# Patient Record
Sex: MALE | Race: WHITE | NOT HISPANIC OR LATINO | Employment: OTHER | URBAN - METROPOLITAN AREA
[De-identification: names, ages, dates, MRNs, and addresses within clinical notes are randomized per-mention and may not be internally consistent; named-entity substitution may affect disease eponyms.]

---

## 2023-11-10 ENCOUNTER — APPOINTMENT (EMERGENCY)
Dept: RADIOLOGY | Facility: HOSPITAL | Age: 73
DRG: 291 | End: 2023-11-10
Payer: MEDICARE

## 2023-11-10 ENCOUNTER — HOSPITAL ENCOUNTER (INPATIENT)
Facility: HOSPITAL | Age: 73
LOS: 6 days | Discharge: NURSING, REHAB OR LONG-TERM CARE | DRG: 291 | End: 2023-11-16
Attending: EMERGENCY MEDICINE | Admitting: HOSPITALIST
Payer: MEDICARE

## 2023-11-10 ENCOUNTER — APPOINTMENT (EMERGENCY)
Dept: CT IMAGING | Facility: HOSPITAL | Age: 73
DRG: 291 | End: 2023-11-10
Payer: MEDICARE

## 2023-11-10 DIAGNOSIS — R79.89 ELEVATED TROPONIN: ICD-10-CM

## 2023-11-10 DIAGNOSIS — R60.0 EDEMA OF LEFT LOWER EXTREMITY: ICD-10-CM

## 2023-11-10 DIAGNOSIS — S21.101A: ICD-10-CM

## 2023-11-10 DIAGNOSIS — I82.409 ACUTE DVT (DEEP VENOUS THROMBOSIS) (HCC): ICD-10-CM

## 2023-11-10 DIAGNOSIS — R53.1 GENERALIZED WEAKNESS: Primary | ICD-10-CM

## 2023-11-10 DIAGNOSIS — R06.89 ACUTE RESPIRATORY INSUFFICIENCY: ICD-10-CM

## 2023-11-10 DIAGNOSIS — I50.41 ACUTE COMBINED SYSTOLIC AND DIASTOLIC CONGESTIVE HEART FAILURE (HCC): ICD-10-CM

## 2023-11-10 PROBLEM — J96.01 ACUTE RESPIRATORY FAILURE WITH HYPOXIA (HCC): Status: ACTIVE | Noted: 2023-11-10

## 2023-11-10 PROBLEM — R65.10 SIRS (SYSTEMIC INFLAMMATORY RESPONSE SYNDROME) (HCC): Status: ACTIVE | Noted: 2023-11-10

## 2023-11-10 PROBLEM — I10 ESSENTIAL HYPERTENSION: Status: ACTIVE | Noted: 2023-11-10

## 2023-11-10 PROBLEM — R22.42 LOCALIZED SWELLING OF LEFT LOWER LEG: Status: ACTIVE | Noted: 2023-11-10

## 2023-11-10 LAB
2HR DELTA HS TROPONIN: 14 NG/L
4HR DELTA HS TROPONIN: 14 NG/L
ALBUMIN SERPL BCP-MCNC: 3.9 G/DL (ref 3.5–5)
ALP SERPL-CCNC: 54 U/L (ref 34–104)
ALT SERPL W P-5'-P-CCNC: 34 U/L (ref 7–52)
ANION GAP SERPL CALCULATED.3IONS-SCNC: 9 MMOL/L
APTT PPP: 28 SECONDS (ref 23–37)
AST SERPL W P-5'-P-CCNC: 53 U/L (ref 13–39)
ATRIAL RATE: 96 BPM
BASOPHILS # BLD AUTO: 0.03 THOUSANDS/ÂΜL (ref 0–0.1)
BASOPHILS NFR BLD AUTO: 0 % (ref 0–1)
BILIRUB SERPL-MCNC: 0.71 MG/DL (ref 0.2–1)
BNP SERPL-MCNC: 572 PG/ML (ref 0–100)
BUN SERPL-MCNC: 17 MG/DL (ref 5–25)
CALCIUM SERPL-MCNC: 8.8 MG/DL (ref 8.4–10.2)
CARDIAC TROPONIN I PNL SERPL HS: 54 NG/L
CARDIAC TROPONIN I PNL SERPL HS: 68 NG/L
CARDIAC TROPONIN I PNL SERPL HS: 68 NG/L
CHLORIDE SERPL-SCNC: 103 MMOL/L (ref 96–108)
CO2 SERPL-SCNC: 22 MMOL/L (ref 21–32)
CREAT SERPL-MCNC: 1.03 MG/DL (ref 0.6–1.3)
D DIMER PPP FEU-MCNC: 3.09 UG/ML FEU
EOSINOPHIL # BLD AUTO: 0.03 THOUSAND/ÂΜL (ref 0–0.61)
EOSINOPHIL NFR BLD AUTO: 0 % (ref 0–6)
ERYTHROCYTE [DISTWIDTH] IN BLOOD BY AUTOMATED COUNT: 13.4 % (ref 11.6–15.1)
FLUAV RNA RESP QL NAA+PROBE: NEGATIVE
FLUBV RNA RESP QL NAA+PROBE: NEGATIVE
GFR SERPL CREATININE-BSD FRML MDRD: 70 ML/MIN/1.73SQ M
GLUCOSE SERPL-MCNC: 128 MG/DL (ref 65–140)
HCT VFR BLD AUTO: 44.4 % (ref 36.5–49.3)
HGB BLD-MCNC: 14.5 G/DL (ref 12–17)
IMM GRANULOCYTES # BLD AUTO: 0.08 THOUSAND/UL (ref 0–0.2)
IMM GRANULOCYTES NFR BLD AUTO: 1 % (ref 0–2)
INR PPP: 1.11 (ref 0.84–1.19)
LACTATE SERPL-SCNC: 2 MMOL/L (ref 0.5–2)
LACTATE SERPL-SCNC: 2.1 MMOL/L (ref 0.5–2)
LACTATE SERPL-SCNC: 2.4 MMOL/L (ref 0.5–2)
LYMPHOCYTES # BLD AUTO: 0.46 THOUSANDS/ÂΜL (ref 0.6–4.47)
LYMPHOCYTES NFR BLD AUTO: 3 % (ref 14–44)
MAGNESIUM SERPL-MCNC: 2 MG/DL (ref 1.9–2.7)
MCH RBC QN AUTO: 31.3 PG (ref 26.8–34.3)
MCHC RBC AUTO-ENTMCNC: 32.7 G/DL (ref 31.4–37.4)
MCV RBC AUTO: 96 FL (ref 82–98)
MONOCYTES # BLD AUTO: 0.84 THOUSAND/ÂΜL (ref 0.17–1.22)
MONOCYTES NFR BLD AUTO: 6 % (ref 4–12)
NEUTROPHILS # BLD AUTO: 13.95 THOUSANDS/ÂΜL (ref 1.85–7.62)
NEUTS SEG NFR BLD AUTO: 90 % (ref 43–75)
NRBC BLD AUTO-RTO: 0 /100 WBCS
P AXIS: 61 DEGREES
PLATELET # BLD AUTO: 295 THOUSANDS/UL (ref 149–390)
PMV BLD AUTO: 10.8 FL (ref 8.9–12.7)
POTASSIUM SERPL-SCNC: 4.2 MMOL/L (ref 3.5–5.3)
POTASSIUM SERPL-SCNC: 5.8 MMOL/L (ref 3.5–5.3)
PR INTERVAL: 136 MS
PROCALCITONIN SERPL-MCNC: 0.32 NG/ML
PROT SERPL-MCNC: 7.3 G/DL (ref 6.4–8.4)
PROTHROMBIN TIME: 14.7 SECONDS (ref 11.6–14.5)
QRS AXIS: 56 DEGREES
QRSD INTERVAL: 118 MS
QT INTERVAL: 368 MS
QTC INTERVAL: 464 MS
RBC # BLD AUTO: 4.63 MILLION/UL (ref 3.88–5.62)
RSV RNA RESP QL NAA+PROBE: NEGATIVE
SARS-COV-2 RNA RESP QL NAA+PROBE: NEGATIVE
SODIUM SERPL-SCNC: 134 MMOL/L (ref 135–147)
T WAVE AXIS: 133 DEGREES
VENTRICULAR RATE: 96 BPM
WBC # BLD AUTO: 15.39 THOUSAND/UL (ref 4.31–10.16)

## 2023-11-10 PROCEDURE — 83735 ASSAY OF MAGNESIUM: CPT | Performed by: EMERGENCY MEDICINE

## 2023-11-10 PROCEDURE — G1004 CDSM NDSC: HCPCS

## 2023-11-10 PROCEDURE — 83605 ASSAY OF LACTIC ACID: CPT | Performed by: INTERNAL MEDICINE

## 2023-11-10 PROCEDURE — 87040 BLOOD CULTURE FOR BACTERIA: CPT | Performed by: EMERGENCY MEDICINE

## 2023-11-10 PROCEDURE — 71045 X-RAY EXAM CHEST 1 VIEW: CPT

## 2023-11-10 PROCEDURE — 83605 ASSAY OF LACTIC ACID: CPT | Performed by: EMERGENCY MEDICINE

## 2023-11-10 PROCEDURE — 0241U HB NFCT DS VIR RESP RNA 4 TRGT: CPT | Performed by: INTERNAL MEDICINE

## 2023-11-10 PROCEDURE — 96365 THER/PROPH/DIAG IV INF INIT: CPT

## 2023-11-10 PROCEDURE — 99285 EMERGENCY DEPT VISIT HI MDM: CPT | Performed by: EMERGENCY MEDICINE

## 2023-11-10 PROCEDURE — 84132 ASSAY OF SERUM POTASSIUM: CPT | Performed by: EMERGENCY MEDICINE

## 2023-11-10 PROCEDURE — 80053 COMPREHEN METABOLIC PANEL: CPT | Performed by: EMERGENCY MEDICINE

## 2023-11-10 PROCEDURE — 85025 COMPLETE CBC W/AUTO DIFF WBC: CPT | Performed by: EMERGENCY MEDICINE

## 2023-11-10 PROCEDURE — 71275 CT ANGIOGRAPHY CHEST: CPT

## 2023-11-10 PROCEDURE — 85379 FIBRIN DEGRADATION QUANT: CPT | Performed by: EMERGENCY MEDICINE

## 2023-11-10 PROCEDURE — 36415 COLL VENOUS BLD VENIPUNCTURE: CPT | Performed by: EMERGENCY MEDICINE

## 2023-11-10 PROCEDURE — 93005 ELECTROCARDIOGRAM TRACING: CPT

## 2023-11-10 PROCEDURE — 84145 PROCALCITONIN (PCT): CPT | Performed by: EMERGENCY MEDICINE

## 2023-11-10 PROCEDURE — 96375 TX/PRO/DX INJ NEW DRUG ADDON: CPT

## 2023-11-10 PROCEDURE — 99223 1ST HOSP IP/OBS HIGH 75: CPT | Performed by: INTERNAL MEDICINE

## 2023-11-10 PROCEDURE — 85730 THROMBOPLASTIN TIME PARTIAL: CPT | Performed by: EMERGENCY MEDICINE

## 2023-11-10 PROCEDURE — 83880 ASSAY OF NATRIURETIC PEPTIDE: CPT | Performed by: EMERGENCY MEDICINE

## 2023-11-10 PROCEDURE — 99285 EMERGENCY DEPT VISIT HI MDM: CPT

## 2023-11-10 PROCEDURE — 93010 ELECTROCARDIOGRAM REPORT: CPT | Performed by: INTERNAL MEDICINE

## 2023-11-10 PROCEDURE — 84484 ASSAY OF TROPONIN QUANT: CPT | Performed by: EMERGENCY MEDICINE

## 2023-11-10 PROCEDURE — 85610 PROTHROMBIN TIME: CPT | Performed by: EMERGENCY MEDICINE

## 2023-11-10 RX ORDER — HYDRALAZINE HYDROCHLORIDE 20 MG/ML
5 INJECTION INTRAMUSCULAR; INTRAVENOUS EVERY 6 HOURS PRN
Status: DISCONTINUED | OUTPATIENT
Start: 2023-11-10 | End: 2023-11-16 | Stop reason: HOSPADM

## 2023-11-10 RX ORDER — ACETAMINOPHEN 325 MG/1
650 TABLET ORAL EVERY 6 HOURS PRN
Status: DISCONTINUED | OUTPATIENT
Start: 2023-11-10 | End: 2023-11-16 | Stop reason: HOSPADM

## 2023-11-10 RX ORDER — HEPARIN SODIUM 1000 [USP'U]/ML
10000 INJECTION, SOLUTION INTRAVENOUS; SUBCUTANEOUS ONCE
Status: COMPLETED | OUTPATIENT
Start: 2023-11-10 | End: 2023-11-10

## 2023-11-10 RX ORDER — HEPARIN SODIUM 10000 [USP'U]/100ML
3-30 INJECTION, SOLUTION INTRAVENOUS
Status: DISCONTINUED | OUTPATIENT
Start: 2023-11-10 | End: 2023-11-14

## 2023-11-10 RX ORDER — FUROSEMIDE 10 MG/ML
20 INJECTION INTRAMUSCULAR; INTRAVENOUS 2 TIMES DAILY
Status: DISCONTINUED | OUTPATIENT
Start: 2023-11-10 | End: 2023-11-13

## 2023-11-10 RX ORDER — HEPARIN SODIUM 1000 [USP'U]/ML
10000 INJECTION, SOLUTION INTRAVENOUS; SUBCUTANEOUS EVERY 6 HOURS PRN
Status: DISCONTINUED | OUTPATIENT
Start: 2023-11-10 | End: 2023-11-14

## 2023-11-10 RX ORDER — HEPARIN SODIUM 1000 [USP'U]/ML
5000 INJECTION, SOLUTION INTRAVENOUS; SUBCUTANEOUS EVERY 6 HOURS PRN
Status: DISCONTINUED | OUTPATIENT
Start: 2023-11-10 | End: 2023-11-14

## 2023-11-10 RX ADMIN — HEPARIN SODIUM 10000 UNITS: 1000 INJECTION, SOLUTION INTRAVENOUS; SUBCUTANEOUS at 19:05

## 2023-11-10 RX ADMIN — IOHEXOL 100 ML: 350 INJECTION, SOLUTION INTRAVENOUS at 19:14

## 2023-11-10 RX ADMIN — SODIUM CHLORIDE 250 ML: 0.9 INJECTION, SOLUTION INTRAVENOUS at 21:44

## 2023-11-10 RX ADMIN — ACETAMINOPHEN 650 MG: 325 TABLET, FILM COATED ORAL at 21:40

## 2023-11-10 RX ADMIN — HEPARIN SODIUM 18 UNITS/KG/HR: 10000 INJECTION, SOLUTION INTRAVENOUS at 19:23

## 2023-11-10 RX ADMIN — FUROSEMIDE 20 MG: 10 INJECTION, SOLUTION INTRAMUSCULAR; INTRAVENOUS at 21:40

## 2023-11-10 NOTE — ED PROVIDER NOTES
History  Chief Complaint   Patient presents with    Weakness - Generalized     Pt to er via ems from [facility] with reports from staff that he was in "respiratory distress", patient denies any trouble breathing. Reports that he has been feeling an increase in weakness over the past couple days      74year old male presents for evaluation of generalized weakness and soreness of the anterior thighs bilaterally, worse on the right, with the feeling his legs are going to give out when he walks. Patient was noted to have difficulty breathing by nursing home staff this morning and had an oxygen saturation of 90% on room air upon arrival.  He states he feels better with the oxygen, but denies shortness of breath. No chest pain. Patient states he has noticed increased swelling of the left leg over the past 2 months. No history of VTE. He report recent URI symptoms 4 weeks ago, but states he no longer has cough, congestion or fevers. None       Past Medical History:   Diagnosis Date    Hypertension     Skin cancer        History reviewed. No pertinent surgical history. History reviewed. No pertinent family history. I have reviewed and agree with the history as documented. E-Cigarette/Vaping     E-Cigarette/Vaping Substances     Social History     Tobacco Use    Smoking status: Never    Smokeless tobacco: Never   Substance Use Topics    Alcohol use: Never    Drug use: Never       Review of Systems    Physical Exam  Physical Exam  Vitals and nursing note reviewed. HENT:      Head: Normocephalic and atraumatic. Cardiovascular:      Rate and Rhythm: Normal rate and regular rhythm. Pulses: Normal pulses. Heart sounds: Normal heart sounds. Pulmonary:      Effort: Pulmonary effort is normal. No respiratory distress. Breath sounds: Normal breath sounds. Abdominal:      General: There is no distension. Palpations: Abdomen is soft. Tenderness:  There is no abdominal tenderness. Musculoskeletal:      Right lower leg: No edema. Left lower leg: Edema (3+) present. Skin:     Comments: Erythema left lower leg associated with edema and venous congestion. Neurological:      Mental Status: He is alert. Vital Signs  ED Triage Vitals   Temperature Pulse Respirations Blood Pressure SpO2   11/10/23 1515 11/10/23 1515 11/10/23 1513 11/10/23 1513 11/10/23 1515   100.2 °F (37.9 °C) 96 18 161/71 90 %      Temp Source Heart Rate Source Patient Position - Orthostatic VS BP Location FiO2 (%)   11/10/23 1515 11/10/23 1515 11/10/23 1513 11/10/23 1513 --   Oral Monitor Lying Left arm       Pain Score       11/10/23 2000       No Pain           Vitals:    11/10/23 1630 11/10/23 1715 11/10/23 1900 11/10/23 2000   BP: 138/65 130/62  141/62   Pulse: 91 99 98 92   Patient Position - Orthostatic VS:    Lying         Visual Acuity      ED Medications  Medications   heparin (porcine) 25,000 units in 0.45% NaCl 250 mL infusion (premix) ( Intravenous Not Given 11/10/23 1927)   heparin (porcine) injection 10,000 Units (has no administration in time range)   heparin (porcine) injection 5,000 Units (has no administration in time range)   heparin (porcine) injection 10,000 Units (10,000 Units Intravenous Given 11/10/23 1905)   iohexol (OMNIPAQUE) 350 MG/ML injection (SINGLE-DOSE) 100 mL (100 mL Intravenous Given 11/10/23 1914)       Diagnostic Studies  Results Reviewed       Procedure Component Value Units Date/Time    Blood culture #1 [[id number]] Collected: 11/10/23 1532    Lab Status: Preliminary result Specimen: Blood from Arm, Left Updated: 11/10/23 2001     Blood Culture Received in Microbiology Lab. Culture in Progress. Blood culture #2 [[id number]] Collected: 11/10/23 1532    Lab Status: Preliminary result Specimen: Blood from Arm, Left Updated: 11/10/23 2001     Blood Culture Received in Microbiology Lab. Culture in Progress.     Lactic acid 2 Hours [[id number]] Collected: 11/10/23 1942 Lab Status: In process Specimen: Blood from Arm, Right Updated: 11/1948    HS Troponin I 4hr [[id number]] Collected: 11/10/23 1942    Lab Status: In process Specimen: Blood from Arm, Right Updated: 11/1948    D-Dimer [[id number]]  (Abnormal) Collected: 11/10/23 1645    Lab Status: Final result Specimen: Blood from Arm, Left Updated: 11/10/23 1752     D-Dimer, Quant 3.09 ug/ml FEU     Narrative: In the evaluation for possible pulmonary embolism, in the appropriate (Well's Score of 4 or less) patient, the age adjusted d-dimer cutoff for this patient can be calculated as:    Age x 0.01 (in ug/mL) for Age-adjusted D-dimer exclusion threshold for a patient over 50 years. Protime-INR [[id number]]  (Abnormal) Collected: 11/10/23 1645    Lab Status: Final result Specimen: Blood from Arm, Left Updated: 11/10/23 1752     Protime 14.7 seconds      INR 1.11    APTT [[id number]]  (Normal) Collected: 11/10/23 1645    Lab Status: Final result Specimen: Blood from Arm, Left Updated: 11/10/23 1752     PTT 28 seconds     Potassium [[id number]]  (Normal) Collected: 11/10/23 1647    Lab Status: Final result Specimen: Blood from Arm, Left Updated: 11/10/23 1739     Potassium 4.2 mmol/L     HS Troponin I 2hr [[id number]]  (Abnormal) Collected: 11/10/23 1706    Lab Status: Final result Specimen: Blood from Arm, Left Updated: 11/10/23 1736     hs TnI 2hr 68 ng/L      Delta 2hr hsTnI 14 ng/L     B-Type Natriuretic Peptide(BNP) [[id number]]  (Abnormal) Collected: 11/10/23 1532    Lab Status: Final result Specimen: Blood from Arm, Left Updated: 11/10/23 1637      pg/mL     Lactic acid [[id number]]  (Abnormal) Collected: 11/10/23 1532    Lab Status: Final result Specimen: Blood from Arm, Left Updated: 11/10/23 1624     LACTIC ACID 2.1 mmol/L     Narrative:      Result may be elevated if tourniquet was used during collection.     Procalcitonin [[id number]]  (Abnormal) Collected: 11/10/23 2673    Lab Status: Final result Specimen: Blood from Arm, Left Updated: 11/10/23 1623     Procalcitonin 0.32 ng/ml     HS Troponin 0hr (reflex protocol) [[id number]]  (Abnormal) Collected: 11/10/23 1532    Lab Status: Final result Specimen: Blood from Arm, Left Updated: 11/10/23 1620     hs TnI 0hr 54 ng/L     Comprehensive metabolic panel [[id number]]  (Abnormal) Collected: 11/10/23 1540    Lab Status: Final result Specimen: Blood from Arm, Left Updated: 11/10/23 1614     Sodium 134 mmol/L      Potassium 5.8 mmol/L      Chloride 103 mmol/L      CO2 22 mmol/L      ANION GAP 9 mmol/L      BUN 17 mg/dL      Creatinine 1.03 mg/dL      Glucose 128 mg/dL      Calcium 8.8 mg/dL      AST 53 U/L      ALT 34 U/L      Alkaline Phosphatase 54 U/L      Total Protein 7.3 g/dL      Albumin 3.9 g/dL      Total Bilirubin 0.71 mg/dL      eGFR 70 ml/min/1.73sq m     Narrative:      [place] guidelines for Chronic Kidney Disease (CKD):     Stage 1 with normal or high GFR (GFR > 90 mL/min/1.73 square meters)    Stage 2 Mild CKD (GFR = 60-89 mL/min/1.73 square meters)    Stage 3A Moderate CKD (GFR = 45-59 mL/min/1.73 square meters)    Stage 3B Moderate CKD (GFR = 30-44 mL/min/1.73 square meters)    Stage 4 Severe CKD (GFR = 15-29 mL/min/1.73 square meters)    Stage 5 End Stage CKD (GFR <15 mL/min/1.73 square meters)  Note: GFR calculation is accurate only with a steady state creatinine    Magnesium [[id number]]  (Normal) Collected: 11/10/23 1540    Lab Status: Final result Specimen: Blood from Arm, Left Updated: 11/10/23 1614     Magnesium 2.0 mg/dL     CBC and differential [[id number]]  (Abnormal) Collected: 11/10/23 1532    Lab Status: Final result Specimen: Blood from Arm, Left Updated: 11/10/23 1604     WBC 15.39 Thousand/uL      RBC 4.63 Million/uL      Hemoglobin 14.5 g/dL      Hematocrit 44.4 %      MCV 96 fL      MCH 31.3 pg      MCHC 32.7 g/dL      RDW 13.4 %      MPV 10.8 fL      Platelets 179 Thousands/uL      nRBC 0 /100 WBCs Neutrophils Relative 90 %      Immat GRANS % 1 %      Lymphocytes Relative 3 %      Monocytes Relative 6 %      Eosinophils Relative 0 %      Basophils Relative 0 %      Neutrophils Absolute 13.95 Thousands/µL      Immature Grans Absolute 0.08 Thousand/uL      Lymphocytes Absolute 0.46 Thousands/µL      Monocytes Absolute 0.84 Thousand/µL      Eosinophils Absolute 0.03 Thousand/µL      Basophils Absolute 0.03 Thousands/µL     Narrative: This is an appended report. These results have been appended to a previously verified report. UA w Reflex to Microscopic w Reflex to Culture [[id number]]     Lab Status: No result Specimen: Urine                    CTA ED chest PE study   Final Result by Russell Berry MD (1950)      No definite pulmonary emboli although segmental and subsegmental emboli in the lung bases cannot be excluded due to motion artifact. Evaluation of the lungs compromised by motion with mild interstitial edema. Pulmonary artery enlargement which can be seen with pulmonary hypertension. Hepatic steatosis. Gynecomastia.          Workstation performed: [id number]         XR chest portable   ED Interpretation by Aicha Guzman MD (11/10 [id number])   Cardiomegaly with patchiness bilateral lower lung fields                 Procedures  ECG 12 Lead Documentation Only    Date/Time: 11/10/2023 3:20 PM    Performed by: Aicha Guzman MD  Authorized by: Aicha Guzman MD    Indications / Diagnosis:  Generalized weakness  ECG reviewed by me, the ED Provider: yes    Patient location:  ED  Previous ECG:     Previous ECG:  Unavailable  Interpretation:     Interpretation: abnormal    Rate:     ECG rate:  96    ECG rate assessment: normal    Rhythm:     Rhythm: sinus rhythm    Ectopy:     Ectopy: PVCs    QRS:     QRS axis:  Normal    QRS intervals:  Normal  Conduction:     Conduction: normal    ST segments:     ST segments:  Non-specific    Elevation:  V1, V2 and V3    Depression:  V5 and V6  T waves:     T waves: inverted      Inverted:  AVL and II  Other findings:     Other findings: LVH with strain             ED Course  ED Course as of 11/10/23 2005   Fri Nov 10, 2023   1624 hs TnI 0hr(!): 54   1624 Potassium(!): 5.8  Moderately hemolyzed. Repeat ordered   1739 Potassium: 4.2   1816 D-Dimer, Quant(!): 3.09             HEART Risk Score      Flowsheet Row Most Recent Value   Heart Score Risk Calculator    History 0  [no chest pain] Filed at: 11/10/2023 1545   ECG 1 Filed at: 11/10/2023 1545   Age 2 Filed at: 11/10/2023 1545   Risk Factors 2 Filed at: 11/10/2023 1545   Troponin 0 Filed at: 11/10/2023 1545   HEART Score 5 Filed at: 11/10/2023 1545                       Initial Sepsis Screening       Row Name 11/10/23 2002                Is the patient's history suggestive of a new or worsening infection? No  -EE                  User Key  (r) = Recorded By, (t) = Taken By, (c) = Cosigned By      [address] [place] [address] Name Provider Type    EE Lea Curtis MD Physician                    SBIRT 22yo+      Flowsheet Row Most Recent Value   Initial Alcohol Screen: US AUDIT-C     1. How often do you have a drink containing alcohol? 0 Filed at: 11/10/2023 1514   2. How many drinks containing alcohol do you have on a typical day you are drinking? 0 Filed at: 11/10/2023 1514   3a. Male UNDER 65: How often do you have five or more drinks on one occasion? 0 Filed at: 11/10/2023 1514   3b. FEMALE Any Age, or MALE 65+: How often do you have 4 or more drinks on one occassion? 0 Filed at: 11/10/2023 1514   Audit-C Score 0 Filed at: 11/10/2023 1514   WILLIE: How many times in the past year have you. .. Used an illegal drug or used a prescription medication for non-medical reasons?  Never Filed at: 11/10/2023 1514            Wells' Criteria for PE      Flowsheet Row Most Recent Value   Wells' Criteria for PE    Clinical signs and symptoms of DVT 3 Filed at: 11/10/2023 1546   PE is primary diagnosis or equally likely 3 Filed at: 11/10/2023 1546   HR >100 0 Filed at: 11/10/2023 1546   Immobilization at least 3 days or Surgery in the previous 4 weeks 0 Filed at: 11/10/2023 1546   Previous, objectively diagnosed PE or DVT 0 Filed at: 11/10/2023 1546   Hemoptysis 0 Filed at: 11/10/2023 1546   Malignancy with treatment within 6 months or palliative 0 Filed at: 11/10/2023 1546   Wells' Criteria Total 6 Filed at: 11/10/2023 1546                  Medical Decision Making  74year old male presents for evaluation of generalized weakness. Placed on nasal canula for hypoxemia on room air. No home oxygen requirement. EKG consistent with LVH with strain. No prior for comparison. No STEMI criteria or arrhythmia on my independent interpretation. HEART score 5. Moderate risk Well's score for PE with signs of possible DVT on exam.  D-dimer elevated. Patient started on heparin drip. CT PE study negative for large PE, but limited by motion artifact. Will continue VTE heparin pending duplex of the lower extremity which can be completed as inpatient. Patient admitted for further evaluation and management. Amount and/or Complexity of Data Reviewed  Labs: ordered. Decision-making details documented in ED Course. Radiology: ordered and independent interpretation performed. Risk  Prescription drug management. Decision regarding hospitalization.              Disposition  Final diagnoses:   Generalized weakness   Elevated troponin   Edema of left lower extremity   Acute respiratory insufficiency     Time reflects when diagnosis was documented in both MDM as applicable and the Disposition within this note       Time User Action Codes Description Comment    11/10/2023  7:32 PM Ernestina Duane Add [R53.1] Generalized weakness     11/10/2023  7:32 PM Ayesha Duane Add [R79.89] Elevated troponin     11/10/2023  8:00 PM Ernestina Duane Add [R60.0] Edema of left lower extremity     11/10/2023  8:05 KURT Cerna Add [R06.89] Acute respiratory insufficiency           ED Disposition       ED Disposition   Admit    Condition   Stable    Date/Time   Fri Nov 10, 2023 2000    Comment   Case was discussed with MIO and the patient's admission status was agreed to be Admission Status: inpatient status to the service of Dr. Juliana Peterson . Follow-up Information    None         Patient's Medications    No medications on file       No discharge procedures on file.     PDMP Review       None            ED Provider  Electronically Signed by             Eduarda Stephenson MD  11/10/23 2002       Eduarda Stephenson MD  11/10/23 2005

## 2023-11-11 LAB
ANION GAP SERPL CALCULATED.3IONS-SCNC: 10 MMOL/L
APTT PPP: 185 SECONDS (ref 23–37)
APTT PPP: 78 SECONDS (ref 23–37)
APTT PPP: >210 SECONDS (ref 23–37)
BASOPHILS # BLD AUTO: 0.11 THOUSANDS/ÂΜL (ref 0–0.1)
BASOPHILS NFR BLD AUTO: 1 % (ref 0–1)
BUN SERPL-MCNC: 19 MG/DL (ref 5–25)
CALCIUM SERPL-MCNC: 8.1 MG/DL (ref 8.4–10.2)
CHLORIDE SERPL-SCNC: 102 MMOL/L (ref 96–108)
CO2 SERPL-SCNC: 22 MMOL/L (ref 21–32)
CREAT SERPL-MCNC: 1.14 MG/DL (ref 0.6–1.3)
EOSINOPHIL # BLD AUTO: 0.13 THOUSAND/ÂΜL (ref 0–0.61)
EOSINOPHIL NFR BLD AUTO: 1 % (ref 0–6)
ERYTHROCYTE [DISTWIDTH] IN BLOOD BY AUTOMATED COUNT: 13.5 % (ref 11.6–15.1)
GFR SERPL CREATININE-BSD FRML MDRD: 62 ML/MIN/1.73SQ M
GLUCOSE SERPL-MCNC: 121 MG/DL (ref 65–140)
HCT VFR BLD AUTO: 40.5 % (ref 36.5–49.3)
HGB BLD-MCNC: 13.1 G/DL (ref 12–17)
IMM GRANULOCYTES # BLD AUTO: 0.15 THOUSAND/UL (ref 0–0.2)
IMM GRANULOCYTES NFR BLD AUTO: 1 % (ref 0–2)
LYMPHOCYTES # BLD AUTO: 1.87 THOUSANDS/ÂΜL (ref 0.6–4.47)
LYMPHOCYTES NFR BLD AUTO: 9 % (ref 14–44)
MAGNESIUM SERPL-MCNC: 1.9 MG/DL (ref 1.9–2.7)
MCH RBC QN AUTO: 30.8 PG (ref 26.8–34.3)
MCHC RBC AUTO-ENTMCNC: 32.3 G/DL (ref 31.4–37.4)
MCV RBC AUTO: 95 FL (ref 82–98)
MONOCYTES # BLD AUTO: 1.07 THOUSAND/ÂΜL (ref 0.17–1.22)
MONOCYTES NFR BLD AUTO: 5 % (ref 4–12)
NEUTROPHILS # BLD AUTO: 18.72 THOUSANDS/ÂΜL (ref 1.85–7.62)
NEUTS SEG NFR BLD AUTO: 83 % (ref 43–75)
NRBC BLD AUTO-RTO: 0 /100 WBCS
PLATELET # BLD AUTO: 263 THOUSANDS/UL (ref 149–390)
PMV BLD AUTO: 10.8 FL (ref 8.9–12.7)
POTASSIUM SERPL-SCNC: 4 MMOL/L (ref 3.5–5.3)
RBC # BLD AUTO: 4.26 MILLION/UL (ref 3.88–5.62)
SODIUM SERPL-SCNC: 134 MMOL/L (ref 135–147)
WBC # BLD AUTO: 22.05 THOUSAND/UL (ref 4.31–10.16)

## 2023-11-11 PROCEDURE — 99222 1ST HOSP IP/OBS MODERATE 55: CPT | Performed by: INTERNAL MEDICINE

## 2023-11-11 PROCEDURE — 83735 ASSAY OF MAGNESIUM: CPT | Performed by: INTERNAL MEDICINE

## 2023-11-11 PROCEDURE — 80048 BASIC METABOLIC PNL TOTAL CA: CPT | Performed by: INTERNAL MEDICINE

## 2023-11-11 PROCEDURE — 85730 THROMBOPLASTIN TIME PARTIAL: CPT | Performed by: INTERNAL MEDICINE

## 2023-11-11 PROCEDURE — 90471 IMMUNIZATION ADMIN: CPT | Performed by: INTERNAL MEDICINE

## 2023-11-11 PROCEDURE — 85025 COMPLETE CBC W/AUTO DIFF WBC: CPT | Performed by: INTERNAL MEDICINE

## 2023-11-11 PROCEDURE — 99232 SBSQ HOSP IP/OBS MODERATE 35: CPT | Performed by: HOSPITALIST

## 2023-11-11 PROCEDURE — 85730 THROMBOPLASTIN TIME PARTIAL: CPT | Performed by: HOSPITALIST

## 2023-11-11 PROCEDURE — 90662 IIV NO PRSV INCREASED AG IM: CPT | Performed by: INTERNAL MEDICINE

## 2023-11-11 RX ORDER — METOPROLOL SUCCINATE 100 MG/1
100 TABLET, EXTENDED RELEASE ORAL DAILY
COMMUNITY

## 2023-11-11 RX ORDER — METOPROLOL SUCCINATE 50 MG/1
100 TABLET, EXTENDED RELEASE ORAL DAILY
Status: DISCONTINUED | OUTPATIENT
Start: 2023-11-12 | End: 2023-11-16 | Stop reason: HOSPADM

## 2023-11-11 RX ORDER — LISINOPRIL 10 MG/1
10 TABLET ORAL DAILY
COMMUNITY
End: 2023-11-16

## 2023-11-11 RX ORDER — LISINOPRIL 10 MG/1
10 TABLET ORAL DAILY
Status: DISCONTINUED | OUTPATIENT
Start: 2023-11-12 | End: 2023-11-13

## 2023-11-11 RX ADMIN — INFLUENZA A VIRUS A/VICTORIA/4897/2022 IVR-238 (H1N1) ANTIGEN (FORMALDEHYDE INACTIVATED), INFLUENZA A VIRUS A/DARWIN/9/2021 SAN-010 (H3N2) ANTIGEN (FORMALDEHYDE INACTIVATED), INFLUENZA B VIRUS B/PHUKET/3073/2013 ANTIGEN (FORMALDEHYDE INACTIVATED), AND INFLUENZA B VIRUS B/MICHIGAN/01/2021 ANTIGEN (FORMALDEHYDE INACTIVATED) 0.7 ML: 60; 60; 60; 60 INJECTION, SUSPENSION INTRAMUSCULAR at 14:07

## 2023-11-11 RX ADMIN — ACETAMINOPHEN 650 MG: 325 TABLET, FILM COATED ORAL at 06:14

## 2023-11-11 RX ADMIN — HEPARIN SODIUM 12 UNITS/KG/HR: 10000 INJECTION, SOLUTION INTRAVENOUS at 23:49

## 2023-11-11 RX ADMIN — FUROSEMIDE 20 MG: 10 INJECTION, SOLUTION INTRAMUSCULAR; INTRAVENOUS at 09:34

## 2023-11-11 RX ADMIN — HEPARIN SODIUM 15 UNITS/KG/HR: 10000 INJECTION, SOLUTION INTRAVENOUS at 07:42

## 2023-11-11 RX ADMIN — FUROSEMIDE 20 MG: 10 INJECTION, SOLUTION INTRAMUSCULAR; INTRAVENOUS at 17:22

## 2023-11-11 NOTE — ASSESSMENT & PLAN NOTE
Reporting sob over the past couple of days. Dry ongoing cough over the past couple of weeks. Denies congestion, fevers or chills. 88% on RA. Currently on 2 L NC   CTA chest   No definite pulmonary emboli although segmental and subsegmental emboli in the lung bases cannot be excluded due to motion artifact. Evaluation of the lungs compromised by motion with mild interstitial edema.   Order ECHO   Per patient takes lasix at home. Dose unknown.  [facility] will need to be called in the morning   IV lasix ordered  Covid/Flu/RSV negative   Respiratory protocol   Continue to monitor and wean oxygen as able

## 2023-11-11 NOTE — ASSESSMENT & PLAN NOTE
Reporting sob over the past couple of days. Dry ongoing cough over the past couple of weeks. Denies congestion, fevers or chills. 88% on RA. Currently on 2 L NC   CTA chest   No definite pulmonary emboli although segmental and subsegmental emboli in the lung bases cannot be excluded due to motion artifact. Evaluation of the lungs compromised by motion with mild interstitial edema.     Order ECHO   IV lasix ordered  Covid/Flu/RSV negative   Respiratory protocol   Continue to monitor and wean oxygen as able

## 2023-11-11 NOTE — ASSESSMENT & PLAN NOTE
Patient reports progressively worsening swelling and erythema of his left lower extremity. 2+ pitting edema noted  D-dimer elevated. Order venous duplex  Started on VTE heparin due to possible DVT. BNP elevated. No echo on file. Start IV Lasix 20 mg bid   Elevate leg  Hold off on antibiotics at this time.  Does not appear cellulitic

## 2023-11-11 NOTE — PLAN OF CARE
Problem: MOBILITY - ADULT  Goal: Maintain or return to baseline ADL function  Description: INTERVENTIONS:  -  Assess patient's ability to carry out ADLs; assess patient's baseline for ADL function and identify physical deficits which impact ability to perform ADLs (bathing, care of mouth/teeth, toileting, grooming, dressing, etc.)  - Assess/evaluate cause of self-care deficits   - Assess range of motion  - Assess patient's mobility; develop plan if impaired  - Assess patient's need for assistive devices and provide as appropriate  - Encourage maximum independence but intervene and supervise when necessary  - Involve family in performance of ADLs  - Assess for home care needs following discharge   - Consider OT consult to assist with ADL evaluation and planning for discharge  - Provide patient education as appropriate  Outcome: Progressing  Goal: Maintains/Returns to pre admission functional level  Description: INTERVENTIONS:  - Perform BMAT or MOVE assessment daily.   - Set and communicate daily mobility goal to care team and patient/family/caregiver. - Collaborate with rehabilitation services on mobility goals if consulted  - Perform Range of Motion 2 times a day. - Reposition patient every 2 hours.   - Dangle patient 2 times a day  - Stand patient 2 times a day  - Ambulate patient 2 times a day  - Out of bed to chair 2 times a day   - Out of bed for meals 2 times a day  - Out of bed for toileting  - Record patient progress and toleration of activity level   Outcome: Progressing     Problem: PAIN - ADULT  Goal: Verbalizes/displays adequate comfort level or baseline comfort level  Description: Interventions:  - Encourage patient to monitor pain and request assistance  - Assess pain using appropriate pain scale  - Administer analgesics based on type and severity of pain and evaluate response  - Implement non-pharmacological measures as appropriate and evaluate response  - Consider cultural and social influences on pain and pain management  - Notify physician/advanced practitioner if interventions unsuccessful or patient reports new pain  Outcome: Progressing     Problem: INFECTION - ADULT  Goal: Absence or prevention of progression during hospitalization  Description: INTERVENTIONS:  - Assess and monitor for signs and symptoms of infection  - Monitor lab/diagnostic results  - Monitor all insertion sites, i.e. indwelling lines, tubes, and drains  - Monitor endotracheal if appropriate and nasal secretions for changes in amount and color  - [place] appropriate cooling/warming therapies per order  - Administer medications as ordered  - Instruct and encourage patient and family to use good hand hygiene technique  - Identify and instruct in appropriate isolation precautions for identified infection/condition  Outcome: Progressing  Goal: Absence of fever/infection during neutropenic period  Description: INTERVENTIONS:  - Monitor WBC    Outcome: Progressing     Problem: SAFETY ADULT  Goal: Maintain or return to baseline ADL function  Description: INTERVENTIONS:  -  Assess patient's ability to carry out ADLs; assess patient's baseline for ADL function and identify physical deficits which impact ability to perform ADLs (bathing, care of mouth/teeth, toileting, grooming, dressing, etc.)  - Assess/evaluate cause of self-care deficits   - Assess range of motion  - Assess patient's mobility; develop plan if impaired  - Assess patient's need for assistive devices and provide as appropriate  - Encourage maximum independence but intervene and supervise when necessary  - Involve family in performance of ADLs  - Assess for home care needs following discharge   - Consider OT consult to assist with ADL evaluation and planning for discharge  - Provide patient education as appropriate  Outcome: Progressing  Goal: Maintains/Returns to pre admission functional level  Description: INTERVENTIONS:  - Perform BMAT or MOVE assessment daily.   - Set and communicate daily mobility goal to care team and patient/family/caregiver. - Collaborate with rehabilitation services on mobility goals if consulted  - Perform Range of Motion 2 times a day. - Reposition patient every 2 hours.   - Dangle patient 2 times a day  - Stand patient 2 times a day  - Ambulate patient 2 times a day  - Out of bed to chair 2 times a day   - Out of bed for meals 2 times a day  - Out of bed for toileting  - Record patient progress and toleration of activity level   Outcome: Progressing  Goal: Patient will remain free of falls  Description: INTERVENTIONS:  - Educate patient/family on patient safety including physical limitations  - Instruct patient to call for assistance with activity   - Consult OT/PT to assist with strengthening/mobility   - Keep Call bell within reach  - Keep bed low and locked with side rails adjusted as appropriate  - Keep care items and personal belongings within reach  - Initiate and maintain comfort rounds  - Make Fall Risk Sign visible to staff  - Offer Toileting every 2 Hours, in advance of need  - Initiate/Maintain bed alarm  - Obtain necessary fall risk management equipment: socks  - Apply yellow socks and bracelet for high fall risk patients  - Consider moving patient to room near nurses station  Outcome: Progressing     Problem: DISCHARGE PLANNING  Goal: Discharge to home or other facility with appropriate resources  Description: INTERVENTIONS:  - Identify barriers to discharge w/patient and caregiver  - Arrange for needed discharge resources and transportation as appropriate  - Identify discharge learning needs (meds, wound care, etc.)  - Arrange for interpretive services to assist at discharge as needed  - Refer to Case Management Department for coordinating discharge planning if the patient needs post-hospital services based on physician/advanced practitioner order or complex needs related to functional status, cognitive ability, or social support system  Outcome: Progressing     Problem: Knowledge Deficit  Goal: Patient/family/caregiver demonstrates understanding of disease process, treatment plan, medications, and discharge instructions  Description: Complete learning assessment and assess knowledge base.   Interventions:  - Provide teaching at level of understanding  - Provide teaching via preferred learning methods  Outcome: Progressing     Problem: Potential for Falls  Goal: Patient will remain free of falls  Description: INTERVENTIONS:  - Educate patient/family on patient safety including physical limitations  - Instruct patient to call for assistance with activity   - Consult OT/PT to assist with strengthening/mobility   - Keep Call bell within reach  - Keep bed low and locked with side rails adjusted as appropriate  - Keep care items and personal belongings within reach  - Initiate and maintain comfort rounds  - Make Fall Risk Sign visible to staff  - Offer Toileting every 2 Hours, in advance of need  - Initiate/Maintain bed alarm  - Obtain necessary fall risk management equipment: socks  - Apply yellow socks and bracelet for high fall risk patients  - Consider moving patient to room near nurses station  Outcome: Progressing

## 2023-11-11 NOTE — ASSESSMENT & PLAN NOTE
Home regimen: Unknown  Patient reports he takes 2 blood pressure medications + Lasix  Requesting nursing to perform med rec as able  Normotensive thus far.   Continue to monitor  Added IV hydralazine as needed for systolic over 437

## 2023-11-11 NOTE — ASSESSMENT & PLAN NOTE
Presents from [facility] due to increasing generalized weakness over the past couple of days. He feels like his legs are going to give out when he tries to ambulate. Denies fall or injury. Typically uses a cane to ambulate.   Meeting SIRS criteria (tachycardia, tachypnea, leukocytosis)  CTA negative for pneumonia  COVID/flu/RSV negative  UA pending  Hypoxic on arrival - 2L NC  Fall precautions  Consult PT/OT and case management

## 2023-11-11 NOTE — UTILIZATION REVIEW
Initial Clinical Review    Admission: Date/Time/Statement:   Admission Orders (From admission, onward)       Ordered        11/10/23 2000  INPATIENT ADMISSION  Once                          Orders Placed This Encounter   Procedures    INPATIENT ADMISSION     Standing Status:   Standing     Number of Occurrences:   1     Order Specific Question:   Level of Care     Answer:   Med Surg [16]     Order Specific Question:   Estimated length of stay     Answer:   More than 2 Midnights     Order Specific Question:   Certification     Answer:   I certify that inpatient services are medically necessary for this patient for a duration of greater than two midnights. See H&P and MD Progress Notes for additional information about the patient's course of treatment. ED Arrival Information       Expected   -    Arrival   11/10/2023 15:05    Acuity   Emergent              Means of arrival   Ambulance    Escorted by   [facility]    Service   Hospitalist    Admission type   Emergency              Arrival complaint   weakness             Chief Complaint   Patient presents with    Weakness - Generalized     Pt to er via ems from [facility] with reports from staff that he was in "respiratory distress", patient denies any trouble breathing. Reports that he has been feeling an increase in weakness over the past couple days        Initial Presentation: 74 y.o. male to ED via EMS from home ([facility])   Present to ED with generalized weakness, shortness of breath and left lower extremity swelling. He feels like his legs are going to give out when he is attempting to ambulate. Usually uses a cane. PMHX hypertension, obesity   Admitted to [facility] with DX: Generalized weakness   on exam: temp 100.3; tachypnea; RA sat 88% - placed on O2 @ 2L via nc sat 94%; left lower extremity has 2+ pitting edema with dry skin and redness. D-dimer elevated. .  leukocytosis (15.39)   CTA unable to exclude segmental and subsegmental emboli within the lungs. Started heparin gtt  PLAN: cont heparin gtt; cont iv lasix; monitor labs; f/u UA; PT/ OT eval - tx; titrate O2; f/u echo; f/u venous duplex of B/L LE; tele monitoring; cardiology consult; f/u blood cx    Date: 11/11/23      Day 2  Pt states feeling better; T 101.4; elevated trops  Plan: cont heparin gtt; cont iv lasix; monitor labs; f/u UA; PT/ OT eval - tx; titrate O2; f/u echo; f/u venous duplex of B/L LE; trend fever; f/u blood cx    CARDIOLOGY CONSULT: Troponin elevation / LE edema / Pulmonary edema  Patient denies any chest discomfort. He does have exertional dyspnea and LLE edema. He is currently on IV heparin and awaiting venous duplex of LLE. Chest CT shows pulmonary edema and BNP is elevated at 572. Check echocardiogram on Monday. Continue with IV lasix as ordered. Troponin elevation is likely a non MI troponin elevation. More recs after echo is completed.        ED Triage Vitals   Temperature Pulse Respirations Blood Pressure SpO2   11/10/23 1515 11/10/23 1515 11/10/23 1513 11/10/23 1513 11/10/23 1515   100.2 °F (37.9 °C) 96 18 161/71 90 %      Temp Source Heart Rate Source Patient Position - Orthostatic VS BP Location FiO2 (%)   11/10/23 1515 11/10/23 1515 11/10/23 1513 11/10/23 1513 --   Oral Monitor Lying Left arm       Pain Score       11/10/23 2000       No Pain          Wt Readings from Last 1 Encounters:   11/11/23 112 kg (246 lb 7.6 oz)     Additional Vital Signs:   Date/Time Temp Pulse Resp BP MAP (mmHg) SpO2 Calculated FIO2 (%) - Nasal Cannula Nasal Cannula O2 Flow Rate (L/min) O2 Device Patient Position - Orthostatic VS   11/11/23 15:56:14 98 °F (36.7 °C) 88 18 136/62 87 97 % 28 2 L/min Nasal cannula Lying   11/11/23 0938 -- -- -- -- -- -- 28 2 L/min Nasal cannula --   11/11/23 08:17:46 98.3 °F (36.8 °C) 90 18 167/73 104 94 % 28 2 L/min Nasal cannula Sitting   11/11/23 06:06:53 101.4 °F (38.6 °C) Abnormal  84 -- -- -- 95 % -- -- -- --   11/11/23 02:49:42 100 °F (37.8 °C) 79 -- -- -- 96 % -- -- -- --   11/10/23 2250 -- -- -- -- -- 94 % 28 2 L/min Nasal cannula --   11/10/23 2240 100.3 °F (37.9 °C) 83 16 146/65 94 94 % 24 1 L/min Nasal cannula Lying   11/10/23 2100 -- 88 20 126/54 78 95 % -- -- None (Room air) Lying   11/10/23 2030 -- 91 20 142/63 91 97 % -- -- None (Room air) Lying   11/10/23 2000 -- 92 22 141/62 89 97 % -- -- None (Room air) Lying   11/10/23 1900 -- 98 -- -- -- 94 % -- -- -- --   11/10/23 1715 -- 99 20 130/62 85 95 % -- -- -- --   11/10/23 1630 -- 91 20 138/65 94 95 % -- -- -- --   11/10/23 1515 100.2 °F (37.9 °C) 96 -- -- -- 90 % -- -- None (Room air) --   11/10/23 1513 -- -- 18 161/71 -- -- -- -- -- Lying       EKG: Sinus rhythm with occasional Premature ventricular complexes  Possible Left atrial enlargement  Left ventricular hypertrophy with repolarization abnormality  Prolonged QT  Abnormal ECG  No previous ECGs available      Pertinent Labs/Diagnostic Test Results:   CTA ED chest PE study   Final Result by Loan Pugh MD (1950)      No definite pulmonary emboli although segmental and subsegmental emboli in the lung bases cannot be excluded due to motion artifact. Evaluation of the lungs compromised by motion with mild interstitial edema. Pulmonary artery enlargement which can be seen with pulmonary hypertension. Hepatic steatosis. Gynecomastia. Workstation performed: [id number]         XR chest portable   ED Interpretation by Brennon Malin MD (11/10 8610)   Cardiomegaly with patchiness bilateral lower lung fields      Final Result by Cathy Wheeler MD (11/11 0977)      Moderate cardiomegaly with likely mild pulmonary edema. Resident: Howie Vega, the attending radiologist, have reviewed the images and agree with the final report above.       Workstation performed: [id number]         VAS lower limb venous duplex study, complete bilateral    (Results Pending)     Results from last 7 days   Lab Units 11/10/23  2012   SARS-COV-2  Negative     Results from last 7 days   Lab Units 11/11/23  0206 11/10/23  1532   WBC Thousand/uL 22.05* 15.39*   HEMOGLOBIN g/dL 13.1 14.5   HEMATOCRIT % 40.5 44.4   PLATELETS Thousands/uL 263 295   NEUTROS ABS Thousands/µL 18.72* 13.95*        Results from last 7 days   Lab Units 11/11/23  0206 11/10/23  1647 11/10/23  1540   SODIUM mmol/L 134*  --  134*   POTASSIUM mmol/L 4.0 4.2 5.8*   CHLORIDE mmol/L 102  --  103   CO2 mmol/L 22  --  22   ANION GAP mmol/L 10  --  9   BUN mg/dL 19  --  17   CREATININE mg/dL 1.14  --  1.03   EGFR ml/min/1.73sq m 62  --  70   CALCIUM mg/dL 8.1*  --  8.8   MAGNESIUM mg/dL 1.9  --  2.0     Results from last 7 days   Lab Units 11/10/23  1540   AST U/L 53*   ALT U/L 34   ALK PHOS U/L 54   TOTAL PROTEIN g/dL 7.3   ALBUMIN g/dL 3.9   TOTAL BILIRUBIN mg/dL 0.71        Results from last 7 days   Lab Units 11/11/23  0206 11/10/23  1540   GLUCOSE RANDOM mg/dL 121 128       Results from last 7 days   Lab Units 11/10/23  1942 11/10/23  1706 11/10/23  1532   HS TNI 0HR ng/L  --   --  54*   HS TNI 2HR ng/L  --  68*  --    HSTNI D2 ng/L  --  14  --    HS TNI 4HR ng/L 68*  --   --    HSTNI D4 ng/L 14  --   --      Results from last 7 days   Lab Units 11/10/23  1645   D-DIMER QUANTITATIVE ug/ml FEU 3.09*     Results from last 7 days   Lab Units 11/11/23  1053 11/11/23  0157 11/10/23  1645   PROTIME seconds  --   --  14.7*   INR   --   --  1.11   PTT seconds 185* >210* 28        Results from last 7 days   Lab Units 11/10/23  1532   PROCALCITONIN ng/ml 0.32*     Results from last 7 days   Lab Units 11/10/23  2316 11/10/23  1942 11/10/23  1532   LACTIC ACID mmol/L 2.0 2.4* 2.1*       Results from last 7 days   Lab Units 11/10/23  1532   BNP pg/mL 572*       Results from last 7 days   Lab Units 11/10/23  2012   INFLUENZA A PCR  Negative   INFLUENZA B PCR  Negative   RSV PCR  Negative       Results from last 7 days   Lab Units 11/10/23  1532 BLOOD CULTURE  Received in Microbiology Lab. Culture in Progress. Received in Microbiology Lab. Culture in Progress. ED Treatment:   Medication Administration from 11/10/2023 1505 to 11/10/2023 2227         Date/Time Order Dose Route Action     11/10/2023 1905 EST heparin (porcine) injection 10,000 Units 10,000 Units Intravenous Given     11/10/2023 1923 EST heparin (porcine) 25,000 units in 0.45% NaCl 250 mL infusion (premix) 18 Units/kg/hr Intravenous New Bag     11/10/2023 1914 EST iohexol (OMNIPAQUE) 350 MG/ML injection (SINGLE-DOSE) 100 mL 100 mL Intravenous Given     11/10/2023 2140 EST acetaminophen (TYLENOL) tablet 650 mg 650 mg Oral Given     11/10/2023 2140 EST furosemide (LASIX) injection 20 mg 20 mg Intravenous Given     11/10/2023 2144 EST sodium chloride 0.9 % bolus 250 mL 250 mL Intravenous New Bag            Admitting Diagnosis: Acute respiratory insufficiency [R06.89]  Elevated troponin [R79.89]  Generalized weakness [R53.1]  Edema of left lower extremity [R60.0]    Age/Sex: 74 y.o. male    Admission Orders: SCDs; I/O; daily wts; tele monitoring; cardiac diet; fluid restriction 1800    Scheduled Medications:  furosemide, 20 mg, Intravenous, BID      Continuous IV Infusions:  heparin (porcine), 3-30 Units/kg/hr (Order-Specific), Intravenous, Titrated      PRN Meds:  acetaminophen, 650 mg, Oral, Q6H PRN  (11/10 rec'd x1)  (11/11 rec'd x1 so far today)   heparin (porcine), 10,000 Units, Intravenous, Q6H PRN  heparin (porcine), 5,000 Units, Intravenous, Q6H PRN  hydrALAZINE, 5 mg, Intravenous, Q6H PRN        IP CONSULT TO NUTRITION SERVICES  IP CONSULT TO CARDIOLOGY  IP CONSULT TO CASE MANAGEMENT    Network Utilization Review Department  ATTENTION: Please call with any questions or concerns to [phone] and carefully listen to the prompts so that you are directed to the right person.  All voicemails are confidential.   For Discharge needs, contact Care Management DC Support Team at [phone] opt. 2  Send all requests for admission clinical reviews, approved or denied determinations and any other requests to dedicated fax number below belonging to the campus where the patient is receiving treatment.  List of dedicated fax numbers for the Facilities:  [place] DENIALS (Administrative/Medical Necessity) [phone]   DISCHARGE SUPPORT TEAM (NETWORK) [id number] [address] [facility] (Maternity/NICU/Pediatrics) [phone]   [address] [place] [address] Drive 1521 [facility] 1000 [facility] [phone]   [address] [place] [address] 5220 [facility] [address] [address] [facility] [address] [facility] [address] [phone]

## 2023-11-11 NOTE — ASSESSMENT & PLAN NOTE
D-dimer 3.09  CTA chest  No definite pulmonary emboli although segmental and subsegmental emboli in the lung bases cannot be excluded due to motion artifact. Evaluation of the lungs compromised by motion with mild interstitial edema. Pulmonary artery enlargement which can be seen with pulmonary hypertension. Hepatic steatosis. Gynecomastia. Left lower extremity redness and swelling.    Order venous duplex bilateral lower extremities  ED started patient on VTE heparin drip due to strong likelihood for DVT

## 2023-11-11 NOTE — H&P
[address] [facility]  H&P  Name: Brian Bloom 74 y.o. male I MRN: [id number]  Unit/Bed#: ED 05 I Date of Admission: 11/10/2023   Date of Service: 11/10/2023 I Hospital Day: 0      Assessment/Plan   * Generalized weakness  Assessment & Plan  Presents from [facility] care due to increasing generalized weakness over the past couple of days. He feels like his legs are going to give out when he tries to ambulate. Denies fall or injury. Typically uses a cane to ambulate. Meeting SIRS criteria (tachycardia, tachypnea, leukocytosis)  CTA negative for pneumonia  COVID/flu/RSV negative  UA pending  Hypoxic on arrival - 2L NC  Fall precautions  Consult PT/OT and case management    Acute respiratory failure with hypoxia [facility])  Assessment & Plan  Reporting sob over the past couple of days. Dry ongoing cough over the past couple of weeks. Denies congestion, fevers or chills. 88% on RA. Currently on 2 L NC   CTA chest   No definite pulmonary emboli although segmental and subsegmental emboli in the lung bases cannot be excluded due to motion artifact. Evaluation of the lungs compromised by motion with mild interstitial edema.   Order ECHO   Per patient takes lasix at home. Dose unknown. [place] assisted care will need to be called in the morning   IV lasix ordered  Covid/Flu/RSV negative   Respiratory protocol   Continue to monitor and wean oxygen as able     Localized swelling of left lower leg  Assessment & Plan  Patient reports progressively worsening swelling and erythema of his left lower extremity. 2+ pitting edema noted  D-dimer elevated. Order venous duplex  Started on VTE heparin due to possible DVT. BNP elevated. No echo on file. Start IV Lasix 20 mg bid   Elevate leg  Hold off on antibiotics at this time.  Does not appear cellulitic     SIRS (systemic inflammatory response syndrome) (HCC)  Assessment & Plan  SIRS: Tachycardia, tachypnea, leukocytosis (15.39)  Lactic 2.1, 2-hour pending  Procalcitonin 0.32  SOI: Unknown at this time  Left lower extremity with swelling and erythema  DVT suspected  Do not suspect cellulitis at this time. CTA negative for pneumonia  COVID/flu/RSV negative  UA pending  Abx: Hold off on starting abx at this time. Blood cultures pending    Elevated d-dimer  Assessment & Plan  D-dimer 3.09  CTA chest  No definite pulmonary emboli although segmental and subsegmental emboli in the lung bases cannot be excluded due to motion artifact. Evaluation of the lungs compromised by motion with mild interstitial edema. Pulmonary artery enlargement which can be seen with pulmonary hypertension. Hepatic steatosis. Gynecomastia. Left lower extremity redness and swelling. Order venous duplex bilateral lower extremities  ED started patient on VTE heparin drip due to strong likelihood for DVT    Elevated troponin  Assessment & Plan  Troponin 54, 68, 4-hour pending  Delta 14  Denies chest pain. BNP elevated at 572. No prior echo. ED started patient on VTE heparin drip due to possible DVT. Telemetry  Cardiology consult    Essential hypertension  Assessment & Plan  Home regimen: Unknown  Patient reports he takes 2 blood pressure medications + Lasix  Called [place] assisted care. VM left  Normotensive thus far. Continue to monitor  Added IV hydralazine as needed for systolic over 704           VTE Pharmacologic Prophylaxis: VTE Score: 5 High Risk (Score >/= 5) - Pharmacological DVT Prophylaxis Ordered: heparin drip. Sequential Compression Devices Ordered. Code Status: Level 1 - Full Code   Discussion with family: Updated  (daughter) at bedside. Anticipated Length of Stay: Patient will be admitted on an inpatient basis with an anticipated length of stay of greater than 2 midnights secondary to Gen weakness, ddimer, sirs. Total Time Spent on Date of Encounter in care of patient: 55 mins.  This time was spent on one or more of the following: performing physical exam; counseling and coordination of care; obtaining or reviewing history; documenting in the medical record; reviewing/ordering tests, medications or procedures; communicating with other healthcare professionals and discussing with patient's family/caregivers. Chief Complaint: Weakness     History of Present Illness:  Eric Zelaya is a 74 y.o. male with a PMH of hypertension, obesity who presents from [facility] due to generalized weakness, shortness of breath and left lower extremity swelling. Patient's main concern is his weakness. He feels like his legs are going to give out when he is attempting to ambulate. Usually uses a cane. Denies fall or injury but had some close incidences. On exam left lower extremity has 2+ pitting edema with dry skin and redness. Patient reports this has been worsening over the past 2 to 3 weeks. Denies history of blood clots. D-dimer elevated. CTA unable to exclude segmental and subsegmental emboli within the lungs. Started on IV heparin drip. Per patient shortness of breath present over the past couple of days. Dry ongoing cough over the past couple of weeks. Denies congestion, fevers or chills. COVID/flu/RSV negative. No signs of pneumonia on CTA chest.  Mild interstitial edema of the lungs. Per patient he takes Lasix at home. Unknown dose. No echo on file. Suspect mild heart failure exacerbation. Requiring 2 L nasal cannula at this time. Review of Systems:  Review of Systems   Constitutional:  Positive for fatigue. Negative for chills and fever. HENT:  Negative for sore throat. Respiratory:  Positive for shortness of breath. Negative for cough and chest tightness. Cardiovascular:  Positive for leg swelling. Negative for chest pain. Gastrointestinal:  Negative for abdominal distention, abdominal pain, diarrhea, nausea and vomiting. Genitourinary:  Negative for difficulty urinating.    Musculoskeletal:  Negative for arthralgias. Skin:  Positive for color change. Neurological:  Positive for weakness. Negative for headaches. Psychiatric/Behavioral:  Negative for agitation and behavioral problems. All other systems reviewed and are negative. Past Medical and Surgical History:   Past Medical History:   Diagnosis Date    Hypertension     Skin cancer        History reviewed. No pertinent surgical history. Meds/Allergies:  Prior to Admission medications    Not on File     I have been unable to obtain / verify an up to date medication list despite all reasonable attempts. Allergies: Allergies   Allergen Reactions    Zosyn [Piperacillin Sod-Tazobactam So] Rash       Social History:  Marital Status:    Occupation: Unknown  Patient Pre-hospital Living Situation: Assisted Living  Patient Pre-hospital Level of Mobility: walks with cane  Patient Pre-hospital Diet Restrictions: Cardiac  Substance Use History:   Social History     Substance and Sexual Activity   Alcohol Use Never     Social History     Tobacco Use   Smoking Status Never   Smokeless Tobacco Never     Social History     Substance and Sexual Activity   Drug Use Never       Family History:  History reviewed. No pertinent family history. Physical Exam:     Vitals:   Blood Pressure: 126/54 (11/10/23 2100)  Pulse: 88 (11/10/23 2100)  Temperature: 100.2 °F (37.9 °C) (11/10/23 1515)  Temp Source: Oral (11/10/23 1515)  Respirations: 20 (11/10/23 2100)  Height: 5' 7" (170.2 cm) (11/10/23 1515)  Weight - Scale: 127 kg (280 lb) (11/10/23 1515)  SpO2: 95 % (11/10/23 2100)    Physical Exam  Vitals and nursing note reviewed. Constitutional:       Appearance: Normal appearance. He is obese. HENT:      Head: Normocephalic. Eyes:      Extraocular Movements: Extraocular movements intact. Pupils: Pupils are equal, round, and reactive to light. Cardiovascular:      Rate and Rhythm: Normal rate and regular rhythm. Heart sounds: No murmur heard. No gallop. Pulmonary:      Effort: No respiratory distress. Breath sounds: No wheezing. Comments: Decreased breath sounds   Abdominal:      General: Bowel sounds are normal. There is no distension. Tenderness: There is no abdominal tenderness. Musculoskeletal:         General: Normal range of motion. Cervical back: Normal range of motion. Right lower leg: No edema. Left lower leg: Edema (+2) present. Skin:     General: Skin is warm. Neurological:      General: No focal deficit present. Mental Status: He is alert and oriented to person, place, and time. Mental status is at baseline. Psychiatric:         Mood and Affect: Mood normal.         Behavior: Behavior normal.         Thought Content: Thought content normal.          Additional Data:     Lab Results:  Results from last 7 days   Lab Units 11/10/23  1532   WBC Thousand/uL 15.39*   HEMOGLOBIN g/dL 14.5   HEMATOCRIT % 44.4   PLATELETS Thousands/uL 295   NEUTROS PCT % 90*   LYMPHS PCT % 3*   MONOS PCT % 6   EOS PCT % 0     Results from last 7 days   Lab Units 11/10/23  1647 11/10/23  1540   SODIUM mmol/L  --  134*   POTASSIUM mmol/L 4.2 5.8*   CHLORIDE mmol/L  --  103   CO2 mmol/L  --  22   BUN mg/dL  --  17   CREATININE mg/dL  --  1.03   ANION GAP mmol/L  --  9   CALCIUM mg/dL  --  8.8   ALBUMIN g/dL  --  3.9   TOTAL BILIRUBIN mg/dL  --  0.71   ALK PHOS U/L  --  54   ALT U/L  --  34   AST U/L  --  53*   GLUCOSE RANDOM mg/dL  --  128     Results from last 7 days   Lab Units 11/10/23  1645   INR  1.11             Results from last 7 days   Lab Units 11/10/23  1942 11/10/23  1532   LACTIC ACID mmol/L 2.4* 2.1*   PROCALCITONIN ng/ml  --  0.32*       Lines/Drains:  Invasive Devices       Peripheral Intravenous Line  Duration             Peripheral IV 11/10/23 Distal;Right;Upper;Ventral (anterior) Arm <1 day    Peripheral IV 11/10/23 Dorsal (posterior); Left <1 day                        Imaging: Reviewed radiology reports from this admission including: chest xray and chest CT scan  CTA ED chest PE study   Final Result by Mariaelena Prasad MD (1950)      No definite pulmonary emboli although segmental and subsegmental emboli in the lung bases cannot be excluded due to motion artifact. Evaluation of the lungs compromised by motion with mild interstitial edema. Pulmonary artery enlargement which can be seen with pulmonary hypertension. Hepatic steatosis. Gynecomastia. Workstation performed: [id number]         XR chest portable   ED Interpretation by Dunia Muñoz MD (11/10 1550)   Cardiomegaly with patchiness bilateral lower lung fields      VAS lower limb venous duplex study, complete bilateral    (Results Pending)       EKG and Other Studies Reviewed on Admission:   EKG: NSR. HR 96.    ** Please Note: This note has been constructed using a voice recognition system.  **

## 2023-11-11 NOTE — ASSESSMENT & PLAN NOTE
Troponin 54, 68, 68  Denies chest pain. BNP elevated at 572. No prior echo. ED started patient on VTE heparin drip due to possible DVT.   Telemetry  Cardiology consult

## 2023-11-11 NOTE — PROGRESS NOTES
[facility] [facility]  Progress Note  Name: Tiffanie Briones  MRN: [id number]  Unit/Bed#: -82 I Date of Admission: 11/10/2023   Date of Service: 11/11/2023 I Hospital Day: 1    Assessment/Plan   SIRS (systemic inflammatory response syndrome) (HCC)  Assessment & Plan  SIRS: Tachycardia, tachypnea, leukocytosis (15.39)  Lactic 2.1, 2-hour pending  Procalcitonin 0.32  SOI: Unknown at this time  Left lower extremity with swelling and erythema  DVT suspected  Do not suspect cellulitis at this time. CTA negative for pneumonia  COVID/flu/RSV negative  UA pending  Abx: Hold off on starting abx at this time. Blood cultures pending    Acute respiratory failure with hypoxia [facility])  Assessment & Plan  Reporting sob over the past couple of days. Dry ongoing cough over the past couple of weeks. Denies congestion, fevers or chills. 88% on RA. Currently on 2 L NC   CTA chest   No definite pulmonary emboli although segmental and subsegmental emboli in the lung bases cannot be excluded due to motion artifact. Evaluation of the lungs compromised by motion with mild interstitial edema.   Order ECHO   IV lasix ordered  Covid/Flu/RSV negative   Respiratory protocol   Continue to monitor and wean oxygen as able     Elevated d-dimer  Assessment & Plan  D-dimer 3.09  CTA chest  No definite pulmonary emboli although segmental and subsegmental emboli in the lung bases cannot be excluded due to motion artifact. Evaluation of the lungs compromised by motion with mild interstitial edema. Pulmonary artery enlargement which can be seen with pulmonary hypertension. Hepatic steatosis. Gynecomastia. Left lower extremity redness and swelling. Order venous duplex bilateral lower extremities  ED started patient on VTE heparin drip due to strong likelihood for DVT    Elevated troponin  Assessment & Plan  Troponin 54, 68, 68  Denies chest pain. BNP elevated at 572. No prior echo.   ED started patient on VTE heparin drip due to possible DVT. Telemetry  Cardiology consult    Localized swelling of left lower leg  Assessment & Plan  Patient reports progressively worsening swelling and erythema of his left lower extremity. 2+ pitting edema noted  D-dimer elevated. Order venous duplex  Started on VTE heparin due to possible DVT. BNP elevated. No echo on file. cont IV Lasix 20 mg bid   Elevate leg  Hold off on antibiotics at this time. Does not appear cellulitic     Essential hypertension  Assessment & Plan  Home regimen: Unknown  Patient reports he takes 2 blood pressure medications + Lasix  Requesting nursing to perform med rec as able  Normotensive thus far. Continue to monitor  Added IV hydralazine as needed for systolic over 106    * Generalized weakness  Assessment & Plan  Presents from [facility] care due to increasing generalized weakness over the past couple of days. He feels like his legs are going to give out when he tries to ambulate. Denies fall or injury. Typically uses a cane to ambulate. Meeting SIRS criteria (tachycardia, tachypnea, leukocytosis)  CTA negative for pneumonia  COVID/flu/RSV negative  UA pending  Hypoxic on arrival - 2L NC  Fall precautions  Consult PT/OT and case management          VTE  Prophylaxis:   Pharmacologic: in place  Mechanical VTE Prophylaxis in Place: Yes    Patient Centered Rounds: I have performed bedside rounds with nursing staff today. Discussions with Specialists or Other Care Team Provider: case management         Current Length of Stay: 1 day(s)    Current Patient Status: Inpatient        Code Status: Level 1 - Full Code    Discharge Plan: Pt will require continued inpatient hospitalization. Subjective:     Pt states feeling better on iv lasix. Patient is seen and examined at bedside. All other ROS are negative.     Objective:     Vitals:   Temp (24hrs), Av °F (37.8 °C), Min:98.3 °F (36.8 °C), Max:101.4 °F (38.6 °C)    Temp:  [98.3 °F (36.8 °C)-101.4 °F (38.6 °C)] 98.3 °F (36.8 °C)  HR:  [79-99] 90  Resp:  [16-22] 18  BP: (126-167)/(54-73) 167/73  SpO2:  [90 %-97 %] 94 %  Body mass index is 38.6 kg/m². Input and Output Summary (last 24 hours): Intake/Output Summary (Last 24 hours) at 11/11/2023 1041  Last data filed at 11/11/2023 0601  Gross per 24 hour   Intake --   Output 250 ml   Net -250 ml       Physical Exam:       GEN: No acute distress, comfortable on o2  HEEENT: No JVD, PERRLA, no scleral icterus  RESP: Lungs clear to auscultation bilaterally  CV: RRR, +s1/s2   ABD: SOFT NON TENDER, POSITIVE BOWEL SOUNDS, NO DISTENTION  PSYCH: CALM  NEURO: Mentation baseline, NO FOCAL DEFICITS  SKIN: NO RASH  EXTREM:   EDEMA present. Additional Data:     Labs:    Results from last 7 days   Lab Units 11/11/23  0206   WBC Thousand/uL 22.05*   HEMOGLOBIN g/dL 13.1   HEMATOCRIT % 40.5   PLATELETS Thousands/uL 263   NEUTROS PCT % 83*   LYMPHS PCT % 9*   MONOS PCT % 5   EOS PCT % 1     Results from last 7 days   Lab Units 11/11/23  0206 11/10/23  1647 11/10/23  1540   SODIUM mmol/L 134*  --  134*   POTASSIUM mmol/L 4.0   < > 5.8*   CHLORIDE mmol/L 102  --  103   CO2 mmol/L 22  --  22   BUN mg/dL 19  --  17   CREATININE mg/dL 1.14  --  1.03   ANION GAP mmol/L 10  --  9   CALCIUM mg/dL 8.1*  --  8.8   ALBUMIN g/dL  --   --  3.9   TOTAL BILIRUBIN mg/dL  --   --  0.71   ALK PHOS U/L  --   --  54   ALT U/L  --   --  34   AST U/L  --   --  53*   GLUCOSE RANDOM mg/dL 121  --  128    < > = values in this interval not displayed.      Results from last 7 days   Lab Units 11/10/23  1645   INR  1.11             Results from last 7 days   Lab Units 11/10/23  2316 11/10/23  1942 11/10/23  1532   LACTIC ACID mmol/L 2.0 2.4* 2.1*   PROCALCITONIN ng/ml  --   --  0.32*       Lines/Drains:  Invasive Devices       Peripheral Intravenous Line  Duration             Peripheral IV 11/10/23 Distal;Right;Upper;Ventral (anterior) Arm <1 day                    Telemetry: Telemetry Orders (From admission, onward)               24 Hour Telemetry Monitoring  Continuous x 24 Hours (Telem)        Question:  Reason for 24 Hour Telemetry  Answer:  Decompensated CHF- and any one of the following: continuous diuretic infusion or total diuretic dose >200 mg daily, associated electrolyte derangement (I.e. K < 3.0), ionotropic drip (continuous infusion), hx of ventricular arrhythmia, or new EF < 35%                        * I Have Reviewed All Lab Data Listed Above. Imaging:     Results for orders placed during the hospital encounter of 11/10/23    XR chest portable    Narrative  CHEST    INDICATION:   hypoxia. COMPARISON: CTA of the chest 11/10/2023    EXAM PERFORMED/VIEWS:  XR CHEST PORTABLE  AP semierect      FINDINGS:    Moderate cardiomegaly. Aortic calcification. Mild bibasilar opacities could be suggestive of pulmonary edema. Mild dextroscoliosis. Age-related degenerative change of the spine. Severe erosions of right greater than left glenohumeral joint. Impression  Moderate cardiomegaly with likely mild pulmonary edema. Resident: Neo Vee, the attending radiologist, have reviewed the images and agree with the final report above. Workstation performed: [id number]    No results found for this or any previous visit. *I have reviewed all imaging reports listed above      Recent Cultures (last 7 days):     Results from last 7 days   Lab Units 11/10/23  1532   BLOOD CULTURE  Received in Microbiology Lab. Culture in Progress. Received in Microbiology Lab. Culture in Progress.        Last 24 Hours Medication List:   Current Facility-Administered Medications   Medication Dose Route Frequency Provider Last Rate    acetaminophen  650 mg Oral Q6H PRN Vesta Pamer, PA-C      furosemide  20 mg Intravenous BID Angelica Madrid PA-C      heparin (porcine)  3-30 Units/kg/hr (Order-Specific) Intravenous Titrated Vesta Rower, PA-C 15 Units/kg/hr (11/11/23 0774)    heparin (porcine)  10,000 Units Intravenous Q6H PRN Angelica Madrid PA-C      heparin (porcine)  5,000 Units Intravenous Q6H PRN Jhonny Sharp PA-C      hydrALAZINE  5 mg Intravenous Q6H PRN Jhonny Sharp PA-C      influenza vaccine  0.7 mL Intramuscular Once Willie Brody MD          Today, Patient Was Seen By: Lyn Ochoa MD    ** Please Note: Dictation voice to text software may have been used in the creation of this document.  **

## 2023-11-11 NOTE — ASSESSMENT & PLAN NOTE
Patient reports progressively worsening swelling and erythema of his left lower extremity. 2+ pitting edema noted  D-dimer elevated. Order venous duplex  Started on VTE heparin due to possible DVT. BNP elevated. No echo on file. cont IV Lasix 20 mg bid   Elevate leg  Hold off on antibiotics at this time.  Does not appear cellulitic

## 2023-11-11 NOTE — ASSESSMENT & PLAN NOTE
Troponin 54, 68, 4-hour pending  Delta 14  Denies chest pain. BNP elevated at 572. No prior echo. ED started patient on VTE heparin drip due to possible DVT.   Telemetry  Cardiology consult

## 2023-11-11 NOTE — ASSESSMENT & PLAN NOTE
SIRS: Tachycardia, tachypnea, leukocytosis (15.39)  Lactic 2.1, 2-hour pending  Procalcitonin 0.32  SOI: Unknown at this time  Left lower extremity with swelling and erythema  DVT suspected  Do not suspect cellulitis at this time. CTA negative for pneumonia  COVID/flu/RSV negative  UA pending  Abx: Hold off on starting abx at this time.    Blood cultures pending

## 2023-11-11 NOTE — ASSESSMENT & PLAN NOTE
Home regimen: Unknown  Patient reports he takes 2 blood pressure medications + Lasix  Called [facility]. VM left  Normotensive thus far.   Continue to monitor  Added IV hydralazine as needed for systolic over 792

## 2023-11-11 NOTE — CONSULTS
Consultation - Cardiology   Huyen Zamudio 74 y.o. male MRN: [id number]  Unit/Bed#: -95 Encounter: [id number]    Assessment/Plan     Assessment:  Troponin elevation  LE edema  Pulmonary edema    Plan:    Patient denies any chest discomfort. He does have exertional dyspnea and LLE edema. He is currently on IV heparin and awaiting venous duplex of LLE. Chest CT shows pulmonary edema and BNP is elevated at 572. Check echocardiogram on Monday. Continue with IV lasix as ordered. Troponin elevation is likely a non MI troponin elevation. More recs after echo is completed. History of Present Illness   Physician Requesting Consult: Kailey Goldstein MD  Reason for Consult / Principal Problem: Troponin elevation  HPI: Huyen Zamudio is a 74y.o. year old male who presents with weakness, shortness of breath and left lower extremity edema. Patient was admitted and started on IV heparin and suspected to have a DVT. Chest CT was perfromed that was negative for PE but does show pulmoanry edema and PA enlargement. Patient has a history of HTN. No history of cad, chf or arrhythmia. Inpatient consult to Cardiology  Consult performed by: Iza Horn MD  Consult ordered by: Leander Flores PA-C          Review of Systems   Constitutional:  Negative for chills and fever. HENT:  Negative for ear pain and sore throat. Eyes:  Negative for pain and visual disturbance. Respiratory:  Positive for shortness of breath. Negative for cough. Cardiovascular:  Positive for leg swelling. Negative for chest pain and palpitations. Gastrointestinal:  Negative for abdominal pain and vomiting. Genitourinary:  Negative for dysuria and hematuria. Musculoskeletal:  Negative for arthralgias and back pain. Skin:  Negative for color change and rash. Neurological:  Negative for seizures and syncope. All other systems reviewed and are negative.       Historical Information   Past Medical History: Diagnosis Date    Hypertension     Skin cancer      History reviewed. No pertinent surgical history. Social History     Substance and Sexual Activity   Alcohol Use Never     Social History     Substance and Sexual Activity   Drug Use Never     E-Cigarette/Vaping     E-Cigarette/Vaping Substances     Social History     Tobacco Use   Smoking Status Never   Smokeless Tobacco Never     Family History: History reviewed. No pertinent family history. Meds/Allergies   current meds:   Current Facility-Administered Medications   Medication Dose Route Frequency    acetaminophen (TYLENOL) tablet 650 mg  650 mg Oral Q6H PRN    furosemide (LASIX) injection 20 mg  20 mg Intravenous BID    heparin (porcine) 25,000 units in 0.45% NaCl 250 mL infusion (premix)  3-30 Units/kg/hr (Order-Specific) Intravenous Titrated    heparin (porcine) injection 10,000 Units  10,000 Units Intravenous Q6H PRN    heparin (porcine) injection 5,000 Units  5,000 Units Intravenous Q6H PRN    hydrALAZINE (APRESOLINE) injection 5 mg  5 mg Intravenous Q6H PRN     Allergies   Allergen Reactions    Zosyn [Piperacillin Sod-Tazobactam So] Rash       Objective   Vitals: Blood pressure 167/73, pulse 90, temperature 98.3 °F (36.8 °C), temperature source Axillary, resp. rate 18, height 5' 7" (1.702 m), weight 112 kg (246 lb 7.6 oz), SpO2 94 %. Orthostatic Blood Pressures      Flowsheet Row Most Recent Value   Blood Pressure 167/73 filed at 11/11/2023 9061   Patient Position - Orthostatic VS Sitting filed at 11/11/2023 0817              Intake/Output Summary (Last 24 hours) at 11/11/2023 1105  Last data filed at 11/11/2023 0601  Gross per 24 hour   Intake --   Output 250 ml   Net -250 ml       Invasive Devices       Peripheral Intravenous Line  Duration             Peripheral IV 11/11/23 Left;Proximal;Ventral (anterior) Forearm <1 day                    Physical Exam  Vitals and nursing note reviewed.    Constitutional:       General: He is not in acute distress. Appearance: He is well-developed. HENT:      Head: Normocephalic and atraumatic. Eyes:      Conjunctiva/sclera: Conjunctivae normal.   Cardiovascular:      Rate and Rhythm: Normal rate and regular rhythm. Heart sounds: No murmur heard. Pulmonary:      Effort: Pulmonary effort is normal. No respiratory distress. Breath sounds: Normal breath sounds. Abdominal:      Palpations: Abdomen is soft. Tenderness: There is no abdominal tenderness. Musculoskeletal:         General: No swelling. Cervical back: Neck supple. Left lower leg: Edema present. Skin:     General: Skin is warm and dry. Capillary Refill: Capillary refill takes less than 2 seconds. Neurological:      Mental Status: He is alert. Psychiatric:         Mood and Affect: Mood normal.         Lab Results: I have personally reviewed pertinent lab results. CBC with diff:   Results from last 7 days   Lab Units 11/11/23  0206   WBC Thousand/uL 22.05*   RBC Million/uL 4.26   HEMOGLOBIN g/dL 13.1   HEMATOCRIT % 40.5   MCV fL 95   MCH pg 30.8   MCHC g/dL 32.3   RDW % 13.5   MPV fL 10.8   PLATELETS Thousands/uL 263     CMP:   Results from last 7 days   Lab Units 11/11/23  0206 11/10/23  1540   SODIUM mmol/L 134* 134*   CHLORIDE mmol/L 102 103   CO2 mmol/L 22 22   BUN mg/dL 19 17   CREATININE mg/dL 1.14 1.03   CALCIUM mg/dL 8.1* 8.8   AST U/L  --  53*   ALT U/L  --  34   ALK PHOS U/L  --  54   EGFR ml/min/1.73sq m 62 70     HS Troponin:   0   Lab Value Date/Time    HSTNI0 54 (H) 11/10/2023 1532    HSTNI2 68 (H) 11/10/2023 1706    HSTNI4 68 (H) 11/10/2023 1942     BNP:   Results from last 7 days   Lab Units 11/11/23  0206   POTASSIUM mmol/L 4.0   CHLORIDE mmol/L 102   CO2 mmol/L 22   BUN mg/dL 19   CREATININE mg/dL 1.14   CALCIUM mg/dL 8.1*   EGFR ml/min/1.73sq m 62     Imaging: I have personally reviewed pertinent films in PACS  EKG: NSR with PVCs.  LVH  VTE Prophylaxis:     Code Status: Level 1 - Full Code  Advance Directive and Living Will:      Power of :    POLST:      Counseling / Coordination of Care  Total floor / unit time spent today 45 minutes. Greater than 50% of total time was spent with the patient and / or family counseling and / or coordination of care. A description of the counseling / coordination of care.

## 2023-11-12 ENCOUNTER — APPOINTMENT (INPATIENT)
Dept: NON INVASIVE DIAGNOSTICS | Facility: HOSPITAL | Age: 73
DRG: 291 | End: 2023-11-12
Payer: MEDICARE

## 2023-11-12 LAB
APTT PPP: 66 SECONDS (ref 23–37)
BACTERIA UR QL AUTO: ABNORMAL /HPF
BILIRUB UR QL STRIP: NEGATIVE
CLARITY UR: CLEAR
COLOR UR: YELLOW
FLUAV RNA RESP QL NAA+PROBE: NEGATIVE
FLUBV RNA RESP QL NAA+PROBE: NEGATIVE
GLUCOSE UR STRIP-MCNC: ABNORMAL MG/DL
HGB UR QL STRIP.AUTO: ABNORMAL
KETONES UR STRIP-MCNC: ABNORMAL MG/DL
LEUKOCYTE ESTERASE UR QL STRIP: NEGATIVE
NITRITE UR QL STRIP: NEGATIVE
NON-SQ EPI CELLS URNS QL MICRO: ABNORMAL /HPF
PH UR STRIP.AUTO: 5.5 [PH]
PROT UR STRIP-MCNC: ABNORMAL MG/DL
RBC #/AREA URNS AUTO: ABNORMAL /HPF
RSV RNA RESP QL NAA+PROBE: NEGATIVE
SARS-COV-2 RNA RESP QL NAA+PROBE: NEGATIVE
SP GR UR STRIP.AUTO: 1.02 (ref 1–1.03)
UROBILINOGEN UR STRIP-ACNC: <2 MG/DL
WBC #/AREA URNS AUTO: ABNORMAL /HPF

## 2023-11-12 PROCEDURE — 99232 SBSQ HOSP IP/OBS MODERATE 35: CPT | Performed by: INTERNAL MEDICINE

## 2023-11-12 PROCEDURE — 93970 EXTREMITY STUDY: CPT | Performed by: SURGERY

## 2023-11-12 PROCEDURE — 0241U HB NFCT DS VIR RESP RNA 4 TRGT: CPT | Performed by: HOSPITALIST

## 2023-11-12 PROCEDURE — 85730 THROMBOPLASTIN TIME PARTIAL: CPT | Performed by: INTERNAL MEDICINE

## 2023-11-12 PROCEDURE — 81001 URINALYSIS AUTO W/SCOPE: CPT | Performed by: HOSPITALIST

## 2023-11-12 PROCEDURE — 93970 EXTREMITY STUDY: CPT

## 2023-11-12 PROCEDURE — 99232 SBSQ HOSP IP/OBS MODERATE 35: CPT | Performed by: HOSPITALIST

## 2023-11-12 RX ORDER — CEFTRIAXONE 1 G/50ML
1000 INJECTION, SOLUTION INTRAVENOUS EVERY 24 HOURS
Status: DISCONTINUED | OUTPATIENT
Start: 2023-11-12 | End: 2023-11-14

## 2023-11-12 RX ADMIN — LISINOPRIL 10 MG: 10 TABLET ORAL at 08:26

## 2023-11-12 RX ADMIN — FUROSEMIDE 20 MG: 10 INJECTION, SOLUTION INTRAMUSCULAR; INTRAVENOUS at 08:26

## 2023-11-12 RX ADMIN — FUROSEMIDE 20 MG: 10 INJECTION, SOLUTION INTRAMUSCULAR; INTRAVENOUS at 18:05

## 2023-11-12 RX ADMIN — CEFTRIAXONE 1000 MG: 1 INJECTION, SOLUTION INTRAVENOUS at 10:23

## 2023-11-12 RX ADMIN — METOPROLOL SUCCINATE 100 MG: 50 TABLET, EXTENDED RELEASE ORAL at 08:26

## 2023-11-12 RX ADMIN — HEPARIN SODIUM 12 UNITS/KG/HR: 10000 INJECTION, SOLUTION INTRAVENOUS at 15:50

## 2023-11-12 NOTE — ASSESSMENT & PLAN NOTE
Troponin 54, 68, 68  Denies chest pain. BNP elevated at 572. No prior echo. ED started patient on VTE heparin drip due to possible DVT. Telemetry  Cardiology consult appreciated. [place] to have non MI trop elevation.  Await echocardiogram.

## 2023-11-12 NOTE — ASSESSMENT & PLAN NOTE
Home regimen: Unknown  Patient reports he takes 2 blood pressure medications + Lasix  Requesting nursing to perform med rec as able  Normotensive thus far.   Continue to monitor  Added IV hydralazine as needed for systolic over 580

## 2023-11-12 NOTE — ASSESSMENT & PLAN NOTE
Presents from [facility] assisted care due to increasing generalized weakness over the past couple of days. He feels like his legs are going to give out when he tries to ambulate. Denies fall or injury. Typically uses a cane to ambulate.   Meeting SIRS criteria (tachycardia, tachypnea, leukocytosis)  CTA negative for pneumonia  COVID/flu/RSV negative  UA pending  Hypoxic on arrival - 2L NC  Fall precautions  Weakness may be secondary to volume overload, fever  Consult PT/OT and case management

## 2023-11-12 NOTE — PROGRESS NOTES
[address] [facility]  Progress Note  Name: Ghulam Marcus  MRN: [id number]  Unit/Bed#: -12 I Date of Admission: 11/10/2023   Date of Service: 11/12/2023 I Hospital Day: 2    Assessment/Plan   SIRS (systemic inflammatory response syndrome) (HCC)  Assessment & Plan  SIRS: Tachycardia, tachypnea, leukocytosis (15.39)  Lactic 2.1, 2-hour pending  Procalcitonin 0.32  SOI: Unknown at this time  Left lower extremity with swelling and erythema  DVT suspected  Do not suspect cellulitis at this time. CTA negative for pneumonia  COVID/flu/RSV negative  Pt with fevers 11/11 PM.   Will check UA, COVID/flu/RSV, procalcitonin. Initiate rocephin. Acute respiratory failure with hypoxia [facility])  Assessment & Plan  Reporting sob over the past couple of days. Dry ongoing cough over the past couple of weeks. Denies congestion, fevers or chills. 88% on RA. Currently on 2 L NC   CTA chest   No definite pulmonary emboli although segmental and subsegmental emboli in the lung bases cannot be excluded due to motion artifact. Evaluation of the lungs compromised by motion with mild interstitial edema.   Order ECHO   IV lasix ordered  Covid/Flu/RSV negative   Respiratory protocol   Continue to monitor and wean oxygen as able     Elevated d-dimer  Assessment & Plan  D-dimer 3.09  CTA chest  No definite pulmonary emboli although segmental and subsegmental emboli in the lung bases cannot be excluded due to motion artifact. Evaluation of the lungs compromised by motion with mild interstitial edema. Pulmonary artery enlargement which can be seen with pulmonary hypertension. Hepatic steatosis. Gynecomastia. Left lower extremity redness and swelling. Order venous duplex bilateral lower extremities  ED started patient on VTE heparin drip due to strong likelihood for DVT    Elevated troponin  Assessment & Plan  Troponin 54, 68, 68  Denies chest pain. BNP elevated at 572. No prior echo.   ED started patient on VTE heparin drip due to possible DVT. Telemetry  Cardiology consult appreciated. [place] to have non MI trop elevation. Await echocardiogram.    Localized swelling of left lower leg  Assessment & Plan  Patient reports progressively worsening swelling and erythema of his left lower extremity. 2+ pitting edema noted  D-dimer elevated. Order venous duplex  Started on VTE heparin due to possible DVT. BNP elevated. No echo on file. cont IV Lasix 20 mg bid   Elevate leg  Hold off on antibiotics at this time. Does not appear cellulitic     Essential hypertension  Assessment & Plan  Home regimen: Unknown  Patient reports he takes 2 blood pressure medications + Lasix  Requesting nursing to perform med rec as able  Normotensive thus far. Continue to monitor  Added IV hydralazine as needed for systolic over 217    * Generalized weakness  Assessment & Plan  Presents from [facility] due to increasing generalized weakness over the past couple of days. He feels like his legs are going to give out when he tries to ambulate. Denies fall or injury. Typically uses a cane to ambulate. Meeting SIRS criteria (tachycardia, tachypnea, leukocytosis)  CTA negative for pneumonia  COVID/flu/RSV negative  UA pending  Hypoxic on arrival - 2L NC  Fall precautions  Weakness may be secondary to volume overload, fever  Consult PT/OT and case management          VTE  Prophylaxis:   Pharmacologic: in place  Mechanical VTE Prophylaxis in Place: Yes    Patient Centered Rounds: I have performed bedside rounds with nursing staff today. Discussions with Specialists or Other Care Team Provider: case management    Education and Discussions with Family / Patient: pt dtr      Current Length of Stay: 2 day(s)    Current Patient Status: Inpatient        Code Status: Level 1 - Full Code    Discharge Plan: Pt will require continued inpatient hospitalization.     Subjective:     Pt febrile overnight  States mild dysuria has developed  No chest pain    Patient is seen and examined at bedside. All other ROS are negative. Objective:     Vitals:   Temp (24hrs), Av.7 °F (37.1 °C), Min:98 °F (36.7 °C), Max:99.7 °F (37.6 °C)    Temp:  [98 °F (36.7 °C)-99.7 °F (37.6 °C)] 98.5 °F (36.9 °C)  HR:  [] 101  Resp:  [18] 18  BP: (136-163)/(62-82) 137/72  SpO2:  [91 %-97 %] 93 %  Body mass index is 37.98 kg/m². Input and Output Summary (last 24 hours): Intake/Output Summary (Last 24 hours) at 2023 1009  Last data filed at 2023 0856  Gross per 24 hour   Intake 720 ml   Output 810 ml   Net -90 ml       Physical Exam:       GEN: No acute distress, comfortable  HEEENT: No JVD, PERRLA, no scleral icterus  RESP: Lungs clear to auscultation bilaterally  CV: RRR, +s1/s2   ABD: SOFT NON TENDER, POSITIVE BOWEL SOUNDS, NO DISTENTION  PSYCH: CALM  NEURO: [place] baseline, NO FOCAL DEFICITS  SKIN: NO RASH  EXTREM: bilateral L>R EDEMA    Additional Data:     Labs:    Results from last 7 days   Lab Units 23  0206   WBC Thousand/uL 22.05*   HEMOGLOBIN g/dL 13.1   HEMATOCRIT % 40.5   PLATELETS Thousands/uL 263   NEUTROS PCT % 83*   LYMPHS PCT % 9*   MONOS PCT % 5   EOS PCT % 1     Results from last 7 days   Lab Units 23  0206 11/10/23  1647 11/10/23  1540   SODIUM mmol/L 134*  --  134*   POTASSIUM mmol/L 4.0   < > 5.8*   CHLORIDE mmol/L 102  --  103   CO2 mmol/L 22  --  22   BUN mg/dL 19  --  17   CREATININE mg/dL 1.14  --  1.03   ANION GAP mmol/L 10  --  9   CALCIUM mg/dL 8.1*  --  8.8   ALBUMIN g/dL  --   --  3.9   TOTAL BILIRUBIN mg/dL  --   --  0.71   ALK PHOS U/L  --   --  54   ALT U/L  --   --  34   AST U/L  --   --  53*   GLUCOSE RANDOM mg/dL 121  --  128    < > = values in this interval not displayed.      Results from last 7 days   Lab Units 11/10/23  1645   INR  1.11             Results from last 7 days   Lab Units 11/10/23  2316 11/10/23  1942 11/10/23  1532   LACTIC ACID mmol/L 2.0 2.4* 2.1*   PROCALCITONIN ng/ml  --   --  0.32*       Lines/Drains:  Invasive Devices       Peripheral Intravenous Line  Duration             Peripheral IV 11/11/23 Left;Proximal;Ventral (anterior) Forearm <1 day                    Telemetry:   Telemetry Orders (From admission, onward)               24 Hour Telemetry Monitoring  Continuous x 24 Hours (Telem)        Question:  Reason for 24 Hour Telemetry  Answer:  Decompensated CHF- and any one of the following: continuous diuretic infusion or total diuretic dose >200 mg daily, associated electrolyte derangement (I.e. K < 3.0), ionotropic drip (continuous infusion), hx of ventricular arrhythmia, or new EF < 35%                        * I Have Reviewed All Lab Data Listed Above. Imaging:     Results for orders placed during the hospital encounter of 11/10/23    XR chest portable    Narrative  CHEST    INDICATION:   hypoxia. COMPARISON: CTA of the chest 11/10/2023    EXAM PERFORMED/VIEWS:  XR CHEST PORTABLE  AP semierect      FINDINGS:    Moderate cardiomegaly. Aortic calcification. Mild bibasilar opacities could be suggestive of pulmonary edema. Mild dextroscoliosis. Age-related degenerative change of the spine. Severe erosions of right greater than left glenohumeral joint. Impression  Moderate cardiomegaly with likely mild pulmonary edema. Resident: Valarie Mcclelland, the attending radiologist, have reviewed the images and agree with the final report above. Workstation performed: [id number]    No results found for this or any previous visit. *I have reviewed all imaging reports listed above      Recent Cultures (last 7 days):     Results from last 7 days   Lab Units 11/10/23  1532   BLOOD CULTURE  No Growth at 24 hrs. No Growth at 24 hrs.        Last 24 Hours Medication List:   Current Facility-Administered Medications   Medication Dose Route Frequency Provider Last Rate    acetaminophen  650 mg Oral Q6H PRN Lisa Das PA-C      cefTRIAXone 1,000 mg Intravenous Q24H Yesenia Berry MD      furosemide  20 mg Intravenous BID Mendez Mariangel PA-C      heparin (porcine)  3-30 Units/kg/hr (Order-Specific) Intravenous Titrated Mendez Mariangel, PA-C 12 Units/kg/hr (11/11/23 7960)    heparin (porcine)  10,000 Units Intravenous Q6H PRN Mendez Mariangel, PA-C      heparin (porcine)  5,000 Units Intravenous Q6H PRN Mendez Mariangel PA-C      hydrALAZINE  5 mg Intravenous Q6H PRN Angelica Madrid PA-C      lisinopril  10 mg Oral Daily Angelica Madrid PA-C      metoprolol succinate  100 mg Oral Daily Andrea August PA-C          Today, Patient Was Seen By: Yesenia Berry MD    ** Please Note: Dictation voice to text software may have been used in the creation of this document.  **

## 2023-11-12 NOTE — PLAN OF CARE
Problem: MOBILITY - ADULT  Goal: Maintain or return to baseline ADL function  Description: INTERVENTIONS:  -  Assess patient's ability to carry out ADLs; assess patient's baseline for ADL function and identify physical deficits which impact ability to perform ADLs (bathing, care of mouth/teeth, toileting, grooming, dressing, etc.)  - Assess/evaluate cause of self-care deficits   - Assess range of motion  - Assess patient's mobility; develop plan if impaired  - Assess patient's need for assistive devices and provide as appropriate  - Encourage maximum independence but intervene and supervise when necessary  - Involve family in performance of ADLs  - Assess for home care needs following discharge   - Consider OT consult to assist with ADL evaluation and planning for discharge  - Provide patient education as appropriate  Outcome: Progressing  Goal: Maintains/Returns to pre admission functional level  Description: INTERVENTIONS:  - Perform BMAT or MOVE assessment daily.   - Set and communicate daily mobility goal to care team and patient/family/caregiver. - Collaborate with rehabilitation services on mobility goals if consulted  - Perform Range of Motion x times a day. - Reposition patient every x hours.   - Dangle patient x times a day  - Stand patient x times a day  - Ambulate patient x times a day  - Out of bed to chair x times a day   - Out of bed for meals x times a day  - Out of bed for toileting  - Record patient progress and toleration of activity level   Outcome: Progressing     Problem: PAIN - ADULT  Goal: Verbalizes/displays adequate comfort level or baseline comfort level  Description: Interventions:  - Encourage patient to monitor pain and request assistance  - Assess pain using appropriate pain scale  - Administer analgesics based on type and severity of pain and evaluate response  - Implement non-pharmacological measures as appropriate and evaluate response  - Consider cultural and social influences on pain and pain management  - Notify physician/advanced practitioner if interventions unsuccessful or patient reports new pain  Outcome: Progressing     Problem: INFECTION - ADULT  Goal: Absence or prevention of progression during hospitalization  Description: INTERVENTIONS:  - Assess and monitor for signs and symptoms of infection  - Monitor lab/diagnostic results  - Monitor all insertion sites, i.e. indwelling lines, tubes, and drains  - Monitor endotracheal if appropriate and nasal secretions for changes in amount and color  - [place] appropriate cooling/warming therapies per order  - Administer medications as ordered  - Instruct and encourage patient and family to use good hand hygiene technique  - Identify and instruct in appropriate isolation precautions for identified infection/condition  Outcome: Progressing  Goal: Absence of fever/infection during neutropenic period  Description: INTERVENTIONS:  - Monitor WBC    Outcome: Progressing     Problem: SAFETY ADULT  Goal: Maintain or return to baseline ADL function  Description: INTERVENTIONS:  -  Assess patient's ability to carry out ADLs; assess patient's baseline for ADL function and identify physical deficits which impact ability to perform ADLs (bathing, care of mouth/teeth, toileting, grooming, dressing, etc.)  - Assess/evaluate cause of self-care deficits   - Assess range of motion  - Assess patient's mobility; develop plan if impaired  - Assess patient's need for assistive devices and provide as appropriate  - Encourage maximum independence but intervene and supervise when necessary  - Involve family in performance of ADLs  - Assess for home care needs following discharge   - Consider OT consult to assist with ADL evaluation and planning for discharge  - Provide patient education as appropriate  Outcome: Progressing  Goal: Maintains/Returns to pre admission functional level  Description: INTERVENTIONS:  - Perform BMAT or MOVE assessment daily.   - Set and communicate daily mobility goal to care team and patient/family/caregiver. - Collaborate with rehabilitation services on mobility goals if consulted  - Perform Range of Motion x times a day. - Reposition patient every x hours.   - Dangle patient x times a day  - Stand patient x times a day  - Ambulate patient x times a day  - Out of bed to chair x times a day   - Out of bed for meals x times a day  - Out of bed for toileting  - Record patient progress and toleration of activity level   Outcome: Progressing  Goal: Patient will remain free of falls  Description: INTERVENTIONS:  - Educate patient/family on patient safety including physical limitations  - Instruct patient to call for assistance with activity   - Consult OT/PT to assist with strengthening/mobility   - Keep Call bell within reach  - Keep bed low and locked with side rails adjusted as appropriate  - Keep care items and personal belongings within reach  - Initiate and maintain comfort rounds  - Make Fall Risk Sign visible to staff  - Offer Toileting every  Hours, in advance of need  - Initiate/Maintain bedalarm  - Obtain necessary fall risk management equipment:   - Apply yellow socks and bracelet for high fall risk patients  - Consider moving patient to room near nurses station  Outcome: Progressing     Problem: DISCHARGE PLANNING  Goal: Discharge to home or other facility with appropriate resources  Description: INTERVENTIONS:  - Identify barriers to discharge w/patient and caregiver  - Arrange for needed discharge resources and transportation as appropriate  - Identify discharge learning needs (meds, wound care, etc.)  - Arrange for interpretive services to assist at discharge as needed  - Refer to Case Management Department for coordinating discharge planning if the patient needs post-hospital services based on physician/advanced practitioner order or complex needs related to functional status, cognitive ability, or social support system  Outcome: Progressing     Problem: Knowledge Deficit  Goal: Patient/family/caregiver demonstrates understanding of disease process, treatment plan, medications, and discharge instructions  Description: Complete learning assessment and assess knowledge base.   Interventions:  - Provide teaching at level of understanding  - Provide teaching via preferred learning methods  Outcome: Progressing     Problem: Potential for Falls  Goal: Patient will remain free of falls  Description: INTERVENTIONS:  - Educate patient/family on patient safety including physical limitations  - Instruct patient to call for assistance with activity   - Consult OT/PT to assist with strengthening/mobility   - Keep Call bell within reach  - Keep bed low and locked with side rails adjusted as appropriate  - Keep care items and personal belongings within reach  - Initiate and maintain comfort rounds  - Make Fall Risk Sign visible to staff  - Offer Toileting every x Hours, in advance of need  - Initiate/Maintain xbedalarm  - Obtain necessary fall risk management equipment:   - Apply yellow socks and bracelet for high fall risk patients  - Consider moving patient to room near nurses station  Outcome: Progressing

## 2023-11-12 NOTE — PROGRESS NOTES
Cardiology Progress Note - Michell Iglesias 74 y.o. male MRN: [id number]    Unit/Bed#: [facility] Encounter: [id number]        Subjective:    No significant events overnight. No chest pain or shortness of breath. Review of Systems   Constitutional: Negative for malaise/fatigue. Cardiovascular:  Negative for chest pain. Respiratory:  Negative for shortness of breath. Objective:   Vitals: Blood pressure 137/72, pulse 101, temperature 98.5 °F (36.9 °C), temperature source Oral, resp. rate 18, height 5' 7" (1.702 m), weight 110 kg (242 lb 8.1 oz), SpO2 93 %. , Body mass index is 37.98 kg/m².,   Orthostatic Blood Pressures      Flowsheet Row Most Recent Value   Blood Pressure 137/72 filed at 11/12/2023 0728   Patient Position - Orthostatic VS Lying filed at 11/12/2023 0154           Systolic (25XZM), LBJ:942 , Min:136 , EKW:894     Diastolic (73RUO), XPU:70, Min:62, Max:82      Intake/Output Summary (Last 24 hours) at 11/12/2023 1356  Last data filed at 11/12/2023 1348  Gross per 24 hour   Intake 1020 ml   Output 1360 ml   Net -340 ml     Weight (last 2 days)       Date/Time Weight    11/12/23 0450 110 (242.51)    11/11/23 0500 112 (246.47)    11/10/23 1515 127 (280)              T    Physical Exam  Vitals reviewed. Constitutional:       Appearance: Normal appearance. HENT:      Head: Normocephalic. Nose: Nose normal.      Mouth/Throat:      Mouth: Mucous membranes are moist.      Pharynx: Oropharynx is clear. Eyes:      General: No scleral icterus. Conjunctiva/sclera: Conjunctivae normal.   Cardiovascular:      Rate and Rhythm: Normal rate and regular rhythm. Heart sounds: No murmur heard. No friction rub. No gallop. Pulmonary:      Effort: Pulmonary effort is normal. No respiratory distress. Breath sounds: Normal breath sounds. No wheezing or rales. Abdominal:      General: Abdomen is flat. Bowel sounds are normal. There is no distension. Palpations: Abdomen is soft. Tenderness: There is no abdominal tenderness. There is no guarding. Musculoskeletal:      Cervical back: Normal range of motion and neck supple. Right lower leg: No edema. Left lower leg: No edema. Skin:     General: Skin is warm and dry. Neurological:      General: No focal deficit present. Mental Status: He is alert and oriented to person, place, and time.    Psychiatric:         Mood and Affect: Mood normal.         Behavior: Behavior normal.           Laboratory Results:        CBC with diff:   Results from last 7 days   Lab Units 11/11/23  0206 11/10/23  1532   WBC Thousand/uL 22.05* 15.39*   HEMOGLOBIN g/dL 13.1 14.5   HEMATOCRIT % 40.5 44.4   MCV fL 95 96   PLATELETS Thousands/uL 263 295   RBC Million/uL 4.26 4.63   MCH pg 30.8 31.3   MCHC g/dL 32.3 32.7   RDW % 13.5 13.4   MPV fL 10.8 10.8   NRBC AUTO /100 WBCs 0 0         CMP:  Results from last 7 days   Lab Units 11/11/23  0206 11/10/23  1647 11/10/23  1540   POTASSIUM mmol/L 4.0 4.2 5.8*   CHLORIDE mmol/L 102  --  103   CO2 mmol/L 22  --  22   BUN mg/dL 19  --  17   CREATININE mg/dL 1.14  --  1.03   CALCIUM mg/dL 8.1*  --  8.8   AST U/L  --   --  53*   ALT U/L  --   --  34   ALK PHOS U/L  --   --  54   EGFR ml/min/1.73sq m 62  --  70         BMP:  Results from last 7 days   Lab Units 11/11/23  0206 11/10/23  1647 11/10/23  1540   POTASSIUM mmol/L 4.0 4.2 5.8*   CHLORIDE mmol/L 102  --  103   CO2 mmol/L 22  --  22   BUN mg/dL 19  --  17   CREATININE mg/dL 1.14  --  1.03   CALCIUM mg/dL 8.1*  --  8.8       BNP:   Recent Labs     11/10/23  1532   *       Magnesium:   Results from last 7 days   Lab Units 11/11/23  0206 11/10/23  1540   MAGNESIUM mg/dL 1.9 2.0       Coags:   Results from last 7 days   Lab Units 11/12/23  0231 11/11/23  1946 11/11/23  1053 11/11/23  0157 11/10/23  1645   PTT seconds 66* 78* 185* >210* 28   INR   --   --   --   --  1.11       TSH: No results found for: "TSH"    Hemoglobin A1C       Lipid Profile: Cardiac testing:   No results found for this or any previous visit. No results found for this or any previous visit. No results found for this or any previous visit. No results found for this or any previous visit. Meds/Allergies   current meds:   Current Facility-Administered Medications   Medication Dose Route Frequency    acetaminophen (TYLENOL) tablet 650 mg  650 mg Oral Q6H PRN    cefTRIAXone (ROCEPHIN) IVPB (premix in dextrose) 1,000 mg 50 mL  1,000 mg Intravenous Q24H    furosemide (LASIX) injection 20 mg  20 mg Intravenous BID    heparin (porcine) 25,000 units in 0.45% NaCl 250 mL infusion (premix)  3-30 Units/kg/hr (Order-Specific) Intravenous Titrated    heparin (porcine) injection 10,000 Units  10,000 Units Intravenous Q6H PRN    heparin (porcine) injection 5,000 Units  5,000 Units Intravenous Q6H PRN    hydrALAZINE (APRESOLINE) injection 5 mg  5 mg Intravenous Q6H PRN    lisinopril (ZESTRIL) tablet 10 mg  10 mg Oral Daily    metoprolol succinate (TOPROL-XL) 24 hr tablet 100 mg  100 mg Oral Daily     Medications Prior to Admission   Medication    lisinopril (ZESTRIL) 10 mg tablet    metoprolol succinate (TOPROL-XL) 100 mg 24 hr tablet       heparin (porcine), 3-30 Units/kg/hr (Order-Specific), Last Rate: 12 Units/kg/hr (11/11/23 1404)      Assessment:  Principal Problem:    Generalized weakness  Active Problems:    Essential hypertension    Localized swelling of left lower leg    Elevated troponin    Elevated d-dimer    Acute respiratory failure with hypoxia (HCC)    SIRS (systemic inflammatory response syndrome) ([address])    Plan:    Continue with IV diuresis as ordered  Echo pending Monday. Counseling / Coordination of Care  Total floor / unit time spent today 25 minutes. Greater than 50% of total time was spent with the patient and / or family counseling and / or coordination of care. A description of the counseling / coordination of care.

## 2023-11-12 NOTE — UTILIZATION REVIEW
NOTIFICATION OF INPATIENT ADMISSION   AUTHORIZATION REQUEST   SERVICING FACILITY:   [address] [place] [address]  [address] [facility],Third [address] [address]  Tax ID: [id number]  NPI: [id number] ATTENDING PROVIDER:  Attending Name and NPI#: Lucas Hurtado, [id number] [place] [address] [[id number]]  Address: [address] [facility],Third [address] [address]  Phone: [phone]   ADMISSION INFORMATION:  Place of Service: [address] [place] [address]  Place of Service Code: 21  Inpatient Admission Date/Time: 11/10/23  8:00 PM  Discharge Date/Time: No discharge date for patient encounter. Admitting Diagnosis Code/Description:  Acute respiratory insufficiency [R06.89]  Elevated troponin [R79.89]  Generalized weakness [R53.1]  Edema of left lower extremity [R60.0]     UTILIZATION REVIEW CONTACT:  Lila Darden Utilization   Network Utilization Review Department  Phone: [phone]  Fax [phone]  Email: [email or web address]  Contact for approvals/pending authorizations, clinical reviews, and discharge. PHYSICIAN ADVISORY SERVICES:  Medical Necessity Denial & Fpef-lw-Zrfy Review  Phone: [phone]  Fax: [phone]  Email: [email or web address]     DISCHARGE SUPPORT TEAM:  For Patients Discharge Needs & Updates  Phone: [phone] opt. 2 Fax: [phone]  Email: [email or web address]

## 2023-11-12 NOTE — ASSESSMENT & PLAN NOTE
SIRS: Tachycardia, tachypnea, leukocytosis (15.39)  Lactic 2.1, 2-hour pending  Procalcitonin 0.32  SOI: Unknown at this time  Left lower extremity with swelling and erythema  DVT suspected  Do not suspect cellulitis at this time. CTA negative for pneumonia  COVID/flu/RSV negative  Pt with fevers 11/11 PM.   Will check UA, COVID/flu/RSV, procalcitonin. Initiate rocephin.

## 2023-11-12 NOTE — CONSULTS
Consult received for CHF Ed. No formal dx for CHF at this time, noting edema and IV lasix use. Pt ordered cardiac diet with fluid restriction. Pt resides at facility, reports obtains meals typically from facility, may order out at [employer], gets cheesteak with sauce. Pt reports no following a special diet there. May snack on tiffany crackers or pretzels or tastykakes. Discussed low Na diet with pt to help prevent edema. Discussed avoidance of salt shaker entirely. Discussed low sodium modifications to meals such as swiss cheese with cheesesteak and asking for less tomato sauce or lettuce and tomato instead. Select unsalted pretzels or tiffany crackers or fresh/canned fruits for snacks. Pt does not appear particularly interested in following low Na diet though appreciative. Provided with low sodium nutrition therapy handout. Continue cardiac diet. Fluid restriction per MD.     Will provide further instruction pending CHF dx.

## 2023-11-13 ENCOUNTER — APPOINTMENT (INPATIENT)
Dept: NON INVASIVE DIAGNOSTICS | Facility: HOSPITAL | Age: 73
DRG: 291 | End: 2023-11-13
Payer: MEDICARE

## 2023-11-13 LAB
ALBUMIN SERPL BCP-MCNC: 3.2 G/DL (ref 3.5–5)
ALP SERPL-CCNC: 59 U/L (ref 34–104)
ALT SERPL W P-5'-P-CCNC: 34 U/L (ref 7–52)
ANION GAP SERPL CALCULATED.3IONS-SCNC: 9 MMOL/L
AORTIC ROOT: 3.3 CM
APICAL FOUR CHAMBER EJECTION FRACTION: 17 %
APTT PPP: 51 SECONDS (ref 23–37)
APTT PPP: 52 SECONDS (ref 23–37)
APTT PPP: 70 SECONDS (ref 23–37)
APTT PPP: 72 SECONDS (ref 23–37)
ASCENDING AORTA: 3.3 CM
AST SERPL W P-5'-P-CCNC: 49 U/L (ref 13–39)
BASOPHILS # BLD AUTO: 0.05 THOUSANDS/ÂΜL (ref 0–0.1)
BASOPHILS NFR BLD AUTO: 1 % (ref 0–1)
BILIRUB SERPL-MCNC: 0.51 MG/DL (ref 0.2–1)
BUN SERPL-MCNC: 20 MG/DL (ref 5–25)
CALCIUM ALBUM COR SERPL-MCNC: 9.3 MG/DL (ref 8.3–10.1)
CALCIUM SERPL-MCNC: 8.7 MG/DL (ref 8.4–10.2)
CHLORIDE SERPL-SCNC: 101 MMOL/L (ref 96–108)
CHOLEST SERPL-MCNC: 106 MG/DL
CO2 SERPL-SCNC: 25 MMOL/L (ref 21–32)
CREAT SERPL-MCNC: 0.86 MG/DL (ref 0.6–1.3)
DOP CALC LVOT AREA: 3.14 CM2
DOP CALC LVOT DIAMETER: 2 CM
E WAVE DECELERATION TIME: 137 MS
E/A RATIO: 0.73
EOSINOPHIL # BLD AUTO: 0.17 THOUSAND/ÂΜL (ref 0–0.61)
EOSINOPHIL NFR BLD AUTO: 2 % (ref 0–6)
ERYTHROCYTE [DISTWIDTH] IN BLOOD BY AUTOMATED COUNT: 13.4 % (ref 11.6–15.1)
FRACTIONAL SHORTENING: 7 (ref 28–44)
GFR SERPL CREATININE-BSD FRML MDRD: 84 ML/MIN/1.73SQ M
GLUCOSE SERPL-MCNC: 160 MG/DL (ref 65–140)
HCT VFR BLD AUTO: 41.6 % (ref 36.5–49.3)
HDLC SERPL-MCNC: 27 MG/DL
HGB BLD-MCNC: 13.9 G/DL (ref 12–17)
IMM GRANULOCYTES # BLD AUTO: 0.05 THOUSAND/UL (ref 0–0.2)
IMM GRANULOCYTES NFR BLD AUTO: 1 % (ref 0–2)
INTERVENTRICULAR SEPTUM IN DIASTOLE (PARASTERNAL SHORT AXIS VIEW): 1.3 CM
INTERVENTRICULAR SEPTUM: 1.3 CM (ref 0.6–1.1)
LAAS-AP2: 21.2 CM2
LAAS-AP4: 20.7 CM2
LDLC SERPL CALC-MCNC: 45 MG/DL (ref 0–100)
LEFT ATRIUM AREA SYSTOLE SINGLE PLANE A4C: 20.8 CM2
LEFT ATRIUM SIZE: 4 CM
LEFT ATRIUM VOLUME (MOD BIPLANE): 69 ML
LEFT ATRIUM VOLUME INDEX (MOD BIPLANE): 31.4 ML/M2
LEFT INTERNAL DIMENSION IN SYSTOLE: 6.6 CM (ref 2.1–4)
LEFT VENTRICLE DIASTOLIC VOLUME (MOD BIPLANE): 185 ML
LEFT VENTRICLE SYSTOLIC VOLUME (MOD BIPLANE): 158 ML
LEFT VENTRICULAR INTERNAL DIMENSION IN DIASTOLE: 7.1 CM (ref 3.5–6)
LEFT VENTRICULAR POSTERIOR WALL IN END DIASTOLE: 0.8 CM
LEFT VENTRICULAR STROKE VOLUME: 42 ML
LV EF: 14 %
LVSV (TEICH): 42 ML
LYMPHOCYTES # BLD AUTO: 0.94 THOUSANDS/ÂΜL (ref 0.6–4.47)
LYMPHOCYTES NFR BLD AUTO: 11 % (ref 14–44)
MCH RBC QN AUTO: 31.4 PG (ref 26.8–34.3)
MCHC RBC AUTO-ENTMCNC: 33.4 G/DL (ref 31.4–37.4)
MCV RBC AUTO: 94 FL (ref 82–98)
MITRAL REGURGITATION PEAK VELOCITY: 5.94 M/S
MITRAL VALVE MEAN INFLOW VELOCITY: 4.58 M/S
MITRAL VALVE REGURGITANT PEAK GRADIENT: 141 MMHG
MONOCYTES # BLD AUTO: 0.85 THOUSAND/ÂΜL (ref 0.17–1.22)
MONOCYTES NFR BLD AUTO: 10 % (ref 4–12)
MV E'TISSUE VEL-SEP: 4 CM/S
MV PEAK A VEL: 0.9 M/S
MV PEAK E VEL: 66 CM/S
MV STENOSIS PRESSURE HALF TIME: 40 MS
MV VALVE AREA P 1/2 METHOD: 5.5
NEUTROPHILS # BLD AUTO: 6.8 THOUSANDS/ÂΜL (ref 1.85–7.62)
NEUTS SEG NFR BLD AUTO: 75 % (ref 43–75)
NRBC BLD AUTO-RTO: 0 /100 WBCS
PLATELET # BLD AUTO: 272 THOUSANDS/UL (ref 149–390)
PMV BLD AUTO: 10.7 FL (ref 8.9–12.7)
POTASSIUM SERPL-SCNC: 3.9 MMOL/L (ref 3.5–5.3)
PROT SERPL-MCNC: 7 G/DL (ref 6.4–8.4)
RBC # BLD AUTO: 4.43 MILLION/UL (ref 3.88–5.62)
RIGHT ATRIUM AREA SYSTOLE A4C: 17.7 CM2
RIGHT VENTRICLE ID DIMENSION: 4.1 CM
SL CV DOP CALC MV VTI RETROGRADE: 186.6 CM
SL CV LEFT ATRIUM LENGTH A2C: 5.3 CM
SL CV LV EF: 20
SL CV MV MEAN GRADIENT RETROGRADE: 94 MMHG
SL CV PED ECHO LEFT VENTRICLE DIASTOLIC VOLUME (MOD BIPLANE) 2D: 263 ML
SL CV PED ECHO LEFT VENTRICLE SYSTOLIC VOLUME (MOD BIPLANE) 2D: 220 ML
SODIUM SERPL-SCNC: 135 MMOL/L (ref 135–147)
TRICUSPID ANNULAR PLANE SYSTOLIC EXCURSION: 2.7 CM
TRIGL SERPL-MCNC: 172 MG/DL
WBC # BLD AUTO: 8.86 THOUSAND/UL (ref 4.31–10.16)

## 2023-11-13 PROCEDURE — 85025 COMPLETE CBC W/AUTO DIFF WBC: CPT | Performed by: HOSPITALIST

## 2023-11-13 PROCEDURE — 80061 LIPID PANEL: CPT | Performed by: INTERNAL MEDICINE

## 2023-11-13 PROCEDURE — C8929 TTE W OR WO FOL WCON,DOPPLER: HCPCS

## 2023-11-13 PROCEDURE — 97167 OT EVAL HIGH COMPLEX 60 MIN: CPT

## 2023-11-13 PROCEDURE — 99232 SBSQ HOSP IP/OBS MODERATE 35: CPT | Performed by: HOSPITALIST

## 2023-11-13 PROCEDURE — 93306 TTE W/DOPPLER COMPLETE: CPT | Performed by: INTERNAL MEDICINE

## 2023-11-13 PROCEDURE — 97116 GAIT TRAINING THERAPY: CPT

## 2023-11-13 PROCEDURE — 99233 SBSQ HOSP IP/OBS HIGH 50: CPT | Performed by: INTERNAL MEDICINE

## 2023-11-13 PROCEDURE — 80053 COMPREHEN METABOLIC PANEL: CPT | Performed by: HOSPITALIST

## 2023-11-13 PROCEDURE — 97163 PT EVAL HIGH COMPLEX 45 MIN: CPT

## 2023-11-13 PROCEDURE — 85730 THROMBOPLASTIN TIME PARTIAL: CPT | Performed by: INTERNAL MEDICINE

## 2023-11-13 PROCEDURE — 85730 THROMBOPLASTIN TIME PARTIAL: CPT | Performed by: HOSPITALIST

## 2023-11-13 RX ORDER — ATORVASTATIN CALCIUM 40 MG/1
40 TABLET, FILM COATED ORAL
Status: DISCONTINUED | OUTPATIENT
Start: 2023-11-13 | End: 2023-11-16 | Stop reason: HOSPADM

## 2023-11-13 RX ORDER — SPIRONOLACTONE 25 MG/1
25 TABLET ORAL DAILY
Status: DISCONTINUED | OUTPATIENT
Start: 2023-11-13 | End: 2023-11-16 | Stop reason: HOSPADM

## 2023-11-13 RX ORDER — LISINOPRIL 20 MG/1
20 TABLET ORAL DAILY
Status: DISCONTINUED | OUTPATIENT
Start: 2023-11-14 | End: 2023-11-16 | Stop reason: HOSPADM

## 2023-11-13 RX ORDER — FUROSEMIDE 10 MG/ML
40 INJECTION INTRAMUSCULAR; INTRAVENOUS 2 TIMES DAILY
Status: DISCONTINUED | OUTPATIENT
Start: 2023-11-13 | End: 2023-11-15

## 2023-11-13 RX ADMIN — FUROSEMIDE 20 MG: 10 INJECTION, SOLUTION INTRAMUSCULAR; INTRAVENOUS at 09:00

## 2023-11-13 RX ADMIN — PERFLUTREN 1.2 ML/MIN: 6.52 INJECTION, SUSPENSION INTRAVENOUS at 10:53

## 2023-11-13 RX ADMIN — ACETAMINOPHEN 650 MG: 325 TABLET, FILM COATED ORAL at 02:11

## 2023-11-13 RX ADMIN — HEPARIN SODIUM 16 UNITS/KG/HR: 10000 INJECTION, SOLUTION INTRAVENOUS at 20:38

## 2023-11-13 RX ADMIN — ATORVASTATIN CALCIUM 40 MG: 40 TABLET, FILM COATED ORAL at 17:35

## 2023-11-13 RX ADMIN — HEPARIN SODIUM 14 UNITS/KG/HR: 10000 INJECTION, SOLUTION INTRAVENOUS at 07:24

## 2023-11-13 RX ADMIN — HEPARIN SODIUM 5000 UNITS: 1000 INJECTION INTRAVENOUS; SUBCUTANEOUS at 05:50

## 2023-11-13 RX ADMIN — METOPROLOL SUCCINATE 100 MG: 50 TABLET, EXTENDED RELEASE ORAL at 09:00

## 2023-11-13 RX ADMIN — SPIRONOLACTONE 25 MG: 25 TABLET ORAL at 17:35

## 2023-11-13 RX ADMIN — LISINOPRIL 10 MG: 10 TABLET ORAL at 09:00

## 2023-11-13 RX ADMIN — FUROSEMIDE 40 MG: 10 INJECTION, SOLUTION INTRAMUSCULAR; INTRAVENOUS at 17:36

## 2023-11-13 RX ADMIN — CEFTRIAXONE 1000 MG: 1 INJECTION, SOLUTION INTRAVENOUS at 09:00

## 2023-11-13 RX ADMIN — HEPARIN SODIUM 5000 UNITS: 1000 INJECTION INTRAVENOUS; SUBCUTANEOUS at 13:01

## 2023-11-13 NOTE — PROGRESS NOTES
Cardiology Progress Note   Zoraida Levi 74 y.o. male MRN: [id number]    Unit/Bed#: -01 Encounter: [id number]      Assessment:  Reduced ejection fraction heart failure  Plan:  Discussed ECHO results show evidence of reduced ejection fraction HF. Increased lisinopril to 20mg daily. Increased Lasix to 40mg to continue diuresis for B/L leg edema on exam. Pt started on spironolactone 25mg daily and atorvastatin 40mg with concern for CAD. Continue on metoprolol succinate, heparin, and rocephin. Discussed use of LifeVest due to string of nonsustained Vtach on EKG. Will need outpatient f/u with heart catheterization. Continue DVT management. Objective:     Vitals: Blood pressure 147/81, pulse 99, temperature 98 °F (36.7 °C), resp. rate 20, height 5' 7" (1.702 m), weight 111 kg (245 lb), SpO2 92 %. , Body mass index is 38.37 kg/m².,   Orthostatic Blood Pressures      Flowsheet Row Most Recent Value   Blood Pressure 147/81 filed at 11/13/2023 1423   Patient Position - Orthostatic VS Lying filed at 11/13/2023 0846              Intake/Output Summary (Last 24 hours) at 11/13/2023 1543  Last data filed at 11/13/2023 1342  Gross per 24 hour   Intake 1150 ml   Output 1690 ml   Net -540 ml         Physical Exam:    GEN: Zoraida Levi appears well, alert and oriented x 3, pleasant and cooperative   HEENT: Sclera anicteric, conjunctivae pink, mucous membranes moist. Oropharynx clear. NECK: supple, no significant JVD, Trachea midline, no thyromegaly. HEART: regular rhythm, normal S1 and S2, no murmurs, clicks, gallops or rubs   LUNGS: clear to auscultation bilaterally; no wheezes, rales, or rhonchi. No increased work of breathing or signs of respiratory distress. ABDOMEN: Soft, nontender, nondistended  EXTREMITIES: 1+ pitting edema of lower extremities B/L. Skin warm and well perfused, no clubbing, cyanosis. NEURO: No focal findings. Normal speech.  Mood and affect normal.   SKIN: Normal without suspicious lesions on exposed skin. Medications:      Current Facility-Administered Medications:     acetaminophen (TYLENOL) tablet 650 mg, 650 mg, Oral, Q6H PRN, Jus Romero PA-C, 650 mg at 11/13/23 0211    atorvastatin (LIPITOR) tablet 40 mg, 40 mg, Oral, Daily With Meseret Carolina MD    cefTRIAXone (ROCEPHIN) IVPB (premix in dextrose) 1,000 mg 50 mL, 1,000 mg, Intravenous, Q24H, Musa Peguero MD, Last Rate: 100 mL/hr at 11/13/23 0900, 1,000 mg at 11/13/23 0900    furosemide (LASIX) injection 40 mg, 40 mg, Intravenous, BID, Ashley Heredia MD    heparin (porcine) 25,000 units in 0.45% NaCl 250 mL infusion (premix), 3-30 Units/kg/hr (Order-Specific), Intravenous, Titrated, Angelica Madrid PA-C, Last Rate: 20 mL/hr at 11/13/23 1303, 16 Units/kg/hr at 11/13/23 1303    heparin (porcine) injection 10,000 Units, 10,000 Units, Intravenous, Q6H PRN, Jus Romero PA-C    heparin (porcine) injection 5,000 Units, 5,000 Units, Intravenous, Q6H PRN, Jus Romero PA-C, 5,000 Units at 11/13/23 1301    hydrALAZINE (APRESOLINE) injection 5 mg, 5 mg, Intravenous, Q6H PRN, Jus Romero PA-C    [START ON 11/14/2023] lisinopril (ZESTRIL) tablet 20 mg, 20 mg, Oral, Daily, Ashley Heredia MD    metoprolol succinate (TOPROL-XL) 24 hr tablet 100 mg, 100 mg, Oral, Daily, Angelica Madrid PA-C, 100 mg at 11/13/23 0900    spironolactone (ALDACTONE) tablet 25 mg, 25 mg, Oral, Daily, Ashley Heredia MD     Labs & Results:        Results from last 7 days   Lab Units 11/13/23  1207 11/11/23  0206 11/10/23  1532   WBC Thousand/uL 8.86 22. 05* 15.39*   HEMOGLOBIN g/dL 13.9 13.1 14.5   HEMATOCRIT % 41.6 40.5 44.4   PLATELETS Thousands/uL 272 263 295     Results from last 7 days   Lab Units 11/13/23  1207   TRIGLYCERIDES mg/dL 172*   HDL mg/dL 27*     Results from last 7 days   Lab Units 11/13/23  1207 11/11/23  0206 11/10/23  1647 11/10/23  1540   POTASSIUM mmol/L 3.9 4.0 4.2 5.8*   CHLORIDE mmol/L 101 102  --  103   CO2 mmol/L 25 22  --  22   BUN mg/dL 20 19  --  17 CREATININE mg/dL 0.86 1.14  --  1.03   CALCIUM mg/dL 8.7 8.1*  --  8.8   ALK PHOS U/L 59  --   --  54   ALT U/L 34  --   --  34   AST U/L 49*  --   --  53*     Results from last 7 days   Lab Units 11/13/23  1207 11/13/23  0440 11/12/23  0231 11/11/23  0157 11/10/23  1645   INR   --   --   --   --  1.11   PTT seconds 52* 51* 66*   < > 28    < > = values in this interval not displayed.      Results from last 7 days   Lab Units 11/11/23  0206 11/10/23  1540   MAGNESIUM mg/dL 1.9 2.0

## 2023-11-13 NOTE — PLAN OF CARE
Problem: MOBILITY - ADULT  Goal: Maintain or return to baseline ADL function  Description: INTERVENTIONS:  -  Assess patient's ability to carry out ADLs; assess patient's baseline for ADL function and identify physical deficits which impact ability to perform ADLs (bathing, care of mouth/teeth, toileting, grooming, dressing, etc.)  - Assess/evaluate cause of self-care deficits   - Assess range of motion  - Assess patient's mobility; develop plan if impaired  - Assess patient's need for assistive devices and provide as appropriate  - Encourage maximum independence but intervene and supervise when necessary  - Involve family in performance of ADLs  - Assess for home care needs following discharge   - Consider OT consult to assist with ADL evaluation and planning for discharge  - Provide patient education as appropriate  Outcome: Progressing  Goal: Maintains/Returns to pre admission functional level  Description: INTERVENTIONS:  - Perform BMAT or MOVE assessment daily.   - Set and communicate daily mobility goal to care team and patient/family/caregiver. - Collaborate with rehabilitation services on mobility goals if consulted  - Perform Range of Motion x times a day. - Reposition patient every x hours.   - Dangle patient x times a day  - Stand patient x times a day  - Ambulate patient x times a day  - Out of bed to chair x times a day   - Out of bed for meals x times a day  - Out of bed for toileting  - Record patient progress and toleration of activity level   Outcome: Progressing     Problem: PAIN - ADULT  Goal: Verbalizes/displays adequate comfort level or baseline comfort level  Description: Interventions:  - Encourage patient to monitor pain and request assistance  - Assess pain using appropriate pain scale  - Administer analgesics based on type and severity of pain and evaluate response  - Implement non-pharmacological measures as appropriate and evaluate response  - Consider cultural and social influences on pain and pain management  - Notify physician/advanced practitioner if interventions unsuccessful or patient reports new pain  Outcome: Progressing     Problem: INFECTION - ADULT  Goal: Absence or prevention of progression during hospitalization  Description: INTERVENTIONS:  - Assess and monitor for signs and symptoms of infection  - Monitor lab/diagnostic results  - Monitor all insertion sites, i.e. indwelling lines, tubes, and drains  - Monitor endotracheal if appropriate and nasal secretions for changes in amount and color  - [place] appropriate cooling/warming therapies per order  - Administer medications as ordered  - Instruct and encourage patient and family to use good hand hygiene technique  - Identify and instruct in appropriate isolation precautions for identified infection/condition  Outcome: Progressing  Goal: Absence of fever/infection during neutropenic period  Description: INTERVENTIONS:  - Monitor WBC    Outcome: Progressing     Problem: SAFETY ADULT  Goal: Maintain or return to baseline ADL function  Description: INTERVENTIONS:  -  Assess patient's ability to carry out ADLs; assess patient's baseline for ADL function and identify physical deficits which impact ability to perform ADLs (bathing, care of mouth/teeth, toileting, grooming, dressing, etc.)  - Assess/evaluate cause of self-care deficits   - Assess range of motion  - Assess patient's mobility; develop plan if impaired  - Assess patient's need for assistive devices and provide as appropriate  - Encourage maximum independence but intervene and supervise when necessary  - Involve family in performance of ADLs  - Assess for home care needs following discharge   - Consider OT consult to assist with ADL evaluation and planning for discharge  - Provide patient education as appropriate  Outcome: Progressing  Goal: Maintains/Returns to pre admission functional level  Description: INTERVENTIONS:  - Perform BMAT or MOVE assessment daily.   - Set and communicate daily mobility goal to care team and patient/family/caregiver. - Collaborate with rehabilitation services on mobility goals if consulted  - Perform Range of Motion x times a day. - Reposition patient every x hours.   - Dangle patient x times a day  - Stand patient x times a day  - Ambulate patient x times a day  - Out of bed to chair x times a day   - Out of bed for meals x times a day  - Out of bed for toileting  - Record patient progress and toleration of activity level   Outcome: Progressing  Goal: Patient will remain free of falls  Description: INTERVENTIONS:  - Educate patient/family on patient safety including physical limitations  - Instruct patient to call for assistance with activity   - Consult OT/PT to assist with strengthening/mobility   - Keep Call bell within reach  - Keep bed low and locked with side rails adjusted as appropriate  - Keep care items and personal belongings within reach  - Initiate and maintain comfort rounds  - Make Fall Risk Sign visible to staff  - Offer Toileting every x Hours, in advance of need  - Initiate/Maintain xalarm  - Obtain necessary fall risk management equipment: x  - Apply yellow socks and bracelet for high fall risk patients  - Consider moving patient to room near nurses station  Outcome: Progressing     Problem: DISCHARGE PLANNING  Goal: Discharge to home or other facility with appropriate resources  Description: INTERVENTIONS:  - Identify barriers to discharge w/patient and caregiver  - Arrange for needed discharge resources and transportation as appropriate  - Identify discharge learning needs (meds, wound care, etc.)  - Arrange for interpretive services to assist at discharge as needed  - Refer to Case Management Department for coordinating discharge planning if the patient needs post-hospital services based on physician/advanced practitioner order or complex needs related to functional status, cognitive ability, or social support system  Outcome: Progressing     Problem: Knowledge Deficit  Goal: Patient/family/caregiver demonstrates understanding of disease process, treatment plan, medications, and discharge instructions  Description: Complete learning assessment and assess knowledge base.   Interventions:  - Provide teaching at level of understanding  - Provide teaching via preferred learning methods  Outcome: Progressing     Problem: Potential for Falls  Goal: Patient will remain free of falls  Description: INTERVENTIONS:  - Educate patient/family on patient safety including physical limitations  - Instruct patient to call for assistance with activity   - Consult OT/PT to assist with strengthening/mobility   - Keep Call bell within reach  - Keep bed low and locked with side rails adjusted as appropriate  - Keep care items and personal belongings within reach  - Initiate and maintain comfort rounds  - Make Fall Risk Sign visible to staff  - Offer Toileting every x Hours, in advance of need  - Initiate/Maintain xalarm  - Obtain necessary fall risk management equipment: x  - Apply yellow socks and bracelet for high fall risk patients  - Consider moving patient to room near nurses station  Outcome: Progressing     Problem: Prexisting or High Potential for Compromised Skin Integrity  Goal: Skin integrity is maintained or improved  Description: INTERVENTIONS:  - Identify patients at risk for skin breakdown  - Assess and monitor skin integrity  - Assess and monitor nutrition and hydration status  - Monitor labs   - Assess for incontinence   - Turn and reposition patient  - Assist with mobility/ambulation  - Relieve pressure over bony prominences  - Avoid friction and shearing  - Provide appropriate hygiene as needed including keeping skin clean and dry  - Evaluate need for skin moisturizer/barrier cream  - Collaborate with interdisciplinary team   - Patient/family teaching  - Consider wound care consult   Outcome: Progressing

## 2023-11-13 NOTE — ASSESSMENT & PLAN NOTE
Patient reports progressively worsening swelling and erythema of his left lower extremity. 2+ pitting edema noted  Acute Left DVT  Started on VTE heparin. Eventual doac. BNP elevated. No echo on file. cont IV Lasix 20 mg bid   Elevate leg  Hold off on antibiotics at this time.  Does not appear cellulitic

## 2023-11-13 NOTE — PHYSICAL THERAPY NOTE
PHYSICAL THERAPY EVALUATION NOTE    Patient Name: Zoraida Levi  RLTRF'D Date: 2023    AGE:   74 y.o. Mrn:   [id number]  ADMIT DX:  Acute respiratory insufficiency [R06.89]  Elevated troponin [R79.89]  Generalized weakness [R53.1]  Edema of left lower extremity [R60.0]    Past Medical History:   Diagnosis Date    Hypertension     Skin cancer      Length Of Stay: 3  PHYSICAL THERAPY EVALUATION :   Patient's identity confirmed via 2 patient identifiers (full name and ) at start of session         23 1505   PT Last Visit   PT Visit Date 23   Note Type   Note type Evaluation   Pain Assessment   Pain Assessment Tool FLACC   Pain Score No Pain   Pain Location/Orientation Orientation: Right  (quad)   Restrictions/Precautions   Weight Bearing Precautions Per Order No   Other Precautions Chair Alarm; Bed Alarm;Telemetry; Fall Risk   Home Living   Type of Home Assisted living  ([address]   Home Layout One level  (0 RUDY)   Bathroom Shower/Tub Walk-in shower   Bathroom Toilet Standard   Bathroom Equipment Grab bars in shower; Shower chair;Grab bars around toilet   Home Equipment Walker;Quad cane  (long handled equipment for LBD, not using PTA)   Additional Comments Pt reports using a [employer] PTA. Pt ambulates to dining room for meals. Prior Function   Level of [place] Independent with ADLs; Independent with functional mobility   Lives With Facility staff  (Roommate)   IADLs Independent with driving;Family/Friend/Other provides meals; Family/Friend/Other provides medication management  (facility does laundry)   Falls in the last 6 months 0   Vocational Retired   General   Family/Caregiver Present No   Cognition   Overall Cognitive Status WFL   Arousal/Participation Alert   Orientation Level Oriented X4   Following Commands Follows multistep commands with increased time or repetition   Comments Pt pleasant and cooperative, ? if higher level cog impairments, defer to OT   Subjective   Subjective "I [facility] at [facility] It was great from what I remember"   RLE Assessment   RLE Assessment WFL   Strength RLE   RLE Overall Strength 3+/5   LLE Assessment   LLE Assessment WFL   Strength LLE   LLE Overall Strength 3+/5   Bed Mobility   Supine to Sit 4  Minimal assistance   Additional items Assist x 1; Increased time required;LE management;HOB elevated   Transfers   Sit to Stand 4  Minimal assistance   Additional items Assist x 1   Stand to Sit 4  Minimal assistance   Additional items Assist x 1   Ambulation/Elevation   Gait pattern Decreased foot clearance; Short stride; Excessively slow;Retropulsion   Gait Assistance 4  Minimal assist   Additional items Assist x 1   Assistive Device Large base quad cane   Distance 3 ft  (EOB to recliner chair)   Ambulation/Elevation Additional Comments see tx note below for mobility w/ RW   Balance   Static Sitting Good   Static Standing Fair -   Ambulatory Poor +   Activity Tolerance   Activity Tolerance Patient tolerated treatment well   Medical Staff Made Aware OT VANE Garcia   Nurse Made Aware RN Annalee   Assessment   Problem List Decreased strength;Decreased endurance; Impaired balance;Decreased mobility; Decreased skin integrity;Obesity   Assessment Tiffany Taylor is a 74 y.o. Male who presents to [address] [place] [address] on 11/10/2023 from [address] w/ c/o generalized weakness and diagnosis of generalized weakness. Orders for PT eval and treat received. Pt presents w/ comorbidities of HTN, hx of ETOH. At baseline, pt mobilizes modified I w/ quad cane, and reports 0 falls in the last 6 months. Upon evaluation, pt presents w/ the following deficits: weakness, impaired skin integrity, impaired hearing, and decreased endurance. Upon eval, pt requires min A x 1 for bed mobility, min A x 1 for transfers, and min A x 1 for gait. Based on this PT evaluation today, patient's discharge recommendation is for Level II. During this admission, pt would benefit from continued skilled inpatient PT in the acute care setting in order to address the abovementioned deficits to maximize function and mobility before DC from acute care. Goals   Patient Goals to get stronger   STG Expiration Date 11/23/23   Short Term Goal #1 Patient will: Perform all bed mobility tasks w/ supervision to improve pt's independence w/ repositioning for decrease risk of skin breakdown, Perform all transfers w/ supervision consistently from various height surfaces in order to improve I w/ engagement w/ real-world environments/situations, Ambulate at least 100 ft. with roller walker w/ supervision w/o LOB to facilitate return and engagement w/ previous living environment, and Increase all balance 1/2 grade to decrease risk for falls   PT Treatment Day 0   Plan   Treatment/Interventions Functional transfer training;LE strengthening/ROM; Endurance training; Therapeutic exercise;Patient/family training;Equipment eval/education; Bed mobility;Gait training   PT Frequency 2-3x/wk   Discharge Recommendation   Rehab Resource Intensity Level, PT II (Moderate Resource Intensity)   AM-PAC Basic Mobility Inpatient   Turning in Flat Bed Without Bedrails 3   Lying on Back to Sitting on Edge of Flat Bed Without Bedrails 3   Moving Bed to Chair 3   Standing Up From Chair Using Arms 3   Walk in Room 3   Climb 3-5 Stairs With Railing 1   Basic Mobility Inpatient Raw Score 16   Basic Mobility Standardized Score 38.32   Highest Level Of Mobility   -HLM Goal 5: Stand one or more mins   JH-HLM Achieved 6: Walk 10 steps or more   Additional Treatment Session   Start Time 1518   End Time 1526   Treatment Assessment Pt seated OOB in recliner chair, he requires min A x 1 for STS from recliner chair. He is then able to ambulate 40 ft w/ RW w/ CGA/min A x 1. Pt noted to be very pronated, decreased R LE stance time. Pt returned to recliner chair at end of session, reports incresaed WOB. Equipment Use RW   Additional Treatment Day 1   End of Consult   Patient Position at End of Consult Bedside chair;Bed/Chair alarm activated; All needs within reach   End of Consult Comments Notified GLORIA Lieberman of some serosag drainage from anterior shin wound on LLE       Pt would benefit from skilled inpatient PT during this admission in order to facilitate progress towards goals to maximize functional independence    Malachi Barnhart, PT, DPT

## 2023-11-13 NOTE — ASSESSMENT & PLAN NOTE
SIRS: Tachycardia, tachypnea, leukocytosis (15.39)  Lactic 2.1, 2-hour pending  Procalcitonin 0.32  SOI: Unknown at this time, could be related to acute DVT  Left lower extremity with swelling and erythema  DVT suspected  Do not suspect cellulitis at this time. CTA negative for pneumonia  COVID/flu/RSV negative  Pt with fevers 11/11 PM.     UA no clear UTI  COVID/flu/RSV, neg procalcitonin equivocal at 0.32 .  Initiate empirically on rocephin with resolution of fever

## 2023-11-13 NOTE — PROGRESS NOTES
[address] [facility]  Progress Note  Name: Saroj Montoya  MRN: [id number]  Unit/Bed#: -05 I Date of Admission: 11/10/2023   Date of Service: 11/13/2023 I Hospital Day: 3    Assessment/Plan   SIRS (systemic inflammatory response syndrome) (HCC)  Assessment & Plan  SIRS: Tachycardia, tachypnea, leukocytosis (15.39)  Lactic 2.1, 2-hour pending  Procalcitonin 0.32  SOI: Unknown at this time, could be related to acute DVT  Left lower extremity with swelling and erythema  DVT suspected  Do not suspect cellulitis at this time. CTA negative for pneumonia  COVID/flu/RSV negative  Pt with fevers 11/11 PM.     UA no clear UTI  COVID/flu/RSV, neg procalcitonin equivocal at 0.32 . Initiate empirically on rocephin with resolution of fever    Acute respiratory failure with hypoxia [facility])  Assessment & Plan  Reporting sob over the past couple of days. Dry ongoing cough over the past couple of weeks. Denies congestion, fevers or chills. 88% on RA. Currently on 2 L NC   CTA chest   No definite pulmonary emboli although segmental and subsegmental emboli in the lung bases cannot be excluded due to motion artifact. Evaluation of the lungs compromised by motion with mild interstitial edema.   Order ECHO   IV lasix ordered  Covid/Flu/RSV negative   Respiratory protocol   Continue to monitor and wean oxygen as able     Elevated d-dimer  Assessment & Plan  D-dimer 3.09  CTA chest  No definite pulmonary emboli although segmental and subsegmental emboli in the lung bases cannot be excluded due to motion artifact. Evaluation of the lungs compromised by motion with mild interstitial edema. Pulmonary artery enlargement which can be seen with pulmonary hypertension. Hepatic steatosis. Gynecomastia. Left lower extremity redness and swelling.    Order venous duplex bilateral lower extremities  ED started patient on VTE heparin drip due to strong likelihood for DVT    Elevated troponin  Assessment & Plan  Troponin 54, 68, 68  Denies chest pain. BNP elevated at 572. No prior echo. ED started patient on VTE heparin drip due to possible DVT. Telemetry  Cardiology consult appreciated. [place] to have non MI trop elevation. Await echocardiogram.    Localized swelling of left lower leg  Assessment & Plan  Patient reports progressively worsening swelling and erythema of his left lower extremity. 2+ pitting edema noted  Acute Left DVT  Started on VTE heparin. Eventual doac. BNP elevated. No echo on file. cont IV Lasix 20 mg bid   Elevate leg  Hold off on antibiotics at this time. Does not appear cellulitic     Essential hypertension  Assessment & Plan  Home regimen: Unknown  Patient reports he takes 2 blood pressure medications + Lasix  Requesting nursing to perform med rec as able  Normotensive thus far. Continue to monitor  Added IV hydralazine as needed for systolic over 304    * Generalized weakness  Assessment & Plan  Presents from [facility] due to increasing generalized weakness over the past couple of days. He feels like his legs are going to give out when he tries to ambulate. Denies fall or injury. Typically uses a cane to ambulate. Meeting SIRS criteria (tachycardia, tachypnea, leukocytosis)  CTA negative for pneumonia  COVID/flu/RSV negative  UA pending  Hypoxic on arrival - 2L NC  Fall precautions  Weakness may be secondary to volume overload, fever  Consult PT/OT and case management          VTE  Prophylaxis:   Pharmacologic: in place  Mechanical VTE Prophylaxis in Place: Yes    Patient Centered Rounds: I have performed bedside rounds with nursing staff today.     Discussions with Specialists or Other Care Team Provider: case management    Education and Discussions with Family / Patient: pt dtr      Current Length of Stay: 3 day(s)    Current Patient Status: Inpatient        Code Status: Level 1 - Full Code    Discharge Plan: Pt will require continued inpatient hospitalization. Subjective:   Pt afebrile since . States leg is still swollen but no enlargement. Patient is seen and examined at bedside. All other ROS are negative. Objective:     Vitals:   Temp (24hrs), Av.5 °F (36.9 °C), Min:98 °F (36.7 °C), Max:99 °F (37.2 °C)    Temp:  [98 °F (36.7 °C)-99 °F (37.2 °C)] 98 °F (36.7 °C)  HR:  [91-99] 99  Resp:  [22] 22  BP: (142-143)/(69-71) 142/69  SpO2:  [91 %-92 %] 92 %  Body mass index is 38.4 kg/m². Input and Output Summary (last 24 hours): Intake/Output Summary (Last 24 hours) at 2023 1045  Last data filed at 2023 0900  Gross per 24 hour   Intake 1330 ml   Output 1240 ml   Net 90 ml       Physical Exam:       GEN: No acute distress, comfortable  HEEENT: No JVD, PERRLA, no scleral icterus  RESP: Lungs clear to auscultation bilaterally  CV: RRR, +s1/s2   ABD: SOFT NON TENDER, POSITIVE BOWEL SOUNDS, NO DISTENTION  PSYCH: CALM  NEURO: [place] baseline, NO FOCAL DEFICITS  SKIN: NO RASH  EXTREM: le edema     Additional Data:     Labs:    Results from last 7 days   Lab Units 23  0206   WBC Thousand/uL 22.05*   HEMOGLOBIN g/dL 13.1   HEMATOCRIT % 40.5   PLATELETS Thousands/uL 263   NEUTROS PCT % 83*   LYMPHS PCT % 9*   MONOS PCT % 5   EOS PCT % 1     Results from last 7 days   Lab Units 23  0206 11/10/23  1647 11/10/23  1540   SODIUM mmol/L 134*  --  134*   POTASSIUM mmol/L 4.0   < > 5.8*   CHLORIDE mmol/L 102  --  103   CO2 mmol/L 22  --  22   BUN mg/dL 19  --  17   CREATININE mg/dL 1.14  --  1.03   ANION GAP mmol/L 10  --  9   CALCIUM mg/dL 8.1*  --  8.8   ALBUMIN g/dL  --   --  3.9   TOTAL BILIRUBIN mg/dL  --   --  0.71   ALK PHOS U/L  --   --  54   ALT U/L  --   --  34   AST U/L  --   --  53*   GLUCOSE RANDOM mg/dL 121  --  128    < > = values in this interval not displayed.      Results from last 7 days   Lab Units 11/10/23  1645   INR  1.11             Results from last 7 days   Lab Units 11/10/23  2316 11/10/23  4191 11/10/23  1532   LACTIC ACID mmol/L 2.0 2.4* 2.1*   PROCALCITONIN ng/ml  --   --  0.32*       Lines/Drains:  Invasive Devices       Peripheral Intravenous Line  Duration             Peripheral IV 11/11/23 Left;Proximal;Ventral (anterior) Forearm 2 days                    Telemetry:   Telemetry Orders (From admission, onward)               24 Hour Telemetry Monitoring  Continuous x 24 Hours (Telem)        Expiring   Question:  Reason for 24 Hour Telemetry  Answer:  Decompensated CHF- and any one of the following: continuous diuretic infusion or total diuretic dose >200 mg daily, associated electrolyte derangement (I.e. K < 3.0), ionotropic drip (continuous infusion), hx of ventricular arrhythmia, or new EF < 35%                        * I Have Reviewed All Lab Data Listed Above. Imaging:     Results for orders placed during the hospital encounter of 11/10/23    XR chest portable    Narrative  CHEST    INDICATION:   hypoxia. COMPARISON: CTA of the chest 11/10/2023    EXAM PERFORMED/VIEWS:  XR CHEST PORTABLE  AP semierect      FINDINGS:    Moderate cardiomegaly. Aortic calcification. Mild bibasilar opacities could be suggestive of pulmonary edema. Mild dextroscoliosis. Age-related degenerative change of the spine. Severe erosions of right greater than left glenohumeral joint. Impression  Moderate cardiomegaly with likely mild pulmonary edema. Resident: Miah Guerrier, the attending radiologist, have reviewed the images and agree with the final report above. Workstation performed: [id number]    No results found for this or any previous visit. *I have reviewed all imaging reports listed above      Recent Cultures (last 7 days):     Results from last 7 days   Lab Units 11/10/23  1532   BLOOD CULTURE  No Growth at 48 hrs. No Growth at 48 hrs.        Last 24 Hours Medication List:   Current Facility-Administered Medications   Medication Dose Route Frequency Provider Last Rate    acetaminophen  650 mg Oral Q6H PRN EMMETT Coronado-LENNOX      cefTRIAXone  1,000 mg Intravenous Q24H Lyn Ochoa MD 1,000 mg (11/13/23 0900)    furosemide  20 mg Intravenous BID EMMETT Coronado-C      heparin (porcine)  3-30 Units/kg/hr (Order-Specific) Intravenous Titrated Jhonny Lila, PA-C 14 Units/kg/hr (11/13/23 0724)    heparin (porcine)  10,000 Units Intravenous Q6H PRN Jhonny Lila, PA-C      heparin (porcine)  5,000 Units Intravenous Q6H PRN Jhonny Lila, PA-C      hydrALAZINE  5 mg Intravenous Q6H PRN Angelica Madrid PA-C      lisinopril  10 mg Oral Daily Angelica Madrid PA-C      metoprolol succinate  100 mg Oral Daily Jhonny Sharp PA-C          Today, Patient Was Seen By: Lyn Ochoa MD    ** Please Note: Dictation voice to text software may have been used in the creation of this document.  **

## 2023-11-13 NOTE — PLAN OF CARE
Problem: MOBILITY - ADULT  Goal: Maintain or return to baseline ADL function  Description: INTERVENTIONS:  -  Assess patient's ability to carry out ADLs; assess patient's baseline for ADL function and identify physical deficits which impact ability to perform ADLs (bathing, care of mouth/teeth, toileting, grooming, dressing, etc.)  - Assess/evaluate cause of self-care deficits   - Assess range of motion  - Assess patient's mobility; develop plan if impaired  - Assess patient's need for assistive devices and provide as appropriate  - Encourage maximum independence but intervene and supervise when necessary  - Involve family in performance of ADLs  - Assess for home care needs following discharge   - Consider OT consult to assist with ADL evaluation and planning for discharge  - Provide patient education as appropriate  Outcome: Progressing  Goal: Maintains/Returns to pre admission functional level  Description: INTERVENTIONS:  - Perform BMAT or MOVE assessment daily.   - Set and communicate daily mobility goal to care team and patient/family/caregiver. - Collaborate with rehabilitation services on mobility goals if consulted  - Perform Range of Motion 2 times a day. - Reposition patient every 2 hours.   - Dangle patient 2 times a day  - Stand patient 2 times a day  - Ambulate patient 2 times a day  - Out of bed to chair 2 times a day   - Out of bed for meals 2 times a day  - Out of bed for toileting  - Record patient progress and toleration of activity level   Outcome: Progressing     Problem: PAIN - ADULT  Goal: Verbalizes/displays adequate comfort level or baseline comfort level  Description: Interventions:  - Encourage patient to monitor pain and request assistance  - Assess pain using appropriate pain scale  - Administer analgesics based on type and severity of pain and evaluate response  - Implement non-pharmacological measures as appropriate and evaluate response  - Consider cultural and social influences on pain and pain management  - Notify physician/advanced practitioner if interventions unsuccessful or patient reports new pain  Outcome: Progressing     Problem: INFECTION - ADULT  Goal: Absence or prevention of progression during hospitalization  Description: INTERVENTIONS:  - Assess and monitor for signs and symptoms of infection  - Monitor lab/diagnostic results  - Monitor all insertion sites, i.e. indwelling lines, tubes, and drains  - Monitor endotracheal if appropriate and nasal secretions for changes in amount and color  - [place] appropriate cooling/warming therapies per order  - Administer medications as ordered  - Instruct and encourage patient and family to use good hand hygiene technique  - Identify and instruct in appropriate isolation precautions for identified infection/condition  Outcome: Progressing  Goal: Absence of fever/infection during neutropenic period  Description: INTERVENTIONS:  - Monitor WBC    Outcome: Progressing     Problem: SAFETY ADULT  Goal: Maintain or return to baseline ADL function  Description: INTERVENTIONS:  -  Assess patient's ability to carry out ADLs; assess patient's baseline for ADL function and identify physical deficits which impact ability to perform ADLs (bathing, care of mouth/teeth, toileting, grooming, dressing, etc.)  - Assess/evaluate cause of self-care deficits   - Assess range of motion  - Assess patient's mobility; develop plan if impaired  - Assess patient's need for assistive devices and provide as appropriate  - Encourage maximum independence but intervene and supervise when necessary  - Involve family in performance of ADLs  - Assess for home care needs following discharge   - Consider OT consult to assist with ADL evaluation and planning for discharge  - Provide patient education as appropriate  Outcome: Progressing  Goal: Maintains/Returns to pre admission functional level  Description: INTERVENTIONS:  - Perform BMAT or MOVE assessment daily.   - Set and communicate daily mobility goal to care team and patient/family/caregiver. - Collaborate with rehabilitation services on mobility goals if consulted  - Perform Range of Motion 2 times a day. - Reposition patient every 2 hours.   - Dangle patient 2 times a day  - Stand patient 2 times a day  - Ambulate patient 2 times a day  - Out of bed to chair 2 times a day   - Out of bed for meals 2 times a day  - Out of bed for toileting  - Record patient progress and toleration of activity level   Outcome: Progressing  Goal: Patient will remain free of falls  Description: INTERVENTIONS:  - Educate patient/family on patient safety including physical limitations  - Instruct patient to call for assistance with activity   - Consult OT/PT to assist with strengthening/mobility   - Keep Call bell within reach  - Keep bed low and locked with side rails adjusted as appropriate  - Keep care items and personal belongings within reach  - Initiate and maintain comfort rounds  - Make Fall Risk Sign visible to staff  - Offer Toileting every 2 Hours, in advance of need  - Initiate/Maintain bed alarm  - Obtain necessary fall risk management equipment: socks  - Apply yellow socks and bracelet for high fall risk patients  - Consider moving patient to room near nurses station  Outcome: Progressing     Problem: DISCHARGE PLANNING  Goal: Discharge to home or other facility with appropriate resources  Description: INTERVENTIONS:  - Identify barriers to discharge w/patient and caregiver  - Arrange for needed discharge resources and transportation as appropriate  - Identify discharge learning needs (meds, wound care, etc.)  - Arrange for interpretive services to assist at discharge as needed  - Refer to Case Management Department for coordinating discharge planning if the patient needs post-hospital services based on physician/advanced practitioner order or complex needs related to functional status, cognitive ability, or social support system  Outcome: Progressing     Problem: Knowledge Deficit  Goal: Patient/family/caregiver demonstrates understanding of disease process, treatment plan, medications, and discharge instructions  Description: Complete learning assessment and assess knowledge base.   Interventions:  - Provide teaching at level of understanding  - Provide teaching via preferred learning methods  Outcome: Progressing     Problem: Potential for Falls  Goal: Patient will remain free of falls  Description: INTERVENTIONS:  - Educate patient/family on patient safety including physical limitations  - Instruct patient to call for assistance with activity   - Consult OT/PT to assist with strengthening/mobility   - Keep Call bell within reach  - Keep bed low and locked with side rails adjusted as appropriate  - Keep care items and personal belongings within reach  - Initiate and maintain comfort rounds  - Make Fall Risk Sign visible to staff  - Offer Toileting every 2 Hours, in advance of need  - Initiate/Maintain bed alarm  - Obtain necessary fall risk management equipment: socks  - Apply yellow socks and bracelet for high fall risk patients  - Consider moving patient to room near nurses station  Outcome: Progressing     Problem: Prexisting or High Potential for Compromised Skin Integrity  Goal: Skin integrity is maintained or improved  Description: INTERVENTIONS:  - Identify patients at risk for skin breakdown  - Assess and monitor skin integrity  - Assess and monitor nutrition and hydration status  - Monitor labs   - Assess for incontinence   - Turn and reposition patient  - Assist with mobility/ambulation  - Relieve pressure over bony prominences  - Avoid friction and shearing  - Provide appropriate hygiene as needed including keeping skin clean and dry  - Evaluate need for skin moisturizer/barrier cream  - Collaborate with interdisciplinary team   - Patient/family teaching  - Consider wound care consult   Outcome: Progressing

## 2023-11-13 NOTE — PLAN OF CARE
Problem: OCCUPATIONAL THERAPY ADULT  Goal: Performs self-care activities at highest level of function for planned discharge setting. See evaluation for individualized goals. Description: Treatment Interventions: ADL retraining, Endurance training, Patient/family training, Equipment evaluation/education, Compensatory technique education, Continued evaluation, Functional transfer training          See flowsheet documentation for full assessment, interventions and recommendations. Note: Limitation: Decreased ADL status, Decreased Safe judgement during ADL, Decreased endurance, Decreased self-care trans, Decreased high-level ADLs  Prognosis: Good  Assessment: Pt is a 74 y.o. male seen for OT evaluation at [facility], admitted 11/10/2023 w/ Generalized weakness. OT completed extensive review of pt's medical and social history. Comorbidities affecting pt's functional performance at time of assessment include: HTN, elevated troponin & D-dimer, acute respiratory failure with hypoxia, SIRS, (+) DVT to LLE. Personal factors affecting pt at time of IE include: difficulty performing ADLS, difficulty performing IADLS , health management , and environment. Prior to admission, pt was living at [facility] in a [facility] with [facility]. Pt was I w/  ADLS and A w/ IADLS, (+) drove, & required use of  quad cane  PTA. Upon evaluation: Pt requires Min Ax1 for bed mobility, Min Ax1 for functional mobility/transfers with RW, S for UB ADLs and Min A for LB ADLS 2* the following deficits impacting occupational performance: weakness, decreased strength, decreased balance, decreased tolerance, and increased pain. Full objective findings from OT assessment regarding body systems outlined above. Pt to benefit from continued skilled OT tx while in the hospital to address deficits as defined above and maximize level of functional independence w/ ADL's and functional mobility.  Occupational Performance areas to address include: grooming, bathing/shower, toilet hygiene, dressing, functional mobility, community mobility, and clothing management. Based on findings, pt is of high complexity. The patient's raw score on the AM-PAC Daily Activity inpatient short form is 19, standardized score is 40.22, greater than 39.4. Patients at this level are likely to benefit from DC to post-acute rehabilitation services. However, please refer to therapist recommendation for discharge planning given other factors that may influence destination. At this time, OT recommendations at time of discharge are DC with Level II resources as pt is requiring increased physical assistance for safe functional txfers/mobility.      Rehab Resource Intensity Level, OT: II (Moderate Resource Intensity)

## 2023-11-13 NOTE — ASSESSMENT & PLAN NOTE
Troponin 54, 68, 68  Denies chest pain. BNP elevated at 572. No prior echo. ED started patient on VTE heparin drip due to possible DVT. Telemetry  Cardiology consult appreciated. [place] to have non MI trop elevation.  Await echocardiogram.

## 2023-11-13 NOTE — PLAN OF CARE
Problem: PHYSICAL THERAPY ADULT  Goal: Performs mobility at highest level of function for planned discharge setting. See evaluation for individualized goals. Description: Treatment/Interventions: Functional transfer training, LE strengthening/ROM, Endurance training, Therapeutic exercise, Patient/family training, Equipment eval/education, Bed mobility, Gait training          See flowsheet documentation for full assessment, interventions and recommendations. Note:    Problem List: Decreased strength, Decreased endurance, Impaired balance, Decreased mobility, Decreased skin integrity, Obesity  Assessment: Paulette Tiwari is a 74 y.o. Male who presents to [address] [place] [address] on 11/10/2023 from [address] [facility] w/ c/o generalized weakness and diagnosis of generalized weakness. Orders for PT eval and treat received. Pt presents w/ comorbidities of HTN, hx of ETOH. At baseline, pt mobilizes modified I w/ quad cane, and reports 0 falls in the last 6 months. Upon evaluation, pt presents w/ the following deficits: weakness, impaired skin integrity, impaired hearing, and decreased endurance. Upon eval, pt requires min A x 1 for bed mobility, min A x 1 for transfers, and min A x 1 for gait. Based on this PT evaluation today, patient's discharge recommendation is for Level II. During this admission, pt would benefit from continued skilled inpatient PT in the acute care setting in order to address the abovementioned deficits to maximize function and mobility before DC from acute care. Rehab Resource Intensity Level, PT: II (Moderate Resource Intensity)    See flowsheet documentation for full assessment.

## 2023-11-13 NOTE — OCCUPATIONAL THERAPY NOTE
Occupational Therapy Evaluation     Patient Name: Michael Garcia  Today's Date: 11/13/2023  Problem List  Principal Problem:    Generalized weakness  Active Problems:    Essential hypertension    Localized swelling of left lower leg    Elevated troponin    Elevated d-dimer    Acute respiratory failure with hypoxia (HCC)    SIRS (systemic inflammatory response syndrome) (HCC)    Past Medical History  Past Medical History:   Diagnosis Date    Hypertension     Skin cancer      Past Surgical History  History reviewed. No pertinent surgical history. 11/13/23 1525   OT Last Visit   OT Visit Date 11/13/23   Note Type   Note type Evaluation   Pain Assessment   Pain Assessment Tool Chang-Baker FACES   Chang-Baker FACES Pain Rating 4   Pain Location/Orientation Orientation: Right;Location: Leg   Patient's Stated Pain Goal No pain   Hospital Pain Intervention(s) Repositioned; Ambulation/increased activity; Elevated   Restrictions/Precautions   Weight Bearing Precautions Per Order No   Home Living   Type of Home Assisted living  [facility]isted)   Home Layout One level  (0 RUDY)   Bathroom Shower/Tub Walk-in shower   Bathroom Toilet Standard   Bathroom Equipment Grab bars in shower; Shower chair;Grab bars around toilet   Bathroom Accessibility Accessible via walker   Home Equipment Walker;Cane;Sock aid;Long-handled shoehorn;Reacher  (Quad cane - reports having LHAD but does not use. Elastic laces for shoes)   Additional Comments Quad cane for mobility   Prior Function   Level of [place] Independent with ADLs; Independent with functional mobility; Needs assistance with [address] staff  (Roommate)   Receives Help From Personal care attendant   IADLs Independent with driving;Family/Friend/Other provides meals; Family/Friend/Other provides medication management  (Staff does laundry)   Falls in the last 6 months 0   Vocational Retired   General   Additional Pertinent History (+) DVT LLE - pt on heparin GTT Family/Caregiver Present No   Additional General Comments LLE with poor skin integrity. Dressing appeared to be falling down LE, open oozing wound. RN informed   Subjective   Subjective "I couldn't get out of bed on Saturday"   ADL   Eating Assistance 7  [address] 5  Supervision/Setup   LB Bathing Assistance 4  [address]  [address]  4  Minimal Assistance   Bed Mobility   Supine to Sit 4  Minimal assistance   Additional items Assist x 1; Increased time required;HOB elevated;Verbal cues; Bedrails   Additional Comments Pt sat EOB x3 min with S   Transfers   Sit to Stand 4  Minimal assistance   Additional items Assist x 1; Increased time required;Verbal cues;Armrests   Stand to Sit 4  Minimal assistance   Additional items Assist x 1; Increased time required;Verbal cues;Armrests   Stand pivot 5  Supervision   Additional items Assist x 2; Increased time required;Verbal cues  (Quad cane - (+) LOB)   Functional Mobility   Functional Mobility 4  Minimal assistance   Additional Comments x1 x 50 ft   Additional items Rolling walker   Balance   Static Sitting Good   Dynamic Sitting Fair +   Static Standing Fair -   Dynamic Standing Fair -   Ambulatory Fair -   Activity Tolerance   Activity Tolerance Patient tolerated treatment well   Medical Staff Made Aware PT VANE Loja   Nurse Made Aware RN Annalee FLEMING Assessment   RUE Assessment WFL   LUE Assessment   LUE Assessment WFL   Cognition   Overall Cognitive Status WFL   Arousal/Participation Alert   Attention Within functional limits   Orientation Level Oriented X4   Memory Decreased recall of precautions   Following Commands Follows multistep commands with increased time or repetition   Assessment   Limitation Decreased ADL status; Decreased Safe judgement during ADL;Decreased endurance;Decreased self-care trans;Decreased high-level ADLs   Prognosis Good   Assessment Pt is a 74 y.o. male seen for OT evaluation at [facility], admitted 11/10/2023 w/ Generalized weakness. OT completed extensive review of pt's medical and social history. Comorbidities affecting pt's functional performance at time of assessment include: HTN, elevated troponin & D-dimer, acute respiratory failure with hypoxia, SIRS, (+) DVT to LLE. Personal factors affecting pt at time of IE include: difficulty performing ADLS, difficulty performing IADLS , health management , and environment. Prior to admission, pt was living at [facility] in a [facility] with 0 RUDY. Pt was I w/  ADLS and A w/ IADLS, (+) drove, & required use of  quad cane  PTA. Upon evaluation: Pt requires Min Ax1 for bed mobility, Min Ax1 for functional mobility/transfers with RW, S for UB ADLs and Min A for LB ADLS 2* the following deficits impacting occupational performance: weakness, decreased strength, decreased balance, decreased tolerance, and increased pain. Full objective findings from OT assessment regarding body systems outlined above. Pt to benefit from continued skilled OT tx while in the hospital to address deficits as defined above and maximize level of functional independence w/ ADL's and functional mobility. Occupational Performance areas to address include: grooming, bathing/shower, toilet hygiene, dressing, functional mobility, community mobility, and clothing management. Based on findings, pt is of high complexity. The patient's raw score on the AM-PAC Daily Activity inpatient short form is 19, standardized score is 40.22, greater than 39.4. Patients at this level are likely to benefit from DC to post-acute rehabilitation services. However, please refer to therapist recommendation for discharge planning given other factors that may influence destination.  At this time, OT recommendations at time of discharge are DC with Level II resources as pt is requiring increased physical assistance for safe functional txfers/mobility. Goals   Patient Goals Pt wants to get stronger   Plan   Treatment Interventions ADL retraining; Endurance training;Patient/family training;Equipment evaluation/education; Compensatory technique education;Continued evaluation; Functional transfer training   Goal Expiration Date 11/23/23   OT Treatment Day 0   OT Frequency 2-3x/wk   Discharge Recommendation   Rehab Resource Intensity Level, OT II (Moderate Resource Intensity)   AM-PAC Daily Activity Inpatient   Lower Body Dressing 3   Bathing 3   Toileting 3   Upper Body Dressing 3   Grooming 3   Eating 4   Daily Activity Raw Score 19   Daily Activity Standardized Score (Calc for Raw Score >=11) 40.22   AM-PAC Applied Cognition Inpatient   Following a Speech/Presentation 3   Understanding Ordinary Conversation 4   Taking Medications 4   Remembering Where Things Are Placed or Put Away 4   Remembering List of 4-5 Errands 3   Taking Care of Complicated Tasks 3   Applied Cognition Raw Score 21   Applied Cognition Standardized Score 44.3   End of Consult   Education Provided Yes   Patient Position at End of Consult Bedside chair; All needs within reach;Bed/Chair alarm activated   Nurse Communication Nurse aware of consult     Pt will achieve the following goals within 10 days. *Pt will complete grooming with Mod I & DME PRn. *Pt will complete LB bathing and dressing with Mod I & DME PRN. *Pt will complete toileting w/ Mod I w/ G hygiene/thoroughness using DME PRN    *Pt will complete bed mobility with Mod I, with HOB elevated & use of side rails PRN to prep for purposeful tasks    *Pt will perform functional transfers with on/off all surfaces with Mod I using DME as needed w/ G balance/safety. *Pt will improve functional mobility during ADL/IADL/leisure tasks to Mod I using DME as needed w/ G balance/safety.       Jacinto Garces, OTR/L

## 2023-11-13 NOTE — ASSESSMENT & PLAN NOTE
Home regimen: Unknown  Patient reports he takes 2 blood pressure medications + Lasix  Requesting nursing to perform med rec as able  Normotensive thus far.   Continue to monitor  Added IV hydralazine as needed for systolic over 308

## 2023-11-13 NOTE — ASSESSMENT & PLAN NOTE
Presents from [facility] assisted care due to increasing generalized weakness over the past couple of days. He feels like his legs are going to give out when he tries to ambulate. Denies fall or injury. Typically uses a cane to ambulate.   Meeting SIRS criteria (tachycardia, tachypnea, leukocytosis)  CTA negative for pneumonia  COVID/flu/RSV negative  UA pending  Hypoxic on arrival - 2L NC  Fall precautions  Weakness may be secondary to volume overload, fever  Consult PT/OT and case management

## 2023-11-13 NOTE — UTILIZATION REVIEW
Initial Clinical Review    Admission: Date/Time/Statement:   Admission Orders (From admission, onward)       Ordered        11/10/23 2000  INPATIENT ADMISSION  Once                          Orders Placed This Encounter   Procedures    INPATIENT ADMISSION     Standing Status:   Standing     Number of Occurrences:   1     Order Specific Question:   Level of Care     Answer:   Med Surg [16]     Order Specific Question:   Estimated length of stay     Answer:   More than 2 Midnights     Order Specific Question:   Certification     Answer:   I certify that inpatient services are medically necessary for this patient for a duration of greater than two midnights. See H&P and MD Progress Notes for additional information about the patient's course of treatment. ED Arrival Information       Expected   -    Arrival   11/10/2023 15:05    Acuity   Emergent              Means of arrival   Ambulance    Escorted by   [facility]    Service   Hospitalist    Admission type   Emergency              Arrival complaint   weakness             Chief Complaint   Patient presents with    Weakness - Generalized     Pt to er via ems from [facility] with reports from staff that he was in "respiratory distress", patient denies any trouble breathing. Reports that he has been feeling an increase in weakness over the past couple days        Initial Presentation: 74 y.o. male to ED via EMS from home ([facility])   Present to ED with generalized weakness, shortness of breath and left lower extremity swelling. He feels like his legs are going to give out when he is attempting to ambulate. Usually uses a cane. PMHX hypertension, obesity   Admitted to [facility] with DX: Generalized weakness   on exam: temp 100.3; tachypnea; RA sat 88% - placed on O2 @ 2L via nc sat 94%; left lower extremity has 2+ pitting edema with dry skin and redness. D-dimer elevated. .  leukocytosis (15.39)   CTA unable to exclude segmental and subsegmental emboli within the lungs. Started heparin gtt  PLAN: cont heparin gtt; cont iv lasix; monitor labs; f/u UA; PT/ OT eval - tx; titrate O2; f/u echo; f/u venous duplex of B/L LE; tele monitoring; cardiology consult; f/u blood cx    Date: 11/11/23      Day 2  Pt states feeling better; T 101.4; elevated trops  Plan: cont heparin gtt; cont iv lasix; monitor labs; f/u UA; PT/ OT eval - tx; titrate O2; f/u echo; f/u venous duplex of B/L LE; trend fever; f/u blood cx    CARDIOLOGY CONSULT: Troponin elevation / LE edema / Pulmonary edema  Patient denies any chest discomfort. He does have exertional dyspnea and LLE edema. He is currently on IV heparin and awaiting venous duplex of LLE. Chest CT shows pulmonary edema and BNP is elevated at 572. Check echocardiogram on Monday. Continue with IV lasix as ordered. Troponin elevation is likely a non MI troponin elevation. More recs after echo is completed.        ED Triage Vitals   Temperature Pulse Respirations Blood Pressure SpO2   11/10/23 1515 11/10/23 1515 11/10/23 1513 11/10/23 1513 11/10/23 1515   100.2 °F (37.9 °C) 96 18 161/71 90 %      Temp Source Heart Rate Source Patient Position - Orthostatic VS BP Location FiO2 (%)   11/10/23 1515 11/10/23 1515 11/10/23 1513 11/10/23 1513 --   Oral Monitor Lying Left arm       Pain Score       11/10/23 2000       No Pain          Wt Readings from Last 1 Encounters:   11/13/23 111 kg (245 lb)     Additional Vital Signs:   Date/Time Temp Pulse Resp BP MAP (mmHg) SpO2 Calculated FIO2 (%) - Nasal Cannula Nasal Cannula O2 Flow Rate (L/min) O2 Device Patient Position - Orthostatic VS   11/11/23 15:56:14 98 °F (36.7 °C) 88 18 136/62 87 97 % 28 2 L/min Nasal cannula Lying   11/11/23 0938 -- -- -- -- -- -- 28 2 L/min Nasal cannula --   11/11/23 08:17:46 98.3 °F (36.8 °C) 90 18 167/73 104 94 % 28 2 L/min Nasal cannula Sitting   11/11/23 06:06:53 101.4 °F (38.6 °C) Abnormal  84 -- -- -- 95 % -- -- -- --   11/11/23 02:49:42 100 °F (37.8 °C) 79 -- -- -- 96 % -- -- -- --   11/10/23 2250 -- -- -- -- -- 94 % 28 2 L/min Nasal cannula --   11/10/23 2240 100.3 °F (37.9 °C) 83 16 146/65 94 94 % 24 1 L/min Nasal cannula Lying   11/10/23 2100 -- 88 20 126/54 78 95 % -- -- None (Room air) Lying   11/10/23 2030 -- 91 20 142/63 91 97 % -- -- None (Room air) Lying   11/10/23 2000 -- 92 22 141/62 89 97 % -- -- None (Room air) Lying   11/10/23 1900 -- 98 -- -- -- 94 % -- -- -- --   11/10/23 1715 -- 99 20 130/62 85 95 % -- -- -- --   11/10/23 1630 -- 91 20 138/65 94 95 % -- -- -- --   11/10/23 1515 100.2 °F (37.9 °C) 96 -- -- -- 90 % -- -- None (Room air) --   11/10/23 1513 -- -- 18 161/71 -- -- -- -- -- Lying       EKG: Sinus rhythm with occasional Premature ventricular complexes  Possible Left atrial enlargement  Left ventricular hypertrophy with repolarization abnormality  Prolonged QT  Abnormal ECG  No previous ECGs available      Pertinent Labs/Diagnostic Test Results:   VAS lower limb venous duplex study, complete bilateral   Final Result by Alvarado Roberts MD (11/12 1604)      CTA ED chest PE study   Final Result by Chuck Estrella MD (1950)      No definite pulmonary emboli although segmental and subsegmental emboli in the lung bases cannot be excluded due to motion artifact. Evaluation of the lungs compromised by motion with mild interstitial edema. Pulmonary artery enlargement which can be seen with pulmonary hypertension. Hepatic steatosis. Gynecomastia. Workstation performed: [id number]         XR chest portable   ED Interpretation by Lea Curtis MD (11/10 0540)   Cardiomegaly with patchiness bilateral lower lung fields      Final Result by Sally Winston MD (11/11 1776)      Moderate cardiomegaly with likely mild pulmonary edema. Resident: Kendra Ibarra, the attending radiologist, have reviewed the images and agree with the final report above.       Workstation performed: [id number] Results from last 7 days   Lab Units 11/12/23  1028 11/10/23  2012   SARS-COV-2  Negative Negative       Results from last 7 days   Lab Units 11/13/23  1207 11/11/23  0206 11/10/23  1532   WBC Thousand/uL 8.86 22. 05* 15.39*   HEMOGLOBIN g/dL 13.9 13.1 14.5   HEMATOCRIT % 41.6 40.5 44.4   PLATELETS Thousands/uL 272 263 295   NEUTROS ABS Thousands/µL 6.80 18.72* 13.95*          Results from last 7 days   Lab Units 11/13/23  1207 11/11/23  0206 11/10/23  1647 11/10/23  1540   SODIUM mmol/L 135 134*  --  134*   POTASSIUM mmol/L 3.9 4.0 4.2 5.8*   CHLORIDE mmol/L 101 102  --  103   CO2 mmol/L 25 22  --  22   ANION GAP mmol/L 9 10  --  9   BUN mg/dL 20 19  --  17   CREATININE mg/dL 0.86 1.14  --  1.03   EGFR ml/min/1.73sq m 84 62  --  70   CALCIUM mg/dL 8.7 8.1*  --  8.8   MAGNESIUM mg/dL  --  1.9  --  2.0       Results from last 7 days   Lab Units 11/13/23  1207 11/10/23  1540   AST U/L 49* 53*   ALT U/L 34 34   ALK PHOS U/L 59 54   TOTAL PROTEIN g/dL 7.0 7.3   ALBUMIN g/dL 3.2* 3.9   TOTAL BILIRUBIN mg/dL 0.51 0.71          Results from last 7 days   Lab Units 11/13/23  1207 11/11/23  0206 11/10/23  1540   GLUCOSE RANDOM mg/dL 160* 121 128         Results from last 7 days   Lab Units 11/10/23  1942 11/10/23  1706 11/10/23  1532   HS TNI 0HR ng/L  --   --  54*   HS TNI 2HR ng/L  --  68*  --    HSTNI D2 ng/L  --  14  --    HS TNI 4HR ng/L 68*  --   --    HSTNI D4 ng/L 14  --   --        Results from last 7 days   Lab Units 11/10/23  1645   D-DIMER QUANTITATIVE ug/ml FEU 3.09*       Results from last 7 days   Lab Units 11/13/23  1207 11/13/23  0440 11/12/23  0231 11/11/23  0157 11/10/23  1645   PROTIME seconds  --   --   --   --  14.7*   INR   --   --   --   --  1.11   PTT seconds 52* 51* 66*   < > 28    < > = values in this interval not displayed.           Results from last 7 days   Lab Units 11/10/23  1532   PROCALCITONIN ng/ml 0.32*       Results from last 7 days   Lab Units 11/10/23  2316 11/10/23  1942 11/10/23  1532   LACTIC ACID mmol/L 2.0 2.4* 2.1*         Results from last 7 days   Lab Units 11/10/23  1532   BNP pg/mL 572*         Results from last 7 days   Lab Units 11/12/23  1028 11/10/23  2012   INFLUENZA A PCR  Negative Negative   INFLUENZA B PCR  Negative Negative   RSV PCR  Negative Negative         Results from last 7 days   Lab Units 11/10/23  1532   BLOOD CULTURE  No Growth at 48 hrs. No Growth at 48 hrs.          ED Treatment:   Medication Administration from 11/10/2023 1505 to 11/10/2023 2227         Date/Time Order Dose Route Action     11/10/2023 1905 EST heparin (porcine) injection 10,000 Units 10,000 Units Intravenous Given     11/10/2023 1923 EST heparin (porcine) 25,000 units in 0.45% NaCl 250 mL infusion (premix) 18 Units/kg/hr Intravenous New Bag     11/10/2023 1914 EST iohexol (OMNIPAQUE) 350 MG/ML injection (SINGLE-DOSE) 100 mL 100 mL Intravenous Given     11/10/2023 2140 EST acetaminophen (TYLENOL) tablet 650 mg 650 mg Oral Given     11/10/2023 2140 EST furosemide (LASIX) injection 20 mg 20 mg Intravenous Given     11/10/2023 2144 EST sodium chloride 0.9 % bolus 250 mL 250 mL Intravenous New Bag            Admitting Diagnosis: Acute respiratory insufficiency [R06.89]  Elevated troponin [R79.89]  Generalized weakness [R53.1]  Edema of left lower extremity [R60.0]    Age/Sex: 74 y.o. male    Admission Orders: SCDs; I/O; daily wts; tele monitoring; cardiac diet; fluid restriction 1800    Scheduled Medications:  furosemide, 20 mg, Intravenous, BID      Continuous IV Infusions:  heparin (porcine), 3-30 Units/kg/hr (Order-Specific), Intravenous, Titrated      PRN Meds:  acetaminophen, 650 mg, Oral, Q6H PRN  (11/10 rec'd x1)  (11/11 rec'd x1 so far today)   heparin (porcine), 10,000 Units, Intravenous, Q6H PRN  heparin (porcine), 5,000 Units, Intravenous, Q6H PRN  hydrALAZINE, 5 mg, Intravenous, Q6H PRN        IP CONSULT TO NUTRITION SERVICES  IP CONSULT TO CARDIOLOGY  IP CONSULT TO CASE MANAGEMENT    Network Utilization Review Department  ATTENTION: Please call with any questions or concerns to [phone] and carefully listen to the prompts so that you are directed to the right person. All voicemails are confidential.   For Discharge needs, contact Care Management DC Support Team at [phone] opt. 2  Send all requests for admission clinical reviews, approved or denied determinations and any other requests to dedicated fax number below belonging to the campus where the patient is receiving treatment.  List of dedicated fax numbers for the Facilities:  [place] DENIALS (Administrative/Medical Necessity) [phone]   DISCHARGE SUPPORT TEAM (NETWORK) [address] [facility] (Maternity/NICU/Pediatrics) [phone]   [address] [address] [facility] [phone]   [address] [place] [address] [address] [place] [address] [address] [address] [facility] [address] [phone]

## 2023-11-14 DIAGNOSIS — I42.0 DILATED CARDIOMYOPATHY (HCC): Primary | ICD-10-CM

## 2023-11-14 PROBLEM — A41.9 SEPSIS (HCC): Status: ACTIVE | Noted: 2023-11-10

## 2023-11-14 PROBLEM — I82.409 ACUTE DVT (DEEP VENOUS THROMBOSIS) (HCC): Status: ACTIVE | Noted: 2023-11-10

## 2023-11-14 PROBLEM — I50.41 ACUTE COMBINED SYSTOLIC AND DIASTOLIC CONGESTIVE HEART FAILURE (HCC): Status: ACTIVE | Noted: 2023-11-10

## 2023-11-14 LAB
ALBUMIN SERPL BCP-MCNC: 3.4 G/DL (ref 3.5–5)
ALP SERPL-CCNC: 58 U/L (ref 34–104)
ALT SERPL W P-5'-P-CCNC: 35 U/L (ref 7–52)
ANION GAP SERPL CALCULATED.3IONS-SCNC: 10 MMOL/L
AST SERPL W P-5'-P-CCNC: 45 U/L (ref 13–39)
BASOPHILS # BLD AUTO: 0.08 THOUSANDS/ÂΜL (ref 0–0.1)
BASOPHILS NFR BLD AUTO: 1 % (ref 0–1)
BILIRUB SERPL-MCNC: 0.53 MG/DL (ref 0.2–1)
BUN SERPL-MCNC: 22 MG/DL (ref 5–25)
CALCIUM ALBUM COR SERPL-MCNC: 8.8 MG/DL (ref 8.3–10.1)
CALCIUM SERPL-MCNC: 8.3 MG/DL (ref 8.4–10.2)
CHLORIDE SERPL-SCNC: 102 MMOL/L (ref 96–108)
CO2 SERPL-SCNC: 22 MMOL/L (ref 21–32)
CREAT SERPL-MCNC: 0.89 MG/DL (ref 0.6–1.3)
EOSINOPHIL # BLD AUTO: 0.7 THOUSAND/ÂΜL (ref 0–0.61)
EOSINOPHIL NFR BLD AUTO: 7 % (ref 0–6)
ERYTHROCYTE [DISTWIDTH] IN BLOOD BY AUTOMATED COUNT: 13.5 % (ref 11.6–15.1)
GFR SERPL CREATININE-BSD FRML MDRD: 83 ML/MIN/1.73SQ M
GLUCOSE SERPL-MCNC: 138 MG/DL (ref 65–140)
GLUCOSE SERPL-MCNC: 163 MG/DL (ref 65–140)
HCT VFR BLD AUTO: 43.1 % (ref 36.5–49.3)
HGB BLD-MCNC: 13.8 G/DL (ref 12–17)
IMM GRANULOCYTES # BLD AUTO: 0.07 THOUSAND/UL (ref 0–0.2)
IMM GRANULOCYTES NFR BLD AUTO: 1 % (ref 0–2)
LYMPHOCYTES # BLD AUTO: 1.7 THOUSANDS/ÂΜL (ref 0.6–4.47)
LYMPHOCYTES NFR BLD AUTO: 18 % (ref 14–44)
MAGNESIUM SERPL-MCNC: 2 MG/DL (ref 1.9–2.7)
MCH RBC QN AUTO: 30.4 PG (ref 26.8–34.3)
MCHC RBC AUTO-ENTMCNC: 32 G/DL (ref 31.4–37.4)
MCV RBC AUTO: 95 FL (ref 82–98)
MONOCYTES # BLD AUTO: 0.8 THOUSAND/ÂΜL (ref 0.17–1.22)
MONOCYTES NFR BLD AUTO: 8 % (ref 4–12)
NEUTROPHILS # BLD AUTO: 6.18 THOUSANDS/ÂΜL (ref 1.85–7.62)
NEUTS SEG NFR BLD AUTO: 65 % (ref 43–75)
NRBC BLD AUTO-RTO: 0 /100 WBCS
PLATELET # BLD AUTO: 286 THOUSANDS/UL (ref 149–390)
PMV BLD AUTO: 10.6 FL (ref 8.9–12.7)
POTASSIUM SERPL-SCNC: 4 MMOL/L (ref 3.5–5.3)
PROT SERPL-MCNC: 6.7 G/DL (ref 6.4–8.4)
RBC # BLD AUTO: 4.54 MILLION/UL (ref 3.88–5.62)
SODIUM SERPL-SCNC: 134 MMOL/L (ref 135–147)
WBC # BLD AUTO: 9.53 THOUSAND/UL (ref 4.31–10.16)

## 2023-11-14 PROCEDURE — 82948 REAGENT STRIP/BLOOD GLUCOSE: CPT

## 2023-11-14 PROCEDURE — 80053 COMPREHEN METABOLIC PANEL: CPT | Performed by: HOSPITALIST

## 2023-11-14 PROCEDURE — 85025 COMPLETE CBC W/AUTO DIFF WBC: CPT | Performed by: HOSPITALIST

## 2023-11-14 PROCEDURE — 99222 1ST HOSP IP/OBS MODERATE 55: CPT | Performed by: NURSE PRACTITIONER

## 2023-11-14 PROCEDURE — 99232 SBSQ HOSP IP/OBS MODERATE 35: CPT | Performed by: PHYSICIAN ASSISTANT

## 2023-11-14 PROCEDURE — 83735 ASSAY OF MAGNESIUM: CPT | Performed by: PHYSICIAN ASSISTANT

## 2023-11-14 PROCEDURE — 99233 SBSQ HOSP IP/OBS HIGH 50: CPT | Performed by: INTERNAL MEDICINE

## 2023-11-14 RX ADMIN — FUROSEMIDE 40 MG: 10 INJECTION, SOLUTION INTRAMUSCULAR; INTRAVENOUS at 09:16

## 2023-11-14 RX ADMIN — CEFTRIAXONE 1000 MG: 1 INJECTION, SOLUTION INTRAVENOUS at 09:15

## 2023-11-14 RX ADMIN — EMPAGLIFLOZIN 10 MG: 10 TABLET, FILM COATED ORAL at 11:20

## 2023-11-14 RX ADMIN — APIXABAN 10 MG: 5 TABLET, FILM COATED ORAL at 17:12

## 2023-11-14 RX ADMIN — FUROSEMIDE 40 MG: 10 INJECTION, SOLUTION INTRAMUSCULAR; INTRAVENOUS at 17:12

## 2023-11-14 RX ADMIN — HEPARIN SODIUM 16 UNITS/KG/HR: 10000 INJECTION, SOLUTION INTRAVENOUS at 09:09

## 2023-11-14 RX ADMIN — SPIRONOLACTONE 25 MG: 25 TABLET ORAL at 09:15

## 2023-11-14 RX ADMIN — ATORVASTATIN CALCIUM 40 MG: 40 TABLET, FILM COATED ORAL at 17:12

## 2023-11-14 RX ADMIN — METOPROLOL SUCCINATE 100 MG: 50 TABLET, EXTENDED RELEASE ORAL at 09:12

## 2023-11-14 RX ADMIN — LISINOPRIL 20 MG: 20 TABLET ORAL at 09:15

## 2023-11-14 NOTE — PLAN OF CARE
Problem: MOBILITY - ADULT  Goal: Maintain or return to baseline ADL function  Description: INTERVENTIONS:  -  Assess patient's ability to carry out ADLs; assess patient's baseline for ADL function and identify physical deficits which impact ability to perform ADLs (bathing, care of mouth/teeth, toileting, grooming, dressing, etc.)  - Assess/evaluate cause of self-care deficits   - Assess range of motion  - Assess patient's mobility; develop plan if impaired  - Assess patient's need for assistive devices and provide as appropriate  - Encourage maximum independence but intervene and supervise when necessary  - Involve family in performance of ADLs  - Assess for home care needs following discharge   - Consider OT consult to assist with ADL evaluation and planning for discharge  - Provide patient education as appropriate  Outcome: Progressing  Goal: Maintains/Returns to pre admission functional level  Description: INTERVENTIONS:  - Perform BMAT or MOVE assessment daily.   - Set and communicate daily mobility goal to care team and patient/family/caregiver. - Collaborate with rehabilitation services on mobility goals if consulted  - Perform Range of Motion  times a day. - Reposition patient every  hours.   - Dangle patient  times a day  - Stand patient  times a day  - Ambulate patient  times a day  - Out of bed to chair  times a day   - Out of bed for meals times a day  - Out of bed for toileting  - Record patient progress and toleration of activity level   Outcome: Progressing     Problem: PAIN - ADULT  Goal: Verbalizes/displays adequate comfort level or baseline comfort level  Description: Interventions:  - Encourage patient to monitor pain and request assistance  - Assess pain using appropriate pain scale  - Administer analgesics based on type and severity of pain and evaluate response  - Implement non-pharmacological measures as appropriate and evaluate response  - Consider cultural and social influences on pain and pain management  - Notify physician/advanced practitioner if interventions unsuccessful or patient reports new pain  Outcome: Progressing     Problem: INFECTION - ADULT  Goal: Absence or prevention of progression during hospitalization  Description: INTERVENTIONS:  - Assess and monitor for signs and symptoms of infection  - Monitor lab/diagnostic results  - Monitor all insertion sites, i.e. indwelling lines, tubes, and drains  - Monitor endotracheal if appropriate and nasal secretions for changes in amount and color  - [place] appropriate cooling/warming therapies per order  - Administer medications as ordered  - Instruct and encourage patient and family to use good hand hygiene technique  - Identify and instruct in appropriate isolation precautions for identified infection/condition  Outcome: Progressing  Goal: Absence of fever/infection during neutropenic period  Description: INTERVENTIONS:  - Monitor WBC    Outcome: Progressing     Problem: SAFETY ADULT  Goal: Maintain or return to baseline ADL function  Description: INTERVENTIONS:  -  Assess patient's ability to carry out ADLs; assess patient's baseline for ADL function and identify physical deficits which impact ability to perform ADLs (bathing, care of mouth/teeth, toileting, grooming, dressing, etc.)  - Assess/evaluate cause of self-care deficits   - Assess range of motion  - Assess patient's mobility; develop plan if impaired  - Assess patient's need for assistive devices and provide as appropriate  - Encourage maximum independence but intervene and supervise when necessary  - Involve family in performance of ADLs  - Assess for home care needs following discharge   - Consider OT consult to assist with ADL evaluation and planning for discharge  - Provide patient education as appropriate  Outcome: Progressing  Goal: Maintains/Returns to pre admission functional level  Description: INTERVENTIONS:  - Perform BMAT or MOVE assessment daily.   - Set and communicate daily mobility goal to care team and patient/family/caregiver. - Collaborate with rehabilitation services on mobility goals if consulted  - Perform Range of Motion  times a day. - Reposition patient every  hours.   - Dangle patient  times a day  - Stand patient  times a day  - Ambulate patient  times a day  - Out of bed to chair  times a day   - Out of bed for meals  times a day  - Out of bed for toileting  - Record patient progress and toleration of activity level   Outcome: Progressing  Goal: Patient will remain free of falls  Description: INTERVENTIONS:  - Educate patient/family on patient safety including physical limitations  - Instruct patient to call for assistance with activity   - Consult OT/PT to assist with strengthening/mobility   - Keep Call bell within reach  - Keep bed low and locked with side rails adjusted as appropriate  - Keep care items and personal belongings within reach  - Initiate and maintain comfort rounds  - Make Fall Risk Sign visible to staff  - Offer Toileting every  Hours, in advance of need  - Initiate/Maintain alarm  - Obtain necessary fall risk management equipment:   - Apply yellow socks and bracelet for high fall risk patients  - Consider moving patient to room near nurses station  Outcome: Progressing 1.64

## 2023-11-14 NOTE — ASSESSMENT & PLAN NOTE
Home regimen: Unknown  Patient reports he takes 2 blood pressure medications + Lasix  Blood pressure stable  Given findings on echocardiogram, Toprol XL, lisinopril and spironolactone added per cardiology

## 2023-11-14 NOTE — PROGRESS NOTES
Cardiology Progress Note   Huyen Zamudio 74 y.o. male MRN: [id number]    Unit/Bed#: -01 Encounter: [id number]      Assessment:  Acute combined CHF, new diagnosis  Dilated cardiomyopathy w/ EF 20%  NSVT  Acute LLE DVT  Non-MI elevated troponin   Coronary artery calcifications  Hypertension   Hyperlipidemia    TTE 11/13/2023: EF 20%, global hypokinesis, G1DD, akinetic basal inferior and mid inferior, hypokinetic basal anterior, basal anteroseptal, basal inferoseptal, basal inferolateral, basal anterolateral, mid anterior, mid anteroseptal, mid inferoseptal, mid inferolateral, mid anterolateral, apical anterior, apical septal, apical inferior, apical lateral and apex, mild INSA, moderate MR, mild TR. Plan/Discussion:  Patients mildly elevated troponin likely non-MI elevated in the setting of CHF exacerbation. He is chest pain free. EKG without ischemic changes. TTE reviewed in detail with patient; plan to optimize GDMT here in hospital and facilitate outpatient workup to include ischemic evaluation when better euvolemic. Continue Toprol XL, lisinopril and Aldactone  Check A1c and add Jardiance 10mg QD  He does have bout of 3-5 beats of NSVT on telemetry secondary to low EF; LifeVest ordered and issued yesterday evening. Continue telemetry (non-expiring) throughout his admission. He is close to euvolemic; transition to PO diuretics (Lasix 40mg QD) when able to be weaned of O2 (currently on 2L via NC). Serial BMPs daily (Keep K>4 and Mg>2), salt/fluid restrictions advised, daily standing weights, strict ASHWINI monitoring. Transition from IV heparin gtt to [facility] for acute DVT as per primary team     Subjective:   Patient seen and examined. Overnight events reviewed. Feels mildly fatigued but otherwise no acute complaints. Objective:     Vitals: Blood pressure 165/84, pulse 96, temperature 97.8 °F (36.6 °C), resp. rate 18, height 5' 7" (1.702 m), weight 111 kg (244 lb 11.4 oz), SpO2 90 %. , Body mass index is 38.33 kg/m².,   Orthostatic Blood Pressures      Flowsheet Row Most Recent Value   Blood Pressure 165/84 filed at 11/14/2023 0746   Patient Position - Orthostatic VS Lying filed at 11/13/2023 2114              Intake/Output Summary (Last 24 hours) at 11/14/2023 1026  Last data filed at 11/14/2023 1025  Gross per 24 hour   Intake 420 ml   Output 2625 ml   Net -2205 ml         Physical Exam:    GEN: Pernell Leyden appears well, alert and oriented x 3, pleasant and cooperative   HEENT: Sclera anicteric, conjunctivae pink, mucous membranes moist. Oropharynx clear. NECK: supple, no significant JVD, Trachea midline, no thyromegaly. HEART: regular rhythm, normal S1 and S2, no murmurs, clicks, gallops or rubs   LUNGS: clear to auscultation bilaterally; no wheezes, rales, or rhonchi. No increased work of breathing or signs of respiratory distress. ABDOMEN: Soft, nontender, nondistended  EXTREMITIES: Skin warm and well perfused, no clubbing, cyanosis, or edema. NEURO: No focal findings. Normal speech. Mood and affect normal.   SKIN: Normal without suspicious lesions on exposed skin.       Medications:      Current Facility-Administered Medications:     acetaminophen (TYLENOL) tablet 650 mg, 650 mg, Oral, Q6H PRN, Madan Romero PA-C, 650 mg at 11/13/23 0211    atorvastatin (LIPITOR) tablet 40 mg, 40 mg, Oral, Daily With Justina Mcallister MD, 40 mg at 11/13/23 1735    cefTRIAXone (ROCEPHIN) IVPB (premix in dextrose) 1,000 mg 50 mL, 1,000 mg, Intravenous, Q24H, Leidy Waterman MD, Last Rate: 100 mL/hr at 11/14/23 0915, 1,000 mg at 11/14/23 0915    furosemide (LASIX) injection 40 mg, 40 mg, Intravenous, BID, Jennifer Arana MD, 40 mg at 11/14/23 0916    heparin (porcine) 25,000 units in 0.45% NaCl 250 mL infusion (premix), 3-30 Units/kg/hr (Order-Specific), Intravenous, Titrated, Angelica Madrid PA-C, Last Rate: 20 mL/hr at 11/14/23 0909, 16 Units/kg/hr at 11/14/23 0909    heparin (porcine) injection 10,000 Units, 10,000 Units, Intravenous, Q6H PRN, Escobar Mcfarland PA-C    heparin (porcine) injection 5,000 Units, 5,000 Units, Intravenous, Q6H PRN, Tristenqualine EMMETT Mcfarland-C, 5,000 Units at 11/13/23 1301    hydrALAZINE (APRESOLINE) injection 5 mg, 5 mg, Intravenous, Q6H PRN, Escobar Mcfarland PA-C    lisinopril (ZESTRIL) tablet 20 mg, 20 mg, Oral, Daily, Casi Hopkins MD, 20 mg at 11/14/23 0915    metoprolol succinate (TOPROL-XL) 24 hr tablet 100 mg, 100 mg, Oral, Daily, Angelica Madrid PA-C, 100 mg at 11/14/23 1670    spironolactone (ALDACTONE) tablet 25 mg, 25 mg, Oral, Daily, Casi Hopkins MD, 25 mg at 11/14/23 0915     Labs & Results:        Results from last 7 days   Lab Units 11/14/23  0531 11/13/23  1207 11/11/23  0206   WBC Thousand/uL 9.53 8.86 22.05*   HEMOGLOBIN g/dL 13.8 13.9 13.1   HEMATOCRIT % 43.1 41.6 40.5   PLATELETS Thousands/uL 286 272 263     Results from last 7 days   Lab Units 11/13/23  1207   TRIGLYCERIDES mg/dL 172*   HDL mg/dL 27*     Results from last 7 days   Lab Units 11/14/23  0531 11/13/23  1207 11/11/23  0206 11/10/23  1647 11/10/23  1540   POTASSIUM mmol/L 4.0 3.9 4.0   < > 5.8*   CHLORIDE mmol/L 102 101 102  --  103   CO2 mmol/L 22 25 22  --  22   BUN mg/dL 22 20 19  --  17   CREATININE mg/dL 0.89 0.86 1.14  --  1.03   CALCIUM mg/dL 8.3* 8.7 8.1*  --  8.8   ALK PHOS U/L 58 59  --   --  54   ALT U/L 35 34  --   --  34   AST U/L 45* 49*  --   --  53*    < > = values in this interval not displayed. Results from last 7 days   Lab Units 11/13/23  2314 11/13/23  1918 11/13/23  1207 11/11/23  0157 11/10/23  1645   INR   --   --   --   --  1.11   PTT seconds 70* 72* 52*   < > 28    < > = values in this interval not displayed.      Results from last 7 days   Lab Units 11/11/23  0206 11/10/23  1540   MAGNESIUM mg/dL 1.9 2.0

## 2023-11-14 NOTE — CONSULTS
Consultation - [id number]. 74 y.o. male MRN: [id number]  Unit/Bed#: -01 Encounter: [id number]    Assessment/Plan     Assessment:  Chronic venous hypertension with ulcer of left lower extremity  Non-pressure chronic ulcer of left lower leg limited to breakdown of skin  Localized swelling of left lower leg  Open wound of chest wall, right initial encounter    Plan:  Left lower leg: Patient has a partial thickness venous ulcer of his left lower extremity not showing any s/s of infection. Wound measures 1x1x0.1cm. Please cleanse wound with NSS, pat dry, apply Dermagran gauze to wound covered with dry gauze and [place] changed every other day    Left lower leg erythematous and edematous with +2 pitting edema. Patient was found to have an acute DVT in the gastrocnemius veins. Primary care team feels erythema not d/t cellulitis. Holding off on antibiotics at this time. Patient currently on heparin gtt. Right chest wall: Patient has hypergranular wound of right chest wall possibly d/t skin cancer. Wound has atypical appearance. Patient has not had biopsy of area. Wound measures 1.5x2x0.1cm. Hypergranular tissue slightly friable with removal of dressing. Please cleanse wound with NSS, pat dry, apply Derma Gel Semi Occlusive hydrogel sheet to wound and secure with a tegaderm dressing changed every other day. Will place an outpatient referral to dermatology. Bilateral heels intact and blanchable    Patient declines assessment of sacrum and buttocks    Will place preventative wound care orders     History of Present Illness   HPI:  Yudith Mg is a 74 y.o. male with a PmHx of HTN and obesity who was admitted on 11/10 for generalized weakness and left lower extremity swelling 2/2 acute DVT. Wound care was consulted for evaluation of venous ulcer of his LLE and open wound of his right chest wall. Patient feels the wound on his chest might be d/t skin cancer.  He was seeing a Dermatologist through [facility] prior to his hospitalization. He reports he has not had a biopsy of his wound. He denies any pain, fevers, or chills. Consults    Review of Systems   Constitutional: Negative. HENT:  Negative for ear pain and hearing loss. Eyes:  Negative for pain. Respiratory:  Negative for chest tightness and shortness of breath. Cardiovascular:  Positive for leg swelling. Negative for chest pain and palpitations. Gastrointestinal:  Negative for diarrhea, nausea and vomiting. Genitourinary:  Negative for dysuria. Musculoskeletal:  Positive for gait problem. Skin:  Positive for wound. Neurological:  Negative for tremors and weakness. Psychiatric/Behavioral:  Negative for behavioral problems, confusion and suicidal ideas. Historical Information   Past Medical History:   Diagnosis Date    Hypertension     Skin cancer      History reviewed. No pertinent surgical history. Social History   Social History     Substance and Sexual Activity   Alcohol Use Never     Social History     Substance and Sexual Activity   Drug Use Never     E-Cigarette/Vaping     E-Cigarette/Vaping Substances     Social History     Tobacco Use   Smoking Status Never   Smokeless Tobacco Never     Family History: History reviewed. No pertinent family history.     Meds/Allergies   current meds:   Current Facility-Administered Medications   Medication Dose Route Frequency    acetaminophen (TYLENOL) tablet 650 mg  650 mg Oral Q6H PRN    atorvastatin (LIPITOR) tablet 40 mg  40 mg Oral Daily With Dinner    cefTRIAXone (ROCEPHIN) IVPB (premix in dextrose) 1,000 mg 50 mL  1,000 mg Intravenous Q24H    Empagliflozin (JARDIANCE) tablet 10 mg  10 mg Oral Daily    furosemide (LASIX) injection 40 mg  40 mg Intravenous BID    heparin (porcine) 25,000 units in 0.45% NaCl 250 mL infusion (premix)  3-30 Units/kg/hr (Order-Specific) Intravenous Titrated    heparin (porcine) injection 10,000 Units  10,000 Units Intravenous Q6H PRN    heparin (porcine) injection 5,000 Units  5,000 Units Intravenous Q6H PRN    hydrALAZINE (APRESOLINE) injection 5 mg  5 mg Intravenous Q6H PRN    lisinopril (ZESTRIL) tablet 20 mg  20 mg Oral Daily    metoprolol succinate (TOPROL-XL) 24 hr tablet 100 mg  100 mg Oral Daily    spironolactone (ALDACTONE) tablet 25 mg  25 mg Oral Daily     Allergies   Allergen Reactions    Zosyn [Piperacillin Sod-Tazobactam So] Rash       Objective   Vitals: Blood pressure 165/84, pulse 96, temperature 97.8 °F (36.6 °C), resp. rate 18, height 5' 7" (1.702 m), weight 111 kg (244 lb 11.4 oz), SpO2 90 %. Physical Exam  Constitutional:       General: He is not in acute distress. Appearance: Normal appearance. He is obese. HENT:      Head: Normocephalic and atraumatic. Eyes:      General:         Right eye: No discharge. Left eye: No discharge. Pulmonary:      Effort: Pulmonary effort is normal. No respiratory distress. Musculoskeletal:      Cervical back: Normal range of motion and neck supple. No rigidity. Left lower leg: Edema present. Skin:     Findings: Erythema, lesion and wound present. Comments: LLE erythema   Neurological:      General: No focal deficit present. Mental Status: He is alert and oriented to person, place, and time. Mental status is at baseline. Psychiatric:         Mood and Affect: Mood normal.         Behavior: Behavior normal.         Thought Content: Thought content normal.         Judgment: Judgment normal.                 Lab Results: I have personally reviewed pertinent reports. Imaging: I have personally reviewed pertinent reports. EKG, Pathology, and Other Studies: I have personally reviewed pertinent reports. Code Status: Level 1 - Full Code      Counseling / Coordination of Care  Total floor / unit time spent today 15 minutes. Greater than 50% of total time was spent with the patient and / or family counseling and / or coordination of care.   A description of the counseling / coordination of care: Wound care plan of care.

## 2023-11-14 NOTE — ASSESSMENT & PLAN NOTE
Reporting sob over the past couple of days. Dry ongoing cough over the past couple of weeks. Denies congestion, fevers or chills. 88% on RA on admission. Currently on 2 L NC   CTA chest   No definite pulmonary emboli although segmental and subsegmental emboli in the lung bases cannot be excluded due to motion artifact. Evaluation of the lungs compromised by motion with mild interstitial edema.     Covid/Flu/RSV negative   Respiratory protocol   Continue to monitor and wean oxygen as able

## 2023-11-14 NOTE — PROGRESS NOTES
[address] [facility]  Progress Note  Name: Brennon Malin  MRN: [id number]  Unit/Bed#: -60 I Date of Admission: 11/10/2023   Date of Service: 11/14/2023 I Hospital Day: 4    Assessment/Plan   * Generalized weakness  Assessment & Plan  Presents from [facility] care due to increasing generalized weakness over the past couple of days. He feels like his legs are going to give out when he tries to ambulate. Denies fall or injury. Typically uses a cane to ambulate. Meeting SIRS criteria (tachycardia, tachypnea, leukocytosis)  CTA negative for pneumonia  COVID/flu/RSV negative  UA negative UTI  Hypoxic on arrival - 2L NC  Fall precautions  Suspect weakness related to volume overload and general deconditioning  Consult PT/OT and case management    Acute combined systolic and diastolic congestive heart failure [facility])  Assessment & Plan  Patient reports progressively worsening swelling and erythema of his left lower extremity. 2+ pitting edema noted  Acute Left DVT  Started on VTE heparin. Transition to NOAC  BNP elevated.     Echocardiogram: EF 04% grade 1 diastolic dysfunction  IV Lasix increased to 40 mg twice daily on 11/13  Given reduced EF continue additional medication optimization including metoprolol, Aldactone, lisinopril  Daily weights, I/O  Cardiology following    SIRS (systemic inflammatory response syndrome) (HCC)  Assessment & Plan  SIRS: Tachycardia, tachypnea, leukocytosis (15.39)  Procalcitonin 0.32  No identified source of infection - initial SIRS criteria could be related to acute DVT and acute CHF  CTA negative for pneumonia  COVID/flu/RSV negative  Patient has been afebrile last several days, leukocytosis resolved  UA negative UTI  Mildly elevated procalcitonin 0.32 x1  Blood cultures negative x2 after 72 hours  Received 3 days of empiric IV rocephin - will discontinue and monitor    Acute respiratory failure with hypoxia [facility])  Assessment & Plan  Reporting sob over the past couple of days. Dry ongoing cough over the past couple of weeks. Denies congestion, fevers or chills. 88% on RA on admission. Currently on 2 L NC   CTA chest   No definite pulmonary emboli although segmental and subsegmental emboli in the lung bases cannot be excluded due to motion artifact. Evaluation of the lungs compromised by motion with mild interstitial edema.   Covid/Flu/RSV negative   Respiratory protocol   Continue to monitor and wean oxygen as able     Acute DVT (deep venous thrombosis) (HCC)  Assessment & Plan  D-dimer 3.09  CTA chest  No definite pulmonary emboli although segmental and subsegmental emboli in the lung bases cannot be excluded due to motion artifact. Evaluation of the lungs compromised by motion with mild interstitial edema. Pulmonary artery enlargement which can be seen with pulmonary hypertension. Hepatic steatosis. Gynecomastia. Left lower extremity redness and swelling. Venous duplex positive acute DVT in the left gastrocnemius veins. Started on heparin gtt  Will transition to NOAC    Elevated troponin  Assessment & Plan  Troponin 54, 68, 68  Denies chest pain. BNP elevated at 572. No prior echo. Telemetry  Cardiology consult appreciated. [place] to have non MI trop elevation. Essential hypertension  Assessment & Plan  Home regimen: Unknown  Patient reports he takes 2 blood pressure medications + Lasix  Blood pressure stable  Given findings on echocardiogram, Toprol XL, lisinopril and spironolactone added per cardiology         VTE Pharmacologic Prophylaxis: VTE Score: 5 High Risk (Score >/= 5) - Pharmacological DVT Prophylaxis Ordered: apixaban (Eliquis). Sequential Compression Devices Ordered. Mobility:   Basic Mobility Inpatient Raw Score: 16  JH-HLM Goal: 5: Stand one or more mins  JH-HLM Achieved: 6: Walk 10 steps or more  HLM Goal achieved. Continue to encourage appropriate mobility.     Patient Centered Rounds: I performed bedside rounds with nursing staff today. Discussions with Specialists or Other Care Team Provider: chinyere CIFUENETS    Education and Discussions with Family / Patient: Attempted to update  (daughter) via phone. Left voicemail. Total Time Spent on Date of Encounter in care of patient: 45 mins. This time was spent on one or more of the following: performing physical exam; counseling and coordination of care; obtaining or reviewing history; documenting in the medical record; reviewing/ordering tests, medications or procedures; communicating with other healthcare professionals and discussing with patient's family/caregivers. Current Length of Stay: 4 day(s)  Current Patient Status: Inpatient   Certification Statement: The patient will continue to require additional inpatient hospital stay due to IV diuresis  Discharge Plan: Anticipate discharge in 24-48 hrs to rehab facility. Code Status: Level 1 - Full Code    Subjective:   Patient overall feels well this morning. Feels he is urinating frequently. Breathing gradually improving. Denies chest pain/palpitations, nausea/vomiting, abdominal pain. Still with lower extremity edema. Objective:     Vitals:   Temp (24hrs), Av.7 °F (36.5 °C), Min:97.5 °F (36.4 °C), Max:97.9 °F (36.6 °C)    Temp:  [97.5 °F (36.4 °C)-97.9 °F (36.6 °C)] 97.5 °F (36.4 °C)  HR:  [89-96] 96  Resp:  [17-20] 17  BP: (146-168)/(78-84) 168/83  SpO2:  [90 %-91 %] 90 %  Body mass index is 38.33 kg/m². Input and Output Summary (last 24 hours): Intake/Output Summary (Last 24 hours) at 2023 1552  Last data filed at 2023 1540  Gross per 24 hour   Intake 540 ml   Output 3115 ml   Net -2575 ml       Physical Exam:   Physical Exam  Vitals and nursing note reviewed. Constitutional:       General: He is not in acute distress. Appearance: He is well-developed. Comments: No acute distress   HENT:      Head: Normocephalic and atraumatic.    Eyes:      General: No scleral icterus. Extraocular Movements: Extraocular movements intact. Conjunctiva/sclera: Conjunctivae normal.   Cardiovascular:      Rate and Rhythm: Normal rate and regular rhythm. Heart sounds: No murmur heard. Pulmonary:      Effort: Pulmonary effort is normal. No respiratory distress. Breath sounds: Normal breath sounds. Abdominal:      General: Bowel sounds are normal.      Palpations: Abdomen is soft. Tenderness: There is no abdominal tenderness. There is no guarding or rebound. Musculoskeletal:         General: No swelling. Cervical back: Normal range of motion. Comments: Lower extremities bilaterally, lower extremity edema L > R   Skin:     General: Skin is warm and dry. Capillary Refill: Capillary refill takes less than 2 seconds. Neurological:      Mental Status: He is alert. Mental status is at baseline.    Psychiatric:         Mood and Affect: Mood normal.         Speech: Speech normal.         Behavior: Behavior normal.          Additional Data:     Labs:  Results from last 7 days   Lab Units 11/14/23  0531   WBC Thousand/uL 9.53   HEMOGLOBIN g/dL 13.8   HEMATOCRIT % 43.1   PLATELETS Thousands/uL 286   NEUTROS PCT % 65   LYMPHS PCT % 18   MONOS PCT % 8   EOS PCT % 7*     Results from last 7 days   Lab Units 11/14/23  0531   SODIUM mmol/L 134*   POTASSIUM mmol/L 4.0   CHLORIDE mmol/L 102   CO2 mmol/L 22   BUN mg/dL 22   CREATININE mg/dL 0.89   ANION GAP mmol/L 10   CALCIUM mg/dL 8.3*   ALBUMIN g/dL 3.4*   TOTAL BILIRUBIN mg/dL 0.53   ALK PHOS U/L 58   ALT U/L 35   AST U/L 45*   GLUCOSE RANDOM mg/dL 138     Results from last 7 days   Lab Units 11/10/23  1645   INR  1.11     Results from last 7 days   Lab Units 11/14/23  1100   POC GLUCOSE mg/dl 163*         Results from last 7 days   Lab Units 11/10/23  2316 11/10/23  1942 11/10/23  1532   LACTIC ACID mmol/L 2.0 2.4* 2.1*   PROCALCITONIN ng/ml  --   --  0.32*       Lines/Drains:  Invasive Devices       Peripheral Intravenous Line  Duration             Peripheral IV 11/11/23 Left;Proximal;Ventral (anterior) Forearm 3 days                      Telemetry:  Telemetry Orders (From admission, onward)               LifeVest Patient: Continuous Telemetry Monitoring during hospitalization (non-expiring)  Continuous LifeVest Telemetry Monitoring        References:    LifeVest Policy                     Telemetry Reviewed: Normal Sinus Rhythm  Indication for Continued Telemetry Use: Acute CHF on >200 mg lasix/day or equivalent dose or with new reduced EF. Imaging: Reviewed radiology reports from this admission including: chest CT scan    Recent Cultures (last 7 days):   Results from last 7 days   Lab Units 11/10/23  1532   BLOOD CULTURE  No Growth at 72 hrs. No Growth at 72 hrs. Last 24 Hours Medication List:   Current Facility-Administered Medications   Medication Dose Route Frequency Provider Last Rate    acetaminophen  650 mg Oral Q6H PRN Ian Mishra PA-C      apixaban  10 mg Oral BID Liss Castillo PA-C      Followed by    Mehdi Jama ON 11/21/2023] apixaban  5 mg Oral BID Liss Castillo PA-C      atorvastatin  40 mg Oral Daily With Rani Mares MD      cefTRIAXone  1,000 mg Intravenous Q24H Hector Greer MD 1,000 mg (11/14/23 0915)    Empagliflozin  10 mg Oral Daily Blair Chairez PA-C      furosemide  40 mg Intravenous BID Jose Juan Barnhart MD      hydrALAZINE  5 mg Intravenous Q6H PRN Ian Mishra PA-C      lisinopril  20 mg Oral Daily Jose Juan Barnhart MD      metoprolol succinate  100 mg Oral Daily Ian Mishra PA-C      spironolactone  25 mg Oral Daily Jose Juan Barnhart MD          Today, Patient Was Seen By: Renny Juarez PA-C    **Please Note: This note may have been constructed using a voice recognition system. **

## 2023-11-14 NOTE — ASSESSMENT & PLAN NOTE
D-dimer 3.09  CTA chest  No definite pulmonary emboli although segmental and subsegmental emboli in the lung bases cannot be excluded due to motion artifact. Evaluation of the lungs compromised by motion with mild interstitial edema. Pulmonary artery enlargement which can be seen with pulmonary hypertension. Hepatic steatosis. Gynecomastia. Left lower extremity redness and swelling. Venous duplex positive acute DVT in the left gastrocnemius veins.   Started on heparin gtt  Will transition to [address]

## 2023-11-14 NOTE — PLAN OF CARE
Problem: PAIN - ADULT  Goal: Verbalizes/displays adequate comfort level or baseline comfort level  Description: Interventions:  - Encourage patient to monitor pain and request assistance  - Assess pain using appropriate pain scale  - Administer analgesics based on type and severity of pain and evaluate response  - Implement non-pharmacological measures as appropriate and evaluate response  - Consider cultural and social influences on pain and pain management  - Notify physician/advanced practitioner if interventions unsuccessful or patient reports new pain  Outcome: Progressing     Problem: INFECTION - ADULT  Goal: Absence or prevention of progression during hospitalization  Description: INTERVENTIONS:  - Assess and monitor for signs and symptoms of infection  - Monitor lab/diagnostic results  - Monitor all insertion sites, i.e. indwelling lines, tubes, and drains  - Monitor endotracheal if appropriate and nasal secretions for changes in amount and color  - [place] appropriate cooling/warming therapies per order  - Administer medications as ordered  - Instruct and encourage patient and family to use good hand hygiene technique  - Identify and instruct in appropriate isolation precautions for identified infection/condition  Outcome: Progressing  Goal: Absence of fever/infection during neutropenic period  Description: INTERVENTIONS:  - Monitor WBC    Outcome: Progressing     Problem: MOBILITY - ADULT  Goal: Maintain or return to baseline ADL function  Description: INTERVENTIONS:  -  Assess patient's ability to carry out ADLs; assess patient's baseline for ADL function and identify physical deficits which impact ability to perform ADLs (bathing, care of mouth/teeth, toileting, grooming, dressing, etc.)  - Assess/evaluate cause of self-care deficits   - Assess range of motion  - Assess patient's mobility; develop plan if impaired  - Assess patient's need for assistive devices and provide as appropriate  - Encourage maximum independence but intervene and supervise when necessary  - Involve family in performance of ADLs  - Assess for home care needs following discharge   - Consider OT consult to assist with ADL evaluation and planning for discharge  - Provide patient education as appropriate  Outcome: Progressing  Goal: Maintains/Returns to pre admission functional level  Description: INTERVENTIONS:  - Perform BMAT or MOVE assessment daily.   - Set and communicate daily mobility goal to care team and patient/family/caregiver. - Collaborate with rehabilitation services on mobility goals if consulted  - Perform Range of Motion 3 times a day. - Reposition patient every 3 hours.   - Dangle patient 3 times a day  - Stand patient 3 times a day  - Ambulate patient 3 times a day  - Out of bed to chair 3 times a day   - Out of bed for meals 3 times a day  - Out of bed for toileting  - Record patient progress and toleration of activity level   Outcome: Progressing

## 2023-11-14 NOTE — ASSESSMENT & PLAN NOTE
SIRS: Tachycardia, tachypnea, leukocytosis (15.39)  Procalcitonin 0.32  No identified source of infection - initial SIRS criteria could be related to acute DVT and acute CHF  CTA negative for pneumonia  COVID/flu/RSV negative  Patient has been afebrile last several days, leukocytosis resolved  UA negative UTI  Mildly elevated procalcitonin 0.32 x1  Blood cultures negative x2 after 72 hours  Received 3 days of empiric IV rocephin - will discontinue and monitor

## 2023-11-14 NOTE — UTILIZATION REVIEW
NOTIFICATION OF INPATIENT ADMISSION   AUTHORIZATION REQUEST   SERVICING FACILITY:   [address] [place] [address]  [address] [facility],Third Floor [address]  Tax ID: [id number]  NPI: [id number] ATTENDING PROVIDER:  Attending Name and NPI#: Joe Leung Md [[id number]]  Address: [address] [facility],Third [facility] [address]  Phone: [phone]   ADMISSION INFORMATION:  Place of Service: Inpatient [address]  Place of Service Code: 21  Inpatient Admission Date/Time: 11/10/23  8:00 PM  Discharge Date/Time: No discharge date for patient encounter. Admitting Diagnosis Code/Description:  Acute respiratory insufficiency [R06.89]  Elevated troponin [R79.89]  Generalized weakness [R53.1]  Edema of left lower extremity [R60.0]     UTILIZATION REVIEW CONTACT:  Alejandra Harrison Utilization   Network Utilization Review Department  Phone: [phone]  Fax [phone]  Email: [email or web address]  Contact for approvals/pending authorizations, clinical reviews, and discharge. PHYSICIAN ADVISORY SERVICES:  Medical Necessity Denial & Dxwa-hv-Xtav Review  Phone: [phone]  Fax: [phone]  Email: [email or web address]     DISCHARGE SUPPORT TEAM:  For Patients Discharge Needs & Updates  Phone: [phone] opt. 2 Fax: [phone]  Email: [email or web address]

## 2023-11-14 NOTE — ASSESSMENT & PLAN NOTE
Troponin 54, 68, 68  Denies chest pain. BNP elevated at 572. No prior echo. Telemetry  Cardiology consult appreciated. [place] to have non MI trop elevation.

## 2023-11-14 NOTE — ASSESSMENT & PLAN NOTE
Presents from [facility] assisted care due to increasing generalized weakness over the past couple of days. He feels like his legs are going to give out when he tries to ambulate. Denies fall or injury. Typically uses a cane to ambulate.   Meeting SIRS criteria (tachycardia, tachypnea, leukocytosis)  CTA negative for pneumonia  COVID/flu/RSV negative  UA negative UTI  Hypoxic on arrival - 2L NC  Fall precautions  Suspect weakness related to volume overload and general deconditioning  Consult PT/OT and case management

## 2023-11-14 NOTE — UTILIZATION REVIEW
Continued Stay Review    Date: 11/14/23                           Current Patient Class: inpatient   Current Level of Care: med surg    HPI:73 y.o. male initially admitted on 11/10/23 inpatient due to  weakness/acute respiratory failure with hypoxia/Swelling of left leg/SIRS/elevated D dimer and troponin/hpt. Found to have non MI elevated troponin with CHF exacerbation. LLE with swelling and edema. Likely DVT and started on heparin gtt,  emboli could not be ruled out in lung due to artifact. Assessment/Plan:   11/14/23:   fatigued. Telemetry 3 to 5 beats of NSVT. On exam cardiac rhythm regular  lungs clear. Continue Toprol XL, lisinopril and Aldactone . Check A1c and add Jardiance 10mg QD. Continue Telemetry. On oxygen, when can wean to room air transition to oral diuretics. Continue IV lasix. Continue Heparin gtt. Continue ceftriaxone. .       Vital Signs:   11/14/23 07:46:08 97.8 °F (36.6 °C) 96 18 165/84 111 90 % -- -- -- --   11/13/23 21:14:45 97.9 °F (36.6 °C) 89 20 146/78 101 91 % -- -- None (Room air)        Pertinent Labs/Diagnostic Results:   VAS lower limb venous duplex study, complete bilateral   Final Result by Rodney Rowell MD (11/12 6454)   RIGHT LOWER LIMB:  No evidence of acute or chronic deep vein thrombosis. No evidence of superficial thrombophlebitis noted. Doppler evaluation shows a normal response to augmentation maneuvers. Popliteal, posterior tibial and anterior tibial arterial Doppler waveform's are  monophasic. LEFT LOWER LIMB:  Acute deep vein thrombosis is noted in the gastrocnemius veins. No evidence of superficial thrombophlebitis noted. Doppler evaluation shows a normal response to augmentation maneuvers. Popliteal, posterior tibial and anterior tibial arterial Doppler waveform's are  monophasic and hyperemic.    CTA ED chest PE study   Final Result by Tereza Raphael MD (1950)      No definite pulmonary emboli although segmental and subsegmental emboli in the lung bases cannot be excluded due to motion artifact. Evaluation of the lungs compromised by motion with mild interstitial edema. Pulmonary artery enlargement which can be seen with pulmonary hypertension. Hepatic steatosis. Gynecomastia. Workstation performed: [id number]         XR chest portable   ED Interpretation by Israel Hall MD (11/10 0650)   Cardiomegaly with patchiness bilateral lower lung fields      Final Result by Sabina Mata MD (11/11 1365)      Moderate cardiomegaly with likely mild pulmonary edema. Resident: Elizabeth Rai, the attending radiologist, have reviewed the images and agree with the final report above. Workstation performed: [id number]            11/13/23 echo   Left Ventricle: Left ventricular cavity size is mildly dilated. Wall thickness is normal. The left ventricular ejection fraction is 20%. Systolic function is severely reduced. There is global hypokinesis. Diastolic function is mildly abnormal, consistent with grade I (abnormal) relaxation. The following segments are akinetic: basal inferior and mid inferior. The following segments are hypokinetic: basal anterior, basal anteroseptal, basal inferoseptal, basal inferolateral, basal anterolateral, mid anterior, mid anteroseptal, mid inferoseptal, mid inferolateral, mid anterolateral, apical anterior, apical septal, apical inferior, apical lateral and apex. Left Atrium: The atrium is mildly dilated. Right Atrium: The atrium is mildly dilated. Mitral Valve: There is moderate regurgitation. Tricuspid Valve: There is mild regurgitation. Results from last 7 days   Lab Units 11/12/23  1028 11/10/23  2012   SARS-COV-2  Negative Negative     Results from last 7 days   Lab Units 11/14/23  0531 11/13/23  1207 11/11/23  0206 11/10/23  1532   WBC Thousand/uL 9.53 8.86 22. 05* 15.39*   HEMOGLOBIN g/dL 13.8 13.9 13.1 14.5   HEMATOCRIT % 43.1 41.6 40.5 44.4   PLATELETS Thousands/uL 286 272 263 295   NEUTROS ABS Thousands/µL 6.18 6.80 18.72* 13.95*     Results from last 7 days   Lab Units 11/14/23  0531 11/13/23  1207 11/11/23  0206 11/10/23  1647 11/10/23  1540   SODIUM mmol/L 134* 135 134*  --  134*   POTASSIUM mmol/L 4.0 3.9 4.0 4.2 5.8*   CHLORIDE mmol/L 102 101 102  --  103   CO2 mmol/L 22 25 22  --  22   ANION GAP mmol/L 10 9 10  --  9   BUN mg/dL 22 20 19  --  17   CREATININE mg/dL 0.89 0.86 1.14  --  1.03   EGFR ml/min/1.73sq m 83 84 62  --  70   CALCIUM mg/dL 8.3* 8.7 8.1*  --  8.8   MAGNESIUM mg/dL  --   --  1.9  --  2.0     Results from last 7 days   Lab Units 11/14/23  0531 11/13/23  1207 11/10/23  1540   AST U/L 45* 49* 53*   ALT U/L 35 34 34   ALK PHOS U/L 58 59 54   TOTAL PROTEIN g/dL 6.7 7.0 7.3   ALBUMIN g/dL 3.4* 3.2* 3.9   TOTAL BILIRUBIN mg/dL 0.53 0.51 0.71     Results from last 7 days   Lab Units 11/14/23  1100   POC GLUCOSE mg/dl 163*     Results from last 7 days   Lab Units 11/14/23  0531 11/13/23  1207 11/11/23  0206 11/10/23  1540   GLUCOSE RANDOM mg/dL 138 160* 121 128     Results from last 7 days   Lab Units 11/10/23  1942 11/10/23  1706 11/10/23  1532   HS TNI 0HR ng/L  --   --  54*   HS TNI 2HR ng/L  --  68*  --    HSTNI D2 ng/L  --  14  --    HS TNI 4HR ng/L 68*  --   --    HSTNI D4 ng/L 14  --   --      Results from last 7 days   Lab Units 11/10/23  1645   D-DIMER QUANTITATIVE ug/ml FEU 3.09*     Results from last 7 days   Lab Units 11/13/23  2314 11/13/23  1918 11/13/23  1207 11/11/23  0157 11/10/23  1645   PROTIME seconds  --   --   --   --  14.7*   INR   --   --   --   --  1.11   PTT seconds 70* 72* 52*   < > 28    < > = values in this interval not displayed.      Results from last 7 days   Lab Units 11/10/23  1532   PROCALCITONIN ng/ml 0.32*     Results from last 7 days   Lab Units 11/10/23  2316 11/10/23  1942 11/10/23  1532   LACTIC ACID mmol/L 2.0 2.4* 2.1*     Results from last 7 days   Lab Units 11/10/23  1532 BNP pg/mL 572*     Results from last 7 days   Lab Units 11/12/23  0853   CLARITY UA  Clear   COLOR UA  Yellow   SPEC GRAV UA  1.020   PH UA  5.5   GLUCOSE UA mg/dl 30 (3/100%)*   KETONES UA mg/dl Trace*   BLOOD UA  Trace*   PROTEIN UA mg/dl Trace*   NITRITE UA  Negative   BILIRUBIN UA  Negative   UROBILINOGEN UA (BE) mg/dl <2.0   LEUKOCYTES UA  Negative   WBC UA /hpf 4-10*   RBC UA /hpf 2-4   BACTERIA UA /hpf Occasional   EPITHELIAL CELLS WET PREP /hpf Occasional     Results from last 7 days   Lab Units 11/12/23  1028 11/10/23  2012   INFLUENZA A PCR  Negative Negative   INFLUENZA B PCR  Negative Negative   RSV PCR  Negative Negative     Results from last 7 days   Lab Units 11/10/23  1532   BLOOD CULTURE  No Growth at 72 hrs. No Growth at 72 hrs. Medications:   Scheduled Medications:  atorvastatin, 40 mg, Oral, Daily With Dinner  cefTRIAXone, 1,000 mg, Intravenous, Q24H  Empagliflozin, 10 mg, Oral, Daily  furosemide, 40 mg, Intravenous, BID  lisinopril, 20 mg, Oral, Daily  metoprolol succinate, 100 mg, Oral, Daily  spironolactone, 25 mg, Oral, Daily    Continuous IV Infusions:  heparin (porcine), 3-30 Units/kg/hr (Order-Specific), Intravenous, Titrated      PRN Meds: not used. acetaminophen, 650 mg, Oral, Q6H PRN  heparin (porcine), 10,000 Units, Intravenous, Q6H PRN  heparin (porcine), 5,000 Units, Intravenous, Q6H PRN  hydrALAZINE, 5 mg, Intravenous, Q6H PRN        Discharge Plan: to be determined. Network Utilization Review Department  ATTENTION: Please call with any questions or concerns to [phone] and carefully listen to the prompts so that you are directed to the right person. All voicemails are confidential.   For Discharge needs, contact Care Management DC Support Team at [phone] opt.  2  Send all requests for admission clinical reviews, approved or denied determinations and any other requests to dedicated fax number below belonging to the campus where the patient is receiving treatment.  List of dedicated fax numbers for the Facilities:  Cantuvdru JENKINS (Administrative/Medical Necessity) [phone]   DISCHARGE SUPPORT TEAM (NETWORK) [id number] Rogelio Lemos (Maternity/NICU/Pediatrics) [phone]   [address] 1521 [facility] 1000 [facility] [phone] [address] 5220 [facility] [address] [address] [facility] [address] [phone]

## 2023-11-14 NOTE — ASSESSMENT & PLAN NOTE
Patient reports progressively worsening swelling and erythema of his left lower extremity. 2+ pitting edema noted  Acute Left DVT  Started on VTE heparin. Transition to NOAC  BNP elevated.     Echocardiogram: EF 10% grade 1 diastolic dysfunction  IV Lasix increased to 40 mg twice daily on 11/13  Given reduced EF continue additional medication optimization including metoprolol, Aldactone, lisinopril  Daily weights, I/O  Cardiology following

## 2023-11-15 ENCOUNTER — APPOINTMENT (INPATIENT)
Dept: RADIOLOGY | Facility: HOSPITAL | Age: 73
DRG: 291 | End: 2023-11-15
Payer: MEDICARE

## 2023-11-15 PROBLEM — R65.10 SIRS (SYSTEMIC INFLAMMATORY RESPONSE SYNDROME) (HCC): Status: RESOLVED | Noted: 2023-11-10 | Resolved: 2023-11-15

## 2023-11-15 PROBLEM — R79.89 ELEVATED TROPONIN: Status: RESOLVED | Noted: 2023-11-10 | Resolved: 2023-11-15

## 2023-11-15 LAB
ANION GAP SERPL CALCULATED.3IONS-SCNC: 4 MMOL/L
BACTERIA BLD CULT: NORMAL
BACTERIA BLD CULT: NORMAL
BASOPHILS # BLD AUTO: 0.06 THOUSANDS/ÂΜL (ref 0–0.1)
BASOPHILS NFR BLD AUTO: 1 % (ref 0–1)
BUN SERPL-MCNC: 23 MG/DL (ref 5–25)
CALCIUM SERPL-MCNC: 9.1 MG/DL (ref 8.4–10.2)
CHLORIDE SERPL-SCNC: 100 MMOL/L (ref 96–108)
CO2 SERPL-SCNC: 34 MMOL/L (ref 21–32)
CREAT SERPL-MCNC: 1.2 MG/DL (ref 0.6–1.3)
EOSINOPHIL # BLD AUTO: 0.67 THOUSAND/ÂΜL (ref 0–0.61)
EOSINOPHIL NFR BLD AUTO: 8 % (ref 0–6)
ERYTHROCYTE [DISTWIDTH] IN BLOOD BY AUTOMATED COUNT: 13.4 % (ref 11.6–15.1)
EST. AVERAGE GLUCOSE BLD GHB EST-MCNC: 180 MG/DL
GFR SERPL CREATININE-BSD FRML MDRD: 58 ML/MIN/1.73SQ M
GLUCOSE SERPL-MCNC: 122 MG/DL (ref 65–140)
HBA1C MFR BLD: 7.9 %
HCT VFR BLD AUTO: 46.3 % (ref 36.5–49.3)
HGB BLD-MCNC: 14.5 G/DL (ref 12–17)
IMM GRANULOCYTES # BLD AUTO: 0.06 THOUSAND/UL (ref 0–0.2)
IMM GRANULOCYTES NFR BLD AUTO: 1 % (ref 0–2)
LYMPHOCYTES # BLD AUTO: 1.5 THOUSANDS/ÂΜL (ref 0.6–4.47)
LYMPHOCYTES NFR BLD AUTO: 19 % (ref 14–44)
MCH RBC QN AUTO: 30.3 PG (ref 26.8–34.3)
MCHC RBC AUTO-ENTMCNC: 31.3 G/DL (ref 31.4–37.4)
MCV RBC AUTO: 97 FL (ref 82–98)
MONOCYTES # BLD AUTO: 0.76 THOUSAND/ÂΜL (ref 0.17–1.22)
MONOCYTES NFR BLD AUTO: 10 % (ref 4–12)
NEUTROPHILS # BLD AUTO: 4.99 THOUSANDS/ÂΜL (ref 1.85–7.62)
NEUTS SEG NFR BLD AUTO: 61 % (ref 43–75)
NRBC BLD AUTO-RTO: 0 /100 WBCS
PLATELET # BLD AUTO: 347 THOUSANDS/UL (ref 149–390)
PMV BLD AUTO: 10.6 FL (ref 8.9–12.7)
POTASSIUM SERPL-SCNC: 4.9 MMOL/L (ref 3.5–5.3)
RBC # BLD AUTO: 4.78 MILLION/UL (ref 3.88–5.62)
SODIUM SERPL-SCNC: 138 MMOL/L (ref 135–147)
WBC # BLD AUTO: 8.04 THOUSAND/UL (ref 4.31–10.16)

## 2023-11-15 PROCEDURE — 83036 HEMOGLOBIN GLYCOSYLATED A1C: CPT | Performed by: INTERNAL MEDICINE

## 2023-11-15 PROCEDURE — 87081 CULTURE SCREEN ONLY: CPT | Performed by: INTERNAL MEDICINE

## 2023-11-15 PROCEDURE — 85025 COMPLETE CBC W/AUTO DIFF WBC: CPT | Performed by: INTERNAL MEDICINE

## 2023-11-15 PROCEDURE — 99232 SBSQ HOSP IP/OBS MODERATE 35: CPT | Performed by: PHYSICIAN ASSISTANT

## 2023-11-15 PROCEDURE — 99233 SBSQ HOSP IP/OBS HIGH 50: CPT | Performed by: INTERNAL MEDICINE

## 2023-11-15 PROCEDURE — 71045 X-RAY EXAM CHEST 1 VIEW: CPT

## 2023-11-15 PROCEDURE — 80048 BASIC METABOLIC PNL TOTAL CA: CPT | Performed by: INTERNAL MEDICINE

## 2023-11-15 RX ORDER — FUROSEMIDE 40 MG/1
40 TABLET ORAL DAILY
Status: DISCONTINUED | OUTPATIENT
Start: 2023-11-15 | End: 2023-11-16 | Stop reason: HOSPADM

## 2023-11-15 RX ORDER — ACETAMINOPHEN 325 MG/1
650 TABLET ORAL EVERY 6 HOURS PRN
Refills: 0
Start: 2023-11-15

## 2023-11-15 RX ORDER — ATORVASTATIN CALCIUM 40 MG/1
40 TABLET, FILM COATED ORAL
Refills: 0
Start: 2023-11-15

## 2023-11-15 RX ORDER — SPIRONOLACTONE 25 MG/1
25 TABLET ORAL DAILY
Refills: 0
Start: 2023-11-16

## 2023-11-15 RX ORDER — LISINOPRIL 20 MG/1
20 TABLET ORAL DAILY
Refills: 0
Start: 2023-11-16

## 2023-11-15 RX ORDER — FUROSEMIDE 40 MG/1
40 TABLET ORAL DAILY
Refills: 0
Start: 2023-11-16

## 2023-11-15 RX ADMIN — ATORVASTATIN CALCIUM 40 MG: 40 TABLET, FILM COATED ORAL at 17:26

## 2023-11-15 RX ADMIN — APIXABAN 10 MG: 5 TABLET, FILM COATED ORAL at 09:04

## 2023-11-15 RX ADMIN — EMPAGLIFLOZIN 10 MG: 10 TABLET, FILM COATED ORAL at 09:04

## 2023-11-15 RX ADMIN — METOPROLOL SUCCINATE 100 MG: 50 TABLET, EXTENDED RELEASE ORAL at 09:04

## 2023-11-15 RX ADMIN — APIXABAN 10 MG: 5 TABLET, FILM COATED ORAL at 17:26

## 2023-11-15 RX ADMIN — LISINOPRIL 20 MG: 20 TABLET ORAL at 09:04

## 2023-11-15 RX ADMIN — FUROSEMIDE 40 MG: 40 TABLET ORAL at 09:04

## 2023-11-15 RX ADMIN — SPIRONOLACTONE 25 MG: 25 TABLET ORAL at 09:04

## 2023-11-15 NOTE — CASE MANAGEMENT
Case Management Discharge Planning Note    Patient name Paresh Rios  Location [address] [facility] [place] 322/-94 MRN [id number]  : 1950 Date 11/15/2023       Current Admission Date: 11/10/2023  Current Admission Diagnosis:Generalized weakness   Patient Active Problem List    Diagnosis Date Noted    Essential hypertension 11/10/2023    Generalized weakness 11/10/2023    Acute combined systolic and diastolic congestive heart failure ([address]) 11/10/2023    Elevated troponin 11/10/2023    Acute DVT (deep venous thrombosis) ([address]) 11/10/2023    Acute respiratory failure with hypoxia ([address]) 11/10/2023    SIRS (systemic inflammatory response syndrome) ([address]) 11/10/2023      LOS (days): 5  Geometric Mean LOS (GMLOS) (days): 3.60  Days to GMLOS:-1.1     OBJECTIVE:  Risk of Unplanned Readmission Score: 8.64         Current admission status: Inpatient   Preferred Pharmacy:   [address], [address] [address] [address]  Phone: [phone] Fax: [phone]    Primary Care Provider: No primary care provider on file. Primary Insurance: MEDICARE  Secondary Insurance: BLUE CROSS    DISCHARGE DETAILS:     Provided pt with STR choice list from [address] [place] [address] for her to chose which accepting facility she would like to go to. Pt is going to talk to her daughters and make decision. CM to check back with pt regarding SNF placement choice.

## 2023-11-15 NOTE — PLAN OF CARE
Problem: MOBILITY - ADULT  Goal: Maintain or return to baseline ADL function  Description: INTERVENTIONS:  -  Assess patient's ability to carry out ADLs; assess patient's baseline for ADL function and identify physical deficits which impact ability to perform ADLs (bathing, care of mouth/teeth, toileting, grooming, dressing, etc.)  - Assess/evaluate cause of self-care deficits   - Assess range of motion  - Assess patient's mobility; develop plan if impaired  - Assess patient's need for assistive devices and provide as appropriate  - Encourage maximum independence but intervene and supervise when necessary  - Involve family in performance of ADLs  - Assess for home care needs following discharge   - Consider OT consult to assist with ADL evaluation and planning for discharge  - Provide patient education as appropriate  Outcome: Progressing  Goal: Maintains/Returns to pre admission functional level  Description: INTERVENTIONS:  - Perform BMAT or MOVE assessment daily.   - Set and communicate daily mobility goal to care team and patient/family/caregiver. - Collaborate with rehabilitation services on mobility goals if consulted  - Perform Range of Motion 2 times a day. - Reposition patient every 2 hours.   - Dangle patient 2 times a day  - Stand patient 2 times a day    - Out of bed to chair 2 times a day   - Out of bed for meals 2 times a day  - Out of bed for toileting  - Record patient progress and toleration of activity level   Outcome: Progressing     Problem: PAIN - ADULT  Goal: Verbalizes/displays adequate comfort level or baseline comfort level  Description: Interventions:  - Encourage patient to monitor pain and request assistance  - Assess pain using appropriate pain scale  - Administer analgesics based on type and severity of pain and evaluate response  - Implement non-pharmacological measures as appropriate and evaluate response  - Consider cultural and social influences on pain and pain management  - Notify physician/advanced practitioner if interventions unsuccessful or patient reports new pain  Outcome: Progressing     Problem: INFECTION - ADULT  Goal: Absence or prevention of progression during hospitalization  Description: INTERVENTIONS:  - Assess and monitor for signs and symptoms of infection  - Monitor lab/diagnostic results  - Monitor all insertion sites, i.e. indwelling lines, tubes, and drains  - Monitor endotracheal if appropriate and nasal secretions for changes in amount and color  - [place] appropriate cooling/warming therapies per order  - Administer medications as ordered  - Instruct and encourage patient and family to use good hand hygiene technique  - Identify and instruct in appropriate isolation precautions for identified infection/condition  Outcome: Progressing  Goal: Absence of fever/infection during neutropenic period  Description: INTERVENTIONS:  - Monitor WBC    Outcome: Progressing     Problem: SAFETY ADULT  Goal: Maintain or return to baseline ADL function  Description: INTERVENTIONS:  -  Assess patient's ability to carry out ADLs; assess patient's baseline for ADL function and identify physical deficits which impact ability to perform ADLs (bathing, care of mouth/teeth, toileting, grooming, dressing, etc.)  - Assess/evaluate cause of self-care deficits   - Assess range of motion  - Assess patient's mobility; develop plan if impaired  - Assess patient's need for assistive devices and provide as appropriate  - Encourage maximum independence but intervene and supervise when necessary  - Involve family in performance of ADLs  - Assess for home care needs following discharge   - Consider OT consult to assist with ADL evaluation and planning for discharge  - Provide patient education as appropriate  Outcome: Progressing  Goal: Maintains/Returns to pre admission functional level  Description: INTERVENTIONS:  - Perform BMAT or MOVE assessment daily.   - Set and communicate daily mobility goal to care team and patient/family/caregiver. - Collaborate with rehabilitation services on mobility goals if consulted  - Perform Range of Motion 2 times a day. - Reposition patient every 2 hours.   - Dangle patient 2 times a day  - Stand patient 2 times a day  - Ambulate patient 2 times a day  - Out of bed to chair 2 times a day   - Out of bed for meals 2 times a day  - Out of bed for toileting  - Record patient progress and toleration of activity level   Outcome: Progressing  Goal: Patient will remain free of falls  Description: INTERVENTIONS:  - Educate patient/family on patient safety including physical limitations  - Instruct patient to call for assistance with activity   - Consult OT/PT to assist with strengthening/mobility   - Keep Call bell within reach  - Keep bed low and locked with side rails adjusted as appropriate  - Keep care items and personal belongings within reach  - Initiate and maintain comfort rounds  - Make Fall Risk Sign visible to staff  - Offer Toileting every 2 Hours, in advance of need  - Initiate/Maintain bved alarm  - Obtain necessary fall risk management equipment:   - Apply yellow socks and bracelet for high fall risk patients  - Consider moving patient to room near nurses station  Outcome: Progressing     Problem: DISCHARGE PLANNING  Goal: Discharge to home or other facility with appropriate resources  Description: INTERVENTIONS:  - Identify barriers to discharge w/patient and caregiver  - Arrange for needed discharge resources and transportation as appropriate  - Identify discharge learning needs (meds, wound care, etc.)  - Arrange for interpretive services to assist at discharge as needed  - Refer to Case Management Department for coordinating discharge planning if the patient needs post-hospital services based on physician/advanced practitioner order or complex needs related to functional status, cognitive ability, or social support system  Outcome: Progressing     Problem: Knowledge Deficit  Goal: Patient/family/caregiver demonstrates understanding of disease process, treatment plan, medications, and discharge instructions  Description: Complete learning assessment and assess knowledge base.   Interventions:  - Provide teaching at level of understanding  - Provide teaching via preferred learning methods  Outcome: Progressing     Problem: Potential for Falls  Goal: Patient will remain free of falls  Description: INTERVENTIONS:  - Educate patient/family on patient safety including physical limitations  - Instruct patient to call for assistance with activity   - Consult OT/PT to assist with strengthening/mobility   - Keep Call bell within reach  - Keep bed low and locked with side rails adjusted as appropriate  - Keep care items and personal belongings within reach  - Initiate and maintain comfort rounds  - Make Fall Risk Sign visible to staff  - Offer Toileting every 2 Hours, in advance of need  - Initiate/Maintain bed alarm  - Obtain necessary fall risk management equipment:   - Apply yellow socks and bracelet for high fall risk patients  - Consider moving patient to room near nurses station  Outcome: Progressing     Problem: Prexisting or High Potential for Compromised Skin Integrity  Goal: Skin integrity is maintained or improved  Description: INTERVENTIONS:  - Identify patients at risk for skin breakdown  - Assess and monitor skin integrity  - Assess and monitor nutrition and hydration status  - Monitor labs   - Assess for incontinence   - Turn and reposition patient  - Assist with mobility/ambulation  - Relieve pressure over bony prominences  - Avoid friction and shearing  - Provide appropriate hygiene as needed including keeping skin clean and dry  - Evaluate need for skin moisturizer/barrier cream  - Collaborate with interdisciplinary team   - Patient/family teaching  - Consider wound care consult   Outcome: Progressing

## 2023-11-15 NOTE — ASSESSMENT & PLAN NOTE
Presents from [facility] ER care due to increasing generalized weakness over the past couple of days. He feels like his legs are going to give out when he tries to ambulate. Denies fall or injury. Typically uses a cane to ambulate. Meeting SIRS criteria (tachycardia, tachypnea, leukocytosis)  CTA negative for pneumonia  COVID/flu/RSV negative  UA negative UTI  Hypoxic on arrival - 2L NC  Fall precautions  Suspect weakness related to volume overload and general deconditioning  Consult PT/OT and case management  Stable for discharge to rehab.   Patient will require additional teaching from [address] [facility] in regards to the life vest.

## 2023-11-15 NOTE — ASSESSMENT & PLAN NOTE
SIRS: Tachycardia, tachypnea, leukocytosis (15.39)  Procalcitonin 0.32  No identified source of infection - initial SIRS criteria could be related to acute DVT and acute CHF  CTA negative for pneumonia  COVID/flu/RSV negative  Patient has been afebrile last several days, leukocytosis resolved  UA negative UTI  Mildly elevated procalcitonin 0.32 x1  Blood cultures negative x2 after 4 days  Received 3 days of empiric IV rocephin - will discontinue and monitor

## 2023-11-15 NOTE — CASE MANAGEMENT
Case Management Discharge Planning Note    Patient name Traci Jama  Location [address] [facility] [place] 322/-02 MRN [id number]  : 1950 Date 11/15/2023       Current Admission Date: 11/10/2023  Current Admission Diagnosis:Generalized weakness   Patient Active Problem List    Diagnosis Date Noted    Essential hypertension 11/10/2023    Generalized weakness 11/10/2023    Acute combined systolic and diastolic congestive heart failure ([address]) 11/10/2023    Acute DVT (deep venous thrombosis) ([address]) 11/10/2023    Acute respiratory failure with hypoxia ([address]) 11/10/2023      LOS (days): 5  Geometric Mean LOS (GMLOS) (days): 3.60  Days to GMLOS:-1.2     OBJECTIVE:  Risk of Unplanned Readmission Score: 8.64         Current admission status: Inpatient   Preferred Pharmacy:   [address] [facility], [address] [address] [address]  Phone: [phone] Fax: [phone]    Primary Care Provider: No primary care provider on file. Primary Insurance: MEDICARE  Secondary Insurance: BLUE CROSS    DISCHARGE DETAILS:     Pt's d/c was cancelled for today. Pt does not have a enough of a working understanding of his LifeVest for him to be safely discharged to [facility] today. Updated Pt's RN Argelia Baires and EMMETT Verma [facility]. Updated [place] israel Pryor, who is in agreement with  the pt's d/c plan. Spoke with Se from [facility] and requested in person training for the pt tomorrow prior to d/c. Se will contact  Supervisor Miquel Bronson with training time. Updated pt with his d/c plan-- pt on board with not being discharged today.

## 2023-11-15 NOTE — ASSESSMENT & PLAN NOTE
Patient reports progressively worsening swelling and erythema of his left lower extremity. 2+ pitting edema noted  Acute Left DVT  Started on VTE heparin. Transition to NOAC  BNP elevated. Echocardiogram: EF 53% grade 1 diastolic dysfunction  IV Lasix increased to 40 mg twice daily on 11/13  Given reduced EF continue additional medication optimization including metoprolol, Aldactone, lisinopril  Daily weights, I/O  Repeat CXR 11/15 no acute cardiopulmonary disease - cardiomegaly.   Cardiology following - transitioned to oral diuretic 11/15

## 2023-11-15 NOTE — H&P (VIEW-ONLY)
Cardiology Progress Note   Ara Sy 74 y.o. male MRN: [id number]    Unit/Bed#: -01 Encounter: [id number]    Assessment:  New acute combined CHF  Dilated cardiomyopathy w/ EF 20%  NSVT  Acute LLE DVT  Non-MI elevated troponin   Coronary artery calcifications  Hypertension   Hyperlipidemia     TTE 11/13/2023: EF 20%, global hypokinesis, G1DD, akinetic basal inferior and mid inferior, hypokinetic basal anterior, basal anteroseptal, basal inferoseptal, basal inferolateral, basal anterolateral, mid anterior, mid anteroseptal, mid inferoseptal, mid inferolateral, mid anterolateral, apical anterior, apical septal, apical inferior, apical lateral and apex, mild NIAS, moderate MR, mild TR. Plan/Discussion:  Patients mildly elevated troponin likely non-MI elevated in the setting of CHF exacerbation. He is chest pain free. EKG without ischemic changes. He does have bouts of 3-5 beats of NSVT on telemetry secondary to low EF; now with LifeVest  TTE reviewed in detail with patient; plan to optimize GDMT here in hospital and facilitate cardiac cath in the outpatient setting. Continue Toprol XL, lisinopril, aldactone and Jardiance  Appears euvolemic today and Cr now up-trending; switch to PO Lasix 40mg QD  Plan for outpatient BMP and follow up in our office next week  Salt/fluid restrictions and daily standing weights at home advised  IV heparin switched to PO Eliquis for acute DVT    No further inpatient cardiac recommendations at this time. Signing off. Please call with any questions. Subjective:   Patient seen and examined. Overnight events reviewed. Objective:     Vitals: Blood pressure 158/85, pulse 94, temperature (!) 97.3 °F (36.3 °C), temperature source Oral, resp. rate 18, height 5' 7" (1.702 m), weight 111 kg (244 lb 11.4 oz), SpO2 93 %. , Body mass index is 38.33 kg/m².,   Orthostatic Blood Pressures      Flowsheet Row Most Recent Value   Blood Pressure 158/85 filed at 11/14/2023 1932   Patient Position - Orthostatic VS Lying filed at 11/14/2023 1932              Intake/Output Summary (Last 24 hours) at 11/15/2023 0816  Last data filed at 11/15/2023 0416  Gross per 24 hour   Intake 1140 ml   Output 2865 ml   Net -1725 ml         Physical Exam:    GEN: Tiffany Memory appears well, alert and oriented x 3, pleasant and cooperative   HEENT: Sclera anicteric, conjunctivae pink, mucous membranes moist. Oropharynx clear. NECK: supple, no significant JVD, Trachea midline, no thyromegaly. HEART: regular rhythm, normal S1 and S2, no murmurs, clicks, gallops or rubs   LUNGS: clear to auscultation bilaterally; no wheezes, rales, or rhonchi. No increased work of breathing or signs of respiratory distress. ABDOMEN: Soft, nontender, nondistended  EXTREMITIES: Skin warm and well perfused, no clubbing, cyanosis, or edema. NEURO: No focal findings. Normal speech. Mood and affect normal.   SKIN: Normal without suspicious lesions on exposed skin.       Medications:      Current Facility-Administered Medications:     acetaminophen (TYLENOL) tablet 650 mg, 650 mg, Oral, Q6H PRN, Jacklyn Sheppard PA-C, 650 mg at 11/13/23 0211    apixaban (ELIQUIS) tablet 10 mg, 10 mg, Oral, BID, 10 mg at 11/14/23 1712 **FOLLOWED BY** [START ON 11/21/2023] apixaban (ELIQUIS) tablet 5 mg, 5 mg, Oral, BID, Krysta Castillo PA-C    atorvastatin (LIPITOR) tablet 40 mg, 40 mg, Oral, Daily With Reji Rankin MD, 40 mg at 11/14/23 1712    Empagliflozin (JARDIANCE) tablet 10 mg, 10 mg, Oral, Daily, Blair Chairez PA-C, 10 mg at 11/14/23 1120    furosemide (LASIX) tablet 40 mg, 40 mg, Oral, Daily, Blair Chairez PA-C    hydrALAZINE (APRESOLINE) injection 5 mg, 5 mg, Intravenous, Q6H PRN, Angelica Madrid PA-C    lisinopril (ZESTRIL) tablet 20 mg, 20 mg, Oral, Daily, Donnamaria Schaumann, MD, 20 mg at 11/14/23 0915    metoprolol succinate (TOPROL-XL) 24 hr tablet 100 mg, 100 mg, Oral, Daily, Angelica Madrid PA-C, 100 mg at 11/14/23 0912    spironolactone (ALDACTONE) tablet 25 mg, 25 mg, Oral, Daily, Marcus Zendejas MD, 25 mg at 11/14/23 0915     Labs & Results:        Results from last 7 days   Lab Units 11/15/23  0306 11/14/23  0531 11/13/23  1207   WBC Thousand/uL 8.04 9.53 8.86   HEMOGLOBIN g/dL 14.5 13.8 13.9   HEMATOCRIT % 46.3 43.1 41.6   PLATELETS Thousands/uL 347 286 272     Results from last 7 days   Lab Units 11/13/23  1207   TRIGLYCERIDES mg/dL 172*   HDL mg/dL 27*     Results from last 7 days   Lab Units 11/15/23  0306 11/14/23  0531 11/13/23  1207 11/10/23  1647 11/10/23  1540   POTASSIUM mmol/L 4.9 4.0 3.9   < > 5.8*   CHLORIDE mmol/L 100 102 101   < > 103   CO2 mmol/L 34* 22 25   < > 22   BUN mg/dL 23 22 20   < > 17   CREATININE mg/dL 1.20 0.89 0.86   < > 1.03   CALCIUM mg/dL 9.1 8.3* 8.7   < > 8.8   ALK PHOS U/L  --  58 59  --  54   ALT U/L  --  35 34  --  34   AST U/L  --  45* 49*  --  53*    < > = values in this interval not displayed. Results from last 7 days   Lab Units 11/13/23  2314 11/13/23  1918 11/13/23  1207 11/11/23  0157 11/10/23  1645   INR   --   --   --   --  1.11   PTT seconds 70* 72* 52*   < > 28    < > = values in this interval not displayed.      Results from last 7 days   Lab Units 11/14/23  1242 11/11/23  0206 11/10/23  1540   MAGNESIUM mg/dL 2.0 1.9 2.0

## 2023-11-15 NOTE — PROGRESS NOTES
[facility]  Progress Note  Name: Daryl Hall  MRN: [id number]  Unit/Bed#: -27 I Date of Admission: 11/10/2023   Date of Service: 11/15/2023 I Hospital Day: 5    Assessment/Plan   * Generalized weakness  Assessment & Plan  Presents from [facility] assisted care due to increasing generalized weakness over the past couple of days. He feels like his legs are going to give out when he tries to ambulate. Denies fall or injury. Typically uses a cane to ambulate. Meeting SIRS criteria (tachycardia, tachypnea, leukocytosis)  CTA negative for pneumonia  COVID/flu/RSV negative  UA negative UTI  Hypoxic on arrival - 2L NC  Fall precautions  Suspect weakness related to volume overload and general deconditioning  Consult PT/OT and case management  Stable for discharge to rehab. Patient will require additional teaching from [address] in regards to the life vest.      Acute combined systolic and diastolic congestive heart failure [facility])  Assessment & Plan  Patient reports progressively worsening swelling and erythema of his left lower extremity. 2+ pitting edema noted  Acute Left DVT  Started on VTE heparin. Transition to NOAC  BNP elevated. Echocardiogram: EF 90% grade 1 diastolic dysfunction  IV Lasix increased to 40 mg twice daily on 11/13  Given reduced EF continue additional medication optimization including metoprolol, Aldactone, lisinopril  Daily weights, I/O  Repeat CXR 11/15 no acute cardiopulmonary disease - cardiomegaly. Cardiology following - transitioned to oral diuretic 11/15    Acute respiratory failure with hypoxia [facility])  Assessment & Plan  Reporting sob over the past couple of days. Dry ongoing cough over the past couple of weeks. Denies congestion, fevers or chills. 88% on RA on admission. Currently on 2 L NC   CTA chest   No definite pulmonary emboli although segmental and subsegmental emboli in the lung bases cannot be excluded due to motion artifact.   Evaluation of the lungs compromised by motion with mild interstitial edema.   Covid/Flu/RSV negative   Respiratory protocol   Continue to monitor and wean oxygen as able -requiring approximately 2 L nasal cannula  Repeat CXR 11/15 with cardiomegaly but no evidence of volume overload or infiltrate  Suspect multifactorial in setting of obesity, deconditioning, atelectasis. Encourage incentive spirometry    Acute DVT (deep venous thrombosis) ([facility])  Assessment & Plan  D-dimer 3.09  CTA chest  No definite pulmonary emboli although segmental and subsegmental emboli in the lung bases cannot be excluded due to motion artifact. Evaluation of the lungs compromised by motion with mild interstitial edema. Pulmonary artery enlargement which can be seen with pulmonary hypertension. Hepatic steatosis. Gynecomastia. Left lower extremity redness and swelling. Venous duplex positive acute DVT in the left gastrocnemius veins. Started on heparin gtt  Will transition to [address] [place] [address] hypertension  Assessment & Plan  Home regimen: Unknown  Patient reports he takes 2 blood pressure medications + Lasix  Blood pressure stable  Given findings on echocardiogram, Toprol XL, lisinopril and spironolactone added per cardiology    SIRS (systemic inflammatory response syndrome) ([facility])-resolved as of 11/15/2023  Assessment & Plan  SIRS: Tachycardia, tachypnea, leukocytosis (15.39)  Procalcitonin 0.32  No identified source of infection - initial SIRS criteria could be related to acute DVT and acute CHF  CTA negative for pneumonia  COVID/flu/RSV negative  Patient has been afebrile last several days, leukocytosis resolved  UA negative UTI  Mildly elevated procalcitonin 0.32 x1  Blood cultures negative x2 after 4 days  Received 3 days of empiric IV rocephin - will discontinue and monitor         VTE Pharmacologic Prophylaxis: VTE Score: 5 High Risk (Score >/= 5) - Pharmacological DVT Prophylaxis Ordered: apixaban (Eliquis).  Sequential Compression Devices Ordered. Mobility:   Basic Mobility Inpatient Raw Score: 16  JH-HLM Goal: 5: Stand one or more mins  JH-HLM Achieved: 1: Laying in bed  [facility] Goal NOT achieved. Continue with multidisciplinary rounding and encourage appropriate mobility to improve upon [facility] goals. Patient Centered Rounds: I performed bedside rounds with nursing staff today. Discussions with Specialists or Other Care Team Provider: chinyere CIFUENTES    Education and Discussions with Family / Patient: Updated  (daughter) via phone. Total Time Spent on Date of Encounter in care of patient: 45 mins. This time was spent on one or more of the following: performing physical exam; counseling and coordination of care; obtaining or reviewing history; documenting in the medical record; reviewing/ordering tests, medications or procedures; communicating with other healthcare professionals and discussing with patient's family/caregivers. Current Length of Stay: 5 day(s)  Current Patient Status: Inpatient   Certification Statement: The patient will continue to require additional inpatient hospital stay due to disposition planning  Discharge Plan: Anticipate discharge tomorrow to rehab facility. Code Status: Level 1 - Full Code    Subjective:   Patient overall feels well. Generally feels weak and deconditioned. Agreeable for rehab. Denies chest pain/palpitations, shortness of breath, nausea/vomiting or abdominal pain. Feels his lower extremity edema has improved. Objective:     Vitals:   Temp (24hrs), Av.8 °F (36.6 °C), Min:97.3 °F (36.3 °C), Max:98.3 °F (36.8 °C)    Temp:  [97.3 °F (36.3 °C)-98.3 °F (36.8 °C)] 97.8 °F (36.6 °C)  HR:  [86-94] 86  Resp:  [16-18] 17  BP: (130-158)/(64-87) 130/64  SpO2:  [90 %-93 %] 93 %  Body mass index is 38.33 kg/m². Input and Output Summary (last 24 hours):      Intake/Output Summary (Last 24 hours) at 11/15/2023 1510  Last data filed at 11/15/2023 1301  Gross per 24 hour   Intake 1200 ml   Output 1850 ml   Net -650 ml       Physical Exam:   Physical Exam  Vitals and nursing note reviewed. Constitutional:       General: He is not in acute distress. Appearance: He is well-developed. Interventions: Nasal cannula in place. Comments: No acute distress   HENT:      Head: Normocephalic and atraumatic. Eyes:      General: No scleral icterus. Extraocular Movements: Extraocular movements intact. Conjunctiva/sclera: Conjunctivae normal.   Cardiovascular:      Rate and Rhythm: Normal rate and regular rhythm. Heart sounds: No murmur heard. Pulmonary:      Effort: Pulmonary effort is normal. No respiratory distress. Breath sounds: Normal breath sounds. No wheezing, rhonchi or rales. Abdominal:      General: Bowel sounds are normal.      Palpations: Abdomen is soft. Tenderness: There is no abdominal tenderness. There is no guarding or rebound. Musculoskeletal:         General: No swelling. Cervical back: Normal range of motion. Comments: Able to move upper/lower extremities bilaterally. Trace lower extremity edema, L > R   Skin:     General: Skin is warm and dry. Capillary Refill: Capillary refill takes less than 2 seconds. Neurological:      Mental Status: He is alert. Mental status is at baseline.    Psychiatric:         Mood and Affect: Mood normal.         Speech: Speech normal.         Behavior: Behavior normal.          Additional Data:     Labs:  Results from last 7 days   Lab Units 11/15/23  0306   WBC Thousand/uL 8.04   HEMOGLOBIN g/dL 14.5   HEMATOCRIT % 46.3   PLATELETS Thousands/uL 347   NEUTROS PCT % 61   LYMPHS PCT % 19   MONOS PCT % 10   EOS PCT % 8*     Results from last 7 days   Lab Units 11/15/23  0306 11/14/23  0531   SODIUM mmol/L 138 134*   POTASSIUM mmol/L 4.9 4.0   CHLORIDE mmol/L 100 102   CO2 mmol/L 34* 22   BUN mg/dL 23 22   CREATININE mg/dL 1.20 0.89   ANION GAP mmol/L 4 10   CALCIUM mg/dL 9.1 8.3*   ALBUMIN g/dL  -- 3.4*   TOTAL BILIRUBIN mg/dL  --  0.53   ALK PHOS U/L  --  58   ALT U/L  --  35   AST U/L  --  45*   GLUCOSE RANDOM mg/dL 122 138     Results from last 7 days   Lab Units 11/10/23  1645   INR  1.11     Results from last 7 days   Lab Units 11/14/23  1100   POC GLUCOSE mg/dl 163*     Results from last 7 days   Lab Units 11/15/23  0306   HEMOGLOBIN A1C % 7.9*     Results from last 7 days   Lab Units 11/10/23  2316 11/10/23  1942 11/10/23  1532   LACTIC ACID mmol/L 2.0 2.4* 2.1*   PROCALCITONIN ng/ml  --   --  0.32*       Lines/Drains:  Invasive Devices       Peripheral Intravenous Line  Duration             Peripheral IV 11/15/23 Left Antecubital <1 day                      Telemetry:  Telemetry Orders (From admission, onward)               LifeVest Patient: Continuous Telemetry Monitoring during hospitalization (non-expiring)  Continuous LifeVest Telemetry Monitoring        References:    LifeVest Policy                     Telemetry Reviewed: Normal Sinus Rhythm  Indication for Continued Telemetry Use: Acute CHF on >200 mg lasix/day or equivalent dose or with new reduced EF. Imaging: Reviewed radiology reports from this admission including: chest xray    Recent Cultures (last 7 days):   Results from last 7 days   Lab Units 11/10/23  1532   BLOOD CULTURE  No Growth After 4 Days. No Growth After 4 Days.        Last 24 Hours Medication List:   Current Facility-Administered Medications   Medication Dose Route Frequency Provider Last Rate    acetaminophen  650 mg Oral Q6H PRN Rasheed Ferrari PA-C      apixaban  10 mg Oral BID Antoinette Castillo PA-C      Followed by    Rogerio Wells ON 11/21/2023] apixaban  5 mg Oral BID Antoinette Castillo PA-C      atorvastatin  40 mg Oral Daily With Chilango Pond MD      Empagliflozin  10 mg Oral Daily Harris Stanley PA-C      furosemide  40 mg Oral Daily Blair Chairez PA-C      hydrALAZINE  5 mg Intravenous Q6H PRN Rasheed Ferrari PA-C      lisinopril  20 mg Oral Daily Guillaume Mei Litzy Soliz MD      metoprolol succinate  100 mg Oral Daily Daphney Burgess PA-C      spironolactone  25 mg Oral Daily Regi Rose MD          Today, Patient Was Seen By: Hortencia Davis PA-C    **Please Note: This note may have been constructed using a voice recognition system. **

## 2023-11-15 NOTE — ASSESSMENT & PLAN NOTE
Reporting sob over the past couple of days. Dry ongoing cough over the past couple of weeks. Denies congestion, fevers or chills. 88% on RA on admission. Currently on 2 L NC   CTA chest   No definite pulmonary emboli although segmental and subsegmental emboli in the lung bases cannot be excluded due to motion artifact. Evaluation of the lungs compromised by motion with mild interstitial edema.   Covid/Flu/RSV negative   Respiratory protocol   Continue to monitor and wean oxygen as able -requiring approximately 2 L nasal cannula  Repeat CXR 11/15 with cardiomegaly but no evidence of volume overload or infiltrate  Suspect multifactorial in setting of obesity, deconditioning, atelectasis.   Encourage incentive spirometry

## 2023-11-15 NOTE — PLAN OF CARE
Problem: MOBILITY - ADULT  Goal: Maintain or return to baseline ADL function  Description: INTERVENTIONS:  -  Assess patient's ability to carry out ADLs; assess patient's baseline for ADL function and identify physical deficits which impact ability to perform ADLs (bathing, care of mouth/teeth, toileting, grooming, dressing, etc.)  - Assess/evaluate cause of self-care deficits   - Assess range of motion  - Assess patient's mobility; develop plan if impaired  - Assess patient's need for assistive devices and provide as appropriate  - Encourage maximum independence but intervene and supervise when necessary  - Involve family in performance of ADLs  - Assess for home care needs following discharge   - Consider OT consult to assist with ADL evaluation and planning for discharge  - Provide patient education as appropriate  Outcome: Progressing  Goal: Maintains/Returns to pre admission functional level  Description: INTERVENTIONS:  - Perform BMAT or MOVE assessment daily.   - Set and communicate daily mobility goal to care team and patient/family/caregiver. - Collaborate with rehabilitation services on mobility goals if consulted  - Perform Range of Motion 3 times a day. - Reposition patient every 2 hours.   - Dangle patient 3 times a day  - Stand patient 3 times a day  - Ambulate patient 3 times a day  - Out of bed to chair 3 times a day   - Out of bed for meals 3 times a day  - Out of bed for toileting  - Record patient progress and toleration of activity level   Outcome: Progressing     Problem: PAIN - ADULT  Goal: Verbalizes/displays adequate comfort level or baseline comfort level  Description: Interventions:  - Encourage patient to monitor pain and request assistance  - Assess pain using appropriate pain scale  - Administer analgesics based on type and severity of pain and evaluate response  - Implement non-pharmacological measures as appropriate and evaluate response  - Consider cultural and social influences on pain and pain management  - Notify physician/advanced practitioner if interventions unsuccessful or patient reports new pain  Outcome: Progressing     Problem: INFECTION - ADULT  Goal: Absence or prevention of progression during hospitalization  Description: INTERVENTIONS:  - Assess and monitor for signs and symptoms of infection  - Monitor lab/diagnostic results  - Monitor all insertion sites, i.e. indwelling lines, tubes, and drains  - Monitor endotracheal if appropriate and nasal secretions for changes in amount and color  - Willard appropriate cooling/warming therapies per order  - Administer medications as ordered  - Instruct and encourage patient and family to use good hand hygiene technique  - Identify and instruct in appropriate isolation precautions for identified infection/condition  Outcome: Progressing  Goal: Absence of fever/infection during neutropenic period  Description: INTERVENTIONS:  - Monitor WBC    Outcome: Progressing     Problem: SAFETY ADULT  Goal: Maintain or return to baseline ADL function  Description: INTERVENTIONS:  -  Assess patient's ability to carry out ADLs; assess patient's baseline for ADL function and identify physical deficits which impact ability to perform ADLs (bathing, care of mouth/teeth, toileting, grooming, dressing, etc.)  - Assess/evaluate cause of self-care deficits   - Assess range of motion  - Assess patient's mobility; develop plan if impaired  - Assess patient's need for assistive devices and provide as appropriate  - Encourage maximum independence but intervene and supervise when necessary  - Involve family in performance of ADLs  - Assess for home care needs following discharge   - Consider OT consult to assist with ADL evaluation and planning for discharge  - Provide patient education as appropriate  Outcome: Progressing  Goal: Maintains/Returns to pre admission functional level  Description: INTERVENTIONS:  - Perform BMAT or MOVE assessment daily.   - Set and communicate daily mobility goal to care team and patient/family/caregiver. - Collaborate with rehabilitation services on mobility goals if consulted  - Perform Range of Motion 3 times a day. - Reposition patient every 2 hours.   - Dangle patient 3 times a day  - Stand patient 3 times a day  - Ambulate patient 3 times a day  - Out of bed to chair 3 times a day   - Out of bed for meals 3 times a day  - Out of bed for toileting  - Record patient progress and toleration of activity level   Outcome: Progressing  Goal: Patient will remain free of falls  Description: INTERVENTIONS:  - Educate patient/family on patient safety including physical limitations  - Instruct patient to call for assistance with activity   - Consult OT/PT to assist with strengthening/mobility   - Keep Call bell within reach  - Keep bed low and locked with side rails adjusted as appropriate  - Keep care items and personal belongings within reach  - Initiate and maintain comfort rounds  - Make Fall Risk Sign visible to staff  - Offer Toileting every 2 Hours, in advance of need  - Initiate/Maintain bed alarm  - Obtain necessary fall risk management equipment  - Apply yellow socks and bracelet for high fall risk patients  - Consider moving patient to room near nurses station  Outcome: Progressing     Problem: DISCHARGE PLANNING  Goal: Discharge to home or other facility with appropriate resources  Description: INTERVENTIONS:  - Identify barriers to discharge w/patient and caregiver  - Arrange for needed discharge resources and transportation as appropriate  - Identify discharge learning needs (meds, wound care, etc.)  - Arrange for interpretive services to assist at discharge as needed  - Refer to Case Management Department for coordinating discharge planning if the patient needs post-hospital services based on physician/advanced practitioner order or complex needs related to functional status, cognitive ability, or social support system  Outcome: Progressing     Problem: Knowledge Deficit  Goal: Patient/family/caregiver demonstrates understanding of disease process, treatment plan, medications, and discharge instructions  Description: Complete learning assessment and assess knowledge base.   Interventions:  - Provide teaching at level of understanding  - Provide teaching via preferred learning methods  Outcome: Progressing     Problem: Potential for Falls  Goal: Patient will remain free of falls  Description: INTERVENTIONS:  - Educate patient/family on patient safety including physical limitations  - Instruct patient to call for assistance with activity   - Consult OT/PT to assist with strengthening/mobility   - Keep Call bell within reach  - Keep bed low and locked with side rails adjusted as appropriate  - Keep care items and personal belongings within reach  - Initiate and maintain comfort rounds  - Make Fall Risk Sign visible to staff  - Offer Toileting every 2 Hours, in advance of need  - Initiate/Maintain bed alarm  - Obtain necessary fall risk management equipment  - Apply yellow socks and bracelet for high fall risk patients  - Consider moving patient to room near nurses station  Outcome: Progressing     Problem: Prexisting or High Potential for Compromised Skin Integrity  Goal: Skin integrity is maintained or improved  Description: INTERVENTIONS:  - Identify patients at risk for skin breakdown  - Assess and monitor skin integrity  - Assess and monitor nutrition and hydration status  - Monitor labs   - Assess for incontinence   - Turn and reposition patient  - Assist with mobility/ambulation  - Relieve pressure over bony prominences  - Avoid friction and shearing  - Provide appropriate hygiene as needed including keeping skin clean and dry  - Evaluate need for skin moisturizer/barrier cream  - Collaborate with interdisciplinary team   - Patient/family teaching  - Consider wound care consult   Outcome: Progressing

## 2023-11-15 NOTE — CASE MANAGEMENT
Case Management Discharge Planning Note    Patient name Traci Jama  Location [address] [facility] 322/-93 MRN [id number]  : 1950 Date 11/15/2023       Current Admission Date: 11/10/2023  Current Admission Diagnosis:Generalized weakness   Patient Active Problem List    Diagnosis Date Noted    Essential hypertension 11/10/2023    Generalized weakness 11/10/2023    Acute combined systolic and diastolic congestive heart failure ([address]) 11/10/2023    Acute DVT (deep venous thrombosis) ([address]) 11/10/2023    Acute respiratory failure with hypoxia ([address]) 11/10/2023      LOS (days): 5  Geometric Mean LOS (GMLOS) (days): 3.60  Days to GMLOS:-1.1     OBJECTIVE:  Risk of Unplanned Readmission Score: 8.64         Current admission status: Inpatient   Preferred Pharmacy:   [id number], [id number] [place] [id number]  Phone: [phone] Fax: [phone]    Primary Care Provider: No primary care provider on file. Primary Insurance: MEDICARE  Secondary Insurance: BLUE CROSS    DISCHARGE DETAILS:     Met with pt to provide SNF choice list from [address] [place] [address]. [facility] and set up transport via [facility] for 2:30pm BLS. Pt to update his daughters. CM waiting final confirmation from [facility].

## 2023-11-15 NOTE — CASE MANAGEMENT
Case Management Assessment & Discharge Planning Note    Patient name Kajal Muñoz  Location [address] [facility] 322/-24 MRN [id number]  : 1950 Date 11/15/2023       Current Admission Date: 11/10/2023  Current Admission Diagnosis:Generalized weakness   Patient Active Problem List    Diagnosis Date Noted    Essential hypertension 11/10/2023    Generalized weakness 11/10/2023    Acute combined systolic and diastolic congestive heart failure ([address]) 11/10/2023    Acute DVT (deep venous thrombosis) ([address]) 11/10/2023    Acute respiratory failure with hypoxia ([address]) 11/10/2023      LOS (days): 5  Geometric Mean LOS (GMLOS) (days): 3.60  Days to GMLOS:-1.2     OBJECTIVE:    Risk of Unplanned Readmission Score: 8.64         Current admission status: Inpatient       Preferred Pharmacy:   [address], [address] [address] [address]  Phone: [phone] Fax: [phone]    Primary Care Provider: No primary care provider on file. Primary Insurance: MEDICARE  Secondary Insurance: BLUE CROSS    ASSESSMENT:  Active Health Care Proxies    There are no active Health Care Proxies on file. Advance Directives  Does patient have a Health Care POA?: Yes  Does patient have Advance Directives?: Yes  Advance Directives: Living will, Power of  for health care  Primary Contact: Mel Pinto         Readmission Root Cause  30 Day Readmission: No    Patient Information  Admitted from[de-identified] Facility ([address]  Mental Status: Alert  During Assessment patient was accompanied by: Not accompanied during assessment  Assessment information provided by[de-identified] Patient  Primary Caregiver: Self  Support Systems: Children, Daughter, Self, Other (Comment)  [place] of Residence: [address] [facility] do you live in?: [address] [facility] entry access options.  Select all that apply.: No steps to enter home  Type of Current Residence: Facility ([address]  Upon entering residence, is there a bedroom on the main floor (no further steps)?: Yes  Upon entering residence, is there a bathroom on the main floor (no further steps)?: Yes  In the last 12 months, was there a time when you were not able to pay the mortgage or rent on time?: No  In the last 12 months, how many places have you lived?: 1  In the last 12 months, was there a time when you did not have a steady place to sleep or slept in a shelter (including now)?: No  Homeless/housing insecurity resource given?: N/A  Living Arrangements: Other (Comment) ([address]    Activities of Daily Living Prior to Admission  Functional Status: Independent  Completes ADLs independently?: Yes  Ambulates independently?: Yes  Does patient use assisted devices?: Yes  Assisted Devices (DME) used: Straight Cane  Does patient currently own DME?: Yes  What DME does the patient currently own?: Straight Heidi Albino  Does patient have a history of Outpatient Therapy (PT/OT)?: No  Does the patient have a history of Short-Term Rehab?: No  Does patient have a history of HHC?: Yes  Does patient currently have [facility]?: No         Patient Information Continued  Income Source: Pension/senior living  Does patient have prescription coverage?: Yes  Within the past 12 months, you worried that your food would run out before you got the money to buy more.: Never true  Within the past 12 months, the food you bought just didn't last and you didn't have money to get more.: Never true  Food insecurity resource given?: N/A  Does patient receive dialysis treatments?: No  Does patient have a history of substance abuse?: No  Does patient have a history of Mental Health Diagnosis?: No         Means of Transportation  Means of Transport to Appts[de-identified] Drives Self  In the past 12 months, has lack of transportation kept you from medical appointments or from getting medications?: No  In the past 12 months, has lack of transportation kept you from meetings, work, or from getting things needed for daily living?: No  Was application for public transport provided?: N/A        DISCHARGE DETAILS:    Discharge planning discussed with[de-identified] Patient  Freedom of Choice: Yes     CM contacted family/caregiver?: No- see comments (Pt is alert and oriented)  Were Treatment Team discharge recommendations reviewed with patient/caregiver?: Yes  Did patient/caregiver verbalize understanding of patient care needs?: Yes  Were patient/caregiver advised of the risks associated with not following Treatment Team discharge recommendations?: Yes         Requested [address] [facility]         Is the patient interested in [facility] at discharge?: No    DME Referral Provided  Referral made for DME?: No                  Additional Comments: CM met with pt to discuss role of CM and any other needs prior to discharge. Pt lives at [facility]. Indp PTA. Owns/uses cane, walker. Hx HH (Unable to recall name). No hx STR/OP PT/DA/MH. Transportation TBD based on disposition at discharge.

## 2023-11-15 NOTE — PROGRESS NOTES
Cardiology Progress Note   Prudy Kaitlyn 74 y.o. male MRN: [id number]    Unit/Bed#: -01 Encounter: [id number]    Assessment:  New acute combined CHF  Dilated cardiomyopathy w/ EF 20%  NSVT  Acute LLE DVT  Non-MI elevated troponin   Coronary artery calcifications  Hypertension   Hyperlipidemia     TTE 11/13/2023: EF 20%, global hypokinesis, G1DD, akinetic basal inferior and mid inferior, hypokinetic basal anterior, basal anteroseptal, basal inferoseptal, basal inferolateral, basal anterolateral, mid anterior, mid anteroseptal, mid inferoseptal, mid inferolateral, mid anterolateral, apical anterior, apical septal, apical inferior, apical lateral and apex, mild NISA, moderate MR, mild TR. Plan/Discussion:  Patients mildly elevated troponin likely non-MI elevated in the setting of CHF exacerbation. He is chest pain free. EKG without ischemic changes. He does have bouts of 3-5 beats of NSVT on telemetry secondary to low EF; now with LifeVest  TTE reviewed in detail with patient; plan to optimize GDMT here in hospital and facilitate cardiac cath in the outpatient setting. Continue Toprol XL, lisinopril, aldactone and Jardiance  Appears euvolemic today and Cr now up-trending; switch to PO Lasix 40mg QD  Plan for outpatient BMP and follow up in our office next week  Salt/fluid restrictions and daily standing weights at home advised  IV heparin switched to PO Eliquis for acute DVT    No further inpatient cardiac recommendations at this time. Signing off. Please call with any questions. Subjective:   Patient seen and examined. Overnight events reviewed. Objective:     Vitals: Blood pressure 158/85, pulse 94, temperature (!) 97.3 °F (36.3 °C), temperature source Oral, resp. rate 18, height 5' 7" (1.702 m), weight 111 kg (244 lb 11.4 oz), SpO2 93 %. , Body mass index is 38.33 kg/m².,   Orthostatic Blood Pressures      Flowsheet Row Most Recent Value   Blood Pressure 158/85 filed at 11/14/2023 1932   Patient Position - Orthostatic VS Lying filed at 11/14/2023 1932              Intake/Output Summary (Last 24 hours) at 11/15/2023 0816  Last data filed at 11/15/2023 0416  Gross per 24 hour   Intake 1140 ml   Output 2865 ml   Net -1725 ml         Physical Exam:    GEN: Huyne Zamudio appears well, alert and oriented x 3, pleasant and cooperative   HEENT: Sclera anicteric, conjunctivae pink, mucous membranes moist. Oropharynx clear. NECK: supple, no significant JVD, Trachea midline, no thyromegaly. HEART: regular rhythm, normal S1 and S2, no murmurs, clicks, gallops or rubs   LUNGS: clear to auscultation bilaterally; no wheezes, rales, or rhonchi. No increased work of breathing or signs of respiratory distress. ABDOMEN: Soft, nontender, nondistended  EXTREMITIES: Skin warm and well perfused, no clubbing, cyanosis, or edema. NEURO: No focal findings. Normal speech. Mood and affect normal.   SKIN: Normal without suspicious lesions on exposed skin.       Medications:      Current Facility-Administered Medications:     acetaminophen (TYLENOL) tablet 650 mg, 650 mg, Oral, Q6H PRN, Leander Flores PA-C, 650 mg at 11/13/23 0211    apixaban (ELIQUIS) tablet 10 mg, 10 mg, Oral, BID, 10 mg at 11/14/23 1712 **FOLLOWED BY** [START ON 11/21/2023] apixaban (ELIQUIS) tablet 5 mg, 5 mg, Oral, BID, Krysta Castillo PA-C    atorvastatin (LIPITOR) tablet 40 mg, 40 mg, Oral, Daily With Farnaz Jackson MD, 40 mg at 11/14/23 1712    Empagliflozin (JARDIANCE) tablet 10 mg, 10 mg, Oral, Daily, Blair Chairez PA-C, 10 mg at 11/14/23 1120    furosemide (LASIX) tablet 40 mg, 40 mg, Oral, Daily, Blair Chairez PA-C    hydrALAZINE (APRESOLINE) injection 5 mg, 5 mg, Intravenous, Q6H PRN, Angelica Madrid PA-C    lisinopril (ZESTRIL) tablet 20 mg, 20 mg, Oral, Daily, Iza Horn MD, 20 mg at 11/14/23 0915    metoprolol succinate (TOPROL-XL) 24 hr tablet 100 mg, 100 mg, Oral, Daily, Angelica Madrid PA-C, 100 mg at 11/14/23 0912    spironolactone (ALDACTONE) tablet 25 mg, 25 mg, Oral, Daily, Margarito Villa MD, 25 mg at 11/14/23 0915     Labs & Results:        Results from last 7 days   Lab Units 11/15/23  0306 11/14/23  0531 11/13/23  1207   WBC Thousand/uL 8.04 9.53 8.86   HEMOGLOBIN g/dL 14.5 13.8 13.9   HEMATOCRIT % 46.3 43.1 41.6   PLATELETS Thousands/uL 347 286 272     Results from last 7 days   Lab Units 11/13/23  1207   TRIGLYCERIDES mg/dL 172*   HDL mg/dL 27*     Results from last 7 days   Lab Units 11/15/23  0306 11/14/23  0531 11/13/23  1207 11/10/23  1647 11/10/23  1540   POTASSIUM mmol/L 4.9 4.0 3.9   < > 5.8*   CHLORIDE mmol/L 100 102 101   < > 103   CO2 mmol/L 34* 22 25   < > 22   BUN mg/dL 23 22 20   < > 17   CREATININE mg/dL 1.20 0.89 0.86   < > 1.03   CALCIUM mg/dL 9.1 8.3* 8.7   < > 8.8   ALK PHOS U/L  --  58 59  --  54   ALT U/L  --  35 34  --  34   AST U/L  --  45* 49*  --  53*    < > = values in this interval not displayed. Results from last 7 days   Lab Units 11/13/23  2314 11/13/23  1918 11/13/23  1207 11/11/23  0157 11/10/23  1645   INR   --   --   --   --  1.11   PTT seconds 70* 72* 52*   < > 28    < > = values in this interval not displayed.      Results from last 7 days   Lab Units 11/14/23  1242 11/11/23  0206 11/10/23  1540   MAGNESIUM mg/dL 2.0 1.9 2.0

## 2023-11-16 VITALS
OXYGEN SATURATION: 89 % | TEMPERATURE: 97.5 F | SYSTOLIC BLOOD PRESSURE: 130 MMHG | HEIGHT: 67 IN | HEART RATE: 89 BPM | WEIGHT: 239.2 LBS | DIASTOLIC BLOOD PRESSURE: 65 MMHG | BODY MASS INDEX: 37.54 KG/M2 | RESPIRATION RATE: 17 BRPM

## 2023-11-16 LAB
ANION GAP SERPL CALCULATED.3IONS-SCNC: 7 MMOL/L
BUN SERPL-MCNC: 29 MG/DL (ref 5–25)
CALCIUM SERPL-MCNC: 8.9 MG/DL (ref 8.4–10.2)
CHLORIDE SERPL-SCNC: 103 MMOL/L (ref 96–108)
CO2 SERPL-SCNC: 28 MMOL/L (ref 21–32)
CREAT SERPL-MCNC: 1.04 MG/DL (ref 0.6–1.3)
GFR SERPL CREATININE-BSD FRML MDRD: 69 ML/MIN/1.73SQ M
GLUCOSE SERPL-MCNC: 131 MG/DL (ref 65–140)
MRSA NOSE QL CULT: NORMAL
POTASSIUM SERPL-SCNC: 4.6 MMOL/L (ref 3.5–5.3)
SODIUM SERPL-SCNC: 138 MMOL/L (ref 135–147)

## 2023-11-16 PROCEDURE — 99239 HOSP IP/OBS DSCHRG MGMT >30: CPT | Performed by: PHYSICIAN ASSISTANT

## 2023-11-16 PROCEDURE — 80048 BASIC METABOLIC PNL TOTAL CA: CPT | Performed by: INTERNAL MEDICINE

## 2023-11-16 RX ADMIN — SPIRONOLACTONE 25 MG: 25 TABLET ORAL at 09:51

## 2023-11-16 RX ADMIN — APIXABAN 10 MG: 5 TABLET, FILM COATED ORAL at 09:30

## 2023-11-16 RX ADMIN — LISINOPRIL 20 MG: 20 TABLET ORAL at 09:51

## 2023-11-16 RX ADMIN — FUROSEMIDE 40 MG: 40 TABLET ORAL at 09:27

## 2023-11-16 RX ADMIN — METOPROLOL SUCCINATE 100 MG: 50 TABLET, EXTENDED RELEASE ORAL at 09:27

## 2023-11-16 RX ADMIN — EMPAGLIFLOZIN 10 MG: 10 TABLET, FILM COATED ORAL at 09:29

## 2023-11-16 NOTE — ASSESSMENT & PLAN NOTE
Patient reports progressively worsening swelling and erythema of his left lower extremity. 2+ pitting edema noted  Acute Left DVT  Started on VTE heparin. Transition to NOAC  BNP elevated. Echocardiogram: EF 24% grade 1 diastolic dysfunction  IV Lasix increased to 40 mg twice daily on 11/13  Given reduced EF continue additional medication optimization including metoprolol, Aldactone, lisinopril  Daily weights, I/O  Repeat CXR 11/15 no acute cardiopulmonary disease - cardiomegaly.   Cardiology following - transitioned to oral diuretic 11/15

## 2023-11-16 NOTE — ASSESSMENT & PLAN NOTE
Presents from [facility] care due to increasing generalized weakness over the past couple of days. He feels like his legs are going to give out when he tries to ambulate. Denies fall or injury. Typically uses a cane to ambulate. Meeting SIRS criteria (tachycardia, tachypnea, leukocytosis)  CTA negative for pneumonia  COVID/flu/RSV negative  UA negative UTI  Hypoxic on arrival - 2L NC  Fall precautions  Suspect weakness related to volume overload and general deconditioning  Consult PT/OT and case management  Stable for discharge to rehab.   Patient will require additional teaching from [address] in regards to the life vest.

## 2023-11-16 NOTE — NURSING NOTE
Representative from [address] came at [address] on 11/15/2023 with RN present in room at time of education. [employer] rep re-trained patient on the use of his LifeVest. Patient expressed full understanding of how to use LifeVest after discharge.

## 2023-11-16 NOTE — NURSING NOTE
Patient discharged to [facility] for SNF. Report given to Missy Youngblood at receiving facility. Patient demonstrated proper technique of LifeVest application. Patient belongs given to transport team along with discharge paperwork to provide to facility. All questions/concerns addressed prior to d/c.

## 2023-11-16 NOTE — PLAN OF CARE
Problem: MOBILITY - ADULT  Goal: Maintain or return to baseline ADL function  Description: INTERVENTIONS:  -  Assess patient's ability to carry out ADLs; assess patient's baseline for ADL function and identify physical deficits which impact ability to perform ADLs (bathing, care of mouth/teeth, toileting, grooming, dressing, etc.)  - Assess/evaluate cause of self-care deficits   - Assess range of motion  - Assess patient's mobility; develop plan if impaired  - Assess patient's need for assistive devices and provide as appropriate  - Encourage maximum independence but intervene and supervise when necessary  - Involve family in performance of ADLs  - Assess for home care needs following discharge   - Consider OT consult to assist with ADL evaluation and planning for discharge  - Provide patient education as appropriate  Outcome: Progressing  Goal: Maintains/Returns to pre admission functional level  Description: INTERVENTIONS:  - Perform BMAT or MOVE assessment daily.   - Set and communicate daily mobility goal to care team and patient/family/caregiver. - Collaborate with rehabilitation services on mobility goals if consulted  - Perform Range of Motion 3 times a day. - Reposition patient every 2 hours.   - Dangle patient 3 times a day  - Stand patient 3 times a day  - Ambulate patient 3 times a day  - Out of bed to chair 3 times a day   - Out of bed for meals 3 times a day  - Out of bed for toileting  - Record patient progress and toleration of activity level   Outcome: Progressing     Problem: PAIN - ADULT  Goal: Verbalizes/displays adequate comfort level or baseline comfort level  Description: Interventions:  - Encourage patient to monitor pain and request assistance  - Assess pain using appropriate pain scale  - Administer analgesics based on type and severity of pain and evaluate response  - Implement non-pharmacological measures as appropriate and evaluate response  - Consider cultural and social influences on pain and pain management  - Notify physician/advanced practitioner if interventions unsuccessful or patient reports new pain  Outcome: Progressing     Problem: INFECTION - ADULT  Goal: Absence or prevention of progression during hospitalization  Description: INTERVENTIONS:  - Assess and monitor for signs and symptoms of infection  - Monitor lab/diagnostic results  - Monitor all insertion sites, i.e. indwelling lines, tubes, and drains  - Monitor endotracheal if appropriate and nasal secretions for changes in amount and color  - [place] appropriate cooling/warming therapies per order  - Administer medications as ordered  - Instruct and encourage patient and family to use good hand hygiene technique  - Identify and instruct in appropriate isolation precautions for identified infection/condition  Outcome: Progressing  Goal: Absence of fever/infection during neutropenic period  Description: INTERVENTIONS:  - Monitor WBC    Outcome: Progressing     Problem: SAFETY ADULT  Goal: Maintain or return to baseline ADL function  Description: INTERVENTIONS:  -  Assess patient's ability to carry out ADLs; assess patient's baseline for ADL function and identify physical deficits which impact ability to perform ADLs (bathing, care of mouth/teeth, toileting, grooming, dressing, etc.)  - Assess/evaluate cause of self-care deficits   - Assess range of motion  - Assess patient's mobility; develop plan if impaired  - Assess patient's need for assistive devices and provide as appropriate  - Encourage maximum independence but intervene and supervise when necessary  - Involve family in performance of ADLs  - Assess for home care needs following discharge   - Consider OT consult to assist with ADL evaluation and planning for discharge  - Provide patient education as appropriate  Outcome: Progressing  Goal: Maintains/Returns to pre admission functional level  Description: INTERVENTIONS:  - Perform BMAT or MOVE assessment daily.   - Set and communicate daily mobility goal to care team and patient/family/caregiver. - Collaborate with rehabilitation services on mobility goals if consulted  - Perform Range of Motion 3 times a day. - Reposition patient every 2 hours.   - Dangle patient 3 times a day  - Stand patient 3 times a day  - Ambulate patient 3 times a day  - Out of bed to chair 3 times a day   - Out of bed for meals 3 times a day  - Out of bed for toileting  - Record patient progress and toleration of activity level   Outcome: Progressing  Goal: Patient will remain free of falls  Description: INTERVENTIONS:  - Educate patient/family on patient safety including physical limitations  - Instruct patient to call for assistance with activity   - Consult OT/PT to assist with strengthening/mobility   - Keep Call bell within reach  - Keep bed low and locked with side rails adjusted as appropriate  - Keep care items and personal belongings within reach  - Initiate and maintain comfort rounds  - Make Fall Risk Sign visible to staff  - Offer Toileting every 2 Hours, in advance of need  - Initiate/Maintain bed alarm  - Obtain necessary fall risk management equipment  - Apply yellow socks and bracelet for high fall risk patients  - Consider moving patient to room near nurses station  Outcome: Progressing     Problem: DISCHARGE PLANNING  Goal: Discharge to home or other facility with appropriate resources  Description: INTERVENTIONS:  - Identify barriers to discharge w/patient and caregiver  - Arrange for needed discharge resources and transportation as appropriate  - Identify discharge learning needs (meds, wound care, etc.)  - Arrange for interpretive services to assist at discharge as needed  - Refer to Case Management Department for coordinating discharge planning if the patient needs post-hospital services based on physician/advanced practitioner order or complex needs related to functional status, cognitive ability, or social support system  Outcome: Progressing     Problem: Knowledge Deficit  Goal: Patient/family/caregiver demonstrates understanding of disease process, treatment plan, medications, and discharge instructions  Description: Complete learning assessment and assess knowledge base.   Interventions:  - Provide teaching at level of understanding  - Provide teaching via preferred learning methods  Outcome: Progressing     Problem: Potential for Falls  Goal: Patient will remain free of falls  Description: INTERVENTIONS:  - Educate patient/family on patient safety including physical limitations  - Instruct patient to call for assistance with activity   - Consult OT/PT to assist with strengthening/mobility   - Keep Call bell within reach  - Keep bed low and locked with side rails adjusted as appropriate  - Keep care items and personal belongings within reach  - Initiate and maintain comfort rounds  - Make Fall Risk Sign visible to staff  - Offer Toileting every 2 Hours, in advance of need  - Initiate/Maintain BED alarm  - Obtain necessary fall risk management equipment  - Apply yellow socks and bracelet for high fall risk patients  - Consider moving patient to room near nurses station  Outcome: Progressing     Problem: Prexisting or High Potential for Compromised Skin Integrity  Goal: Skin integrity is maintained or improved  Description: INTERVENTIONS:  - Identify patients at risk for skin breakdown  - Assess and monitor skin integrity  - Assess and monitor nutrition and hydration status  - Monitor labs   - Assess for incontinence   - Turn and reposition patient  - Assist with mobility/ambulation  - Relieve pressure over bony prominences  - Avoid friction and shearing  - Provide appropriate hygiene as needed including keeping skin clean and dry  - Evaluate need for skin moisturizer/barrier cream  - Collaborate with interdisciplinary team   - Patient/family teaching  - Consider wound care consult   Outcome: Progressing

## 2023-11-16 NOTE — CASE MANAGEMENT
Case Management Discharge Planning Note    Patient name Timmy Espinoza  Location [address] [facility] [place] 322/-00 MRN [id number]  : 1950 Date 2023       Current Admission Date: 11/10/2023  Current Admission Diagnosis:Generalized weakness   Patient Active Problem List    Diagnosis Date Noted    Essential hypertension 11/10/2023    Generalized weakness 11/10/2023    Acute combined systolic and diastolic congestive heart failure ([address]) 11/10/2023    Acute DVT (deep venous thrombosis) ([address]) 11/10/2023    Acute respiratory failure with hypoxia ([address]) 11/10/2023      LOS (days): 6  Geometric Mean LOS (GMLOS) (days): 3.60  Days to GMLOS:-2     OBJECTIVE:  Risk of Unplanned Readmission Score: 10.9         Current admission status: Inpatient   Preferred Pharmacy:   [facility], [address] [facility], [address] [place] [address]  Phone: [phone] Fax: [phone]    Primary Care Provider: No primary care provider on file. Primary Insurance: MEDICARE  Secondary Insurance: BLUE CROSS    DISCHARGE DETAILS:        CM received confirmed  time of 11am. Notified MD and RN via TT and facility via Aidin. Pt notified in person.               IMM Given (Date):: 23 (1029am)  IMM Given to[de-identified] Patient          Accepting Facility Name, [address] [place] [address] : [facility]  Receiving Facility/Agency Phone Number: [phone]  Facility/Agency Fax Number: [phone]

## 2023-11-16 NOTE — DISCHARGE SUMMARY
[address] [facility]  Discharge- [facility] 1950, 74 y.o. male MRN: [id number]  Unit/Bed#: -Jason Encounter: [id number]  Primary Care Provider: No primary care provider on file. Date and time admitted to hospital: 11/10/2023  3:11 PM    Acute respiratory failure with hypoxia [facility])  Assessment & Plan  Reporting sob over the past couple of days. Dry ongoing cough over the past couple of weeks. Denies congestion, fevers or chills. 88% on RA on admission. Currently on 2 L NC   CTA chest   No definite pulmonary emboli although segmental and subsegmental emboli in the lung bases cannot be excluded due to motion artifact. Evaluation of the lungs compromised by motion with mild interstitial edema.   Covid/Flu/RSV negative   Respiratory protocol   Continue to monitor and wean oxygen as able -requiring approximately 2 L nasal cannula  Repeat CXR 11/15 with cardiomegaly but no evidence of volume overload or infiltrate  Suspect multifactorial in setting of obesity, deconditioning, atelectasis. Encourage incentive spirometry    Acute DVT (deep venous thrombosis) (HCC)  Assessment & Plan  D-dimer 3.09  CTA chest  No definite pulmonary emboli although segmental and subsegmental emboli in the lung bases cannot be excluded due to motion artifact. Evaluation of the lungs compromised by motion with mild interstitial edema. Pulmonary artery enlargement which can be seen with pulmonary hypertension. Hepatic steatosis. Gynecomastia. Left lower extremity redness and swelling. Venous duplex positive acute DVT in the left gastrocnemius veins. Started on heparin gtt  Will transition to NOAC    Acute combined systolic and diastolic congestive heart failure [facility])  Assessment & Plan  Patient reports progressively worsening swelling and erythema of his left lower extremity. 2+ pitting edema noted  Acute Left DVT  Started on VTE heparin. Transition to NOAC  BNP elevated.     Echocardiogram: EF 63% grade 1 diastolic dysfunction  IV Lasix increased to 40 mg twice daily on 11/13  Given reduced EF continue additional medication optimization including metoprolol, Aldactone, lisinopril  Daily weights, I/O  Repeat CXR 11/15 no acute cardiopulmonary disease - cardiomegaly. Cardiology following - transitioned to oral diuretic 11/15    Essential hypertension  Assessment & Plan  Home regimen: Unknown  Patient reports he takes 2 blood pressure medications + Lasix  Blood pressure stable  Given findings on echocardiogram, Toprol XL, lisinopril and spironolactone added per cardiology    * Generalized weakness  Assessment & Plan  Presents from [facility] care due to increasing generalized weakness over the past couple of days. He feels like his legs are going to give out when he tries to ambulate. Denies fall or injury. Typically uses a cane to ambulate. Meeting SIRS criteria (tachycardia, tachypnea, leukocytosis)  CTA negative for pneumonia  COVID/flu/RSV negative  UA negative UTI  Hypoxic on arrival - 2L NC  Fall precautions  Suspect weakness related to volume overload and general deconditioning  Consult PT/OT and case management  Stable for discharge to rehab. Patient will require additional teaching from [id number] [place] [address] in regards to the life vest.        Medical Problems       Resolved Problems  Date Reviewed: 11/16/2023            Resolved    Elevated troponin 11/15/2023     Resolved by  Armand Berger PA-C    SIRS (systemic inflammatory response syndrome) ([address]) 11/15/2023     Resolved by  Armand Berger PA-C        Discharging Physician / Practitioner: Josy Renee PA-C  PCP: No primary care provider on file.   Admission Date:   Admission Orders (From admission, onward)       Ordered        11/10/23 2000  INPATIENT ADMISSION  Once                          Discharge Date: 11/16/23    Consultations During Hospital Stay:  Cardiology  Nutrition  Wound care    Procedures Performed:   Echocardiogram 11/10: Left Ventricle: Left ventricular cavity size is mildly dilated. Wall thickness is normal. The left ventricular ejection fraction is 20%. Systolic function is severely reduced. There is global hypokinesis. Diastolic function is mildly abnormal, consistent with grade I (abnormal) relaxation. The following segments are akinetic: basal inferior and mid inferior. The following segments are hypokinetic: basal anterior, basal anteroseptal, basal inferoseptal, basal inferolateral, basal anterolateral, mid anterior, mid anteroseptal, mid inferoseptal, mid inferolateral, mid anterolateral, apical anterior, apical septal, apical inferior, apical lateral and apex. Left Atrium: The atrium is mildly dilated. Right Atrium: The atrium is mildly dilated. Mitral Valve: There is moderate regurgitation. Tricuspid Valve: There is mild regurgitation. VAS LE venous duplex study 11/12:   RIGHT LOWER LIMB:  No evidence of acute or chronic deep vein thrombosis. No evidence of superficial thrombophlebitis noted. Doppler evaluation shows a normal response to augmentation maneuvers. Popliteal, posterior tibial and anterior tibial arterial Doppler waveform's are  monophasic. LEFT LOWER LIMB:  Acute deep vein thrombosis is noted in the gastrocnemius veins. No evidence of superficial thrombophlebitis noted. Doppler evaluation shows a normal response to augmentation maneuvers. Popliteal, posterior tibial and anterior tibial arterial Doppler waveform's are  monophasic and hyperemic. Significant Findings / Test Results:   CXR 11/10: Moderate cardiomegaly with likely mild pulmonary edema. CTA ED chest PE study 11/10:   No definite pulmonary emboli although segmental and subsegmental emboli in the lung bases cannot be excluded due to motion artifact. Evaluation of the lungs compromised by motion with mild interstitial edema. Pulmonary artery enlargement which can be seen with pulmonary hypertension.   Hepatic steatosis. Gynecomastia. CXR 11/15: Cardiomegaly. Clear lungs     Incidental Findings:   None     Test Results Pending at Discharge (will require follow up): None     Outpatient Tests Requested:  L heart cath    Complications:  None    Reason for Admission: SOB    Hospital Course:   Dario Petersen is a 74 y.o. male patient with past medical history of hypertension, obesity who originally presented to the hospital on 11/10/2023 due to generalized weakness, shortness of breath and left lower extremity swelling. Patient's main concern was his weakness. [facility]. He was found to have lower extremity pitting edema with dry skin and redness worsening over the past 2 to 3 weeks. D-dimer was elevated and patient was initially started on a heparin drip. Ultimately, patient was found to have CHF with an EF of 20%, cardiology recommending LifeVest with outpatient cardiac catheterization. Ultimately, CTA chest was negative for any definite pulmonary emboli, however, venous duplex was positive for acute DVT in left gastrocnemius vein. Patient was on heparin GTT during admission and transition to [address] [place] [address] for discharge. Patient responded to IV diuretics and was stable for discharge with LifeVest.  Hemodynamically stable at time of discharge and appropriate for outpatient follow-up. Please see above list of diagnoses and related plan for additional information. Condition at Discharge: stable    Discharge Day Visit / Exam:   Subjective: Patient has no questions, feels he is able to utilize his LifeVest in the outpatient setting and is agreeable to outpatient follow-up.   Vitals: Blood Pressure: 130/65 (11/16/23 0950)  Pulse: 89 (11/16/23 0950)  Temperature: 97.5 °F (36.4 °C) (11/16/23 0605)  Temp Source: Oral (11/14/23 1932)  Respirations: 17 (11/15/23 1424)  Height: 5' 7" (170.2 cm) (11/13/23 0750)  Weight - Scale: 109 kg (239 lb 3.2 oz) (11/16/23 0553)  SpO2: (!) 89 % (11/16/23 0950)  Exam: Physical Exam  Vitals and nursing note reviewed. Constitutional:       General: He is not in acute distress. Appearance: Normal appearance. He is well-developed. HENT:      Head: Normocephalic and atraumatic. Eyes:      General: No scleral icterus. Conjunctiva/sclera: Conjunctivae normal.   Cardiovascular:      Rate and Rhythm: Normal rate and regular rhythm. Heart sounds: No murmur heard. Pulmonary:      Effort: Pulmonary effort is normal.      Breath sounds: No wheezing, rhonchi or rales. Abdominal:      General: There is no distension. Palpations: Abdomen is soft. Musculoskeletal:      Left lower leg: Edema present. Skin:     General: Skin is warm and dry. Neurological:      General: No focal deficit present. Mental Status: He is alert. Psychiatric:         Mood and Affect: Mood normal.        Discussion with Family: Patient declined call to . Discharge instructions/Information to patient and family:   See after visit summary for information provided to patient and family. Provisions for Follow-Up Care:  See after visit summary for information related to follow-up care and any pertinent home health orders. Mobility at time of Discharge:   Basic Mobility Inpatient Raw Score: 16  -HLM Goal: 5: Stand one or more mins  -HLM Achieved: 3: Sit at edge of bed        Disposition:   Other [facility] Readmission: L heart cath     Discharge Statement:  I spent 65 minutes discharging the patient. This time was spent on the day of discharge. I had direct contact with the patient on the day of discharge. Greater than 50% of the total time was spent examining patient, answering all patient questions, arranging and discussing plan of care with patient as well as directly providing post-discharge instructions. Additional time then spent on discharge activities.     Discharge Medications:  See after visit summary for reconciled discharge medications provided to patient and/or family.       **Please Note: This note may have been constructed using a voice recognition system**

## 2023-11-16 NOTE — PROGRESS NOTES
Patient:    MRN:  [id number]    Thomas Request ID:  [id number]    Level of care reserved:  [address]    Partner Reserved:  [address], [address] [phone]    Clinical needs requested:    Geography searched:  10 miles around [address]    Start of Service:    Request sent:  1:03pm EST on 11/14/2023 by Radha Schroeedr    Partner reserved:  12:06pm EST on 11/15/2023 by Lul Mendoza    Choice list shared:  10:13am EST on 11/15/2023 by Lul Mendoza

## 2023-11-16 NOTE — CASE MANAGEMENT
Case Management Discharge Planning Note    Patient name Colby Ladd  Location [address] 322/-70 MRN [id number]  : 1950 Date 2023       Current Admission Date: 11/10/2023  Current Admission Diagnosis:Generalized weakness   Patient Active Problem List    Diagnosis Date Noted    Essential hypertension 11/10/2023    Generalized weakness 11/10/2023    Acute combined systolic and diastolic congestive heart failure ([address]) 11/10/2023    Acute DVT (deep venous thrombosis) ([address]) 11/10/2023    Acute respiratory failure with hypoxia ([address]) 11/10/2023      LOS (days): 6  Geometric Mean LOS (GMLOS) (days): 3.60  Days to GMLOS:-1.9     OBJECTIVE:  Risk of Unplanned Readmission Score: 10.9         Current admission status: Inpatient   Preferred Pharmacy:   [address] [address]  Phone: [phone] Fax: [phone]    Primary Care Provider: No primary care provider on file. Primary Insurance: MEDICARE  Secondary Insurance: BLUE CROSS    DISCHARGE DETAILS:        Cait Paulino can accept pt today. Notified provider via TT. Awaiting determination of medical stability. Pt is medically stable per provider. CM requested wc van via [facility]. Awaiting confirmed  time.

## 2023-11-17 ENCOUNTER — TELEPHONE (OUTPATIENT)
Age: 73
End: 2023-11-17

## 2023-11-17 NOTE — TELEPHONE ENCOUNTER
Lori from [facility] ([phone]) called to confirm the patient's apt. Confirmed date/time and location.

## 2023-11-27 ENCOUNTER — TELEPHONE (OUTPATIENT)
Dept: CARDIOLOGY CLINIC | Facility: CLINIC | Age: 73
End: 2023-11-27

## 2023-11-27 NOTE — LETTER
2023       Ofelia Biswas              : 1950        MRN: [id number]  [address] [address]     Procedure Name:   CARDIAC  CATHETERIZATION    Procedure date: 2023    The hospital will contact you the day prior to your procedure, usually between 4PM - 6PM to instruct you on the time and place to report. If you do not hear from a [facility]  by 6PM the evening prior to your procedure, please contact the campus that you are scheduled at. [place]: [facility] [facility]    You may have nothing to eat or drink from midnight the night prior to your procedure. You may have a minimal amount of water with your morning medications. DO NOT stop taking Plavix or Aspirin unless advised otherwise. If your procedure is scheduled after 12:00 noon, you may have clear liquids until 8:00AM the morning of your procedure. Clear liquids are 7UP, Ginger Ale, Jello or broth. Arrange for a responsible person to drive you to and from the hospital.    Please shower your procedure and do not use powders or lotions. Please notify us if you have been admitted to the hospital within the past 30 days. Bring a list of daily medications, vitamins, minerals, herbals and nutritional supplements you take. Include dosage and time you take them each day. If packing an overnight bag, pack minimal clothing, you will be given hospital sleepwear. Do not bring money, valuables or jewelry. Wedding band is OK. If you use CPAP machine, bring it to the hospital.      Have your Photo ID and Insurance cards with you. DO NOT take any diabetic medication, including insulin, the morning of the procedure. Oral diabetic medications may include: Glucophage, Prandin, Glyburide, Micronase, Avandia, Glocovance, Precose, Glynase y Starlix.     You should continue to take your morning dose of heart and/or blood pressure medications with a sip of water UNLESS ADVISED OTHERWISE. Special Instructions:    Medication hold:   ELIQUIS: DO NOT take this medication the night prior and the morning of the procedure. LASIX: DO NOT take this medication the morning of the procedure. JARDIANCE: DO NOT take this medication the morning of the procedure.      SPIRONOLACTONE: DO NOT take this medication the morning of the procedure        Thank you,   [address] [place] [address] Cardiology   [address] [place] [address]  Ph: [phone]

## 2023-11-27 NOTE — LETTER
11/30/2023      MRN: [id number]        [address] [place] [address]      You have been referred to our Cardiology department to be scheduled for a Cardiac Catheterization . I have been unable to contact you. Please call me at [phone] to schedule your procedure.        Thank you,   Rafael Dunn  Surgery Coordinator  [facility]   [facility]  [facility] [place]  Ph: [phone]

## 2023-11-27 NOTE — TELEPHONE ENCOUNTER
Good morning Dr Membreno Angry had place and order for this patient to have an outpatient cardiac cath during his hospital admission. I see he have hosp f/u appointment with you on 12/7/2023. We would like to know if patient still need to be schedule to have this cardiac cath procedure done? Thank you.

## 2023-11-28 ENCOUNTER — OFFICE VISIT (OUTPATIENT)
Dept: DERMATOLOGY | Facility: CLINIC | Age: 73
End: 2023-11-28

## 2023-11-28 VITALS — WEIGHT: 230 LBS | BODY MASS INDEX: 36.1 KG/M2 | TEMPERATURE: 97.7 F | HEIGHT: 67 IN

## 2023-11-28 DIAGNOSIS — D22.9 MULTIPLE MELANOCYTIC NEVI: Primary | ICD-10-CM

## 2023-11-28 DIAGNOSIS — D18.01 CHERRY ANGIOMA: ICD-10-CM

## 2023-11-28 DIAGNOSIS — D48.5 NEOPLASM OF UNCERTAIN BEHAVIOR OF SKIN: ICD-10-CM

## 2023-11-28 DIAGNOSIS — L91.8 ACROCHORDON: ICD-10-CM

## 2023-11-28 DIAGNOSIS — S21.101A: ICD-10-CM

## 2023-11-28 PROCEDURE — 88341 IMHCHEM/IMCYTCHM EA ADD ANTB: CPT | Performed by: STUDENT IN AN ORGANIZED HEALTH CARE EDUCATION/TRAINING PROGRAM

## 2023-11-28 PROCEDURE — 88342 IMHCHEM/IMCYTCHM 1ST ANTB: CPT | Performed by: STUDENT IN AN ORGANIZED HEALTH CARE EDUCATION/TRAINING PROGRAM

## 2023-11-28 PROCEDURE — 88305 TISSUE EXAM BY PATHOLOGIST: CPT | Performed by: STUDENT IN AN ORGANIZED HEALTH CARE EDUCATION/TRAINING PROGRAM

## 2023-11-28 NOTE — PATIENT INSTRUCTIONS
NEOPLASM OF UNCERTAIN BEHAVIOR      Plan:  Shave biopsy  Will call with results and go from there. PROCEDURES PERFORMED TODAY ASSOCIATED WITH THIS CONDITION:          TANGENTIAL BIOPSY  PROCEDURE TANGENTIAL (SHAVE) BIOPSY NOTE:        INFORMED CONSENT DISCUSSION AND POST-OPERATIVE INSTRUCTIONS FOR PATIENT    I.  RATIONALE FOR PROCEDURE  I understand that a skin biopsy allows the Dermatologist to test a lesion or rash under the microscope to obtain a diagnosis. It usually involves numbing the area with numbing medication and removing a small piece of skin; sometimes the area will be closed with sutures. In this specific procedure, sutures are not usually needed. If any sutures are placed, then they are usually need to be removed in 2 weeks or less. I understand that my Dermatologist recommends that a skin "shave" biopsy be performed today. A local anesthetic, similar to the kind that a dentist uses when filling a cavity, will be injected with a very small needle into the skin area to be sampled. The injected skin and tissue underneath "will go to sleep” and become numb so no pain should be felt afterwards. An instrument shaped like a tiny "razor blade" (shave biopsy instrument) will be used to cut a small piece of tissue and skin from the area so that a sample of tissue can be taken and examined more closely under the microscope. A slight amount of bleeding will occur, but it will be stopped with direct pressure and a pressure bandage and any other appropriate methods. I understands that a scar will form where the wound was created. Surgical ointment will be applied to help protect the wound. Sutures are not usually needed.     II.  RISKS AND POTENTIAL COMPLICATIONS   I understand the risks and potential complications of a skin biopsy include but are not limited to the following:  Bleeding  Infection  Pain  Scar/keloid  Skin discoloration  Incomplete Removal  Recurrence  Nerve Damage/Numbness/Loss of Function  Allergic Reaction to Anesthesia  Biopsies are diagnostic procedures and based on findings additional treatment or evaluation may be required  Loss or destruction of specimen resulting in no additional findings    My Dermatologist has explained to me the nature of the condition, the nature of the procedure, and the benefits to be reasonably expected compared with alternative approaches. My Dermatologist has discussed the likelihood of major risks or complications of this procedure including the specific risks listed above, such as bleeding, infection, and scarring/keloid. I understand that a scar is expected after this procedure. I understand that my physician cannot predict if the scar will form a "keloid," which extends beyond the borders of the wound that is created. A keloid is a thick, painful, and bumpy scar. A keloid can be difficult to treat, as it does not always respond well to therapy, which includes injecting cortisone directly into the keloid every few weeks. While this usually reduces the pain and size of the scar, it does not eliminate it. I understand that photographs may be taken before and after the procedure. These will be maintained as part of the medical providers confidential records and may not be made available to me. I further authorize the medical provider to use the photographs for teaching purposes or to illustrate scientific papers, books, or lectures if in his/her judgment, medical research, education, or science may benefit from its use. I have had an opportunity to fully inquire about the risks and benefits of this procedure and its alternatives. I have been given ample time and opportunity to ask questions and to seek a second opinion if I wished to do so. I acknowledge that there have specifically been no guarantees as to the cosmetic results from the procedure.   I am aware that with any procedure there is always the possibility of an unexpected complication. III. POST-PROCEDURAL CARE (WHAT YOU WILL NEED TO DO "AFTER THE BIOPSY" TO OPTIMIZE HEALING)    Keep the area clean and dry. Try NOT to remove the bandage or get it wet for the first 24 hours. Gently clean the area and apply surgical ointment (such as Vaseline petrolatum ointment, which is available "over the counter" and not a prescription) to the biopsy site for up to 2 weeks straight. This acts to protect the wound from the outside world. Sutures are not usually placed in this procedure. If any sutures were placed, return for suture removal as instructed (generally 1 week for the face, 2 weeks for the body). Take Acetaminophen (Tylenol) for discomfort, if no contraindications. Ibuprofen or aspirin could make bleeding worse. Call our office immediately for signs of infection: fever, chills, increased redness, warmth, tenderness, discomfort/pain, or pus or foul smell coming from the wound. WHAT TO DO IF THERE IS ANY BLEEDING? If a small amount of bleeding is noticed, place a clean cloth over the area and apply firm pressure for ten minutes. Check the wound after 10 minutes of direct pressure. If bleeding persists, try one more time for an additional 10 minutes of direct pressure on the area. If the bleeding becomes heavier or does not stop after the second attempt, or if you have any other questions about this procedure, then please call your [facility] ke's Dermatologist by calling [phone] (SKIN). I hereby acknowledge that I have reviewed and verified the site with my Dermatologist and have requested and authorized my Dermatologist to proceed with the procedure.                MELANOCYTIC NEVI ("Moles")        Assessment and Plan:  Based on a thorough discussion of this condition and the management approach to it (including a comprehensive discussion of the known risks, side effects and potential benefits of treatment), the patient (family) agrees to implement the following specific plan:  When outside we recommend using a wide brim hat, sunglasses, long sleeve and pants, sunscreen with SPF 62+ with reapplication every 2 hours, or SPF specific clothing   Benign, reassured  Annual skin check     Melanocytic Nevi  Melanocytic nevi ("moles") are tan or brown, raised or flat areas of the skin which have an increased number of melanocytes. Melanocytes are the cells in our body which make pigment and account for skin color. Some moles are present at birth (I.e., "congenital nevi"), while others come up later in life (i.e., "acquired nevi"). The sun can stimulate the body to make more moles. Sunburns are not the only thing that triggers more moles. Chronic sun exposure can do it too. Clinically distinguishing a healthy mole from melanoma may be difficult, even for experienced dermatologists. The "ABCDE's" of moles have been suggested as a means of helping to alert a person to a suspicious mole and the possible increased risk of melanoma. The suggestions for raising alert are as follows:    Asymmetry: Healthy moles tend to be symmetric, while melanomas are often asymmetric. Asymmetry means if you draw a line through the mole, the two halves do not match in color, size, shape, or surface texture. Asymmetry can be a result of rapid enlargement of a mole, the development of a raised area on a previously flat lesion, scaling, ulceration, bleeding or scabbing within the mole. Any mole that starts to demonstrate "asymmetry" should be examined promptly by a board certified dermatologist.     Border: Healthy moles tend to have discrete, even borders. The border of a melanoma often blends into the normal skin and does not sharply delineate the mole from normal skin. Any mole that starts to demonstrate "uneven borders" should be examined promptly by a board certified dermatologist.     Color: Healthy moles tend to be one color throughout.   Melanomas tend to be made up of different colors ranging from dark black, blue, white, or red. Any mole that demonstrates a color change should be examined promptly by a board certified dermatologist.     Diameter: Healthy moles tend to be smaller than 0.6 cm in size; an exception are "congenital nevi" that can be larger. Melanomas tend to grow and can often be greater than 0.6 cm (1/4 of an inch, or the size of a pencil eraser). This is only a guideline, and many normal moles may be larger than 0.6 cm without being unhealthy. Any mole that starts to change in size (small to bigger or bigger to smaller) should be examined promptly by a board certified dermatologist.     Evolving: Healthy moles tend to "stay the same."  Melanomas may often show signs of change or evolution such as a change in size, shape, color, or elevation. Any mole that starts to itch, bleed, crust, burn, hurt, or ulcerate or demonstrate a change or evolution should be examined promptly by a board certified dermatologist.        Lori Negro and Plan:  Based on a thorough discussion of this condition and the management approach to it (including a comprehensive discussion of the known risks, side effects and potential benefits of treatment), the patient (family) agrees to implement the following specific plan:  When outside we recommend using a wide brim hat, sunglasses, long sleeve and pants, sunscreen with SPF 77+ with reapplication every 2 hours, or SPF specific clothing       What is a lentigo? A lentigo is a pigmented flat or slightly raised lesion with a clearly defined edge. Unlike an ephelis (freckle), it does not fade in the winter months. There are several kinds of lentigo. The name lentigo originally referred to its appearance resembling a small lentil. The plural of lentigo is lentigines, although “lentigos” is also in common use. Who gets lentigines? Lentigines can affect males and females of all ages and races.  Solar lentigines are especially prevalent in fair skinned adults. Lentigines associated with syndromes are present at birth or arise during childhood. What causes lentigines? Common forms of lentigo are due to exposure to ultraviolet radiation:  Sun damage including sunburn   Indoor tanning   Phototherapy, especially photochemotherapy (PUVA)    Ionizing radiation, eg radiation therapy, can also cause lentigines. Several familial syndromes associated with widespread lentigines originate from mutations in Blake-MAP kinase, mTOR signaling and PTEN pathways. What is the treatment for lentigines? Most lentigines are left alone. Attempts to lighten them may not be successful. The following approaches are used:  SPF 50+ broad-spectrum sunscreen   Hydroquinone bleaching cream   Alpha hydroxy acids   Vitamin C   Retinoids   Azelaic acid   Chemical peels  Individual lesions can be permanently removed using:  Cryotherapy   Intense pulsed light   Pigment lasers    How can lentigines be prevented? Lentigines associated with exposure ultraviolet radiation can be prevented by very careful sun protection. Clothing is more successful at preventing new lentigines than are sunscreens. What is the outlook for lentigines? Lentigines usually persist. They may increase in number with age and sun exposure. Some in sun-protected sites may fade and disappear. ARREGUIN ANGIOMAS        Assessment and Plan:  Based on a thorough discussion of this condition and the management approach to it (including a comprehensive discussion of the known risks, side effects and potential benefits of treatment), the patient (family) agrees to implement the following specific plan:  Monitor for changes  Benign, reassured      Assessment and Plan:    Cherry angioma, also known as Iver Montero spots, are benign vascular skin lesions. A "cherry angioma" is a firm red, blue or purple papule, 0.1-1 cm in diameter.  When thrombosed, they can appear black in colour until evaluated with a dermatoscope when the red or purple colour is more easily seen. Cherry angioma may develop on any part of the body but most often appear on the scalp, face, lips and trunk. An angioma is due to proliferating endothelial cells; these are the cells that line the inside of a blood vessel. Angiomas can arise in early life or later in life; the most common type of angioma is a cherry angioma. Cherry angiomas are very common in males and females of any age or race. They are more noticeable in white skin than in skin of colour. They markedly increase in number from about the age of 36. There may be a family history of similar lesions. Eruptive cherry angiomas have been rarely reported to be associated with internal malignancy. The cause of angiomas is unknown. Genetic analysis of cherry angiomas has shown that they frequently carry specific somatic missense mutations in the GNAQ and GNA11 (Q209H) genes, which are involved in other vascular and melanocytic proliferations. SEBORRHEIC KERATOSIS; NON-INFLAMED        Assessment and Plan:  Based on a thorough discussion of this condition and the management approach to it (including a comprehensive discussion of the known risks, side effects and potential benefits of treatment), the patient (family) agrees to implement the following specific plan:  Monitor for changes  Benign, reassured      Seborrheic Keratosis  A seborrheic keratosis is a harmless warty spot that appears during adult life as a common sign of skin aging. Seborrheic keratoses can arise on any area of skin, covered or uncovered, with the usual exception of the palms and soles. They do not arise from mucous membranes. Seborrheic keratoses can have highly variable appearance. Seborrheic keratoses are extremely common. It has been estimated that over 90% of adults over the age of 61 years have one or more of them.  They occur in males and females of all races, typically beginning to erupt in the 30s or 40s. They are uncommon under the age of 21 years. The precise cause of seborrhoeic keratoses is not known. Seborrhoeic keratoses are considered degenerative in nature. As time goes by, seborrheic keratoses tend to become more numerous. Some people inherit a tendency to develop a very large number of them; some people may have hundreds of them. There is no easy way to remove multiple lesions on a single occasion. Unless a specific lesion is "inflamed" and is causing pain or stinging/burning or is bleeding, most insurance companies do not authorize treatment. XEROSIS ("DRY SKIN")        Assessment and Plan:  Based on a thorough discussion of this condition and the management approach to it (including a comprehensive discussion of the known risks, side effects and potential benefits of treatment), the patient (family) agrees to implement the following specific plan:  Use moisturizer like Eucerin,Cerave or Aveeno Cream 3 times a day for the dry skin            Dry skin refers to skin that feels dry to touch. Dry skin has a dull surface with a rough, scaly quality. The skin is less pliable and cracked. When dryness is severe, the skin may become inflamed and fissured. Although any body site can be dry, dry skin tends to affect the shins more than any other site. Dry skin is lacking moisture in the outer horny cell layer (stratum corneum) and this results in cracks in the skin surface. Dry skin is also called xerosis, xeroderma or asteatosis (lack of fat). It can affect males and females of all ages. There is some racial variability in water and lipid content of the skin. Dry skin that starts in early childhood may be one of about 20 types of ichthyosis (fish-scale skin). There is often a family history of dry skin. Dry skin is commonly seen in people with atopic dermatitis. Nearly everyone > 60 years has dry skin.     Dry skin that begins later may be seen in people with certain diseases and conditions. Postmenopausal women  Hypothyroidism  Chronic renal disease   Malnutrition and weight loss   Subclinical dermatitis   Treatment with certain drugs such as oral retinoids, diuretics and epidermal growth factor receptor inhibitors      What is the treatment for dry skin? The mainstay of treatment of dry skin and ichthyosis is moisturisers/emollients. They should be applied liberally and often enough to:  Reduce itch   Improve the barrier function   Prevent entry of irritants, bacteria   Reduce transepidermal water loss. How can dry skin be prevented? Eliminate aggravating factors:  Reduce the frequency of bathing. A humidifier in winter and air conditioner in summer   Compare having a short shower with a prolonged soak in a bath. Use lukewarm, not hot, water. Replace standard soap with a substitute such as a synthetic detergent cleanser, water-miscible emollient, bath oil, anti-pruritic tar oil, colloidal oatmeal etc.   Apply an emollient liberally and often, particularly shortly after bathing, and when itchy. The drier the skin, the thicker this should be, especially on the hands. What is the outlook for dry skin? A tendency to dry skin may persist life-long, or it may improve once contributing factors are controlled. ACROCHORDON ("SKIN TAG")        Assessment and Plan:  Based on a thorough discussion of this condition and the management approach to it (including a comprehensive discussion of the known risks, side effects and potential benefits of treatment), the patient (family) agrees to implement the following specific plan:  Reassured benign    Skin tags are common, soft, harmless skin lesions that are also called, in the appropriate settings, papillomas, fibroepithelial polyps, and soft fibromas. They are made up of loosely arranged collagen fibers and blood vessels surrounded by a thickened or thinned-out epidermis.     Skin tags tend to develop in both men and women as we grow older.  They are usually found on the skin folds (neck, armpits, groin). It is not known what specifically causes skin tags. Certain factors, though, do appear to play a role:  Chaffing and irritation from skin rubbing together  High levels of growth factors (as seen, for example, in pregnancy or in acromegaly/gigantism)  Insulin resistance  Human papillomavirus (wart virus)    We discussed that most skin tags do not need to be treated unless they are specifically causing the patient physical distress or limitation or pose a risk for a larger problem such as an infection that forms secondary to excoriation or chronic irritation.     We had a thorough discussion of treatment options and specific risks (including that any procedural treatment may not be covered by insurance and would then be the patient's responsibility) and benefits/alternatives including but not limited to the following:  Cryotherapy (freezing)  Shave removal  Surgical excision (snip excision with scissors)  Electrosurgery  Ligation (we do not do this procedure and counseled against it due to risk of tissue necrosis and infection)

## 2023-11-28 NOTE — PROGRESS NOTES
[facility] Note     Patient Name: Domenic Jeffery  Encounter Date: 11/28/23     Have you been cared for by a [facility]atologist in the last 3 years and, if so, which description applies to you? NO. I am considered a "new" patient and must complete all patient intake questions. I am MALE/not capable of bearing children. REVIEW OF SYSTEMS:  Have you recently had or currently have any of the following? Recent fever or chills? YES, 10 days ago was in hospital and running fever. Any non-healing wound? YES, on chest.   PAST MEDICAL HISTORY:  Have you personally ever had or currently have any of the following? If "YES," then please provide more detail. Skin cancer (such as Melanoma, Basal Cell Carcinoma, Squamous Cell Carcinoma? No  Tuberculosis, HIV/AIDS, Hepatitis B or C: No  Radiation Treatment No   HISTORY OF IMMUNOSUPPRESSION:   Do you have a history of any of the following:  Systemic Immunosuppression such as Diabetes, Biologic or Immunotherapy, Chemotherapy, Organ Transplantation, Bone Marrow Transplantation? No     Answering "YES" requires the addition of the dotphrase "IMMUNOSUPPRESSED" as the first diagnosis of the patient's visit. FAMILY HISTORY:  Any "first degree relatives" (parent, brother, sister, or child) with the following? Skin Cancer, Pancreatic or Other Cancer? YES, mother-breast   PATIENT EXPERIENCE:    Do you want the Dermatologist to perform a COMPLETE skin exam today including a clinical examination under the "bra and underwear" areas? Yes  If necessary, do we have your permission to call and leave a detailed message on your Preferred Phone number that includes your specific medical information?   Yes      Allergies   Allergen Reactions    Zosyn [Piperacillin Sod-Tazobactam So] Rash      Current Outpatient Medications:     acetaminophen (TYLENOL) 325 mg tablet, Take 2 tablets (650 mg total) by mouth every 6 (six) hours as needed for mild pain, headaches or fever, Disp: , Rfl: 0    apixaban (ELIQUIS) 5 mg, Take 2 tablets (10 mg total) by mouth 2 (two) times a day for 12 doses, Disp: 24 tablet, Rfl: 0    apixaban (ELIQUIS) 5 mg, Take 1 tablet (5 mg total) by mouth 2 (two) times a day Do not start before November 21, 2023., Disp: , Rfl: 0    atorvastatin (LIPITOR) 40 mg tablet, Take 1 tablet (40 mg total) by mouth daily with dinner, Disp: , Rfl: 0    Empagliflozin (JARDIANCE) 10 MG TABS tablet, Take 1 tablet (10 mg total) by mouth daily Do not start before November 16, 2023., Disp: , Rfl: 0    furosemide (LASIX) 40 mg tablet, Take 1 tablet (40 mg total) by mouth daily Do not start before November 16, 2023., Disp: , Rfl: 0    lisinopril (ZESTRIL) 20 mg tablet, Take 1 tablet (20 mg total) by mouth daily Do not start before November 16, 2023., Disp: , Rfl: 0    metoprolol succinate (TOPROL-XL) 100 mg 24 hr tablet, Take 100 mg by mouth daily, Disp: , Rfl:     spironolactone (ALDACTONE) 25 mg tablet, Take 1 tablet (25 mg total) by mouth daily Do not start before November 16, 2023., Disp: , Rfl: 0      New patient with spot of concern on chest, present for about a year. Sometimes it bleed, mostly when he tries to clean it and is slightly itchy. Whom besides the patient is providing clinical information about today's encounter? NO ADDITIONAL HISTORIAN (patient alone provided history)    Physical Exam and Assessment/Plan by Diagnosis:    NEOPLASM OF UNCERTAIN BEHAVIOR    Physical Exam:  (Anatomic Location); (Size and Morphological Description); (Differential Diagnosis):  Specimen A: 74year old male with family history of skin cancer; skin; shave biopsy; right upper chest wall; 2 cm x 1.5 cm erythematous ulcer with rolled boarder; Diff DDX: rule out basal cell carcinoma   Pertinent Positives:  Pertinent Negatives: Additional History of Present Condition: New patient with spot of concern on chest, present for over a year.  States that it will occasionally bleed, denies any trauma prior to onset of lesion. He currently resides at [facility]. Patient was recently discharged from the hospital for syncopal event, now wearing a cardiac life vest.  Wound care consulted during admission, and provided dressing changes to right upper chest wall lesion and left lower extremity venous ulcer. Current wound care orders performed by nursing staff for right chest wound: Cleanse lesion with NSS, pat dry, apply hydrogel to wound bed, and cover with clear dressing. Change every 3 days and as needed for soilage or dislodgement. Patient reports family history of skin cancer. Wound care also following left lower extremity venous ulcer. Plan:  Shave biopsy performed on right upper chest wall lesion  Pressure dressing applied. Orders provided to nursing home to remove dressing in 24 hours, and apply vaseline to wound. Will call with results        PROCEDURES PERFORMED TODAY ASSOCIATED WITH THIS CONDITION:          TANGENTIAL BIOPSY  PROCEDURE TANGENTIAL (SHAVE) BIOPSY NOTE:    Performing Physician:  Usha Kenny  Anatomic Location; Clinical Description with size (cm); Pre-Op Diagnosis:   Specimen A: 74year old male with family history of skin cancer; skin; shave biopsy; right upper chest wall; 2 cm x 1.5 cm erythematous ulcer with rolled boarder; Diff DDX: rule out basal cell carcinoma  Post-op diagnosis: Same     Local anesthesia: 1% xylocaine with epi      Topical anesthesia: None    Hemostasis: Aluminum chloride       After obtaining informed consent  at which time there was a discussion about the purpose of biopsy  and low risks of infection and bleeding. The area was prepped and draped in the usual fashion. Anesthesia was obtained with 1% lidocaine with epinephrine. A shave biopsy to an appropriate sampling depth was obtained by Shave (Dermablade or 15 blade) The resulting wound was covered with surgical ointment and bandaged appropriately.      The patient tolerated the procedure well without complications and was without signs of functional compromise. Specimen has been sent for review by Dermatopathology. Standard post-procedure care has been explained and has been included in written form within the patient's copy of Informed Consent. INFORMED CONSENT DISCUSSION AND POST-OPERATIVE INSTRUCTIONS FOR PATIENT    I.  RATIONALE FOR PROCEDURE  I understand that a skin biopsy allows the Dermatologist to test a lesion or rash under the microscope to obtain a diagnosis. It usually involves numbing the area with numbing medication and removing a small piece of skin; sometimes the area will be closed with sutures. In this specific procedure, sutures are not usually needed. If any sutures are placed, then they are usually need to be removed in 2 weeks or less. I understand that my Dermatologist recommends that a skin "shave" biopsy be performed today. A local anesthetic, similar to the kind that a dentist uses when filling a cavity, will be injected with a very small needle into the skin area to be sampled. The injected skin and tissue underneath "will go to sleep” and become numb so no pain should be felt afterwards. An instrument shaped like a tiny "razor blade" (shave biopsy instrument) will be used to cut a small piece of tissue and skin from the area so that a sample of tissue can be taken and examined more closely under the microscope. A slight amount of bleeding will occur, but it will be stopped with direct pressure and a pressure bandage and any other appropriate methods. I understands that a scar will form where the wound was created. Surgical ointment will be applied to help protect the wound. Sutures are not usually needed.     II.  RISKS AND POTENTIAL COMPLICATIONS   I understand the risks and potential complications of a skin biopsy include but are not limited to the following:  Bleeding  Infection  Pain  Scar/keloid  Skin discoloration  Incomplete Removal  Recurrence  Nerve Damage/Numbness/Loss of Function  Allergic Reaction to Anesthesia  Biopsies are diagnostic procedures and based on findings additional treatment or evaluation may be required  Loss or destruction of specimen resulting in no additional findings    My Dermatologist has explained to me the nature of the condition, the nature of the procedure, and the benefits to be reasonably expected compared with alternative approaches. My Dermatologist has discussed the likelihood of major risks or complications of this procedure including the specific risks listed above, such as bleeding, infection, and scarring/keloid. I understand that a scar is expected after this procedure. I understand that my physician cannot predict if the scar will form a "keloid," which extends beyond the borders of the wound that is created. A keloid is a thick, painful, and bumpy scar. A keloid can be difficult to treat, as it does not always respond well to therapy, which includes injecting cortisone directly into the keloid every few weeks. While this usually reduces the pain and size of the scar, it does not eliminate it. I understand that photographs may be taken before and after the procedure. These will be maintained as part of the medical providers confidential records and may not be made available to me. I further authorize the medical provider to use the photographs for teaching purposes or to illustrate scientific papers, books, or lectures if in his/her judgment, medical research, education, or science may benefit from its use. I have had an opportunity to fully inquire about the risks and benefits of this procedure and its alternatives. I have been given ample time and opportunity to ask questions and to seek a second opinion if I wished to do so. I acknowledge that there have specifically been no guarantees as to the cosmetic results from the procedure.   I am aware that with any procedure there is always the possibility of an unexpected complication. III. POST-PROCEDURAL CARE (WHAT YOU WILL NEED TO DO "AFTER THE BIOPSY" TO OPTIMIZE HEALING)    Keep the area clean and dry. Try NOT to remove the bandage or get it wet for the first 24 hours. Gently clean the area and apply surgical ointment (such as Vaseline petrolatum ointment, which is available "over the counter" and not a prescription) to the biopsy site for up to 2 weeks straight. This acts to protect the wound from the outside world. Sutures are not usually placed in this procedure. If any sutures were placed, return for suture removal as instructed (generally 1 week for the face, 2 weeks for the body). Take Acetaminophen (Tylenol) for discomfort, if no contraindications. Ibuprofen or aspirin could make bleeding worse. Call our office immediately for signs of infection: fever, chills, increased redness, warmth, tenderness, discomfort/pain, or pus or foul smell coming from the wound. WHAT TO DO IF THERE IS ANY BLEEDING? If a small amount of bleeding is noticed, place a clean cloth over the area and apply firm pressure for ten minutes. Check the wound after 10 minutes of direct pressure. If bleeding persists, try one more time for an additional 10 minutes of direct pressure on the area. If the bleeding becomes heavier or does not stop after the second attempt, or if you have any other questions about this procedure, then please call your [facility] by calling [phone] (SKIN). I hereby acknowledge that I have reviewed and verified the site with my Dermatologist and have requested and authorized my Dermatologist to proceed with the procedure. Medical Complexity:    UNDIAGNOSED NEW PROBLEM WITH UNCERTAIN DIAGNOSIS. A condition included in the differential diagnosis represents a high risk of morbidity without treatment.       MELANOCYTIC NEVI ("Moles")    Physical Exam:  Anatomic Location Affected: Mostly on sun-exposed areas of the trunk and extremities  Morphological Description:  Scattered, 1-4mm round to ovoid, symmetrical-appearing, even bordered, skin colored to dark brown macules/papules, mostly in sun-exposed areas      Additional History of Present Condition:  skin check    Assessment and Plan:  Based on a thorough discussion of this condition and the management approach to it (including a comprehensive discussion of the known risks, side effects and potential benefits of treatment), the patient (family) agrees to implement the following specific plan:  When outside we recommend using a wide brim hat, sunglasses, long sleeve and pants, sunscreen with SPF 90+ with reapplication every 2 hours, or SPF specific clothing   Benign, reassured  Annual skin check      LENTIGO    Physical Exam:  Anatomic Location Affected:  trunk, arms  Morphological Description:  Light brown macules  Pertinent Positives:  Pertinent Negatives: Additional History of Present Condition:  skin check    Assessment and Plan:  Based on a thorough discussion of this condition and the management approach to it (including a comprehensive discussion of the known risks, side effects and potential benefits of treatment), the patient (family) agrees to implement the following specific plan:  When outside we recommend using a wide brim hat, sunglasses, long sleeve and pants, sunscreen with SPF 84+ with reapplication every 2 hours, or SPF specific clothing         ARREGUIN ANGIOMAS    Physical Exam:  Anatomic Location Affected:  trunk  Morphological Description:  Scattered cherry red, 1-4 mm papules. Pertinent Positives:  Pertinent Negatives:     Additional History of Present Condition:      Assessment and Plan:  Based on a thorough discussion of this condition and the management approach to it (including a comprehensive discussion of the known risks, side effects and potential benefits of treatment), the patient (family) agrees to implement the following specific plan:  Monitor for changes  Benign, reassured      SEBORRHEIC KERATOSIS; NON-INFLAMED    Physical Exam:  Anatomic Location Affected:  trunk  Morphological Description:  Flat and raised, waxy, smooth to warty textured, yellow to brownish-grey to dark brown to blackish, discrete, "stuck-on" appearing papules. Pertinent Positives:  Pertinent Negatives: Additional History of Present Condition:  skin check    Assessment and Plan:  Based on a thorough discussion of this condition and the management approach to it (including a comprehensive discussion of the known risks, side effects and potential benefits of treatment), the patient (family) agrees to implement the following specific plan:  Monitor for changes  Benign, reassured      XEROSIS ("DRY SKIN")    Physical Exam:  Anatomic Location Affected:  diffuse  Morphological Description:  xerosis  Pertinent Positives:  Pertinent Negatives: Additional History of Present Condition:  skin check    Assessment and Plan:  Based on a thorough discussion of this condition and the management approach to it (including a comprehensive discussion of the known risks, side effects and potential benefits of treatment), the patient (family) agrees to implement the following specific plan:  Use moisturizer like Eucerin,Cerave or Aveeno Cream 3 times a day for the dry skin              ACROCHORDON ("SKIN TAG")    Physical Exam:  Anatomic Location Affected:  Neck, face  Morphological Description:  Skin colored and brown pedunculated papules  Pertinent Positives:  Pertinent Negatives:     Additional History of Present Condition:  Present upon skin exam    Assessment and Plan:  Based on a thorough discussion of this condition and the management approach to it (including a comprehensive discussion of the known risks, side effects and potential benefits of treatment), the patient (family) agrees to implement the following specific plan:  Reassured benign        Scribe Attestation      I,:  Ed Nieto am acting as a scribe while in the presence of the attending physician.:       I,:  Rubens Araujo MD personally performed the services described in this documentation    as scribed in my presence.:           Silvia Mi, [address] [place] [address]. Assessment and evaluation performed with Dr. Nelson Tolentino.

## 2023-12-04 PROCEDURE — 88305 TISSUE EXAM BY PATHOLOGIST: CPT | Performed by: STUDENT IN AN ORGANIZED HEALTH CARE EDUCATION/TRAINING PROGRAM

## 2023-12-04 PROCEDURE — 88341 IMHCHEM/IMCYTCHM EA ADD ANTB: CPT | Performed by: STUDENT IN AN ORGANIZED HEALTH CARE EDUCATION/TRAINING PROGRAM

## 2023-12-04 PROCEDURE — 88342 IMHCHEM/IMCYTCHM 1ST ANTB: CPT | Performed by: STUDENT IN AN ORGANIZED HEALTH CARE EDUCATION/TRAINING PROGRAM

## 2023-12-05 ENCOUNTER — TELEPHONE (OUTPATIENT)
Age: 73
End: 2023-12-05

## 2023-12-05 NOTE — TELEPHONE ENCOUNTER
Patients daughter is returning call from [facility] to discuss biopsy results.   Please call patients daughter Preet Childress [phone]

## 2023-12-05 NOTE — RESULT ENCOUNTER NOTE
DERMATOPATHOLOGY RESULT NOTE    Results reviewed by ordering physician. Dr. Miles Hendrix recommends patient proceed with excision. No answer, voicemail left for patient to return call to review results of pathology. Provided patient with office number, and personal number to call back and discuss. Will also try calling patient again, and call nursing facility. Instructions for Clinical Derm Team:   (remember to route Result Note to appropriate staff):    Call patient and schedule for excision    Result & Plan by Specimen:    Specimen A: malignant  Plan: excision    Tissue Exam: [id number]  Order: [id number]  Status: Final result      Visible to patient: No (inaccessible in [address] [place] [address])      Dx: Neoplasm of uncertain behavior of skin    1 Result Note     Component   Case Report  Surgical Pathology Report                         Case: [id number]                                  Authorizing Provider:  Jess Cortez,     Collected:           11/28/2023 [address]                                                                        Ordering Location:     [facility]      Received:            11/28/2023 [address]                                                                      Pathologist:           Patti Raza MD                                                          Specimen:    Skin, Other, Specimen A: right upper chest wall                                          Final Diagnosis  A. Skin, right upper chest wall, shave biopsy:    Consistent with BASAL CELL CARCINOMA (NODULAR TYPE); transected (see note). Note: SOX10, p40, and Isaiah-EP4 immunostains were reviewed.       Electronically signed by Patti Raza MD on 12/4/2023 at  9:53 AM

## 2023-12-05 NOTE — TELEPHONE ENCOUNTER
Patient scheduled for LHC at [facility] on 12/13/2023 with Dr Kandi Rivera. Patient aware of general instructions. Meds holds:   ELIQUIS: Hold the night prior and the morning of the procedure. LASIX: Hold the morning of the procedure. JARDIANCE: Hold the morning of the procedure. SPIRONOLACTONE: Hold the morning of the procedure    Can we please check insurance for service. Patient at rehab center, [facility] [place], [place]. Transportation will need for transportation arrangement, I will call at [phone]. [place], can you please fax procedure instructions at [phone] Attn. Justus Reid. Thank you.

## 2023-12-07 ENCOUNTER — OFFICE VISIT (OUTPATIENT)
Dept: CARDIOLOGY CLINIC | Facility: CLINIC | Age: 73
End: 2023-12-07
Payer: MEDICARE

## 2023-12-07 VITALS
SYSTOLIC BLOOD PRESSURE: 118 MMHG | WEIGHT: 226.4 LBS | BODY MASS INDEX: 35.53 KG/M2 | DIASTOLIC BLOOD PRESSURE: 69 MMHG | HEART RATE: 85 BPM | HEIGHT: 67 IN

## 2023-12-07 DIAGNOSIS — E66.01 OBESITY, MORBID (HCC): ICD-10-CM

## 2023-12-07 DIAGNOSIS — I82.462 ACUTE DEEP VEIN THROMBOSIS (DVT) OF CALF MUSCLE VEIN OF LEFT LOWER EXTREMITY (HCC): ICD-10-CM

## 2023-12-07 DIAGNOSIS — I10 ESSENTIAL HYPERTENSION: ICD-10-CM

## 2023-12-07 DIAGNOSIS — I50.41 ACUTE COMBINED SYSTOLIC AND DIASTOLIC CONGESTIVE HEART FAILURE (HCC): Primary | ICD-10-CM

## 2023-12-07 DIAGNOSIS — I42.0 DILATED CARDIOMYOPATHY (HCC): ICD-10-CM

## 2023-12-07 PROCEDURE — 99214 OFFICE O/P EST MOD 30 MIN: CPT | Performed by: PHYSICIAN ASSISTANT

## 2023-12-07 RX ORDER — DOCUSATE SODIUM 100 MG/1
100 CAPSULE, LIQUID FILLED ORAL 2 TIMES DAILY
COMMUNITY

## 2023-12-07 RX ORDER — LINEZOLID 100 MG/5ML
10 GRANULE, FOR SUSPENSION ORAL 3 TIMES DAILY
COMMUNITY

## 2023-12-07 RX ORDER — METOPROLOL SUCCINATE 100 MG/1
100 TABLET, EXTENDED RELEASE ORAL DAILY
COMMUNITY

## 2023-12-07 NOTE — RESULT ENCOUNTER NOTE
Attempted to call patient again, but no answer and voicemail left. Also tried calling his daughter, Preet Childress, but no answer. Will try calling both numbers again later today.

## 2023-12-07 NOTE — PROGRESS NOTES
Cardiology Office Follow Up  Eric Zelaya  1950  [id number]      ASSESSMENT:  Chronic HFrEF  Dilated cardiomyopathy w/ EF 20% wearing LifeVest   NSVT  Acute LLE DVT  Coronary artery calcifications  Hypertension   Hyperlipidemia    TTE 11/13/2023: EF 20%, global hypokinesis, G1DD, akinetic basal inferior and mid inferior, hypokinetic basal anterior, basal anteroseptal, basal inferoseptal, basal inferolateral, basal anterolateral, mid anterior, mid anteroseptal, mid inferoseptal, mid inferolateral, mid anterolateral, apical anterior, apical septal, apical inferior, apical lateral and apex, mild NISA, moderate MR, mild TR. PLAN:  Euvolemic today on Lasix 40mg and Aldactone 25mg QD  Check BMP   Continue GDMT with Toprol XL 100mg, lisinopril 20mg, Ertugliflozin 5mg  Continue PO Eliquis 5mg BID  Scheduled for [facility] 12/13/2023 with Dr Kandi Rivera to exclude ischemic component of cardiomyopathy. CT chest w/ coronary calcifications. LDL 45. Statin deferred for now. Recheck limited TTE in 90 days to assess for LVEF recovery  RTO in 3 months or sooner if needed    Interval History/ HPI:   Eric Zelaya is a 74year old male with history of dilated cardiomyopathy, EF 20%, LLE DVT, Hypertension, Obesity who presents for hospital follow up visit. Admitted to [address] [place] [address] 11/10-11/16 with progressive weakness, SOB, LLE swelling from [facility]. Initial lab work showed elevated d-dimer. CTA negative for PE study however found with LLE DVT on venous doppler. Started on IV heparin which was switched to Eliquis 5mg BID. TTE also done shows EF 20% but appeared to be compensated from a volume standpoint. Brief NSVT was noted on telemetry. He had been chest pain free since admission. EKG on admission showed SR with PAC, LAD, LVH with repolarization abnormality. He was placed on optimal medical therapy and LifeVest prior to discharge. Plan for [facility] and follow up appt today. Presents from [employer].  Reports he is doing well CV wise, no acute complaints or concerns today. Wearing his LifeVest and taking his medications. Per med review from [employer] was switched to [employer]. Weights have been slowly improving, today 226.9 taken today at his facility. Is being provide low-salt foods and restricting his fluid intake. Vitals:  118/69  80  226lbs    Past Medical History:   Diagnosis Date    Hypertension     Skin cancer      Social History     Socioeconomic History    Marital status:      Spouse name: Not on file    Number of children: Not on file    Years of education: Not on file    Highest education level: Not on file   Occupational History    Not on file   Tobacco Use    Smoking status: Never    Smokeless tobacco: Never   Substance and Sexual Activity    Alcohol use: Never    Drug use: Never    Sexual activity: Not on file   Other Topics Concern    Not on file   Social History Narrative    Not on file     Social Determinants of Health     Financial Resource Strain: Not on file   Food Insecurity: No Food Insecurity (11/15/2023)    Hunger Vital Sign     Worried About Running Out of Food in the Last Year: Never true     Ran Out of Food in the Last Year: Never true   Transportation Needs: No Transportation Needs (11/15/2023)    PRAPARE - Transportation     Lack of Transportation (Medical): No     Lack of Transportation (Non-Medical): No   Physical Activity: Not on file   Stress: Not on file   Social Connections: Not on file   Intimate Partner Violence: Not on file   Housing Stability: Low Risk  (11/15/2023)    Housing Stability Vital Sign     Unable to Pay for Housing in the Last Year: No     Number of Places Lived in the Last Year: 1     Unstable Housing in the Last Year: No      No family history on file. No past surgical history on file.     Current Outpatient Medications:     acetaminophen (TYLENOL) 325 mg tablet, Take 2 tablets (650 mg total) by mouth every 6 (six) hours as needed for mild pain, headaches or fever, Disp: , Rfl: 0    apixaban (ELIQUIS) 5 mg, Take 2 tablets (10 mg total) by mouth 2 (two) times a day for 12 doses, Disp: 24 tablet, Rfl: 0    apixaban (ELIQUIS) 5 mg, Take 1 tablet (5 mg total) by mouth 2 (two) times a day Do not start before November 21, 2023., Disp: , Rfl: 0    atorvastatin (LIPITOR) 40 mg tablet, Take 1 tablet (40 mg total) by mouth daily with dinner, Disp: , Rfl: 0    Empagliflozin (JARDIANCE) 10 MG TABS tablet, Take 1 tablet (10 mg total) by mouth daily Do not start before November 16, 2023., Disp: , Rfl: 0    furosemide (LASIX) 40 mg tablet, Take 1 tablet (40 mg total) by mouth daily Do not start before November 16, 2023., Disp: , Rfl: 0    lisinopril (ZESTRIL) 20 mg tablet, Take 1 tablet (20 mg total) by mouth daily Do not start before November 16, 2023., Disp: , Rfl: 0    metoprolol succinate (TOPROL-XL) 100 mg 24 hr tablet, Take 100 mg by mouth daily, Disp: , Rfl:     spironolactone (ALDACTONE) 25 mg tablet, Take 1 tablet (25 mg total) by mouth daily Do not start before November 16, 2023., Disp: , Rfl: 0      Review of Systems:  Review of Systems   Constitutional:  Negative for appetite change, chills, diaphoresis, fatigue and fever. Respiratory:  Negative for cough, chest tightness and shortness of breath. Cardiovascular:  Negative for chest pain, palpitations and leg swelling. Gastrointestinal:  Negative for diarrhea, nausea and vomiting. Endocrine: Negative for cold intolerance and heat intolerance. Genitourinary:  Negative for difficulty urinating, dysuria and enuresis. Musculoskeletal:  Negative for arthralgias, back pain and gait problem. Allergic/Immunologic: Negative for environmental allergies and food allergies. Neurological:  Negative for dizziness, facial asymmetry and headaches. Hematological:  Negative for adenopathy. Does not bruise/bleed easily. Psychiatric/Behavioral:  Negative for agitation, behavioral problems and confusion.           Physical Exam:  Physical Exam  Constitutional:       Appearance: He is well-developed. HENT:      Right Ear: External ear normal.      Left Ear: External ear normal.   Eyes:      Pupils: Pupils are equal, round, and reactive to light. Cardiovascular:      Rate and Rhythm: Normal rate and regular rhythm. Heart sounds: Normal heart sounds. No murmur heard. No friction rub. No gallop. Pulmonary:      Effort: Pulmonary effort is normal.      Breath sounds: Normal breath sounds. Comments: Decreased breath sounds  Abdominal:      Palpations: Abdomen is soft. Musculoskeletal:         General: Normal range of motion. Cervical back: Normal range of motion. Right lower leg: Edema present. Left lower leg: Edema present. Comments: L>R trace to +1 bilaterally   Skin:     General: Skin is warm and dry. Neurological:      Mental Status: He is alert and oriented to person, place, and time. Deep Tendon Reflexes: Reflexes are normal and symmetric. Psychiatric:         Behavior: Behavior normal.         Thought Content: Thought content normal.         Judgment: Judgment normal.         This note was completed in part utilizing [employer] Direct Software. Grammatical errors, random word insertions, spelling mistakes, and incomplete sentences can be an occasional consequence of this system secondary to software limitations, ambient noise, and hardware issues. If you have any questions or concerns about the content, text, or information contained within the body of this dictation, please contact the provider for clarification.

## 2023-12-07 NOTE — TELEPHONE ENCOUNTER
Attempted to call daughter, Marcelino Guerrero, to review results, but no answer and voicemail left advising her to call back to discuss. Will also try calling back again today.

## 2023-12-07 NOTE — TELEPHONE ENCOUNTER
Called and spoke to patient's daughter, Marisa Castro, and was able to review results of biopsy. Please refer to pathology results. Findings previously discussed with Dr. Vidya Carmona. Informed Shanique of positive basal cell carcinoma. Dr. Vidya Carmona recommends patient proceed with excision. Informed consent for excision discussed. Marisa Castro requests clerical call her to schedule and she will coordinate with [facility] where patient resides. All questions answered, daughter verbalized understanding.

## 2023-12-08 NOTE — TELEPHONE ENCOUNTER
Called pt daughter Burt Watters) to schedule an excision but n/a, left v/m with our c/b info. If pt daughter calls back please schedule an excision in a procedure spot with Dr. Destinee Siegel in C/V or with Dr. Mikhail Felton in his Wednesday clinics at [facility].

## 2023-12-12 NOTE — PROGRESS NOTES
left message for patient regarding arrival time of 0730, need for transportation, npo status, and directions to unit. call back number provided.

## 2023-12-13 ENCOUNTER — HOSPITAL ENCOUNTER (INPATIENT)
Facility: HOSPITAL | Age: 73
LOS: 2 days | Discharge: NURSING, REHAB OR LONG-TERM CARE | End: 2023-12-15
Attending: INTERNAL MEDICINE | Admitting: INTERNAL MEDICINE
Payer: MEDICARE

## 2023-12-13 DIAGNOSIS — I47.29 NSVT (NONSUSTAINED VENTRICULAR TACHYCARDIA) (HCC): ICD-10-CM

## 2023-12-13 DIAGNOSIS — N18.30 STAGE 3 CHRONIC KIDNEY DISEASE, UNSPECIFIED WHETHER STAGE 3A OR 3B CKD (HCC): ICD-10-CM

## 2023-12-13 DIAGNOSIS — R33.9 URINARY RETENTION: ICD-10-CM

## 2023-12-13 DIAGNOSIS — E11.9 DIABETES MELLITUS (HCC): Chronic | ICD-10-CM

## 2023-12-13 DIAGNOSIS — I10 ESSENTIAL HYPERTENSION: Chronic | ICD-10-CM

## 2023-12-13 DIAGNOSIS — I25.10 CORONARY ARTERY DISEASE INVOLVING NATIVE CORONARY ARTERY OF NATIVE HEART: Primary | Chronic | ICD-10-CM

## 2023-12-13 DIAGNOSIS — I42.0 DILATED CARDIOMYOPATHY (HCC): ICD-10-CM

## 2023-12-13 DIAGNOSIS — N40.1 BENIGN PROSTATIC HYPERPLASIA WITH URINARY RETENTION: ICD-10-CM

## 2023-12-13 DIAGNOSIS — I82.462 ACUTE DEEP VEIN THROMBOSIS (DVT) OF CALF MUSCLE VEIN OF LEFT LOWER EXTREMITY (HCC): ICD-10-CM

## 2023-12-13 DIAGNOSIS — R33.8 BENIGN PROSTATIC HYPERPLASIA WITH URINARY RETENTION: ICD-10-CM

## 2023-12-13 DIAGNOSIS — I50.22 CHRONIC HFREF (HEART FAILURE WITH REDUCED EJECTION FRACTION) (HCC): Chronic | ICD-10-CM

## 2023-12-13 DIAGNOSIS — I50.41 ACUTE COMBINED SYSTOLIC AND DIASTOLIC CONGESTIVE HEART FAILURE (HCC): ICD-10-CM

## 2023-12-13 PROBLEM — R53.1 GENERALIZED WEAKNESS: Status: RESOLVED | Noted: 2023-11-10 | Resolved: 2023-12-13

## 2023-12-13 PROBLEM — N17.9 ACUTE KIDNEY INJURY (HCC): Status: ACTIVE | Noted: 2023-12-13

## 2023-12-13 PROBLEM — J96.01 ACUTE RESPIRATORY FAILURE WITH HYPOXIA (HCC): Status: RESOLVED | Noted: 2023-11-10 | Resolved: 2023-12-13

## 2023-12-13 PROBLEM — I82.402 ACUTE DEEP VEIN THROMBOSIS (DVT) OF LEFT LOWER EXTREMITY (HCC): Status: ACTIVE | Noted: 2023-11-10

## 2023-12-13 LAB
AMPHETAMINES SERPL QL SCN: NEGATIVE
ANION GAP SERPL CALCULATED.3IONS-SCNC: 8 MMOL/L
ANION GAP SERPL CALCULATED.3IONS-SCNC: 8 MMOL/L
ATRIAL RATE: 85 BPM
BACTERIA UR QL AUTO: ABNORMAL /HPF
BARBITURATES UR QL: NEGATIVE
BENZODIAZ UR QL: NEGATIVE
BILIRUB UR QL STRIP: NEGATIVE
BUN SERPL-MCNC: 52 MG/DL (ref 5–25)
BUN SERPL-MCNC: 61 MG/DL (ref 5–25)
CALCIUM SERPL-MCNC: 8.3 MG/DL (ref 8.4–10.2)
CALCIUM SERPL-MCNC: 9.2 MG/DL (ref 8.4–10.2)
CHLORIDE SERPL-SCNC: 105 MMOL/L (ref 96–108)
CHLORIDE SERPL-SCNC: 110 MMOL/L (ref 96–108)
CLARITY UR: ABNORMAL
CO2 SERPL-SCNC: 23 MMOL/L (ref 21–32)
CO2 SERPL-SCNC: 23 MMOL/L (ref 21–32)
COCAINE UR QL: NEGATIVE
COLOR UR: ABNORMAL
CREAT SERPL-MCNC: 1.58 MG/DL (ref 0.6–1.3)
CREAT SERPL-MCNC: 1.75 MG/DL (ref 0.6–1.3)
CREAT UR-MCNC: 90.7 MG/DL
FERRITIN SERPL-MCNC: 316 NG/ML (ref 24–336)
GFR SERPL CREATININE-BSD FRML MDRD: 37 ML/MIN/1.73SQ M
GFR SERPL CREATININE-BSD FRML MDRD: 42 ML/MIN/1.73SQ M
GLUCOSE P FAST SERPL-MCNC: 101 MG/DL (ref 65–99)
GLUCOSE SERPL-MCNC: 101 MG/DL (ref 65–140)
GLUCOSE SERPL-MCNC: 95 MG/DL (ref 65–140)
GLUCOSE UR STRIP-MCNC: ABNORMAL MG/DL
HGB UR QL STRIP.AUTO: ABNORMAL
IRON SATN MFR SERPL: 38 % (ref 15–50)
IRON SERPL-MCNC: 164 UG/DL (ref 50–212)
KETONES UR STRIP-MCNC: NEGATIVE MG/DL
LEUKOCYTE ESTERASE UR QL STRIP: NEGATIVE
METHADONE UR QL: NEGATIVE
MUCOUS THREADS UR QL AUTO: ABNORMAL
NITRITE UR QL STRIP: NEGATIVE
NON-SQ EPI CELLS URNS QL MICRO: ABNORMAL /HPF
OPIATES UR QL SCN: NEGATIVE
OXYCODONE+OXYMORPHONE UR QL SCN: NEGATIVE
P AXIS: 40 DEGREES
PCP UR QL: NEGATIVE
PH UR STRIP.AUTO: 5 [PH]
POTASSIUM SERPL-SCNC: 4.6 MMOL/L (ref 3.5–5.3)
POTASSIUM SERPL-SCNC: 5.5 MMOL/L (ref 3.5–5.3)
PR INTERVAL: 144 MS
PROT UR STRIP-MCNC: ABNORMAL MG/DL
PROT UR-MCNC: 51 MG/DL
PROT/CREAT UR: 0.56 MG/G{CREAT} (ref 0–0.1)
QRS AXIS: 35 DEGREES
QRSD INTERVAL: 122 MS
QT INTERVAL: 386 MS
QTC INTERVAL: 459 MS
RBC #/AREA URNS AUTO: ABNORMAL /HPF
SODIUM SERPL-SCNC: 136 MMOL/L (ref 135–147)
SODIUM SERPL-SCNC: 141 MMOL/L (ref 135–147)
SP GR UR STRIP.AUTO: 1.01 (ref 1–1.03)
T WAVE AXIS: 156 DEGREES
THC UR QL: NEGATIVE
TIBC SERPL-MCNC: 434 UG/DL (ref 250–450)
TSH SERPL DL<=0.05 MIU/L-ACNC: 1.8 UIU/ML (ref 0.45–4.5)
UIBC SERPL-MCNC: 270 UG/DL (ref 155–355)
UROBILINOGEN UR STRIP-ACNC: <2 MG/DL
VENTRICULAR RATE: 85 BPM
WBC #/AREA URNS AUTO: ABNORMAL /HPF

## 2023-12-13 PROCEDURE — 80048 BASIC METABOLIC PNL TOTAL CA: CPT | Performed by: PHYSICIAN ASSISTANT

## 2023-12-13 PROCEDURE — 82728 ASSAY OF FERRITIN: CPT | Performed by: PHYSICIAN ASSISTANT

## 2023-12-13 PROCEDURE — NC001 PR NO CHARGE: Performed by: INTERNAL MEDICINE

## 2023-12-13 PROCEDURE — 83540 ASSAY OF IRON: CPT | Performed by: PHYSICIAN ASSISTANT

## 2023-12-13 PROCEDURE — 83550 IRON BINDING TEST: CPT | Performed by: PHYSICIAN ASSISTANT

## 2023-12-13 PROCEDURE — 4A023N7 MEASUREMENT OF CARDIAC SAMPLING AND PRESSURE, LEFT HEART, PERCUTANEOUS APPROACH: ICD-10-PCS | Performed by: INTERNAL MEDICINE

## 2023-12-13 PROCEDURE — 80307 DRUG TEST PRSMV CHEM ANLYZR: CPT | Performed by: PHYSICIAN ASSISTANT

## 2023-12-13 PROCEDURE — 87086 URINE CULTURE/COLONY COUNT: CPT | Performed by: NURSE PRACTITIONER

## 2023-12-13 PROCEDURE — 84443 ASSAY THYROID STIM HORMONE: CPT | Performed by: PHYSICIAN ASSISTANT

## 2023-12-13 PROCEDURE — 80048 BASIC METABOLIC PNL TOTAL CA: CPT | Performed by: INTERNAL MEDICINE

## 2023-12-13 PROCEDURE — 84156 ASSAY OF PROTEIN URINE: CPT | Performed by: INTERNAL MEDICINE

## 2023-12-13 PROCEDURE — B2111ZZ FLUOROSCOPY OF MULTIPLE CORONARY ARTERIES USING LOW OSMOLAR CONTRAST: ICD-10-PCS | Performed by: INTERNAL MEDICINE

## 2023-12-13 PROCEDURE — 93005 ELECTROCARDIOGRAM TRACING: CPT

## 2023-12-13 PROCEDURE — 82570 ASSAY OF URINE CREATININE: CPT | Performed by: INTERNAL MEDICINE

## 2023-12-13 PROCEDURE — 51798 US URINE CAPACITY MEASURE: CPT | Performed by: INTERNAL MEDICINE

## 2023-12-13 PROCEDURE — 99223 1ST HOSP IP/OBS HIGH 75: CPT | Performed by: INTERNAL MEDICINE

## 2023-12-13 PROCEDURE — 93010 ELECTROCARDIOGRAM REPORT: CPT | Performed by: INTERNAL MEDICINE

## 2023-12-13 PROCEDURE — 81001 URINALYSIS AUTO W/SCOPE: CPT | Performed by: NURSE PRACTITIONER

## 2023-12-13 RX ORDER — SODIUM CHLORIDE 9 MG/ML
125 INJECTION, SOLUTION INTRAVENOUS CONTINUOUS
Status: DISCONTINUED | OUTPATIENT
Start: 2023-12-13 | End: 2023-12-13

## 2023-12-13 RX ORDER — DOCUSATE SODIUM 100 MG/1
100 CAPSULE, LIQUID FILLED ORAL 2 TIMES DAILY
Status: DISCONTINUED | OUTPATIENT
Start: 2023-12-13 | End: 2023-12-15 | Stop reason: HOSPADM

## 2023-12-13 RX ORDER — METOPROLOL SUCCINATE 100 MG/1
100 TABLET, EXTENDED RELEASE ORAL DAILY
Status: DISCONTINUED | OUTPATIENT
Start: 2023-12-13 | End: 2023-12-15 | Stop reason: HOSPADM

## 2023-12-13 RX ORDER — ATORVASTATIN CALCIUM 40 MG/1
40 TABLET, FILM COATED ORAL
Status: DISCONTINUED | OUTPATIENT
Start: 2023-12-13 | End: 2023-12-15 | Stop reason: HOSPADM

## 2023-12-13 RX ORDER — ASPIRIN 81 MG/1
81 TABLET, CHEWABLE ORAL DAILY
Status: DISCONTINUED | OUTPATIENT
Start: 2023-12-14 | End: 2023-12-15 | Stop reason: HOSPADM

## 2023-12-13 RX ORDER — CLOPIDOGREL BISULFATE 75 MG/1
600 TABLET ORAL ONCE
Status: COMPLETED | OUTPATIENT
Start: 2023-12-13 | End: 2023-12-13

## 2023-12-13 RX ORDER — ACETAMINOPHEN 325 MG/1
650 TABLET ORAL EVERY 6 HOURS PRN
Status: DISCONTINUED | OUTPATIENT
Start: 2023-12-13 | End: 2023-12-15 | Stop reason: HOSPADM

## 2023-12-13 RX ORDER — SODIUM CHLORIDE 9 MG/ML
50 INJECTION, SOLUTION INTRAVENOUS CONTINUOUS
Status: DISCONTINUED | OUTPATIENT
Start: 2023-12-13 | End: 2023-12-15

## 2023-12-13 RX ORDER — ASPIRIN 81 MG/1
324 TABLET, CHEWABLE ORAL ONCE
Status: COMPLETED | OUTPATIENT
Start: 2023-12-13 | End: 2023-12-13

## 2023-12-13 RX ADMIN — ATORVASTATIN CALCIUM 40 MG: 40 TABLET, FILM COATED ORAL at 16:53

## 2023-12-13 RX ADMIN — CLOPIDOGREL BISULFATE 600 MG: 75 TABLET ORAL at 07:47

## 2023-12-13 RX ADMIN — DOCUSATE SODIUM 100 MG: 100 CAPSULE, LIQUID FILLED ORAL at 16:53

## 2023-12-13 RX ADMIN — ASPIRIN 81 MG CHEWABLE TABLET 324 MG: 81 TABLET CHEWABLE at 07:47

## 2023-12-13 RX ADMIN — METOPROLOL SUCCINATE 100 MG: 100 TABLET, EXTENDED RELEASE ORAL at 11:24

## 2023-12-13 RX ADMIN — SODIUM CHLORIDE 125 ML/HR: 0.9 INJECTION, SOLUTION INTRAVENOUS at 07:49

## 2023-12-13 NOTE — H&P
Cardiac Catheterization -  Outpatient H&P  Paresh Rios 74 y.o. male MRN: [id number]  Unit/Bed#: BE CATH LAB ROOM Encounter: [id number]       PCP: No primary care provider on file. History of Present Illness   HPI:  Paresh Rios is a 74 y.o. male w/ HTN, HLD, former smoker (quit 20 yrs ago), DVT w/ recent episode ADHF was found to have new onset low EF 20% is referred for [facility] for ischemic evaluation. Patient denies of having any cardiac symptoms since the discharge from the hospital. Pt is ambulatory w/ walker and cane, but also pushes himself in the wheelchair. This morning, the patient is found to have CHUY with Cr 1.7 (baseline 1). Historical Information   Past Medical History:   Diagnosis Date    Hypertension     Skin cancer      No past surgical history on file. Social History   Social History     Substance and Sexual Activity   Alcohol Use Never     Social History     Substance and Sexual Activity   Drug Use Never     Social History     Tobacco Use   Smoking Status Never   Smokeless Tobacco Never     Family History: No family history on file.     Meds/Allergies   all medications and allergies reviewed  Allergies   Allergen Reactions    Zosyn [Piperacillin Sod-Tazobactam So] Rash       Physical Exam  /57 (BP Location: Right arm)   Pulse 80   Temp (!) 97.4 °F (36.3 °C) (Temporal)   Resp 16   Ht 5' 7" (1.702 m)   Wt 101 kg (222 lb)   SpO2 95%   BMI 34.77 kg/m²       GEN: Paresh Rios appears well, alert and oriented x 3, pleasant and cooperative   HEENT:  Normocephalic, atraumatic, anicteric, moist mucous membranes  NECK: no JVD; nocarotid bruits   HEART: RRR, normal S1 and S2, no murmurs, clicks, gallops or rubs   LUNGS: Clear to auscultation bilaterally; no wheezes, rales, or rhonchi; respiration nonlabored   ABDOMEN:  Normoactive bowel sounds, soft, no tenderness, no distention  EXTREMITIES: No edema  NEURO: no gross focal findings; cranial nerves grossly intact   SKIN:  Dry, intact, warm to touch  ACCESS: 2+ right radial Shawn's, 2+ right femoral pulse      Previous Cath/PCI:  No results found for this or any previous visit. No results found for this or any previous visit. No results found for this or any previous visit. No results found for this or any previous visit. Previous STRESS TEST:  No results found for this or any previous visit. No results found for this or any previous visit. No results found for this or any previous visit. ECHO:  No results found for this or any previous visit. Results for orders placed during the hospital encounter of 11/10/23    Echo complete w/ contrast if indicated    Interpretation Summary    Left Ventricle: Left ventricular cavity size is mildly dilated. Wall thickness is normal. The left ventricular ejection fraction is 20%. Systolic function is severely reduced. There is global hypokinesis. Diastolic function is mildly abnormal, consistent with grade I (abnormal) relaxation. The following segments are akinetic: basal inferior and mid inferior. The following segments are hypokinetic: basal anterior, basal anteroseptal, basal inferoseptal, basal inferolateral, basal anterolateral, mid anterior, mid anteroseptal, mid inferoseptal, mid inferolateral, mid anterolateral, apical anterior, apical septal, apical inferior, apical lateral and apex. Left Atrium: The atrium is mildly dilated. Right Atrium: The atrium is mildly dilated. Mitral Valve: There is moderate regurgitation. Tricuspid Valve: There is mild regurgitation. LARRY:  No results found for this or any previous visit. No results found for this or any previous visit. CMR:  No results found for this or any previous visit. No results found for this or any previous visit. No results found for this or any previous visit. Lab Results: I have personally reviewed pertinent lab results.     Results from last 7 days   Lab Units 12/13/23  0742   POTASSIUM mmol/L 5.5*   CO2 mmol/L 23   CHLORIDE mmol/L 110*   BUN mg/dL 61*   CREATININE mg/dL 1.75*                         Assessment/Plan     Assessment:  74 y.o. male w/ HTN, HLD, former smoker (quit 20 yrs ago), DVT w/ recent episode ADHF and new low EF of 20% is referred for [facility] for ischemic evaluation. Patient is found to be in CHUY this morning. 1.New cardiomyopathy, NYHA 1, r/o ischemic cardiomyopathy  2. CHUY      Plan:  1. Given the elective nature of LHC, we will postpone his LHC until his renal function stabilizes. Etiology of CHUY unclear, but possibly medication induced from ACE and diuretics. Will admit the patient and will recheck his renal function tomorrow before reassessment of LHC tomorrow  2. Hold ACE, diuretics. Start IVF  3. Nephro consult  4. HF consult  5. NPO @MN  6. Can give Eliquis in the morning. Hold night and tomorrow morning dose          Case discussed and reviewed with Dr. Chelsea Luna who agrees with my assessment and plan. Thank you for involving us in the care of your patient. Christen Escobar,    Interventional Cardiology Fellow  PGY-7      ==========================================================================================    Epic/ Allscripts/Care Everywhere records reviewed: Yes    ** Please Note: [employer] voice to text software may have been used in the creation of this document.  **

## 2023-12-13 NOTE — PLAN OF CARE
Problem: CARDIOVASCULAR - ADULT  Goal: Maintains optimal cardiac output and hemodynamic stability  Description: INTERVENTIONS:  - Monitor I/O, vital signs and rhythm  - Monitor for S/S and trends of decreased cardiac output  - Administer and titrate ordered vasoactive medications to optimize hemodynamic stability  - Assess quality of pulses, skin color and temperature  - Assess for signs of decreased coronary artery perfusion  - Instruct patient to report change in severity of symptoms  Outcome: Progressing     Problem: METABOLIC, FLUID AND ELECTROLYTES - ADULT  Goal: Fluid balance maintained  Description: INTERVENTIONS:  - Monitor labs   - Monitor I/O and WT  - Instruct patient on fluid and nutrition as appropriate  - Assess for signs & symptoms of volume excess or deficit  Outcome: Progressing     Problem: GENITOURINARY - ADULT  Goal: Absence of urinary retention  Description: INTERVENTIONS:  - Assess patient’s ability to void and empty bladder  - Monitor I/O  - Bladder scan as needed  - Discuss with physician/AP medications to alleviate retention as needed  - Discuss catheterization for long term situations as appropriate  Outcome: Not Progressing

## 2023-12-13 NOTE — CONSULTS
Consultation - Nephrology   Saray Key 74 y.o. male MRN: [id number]  Unit/Bed#: BE CATH LAB ROOM Encounter: [id number]          ASSESSMENT and PLAN:  Acute kidney injury (POA):  Etiology: Suspect prerenal azotemia with volume depletion in the setting diuretics, loss of RAAS system with initiation of lisinopril as well as CRS  Baseline creatinine 0.9-1.2 dating back to 11/10/2023  Discharge creatinine on 11/16/2023 was 1.04 EGFR 69 and stable electrolytes  In care everywhere creatinine 0.7-1.0 dating back to 2020 through [facility]  Admission creatinine 1.75 with EGFR 37, elevated potassium of 5.5  Currently on IV fluids at 125 cc an hour precardiac catheterization  On lisinopril and Lasix as outpatient  Workup:  Urinalysis with micro 11/12/2023 reports: Trace blood, trace protein, 4-10 WBCs, positive glucose  Plan  Avoid nephrotoxins, NSAIDs, and limit IV contrast if possible  Avoid hypotension, perturbations of blood pressure to prevent decreased renal perfusion  Adjust meds to appropriate GFR  Continue with IV fluids timed.   In the setting of decreased EF of 20%  Due to hold lisinopril Aldactone and Lasix for now likely would need adjustments prior to discharge  Will repeat urinalysis with micro  Check bladder scan  Optimize hemodynamic status to avoid delay in renal recovery  Need accurate I/O and daily weights  Monitor BMP daily    Suspect chronic kidney disease IIIA:    Etiology:  Suspect secondary to cardiorenal syndrome, hypertension nephrosclerosis and age-related nephron loss  Baseline creatinine 0.91.2 dating back to 11/10/2023 proteinuria seen on urinalysis  Does not follow with nephrology  Recommend follow-up on discharge for long-term management-would need to be [facility]    Blood pressure/volume status  Current blood pressure appears stable 117/57 when compared to prior blood pressures with last admission  Home medications: Furosemide 40 mg daily, lisinopril 20 mg daily, Toprol  mg daily,  Current medications: Metoprolol  mg daily  Maximize hemodynamics to maintain MAP >65  Avoid hypotension or fluctuations in blood pressure  Patient examining fairly euvolemic to the dry side  Will continue to trend    New onset of acute combined CHF  Plan cardiac catheterization today for ischemic workup however BMP revealed elevated creatinine and currently on hold  Gentle IV fluids   Monitor respiratory status closely given decreased EF of 20%    Recent diagnosis of acute DVT  On Eliquis milligrams 2 times daily as outpatient    Overall plan and recommendations:  Continue to hold lisinopril and Lasix while admitted  We will require dose adjustments prior to discharge  BMP in a.m. Once creatinine reviewed further recommendations will be forthcoming regarding cardiac catheterization  Using Q calculate patient has a 14% risk of contrast-induced nephropathy and 12 % risk of dialysis giving his comorbidities the patient is understanding. Recommend holding off cardiac catheterization till renal function improved assist with CHUY risk reduction      Medical records through [facility] has been reviewed for this consult encounter    HISTORY OF PRESENT ILLNESS:  Requesting Physician: Dorota Arreguin MD  Reason for Consult: Cute kidney injury    Ayanna Garcia is a 74 y.o. male who has PMH of suspected CKD, hypertension, hyperlipidemia, recent admission with DVT and heart failure EF 20%, who presents for left heart cath for ischemic evaluation. Today's blood work revealed acute kidney injury with a creatinine 1.75 and cath was held off. Nephrology consulted for acute kidney injury. On discussion, the patient reports drinking very minimal for he is on a fluid restriction. Has been taking his medication as prescribed. Patient reported being disappointed not having his cardiac catheterization today.   Of note patient had a hospitalization 11/10/2023-11/16/2023 for new onset of acute combined systolic and diastolic CHF with EF of 72% and grade 1 diastolic dysfunction, acute DVT and treated with IV diuretics and oral Lasix and lisinopril along with LifeVest.  No blood work available since discharge for review    PAST MEDICAL HISTORY:  Past Medical History:   Diagnosis Date    Hypertension     Skin cancer        PAST SURGICAL HISTORY:  No past surgical history on file. ALLERGIES:  Allergies   Allergen Reactions    Zosyn [Piperacillin Sod-Tazobactam So] Rash       SOCIAL HISTORY:  Social History     Substance and Sexual Activity   Alcohol Use Never     Social History     Substance and Sexual Activity   Drug Use Never     Social History     Tobacco Use   Smoking Status Never   Smokeless Tobacco Never       FAMILY HISTORY:  No family history on file. MEDICATIONS:    Current Facility-Administered Medications:     acetaminophen (TYLENOL) tablet 650 mg, 650 mg, Oral, Q6H PRN, Brian Cargo, CRNP    atorvastatin (LIPITOR) tablet 40 mg, 40 mg, Oral, After Dinner, Brian Cargo, CRNP    docusate sodium (COLACE) capsule 100 mg, 100 mg, Oral, BID, Brian Cargo, CRNP    metoprolol succinate (TOPROL-XL) 24 hr tablet 100 mg, 100 mg, Oral, Daily, Brian Cargo, CRNP    sodium chloride 0.9 % infusion, 125 mL/hr, Intravenous, Continuous, Brian Cargo, CRNP, Last Rate: 125 mL/hr at 12/13/23 0749, 125 mL/hr at 12/13/23 0749      REVIEW OF SYSTEMS:  Patient seen and examined at bedside  Review of Systems   Constitutional: Negative. Negative for activity change, appetite change, chills, diaphoresis, fatigue and fever. HENT: Negative. Negative for congestion and facial swelling. Respiratory: Negative. Cardiovascular:  Positive for leg swelling. Improved with diuretics   Gastrointestinal: Negative. Endocrine: Negative. Genitourinary: Negative. Musculoskeletal: Negative. Skin: Negative. Allergic/Immunologic: Negative. Neurological: Negative. Hematological: Negative. Psychiatric/Behavioral: Negative. PHYSICAL EXAM:  Current weight: Weight - Scale: 101 kg (222 lb)  Vitals:    12/13/23 0727   BP: 117/57   BP Location: Right arm   Pulse: 80   Resp: 16   Temp: (!) 97.4 °F (36.3 °C)   TempSrc: Temporal   SpO2: 95%   Weight: 101 kg (222 lb)   Height: 5' 7" (1.702 m)       Physical Exam  Vitals and nursing note reviewed. Constitutional:       Appearance: Normal appearance. HENT:      Head: Normocephalic and atraumatic. Nose: Nose normal.      Mouth/Throat:      Mouth: Mucous membranes are moist.      Pharynx: Oropharynx is clear. Eyes:      Extraocular Movements: Extraocular movements intact. Conjunctiva/sclera: Conjunctivae normal.   Cardiovascular:      Rate and Rhythm: Normal rate and regular rhythm. Pulses: Normal pulses. Heart sounds: Normal heart sounds. Pulmonary:      Effort: Pulmonary effort is normal.      Breath sounds: Normal breath sounds. Abdominal:      General: Bowel sounds are normal.      Palpations: Abdomen is soft. Musculoskeletal:      Cervical back: Normal range of motion and neck supple. Right lower leg: Edema present. Left lower leg: Edema present. Comments: Lrft>right edema   Skin:     General: Skin is warm and dry. Comments: LE venous stasis discoloration   Neurological:      General: No focal deficit present. Mental Status: He is alert and oriented to person, place, and time. Psychiatric:         Mood and Affect: Mood normal.         Behavior: Behavior normal.           Lab Results:   Results from last 7 days   Lab Units 12/13/23  0742   SODIUM mmol/L 141   POTASSIUM mmol/L 5.5*   CHLORIDE mmol/L 110*   CO2 mmol/L 23   BUN mg/dL 61*   CREATININE mg/dL 1.75*   CALCIUM mg/dL 9.2           Portions of the record may have been created with voice recognition software.  Occasional wrong word or "sound a like" substitutions may have occurred due to the inherent limitations of voice recognition software.  Read the chart carefully and recognize,

## 2023-12-13 NOTE — INTERVAL H&P NOTE
H&P reviewed. After examining the patient, I find no changed to the H&P since it had been written. 73M w/ HTN, HLD, former smoker (quit 20 yrs ago), DVT w/ recent episode ADHF was found to have new onset low EF 20% is referred for [facility] for ischemic evaluation. /57 (BP Location: Right arm)   Pulse 80   Temp (!) 97.4 °F (36.3 °C) (Temporal)   Resp 16   Ht 5' 7" (1.702 m)   Wt 101 kg (222 lb)   SpO2 95%   BMI 34.77 kg/m²      Patient re-evaluated.  Accept as history and physical.    Jasmyne Kendall, DO/December 13, 2023/7:28 AM

## 2023-12-13 NOTE — CONSULTS
Advanced Heart Failure / Pulmonary Hypertension Service Consultation    Pernell Leyden 74 y.o. male  MRN: [id number]  Unit/Bed#: BE CATH LAB ROOM; Encounter: [id number]    Assessment:  Principal Problem:    Acute kidney injury [facility])  Active Problems:    Essential hypertension    Coronary artery calcification seen on CT scan      HPI:   Pernell Leyden is a 57-year-old man with HFrEF (diagnosed in 11/2023, wearing LifeVest as outpatient), HTN, coronary artery calcifications on CT imaging, DM2, recent LLE DVT (also diagnosed in 11/2023), and basal cell carcinoma who presented to [facility] on 12/13/2023 for elective outpatient LHC (ischemic evaluation for new HFrEF). However, pre-procedure labs revealed CHUY, and LHC was cancelled. Started on IV fluids, and nephrology and HF team consulted. Patient seen and examined in short stay. No current cardiac complaints. Denies CP, SOB, FREDERICK, PND, and orthopnea. Ambulating 100-150 feet with walker without issues. Continues with mild LE swelling (L>R). Denies recent viral symptoms/syndrome prior to admission last month. Reports fluid restriction and diet is monitored by staff at his living facility. Is being weighed regularly and has noted some additional weight loss since discharge. Reviewed patient's medical, family, and social history; EMR updated as appropriate. Heart failure service was consulted for "acute combined systolic and diastolic congestive heart failure." Patient scheduled to follow with Dr. Miguel Soto for outpatient cardiology. Objective: Intake/ Output: TBD. Weight: stated weight documented. Telemetry: NSR, rates in 70s. MAPs: 78. Today's Plan:  Continues on IV fluids. Nephrology on board; input appreciated. Outpatient ACEi, SGLT2i, and diuretic on hold. Appears tentative plan is for [facility] tomorrow. Cath team also on board. Check TSH, iron panel, HIV, and BNP. Continue I/Os and daily weights. Will price check Entresto. Depending on cost and renal function, can consider switching from ACEi to [facility]. Plan:  Acute kidney injury   Baseline creatinine of 0.8-1.1. Today, creatinine of 1.75 (1.04 on 11/16). Nephrology on board; input appreciated. Chronic HFrEF; LVEF 20%; LVIDd 7.1 cm; NYHA II; ACC/AHA Stage C   Etiology: unclear. Possible contribution from long-standing HTN. Negative UDS this admission. TTE 11/13/2023 (volume up): LVEF 20%. LVIDd 7.1 cm. Grade 1 DD. RWMA noted. Normal RV. Mild NISA. Moderate MR. Mild TR. Dilated IVC. Pharmacotherapies / Neurohormonal Blockade:  --Beta Blocker: metoprolol succinate 100 mg daily. --ARNi / ACEi / ARB: Held (on lisinopril 20 mg daily as outpatient). --Aldosterone Antagonist: No (was on MRA during 11/2023; d/c in outpatient setting?). --SGLT2 Inhibitor: Held (on ertugliflozin 5 mg daily as outpatient)  --Home Diuretic: Lasix 40 mg daily. --Inpatient Diuretic: Held. Sudden Cardiac Death Risk Reduction:  --LVEF 20%. Wearing LifeVest as outpatient. --Plan to reassess LVEF with limited TTE after 3-4 months of uninterrupted and optimized HF GDMT (earliest 02/2024). Electrical Resynchronization:  --Candidacy for BiV device: narrow QRS. Advanced Therapies (if appropriate): Will continue to monitor. Coronary artery calcifications noted on CT imaging   Without active chest pain. CTA chest 11/10/2023: "Moderate coronary artery calcification indicating atherosclerotic heart disease."   Continues on statin and BB as above. Hypertension: diagnosed in his early 45s. Diabetes mellitus, type II  DVT of left gastrocnemius vein: diagnosed in 11/2023. AC on Eliquis. Basal cell carcinoma: noted on shave biopsy in 11/2023. Following with outpatient dermatology - plan for excision per notes. History of admission for alcohol intoxication: resulting in mechanical fall; occurring in 2020 at [facility].      Past Medical History:   Diagnosis Date    BPH (benign prostatic hyperplasia)     Chronic HFrEF (heart failure with reduced ejection fraction) ([address]) 11/2023    Coronary artery calcification seen on CT scan 11/10/2023    Deep vein thrombosis (DVT) of left lower extremity ([address]) 11/2023    Diabetes mellitus ([address]) 11/2023    History of acute alcohol intoxication 10/2020    History of phimosis of penis     History of urinary calculi     Hypertension 1988    Skin cancer     Tobacco abuse     quit around 2000       Review of Systems   Constitutional:  Negative for activity change, appetite change, chills, fatigue, fever and unexpected weight change. HENT:  Negative for sneezing and sore throat. Respiratory:  Negative for cough, chest tightness and shortness of breath. Cardiovascular:  Positive for leg swelling (LLE). Negative for chest pain and palpitations. Gastrointestinal:  Negative for abdominal distention, abdominal pain, diarrhea and nausea. Genitourinary:  Negative for decreased urine volume, dysuria and urgency. Musculoskeletal: Negative. Skin: Negative. Neurological:  Negative for dizziness, syncope, weakness and light-headedness. Psychiatric/Behavioral:  Negative for confusion and sleep disturbance. The patient is not nervous/anxious.         Current Facility-Administered Medications:     acetaminophen (TYLENOL) tablet 650 mg, 650 mg, Oral, Q6H PRN, Naila Labella, CRNP    [START ON 12/14/2023] aspirin chewable tablet 81 mg, 81 mg, Oral, Daily, Naila Labella, CRNP    atorvastatin (LIPITOR) tablet 40 mg, 40 mg, Oral, After Dinner, Naila Labella, CRNP    docusate sodium (COLACE) capsule 100 mg, 100 mg, Oral, BID, Naila Labella, CRNP    metoprolol succinate (TOPROL-XL) 24 hr tablet 100 mg, 100 mg, Oral, Daily, Naila Labella, CRNP, 100 mg at 12/13/23 1124    sodium chloride 0.9 % infusion, 50 mL/hr, Intravenous, Continuous, Naila Labella, CRNP, Last Rate: 50 mL/hr at 12/13/23 1245, 50 mL/hr at 12/13/23 1245    Allergies   Allergen Reactions    Zosyn [Piperacillin Sod-Tazobactam So] Rash     Social History     Socioeconomic History    Marital status:      Spouse name: Not on file    Number of children: Not on file    Years of education: Not on file    Highest education level: Not on file   Occupational History    Not on file   Tobacco Use    Smoking status: Former     Types: Cigarettes     Start date: 200     Quit date:      Years since quittin.9    Smokeless tobacco: Never    Tobacco comments:     Quit over 30 years ago (Updated 2023). Vaping Use    Vaping status: Never Used   Substance and Sexual Activity    Alcohol use: Not Currently    Drug use: Never    Sexual activity: Not on file   Other Topics Concern    Not on file   Social History Narrative    Not on file     Social Determinants of Health     Financial Resource Strain: Not on file   Food Insecurity: No Food Insecurity (11/15/2023)    Hunger Vital Sign     Worried About Running Out of Food in the Last Year: Never true     Ran Out of Food in the Last Year: Never true   Transportation Needs: No Transportation Needs (11/15/2023)    PRAPARE - Transportation     Lack of Transportation (Medical): No     Lack of Transportation (Non-Medical): No   Physical Activity: Not on file   Stress: Not on file   Social Connections: Not on file   Intimate Partner Violence: Not on file   Housing Stability: Low Risk  (11/15/2023)    Housing Stability Vital Sign     Unable to Pay for Housing in the Last Year: No     Number of Places Lived in the Last Year: 1     Unstable Housing in the Last Year: No     Family History   Problem Relation Age of Onset    Breast cancer Mother     Heart attack Father 64    Other Sister         s/p hysterectomy    Cerebral palsy Brother     Skin cancer Other         family history    No Known Problems Son     No Known Problems Daughter     Sudden death Neg Hx        Vitals:  Blood pressure 117/57, pulse 80, temperature (!) 97.4 °F (36.3 °C), temperature source Temporal, resp.  rate 16, height 5' 7" (1.702 m), weight 101 kg (222 lb), SpO2 95%. No intake/output data recorded. Weight (last 2 days)       Date/Time Weight    12/13/23 0727 101 (222)          Wt Readings from Last 10 Encounters:   12/13/23 101 kg (222 lb)   12/07/23 103 kg (226 lb 6.4 oz)   11/28/23 104 kg (230 lb)   11/16/23 109 kg (239 lb 3.2 oz)       Vitals:    12/13/23 0727   BP: 117/57   BP Location: Right arm   Pulse: 80   Resp: 16   Temp: (!) 97.4 °F (36.3 °C)   TempSrc: Temporal   SpO2: 95%   Weight: 101 kg (222 lb)   Height: 5' 7" (1.702 m)       Physical Exam  Vitals reviewed. Constitutional:       General: He is not in acute distress. Appearance: Normal appearance. He is well-developed and overweight. He is not toxic-appearing or diaphoretic. HENT:      Head: Normocephalic. Nose: Nose normal.      Mouth/Throat:      Mouth: Mucous membranes are moist.   Eyes:      General: No scleral icterus. Conjunctiva/sclera: Conjunctivae normal.   Neck:      Vascular: No JVD. Trachea: No tracheal deviation. Cardiovascular:      Rate and Rhythm: Normal rate and regular rhythm. No extrasystoles are present. Heart sounds: Murmur heard. Pulmonary:      Effort: Pulmonary effort is normal. No tachypnea, bradypnea or respiratory distress. Breath sounds: Normal air entry. No decreased air movement. No decreased breath sounds or wheezing. Abdominal:      General: Bowel sounds are normal. There is no distension. Palpations: Abdomen is soft. Tenderness: There is no abdominal tenderness. Musculoskeletal:      Cervical back: Neck supple. Right lower leg: Edema (trace) present. Left lower leg: Edema (trace) present. Skin:     General: Skin is warm and dry. Coloration: Skin is not jaundiced. Neurological:      General: No focal deficit present. Mental Status: He is alert and oriented to person, place, and time.    Psychiatric:         Attention and Perception: Attention normal. Mood and Affect: Mood and affect normal.         Speech: Speech normal.         Behavior: Behavior normal. Behavior is cooperative. Thought Content:  Thought content normal.       Lines/Drains/Airways       Active Status       None                  Labs & Results:            Results from last 7 days   Lab Units 12/13/23  0742   POTASSIUM mmol/L 5.5*   CHLORIDE mmol/L 110*   CO2 mmol/L 23   BUN mg/dL 61*   CREATININE mg/dL 1.75*   CALCIUM mg/dL 9.2         Tomer Guy PA-C

## 2023-12-14 ENCOUNTER — PATIENT OUTREACH (OUTPATIENT)
Dept: CARDIOLOGY CLINIC | Facility: CLINIC | Age: 73
End: 2023-12-14

## 2023-12-14 ENCOUNTER — TELEPHONE (OUTPATIENT)
Dept: OTHER | Facility: HOSPITAL | Age: 73
End: 2023-12-14

## 2023-12-14 LAB
ANION GAP SERPL CALCULATED.3IONS-SCNC: 9 MMOL/L
BNP SERPL-MCNC: 103 PG/ML (ref 0–100)
BUN SERPL-MCNC: 37 MG/DL (ref 5–25)
CALCIUM SERPL-MCNC: 8 MG/DL (ref 8.4–10.2)
CHLORIDE SERPL-SCNC: 108 MMOL/L (ref 96–108)
CO2 SERPL-SCNC: 20 MMOL/L (ref 21–32)
CREAT SERPL-MCNC: 1.09 MG/DL (ref 0.6–1.3)
EST. AVERAGE GLUCOSE BLD GHB EST-MCNC: 189 MG/DL
GFR SERPL CREATININE-BSD FRML MDRD: 66 ML/MIN/1.73SQ M
GLUCOSE SERPL-MCNC: 77 MG/DL (ref 65–140)
HBA1C MFR BLD: 8.2 %
HIV 1+2 AB+HIV1 P24 AG SERPL QL IA: NORMAL
HIV1 P24 AG SER QL: NORMAL
POTASSIUM SERPL-SCNC: 4.4 MMOL/L (ref 3.5–5.3)
SODIUM SERPL-SCNC: 137 MMOL/L (ref 135–147)

## 2023-12-14 PROCEDURE — 93458 L HRT ARTERY/VENTRICLE ANGIO: CPT | Performed by: INTERNAL MEDICINE

## 2023-12-14 PROCEDURE — 99222 1ST HOSP IP/OBS MODERATE 55: CPT | Performed by: NURSE PRACTITIONER

## 2023-12-14 PROCEDURE — 99153 MOD SED SAME PHYS/QHP EA: CPT | Performed by: INTERNAL MEDICINE

## 2023-12-14 PROCEDURE — 99152 MOD SED SAME PHYS/QHP 5/>YRS: CPT | Performed by: INTERNAL MEDICINE

## 2023-12-14 PROCEDURE — 99232 SBSQ HOSP IP/OBS MODERATE 35: CPT | Performed by: INTERNAL MEDICINE

## 2023-12-14 PROCEDURE — 83880 ASSAY OF NATRIURETIC PEPTIDE: CPT | Performed by: PHYSICIAN ASSISTANT

## 2023-12-14 PROCEDURE — 80048 BASIC METABOLIC PNL TOTAL CA: CPT | Performed by: NURSE PRACTITIONER

## 2023-12-14 PROCEDURE — C1769 GUIDE WIRE: HCPCS | Performed by: INTERNAL MEDICINE

## 2023-12-14 PROCEDURE — 83036 HEMOGLOBIN GLYCOSYLATED A1C: CPT | Performed by: PHYSICIAN ASSISTANT

## 2023-12-14 PROCEDURE — 87806 HIV AG W/HIV1&2 ANTB W/OPTIC: CPT | Performed by: PHYSICIAN ASSISTANT

## 2023-12-14 PROCEDURE — C1894 INTRO/SHEATH, NON-LASER: HCPCS | Performed by: INTERNAL MEDICINE

## 2023-12-14 RX ORDER — SACUBITRIL AND VALSARTAN 24; 26 MG/1; MG/1
1 TABLET, FILM COATED ORAL 2 TIMES DAILY
Qty: 60 TABLET | Refills: 0 | Status: SHIPPED | OUTPATIENT
Start: 2023-12-14 | End: 2023-12-15

## 2023-12-14 RX ORDER — MIDAZOLAM HYDROCHLORIDE 2 MG/2ML
INJECTION, SOLUTION INTRAMUSCULAR; INTRAVENOUS CODE/TRAUMA/SEDATION MEDICATION
Status: DISCONTINUED | OUTPATIENT
Start: 2023-12-14 | End: 2023-12-14 | Stop reason: HOSPADM

## 2023-12-14 RX ORDER — LIDOCAINE HYDROCHLORIDE 10 MG/ML
INJECTION, SOLUTION EPIDURAL; INFILTRATION; INTRACAUDAL; PERINEURAL CODE/TRAUMA/SEDATION MEDICATION
Status: DISCONTINUED | OUTPATIENT
Start: 2023-12-14 | End: 2023-12-14 | Stop reason: HOSPADM

## 2023-12-14 RX ORDER — NITROGLYCERIN 0.4 MG/1
0.4 TABLET SUBLINGUAL
Status: DISCONTINUED | OUTPATIENT
Start: 2023-12-14 | End: 2023-12-15 | Stop reason: HOSPADM

## 2023-12-14 RX ORDER — HEPARIN SODIUM 1000 [USP'U]/ML
INJECTION, SOLUTION INTRAVENOUS; SUBCUTANEOUS CODE/TRAUMA/SEDATION MEDICATION
Status: DISCONTINUED | OUTPATIENT
Start: 2023-12-14 | End: 2023-12-14 | Stop reason: HOSPADM

## 2023-12-14 RX ORDER — ONDANSETRON 2 MG/ML
4 INJECTION INTRAMUSCULAR; INTRAVENOUS EVERY 6 HOURS PRN
Status: DISCONTINUED | OUTPATIENT
Start: 2023-12-14 | End: 2023-12-15 | Stop reason: HOSPADM

## 2023-12-14 RX ORDER — VERAPAMIL HYDROCHLORIDE 2.5 MG/ML
INJECTION, SOLUTION INTRAVENOUS CODE/TRAUMA/SEDATION MEDICATION
Status: DISCONTINUED | OUTPATIENT
Start: 2023-12-14 | End: 2023-12-14 | Stop reason: HOSPADM

## 2023-12-14 RX ORDER — TAMSULOSIN HYDROCHLORIDE 0.4 MG/1
0.4 CAPSULE ORAL
Status: DISCONTINUED | OUTPATIENT
Start: 2023-12-14 | End: 2023-12-15 | Stop reason: HOSPADM

## 2023-12-14 RX ORDER — FENTANYL CITRATE 50 UG/ML
INJECTION, SOLUTION INTRAMUSCULAR; INTRAVENOUS CODE/TRAUMA/SEDATION MEDICATION
Status: DISCONTINUED | OUTPATIENT
Start: 2023-12-14 | End: 2023-12-14 | Stop reason: HOSPADM

## 2023-12-14 RX ORDER — METOPROLOL SUCCINATE 50 MG/1
50 TABLET, EXTENDED RELEASE ORAL EVERY EVENING
Status: DISCONTINUED | OUTPATIENT
Start: 2023-12-14 | End: 2023-12-15 | Stop reason: HOSPADM

## 2023-12-14 RX ORDER — NITROGLYCERIN 20 MG/100ML
INJECTION INTRAVENOUS CODE/TRAUMA/SEDATION MEDICATION
Status: DISCONTINUED | OUTPATIENT
Start: 2023-12-14 | End: 2023-12-14 | Stop reason: HOSPADM

## 2023-12-14 RX ADMIN — ATORVASTATIN CALCIUM 40 MG: 40 TABLET, FILM COATED ORAL at 17:47

## 2023-12-14 RX ADMIN — DOCUSATE SODIUM 100 MG: 100 CAPSULE, LIQUID FILLED ORAL at 09:20

## 2023-12-14 RX ADMIN — ASPIRIN 81 MG CHEWABLE TABLET 81 MG: 81 TABLET CHEWABLE at 09:20

## 2023-12-14 RX ADMIN — DOCUSATE SODIUM 100 MG: 100 CAPSULE, LIQUID FILLED ORAL at 17:47

## 2023-12-14 RX ADMIN — METOPROLOL SUCCINATE 50 MG: 50 TABLET, EXTENDED RELEASE ORAL at 21:07

## 2023-12-14 RX ADMIN — METOPROLOL SUCCINATE 100 MG: 100 TABLET, EXTENDED RELEASE ORAL at 09:20

## 2023-12-14 RX ADMIN — SODIUM CHLORIDE 50 ML/HR: 0.9 INJECTION, SOLUTION INTRAVENOUS at 10:41

## 2023-12-14 RX ADMIN — TAMSULOSIN HYDROCHLORIDE 0.4 MG: 0.4 CAPSULE ORAL at 17:47

## 2023-12-14 NOTE — PROGRESS NOTES
Progress Note - Nephrology   Paresh Rios 74 y.o. male MRN: [id number]  Unit/Bed#: -01 Encounter: [id number]      Assessment / Plan:    Acute kidney injury POA  Baseline creatinine of 0.9-1.2  Admission creatinine 1.75  CHUY felt due to volume depletion in the setting of diuretics and RAAS inhibition as well as cardiorenal syndrome and urinary retention  Creatinine has improved with intravenous fluids, with holding RAAS inhibition and placement of Colon catheter  Continue cautious intravenous fluids precatheterization  Urinalysis: Positive glucose/large heme/1+ protein/increased WBC and RBCs /PVR: 900 mL  Given rapid improvement in renal function, doubt underlying glomerulonephritis (i.e. abnormal urinalysis)  Suspect underlying CKD stage IIIa. Would benefit from nephrology evaluation/follow-up as an outpatient  Hypertension  Need to avoid relative hypotension  Continue to hold ACE inhibitor and diuretic  Decreased bicarbonate level  Presumed on anion gap acidosis due to renal failure and administration of chloride based solution  If decreased bicarbonate level persist through 12/15, will check venous blood gas  Proteinuria  Spot urine measured at 560 mg  Urinary retention  Colon catheter placed  Hyperkalemia  Likely due to volume depletion and ACE in addition  Resolved  Other issues:  Heart failure with reduced ejection fraction  History of DVT  Diabetes mellitus type 2  Obesity  Basal cell carcinoma  NSVT  Hyperlipidemia    Plan:  Continue cautious intravenous fluids pre-catheterization  Start Flomax  Consult urology  Avoid nephrotoxins and relative hypotension  Continue to hold lisinopril and diuretics  Would repeat urinalysis as an outpatient once his urologic issues subside  Check CBC  Trend bicarbonate level      Subjective: The patient was seen and examined earlier this morning.   He denied any shortness of breath, chest pain or pressure, abdominal pain, vomiting, diarrhea, sweats, chills, change in peripheral edema pattern. A Colon catheter was placed because of urinary retention. The patient denied any paroxysmal nocturnal dyspnea or orthopnea. Objective:     Vitals: Blood pressure 123/64, pulse 73, temperature 98.3 °F (36.8 °C), resp. rate 18, height 5' 7" (1.702 m), weight 100 kg (221 lb 6.4 oz), SpO2 96%. ,Body mass index is 34.68 kg/m². Temp (24hrs), Av.4 °F (36.9 °C), Min:98.1 °F (36.7 °C), Max:98.9 °F (37.2 °C)      Weight (last 2 days)       Date/Time Weight    23 0600 100 (221.4)    23 15:29:46 101 (222)    23 0727 101 (222)                   Intake/Output Summary (Last 24 hours) at 2023 1249  Last data filed at 2023 1100  Gross per 24 hour   Intake 1138.33 ml   Output 3400 ml   Net -2261.67 ml     I/O last 24 hours: In: 2353 [P.O.:798; I.V.:1555]  Out: 3510 [Urine:3510]        Physical Exam    /64   Pulse 73   Temp 98.3 °F (36.8 °C)   Resp 18   Ht 5' 7" (1.702 m)   Wt 100 kg (221 lb 6.4 oz)   SpO2 96%   BMI 34.68 kg/m²   Vital signs were reviewed  Constitutional: The patient was awake, alert, pleasant, cooperative and in no apparent distress  Cardiovascular: No overt jugular venous tension was noted, S1-S2, no pericardial friction rub S3 appreciated, there is trace edema of the left lower extremity  Pulmonary: Adequate inspiratory effort, lungs were clear                Invasive Devices       Peripheral Intravenous Line  Duration             Peripheral IV 23 Left Antecubital 1 day    Peripheral IV 23 Dorsal (posterior); Left Wrist <1 day              Drain  Duration             Urethral Catheter Non-latex 18 Fr. <1 day                    Medications:    Scheduled Meds:  Current Facility-Administered Medications   Medication Dose Route Frequency Provider Last Rate    acetaminophen  650 mg Oral Q6H PRN Beaulah Aspen, CRNP      aspirin  81 mg Oral Daily Beaulah Aspen, CRNP      atorvastatin  40 mg Oral After [employer], CARLIE docusate sodium  100 mg Oral BID Idell Maxcy, CRNP      metoprolol succinate  100 mg Oral Daily Idell Maxcy, CRNP      metoprolol succinate  50 mg Oral QPM Nico Mendez PA-C      sodium chloride  50 mL/hr Intravenous Continuous Idell Maxcy, CRNP 50 mL/hr (12/14/23 1041)       PRN Meds:.  acetaminophen    Continuous Infusions:sodium chloride, 50 mL/hr, Last Rate: 50 mL/hr (12/14/23 1041)            LAB RESULTS:      Results from last 7 days   Lab Units 12/14/23  0538 12/13/23  1509 12/13/23  0742   POTASSIUM mmol/L 4.4 4.6 5.5*   CHLORIDE mmol/L 108 105 110*   CO2 mmol/L 20* 23 23   BUN mg/dL 37* 52* 61*   CREATININE mg/dL 1.09 1.58* 1.75*   CALCIUM mg/dL 8.0* 8.3* 9.2       CUTURES:  Lab Results   Component Value Date    BLOODCX No Growth After 5 Days. 11/10/2023    BLOODCX No Growth After 5 Days. 11/10/2023                 PLEASE NOTE:  This encounter was completed utilizing the [employer] Direct Speech Voice Recognition Software. Grammatical errors, random word insertions, pronoun errors and incomplete sentences are occasional consequences of the system due to software limitations, ambient noise and hardware issues. These may be missed by proof reading prior to affixing electronic signature. Any questions or concerns about the content, text or information contained within the body of this dictation should be directly addressed to the physician for clarification. Please do not hesitate to call me directly if you have any any questions or concerns.

## 2023-12-14 NOTE — CASE MANAGEMENT
Case Management Progress Note    Patient name Yunior Brian  Location [address] [facility] 565/-06 MRN [id number]  : 1950 Date 2023       LOS (days): 1  Geometric Mean LOS (GMLOS) (days): 3.1  Days to GMLOS:1.9        OBJECTIVE:        Current admission status: Inpatient  Preferred Pharmacy:   [address], [address] [facility], [address] [place] [id number]  Phone: [phone] Fax: [address] [facility] [address] [facility] [place], [address]  [address] [facility] of [address] [address]  Phone: [phone] Fax: [facility], [address] [facility] [address] [facility] [address]  Phone: [phone] Fax: [phone]    Primary Care Provider: No primary care provider on file. Primary Insurance: MEDICARE  Secondary Insurance: BLUE CROSS    PROGRESS NOTE: Patient is s/p cardiac cath. Patient on bedrest until 6 PM. Facility confirmed they are able to accept patient in the evening. Spoke with patient's daughter Martinez Neri who confirmed she prepaid for round trip transport with [facility] for patient to get back to [facility] post procedure. Spoke with Tonya Cutler at [address] [place] [address] Dr Ivey. Per rep, WCV cutoff is 6 PM. Scheduled transport for tomorrow 12/15 at 1 PM. Daughter, nurse and provider updated. Update at 1530: Kelly Romero is $47 per month. [employer] will apply one month free coupon.

## 2023-12-14 NOTE — CASE MANAGEMENT
Case Management Assessment & Discharge Planning Note    Patient name Rigoberto Buckley  Location [address] [facility] [place] 565/-51 MRN [id number]  : 1950 Date 2023       Current Admission Date: 2023  Current Admission Diagnosis:Acute kidney injury [facility])   Patient Active Problem List    Diagnosis Date Noted    Acute kidney injury ([address]) 2023    Obesity, morbid ([address]) 2023    Diabetes mellitus ([address]) 11/15/2023    Essential hypertension 11/10/2023    Chronic HFrEF (heart failure with reduced ejection fraction) ([address]) 11/10/2023    Deep vein thrombosis (DVT) of left lower extremity ([address]) 11/10/2023    Coronary artery calcification seen on CT scan 11/10/2023    Benign prostatic hyperplasia with urinary retention       LOS (days): 1  Geometric Mean LOS (GMLOS) (days): 3.1  Days to GMLOS:2     OBJECTIVE:  PATIENT READMITTED [address] of Unplanned Readmission Score: 11.76         Current admission status: Inpatient       Preferred Pharmacy:   [address] [facility], [address] [facility], [address] [place] [id number]  Phone: [phone] Fax: [address] [place] [address], [address] [address]  Phone: [phone] Fax: [facility], [address] [place] [address] [address] [facility]  Phone: [phone] Fax: [phone]    Primary Care Provider: No primary care provider on file. Primary Insurance: MEDICARE  Secondary Insurance: BLUE CROSS    ASSESSMENT:  Active Health Care Proxies    There are no active Health Care Proxies on file.          Readmission Root Cause  30 Day Readmission: Yes  Who directed you to return to the hospital?: Specialist  Did you understand whom to contact if you had questions or problems?: Yes  Did you get your prescriptions before you left the hospital?: Yes  Were you able to get your prescriptions filled when you left the hospital?: Yes  Did you take your medications as prescribed?: Yes  Were you able to get to your follow-up appointments?: Yes  During previous admission, was a post-acute recommendation made?: Yes  What post-acute resources were offered?:  (Was DC on 11/16 to [facility]placed by [facility])  Patient was readmitted due to: Acute kidney injury    Patient Information  Admitted from[de-identified] Facility  Mental Status: Alert  During Assessment patient was accompanied by: Not accompanied during assessment  Assessment information provided by[de-identified] Patient  Primary Caregiver: Other (Comment)  Caregiver's Name[de-identified] Resides at [address] [facility] Staff assists with ADLs  Caregiver's Relationship to Patient[de-identified] Other (Specify)  Support Systems: Daughter  [place] of Residence: [address] [place] [address] do you live in?: [address] [place] [address] entry access options.  Select all that apply.: No steps to enter home  Type of Current Residence: Facility  Upon entering residence, is there a bedroom on the main floor (no further steps)?: Yes  Upon entering residence, is there a bathroom on the main floor (no further steps)?: Yes  Living Arrangements: Other (Comment) ([address] [place])    Activities of Daily Living Prior to Admission  Functional Status: Assistance  Completes ADLs independently?: No  Level of ADL dependence: Assistance  Ambulates independently?: No  Level of ambulatory dependence: Assistance  Does patient use assisted devices?: Yes  Assisted Devices (DME) used: Dea Hess  Does patient currently own DME?: Yes  What DME does the patient currently own?: Jeet Maki  Does patient have a history of Outpatient Therapy (PT/OT)?: No  Does the patient have a history of Short-Term Rehab?: Yes (Currently at [facility] for [facility])  Does patient have a history of HHC?: No  Does patient currently have [facility]?: No         Patient Information Continued  Income Source: Pension/FDC  Does patient have prescription coverage?: Yes  Does patient receive dialysis treatments?: No  Does patient have a history of substance abuse?: No  Does patient have a history of Mental Health Diagnosis?: No    PHQ 2/9 Screening   Reviewed PHQ 2/9 Depression Screening Score?: No    Means of Transportation  Means of Transport to Appts[de-identified] Family transport      Housing Stability: Low Risk  (12/14/2023)    Housing Stability Vital Sign     Unable to Pay for Housing in the Last Year: No     Number of [address] in the Last Year: 1     Unstable Housing in the Last Year: No   Food Insecurity: No Food Insecurity (12/14/2023)    Hunger Vital Sign     Worried About Running Out of Food in the Last Year: Never true     Ran Out of Food in the Last Year: Never true   Transportation Needs: No Transportation Needs (12/14/2023)    PRAPARE - Transportation     Lack of Transportation (Medical): No     Lack of Transportation (Non-Medical): No   Utilities: Not At Risk (12/14/2023)    [facility] Utilities     Threatened with loss of utilities: No       DISCHARGE DETAILS:    Discharge planning discussed with[de-identified] Patient  Freedom of Choice: Yes  Comments - Freedom of Choice: Sangeeta OSS [facility] Paulino CM contacted family/caregiver?: Yes  Were Treatment Team discharge recommendations reviewed with patient/caregiver?: Yes  Did patient/caregiver verbalize understanding of patient care needs?: N/A- going to facility  Were patient/caregiver advised of the risks associated with not following Treatment Team discharge recommendations?: Yes    Contacts  Patient Contacts: Meera Langley  Relationship to Patient[de-identified] Family  Contact Method: Phone  Phone Number: [phone]      Summary: Met with patient at bedside to complete initial assessment. Demographics confirmed. Patient lives at [facility]) and received assistance with all ADLs. Daughter or KEON provides transport to appointments. On 11/16 patient was DC to [facility].  Per patient, the plan is to be discharged back to [facility] for a couple of more days and then they will transition him back to [address] [place] [address]. Return referral sent to [facility] via [address] Patient will need a WCV back to facility. Has facility's wheelchair at bedside.

## 2023-12-14 NOTE — CONSULTS
Consult - Urology   Estella [facility] 1950, 74 y.o. male MRN: [id number]    Unit/Bed#: -Jason Encounter: [id number]    Benign prostatic hyperplasia with urinary retention  Assessment & Plan  Continue Flomax 0.4 mg daily  Creat 1.09, down from 1.75  Maintain Colon catheter x 1 week  Void trial as outpatient in the office 1 week  Daily bowel regimen constipation  Check urine culture, added onto urine testing completed previously  Medical optimization and discharge per primary team  Follow-up with urology outpatient, no need for urological intervention during this hospitalization      Urology will sign off but remain available for any further inpatient needs. Please feel free to contact the provider currently covering the Urology [facility] role for this campus with questions or concerns. Subjective:   59-year-old male with recent hospitalization for DVT and CHF from 1110/1116 for shortness of breath and leg swelling. He has an ejection fraction of 20% presented for left heart catheterization that was postponed due to a creatinine of 1.75 with a baseline of 0.9-1.2. Nephrology was consulted due to CHUY. urinary retention protocol was ordered. He had been straight cathed for up to 1000 mL last evening. Today Colon catheter was placed and he was started on Flomax by nephrology team.  His creatinine is since improved to 1.09. Urine microscopic from yesterday reveals large blood and 10-20 WBCs with occasional bacteria. He reports having prior UTI. He feels his urinary symptoms started after his fluid restriction. He does not feel he has problems urinating at home normally. He reported nocturia x 1 previously. He has been circumcised as an adult due to phimosis. Denies personal or family history of prostate cancer. He does report his last bowel movement was about 2 days ago but has not been eating regularly. He normally has a bowel movement every day or so.     He has a history of CKD, hypertension, hyperlipidemia,    Review of Systems   Constitutional:  Negative for activity change, appetite change, chills, fatigue, fever and unexpected weight change. HENT:  Negative for facial swelling. Eyes:  Negative for discharge. Respiratory: Negative. Negative for cough and shortness of breath. Cardiovascular:  Negative for chest pain and leg swelling. Gastrointestinal:  Positive for constipation. Negative for abdominal distention, abdominal pain, diarrhea, nausea and vomiting. Endocrine: Negative. Genitourinary:  Positive for difficulty urinating. Negative for decreased urine volume, dysuria, enuresis, flank pain, frequency, genital sores, hematuria and urgency. Musculoskeletal:  Positive for gait problem. Negative for back pain and myalgias. Skin:  Negative for pallor and rash. Allergic/Immunologic: Negative. Negative for immunocompromised state. Neurological:  Negative for facial asymmetry and speech difficulty. Psychiatric/Behavioral:  Negative for agitation and confusion. Objective:  Vitals: Blood pressure 119/56, pulse 66, temperature 98.3 °F (36.8 °C), resp. rate 18, height 5' 7" (1.702 m), weight 100 kg (221 lb 6.4 oz), SpO2 94%. ,Body mass index is 34.68 kg/m². Physical Exam  Vitals and nursing note reviewed. Constitutional:       General: He is not in acute distress. Appearance: Normal appearance. He is obese. He is not ill-appearing or toxic-appearing. HENT:      Head: Normocephalic. Pulmonary:      Effort: Pulmonary effort is normal. No respiratory distress. Abdominal:      General: Abdomen is flat. There is no distension. Genitourinary:     Penis: Circumcised. Erythema present. No hypospadias, tenderness, discharge, swelling or lesions. Comments: Unable to palpate prostate due to body habitus and positioning. Mild erythema to tip of penis  Musculoskeletal:         General: No swelling. Skin:     General: Skin is warm and dry.    Neurological: General: No focal deficit present. Mental Status: He is alert and oriented to person, place, and time. Psychiatric:         Mood and Affect: Mood normal.         Behavior: Behavior normal.         Imaging:  none  Imaging reviewed - both report and images personally reviewed. Labs:  No results for input(s): "WBC" in the last 72 hours. No results for input(s): "HGB" in the last 72 hours. Recent Labs     23  0742 23  1509 23  0538   CREATININE 1.75* 1.58* 1.09       Microbiology:  Pending    History:  Social History     Socioeconomic History    Marital status:      Spouse name: None    Number of children: None    Years of education: None    Highest education level: None   Occupational History    None   Tobacco Use    Smoking status: Former     Types: Cigarettes     Start date:      Quit date:      Years since quittin.9    Smokeless tobacco: Never    Tobacco comments:     Quit over 30 years ago (Updated 2023). Vaping Use    Vaping status: Never Used   Substance and Sexual Activity    Alcohol use: Not Currently    Drug use: Never    Sexual activity: None   Other Topics Concern    None   Social History Narrative    None     Social Determinants of Health     Financial Resource Strain: Not on file   Food Insecurity: No Food Insecurity (2023)    Hunger Vital Sign     Worried About Running Out of Food in the Last Year: Never true     Ran Out of Food in the Last Year: Never true   Transportation Needs: No Transportation Needs (2023)    PRAPARE - Transportation     Lack of Transportation (Medical): No     Lack of Transportation (Non-Medical):  No   Physical Activity: Not on file   Stress: Not on file   Social Connections: Not on file   Intimate Partner Violence: Not on file   Housing Stability: Low Risk  (2023)    Housing Stability Vital Sign     Unable to Pay for Housing in the Last Year: No     Number of Places Lived in the Last Year: 1     Unstable Housing in the Last Year: No       Past Medical History:   Diagnosis Date    BPH (benign prostatic hyperplasia)     Chronic HFrEF (heart failure with reduced ejection fraction) ([address]) 11/2023    Coronary artery calcification seen on CT scan 11/10/2023    Deep vein thrombosis (DVT) of left lower extremity ([address]) 11/2023    Diabetes mellitus ([address]) 11/2023    History of acute alcohol intoxication 10/2020    History of phimosis of penis     History of urinary calculi     Hypertension 1988    Skin cancer     Tobacco abuse     quit around 2000     Past Surgical History:   Procedure Laterality Date    BLADDER STONE REMOVAL  2014    ORIF ANKLE FRACTURE Left     TOTAL HIP ARTHROPLASTY Right      Family History   Problem Relation Age of Onset    Breast cancer Mother     Heart attack Father 64    Other Sister         s/p hysterectomy    Cerebral palsy Brother     Skin cancer Other         family history    No Known Problems Son     No Known Problems Daughter     Sudden death Neg Hx        The following portions of the patient's history were reviewed and updated as appropriate: allergies, current medications, past family history, past medical history, past social history, past surgical history and problem list    CARLIE Joyner  Date: 12/14/2023 Time: 4:51 PM

## 2023-12-14 NOTE — ASSESSMENT & PLAN NOTE
Continue Flomax 0.4 mg daily  Creat 1.09, down from 1.75  Maintain Colon catheter x 1 week  Void trial as outpatient in the office 1 week  Daily bowel regimen constipation  Check urine culture, added onto urine testing completed previously  Medical optimization and discharge per primary team  Follow-up with urology outpatient, no need for urological intervention during this hospitalization

## 2023-12-14 NOTE — PROGRESS NOTES
Advanced Heart Failure / Pulmonary Hypertension Service Progress Note    Pernell Leyden 74 y.o. male   MRN: [id number]  Unit/Bed#: MS 56Lona-Jason; Encounter: [id number]    Assessment:  Principal Problem:    Acute kidney injury [facility])  Active Problems:    Essential hypertension    Coronary artery calcification seen on CT scan      Subjective:   Patient seen and examined. No significant events overnight. Denies any SOB, FREDERICK, or worsening swelling s/p IV fluids. Eager to get LHC completed. Objective: Intake/ Output: 2010.5mL / 2760 mL. Weight: 221 lbs (222 lbs on 12/13). Telemetry: NSR, rates in 70-80s. ~10 sec run of VT this AM. Occasional NSVT. MAPs: 70-85. Today's Plan:  NOT IN ACUTE HF EXACERBATION. Tentative plan for [facility] today per cath team.   Outpatient Eliquis remains on hold. Given NSVT/PVCs on telemetry, will add PM dose of metoprolol succinate. Continues on IV fluids. Continue close monitoring of volume status.  (down from 572 in 11/2023). Continue I/Os and daily weights. Outpatient ACEi, SGLT2i, and diuretic remain on hold. Also, at some point between this admission and prior one, MRA was discontinued (unclear why). Will price check Entresto. Depending on cost and renal function, can consider switching from ACEi to [facility]. Plan:  Acute kidney injury, resolved              Baseline creatinine of 0.8-1.1. Today, creatinine of 1.09 (1.75 on 12/13). Nephrology on board; input appreciated. Chronic HFrEF; LVEF 20%; LVIDd 7.1 cm; NYHA II; ACC/AHA Stage C              Etiology: unclear. Possible contribution from long-standing HTN. Negative UDS this admission. Negative HIV. Iron panel and TSH WNL. TTE 11/13/2023 (volume up): LVEF 20%. LVIDd 7.1 cm. Grade 1 DD. RWMA noted. Normal RV. Mild NISA. Moderate MR. Mild TR. Dilated IVC.       Pharmacotherapies / Neurohormonal Blockade:  --Beta Blocker: metoprolol succinate 100 mg qAM and 50 mg qPM. --ARNi / ACEi / ARB: Held (on lisinopril 20 mg daily as outpatient). --Aldosterone Antagonist: No (was on MRA during 11/2023; d/c in outpatient setting?). --SGLT2 Inhibitor: Held (on ertugliflozin 5 mg daily as outpatient)  --Home Diuretic: Lasix 40 mg daily. --Inpatient Diuretic: Held. Sudden Cardiac Death Risk Reduction:  --LVEF 20%. Wearing LifeVest as outpatient. --Plan to reassess LVEF with limited TTE after 3-4 months of uninterrupted and optimized HF GDMT (earliest 02/2024). Electrical Resynchronization:  --Candidacy for BiV device: narrow QRS. Advanced Therapies (if appropriate): Will continue to monitor. Coronary artery calcifications noted on CT imaging              Without active chest pain. CTA chest 11/10/2023: "Moderate coronary artery calcification indicating atherosclerotic heart disease."              Continues on statin and BB as above. Hypertension: diagnosed in his early 45s. Diabetes mellitus, type II  DVT of left gastrocnemius vein: diagnosed in 11/2023. AC on Eliquis. Basal cell carcinoma: noted on shave biopsy in 11/2023. Following with outpatient dermatology - plan for excision per notes. History of admission for alcohol intoxication: resulting in mechanical fall; occurring in 2020 at [facility]. Benign prostatic hyperplasia    Vitals:   Blood pressure 123/64, pulse 73, temperature 98.3 °F (36.8 °C), resp. rate 18, height 5' 7" (1.702 m), weight 100 kg (221 lb 6.4 oz), SpO2 96%. I/O last 3 completed shifts: In: 2010.5 [P.O.:798;  I.V.:1212.5]  Out: 2760 [Urine:2760]    Weight (last 2 days)       Date/Time Weight    12/14/23 0600 100 (221.4)    12/13/23 15:29:46 101 (222)    12/13/23 0727 101 (222)          Wt Readings from Last 10 Encounters:   12/14/23 100 kg (221 lb 6.4 oz)   12/07/23 103 kg (226 lb 6.4 oz)   11/28/23 104 kg (230 lb)   11/16/23 109 kg (239 lb 3.2 oz)     Vitals:    12/14/23 0230 12/14/23 0600 12/14/23 0716 12/14/23 1140 BP: 125/64  128/64 123/64   BP Location:   Right arm    Pulse: 76  82 73   Resp:    18   Temp:   98.3 °F (36.8 °C) 98.3 °F (36.8 °C)   TempSrc:   Oral    SpO2: 97%  96% 96%   Weight:  100 kg (221 lb 6.4 oz)     Height:           Physical Exam  Vitals reviewed. Constitutional:       General: He is awake. He is not in acute distress. Appearance: Normal appearance. He is well-developed and overweight. He is not toxic-appearing or diaphoretic. Interventions: Nasal cannula in place. HENT:      Head: Normocephalic. Nose: Nose normal.      Mouth/Throat:      Mouth: Mucous membranes are moist.   Eyes:      General: No scleral icterus. Conjunctiva/sclera: Conjunctivae normal.   Neck:      Trachea: No tracheal deviation. Cardiovascular:      Rate and Rhythm: Normal rate and regular rhythm. No extrasystoles are present. Heart sounds: Murmur heard. Pulmonary:      Effort: Pulmonary effort is normal. No tachypnea, bradypnea or respiratory distress. Breath sounds: Decreased air movement present. No wheezing or rhonchi. Abdominal:      General: There is no distension. Palpations: Abdomen is soft. Tenderness: There is no abdominal tenderness. Genitourinary:     Comments: Oclon catheter present with tea colored urine and sediment  Musculoskeletal:      Cervical back: Neck supple. Right lower leg: No edema. Left lower leg: No edema. Skin:     General: Skin is warm and dry. Coloration: Skin is pale. Skin is not jaundiced. Neurological:      General: No focal deficit present. Mental Status: He is alert and oriented to person, place, and time. Psychiatric:         Attention and Perception: Attention normal.         Mood and Affect: Mood normal. Affect is blunt. Speech: Speech normal.         Behavior: Behavior normal. Behavior is cooperative. Thought Content:  Thought content normal.       Lines/Drains/Airways       Active Status       Name Placement date Placement time Site Days    Urethral Catheter Non-latex 18 Fr. 12/14/23  6200  Non-latex  less than 1                    Current Facility-Administered Medications:     acetaminophen (TYLENOL) tablet 650 mg, 650 mg, Oral, Q6H PRN, Gopal Points, CRNP    aspirin chewable tablet 81 mg, 81 mg, Oral, Daily, Gopal Points, CRNP, 81 mg at 12/14/23 0920    atorvastatin (LIPITOR) tablet 40 mg, 40 mg, Oral, After Dinner, Gopal Points, CRNP, 40 mg at 12/13/23 1653    docusate sodium (COLACE) capsule 100 mg, 100 mg, Oral, BID, Gopal Points, CRNP, 100 mg at 12/14/23 0920    metoprolol succinate (TOPROL-XL) 24 hr tablet 100 mg, 100 mg, Oral, Daily, Gopal Points, CRNP, 100 mg at 12/14/23 0920    metoprolol succinate (TOPROL-XL) 24 hr tablet 50 mg, 50 mg, Oral, QPM, Mariana Acevedo PA-C    sodium chloride 0.9 % infusion, 50 mL/hr, Intravenous, Continuous, Gopal Points, CRNP, Last Rate: 50 mL/hr at 12/14/23 1041, 50 mL/hr at 12/14/23 1041    tamsulosin (FLOMAX) capsule 0.4 mg, 0.4 mg, Oral, Daily With Laya Langford MD    Labs & Results:              Results from last 7 days   Lab Units 12/14/23  0538 12/13/23  1509 12/13/23  0742   POTASSIUM mmol/L 4.4 4.6 5.5*   CHLORIDE mmol/L 108 105 110*   CO2 mmol/L 20* 23 23   BUN mg/dL 37* 52* 61*   CREATININE mg/dL 1.09 1.58* 1.75*   CALCIUM mg/dL 8.0* 8.3* 9.2         Agatha Bloom PA-C

## 2023-12-14 NOTE — PLAN OF CARE
Problem: CARDIOVASCULAR - ADULT  Goal: Maintains optimal cardiac output and hemodynamic stability  Description: INTERVENTIONS:  - Monitor I/O, vital signs and rhythm  - Monitor for S/S and trends of decreased cardiac output  - Administer and titrate ordered vasoactive medications to optimize hemodynamic stability  - Assess quality of pulses, skin color and temperature  - Assess for signs of decreased coronary artery perfusion  - Instruct patient to report change in severity of symptoms  Outcome: Progressing  Goal: Absence of cardiac dysrhythmias or at baseline rhythm  Description: INTERVENTIONS:  - Continuous cardiac monitoring, vital signs, obtain 12 lead EKG if ordered  - Administer antiarrhythmic and heart rate control medications as ordered  - Monitor electrolytes and administer replacement therapy as ordered  Outcome: Progressing     Problem: GENITOURINARY - ADULT  Goal: Maintains or returns to baseline urinary function  Description: INTERVENTIONS:  - Assess urinary function  - Encourage oral fluids to ensure adequate hydration if ordered  - Administer IV fluids as ordered to ensure adequate hydration  - Administer ordered medications as needed  - Offer frequent toileting  - Follow urinary retention protocol if ordered  Outcome: Progressing  Goal: Absence of urinary retention  Description: INTERVENTIONS:  - Assess patient’s ability to void and empty bladder  - Monitor I/O  - Bladder scan as needed  - Discuss with physician/AP medications to alleviate retention as needed  - Discuss catheterization for long term situations as appropriate  Outcome: Progressing  Goal: Urinary catheter remains patent  Description: INTERVENTIONS:  - Assess patency of urinary catheter  - If patient has a chronic farmer, consider changing catheter if non-functioning  - Follow guidelines for intermittent irrigation of non-functioning urinary catheter  Outcome: Progressing     Problem: METABOLIC, FLUID AND ELECTROLYTES - ADULT  Goal: Electrolytes maintained within normal limits  Description: INTERVENTIONS:  - Monitor labs and assess patient for signs and symptoms of electrolyte imbalances  - Administer electrolyte replacement as ordered  - Monitor response to electrolyte replacements, including repeat lab results as appropriate  - Instruct patient on fluid and nutrition as appropriate  Outcome: Progressing  Goal: Fluid balance maintained  Description: INTERVENTIONS:  - Monitor labs   - Monitor I/O and WT  - Instruct patient on fluid and nutrition as appropriate  - Assess for signs & symptoms of volume excess or deficit  Outcome: Progressing  Goal: Glucose maintained within target range  Description: INTERVENTIONS:  - Monitor Blood Glucose as ordered  - Assess for signs and symptoms of hyperglycemia and hypoglycemia  - Administer ordered medications to maintain glucose within target range  - Assess nutritional intake and initiate nutrition service referral as needed  Outcome: Progressing     Problem: Prexisting or High Potential for Compromised Skin Integrity  Goal: Skin integrity is maintained or improved  Description: INTERVENTIONS:  - Identify patients at risk for skin breakdown  - Assess and monitor skin integrity  - Assess and monitor nutrition and hydration status  - Monitor labs   - Assess for incontinence   - Turn and reposition patient  - Assist with mobility/ambulation  - Relieve pressure over bony prominences  - Avoid friction and shearing  - Provide appropriate hygiene as needed including keeping skin clean and dry  - Evaluate need for skin moisturizer/barrier cream  - Collaborate with interdisciplinary team   - Patient/family teaching  - Consider wound care consult   Outcome: Progressing

## 2023-12-14 NOTE — TELEPHONE ENCOUNTER
Patient seen in consultation during hospitalization for urinary retention up to 1 L.  Status post Colon catheter placement initiation of Flomax to remain in place for 1 week.  Please schedule patient for void trial 1 week.  With provider follow-up in 3 months

## 2023-12-14 NOTE — PROGRESS NOTES
Patient admitted to [facility]; Advanced Heart Failure Census. Outpatient Advanced Heart Failure LCSW completed electronic chart review and rounded with the HF Team. HF Team discussed today's plan. Pt scheduled for cardiac cath today. Referral for outpatient social work not entered at this time. Please enter referral if outpatient resources are needed in the future.

## 2023-12-15 VITALS
BODY MASS INDEX: 34.69 KG/M2 | DIASTOLIC BLOOD PRESSURE: 59 MMHG | HEART RATE: 79 BPM | RESPIRATION RATE: 16 BRPM | OXYGEN SATURATION: 94 % | SYSTOLIC BLOOD PRESSURE: 97 MMHG | TEMPERATURE: 98.1 F | HEIGHT: 67 IN | WEIGHT: 221 LBS

## 2023-12-15 PROBLEM — I42.0 DILATED CARDIOMYOPATHY (HCC): Status: ACTIVE | Noted: 2023-12-15

## 2023-12-15 PROBLEM — I25.10 CORONARY ARTERY DISEASE INVOLVING NATIVE CORONARY ARTERY OF NATIVE HEART: Status: ACTIVE | Noted: 2023-11-10
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
## 2023-12-15 PROBLEM — F10.929 ALCOHOL INTOXICATION (HCC): Status: RESOLVED | Noted: 2020-10-15 | Resolved: 2023-12-15

## 2023-12-15 LAB
ANION GAP SERPL CALCULATED.3IONS-SCNC: 7 MMOL/L
BUN SERPL-MCNC: 29 MG/DL (ref 5–25)
CALCIUM SERPL-MCNC: 8.3 MG/DL (ref 8.4–10.2)
CHLORIDE SERPL-SCNC: 103 MMOL/L (ref 96–108)
CO2 SERPL-SCNC: 23 MMOL/L (ref 21–32)
CREAT SERPL-MCNC: 0.98 MG/DL (ref 0.6–1.3)
ERYTHROCYTE [DISTWIDTH] IN BLOOD BY AUTOMATED COUNT: 12.7 % (ref 11.6–15.1)
GFR SERPL CREATININE-BSD FRML MDRD: 75 ML/MIN/1.73SQ M
GLUCOSE SERPL-MCNC: 85 MG/DL (ref 65–140)
HCT VFR BLD AUTO: 37.7 % (ref 36.5–49.3)
HGB BLD-MCNC: 12.7 G/DL (ref 12–17)
MAGNESIUM SERPL-MCNC: 2.1 MG/DL (ref 1.9–2.7)
MCH RBC QN AUTO: 31.9 PG (ref 26.8–34.3)
MCHC RBC AUTO-ENTMCNC: 33.7 G/DL (ref 31.4–37.4)
MCV RBC AUTO: 95 FL (ref 82–98)
PHOSPHATE SERPL-MCNC: 3.4 MG/DL (ref 2.3–4.1)
PLATELET # BLD AUTO: 133 THOUSANDS/UL (ref 149–390)
PMV BLD AUTO: 11.3 FL (ref 8.9–12.7)
POTASSIUM SERPL-SCNC: 4.4 MMOL/L (ref 3.5–5.3)
RBC # BLD AUTO: 3.98 MILLION/UL (ref 3.88–5.62)
SODIUM SERPL-SCNC: 133 MMOL/L (ref 135–147)
WBC # BLD AUTO: 6.25 THOUSAND/UL (ref 4.31–10.16)

## 2023-12-15 PROCEDURE — 84100 ASSAY OF PHOSPHORUS: CPT | Performed by: INTERNAL MEDICINE

## 2023-12-15 PROCEDURE — 99232 SBSQ HOSP IP/OBS MODERATE 35: CPT | Performed by: INTERNAL MEDICINE

## 2023-12-15 PROCEDURE — 83735 ASSAY OF MAGNESIUM: CPT | Performed by: INTERNAL MEDICINE

## 2023-12-15 PROCEDURE — 80048 BASIC METABOLIC PNL TOTAL CA: CPT | Performed by: INTERNAL MEDICINE

## 2023-12-15 PROCEDURE — 85027 COMPLETE CBC AUTOMATED: CPT | Performed by: INTERNAL MEDICINE

## 2023-12-15 PROCEDURE — 99238 HOSP IP/OBS DSCHRG MGMT 30/<: CPT | Performed by: INTERNAL MEDICINE

## 2023-12-15 RX ORDER — AMIODARONE HYDROCHLORIDE 200 MG/1
200 TABLET ORAL DAILY
Status: CANCELLED
Start: 2023-12-22

## 2023-12-15 RX ORDER — ASPIRIN 81 MG/1
81 TABLET, CHEWABLE ORAL DAILY
Start: 2023-12-16

## 2023-12-15 RX ORDER — AMIODARONE HYDROCHLORIDE 200 MG/1
200 TABLET ORAL DAILY
Refills: 0 | Status: CANCELLED | OUTPATIENT
Start: 2023-12-15

## 2023-12-15 RX ORDER — METOPROLOL SUCCINATE 50 MG/1
50 TABLET, EXTENDED RELEASE ORAL EVERY EVENING
Status: CANCELLED
Start: 2023-12-15

## 2023-12-15 RX ORDER — NITROGLYCERIN 0.4 MG/1
0.4 TABLET SUBLINGUAL
Status: CANCELLED
Start: 2023-12-15

## 2023-12-15 RX ORDER — METOPROLOL SUCCINATE 100 MG/1
100 TABLET, EXTENDED RELEASE ORAL DAILY
Status: CANCELLED
Start: 2023-12-16

## 2023-12-15 RX ORDER — ASPIRIN 81 MG/1
81 TABLET, CHEWABLE ORAL DAILY
Status: CANCELLED
Start: 2023-12-16

## 2023-12-15 RX ORDER — TAMSULOSIN HYDROCHLORIDE 0.4 MG/1
0.4 CAPSULE ORAL
Status: CANCELLED
Start: 2023-12-15

## 2023-12-15 RX ORDER — NITROGLYCERIN 0.4 MG/1
0.4 TABLET SUBLINGUAL
Start: 2023-12-15

## 2023-12-15 RX ORDER — METOPROLOL SUCCINATE 100 MG/1
100 TABLET, EXTENDED RELEASE ORAL DAILY
Start: 2023-12-16

## 2023-12-15 RX ORDER — TAMSULOSIN HYDROCHLORIDE 0.4 MG/1
0.4 CAPSULE ORAL
Start: 2023-12-15

## 2023-12-15 RX ORDER — AMIODARONE HYDROCHLORIDE 200 MG/1
200 TABLET ORAL
Status: CANCELLED
Start: 2023-12-15 | End: 2023-12-22

## 2023-12-15 RX ORDER — METOPROLOL SUCCINATE 50 MG/1
50 TABLET, EXTENDED RELEASE ORAL EVERY EVENING
Start: 2023-12-15

## 2023-12-15 RX ORDER — FUROSEMIDE 20 MG/1
20 TABLET ORAL DAILY
Refills: 0 | Status: CANCELLED | OUTPATIENT
Start: 2023-12-15

## 2023-12-15 RX ORDER — METOPROLOL SUCCINATE 50 MG/1
50 TABLET, EXTENDED RELEASE ORAL EVERY EVENING
Refills: 0 | Status: CANCELLED | OUTPATIENT
Start: 2023-12-15

## 2023-12-15 RX ORDER — AMIODARONE HYDROCHLORIDE 200 MG/1
200 TABLET ORAL
Status: DISCONTINUED | OUTPATIENT
Start: 2023-12-15 | End: 2023-12-15 | Stop reason: HOSPADM

## 2023-12-15 RX ORDER — AMIODARONE HYDROCHLORIDE 200 MG/1
200 TABLET ORAL DAILY
Start: 2023-12-22

## 2023-12-15 RX ORDER — AMIODARONE HYDROCHLORIDE 200 MG/1
TABLET ORAL
Qty: 21 TABLET | Refills: 0 | Status: CANCELLED | OUTPATIENT
Start: 2023-12-15 | End: 2024-01-18

## 2023-12-15 RX ORDER — AMIODARONE HYDROCHLORIDE 200 MG/1
200 TABLET ORAL
Start: 2023-12-15 | End: 2023-12-22

## 2023-12-15 RX ORDER — FUROSEMIDE 20 MG/1
20 TABLET ORAL DAILY
Status: DISCONTINUED | OUTPATIENT
Start: 2023-12-15 | End: 2023-12-15 | Stop reason: HOSPADM

## 2023-12-15 RX ADMIN — APIXABAN 5 MG: 5 TABLET, FILM COATED ORAL at 08:23

## 2023-12-15 RX ADMIN — METOPROLOL SUCCINATE 100 MG: 100 TABLET, EXTENDED RELEASE ORAL at 08:23

## 2023-12-15 RX ADMIN — AMIODARONE HYDROCHLORIDE 200 MG: 200 TABLET ORAL at 11:39

## 2023-12-15 RX ADMIN — FUROSEMIDE 20 MG: 20 TABLET ORAL at 11:39

## 2023-12-15 RX ADMIN — DOCUSATE SODIUM 100 MG: 100 CAPSULE, LIQUID FILLED ORAL at 08:23

## 2023-12-15 RX ADMIN — ASPIRIN 81 MG CHEWABLE TABLET 81 MG: 81 TABLET CHEWABLE at 08:23

## 2023-12-15 RX ADMIN — SACUBITRIL AND VALSARTAN 1 TABLET: 24; 26 TABLET, FILM COATED ORAL at 11:39

## 2023-12-15 NOTE — CASE MANAGEMENT
Case Management Discharge Planning Note    Patient name Yunior Brian  Location [address] [facility] [place] 565/-[id number] MRN [id number]  : 1950 Date 12/15/2023       Current Admission Date: 2023  Current Admission Diagnosis:Acute kidney injury [facility])   Patient Active Problem List    Diagnosis Date Noted    Acute kidney injury ([address]) 2023    Obesity, morbid ([address]) 2023    Diabetes mellitus ([address]) 11/15/2023    Essential hypertension 11/10/2023    Chronic HFrEF (heart failure with reduced ejection fraction) ([address]) 11/10/2023    Deep vein thrombosis (DVT) of left lower extremity ([address]) 11/10/2023    Coronary artery disease involving native coronary artery of native heart 11/10/2023    Benign prostatic hyperplasia with urinary retention       LOS (days): 2  Geometric Mean LOS (GMLOS) (days): 3.1  Days to GMLOS:1.1     OBJECTIVE:  Risk of Unplanned Readmission Score: 14.01         Current admission status: Inpatient   Preferred Pharmacy:   [address] [facility], [address] [facility] [address]  Phone: [phone] Fax: [address] [place] [address], [address] [place] [address]  Phone: [phone] Fax: [address]  Phone: [phone] Fax: [phone]    Primary Care Provider: No primary care provider on file.     Primary Insurance: MEDICARE  Secondary Insurance: BLUE CROSS    DISCHARGE DETAILS:    Contacts  Patient Contacts: Mnia Morejon  Relationship to Patient[de-identified] Family  Contact Method: Phone  Phone Number: [phone]  Reason/Outcome: Discharge Planning    Treatment Team Recommendation: SNF  Discharge Destination Plan[de-identified] SNF  Transport at Discharge : [address]: Yes     Transported by [employer] and Unit #): [place] [phone]  ETA of Transport (Date): 12/15/23  ETA of Transport (Time): 1300      IMM Given (Date):: 12/15/23  IMM Given to[de-identified] Patient      Accepting Facility Name, [address] [place] [address] : [address] [place] [address]  Receiving Facility/Agency Phone Number: [phone]  Facility/Agency Fax Number: [phone]

## 2023-12-15 NOTE — PROGRESS NOTES
Advanced Heart Failure / Pulmonary Hypertension Service Progress Note    Jory Aceves 74 y.o. male   MRN: [id number]  Unit/Bed#: MS Ambrose-Jason; Encounter: [id number]    Assessment:  Principal Problem:    Dilated cardiomyopathy ([address])  Active Problems:    Acute kidney injury ([address])    Essential hypertension    Coronary artery disease involving native coronary artery of native heart    Benign prostatic hyperplasia with urinary retention      Subjective:   Patient seen and examined. No significant events overnight. Denies any SOB, FREDERICK, or worsening swelling s/p IV fluids. Eager to get LHC completed. Objective: Intake/ Output: 1095.8 mL / 2400 mL. Weight: 221 lbs (221 lbs on 12/14). Telemetry: NSR, rates in 70-80s. Continues with NSVT. MAPs: . Today's Plan:  NOT IN ACUTE HF EXACERBATION. LHC yesterday with single vessel CAD ( of RCA). Continues with frequent NSVT. Start PO amiodarone 200 mg TID x1 week followed by 200 mg daily thereafter. Now off IV fluids. Start Entresto 24-36 mg BID and PO Lasix 20 mg daily. Discontinue SLGT2i at discharge. Refer to cardiac rehab and family practice (no PCP listed in EMR) at time of discharge. Continue LifeVest at time of discharge. Repeat CMP in 7-10 days. Hospital follow-up scheduled for 01/04/2024 at [facility]. Plan:  Acute kidney injury, resolved              Baseline creatinine of 0.8-1.1. Today, creatinine of 0.98 (1.09 on 12/14). Nephrology on board; input appreciated. Chronic HFrEF; LVEF 20%; LVIDd 7.1 cm; NYHA II; ACC/AHA Stage C              Etiology: unclear. Possible contribution from long-standing HTN. Negative UDS this admission. Negative HIV. Iron panel and TSH WNL. TTE 11/13/2023 (volume up): LVEF 20%. LVIDd 7.1 cm. Grade 1 DD. RWMA noted. Normal RV. Mild NISA. Moderate MR. Mild TR. Dilated IVC.       Pharmacotherapies / Neurohormonal Blockade:  --Beta Blocker: metoprolol succinate 100 mg qAM and 50 mg qPM.   --ARNi / ACEi / ARB: Entresto 24-26 mg BID. --Aldosterone Antagonist: No (was on MRA during 11/2023; d/c in outpatient setting?). --SGLT2 Inhibitor: Held (on ertugliflozin 5 mg daily as outpatient). Note: history of UTIs. --Home Diuretic: Lasix 40 mg daily. --Inpatient Diuretic: PO Lasix 20 mg daily. Sudden Cardiac Death Risk Reduction:  --LVEF 20%. Wearing LifeVest as outpatient. --Plan to reassess LVEF with limited TTE after 3-4 months of uninterrupted and optimized HF GDMT (earliest 02/2024). Electrical Resynchronization:  --Candidacy for BiV device: narrow QRS. Advanced Therapies (if appropriate): Will continue to monitor. Coronary artery disease              CTA chest 11/10/2023: "Moderate coronary artery calcification indicating atherosclerotic heart disease."   [facility] 12/14/2023: 100% stenosis of ostial to proximal RCA (). RPAV filled by collaterals from distal Cx. Continues on aspirin, statin and BB as above. Nonsustained ventricular tachycardia   Start PO amiodarone 200 mg TID x1 week followed by 200 mg daily thereafter. Continue BB as above. Hypertension: diagnosed in his early 45s. Diabetes mellitus, type II  DVT of left gastrocnemius vein: diagnosed in 11/2023. AC on Eliquis. Basal cell carcinoma: noted on shave biopsy in 11/2023. Following with outpatient dermatology - plan for excision per notes. History of admission for alcohol intoxication: resulting in mechanical fall; occurring in 2020 at [facility]. Benign prostatic hyperplasia    Vitals:   Blood pressure 97/59, pulse 79, temperature 98.1 °F (36.7 °C), resp. rate 16, height 5' 7" (1.702 m), weight 100 kg (221 lb), SpO2 94%. I/O last 3 completed shifts:   In: 1891.7 [P.O.:440; I.V.:1451.7]  Out: 4300 [Urine:4300]    Weight (last 2 days)       Date/Time Weight    12/15/23 0500 100 (221)    12/14/23 0600 100 (221.4)    12/13/23 15:29:46 101 (222)    12/13/23 0727 101 (371) Wt Readings from Last 10 Encounters:   12/15/23 100 kg (221 lb)   12/07/23 103 kg (226 lb 6.4 oz)   11/28/23 104 kg (230 lb)   11/16/23 109 kg (239 lb 3.2 oz)     Vitals:    12/15/23 0500 12/15/23 0751 12/15/23 1139 12/15/23 1147   BP:  127/61 127/63 97/59   BP Location:  Right arm     Pulse:  79 85 79   Resp:  16  16   Temp:  98.3 °F (36.8 °C)  98.1 °F (36.7 °C)   TempSrc:  Oral     SpO2:  94% 93% 94%   Weight: 100 kg (221 lb)      Height:           Physical Exam  Vitals reviewed. Constitutional:       General: He is awake. He is not in acute distress. Appearance: Normal appearance. He is well-developed and overweight. He is not toxic-appearing or diaphoretic. HENT:      Head: Normocephalic. Nose: Nose normal.      Mouth/Throat:      Mouth: Mucous membranes are moist.   Eyes:      General: No scleral icterus. Conjunctiva/sclera: Conjunctivae normal.   Neck:      Trachea: No tracheal deviation. Cardiovascular:      Rate and Rhythm: Normal rate and regular rhythm. Heart sounds: Murmur heard. Pulmonary:      Effort: Pulmonary effort is normal. No tachypnea, bradypnea or respiratory distress. Breath sounds: Decreased air movement present. No decreased breath sounds or wheezing. Abdominal:      General: There is no distension. Palpations: Abdomen is soft. Tenderness: There is no abdominal tenderness. Genitourinary:     Comments: Colon catheter present with tea colored urine  Musculoskeletal:      Cervical back: Neck supple. Right lower leg: No edema. Left lower leg: No edema. Skin:     General: Skin is warm and dry. Coloration: Skin is not jaundiced or pale. Neurological:      General: No focal deficit present. Mental Status: He is alert and oriented to person, place, and time. Psychiatric:         Attention and Perception: Attention normal.         Mood and Affect: Mood normal. Affect is blunt.          Speech: Speech normal. Behavior: Behavior normal. Behavior is cooperative. Thought Content:  Thought content normal.       Lines/Drains/Airways       Active Status       Name Placement date Placement time Site Days    Urethral Catheter Non-latex 18 Fr. 12/14/23 2037  Non-latex  1                    Current Facility-Administered Medications:     acetaminophen (TYLENOL) tablet 650 mg, 650 mg, Oral, Q6H PRN, Naila Labella, CRNP    amiodarone tablet 200 mg, 200 mg, Oral, TID With Meals, Santillan Soup, PA-C, 200 mg at 12/15/23 1139    apixaban (ELIQUIS) tablet 5 mg, 5 mg, Oral, BID, Anila Labella, CRNP, 5 mg at 12/15/23 1984    aspirin chewable tablet 81 mg, 81 mg, Oral, Daily, Naila Labella, CRNP, 81 mg at 12/15/23 4070    atorvastatin (LIPITOR) tablet 40 mg, 40 mg, Oral, After Dondra Mould, CRNP, 40 mg at 12/14/23 1747    docusate sodium (COLACE) capsule 100 mg, 100 mg, Oral, BID, Naila Labella, CRNP, 100 mg at 12/15/23 3397    furosemide (LASIX) tablet 20 mg, 20 mg, Oral, Daily, Santillan Soup, PA-C, 20 mg at 12/15/23 1139    metoprolol succinate (TOPROL-XL) 24 hr tablet 100 mg, 100 mg, Oral, Daily, Naila Labella, CRNP, 100 mg at 12/15/23 1660    metoprolol succinate (TOPROL-XL) 24 hr tablet 50 mg, 50 mg, Oral, QPM, Naila Labella, CRNP, 50 mg at 12/14/23 2107    nitroglycerin (NITROSTAT) SL tablet 0.4 mg, 0.4 mg, Sublingual, Q5 Min PRN, Naila Labella, CRNP    ondansetron [facility]) injection 4 mg, 4 mg, Intravenous, Q6H PRN, Naila Labella, CRNP    sacubitril-valsartan (ENTRESTO) 24-26 MG per tablet 1 tablet, 1 tablet, Oral, BID, Santillan Soup, PA-C, 1 tablet at 12/15/23 1139    tamsulosin (FLOMAX) capsule 0.4 mg, 0.4 mg, Oral, Daily With Dinner, Naila Labella, CRNP, 0.4 mg at 12/14/23 3730    Labs & Results:      Results from last 7 days   Lab Units 12/15/23  0459   WBC Thousand/uL 6.25   HEMOGLOBIN g/dL 12.7   HEMATOCRIT % 37.7   PLATELETS Thousands/uL 133*         Results from last 7 days   Lab Units 12/15/23  0459 12/14/23  0538 12/13/23  1509   POTASSIUM mmol/L 4.4 4.4 4.6   CHLORIDE mmol/L 103 108 105   CO2 mmol/L 23 20* 23   BUN mg/dL 29* 37* 52*   CREATININE mg/dL 0.98 1.09 1.58*   CALCIUM mg/dL 8.3* 8.0* 8.3*         Reji Rodgers, UJLIAN

## 2023-12-15 NOTE — DISCHARGE SUMMARY
02 13 reduced affect that is good okay take    705 we have had to Do the cath and then rapidly it is cardioprotective that  Discharge Summary - Dario Petersen 74 y.o. male MRN: [id number]    Unit/Bed#: MS Ambrose-01 Encounter: [id number]    Admission Date: 12/13/2023   Discharge Date: 12/15/2023    Disposition: Short-term rehab at [facility]    Condition at Discharge: fair     PCP:No primary care provider on file. OP Cardiologist: Dr Pierre Henderson    Interventional cardiologist: Dr. Uyen Sargent    Admitting Diagnosis:  Reduced ejection fraction 20%- admitted for ischemic evaluation  Chronic HFrEF; LVEF 20%; LVIDd 7.1 cm; NYHA II; ACC/AHA Stage C. Secondary Diagnoses  CHUY - Secondary to volume depletion and urinary retention. Hypertension  Hyperlipidemia  Former smoker quit 20 years ago  DVT left gastrocnemius vein 11/23 on Eliquis  Ambulatory dysfunction chronic  Chronic kidney disease  Basal cell carcinoma above right clavicle at neck crease-plan for outpatient excision    Discharge Diagnosis:  Coronary artery disease t chronic total occlusion(100%) proximal RCA   RPAV filled by collaterals from distal circumflex;   mild disease iin LAD    NSVT  started on Amiodarone 200 mg Tid for 7 days then 200 mg daily  Check CMP in 1 month. Resume life vest    Urinary retention -discharged home with Colon catheter. Patient needs to follow-up with urology in 1 week  Heart failure with reduced ejection fraction, dilated stage C NYHA II/ Mixed cardiomyopathy lasix entresto added. CHUY resolved-BMP should be checked within 1 week    Consultants:  Heart failure Dr. Karin Willis  Urology Dr. Safia Stanley      /61 (BP Location: Right arm)   Pulse 79   Temp 98.3 °F (36.8 °C) (Oral)   Resp 16   Ht 5' 7" (1.702 m)   Wt 100 kg (221 lb)   SpO2 94%   BMI 34.61 kg/m²       Review of Systems   Genitourinary:  Positive for difficulty urinating.        Physical Exam        HPI and Hospital Course: 68-year-old male with history of hypertension, hyperlipidemia, former smoker quit 20 years ago, DVT of the left gastrocnemius vein 11/23 on Eliquis, and new onset of low ejection fraction of 20%. Patient was admitted for left heart cath for ischemic evaluation. Patient  has a gait dysfunction and was originally admitted and discharged from [address] [place] [address]. Patient was admitted to [facility] for ischemic evaluation due to his low ejection fraction. On admission his creatinine was 1.7 and his cath was canceled . Nephrology and heart failure were consulted. Patient received IV hydration and nephrotoxic agents were held. Patient underwent cardiac catheterization on 12/13/2023 which revealed 100%  Ost RCA to Prox RCA lesion is 100% stenosed. RPAV filled by collaterals from distal circumflex;  and and diffuse disease in the LAD. LVEDP pressure was 10 mmHg. Patient had urinary retention during the hospitalization with his elevated creatinine. Urology was consulted and indwelling Colon catheter was placed after failing urinary retention protocol . Patient's urine is dark yellow with sedimentation. Patient needs to follow-up with urology in 1 week in the office. Patient's discharge creatinine is 0.8 and his GFR is 75. BMP should be rechecked in 1 week. Hold your    Patient is having episodes of nonsustained VT. Amiodarone was started for  PVC suppression. He can resume his LifeVest once he is discharged. In regards to his DVT his Eliquis has been resumed at 5 mg twice daily. Heart failure service has started him on Entresto 24/26 2 times per day and Lasix 40 mg daily. He will follow-up in our cardiology [place] [address] [place] [address] office.           Current Facility-Administered Medications   Medication Dose Route Frequency    acetaminophen (TYLENOL) tablet 650 mg  650 mg Oral Q6H PRN    apixaban (ELIQUIS) tablet 5 mg  5 mg Oral BID    aspirin chewable tablet 81 mg  81 mg Oral Daily    atorvastatin (LIPITOR) tablet 40 mg  40 mg Oral After Dinner    docusate sodium (COLACE) capsule 100 mg  100 mg Oral BID    metoprolol succinate (TOPROL-XL) 24 hr tablet 100 mg  100 mg Oral Daily    metoprolol succinate (TOPROL-XL) 24 hr tablet 50 mg  50 mg Oral QPM    nitroglycerin (NITROSTAT) SL tablet 0.4 mg  0.4 mg Sublingual Q5 Min PRN    ondansetron (ZOFRAN) injection 4 mg  4 mg Intravenous Q6H PRN    tamsulosin (FLOMAX) capsule 0.4 mg  0.4 mg Oral Daily With Dinner       Pertinent Labs/diagnostics:        Lab Ressults:  Recent Results (from the past 24 hour(s))   Basic metabolic panel    Collection Time: 12/15/23  4:59 AM   Result Value Ref Range    Sodium 133 (L) 135 - 147 mmol/L    Potassium 4.4 3.5 - 5.3 mmol/L    Chloride 103 96 - 108 mmol/L    CO2 23 21 - 32 mmol/L    ANION GAP 7 mmol/L    BUN 29 (H) 5 - 25 mg/dL    Creatinine 0.98 0.60 - 1.30 mg/dL    Glucose 85 65 - 140 mg/dL    Calcium 8.3 (L) 8.4 - 10.2 mg/dL    eGFR 75 ml/min/1.73sq m   Magnesium    Collection Time: 12/15/23  4:59 AM   Result Value Ref Range    Magnesium 2.1 1.9 - 2.7 mg/dL   Phosphorus    Collection Time: 12/15/23  4:59 AM   Result Value Ref Range    Phosphorus 3.4 2.3 - 4.1 mg/dL   CBC    Collection Time: 12/15/23  4:59 AM   Result Value Ref Range    WBC 6.25 4.31 - 10.16 Thousand/uL    RBC 3.98 3.88 - 5.62 Million/uL    Hemoglobin 12.7 12.0 - 17.0 g/dL    Hematocrit 37.7 36.5 - 49.3 %    MCV 95 82 - 98 fL    MCH 31.9 26.8 - 34.3 pg    MCHC 33.7 31.4 - 37.4 g/dL    RDW 12.7 11.6 - 15.1 %    Platelets 632 (L) 344 - 390 Thousands/uL    MPV 11.3 8.9 - 12.7 fL           Lipid Profile:   No results found for: "CHOL"  Lab Results   Component Value Date    HDL 27 (L) 11/13/2023     Lab Results   Component Value Date    LDLCALC 45 11/13/2023     Lab Results   Component Value Date    TRIG 172 (H) 11/13/2023         Tele: Sinus rhythm with PVCs and short runs of VT      Discharge instructions/Information to patient and family:   See after visit summary for information provided to patient and family. Provisions for Follow-Up Care:  See after visit summary for information related to follow-up care and any pertinent home health orders. Planned Readmission: No    Discharge Statement:  I spent 45 minutes minutes discharging the patient. This time was spent on the day of discharge. I had direct contact with the patient on the day of discharge. Additional documentation is required if more than 30 minutes were spent on discharge. ** Please Note: Fluency Direct Dictation voice to text software may have been used in the creation of this document.  **

## 2023-12-15 NOTE — DISCHARGE INSTR - AVS FIRST PAGE
1. Please see the post cardiac catheterization dishcarge instructions. No heavy lifting, greater than 10 lbs. or strenuous  activity for 48 hrs. 2.Remove band aid tomorrow. Shower and wash area- wrist gently with soap and water- beginning tomorrow. Rinse and pat dry. Apply new water seal band aid. Repeat this process for 5 days. No powders, creams lotions or antibiotic ointments  for 5 days. No tub baths, hot tubs or swimming for 5 days. 3. Please call our office ([phone]) if you have any fever, redness, swelling, discharge from your wrist access site.     4.No driving for 1 day

## 2023-12-15 NOTE — PROGRESS NOTES
NEPHROLOGY PROGRESS NOTE   Zoraida Levi 74 y.o. male MRN: [id number]  Unit/Bed#: -01 Encounter: [id number]  Reason for Consult: CHUY    ASSESSMENT/PLAN:  Acute Kidney Injury, POA- secondary to volume depletion in the setting of diuretics with initiation of lisinopril +/- urinary retention  Creatinine back to baseline, RESOLVED   S/p IVF  Cardiology restarting lasix and entresto today 12/15/23  BMP in 5-7 days outpatient  Chronic Kidney Disease stage 3a- Baselie creatinine 0.9-1.2. Will follow up with our office. Blood Pressure- acceptable  Lower dose of lasix started today  Continue metoprolol   Combined CHF- s/p IVF and now restarted low dose lasix   Urinary Retention- farmer catheter in place  Outpatient urology follow up     Disposition:  Stable from renal standpoint for discharge. BMP 5-7 days. Office message sent. SUBJECTIVE:  Patient denies acute complaints and is feeling well. States he does not have a PCP. No SOB. No LE edema. Willing to follow up in our office.     OBJECTIVE:  Current Weight: Weight - Scale: 100 kg (221 lb)  Vitals:    12/15/23 0500 12/15/23 0751 12/15/23 1139 12/15/23 1147   BP:  127/61 127/63 97/59   BP Location:  Right arm     Pulse:  79 85 79   Resp:  16  16   Temp:  98.3 °F (36.8 °C)  98.1 °F (36.7 °C)   TempSrc:  Oral     SpO2:  94% 93% 94%   Weight: 100 kg (221 lb)      Height:           Intake/Output Summary (Last 24 hours) at 12/15/2023 1225  Last data filed at 12/15/2023 0900  Gross per 24 hour   Intake 1113.33 ml   Output 1650 ml   Net -536.67 ml     General: NAD  Skin: no rash, dry  Eyes: anicteric  ENMT: mm moist  Neck: no masses  Respiratory: CTAB  Cardiac: RRR  Extremities: no edema  GI: soft nt nd  Neuro: alert awake  Psych: mood and affect appropriate    Medications:    Current Facility-Administered Medications:     acetaminophen (TYLENOL) tablet 650 mg, 650 mg, Oral, Q6H PRN, Danial Pong, CRNP    amiodarone tablet 200 mg, 200 mg, Oral, TID With Meals, Shari Fan PA-C, 200 mg at 12/15/23 1139    apixaban (ELIQUIS) tablet 5 mg, 5 mg, Oral, BID, Odette Cart, CRNP, 5 mg at 12/15/23 1791    aspirin chewable tablet 81 mg, 81 mg, Oral, Daily, Odette Cart, CRNP, 81 mg at 12/15/23 2877    atorvastatin (LIPITOR) tablet 40 mg, 40 mg, Oral, After Tiny Garg, JAKENP, 40 mg at 12/14/23 1747    docusate sodium (COLACE) capsule 100 mg, 100 mg, Oral, BID, Odette Cart, CRNP, 100 mg at 12/15/23 3174    furosemide (LASIX) tablet 20 mg, 20 mg, Oral, Daily, Shari Fan PA-C, 20 mg at 12/15/23 1139    metoprolol succinate (TOPROL-XL) 24 hr tablet 100 mg, 100 mg, Oral, Daily, Odette Cart, CRNP, 100 mg at 12/15/23 9111    metoprolol succinate (TOPROL-XL) 24 hr tablet 50 mg, 50 mg, Oral, QPM, Odette Cart, CRNP, 50 mg at 12/14/23 2107    nitroglycerin (NITROSTAT) SL tablet 0.4 mg, 0.4 mg, Sublingual, Q5 Min PRN, Odette Cart, CRNP    ondansetron [facility]) injection 4 mg, 4 mg, Intravenous, Q6H PRN, Odette Cart, CRNP    sacubitril-valsartan (ENTRESTO) 24-26 MG per tablet 1 tablet, 1 tablet, Oral, BID, Shari Fan PA-C, 1 tablet at 12/15/23 1139    tamsulosin (FLOMAX) capsule 0.4 mg, 0.4 mg, Oral, Daily With Tiny Forest, CRNP, 0.4 mg at 12/14/23 1747    Laboratory Results:  Results from last 7 days   Lab Units 12/15/23  0459 12/14/23  0538 12/13/23  1509 12/13/23  0742   WBC Thousand/uL 6.25  --   --   --    HEMOGLOBIN g/dL 12.7  --   --   --    HEMATOCRIT % 37.7  --   --   --    PLATELETS Thousands/uL 133*  --   --   --    POTASSIUM mmol/L 4.4 4.4 4.6 5.5*   CHLORIDE mmol/L 103 108 105 110*   CO2 mmol/L 23 20* 23 23   BUN mg/dL 29* 37* 52* 61*   CREATININE mg/dL 0.98 1.09 1.58* 1.75*   CALCIUM mg/dL 8.3* 8.0* 8.3* 9.2   MAGNESIUM mg/dL 2.1  --   --   --    PHOSPHORUS mg/dL 3.4  --   --   --      I have personally reviewed the blood work as stated above and in my note. I have personally reviewed cardiology note.

## 2023-12-16 LAB — BACTERIA UR CULT: NORMAL

## 2023-12-18 NOTE — TELEPHONE ENCOUNTER
LM for Renan that we have and appointment for tomorrow at the [place] office at 8:30 and 3 - He can call back to confirm or if he wants to go to [place] we can set that up as michelle has no appointments until further notice

## 2023-12-18 NOTE — TELEPHONE ENCOUNTER
Look as  if Toro was sent to [facility] in [place] [phone]  called [facility]. They will do a void trial in a week and call with results

## 2023-12-19 ENCOUNTER — TELEPHONE (OUTPATIENT)
Dept: NEPHROLOGY | Facility: CLINIC | Age: 73
End: 2023-12-19

## 2023-12-19 NOTE — TELEPHONE ENCOUNTER
----- Message from Solange Jerome PA-C sent at 12/15/2023 12:17 PM EST -----  Please schedule nonurgent hospital follow up for CKD3a.  Patient being discharged from [facility] today.  Thank you.

## 2023-12-22 NOTE — TELEPHONE ENCOUNTER
Spoke with GALLO and explained that we don't have a provider at this time in [place] - He would have to travel to [facility].  He states they did contact [facility] and they will be over next week and then he will call back to set up a plan

## 2023-12-22 NOTE — TELEPHONE ENCOUNTER
GALLO from [facility] calling in regarding apt for patient for void trial. They stated patient was discharged [facility] yesterday. Please review and call [facility] to set up apts for void trial. They do not have a nurse at this facility to do void trial.     CB: [phone]   (GALLO CHAVEZ)

## 2023-12-24 ENCOUNTER — HOSPITAL ENCOUNTER (EMERGENCY)
Facility: HOSPITAL | Age: 73
Discharge: HOME | End: 2023-12-24
Attending: EMERGENCY MEDICINE
Payer: MEDICARE

## 2023-12-24 VITALS
WEIGHT: 221 LBS | HEIGHT: 67 IN | DIASTOLIC BLOOD PRESSURE: 52 MMHG | BODY MASS INDEX: 34.69 KG/M2 | SYSTOLIC BLOOD PRESSURE: 107 MMHG | TEMPERATURE: 98.3 F | RESPIRATION RATE: 18 BRPM | HEART RATE: 67 BPM | OXYGEN SATURATION: 98 %

## 2023-12-24 DIAGNOSIS — N39.0 URINARY TRACT INFECTION: ICD-10-CM

## 2023-12-24 DIAGNOSIS — Z46.6 URINARY CATHETER (FOLEY) CHANGE REQUIRED: ICD-10-CM

## 2023-12-24 DIAGNOSIS — R33.9 URINARY RETENTION: Primary | ICD-10-CM

## 2023-12-24 LAB
BACTERIA UR QL AUTO: ABNORMAL /HPF
BILIRUB UR QL STRIP: NEGATIVE
CLARITY UR: CLEAR
COLOR UR: YELLOW
GLUCOSE UR STRIP-MCNC: ABNORMAL MG/DL
HGB UR QL STRIP.AUTO: ABNORMAL
KETONES UR STRIP-MCNC: NEGATIVE MG/DL
LEUKOCYTE ESTERASE UR QL STRIP: ABNORMAL
NITRITE UR QL STRIP: NEGATIVE
NON-SQ EPI CELLS URNS QL MICRO: ABNORMAL /HPF
OTHER STN SPEC: ABNORMAL
PH UR STRIP.AUTO: 5 [PH]
PROT UR STRIP-MCNC: NEGATIVE MG/DL
RBC #/AREA URNS AUTO: ABNORMAL /HPF
SP GR UR STRIP.AUTO: 1.01 (ref 1–1.03)
UROBILINOGEN UR STRIP-ACNC: <2 MG/DL
WBC #/AREA URNS AUTO: ABNORMAL /HPF

## 2023-12-24 PROCEDURE — 81001 URINALYSIS AUTO W/SCOPE: CPT

## 2023-12-24 PROCEDURE — 99284 EMERGENCY DEPT VISIT MOD MDM: CPT

## 2023-12-24 PROCEDURE — 87106 FUNGI IDENTIFICATION YEAST: CPT

## 2023-12-24 PROCEDURE — 87086 URINE CULTURE/COLONY COUNT: CPT

## 2023-12-24 RX ORDER — CEPHALEXIN 250 MG/1
500 CAPSULE ORAL ONCE
Status: COMPLETED | OUTPATIENT
Start: 2023-12-24 | End: 2023-12-24

## 2023-12-24 RX ORDER — CEPHALEXIN 500 MG/1
500 CAPSULE ORAL EVERY 6 HOURS SCHEDULED
Qty: 28 CAPSULE | Refills: 0 | Status: SHIPPED | OUTPATIENT
Start: 2023-12-24 | End: 2023-12-31

## 2023-12-24 RX ORDER — FLUCONAZOLE 100 MG/1
200 TABLET ORAL ONCE
Status: COMPLETED | OUTPATIENT
Start: 2023-12-24 | End: 2023-12-24

## 2023-12-24 RX ADMIN — FLUCONAZOLE 200 MG: 100 TABLET ORAL at 15:46

## 2023-12-24 RX ADMIN — CEPHALEXIN 500 MG: 250 CAPSULE ORAL at 15:46

## 2023-12-24 NOTE — ED PROVIDER NOTES
History  Chief Complaint   Patient presents with    Urinary Catheter Problem     Patient presents to the ER via EMS from [facility] with report of no urine coming out of his farmer catheter.  Patient reports being on fluid restriction, emptying the farmer bag this morning and having only a small amount in the farmer bag upon arrival.     This is a 72 y/o male with PMH CHF, CAD, DVT, DM, HTN who presents to the ER today for decreased urine output from catheter. Patient reports that this morning his bag was full. He emptied it and then after lunch he noticed there was minimal urine in it. He also noticed he was developing some abdominal pressure as well. He denies having this before. He denies fevers, chills, sediment in farmer bag, blood in urine, back pain, pelvic numbness. He states he just had the catheter placed two days ago at [facility]. Patient had a recent hospital admission at \[facility]\"" for cardiac catheterization       History provided by:  Patient   used: No        Prior to Admission Medications   Prescriptions Last Dose Informant Patient Reported? Taking?   acetaminophen (TYLENOL) 325 mg tablet  Outside Facility (Specify) No No   Sig: Take 2 tablets (650 mg total) by mouth every 6 (six) hours as needed for mild pain, headaches or fever   amiodarone 200 mg tablet   No No   Sig: Take 1 tablet (200 mg total) by mouth 3 (three) times a day with meals for 20 doses   amiodarone 200 mg tablet   No No   Sig: Take 1 tablet (200 mg total) by mouth daily Do not start before December 22, 2023.   apixaban (ELIQUIS) 5 mg  Outside Facility (Specify) No No   Sig: Take 1 tablet (5 mg total) by mouth 2 (two) times a day Do not start before November 21, 2023.   aspirin 81 mg chewable tablet   No No   Sig: Chew 1 tablet (81 mg total) daily   atorvastatin (LIPITOR) 40 mg tablet  Outside Facility (Specify) No No   Sig: Take 1 tablet (40 mg total) by mouth daily with dinner   docusate sodium  (COLACE) 100 mg capsule  Outside Facility (Specify) Yes No   Sig: Take 100 mg by mouth 2 (two) times a day   furosemide (LASIX) 40 mg tablet  Outside Facility (Specify) No No   Sig: Take 1 tablet (40 mg total) by mouth daily Do not start before November 16, 2023.   magnesium hydroxide (MILK OF MAGNESIA) 400 mg/5 mL oral suspension  Outside Facility (Specify) Yes No   Sig: Take by mouth daily as needed for constipation   metoprolol succinate (TOPROL-XL) 100 mg 24 hr tablet   No No   Sig: Take 1 tablet (100 mg total) by mouth daily   metoprolol succinate (TOPROL-XL) 50 mg 24 hr tablet   No No   Sig: Take 1 tablet (50 mg total) by mouth every evening   nitroglycerin (NITROSTAT) 0.4 mg SL tablet   No No   Sig: Place 1 tablet (0.4 mg total) under the tongue every 5 (five) minutes as needed for chest pain   sacubitril-valsartan (ENTRESTO) 24-26 MG TABS   No No   Sig: Take 1 tablet by mouth 2 (two) times a day   tamsulosin (FLOMAX) 0.4 mg   No No   Sig: Take 1 capsule (0.4 mg total) by mouth daily with dinner      Facility-Administered Medications: None       Past Medical History:   Diagnosis Date    Alcohol intoxication ([facility]) 10/15/2020    BPH (benign prostatic hyperplasia)     Chronic HFrEF (heart failure with reduced ejection fraction) ([facility]) 11/2023    Coronary artery calcification seen on CT scan 11/10/2023    Coronary artery disease 12/14/2023    Deep vein thrombosis (DVT) of left lower extremity ([facility]) 11/2023    Diabetes mellitus ([facility]) 11/2023    Fall from slip, trip, or stumble 10/15/2020    History of phimosis of penis     History of urinary calculi     Hypertension 1988    Skin cancer     Tobacco abuse     quit around 2000       Past Surgical History:   Procedure Laterality Date    BLADDER STONE REMOVAL  2014    CARDIAC CATHETERIZATION N/A 12/14/2023    Procedure: Cardiac catheterization;  Surgeon: Dave Royal MD;  Location[facility];  Service: Cardiology    ORIF ANKLE FRACTURE Left     TOTAL HIP  ARTHROPLASTY Right        Family History   Problem Relation Age of Onset    Breast cancer Mother     Heart attack Father 61    Other Sister         s/p hysterectomy    Cerebral palsy Brother     Skin cancer Other         family history    No Known Problems Son     No Known Problems Daughter     Sudden death Neg Hx      I have reviewed and agree with the history as documented.    E-Cigarette/Vaping    E-Cigarette Use Never User      E-Cigarette/Vaping Substances     Social History     Tobacco Use    Smoking status: Former     Types: Cigarettes     Start date:      Quit date:      Years since quittin.0    Smokeless tobacco: Never    Tobacco comments:     Quit over 30 years ago (Updated 2023).    Vaping Use    Vaping status: Never Used   Substance Use Topics    Alcohol use: Not Currently    Drug use: Never       Review of Systems   Constitutional:  Negative for chills and fever.   Respiratory:  Negative for shortness of breath.    Cardiovascular:  Negative for chest pain.   Gastrointestinal:  Positive for abdominal distention and abdominal pain. Negative for nausea and vomiting.   Genitourinary:  Positive for decreased urine volume and difficulty urinating. Negative for hematuria.       Physical Exam  Physical Exam  Vitals and nursing note reviewed.   Constitutional:       General: He is awake.      Appearance: He is well-developed. He is obese.   HENT:      Head: Normocephalic and atraumatic.      Right Ear: External ear normal.      Left Ear: External ear normal.      Nose: Nose normal.   Eyes:      General: No scleral icterus.     Extraocular Movements: Extraocular movements intact.   Cardiovascular:      Rate and Rhythm: Normal rate and regular rhythm.      Heart sounds: Normal heart sounds, S1 normal and S2 normal. No murmur heard.     No gallop.   Pulmonary:      Effort: Pulmonary effort is normal.      Breath sounds: Normal breath sounds. No wheezing, rhonchi or rales.   Abdominal:      General:  Abdomen is protuberant. Bowel sounds are normal.      Palpations: Abdomen is soft.      Tenderness: There is no abdominal tenderness.      Comments: Urine draining into new farmer bag well. No sediment or hematuria noted.    Musculoskeletal:         General: Normal range of motion.      Cervical back: Normal range of motion.   Skin:     General: Skin is warm and dry.   Neurological:      General: No focal deficit present.      Mental Status: He is alert.   Psychiatric:         Attention and Perception: Attention and perception normal.         Mood and Affect: Mood normal.         Behavior: Behavior normal. Behavior is cooperative.         Vital Signs  ED Triage Vitals [12/24/23 1412]   Temperature Pulse Respirations Blood Pressure SpO2   98.3 °F (36.8 °C) 77 17 104/65 96 %      Temp Source Heart Rate Source Patient Position - Orthostatic VS BP Location FiO2 (%)   Oral Monitor Lying Right arm --      Pain Score       No Pain           Vitals:    12/24/23 1412 12/24/23 1500   BP: 104/65 107/52   Pulse: 77 67   Patient Position - Orthostatic VS: Lying          Visual Acuity      ED Medications  Medications   cephalexin (KEFLEX) capsule 500 mg (500 mg Oral Given 12/24/23 1546)   fluconazole (DIFLUCAN) tablet 200 mg (200 mg Oral Given 12/24/23 1546)       Diagnostic Studies  Results Reviewed       Procedure Component Value Units Date/Time    Urine culture [[id number]] Collected: 12/24/23 1546    Lab Status: In process Specimen: Urine, Clean Catch Updated: 12/24/23 1553    Urine Microscopic [[id number]]  (Abnormal) Collected: 12/24/23 1426    Lab Status: Final result Specimen: Urine, Indwelling Farmer Catheter Updated: 12/24/23 1531     RBC, UA 0-1 /hpf      WBC, UA 4-10 /hpf      Epithelial Cells Occasional /hpf      Bacteria, UA Occasional /hpf      OTHER OBSERVATIONS Yeast Cells Present    Narrative:      Microscopic performed by Allie Martini.     UA w Reflex to Microscopic w Reflex to Culture [[id number]]  (Abnormal)  Collected: 12/24/23 1426    Lab Status: Final result Specimen: Urine, Indwelling Farmer Catheter Updated: 12/24/23 1452     Color, UA Yellow     Clarity, UA Clear     Specific Gravity, UA 1.015     pH, UA 5.0     Leukocytes, UA Moderate     Nitrite, UA Negative     Protein, UA Negative mg/dl      Glucose, UA 30 (3/100%) mg/dl      Ketones, UA Negative mg/dl      Urobilinogen, UA <2.0 mg/dl      Bilirubin, UA Negative     Occult Blood, UA Small                   No orders to display              Procedures  Procedures         ED Course                               SBIRT 20yo+      Flowsheet Row Most Recent Value   Initial Alcohol Screen: US AUDIT-C     1. How often do you have a drink containing alcohol? 0 Filed at: 12/24/2023 1414   Audit-C Score 0 Filed at: 12/24/2023 1414   WILLIE: How many times in the past year have you...    Used an illegal drug or used a prescription medication for non-medical reasons? Never Filed at: 12/24/2023 1414                      Medical Decision Making  74 y/o male here for urinary retention  Differential diagnosis including but not limited to: UTI, urinary retention, prostatomegaly/obstruction  Assessment: UTI, urinary retention, farmer catheter change  Plan:  bladder scan > 1000 mL. Improved after farmer exchanged. UTI noted on UA with yeast cells present. Started on keflex x one week and gave single dose of diflucan. Patient  was given strict return to ER precautions both verbally and in discharge papers. Patient verbalized understanding and agrees with plan.      Amount and/or Complexity of Data Reviewed  External Data Reviewed: notes.     Details: Recent admission disccharged 12/15/23  Labs: ordered.    Risk  Prescription drug management.             Disposition  Final diagnoses:   Urinary retention   Urinary catheter (Farmer) change required   Urinary tract infection     Time reflects when diagnosis was documented in both MDM as applicable and the Disposition within this note        Time User Action Codes Description Comment    12/24/2023  3:54 PM Yolanda Jane [R33.9] Urinary retention     12/24/2023  3:54 PM Yolanda Jane [Z46.6] Urinary catheter (Colon) change required     12/24/2023  3:54 PM Yolanda Jane [N39.0] Urinary tract infection           ED Disposition       ED Disposition   Discharge    Condition   Stable    Date/Time   Sun Dec 24, 2023 4024    Comment   Toro Pricey discharge to home/self care.                   Follow-up Information    None         Discharge Medication List as of 12/24/2023  4:02 PM        START taking these medications    Details   cephalexin (KEFLEX) 500 mg capsule Take 1 capsule (500 mg total) by mouth every 6 (six) hours for 7 days, Starting Sun 12/24/2023, Until Sun 12/31/2023, Print           CONTINUE these medications which have NOT CHANGED    Details   acetaminophen (TYLENOL) 325 mg tablet Take 2 tablets (650 mg total) by mouth every 6 (six) hours as needed for mild pain, headaches or fever, Starting Wed 11/15/2023, No Print      amiodarone 200 mg tablet Take 1 tablet (200 mg total) by mouth daily Do not start before December 22, 2023., Starting Fri 12/22/2023, No Print      apixaban (ELIQUIS) 5 mg Take 1 tablet (5 mg total) by mouth 2 (two) times a day Do not start before November 21, 2023., Starting Tue 11/21/2023, No Print      aspirin 81 mg chewable tablet Chew 1 tablet (81 mg total) daily, Starting Sat 12/16/2023, No Print      atorvastatin (LIPITOR) 40 mg tablet Take 1 tablet (40 mg total) by mouth daily with dinner, Starting Wed 11/15/2023, No Print      docusate sodium (COLACE) 100 mg capsule Take 100 mg by mouth 2 (two) times a day, Historical Med      furosemide (LASIX) 40 mg tablet Take 1 tablet (40 mg total) by mouth daily Do not start before November 16, 2023., Starting u 11/16/2023, No Print      magnesium hydroxide (MILK OF MAGNESIA) 400 mg/5 mL oral suspension Take by mouth daily as needed for constipation, Historical  Med      !! metoprolol succinate (TOPROL-XL) 100 mg 24 hr tablet Take 1 tablet (100 mg total) by mouth daily, Starting Sat 12/16/2023, No Print      !! metoprolol succinate (TOPROL-XL) 50 mg 24 hr tablet Take 1 tablet (50 mg total) by mouth every evening, Starting Fri 12/15/2023, No Print      nitroglycerin (NITROSTAT) 0.4 mg SL tablet Place 1 tablet (0.4 mg total) under the tongue every 5 (five) minutes as needed for chest pain, Starting Fri 12/15/2023, No Print      sacubitril-valsartan (ENTRESTO) 24-26 MG TABS Take 1 tablet by mouth 2 (two) times a day, Starting Fri 12/15/2023, No Print      tamsulosin (FLOMAX) 0.4 mg Take 1 capsule (0.4 mg total) by mouth daily with dinner, Starting Fri 12/15/2023, No Print       !! - Potential duplicate medications found. Please discuss with provider.          No discharge procedures on file.    PDMP Review         Value Time User    PDMP Reviewed  Yes 11/15/2023 12:20 PM Krysta Castillo PA-C            ED Provider  Electronically Signed by             Yolanda Jane PA-C  12/24/23 2884

## 2023-12-26 LAB — BACTERIA UR CULT: ABNORMAL

## 2023-12-26 NOTE — TELEPHONE ENCOUNTER
LINDA for GALLO about void  trial for Toro as we do not have anyone in our [place] office at this time and if [facility] would be able to assist with the void trial

## 2023-12-27 NOTE — TELEPHONE ENCOUNTER
GALLO returned call and stated that he spoke with [facility] and is working on getting a new order from the patient's PCP.    Once the new order has been obtained, [facility] will do the voiding trial.    Call back if any questions [phone]

## 2023-12-27 NOTE — TELEPHONE ENCOUNTER
LINDA for GALLO about void  trial for Toro as we do not have anyone in our [place] office at this time and if [facility] would be able to assist with the void trial

## 2023-12-29 ENCOUNTER — TELEPHONE (OUTPATIENT)
Age: 73
End: 2023-12-29

## 2023-12-29 NOTE — TELEPHONE ENCOUNTER
Patients daughter calling in in regards to void trial and f/u from hospital/ER . Please review and call patients daughter back.     CB: [phone]  (Shanique-daughter)

## 2023-12-29 NOTE — TELEPHONE ENCOUNTER
Called the patient and asked for a call back to confirm the below appointment.    Date: 4/19/2024     Arrival Time: 2:30 PM     Visit Type: FOLLOW UP PG [[id number]]     Provider: Santo Storey MD [facility]

## 2023-12-29 NOTE — TELEPHONE ENCOUNTER
Void trial to be done by facility/[facility] as noted below. Follow up in 3 months as noted by CARLIE De La Paz

## 2023-12-29 NOTE — TELEPHONE ENCOUNTER
Pt daughter calling in to see if she has to make her dads excision appt for his BCC, or if someone will be calling him

## 2023-12-30 NOTE — PROGRESS NOTES
Cardiology Office Follow Up  Toro Rodriguez  1950  [id number]      ASSESSMENT:  Nonischemic dilated cardiomyopathy/ HFrEF  11/2023 echo: LVEF 20%  GDMT: Entresto 24-26 twice daily, Toprol- mg in a.m., 50 mg in p.m.  Diuretic: Lasix 40 daily  Patient seen and evaluated by advanced heart failure and degree of cardiomyopathy felt to be out of proportion from his CAD  Coronary artery disease  12/2023 LHC: Ostial proximal RCA stenosis , mild disease in remainder of coronaries, right system filled by collaterals  Nonsustained VT/frequent PVCs  Takes amiodarone for suppression  History of DVT November 2023 for which he takes Eliquis  Type 2 diabetes  Hypertension  Long-term resident of assisted living facility    PLAN/ DISCUSSION:  Overall appears euvolemic today on low-dose of Lasix 40 mg daily  Remains on aspirin, statin, and beta-blocker for CAD, not having any angina  We will attempt to further optimize his GDMT  Repeat BMP today  If renal function and electrolytes stable we will double dose of Entresto  Continue Toprol- a.m. and 50  Continue amiodarone for NSVT  Repeat echocardiogram in 6-7 weeks  If EF remains <35%, we will plan for ICD implantation (currently wearing LifeVest)  Follow-up 2 months  Encouraged to call the office in the meantime for any questions or concerns, specifically concerns about retaining fluid    Addendum: Received medication list from assisted living facility.  Currently prescribed lisinopril and Entresto.  We will discontinue lisinopril but continue Entresto as above. He is also not getting amiodarone. New Rx sent today.    Interval History/ HPI:   73-year-old gentleman with history of dilated cardiomyopathy.  He underwent recent cardiac cath which showed a  of his RCA.  Catheterization was complicated by acute kidney injur with peak creatinine of 1.75 y.  His creatinine did gradually improve.  He was placed on beta-blockers and Entresto.  He is here today for a  "posthospital follow-up.    Today comes to the office accompanied by his daughter.  He reports feeling generally weak.  This is gotten slightly better since his hospitalization but is still present.  He reports no chest pain or chest pressure.  He reports ongoing swelling in his left leg which has been present for several months.  He is currently wearing a LifeVest.  He has had no shocks.  He has had no further falls or episodes of passing out at home.     Vitals:  /60 (BP Location: Left arm, Patient Position: Sitting, Cuff Size: Standard)   Pulse 76   Ht 5' 7\" (1.702 m)   Wt 103 kg (226 lb 6.4 oz)   BMI 35.46 kg/m²      Past Medical History:   Diagnosis Date    Alcohol intoxication ([facility]) 10/15/2020    BPH (benign prostatic hyperplasia)     Chronic HFrEF (heart failure with reduced ejection fraction) ([facility]) 2023    Coronary artery calcification seen on CT scan 11/10/2023    Coronary artery disease 2023    Deep vein thrombosis (DVT) of left lower extremity ([facility]) 2023    Diabetes mellitus ([facility]) 2023    Fall from slip, trip, or stumble 10/15/2020    History of phimosis of penis     History of urinary calculi     Hypertension 1988    Skin cancer     Tobacco abuse     quit around      Social History     Socioeconomic History    Marital status:      Spouse name: Not on file    Number of children: Not on file    Years of education: Not on file    Highest education level: Not on file   Occupational History    Not on file   Tobacco Use    Smoking status: Former     Types: Cigarettes     Start date:      Quit date:      Years since quittin.0    Smokeless tobacco: Never    Tobacco comments:     Quit over 30 years ago (Updated 2023).    Vaping Use    Vaping status: Never Used   Substance and Sexual Activity    Alcohol use: Not Currently    Drug use: Never    Sexual activity: Not on file   Other Topics Concern    Not on file   Social History Narrative    Not on file     Social " Determinants of Health     Financial Resource Strain: Not on file   Food Insecurity: No Food Insecurity (12/14/2023)    Hunger Vital Sign     Worried About Running Out of Food in the Last Year: Never true     Ran Out of Food in the Last Year: Never true   Transportation Needs: No Transportation Needs (12/14/2023)    PRAPARE - Transportation     Lack of Transportation (Medical): No     Lack of Transportation (Non-Medical): No   Physical Activity: Not on file   Stress: Not on file   Social Connections: Not on file   Intimate Partner Violence: Not on file   Housing Stability: Low Risk  (12/14/2023)    Housing Stability Vital Sign     Unable to Pay for Housing in the Last Year: No     Number of Places Lived in the Last Year: 1     Unstable Housing in the Last Year: No      Family History   Problem Relation Age of Onset    Breast cancer Mother     Heart attack Father 61    Other Sister         s/p hysterectomy    Cerebral palsy Brother     Skin cancer Other         family history    No Known Problems Son     No Known Problems Daughter     Sudden death Neg Hx      Past Surgical History:   Procedure Laterality Date    BLADDER STONE REMOVAL  2014    CARDIAC CATHETERIZATION N/A 12/14/2023    Procedure: Cardiac catheterization;  Surgeon: Dave Royal MD;  Location: [facility];  Service: Cardiology    ORIF ANKLE FRACTURE Left     TOTAL HIP ARTHROPLASTY Right        Current Outpatient Medications:     acetaminophen (TYLENOL) 325 mg tablet, Take 2 tablets (650 mg total) by mouth every 6 (six) hours as needed for mild pain, headaches or fever, Disp: , Rfl: 0    amiodarone 200 mg tablet, Take 1 tablet (200 mg total) by mouth 3 (three) times a day with meals for 20 doses, Disp: , Rfl:     amiodarone 200 mg tablet, Take 1 tablet (200 mg total) by mouth daily Do not start before December 22, 2023., Disp: , Rfl:     apixaban (ELIQUIS) 5 mg, Take 1 tablet (5 mg total) by mouth 2 (two) times a day Do not start before November  21, 2023., Disp: , Rfl: 0    aspirin 81 mg chewable tablet, Chew 1 tablet (81 mg total) daily, Disp: , Rfl:     atorvastatin (LIPITOR) 40 mg tablet, Take 1 tablet (40 mg total) by mouth daily with dinner, Disp: , Rfl: 0    cephalexin (KEFLEX) 500 mg capsule, Take 1 capsule (500 mg total) by mouth every 6 (six) hours for 7 days, Disp: 28 capsule, Rfl: 0    docusate sodium (COLACE) 100 mg capsule, Take 100 mg by mouth 2 (two) times a day, Disp: , Rfl:     furosemide (LASIX) 40 mg tablet, Take 1 tablet (40 mg total) by mouth daily Do not start before November 16, 2023., Disp: , Rfl: 0    magnesium hydroxide (MILK OF MAGNESIA) 400 mg/5 mL oral suspension, Take by mouth daily as needed for constipation, Disp: , Rfl:     metoprolol succinate (TOPROL-XL) 100 mg 24 hr tablet, Take 1 tablet (100 mg total) by mouth daily, Disp: , Rfl:     metoprolol succinate (TOPROL-XL) 50 mg 24 hr tablet, Take 1 tablet (50 mg total) by mouth every evening, Disp: , Rfl:     nitroglycerin (NITROSTAT) 0.4 mg SL tablet, Place 1 tablet (0.4 mg total) under the tongue every 5 (five) minutes as needed for chest pain, Disp: , Rfl:     sacubitril-valsartan (ENTRESTO) 24-26 MG TABS, Take 1 tablet by mouth 2 (two) times a day, Disp: , Rfl:     tamsulosin (FLOMAX) 0.4 mg, Take 1 capsule (0.4 mg total) by mouth daily with dinner, Disp: , Rfl:       Review of Systems:  Review of Systems   Constitutional:  Negative for chills and fever.   HENT:  Negative for ear pain and sore throat.    Eyes:  Negative for pain and visual disturbance.   Respiratory:  Negative for cough and shortness of breath.    Cardiovascular:  Positive for leg swelling. Negative for chest pain and palpitations.   Gastrointestinal:  Negative for abdominal pain and vomiting.   Genitourinary:  Negative for dysuria and hematuria.   Musculoskeletal:  Negative for arthralgias and back pain.   Skin:  Negative for color change and rash.   Neurological:  Positive for weakness. Negative for  seizures and syncope.   All other systems reviewed and are negative.        Physical Exam:  Physical Exam  Constitutional:       Appearance: He is well-developed.   HENT:      Head: Normocephalic and atraumatic.   Eyes:      Pupils: Pupils are equal, round, and reactive to light.   Cardiovascular:      Rate and Rhythm: Normal rate and regular rhythm.      Heart sounds: No murmur heard.     No friction rub. No gallop.   Pulmonary:      Effort: Pulmonary effort is normal. No respiratory distress.      Breath sounds: Normal breath sounds. No wheezing or rales.   Abdominal:      General: Bowel sounds are normal. There is no distension.      Palpations: Abdomen is soft.      Tenderness: There is no abdominal tenderness.   Musculoskeletal:         General: Swelling present. No deformity. Normal range of motion.      Cervical back: Normal range of motion and neck supple.      Comments: Left lower extremity 1-2+ pitting edema   Skin:     General: Skin is warm and dry.   Neurological:      Mental Status: He is alert and oriented to person, place, and time.   Psychiatric:         Behavior: Behavior normal.         This note was completed in part utilizing [employer] Software.  Grammatical errors, random word insertions, spelling mistakes, and incomplete sentences can be an occasional consequence of this system secondary to software limitations, ambient noise, and hardware issues.  If you have any questions or concerns about the content, text, or information contained within the body of this dictation, please contact the provider for clarification.

## 2024-01-04 ENCOUNTER — APPOINTMENT (OUTPATIENT)
Dept: LAB | Facility: HOSPITAL | Age: 74
End: 2024-01-04
Payer: MEDICARE

## 2024-01-04 ENCOUNTER — OFFICE VISIT (OUTPATIENT)
Dept: CARDIOLOGY CLINIC | Facility: CLINIC | Age: 74
End: 2024-01-04
Payer: MEDICARE

## 2024-01-04 VITALS
HEART RATE: 76 BPM | WEIGHT: 226.4 LBS | DIASTOLIC BLOOD PRESSURE: 60 MMHG | SYSTOLIC BLOOD PRESSURE: 146 MMHG | HEIGHT: 67 IN | BODY MASS INDEX: 35.53 KG/M2

## 2024-01-04 DIAGNOSIS — I25.10 CORONARY ARTERY DISEASE INVOLVING NATIVE CORONARY ARTERY OF NATIVE HEART WITHOUT ANGINA PECTORIS: Chronic | ICD-10-CM

## 2024-01-04 DIAGNOSIS — I47.29 NSVT (NONSUSTAINED VENTRICULAR TACHYCARDIA) (HCC): ICD-10-CM

## 2024-01-04 DIAGNOSIS — I50.22 CHRONIC HFREF (HEART FAILURE WITH REDUCED EJECTION FRACTION) (HCC): Chronic | ICD-10-CM

## 2024-01-04 DIAGNOSIS — I50.22 CHRONIC HFREF (HEART FAILURE WITH REDUCED EJECTION FRACTION) (HCC): Primary | Chronic | ICD-10-CM

## 2024-01-04 DIAGNOSIS — I42.0 DILATED CARDIOMYOPATHY (HCC): ICD-10-CM

## 2024-01-04 LAB
ALBUMIN SERPL BCP-MCNC: 3.8 G/DL (ref 3.5–5)
ALP SERPL-CCNC: 61 U/L (ref 34–104)
ALT SERPL W P-5'-P-CCNC: 15 U/L (ref 7–52)
ANION GAP SERPL CALCULATED.3IONS-SCNC: 11 MMOL/L
AST SERPL W P-5'-P-CCNC: 18 U/L (ref 13–39)
BILIRUB SERPL-MCNC: 0.58 MG/DL (ref 0.2–1)
BUN SERPL-MCNC: 25 MG/DL (ref 5–25)
CALCIUM SERPL-MCNC: 9.1 MG/DL (ref 8.4–10.2)
CHLORIDE SERPL-SCNC: 104 MMOL/L (ref 96–108)
CO2 SERPL-SCNC: 24 MMOL/L (ref 21–32)
CREAT SERPL-MCNC: 1.17 MG/DL (ref 0.6–1.3)
GFR SERPL CREATININE-BSD FRML MDRD: 60 ML/MIN/1.73SQ M
GLUCOSE P FAST SERPL-MCNC: 98 MG/DL (ref 65–99)
POTASSIUM SERPL-SCNC: 4.5 MMOL/L (ref 3.5–5.3)
PROT SERPL-MCNC: 7 G/DL (ref 6.4–8.4)
SODIUM SERPL-SCNC: 139 MMOL/L (ref 135–147)

## 2024-01-04 PROCEDURE — 36415 COLL VENOUS BLD VENIPUNCTURE: CPT

## 2024-01-04 PROCEDURE — 99214 OFFICE O/P EST MOD 30 MIN: CPT | Performed by: PHYSICIAN ASSISTANT

## 2024-01-04 PROCEDURE — 80053 COMPREHEN METABOLIC PANEL: CPT

## 2024-01-04 RX ORDER — AMIODARONE HYDROCHLORIDE 200 MG/1
200 TABLET ORAL DAILY
Qty: 90 TABLET | Refills: 1 | Status: SHIPPED | OUTPATIENT
Start: 2024-01-04

## 2024-01-04 RX ORDER — MELOXICAM 15 MG/1
15 TABLET ORAL DAILY
COMMUNITY

## 2024-01-04 NOTE — PATIENT INSTRUCTIONS
Please check blood work today  We will repeat an echocardiogram in about 6-7 weeks prior to your next visit  AS long as blood work looks good, I will call the nurses at your home for medication adjustments  Follow up in early March  Please call if any questions or concerns

## 2024-01-08 DIAGNOSIS — I50.22 CHRONIC HFREF (HEART FAILURE WITH REDUCED EJECTION FRACTION) (HCC): Primary | ICD-10-CM

## 2024-01-08 NOTE — TELEPHONE ENCOUNTER
----- Message from Lisandro Cevallos PA-C sent at 1/4/2024  4:09 PM EST -----  Lab work looks good.  Kidney function and electrolytes are stable.  I would like to increase Entresto to 48-52 mg twice daily.  I did try to call the assisted living facility this afternoon but the nursing line is closed after 3 PM.  I left a message with our office number.  If they call back could you let me know where I can send a new prescription for Entresto?  Thank you, Gerson

## 2024-01-08 NOTE — TELEPHONE ENCOUNTER
GALLO from [facility] returned call.    Message relayed as given.    Per GALLO, please send new rx to [facility].     He would also like a copy of message faxed to him @ [phone] for pt's record.    Blair, can you please cover rx - Gerson is out of office today.

## 2024-01-16 ENCOUNTER — NURSE TRIAGE (OUTPATIENT)
Age: 74
End: 2024-01-16

## 2024-01-16 NOTE — TELEPHONE ENCOUNTER
"Answer Assessment - Initial Assessment Questions  1. REASON FOR CALL or QUESTION: \"What is your reason for calling today?\" or \"How can I best help you?\" or \"What question do you have that I can help answer?\"          GALLO  from [facility] called to report void trial will be complete tomorrow. Cancelled today due to weather.     #[phone]    Protocols used: Information Only Call - No Triage-ADULT-[place]    " Calcipotriene Pregnancy And Lactation Text: This medication has not been proven safe during pregnancy. It is unknown if this medication is excreted in breast milk.

## 2024-01-19 NOTE — TELEPHONE ENCOUNTER
Susie nurse from [facility] called to notify office void trial still was not completed. They are not able to perform void trial due to not have PVR available.     Seems this has been ongoing since 1/9/24 communication back and forth about void trial .     Please see previous notes in chart and advise.    # [phone]

## 2024-01-22 DIAGNOSIS — R33.9 URINARY RETENTION: Primary | ICD-10-CM

## 2024-01-22 NOTE — TELEPHONE ENCOUNTER
Spoke to [facility] nurse Laila ( [phone] ) Laila stated they can do farmer removal and reinsertion if necessary at the facility where pt resides on 1/24/24.   She just requested an order to be placed in the chart.  She states pt is very weak with cardiac issues and it is difficult for him to be transported.    Laila stated she will call pt's daughter to relay this information.  Pt has appt on 4/13/24 with Dr. Storey for urinary retention.

## 2024-01-22 NOTE — TELEPHONE ENCOUNTER
Laila from [facility] called in to inform us that they can not perform void trial, I explained that office is aware and has reached out to client to schedule void trial in our office.  Laila's call [phone]

## 2024-01-22 NOTE — TELEPHONE ENCOUNTER
The only order I could not find was for bladder catheterization.  I wrote it in the free text.  If that does not work we will need to fax them a written order

## 2024-01-22 NOTE — TELEPHONE ENCOUNTER
Pt daughter calling to set up voiding trial per [facility] nurses. And previous notes.     Please call daughter back at [phone] vm with appointment information can be left on daughter phone if needed.

## 2024-01-23 ENCOUNTER — HOSPITAL ENCOUNTER (EMERGENCY)
Facility: HOSPITAL | Age: 74
Discharge: HOME | End: 2024-01-23
Attending: EMERGENCY MEDICINE
Payer: MEDICARE

## 2024-01-23 VITALS
TEMPERATURE: 97.6 F | BODY MASS INDEX: 35.47 KG/M2 | HEIGHT: 67 IN | SYSTOLIC BLOOD PRESSURE: 126 MMHG | WEIGHT: 226 LBS | OXYGEN SATURATION: 93 % | HEART RATE: 71 BPM | DIASTOLIC BLOOD PRESSURE: 58 MMHG | RESPIRATION RATE: 18 BRPM

## 2024-01-23 DIAGNOSIS — N48.1 BALANITIS: Primary | ICD-10-CM

## 2024-01-23 DIAGNOSIS — B37.2 CANDIDAL SKIN INFECTION: ICD-10-CM

## 2024-01-23 DIAGNOSIS — L89.312 PRESSURE ULCER OF RIGHT BUTTOCK, STAGE 2 (HCC): ICD-10-CM

## 2024-01-23 PROCEDURE — 99284 EMERGENCY DEPT VISIT MOD MDM: CPT

## 2024-01-23 PROCEDURE — 99283 EMERGENCY DEPT VISIT LOW MDM: CPT

## 2024-01-23 RX ORDER — CLOTRIMAZOLE 1 %
CREAM (GRAM) TOPICAL ONCE
Status: COMPLETED | OUTPATIENT
Start: 2024-01-23 | End: 2024-01-23

## 2024-01-23 RX ORDER — NYSTATIN 100000 [USP'U]/G
POWDER TOPICAL 3 TIMES DAILY
Qty: 60 G | Refills: 0 | Status: SHIPPED | OUTPATIENT
Start: 2024-01-24 | End: 2024-01-31

## 2024-01-23 RX ORDER — ACETAMINOPHEN 325 MG/1
650 TABLET ORAL ONCE
Status: COMPLETED | OUTPATIENT
Start: 2024-01-23 | End: 2024-01-23

## 2024-01-23 RX ORDER — CLOTRIMAZOLE 1 %
CREAM (GRAM) TOPICAL 2 TIMES DAILY
Qty: 15 G | Refills: 0 | Status: SHIPPED | OUTPATIENT
Start: 2024-01-23 | End: 2024-01-31

## 2024-01-23 RX ORDER — NYSTATIN 100000 [USP'U]/G
POWDER TOPICAL ONCE
Status: COMPLETED | OUTPATIENT
Start: 2024-01-23 | End: 2024-01-23

## 2024-01-23 RX ADMIN — ACETAMINOPHEN 325MG 650 MG: 325 TABLET ORAL at 18:27

## 2024-01-23 RX ADMIN — NYSTATIN: 100000 POWDER TOPICAL at 18:27

## 2024-01-23 RX ADMIN — CLOTRIMAZOLE: 1 CREAM TOPICAL at 18:27

## 2024-01-23 NOTE — DISCHARGE INSTRUCTIONS
Apply the topical nystatin powder 3 times a day to bilateral groin folds that are red and itchy.  Apply gauze or ABD pad for barrier to the skin touching itself.  Apply the topical clotrimazole (Lotrimin) 1% cream twice a day to the tip of the penis.  Change the patient's position every 2 hours to prevent worsening pressure ulcer to the right buttocks.  Keep the right buttocks clean and dry.    Return to the ER if you develop new or worsening pain, inability urinate, fever, severe abdominal pain, vomiting, diarrhea, weakness, confusion, or lethargy.

## 2024-01-23 NOTE — TELEPHONE ENCOUNTER
"hmm i don't think there are any [facility] orders for vna orders i've always used the paper order sheets and free texted it and then stamped or signed it by provider in office- here's the usual-      \"Remove farmer catheter 0700. Hydrate and allow natural voids throughout the day. At 1500 measure residual bladder volume via bladder scanner or via straight cath. Replace farmer catheter if no void in those 8 hours OR if bladder residual is over 400ml\"  "

## 2024-01-23 NOTE — ED PROVIDER NOTES
History  Chief Complaint   Patient presents with    Urinary Catheter Problem     Patient presents to the ER via EMS with report of having pain from a farmer that was placed 1 weeks ago (with blood in urine as well).      The patient is a 73-year-old male with PMH of HTN, CAD, obesity, BPH, LLL DVT on Eliquis, and indwelling Farmer for acute urinary retention presenting for evaluation of penis tip pain and bleeding.  The patient notes having a Farmer in for the last few weeks with plan for removal tomorrow.  He has had worsening pain to the tip of the penis over the last few days.  Today while in the shower, he had bleeding from the tip of the penis.  He notes it is very tender to touch and any movement of the Farmer catheter causes extreme discomfort.  He also notes having a rash to the groin folds over the last 3 to 4 days and new right buttock wound.  He has lived at assisted care for the last 1.5 years and notes overall he is well taken care of.  He is relatively independent with his ADLs, however he has started with PT for deconditioning secondary to recent hospitalizations.  He notes bilateral foot numbness/tingling for years which is unchanged from his baseline.  He denies fevers, headache, headedness/dizziness, sore throat/cough, CP/SOB, abdominal pain, N/V/D, decreased urine from Farmer catheter, hematuria, and leg swelling.    On confirm the story with the patient's assisted living facility, spoke with GLORIA Desai who confirms the patient complained of urethral meatus pain with bleeding at that site.  No new hematuria to the Farmer bag.  No dysfunction of the Farmer catheter.  Plan is for removal of Farmer tomorrow and void trial.  She also does note rash/wound to the buttocks is relatively new.  She notes he otherwise is at his baseline and denies other medical issues or complaints.      History provided by:  Patient, medical records and nursing home   used: No        Prior to Admission  Medications   Prescriptions Last Dose Informant Patient Reported? Taking?   acetaminophen (TYLENOL) 325 mg tablet  Outside Facility (Specify) No No   Sig: Take 2 tablets (650 mg total) by mouth every 6 (six) hours as needed for mild pain, headaches or fever   amiodarone 200 mg tablet  Outside Facility (Specify) No No   Sig: Take 1 tablet (200 mg total) by mouth 3 (three) times a day with meals for 20 doses   Patient not taking: Reported on 1/4/2024   amiodarone 200 mg tablet   No No   Sig: Take 1 tablet (200 mg total) by mouth daily   apixaban (ELIQUIS) 5 mg  Outside Facility (Specify) No No   Sig: Take 1 tablet (5 mg total) by mouth 2 (two) times a day Do not start before November 21, 2023.   aspirin 81 mg chewable tablet  Outside Facility (Specify) No No   Sig: Chew 1 tablet (81 mg total) daily   Patient not taking: Reported on 1/4/2024   atorvastatin (LIPITOR) 40 mg tablet  Outside Facility (Specify) No No   Sig: Take 1 tablet (40 mg total) by mouth daily with dinner   docusate sodium (COLACE) 100 mg capsule  Outside Facility (Specify) Yes No   Sig: Take 100 mg by mouth 2 (two) times a day   Patient not taking: Reported on 1/4/2024   furosemide (LASIX) 40 mg tablet  Outside Facility (Specify) No No   Sig: Take 1 tablet (40 mg total) by mouth daily Do not start before November 16, 2023.   magnesium hydroxide (MILK OF MAGNESIA) 400 mg/5 mL oral suspension  Outside Facility (Specify) Yes No   Sig: Take by mouth daily as needed for constipation   Patient not taking: Reported on 1/4/2024   meloxicam (MOBIC) 15 mg tablet  Outside Facility (Specify) Yes No   Sig: Take 15 mg by mouth daily   metoprolol succinate (TOPROL-XL) 100 mg 24 hr tablet  Outside Facility (Specify) No No   Sig: Take 1 tablet (100 mg total) by mouth daily   metoprolol succinate (TOPROL-XL) 50 mg 24 hr tablet  Outside Facility (Specify) No No   Sig: Take 1 tablet (50 mg total) by mouth every evening   nitroglycerin (NITROSTAT) 0.4 mg SL tablet   Outside Facility (Specify) No No   Sig: Place 1 tablet (0.4 mg total) under the tongue every 5 (five) minutes as needed for chest pain   sacubitril-valsartan (ENTRESTO) 49-51 MG TABS   No No   Sig: Take 1 tablet by mouth 2 (two) times a day   tamsulosin (FLOMAX) 0.4 mg  Outside Facility (Specify) No No   Sig: Take 1 capsule (0.4 mg total) by mouth daily with dinner      Facility-Administered Medications: None       Past Medical History:   Diagnosis Date    Alcohol intoxication ([facility]) 10/15/2020    BPH (benign prostatic hyperplasia)     Chronic HFrEF (heart failure with reduced ejection fraction) ([facility]) 2023    Coronary artery calcification seen on CT scan 11/10/2023    Coronary artery disease 2023    Deep vein thrombosis (DVT) of left lower extremity ([facility]) 2023    Diabetes mellitus ([facility]) 2023    Fall from slip, trip, or stumble 10/15/2020    History of phimosis of penis     History of urinary calculi     Hypertension     Skin cancer     Tobacco abuse     quit around        Past Surgical History:   Procedure Laterality Date    BLADDER STONE REMOVAL  2014    CARDIAC CATHETERIZATION N/A 2023    Procedure: Cardiac catheterization;  Surgeon: Dave Royal MD;  Location: [facility];  Service: Cardiology    ORIF ANKLE FRACTURE Left     TOTAL HIP ARTHROPLASTY Right        Family History   Problem Relation Age of Onset    Breast cancer Mother     Heart attack Father 61    Other Sister         s/p hysterectomy    Cerebral palsy Brother     Skin cancer Other         family history    No Known Problems Son     No Known Problems Daughter     Sudden death Neg Hx      I have reviewed and agree with the history as documented.    E-Cigarette/Vaping    E-Cigarette Use Never User      E-Cigarette/Vaping Substances     Social History     Tobacco Use    Smoking status: Former     Types: Cigarettes     Start date:      Quit date:      Years since quittin.1    Smokeless tobacco: Never     Tobacco comments:     Quit over 30 years ago (Updated 12/2023).    Vaping Use    Vaping status: Never Used   Substance Use Topics    Alcohol use: Not Currently    Drug use: Never       Review of Systems   Constitutional:  Negative for appetite change, chills and fever.   Respiratory:  Negative for cough and shortness of breath.    Cardiovascular:  Negative for chest pain, palpitations and leg swelling.   Gastrointestinal:  Negative for abdominal pain, diarrhea, nausea and vomiting.   Genitourinary:  Negative for hematuria.        Tenderness to the tip of the penis, blood from the tip of the penis   Skin:  Positive for rash (b/l groins) and wound (Right buttocks). Negative for color change and pallor.   Neurological:  Negative for dizziness, syncope, weakness, light-headedness and headaches.   All other systems reviewed and are negative.      Physical Exam  Physical Exam  Vitals and nursing note reviewed.   Constitutional:       General: He is not in acute distress.     Appearance: Normal appearance. He is well-developed. He is not ill-appearing, toxic-appearing or diaphoretic.   HENT:      Head: Normocephalic and atraumatic.      Jaw: There is normal jaw occlusion.      Nose: Nose normal.      Mouth/Throat:      Lips: Pink.      Mouth: Mucous membranes are moist.   Eyes:      Extraocular Movements: Extraocular movements intact.      Conjunctiva/sclera: Conjunctivae normal.   Cardiovascular:      Rate and Rhythm: Normal rate and regular rhythm.      Pulses:           Radial pulses are 2+ on the right side and 2+ on the left side.        Posterior tibial pulses are 2+ on the right side and 2+ on the left side.      Heart sounds: Normal heart sounds, S1 normal and S2 normal.   Pulmonary:      Effort: Pulmonary effort is normal. No respiratory distress.      Breath sounds: Normal breath sounds and air entry.   Abdominal:      General: There is no distension.      Palpations: Abdomen is soft.      Tenderness: There is no  abdominal tenderness. There is no guarding or rebound.   Genitourinary:     Pubic Area: Rash present.      Penis: Uncircumcised. Erythema, tenderness and discharge (Scant clear discharge) present. No phimosis, paraphimosis or swelling.       Testes: Normal.          Comments: Erythematous, moist, pruritic rash to bilateral groin folds consistent with candidal infection. Focal area of erythema, warmth, tenderness to the urethral meatus consistent with balanitis. Satellite lesions most consistent with yeast  Musculoskeletal:         General: Normal range of motion.      Cervical back: Neck supple.      Right lower leg: No edema.      Left lower leg: No edema.   Skin:     General: Skin is warm and dry.      Capillary Refill: Capillary refill takes less than 2 seconds.      Comments: See  exam   Neurological:      General: No focal deficit present.      Mental Status: He is alert and oriented to person, place, and time. Mental status is at baseline.      Sensory: Sensation is intact.      Motor: Motor function is intact.         Vital Signs  ED Triage Vitals   Temperature Pulse Respirations Blood Pressure SpO2   01/23/24 1730 01/23/24 1730 01/23/24 1730 01/23/24 1730 01/23/24 1730   97.6 °F (36.4 °C) 71 16 126/58 93 %      Temp Source Heart Rate Source Patient Position - Orthostatic VS BP Location FiO2 (%)   01/23/24 1730 -- 01/23/24 1730 01/23/24 1730 --   Oral  Sitting Right arm       Pain Score       01/23/24 1731       4           Vitals:    01/23/24 1730   BP: 126/58   Pulse: 71   Patient Position - Orthostatic VS: Sitting         Visual Acuity      ED Medications  Medications   nystatin (MYCOSTATIN) powder ( Topical Given 1/23/24 1827)   clotrimazole (LOTRIMIN) 1 % cream ( Topical Given 1/23/24 1827)   acetaminophen (TYLENOL) tablet 650 mg (650 mg Oral Given 1/23/24 1827)       Diagnostic Studies  Results Reviewed       None                   No orders to display              Procedures  Procedures         ED  "Course  ED Course as of 01/23/24 1925 Tue Jan 23, 2024   1807 Called [facility] to clarify why patient was sent in.  The RN Giselle reported that he was complaining of pain at the urethral meatus and had scant amount of blood coming from the site at that time for which she called the on-call [facility] nurse.  The [facility] nurse was unable to come evaluate so she requested to be sent into the ER for further evaluation.  She also notes he has been complaining of right foot numbness for \"a while now\".  She also reports a new wound to the right buttocks that she requests evaluation of.   1817 On further evaluation with the patient in discussion, in conjunction with the report from Giselel (RN at [facility]) patient is without hematuria to the back.  He has had no change in urine output or bladder fullness/spasms.  He confirms that there was blood coming from the urethral meatus.  Not in the actual urine or tubing itself.  No need for UA at this time.  Plan for Colon removal tomorrow and void trial.  Will continue with plan for treatment of balanitis and skinfold yeast infection.                               SBIRT 22yo+      Flowsheet Row Most Recent Value   Initial Alcohol Screen: US AUDIT-C     1. How often do you have a drink containing alcohol? 0 Filed at: 01/23/2024 1732   Audit-C Score 0 Filed at: 01/23/2024 1732   WILLIE: How many times in the past year have you...    Used an illegal drug or used a prescription medication for non-medical reasons? Never Filed at: 01/23/2024 1732                      Medical Decision Making  DDx including but not limited to: Wound check, urethral meatus ulcer, balanitis, candidal skin infection, pressure injury; doubt Colon catheter obstruction or dysfunction, no hematuria; no fever or abdominal pain    Wound care performed for bilateral groin rash, urethral meatus, and pressure injury to right buttocks.  Suspect yeast given moisture, pruritus, with satellite lesions.  Will " start antifungals and wound care.  Discussed case with RN from patient's assisted living facility and she is in agreement with plan.  Documentation provided for ongoing wound care.  Benign abdominal exam.  Patient without other complaints at this time.  Patient appears well, in no acute distress, and will be transported back to his facility when vehicle available.    Problems Addressed:  Balanitis: acute illness or injury  Candidal skin infection: acute illness or injury  Pressure ulcer of right buttock, stage 2 (HCC): acute illness or injury    Risk  OTC drugs.  Prescription drug management.             Disposition  Final diagnoses:   Balanitis   Pressure ulcer of right buttock, stage 2 (HCC)   Candidal skin infection     Time reflects when diagnosis was documented in both MDM as applicable and the Disposition within this note       Time User Action Codes Description Comment    1/23/2024  6:20 PM Tracy Ingram Add [N48.1] Balanitis     1/23/2024  6:20 PM Tracy Ingram Add [L89.312] Pressure ulcer of right buttock, stage 2 (HCC)     1/23/2024  6:21 PM Tracy Ingram Add [B37.2] Candidal skin infection           ED Disposition       ED Disposition   Discharge    Condition   Stable    Date/Time   Tue Jan 23, 2024 1820    Comment   Toro Rodriguez discharge to home/self care.                   Follow-up Information       Follow up With Specialties Details Why Contact Info Additional Information    [facility], Nurse Practitioner Schedule an appointment as soon as possible for a visit on 1/29/2024 For wound re-check [address] [place] [id number]  [phone]        [facility] Emergency Department Emergency Medicine Go to  If symptoms worsen [address] [facility] [id number] [phone] [facility] Emergency Department, [address] [place], [place] [address]            Patient's Medications   Discharge  Prescriptions    CLOTRIMAZOLE (LOTRIMIN) 1 % CREAM    Apply topically 2 (two) times a day for 7 days Apply to affected area, the tip of the penis, 2 times daily       Start Date: 1/23/2024 End Date: 1/30/2024       Order Dose: --       Quantity: 15 g    Refills: 0    NYSTATIN (MYCOSTATIN) POWDER    Apply topically 3 (three) times a day for 7 days Do not start before January 24, 2024.       Start Date: 1/24/2024 End Date: 1/31/2024       Order Dose: --       Quantity: 60 g    Refills: 0       No discharge procedures on file.    PDMP Review         Value Time User    PDMP Reviewed  Yes 11/15/2023 12:20 PM Krysta Castillo PA-C            ED Provider  Electronically Signed by             CARLIE Steele  01/23/24 1927

## 2024-01-24 NOTE — TELEPHONE ENCOUNTER
repeat same voiding trial 2 weeks. i see he went to the er yesterday and got cream for balanitis as well

## 2024-01-24 NOTE — TELEPHONE ENCOUNTER
Laila from [facility] called in to inform us that client failed VOID trial. New farmer placed. Requesting orders with the plan, will another VOID trial be the next step or monthly changes. Please advise and fax orders accordingly.     Fax#: [phone]

## 2024-01-24 NOTE — TELEPHONE ENCOUNTER
Faxed farmer removal/void trial orders to [facility] [phone].  Laila stated she removed pt's farmer this morning.  She will contact us with regard to whether pt needed farmer re-inserted.

## 2024-01-24 NOTE — ED NOTES
Er staff cleansed wounds with warm soap and water. Applied medication to dry skin and applied padding with skin breakdown padding.      Li Joseph RN  01/23/24 [id number]

## 2024-01-25 NOTE — TELEPHONE ENCOUNTER
Faxed order for repeat farmer removal/VT in 2 weeks to Laila at [facility] [phone].  Asked that we be contacted for PVR appt in the office if needed.

## 2024-01-31 ENCOUNTER — PROCEDURE VISIT (OUTPATIENT)
Dept: DERMATOLOGY | Facility: CLINIC | Age: 74
End: 2024-01-31
Payer: MEDICARE

## 2024-01-31 VITALS — TEMPERATURE: 97.9 F | BODY MASS INDEX: 33.74 KG/M2 | WEIGHT: 215 LBS | HEIGHT: 67 IN

## 2024-01-31 DIAGNOSIS — C44.519 BASAL CELL CARCINOMA (BCC) OF SKIN OF OTHER PART OF TORSO: Primary | ICD-10-CM

## 2024-01-31 PROCEDURE — 88342 IMHCHEM/IMCYTCHM 1ST ANTB: CPT | Performed by: STUDENT IN AN ORGANIZED HEALTH CARE EDUCATION/TRAINING PROGRAM

## 2024-01-31 PROCEDURE — 11604 EXC TR-EXT MAL+MARG 3.1-4 CM: CPT | Performed by: DERMATOLOGY

## 2024-01-31 PROCEDURE — 11102 TANGNTL BX SKIN SINGLE LES: CPT | Performed by: DERMATOLOGY

## 2024-01-31 PROCEDURE — 12034 INTMD RPR S/TR/EXT 7.6-12.5: CPT | Performed by: DERMATOLOGY

## 2024-01-31 PROCEDURE — 88341 IMHCHEM/IMCYTCHM EA ADD ANTB: CPT | Performed by: STUDENT IN AN ORGANIZED HEALTH CARE EDUCATION/TRAINING PROGRAM

## 2024-01-31 PROCEDURE — 88305 TISSUE EXAM BY PATHOLOGIST: CPT | Performed by: STUDENT IN AN ORGANIZED HEALTH CARE EDUCATION/TRAINING PROGRAM

## 2024-01-31 RX ORDER — LISINOPRIL 10 MG/1
TABLET ORAL
COMMUNITY
Start: 2024-01-05

## 2024-01-31 NOTE — PROGRESS NOTES
PROCEDURE:  EXCISION WITH INTERMEDIATE LAYERED CLOSURE     Attending:  Dr. Turner / Dr. Streeter  Assistant:  Susie Hart    Pre-Op Diagnosis: Basal Cell Carcinoma  Post-Op Diagnosis: Same   Benign versus Malignant Malignant      Lesion Anatomic Location: Right upper chest wall (Previous Accession Number: [id number])  Pre-op size: 2.8 cm x 2 cm  Size of defect:  3.6 cm x 2.8 cm (with 0.4 centimeter margins)  Final repaired wound length:  8.5 cm    Written and verbal, witnessed informed consent was obtained. I discussed that excision is a method of removing lesions both benign and malignant lesions.  A portion of normal skin is often taken to ensure completeness of removal.  I reviewed that procedure will include numbing the area,  cutting around and under defect, undermining tissue, and closing the wound with sutures both inside and out.  These sutures are usually removed in 7 to 14 days. Risks (bleeding, pain, infection, scarring, recurrence) and benefits discussed. It was discussed with patient that every effort is made to minimize scar, but scarring is influenced also by extrinsic factor such as location, age and genetics.     Time Out: performed:  yes  Correct patient: yes.   Correct site per Clinic Report: yes  Correct site per Patient Report: yes    LOCAL ANESTHESIA: 3:1 1% xylocaine with epi and 1-100,000 buffered    DESCRIPTION OF PROCEDURE: The patient was brought back into the procedure room, anesthetized locally, prepped and draped in the usual fashion. Using a #15 blade with a scalpel, the lesion was excised in elliptical fashion. The wound was  undermined in the  fascial plane. Hemostasis was achieved with light electrocoagulation. Purpose of undermining was to decrease wound tension and facilitate closure.    The wound was closed with subcutaneous sutures as follows:    Deep suture:4-0 Vicryl      Epidermal edge closure was accomplished with superficial sutures as follows:    Superficial suture: 4-0  Ethilon  Superficial suture type: running    Estimated blood loss less than 3ml.    The patient tolerated the procedure well without any complications. The wound was cleaned with sterile saline, dried off, surgical vaseline ointment was applied, and the wound was covered. A pressure dressing was applied for stabilization and light pressure. The patient was given detailed oral and written instructions on postoperative care as detailed in consent. The patient left in good medical condition.    POSTOP DISCUSSION DISCUSSION AND INSTRUCTIONS FOR PATIENT      Rationale for Procedure  A skin excision allows the dermatologist to remove a lesion. The procedure involves a local numbing medication and removing the entire lesion. Typically, the lesion is being removed because it is atypical, traumatized, or for significant appearance reasons.  The area will be open like a brush burn and allowed to heal.   There will be no sutures.Tissue is sent to pathologist who will reconfirm diagnosis and verify completeness of lesion removal.    Description of Procedure  We would like to perform a skin excision today.  A local anesthetic, similar to the kind that a dentist uses when filling a cavity, will be injected with a very small needle into the skin area to be sampled.  The injected skin and tissue underneath should “go to sleep” and become numbed so that no further pain should be felt.  A scalpel will be used to cut around the lesion and tissue will be submitted to pathology for examination.  Depending on the diagnosis the lesion will be excised with a certain amount of normal skin to help assure completeness of lesion removal.  The physician will discuss in advance the anticipated size and extent of removal.   Bleeding will occur, but it will stopped with direct pressure, sutures, and electrocautery.    Surgical “Vaseline-type” ointment will also applied after the procedure to help create a barrier between the wound and the outside  world.      Risks and Potential Complications  The advantage of a skin excision is that it allows us to remove a problem lesion quickly.  Although this usually permits the lesion to heal as soon as possible with the least scarring, there are some risks and potential complications that include but are not limited to the following:  Some bleeding is normal at time of procedure and some bleeding on gauze is normal  the first few days after surgery.  Profuse bleeding and bleeding with swelling and pain should be reported as detailed  below  Infection is uncommon in skin surgery.  Infection should be reported and is indicated by pain, redness, and discharge of purulent material.  Some dull to at time sharp pain could occur initially the day after surgery.  Persistent pain or increasing pain days after surgery is not expected and should be reported.  Every effort is made to minimize scar, but location, size, and genetics do play a role in scar appearance.  A surgical wound does not achieve its optimal appearance until 6 months.  There are several treatments available if scarring would be problematic including scar creams, silicone pad, laser and scar revision.  Skin discoloration can occur especially in people of color.  Its important to avoid sun on wound in first 6 months after surgery.  Treatment is available if pigment is problematic.  Incomplete removal of the lesion or recurrence of lesion can occur and this would then require further treatment and more surgery.  Nerve Damage/Numbness/Loss of Function is very rare, but is most likely to occur if lesion is large or if it is in a high risk location  Allergic Reaction to lidocaine is rare.  More commonly,  epinephrine is used  with the lidocaine.  Occasionally, epinephrine (aka adrenalin) may cause a brief  feeling of anxiety or jitteriness.  The person at the microscope  (pathologist) may provide additional information that was unexpected. This unexpected finding  could provoke the need for additional treatment or evaluation.    What You Will Need to Do After the Procedure  Keep the area clean and dry the first day. Try NOT to remove the bandage for the first day.  Gently clean the area with soap and water and apply Vaseline ointment (this is over the counter and not a prescription) to the excision  site for up to 2 weeks.    Apply a clean appropriately sized bandage to area.  Gauze and paper tape are recommended for sensitive skin.  Return for suture removal as instructed (generally 1 week for the face, 2 weeks for the body).  Take Acetaminophen (Tylenol) for discomfort, if no contraindications.  Do NOT take Ibuprofen or aspirin unless specifically told to do so by your Dermatologist because these medications can make bleeding worse.  Call our office immediately for signs of infection: fever, chills, increased redness, warmth, tenderness, discomfort/pain, or pus or foul smell coming from the wound.    If bleeding is noticed, place a clean cloth over the area and apply firm pressure for thirty minutes.  Check the wound ONLY after 30 minutes of direct pressure; do not cheat and sneak a peak, as that does not count.  If bleeding persists after 30 minutes of legitimate direct pressure, then try one more round of direct pressure for an additional 10 minutes to the area.  Should the bleeding become heavier or not stop after the second attempt, call [facility] directly at [phone] (SKIN) or, if after hours, go to your local Emergency Room/Emergency Department.       NEOPLASM OF UNCERTAIN BEHAVIOR    Physical Exam:  (Anatomic Location); (Size and Morphological Description); (Differential Diagnosis):  Specimen B (Labeled A under media tab): 73 year old male; skin; shave; left lower back; 1.5 cm x 0.5 cm light brown to pink macule variegated ; diff DDX: pigmented BCC vs nevi rule out atypia vs melanoma    Pertinent Positives:  Pertinent Negatives:    Additional  History of Present Condition:  Noted upon appt today.    Plan:  Shave biopsy       PROCEDURES PERFORMED TODAY ASSOCIATED WITH THIS CONDITION:          TANGENTIAL BIOPSY  PROCEDURE TANGENTIAL (SHAVE) BIOPSY NOTE:    Performing Physician:   Anatomic Location; Clinical Description with size (cm); Pre-Op Diagnosis:   Specimen B (Labeled A under media tab): 73 year old male; skin; shave; left lower back; 1.5 cm x 0.5 cm light brown to pink macule variegated ; diff DDX: pigmented BCC vs nevi rule out atypia vs melanoma  Post-op diagnosis: Same     Local anesthesia: 3:1 1% xylocaine with epi and 1-100,000 buffered     Topical anesthesia: None    Hemostasis: Aluminum chloride       After obtaining informed consent  at which time there was a discussion about the purpose of biopsy  and low risks of infection and bleeding.  The area was prepped and draped in the usual fashion. Anesthesia was obtained with 1% lidocaine with epinephrine. A shave biopsy to an appropriate sampling depth was obtained by Shave (Dermablade or 15 blade) The resulting wound was covered with surgical ointment and bandaged appropriately.     The patient tolerated the procedure well without complications and was without signs of functional compromise.      Specimen has been sent for review by Dermatopathology.    Standard post-procedure care has been explained and has been included in written form within the patient's copy of Informed Consent.    INFORMED CONSENT DISCUSSION AND POST-OPERATIVE INSTRUCTIONS FOR PATIENT    I.  RATIONALE FOR PROCEDURE  I understand that a skin biopsy allows the Dermatologist to test a lesion or rash under the microscope to obtain a diagnosis.  It usually involves numbing the area with numbing medication and removing a small piece of skin; sometimes the area will be closed with sutures. In this specific procedure, sutures are not usually needed.  If any sutures are placed, then they are usually need to be removed in 2  "weeks or less.    I understand that my Dermatologist recommends that a skin \"shave\" biopsy be performed today.  A local anesthetic, similar to the kind that a dentist uses when filling a cavity, will be injected with a very small needle into the skin area to be sampled.  The injected skin and tissue underneath \"will go to sleep” and become numb so no pain should be felt afterwards.  An instrument shaped like a tiny \"razor blade\" (shave biopsy instrument) will be used to cut a small piece of tissue and skin from the area so that a sample of tissue can be taken and examined more closely under the microscope.  A slight amount of bleeding will occur, but it will be stopped with direct pressure and a pressure bandage and any other appropriate methods.  I understands that a scar will form where the wound was created.  Surgical ointment will be applied to help protect the wound.  Sutures are not usually needed.    II.  RISKS AND POTENTIAL COMPLICATIONS   I understand the risks and potential complications of a skin biopsy include but are not limited to the following:  Bleeding  Infection  Pain  Scar/keloid  Skin discoloration  Incomplete Removal  Recurrence  Nerve Damage/Numbness/Loss of Function  Allergic Reaction to Anesthesia  Biopsies are diagnostic procedures and based on findings additional treatment or evaluation may be required  Loss or destruction of specimen resulting in no additional findings    My Dermatologist has explained to me the nature of the condition, the nature of the procedure, and the benefits to be reasonably expected compared with alternative approaches.  My Dermatologist has discussed the likelihood of major risks or complications of this procedure including the specific risks listed above, such as bleeding, infection, and scarring/keloid.  I understand that a scar is expected after this procedure.  I understand that my physician cannot predict if the scar will form a \"keloid,\" which extends beyond " "the borders of the wound that is created.  A keloid is a thick, painful, and bumpy scar.  A keloid can be difficult to treat, as it does not always respond well to therapy, which includes injecting cortisone directly into the keloid every few weeks.  While this usually reduces the pain and size of the scar, it does not eliminate it.      I understand that photographs may be taken before and after the procedure.  These will be maintained as part of the medical providers confidential records and may not be made available to me.  I further authorize the medical provider to use the photographs for teaching purposes or to illustrate scientific papers, books, or lectures if in his/her judgment, medical research, education, or science may benefit from its use.    I have had an opportunity to fully inquire about the risks and benefits of this procedure and its alternatives.   I have been given ample time and opportunity to ask questions and to seek a second opinion if I wished to do so.  I acknowledge that there have specifically been no guarantees as to the cosmetic results from the procedure.  I am aware that with any procedure there is always the possibility of an unexpected complication.    III. POST-PROCEDURAL CARE (WHAT YOU WILL NEED TO DO \"AFTER THE BIOPSY\" TO OPTIMIZE HEALING)    Keep the area clean and dry.  Try NOT to remove the bandage or get it wet for the first 24 hours.    Gently clean the area and apply surgical ointment (such as Vaseline petrolatum ointment, which is available \"over the counter\" and not a prescription) to the biopsy site for up to 2 weeks straight.  This acts to protect the wound from the outside world.      Sutures are not usually placed in this procedure.  If any sutures were placed, return for suture removal as instructed (generally 1 week for the face, 2 weeks for the body).      Take Acetaminophen (Tylenol) for discomfort, if no contraindications.  Ibuprofen or aspirin could make " bleeding worse.    Call our office immediately for signs of infection: fever, chills, increased redness, warmth, tenderness, discomfort/pain, or pus or foul smell coming from the wound.    WHAT TO DO IF THERE IS ANY BLEEDING?  If a small amount of bleeding is noticed, place a clean cloth over the area and apply firm pressure for ten minutes.  Check the wound after 10 minutes of direct pressure.  If bleeding persists, try one more time for an additional 10 minutes of direct pressure on the area.  If the bleeding becomes heavier or does not stop after the second attempt, or if you have any other questions about this procedure, then please call your [facility] Dermatologist by calling [phone] (SKIN).     I hereby acknowledge that I have reviewed and verified the site with my Dermatologist and have requested and authorized my Dermatologist to proceed with the procedure.         Medical Complexity:    UNDIAGNOSED NEW PROBLEM WITH UNCERTAIN DIAGNOSIS.  A condition included in the differential diagnosis represents a high risk of morbidity without treatment.         Scribe Attestation    I,:  Susie Hart am acting as a scribe while in the presence of the attending physician.:       I,:  Tyson Turner MD personally performed the services described in this documentation    as scribed in my presence.:         Patient was seen and discussed with MD Randolph Harry DO   PGY-IV Dermatology Resident

## 2024-01-31 NOTE — PATIENT INSTRUCTIONS
POSTOP DISCUSSION DISCUSSION AND INSTRUCTIONS FOR PATIENT      Rationale for Procedure  A skin excision allows the dermatologist to remove a lesion. The procedure involves a local numbing medication and removing the entire lesion. Typically, the lesion is being removed because it is atypical, traumatized, or for significant appearance reasons.  The area will be open like a brush burn and allowed to heal.   There will be no sutures.Tissue is sent to pathologist who will reconfirm diagnosis and verify completeness of lesion removal.    Description of Procedure  We would like to perform a skin excision today.  A local anesthetic, similar to the kind that a dentist uses when filling a cavity, will be injected with a very small needle into the skin area to be sampled.  The injected skin and tissue underneath should “go to sleep” and become numbed so that no further pain should be felt.  A scalpel will be used to cut around the lesion and tissue will be submitted to pathology for examination.  Depending on the diagnosis the lesion will be excised with a certain amount of normal skin to help assure completeness of lesion removal.  The physician will discuss in advance the anticipated size and extent of removal.   Bleeding will occur, but it will stopped with direct pressure, sutures, and electrocautery.    Surgical “Vaseline-type” ointment will also applied after the procedure to help create a barrier between the wound and the outside world.      Risks and Potential Complications  The advantage of a skin excision is that it allows us to remove a problem lesion quickly.  Although this usually permits the lesion to heal as soon as possible with the least scarring, there are some risks and potential complications that include but are not limited to the following:  Some bleeding is normal at time of procedure and some bleeding on gauze is normal  the first few days after surgery.  Profuse bleeding and bleeding with swelling  and pain should be reported as detailed  below  Infection is uncommon in skin surgery.  Infection should be reported and is indicated by pain, redness, and discharge of purulent material.  Some dull to at time sharp pain could occur initially the day after surgery.  Persistent pain or increasing pain days after surgery is not expected and should be reported.  Every effort is made to minimize scar, but location, size, and genetics do play a role in scar appearance.  A surgical wound does not achieve its optimal appearance until 6 months.  There are several treatments available if scarring would be problematic including scar creams, silicone pad, laser and scar revision.  Skin discoloration can occur especially in people of color.  Its important to avoid sun on wound in first 6 months after surgery.  Treatment is available if pigment is problematic.  Incomplete removal of the lesion or recurrence of lesion can occur and this would then require further treatment and more surgery.  Nerve Damage/Numbness/Loss of Function is very rare, but is most likely to occur if lesion is large or if it is in a high risk location  Allergic Reaction to lidocaine is rare.  More commonly,  epinephrine is used  with the lidocaine.  Occasionally, epinephrine (aka adrenalin) may cause a brief  feeling of anxiety or jitteriness.  The person at the microscope  (pathologist) may provide additional information that was unexpected. This unexpected finding could provoke the need for additional treatment or evaluation.    What You Will Need to Do After the Procedure  Keep the area clean and dry the first day. Try NOT to remove the bandage for the first day.  Gently clean the area with soap and water and apply Vaseline ointment (this is over the counter and not a prescription) to the excision  site for up to 2 weeks.    Apply a clean appropriately sized bandage to area.  Gauze and paper tape are recommended for sensitive skin.  Return for suture  removal as instructed (generally 1 week for the face, 2 weeks for the body).  Take Acetaminophen (Tylenol) for discomfort, if no contraindications.  Do NOT take Ibuprofen or aspirin unless specifically told to do so by your Dermatologist because these medications can make bleeding worse.  Call our office immediately for signs of infection: fever, chills, increased redness, warmth, tenderness, discomfort/pain, or pus or foul smell coming from the wound.    If bleeding is noticed, place a clean cloth over the area and apply firm pressure for thirty minutes.  Check the wound ONLY after 30 minutes of direct pressure; do not cheat and sneak a peak, as that does not count.  If bleeding persists after 30 minutes of legitimate direct pressure, then try one more round of direct pressure for an additional 10 minutes to the area.  Should the bleeding become heavier or not stop after the second attempt, call [facility] directly at [phone] (SKIN) or, if after hours, go to your local Emergency Room/Emergency Department.

## 2024-02-01 ENCOUNTER — TELEPHONE (OUTPATIENT)
Age: 74
End: 2024-02-01

## 2024-02-01 NOTE — TELEPHONE ENCOUNTER
Patient lives in an assisted living facility and has a personal nurse who assists with any medical attention.     Patients daughter is asking if we can provide Laila, his nurse, with detailed orders on how to care for his 2 wounds? Possibly having her change the bandage every other day since she is not with him every day. She also asks if the provider would be okay with Laila removing the sutures in 2 weeks as opposed to him coming in to our office?    Advised that I would check with the provider first. Once approved we can potentially give Laila a call and provide her with verbal orders.     Jorge phone number is [phone]

## 2024-02-01 NOTE — TELEPHONE ENCOUNTER
Attempted to reach Laila at the number that was provided. Laila  was not in the office at this time but another nurse stated they would have her call me back once she returns. Awaiting for Laila to call back to provide her with verbal orders.

## 2024-02-06 PROCEDURE — 88342 IMHCHEM/IMCYTCHM 1ST ANTB: CPT | Performed by: STUDENT IN AN ORGANIZED HEALTH CARE EDUCATION/TRAINING PROGRAM

## 2024-02-06 PROCEDURE — 88305 TISSUE EXAM BY PATHOLOGIST: CPT | Performed by: STUDENT IN AN ORGANIZED HEALTH CARE EDUCATION/TRAINING PROGRAM

## 2024-02-06 PROCEDURE — 88341 IMHCHEM/IMCYTCHM EA ADD ANTB: CPT | Performed by: STUDENT IN AN ORGANIZED HEALTH CARE EDUCATION/TRAINING PROGRAM

## 2024-02-09 DIAGNOSIS — C43.59 MALIGNANT MELANOMA OF TORSO EXCLUDING BREAST (HCC): Primary | ICD-10-CM

## 2024-02-09 NOTE — RESULT ENCOUNTER NOTE
DERMATOPATHOLOGY RESULT NOTE    Results reviewed by ordering physician.  Called patient to personally discuss results. I spoke with assisted care facility, patient himself and patents daughter Shanique Rodriguez.    I disussed that basal cell had narrow margins and should be observed.  But the newly noted pigmented lesion is a melanoma that extended deeper .  In noted it's potential to spread. I noted possible SLN and surgical oncology referral.      Instructions for Clinical Derm Team:   (remember to route Result Note to appropriate staff):     Result & Plan by Specimen:    Specimen A: malignant  Plan: margins close,  Observe      Specimen B: malignant  Plan: higher risk melanoma.  Refer to surgical oncologyFinal Diagnosis  A. Skin, right upper chest wall, excision:    -Residual BASAL CELL CARCINOMA (SUPERFICIAL AND NODULAR TYPE); basal cell carcinoma, superficial type, focally close (0.4 mm) to one peripheral specimen margin.    -Prior procedure site changes present.    -Incidental seborrheic keratosis.    -Incidental lichenoid keratosis.         B. Skin, left lower back, shave biopsy:    MELANOMA (thickness: at least 0.7 mm) (see note).    Note: SOX10, MART, and PRAME immunostains were reviewed; significant melanocytic architectural disorder is seen, and the lesional cells are positive for PRAME. Please see synoptic report for additional details.    Synoptic report for melanoma of the skin  Thickness: at least 0.7 mm (invasive melanoma extends to deep specimen margin)  Ulceration: not seen  Anatomic (Lukas) level: at least IV  Type: superficial spreading melanoma  Mitoses: 2/mm2  Microsatellites: cannot be determined  Lymphovascular invasion: not seen  Neurotropism: not seen  Tumor regression: present  Tumor-infiltrating lymphocytes (TIL): present, non-brisk  Margin assessment: invasive melanoma extends to deep specimen margin; melanoma in situ extends to peripheral specimen margin  Pathologic stage: at least  "pT1a  Associated nevus: dermal melanocytic nevus    Electronically signed by Claire Heart MD on 2/6/2024 at  2:56 PM  Additional Information   All reported additional testing was performed with appropriately reactive controls.  These tests were developed and their performance characteristics determined by [facility] or appropriate performing facility, though some tests may be performed on tissues which have not been validated for performance characteristics (such as staining performed on alcohol exposed cell blocks and decalcified tissues).  Results should be interpreted with caution and in the context of the patients' clinical condition. These tests may not be cleared or approved by the U.S. Food and Drug Administration, though the FDA has determined that such clearance or approval is not necessary. These tests are used for clinical purposes and they should not be regarded as investigational or for research. This laboratory has been approved by CLIA 88, designated as a high-complexity laboratory and is qualified to perform these tests.  .  Gross Description   A. The specimen is received in formalin, labeled with the patient's name and hospital number, and is designated \" right upper chest wall\".  The specimen consists of an unoriented tan ellipse of skin measuring 6.3 x 2.5 cm, excised to a maximum depth of 0.7 cm.  The skin surface exhibits a centrally located area of vague scarring measuring approximately 2.2 x 1 cm which appears to be located 0.5 cm or greater from the peripheral margins.  The margin of resection is inked green.  The skin surface tips are inked red.  The specimen is sectioned revealing unremarkable tan-yellow fatty cut surfaces.  Entirely submitted. Nine cassettes.  Tips in cassette 1, on a sponge; center sequentially submitted in cassettes 2-9, 2 complete sections in each cassette.  B. The specimen is received in formalin, labeled with the patient's name and hospital " "number, and is designated \" left lower back\".  The specimen consists of a tan superficial shaving of skin measuring 1 x 0.4 x 0.1 cm.  The skin surface exhibits scattered areas of irregular/vague pigmentation.  The margin of resection is inked green.  The skin surface is inked red.  The specimen is bisected lengthwise.  Entirely submitted. One cassette.  Between sponges.    Note: The estimated total formalin fixation time based upon information provided by the submitting clinician and the standard processing schedule is under 72 hours.    [facility]  Clinical Information   Specimen A: 73 year old male; skin; excision; right upper chest; 2.8 cm x 2 cm pink plaque; Diff DDX: Basal cell carcinoma    Specimen B (Labeled A under media tab): 73 year old male; skin; shave; left lower back; 1.5 cm x 0.5 cm light brown to pink macule variegated ; diff DDX: pigmented BCC vs nevi rule out atypia vs melanoma    ATTN: DERM PATH  Resulting Agency [facility]      "

## 2024-02-12 ENCOUNTER — TELEPHONE (OUTPATIENT)
Age: 74
End: 2024-02-12

## 2024-02-12 ENCOUNTER — DOCUMENTATION (OUTPATIENT)
Dept: HEMATOLOGY ONCOLOGY | Facility: CLINIC | Age: 74
End: 2024-02-12

## 2024-02-12 NOTE — PROGRESS NOTES
Intake received/ Chart reviewed for services completed outside of [facility]    Pathology completed: 1/31/2024 [id number]    Imaging completed: n/a    All records needed are in patients chart. No records retrieval needed at this time.     Dr. La 2/28/2024

## 2024-02-12 NOTE — TELEPHONE ENCOUNTER
Pt's daughter Shanique calling regarding pt's scheduled suture removal for Weds    Shanique states pt cannot come to appt, and has a home nurse    Pt wants his home nurse to remove sutures, however the nurse needs pt's medical records to do so    Advised pt he would need to come to the office and sign a consent form for us to fax them    Shanique states he cannot come to office to sign consent

## 2024-02-13 NOTE — TELEPHONE ENCOUNTER
Called and left message for patients daughter informing her that everything was okay with Eleno removing sutures. Several calls have been made by clinical POD to give Eleno verbal orders/approval, we were just waiting for a call back.    Asked her to please return call if she had any questions or needed anything further.

## 2024-02-13 NOTE — TELEPHONE ENCOUNTER
Rec'd call from patient's daughter questioning if we had an answer yet on if [facility] nurse can remove sutures?    (Message taken yesterday and forwarded to office (?) was that medical records were needed and that released needed to be signed??)    Medical records are NOT NEEDED. What is needed is an order for [facility] Nursing to remove sutures. Can be faxed to [phone] OR verbal order can be given.    (Looks like this issue was addressed in prior triage 2/1/2024 - Dr. Turner has already given approval - POD was unable to contact [facility] nurse.)    At daughter's request, cancelled tomorrows appt for suture removal. She is just getting over COVID and she (Shanique) provides transportation for her father will be unable to bring him.    Shanqiue is aware that we are running on a skeleton crew today due to inclement weather but I told her that I would call her this afternoon - regardless of status of order.    Shanique verbalized understanding.    CLINICAL POD: Can you please attempt to contact [facility] to give verbal order for suture removal? (Contact info is in prior encounter.)  They will be with patient approx 9:00 am tomorrow.    Thank you.

## 2024-02-13 NOTE — TELEPHONE ENCOUNTER
Called [facility] but Laila is not available at the moment , she will give me a call back regarding verbal order for suture removal .

## 2024-02-16 ENCOUNTER — TELEPHONE (OUTPATIENT)
Dept: CARDIOLOGY CLINIC | Facility: CLINIC | Age: 74
End: 2024-02-16

## 2024-02-16 NOTE — TELEPHONE ENCOUNTER
Routed LOV note to [facility] #[phone].  Placed echo and OV appt dates on face sheet of fax to [facility].

## 2024-02-20 PROBLEM — C43.59 MELANOMA OF BACK (HCC): Status: ACTIVE | Noted: 2024-02-20

## 2024-02-26 ENCOUNTER — HOSPITAL ENCOUNTER (OUTPATIENT)
Dept: NON INVASIVE DIAGNOSTICS | Age: 74
Discharge: HOME | End: 2024-02-26
Payer: MEDICARE

## 2024-02-26 VITALS
HEART RATE: 62 BPM | BODY MASS INDEX: 33.74 KG/M2 | SYSTOLIC BLOOD PRESSURE: 130 MMHG | WEIGHT: 215 LBS | DIASTOLIC BLOOD PRESSURE: 58 MMHG | HEIGHT: 67 IN

## 2024-02-26 DIAGNOSIS — I50.22 CHRONIC HFREF (HEART FAILURE WITH REDUCED EJECTION FRACTION) (HCC): Chronic | ICD-10-CM

## 2024-02-26 LAB
AORTIC ROOT: 3.3 CM
APICAL FOUR CHAMBER EJECTION FRACTION: 31 %
ASCENDING AORTA: 3.2 CM
BSA FOR ECHO PROCEDURE: 2.09 M2
DOP CALC LVOT AREA: 3.8 CM2
DOP CALC LVOT DIAMETER: 2.2 CM
E WAVE DECELERATION TIME: 199 MS
E/A RATIO: 0.64
FRACTIONAL SHORTENING: 15 (ref 28–44)
INTERVENTRICULAR SEPTUM IN DIASTOLE (PARASTERNAL SHORT AXIS VIEW): 1 CM
INTERVENTRICULAR SEPTUM: 1 CM (ref 0.6–1.1)
LAAS-AP2: 24.7 CM2
LAAS-AP4: 25.2 CM2
LEFT ATRIUM AREA SYSTOLE SINGLE PLANE A4C: 26.6 CM2
LEFT ATRIUM SIZE: 4 CM
LEFT ATRIUM VOLUME (MOD BIPLANE): 97 ML
LEFT ATRIUM VOLUME INDEX (MOD BIPLANE): 46.4 ML/M2
LEFT INTERNAL DIMENSION IN SYSTOLE: 6.3 CM (ref 2.1–4)
LEFT VENTRICULAR INTERNAL DIMENSION IN DIASTOLE: 7.4 CM (ref 3.5–6)
LEFT VENTRICULAR POSTERIOR WALL IN END DIASTOLE: 1.1 CM
LEFT VENTRICULAR STROKE VOLUME: 84 ML
LVSV (TEICH): 84 ML
MITRAL REGURGITATION PEAK VELOCITY: 5.6 M/S
MITRAL VALVE MEAN INFLOW VELOCITY: 4.32 M/S
MITRAL VALVE REGURGITANT PEAK GRADIENT: 125 MMHG
MV E'TISSUE VEL-SEP: 6 CM/S
MV PEAK A VEL: 0.89 M/S
MV PEAK E VEL: 57 CM/S
MV STENOSIS PRESSURE HALF TIME: 58 MS
MV VALVE AREA P 1/2 METHOD: 3.79
RA PRESSURE ESTIMATED: 8 MMHG
RIGHT ATRIUM AREA SYSTOLE A4C: 18.6 CM2
RIGHT VENTRICLE ID DIMENSION: 3.4 CM
SL CV DOP CALC MV VTI RETROGRADE: 205.4 CM
SL CV LEFT ATRIUM LENGTH A2C: 5.1 CM
SL CV LV EF: 40
SL CV MV MEAN GRADIENT RETROGRADE: 84 MMHG
SL CV PED ECHO LEFT VENTRICLE DIASTOLIC VOLUME (MOD BIPLANE) 2D: 287 ML
SL CV PED ECHO LEFT VENTRICLE SYSTOLIC VOLUME (MOD BIPLANE) 2D: 203 ML
TRICUSPID ANNULAR PLANE SYSTOLIC EXCURSION: 2 CM

## 2024-02-26 PROCEDURE — 93306 TTE W/DOPPLER COMPLETE: CPT | Performed by: INTERNAL MEDICINE

## 2024-02-26 PROCEDURE — 93306 TTE W/DOPPLER COMPLETE: CPT

## 2024-02-27 ENCOUNTER — TELEPHONE (OUTPATIENT)
Dept: HEMATOLOGY ONCOLOGY | Facility: CLINIC | Age: 74
End: 2024-02-27

## 2024-02-27 NOTE — TELEPHONE ENCOUNTER
Call Transfer   Who are you speaking with?  Child   If it is not the patient, are they listed on an active communication consent form? Yes   Who is the patients HemOnc/SurgOnc provider? Dr. La   What is the reason for this call? Questions about appmt tomorrow   Person/Department that the call was transferred to?    Time that call was transferred?   CLAY Porras 10:45am 2/27   Your call will be transferred now. If you receive a voicemail, please leave a detailed message and a member of the team will return your call as soon as possible.    Did you relay this information to the caller?  Yes

## 2024-02-28 ENCOUNTER — CONSULT (OUTPATIENT)
Age: 74
End: 2024-02-28
Payer: MEDICARE

## 2024-02-28 ENCOUNTER — APPOINTMENT (OUTPATIENT)
Dept: LAB | Facility: HOSPITAL | Age: 74
End: 2024-02-28
Payer: MEDICARE

## 2024-02-28 VITALS
SYSTOLIC BLOOD PRESSURE: 116 MMHG | HEIGHT: 67 IN | TEMPERATURE: 98 F | BODY MASS INDEX: 34.21 KG/M2 | DIASTOLIC BLOOD PRESSURE: 62 MMHG | WEIGHT: 218 LBS | HEART RATE: 76 BPM | OXYGEN SATURATION: 98 % | RESPIRATION RATE: 18 BRPM

## 2024-02-28 DIAGNOSIS — C43.59 MELANOMA OF BACK (HCC): Primary | ICD-10-CM

## 2024-02-28 DIAGNOSIS — C43.59 MALIGNANT MELANOMA OF TORSO EXCLUDING BREAST (HCC): ICD-10-CM

## 2024-02-28 DIAGNOSIS — I50.23 ACUTE ON CHRONIC SYSTOLIC (CONGESTIVE) HEART FAILURE (HCC): ICD-10-CM

## 2024-02-28 DIAGNOSIS — C43.59 MELANOMA OF BACK (HCC): ICD-10-CM

## 2024-02-28 LAB
BASOPHILS # BLD AUTO: 0.05 THOUSANDS/ÂΜL (ref 0–0.1)
BASOPHILS NFR BLD AUTO: 1 % (ref 0–1)
EOSINOPHIL # BLD AUTO: 0.93 THOUSAND/ÂΜL (ref 0–0.61)
EOSINOPHIL NFR BLD AUTO: 11 % (ref 0–6)
ERYTHROCYTE [DISTWIDTH] IN BLOOD BY AUTOMATED COUNT: 14.4 % (ref 11.6–15.1)
HCT VFR BLD AUTO: 36.2 % (ref 36.5–49.3)
HGB BLD-MCNC: 11.3 G/DL (ref 12–17)
IMM GRANULOCYTES # BLD AUTO: 0.05 THOUSAND/UL (ref 0–0.2)
IMM GRANULOCYTES NFR BLD AUTO: 1 % (ref 0–2)
LYMPHOCYTES # BLD AUTO: 1.38 THOUSANDS/ÂΜL (ref 0.6–4.47)
LYMPHOCYTES NFR BLD AUTO: 16 % (ref 14–44)
MCH RBC QN AUTO: 31.7 PG (ref 26.8–34.3)
MCHC RBC AUTO-ENTMCNC: 31.2 G/DL (ref 31.4–37.4)
MCV RBC AUTO: 102 FL (ref 82–98)
MONOCYTES # BLD AUTO: 0.79 THOUSAND/ÂΜL (ref 0.17–1.22)
MONOCYTES NFR BLD AUTO: 9 % (ref 4–12)
NEUTROPHILS # BLD AUTO: 5.49 THOUSANDS/ÂΜL (ref 1.85–7.62)
NEUTS SEG NFR BLD AUTO: 62 % (ref 43–75)
NRBC BLD AUTO-RTO: 0 /100 WBCS
PLATELET # BLD AUTO: 329 THOUSANDS/UL (ref 149–390)
PMV BLD AUTO: 10.7 FL (ref 8.9–12.7)
RBC # BLD AUTO: 3.56 MILLION/UL (ref 3.88–5.62)
WBC # BLD AUTO: 8.69 THOUSAND/UL (ref 4.31–10.16)

## 2024-02-28 PROCEDURE — 99205 OFFICE O/P NEW HI 60 MIN: CPT | Performed by: STUDENT IN AN ORGANIZED HEALTH CARE EDUCATION/TRAINING PROGRAM

## 2024-02-28 PROCEDURE — 36415 COLL VENOUS BLD VENIPUNCTURE: CPT

## 2024-02-28 PROCEDURE — 85025 COMPLETE CBC W/AUTO DIFF WBC: CPT

## 2024-02-28 RX ORDER — TRAMADOL HYDROCHLORIDE 50 MG/1
50 TABLET ORAL EVERY 6 HOURS PRN
Qty: 10 TABLET | Refills: 0 | Status: CANCELLED | OUTPATIENT
Start: 2024-02-28

## 2024-02-28 RX ORDER — TRAMADOL HYDROCHLORIDE 50 MG/1
50 TABLET ORAL EVERY 6 HOURS PRN
Qty: 10 TABLET | Refills: 0 | Status: SHIPPED | OUTPATIENT
Start: 2024-02-28

## 2024-02-28 NOTE — H&P (VIEW-ONLY)
Surgical Oncology Consultation    [facility]  [address] [place] [id number] [phone]    Patient:  Toro Rodriguez  1950  [id number]    Primary Care provider:  CARLIE Foster  [address] [facility] [address]    Referring provider:  Tyson Turner MD  [address] [place] [address]    Diagnoses and all orders for this visit:    Melanoma of back (HCC)    Malignant melanoma of torso excluding breast (HCC)  -     Ambulatory Referral to Surgical Oncology        Chief Complaint   Patient presents with    Consult       No follow-ups on file.    Oncology History   Melanoma of back (HCC)   1/31/2024 Biopsy    B. Skin, left lower back, shave biopsy:     MELANOMA (thickness: at least 0.7 mm) (see note).     Note: SOX10, MART, and PRAME immunostains were reviewed; significant melanocytic architectural disorder is seen, and the lesional cells are positive for PRAME. Please see synoptic report for additional details.     Synoptic report for melanoma of the skin  Thickness: at least 0.7 mm (invasive melanoma extends to deep specimen margin)  Ulceration: not seen  Anatomic (Lukas) level: at least IV  Type: superficial spreading melanoma  Mitoses: 2/mm2  Microsatellites: cannot be determined  Lymphovascular invasion: not seen  Neurotropism: not seen  Tumor regression: present  Tumor-infiltrating lymphocytes (TIL): present, non-brisk  Margin assessment: invasive melanoma extends to deep specimen margin; melanoma in situ extends to peripheral specimen margin  Pathologic stage: at least pT1a  Associated nevus: dermal melanocytic nevus     2/20/2024 Initial Diagnosis    Melanoma of back (HCC)         History of Present Illness  :   Pt with hx BCC and saw derm in Nov for removal; at that time they noted a lesion of the back that was concerning and biopsied it. Pt was unaware of its  presence. This demonstrated a melanoma 0.7 mm positive deep margin. No systemic sx of weight loss, bone pain, neuro sx. Pt with extensive cardiac hx and wears a zoll life vest at this time. Is on eliquis for fib. EF 20-35% w/ hx dilated cardiomyopathy. Recent hospitalizations for CHF exacerbation with CHUY and urinary retention.     Review of Systems  Complete ROS Surg Onc:   Constitutional: The patient ENDORSES recent history of general fatigue, no recent weight loss, no change in appetite.   Eyes: No complaints of visual problems, no scleral icterus.   ENT: No complaints of ear pain, no hoarseness, no difficulty swallowing,  no tinnitus and no new masses in head, oral cavity, or neck.   Cardiovascular: No complaints of chest pain, no palpitations, no ankle edema.   Respiratory: No complaints of shortness of breath, no cough.   Gastrointestinal: No complaints of jaundice, no bloody stools, no pale stools.   Genitourinary: YES dysuria, YES hematuria, no nocturia, no frequent urination, YES urethral discharge.   Musculoskeletal: No complaints of weakness, paralysis, joint stiffness or arthralgias.  Integumentary: No complaints of rash, no new lesions.   Neurological: No complaints of convulsions, no seizures, no dizziness.   Hematologic/Lymphatic: No complaints of easy bruising.   Endocrine:  No hot or cold intolerance.  No polydipsia, polyphagia, or polyuria.  Allergy/immunology:  No environmental allergies.  No food allergies.  Not immunocompromised.      Patient Active Problem List   Diagnosis    Essential hypertension    Chronic HFrEF (heart failure with reduced ejection fraction) (HCC)    Deep vein thrombosis (DVT) of left lower extremity (HCC)    Obesity, morbid (HCC)    Acute kidney injury (HCC)    Coronary artery disease involving native coronary artery of native heart    Diabetes mellitus (HCC)    Benign prostatic hyperplasia with urinary retention    Dilated cardiomyopathy (HCC)    Melanoma of back (HCC)      Past Medical History:   Diagnosis Date    Alcohol intoxication ([facility]) 10/15/2020    BPH (benign prostatic hyperplasia)     Chronic HFrEF (heart failure with reduced ejection fraction) ([facility]) 2023    Coronary artery calcification seen on CT scan 11/10/2023    Coronary artery disease 2023    Deep vein thrombosis (DVT) of left lower extremity ([facility]) 2023    Diabetes mellitus ([facility]) 2023    Fall from slip, trip, or stumble 10/15/2020    History of phimosis of penis     History of urinary calculi     Hypertension     Skin cancer     Tobacco abuse     quit around      Past Surgical History:   Procedure Laterality Date    BLADDER STONE REMOVAL      CARDIAC CATHETERIZATION N/A 2023    Procedure: Cardiac catheterization;  Surgeon: Dave Royal MD;  Location: [facility];  Service: Cardiology    ORIF ANKLE FRACTURE Left     TOTAL HIP ARTHROPLASTY Right      Family History   Problem Relation Age of Onset    Breast cancer Mother     Heart attack Father 61    Other Sister         s/p hysterectomy    Cerebral palsy Brother     Skin cancer Other         family history    No Known Problems Son     No Known Problems Daughter     Sudden death Neg Hx      Social History     Socioeconomic History    Marital status:      Spouse name: Not on file    Number of children: Not on file    Years of education: Not on file    Highest education level: Not on file   Occupational History    Not on file   Tobacco Use    Smoking status: Former     Types: Cigarettes     Start date:      Quit date: 2     Years since quittin.2    Smokeless tobacco: Never    Tobacco comments:     Quit over 30 years ago (Updated 2023).    Vaping Use    Vaping status: Never Used   Substance and Sexual Activity    Alcohol use: Not Currently    Drug use: Never    Sexual activity: Not on file   Other Topics Concern    Not on file   Social History Narrative    Not on file     Social Determinants of Health      Financial Resource Strain: Not on file   Food Insecurity: No Food Insecurity (12/14/2023)    Hunger Vital Sign     Worried About Running Out of Food in the Last Year: Never true     Ran Out of Food in the Last Year: Never true   Transportation Needs: No Transportation Needs (12/14/2023)    PRAPARE - Transportation     Lack of Transportation (Medical): No     Lack of Transportation (Non-Medical): No   Physical Activity: Not on file   Stress: Not on file   Social Connections: Not on file   Intimate Partner Violence: Not on file   Housing Stability: Low Risk  (12/14/2023)    Housing Stability Vital Sign     Unable to Pay for Housing in the Last Year: No     Number of Places Lived in the Last Year: 1     Unstable Housing in the Last Year: No       Current Outpatient Medications:     acetaminophen (TYLENOL) 325 mg tablet, Take 2 tablets (650 mg total) by mouth every 6 (six) hours as needed for mild pain, headaches or fever, Disp: , Rfl: 0    amiodarone 200 mg tablet, Take 1 tablet (200 mg total) by mouth 3 (three) times a day with meals for 20 doses (Patient not taking: Reported on 1/4/2024), Disp: , Rfl:     amiodarone 200 mg tablet, Take 1 tablet (200 mg total) by mouth daily, Disp: 90 tablet, Rfl: 1    apixaban (ELIQUIS) 5 mg, Take 1 tablet (5 mg total) by mouth 2 (two) times a day Do not start before November 21, 2023., Disp: , Rfl: 0    aspirin 81 mg chewable tablet, Chew 1 tablet (81 mg total) daily (Patient not taking: Reported on 1/4/2024), Disp: , Rfl:     atorvastatin (LIPITOR) 40 mg tablet, Take 1 tablet (40 mg total) by mouth daily with dinner, Disp: , Rfl: 0    clotrimazole (LOTRIMIN) 1 % cream, Apply topically 2 (two) times a day for 7 days Apply to affected area, the tip of the penis, 2 times daily, Disp: 15 g, Rfl: 0    docusate sodium (COLACE) 100 mg capsule, Take 100 mg by mouth 2 (two) times a day, Disp: , Rfl:     furosemide (LASIX) 40 mg tablet, Take 1 tablet (40 mg total) by mouth daily Do not  start before November 16, 2023. (Patient not taking: Reported on 1/31/2024), Disp: , Rfl: 0    lisinopril (ZESTRIL) 10 mg tablet, , Disp: , Rfl:     magnesium hydroxide (MILK OF MAGNESIA) 400 mg/5 mL oral suspension, Take by mouth daily as needed for constipation (Patient not taking: Reported on 1/4/2024), Disp: , Rfl:     meloxicam (MOBIC) 15 mg tablet, Take 15 mg by mouth daily (Patient not taking: Reported on 1/31/2024), Disp: , Rfl:     metoprolol succinate (TOPROL-XL) 100 mg 24 hr tablet, Take 1 tablet (100 mg total) by mouth daily, Disp: , Rfl:     metoprolol succinate (TOPROL-XL) 50 mg 24 hr tablet, Take 1 tablet (50 mg total) by mouth every evening (Patient not taking: Reported on 1/31/2024), Disp: , Rfl:     mupirocin (BACTROBAN) 2 % ointment, Apply topically 3 (three) times a day, Disp: 30 g, Rfl: 2    nitroglycerin (NITROSTAT) 0.4 mg SL tablet, Place 1 tablet (0.4 mg total) under the tongue every 5 (five) minutes as needed for chest pain, Disp: , Rfl:     nystatin (MYCOSTATIN) powder, Apply topically 3 (three) times a day for 7 days Do not start before January 24, 2024., Disp: 60 g, Rfl: 0    sacubitril-valsartan (ENTRESTO) 49-51 MG TABS, Take 1 tablet by mouth 2 (two) times a day, Disp: 60 tablet, Rfl: 5    tamsulosin (FLOMAX) 0.4 mg, Take 1 capsule (0.4 mg total) by mouth daily with dinner, Disp: , Rfl:   Allergies   Allergen Reactions    Zosyn [Piperacillin Sod-Tazobactam So] Rash       Vitals:    02/28/24 0957   BP: 116/62   Pulse: 76   Resp: 18   Temp: 98 °F (36.7 °C)   SpO2: 98%       Physical Exam   General: Appears tired, appears stated age, sitting on walker  Skin: Dry, anicteric  HEENT: Normocephalic, atraumatic; sclera aniceteric, mucous membranes dry; cervical nodes without adenopathy  Cardiopulmonary: RRR, Easy WOB, mild BLE edema. Lifevest in place  Abd: Rotund and soft, nontender, no masses appreciated, no hepatosplenomegaly  MSK: Symmetric, no cyanosis, no overt weakness  Lymphatic: No  cervical, axillary or inguinal lymphadenopathy  Neuro: Affect appropriate, no gross motor abnormalities other than walker use      Pathology:     B. Skin, left lower back, shave biopsy:     MELANOMA (thickness: at least 0.7 mm) (see note).     Note: SOX10, MART, and PRAME immunostains were reviewed; significant melanocytic architectural disorder is seen, and the lesional cells are positive for PRAME. Please see synoptic report for additional details.     Synoptic report for melanoma of the skin  Thickness: at least 0.7 mm (invasive melanoma extends to deep specimen margin)  Ulceration: not seen  Anatomic (Lukas) level: at least IV  Type: superficial spreading melanoma  Mitoses: 2/mm2  Microsatellites: cannot be determined  Lymphovascular invasion: not seen  Neurotropism: not seen  Tumor regression: present  Tumor-infiltrating lymphocytes (TIL): present, non-brisk  Margin assessment: invasive melanoma extends to deep specimen margin; melanoma in situ extends to peripheral specimen margin  Pathologic stage: at least pT1a  Associated nevus: dermal melanocytic nevus    Labs: Reviewed in [facility]    Imaging  Echo complete w/ contrast if indicated    Result Date: 2/26/2024  Narrative:   Left Ventricle: Left ventricular cavity size is normal. Wall thickness is mildly increased. The left ventricular ejection fraction is 35-40% by biplane measurement. Systolic function is moderately reduced. There is moderate global hypokinesis with specific regional wall abnormalities. Diastolic function is mildly abnormal, consistent with grade I (abnormal) relaxation.   The following segments are akinetic: basal inferior, basal inferolateral, mid inferior and mid inferolateral.   The remainder of the myocardium is hypokinetic.   Right Ventricle: Right ventricular cavity size is normal. Systolic function is normal.   Left Atrium: The atrium is moderately dilated.   Right Atrium: The atrium is dilated.   Mitral Valve: There is moderate  regurgitation. Compared to prior study dated 11/13/2020, the LV systolic function has improved.       I independently reviewed and interpreted the above laboratory and imaging data including hospital course, urology notes, echo, labs, derm notes, path, pics. Discussed with Dr Guido      Discussion/Summary:   74 yo male with life-threatening cardiomyopathy and at least T1a melanoma of left lower back with positive deep margin. Given the patients co-morbidities and unknown T stage, will pursue WLE alone to determine need for US-mediated surveillance. Unclear if SLN is indicated in this case, and would require signifigicantly more anesthesia in this otherwise high-risk pt. Likewise, pt unlikely to be a candidate for targeted therapy. Discussed wrisks of WLE to include bleeding, infection, poor wound healing and more global risks of cardiac event, PE, DVT, stroke, death. At elevated risk 2/2 above cardiac hx. Will remain on eliquis to reduce risks. All questions answered.

## 2024-02-28 NOTE — PROGRESS NOTES
Surgical Oncology Consultation    [facility]  [address] [place] [address] [phone]    Patient:  Toro Rodriguez  1950  [id number]    Primary Care provider:  CARLIE Foster  [address] [facility] [address]    Referring provider:  Tyson Turner MD  [address] [place] [address]    Diagnoses and all orders for this visit:    Melanoma of back (HCC)    Malignant melanoma of torso excluding breast (HCC)  -     Ambulatory Referral to Surgical Oncology        Chief Complaint   Patient presents with    Consult       No follow-ups on file.    Oncology History   Melanoma of back (HCC)   1/31/2024 Biopsy    B. Skin, left lower back, shave biopsy:     MELANOMA (thickness: at least 0.7 mm) (see note).     Note: SOX10, MART, and PRAME immunostains were reviewed; significant melanocytic architectural disorder is seen, and the lesional cells are positive for PRAME. Please see synoptic report for additional details.     Synoptic report for melanoma of the skin  Thickness: at least 0.7 mm (invasive melanoma extends to deep specimen margin)  Ulceration: not seen  Anatomic (Lukas) level: at least IV  Type: superficial spreading melanoma  Mitoses: 2/mm2  Microsatellites: cannot be determined  Lymphovascular invasion: not seen  Neurotropism: not seen  Tumor regression: present  Tumor-infiltrating lymphocytes (TIL): present, non-brisk  Margin assessment: invasive melanoma extends to deep specimen margin; melanoma in situ extends to peripheral specimen margin  Pathologic stage: at least pT1a  Associated nevus: dermal melanocytic nevus     2/20/2024 Initial Diagnosis    Melanoma of back (HCC)         History of Present Illness  :   Pt with hx BCC and saw derm in Nov for removal; at that time they noted a lesion of the back that was concerning and biopsied it. Pt was unaware of its  presence. This demonstrated a melanoma 0.7 mm positive deep margin. No systemic sx of weight loss, bone pain, neuro sx. Pt with extensive cardiac hx and wears a zoll life vest at this time. Is on eliquis for fib. EF 20-35% w/ hx dilated cardiomyopathy. Recent hospitalizations for CHF exacerbation with CHUY and urinary retention.     Review of Systems  Complete ROS Surg Onc:   Constitutional: The patient ENDORSES recent history of general fatigue, no recent weight loss, no change in appetite.   Eyes: No complaints of visual problems, no scleral icterus.   ENT: No complaints of ear pain, no hoarseness, no difficulty swallowing,  no tinnitus and no new masses in head, oral cavity, or neck.   Cardiovascular: No complaints of chest pain, no palpitations, no ankle edema.   Respiratory: No complaints of shortness of breath, no cough.   Gastrointestinal: No complaints of jaundice, no bloody stools, no pale stools.   Genitourinary: YES dysuria, YES hematuria, no nocturia, no frequent urination, YES urethral discharge.   Musculoskeletal: No complaints of weakness, paralysis, joint stiffness or arthralgias.  Integumentary: No complaints of rash, no new lesions.   Neurological: No complaints of convulsions, no seizures, no dizziness.   Hematologic/Lymphatic: No complaints of easy bruising.   Endocrine:  No hot or cold intolerance.  No polydipsia, polyphagia, or polyuria.  Allergy/immunology:  No environmental allergies.  No food allergies.  Not immunocompromised.      Patient Active Problem List   Diagnosis    Essential hypertension    Chronic HFrEF (heart failure with reduced ejection fraction) (HCC)    Deep vein thrombosis (DVT) of left lower extremity (HCC)    Obesity, morbid (HCC)    Acute kidney injury (HCC)    Coronary artery disease involving native coronary artery of native heart    Diabetes mellitus (HCC)    Benign prostatic hyperplasia with urinary retention    Dilated cardiomyopathy (HCC)    Melanoma of back (HCC)      Past Medical History:   Diagnosis Date    Alcohol intoxication ([facility]) 10/15/2020    BPH (benign prostatic hyperplasia)     Chronic HFrEF (heart failure with reduced ejection fraction) ([facility]) 2023    Coronary artery calcification seen on CT scan 11/10/2023    Coronary artery disease 2023    Deep vein thrombosis (DVT) of left lower extremity ([facility]) 2023    Diabetes mellitus ([facility]) 2023    Fall from slip, trip, or stumble 10/15/2020    History of phimosis of penis     History of urinary calculi     Hypertension     Skin cancer     Tobacco abuse     quit around      Past Surgical History:   Procedure Laterality Date    BLADDER STONE REMOVAL      CARDIAC CATHETERIZATION N/A 2023    Procedure: Cardiac catheterization;  Surgeon: Dave Royal MD;  Location: [facility];  Service: Cardiology    ORIF ANKLE FRACTURE Left     TOTAL HIP ARTHROPLASTY Right      Family History   Problem Relation Age of Onset    Breast cancer Mother     Heart attack Father 61    Other Sister         s/p hysterectomy    Cerebral palsy Brother     Skin cancer Other         family history    No Known Problems Son     No Known Problems Daughter     Sudden death Neg Hx      Social History     Socioeconomic History    Marital status:      Spouse name: Not on file    Number of children: Not on file    Years of education: Not on file    Highest education level: Not on file   Occupational History    Not on file   Tobacco Use    Smoking status: Former     Types: Cigarettes     Start date:      Quit date: 2     Years since quittin.2    Smokeless tobacco: Never    Tobacco comments:     Quit over 30 years ago (Updated 2023).    Vaping Use    Vaping status: Never Used   Substance and Sexual Activity    Alcohol use: Not Currently    Drug use: Never    Sexual activity: Not on file   Other Topics Concern    Not on file   Social History Narrative    Not on file     Social Determinants of Health      Financial Resource Strain: Not on file   Food Insecurity: No Food Insecurity (12/14/2023)    Hunger Vital Sign     Worried About Running Out of Food in the Last Year: Never true     Ran Out of Food in the Last Year: Never true   Transportation Needs: No Transportation Needs (12/14/2023)    PRAPARE - Transportation     Lack of Transportation (Medical): No     Lack of Transportation (Non-Medical): No   Physical Activity: Not on file   Stress: Not on file   Social Connections: Not on file   Intimate Partner Violence: Not on file   Housing Stability: Low Risk  (12/14/2023)    Housing Stability Vital Sign     Unable to Pay for Housing in the Last Year: No     Number of Places Lived in the Last Year: 1     Unstable Housing in the Last Year: No       Current Outpatient Medications:     acetaminophen (TYLENOL) 325 mg tablet, Take 2 tablets (650 mg total) by mouth every 6 (six) hours as needed for mild pain, headaches or fever, Disp: , Rfl: 0    amiodarone 200 mg tablet, Take 1 tablet (200 mg total) by mouth 3 (three) times a day with meals for 20 doses (Patient not taking: Reported on 1/4/2024), Disp: , Rfl:     amiodarone 200 mg tablet, Take 1 tablet (200 mg total) by mouth daily, Disp: 90 tablet, Rfl: 1    apixaban (ELIQUIS) 5 mg, Take 1 tablet (5 mg total) by mouth 2 (two) times a day Do not start before November 21, 2023., Disp: , Rfl: 0    aspirin 81 mg chewable tablet, Chew 1 tablet (81 mg total) daily (Patient not taking: Reported on 1/4/2024), Disp: , Rfl:     atorvastatin (LIPITOR) 40 mg tablet, Take 1 tablet (40 mg total) by mouth daily with dinner, Disp: , Rfl: 0    clotrimazole (LOTRIMIN) 1 % cream, Apply topically 2 (two) times a day for 7 days Apply to affected area, the tip of the penis, 2 times daily, Disp: 15 g, Rfl: 0    docusate sodium (COLACE) 100 mg capsule, Take 100 mg by mouth 2 (two) times a day, Disp: , Rfl:     furosemide (LASIX) 40 mg tablet, Take 1 tablet (40 mg total) by mouth daily Do not  start before November 16, 2023. (Patient not taking: Reported on 1/31/2024), Disp: , Rfl: 0    lisinopril (ZESTRIL) 10 mg tablet, , Disp: , Rfl:     magnesium hydroxide (MILK OF MAGNESIA) 400 mg/5 mL oral suspension, Take by mouth daily as needed for constipation (Patient not taking: Reported on 1/4/2024), Disp: , Rfl:     meloxicam (MOBIC) 15 mg tablet, Take 15 mg by mouth daily (Patient not taking: Reported on 1/31/2024), Disp: , Rfl:     metoprolol succinate (TOPROL-XL) 100 mg 24 hr tablet, Take 1 tablet (100 mg total) by mouth daily, Disp: , Rfl:     metoprolol succinate (TOPROL-XL) 50 mg 24 hr tablet, Take 1 tablet (50 mg total) by mouth every evening (Patient not taking: Reported on 1/31/2024), Disp: , Rfl:     mupirocin (BACTROBAN) 2 % ointment, Apply topically 3 (three) times a day, Disp: 30 g, Rfl: 2    nitroglycerin (NITROSTAT) 0.4 mg SL tablet, Place 1 tablet (0.4 mg total) under the tongue every 5 (five) minutes as needed for chest pain, Disp: , Rfl:     nystatin (MYCOSTATIN) powder, Apply topically 3 (three) times a day for 7 days Do not start before January 24, 2024., Disp: 60 g, Rfl: 0    sacubitril-valsartan (ENTRESTO) 49-51 MG TABS, Take 1 tablet by mouth 2 (two) times a day, Disp: 60 tablet, Rfl: 5    tamsulosin (FLOMAX) 0.4 mg, Take 1 capsule (0.4 mg total) by mouth daily with dinner, Disp: , Rfl:   Allergies   Allergen Reactions    Zosyn [Piperacillin Sod-Tazobactam So] Rash       Vitals:    02/28/24 0957   BP: 116/62   Pulse: 76   Resp: 18   Temp: 98 °F (36.7 °C)   SpO2: 98%       Physical Exam   General: Appears tired, appears stated age, sitting on walker  Skin: Dry, anicteric  HEENT: Normocephalic, atraumatic; sclera aniceteric, mucous membranes dry; cervical nodes without adenopathy  Cardiopulmonary: RRR, Easy WOB, mild BLE edema. Lifevest in place  Abd: Rotund and soft, nontender, no masses appreciated, no hepatosplenomegaly  MSK: Symmetric, no cyanosis, no overt weakness  Lymphatic: No  cervical, axillary or inguinal lymphadenopathy  Neuro: Affect appropriate, no gross motor abnormalities other than walker use      Pathology:     B. Skin, left lower back, shave biopsy:     MELANOMA (thickness: at least 0.7 mm) (see note).     Note: SOX10, MART, and PRAME immunostains were reviewed; significant melanocytic architectural disorder is seen, and the lesional cells are positive for PRAME. Please see synoptic report for additional details.     Synoptic report for melanoma of the skin  Thickness: at least 0.7 mm (invasive melanoma extends to deep specimen margin)  Ulceration: not seen  Anatomic (Lukas) level: at least IV  Type: superficial spreading melanoma  Mitoses: 2/mm2  Microsatellites: cannot be determined  Lymphovascular invasion: not seen  Neurotropism: not seen  Tumor regression: present  Tumor-infiltrating lymphocytes (TIL): present, non-brisk  Margin assessment: invasive melanoma extends to deep specimen margin; melanoma in situ extends to peripheral specimen margin  Pathologic stage: at least pT1a  Associated nevus: dermal melanocytic nevus    Labs: Reviewed in [employer]    Imaging  Echo complete w/ contrast if indicated    Result Date: 2/26/2024  Narrative:   Left Ventricle: Left ventricular cavity size is normal. Wall thickness is mildly increased. The left ventricular ejection fraction is 35-40% by biplane measurement. Systolic function is moderately reduced. There is moderate global hypokinesis with specific regional wall abnormalities. Diastolic function is mildly abnormal, consistent with grade I (abnormal) relaxation.   The following segments are akinetic: basal inferior, basal inferolateral, mid inferior and mid inferolateral.   The remainder of the myocardium is hypokinetic.   Right Ventricle: Right ventricular cavity size is normal. Systolic function is normal.   Left Atrium: The atrium is moderately dilated.   Right Atrium: The atrium is dilated.   Mitral Valve: There is moderate  regurgitation. Compared to prior study dated 11/13/2020, the LV systolic function has improved.       I independently reviewed and interpreted the above laboratory and imaging data including hospital course, urology notes, echo, labs, derm notes, path, pics. Discussed with Dr Guido      Discussion/Summary:   74 yo male with life-threatening cardiomyopathy and at least T1a melanoma of left lower back with positive deep margin. Given the patients co-morbidities and unknown T stage, will pursue WLE alone to determine need for US-mediated surveillance. Unclear if SLN is indicated in this case, and would require signifigicantly more anesthesia in this otherwise high-risk pt. Likewise, pt unlikely to be a candidate for targeted therapy. Discussed wrisks of WLE to include bleeding, infection, poor wound healing and more global risks of cardiac event, PE, DVT, stroke, death. At elevated risk 2/2 above cardiac hx. Will remain on eliquis to reduce risks. All questions answered.

## 2024-03-05 ENCOUNTER — TELEPHONE (OUTPATIENT)
Dept: ANESTHESIOLOGY | Facility: CLINIC | Age: 74
End: 2024-03-05

## 2024-03-06 ENCOUNTER — ANESTHESIA EVENT (OUTPATIENT)
Dept: PERIOP | Facility: HOSPITAL | Age: 74
End: 2024-03-06
Payer: MEDICARE

## 2024-03-07 ENCOUNTER — TELEPHONE (OUTPATIENT)
Dept: HEMATOLOGY ONCOLOGY | Facility: CLINIC | Age: 74
End: 2024-03-07

## 2024-03-07 DIAGNOSIS — E11.9 TYPE 2 DIABETES MELLITUS WITHOUT COMPLICATION, WITHOUT LONG-TERM CURRENT USE OF INSULIN (HCC): Primary | ICD-10-CM

## 2024-03-07 NOTE — TELEPHONE ENCOUNTER
Call Transfer   Who are you speaking with?  Child   If it is not the patient, are they listed on an active communication consent form? Yes   Who is the patients HemOnc/SurgOnc provider? Dr. La   What is the reason for this call? Returning call about procedure   Person/Department that the call was transferred to?    Time that call was transferred?   CLAY Porras 1:55pm 3/7   Your call will be transferred now. If you receive a voicemail, please leave a detailed message and a member of the team will return your call as soon as possible.    Did you relay this information to the caller?  Yes

## 2024-03-07 NOTE — TELEPHONE ENCOUNTER
"\"Hi, my name is Shanique Rodriguez. I'm calling for Toro Rodriguez's date of birth is [birth year] and he lives at [address] [place] [address]. in [place] I've received a message from Angelique. She said that this was a message through my chart. She said that she tried to call my father twice to schedule a telephone visit prior to the upcoming surgery on 3/18. He didn't call back. So I'm calling about that and that It would probably be better if somebody called me and then I could coordinate between me, my father, and the so we could do the appointment by telephone. So somebody could give me a call back at [phone]. Again, it's [phone]. My name is Shanique Rodriguez. I'm his daughter. That would be great if somebody to do that. Thank you very much. Antwan.\"    "

## 2024-03-08 ENCOUNTER — OFFICE VISIT (OUTPATIENT)
Dept: CARDIOLOGY CLINIC | Facility: CLINIC | Age: 74
End: 2024-03-08
Payer: MEDICARE

## 2024-03-08 ENCOUNTER — TELEMEDICINE (OUTPATIENT)
Dept: ANESTHESIOLOGY | Facility: CLINIC | Age: 74
End: 2024-03-08
Payer: MEDICARE

## 2024-03-08 VITALS
HEIGHT: 67 IN | DIASTOLIC BLOOD PRESSURE: 52 MMHG | BODY MASS INDEX: 34.25 KG/M2 | WEIGHT: 218.2 LBS | SYSTOLIC BLOOD PRESSURE: 110 MMHG | HEART RATE: 65 BPM

## 2024-03-08 DIAGNOSIS — I25.10 CORONARY ARTERY DISEASE INVOLVING NATIVE CORONARY ARTERY OF NATIVE HEART WITHOUT ANGINA PECTORIS: Chronic | ICD-10-CM

## 2024-03-08 DIAGNOSIS — E11.22 TYPE 2 DIABETES MELLITUS WITH CHRONIC KIDNEY DISEASE, WITHOUT LONG-TERM CURRENT USE OF INSULIN, UNSPECIFIED CKD STAGE (HCC): ICD-10-CM

## 2024-03-08 DIAGNOSIS — C43.59 MELANOMA OF BACK (HCC): ICD-10-CM

## 2024-03-08 DIAGNOSIS — I42.0 DILATED CARDIOMYOPATHY (HCC): ICD-10-CM

## 2024-03-08 DIAGNOSIS — I50.22 CHRONIC HFREF (HEART FAILURE WITH REDUCED EJECTION FRACTION) (HCC): Primary | ICD-10-CM

## 2024-03-08 DIAGNOSIS — I87.312 CHRONIC VENOUS HYPERTENSION (IDIOPATHIC) WITH ULCER OF LEFT LOWER EXTREMITY (CODE) (HCC): ICD-10-CM

## 2024-03-08 DIAGNOSIS — I10 ESSENTIAL HYPERTENSION: Chronic | ICD-10-CM

## 2024-03-08 DIAGNOSIS — I25.10 CORONARY ARTERY DISEASE INVOLVING NATIVE CORONARY ARTERY OF NATIVE HEART WITHOUT ANGINA PECTORIS: ICD-10-CM

## 2024-03-08 DIAGNOSIS — E66.01 OBESITY, MORBID (HCC): ICD-10-CM

## 2024-03-08 DIAGNOSIS — Z01.89 ENCOUNTER FOR GERIATRIC ASSESSMENT: Primary | ICD-10-CM

## 2024-03-08 DIAGNOSIS — N18.31 CHRONIC KIDNEY DISEASE, STAGE 3A (HCC): ICD-10-CM

## 2024-03-08 PROCEDURE — 99443 PR PHYS/QHP TELEPHONE EVALUATION 21-30 MIN: CPT | Performed by: NURSE PRACTITIONER

## 2024-03-08 PROCEDURE — 99214 OFFICE O/P EST MOD 30 MIN: CPT | Performed by: INTERNAL MEDICINE

## 2024-03-08 NOTE — PROGRESS NOTES
[facility]  CONSULT       VITAL SIGNS  HR:  BP:  TEMP:  PAIN LEVEL:   TAKEN:     ALLERGIES REVIEWED  MEDICATIONS REVIEWED   PAST MEDICAL HISTORY REVIEWED     See Geriatric Assessment below...  Cognitive Assessment:   CAM:   TUG <15 sec:  Falls (last 6 months):   Hand  score:  -Attempt 1:  -Attempt 2:  -Attempt 3:  Chris Total Score: 18  PHQ- 9 Depression Scale:1  Nutrition Assessment Score:14  METS:5.81  Incentive Spirometry Level:   Health goals:  -What are your overall health goals?     -What brings you strength? daughter    -What activities are important to you? Old movies     As always we discussed having your BEST surgery, and BEST recovery.  Surgery goals reviewed today.      Breathing exercises   Patient was encouraged to begin lung exercises today.  This could be accomplished through deep breathing and cough exercises.  Patient was taught how to use an incentive spirometer.  Return demonstration provided.    Eating/nutrition   Encouraged patient to increase oral protein intake prior to surgery.  This can be accomplished by consuming chicken, fish, tuna fish, cottage cheese, cheese, eggs, Greek yogurt, and protein shakes as needed.  I encouraged use of protein shakes such ENLIVE.  I also recommended making your own protein shakes with protein powder.     Sleep/Stress management  Patient was encouraged to rest their body prior to surgery.  Encouraged attempting to get 8 hours of sleep at night.  Avoid stress.  Avoid sick contacts.  Encouraged to find a relaxing hobby such as reading, meditation, listening to music.  Training exercises  Patient was encouraged to remain active as possible.  Today bilateral lower extremity generic exercises were taught for muscle strengthening and balance.  All exercises to be done sitting down.

## 2024-03-08 NOTE — PROGRESS NOTES
"                                      Cardiology Follow Up    Toro Rodriguez  1950  [id number]  [facility]  Shereen BYRD MELVA  [address] [address] [phone]    1. Chronic venous hypertension (idiopathic) with ulcer of left lower extremity (CODE) (HCC)        2. Chronic kidney disease, stage 3a (HCC)        3. Obesity, morbid (HCC)        4. Type 2 diabetes mellitus with chronic kidney disease, without long-term current use of insulin, unspecified CKD stage (HCC)        5. Chronic HFrEF (heart failure with reduced ejection fraction) (HCC)        6. Coronary artery disease involving native coronary artery of native heart without angina pectoris            Interval History: Followup CHF    Patient here for cardiac clearance .    He has limited functional status. No chest pain. Dyspnea is stable.     Medical Problems       Problem List       Essential hypertension (Chronic)    Acute on chronic systolic (congestive) heart failure (HCC)    Wt Readings from Last 3 Encounters:   03/08/24 99 kg (218 lb 3.2 oz)   02/28/24 98.9 kg (218 lb)   02/26/24 97.5 kg (215 lb)                 Deep vein thrombosis (DVT) of left lower extremity (HCC)    Obesity, morbid (HCC)    Acute kidney injury (HCC)    Coronary artery disease involving native coronary artery of native heart (Chronic)    Overview Signed 12/15/2023  8:31 AM by Mariana Acevedo PA-C     CTA chest 11/10/2023: \"Moderate coronary artery calcification indicating atherosclerotic heart disease.\"  LHC 12/14/2023: 100% stenosis of ostial to proximal RCA (). RPAV filled by collaterals from distal Cx.          Type 2 diabetes mellitus with chronic kidney disease, without long-term current use of insulin, unspecified CKD stage (HCC)      Lab Results   Component Value Date    HGBA1C 8.2 (H) 12/14/2023         Benign prostatic hyperplasia with urinary retention (Chronic)    Dilated cardiomyopathy (HCC)    " Melanoma of back (HCC)    Chronic venous hypertension (idiopathic) with ulcer of left lower extremity (CODE) (HCC)    Chronic kidney disease, stage 3a (HCC)    Lab Results   Component Value Date    EGFR 60 2024    EGFR 75 12/15/2023    EGFR 66 2023    CREATININE 1.17 2024    CREATININE 0.98 12/15/2023    CREATININE 1.09 2023             Past Medical History:   Diagnosis Date    Alcohol intoxication (HCC) 10/15/2020    BPH (benign prostatic hyperplasia)     Chronic HFrEF (heart failure with reduced ejection fraction) (HCC) 2023    Coronary artery calcification seen on CT scan 11/10/2023    Coronary artery disease 2023    Deep vein thrombosis (DVT) of left lower extremity ([facility]) 2023    Diabetes mellitus ([facility]) 2023    Fall from slip, trip, or stumble 10/15/2020    History of phimosis of penis     History of urinary calculi     Hypertension     Skin cancer     Tobacco abuse     quit around      Social History     Socioeconomic History    Marital status:      Spouse name: Not on file    Number of children: Not on file    Years of education: Not on file    Highest education level: Not on file   Occupational History    Not on file   Tobacco Use    Smoking status: Former     Types: Cigarettes     Start date:      Quit date:      Years since quittin.2    Smokeless tobacco: Never    Tobacco comments:     Quit over 30 years ago (Updated 2023).    Vaping Use    Vaping status: Never Used   Substance and Sexual Activity    Alcohol use: Not Currently    Drug use: Never    Sexual activity: Not on file   Other Topics Concern    Not on file   Social History Narrative    Not on file     Social Determinants of Health     Financial Resource Strain: Not on file   Food Insecurity: No Food Insecurity (2023)    Hunger Vital Sign     Worried About Running Out of Food in the Last Year: Never true     Ran Out of Food in the Last Year: Never true   Transportation  Needs: No Transportation Needs (12/14/2023)    PRAPARE - Transportation     Lack of Transportation (Medical): No     Lack of Transportation (Non-Medical): No   Physical Activity: Not on file   Stress: Not on file   Social Connections: Not on file   Intimate Partner Violence: Not on file   Housing Stability: Low Risk  (12/14/2023)    Housing Stability Vital Sign     Unable to Pay for Housing in the Last Year: No     Number of Places Lived in the Last Year: 1     Unstable Housing in the Last Year: No      Family History   Problem Relation Age of Onset    Breast cancer Mother     Heart attack Father 61    Other Sister         s/p hysterectomy    Cerebral palsy Brother     Skin cancer Other         family history    No Known Problems Son     No Known Problems Daughter     Sudden death Neg Hx      Past Surgical History:   Procedure Laterality Date    BLADDER STONE REMOVAL  2014    CARDIAC CATHETERIZATION N/A 12/14/2023    Procedure: Cardiac catheterization;  Surgeon: Dave Royal MD;  Location: [facility];  Service: Cardiology    ORIF ANKLE FRACTURE Left     TOTAL HIP ARTHROPLASTY Right        Current Outpatient Medications:     acetaminophen (TYLENOL) 325 mg tablet, Take 2 tablets (650 mg total) by mouth every 6 (six) hours as needed for mild pain, headaches or fever, Disp: , Rfl: 0    amiodarone 200 mg tablet, Take 1 tablet (200 mg total) by mouth daily, Disp: 90 tablet, Rfl: 1    apixaban (ELIQUIS) 5 mg, Take 1 tablet (5 mg total) by mouth 2 (two) times a day Do not start before November 21, 2023., Disp: , Rfl: 0    aspirin 81 mg chewable tablet, Chew 1 tablet (81 mg total) daily, Disp: , Rfl:     atorvastatin (LIPITOR) 40 mg tablet, Take 1 tablet (40 mg total) by mouth daily with dinner, Disp: , Rfl: 0    clotrimazole (LOTRIMIN) 1 % cream, Apply topically 2 (two) times a day for 7 days Apply to affected area, the tip of the penis, 2 times daily, Disp: 15 g, Rfl: 0    docusate sodium (COLACE) 100 mg  capsule, Take 100 mg by mouth 2 (two) times a day, Disp: , Rfl:     furosemide (LASIX) 40 mg tablet, Take 1 tablet (40 mg total) by mouth daily Do not start before November 16, 2023., Disp: , Rfl: 0    lisinopril (ZESTRIL) 10 mg tablet, , Disp: , Rfl:     metoprolol succinate (TOPROL-XL) 100 mg 24 hr tablet, Take 1 tablet (100 mg total) by mouth daily, Disp: , Rfl:     mupirocin (BACTROBAN) 2 % ointment, Apply topically 3 (three) times a day, Disp: 30 g, Rfl: 2    nitroglycerin (NITROSTAT) 0.4 mg SL tablet, Place 1 tablet (0.4 mg total) under the tongue every 5 (five) minutes as needed for chest pain, Disp: , Rfl:     sacubitril-valsartan (ENTRESTO) 49-51 MG TABS, Take 1 tablet by mouth 2 (two) times a day, Disp: 60 tablet, Rfl: 5    tamsulosin (FLOMAX) 0.4 mg, Take 1 capsule (0.4 mg total) by mouth daily with dinner, Disp: , Rfl:     traMADol (Ultram) 50 mg tablet, Take 1 tablet (50 mg total) by mouth every 6 (six) hours as needed for moderate pain, Disp: 10 tablet, Rfl: 0    magnesium hydroxide (MILK OF MAGNESIA) 400 mg/5 mL oral suspension, Take by mouth daily as needed for constipation (Patient not taking: Reported on 1/4/2024), Disp: , Rfl:     meloxicam (MOBIC) 15 mg tablet, Take 15 mg by mouth daily (Patient not taking: Reported on 3/8/2024), Disp: , Rfl:     metoprolol succinate (TOPROL-XL) 50 mg 24 hr tablet, Take 1 tablet (50 mg total) by mouth every evening (Patient not taking: Reported on 1/31/2024), Disp: , Rfl:     nystatin (MYCOSTATIN) powder, Apply topically 3 (three) times a day for 7 days Do not start before January 24, 2024., Disp: 60 g, Rfl: 0  Allergies   Allergen Reactions    Zosyn [Piperacillin Sod-Tazobactam So] Rash       Labs:     Chemistry        Component Value Date/Time    K 4.5 01/04/2024 1005    K 4.3 10/16/2020 0640     01/04/2024 1005     10/16/2020 0640    CO2 24 01/04/2024 1005    CO2 22 10/16/2020 0640    BUN 25 01/04/2024 1005    BUN 16 10/16/2020 0640    CREATININE  "1.17 01/04/2024 1005    CREATININE 0.74 (L) 10/16/2020 0640        Component Value Date/Time    CALCIUM 9.1 01/04/2024 1005    CALCIUM 8.5 (L) 10/16/2020 0640    ALKPHOS 61 01/04/2024 1005    ALKPHOS 105 10/15/2020 0637    AST 18 01/04/2024 1005    AST 31 10/15/2020 0637    ALT 15 01/04/2024 1005    ALT 18 10/15/2020 0637            No results found for: \"CHOL\"  Lab Results   Component Value Date    HDL 27 (L) 11/13/2023     Lab Results   Component Value Date    LDLCALC 45 11/13/2023     Lab Results   Component Value Date    TRIG 172 (H) 11/13/2023     No results found for: \"CHOLHDL\"    Imaging: Echo complete w/ contrast if indicated    Result Date: 2/26/2024  Narrative:   Left Ventricle: Left ventricular cavity size is normal. Wall thickness is mildly increased. The left ventricular ejection fraction is 35-40% by biplane measurement. Systolic function is moderately reduced. There is moderate global hypokinesis with specific regional wall abnormalities. Diastolic function is mildly abnormal, consistent with grade I (abnormal) relaxation.   The following segments are akinetic: basal inferior, basal inferolateral, mid inferior and mid inferolateral.   The remainder of the myocardium is hypokinetic.   Right Ventricle: Right ventricular cavity size is normal. Systolic function is normal.   Left Atrium: The atrium is moderately dilated.   Right Atrium: The atrium is dilated.   Mitral Valve: There is moderate regurgitation. Compared to prior study dated 11/13/2020, the LV systolic function has improved.       EKG: .    Review of Systems   Constitutional: Positive for malaise/fatigue.   HENT: Negative.     Eyes: Negative.    Cardiovascular: Negative.    Respiratory:  Positive for shortness of breath.    Endocrine: Negative.    Hematologic/Lymphatic: Negative.    Skin: Negative.    Musculoskeletal: Negative.    Gastrointestinal: Negative.    Genitourinary: Negative.    Neurological: Negative.    Psychiatric/Behavioral: " Negative.     Allergic/Immunologic: Negative.        Vitals:    03/08/24 1040   BP: 110/52   Pulse: 65           Physical Exam  Vitals reviewed.   Constitutional:       Appearance: Normal appearance.   HENT:      Head: Normocephalic.      Nose: Nose normal.      Mouth/Throat:      Mouth: Mucous membranes are moist.      Pharynx: Oropharynx is clear.   Eyes:      General: No scleral icterus.     Conjunctiva/sclera: Conjunctivae normal.   Cardiovascular:      Rate and Rhythm: Normal rate and regular rhythm.      Heart sounds: No murmur heard.     No friction rub. No gallop.   Pulmonary:      Effort: Pulmonary effort is normal. No respiratory distress.      Breath sounds: Normal breath sounds. No wheezing or rales.   Abdominal:      General: Abdomen is flat. Bowel sounds are normal. There is no distension.      Palpations: Abdomen is soft.      Tenderness: There is no abdominal tenderness. There is no guarding.   Musculoskeletal:      Cervical back: Normal range of motion and neck supple.      Right lower leg: No edema.      Left lower leg: No edema.   Skin:     General: Skin is warm and dry.   Neurological:      General: No focal deficit present.      Mental Status: He is alert and oriented to person, place, and time.   Psychiatric:         Mood and Affect: Mood normal.         Behavior: Behavior normal.         Discussion/Summary:    Preop Cardiac Clearance: moderate to high cardiovascular risk.   Chronic Systolic CHF.  Personally reviewed his recent echo and LVEF is approximately 40% which is up from 20%. COntinue with current medical therapy. He can discontinue the lifevest.  He gained 4 pounds so he will take a second dose of lasix this afternoon.   CAD:  of RCA.  Continue aspirin and statin therapy.   NSVT  Hx of DVT.     He has melanoma and is scheduled for surgery.       The patient was counseled regarding diagnostic results, instructions for management, risk factor reductions, impressions. total time of  encounter was 25 minutes and 15 minutes was spent counseling.

## 2024-03-08 NOTE — PROGRESS NOTES
[facility]  CONSULT: GERIATRIC SURGERY   Virtual Brief Visit    This Visit is being completed by telephone. The Patient is located at Home and in the following state in which I hold an active license [place]    The patient was identified by name and date of birth. Toro oRdriguez was informed that this is a telemedicine visit and that the visit is being conducted through Telephone.  My office door was closed. No one else was in the room.  He acknowledged consent and understanding of privacy and security of the video platform. The patient has agreed to participate and understands they can discontinue the visit at any time.    Patient is aware this is a billable service.     Assessment/Plan:  73 year old male was referred to [facility] for geriatric assessment and surgical optimization   Consult concerns:  NUTRITION, GLUCOSE CONTROL AND AGE   Melanoma of back (HCC) [C43.59]  - Primary      He is scheduled on 3.18.24  Case: [id number] Date/Time: 03/18/24 0945   Procedure: WIDE LOCAL EXCISION OF BACK MELANOMA (Back)   Anesthesia type: Conscious Sedation   Diagnosis: Melanoma of back (HCC) [C43.59]   Pre-op diagnosis: Melanoma of back (HCC) [C43.59]   Location: AN OR ROOM 03 / [facility]   Surgeons: Lillie La MD     Last anesthesia   Reports NO PROBLEMS      Call daughter   Problem List Items Addressed This Visit       Essential hypertension -   Stable   No concerns today   Much better   Received CC       Coronary artery disease involving native coronary artery of native heart (Chronic)  Dilated cardiomyopathy (HCC)  Stable   METS 5  Denies CP/SOB   Admits to being in well health   Received CC   Pre-op Cardiac Clearance: moderate to high cardiovascular risk.   Chronic Systolic CHF.  Personally reviewed his recent echo and LVEF is approximately 40% which is up from 20%. COntinue with current medical therapy. He can discontinue the lifevest.  He gained 4 pounds so he will take  a second dose of lasix this afternoon.   CAD:  of RCA.   NSVT  Hx of DVT.      He has melanoma and is scheduled for surgery.            Type 2 diabetes mellitus with chronic kidney disease, without long-term current use of insulin, unspecified CKD stage (HCC)  PATIENT & DAUGHTER ARE UNAWARE OF DM DX   EDUCATION PROVIDED ON DIABETES AND HBA1C LEVELS  ENCOURAGED TO GO FOR REPEAT HBA1C LEVEL - CAN BE BEFORE OR AFTER SURGERY   ENCOURAGED TO DISCUSS NEXT STEPS WITH PCP   SSI RISK HIGH 2.2 HBA1C 8.2  Discussed with [facility] DOC, due to nature of surgery (CA) we should plan to move forward with current SSI risk   Education on ADA diet provided   Education High protein diet provided   Follow up with PCP             Melanoma of back (HCC)  Seen for surgical op  Seen for geriatric   Pats reviewed stable   Started on BEST   At risk for post-op CHUY - yes  At risk for post-op SSI- yes 8.2- okay to proceed to surgery   Out patient   BEST   Breathing- instructed to exercise lungs prior to surgery via deep breathing   Eat- discussed increasing protein intake prior to surgery   Sleep/stress- encouraged 8-10 sleep @ night, stress reduction, avoid sick contacts and handwashing   Train- encouraged to remain active          Chronic venous hypertension (idiopathic) with ulcer of left lower extremity (CODE) ([facility])  Bed sore\ buttock  Visiting nurse treating it with PCP recs  Encouraged high protein diet           Chronic kidney disease, stage 3a ([facility])   Latest Reference Range & Units 01/04/24 10:05   Sodium 135 - 147 mmol/L 139   Potassium 3.5 - 5.3 mmol/L 4.5   Chloride 96 - 108 mmol/L 104   Carbon Dioxide 21 - 32 mmol/L 24   ANION GAP mmol/L 11   BUN 5 - 25 mg/dL 25   Creatinine 0.60 - 1.30 mg/dL 1.17   GLUCOSE, FASTING 65 - 99 mg/dL 98   Calcium 8.4 - 10.2 mg/dL 9.1   AST 13 - 39 U/L 18   ALT 7 - 52 U/L 15   ALK PHOS 34 - 104 U/L 61   Total Protein 6.4 - 8.4 g/dL 7.0   Albumin 3.5 - 5.0 g/dL 3.8   Total Bilirubin 0.20 - 1.00 mg/dL 0.58    GFR, Calculated ml/min/1.73sq m 60             Encounter for geriatric assessment  Outpatient procedure   Recommend DP ON ADMIT   Chris Total Score: 18  PHQ- 9 Depression Scale:1  Nutrition Assessment Score:14  METS:5.81  Incentive Spirometry Level:   Health goals:  -What are your overall health goals?      -What brings you strength? daughter     -What activities are important to you? Old movie             Recent Visits  Date Type Provider Dept   03/05/24 Telephone Amber Smith  [facility]   Showing recent visits within past 7 days and meeting all other requirements  Today's Visits  Date Type Provider Dept   03/08/24 Telemedicine CARLIE Nash  [facility]   Showing today's visits and meeting all other requirements  Future Appointments  No visits were found meeting these conditions.  Showing future appointments within next 150 days and meeting all other requirements       Denies fevers and denies chills  Denies congestion and denies sore throat  Denies chest pain  Denies palpitations  Denies shortness of breath  Denies abdominal pain  Denies nausea, vomiting, and diarrhea  Denies any issues with their skin... example NO open wounds or sores  Denies rashes  Denies difficulty urinating  Denies any issues with their urine... example a dark color or an odor  Denies dizziness  Denies headaches  Denies confusion and denies hallucinations    Admits to being in well health today        Physical Assessment   We did not connect via video  Patient was alert and orientated times 3  Mood appropriate  Thought appropriate    I heard no respiratory distress over the phone today      DEB Engel is a 73-year-old male who was referred to [facility] for presurgery optimization and geriatric assessment.  Toro explains he is unaware of how long the area on his back has been there.  States he was having a routine skin exam after having skin cancer removed from his chest when it was  identified.  Since the biopsy on his back he does notice small spots of blood in his clothing.  No pain.  He is looking forward to having it removed.      I met with Toro and his daughter Shanique over video through [employer].  Picture was clear.  Audio was good.  Both were pleasant and a pleasure to care for.  Toro admits to being in well health today.  He is looking forward to having the area removed off his back.  He was told it was a 1 day surgery and he would be going back to the assisted living where he resides after surgery.  He is comfortable with that plan.      He just came from his cardiologist who already cleared him for surgery.  He is currently scheduled for conscious sedation.  He denies ever having any problems with anesthesia.      Surgical optimization complete.  PAT WERE reviewed.  His hemoglobin A1c is high at 8.2.  Both patient and the daughter are unaware of any diagnosis of diabetes.  Today I provided education on the disease process of diabetes.  I also provided education on hemoglobin A1c levels.  I talked about a realistic level for her father, TORO.  I did encourage him to get a repeat HBA1C level.  This can be done before or after surgery.  I had a discussion with [facility] DOC and due to the nature of the surgery (ca) we feel it is more important to move forward with SURGERY VS WAIT COMPARED TO SSI RISK.   I discussed the RISK OF SSI.  I did nutrition education on a ADA diet.  I talked about ways to limit carbs and sweets.  I talked about increasing protein in diet.  Plan to repeat the hemoglobin A1c level.  Plan to speak to the PCP about the next steps in his glucose management.    Patient has a cardiac history.  He follows cardiology.  He just saw cardiology today.  He got cardiac clearance to move forward with surgery.  After surgery he will go back to the assisted facility where he currently resides.    As always we started our best protocol  As always we discussed having your BEST  surgery, and BEST recovery.  Surgery goals reviewed today.      Breathing exercises   Patient was encouraged to begin lung exercises today.  This could be accomplished through deep breathing and cough exercises.      Eating/nutrition   Encouraged patient to increase oral protein intake prior to surgery.  This can be accomplished by consuming chicken, fish, tuna fish, cottage cheese, cheese, eggs, Greek yogurt, and protein shakes as needed.  I encouraged use of protein shakes such ENLIVE.  I also recommended making your own protein shakes with protein powder.   Sleep/Stress management  Patient was encouraged to rest their body prior to surgery.  Encouraged attempting to get 8 hours of sleep at night.  Avoid stress.  Avoid sick contacts.  Encouraged to find a relaxing hobby such as reading, meditation, listening to music.  Training exercises  Patient was encouraged to remain active as possible.  Today bilateral lower extremity generic exercises were taught for muscle strengthening and balance.  All exercises to be done sitting down.         Visit Time  Total Visit Duration: 30 MINUTES

## 2024-03-12 NOTE — PRE-PROCEDURE INSTRUCTIONS
Pre-Surgery Instructions:   Medication Instructions    amiodarone 200 mg tablet Take day of surgery.    apixaban (ELIQUIS) 5 mg Take day of surgery.    atorvastatin (LIPITOR) 40 mg tablet Take day of surgery.    furosemide (LASIX) 40 mg tablet Hold day of surgery.    lisinopril (ZESTRIL) 10 mg tablet Hold day of surgery.    meloxicam (MOBIC) 15 mg tablet Stop taking 7 days prior to surgery.    metoprolol succinate (TOPROL-XL) 100 mg 24 hr tablet Take day of surgery.    metoprolol succinate (TOPROL-XL) 50 mg 24 hr tablet Take day of surgery.    mupirocin (BACTROBAN) 2 % ointment Hold day of surgery.    nitroglycerin (NITROSTAT) 0.4 mg SL tablet Uses PRN- OK to take day of surgery    nystatin (MYCOSTATIN) powder Hold day of surgery.    sacubitril-valsartan (ENTRESTO) 49-51 MG TABS Hold day of surgery.    tamsulosin (FLOMAX) 0.4 mg Take day of surgery.    traMADol (Ultram) 50 mg tablet Uses PRN- OK to take day of surgery      Medication instructions for day surgery reviewed. Please use only a sip of water to take your instructed medications. Avoid all over the counter vitamins, supplements and NSAIDS for one week prior to surgery per anesthesia guidelines. Tylenol is ok to take as needed.     You will receive a call one business day prior to surgery with an arrival time and hospital directions. If your surgery is scheduled on a Monday, the hospital will be calling you on the Friday prior to your surgery. If you have not heard from anyone by 8pm, please call the hospital supervisor through the hospital  at [phone]. ([place] [phone] or [place] [phone]).    Do not eat or drink anything after midnight the night before your surgery, including candy, mints, lifesavers, or chewing gum. Do not drink alcohol 24hrs before your surgery. Try not to smoke at least 24hrs before your surgery.       Follow the pre surgery showering instructions as listed in the “My Surgical Experience Booklet” or otherwise  provided by your surgeon's office. Do not use a blade to shave the surgical area 1 week before surgery. It is okay to use a clean electric clippers up to 24 hours before surgery. Do not apply any lotions, creams, including makeup, cologne, deodorant, or perfumes after showering on the day of your surgery. Do not use dry shampoo, hair spray, hair gel, or any type of hair products.     No contact lenses, eye make-up, or artificial eyelashes. Remove nail polish, including gel polish, and any artificial, gel, or acrylic nails if possible. Remove all jewelry including rings and body piercing jewelry.     Wear causal clothing that is easy to take on and off. Consider your type of surgery.    Keep any valuables, jewelry, piercings at home. Please bring any specially ordered equipment (sling, braces) if indicated.    Arrange for a responsible person to drive you to and from the hospital on the day of your surgery. Please confirm the visitor policy for the day of your procedure when you receive your phone call with an arrival time.     Call the surgeon's office with any new illnesses, exposures, or additional questions prior to surgery.    Please reference your “My Surgical Experience Booklet” for additional information to prepare for your upcoming surgery.

## 2024-03-14 ENCOUNTER — PROCEDURE VISIT (OUTPATIENT)
Dept: UROLOGY | Facility: MEDICAL CENTER | Age: 74
End: 2024-03-14
Payer: MEDICARE

## 2024-03-14 ENCOUNTER — TELEPHONE (OUTPATIENT)
Dept: UROLOGY | Facility: MEDICAL CENTER | Age: 74
End: 2024-03-14

## 2024-03-14 VITALS
SYSTOLIC BLOOD PRESSURE: 118 MMHG | DIASTOLIC BLOOD PRESSURE: 68 MMHG | HEART RATE: 71 BPM | BODY MASS INDEX: 33.12 KG/M2 | WEIGHT: 211 LBS | HEIGHT: 67 IN | OXYGEN SATURATION: 96 %

## 2024-03-14 DIAGNOSIS — R33.9 URINARY RETENTION: Primary | ICD-10-CM

## 2024-03-14 LAB — POST-VOID RESIDUAL VOLUME, ML POC: 189 ML

## 2024-03-14 PROCEDURE — 51798 US URINE CAPACITY MEASURE: CPT

## 2024-03-14 NOTE — TELEPHONE ENCOUNTER
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

## 2024-03-14 NOTE — TELEPHONE ENCOUNTER
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

## 2024-03-14 NOTE — PROGRESS NOTES
"3/14/2024    Toro Rodriguez  1950  [id number]    Diagnosis  Chief Complaint    Urinary Retention         Patient presents for void trial managed and seen in consult by the AP Team.     Plan  Patient needs follow up with any provider to establish care.     Procedure Colon removal/voiding trial    Colon catheter removed by VNA at 8 am.     Patient returned this afternoon. Patient voided while in office. Bladder ultrasound performed and PVR measured 189 mls.    Patient complained of irritation at the tip of the penis. Patient was told to clean area with luke warm water and to apply Vaseline to the tip of the penis.     Recent Results (from the past 4 hour(s))   POCT Measure PVR    Collection Time: 03/14/24  3:14 PM   Result Value Ref Range    POST-VOID RESIDUAL VOLUME, ML  mL           Vitals:    03/14/24 1456   BP: 118/68   BP Location: Left arm   Patient Position: Sitting   Cuff Size: Standard   Pulse: 71   SpO2: 96%   Weight: 95.7 kg (211 lb)   Height: 5' 7\" (1.702 m)           Carol Buckner RN       "

## 2024-03-15 ENCOUNTER — TELEPHONE (OUTPATIENT)
Dept: HEMATOLOGY ONCOLOGY | Facility: CLINIC | Age: 74
End: 2024-03-15

## 2024-03-15 NOTE — TELEPHONE ENCOUNTER
Patient Call    Who are you speaking with? Child    If it is not the patient, are they listed on an active communication consent form? Yes   What is the reason for this call? Pts daughter is calling in regards to pts surgery on Monday with Dr La. Pts daughter has questions regarding medications he is supposed to take and not take prior to the surgery as well as arrival time. Attempted to call Kristyn Porras, unable to connect with her regarding these questions. Pts daughter would like a call back today regarding this please.    Does this require a call back? Yes   If a call back is required, please list best call back number [phone]   If a call back is required, advise that a message will be forwarded to their care team and someone will return their call as soon as possible.   Did you relay this information to the patient? Yes

## 2024-03-17 PROBLEM — N17.9 ACUTE KIDNEY INJURY (HCC): Status: RESOLVED | Noted: 2023-12-13 | Resolved: 2024-03-17

## 2024-03-17 NOTE — ANESTHESIA PREPROCEDURE EVALUATION
Procedure:  WIDE LOCAL EXCISION OF BACK MELANOMA (Back)    Relevant Problems   ANESTHESIA   (-) History of anesthesia complications      CARDIO   (+) Coronary artery disease involving native coronary artery of native heart   (+) Deep vein thrombosis (DVT) of left lower extremity (HCC)   (+) Essential hypertension      ENDO   (+) Type 2 diabetes mellitus with chronic kidney disease, without long-term current use of insulin, unspecified CKD stage (HCC)      GI/HEPATIC   (-) Gastroesophageal reflux disease      /RENAL   (+) Benign prostatic hyperplasia with urinary retention   (+) Chronic kidney disease, stage 3a (HCC)      PULMONARY   (-) URI (upper respiratory infection)      Cardiovascular/Peripheral Vascular   (+) Dilated cardiomyopathy (HCC)      TTE 2/2024:    Left Ventricle: Left ventricular cavity size is normal. Wall thickness is mildly increased. The left ventricular ejection fraction is 35-40% by biplane measurement. Systolic function is moderately reduced. There is moderate global hypokinesis with specific regional wall abnormalities. Diastolic function is mildly abnormal, consistent with grade I (abnormal) relaxation.    The following segments are akinetic: basal inferior, basal inferolateral, mid inferior and mid inferolateral.    The remainder of the myocardium is hypokinetic.    Right Ventricle: Right ventricular cavity size is normal. Systolic function is normal.    Left Atrium: The atrium is moderately dilated.    Right Atrium: The atrium is dilated.    Mitral Valve: There is moderate regurgitation  Physical Exam    Airway    Mallampati score: II  TM Distance: >3 FB  Neck ROM: full     Dental   Comment: Denies loose     Cardiovascular      Pulmonary      Other Findings        Anesthesia Plan  ASA Score- 4     Anesthesia Type- IV sedation with anesthesia with ASA Monitors.         Additional Monitors:     Airway Plan:            Plan Factors-Exercise tolerance (METS): <4 METS.    Chart reviewed. EKG  reviewed.  Existing labs reviewed. Patient summary reviewed.                  Induction- intravenous.    Postoperative Plan-     Informed Consent- Anesthetic plan and risks discussed with patient.  I personally reviewed this patient with the CRNA. Discussed and agreed on the Anesthesia Plan with the CRNA..

## 2024-03-18 ENCOUNTER — TELEPHONE (OUTPATIENT)
Dept: UROLOGY | Facility: AMBULATORY SURGERY CENTER | Age: 74
End: 2024-03-18

## 2024-03-18 ENCOUNTER — ANESTHESIA (OUTPATIENT)
Dept: PERIOP | Facility: HOSPITAL | Age: 74
End: 2024-03-18
Payer: MEDICARE

## 2024-03-18 ENCOUNTER — HOSPITAL ENCOUNTER (OUTPATIENT)
Facility: HOSPITAL | Age: 74
Setting detail: OUTPATIENT SURGERY
Discharge: HOME | End: 2024-03-18
Attending: STUDENT IN AN ORGANIZED HEALTH CARE EDUCATION/TRAINING PROGRAM | Admitting: STUDENT IN AN ORGANIZED HEALTH CARE EDUCATION/TRAINING PROGRAM
Payer: MEDICARE

## 2024-03-18 VITALS
DIASTOLIC BLOOD PRESSURE: 60 MMHG | RESPIRATION RATE: 18 BRPM | HEART RATE: 62 BPM | SYSTOLIC BLOOD PRESSURE: 135 MMHG | TEMPERATURE: 97.5 F | OXYGEN SATURATION: 94 %

## 2024-03-18 DIAGNOSIS — C43.59 MELANOMA OF BACK (HCC): ICD-10-CM

## 2024-03-18 DIAGNOSIS — Z01.818 PRE-OP TESTING: Primary | ICD-10-CM

## 2024-03-18 LAB — GLUCOSE SERPL-MCNC: 105 MG/DL (ref 65–140)

## 2024-03-18 PROCEDURE — 88341 IMHCHEM/IMCYTCHM EA ADD ANTB: CPT | Performed by: STUDENT IN AN ORGANIZED HEALTH CARE EDUCATION/TRAINING PROGRAM

## 2024-03-18 PROCEDURE — 88305 TISSUE EXAM BY PATHOLOGIST: CPT | Performed by: STUDENT IN AN ORGANIZED HEALTH CARE EDUCATION/TRAINING PROGRAM

## 2024-03-18 PROCEDURE — 82948 REAGENT STRIP/BLOOD GLUCOSE: CPT

## 2024-03-18 PROCEDURE — 88342 IMHCHEM/IMCYTCHM 1ST ANTB: CPT | Performed by: STUDENT IN AN ORGANIZED HEALTH CARE EDUCATION/TRAINING PROGRAM

## 2024-03-18 RX ORDER — LIDOCAINE HYDROCHLORIDE 10 MG/ML
INJECTION, SOLUTION EPIDURAL; INFILTRATION; INTRACAUDAL; PERINEURAL AS NEEDED
Status: DISCONTINUED | OUTPATIENT
Start: 2024-03-18 | End: 2024-03-18

## 2024-03-18 RX ORDER — GLYCOPYRROLATE 0.2 MG/ML
INJECTION INTRAMUSCULAR; INTRAVENOUS AS NEEDED
Status: DISCONTINUED | OUTPATIENT
Start: 2024-03-18 | End: 2024-03-18

## 2024-03-18 RX ORDER — CEFAZOLIN SODIUM 2 G/50ML
2000 SOLUTION INTRAVENOUS ONCE
Status: DISCONTINUED | OUTPATIENT
Start: 2024-03-18 | End: 2024-03-18 | Stop reason: HOSPADM

## 2024-03-18 RX ORDER — ONDANSETRON 2 MG/ML
INJECTION INTRAMUSCULAR; INTRAVENOUS AS NEEDED
Status: DISCONTINUED | OUTPATIENT
Start: 2024-03-18 | End: 2024-03-18

## 2024-03-18 RX ORDER — SODIUM CHLORIDE, SODIUM LACTATE, POTASSIUM CHLORIDE, CALCIUM CHLORIDE 600; 310; 30; 20 MG/100ML; MG/100ML; MG/100ML; MG/100ML
INJECTION, SOLUTION INTRAVENOUS CONTINUOUS PRN
Status: DISCONTINUED | OUTPATIENT
Start: 2024-03-18 | End: 2024-03-18

## 2024-03-18 RX ORDER — HYDROMORPHONE HCL IN WATER/PF 6 MG/30 ML
0.2 PATIENT CONTROLLED ANALGESIA SYRINGE INTRAVENOUS
Status: DISCONTINUED | OUTPATIENT
Start: 2024-03-18 | End: 2024-03-18 | Stop reason: HOSPADM

## 2024-03-18 RX ORDER — SODIUM CHLORIDE, SODIUM LACTATE, POTASSIUM CHLORIDE, CALCIUM CHLORIDE 600; 310; 30; 20 MG/100ML; MG/100ML; MG/100ML; MG/100ML
125 INJECTION, SOLUTION INTRAVENOUS CONTINUOUS
Status: DISCONTINUED | OUTPATIENT
Start: 2024-03-18 | End: 2024-03-18 | Stop reason: HOSPADM

## 2024-03-18 RX ORDER — MAGNESIUM HYDROXIDE 1200 MG/15ML
LIQUID ORAL AS NEEDED
Status: DISCONTINUED | OUTPATIENT
Start: 2024-03-18 | End: 2024-03-18 | Stop reason: HOSPADM

## 2024-03-18 RX ORDER — FENTANYL CITRATE/PF 50 MCG/ML
25 SYRINGE (ML) INJECTION
Status: DISCONTINUED | OUTPATIENT
Start: 2024-03-18 | End: 2024-03-18 | Stop reason: HOSPADM

## 2024-03-18 RX ORDER — PROPOFOL 10 MG/ML
INJECTION, EMULSION INTRAVENOUS AS NEEDED
Status: DISCONTINUED | OUTPATIENT
Start: 2024-03-18 | End: 2024-03-18

## 2024-03-18 RX ORDER — FENTANYL CITRATE 50 UG/ML
INJECTION, SOLUTION INTRAMUSCULAR; INTRAVENOUS AS NEEDED
Status: DISCONTINUED | OUTPATIENT
Start: 2024-03-18 | End: 2024-03-18

## 2024-03-18 RX ORDER — LIDOCAINE HYDROCHLORIDE 10 MG/ML
0.5 INJECTION, SOLUTION EPIDURAL; INFILTRATION; INTRACAUDAL; PERINEURAL ONCE AS NEEDED
Status: DISCONTINUED | OUTPATIENT
Start: 2024-03-18 | End: 2024-03-18 | Stop reason: HOSPADM

## 2024-03-18 RX ORDER — ONDANSETRON 2 MG/ML
4 INJECTION INTRAMUSCULAR; INTRAVENOUS ONCE AS NEEDED
Status: DISCONTINUED | OUTPATIENT
Start: 2024-03-18 | End: 2024-03-18 | Stop reason: HOSPADM

## 2024-03-18 RX ORDER — CEFAZOLIN SODIUM 2 G/50ML
SOLUTION INTRAVENOUS AS NEEDED
Status: DISCONTINUED | OUTPATIENT
Start: 2024-03-18 | End: 2024-03-18

## 2024-03-18 RX ORDER — KETAMINE HCL IN NACL, ISO-OSM 100MG/10ML
SYRINGE (ML) INJECTION AS NEEDED
Status: DISCONTINUED | OUTPATIENT
Start: 2024-03-18 | End: 2024-03-18

## 2024-03-18 RX ADMIN — PROPOFOL 40 MG: 10 INJECTION, EMULSION INTRAVENOUS at 09:42

## 2024-03-18 RX ADMIN — PROPOFOL 50 MCG/KG/MIN: 10 INJECTION, EMULSION INTRAVENOUS at 09:43

## 2024-03-18 RX ADMIN — NOREPINEPHRINE BITARTRATE 8 MCG: 1 INJECTION, SOLUTION, CONCENTRATE INTRAVENOUS at 09:48

## 2024-03-18 RX ADMIN — NOREPINEPHRINE BITARTRATE 8 MCG: 1 INJECTION, SOLUTION, CONCENTRATE INTRAVENOUS at 09:52

## 2024-03-18 RX ADMIN — SODIUM CHLORIDE, SODIUM LACTATE, POTASSIUM CHLORIDE, AND CALCIUM CHLORIDE: .6; .31; .03; .02 INJECTION, SOLUTION INTRAVENOUS at 08:32

## 2024-03-18 RX ADMIN — LIDOCAINE HYDROCHLORIDE 50 MG: 10 INJECTION, SOLUTION EPIDURAL; INFILTRATION; INTRACAUDAL; PERINEURAL at 09:42

## 2024-03-18 RX ADMIN — NOREPINEPHRINE BITARTRATE 8 MCG: 1 INJECTION, SOLUTION, CONCENTRATE INTRAVENOUS at 09:42

## 2024-03-18 RX ADMIN — FENTANYL CITRATE 25 MCG: 50 INJECTION INTRAMUSCULAR; INTRAVENOUS at 09:55

## 2024-03-18 RX ADMIN — GLYCOPYRROLATE 0.1 MG: 0.2 INJECTION INTRAMUSCULAR; INTRAVENOUS at 09:52

## 2024-03-18 RX ADMIN — Medication 10 MG: at 09:57

## 2024-03-18 RX ADMIN — CEFAZOLIN SODIUM 2000 MG: 2 SOLUTION INTRAVENOUS at 09:34

## 2024-03-18 RX ADMIN — PROPOFOL 20 MG: 10 INJECTION, EMULSION INTRAVENOUS at 09:55

## 2024-03-18 RX ADMIN — ONDANSETRON 4 MG: 2 INJECTION INTRAMUSCULAR; INTRAVENOUS at 09:40

## 2024-03-18 NOTE — INTERVAL H&P NOTE
H&P reviewed. After examining the patient I find no changes in the patients condition since the H&P had been written.    Vitals:    03/18/24 0841   BP: 122/69   Pulse: 64   Resp: 18   Temp: 97.8 °F (36.6 °C)   SpO2: 93%

## 2024-03-18 NOTE — OP NOTE
OPERATIVE REPORT  PATIENT NAME: Toro Rodriguez    :  1950  MRN: [id number]  Pt Location: [facility] OR ROOM 03    SURGERY DATE: 3/18/2024    Surgeons and Role:     * Lillie La MD - Primary     * Tien Rivera MD - Assisting    Preop Diagnosis:  Melanoma of back (HCC) [C43.59]    Post-Op Diagnosis Codes:     * Melanoma of back (HCC) [C43.59]    Procedure(s):  WIDE LOCAL EXCISION OF BACK MELANOMA    Specimen(s):  ID Type Source Tests Collected by Time Destination   1 : BACK MELANOMA SUTURE MARKS 12 O CLOCK Tissue Soft Tissue, Other TISSUE EXAM Lillie La MD 3/18/2024 1003        Estimated Blood Loss:   2 mL    Drains:  Urethral Catheter Straight-tip 16 Fr. (Active)   Number of days: 85       Anesthesia Type:   Conscious Sedation     Operative Indications:  Melanoma of back (HCC) [C43.59]      Operative Findings:  Melanoma scar in expected location    Complications:   None    Procedure and Technique:  The patient underwent sedation and was placed in the lateral position with all pressure pointed padded. The patient's back was prepped and draped in the usual sterile fashion. A timeout was performed. The margins of the melanoma were marked at 1 x 1.2 cm.  A circumferential margin of 1 cm was marked.  The skin was incised sharply to the muscular fascia.  The melanoma was then dissected off of the fascia with electrocautery.  The specimen was marked with a stitch at 12 o clock.  Skin flaps were then raised both superiorly and inferiorly to facilitate a tension-free closure. Re-approximation of the tissue was estimated and standing cone defects were removed. The final dimensions were 3.5 x 6.5 cm. The defect was copiously irrigated and found to be hemostatic.  The deep dermal tissues were closed with interrupted 2-0 Vicryl suture. The skin was run with 3-0 monocryl.  The patient was then turned supine and awakened and taken to the PACU in stable condition.     I was present for the entire  procedure.    Patient Disposition:  PACU     Wide Local Excision for Primary Cutaneous Melanoma - Excision 1 (Back)  Operation performed with curative intent Yes   Original Breslow thickness of the lesion 0.7 mm (to the tenth of a millimeter)   Clinical margin width   (measured from the edge of the lesion or the prior excision scar) 1 cm   Depth of excision Full-thickness skin/subcutaneous tissue down to fascia (melanoma)         SIGNATURE: Lillie La MD  DATE: March 18, 2024  TIME: 10:53 AM

## 2024-03-18 NOTE — ANESTHESIA POSTPROCEDURE EVALUATION
Post-Op Assessment Note    CV Status:  Stable  Pain Score: 0    Pain management: adequate       Mental Status:  Alert and awake   Hydration Status:  Euvolemic   PONV Controlled:  Controlled   Airway Patency:  Patent  Two or more mitigation strategies used for obstructive sleep apnea   Post Op Vitals Reviewed: Yes    No anethesia notable event occurred.    Staff: CRNA, Anesthesiologist               BP   90/50   Temp   97   Pulse  77   Resp   16   SpO2   100

## 2024-03-18 NOTE — DISCHARGE INSTR - AVS FIRST PAGE
Please follow up with Dr. La on 4/3/24 as previously scheduled    There is skin glue and a small band aid strip over your incision. Beginning tomorrow, you can shower with gentle soap and water as usual and the band aid strip will fall off over time. Do not soak your incision (eg in a bath) for 10 days. The steri strips will come of on there own, please do not pull them off.    Use over the counter tylenol as needed for pain - these can be utilized at the same time. For example, if needed, I recommend 1G tylenol @ 0900, 600 mg advil @ 1200, 1G tylenol @ 1500, 600 mg advil @ 1800, etc. If you have pain that exceeds this, take prescribed medication as needed. Please avoid Advil and other NSAIDs    Expect tenderness after surgery. If you have any significant fever (>101), spreading redness, drainage of pus-like fluid, give my office a call. If you have any other issues or questions, call anytime. [phone]

## 2024-03-21 PROCEDURE — 88341 IMHCHEM/IMCYTCHM EA ADD ANTB: CPT | Performed by: STUDENT IN AN ORGANIZED HEALTH CARE EDUCATION/TRAINING PROGRAM

## 2024-03-21 PROCEDURE — 88305 TISSUE EXAM BY PATHOLOGIST: CPT | Performed by: STUDENT IN AN ORGANIZED HEALTH CARE EDUCATION/TRAINING PROGRAM

## 2024-03-21 PROCEDURE — 88342 IMHCHEM/IMCYTCHM 1ST ANTB: CPT | Performed by: STUDENT IN AN ORGANIZED HEALTH CARE EDUCATION/TRAINING PROGRAM

## 2024-04-01 ENCOUNTER — TELEPHONE (OUTPATIENT)
Dept: HEMATOLOGY ONCOLOGY | Facility: CLINIC | Age: 74
End: 2024-04-01

## 2024-04-01 DIAGNOSIS — C44.519 BASAL CELL CARCINOMA (BCC) OF SKIN OF OTHER PART OF TORSO: ICD-10-CM

## 2024-04-01 NOTE — TELEPHONE ENCOUNTER
Patient underwent excision on 1/31/24.  He should not require mupirocin at this time.  However, please inquire if he is experiencing any concerns at incision site.

## 2024-04-01 NOTE — TELEPHONE ENCOUNTER
Appointment Change  Cancel, Reschedule, Change to Virtual      Who are you speaking with? Child   If it is not the patient, is the caller listed on the communication consent form? Yes   Which provider is the appointment scheduled with? Dr. La   When was the original appointment scheduled?    Please list date and time 4/3/24 11am   At which location is the appointment scheduled to take place? [facility]   Was the appointment rescheduled?     Was the appointment changed from an in person visit to a virtual visit?    If so, please list the details of the change. 4/3/24 330   What is the reason for the appointment change? patient       Was STAR transport scheduled? N/A   Does STAR transport need to be scheduled for the new visit (if applicable) N/A   Does the patient need an infusion appointment rescheduled? N/A   Does the patient have an upcoming infusion appointment scheduled? If so, when? No   Is the patient undergoing chemotherapy? N/A   For appointments cancelled with less than 24 hours:  Was the no-show policy reviewed? N/A

## 2024-04-02 NOTE — TELEPHONE ENCOUNTER
Called and left a message for the patient letting him know he should not need a refill on the mupirocin at this time and asked if he has any concerns at his incision site

## 2024-04-03 ENCOUNTER — OFFICE VISIT (OUTPATIENT)
Dept: SURGICAL ONCOLOGY | Facility: CLINIC | Age: 74
End: 2024-04-03

## 2024-04-03 VITALS
SYSTOLIC BLOOD PRESSURE: 140 MMHG | TEMPERATURE: 97.4 F | HEIGHT: 67 IN | HEART RATE: 70 BPM | OXYGEN SATURATION: 91 % | DIASTOLIC BLOOD PRESSURE: 68 MMHG | WEIGHT: 216 LBS | BODY MASS INDEX: 33.9 KG/M2

## 2024-04-03 DIAGNOSIS — C43.59 MELANOMA OF BACK (HCC): Primary | ICD-10-CM

## 2024-04-03 PROCEDURE — 99024 POSTOP FOLLOW-UP VISIT: CPT | Performed by: STUDENT IN AN ORGANIZED HEALTH CARE EDUCATION/TRAINING PROGRAM

## 2024-04-03 NOTE — PROGRESS NOTES
Surgical Oncology Consultation    [facility]  [address] [place] [address] [phone]    Patient:  Toro Rodriguez  1950  [id number]    Primary Care provider:  CARLIE Foster  [address] [place] [address]    Referring provider:  No referring provider defined for this encounter.    Diagnoses and all orders for this visit:    Melanoma of back (HCC)        Chief Complaint   Patient presents with    Follow-up       No follow-ups on file.    Oncology History   Melanoma of back (HCC)   1/31/2024 Biopsy    B. Skin, left lower back, shave biopsy:     MELANOMA (thickness: at least 0.7 mm) (see note).     Note: SOX10, MART, and PRAME immunostains were reviewed; significant melanocytic architectural disorder is seen, and the lesional cells are positive for PRAME. Please see synoptic report for additional details.     Synoptic report for melanoma of the skin  Thickness: at least 0.7 mm (invasive melanoma extends to deep specimen margin)  Ulceration: not seen  Anatomic (Lukas) level: at least IV  Type: superficial spreading melanoma  Mitoses: 2/mm2  Microsatellites: cannot be determined  Lymphovascular invasion: not seen  Neurotropism: not seen  Tumor regression: present  Tumor-infiltrating lymphocytes (TIL): present, non-brisk  Margin assessment: invasive melanoma extends to deep specimen margin; melanoma in situ extends to peripheral specimen margin  Pathologic stage: at least pT1a  Associated nevus: dermal melanocytic nevus     2/20/2024 Initial Diagnosis    Melanoma of back (HCC)     3/18/2024 Surgery    A. Skin, back, excision:     -Residual focal melanoma (thickness: 0.7 mm); not seen at examined inked specimen margins.     -Prior procedure site changes present.         History of Present Illness  :   S/p incision of back melanoma - final path T1a. Doing well.     Review of  Systems  Complete ROS Surg Onc:   Constitutional: The patient ENDORSES recent history of general fatigue, no recent weight loss, no change in appetite.   Eyes: No complaints of visual problems, no scleral icterus.   ENT: No complaints of ear pain, no hoarseness, no difficulty swallowing,  no tinnitus and no new masses in head, oral cavity, or neck.   Cardiovascular: No complaints of chest pain, no palpitations, no ankle edema.   Respiratory: No complaints of shortness of breath, no cough.   Gastrointestinal: No complaints of jaundice, no bloody stools, no pale stools.   Genitourinary: YES dysuria, YES hematuria, no nocturia, no frequent urination, YES urethral discharge.   Musculoskeletal: No complaints of weakness, paralysis, joint stiffness or arthralgias.  Integumentary: No complaints of rash, no new lesions.   Neurological: No complaints of convulsions, no seizures, no dizziness.   Hematologic/Lymphatic: No complaints of easy bruising.   Endocrine:  No hot or cold intolerance.  No polydipsia, polyphagia, or polyuria.  Allergy/immunology:  No environmental allergies.  No food allergies.  Not immunocompromised.      Patient Active Problem List   Diagnosis    Essential hypertension    Acute on chronic systolic (congestive) heart failure (HCC)    Deep vein thrombosis (DVT) of left lower extremity (HCC)    Obesity, morbid (HCC)    Coronary artery disease involving native coronary artery of native heart    Type 2 diabetes mellitus with chronic kidney disease, without long-term current use of insulin, unspecified CKD stage (HCC)    Benign prostatic hyperplasia with urinary retention    Dilated cardiomyopathy (HCC)    Melanoma of back (HCC)    Chronic venous hypertension (idiopathic) with ulcer of left lower extremity (CODE) (HCC)    Chronic kidney disease, stage 3a (HCC)    Encounter for geriatric assessment     Past Medical History:   Diagnosis Date    Alcohol intoxication (HCC) 10/15/2020    BPH (benign prostatic  hyperplasia)     Chronic HFrEF (heart failure with reduced ejection fraction) ([facility]) 2023    Coronary artery calcification seen on CT scan 11/10/2023    Coronary artery disease 2023    Deep vein thrombosis (DVT) of left lower extremity ([facility]) 2023    Diabetes mellitus ([facility]) 2023    Fall from slip, trip, or stumble 10/15/2020    History of phimosis of penis     History of urinary calculi     Hypertension     Skin cancer     Tobacco abuse     quit around      Past Surgical History:   Procedure Laterality Date    BLADDER STONE REMOVAL      CARDIAC CATHETERIZATION N/A 2023    Procedure: Cardiac catheterization;  Surgeon: Dave Royal MD;  Location: [facility];  Service: Cardiology    ORIF ANKLE FRACTURE Left     SKIN LESION EXCISION N/A 3/18/2024    Procedure: WIDE LOCAL EXCISION OF BACK MELANOMA;  Surgeon: Lillie La MD;  Location: [facility];  Service: Surgical Oncology    TOTAL HIP ARTHROPLASTY Right      Family History   Problem Relation Age of Onset    Breast cancer Mother     Heart attack Father 61    Other Sister         s/p hysterectomy    Cerebral palsy Brother     Skin cancer Other         family history    No Known Problems Son     No Known Problems Daughter     Sudden death Neg Hx      Social History     Socioeconomic History    Marital status:      Spouse name: Not on file    Number of children: Not on file    Years of education: Not on file    Highest education level: Not on file   Occupational History    Not on file   Tobacco Use    Smoking status: Former     Types: Cigarettes     Start date:      Quit date: 2     Years since quittin.2    Smokeless tobacco: Never    Tobacco comments:     Quit over 30 years ago (Updated 2023).    Vaping Use    Vaping status: Never Used   Substance and Sexual Activity    Alcohol use: Not Currently    Drug use: Never    Sexual activity: Not on file   Other Topics Concern    Not on file   Social History  Narrative    Not on file     Social Determinants of Health     Financial Resource Strain: Not on file   Food Insecurity: No Food Insecurity (12/14/2023)    Hunger Vital Sign     Worried About Running Out of Food in the Last Year: Never true     Ran Out of Food in the Last Year: Never true   Transportation Needs: No Transportation Needs (12/14/2023)    PRAPARE - Transportation     Lack of Transportation (Medical): No     Lack of Transportation (Non-Medical): No   Physical Activity: Not on file   Stress: Not on file   Social Connections: Not on file   Intimate Partner Violence: Not on file   Housing Stability: Low Risk  (12/14/2023)    Housing Stability Vital Sign     Unable to Pay for Housing in the Last Year: No     Number of Places Lived in the Last Year: 1     Unstable Housing in the Last Year: No       Current Outpatient Medications:     acetaminophen (TYLENOL) 325 mg tablet, Take 2 tablets (650 mg total) by mouth every 6 (six) hours as needed for mild pain, headaches or fever, Disp: , Rfl: 0    amiodarone 200 mg tablet, Take 1 tablet (200 mg total) by mouth daily, Disp: 90 tablet, Rfl: 1    apixaban (ELIQUIS) 5 mg, Take 1 tablet (5 mg total) by mouth 2 (two) times a day Do not start before November 21, 2023., Disp: , Rfl: 0    aspirin 81 mg chewable tablet, Chew 1 tablet (81 mg total) daily, Disp: , Rfl:     atorvastatin (LIPITOR) 40 mg tablet, Take 1 tablet (40 mg total) by mouth daily with dinner, Disp: , Rfl: 0    furosemide (LASIX) 40 mg tablet, Take 1 tablet (40 mg total) by mouth daily Do not start before November 16, 2023., Disp: , Rfl: 0    lisinopril (ZESTRIL) 10 mg tablet, , Disp: , Rfl:     meloxicam (MOBIC) 15 mg tablet, Take 15 mg by mouth daily, Disp: , Rfl:     metoprolol succinate (TOPROL-XL) 100 mg 24 hr tablet, Take 1 tablet (100 mg total) by mouth daily, Disp: , Rfl:     metoprolol succinate (TOPROL-XL) 50 mg 24 hr tablet, Take 1 tablet (50 mg total) by mouth every evening, Disp: , Rfl:      mupirocin (BACTROBAN) 2 % ointment, Apply topically 3 (three) times a day, Disp: 30 g, Rfl: 2    nitroglycerin (NITROSTAT) 0.4 mg SL tablet, Place 1 tablet (0.4 mg total) under the tongue every 5 (five) minutes as needed for chest pain, Disp: , Rfl:     sacubitril-valsartan (ENTRESTO) 49-51 MG TABS, Take 1 tablet by mouth 2 (two) times a day, Disp: 60 tablet, Rfl: 5    tamsulosin (FLOMAX) 0.4 mg, Take 1 capsule (0.4 mg total) by mouth daily with dinner, Disp: , Rfl:     traMADol (Ultram) 50 mg tablet, Take 1 tablet (50 mg total) by mouth every 6 (six) hours as needed for moderate pain, Disp: 10 tablet, Rfl: 0    clotrimazole (LOTRIMIN) 1 % cream, Apply topically 2 (two) times a day for 7 days Apply to affected area, the tip of the penis, 2 times daily (Patient not taking: Reported on 4/3/2024), Disp: 15 g, Rfl: 0    nystatin (MYCOSTATIN) powder, Apply topically 3 (three) times a day for 7 days Do not start before January 24, 2024. (Patient not taking: Reported on 4/3/2024), Disp: 60 g, Rfl: 0  Allergies   Allergen Reactions    Zosyn [Piperacillin Sod-Tazobactam So] Rash       Vitals:    04/03/24 1530   BP: 140/68   Pulse: 70   Temp: (!) 97.4 °F (36.3 °C)   SpO2: 91%       Physical Exam   General: Appears tired, appears stated age, sitting on walker  Skin: Dry, anicteric  HEENT: Normocephalic, atraumatic; sclera aniceteric, mucous membranes dry; cervical nodes without adenopathy  Cardiopulmonary: RRR, Easy WOB, mild BLE edema. Lifevest in place  Abd: Rotund and soft, nontender, no masses appreciated, no hepatosplenomegaly  MSK: Symmetric, no cyanosis, no overt weakness  Lymphatic: No cervical, axillary or inguinal lymphadenopathy  Neuro: Affect appropriate, no gross motor abnormalities other than walker use      Pathology:     B. Skin, left lower back, shave biopsy:     MELANOMA (thickness: at least 0.7 mm) (see note).     Note: SOX10, MART, and PRAME immunostains were reviewed; significant melanocytic architectural  disorder is seen, and the lesional cells are positive for PRAME. Please see synoptic report for additional details.     Synoptic report for melanoma of the skin  Thickness: at least 0.7 mm (invasive melanoma extends to deep specimen margin)  Ulceration: not seen  Anatomic (Lukas) level: at least IV  Type: superficial spreading melanoma  Mitoses: 2/mm2  Microsatellites: cannot be determined  Lymphovascular invasion: not seen  Neurotropism: not seen  Tumor regression: present  Tumor-infiltrating lymphocytes (TIL): present, non-brisk  Margin assessment: invasive melanoma extends to deep specimen margin; melanoma in situ extends to peripheral specimen margin  Pathologic stage: at least pT1a  Associated nevus: dermal melanocytic nevus    Labs: Reviewed in [facility]    Imaging  Echo complete w/ contrast if indicated    Result Date: 2/26/2024  Narrative:   Left Ventricle: Left ventricular cavity size is normal. Wall thickness is mildly increased. The left ventricular ejection fraction is 35-40% by biplane measurement. Systolic function is moderately reduced. There is moderate global hypokinesis with specific regional wall abnormalities. Diastolic function is mildly abnormal, consistent with grade I (abnormal) relaxation.   The following segments are akinetic: basal inferior, basal inferolateral, mid inferior and mid inferolateral.   The remainder of the myocardium is hypokinetic.   Right Ventricle: Right ventricular cavity size is normal. Systolic function is normal.   Left Atrium: The atrium is moderately dilated.   Right Atrium: The atrium is dilated.   Mitral Valve: There is moderate regurgitation. Compared to prior study dated 11/13/2020, the LV systolic function has improved.       I independently reviewed and interpreted the above laboratory and imaging data including hospital course, urology notes, echo, labs, derm notes, path, pics. Discussed with Dr Guido      Discussion/Summary:   Pt is s/p WLE for T1a melanoma of  back. Path reviewed. Dicussed surveillance with q3 mo visits with derm for skin checks. Discussed vigilance about new skin lesions in this region or new lumps/bumps in LN basins. Discussed need for continued sun protection with sunscreen, hats, minimizing sun exposure. Patient and family expressed understanding and endorsement.

## 2024-04-17 NOTE — PROGRESS NOTES
"     Problem List Items Addressed This Visit       Acute on chronic systolic (congestive) heart failure (HCC)    Coronary artery disease involving native coronary artery of native heart (Chronic)    Type 2 diabetes mellitus with chronic kidney disease, without long-term current use of insulin, unspecified CKD stage (HCC)    Benign prostatic hyperplasia with urinary retention (Chronic)    Relevant Medications    finasteride (PROSCAR) 5 mg tablet    Dilated cardiomyopathy (HCC)    Melanoma of back (HCC)     Other Visit Diagnoses       Urinary retention due to benign prostatic hyperplasia    -  Primary    Relevant Medications    finasteride (PROSCAR) 5 mg tablet    Other Relevant Orders    POCT Measure PVR (Completed)                  Discussion:      Toro and his  and I had a productive consultation today.  He is not a healthy man with a history of chronic heart failure, coronary artery disease, type 2 diabetes, and dilated cardiomyopathy.  He is status post recent excision of melanoma on his back after which he had urinary retention.  He is voiding now.  He is taking tamsulosin.  I do recommend addition of finasteride given that this has been shown to decrease urinary retention episodes in the future.  His postvoid residual urine volume was 139 mL.  He does have limited functional status and is in wheelchair today, he sits down most of the day.  He is not a good surgical candidate for outlet surgery at the current time.    He will see us back in 6 months time for a postvoid residual urine volume.  He can see us yearly thereafter.        Portions of the above record have been created with voice recognition software.  Occasional wrong word or \"sound alike\" substitution may have occurred due to the inherent limitations of voice recognition software.  Read the chart carefully and recognize, using context, where substitution may have occurred.    Assessment and plan:       Please see problem oriented charting " for the assessment plan of today's urological complaints      Rick Espino MD      Chief Complaint     As listed above      History of Present Illness     Toro Rodriguez is a 73 y.o. man presenting in consultation for urinary retention.    The patient was referred by Mela La MD and today's note has been sent to the referring provider.    With regard to this complaint it is localized to the prostate.  The quality is described as urinary retention and the severity of this complaint is described as moderate.  These symptoms have been present for since his melanoma surgery and the timing is ongoing but improving. PVR today 139 mL. Previous treatments include tamsulosin and previous work-up includes evaluation by the surgical oncology team.  The patient mentions nothign as aggravating and alleviating factors, respectively.  The following associated signs and symptoms are mentioned: none    I counseled him on finasteride and he wishes to start this medication.    The following portions of the patient's history were reviewed and updated as appropriate: allergies, current medications, past family history, past medical history, past social history, past surgical history and problem list.    Detailed Urologic History     - please refer to HPI    Review of Systems     Review of Systems   Constitutional: Negative.    HENT: Negative.     Eyes: Negative.    Respiratory: Negative.     Cardiovascular: Negative.    Gastrointestinal: Negative.    Endocrine: Negative.    Genitourinary:  Positive for difficulty urinating.   Musculoskeletal: Negative.    Skin: Negative.    Allergic/Immunologic: Negative.    Neurological:  Positive for weakness.   Hematological: Negative.    Psychiatric/Behavioral: Negative.               Allergies     Allergies   Allergen Reactions    Zosyn [Piperacillin Sod-Tazobactam So] Rash       Physical Exam     Physical Exam  Vitals and nursing note reviewed.   Constitutional:       General: He is not in  acute distress.     Appearance: Normal appearance. He is well-developed. He is ill-appearing. He is not toxic-appearing or diaphoretic.      Comments: In a wheelchair   HENT:      Head: Normocephalic and atraumatic.   Eyes:      General: No scleral icterus.  Pulmonary:      Effort: Pulmonary effort is normal. No respiratory distress.   Abdominal:      General: There is no distension.      Palpations: Abdomen is soft.      Tenderness: There is no abdominal tenderness.   Musculoskeletal:         General: No deformity.      Cervical back: Neck supple.   Skin:     Coloration: Skin is not jaundiced or pale.   Neurological:      Mental Status: He is alert and oriented to person, place, and time. Mental status is at baseline.      Cranial Nerves: No cranial nerve deficit.      Motor: No weakness.      Coordination: Coordination normal.   Psychiatric:         Mood and Affect: Mood normal.         Behavior: Behavior normal.         Thought Content: Thought content normal.         Judgment: Judgment normal.             Vital Signs  There were no vitals filed for this visit.      Current Medications       Current Outpatient Medications:     acetaminophen (TYLENOL) 325 mg tablet, Take 2 tablets (650 mg total) by mouth every 6 (six) hours as needed for mild pain, headaches or fever, Disp: , Rfl: 0    amiodarone 200 mg tablet, Take 1 tablet (200 mg total) by mouth daily, Disp: 90 tablet, Rfl: 1    apixaban (ELIQUIS) 5 mg, Take 1 tablet (5 mg total) by mouth 2 (two) times a day Do not start before November 21, 2023., Disp: , Rfl: 0    aspirin 81 mg chewable tablet, Chew 1 tablet (81 mg total) daily, Disp: , Rfl:     atorvastatin (LIPITOR) 40 mg tablet, Take 1 tablet (40 mg total) by mouth daily with dinner, Disp: , Rfl: 0    clotrimazole (LOTRIMIN) 1 % cream, Apply topically 2 (two) times a day for 7 days Apply to affected area, the tip of the penis, 2 times daily (Patient not taking: Reported on 4/3/2024), Disp: 15 g, Rfl: 0     furosemide (LASIX) 40 mg tablet, Take 1 tablet (40 mg total) by mouth daily Do not start before November 16, 2023., Disp: , Rfl: 0    lisinopril (ZESTRIL) 10 mg tablet, , Disp: , Rfl:     meloxicam (MOBIC) 15 mg tablet, Take 15 mg by mouth daily, Disp: , Rfl:     metoprolol succinate (TOPROL-XL) 100 mg 24 hr tablet, Take 1 tablet (100 mg total) by mouth daily, Disp: , Rfl:     metoprolol succinate (TOPROL-XL) 50 mg 24 hr tablet, Take 1 tablet (50 mg total) by mouth every evening, Disp: , Rfl:     mupirocin (BACTROBAN) 2 % ointment, Apply topically 3 (three) times a day, Disp: 30 g, Rfl: 2    nitroglycerin (NITROSTAT) 0.4 mg SL tablet, Place 1 tablet (0.4 mg total) under the tongue every 5 (five) minutes as needed for chest pain, Disp: , Rfl:     nystatin (MYCOSTATIN) powder, Apply topically 3 (three) times a day for 7 days Do not start before January 24, 2024. (Patient not taking: Reported on 4/3/2024), Disp: 60 g, Rfl: 0    sacubitril-valsartan (ENTRESTO) 49-51 MG TABS, Take 1 tablet by mouth 2 (two) times a day, Disp: 60 tablet, Rfl: 5    tamsulosin (FLOMAX) 0.4 mg, Take 1 capsule (0.4 mg total) by mouth daily with dinner, Disp: , Rfl:     traMADol (Ultram) 50 mg tablet, Take 1 tablet (50 mg total) by mouth every 6 (six) hours as needed for moderate pain, Disp: 10 tablet, Rfl: 0      Active Problems     Patient Active Problem List   Diagnosis    Essential hypertension    Acute on chronic systolic (congestive) heart failure (HCC)    Deep vein thrombosis (DVT) of left lower extremity (HCC)    Obesity, morbid (HCC)    Coronary artery disease involving native coronary artery of native heart    Type 2 diabetes mellitus with chronic kidney disease, without long-term current use of insulin, unspecified CKD stage (HCC)    Benign prostatic hyperplasia with urinary retention    Dilated cardiomyopathy (HCC)    Melanoma of back (HCC)    Chronic venous hypertension (idiopathic) with ulcer of left lower extremity (CODE) (HCC)     Chronic kidney disease, stage 3a ([facility])    Encounter for geriatric assessment         Past Medical History     Past Medical History:   Diagnosis Date    Alcohol intoxication ([facility]) 10/15/2020    BPH (benign prostatic hyperplasia)     Chronic HFrEF (heart failure with reduced ejection fraction) ([facility]) 2023    Coronary artery calcification seen on CT scan 11/10/2023    Coronary artery disease 2023    Deep vein thrombosis (DVT) of left lower extremity ([facility]) 2023    Diabetes mellitus ([facility]) 2023    Fall from slip, trip, or stumble 10/15/2020    History of phimosis of penis     History of urinary calculi     Hypertension     Skin cancer     Tobacco abuse     quit around          Surgical History     Past Surgical History:   Procedure Laterality Date    BLADDER STONE REMOVAL      CARDIAC CATHETERIZATION N/A 2023    Procedure: Cardiac catheterization;  Surgeon: Dave Royal MD;  [facility];  Service: Cardiology    ORIF ANKLE FRACTURE Left     SKIN LESION EXCISION N/A 3/18/2024    Procedure: WIDE LOCAL EXCISION OF BACK MELANOMA;  Surgeon: Lillie La MD;  Location: [facility];  Service: Surgical Oncology    TOTAL HIP ARTHROPLASTY Right          Family History     Family History   Problem Relation Age of Onset    Breast cancer Mother     Heart attack Father 61    Other Sister         s/p hysterectomy    Cerebral palsy Brother     Skin cancer Other         family history    No Known Problems Son     No Known Problems Daughter     Sudden death Neg Hx          Social History     Social History     Social History     Tobacco Use   Smoking Status Former    Types: Cigarettes    Start date:     Quit date: 1962    Years since quittin.3   Smokeless Tobacco Never   Tobacco Comments    Quit over 30 years ago (Updated 2023).          Pertinent Lab Values     Lab Results   Component Value Date    CREATININE 1.17 2024

## 2024-04-18 ENCOUNTER — OFFICE VISIT (OUTPATIENT)
Dept: UROLOGY | Facility: HOSPITAL | Age: 74
End: 2024-04-18
Payer: MEDICARE

## 2024-04-18 ENCOUNTER — APPOINTMENT (OUTPATIENT)
Dept: LAB | Facility: HOSPITAL | Age: 74
End: 2024-04-18
Payer: MEDICARE

## 2024-04-18 VITALS
SYSTOLIC BLOOD PRESSURE: 126 MMHG | BODY MASS INDEX: 33.83 KG/M2 | HEIGHT: 67 IN | HEART RATE: 32 BPM | OXYGEN SATURATION: 98 % | DIASTOLIC BLOOD PRESSURE: 84 MMHG

## 2024-04-18 DIAGNOSIS — I50.23 ACUTE ON CHRONIC SYSTOLIC (CONGESTIVE) HEART FAILURE (HCC): ICD-10-CM

## 2024-04-18 DIAGNOSIS — R33.8 BENIGN PROSTATIC HYPERPLASIA WITH URINARY RETENTION: Chronic | ICD-10-CM

## 2024-04-18 DIAGNOSIS — N40.1 URINARY RETENTION DUE TO BENIGN PROSTATIC HYPERPLASIA: Primary | ICD-10-CM

## 2024-04-18 DIAGNOSIS — E11.22 TYPE 2 DIABETES MELLITUS WITH CHRONIC KIDNEY DISEASE, WITHOUT LONG-TERM CURRENT USE OF INSULIN, UNSPECIFIED CKD STAGE (HCC): ICD-10-CM

## 2024-04-18 DIAGNOSIS — I25.10 CORONARY ARTERY DISEASE INVOLVING NATIVE CORONARY ARTERY OF NATIVE HEART WITHOUT ANGINA PECTORIS: Chronic | ICD-10-CM

## 2024-04-18 DIAGNOSIS — N40.1 BENIGN PROSTATIC HYPERPLASIA WITH URINARY RETENTION: Chronic | ICD-10-CM

## 2024-04-18 DIAGNOSIS — R33.8 URINARY RETENTION DUE TO BENIGN PROSTATIC HYPERPLASIA: Primary | ICD-10-CM

## 2024-04-18 DIAGNOSIS — I42.0 DILATED CARDIOMYOPATHY (HCC): ICD-10-CM

## 2024-04-18 DIAGNOSIS — E11.9 TYPE 2 DIABETES MELLITUS WITHOUT COMPLICATION, WITHOUT LONG-TERM CURRENT USE OF INSULIN (HCC): ICD-10-CM

## 2024-04-18 DIAGNOSIS — C43.59 MELANOMA OF BACK (HCC): ICD-10-CM

## 2024-04-18 LAB — POST-VOID RESIDUAL VOLUME, ML POC: 139 ML

## 2024-04-18 PROCEDURE — 51798 US URINE CAPACITY MEASURE: CPT | Performed by: UROLOGY

## 2024-04-18 PROCEDURE — 83036 HEMOGLOBIN GLYCOSYLATED A1C: CPT

## 2024-04-18 PROCEDURE — 99204 OFFICE O/P NEW MOD 45 MIN: CPT | Performed by: UROLOGY

## 2024-04-18 PROCEDURE — 36415 COLL VENOUS BLD VENIPUNCTURE: CPT

## 2024-04-18 RX ORDER — FINASTERIDE 5 MG/1
5 TABLET, FILM COATED ORAL DAILY
Qty: 90 TABLET | Refills: 3 | Status: SHIPPED | OUTPATIENT
Start: 2024-04-18 | End: 2025-04-13

## 2024-04-19 LAB
EST. AVERAGE GLUCOSE BLD GHB EST-MCNC: 123 MG/DL
HBA1C MFR BLD: 5.9 %

## 2024-04-21 ENCOUNTER — HOSPITAL ENCOUNTER (EMERGENCY)
Facility: HOSPITAL | Age: 74
Discharge: HOME | End: 2024-04-21
Attending: EMERGENCY MEDICINE | Admitting: EMERGENCY MEDICINE
Payer: MEDICARE

## 2024-04-21 VITALS
OXYGEN SATURATION: 95 % | DIASTOLIC BLOOD PRESSURE: 70 MMHG | HEART RATE: 56 BPM | SYSTOLIC BLOOD PRESSURE: 164 MMHG | RESPIRATION RATE: 18 BRPM | TEMPERATURE: 98.7 F

## 2024-04-21 DIAGNOSIS — R31.9 HEMATURIA: Primary | ICD-10-CM

## 2024-04-21 DIAGNOSIS — N39.0 UTI (URINARY TRACT INFECTION): ICD-10-CM

## 2024-04-21 DIAGNOSIS — R33.9 URINARY RETENTION: ICD-10-CM

## 2024-04-21 LAB
ALBUMIN SERPL BCP-MCNC: 3.9 G/DL (ref 3.5–5)
ALP SERPL-CCNC: 57 U/L (ref 34–104)
ALT SERPL W P-5'-P-CCNC: 10 U/L (ref 7–52)
ANION GAP SERPL CALCULATED.3IONS-SCNC: 8 MMOL/L (ref 4–13)
AST SERPL W P-5'-P-CCNC: 13 U/L (ref 13–39)
BACTERIA UR QL AUTO: ABNORMAL /HPF
BASOPHILS # BLD AUTO: 0.03 THOUSANDS/ÂΜL (ref 0–0.1)
BASOPHILS NFR BLD AUTO: 1 % (ref 0–1)
BILIRUB SERPL-MCNC: 0.38 MG/DL (ref 0.2–1)
BILIRUB UR QL STRIP: NEGATIVE
BUN SERPL-MCNC: 36 MG/DL (ref 5–25)
CALCIUM SERPL-MCNC: 9 MG/DL (ref 8.4–10.2)
CHLORIDE SERPL-SCNC: 105 MMOL/L (ref 96–108)
CLARITY UR: ABNORMAL
CO2 SERPL-SCNC: 24 MMOL/L (ref 21–32)
COLOR UR: ABNORMAL
CREAT SERPL-MCNC: 1.42 MG/DL (ref 0.6–1.3)
EOSINOPHIL # BLD AUTO: 0.84 THOUSAND/ÂΜL (ref 0–0.61)
EOSINOPHIL NFR BLD AUTO: 13 % (ref 0–6)
ERYTHROCYTE [DISTWIDTH] IN BLOOD BY AUTOMATED COUNT: 12.9 % (ref 11.6–15.1)
GFR SERPL CREATININE-BSD FRML MDRD: 47 ML/MIN/1.73SQ M
GLUCOSE SERPL-MCNC: 112 MG/DL (ref 65–140)
GLUCOSE UR STRIP-MCNC: NEGATIVE MG/DL
HCT VFR BLD AUTO: 36.2 % (ref 36.5–49.3)
HGB BLD-MCNC: 11.5 G/DL (ref 12–17)
HGB UR QL STRIP.AUTO: ABNORMAL
IMM GRANULOCYTES # BLD AUTO: 0.03 THOUSAND/UL (ref 0–0.2)
IMM GRANULOCYTES NFR BLD AUTO: 1 % (ref 0–2)
KETONES UR STRIP-MCNC: NEGATIVE MG/DL
LEUKOCYTE ESTERASE UR QL STRIP: ABNORMAL
LYMPHOCYTES # BLD AUTO: 1.39 THOUSANDS/ÂΜL (ref 0.6–4.47)
LYMPHOCYTES NFR BLD AUTO: 22 % (ref 14–44)
MCH RBC QN AUTO: 32.1 PG (ref 26.8–34.3)
MCHC RBC AUTO-ENTMCNC: 31.8 G/DL (ref 31.4–37.4)
MCV RBC AUTO: 101 FL (ref 82–98)
MONOCYTES # BLD AUTO: 0.69 THOUSAND/ÂΜL (ref 0.17–1.22)
MONOCYTES NFR BLD AUTO: 11 % (ref 4–12)
NEUTROPHILS # BLD AUTO: 3.5 THOUSANDS/ÂΜL (ref 1.85–7.62)
NEUTS SEG NFR BLD AUTO: 52 % (ref 43–75)
NITRITE UR QL STRIP: NEGATIVE
NON-SQ EPI CELLS URNS QL MICRO: ABNORMAL /HPF
NRBC BLD AUTO-RTO: 0 /100 WBCS
PH UR STRIP.AUTO: 5.5 [PH]
PLATELET # BLD AUTO: 254 THOUSANDS/UL (ref 149–390)
PMV BLD AUTO: 10 FL (ref 8.9–12.7)
POTASSIUM SERPL-SCNC: 4.4 MMOL/L (ref 3.5–5.3)
PROT SERPL-MCNC: 7 G/DL (ref 6.4–8.4)
PROT UR STRIP-MCNC: ABNORMAL MG/DL
RBC # BLD AUTO: 3.58 MILLION/UL (ref 3.88–5.62)
RBC #/AREA URNS AUTO: ABNORMAL /HPF
SODIUM SERPL-SCNC: 137 MMOL/L (ref 135–147)
SP GR UR STRIP.AUTO: 1.02 (ref 1–1.03)
UROBILINOGEN UR STRIP-ACNC: <2 MG/DL
WBC # BLD AUTO: 6.48 THOUSAND/UL (ref 4.31–10.16)
WBC #/AREA URNS AUTO: ABNORMAL /HPF

## 2024-04-21 PROCEDURE — 87077 CULTURE AEROBIC IDENTIFY: CPT | Performed by: EMERGENCY MEDICINE

## 2024-04-21 PROCEDURE — 85025 COMPLETE CBC W/AUTO DIFF WBC: CPT | Performed by: EMERGENCY MEDICINE

## 2024-04-21 PROCEDURE — 81001 URINALYSIS AUTO W/SCOPE: CPT | Performed by: EMERGENCY MEDICINE

## 2024-04-21 PROCEDURE — 87086 URINE CULTURE/COLONY COUNT: CPT | Performed by: EMERGENCY MEDICINE

## 2024-04-21 PROCEDURE — 87186 SC STD MICRODIL/AGAR DIL: CPT | Performed by: EMERGENCY MEDICINE

## 2024-04-21 PROCEDURE — 99283 EMERGENCY DEPT VISIT LOW MDM: CPT

## 2024-04-21 PROCEDURE — 99284 EMERGENCY DEPT VISIT MOD MDM: CPT | Performed by: EMERGENCY MEDICINE

## 2024-04-21 PROCEDURE — 36415 COLL VENOUS BLD VENIPUNCTURE: CPT | Performed by: EMERGENCY MEDICINE

## 2024-04-21 PROCEDURE — 80053 COMPREHEN METABOLIC PANEL: CPT | Performed by: EMERGENCY MEDICINE

## 2024-04-21 PROCEDURE — 51798 US URINE CAPACITY MEASURE: CPT

## 2024-04-21 RX ORDER — CEPHALEXIN 500 MG/1
500 CAPSULE ORAL EVERY 6 HOURS SCHEDULED
Qty: 40 CAPSULE | Refills: 0 | Status: SHIPPED | OUTPATIENT
Start: 2024-04-21 | End: 2024-05-02

## 2024-04-21 RX ORDER — CEPHALEXIN 250 MG/1
500 CAPSULE ORAL ONCE
Status: COMPLETED | OUTPATIENT
Start: 2024-04-21 | End: 2024-04-21

## 2024-04-21 RX ADMIN — CEPHALEXIN 500 MG: 250 CAPSULE ORAL at 21:23

## 2024-04-22 ENCOUNTER — TELEPHONE (OUTPATIENT)
Age: 74
End: 2024-04-22

## 2024-04-22 NOTE — TELEPHONE ENCOUNTER
Pt's daughter Shanique asking to speak with clinical for medical update regarding pt's last office visit as she was unable to attend,she is listed on 02/28/24 Communication Consent.

## 2024-04-22 NOTE — DISCHARGE INSTRUCTIONS
Please follow-up with urology for further care within 1 week, if symptoms worsen please return to the emergency department

## 2024-04-23 NOTE — TELEPHONE ENCOUNTER
LM for Shanique Villarrela daughter who is on the consent form that Dr Espino ordered him to take finasteride in conjunction with his Flomax /tamsulosin and to follow  up in 6 months for  PVR

## 2024-04-23 NOTE — TELEPHONE ENCOUNTER
Received call from patient's daughter Shanique, regarding patients catheter that was inserted on 4/21/24. Inquiring how long it needs to stay in. Reports they inserted it due to bleeding. 400mL drained at the time of insertion. Patient would like to have this removed as soon as possible.     I scheduled VOID trial for first available at [facility] on 4/30/24 as there is no availability in [place]. Shanique states that he does have [facility] nurses that come out for his wound care, inquiring if they can do farmer removal in the AM. Please place order if so.     Shanique's call back#: [phone]     [facility]- Phone#: [phone] she does not have the fax #.

## 2024-04-24 LAB — BACTERIA UR CULT: ABNORMAL

## 2024-04-24 NOTE — TELEPHONE ENCOUNTER
Called the [facility] number and it was an [employer] number. Called Shanique, and LM for her that we can provide the orders, but the [facility] number is not correct. Asked her to call back with the correct number and/or fax number.

## 2024-04-24 NOTE — TELEPHONE ENCOUNTER
Colon removal first thing in the morning on day of voiding trial on 4/30/24   Encourage hydration and monitoring of voiding   Return to office in the afternoon after approximately 4-6 hours for PVR

## 2024-04-25 NOTE — TELEPHONE ENCOUNTER
Called Gloria at [facility] [phone] and she states that Maverick is at another [facility] agency [phone]  called [phone] again wrong [facility]  then called [phone] and spoke with Sandi .  Gave her that dates that Toro is to have void trial and she will have the visiting nurse contact us to see if they can do remove his catheter in am of 4/30. She will be in his home today

## 2024-04-26 NOTE — TELEPHONE ENCOUNTER
Called  rafiq back and she had not her form cele if they can remove catheter on 4/30 @ 8:30 - she will chaeck with them and call back

## 2024-04-26 NOTE — TELEPHONE ENCOUNTER
[facility] Fax [phone]    Colon removal first thing in the morning on day of voiding trial on 5/7 @ 8:30-9:00  Encourage hydration and monitoring of voiding   Patient will present too office in the afternoon after approximately 4-6 hours for PVR @ 3:00

## 2024-04-26 NOTE — TELEPHONE ENCOUNTER
Called [facility] to make sure that they could change their date to the following week 5/7 and spoke with khoi and nurse isn't available to confirm appointment. She will call back about the week of 5/7 to see which date will owrk

## 2024-04-26 NOTE — TELEPHONE ENCOUNTER
Spoke with Shanique and s r/s void trial out one more week.  Will call and verify with [facility] mi [phone] f [phone]

## 2024-04-26 NOTE — TELEPHONE ENCOUNTER
Daughter called. She had called [facility] to see if they can remove cath on 4/30 and they stated they have no orders. Daughter also questioning if Void trial should be pushed off at all d/t he just started the Proscar.       Requesting call back.     Daughter's call back; [phone]

## 2024-04-29 ENCOUNTER — HOSPITAL ENCOUNTER (INPATIENT)
Facility: HOSPITAL | Age: 74
LOS: 2 days | Discharge: NURSING, REHAB OR LONG-TERM CARE | DRG: 696 | End: 2024-05-02
Attending: EMERGENCY MEDICINE | Admitting: INTERNAL MEDICINE
Payer: MEDICARE

## 2024-04-29 ENCOUNTER — APPOINTMENT (EMERGENCY)
Dept: CT IMAGING | Facility: HOSPITAL | Age: 74
DRG: 696 | End: 2024-04-29
Payer: MEDICARE

## 2024-04-29 DIAGNOSIS — R33.8 BENIGN PROSTATIC HYPERPLASIA WITH URINARY RETENTION: Chronic | ICD-10-CM

## 2024-04-29 DIAGNOSIS — N28.1 BILATERAL RENAL CYSTS: ICD-10-CM

## 2024-04-29 DIAGNOSIS — N18.31 CHRONIC KIDNEY DISEASE, STAGE 3A (HCC): ICD-10-CM

## 2024-04-29 DIAGNOSIS — N40.1 BENIGN PROSTATIC HYPERPLASIA WITH URINARY RETENTION: Chronic | ICD-10-CM

## 2024-04-29 DIAGNOSIS — I10 ESSENTIAL HYPERTENSION: Chronic | ICD-10-CM

## 2024-04-29 DIAGNOSIS — N39.0 URINARY TRACT INFECTION: ICD-10-CM

## 2024-04-29 DIAGNOSIS — R31.9 HEMATURIA: Primary | ICD-10-CM

## 2024-04-29 DIAGNOSIS — D64.9 ANEMIA: ICD-10-CM

## 2024-04-29 LAB
ALBUMIN SERPL BCP-MCNC: 3.8 G/DL (ref 3.5–5)
ALP SERPL-CCNC: 57 U/L (ref 34–104)
ALT SERPL W P-5'-P-CCNC: 11 U/L (ref 7–52)
ANION GAP SERPL CALCULATED.3IONS-SCNC: 8 MMOL/L (ref 4–13)
APTT PPP: 36 SECONDS (ref 23–37)
AST SERPL W P-5'-P-CCNC: 13 U/L (ref 13–39)
BACTERIA UR QL AUTO: ABNORMAL /HPF
BASOPHILS # BLD AUTO: 0.05 THOUSANDS/ÂΜL (ref 0–0.1)
BASOPHILS NFR BLD AUTO: 1 % (ref 0–1)
BILIRUB SERPL-MCNC: 0.36 MG/DL (ref 0.2–1)
BILIRUB UR QL STRIP: NEGATIVE
BUN SERPL-MCNC: 63 MG/DL (ref 5–25)
CALCIUM SERPL-MCNC: 8.3 MG/DL (ref 8.4–10.2)
CHLORIDE SERPL-SCNC: 107 MMOL/L (ref 96–108)
CLARITY UR: ABNORMAL
CO2 SERPL-SCNC: 23 MMOL/L (ref 21–32)
COLOR UR: ABNORMAL
CREAT SERPL-MCNC: 1.61 MG/DL (ref 0.6–1.3)
EOSINOPHIL # BLD AUTO: 1.11 THOUSAND/ÂΜL (ref 0–0.61)
EOSINOPHIL NFR BLD AUTO: 15 % (ref 0–6)
ERYTHROCYTE [DISTWIDTH] IN BLOOD BY AUTOMATED COUNT: 12.9 % (ref 11.6–15.1)
GFR SERPL CREATININE-BSD FRML MDRD: 40 ML/MIN/1.73SQ M
GLUCOSE SERPL-MCNC: 92 MG/DL (ref 65–140)
GLUCOSE UR STRIP-MCNC: NEGATIVE MG/DL
HCT VFR BLD AUTO: 29.3 % (ref 36.5–49.3)
HGB BLD-MCNC: 9.3 G/DL (ref 12–17)
HGB UR QL STRIP.AUTO: ABNORMAL
IMM GRANULOCYTES # BLD AUTO: 0.03 THOUSAND/UL (ref 0–0.2)
IMM GRANULOCYTES NFR BLD AUTO: 0 % (ref 0–2)
INR PPP: 1.45 (ref 0.84–1.19)
KETONES UR STRIP-MCNC: ABNORMAL MG/DL
LEUKOCYTE ESTERASE UR QL STRIP: ABNORMAL
LYMPHOCYTES # BLD AUTO: 1.5 THOUSANDS/ÂΜL (ref 0.6–4.47)
LYMPHOCYTES NFR BLD AUTO: 21 % (ref 14–44)
MCH RBC QN AUTO: 32.5 PG (ref 26.8–34.3)
MCHC RBC AUTO-ENTMCNC: 31.7 G/DL (ref 31.4–37.4)
MCV RBC AUTO: 102 FL (ref 82–98)
MONOCYTES # BLD AUTO: 0.6 THOUSAND/ÂΜL (ref 0.17–1.22)
MONOCYTES NFR BLD AUTO: 8 % (ref 4–12)
NEUTROPHILS # BLD AUTO: 4.03 THOUSANDS/ÂΜL (ref 1.85–7.62)
NEUTS SEG NFR BLD AUTO: 55 % (ref 43–75)
NITRITE UR QL STRIP: NEGATIVE
NON-SQ EPI CELLS URNS QL MICRO: ABNORMAL /HPF
NRBC BLD AUTO-RTO: 0 /100 WBCS
PH UR STRIP.AUTO: 5.5 [PH]
PLATELET # BLD AUTO: 248 THOUSANDS/UL (ref 149–390)
PMV BLD AUTO: 11.2 FL (ref 8.9–12.7)
POTASSIUM SERPL-SCNC: 4.7 MMOL/L (ref 3.5–5.3)
PROT SERPL-MCNC: 6.7 G/DL (ref 6.4–8.4)
PROT UR STRIP-MCNC: ABNORMAL MG/DL
PROTHROMBIN TIME: 18.1 SECONDS (ref 11.6–14.5)
RBC # BLD AUTO: 2.86 MILLION/UL (ref 3.88–5.62)
RBC #/AREA URNS AUTO: ABNORMAL /HPF
SODIUM SERPL-SCNC: 138 MMOL/L (ref 135–147)
SP GR UR STRIP.AUTO: 1.02 (ref 1–1.03)
UROBILINOGEN UR STRIP-ACNC: <2 MG/DL
WBC # BLD AUTO: 7.32 THOUSAND/UL (ref 4.31–10.16)
WBC #/AREA URNS AUTO: ABNORMAL /HPF

## 2024-04-29 PROCEDURE — 85610 PROTHROMBIN TIME: CPT | Performed by: PHYSICIAN ASSISTANT

## 2024-04-29 PROCEDURE — 36415 COLL VENOUS BLD VENIPUNCTURE: CPT | Performed by: PHYSICIAN ASSISTANT

## 2024-04-29 PROCEDURE — 85730 THROMBOPLASTIN TIME PARTIAL: CPT | Performed by: PHYSICIAN ASSISTANT

## 2024-04-29 PROCEDURE — 99285 EMERGENCY DEPT VISIT HI MDM: CPT

## 2024-04-29 PROCEDURE — 74176 CT ABD & PELVIS W/O CONTRAST: CPT

## 2024-04-29 PROCEDURE — 99285 EMERGENCY DEPT VISIT HI MDM: CPT | Performed by: PHYSICIAN ASSISTANT

## 2024-04-29 PROCEDURE — 80053 COMPREHEN METABOLIC PANEL: CPT | Performed by: PHYSICIAN ASSISTANT

## 2024-04-29 PROCEDURE — 99204 OFFICE O/P NEW MOD 45 MIN: CPT | Performed by: UROLOGY

## 2024-04-29 PROCEDURE — 81001 URINALYSIS AUTO W/SCOPE: CPT | Performed by: PHYSICIAN ASSISTANT

## 2024-04-29 PROCEDURE — 87086 URINE CULTURE/COLONY COUNT: CPT | Performed by: PHYSICIAN ASSISTANT

## 2024-04-29 PROCEDURE — 85025 COMPLETE CBC W/AUTO DIFF WBC: CPT | Performed by: PHYSICIAN ASSISTANT

## 2024-04-29 PROCEDURE — 99223 1ST HOSP IP/OBS HIGH 75: CPT | Performed by: INTERNAL MEDICINE

## 2024-04-29 RX ORDER — TAMSULOSIN HYDROCHLORIDE 0.4 MG/1
0.4 CAPSULE ORAL
Status: DISCONTINUED | OUTPATIENT
Start: 2024-04-29 | End: 2024-05-02 | Stop reason: HOSPADM

## 2024-04-29 RX ORDER — METOPROLOL SUCCINATE 50 MG/1
50 TABLET, EXTENDED RELEASE ORAL EVERY EVENING
Status: DISCONTINUED | OUTPATIENT
Start: 2024-04-29 | End: 2024-05-02 | Stop reason: HOSPADM

## 2024-04-29 RX ORDER — CEFTRIAXONE 1 G/50ML
1000 INJECTION, SOLUTION INTRAVENOUS EVERY 24 HOURS
Status: DISCONTINUED | OUTPATIENT
Start: 2024-04-30 | End: 2024-05-02

## 2024-04-29 RX ORDER — NITROGLYCERIN 0.4 MG/1
0.4 TABLET SUBLINGUAL
Status: DISCONTINUED | OUTPATIENT
Start: 2024-04-29 | End: 2024-05-02 | Stop reason: HOSPADM

## 2024-04-29 RX ORDER — ASPIRIN 81 MG/1
81 TABLET, CHEWABLE ORAL DAILY
Status: DISCONTINUED | OUTPATIENT
Start: 2024-04-30 | End: 2024-04-29

## 2024-04-29 RX ORDER — AMIODARONE HYDROCHLORIDE 200 MG/1
200 TABLET ORAL DAILY
Status: DISCONTINUED | OUTPATIENT
Start: 2024-04-30 | End: 2024-05-02 | Stop reason: HOSPADM

## 2024-04-29 RX ORDER — CEFTRIAXONE 1 G/50ML
1000 INJECTION, SOLUTION INTRAVENOUS ONCE
Status: COMPLETED | OUTPATIENT
Start: 2024-04-29 | End: 2024-04-29

## 2024-04-29 RX ORDER — ATORVASTATIN CALCIUM 40 MG/1
40 TABLET, FILM COATED ORAL
Status: DISCONTINUED | OUTPATIENT
Start: 2024-04-29 | End: 2024-05-02 | Stop reason: HOSPADM

## 2024-04-29 RX ORDER — METOPROLOL SUCCINATE 50 MG/1
100 TABLET, EXTENDED RELEASE ORAL DAILY
Status: DISCONTINUED | OUTPATIENT
Start: 2024-04-30 | End: 2024-05-02 | Stop reason: HOSPADM

## 2024-04-29 RX ORDER — FINASTERIDE 5 MG/1
5 TABLET, FILM COATED ORAL DAILY
Status: DISCONTINUED | OUTPATIENT
Start: 2024-04-30 | End: 2024-05-02 | Stop reason: HOSPADM

## 2024-04-29 RX ORDER — SODIUM CHLORIDE 9 MG/ML
75 INJECTION, SOLUTION INTRAVENOUS CONTINUOUS
Status: DISCONTINUED | OUTPATIENT
Start: 2024-04-29 | End: 2024-04-30

## 2024-04-29 RX ADMIN — TAMSULOSIN HYDROCHLORIDE 0.4 MG: 0.4 CAPSULE ORAL at 18:18

## 2024-04-29 RX ADMIN — ATORVASTATIN CALCIUM 40 MG: 40 TABLET, FILM COATED ORAL at 18:18

## 2024-04-29 RX ADMIN — METOPROLOL SUCCINATE 50 MG: 50 TABLET, EXTENDED RELEASE ORAL at 18:18

## 2024-04-29 RX ADMIN — CEFTRIAXONE 1000 MG: 1 INJECTION, SOLUTION INTRAVENOUS at 10:46

## 2024-04-29 RX ADMIN — SODIUM CHLORIDE 75 ML/HR: 0.9 INJECTION, SOLUTION INTRAVENOUS at 18:18

## 2024-04-29 RX ADMIN — SODIUM CHLORIDE 150 ML/HR: 0.9 INJECTION, SOLUTION INTRAVENOUS at 10:44

## 2024-04-29 NOTE — ASSESSMENT & PLAN NOTE
"Lab Results   Component Value Date    HGBA1C 5.9 (H) 04/18/2024       No results for input(s): \"POCGLU\" in the last 72 hours.    Blood Sugar Average: Last 72 hrs:      "

## 2024-04-29 NOTE — CONSULTS
Consultation - Toro Rodriguez 74 y.o. male MRN: [id number]    Unit/Bed#: ED 03 Encounter: [id number]      Assessment/Plan     Assessment/Plan:  Hematuria  -pt is 73 y/o male with PMH for DVT on Eliquis, CHF, CAD, CKD3a, T2DM and HTN who presents with blood in his urine that began today.   -CT: No obstructive uropathy. Bilateral intrarenal calculi. Indeterminate cystic lesions in both kidneys. Recommend follow-up nonemergent ultrasound for further evaluation. Partially decompressed by a Farmer catheter. No calculi appreciated   -hgb 9.3 (11.5 4/21)  -creat 1.61   -urine is clear and pink after hand irrigation.     Plan:  -hand irrigate farmer q4  -if hematuria worsens, will switch to CBI  -if pt deteriorates, transfer to [facility]  -trend hgb, creat and UO  -hold A/C if possible  -antibiotics  -plan discussed with Uro AP      History of Present Illness     HPI: Toro Rodriguez is a 74 y.o. year old male with pmh of DVT on eliquis, CHF, CAD, T2DM, BPH, CKD3a and HTN who presents for blood in his urine that began today. He denies any trauma or abd pain. He feels well. Farmer appears to be draining well. No flank pain.     Inpatient consult to Urology  Consult performed by: Pati Perez PA-C  Consult ordered by: Brook Fuentes MD          Review of Systems   Constitutional:  Negative for chills and fever.   HENT:  Negative for ear pain and sore throat.    Eyes:  Negative for pain and visual disturbance.   Respiratory:  Negative for cough and shortness of breath.    Cardiovascular:  Negative for chest pain and palpitations.   Gastrointestinal:  Negative for abdominal pain and vomiting.   Genitourinary:  Positive for hematuria. Negative for difficulty urinating, dysuria and flank pain.   Musculoskeletal:  Negative for arthralgias and back pain.   Skin:  Negative for color change and rash.   Neurological:  Negative for seizures and syncope.   All other systems reviewed and are negative.      Historical Information   Past  Medical History:   Diagnosis Date    Alcohol intoxication ([facility]) 10/15/2020    BPH (benign prostatic hyperplasia)     Chronic HFrEF (heart failure with reduced ejection fraction) ([facility]) 2023    Coronary artery calcification seen on CT scan 11/10/2023    Coronary artery disease 2023    Deep vein thrombosis (DVT) of left lower extremity ([facility]) 2023    Diabetes mellitus (HCC) 2023    Fall from slip, trip, or stumble 10/15/2020    History of phimosis of penis     History of urinary calculi     Hypertension     Skin cancer     Tobacco abuse     quit around      Past Surgical History:   Procedure Laterality Date    BLADDER STONE REMOVAL      CARDIAC CATHETERIZATION N/A 2023    Procedure: Cardiac catheterization;  Surgeon: Dave Royal MD;  [facility];  Service: Cardiology    ORIF ANKLE FRACTURE Left     SKIN LESION EXCISION N/A 3/18/2024    Procedure: WIDE LOCAL EXCISION OF BACK MELANOMA;  Surgeon: Lillie La MD;  [facility];  Service: Surgical Oncology    TOTAL HIP ARTHROPLASTY Right      Social History   Social History     Substance and Sexual Activity   Alcohol Use Not Currently     Social History     Substance and Sexual Activity   Drug Use Never     Social History     Tobacco Use   Smoking Status Former    Types: Cigarettes    Start date:     Quit date:     Years since quittin.3   Smokeless Tobacco Never   Tobacco Comments    Quit over 30 years ago (Updated 2023).      E-Cigarette/Vaping    E-Cigarette Use Never User      E-Cigarette/Vaping Substances    Nicotine No     THC No     CBD No     Flavoring No     Other No     Unknown No       Family History: non-contributory    Meds/Allergies   all current active meds have been reviewed  Allergies   Allergen Reactions    Zosyn [Piperacillin Sod-Tazobactam So] Rash       Objective   Vitals: Blood pressure 157/66, pulse (!) 54, temperature 97.8 °F (36.6 °C), temperature source Oral, resp.  "rate 18, height 5' 7\" (1.702 m), weight 98 kg (216 lb), SpO2 96%.  No intake or output data in the 24 hours ending 04/29/24 1259  Invasive Devices       Peripheral Intravenous Line  Duration             Peripheral IV 04/29/24 Right Antecubital <1 day              Drain  Duration             Urethral Catheter Three way 20 Fr. 7 days                    Physical Exam  Vitals and nursing note reviewed.   Constitutional:       General: He is not in acute distress.     Appearance: He is well-developed.   HENT:      Head: Normocephalic and atraumatic.   Eyes:      Conjunctiva/sclera: Conjunctivae normal.   Cardiovascular:      Rate and Rhythm: Normal rate and regular rhythm.      Heart sounds: No murmur heard.  Pulmonary:      Effort: Pulmonary effort is normal. No respiratory distress.      Breath sounds: Normal breath sounds.   Abdominal:      Palpations: Abdomen is soft.      Tenderness: There is no abdominal tenderness.   Musculoskeletal:         General: No swelling.      Cervical back: Neck supple.   Skin:     General: Skin is warm and dry.      Capillary Refill: Capillary refill takes less than 2 seconds.   Neurological:      General: No focal deficit present.      Mental Status: He is alert and oriented to person, place, and time.   Psychiatric:         Mood and Affect: Mood normal.         Lab Results: I have personally reviewed pertinent reports.    Imaging Studies: I have personally reviewed pertinent reports.    EKG, Pathology, and Other Studies: I have personally reviewed pertinent reports.    VTE Prophylaxis: Reason for no pharmacologic prophylaxis on Eliquis    Code Status: Prior  Advance Directive and Living Will:      Power of :    POLST:      Counseling / Coordination of Care  Total floor / unit time spent today 30 minutes. Greater than 50% of total time was spent with the patient and / or family counseling and / or coordination of care. A description of the counseling / coordination of care: in " my consultation note.

## 2024-04-29 NOTE — H&P
"[facility]  HPI Note  Name: Toro Rodriguez I  MRN: [id number]  Unit/Bed#: -01 I Date of Admission: 4/29/2024   Date of Service: 4/29/2024 I Hospital Day: 0    Assessment/Plan   Chronic kidney disease, stage 3a (HCC)  Assessment & Plan  Lab Results   Component Value Date    EGFR 40 04/29/2024    EGFR 47 04/21/2024    EGFR 60 01/04/2024    CREATININE 1.61 (H) 04/29/2024    CREATININE 1.42 (H) 04/21/2024    CREATININE 1.17 01/04/2024    Elevation in creatinine  Hold lasix, lisionpril and mobic  Monitor Cr with Abx   Encourgaed oral hydration today  BMP AM    Dilated cardiomyopathy (HCC)  Assessment & Plan  ECHo shows EF 35-40% with systolic function moderately reduced, grade 1 diastolic dysfunction  Follws up with [facility]  On Lasix, Lisinopril  Hold today and monitor BMP AM  Appears to be dry- euvolemic today  Continue toprol    Benign prostatic hyperplasia with urinary retention  Assessment & Plan  Continue flomax and finasteride  Urology consulted  Continue farmer    Type 2 diabetes mellitus with chronic kidney disease, without long-term current use of insulin, unspecified CKD stage (HCC)  Assessment & Plan  Lab Results   Component Value Date    HGBA1C 5.9 (H) 04/18/2024       No results for input(s): \"POCGLU\" in the last 72 hours.    Blood Sugar Average: Last 72 hrs:        Deep vein thrombosis (DVT) of left lower extremity (HCC)  Assessment & Plan  Hold eliquis  DVT in nov 2023 in gastrocnemius vein  If has to hold eliquis for longer period, will consult IR for IVC filter      * Hematuria  Assessment & Plan  Poa  Likely truamtic farmer insertion  Hemoglobin trended down from 11.3-9.3  Urology consulted  Hold AC  Bladder irrigation as per urology  Now is running clear to mild pink tinge colured urine  IF persistent, may need TX to [place]           VTE Prophylaxis: Pharmacologic VTE Prophylaxis contraindicated due to hematuria   / sequential compression device   Code Status: " level- 1  POLST: POLST form is not discussed and not completed at this time.    Anticipated Length of Stay:  Patient will be admitted on an Observation basis with an anticipated length of stay of  less than 2 midnights.   Justification for Hospital Stay: hematuria    Chief Complaint:     Chief Complaint   Patient presents with    Blood in Urine     PT to Ed for blood in urine, emptie farmer last night and urine looked normal per pt. Denies pain, currently being treated for UTI.         History of Present Illness:    Toro Rodriguez is a 74 y.o. male who presents with hematuria. Patient stated he had farmer inserted on 4/21 after he noticed hematuria. He was prescribed keflex and was asked to follow up with Urology.  Patient stated he noticed pink-tinged urine yesterday and has ginger hematuria since morning.  Has been taking Keflex regularly.  Denies any further symptoms.    ED provider discussed with urology, recommended WITH elevated creatinine and hematuria      Review of Systems:    Review of Systems   All other systems reviewed and are negative.      Past Medical and Surgical History:     Past Medical History:   Diagnosis Date    Alcohol intoxication ([facility]) 10/15/2020    BPH (benign prostatic hyperplasia)     Chronic HFrEF (heart failure with reduced ejection fraction) ([facility]) 11/2023    Coronary artery calcification seen on CT scan 11/10/2023    Coronary artery disease 12/14/2023    Deep vein thrombosis (DVT) of left lower extremity ([facility]) 11/2023    Diabetes mellitus ([facility]) 11/2023    Fall from slip, trip, or stumble 10/15/2020    History of phimosis of penis     History of urinary calculi     Hypertension 1988    Skin cancer     Tobacco abuse     quit around 2000       Past Surgical History:   Procedure Laterality Date    BLADDER STONE REMOVAL  2014    CARDIAC CATHETERIZATION N/A 12/14/2023    Procedure: Cardiac catheterization;  Surgeon: Dave Royal MD;  [facility]: [facility];  Service: Cardiology    ORI  ANKLE FRACTURE Left     SKIN LESION EXCISION N/A 3/18/2024    Procedure: WIDE LOCAL EXCISION OF BACK MELANOMA;  Surgeon: Lillie La MD;  Location: [facility];  Service: Surgical Oncology    TOTAL HIP ARTHROPLASTY Right        Meds/Allergies:    Prior to Admission medications    Medication Sig Start Date End Date Taking? Authorizing Provider   amiodarone 200 mg tablet Take 1 tablet (200 mg total) by mouth daily 1/4/24  Yes Lisandro Cevallos PA-C   apixaban (ELIQUIS) 5 mg Take 1 tablet (5 mg total) by mouth 2 (two) times a day Do not start before November 21, 2023. 11/21/23  Yes Krysta Castillo PA-C   aspirin 81 mg chewable tablet Chew 1 tablet (81 mg total) daily 12/16/23  Yes CARLIE Rincon   atorvastatin (LIPITOR) 40 mg tablet Take 1 tablet (40 mg total) by mouth daily with dinner 11/15/23  Yes Krysta Castillo PA-C   cephalexin (KEFLEX) 500 mg capsule Take 1 capsule (500 mg total) by mouth every 6 (six) hours for 10 days 4/21/24 5/1/24 Yes Angie Massey DO   finasteride (PROSCAR) 5 mg tablet Take 1 tablet (5 mg total) by mouth daily 4/18/24 4/13/25 Yes Rick Espino MD   furosemide (LASIX) 40 mg tablet Take 1 tablet (40 mg total) by mouth daily Do not start before November 16, 2023. 11/16/23  Yes Krysta Castillo PA-C   lisinopril (ZESTRIL) 10 mg tablet  1/5/24  Yes Historical Provider, MD   meloxicam (MOBIC) 15 mg tablet Take 15 mg by mouth daily   Yes Historical Provider, MD   metoprolol succinate (TOPROL-XL) 100 mg 24 hr tablet Take 1 tablet (100 mg total) by mouth daily 12/16/23  Yes CARLIE Rincon   metoprolol succinate (TOPROL-XL) 50 mg 24 hr tablet Take 1 tablet (50 mg total) by mouth every evening 12/15/23  Yes CARLIE Rincon   mupirocin (BACTROBAN) 2 % ointment Apply topically 3 (three) times a day 1/31/24  Yes Randolph Streeter DO   sacubitril-valsartan (ENTRESTO) 49-51 MG TABS Take 1 tablet by mouth 2 (two) times a day 1/10/24  Yes Blair Chairez PA-C   tamsulosin (FLOMAX) 0.4 mg  Take 1 capsule (0.4 mg total) by mouth daily with dinner 12/15/23  Yes CARLIE Rincon   acetaminophen (TYLENOL) 325 mg tablet Take 2 tablets (650 mg total) by mouth every 6 (six) hours as needed for mild pain, headaches or fever 11/15/23   Krysta Castillo PA-C   clotrimazole (LOTRIMIN) 1 % cream Apply topically 2 (two) times a day for 7 days Apply to affected area, the tip of the penis, 2 times daily  Patient not taking: Reported on 4/3/2024 1/23/24 3/8/24  CARLIE Steele   nitroglycerin (NITROSTAT) 0.4 mg SL tablet Place 1 tablet (0.4 mg total) under the tongue every 5 (five) minutes as needed for chest pain 12/15/23   CARLIE Rincon   nystatin (MYCOSTATIN) powder Apply topically 3 (three) times a day for 7 days Do not start before 2024.  Patient not taking: Reported on 4/3/2024 1/24/24 3/12/24  CARLIE Steele   traMADol (Ultram) 50 mg tablet Take 1 tablet (50 mg total) by mouth every 6 (six) hours as needed for moderate pain  Patient not taking: Reported on 2024   Lillie La MD     I have reviewed home medications with patient personally.    Allergies:   Allergies   Allergen Reactions    Zosyn [Piperacillin Sod-Tazobactam So] Rash       Social History:     Marital Status:    Occupation: None  Patient Pre-hospital Living Situation: assisted living facility  Patient Pre-hospital Level of Mobility: Uses cane  Patient Pre-hospital Diet Restrictions: none  Substance Use History:   Social History     Substance and Sexual Activity   Alcohol Use Not Currently     Social History     Tobacco Use   Smoking Status Former    Types: Cigarettes    Start date:     Quit date: 1962    Years since quittin.3   Smokeless Tobacco Never   Tobacco Comments    Quit over 30 years ago (Updated 2023).      Social History     Substance and Sexual Activity   Drug Use Never       Family History:    Family History   Problem Relation Age of Onset    Breast cancer Mother      "Heart attack Father 61    Other Sister         s/p hysterectomy    Cerebral palsy Brother     Skin cancer Other         family history    No Known Problems Son     No Known Problems Daughter     Sudden death Neg Hx        Physical Exam:     Vitals:   Blood Pressure: 151/55 (04/29/24 1728)  Pulse: 60 (04/29/24 1728)  Temperature: (!) 97.2 °F (36.2 °C) (04/29/24 1728)  Temp Source: Oral (04/29/24 1728)  Respirations: 18 (04/29/24 1728)  Height: 5' 7\" (170.2 cm) (04/29/24 0811)  Weight - Scale: 98 kg (216 lb) (04/29/24 0811)  SpO2: 95 % (04/29/24 1728)    Physical Exam  Vitals reviewed.   Constitutional:       Appearance: He is obese.   HENT:      Mouth/Throat:      Pharynx: Oropharynx is clear.   Cardiovascular:      Rate and Rhythm: Normal rate.   Pulmonary:      Effort: Pulmonary effort is normal.   Abdominal:      General: Bowel sounds are normal. There is no distension.      Palpations: Abdomen is soft.      Tenderness: There is no abdominal tenderness.   Genitourinary:     Comments: Colon with mild pink-tinged urine in the bag  Musculoskeletal:         General: No tenderness.   Skin:     General: Skin is warm and dry.      Capillary Refill: Capillary refill takes less than 2 seconds.   Neurological:      Mental Status: He is alert and oriented to person, place, and time.           Additional Data:     Lab Results: I have personally reviewed pertinent reports.      Results from last 7 days   Lab Units 04/29/24  0840   WBC Thousand/uL 7.32   HEMOGLOBIN g/dL 9.3*   HEMATOCRIT % 29.3*   PLATELETS Thousands/uL 248   SEGS PCT % 55   LYMPHO PCT % 21   MONO PCT % 8   EOS PCT % 15*     Results from last 7 days   Lab Units 04/29/24  0840   POTASSIUM mmol/L 4.7   CHLORIDE mmol/L 107   CO2 mmol/L 23   BUN mg/dL 63*   CREATININE mg/dL 1.61*   CALCIUM mg/dL 8.3*   ALK PHOS U/L 57   ALT U/L 11   AST U/L 13     Results from last 7 days   Lab Units 04/29/24  0840   INR  1.45*       Imaging: I have personally reviewed pertinent " reports.      CT renal stone study abdomen pelvis without contrast    Result Date: 4/29/2024  Narrative: CT ABDOMEN AND PELVIS WITHOUT IV CONTRAST - LOW DOSE RENAL STONE INDICATION: hematuria. COMPARISON: None. TECHNIQUE: Low radiation dose thin section CT examination of the abdomen and pelvis was performed without intravenous or oral contrast according to a protocol specifically designed to evaluate for urinary tract calculus. Axial, sagittal, and coronal 2D reformatted images were created from the source data and submitted for interpretation. Evaluation for pathology in the abdomen and pelvis that is unrelated to urinary tract calculi is limited. Radiation dose length product (DLP) for this visit: 481 mGy-cm . This examination, like all CT scans performed in the [facility], was performed utilizing techniques to minimize radiation dose exposure, including the use of iterative reconstruction and automated exposure control. URINARY TRACT FINDINGS: RIGHT KIDNEY AND URETER: 5 mm calculus in the lower pole of the kidney. No ureteral calculi. No hydronephrosis or hydroureter. Indeterminate small hypodense and isodense cystic lesions scattered throughout the kidney. LEFT KIDNEY AND URETER: Punctate calculus in the lower pole of the kidney. No ureteral calculi. No hydronephrosis or hydroureter. Small simple cyst at the upper pole of the kidney, and indeterminate cystic hypodensity in the lower pole of the kidney. URINARY BLADDER: Partially decompressed by a Colon catheter. No calculi appreciated ADDITIONAL FINDINGS: LOWER CHEST: No clinically significant abnormality in the visualized lower chest. SOLID VISCERA: Limited low radiation dose noncontrast CT evaluation demonstrates no clinically significant abnormality of the imaged portions of the liver, spleen, pancreas, or adrenal glands. GALLBLADDER/BILIARY TREE: No calcified gallstones. No pericholecystic inflammatory change. No biliary dilation. STOMACH  AND BOWEL: Colonic diverticulosis without findings of acute diverticulitis. APPENDIX: No findings to suggest appendicitis. ABDOMINOPELVIC CAVITY: No ascites. No pneumoperitoneum. No lymphadenopathy. VESSELS: Atherosclerosis without abdominal aortic aneurysm. REPRODUCTIVE ORGANS: Unremarkable for patient's age. ABDOMINAL WALL/INGUINAL REGIONS: Unremarkable. BONES: No acute fracture or suspicious osseous lesion. Advanced degenerative changes throughout the visualized thoracolumbar spine, and mild lumbar levoscoliosis. Left-sided pars defect at L5. Grade 1 anterolisthesis L5-S1. Right hip arthroplasty.     Impression: No obstructive uropathy. Bilateral intrarenal calculi. Indeterminate cystic lesions in both kidneys. Recommend follow-up nonemergent ultrasound for further evaluation. Colonic diverticulosis. The study was marked in EPIC for immediate notification. Workstation performed: [id number]       EKG, Pathology, and Other Studies Reviewed on Admission:   EKG: Reviewed    Epic / Care Everywhere Records Reviewed: Yes     ** Please Note: This note has been constructed using a voice recognition system. **

## 2024-04-29 NOTE — ED PROVIDER NOTES
"History  Chief Complaint   Patient presents with    Blood in Urine     PT to Ed for blood in urine, emptie farmer last night and urine looked normal per pt. Denies pain, currently being treated for UTI.      RC is a 73 y/o male with a PMH of CHF, BPH, DM2, HTN, kidney stones, and UTI presents with hematuria x1 day. He was seen in the ED on 4/21 and was prescribed a 10 day course of Keflex for a UTI and was discharged home. A bladder scan showed 400 cc of urine in the bladder post-void and a farmer was placed with planned TOV on 5/7. He denies fever, chills, cough, difficulty breathing, abdominal pain, back pain, N/V/D, blood in stool, rashes or lesions. He reports that he \"doesn't feel right\". He is on Eliquis.      History provided by:  Patient  Blood in Urine  Pertinent negatives include no abdominal pain, chills, fever, flank pain or nausea.       Prior to Admission Medications   Prescriptions Last Dose Informant Patient Reported? Taking?   acetaminophen (TYLENOL) 325 mg tablet  Self No No   Sig: Take 2 tablets (650 mg total) by mouth every 6 (six) hours as needed for mild pain, headaches or fever   amiodarone 200 mg tablet  Self No No   Sig: Take 1 tablet (200 mg total) by mouth daily   apixaban (ELIQUIS) 5 mg  Self No No   Sig: Take 1 tablet (5 mg total) by mouth 2 (two) times a day Do not start before November 21, 2023.   aspirin 81 mg chewable tablet  Self No No   Sig: Chew 1 tablet (81 mg total) daily   atorvastatin (LIPITOR) 40 mg tablet  Self No No   Sig: Take 1 tablet (40 mg total) by mouth daily with dinner   cephalexin (KEFLEX) 500 mg capsule   No No   Sig: Take 1 capsule (500 mg total) by mouth every 6 (six) hours for 10 days   clotrimazole (LOTRIMIN) 1 % cream  Self No No   Sig: Apply topically 2 (two) times a day for 7 days Apply to affected area, the tip of the penis, 2 times daily   Patient not taking: Reported on 4/3/2024   finasteride (PROSCAR) 5 mg tablet   No No   Sig: Take 1 tablet (5 mg total) " by mouth daily   furosemide (LASIX) 40 mg tablet  Self No No   Sig: Take 1 tablet (40 mg total) by mouth daily Do not start before November 16, 2023.   lisinopril (ZESTRIL) 10 mg tablet  Self Yes No   meloxicam (MOBIC) 15 mg tablet  Self Yes No   Sig: Take 15 mg by mouth daily   metoprolol succinate (TOPROL-XL) 100 mg 24 hr tablet  Self No No   Sig: Take 1 tablet (100 mg total) by mouth daily   metoprolol succinate (TOPROL-XL) 50 mg 24 hr tablet  Self No No   Sig: Take 1 tablet (50 mg total) by mouth every evening   mupirocin (BACTROBAN) 2 % ointment  Self No No   Sig: Apply topically 3 (three) times a day   nitroglycerin (NITROSTAT) 0.4 mg SL tablet  Self No No   Sig: Place 1 tablet (0.4 mg total) under the tongue every 5 (five) minutes as needed for chest pain   nystatin (MYCOSTATIN) powder   No No   Sig: Apply topically 3 (three) times a day for 7 days Do not start before January 24, 2024.   Patient not taking: Reported on 4/3/2024   sacubitril-valsartan (ENTRESTO) 49-51 MG TABS  Self No No   Sig: Take 1 tablet by mouth 2 (two) times a day   tamsulosin (FLOMAX) 0.4 mg  Self No No   Sig: Take 1 capsule (0.4 mg total) by mouth daily with dinner   traMADol (Ultram) 50 mg tablet  Self No No   Sig: Take 1 tablet (50 mg total) by mouth every 6 (six) hours as needed for moderate pain      Facility-Administered Medications: None       Past Medical History:   Diagnosis Date    Alcohol intoxication ([facility]) 10/15/2020    BPH (benign prostatic hyperplasia)     Chronic HFrEF (heart failure with reduced ejection fraction) ([facility]) 11/2023    Coronary artery calcification seen on CT scan 11/10/2023    Coronary artery disease 12/14/2023    Deep vein thrombosis (DVT) of left lower extremity ([facility]) 11/2023    Diabetes mellitus ([facility]) 11/2023    Fall from slip, trip, or stumble 10/15/2020    History of phimosis of penis     History of urinary calculi     Hypertension 1988    Skin cancer     Tobacco abuse     quit around 2000       Past  Surgical History:   Procedure Laterality Date    BLADDER STONE REMOVAL      CARDIAC CATHETERIZATION N/A 2023    Procedure: Cardiac catheterization;  Surgeon: Dave Royal MD;  Location: [facility];  Service: Cardiology    ORIF ANKLE FRACTURE Left     SKIN LESION EXCISION N/A 3/18/2024    Procedure: WIDE LOCAL EXCISION OF BACK MELANOMA;  Surgeon: Lillie La MD;  Location: [facility];  Service: Surgical Oncology    TOTAL HIP ARTHROPLASTY Right        Family History   Problem Relation Age of Onset    Breast cancer Mother     Heart attack Father 61    Other Sister         s/p hysterectomy    Cerebral palsy Brother     Skin cancer Other         family history    No Known Problems Son     No Known Problems Daughter     Sudden death Neg Hx      I have reviewed and agree with the history as documented.    E-Cigarette/Vaping    E-Cigarette Use Never User      E-Cigarette/Vaping Substances    Nicotine No     THC No     CBD No     Flavoring No     Other No     Unknown No      Social History     Tobacco Use    Smoking status: Former     Types: Cigarettes     Start date:      Quit date:      Years since quittin.3    Smokeless tobacco: Never    Tobacco comments:     Quit over 30 years ago (Updated 2023).    Vaping Use    Vaping status: Never Used   Substance Use Topics    Alcohol use: Not Currently    Drug use: Never       Review of Systems   Constitutional:  Negative for chills and fever.   Respiratory:  Negative for cough and shortness of breath.    Cardiovascular:  Negative for chest pain.        Chronic swelling of left leg   Gastrointestinal:  Negative for abdominal pain, blood in stool, constipation, diarrhea and nausea.   Genitourinary:  Positive for hematuria. Negative for flank pain, penile pain, penile swelling, scrotal swelling and testicular pain.   Musculoskeletal:  Negative for back pain.   Skin:  Positive for pallor. Negative for color change and rash.        Chronic  redness and scaling of left leg   Neurological:  Negative for dizziness, weakness and light-headedness.   Psychiatric/Behavioral:  Negative for confusion.    All other systems reviewed and are negative.      Physical Exam  Physical Exam  Vitals and nursing note reviewed.   Constitutional:       General: He is not in acute distress.     Appearance: Normal appearance. He is not ill-appearing or diaphoretic.   HENT:      Head: Normocephalic and atraumatic.      Mouth/Throat:      Mouth: Mucous membranes are moist.   Eyes:      Conjunctiva/sclera: Conjunctivae normal.   Cardiovascular:      Rate and Rhythm: Normal rate and regular rhythm.   Pulmonary:      Effort: Pulmonary effort is normal. No respiratory distress.      Breath sounds: Normal breath sounds.   Abdominal:      General: Bowel sounds are normal. There is no distension.      Palpations: Abdomen is soft.      Tenderness: There is no abdominal tenderness. There is no right CVA tenderness or left CVA tenderness.   Musculoskeletal:      Cervical back: Normal range of motion.      Right lower leg: Edema present.      Left lower leg: Edema present.      Comments: Swelling of left calf that patient reports is chronic   Shooting, tingling pain originating in right thigh that shoots down to right foot. Patient reports this pain began about 6 months ago. Has heat patch over the area.   Skin:     General: Skin is warm and dry.      Coloration: Skin is pale.      Comments: Erythema and scaling of bilateral LE that patient reports is chronic.   Neurological:      Mental Status: He is alert. Mental status is at baseline.   Psychiatric:         Behavior: Behavior is cooperative.         Vital Signs  ED Triage Vitals [04/29/24 0811]   Temperature Pulse Respirations Blood Pressure SpO2   97.8 °F (36.6 °C) 64 18 131/64 95 %      Temp Source Heart Rate Source Patient Position - Orthostatic VS BP Location FiO2 (%)   Oral Monitor Sitting Left arm --      Pain Score       No Pain            Vitals:    04/29/24 0811   BP: 131/64   Pulse: 64   Patient Position - Orthostatic VS: Sitting         Visual Acuity      ED Medications  Medications   sodium chloride 0.9 % infusion (has no administration in time range)   cefTRIAXone (ROCEPHIN) IVPB (premix in dextrose) 1,000 mg 50 mL (has no administration in time range)       Diagnostic Studies  Results Reviewed       Procedure Component Value Units Date/Time    Protime-INR [[id number]]  (Abnormal) Collected: 04/29/24 0840    Lab Status: Final result Specimen: Blood from Arm, Right Updated: 04/29/24 0935     Protime 18.1 seconds      INR 1.45    APTT [[id number]]  (Normal) Collected: 04/29/24 0840    Lab Status: Final result Specimen: Blood from Arm, Right Updated: 04/29/24 0935     PTT 36 seconds     Urine Microscopic [[id number]]  (Abnormal) Collected: 04/29/24 0903    Lab Status: Final result Specimen: Urine, Indwelling Colon Catheter Updated: 04/29/24 0927     RBC, UA Innumerable /hpf      WBC, UA 10-20 /hpf      Epithelial Cells Occasional /hpf      Bacteria, UA Occasional /hpf     Urine culture [[id number]] Collected: 04/29/24 0903    Lab Status: In process Specimen: Urine, Indwelling Colon Catheter Updated: 04/29/24 0927    UA w Reflex to Microscopic w Reflex to Culture [[id number]]  (Abnormal) Collected: 04/29/24 0903    Lab Status: Final result Specimen: Urine, Indwelling Colon Catheter Updated: 04/29/24 0909     Color, UA Red     Clarity, UA Turbid     Specific Gravity, UA 1.020     pH, UA 5.5     Leukocytes, UA Moderate     Nitrite, UA Negative     Protein, UA 30 (1+) mg/dl      Glucose, UA Negative mg/dl      Ketones, UA Trace mg/dl      Urobilinogen, UA <2.0 mg/dl      Bilirubin, UA Negative     Occult Blood, UA Large    Comprehensive metabolic panel [[id number]]  (Abnormal) Collected: 04/29/24 0840    Lab Status: Final result Specimen: Blood from Arm, Right Updated: 04/29/24 0905     Sodium 138 mmol/L      Potassium 4.7 mmol/L      Chloride 107  mmol/L      CO2 23 mmol/L      ANION GAP 8 mmol/L      BUN 63 mg/dL      Creatinine 1.61 mg/dL      Glucose 92 mg/dL      Calcium 8.3 mg/dL      AST 13 U/L      ALT 11 U/L      Alkaline Phosphatase 57 U/L      Total Protein 6.7 g/dL      Albumin 3.8 g/dL      Total Bilirubin 0.36 mg/dL      eGFR 40 ml/min/1.73sq m     Narrative:      National Kidney Disease Foundation guidelines for Chronic Kidney Disease (CKD):     Stage 1 with normal or high GFR (GFR > 90 mL/min/1.73 square meters)    Stage 2 Mild CKD (GFR = 60-89 mL/min/1.73 square meters)    Stage 3A Moderate CKD (GFR = 45-59 mL/min/1.73 square meters)    Stage 3B Moderate CKD (GFR = 30-44 mL/min/1.73 square meters)    Stage 4 Severe CKD (GFR = 15-29 mL/min/1.73 square meters)    Stage 5 End Stage CKD (GFR <15 mL/min/1.73 square meters)  Note: GFR calculation is accurate only with a steady state creatinine    CBC and differential [[id number]]  (Abnormal) Collected: 04/29/24 0840    Lab Status: Final result Specimen: Blood from Arm, Right Updated: 04/29/24 0850     WBC 7.32 Thousand/uL      RBC 2.86 Million/uL      Hemoglobin 9.3 g/dL      Hematocrit 29.3 %       fL      MCH 32.5 pg      MCHC 31.7 g/dL      RDW 12.9 %      MPV 11.2 fL      Platelets 248 Thousands/uL      nRBC 0 /100 WBCs      Segmented % 55 %      Immature Grans % 0 %      Lymphocytes % 21 %      Monocytes % 8 %      Eosinophils Relative 15 %      Basophils Relative 1 %      Absolute Neutrophils 4.03 Thousands/µL      Absolute Immature Grans 0.03 Thousand/uL      Absolute Lymphocytes 1.50 Thousands/µL      Absolute Monocytes 0.60 Thousand/µL      Eosinophils Absolute 1.11 Thousand/µL      Basophils Absolute 0.05 Thousands/µL                    CT renal stone study abdomen pelvis without contrast   Final Result by Charles Pizarro MD (04/29 0917)      No obstructive uropathy. Bilateral intrarenal calculi.      Indeterminate cystic lesions in both kidneys. Recommend follow-up nonemergent  ultrasound for further evaluation.      Colonic diverticulosis.      The study was marked in EPIC for immediate notification.         Workstation performed: [id number]                    Procedures  Procedures         ED Course  ED Course as of 04/29/24 1040   Mon Apr 29, 2024   1015 Urology paged concerning results of labs and CT scan.    1029 Sepideh Pro from urology suggest admit obs for anemia.  No need for transfer.    1031 [facility]Im paged for admission.                                SBIRT 22yo+      Flowsheet Row Most Recent Value   Initial Alcohol Screen: US AUDIT-C     1. How often do you have a drink containing alcohol? 0 Filed at: 04/29/2024 0812   2. How many drinks containing alcohol do you have on a typical day you are drinking?  0 Filed at: 04/29/2024 0812   3a. Male UNDER 65: How often do you have five or more drinks on one occasion? 0 Filed at: 04/29/2024 0812   3b. FEMALE Any Age, or MALE 65+: How often do you have 4 or more drinks on one occassion? 0 Filed at: 04/29/2024 0812   Audit-C Score 0 Filed at: 04/29/2024 0812   WILLIE: How many times in the past year have you...    Used an illegal drug or used a prescription medication for non-medical reasons? Never Filed at: 04/29/2024 0812                      Medical Decision Making  Ddx includes hematuria secondary to UTI, blood thinners, bladder cancer, obstructing stone.    Patient with b/l renal cysts, advised f/u with urology/PCP for further monitoring/evaluation.  Will admit for IV abx, further monitoring of Hg and Cr.      Amount and/or Complexity of Data Reviewed  External Data Reviewed: labs and notes.  Labs: ordered.  Radiology: ordered.  Discussion of management or test interpretation with external provider(s): D/w urology.     Risk  Prescription drug management.  Decision regarding hospitalization.             Disposition  Final diagnoses:   Hematuria   Bilateral renal cysts   Urinary tract infection   Anemia     Time reflects when diagnosis was  documented in both MDM as applicable and the Disposition within this note       Time User Action Codes Description Comment    4/29/2024  9:23 AM Yasmeen Braun [R31.9] Hematuria     4/29/2024  9:23 AM Yasmeen Braun [N28.1] Bilateral renal cysts     4/29/2024 10:31 AM Yasmeen Braun [N39.0] Urinary tract infection     4/29/2024 10:40 AM Yasmeen Braun [D64.9] Anemia           ED Disposition       ED Disposition   Admit    Condition   Stable    Date/Time   Mon Apr 29, 2024 1031    Comment   Case was discussed with Dr. Fuentes and the patient's admission status was agreed to be Admission Status: observation status to the service of Dr. Fuentes .               Follow-up Information    None         Patient's Medications   Discharge Prescriptions    No medications on file       No discharge procedures on file.    PDMP Review         Value Time User    PDMP Reviewed  Yes 11/15/2023 12:20 PM Krysta Castillo PA-C            ED Provider  Electronically Signed by             Yasmeen Braun PA-C  04/29/24 1049

## 2024-04-29 NOTE — TELEPHONE ENCOUNTER
Spoke with Ashley and she asked for orders to be refaxed - refax - also noticed that Maverick was in the ED - no notes as of yet

## 2024-04-29 NOTE — ASSESSMENT & PLAN NOTE
Hold eliquis  DVT in nov 2023 in gastrocnemius vein  If has to hold eliquis for longer period, will consult IR for IVC filter

## 2024-04-29 NOTE — TREATMENT PLAN
Received TT from the emergency department that patient is status post Colon catheter placement 4/21 due to urinary retention and UTI.  He was discharged home on Keflex.  Today he was noted to have blood in the urine and his urine is draining light pink color.  He is on Eliquis.  His hemoglobin was 9.3 which is decreased from 11.58 days ago.  Creatinine 1.61 previously 1.42.  His urine with innumerable RBCs and positive pyuria.  CT with bilateral renal cysts and bilateral nonobstructing calculi otherwise unremarkable.    Plan:  Please place consult to urology team  Monitor urine output and color  Trend labs  Agree with IV Elayne's  Manually irrigate Colno catheter  Begin CBI if urine does not clear up  Hold anticoagulation if possible  Medical optimization antibiotics per primary team

## 2024-04-29 NOTE — PLAN OF CARE

## 2024-04-29 NOTE — ASSESSMENT & PLAN NOTE
Poa  Likely truamtic farmer insertion  Hemoglobin trended down from 11.3-9.3  Urology consulted  Hold AC  Bladder irrigation as per urology  Now is running clear to mild pink tinge colured urine  IF persistent, may need TX to [place]

## 2024-04-29 NOTE — ASSESSMENT & PLAN NOTE
Lab Results   Component Value Date    EGFR 40 04/29/2024    EGFR 47 04/21/2024    EGFR 60 01/04/2024    CREATININE 1.61 (H) 04/29/2024    CREATININE 1.42 (H) 04/21/2024    CREATININE 1.17 01/04/2024    Elevation in creatinine  Hold lasix, lisionpril and mobic  Monitor Cr with Abx   Encourgaed oral hydration today  BMP AM

## 2024-04-30 PROBLEM — D64.9 ANEMIA: Status: ACTIVE | Noted: 2024-04-30

## 2024-04-30 LAB
ANION GAP SERPL CALCULATED.3IONS-SCNC: 4 MMOL/L (ref 4–13)
BACTERIA UR CULT: NORMAL
BASOPHILS # BLD AUTO: 0.05 THOUSANDS/ÂΜL (ref 0–0.1)
BASOPHILS NFR BLD AUTO: 1 % (ref 0–1)
BUN SERPL-MCNC: 41 MG/DL (ref 5–25)
CALCIUM SERPL-MCNC: 7.8 MG/DL (ref 8.4–10.2)
CHLORIDE SERPL-SCNC: 112 MMOL/L (ref 96–108)
CO2 SERPL-SCNC: 24 MMOL/L (ref 21–32)
CREAT SERPL-MCNC: 1.18 MG/DL (ref 0.6–1.3)
EOSINOPHIL # BLD AUTO: 0.86 THOUSAND/ÂΜL (ref 0–0.61)
EOSINOPHIL NFR BLD AUTO: 15 % (ref 0–6)
ERYTHROCYTE [DISTWIDTH] IN BLOOD BY AUTOMATED COUNT: 13 % (ref 11.6–15.1)
FERRITIN SERPL-MCNC: 175 NG/ML (ref 24–336)
GFR SERPL CREATININE-BSD FRML MDRD: 59 ML/MIN/1.73SQ M
GLUCOSE P FAST SERPL-MCNC: 103 MG/DL (ref 65–99)
GLUCOSE SERPL-MCNC: 103 MG/DL (ref 65–140)
HCT VFR BLD AUTO: 26.5 % (ref 36.5–49.3)
HCT VFR BLD AUTO: 27.7 % (ref 36.5–49.3)
HGB BLD-MCNC: 8.2 G/DL (ref 12–17)
HGB BLD-MCNC: 8.6 G/DL (ref 12–17)
IMM GRANULOCYTES # BLD AUTO: 0.05 THOUSAND/UL (ref 0–0.2)
IMM GRANULOCYTES NFR BLD AUTO: 1 % (ref 0–2)
IRON SATN MFR SERPL: 27 % (ref 15–50)
IRON SERPL-MCNC: 57 UG/DL (ref 50–212)
LYMPHOCYTES # BLD AUTO: 1.43 THOUSANDS/ÂΜL (ref 0.6–4.47)
LYMPHOCYTES NFR BLD AUTO: 25 % (ref 14–44)
MCH RBC QN AUTO: 32 PG (ref 26.8–34.3)
MCHC RBC AUTO-ENTMCNC: 30.9 G/DL (ref 31.4–37.4)
MCV RBC AUTO: 104 FL (ref 82–98)
MONOCYTES # BLD AUTO: 0.57 THOUSAND/ÂΜL (ref 0.17–1.22)
MONOCYTES NFR BLD AUTO: 10 % (ref 4–12)
NEUTROPHILS # BLD AUTO: 2.72 THOUSANDS/ÂΜL (ref 1.85–7.62)
NEUTS SEG NFR BLD AUTO: 48 % (ref 43–75)
NRBC BLD AUTO-RTO: 0 /100 WBCS
PLATELET # BLD AUTO: 219 THOUSANDS/UL (ref 149–390)
PMV BLD AUTO: 10.1 FL (ref 8.9–12.7)
POTASSIUM SERPL-SCNC: 4.8 MMOL/L (ref 3.5–5.3)
RBC # BLD AUTO: 2.56 MILLION/UL (ref 3.88–5.62)
SODIUM SERPL-SCNC: 140 MMOL/L (ref 135–147)
TIBC SERPL-MCNC: 209 UG/DL (ref 250–450)
UIBC SERPL-MCNC: 152 UG/DL (ref 155–355)
WBC # BLD AUTO: 5.68 THOUSAND/UL (ref 4.31–10.16)

## 2024-04-30 PROCEDURE — 83550 IRON BINDING TEST: CPT | Performed by: NURSE PRACTITIONER

## 2024-04-30 PROCEDURE — 82728 ASSAY OF FERRITIN: CPT | Performed by: NURSE PRACTITIONER

## 2024-04-30 PROCEDURE — 99222 1ST HOSP IP/OBS MODERATE 55: CPT | Performed by: INTERNAL MEDICINE

## 2024-04-30 PROCEDURE — 99233 SBSQ HOSP IP/OBS HIGH 50: CPT | Performed by: INTERNAL MEDICINE

## 2024-04-30 PROCEDURE — 99232 SBSQ HOSP IP/OBS MODERATE 35: CPT | Performed by: PHYSICIAN ASSISTANT

## 2024-04-30 PROCEDURE — 85018 HEMOGLOBIN: CPT | Performed by: INTERNAL MEDICINE

## 2024-04-30 PROCEDURE — 85014 HEMATOCRIT: CPT | Performed by: INTERNAL MEDICINE

## 2024-04-30 PROCEDURE — 85025 COMPLETE CBC W/AUTO DIFF WBC: CPT | Performed by: INTERNAL MEDICINE

## 2024-04-30 PROCEDURE — 80048 BASIC METABOLIC PNL TOTAL CA: CPT | Performed by: INTERNAL MEDICINE

## 2024-04-30 PROCEDURE — 83540 ASSAY OF IRON: CPT | Performed by: NURSE PRACTITIONER

## 2024-04-30 RX ORDER — FUROSEMIDE 40 MG/1
40 TABLET ORAL DAILY
Status: DISCONTINUED | OUTPATIENT
Start: 2024-04-30 | End: 2024-05-01

## 2024-04-30 RX ADMIN — CEFTRIAXONE 1000 MG: 1 INJECTION, SOLUTION INTRAVENOUS at 11:53

## 2024-04-30 RX ADMIN — FUROSEMIDE 40 MG: 40 TABLET ORAL at 13:48

## 2024-04-30 RX ADMIN — FINASTERIDE 5 MG: 5 TABLET, FILM COATED ORAL at 08:13

## 2024-04-30 RX ADMIN — TAMSULOSIN HYDROCHLORIDE 0.4 MG: 0.4 CAPSULE ORAL at 17:15

## 2024-04-30 RX ADMIN — METOPROLOL SUCCINATE 50 MG: 50 TABLET, EXTENDED RELEASE ORAL at 17:15

## 2024-04-30 RX ADMIN — METOPROLOL SUCCINATE 100 MG: 50 TABLET, EXTENDED RELEASE ORAL at 08:13

## 2024-04-30 RX ADMIN — AMIODARONE HYDROCHLORIDE 200 MG: 200 TABLET ORAL at 08:13

## 2024-04-30 RX ADMIN — ATORVASTATIN CALCIUM 40 MG: 40 TABLET, FILM COATED ORAL at 17:15

## 2024-04-30 NOTE — CASE MANAGEMENT
Case Management Assessment & Discharge Planning Note    Patient name Toro Rodriguez  Location /-01 MRN [id number]  : 1950 Date 2024       Current Admission Date: 2024  Current Admission Diagnosis:Hematuria   Patient Active Problem List    Diagnosis Date Noted    Anemia 2024    Hematuria 2024    Chronic venous hypertension (idiopathic) with ulcer of left lower extremity (CODE) ([facility]) 2024    Chronic kidney disease, stage 3a ([facility]) 2024    Encounter for geriatric assessment 2024    Melanoma of back ([facility]) 2024    Dilated cardiomyopathy ([facility]) 12/15/2023    Obesity, morbid ([facility]) 2023    Type 2 diabetes mellitus with chronic kidney disease, without long-term current use of insulin, unspecified CKD stage ([facility]) 11/15/2023    Essential hypertension 11/10/2023    Acute on chronic systolic (congestive) heart failure ([facility]) 11/10/2023    Deep vein thrombosis (DVT) of left lower extremity ([facility]) 11/10/2023    Coronary artery disease involving native coronary artery of native heart 11/10/2023    Benign prostatic hyperplasia with urinary retention       LOS (days): 0  Geometric Mean LOS (GMLOS) (days): 2.3  Days to GMLOS:2.2     OBJECTIVE:              Current admission status: Inpatient       Preferred Pharmacy:   Pharmacy of [address] [address]  Phone: [phone] Fax: [phone]    [facility] [address]  [address] [facility] [address]  Phone: [phone] Fax: [phone]    Primary Care Provider: CARLIE Foster    Primary Insurance: MEDICARE  Secondary Insurance: BLUE CROSS    ASSESSMENT:  Active Health Care Proxies    There are no active Health Care Proxies on file.       Advance Directives  Does patient have a Health Care POA?: Yes  Does patient have Advance Directives?: Yes  Advance  Directives: Living will, Power of  for health care         Readmission Root Cause  30 Day Readmission: No    Patient Information  Admitted from:: Facility ([facility])  Mental Status: Alert  During Assessment patient was accompanied by: Not accompanied during assessment  Assessment information provided by:: Patient  Primary Caregiver: Other (Comment)  Caregiver's Name:: Staff at [facility]  Caregiver's Telephone Number:: [phone] Alwx, director  Support Systems: [facility]  County of Residence: [place]  What city do you live in?: [place]  Home entry access options. Select all that apply.: Stairs, No steps to enter home  Type of Current Residence: 2 story home  Upon entering residence, is there a bedroom on the main floor (no further steps)?: Yes  Upon entering residence, is there a bathroom on the main floor (no further steps)?: Yes  Living Arrangements: Other (Comment) ([facility])  Is patient a ?: No    Activities of Daily Living Prior to Admission  Functional Status: Assistance  Completes ADLs independently?: No  Level of ADL dependence: Assistance  Ambulates independently?: Yes  Does patient use assisted devices?: Yes  Assisted Devices (DME) used: Walker, Straight Cane  Does patient currently own DME?: Yes  What DME does the patient currently own?: Wheelchair, Walker  Does patient have a history of Outpatient Therapy (PT/OT)?: No  Does the patient have a history of Short-Term Rehab?: Yes ([place] [facility])  Does patient have a history of HHC?: Yes  Does patient currently have HHC?: Yes    Current Home Health Care  Type of Current Home Care Services: Home OT, Home PT, Nurse visit  Current Home Health Agency:: [facility]  Current Home Health Follow-Up Provider:: PCP    Patient Information Continued  Income Source: Pension/shelter  Does patient have prescription coverage?: Yes  Does patient receive dialysis treatments?: No  Does patient have a history of substance abuse?: No  Does  patient have a history of Mental Health Diagnosis?: No         Means of Transportation  [facility]      Social Determinants of Health (SDOH)      Flowsheet Row Most Recent Value   Housing Stability    In the last 12 months, was there a time when you were not able to pay the mortgage or rent on time? N   In the last 12 months, how many places have you lived? 1   In the last 12 months, was there a time when you did not have a steady place to sleep or slept in a shelter (including now)? N   Transportation Needs    In the past 12 months, has lack of transportation kept you from medical appointments or from getting medications? no   In the past 12 months, has lack of transportation kept you from meetings, work, or from getting things needed for daily living? No   Food Insecurity    Within the past 12 months, you worried that your food would run out before you got the money to buy more. Never true   Within the past 12 months, the food you bought just didn't last and you didn't have money to get more. Never true            DISCHARGE DETAILS:    Discharge planning discussed with:: patient        CM contacted family/caregiver?: No- see comments (patient alert and in contact with family)                  Requested Home Health Care         Is the patient interested in HHC at discharge?: Yes  Home Health Discipline requested:: Nursing, Occupational Therapy, Physical Therapy  [facility] External [facility]erral Reason (only applicable if external HHA name selected): Patient has established relationship with provider  Home Health Follow-Up Provider:: PCP  Home Health Services Needed:: Gait/ADL Training, Evaluate Functional Status and Safety, Urinary Incontinence Catheter Management  Homebound Criteria Met:: Uses an Assist Device (i.e. cane, walker, etc)  Supporting Clincal Findings:: Limited Endurance     Met with patient to review the role of CM ad discuss needs patient may  have prior to discharge.  Patient reports residing in a shared room on the 1st floor at [facility], He uses a RW and goes to the DR for meals. He hasa history hoe SNF rehab at [facility]. He plans to return to [facility] as discharge. He may need a Lyft ride home.

## 2024-04-30 NOTE — WOUND OSTOMY CARE
Consult Note - Wound   Toro Rodriguez 74 y.o. male MRN: [id number]  Unit/Bed#: -01 Encounter: [id number]        History and Present Illness:  Patient is a 75 yo male that was admitted to [facility] for treatment of hematuria. Patient has a PMH of alcohol intoxication, BPH, DVT, CAD, DM, Patient is a mod assist for turning and repositioning. Patient is incontinent at times of bowel and bladder. On assessment, patient is seen lying on regular mattress.      Wound Care was consulted for buttocks wound.     Assessment Findings:   B/L heels are dry intact and ranulfo with no skin loss or wounds present. Recommend preventative Hydraguard Cream and proper offloading/ repositioning.      POA Right Buttocks Stage 2 Pressure Injury: patient reports wound occurred during previous hospitalization. Circular in shape area of partial thickness skin loss. Wound bed is beefy red in color and non-blanchable tissue. Jana-wound is fragile. Scant sanguinous drainage noted. Recommend foam dressing to area.   Patient reports that foam dressing has been successful in the past.     No induration, fluctuance, odor, warmth/temperature differences, redness, or purulence noted to the above noted wounds and skin areas assessed. New dressings applied per orders listed below. Patient tolerated well- no s/s of non-verbal pain or discomfort observed during the encounter. Bedside nurse aware of plan of care. See flow sheets for more detailed assessment findings.      Orders listed below and wound care will continue to follow, call or tiger text with questions.     Skin Care Plan:  1-Cleanse sacro-buttocks with soap and water. Apply silicone bordered foam dressing to right buttocks wound. Reagan with T for Treatment and change every other day or PRN soilage/displacement.   2-Turn/reposition q2h or when medically stable for pressure re-distribution on skin .  3-Elevate heels to offload pressure.  4-Moisturize skin daily with skin nourishing  cream  5-Ehob cushion in chair when out of bed.  6-Preventative Hydraguard to bilateral heels BID and PRN.       Wounds:  Wound 03/18/24 Pressure Injury Buttocks Right;Posterior (Active)   Wound Image   04/30/24 0801   Wound Description Beefy red;Non-blanchable erythema 04/30/24 1500   Pressure Injury Stage 2 04/30/24 1500   Jana-wound Assessment Fragile 04/30/24 1500   Wound Length (cm) 0.5 cm 04/30/24 1500   Wound Width (cm) 0.5 cm 04/30/24 1500   Wound Depth (cm) 0.1 cm 04/30/24 1500   Wound Surface Area (cm^2) 0.25 cm^2 04/30/24 1500   Wound Volume (cm^3) 0.025 cm^3 04/30/24 1500   Calculated Wound Volume (cm^3) 0.03 cm^3 04/30/24 1500   Wound Site Closure DESTINEY 04/30/24 1500   Drainage Amount Scant 04/30/24 1500   Drainage Description Sanguineous 04/30/24 1500   Non-staged Wound Description Partial thickness 04/30/24 1500   Treatments Cleansed;Irrigation with NSS;Site care 04/30/24 1500   Dressing Foam, Silicon (eg. Allevyn, etc) 04/30/24 1500   Dressing Changed New 04/30/24 1500   Patient Tolerance Tolerated well 04/30/24 1500   Dressing Status Clean;Dry;Intact 04/30/24 1500               Odette Feng RN, BSN, CWOCN

## 2024-04-30 NOTE — ASSESSMENT & PLAN NOTE
Lab Results   Component Value Date    EGFR 59 04/30/2024    EGFR 40 04/29/2024    EGFR 47 04/21/2024    CREATININE 1.18 04/30/2024    CREATININE 1.61 (H) 04/29/2024    CREATININE 1.42 (H) 04/21/2024    Elevation in creatinine  Monitor Cr with Abx   Encourgaed oral hydration   Restart lasix, hold lisionpril today

## 2024-04-30 NOTE — PLAN OF CARE
Problem: Potential for Falls  Goal: Patient will remain free of falls  Description: INTERVENTIONS:  - Educate patient/family on patient safety including physical limitations  - Instruct patient to call for assistance with activity   - Consult OT/PT to assist with strengthening/mobility   - Keep Call bell within reach  - Keep bed low and locked with side rails adjusted as appropriate  - Keep care items and personal belongings within reach  - Initiate and maintain comfort rounds  - Make Fall Risk Sign visible to staff  - Offer Toileting every 2 Hours, in advance of need  - Initiate/Maintain shift alarm  - Obtain necessary fall risk management equipment: socks  - Apply yellow socks and bracelet for high fall risk patients  - Consider moving patient to room near nurses station  Outcome: Progressing     Problem: PAIN - ADULT  Goal: Verbalizes/displays adequate comfort level or baseline comfort level  Description: Interventions:  - Encourage patient to monitor pain and request assistance  - Assess pain using appropriate pain scale  - Administer analgesics based on type and severity of pain and evaluate response  - Implement non-pharmacological measures as appropriate and evaluate response  - Consider cultural and social influences on pain and pain management  - Notify physician/advanced practitioner if interventions unsuccessful or patient reports new pain  Outcome: Progressing     Problem: INFECTION - ADULT  Goal: Absence or prevention of progression during hospitalization  Description: INTERVENTIONS:  - Assess and monitor for signs and symptoms of infection  - Monitor lab/diagnostic results  - Monitor all insertion sites, i.e. indwelling lines, tubes, and drains  - Monitor endotracheal if appropriate and nasal secretions for changes in amount and color  - [place] appropriate cooling/warming therapies per order  - Administer medications as ordered  - Instruct and encourage patient and family to use good hand hygiene  technique  - Identify and instruct in appropriate isolation precautions for identified infection/condition  Outcome: Progressing  Goal: Absence of fever/infection during neutropenic period  Description: INTERVENTIONS:  - Monitor WBC    Outcome: Progressing     Problem: SAFETY ADULT  Goal: Patient will remain free of falls  Description: INTERVENTIONS:  - Educate patient/family on patient safety including physical limitations  - Instruct patient to call for assistance with activity   - Consult OT/PT to assist with strengthening/mobility   - Keep Call bell within reach  - Keep bed low and locked with side rails adjusted as appropriate  - Keep care items and personal belongings within reach  - Initiate and maintain comfort rounds  - Make Fall Risk Sign visible to staff  - Offer Toileting every 2 Hours, in advance of need  - Initiate/Maintain shift alarm  - Obtain necessary fall risk management equipment: socks  - Apply yellow socks and bracelet for high fall risk patients  - Consider moving patient to room near nurses station  Outcome: Progressing  Goal: Maintain or return to baseline ADL function  Description: INTERVENTIONS:  -  Assess patient's ability to carry out ADLs; assess patient's baseline for ADL function and identify physical deficits which impact ability to perform ADLs (bathing, care of mouth/teeth, toileting, grooming, dressing, etc.)  - Assess/evaluate cause of self-care deficits   - Assess range of motion  - Assess patient's mobility; develop plan if impaired  - Assess patient's need for assistive devices and provide as appropriate  - Encourage maximum independence but intervene and supervise when necessary  - Involve family in performance of ADLs  - Assess for home care needs following discharge   - Consider OT consult to assist with ADL evaluation and planning for discharge  - Provide patient education as appropriate  Outcome: Progressing  Goal: Maintains/Returns to pre admission functional  level  Description: INTERVENTIONS:  - Perform AM-PAC 6 Click Basic Mobility/ Daily Activity assessment daily.  - Set and communicate daily mobility goal to care team and patient/family/caregiver.   - Collaborate with rehabilitation services on mobility goals if consulted  - Perform Range of Motion 2 times a day.  - Reposition patient every 2 hours.  - Dangle patient 2 times a day  - Stand patient 2 times a day  - Ambulate patient 2 times a day  - Out of bed to chair 2 times a day   - Out of bed for meals 2 times a day  - Out of bed for toileting  - Record patient progress and toleration of activity level   Outcome: Progressing     Problem: DISCHARGE PLANNING  Goal: Discharge to home or other facility with appropriate resources  Description: INTERVENTIONS:  - Identify barriers to discharge w/patient and caregiver  - Arrange for needed discharge resources and transportation as appropriate  - Identify discharge learning needs (meds, wound care, etc.)  - Arrange for interpretive services to assist at discharge as needed  - Refer to Case Management Department for coordinating discharge planning if the patient needs post-hospital services based on physician/advanced practitioner order or complex needs related to functional status, cognitive ability, or social support system  Outcome: Progressing     Problem: Knowledge Deficit  Goal: Patient/family/caregiver demonstrates understanding of disease process, treatment plan, medications, and discharge instructions  Description: Complete learning assessment and assess knowledge base.  Interventions:  - Provide teaching at level of understanding  - Provide teaching via preferred learning methods  Outcome: Progressing     Problem: Prexisting or High Potential for Compromised Skin Integrity  Goal: Skin integrity is maintained or improved  Description: INTERVENTIONS:  - Identify patients at risk for skin breakdown  - Assess and monitor skin integrity  - Assess and monitor nutrition  and hydration status  - Monitor labs   - Assess for incontinence   - Turn and reposition patient  - Assist with mobility/ambulation  - Relieve pressure over bony prominences  - Avoid friction and shearing  - Provide appropriate hygiene as needed including keeping skin clean and dry  - Evaluate need for skin moisturizer/barrier cream  - Collaborate with interdisciplinary team   - Patient/family teaching  - Consider wound care consult   Outcome: Progressing

## 2024-04-30 NOTE — PROGRESS NOTES
[facility]  Progress Note  Name: Toro Rodriguez I  MRN: [id number]  Unit/Bed#: -01 I Date of Admission: 4/29/2024   Date of Service: 4/30/2024 I Hospital Day: 0    Assessment/Plan   Anemia  Assessment & Plan  Hgb trending down    Chronic kidney disease, stage 3a (HCC)  Assessment & Plan  Lab Results   Component Value Date    EGFR 59 04/30/2024    EGFR 40 04/29/2024    EGFR 47 04/21/2024    CREATININE 1.18 04/30/2024    CREATININE 1.61 (H) 04/29/2024    CREATININE 1.42 (H) 04/21/2024    Elevation in creatinine  Monitor Cr with Abx   Encourgaed oral hydration   Restart lasix, hold lisionpril today    Dilated cardiomyopathy (HCC)  Assessment & Plan  ECHo shows EF 35-40% with systolic function moderately reduced, grade 1 diastolic dysfunction  Follws up with [facility]  On Lasix, Lisinopril  Hold today and monitor BMP AM  Appears to be dry- euvolemic today  Continue toprol  On amiodarone for NSVT    Benign prostatic hyperplasia with urinary retention  Assessment & Plan  Continue flomax and finasteride  Urology consulted  Continue farmer    Deep vein thrombosis (DVT) of left lower extremity (HCC)  Assessment & Plan  Hold eliquis  DVT in nov 2023 in gastrocnemius vein  If has to hold eliquis for longer period, will consult IR for IVC filter      * Hematuria  Assessment & Plan  Poa  Likely truamtic farmer insertion  Hemoglobin trended down from 11.3-9.3  Urology consulted  Hold AC  Bladder irrigation as per urology  Now is running clear to mild pink tinge colured urine  IF persistent, may need TX to [place]                 VTE Pharmacologic Prophylaxis:   Moderate Risk (Score 3-4) - Pharmacological DVT Prophylaxis Contraindicated. Sequential Compression Devices Ordered.    Mobility:   Basic Mobility Inpatient Raw Score: 17  JH-HLM Goal: 5: Stand one or more mins  JH-HLM Achieved: 5: Stand (1 or more minutes)  JH-HLM Goal achieved. Continue to encourage appropriate mobility.    Patient  Centered Rounds: I performed bedside rounds with nursing staff today.   Discussions with Specialists or Other Care Team Provider: GI    Education and Discussions with Family / Patient: Attempted to update  (daughter) via phone. Unable to contact.    Total Time Spent on Date of Encounter in care of patient: 55 mins. This time was spent on one or more of the following: performing physical exam; counseling and coordination of care; obtaining or reviewing history; documenting in the medical record; reviewing/ordering tests, medications or procedures; communicating with other healthcare professionals and discussing with patient's family/caregivers.    Current Length of Stay: 0 day(s)  Current Patient Status: Inpatient   Certification Statement: The patient will continue to require additional inpatient hospital stay due to EGD/colonoscopy, downtrending hemoglobin  Discharge Plan:  Not ready    Code Status: Level 1 - Full Code    Subjective:   Patient stated he had some dark bowel movements, denies any abdominal pain  Clear urine in Colon bag    Objective:     Vitals:   Temp (24hrs), Av.6 °F (36.4 °C), Min:97.2 °F (36.2 °C), Max:98.1 °F (36.7 °C)    Temp:  [97.2 °F (36.2 °C)-98.1 °F (36.7 °C)] 97.3 °F (36.3 °C)  HR:  [55-64] 61  Resp:  [14-20] 16  BP: (127-151)/(55-73) 146/60  SpO2:  [92 %-96 %] 95 %  Body mass index is 33.83 kg/m².     Input and Output Summary (last 24 hours):     Intake/Output Summary (Last 24 hours) at 2024 1303  Last data filed at 2024 0801  Gross per 24 hour   Intake 3142.5 ml   Output 2750 ml   Net 392.5 ml       Physical Exam:   Physical Exam     Gen.-Patient comfortable   Neck- Supple. No thyromegaly or lymphadenopathy  Lungs-Clear bilaterally without any wheeze or rales   Heart S1-S2, regular rate and rhythm, no murmurs  Abdomen-soft nontender, no organomegaly. Bowel sounds present, clear urine in Colon bag  Extremities-no cyanosi,  clubbing or edema  Skin- no  rash  Neuro-nonfocal     Additional Data:     Labs:  Results from last 7 days   Lab Units 04/30/24  0416   WBC Thousand/uL 5.68   HEMOGLOBIN g/dL 8.2*   HEMATOCRIT % 26.5*   PLATELETS Thousands/uL 219   SEGS PCT % 48   LYMPHO PCT % 25   MONO PCT % 10   EOS PCT % 15*     Results from last 7 days   Lab Units 04/30/24  0416 04/29/24  0840   SODIUM mmol/L 140 138   POTASSIUM mmol/L 4.8 4.7   CHLORIDE mmol/L 112* 107   CO2 mmol/L 24 23   BUN mg/dL 41* 63*   CREATININE mg/dL 1.18 1.61*   ANION GAP mmol/L 4 8   CALCIUM mg/dL 7.8* 8.3*   ALBUMIN g/dL  --  3.8   TOTAL BILIRUBIN mg/dL  --  0.36   ALK PHOS U/L  --  57   ALT U/L  --  11   AST U/L  --  13   GLUCOSE RANDOM mg/dL 103 92     Results from last 7 days   Lab Units 04/29/24  0840   INR  1.45*                   Lines/Drains:  Invasive Devices       Peripheral Intravenous Line  Duration             Peripheral IV 04/29/24 Right Antecubital 1 day              Drain  Duration             Urethral Catheter Three way 20 Fr. 8 days                  Urinary Catheter:  Goal for removal: N/A- Discharging with Colon               Imaging: No pertinent imaging reviewed.    Recent Cultures (last 7 days):   Results from last 7 days   Lab Units 04/29/24  0903   URINE CULTURE  No Growth <1000 cfu/mL       Last 24 Hours Medication List:   Current Facility-Administered Medications   Medication Dose Route Frequency Provider Last Rate    amiodarone  200 mg Oral Daily Brook Fuentes MD      atorvastatin  40 mg Oral Daily With Dinner Brook Fuentes MD      cefTRIAXone  1,000 mg Intravenous Q24H Brook Fuentes MD 1,000 mg (04/30/24 1153)    finasteride  5 mg Oral Daily Brook Fuentes MD      furosemide  40 mg Oral Daily Brook Fuentes MD      metoprolol succinate  100 mg Oral Daily Brook Fuentes MD      metoprolol succinate  50 mg Oral QPM Brook Fuentes MD      nitroglycerin  0.4 mg Sublingual Q5 Min PRN Brook Fuentes MD      tamsulosin  0.4 mg Oral Daily With Dinner  Brook Fuentes MD          Today, Patient Was Seen By: Brook Fuentes MD    **Please Note: This note may have been constructed using a voice recognition system.**

## 2024-04-30 NOTE — PROGRESS NOTES
"Progress Note - Toro Rodriguez 74 y.o. male MRN: [id number]    Unit/Bed#: -01 Encounter: [id number]      Assessment/Plan:  Hematuria  -pt is 75 y/o male with PMH for DVT on Eliquis, CHF, CAD, CKD3a, T2DM and HTN who presents with blood in his urine that began today (4/29).   -CT: No obstructive uropathy. Bilateral intrarenal calculi. Indeterminate cystic lesions in both kidneys. Recommend follow-up nonemergent ultrasound for further evaluation. Partially decompressed by a Farmer catheter. No calculi appreciated   -hgb down to 8.2 today from 9.3 yesterday (11.5 4/21)  -creat improved to 1.18 (1.61)   -urine is clear yellow without any clots since he was first irrigated yesterday reported by nursing.      Plan:  -hand irrigate farmer q4  -trend hgb, creat and UO  -hold A/C if possible  -antibiotics  -no need for urologic intervention   -may need to look for other sources of bleeding, pt did report dark stool 2 days ago  -plan discussed with Uro AP    Subjective:   I feel fine.     Objective:     Vitals: Blood pressure 146/60, pulse 61, temperature (!) 97.3 °F (36.3 °C), resp. rate 16, height 5' 7\" (1.702 m), weight 98 kg (216 lb), SpO2 95%.,Body mass index is 33.83 kg/m².      Intake/Output Summary (Last 24 hours) at 4/30/2024 0828  Last data filed at 4/30/2024 0801  Gross per 24 hour   Intake 3142.5 ml   Output 2750 ml   Net 392.5 ml       Physical Exam: General appearance: alert and oriented, in no acute distress  Lungs: clear to auscultation bilaterally  Heart: regular rate and rhythm, S1, S2 normal, no murmur, click, rub or gallop  Abdomen: soft, non-tender; bowel sounds normal; no masses,  no organomegaly  Urine is clear and yellow without any clots.       Invasive Devices       Peripheral Intravenous Line  Duration             Peripheral IV 04/29/24 Right Antecubital 1 day              Drain  Duration             Urethral Catheter Three way 20 Fr. 8 days                    Lab, Imaging and other studies: I " have personally reviewed pertinent reports.    VTE Pharmacologic Prophylaxis: Sequential compression device (Venodyne)   VTE Mechanical Prophylaxis: sequential compression device

## 2024-04-30 NOTE — ASSESSMENT & PLAN NOTE
ECHo shows EF 35-40% with systolic function moderately reduced, grade 1 diastolic dysfunction  Follws up with [facility]iology  On Lasix, Lisinopril  Hold today and monitor BMP AM  Appears to be dry- euvolemic today  Continue toprol  On amiodarone for NSVT

## 2024-04-30 NOTE — CONSULTS
Consultation - GI   Toro Rodriguez 74 y.o. male MRN: [id number]  Unit/Bed#: -01 Encounter: [id number]      Assessment/Plan   74-year-old male with history of BPH and urinary retention requiring intermittent indwelling Colon catheter presented to ED with acute onset of hematuria 4/29/2024.  Prior episode of hematuria 4/21/2024  GI consulted for anemia with hemoglobin 9.3 on admission, decreased to 8.2.  Baseline hemoglobin 13-14 November 2023, hemoglobin decreased to 11.3 2/2024  No recent signs of active GI bleeding-reports dark brown stools but no melena, hematochezia or rectal bleeding.  No GERD symptoms      1.  Normocytic anemia  Patient with recent multiple episodes of hematuria  Hemoglobin 11.5 on 4/21  Decreased to 9.3 on admission 4/29, decreased to 8.2 this a.m.  MCV increased at 102  No signs of GI bleeding-does report dark brown stools but no melena or rectal bleeding  Suspect recent hematuria in setting of anticoagulation with Eliquis may be contributing to anemia    -Check iron panel to further characterize anemia  -Continue to trend hemoglobin, transfuse to keep greater than 7.0  -Hold Eliquis, last dose was Sunday evening 4/28  -Plan for EGD/colonoscopy tentatively on Thursday 5/2 to allow for Eliquis washout    2.  Hematuria  Recent episodes of hematuria.  Urine clear yellow this a.m.  Urology following  CT scan in ED showed nonobstructive uropathy, bilateral intrarenal calculi  Anticoagulation/Eliquis on hold    3.  BPH with urinary retention    4.  History of DVT on Eliquis for anticoagulation  Previously on Eliquis 5 mg twice daily, last dose Sunday evening 4/28    6. CAD/cardiomyopathy/NSVT      Inpatient consult to gastroenterology  Consult performed by: CARLIE Paniagua  Consult ordered by: Brook Fuentes MD          Physician Requesting Consult: Brook Fuentes MD  Reason for Consult / Principal Problem: Anemia    HPI: Toro Rodriguez is a 74 y.o. year old male with PMH  including DM2, CKD, CAD, cardiomyopathy with LVEF 35-40%, CHF, remote DVT on Eliquis, recent excision of back melanoma 3/2024  He has known history of BPH with urinary retention requiring intermittent indwelling Colon catheter  Presented to ED 4/29/2024 with acute onset of hematuria.  Patient currently has Colon catheter in place and reported bright red blood in catheter tubing and bag.  No associated abdominal pain, no flank pain  Previous episode of hematuria 4/21/2024, evaluated in the ED and discharged    GI consulted for anemia  Hemoglobin in ED 9.3, decreased to 8.2 today, normal MCV  Baseline hemoglobin 13-14 November 2023, most recently 11.3 in February 2024  Patient denies recent hematochezia or rectal bleeding but does report dark brown stools for several days.  Denies melena  Denies dysphagia, no GERD symptoms.  Appetite remains good and his weight has been stable, no unintentional weight loss    Remote colonoscopy approximately 10 years ago, denies history of colon polyps          Review of Systems   Constitutional:  Positive for fatigue.   HENT: Negative.     Respiratory: Negative.     Cardiovascular:  Positive for leg swelling.   Gastrointestinal:         Reports several day history of dark brown stools   Genitourinary:  Positive for difficulty urinating and hematuria.   Musculoskeletal:  Positive for arthralgias.   Skin:  Positive for pallor.   Neurological:  Positive for weakness.   Hematological: Negative.    Psychiatric/Behavioral: Negative.           Historical Information   Past Medical History:   Diagnosis Date    Alcohol intoxication (Formerly [facility]) 10/15/2020    BPH (benign prostatic hyperplasia)     Chronic HFrEF (heart failure with reduced ejection fraction) (Formerly [facility]) 11/2023    Coronary artery calcification seen on CT scan 11/10/2023    Coronary artery disease 12/14/2023    Deep vein thrombosis (DVT) of left lower extremity (Formerly [facility]) 11/2023    Diabetes mellitus (HCC) 11/2023    Fall from slip, trip, or  stumbdylon 10/15/2020    History of phimosis of penis     History of urinary calculi     Hypertension 1988    Skin cancer     Tobacco abuse     quit around      Past Surgical History:   Procedure Laterality Date    BLADDER STONE REMOVAL      CARDIAC CATHETERIZATION N/A 2023    Procedure: Cardiac catheterization;  Surgeon: Dave Royal MD;  Location: [facility];  Service: Cardiology    ORIF ANKLE FRACTURE Left     SKIN LESION EXCISION N/A 3/18/2024    Procedure: WIDE LOCAL EXCISION OF BACK MELANOMA;  Surgeon: Lillie La MD;  Location: [facility];  Service: Surgical Oncology    TOTAL HIP ARTHROPLASTY Right      Social History   Social History     Substance and Sexual Activity   Alcohol Use Not Currently     Social History     Substance and Sexual Activity   Drug Use Never     Social History     Tobacco Use   Smoking Status Former    Types: Cigarettes    Start date:     Quit date:     Years since quittin.3   Smokeless Tobacco Never   Tobacco Comments    Quit over 30 years ago (Updated 2023).      Family History   Problem Relation Age of Onset    Breast cancer Mother     Heart attack Father 61    Other Sister         s/p hysterectomy    Cerebral palsy Brother     Skin cancer Other         family history    No Known Problems Son     No Known Problems Daughter     Sudden death Neg Hx        Meds/Allergies   Current Facility-Administered Medications   Medication Dose Route Frequency    amiodarone tablet 200 mg  200 mg Oral Daily    atorvastatin (LIPITOR) tablet 40 mg  40 mg Oral Daily With Dinner    cefTRIAXone (ROCEPHIN) IVPB (premix in dextrose) 1,000 mg 50 mL  1,000 mg Intravenous Q24H    finasteride (PROSCAR) tablet 5 mg  5 mg Oral Daily    metoprolol succinate (TOPROL-XL) 24 hr tablet 100 mg  100 mg Oral Daily    metoprolol succinate (TOPROL-XL) 24 hr tablet 50 mg  50 mg Oral QPM    nitroglycerin (NITROSTAT) SL tablet 0.4 mg  0.4 mg Sublingual Q5 Min PRN    tamsulosin  (FLOMAX) capsule 0.4 mg  0.4 mg Oral Daily With Dinner     Medications Prior to Admission   Medication    amiodarone 200 mg tablet    apixaban (ELIQUIS) 5 mg    aspirin 81 mg chewable tablet    atorvastatin (LIPITOR) 40 mg tablet    cephalexin (KEFLEX) 500 mg capsule    finasteride (PROSCAR) 5 mg tablet    furosemide (LASIX) 40 mg tablet    lisinopril (ZESTRIL) 10 mg tablet    meloxicam (MOBIC) 15 mg tablet    metoprolol succinate (TOPROL-XL) 100 mg 24 hr tablet    metoprolol succinate (TOPROL-XL) 50 mg 24 hr tablet    mupirocin (BACTROBAN) 2 % ointment    sacubitril-valsartan (ENTRESTO) 49-51 MG TABS    tamsulosin (FLOMAX) 0.4 mg    acetaminophen (TYLENOL) 325 mg tablet    clotrimazole (LOTRIMIN) 1 % cream    nitroglycerin (NITROSTAT) 0.4 mg SL tablet    nystatin (MYCOSTATIN) powder    traMADol (Ultram) 50 mg tablet       Allergies   Allergen Reactions    Zosyn [Piperacillin Sod-Tazobactam So] Rash           Physical Exam   Constitutional: Appears comfortable, NAD  HENT: WNL  Head: Normocephalic and atraumatic.   Eyes: Anicteric  Neck: Normal range of motion. Neck supple.   Cardiovascular: Normal rate and regular rhythm.  S1 and S2 normal  Pulmonary/Chest: Effort normal and breath sounds decreased bilaterally  Abdominal: Soft, nondistended, nontender with palpation. Bowel sounds are normal.   Musculoskeletal: Normal range of motion.   Extremities:  No edema  Neurological: alert and oriented to person, place, and time.   Skin: Skin is warm and dry.   Psychiatric: normal mood and affect.       Lab Results:   Admission on 04/29/2024   Component Date Value    WBC 04/29/2024 7.32     RBC 04/29/2024 2.86 (L)     Hemoglobin 04/29/2024 9.3 (L)     Hematocrit 04/29/2024 29.3 (L)     MCV 04/29/2024 102 (H)     MCH 04/29/2024 32.5     MCHC 04/29/2024 31.7     RDW 04/29/2024 12.9     MPV 04/29/2024 11.2     Platelets 04/29/2024 248     nRBC 04/29/2024 0     Segmented % 04/29/2024 55     Immature Grans % 04/29/2024 0      Lymphocytes % 04/29/2024 21     Monocytes % 04/29/2024 8     Eosinophils Relative 04/29/2024 15 (H)     Basophils Relative 04/29/2024 1     Absolute Neutrophils 04/29/2024 4.03     Absolute Immature Grans 04/29/2024 0.03     Absolute Lymphocytes 04/29/2024 1.50     Absolute Monocytes 04/29/2024 0.60     Eosinophils Absolute 04/29/2024 1.11 (H)     Basophils Absolute 04/29/2024 0.05     Sodium 04/29/2024 138     Potassium 04/29/2024 4.7     Chloride 04/29/2024 107     CO2 04/29/2024 23     ANION GAP 04/29/2024 8     BUN 04/29/2024 63 (H)     Creatinine 04/29/2024 1.61 (H)     Glucose 04/29/2024 92     Calcium 04/29/2024 8.3 (L)     AST 04/29/2024 13     ALT 04/29/2024 11     Alkaline Phosphatase 04/29/2024 57     Total Protein 04/29/2024 6.7     Albumin 04/29/2024 3.8     Total Bilirubin 04/29/2024 0.36     eGFR 04/29/2024 40     Color, UA 04/29/2024 Red     Clarity, UA 04/29/2024 Turbid     Specific Gravity, UA 04/29/2024 1.020     pH, UA 04/29/2024 5.5     Leukocytes, UA 04/29/2024 Moderate (A)     Nitrite, UA 04/29/2024 Negative     Protein, UA 04/29/2024 30 (1+) (A)     Glucose, UA 04/29/2024 Negative     Ketones, UA 04/29/2024 Trace (A)     Urobilinogen, UA 04/29/2024 <2.0     Bilirubin, UA 04/29/2024 Negative     Occult Blood, UA 04/29/2024 Large (A)     Protime 04/29/2024 18.1 (H)     INR 04/29/2024 1.45 (H)     PTT 04/29/2024 36     RBC, UA 04/29/2024 Innumerable (A)     WBC, UA 04/29/2024 10-20 (A)     Epithelial Cells 04/29/2024 Occasional     Bacteria, UA 04/29/2024 Occasional     Urine Culture 04/29/2024 No Growth <1000 cfu/mL     WBC 04/30/2024 5.68     RBC 04/30/2024 2.56 (L)     Hemoglobin 04/30/2024 8.2 (L)     Hematocrit 04/30/2024 26.5 (L)     MCV 04/30/2024 104 (H)     MCH 04/30/2024 32.0     MCHC 04/30/2024 30.9 (L)     RDW 04/30/2024 13.0     MPV 04/30/2024 10.1     Platelets 04/30/2024 219     nRBC 04/30/2024 0     Segmented % 04/30/2024 48     Immature Grans % 04/30/2024 1     Lymphocytes %  04/30/2024 25     Monocytes % 04/30/2024 10     Eosinophils Relative 04/30/2024 15 (H)     Basophils Relative 04/30/2024 1     Absolute Neutrophils 04/30/2024 2.72     Absolute Immature Grans 04/30/2024 0.05     Absolute Lymphocytes 04/30/2024 1.43     Absolute Monocytes 04/30/2024 0.57     Eosinophils Absolute 04/30/2024 0.86 (H)     Basophils Absolute 04/30/2024 0.05     Sodium 04/30/2024 140     Potassium 04/30/2024 4.8     Chloride 04/30/2024 112 (H)     CO2 04/30/2024 24     ANION GAP 04/30/2024 4     BUN 04/30/2024 41 (H)     Creatinine 04/30/2024 1.18     Glucose 04/30/2024 103     Glucose, Fasting 04/30/2024 103 (H)     Calcium 04/30/2024 7.8 (L)     eGFR 04/30/2024 59      Imaging Studies: I have personally reviewed pertinent reports.      EKG, Pathology, and Other Studies: I have personally reviewed pertinent reports.    Counseling / Coordination of Care  Total floor / unit time spent today 45 minutes.

## 2024-04-30 NOTE — DISCHARGE INSTR - OTHER ORDERS
Skin Care Plan:  1-Cleanse sacro-buttocks with soap and water. Apply silicone bordered foam dressing to right buttocks wound. Reagan with T for Treatment and change every other day or PRN soilage/displacement.   2-Turn/reposition q2h or when medically stable for pressure re-distribution on skin .  3-Elevate heels to offload pressure.  4-Moisturize skin daily with skin nourishing cream  5-Ehob cushion in chair when out of bed.  6-Preventative Hydraguard to bilateral heels BID and PRN.

## 2024-05-01 ENCOUNTER — TELEPHONE (OUTPATIENT)
Dept: OTHER | Facility: HOSPITAL | Age: 74
End: 2024-05-01

## 2024-05-01 LAB
ANION GAP SERPL CALCULATED.3IONS-SCNC: 6 MMOL/L (ref 4–13)
BASOPHILS # BLD AUTO: 0.07 THOUSANDS/ÂΜL (ref 0–0.1)
BASOPHILS NFR BLD AUTO: 1 % (ref 0–1)
BUN SERPL-MCNC: 32 MG/DL (ref 5–25)
CALCIUM SERPL-MCNC: 8.1 MG/DL (ref 8.4–10.2)
CHLORIDE SERPL-SCNC: 108 MMOL/L (ref 96–108)
CO2 SERPL-SCNC: 24 MMOL/L (ref 21–32)
CREAT SERPL-MCNC: 1.13 MG/DL (ref 0.6–1.3)
EOSINOPHIL # BLD AUTO: 0.98 THOUSAND/ÂΜL (ref 0–0.61)
EOSINOPHIL NFR BLD AUTO: 16 % (ref 0–6)
ERYTHROCYTE [DISTWIDTH] IN BLOOD BY AUTOMATED COUNT: 13.1 % (ref 11.6–15.1)
GFR SERPL CREATININE-BSD FRML MDRD: 62 ML/MIN/1.73SQ M
GLUCOSE SERPL-MCNC: 105 MG/DL (ref 65–140)
HCT VFR BLD AUTO: 28.7 % (ref 36.5–49.3)
HGB BLD-MCNC: 8.9 G/DL (ref 12–17)
IMM GRANULOCYTES # BLD AUTO: 0.03 THOUSAND/UL (ref 0–0.2)
IMM GRANULOCYTES NFR BLD AUTO: 1 % (ref 0–2)
LYMPHOCYTES # BLD AUTO: 1.6 THOUSANDS/ÂΜL (ref 0.6–4.47)
LYMPHOCYTES NFR BLD AUTO: 25 % (ref 14–44)
MCH RBC QN AUTO: 32 PG (ref 26.8–34.3)
MCHC RBC AUTO-ENTMCNC: 31 G/DL (ref 31.4–37.4)
MCV RBC AUTO: 103 FL (ref 82–98)
MONOCYTES # BLD AUTO: 0.59 THOUSAND/ÂΜL (ref 0.17–1.22)
MONOCYTES NFR BLD AUTO: 9 % (ref 4–12)
NEUTROPHILS # BLD AUTO: 3.07 THOUSANDS/ÂΜL (ref 1.85–7.62)
NEUTS SEG NFR BLD AUTO: 48 % (ref 43–75)
NRBC BLD AUTO-RTO: 0 /100 WBCS
PLATELET # BLD AUTO: 256 THOUSANDS/UL (ref 149–390)
PMV BLD AUTO: 10.5 FL (ref 8.9–12.7)
POTASSIUM SERPL-SCNC: 4.6 MMOL/L (ref 3.5–5.3)
RBC # BLD AUTO: 2.78 MILLION/UL (ref 3.88–5.62)
SODIUM SERPL-SCNC: 138 MMOL/L (ref 135–147)
WBC # BLD AUTO: 6.34 THOUSAND/UL (ref 4.31–10.16)

## 2024-05-01 PROCEDURE — 99232 SBSQ HOSP IP/OBS MODERATE 35: CPT | Performed by: INTERNAL MEDICINE

## 2024-05-01 PROCEDURE — 99233 SBSQ HOSP IP/OBS HIGH 50: CPT | Performed by: INTERNAL MEDICINE

## 2024-05-01 PROCEDURE — 80048 BASIC METABOLIC PNL TOTAL CA: CPT | Performed by: INTERNAL MEDICINE

## 2024-05-01 PROCEDURE — 85025 COMPLETE CBC W/AUTO DIFF WBC: CPT | Performed by: INTERNAL MEDICINE

## 2024-05-01 PROCEDURE — 99232 SBSQ HOSP IP/OBS MODERATE 35: CPT | Performed by: PHYSICIAN ASSISTANT

## 2024-05-01 RX ORDER — LISINOPRIL 5 MG/1
5 TABLET ORAL DAILY
Status: DISCONTINUED | OUTPATIENT
Start: 2024-05-02 | End: 2024-05-02 | Stop reason: HOSPADM

## 2024-05-01 RX ORDER — BISACODYL 5 MG/1
10 TABLET, DELAYED RELEASE ORAL ONCE
Status: COMPLETED | OUTPATIENT
Start: 2024-05-01 | End: 2024-05-01

## 2024-05-01 RX ADMIN — AMIODARONE HYDROCHLORIDE 200 MG: 200 TABLET ORAL at 09:57

## 2024-05-01 RX ADMIN — METOPROLOL SUCCINATE 100 MG: 50 TABLET, EXTENDED RELEASE ORAL at 09:56

## 2024-05-01 RX ADMIN — METOPROLOL SUCCINATE 50 MG: 50 TABLET, EXTENDED RELEASE ORAL at 18:49

## 2024-05-01 RX ADMIN — CEFTRIAXONE 1000 MG: 1 INJECTION, SOLUTION INTRAVENOUS at 09:58

## 2024-05-01 RX ADMIN — POLYETHYLENE GLYCOL 3350, SODIUM SULFATE ANHYDROUS, SODIUM BICARBONATE, SODIUM CHLORIDE, POTASSIUM CHLORIDE 4000 ML: 236; 22.74; 6.74; 5.86; 2.97 POWDER, FOR SOLUTION ORAL at 12:44

## 2024-05-01 RX ADMIN — FINASTERIDE 5 MG: 5 TABLET, FILM COATED ORAL at 09:56

## 2024-05-01 RX ADMIN — ATORVASTATIN CALCIUM 40 MG: 40 TABLET, FILM COATED ORAL at 18:49

## 2024-05-01 RX ADMIN — BISACODYL 10 MG: 5 TABLET, COATED ORAL at 12:39

## 2024-05-01 RX ADMIN — TAMSULOSIN HYDROCHLORIDE 0.4 MG: 0.4 CAPSULE ORAL at 18:49

## 2024-05-01 NOTE — PROGRESS NOTES
[facility]  Progress Note  Name: Toro Rodriguez I  MRN: [id number]  Unit/Bed#: -01 I Date of Admission: 4/29/2024   Date of Service: 5/1/2024 I Hospital Day: 1    Assessment/Plan   Anemia  Assessment & Plan  Hgb trending down    Chronic kidney disease, stage 3a (HCC)  Assessment & Plan  Lab Results   Component Value Date    EGFR 62 05/01/2024    EGFR 59 04/30/2024    EGFR 40 04/29/2024    CREATININE 1.13 05/01/2024    CREATININE 1.18 04/30/2024    CREATININE 1.61 (H) 04/29/2024    Elevation in creatinine  Monitor Cr with Abx   Encourgaed oral hydration   Restart lasix, lisinopril AM    Dilated cardiomyopathy (HCC)  Assessment & Plan  ECHo shows EF 35-40% with systolic function moderately reduced, grade 1 diastolic dysfunction  Follws up with [facility]  On Lasix, Lisinopril  Hold today and monitor BMP AM  Appears to be dry- euvolemic today  Continue toprol  On amiodarone for NSVT    Benign prostatic hyperplasia with urinary retention  Assessment & Plan  Continue flomax and finasteride  Urology consulted  Continue farmer    Deep vein thrombosis (DVT) of left lower extremity (HCC)  Assessment & Plan  Hold eliquis  DVT in nov 2023 in gastrocnemius vein  If has to hold eliquis for longer period, will consult IR for IVC filter      * Hematuria  Assessment & Plan  Poa  Likely truamtic farmer insertion  Hemoglobin trended down from 11.3-9.3  Urology consulted  Hold AC  Bladder irrigation as per urology  Now is running clear to mild pink tinge colured urine  IF persistent, may need TX to [place]  REsolving                 VTE Pharmacologic Prophylaxis:   Moderate Risk (Score 3-4) - Pharmacological DVT Prophylaxis Contraindicated. Sequential Compression Devices Ordered.    Mobility:   Basic Mobility Inpatient Raw Score: 17  JH-HLM Goal: 5: Stand one or more mins  JH-HLM Achieved: 6: Walk 10 steps or more  JH-HLM Goal achieved. Continue to encourage appropriate mobility.    Patient  Centered Rounds: I performed bedside rounds with nursing staff today.   Discussions with Specialists or Other Care Team Provider: GI    Education and Discussions with Family / Patient: Patient declined call to .     Total Time Spent on Date of Encounter in care of patient: 55 mins. This time was spent on one or more of the following: performing physical exam; counseling and coordination of care; obtaining or reviewing history; documenting in the medical record; reviewing/ordering tests, medications or procedures; communicating with other healthcare professionals and discussing with patient's family/caregivers.    Current Length of Stay: 1 day(s)  Current Patient Status: Inpatient   Certification Statement: The patient will continue to require additional inpatient hospital stay due to EGD/colonoscopy, downtrending hemoglobin  Discharge Plan:  Not ready    Code Status: Level 1 - Full Code    Subjective:   Patient denies any abdominal pain.  No nausea or vomiting no acute overnight events.  Stated he felt get to the chair today.  Clear urine in Colon bag    Objective:     Vitals:   Temp (24hrs), Av.8 °F (36.6 °C), Min:97.6 °F (36.4 °C), Max:98 °F (36.7 °C)    Temp:  [97.6 °F (36.4 °C)-98 °F (36.7 °C)] 97.6 °F (36.4 °C)  HR:  [55-60] 55  Resp:  [16-18] 16  BP: (145)/(60-64) 145/64  SpO2:  [93 %-97 %] 97 %  Body mass index is 33.83 kg/m².     Input and Output Summary (last 24 hours):     Intake/Output Summary (Last 24 hours) at 2024 1435  Last data filed at 2024 1103  Gross per 24 hour   Intake 480 ml   Output 3600 ml   Net -3120 ml       Physical Exam:   Physical Exam     Gen.-Patient comfortable   Neck- Supple. No thyromegaly or lymphadenopathy  Lungs-Clear bilaterally without any wheeze or rales   Heart S1-S2, regular rate and rhythm, no murmurs  Abdomen-soft nontender, no organomegaly. Bowel sounds present, clear urine in Colon bag  Extremities-no cyanosi,  clubbing or edema  Skin- no  rash  Neuro-nonfocal     Additional Data:     Labs:  Results from last 7 days   Lab Units 05/01/24  0336   WBC Thousand/uL 6.34   HEMOGLOBIN g/dL 8.9*   HEMATOCRIT % 28.7*   PLATELETS Thousands/uL 256   SEGS PCT % 48   LYMPHO PCT % 25   MONO PCT % 9   EOS PCT % 16*     Results from last 7 days   Lab Units 05/01/24  0336 04/30/24  0416 04/29/24  0840   SODIUM mmol/L 138   < > 138   POTASSIUM mmol/L 4.6   < > 4.7   CHLORIDE mmol/L 108   < > 107   CO2 mmol/L 24   < > 23   BUN mg/dL 32*   < > 63*   CREATININE mg/dL 1.13   < > 1.61*   ANION GAP mmol/L 6   < > 8   CALCIUM mg/dL 8.1*   < > 8.3*   ALBUMIN g/dL  --   --  3.8   TOTAL BILIRUBIN mg/dL  --   --  0.36   ALK PHOS U/L  --   --  57   ALT U/L  --   --  11   AST U/L  --   --  13   GLUCOSE RANDOM mg/dL 105   < > 92    < > = values in this interval not displayed.     Results from last 7 days   Lab Units 04/29/24  0840   INR  1.45*                   Lines/Drains:  Invasive Devices       Peripheral Intravenous Line  Duration             Peripheral IV 04/29/24 Right Antecubital 2 days              Drain  Duration             Urethral Catheter Three way 20 Fr. 9 days                  Urinary Catheter:  Goal for removal: N/A- Discharging with Colon               Imaging: No pertinent imaging reviewed.    Recent Cultures (last 7 days):   Results from last 7 days   Lab Units 04/29/24  0903   URINE CULTURE  No Growth <1000 cfu/mL       Last 24 Hours Medication List:   Current Facility-Administered Medications   Medication Dose Route Frequency Provider Last Rate    amiodarone  200 mg Oral Daily Brook Fuentes MD      atorvastatin  40 mg Oral Daily With Dinner Brook Fuentes MD      cefTRIAXone  1,000 mg Intravenous Q24H Brook Fuentes MD 1,000 mg (05/01/24 0958)    finasteride  5 mg Oral Daily Brook Fuentes MD      furosemide  40 mg Oral Daily Brook Fuentes MD      [START ON 5/2/2024] lisinopril  5 mg Oral Daily Brook Fuentes MD      metoprolol succinate  100 mg  Oral Daily Brook Fuentes MD      metoprolol succinate  50 mg Oral QPM Brook Fuentes MD      nitroglycerin  0.4 mg Sublingual Q5 Min PRN Brook Fuentes MD      tamsulosin  0.4 mg Oral Daily With Dinner Brook Fuentes MD          Today, Patient Was Seen By: Brook Fuentes MD    **Please Note: This note may have been constructed using a voice recognition system.**

## 2024-05-01 NOTE — ASSESSMENT & PLAN NOTE
Poa  Likely truamtic farmer insertion  Hemoglobin trended down from 11.3-9.3  Urology consulted  Hold AC  Bladder irrigation as per urology  Now is running clear to mild pink tinge colured urine  IF persistent, may need TX to [place]  REsolving

## 2024-05-01 NOTE — ASSESSMENT & PLAN NOTE
Lab Results   Component Value Date    EGFR 62 05/01/2024    EGFR 59 04/30/2024    EGFR 40 04/29/2024    CREATININE 1.13 05/01/2024    CREATININE 1.18 04/30/2024    CREATININE 1.61 (H) 04/29/2024    Elevation in creatinine  Monitor Cr with Abx   Encourgaed oral hydration   Restart lasix, lisinopril AM

## 2024-05-01 NOTE — PLAN OF CARE
Problem: Potential for Falls  Goal: Patient will remain free of falls  Description: INTERVENTIONS:  - Educate patient/family on patient safety including physical limitations  - Instruct patient to call for assistance with activity   - Consult OT/PT to assist with strengthening/mobility   - Keep Call bell within reach  - Keep bed low and locked with side rails adjusted as appropriate  - Keep care items and personal belongings within reach  - Initiate and maintain comfort rounds  - Make Fall Risk Sign visible to staff  - Offer Toileting every 2 Hours, in advance of need  - Initiate/Maintain BED alarm  - Obtain necessary fall risk management equipment: SOCKS  - Apply yellow socks and bracelet for high fall risk patients  - Consider moving patient to room near nurses station  Outcome: Progressing     Problem: PAIN - ADULT  Goal: Verbalizes/displays adequate comfort level or baseline comfort level  Description: Interventions:  - Encourage patient to monitor pain and request assistance  - Assess pain using appropriate pain scale  - Administer analgesics based on type and severity of pain and evaluate response  - Implement non-pharmacological measures as appropriate and evaluate response  - Consider cultural and social influences on pain and pain management  - Notify physician/advanced practitioner if interventions unsuccessful or patient reports new pain  Outcome: Progressing     Problem: INFECTION - ADULT  Goal: Absence or prevention of progression during hospitalization  Description: INTERVENTIONS:  - Assess and monitor for signs and symptoms of infection  - Monitor lab/diagnostic results  - Monitor all insertion sites, i.e. indwelling lines, tubes, and drains  - Monitor endotracheal if appropriate and nasal secretions for changes in amount and color  - [place] appropriate cooling/warming therapies per order  - Administer medications as ordered  - Instruct and encourage patient and family to use good hand hygiene  technique  - Identify and instruct in appropriate isolation precautions for identified infection/condition  Outcome: Progressing  Goal: Absence of fever/infection during neutropenic period  Description: INTERVENTIONS:  - Monitor WBC    Outcome: Progressing     Problem: SAFETY ADULT  Goal: Patient will remain free of falls  Description: INTERVENTIONS:  - Educate patient/family on patient safety including physical limitations  - Instruct patient to call for assistance with activity   - Consult OT/PT to assist with strengthening/mobility   - Keep Call bell within reach  - Keep bed low and locked with side rails adjusted as appropriate  - Keep care items and personal belongings within reach  - Initiate and maintain comfort rounds  - Make Fall Risk Sign visible to staff  - Offer Toileting every 2 Hours, in advance of need  - Initiate/Maintain BED alarm  - Obtain necessary fall risk management equipment: SOCKS  - Apply yellow socks and bracelet for high fall risk patients  - Consider moving patient to room near nurses station  Outcome: Progressing  Goal: Maintain or return to baseline ADL function  Description: INTERVENTIONS:  -  Assess patient's ability to carry out ADLs; assess patient's baseline for ADL function and identify physical deficits which impact ability to perform ADLs (bathing, care of mouth/teeth, toileting, grooming, dressing, etc.)  - Assess/evaluate cause of self-care deficits   - Assess range of motion  - Assess patient's mobility; develop plan if impaired  - Assess patient's need for assistive devices and provide as appropriate  - Encourage maximum independence but intervene and supervise when necessary  - Involve family in performance of ADLs  - Assess for home care needs following discharge   - Consider OT consult to assist with ADL evaluation and planning for discharge  - Provide patient education as appropriate  Outcome: Progressing  Goal: Maintains/Returns to pre admission functional  level  Description: INTERVENTIONS:  - Perform AM-PAC 6 Click Basic Mobility/ Daily Activity assessment daily.  - Set and communicate daily mobility goal to care team and patient/family/caregiver.   - Collaborate with rehabilitation services on mobility goals if consulted  - Perform Range of Motion 2 times a day.  - Reposition patient every 2 hours.  - Dangle patient 2 times a day  - Stand patient 2 times a day  - Ambulate patient 2 times a day  - Out of bed to chair 2 times a day   - Out of bed for meals 2 times a day  - Out of bed for toileting  - Record patient progress and toleration of activity level   Outcome: Progressing     Problem: DISCHARGE PLANNING  Goal: Discharge to home or other facility with appropriate resources  Description: INTERVENTIONS:  - Identify barriers to discharge w/patient and caregiver  - Arrange for needed discharge resources and transportation as appropriate  - Identify discharge learning needs (meds, wound care, etc.)  - Arrange for interpretive services to assist at discharge as needed  - Refer to Case Management Department for coordinating discharge planning if the patient needs post-hospital services based on physician/advanced practitioner order or complex needs related to functional status, cognitive ability, or social support system  Outcome: Progressing     Problem: Knowledge Deficit  Goal: Patient/family/caregiver demonstrates understanding of disease process, treatment plan, medications, and discharge instructions  Description: Complete learning assessment and assess knowledge base.  Interventions:  - Provide teaching at level of understanding  - Provide teaching via preferred learning methods  Outcome: Progressing     Problem: Prexisting or High Potential for Compromised Skin Integrity  Goal: Skin integrity is maintained or improved  Description: INTERVENTIONS:  - Identify patients at risk for skin breakdown  - Assess and monitor skin integrity  - Assess and monitor nutrition  and hydration status  - Monitor labs   - Assess for incontinence   - Turn and reposition patient  - Assist with mobility/ambulation  - Relieve pressure over bony prominences  - Avoid friction and shearing  - Provide appropriate hygiene as needed including keeping skin clean and dry  - Evaluate need for skin moisturizer/barrier cream  - Collaborate with interdisciplinary team   - Patient/family teaching  - Consider wound care consult   Outcome: Progressing

## 2024-05-01 NOTE — PROGRESS NOTES
"Progress note - Gastroenterology   Toro Rodriguez 74 y.o. male MRN: [id number]  Unit/Bed#: -01 Encounter: [id number]    ASSESSMENT and PLAN    74year-old male with history of BPH and urinary retention requiring intermittent indwelling Farmer catheter presented to ED with acute onset of hematuria 4/29/2024.  Prior episode of hematuria 4/21/2024  GI consulted for anemia with hemoglobin 9.3 on admission, decreased to 8.2.  Baseline hemoglobin 13-14 November 2023, hemoglobin decreased to 11.3 2/2024  No recent signs of active GI bleeding-reports dark brown stools but no melena, hematochezia or rectal bleeding.  No GERD symptoms        1.  Normocytic anemia  Patient with recent multiple episodes of hematuria  Hemoglobin 11.5 on 4/21  Decreased to 9.3 on admission 4/29, stable at 8.9 this a.m.  MCV increased at 102  Iron panel showed normal iron levels, ferritin normal at 175, TIBC low-more consistent with anemia of chronic disease  No signs of GI bleeding-does report dark brown stools but no melena or rectal bleeding since admission  Suspect recent hematuria in setting of anticoagulation with Eliquis may be contributing to anemia  -Continue to trend hemoglobin, transfuse to keep greater than 7.0  -Hold Eliquis, last dose was Sunday evening 4/28  -Plan for EGD/colonoscopy tomorrow 5/2      2.  Hematuria  Recent episodes of hematuria.  Urine remains clear yellow this a.m.  Urology following  CT scan in ED showed nonobstructive uropathy, bilateral intrarenal calculi  Anticoagulation/Eliquis on hold      Chief Complaint   Patient presents with    Blood in Urine     PT to Ed for blood in urine, emptie farmer last night and urine looked normal per pt. Denies pain, currently being treated for UTI.        SUBJECTIVE/HPI   No BM or rectal bleeding since admission  No further hematuria  Hemoglobin stable at 8.9 this a.m., normal MCV    /60   Pulse 59   Temp 97.8 °F (36.6 °C)   Resp 16   Ht 5' 7\" (1.702 m)   Wt 98 " kg (216 lb)   SpO2 93%   BMI 33.83 kg/m²     PHYSICALEXAM  General appearance: alert, appears stated age and cooperative  Eyes: no icterus   Head: Normocephalic, without obvious abnormality, atraumatic  Lungs: clear to auscultation bilaterally  Heart: regular rate and rhythm, S1, S2 normal, no murmur, click, rub or gallop  Abdomen: soft, non-tender; bowel sounds normal; no masses,  no organomegaly  Extremities: extremities normal, atraumatic, no cyanosis or edema  Neurologic: Grossly normal    Lab Results   Component Value Date    CALCIUM 8.1 (L) 05/01/2024    K 4.6 05/01/2024    CO2 24 05/01/2024     05/01/2024    BUN 32 (H) 05/01/2024    CREATININE 1.13 05/01/2024     Lab Results   Component Value Date    WBC 6.34 05/01/2024    HGB 8.9 (L) 05/01/2024    HCT 28.7 (L) 05/01/2024     (H) 05/01/2024     05/01/2024     Lab Results   Component Value Date    ALT 11 04/29/2024    AST 13 04/29/2024    ALKPHOS 57 04/29/2024       Lab Results   Component Value Date    IRON 57 04/30/2024    TIBC 209 (L) 04/30/2024    FERRITIN 175 04/30/2024     Lab Results   Component Value Date    INR 1.45 (H) 04/29/2024

## 2024-05-01 NOTE — PROGRESS NOTES
"Progress Note -urology  Toro Rodriguez 74 y.o. male MRN: [id number]  Unit/Bed#: -01 Encounter: [id number]    Assessment:  74-year-old male with PMH of DVT on Eliquis, CHF, CAD, T2DM, HTN who presents with hematuria, now resolved  -AVSS on room air  -No leukocytosis  -Hemoglobin 8.9 from 8.6 -previous labs reviewed and hemoglobin appears to normally be in the 11-12 range.  -UOP 4.1 L  -Urine is clear, yellow without blood or clots present in collection bag or tubing    Plan:  -Urology will sign off at this time.  Please contact team with any further questions or concerns.  Reconsult as needed for return of hematuria.  -Trend H&H transfuse as indicated   - ok to attempt void trial prior to hospital dc once medically stabilized, notify urology if pt fails void trial. If unable to be completed while inpt he will need short interval follow up as an outpatient   -Recommend investigation of other sources of bleeding: GI following and recommending EGD/Cscope tomorrow   - remainder of medical management per primary     Discussed with Uro AP via TT messenger     Subjective/Objective       Subjective: No acute events overnight.  Patient feeling \"okay \"today.  No further blood in urine.    Objective:     Blood pressure 145/60, pulse 59, temperature 97.8 °F (36.6 °C), resp. rate 16, height 5' 7\" (1.702 m), weight 98 kg (216 lb), SpO2 93%.,Body mass index is 33.83 kg/m².      Intake/Output Summary (Last 24 hours) at 5/1/2024 0931  Last data filed at 5/1/2024 0301  Gross per 24 hour   Intake 480 ml   Output 2700 ml   Net -2220 ml       Invasive Devices       Peripheral Intravenous Line  Duration             Peripheral IV 04/29/24 Right Antecubital 2 days              Drain  Duration             Urethral Catheter Three way 20 Fr. 9 days                    Physical Exam  Vitals reviewed.   Constitutional:       General: He is not in acute distress.     Appearance: He is not toxic-appearing.   HENT:      Head: Normocephalic " "and atraumatic.   Cardiovascular:      Rate and Rhythm: Normal rate.   Pulmonary:      Effort: No respiratory distress.   Abdominal:      Palpations: Abdomen is soft.      Tenderness: There is no abdominal tenderness.   Skin:     Coloration: Skin is pale.   Neurological:      Mental Status: He is alert.          Lab, Imaging and other studies:I have personally reviewed pertinent lab results.  , CBC:   Lab Results   Component Value Date    WBC 6.34 05/01/2024    HGB 8.9 (L) 05/01/2024    HCT 28.7 (L) 05/01/2024     (H) 05/01/2024     05/01/2024    RBC 2.78 (L) 05/01/2024    MCH 32.0 05/01/2024    MCHC 31.0 (L) 05/01/2024    RDW 13.1 05/01/2024    MPV 10.5 05/01/2024    NRBC 0 05/01/2024   , CMP:   Lab Results   Component Value Date    SODIUM 138 05/01/2024    K 4.6 05/01/2024     05/01/2024    CO2 24 05/01/2024    BUN 32 (H) 05/01/2024    CREATININE 1.13 05/01/2024    CALCIUM 8.1 (L) 05/01/2024    EGFR 62 05/01/2024       CT renal stone study abdomen pelvis without contrast    Result Date: 4/29/2024  Impression: No obstructive uropathy. Bilateral intrarenal calculi. Indeterminate cystic lesions in both kidneys. Recommend follow-up nonemergent ultrasound for further evaluation. Colonic diverticulosis. The study was marked in EPIC for immediate notification. Workstation performed: [id number]       VTE PPX:  VTE Pharmacologic Prophylaxis: Reason for no pharmacologic prophylaxis anemia  VTE Mechanical Prophylaxis: SCDs, ambulation as able     Radha Bell PA-C  5/1/2024     9:31 AM   **Please Note: Portions of the record may have been created using voice recognition software.  Occasional wrong word or \"sound a like\" substitutions may have occurred due to the inherent limitations of voice recognition software.  Read the chart carefully and recognize, using context, where substitutions have occurred.**    "

## 2024-05-01 NOTE — TELEPHONE ENCOUNTER
Patient seen in consultation at [facility] for urinary tract infection and urinary retention.  Has Colon catheter.  If void trial not attempted/successful during hospitalization will need outpatient void trial

## 2024-05-01 NOTE — CASE MANAGEMENT
Case Management Progress Note    Patient name Toro Rodriguez  Location /-01 MRN [id number]  : 1950 Date 2024       LOS (days): 1  Geometric Mean LOS (GMLOS) (days): 2.3  Days to GMLOS:1.4        OBJECTIVE:        Current admission status: Inpatient  Preferred Pharmacy:   Pharmacy of [address] [address]  Phone: [phone] Fax: [phone]    [facility] [address] [facility]  [address] [facility] [address]  Phone: [phone] Fax: [phone]    Primary Care Provider: CARLIE Foster    Primary Insurance: MEDICARE  Secondary Insurance: BLUE CROSS    PROGRESS NOTE:    Eleno DELACRUZ accepted. CM added Eleno DELACRUZ to pt's dc instructions.

## 2024-05-01 NOTE — PLAN OF CARE
Problem: INFECTION - ADULT  Goal: Absence or prevention of progression during hospitalization  Description: INTERVENTIONS:  - Assess and monitor for signs and symptoms of infection  - Monitor lab/diagnostic results  - Monitor all insertion sites, i.e. indwelling lines, tubes, and drains  - Monitor endotracheal if appropriate and nasal secretions for changes in amount and color  - [place] appropriate cooling/warming therapies per order  - Administer medications as ordered  - Instruct and encourage patient and family to use good hand hygiene technique  - Identify and instruct in appropriate isolation precautions for identified infection/condition  Outcome: Progressing  Goal: Absence of fever/infection during neutropenic period  Description: INTERVENTIONS:  - Monitor WBC    Outcome: Progressing

## 2024-05-02 ENCOUNTER — ANESTHESIA EVENT (INPATIENT)
Dept: GASTROENTEROLOGY | Facility: HOSPITAL | Age: 74
DRG: 696 | End: 2024-05-02
Payer: MEDICARE

## 2024-05-02 ENCOUNTER — ANESTHESIA (INPATIENT)
Dept: GASTROENTEROLOGY | Facility: HOSPITAL | Age: 74
DRG: 696 | End: 2024-05-02
Payer: MEDICARE

## 2024-05-02 ENCOUNTER — APPOINTMENT (INPATIENT)
Dept: GASTROENTEROLOGY | Facility: HOSPITAL | Age: 74
DRG: 696 | End: 2024-05-02
Payer: MEDICARE

## 2024-05-02 VITALS
HEART RATE: 57 BPM | WEIGHT: 216.05 LBS | SYSTOLIC BLOOD PRESSURE: 131 MMHG | DIASTOLIC BLOOD PRESSURE: 103 MMHG | BODY MASS INDEX: 33.91 KG/M2 | HEIGHT: 67 IN | RESPIRATION RATE: 17 BRPM | OXYGEN SATURATION: 93 % | TEMPERATURE: 98.7 F

## 2024-05-02 LAB
ANION GAP SERPL CALCULATED.3IONS-SCNC: 8 MMOL/L (ref 4–13)
BASOPHILS # BLD AUTO: 0.05 THOUSANDS/ÂΜL (ref 0–0.1)
BASOPHILS NFR BLD AUTO: 1 % (ref 0–1)
BUN SERPL-MCNC: 27 MG/DL (ref 5–25)
CALCIUM SERPL-MCNC: 8.3 MG/DL (ref 8.4–10.2)
CHLORIDE SERPL-SCNC: 104 MMOL/L (ref 96–108)
CO2 SERPL-SCNC: 25 MMOL/L (ref 21–32)
CREAT SERPL-MCNC: 1.13 MG/DL (ref 0.6–1.3)
EOSINOPHIL # BLD AUTO: 1.01 THOUSAND/ÂΜL (ref 0–0.61)
EOSINOPHIL NFR BLD AUTO: 13 % (ref 0–6)
ERYTHROCYTE [DISTWIDTH] IN BLOOD BY AUTOMATED COUNT: 12.9 % (ref 11.6–15.1)
GFR SERPL CREATININE-BSD FRML MDRD: 62 ML/MIN/1.73SQ M
GLUCOSE SERPL-MCNC: 86 MG/DL (ref 65–140)
HCT VFR BLD AUTO: 28.9 % (ref 36.5–49.3)
HGB BLD-MCNC: 9.1 G/DL (ref 12–17)
IMM GRANULOCYTES # BLD AUTO: 0.05 THOUSAND/UL (ref 0–0.2)
IMM GRANULOCYTES NFR BLD AUTO: 1 % (ref 0–2)
LYMPHOCYTES # BLD AUTO: 1.68 THOUSANDS/ÂΜL (ref 0.6–4.47)
LYMPHOCYTES NFR BLD AUTO: 22 % (ref 14–44)
MCH RBC QN AUTO: 32.2 PG (ref 26.8–34.3)
MCHC RBC AUTO-ENTMCNC: 31.5 G/DL (ref 31.4–37.4)
MCV RBC AUTO: 102 FL (ref 82–98)
MONOCYTES # BLD AUTO: 0.71 THOUSAND/ÂΜL (ref 0.17–1.22)
MONOCYTES NFR BLD AUTO: 9 % (ref 4–12)
NEUTROPHILS # BLD AUTO: 4.32 THOUSANDS/ÂΜL (ref 1.85–7.62)
NEUTS SEG NFR BLD AUTO: 54 % (ref 43–75)
NRBC BLD AUTO-RTO: 0 /100 WBCS
PLATELET # BLD AUTO: 261 THOUSANDS/UL (ref 149–390)
PMV BLD AUTO: 11.1 FL (ref 8.9–12.7)
POTASSIUM SERPL-SCNC: 4.7 MMOL/L (ref 3.5–5.3)
RBC # BLD AUTO: 2.83 MILLION/UL (ref 3.88–5.62)
SODIUM SERPL-SCNC: 137 MMOL/L (ref 135–147)
WBC # BLD AUTO: 7.82 THOUSAND/UL (ref 4.31–10.16)

## 2024-05-02 PROCEDURE — 43235 EGD DIAGNOSTIC BRUSH WASH: CPT | Performed by: INTERNAL MEDICINE

## 2024-05-02 PROCEDURE — 45378 DIAGNOSTIC COLONOSCOPY: CPT | Performed by: INTERNAL MEDICINE

## 2024-05-02 PROCEDURE — 0DJD8ZZ INSPECTION OF LOWER INTESTINAL TRACT, VIA NATURAL OR ARTIFICIAL OPENING ENDOSCOPIC: ICD-10-PCS | Performed by: INTERNAL MEDICINE

## 2024-05-02 PROCEDURE — 85025 COMPLETE CBC W/AUTO DIFF WBC: CPT | Performed by: INTERNAL MEDICINE

## 2024-05-02 PROCEDURE — 0DJ08ZZ INSPECTION OF UPPER INTESTINAL TRACT, VIA NATURAL OR ARTIFICIAL OPENING ENDOSCOPIC: ICD-10-PCS | Performed by: INTERNAL MEDICINE

## 2024-05-02 PROCEDURE — 80048 BASIC METABOLIC PNL TOTAL CA: CPT | Performed by: INTERNAL MEDICINE

## 2024-05-02 PROCEDURE — 99239 HOSP IP/OBS DSCHRG MGMT >30: CPT | Performed by: INTERNAL MEDICINE

## 2024-05-02 RX ORDER — PROPOFOL 10 MG/ML
INJECTION, EMULSION INTRAVENOUS AS NEEDED
Status: DISCONTINUED | OUTPATIENT
Start: 2024-05-02 | End: 2024-05-02

## 2024-05-02 RX ORDER — FUROSEMIDE 40 MG/1
40 TABLET ORAL DAILY
Status: DISCONTINUED | OUTPATIENT
Start: 2024-05-03 | End: 2024-05-02 | Stop reason: HOSPADM

## 2024-05-02 RX ORDER — SODIUM CHLORIDE 9 MG/ML
INJECTION, SOLUTION INTRAVENOUS CONTINUOUS PRN
Status: DISCONTINUED | OUTPATIENT
Start: 2024-05-02 | End: 2024-05-02

## 2024-05-02 RX ORDER — LIDOCAINE HYDROCHLORIDE 10 MG/ML
INJECTION, SOLUTION EPIDURAL; INFILTRATION; INTRACAUDAL; PERINEURAL AS NEEDED
Status: DISCONTINUED | OUTPATIENT
Start: 2024-05-02 | End: 2024-05-02

## 2024-05-02 RX ORDER — PHENYLEPHRINE HCL IN 0.9% NACL 1 MG/10 ML
SYRINGE (ML) INTRAVENOUS AS NEEDED
Status: DISCONTINUED | OUTPATIENT
Start: 2024-05-02 | End: 2024-05-02

## 2024-05-02 RX ORDER — CEFTRIAXONE 1 G/50ML
1000 INJECTION, SOLUTION INTRAVENOUS EVERY 24 HOURS
Status: DISCONTINUED | OUTPATIENT
Start: 2024-05-03 | End: 2024-05-02 | Stop reason: HOSPADM

## 2024-05-02 RX ORDER — LISINOPRIL 5 MG/1
5 TABLET ORAL DAILY
Start: 2024-05-02

## 2024-05-02 RX ADMIN — TAMSULOSIN HYDROCHLORIDE 0.4 MG: 0.4 CAPSULE ORAL at 17:16

## 2024-05-02 RX ADMIN — FINASTERIDE 5 MG: 5 TABLET, FILM COATED ORAL at 09:08

## 2024-05-02 RX ADMIN — LISINOPRIL 5 MG: 5 TABLET ORAL at 09:08

## 2024-05-02 RX ADMIN — METOPROLOL SUCCINATE 100 MG: 50 TABLET, EXTENDED RELEASE ORAL at 09:08

## 2024-05-02 RX ADMIN — LIDOCAINE HYDROCHLORIDE 50 MG: 10 INJECTION, SOLUTION EPIDURAL; INFILTRATION; INTRACAUDAL; PERINEURAL at 14:39

## 2024-05-02 RX ADMIN — Medication 100 MCG: at 14:44

## 2024-05-02 RX ADMIN — APIXABAN 5 MG: 5 TABLET, FILM COATED ORAL at 17:16

## 2024-05-02 RX ADMIN — ATORVASTATIN CALCIUM 40 MG: 40 TABLET, FILM COATED ORAL at 17:16

## 2024-05-02 RX ADMIN — AMIODARONE HYDROCHLORIDE 200 MG: 200 TABLET ORAL at 09:08

## 2024-05-02 RX ADMIN — PROPOFOL 50 MG: 10 INJECTION, EMULSION INTRAVENOUS at 14:36

## 2024-05-02 RX ADMIN — METOPROLOL SUCCINATE 50 MG: 50 TABLET, EXTENDED RELEASE ORAL at 17:16

## 2024-05-02 RX ADMIN — PROPOFOL 50 MG: 10 INJECTION, EMULSION INTRAVENOUS at 14:51

## 2024-05-02 RX ADMIN — PROPOFOL 50 MG: 10 INJECTION, EMULSION INTRAVENOUS at 14:40

## 2024-05-02 RX ADMIN — CEFTRIAXONE 1000 MG: 1 INJECTION, SOLUTION INTRAVENOUS at 09:23

## 2024-05-02 RX ADMIN — SODIUM CHLORIDE: 0.9 INJECTION, SOLUTION INTRAVENOUS at 14:36

## 2024-05-02 NOTE — CASE MANAGEMENT
Case Management Progress Note    Patient name Toro Rodriguez  Location /-01 MRN [id number]  : 1950 Date 2024       LOS (days): 2  Geometric Mean LOS (GMLOS) (days): 2.3  Days to GMLOS:0.1        OBJECTIVE:        Current admission status: Inpatient  Preferred Pharmacy:   Pharmacy of [address]  [address] [facility] [address]  Phone: [phone] Fax: [phone]    [facility] [address] [facility]  [address] [facility] [place] [address]  Phone: [phone] Fax: [phone]    Primary Care Provider: CARLIE Foster    Primary Insurance: MEDICARE  Secondary Insurance: BLUE CROSS    PROGRESS NOTE:  Call placed to Juan at [facility], informed Juan that Pt will be returning to facility this evening. Jenny ride request sent to [facility] for 6pm. Pt's nurse(Heidy) and Pt made aware. Faxed discharge instructions to [facility] at [phone]. Jenny Waiver placed in patient label chart.

## 2024-05-02 NOTE — NURSING NOTE
Patient to be discharged to [facility] in stable condition, via Ly[employer] ride. Report called to receiving facility. Patient discharged with farmer catheter in place.

## 2024-05-02 NOTE — PLAN OF CARE
Problem: INFECTION - ADULT  Goal: Absence or prevention of progression during hospitalization  Description: INTERVENTIONS:  - Assess and monitor for signs and symptoms of infection  - Monitor lab/diagnostic results  - Monitor all insertion sites, i.e. indwelling lines, tubes, and drains  - Monitor endotracheal if appropriate and nasal secretions for changes in amount and color  - [place] appropriate cooling/warming therapies per order  - Administer medications as ordered  - Instruct and encourage patient and family to use good hand hygiene technique  - Identify and instruct in appropriate isolation precautions for identified infection/condition  Outcome: Progressing

## 2024-05-02 NOTE — OP NOTE
EGD IMPRESSION:  Small sliding hiatal hernia, otherwise normal exam  Colonoscopy IMPRESSION:  Scattered diverticulosis of moderate severity in the transverse colon, descending colon and sigmoid colon  Normal.    RECOMMENDATION:    No bleeding source identified on EGD or colonoscopy.  If no other source of anemia is identified, we will plan outpatient small bowel capsule.    Resume diet and Eliquis  Results forwarded to hospitalist via [place] text

## 2024-05-02 NOTE — PROGRESS NOTES
[facility]  Progress Note  Name: Toro Rodriguez I  MRN: [id number]  Unit/Bed#: -01 I Date of Admission: 4/29/2024   Date of Service: 5/2/2024 I Hospital Day: 2    Assessment/Plan   Anemia  Assessment & Plan  Hgb trending down    Chronic kidney disease, stage 3a (HCC)  Assessment & Plan  Lab Results   Component Value Date    EGFR 62 05/01/2024    EGFR 59 04/30/2024    EGFR 40 04/29/2024    CREATININE 1.13 05/01/2024    CREATININE 1.18 04/30/2024    CREATININE 1.61 (H) 04/29/2024    Elevation in creatinine  Monitor Cr with Abx   Encourgaed oral hydration   Restart lasix, lisinopril AM    Dilated cardiomyopathy (HCC)  Assessment & Plan  ECHo shows EF 35-40% with systolic function moderately reduced, grade 1 diastolic dysfunction  Follws up with [facility]  On Lasix, Lisinopril  Hold today and monitor BMP AM  Appears to be dry- euvolemic today  Continue toprol  On amiodarone for NSVT    Benign prostatic hyperplasia with urinary retention  Assessment & Plan  Continue flomax and finasteride  Urology consulted  Continue farmer    Deep vein thrombosis (DVT) of left lower extremity (HCC)  Assessment & Plan  Hold eliquis  DVT in nov 2023 in gastrocnemius vein  If has to hold eliquis for longer period, will consult IR for IVC filter      * Hematuria  Assessment & Plan  Poa  Likely truamtic farmer insertion  Hemoglobin trended down from 11.3-9.3  Urology consulted  Hold AC  Bladder irrigation as per urology  Now is running clear to mild pink tinge colured urine  IF persistent, may need TX to [place]  REsolving                 VTE Pharmacologic Prophylaxis:   Moderate Risk (Score 3-4) - Pharmacological DVT Prophylaxis Contraindicated. Sequential Compression Devices Ordered.    Mobility:   Basic Mobility Inpatient Raw Score: 17  JH-HLM Goal: 5: Stand one or more mins  JH-HLM Achieved: 5: Stand (1 or more minutes)  JH-HLM Goal achieved. Continue to encourage appropriate mobility.    Patient  Centered Rounds: I performed bedside rounds with nursing staff today.   Discussions with Specialists or Other Care Team Provider: GI    Education and Discussions with Family / Patient:     Total Time Spent on Date of Encounter in care of patient: 55 mins. This time was spent on one or more of the following: performing physical exam; counseling and coordination of care; obtaining or reviewing history; documenting in the medical record; reviewing/ordering tests, medications or procedures; communicating with other healthcare professionals and discussing with patient's family/caregivers.    Current Length of Stay: 2 day(s)  Current Patient Status: Inpatient   Certification Statement: The patient will continue to require additional inpatient hospital stay due to EGD/colonoscopy, downtrending hemoglobin  Discharge Plan:  Not ready    Code Status: Level 1 - Full Code    Subjective:   Patient denies any abdominal pain.  No nausea or vomiting no acute overnight events.  Stated he felt get to the chair today. Did not have any clear BM  Clear urine in Colon bag    Objective:     Vitals:   Temp (24hrs), Av.3 °F (36.3 °C), Min:97.2 °F (36.2 °C), Max:97.3 °F (36.3 °C)    Temp:  [97.2 °F (36.2 °C)-97.3 °F (36.3 °C)] 97.3 °F (36.3 °C)  HR:  [55-58] 55  Resp:  [14-18] 14  BP: (140-145)/(63-68) 144/63  SpO2:  [94 %-97 %] 95 %  Body mass index is 33.83 kg/m².     Input and Output Summary (last 24 hours):     Intake/Output Summary (Last 24 hours) at 2024 1420  Last data filed at 2024 0950  Gross per 24 hour   Intake --   Output 3150 ml   Net -3150 ml       Physical Exam:   Physical Exam     Gen.-Patient comfortable   Neck- Supple. No thyromegaly or lymphadenopathy  Lungs-Clear bilaterally without any wheeze or rales   Heart S1-S2, regular rate and rhythm, no murmurs  Abdomen-soft nontender, no organomegaly. Bowel sounds present, clear urine in Colon bag  Extremities-no cyanosi,  clubbing or edema  Skin- no  rash  Neuro-nonfocal     Additional Data:     Labs:  Results from last 7 days   Lab Units 05/02/24  0337   WBC Thousand/uL 7.82   HEMOGLOBIN g/dL 9.1*   HEMATOCRIT % 28.9*   PLATELETS Thousands/uL 261   SEGS PCT % 54   LYMPHO PCT % 22   MONO PCT % 9   EOS PCT % 13*     Results from last 7 days   Lab Units 05/02/24  0337 04/30/24  0416 04/29/24  0840   SODIUM mmol/L 137   < > 138   POTASSIUM mmol/L 4.7   < > 4.7   CHLORIDE mmol/L 104   < > 107   CO2 mmol/L 25   < > 23   BUN mg/dL 27*   < > 63*   CREATININE mg/dL 1.13   < > 1.61*   ANION GAP mmol/L 8   < > 8   CALCIUM mg/dL 8.3*   < > 8.3*   ALBUMIN g/dL  --   --  3.8   TOTAL BILIRUBIN mg/dL  --   --  0.36   ALK PHOS U/L  --   --  57   ALT U/L  --   --  11   AST U/L  --   --  13   GLUCOSE RANDOM mg/dL 86   < > 92    < > = values in this interval not displayed.     Results from last 7 days   Lab Units 04/29/24  0840   INR  1.45*                   Lines/Drains:  Invasive Devices       Peripheral Intravenous Line  Duration             Peripheral IV 04/29/24 Right Antecubital 3 days              Drain  Duration             Urethral Catheter Three way 20 Fr. 10 days                  Urinary Catheter:  Goal for removal: N/A- Discharging with Colon               Imaging: No pertinent imaging reviewed.    Recent Cultures (last 7 days):   Results from last 7 days   Lab Units 04/29/24  0903   URINE CULTURE  No Growth <1000 cfu/mL       Last 24 Hours Medication List:   Current Facility-Administered Medications   Medication Dose Route Frequency Provider Last Rate    amiodarone  200 mg Oral Daily Brook Fuentes MD      atorvastatin  40 mg Oral Daily With Dinner Brook Fuentes MD      cefTRIAXone  1,000 mg Intravenous Q24H Brook Fuentes MD 1,000 mg (05/02/24 0923)    finasteride  5 mg Oral Daily Brook Fuentes MD      lisinopril  5 mg Oral Daily Brook Fuentes MD      metoprolol succinate  100 mg Oral Daily Brook Fuentes MD      metoprolol succinate  50 mg Oral QPM  Brook Fuentes MD      nitroglycerin  0.4 mg Sublingual Q5 Min PRN Brook Fuentes MD      tamsulosin  0.4 mg Oral Daily With Dinner Brook Fuentes MD          Today, Patient Was Seen By: Brook Fuentes MD    **Please Note: This note may have been constructed using a voice recognition system.**

## 2024-05-02 NOTE — ANESTHESIA PREPROCEDURE EVALUATION
Procedure:  COLONOSCOPY  EGD    Relevant Problems   ANESTHESIA (within normal limits)      CARDIO   (+) Chronic venous hypertension (idiopathic) with ulcer of left lower extremity (CODE) (HCC)   (+) Coronary artery disease involving native coronary artery of native heart   (+) Deep vein thrombosis (DVT) of left lower extremity (HCC)   (+) Essential hypertension      ENDO   (+) Type 2 diabetes mellitus with chronic kidney disease, without long-term current use of insulin, unspecified CKD stage (HCC)      /RENAL   (+) Benign prostatic hyperplasia with urinary retention   (+) Chronic kidney disease, stage 3a (HCC)      HEMATOLOGY   (+) Anemia      Cardiovascular/Peripheral Vascular   (+) Acute on chronic systolic (congestive) heart failure (HCC)   (+) Dilated cardiomyopathy (HCC)     Labs:   Results from last 7 days   Lab Units 05/02/24 0337 05/01/24 0336 04/30/24 1837 04/30/24 0416 04/29/24  0840   WBC Thousand/uL 7.82 6.34  --  5.68 7.32   HEMOGLOBIN g/dL 9.1* 8.9* 8.6* 8.2* 9.3*   HEMATOCRIT % 28.9* 28.7* 27.7* 26.5* 29.3*   PLATELETS Thousands/uL 261 256  --  219 248   SEGS PCT % 54 48  --  48 55   MONO PCT % 9 9  --  10 8   EOS PCT % 13* 16*  --  15* 15*     Results from last 7 days   Lab Units 05/02/24 0337 05/01/24 0336 04/30/24 0416 04/29/24  0840   SODIUM mmol/L 137 138 140 138   POTASSIUM mmol/L 4.7 4.6 4.8 4.7   CHLORIDE mmol/L 104 108 112* 107   CO2 mmol/L 25 24 24 23   ANION GAP mmol/L 8 6 4 8   BUN mg/dL 27* 32* 41* 63*   CREATININE mg/dL 1.13 1.13 1.18 1.61*   EGFR ml/min/1.73sq m 62 62 59 40   CALCIUM mg/dL 8.3* 8.1* 7.8* 8.3*   GLUCOSE RANDOM mg/dL 86 105 103 92   ALT U/L  --   --   --  11   AST U/L  --   --   --  13   ALK PHOS U/L  --   --   --  57   ALBUMIN g/dL  --   --   --  3.8   TOTAL BILIRUBIN mg/dL  --   --   --  0.36          Results from last 7 days   Lab Units 04/29/24  0840   INR  1.45*   PTT seconds 36      02/26/24 1345   Echo complete w/ contrast if indicated (Final result)        Impression  No impression found.      Narrative    Left Ventricle: Left ventricular cavity size is normal. Wall thickness  is mildly increased. The left ventricular ejection fraction is 35-40% by  biplane measurement. Systolic function is moderately reduced. There is  moderate global hypokinesis with specific regional wall abnormalities.  Diastolic function is mildly abnormal, consistent with grade I (abnormal)  relaxation.    The following segments are akinetic: basal inferior, basal  inferolateral, mid inferior and mid inferolateral.    The remainder of the myocardium is hypokinetic.    Right Ventricle: Right ventricular cavity size is normal. Systolic  function is normal.    Left Atrium: The atrium is moderately dilated.    Right Atrium: The atrium is dilated.    Mitral Valve: There is moderate regurgitation.        Physical Exam    Airway    Mallampati score: II  TM Distance: >3 FB  Neck ROM: full     Dental   No notable dental hx     Cardiovascular  Cardiovascular exam normal    Pulmonary  Pulmonary exam normal     Other Findings        Anesthesia Plan  ASA Score- 4     Anesthesia Type- IV sedation with anesthesia with ASA Monitors.         Additional Monitors:     Airway Plan:     Comment: I discussed risks (reviewed with patient on the anesthesia consent form), benefits and alternatives of monitored sedation including the possibility under sedation to have recall or mild discomfort.  .       Plan Factors-    Chart reviewed. EKG reviewed.  Existing labs reviewed. Patient summary reviewed.                  Induction- intravenous.    Postoperative Plan-     Informed Consent- Anesthetic plan and risks discussed with patient.  I personally reviewed this patient with the CRNA. Discussed and agreed on the Anesthesia Plan with the CRNA..

## 2024-05-02 NOTE — ANESTHESIA POSTPROCEDURE EVALUATION
Post-Op Assessment Note    CV Status:  Stable    Pain management: adequate       Mental Status:  Lethargic and sleepy   Hydration Status:  Stable   PONV Controlled:  None   Airway Patency:  Patent     Post Op Vitals Reviewed: Yes    No anethesia notable event occurred.    Staff: Anesthesiologist, CRNA   Comments: vss              BP   121/58   Temp      Pulse  63   Resp   18   SpO2   96

## 2024-05-03 NOTE — CASE MANAGEMENT
Case Management Progress Note    Patient name Toro Rodriguez  Location /-01 MRN [id number]  : 1950 Date 5/3/2024       LOS (days): 2  Geometric Mean LOS (GMLOS) (days): 2.3  Days to GMLOS:0.1        OBJECTIVE:        Current admission status: Inpatient  Preferred Pharmacy:   Pharmacy of [address] [address]  Phone: [phone] Fax: [phone]    [facility] [address] [facility]  [address] [facility] [address]  Phone: [phone] Fax: [phone]    Primary Care Provider: CARLIE Foster    Primary Insurance: MEDICARE  Secondary Insurance: BLUE CROSS    PROGRESS NOTE:    Notification made to OP CM Handoff: [facility] OP CM regarding discharge planning and disposition.   Message left for Karen CIFUENTES

## 2024-05-03 NOTE — DISCHARGE SUMMARY
[facility]  Discharge- Toro Rodriguez 1950, 74 y.o. male MRN: [id number]  Unit/Bed#: -Jason Encounter: [id number]  Primary Care Provider: CARLIE Foster   Date and time admitted to hospital: 4/29/2024  8:10 AM    Anemia  Assessment & Plan  Hgb trending down    Chronic kidney disease, stage 3a (HCC)  Assessment & Plan  Lab Results   Component Value Date    EGFR 62 05/01/2024    EGFR 59 04/30/2024    EGFR 40 04/29/2024    CREATININE 1.13 05/01/2024    CREATININE 1.18 04/30/2024    CREATININE 1.61 (H) 04/29/2024    Elevation in creatinine  Monitor Cr with Abx   Encourgaed oral hydration   Restart lasix, lisinopril AM    Dilated cardiomyopathy (HCC)  Assessment & Plan  ECHo shows EF 35-40% with systolic function moderately reduced, grade 1 diastolic dysfunction  Follws up with [facility]  On Lasix, Lisinopril  Hold today and monitor BMP AM  Appears to be dry- euvolemic today  Continue toprol  On amiodarone for NSVT    Benign prostatic hyperplasia with urinary retention  Assessment & Plan  Continue flomax and finasteride  Urology consulted  Continue farmer    Deep vein thrombosis (DVT) of left lower extremity (HCC)  Assessment & Plan  Hold eliquis  DVT in nov 2023 in gastrocnemius vein  If has to hold eliquis for longer period, will consult IR for IVC filter      * Hematuria  Assessment & Plan  Poa  Likely truamtic farmer insertion  Hemoglobin trended down from 11.3-9.3  Urology consulted  Hold AC  Bladder irrigation as per urology  Now is running clear to mild pink tinge colured urine  IF persistent, may need TX to [place]  REsolving                Medical Problems       Resolved Problems  Date Reviewed: 4/18/2024   None         Admission Date:   Admission Orders (From admission, onward)       Ordered        04/30/24 1248  INPATIENT ADMISSION  Once            04/29/24 1034  Place in Observation  Once                            Admitting Diagnosis: Blood in urine  [R31.9]  Urinary tract infection [N39.0]  Anemia [D64.9]  Hematuria [R31.9]  Bilateral renal cysts [N28.1]  Benign prostatic hyperplasia with urinary retention [N40.1, R33.8]    Chief compliant:   Chief Complaint   Patient presents with    Blood in Urine     PT to Ed for blood in urine, emptie farmer last night and urine looked normal per pt. Denies pain, currently being treated for UTI.         Procedures Performed: No orders of the defined types were placed in this encounter.      Summary of Hospital Course: Patient presented with hematuria, after Farmer insertion in the ED before.  Urology was consulted, bladder irrigation done.  Hematuria cleared.  Hemoglobin trended down.  With chronic anemia GI was consulted.  Patient had EGD and colonoscopy, no active bleed noted.  Patient will require small capsule endoscopy as an outpatient.  Patient will need void trial outpatient    This is only brief summarization of hospitalization, please refer to progress note for more details     Significant Findings, Care, Treatment and Services Provided:   Colonoscopy    Result Date: 5/2/2024  Impression: Scattered diverticulosis of moderate severity in the transverse colon, descending colon and sigmoid colon Normal. No bleeding source identified on colonoscopy RECOMMENDATION:  No further screening colonoscopies necessary  Age greater than 65  No bleeding source identified on EGD or colonoscopy.  If no other source of anemia is identified, we will plan outpatient small bowel capsule. Resume diet and Eliquis  Ace Ramirez DO     EGD    Result Date: 5/2/2024  Impression: The duodenum appeared normal. The stomach appeared normal. The esophagus appeared normal. 2 cm hiatal hernia RECOMMENDATION: No bleeding source on upper endoscopy.  Proceed with colonoscopy today as scheduled.   Ace Ramirez DO     CT renal stone study abdomen pelvis without contrast    Result Date: 4/29/2024  Impression: No obstructive uropathy. Bilateral  intrarenal calculi. Indeterminate cystic lesions in both kidneys. Recommend follow-up nonemergent ultrasound for further evaluation. Colonic diverticulosis. The study was marked in EPIC for immediate notification. Workstation performed: [id number]       No Chest XR results available for this patient.      Complications: None    Condition at Discharge: stable         Discharge instructions/Information to patient and family:   See after visit summary for information provided to patient and family.      Provisions for Follow-Up Care:  See after visit summary for information related to follow-up care and any pertinent home health orders.      PCP: CARLIE Foster    Disposition: Assisted living/personal care at [facility]    Planned Readmission: No    Discharge Statement   I spent 32 minutes discharging the patient. This time was spent on the day of discharge. I had direct contact with the patient on the day of discharge. Additional documentation is required if more than 30 minutes were spent on discharge.     Discharge Medications:  See after visit summary for reconciled discharge medications provided to patient and family.

## 2024-05-03 NOTE — TELEPHONE ENCOUNTER
Patient still in patient is tent[place] scheduled for void trial 5/10 - [facility] will be removing cathete in am

## 2024-05-06 NOTE — TELEPHONE ENCOUNTER
Order has been faxed to [facility]. Received confirmation of fax being sent successfully.

## 2024-05-06 NOTE — TELEPHONE ENCOUNTER
LM for Shanique that her dad Phil is scheduled for a void trial tomorrow @ 9 and 3 -possibly [facility] can remove cathete in am and then he would just have to come in office in  afternoon for PVR.   This appointment is scheduled at our [place] office on [address]  He is scheduled for a follow up Wednesday 10/23 in [place]

## 2024-05-06 NOTE — TELEPHONE ENCOUNTER
Patient's daughter confirmed luisana/ [facility] that they can remove the Colon tomorrow. They just need an order sent over.     [facility]- Phone- [phone]                 Fax- [phone]

## 2024-05-07 ENCOUNTER — PROCEDURE VISIT (OUTPATIENT)
Dept: UROLOGY | Facility: AMBULATORY SURGERY CENTER | Age: 74
End: 2024-05-07
Payer: MEDICARE

## 2024-05-07 VITALS
OXYGEN SATURATION: 97 % | HEIGHT: 61 IN | HEART RATE: 70 BPM | WEIGHT: 213.6 LBS | DIASTOLIC BLOOD PRESSURE: 60 MMHG | BODY MASS INDEX: 40.33 KG/M2 | SYSTOLIC BLOOD PRESSURE: 140 MMHG

## 2024-05-07 DIAGNOSIS — R33.9 URINARY RETENTION: Primary | ICD-10-CM

## 2024-05-07 PROCEDURE — 51702 INSERT TEMP BLADDER CATH: CPT

## 2024-05-07 PROCEDURE — 99211 OFF/OP EST MAY X REQ PHY/QHP: CPT

## 2024-05-07 PROCEDURE — 51798 US URINE CAPACITY MEASURE: CPT

## 2024-05-07 RX ORDER — MELOXICAM 15 MG/1
15 TABLET ORAL DAILY
COMMUNITY

## 2024-05-07 RX ORDER — CLOTRIMAZOLE 1 %
CREAM (GRAM) TOPICAL 2 TIMES DAILY
COMMUNITY

## 2024-05-07 RX ORDER — ACETAMINOPHEN 325 MG/1
325 TABLET ORAL EVERY 6 HOURS PRN
COMMUNITY

## 2024-05-07 RX ORDER — TRAMADOL HYDROCHLORIDE 50 MG/1
50 TABLET ORAL EVERY 6 HOURS PRN
COMMUNITY

## 2024-05-07 NOTE — PROGRESS NOTES
"5/7/2024    Toro Rodriguez  1950  [id number]    Diagnosis  Chief Complaint    Urinary Retention         Patient presents for void trail managed by Dr. Espino    Plan  Patient will follow up in 2 weeks for another void trial - also scheduled for a Cysto in the [place] office    Procedure Colon removal/voiding trial    Colon catheter removed  by facility in morning. Patient presented this afternoon. Patient states unable to void. Patient voided 0 ml while in office. Bladder ultrasound performed and PVR measured 360 ml.    16 f coude catheter inserted and 360 ml of urine expelled . Attached catheter to overnight bag and extra supplies given.  Patient will present back in w weeks for another void trial      Vitals:    05/07/24 1551   BP: 140/60   Pulse: 70   SpO2: 97%   Weight: 96.9 kg (213 lb 9.6 oz)   Height: 5' 1\" (1.549 m)           Syeda Donnelly RN       "

## 2024-05-07 NOTE — TELEPHONE ENCOUNTER
Patients daughter called in to confirm we sent orders for cath removal to [facility].

## 2024-05-21 ENCOUNTER — PROCEDURE VISIT (OUTPATIENT)
Dept: UROLOGY | Facility: AMBULATORY SURGERY CENTER | Age: 74
End: 2024-05-21
Payer: MEDICARE

## 2024-05-21 VITALS
BODY MASS INDEX: 39.65 KG/M2 | HEART RATE: 67 BPM | SYSTOLIC BLOOD PRESSURE: 164 MMHG | DIASTOLIC BLOOD PRESSURE: 68 MMHG | HEIGHT: 61 IN | WEIGHT: 210 LBS

## 2024-05-21 DIAGNOSIS — R33.9 URINARY RETENTION: Primary | ICD-10-CM

## 2024-05-21 LAB — POST-VOID RESIDUAL VOLUME, ML POC: 358 ML

## 2024-05-21 PROCEDURE — 51798 US URINE CAPACITY MEASURE: CPT

## 2024-05-21 NOTE — PROGRESS NOTES
"5/21/2024    Toro Rodriguez  1950  [id number]    Diagnosis  Chief Complaint    Urinary Retention; void trial         Patient presents for void trial managed by Dr. Espino    Plan  Follow up as scheduled for cystoscopy  Knows to call the office in the meantime with questions or concerns.    Procedure 2nd Colon removal/voiding trial    Colon catheter removed by [facility] staff in the am. They encouraged patient to hydrate well and return this afternoon for post void residual.  He knows he may return early if uncomfortable and unable to urinate. Patient agrees to this plan.    Patient returned this afternoon. Patient states able to void. Patient voided  while in office. Bladder ultrasound performed and PVR measured 358ml. Patient's daughter requested patient to void again. Patient was able to voided again and PVR was 150mL. Educated patient on double voiding and when to call the office or go to the ER. Patient and daughter understand.    Spoke with CARLIE Quiroz who recommends patient follow up as scheduled for cystoscopy 7/2.       Recent Results (from the past 4 hour(s))   POCT Measure PVR    Collection Time: 05/21/24  3:07 PM   Result Value Ref Range    POST-VOID RESIDUAL VOLUME, ML  mL           Vitals:    05/21/24 1502   BP: 164/68   Pulse: 67   Weight: 95.3 kg (210 lb)   Height: 5' 1\" (1.549 m)           Krysta Pugh RN       "

## 2024-05-30 DIAGNOSIS — I47.29 NSVT (NONSUSTAINED VENTRICULAR TACHYCARDIA) (HCC): ICD-10-CM

## 2024-05-30 NOTE — TELEPHONE ENCOUNTER
Medication: Amiodarone    Dose/Frequency: 200 mg daily    Quantity: 90    Pharmacy: [facility]    Office:   [x] PCP/Provider - Lisandro Cevallos PA-C  [] Speciality/Provider -     Does the patient have enough for 3 days?   [x] Yes   [] No - Send as HP to POD

## 2024-05-31 RX ORDER — AMIODARONE HYDROCHLORIDE 200 MG/1
200 TABLET ORAL DAILY
Qty: 90 TABLET | Refills: 2 | Status: SHIPPED | OUTPATIENT
Start: 2024-05-31

## 2024-05-31 NOTE — TELEPHONE ENCOUNTER
Requested medication(s) are due for refill today: Yes  Patient has already received a courtesy refill: No  Other reason request has been forwarded to provider: cannot be delegated

## 2024-06-07 ENCOUNTER — TELEPHONE (OUTPATIENT)
Dept: UROLOGY | Facility: MEDICAL CENTER | Age: 74
End: 2024-06-07

## 2024-06-13 ENCOUNTER — TELEPHONE (OUTPATIENT)
Age: 74
End: 2024-06-13

## 2024-06-13 NOTE — TELEPHONE ENCOUNTER
[facility] called stating the unsigned order to signed for 05/17/24.    Fax number [phone] or   [phone]

## 2024-06-13 NOTE — TELEPHONE ENCOUNTER
[facility] calling to confirm that we have received orders they are going to re fax..  Gave fax number for both [place] and [facility].

## 2024-06-20 NOTE — TELEPHONE ENCOUNTER
LM for them to send paperwork to [phone] jose Landa and if they have question to call [phone]

## 2024-06-21 ENCOUNTER — TELEPHONE (OUTPATIENT)
Dept: UROLOGY | Facility: AMBULATORY SURGERY CENTER | Age: 74
End: 2024-06-21

## 2024-06-21 NOTE — TELEPHONE ENCOUNTER
Called the patient and left a VM, the appointment that was scheduled on 7-2-2024 has been rescheduled to 7- at 7:30 Am, please arrive by 7:00 Am. If you have any questions please call us at [phone] thank-you.

## 2024-07-10 ENCOUNTER — APPOINTMENT (EMERGENCY)
Dept: CT IMAGING | Facility: HOSPITAL | Age: 74
End: 2024-07-10
Payer: MEDICARE

## 2024-07-10 ENCOUNTER — HOSPITAL ENCOUNTER (EMERGENCY)
Facility: HOSPITAL | Age: 74
Discharge: NURSING, REHAB OR LONG-TERM CARE | End: 2024-07-10
Attending: EMERGENCY MEDICINE | Admitting: EMERGENCY MEDICINE
Payer: MEDICARE

## 2024-07-10 ENCOUNTER — APPOINTMENT (EMERGENCY)
Dept: RADIOLOGY | Facility: HOSPITAL | Age: 74
End: 2024-07-10
Payer: MEDICARE

## 2024-07-10 VITALS
OXYGEN SATURATION: 93 % | RESPIRATION RATE: 17 BRPM | HEART RATE: 52 BPM | TEMPERATURE: 98.2 F | DIASTOLIC BLOOD PRESSURE: 65 MMHG | SYSTOLIC BLOOD PRESSURE: 138 MMHG

## 2024-07-10 DIAGNOSIS — R10.9 ABDOMINAL PAIN: Primary | ICD-10-CM

## 2024-07-10 DIAGNOSIS — N39.0 UTI (URINARY TRACT INFECTION): ICD-10-CM

## 2024-07-10 DIAGNOSIS — J18.9 PNEUMONIA: ICD-10-CM

## 2024-07-10 DIAGNOSIS — R33.9 URINARY RETENTION: ICD-10-CM

## 2024-07-10 LAB
ALBUMIN SERPL BCG-MCNC: 4.1 G/DL (ref 3.5–5)
ALP SERPL-CCNC: 79 U/L (ref 34–104)
ALT SERPL W P-5'-P-CCNC: 11 U/L (ref 7–52)
ANION GAP SERPL CALCULATED.3IONS-SCNC: 11 MMOL/L (ref 4–13)
AST SERPL W P-5'-P-CCNC: 17 U/L (ref 13–39)
BACTERIA UR QL AUTO: ABNORMAL /HPF
BASOPHILS # BLD AUTO: 0.04 THOUSANDS/ÂΜL (ref 0–0.1)
BASOPHILS NFR BLD AUTO: 1 % (ref 0–1)
BILIRUB SERPL-MCNC: 0.49 MG/DL (ref 0.2–1)
BILIRUB UR QL STRIP: NEGATIVE
BUN SERPL-MCNC: 28 MG/DL (ref 5–25)
CALCIUM SERPL-MCNC: 9.3 MG/DL (ref 8.4–10.2)
CHLORIDE SERPL-SCNC: 102 MMOL/L (ref 96–108)
CLARITY UR: ABNORMAL
CO2 SERPL-SCNC: 24 MMOL/L (ref 21–32)
COLOR UR: YELLOW
CREAT SERPL-MCNC: 1.52 MG/DL (ref 0.6–1.3)
EOSINOPHIL # BLD AUTO: 0.24 THOUSAND/ÂΜL (ref 0–0.61)
EOSINOPHIL NFR BLD AUTO: 3 % (ref 0–6)
ERYTHROCYTE [DISTWIDTH] IN BLOOD BY AUTOMATED COUNT: 12.1 % (ref 11.6–15.1)
GFR SERPL CREATININE-BSD FRML MDRD: 43 ML/MIN/1.73SQ M
GLUCOSE SERPL-MCNC: 125 MG/DL (ref 65–140)
GLUCOSE UR STRIP-MCNC: NEGATIVE MG/DL
HCT VFR BLD AUTO: 39.9 % (ref 36.5–49.3)
HGB BLD-MCNC: 12.8 G/DL (ref 12–17)
HGB UR QL STRIP.AUTO: ABNORMAL
HYALINE CASTS #/AREA URNS LPF: ABNORMAL /LPF
IMM GRANULOCYTES # BLD AUTO: 0.06 THOUSAND/UL (ref 0–0.2)
IMM GRANULOCYTES NFR BLD AUTO: 1 % (ref 0–2)
KETONES UR STRIP-MCNC: NEGATIVE MG/DL
LEUKOCYTE ESTERASE UR QL STRIP: ABNORMAL
LIPASE SERPL-CCNC: 35 U/L (ref 11–82)
LYMPHOCYTES # BLD AUTO: 0.95 THOUSANDS/ÂΜL (ref 0.6–4.47)
LYMPHOCYTES NFR BLD AUTO: 12 % (ref 14–44)
MCH RBC QN AUTO: 31.2 PG (ref 26.8–34.3)
MCHC RBC AUTO-ENTMCNC: 32.1 G/DL (ref 31.4–37.4)
MCV RBC AUTO: 97 FL (ref 82–98)
MONOCYTES # BLD AUTO: 0.42 THOUSAND/ÂΜL (ref 0.17–1.22)
MONOCYTES NFR BLD AUTO: 6 % (ref 4–12)
MUCOUS THREADS UR QL AUTO: ABNORMAL
NEUTROPHILS # BLD AUTO: 5.93 THOUSANDS/ÂΜL (ref 1.85–7.62)
NEUTS SEG NFR BLD AUTO: 77 % (ref 43–75)
NITRITE UR QL STRIP: NEGATIVE
NON-SQ EPI CELLS URNS QL MICRO: ABNORMAL /HPF
NRBC BLD AUTO-RTO: 0 /100 WBCS
PH UR STRIP.AUTO: 5.5 [PH]
PLATELET # BLD AUTO: 321 THOUSANDS/UL (ref 149–390)
PMV BLD AUTO: 10.2 FL (ref 8.9–12.7)
POTASSIUM SERPL-SCNC: 3.9 MMOL/L (ref 3.5–5.3)
PROT SERPL-MCNC: 7.7 G/DL (ref 6.4–8.4)
PROT UR STRIP-MCNC: ABNORMAL MG/DL
RBC # BLD AUTO: 4.1 MILLION/UL (ref 3.88–5.62)
RBC #/AREA URNS AUTO: ABNORMAL /HPF
SODIUM SERPL-SCNC: 137 MMOL/L (ref 135–147)
SP GR UR STRIP.AUTO: 1.01 (ref 1–1.03)
UROBILINOGEN UR STRIP-ACNC: <2 MG/DL
WBC # BLD AUTO: 7.64 THOUSAND/UL (ref 4.31–10.16)
WBC #/AREA URNS AUTO: ABNORMAL /HPF

## 2024-07-10 PROCEDURE — 87086 URINE CULTURE/COLONY COUNT: CPT | Performed by: PHYSICIAN ASSISTANT

## 2024-07-10 PROCEDURE — 83690 ASSAY OF LIPASE: CPT | Performed by: PHYSICIAN ASSISTANT

## 2024-07-10 PROCEDURE — 81001 URINALYSIS AUTO W/SCOPE: CPT | Performed by: PHYSICIAN ASSISTANT

## 2024-07-10 PROCEDURE — 87077 CULTURE AEROBIC IDENTIFY: CPT | Performed by: PHYSICIAN ASSISTANT

## 2024-07-10 PROCEDURE — 74177 CT ABD & PELVIS W/CONTRAST: CPT

## 2024-07-10 PROCEDURE — 85025 COMPLETE CBC W/AUTO DIFF WBC: CPT | Performed by: PHYSICIAN ASSISTANT

## 2024-07-10 PROCEDURE — 99285 EMERGENCY DEPT VISIT HI MDM: CPT | Performed by: PHYSICIAN ASSISTANT

## 2024-07-10 PROCEDURE — 80053 COMPREHEN METABOLIC PANEL: CPT | Performed by: PHYSICIAN ASSISTANT

## 2024-07-10 PROCEDURE — 87186 SC STD MICRODIL/AGAR DIL: CPT | Performed by: PHYSICIAN ASSISTANT

## 2024-07-10 PROCEDURE — 36415 COLL VENOUS BLD VENIPUNCTURE: CPT | Performed by: PHYSICIAN ASSISTANT

## 2024-07-10 PROCEDURE — 71045 X-RAY EXAM CHEST 1 VIEW: CPT

## 2024-07-10 PROCEDURE — 99284 EMERGENCY DEPT VISIT MOD MDM: CPT

## 2024-07-10 PROCEDURE — 87147 CULTURE TYPE IMMUNOLOGIC: CPT | Performed by: PHYSICIAN ASSISTANT

## 2024-07-10 RX ORDER — AMOXICILLIN AND CLAVULANATE POTASSIUM 875; 125 MG/1; MG/1
1 TABLET, FILM COATED ORAL EVERY 12 HOURS SCHEDULED
Qty: 20 TABLET | Refills: 0 | Status: SHIPPED | OUTPATIENT
Start: 2024-07-10 | End: 2024-07-20

## 2024-07-10 RX ORDER — AMOXICILLIN AND CLAVULANATE POTASSIUM 875; 125 MG/1; MG/1
1 TABLET, FILM COATED ORAL ONCE
Status: COMPLETED | OUTPATIENT
Start: 2024-07-10 | End: 2024-07-10

## 2024-07-10 RX ADMIN — IOHEXOL 100 ML: 350 INJECTION, SOLUTION INTRAVENOUS at 17:23

## 2024-07-10 RX ADMIN — AMOXICILLIN AND CLAVULANATE POTASSIUM 1 TABLET: 875; 125 TABLET, COATED ORAL at 18:24

## 2024-07-10 NOTE — ED PROVIDER NOTES
History  Chief Complaint   Patient presents with    Abdominal Pain     Coming from [facility]. Staff called ems. Ab cramping this morning no N/V.     Patient is a 75 y/o M with h/o BPH with urinary retention, CAD, HTN presents to the ED with suprapubic pain that started this morning while on the toilet.  He states he was trying to have a BM and developed sharp pain.  He states the pain made him nauseated, but he did not vomit.  No diarrhea.  He states the last time he urinated was this morning when he tried to have a BM.  No blood in urine.  No fevers/chills. Patient has farmer catheter removed on 5/21.  He states he has been doing fine without the catheter. Patient has had a productive cough for 5 days.  He denies chest pain or SOB.        History provided by:  Patient  Abdominal Pain  Associated symptoms: nausea    Associated symptoms: no chest pain, no chills, no cough, no diarrhea, no fever, no shortness of breath and no vomiting        Prior to Admission Medications   Prescriptions Last Dose Informant Patient Reported? Taking?   acetaminophen (TYLENOL) 325 mg tablet   Yes No   Sig: Take 325 mg by mouth every 6 (six) hours as needed for mild pain   amiodarone 200 mg tablet   No No   Sig: Take 1 tablet (200 mg total) by mouth daily   apixaban (ELIQUIS) 5 mg  Self No No   Sig: Take 1 tablet (5 mg total) by mouth 2 (two) times a day Do not start before November 21, 2023.   aspirin 81 mg chewable tablet  Self No No   Sig: Chew 1 tablet (81 mg total) daily   atorvastatin (LIPITOR) 40 mg tablet  Self No No   Sig: Take 1 tablet (40 mg total) by mouth daily with dinner   clotrimazole (LOTRIMIN) 1 % cream   Yes No   Sig: Apply topically 2 (two) times a day   finasteride (PROSCAR) 5 mg tablet   No No   Sig: Take 1 tablet (5 mg total) by mouth daily   furosemide (LASIX) 40 mg tablet  Self No No   Sig: Take 1 tablet (40 mg total) by mouth daily Do not start before November 16, 2023.   lisinopril (ZESTRIL) 5 mg tablet    No No   Sig: Take 1 tablet (5 mg total) by mouth daily   meloxicam (MOBIC) 15 mg tablet   Yes No   Sig: Take 15 mg by mouth daily   metoprolol succinate (TOPROL-XL) 100 mg 24 hr tablet  Self No No   Sig: Take 1 tablet (100 mg total) by mouth daily   metoprolol succinate (TOPROL-XL) 50 mg 24 hr tablet  Self No No   Sig: Take 1 tablet (50 mg total) by mouth every evening   mupirocin (BACTROBAN) 2 % ointment   Yes No   Sig: Apply topically 3 (three) times a day   nitroglycerin (NITROSTAT) 0.4 mg SL tablet  Self No No   Sig: Place 1 tablet (0.4 mg total) under the tongue every 5 (five) minutes as needed for chest pain   nystatin (MYCOSTATIN) powder   No No   Sig: Apply topically 3 (three) times a day for 7 days Do not start before January 24, 2024.   Patient not taking: Reported on 4/3/2024   sacubitril-valsartan (ENTRESTO) 49-51 MG TABS   Yes No   Sig: Take 1 tablet by mouth 2 (two) times a day   tamsulosin (FLOMAX) 0.4 mg  Self No No   Sig: Take 1 capsule (0.4 mg total) by mouth daily with dinner   traMADol (ULTRAM) 50 mg tablet   Yes No   Sig: Take 50 mg by mouth every 6 (six) hours as needed for moderate pain      Facility-Administered Medications: None       Past Medical History:   Diagnosis Date    Alcohol intoxication (Formerly [facility]) 10/15/2020    BPH (benign prostatic hyperplasia)     Chronic HFrEF (heart failure with reduced ejection fraction) ([facility]) 11/2023    Coronary artery calcification seen on CT scan 11/10/2023    Coronary artery disease 12/14/2023    Deep vein thrombosis (DVT) of left lower extremity ([facility]) 11/2023    Diabetes mellitus ([facility]) 11/2023    Fall from slip, trip, or stumble 10/15/2020    History of phimosis of penis     History of urinary calculi     Hypertension 1988    Skin cancer     Tobacco abuse     quit around 2000       Past Surgical History:   Procedure Laterality Date    BLADDER STONE REMOVAL  2014    CARDIAC CATHETERIZATION N/A 12/14/2023    Procedure: Cardiac catheterization;  Surgeon: Dave DELANEY  MD Genny;  Location: [facility];  Service: Cardiology    ORIF ANKLE FRACTURE Left     SKIN LESION EXCISION N/A 3/18/2024    Procedure: WIDE LOCAL EXCISION OF BACK MELANOMA;  Surgeon: Lillie La MD;  Location: [facility];  Service: Surgical Oncology    TOTAL HIP ARTHROPLASTY Right        Family History   Problem Relation Age of Onset    Breast cancer Mother     Heart attack Father 61    Other Sister         s/p hysterectomy    Cerebral palsy Brother     Skin cancer Other         family history    No Known Problems Son     No Known Problems Daughter     Sudden death Neg Hx      I have reviewed and agree with the history as documented.    E-Cigarette/Vaping    E-Cigarette Use Never User      E-Cigarette/Vaping Substances    Nicotine No     THC No     CBD No     Flavoring No     Other No     Unknown No      Social History     Tobacco Use    Smoking status: Former     Types: Cigarettes     Start date:      Quit date:      Years since quittin.5    Smokeless tobacco: Never    Tobacco comments:     Quit over 30 years ago (Updated 2023).    Vaping Use    Vaping status: Never Used   Substance Use Topics    Alcohol use: Not Currently    Drug use: Never       Review of Systems   Constitutional:  Negative for chills and fever.   HENT: Negative.     Respiratory:  Negative for cough and shortness of breath.    Cardiovascular:  Negative for chest pain, palpitations and leg swelling.   Gastrointestinal:  Positive for abdominal pain and nausea. Negative for diarrhea and vomiting.   Genitourinary:  Positive for decreased urine volume.   Musculoskeletal:  Negative for back pain and neck pain.   Skin:  Negative for color change and rash.   Neurological:  Negative for dizziness, weakness and numbness.   All other systems reviewed and are negative.      Physical Exam  Physical Exam  Vitals and nursing note reviewed.   Constitutional:       General: He is not in acute distress.     Appearance: Normal  appearance. He is well-developed and well-groomed. He is not ill-appearing.   HENT:      Head: Normocephalic and atraumatic.   Eyes:      Conjunctiva/sclera: Conjunctivae normal.   Cardiovascular:      Rate and Rhythm: Regular rhythm. Bradycardia present.   Pulmonary:      Effort: Pulmonary effort is normal.      Breath sounds: Rhonchi (diffuse) present. No decreased breath sounds or wheezing.   Abdominal:      General: Abdomen is flat. Bowel sounds are normal.      Palpations: Abdomen is soft.      Tenderness: There is abdominal tenderness in the suprapubic area. There is no right CVA tenderness, left CVA tenderness, guarding or rebound.   Musculoskeletal:      Cervical back: Normal range of motion.      Right lower le+ Pitting Edema present.      Left lower le+ Pitting Edema present.   Skin:     General: Skin is warm and dry.      Coloration: Skin is not pale.      Findings: No rash.   Neurological:      Mental Status: He is alert. Mental status is at baseline.   Psychiatric:         Behavior: Behavior is cooperative.         Vital Signs  ED Triage Vitals   Temperature Pulse Respirations Blood Pressure SpO2   07/10/24 1558 07/10/24 1555 07/10/24 1555 07/10/24 1555 07/10/24 1555   98.2 °F (36.8 °C) 58 18 128/63 95 %      Temp Source Heart Rate Source Patient Position - Orthostatic VS BP Location FiO2 (%)   07/10/24 1558 07/10/24 1555 -- -- --   Axillary Monitor         Pain Score       07/10/24 1555       5           Vitals:    07/10/24 1555 07/10/24 1700 07/10/24 1715   BP: 128/63 131/59 140/63   Pulse: 58 58 55         Visual Acuity      ED Medications  Medications   amoxicillin-clavulanate (AUGMENTIN) 875-125 mg per tablet 1 tablet (has no administration in time range)   iohexol (OMNIPAQUE) 350 MG/ML injection (SINGLE-DOSE) 100 mL (100 mL Intravenous Given 7/10/24 1723)       Diagnostic Studies  Results Reviewed       Procedure Component Value Units Date/Time    Urine Microscopic [[id number]]  (Abnormal)  Collected: 07/10/24 1707    Lab Status: Final result Specimen: Urine, Indwelling Colon Catheter Updated: 07/10/24 1729     RBC, UA 0-1 /hpf      WBC, UA Innumerable /hpf      Epithelial Cells Occasional /hpf      Bacteria, UA Moderate /hpf      MUCUS THREADS Occasional     Hyaline Casts, UA 0-1 /lpf     Narrative:      Microscopic performed by Allie Martini.     Urine culture [[id number]] Collected: 07/10/24 1707    Lab Status: In process Specimen: Urine, Indwelling Colon Catheter Updated: 07/10/24 1729    UA w Reflex to Microscopic w Reflex to Culture [[id number]]  (Abnormal) Collected: 07/10/24 1707    Lab Status: Final result Specimen: Urine, Indwelling Colon Catheter Updated: 07/10/24 1718     Color, UA Yellow     Clarity, UA Slightly Cloudy     Specific Gravity, UA 1.015     pH, UA 5.5     Leukocytes, UA Large     Nitrite, UA Negative     Protein, UA Trace mg/dl      Glucose, UA Negative mg/dl      Ketones, UA Negative mg/dl      Urobilinogen, UA <2.0 mg/dl      Bilirubin, UA Negative     Occult Blood, UA Small    Comprehensive metabolic panel [[id number]]  (Abnormal) Collected: 07/10/24 1605    Lab Status: Final result Specimen: Blood from Arm, Left Updated: 07/10/24 1630     Sodium 137 mmol/L      Potassium 3.9 mmol/L      Chloride 102 mmol/L      CO2 24 mmol/L      ANION GAP 11 mmol/L      BUN 28 mg/dL      Creatinine 1.52 mg/dL      Glucose 125 mg/dL      Calcium 9.3 mg/dL      AST 17 U/L      ALT 11 U/L      Alkaline Phosphatase 79 U/L      Total Protein 7.7 g/dL      Albumin 4.1 g/dL      Total Bilirubin 0.49 mg/dL      eGFR 43 ml/min/1.73sq m     Narrative:      National Kidney Disease Foundation guidelines for Chronic Kidney Disease (CKD):     Stage 1 with normal or high GFR (GFR > 90 mL/min/1.73 square meters)    Stage 2 Mild CKD (GFR = 60-89 mL/min/1.73 square meters)    Stage 3A Moderate CKD (GFR = 45-59 mL/min/1.73 square meters)    Stage 3B Moderate CKD (GFR = 30-44 mL/min/1.73 square meters)     Stage 4 Severe CKD (GFR = 15-29 mL/min/1.73 square meters)    Stage 5 End Stage CKD (GFR <15 mL/min/1.73 square meters)  Note: GFR calculation is accurate only with a steady state creatinine    Lipase [[id number]]  (Normal) Collected: 07/10/24 1605    Lab Status: Final result Specimen: Blood from Arm, Left Updated: 07/10/24 1630     Lipase 35 u/L     CBC and differential [[id number]]  (Abnormal) Collected: 07/10/24 1605    Lab Status: Final result Specimen: Blood from Arm, Left Updated: 07/10/24 1616     WBC 7.64 Thousand/uL      RBC 4.10 Million/uL      Hemoglobin 12.8 g/dL      Hematocrit 39.9 %      MCV 97 fL      MCH 31.2 pg      MCHC 32.1 g/dL      RDW 12.1 %      MPV 10.2 fL      Platelets 321 Thousands/uL      nRBC 0 /100 WBCs      Segmented % 77 %      Immature Grans % 1 %      Lymphocytes % 12 %      Monocytes % 6 %      Eosinophils Relative 3 %      Basophils Relative 1 %      Absolute Neutrophils 5.93 Thousands/µL      Absolute Immature Grans 0.06 Thousand/uL      Absolute Lymphocytes 0.95 Thousands/µL      Absolute Monocytes 0.42 Thousand/µL      Eosinophils Absolute 0.24 Thousand/µL      Basophils Absolute 0.04 Thousands/µL                    XR chest 1 view portable   ED Interpretation by Yasmeen Braun PA-C (07/10 1701)   RLL pneumonia        CT abdomen pelvis with contrast    (Results Pending)              Procedures  Procedures         ED Course  ED Course as of 07/10/24 1730   Wed Jul 10, 2024   1625 Patient's bladder scan showing 500mL, patient unable to urinary, he is agreeable to farmer catheter.    1707 Farmer catheter placed, draining over 600mL.    1724 Care transferred to Dr. Hodgson awaiting CT results.                                  SBIRT 22yo+      Flowsheet Row Most Recent Value   Initial Alcohol Screen: US AUDIT-C     1. How often do you have a drink containing alcohol? 0 Filed at: 07/10/2024 1554   2. How many drinks containing alcohol do you have on a typical day you are drinking?   0 Filed at: 07/10/2024 1555   3a. Male UNDER 65: How often do you have five or more drinks on one occasion? 0 Filed at: 07/10/2024 1555   3b. FEMALE Any Age, or MALE 65+: How often do you have 4 or more drinks on one occassion? 0 Filed at: 07/10/2024 1555   Audit-C Score 0 Filed at: 07/10/2024 1555   WILLIE: How many times in the past year have you...    Used an illegal drug or used a prescription medication for non-medical reasons? Never Filed at: 07/10/2024 1555                      Medical Decision Making  Patient with lower abdominal pain, urinary retention with bladder scan, patient agreeable to farmer catheter.  Will order CT scan to r/o obstructing stone, colitis.  Patient with cough x 5 days, diffuse ronchi, RLL on CXR will start abx.     Amount and/or Complexity of Data Reviewed  Labs: ordered.  Radiology: ordered and independent interpretation performed.    Risk  Prescription drug management.                 Disposition  Final diagnoses:   Abdominal pain   Urinary retention   Pneumonia     Time reflects when diagnosis was documented in both MDM as applicable and the Disposition within this note       Time User Action Codes Description Comment    7/10/2024  5:02 PM Yasmeen Braun [R10.9] Abdominal pain     7/10/2024  5:02 PM Yasmeen Braun [R33.9] Urinary retention     7/10/2024  5:02 PM Yasmeen Braun [J18.9] Pneumonia           ED Disposition       None          Follow-up Information    None         Patient's Medications   Discharge Prescriptions    No medications on file       No discharge procedures on file.    PDMP Review         Value Time User    PDMP Reviewed  Yes 11/15/2023 12:20 PM Krysta Castillo PA-C            ED Provider  Electronically Signed by             Yasmeen Braun PA-C  07/10/24 [id number]

## 2024-07-10 NOTE — DISCHARGE INSTRUCTIONS
No acute findings in the abdomen or pelvis.     1.2 cm indeterminate right upper pole renal cyst. Nonemergent ultrasound was recommended on CT of the abdomen and pelvis 4/29/2024.

## 2024-07-12 LAB — BACTERIA UR CULT: ABNORMAL

## 2024-07-23 NOTE — PROGRESS NOTES
Ambulatory Visit  Name: Toro Rodriguez      : 1950      MRN: [id number]  Encounter Provider: Rick Espino MD  Encounter Date: 2024   Encounter department: [facility] [place]    Assessment & Plan   1. Acute on chronic systolic (congestive) heart failure (HCC)  2. Chronic venous hypertension (idiopathic) with ulcer of left lower extremity (CODE) (HCC)  3. Acute deep vein thrombosis (DVT) of calf muscle vein of left lower extremity (HCC)  4. Dilated cardiomyopathy ([facility])  5. 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 Chronic kidney disease, stage 3a ([facility])  7. Benign prostatic hyperplasia with urinary retention  Assessment & Plan:  Cystoscopy today shows slight obstruction by the lateral lobes of the prostate but less than I would have expected.  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  I recommend a interventional radiology suprapubic catheter placement  Orders:  -     Ambulatory Referral to Interventional Radiology; Future  8. Gross hematuria  Assessment & Plan:  No bladder lesions or tumors on cystoscopy today  9. Obesity, morbid ([facility])  10. Encounter to discuss test results  Assessment & Plan:  All clinical findings reviewed with the patient in real-time.  Given significant urethral erosion and severe discomfort at the tip of the penis and the appearance of his bladder outlet and his flaccid bladder I recommend suprapubic catheter placement    I have spent a total time of 30 minutes in caring for this patient on the day of the visit/encounter including Diagnostic results, Prognosis, Risks and benefits of tx options, Instructions for management,  Patient and family education, Importance of tx compliance, Risk factor reductions, Impressions, Counseling / Coordination of care, Documenting in the medical record, Reviewing / ordering tests, medicine, procedures  , and Obtaining or reviewing history  .   11. Urethral erosion by catheter, initial encounter  ([facility])  Assessment & Plan:  This will stop after placement of suprapubic catheter.    Reviewed capping his suprapubic catheter as well as continuous bladder drainage.  He is very much bothered by his suprapubic catheter then consideration can be given to urodynamic studies to look for residual function within the bladder to assess the advisability of possible outlet surgery.  I did tell the patient and his daughter that I am leaning away from this at this time given his poor overall state of health currently          History of Present Illness     Toro Rodriguez is a 74 y.o. male who presents with gross hematuria and urinary retention    Multiple comorbid conditions    The following portions of the patient's history were reviewed and updated as appropriate: allergies, current medications, past family history, past medical history, past social history, past surgical history and problem list.      Review of Systems   Constitutional:  Positive for fatigue.   HENT: Negative.     Eyes: Negative.    Respiratory: Negative.     Cardiovascular: Negative.    Gastrointestinal: Negative.    Endocrine: Negative.    Genitourinary:  Positive for difficulty urinating.   Musculoskeletal:  Positive for arthralgias and gait problem.   Skin: Negative.    Allergic/Immunologic: Negative.    Neurological:  Positive for weakness.   Hematological: Negative.    Psychiatric/Behavioral: Negative.       Current Outpatient Medications on File Prior to Visit   Medication Sig Dispense Refill    acetaminophen (TYLENOL) 325 mg tablet Take 325 mg by mouth every 6 (six) hours as needed for mild pain      amiodarone 200 mg tablet Take 1 tablet (200  "mg total) by mouth daily 90 tablet 2    apixaban (ELIQUIS) 5 mg Take 1 tablet (5 mg total) by mouth 2 (two) times a day Do not start before November 21, 2023.  0    aspirin 81 mg chewable tablet Chew 1 tablet (81 mg total) daily      atorvastatin (LIPITOR) 40 mg tablet Take 1 tablet (40 mg total) by mouth daily with dinner  0    finasteride (PROSCAR) 5 mg tablet Take 1 tablet (5 mg total) by mouth daily 90 tablet 3    furosemide (LASIX) 40 mg tablet Take 1 tablet (40 mg total) by mouth daily Do not start before November 16, 2023.  0    lisinopril (ZESTRIL) 5 mg tablet Take 1 tablet (5 mg total) by mouth daily      meloxicam (MOBIC) 15 mg tablet Take 15 mg by mouth daily      metoprolol succinate (TOPROL-XL) 100 mg 24 hr tablet Take 1 tablet (100 mg total) by mouth daily      metoprolol succinate (TOPROL-XL) 50 mg 24 hr tablet Take 1 tablet (50 mg total) by mouth every evening      mupirocin (BACTROBAN) 2 % ointment Apply topically 3 (three) times a day      nitroglycerin (NITROSTAT) 0.4 mg SL tablet Place 1 tablet (0.4 mg total) under the tongue every 5 (five) minutes as needed for chest pain      sacubitril-valsartan (ENTRESTO) 49-51 MG TABS Take 1 tablet by mouth 2 (two) times a day      tamsulosin (FLOMAX) 0.4 mg Take 1 capsule (0.4 mg total) by mouth daily with dinner      traMADol (ULTRAM) 50 mg tablet Take 50 mg by mouth every 6 (six) hours as needed for moderate pain      clotrimazole (LOTRIMIN) 1 % cream Apply topically 2 (two) times a day      nystatin (MYCOSTATIN) powder Apply topically 3 (three) times a day for 7 days Do not start before January 24, 2024. (Patient not taking: Reported on 4/3/2024) 60 g 0     No current facility-administered medications on file prior to visit.        Objective     /62 (BP Location: Left arm, Patient Position: Sitting, Cuff Size: Standard)   Pulse (!) 53   Ht 5' 1\" (1.549 m)   Wt 95.3 kg (210 lb)   SpO2 94%   BMI 39.68 kg/m²   Physical Exam  Vitals reviewed. " "  Constitutional:       General: He is not in acute distress.     Appearance: Normal appearance. He is well-developed. He is ill-appearing. He is not toxic-appearing or diaphoretic.   HENT:      Head: Normocephalic and atraumatic.      Nose: Nose normal.      Mouth/Throat:      Mouth: Mucous membranes are moist.   Eyes:      General: No scleral icterus.        Right eye: No discharge.         Left eye: No discharge.   Neck:      Thyroid: No thyromegaly.      Trachea: No tracheal deviation.   Pulmonary:      Effort: Pulmonary effort is normal.   Chest:      Chest wall: No tenderness.   Abdominal:      General: There is no distension.      Palpations: There is no mass.      Tenderness: There is no abdominal tenderness. There is no guarding.      Hernia: No hernia is present.   Genitourinary:     Comments: Urethral erosion to the level of the corona ventrally  Musculoskeletal:         General: No deformity or signs of injury. Normal range of motion.      Cervical back: Normal range of motion and neck supple.   Lymphadenopathy:      Cervical: No cervical adenopathy.   Skin:     General: Skin is dry.      Coloration: Skin is not jaundiced or pale.      Findings: No erythema.   Neurological:      Mental Status: He is alert and oriented to person, place, and time.      Cranial Nerves: No cranial nerve deficit.      Motor: No abnormal muscle tone.      Coordination: Coordination normal.   Psychiatric:         Mood and Affect: Mood normal.         Behavior: Behavior normal.         Thought Content: Thought content normal.         Judgment: Judgment normal.       Results  No results found for: \"PSA\"  Lab Results   Component Value Date    CALCIUM 9.3 07/10/2024    K 3.9 07/10/2024    CO2 24 07/10/2024     07/10/2024    BUN 28 (H) 07/10/2024    CREATININE 1.52 (H) 07/10/2024     Lab Results   Component Value Date    WBC 7.64 07/10/2024    HGB 12.8 07/10/2024    HCT 39.9 07/10/2024    MCV 97 07/10/2024     " 07/10/2024       Office Urine Dip  No results found for this or any previous visit (from the past 1 hour(s)).]    Administrative Statements

## 2024-07-30 ENCOUNTER — TELEPHONE (OUTPATIENT)
Dept: UROLOGY | Facility: HOSPITAL | Age: 74
End: 2024-07-30

## 2024-07-30 ENCOUNTER — PROCEDURE VISIT (OUTPATIENT)
Dept: UROLOGY | Facility: HOSPITAL | Age: 74
End: 2024-07-30
Payer: MEDICARE

## 2024-07-30 VITALS
SYSTOLIC BLOOD PRESSURE: 132 MMHG | OXYGEN SATURATION: 94 % | HEART RATE: 53 BPM | WEIGHT: 210 LBS | HEIGHT: 61 IN | BODY MASS INDEX: 39.65 KG/M2 | DIASTOLIC BLOOD PRESSURE: 62 MMHG

## 2024-07-30 DIAGNOSIS — N36.8 URETHRAL EROSION BY CATHETER, INITIAL ENCOUNTER  (HCC): ICD-10-CM

## 2024-07-30 DIAGNOSIS — R31.0 GROSS HEMATURIA: ICD-10-CM

## 2024-07-30 DIAGNOSIS — E66.01 OBESITY, MORBID (HCC): ICD-10-CM

## 2024-07-30 DIAGNOSIS — R33.8 BENIGN PROSTATIC HYPERPLASIA WITH URINARY RETENTION: Chronic | ICD-10-CM

## 2024-07-30 DIAGNOSIS — T83.89XA URETHRAL EROSION BY CATHETER, INITIAL ENCOUNTER  (HCC): ICD-10-CM

## 2024-07-30 DIAGNOSIS — N18.31 CHRONIC KIDNEY DISEASE, STAGE 3A (HCC): ICD-10-CM

## 2024-07-30 DIAGNOSIS — I50.23 ACUTE ON CHRONIC SYSTOLIC (CONGESTIVE) HEART FAILURE (HCC): ICD-10-CM

## 2024-07-30 DIAGNOSIS — I42.0 DILATED CARDIOMYOPATHY (HCC): ICD-10-CM

## 2024-07-30 DIAGNOSIS — I87.312 CHRONIC VENOUS HYPERTENSION (IDIOPATHIC) WITH ULCER OF LEFT LOWER EXTREMITY (CODE) (HCC): ICD-10-CM

## 2024-07-30 DIAGNOSIS — E11.22 TYPE 2 DIABETES MELLITUS WITH CHRONIC KIDNEY DISEASE, WITHOUT LONG-TERM CURRENT USE OF INSULIN, UNSPECIFIED CKD STAGE (HCC): ICD-10-CM

## 2024-07-30 DIAGNOSIS — Z71.2 ENCOUNTER TO DISCUSS TEST RESULTS: Primary | ICD-10-CM

## 2024-07-30 DIAGNOSIS — N40.1 BENIGN PROSTATIC HYPERPLASIA WITH URINARY RETENTION: Chronic | ICD-10-CM

## 2024-07-30 DIAGNOSIS — I82.462 ACUTE DEEP VEIN THROMBOSIS (DVT) OF CALF MUSCLE VEIN OF LEFT LOWER EXTREMITY (HCC): ICD-10-CM

## 2024-07-30 PROCEDURE — 99215 OFFICE O/P EST HI 40 MIN: CPT | Performed by: UROLOGY

## 2024-07-30 PROCEDURE — 52000 CYSTOURETHROSCOPY: CPT | Performed by: UROLOGY

## 2024-07-30 NOTE — ASSESSMENT & PLAN NOTE
This will stop after placement of suprapubic catheter.    Reviewed capping his suprapubic catheter as well as continuous bladder drainage.  He is very much bothered by his suprapubic catheter then consideration can be given to urodynamic studies to look for residual function within the bladder to assess the advisability of possible outlet surgery.  I did tell the patient and his daughter that I am leaning away from this at this time given his poor overall state of health currently

## 2024-07-30 NOTE — ASSESSMENT & PLAN NOTE
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

## 2024-07-30 NOTE — ASSESSMENT & PLAN NOTE
All clinical findings reviewed with the patient in real-time.  Given significant urethral erosion and severe discomfort at the tip of the penis and the appearance of his bladder outlet and his flaccid bladder I recommend suprapubic catheter placement    I have spent a total time of 30 minutes in caring for this patient on the day of the visit/encounter including Diagnostic results, Prognosis, Risks and benefits of tx options, Instructions for management, Patient and family education, Importance of tx compliance, Risk factor reductions, Impressions, Counseling / Coordination of care, Documenting in the medical record, Reviewing / ordering tests, medicine, procedures  , and Obtaining or reviewing history  .

## 2024-07-30 NOTE — ASSESSMENT & PLAN NOTE
Cystoscopy today shows slight obstruction by the lateral lobes of the prostate but less than I would have expected.  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  I recommend a interventional radiology suprapubic catheter placement

## 2024-07-30 NOTE — PROGRESS NOTES
Office Cystoscopy Procedure Note    Indication:     urinary retention     Informed consent   The risks, benefits, complications, treatment options, and expected outcomes were discussed with the patient. The patient concurred with the proposed plan and provided informed consent.    Anesthesia  Lidocaine jelly 2%    Antibiotic prophylaxis   None    Procedure  The patient was placed in the supineposition, was prepped and draped in the usual manner using sterile technique, and 2% lidocaine jelly instilled into the urethra.  A 17 F flexible cystoscope was then inserted into the urethra and the urethra and bladder carefully examined.  The following findings were noted:    Findings:  Urethra:  Abnormal, erosion to the level of the corona is present, no strictures.  Intact sphincter.  Prostate:  Mild lateral lobe hypertrophy, no median lobe, no lesions  Bladder:  The bladder is capacious and smooth, this is not what would be expected for a obstructed bladder with the lack of apparent hypertrophy of the detrusor muscle, concern for neurogenic bladder here  Ureteral orifices:  orthotopic  Other findings:  None, retroflexed view confirms    Specimens: None                 Complications:    None; patient tolerated the procedure well           Disposition: To home            Condition: Stable    Plan: Mildly obstructive bladder outlet.  Large and capacious bladder.  I suspect a neurogenic bladder.  I recommend suprapubic catheter placement.  If the patient very much dislikes his suprapubic catheter he can undergo urodynamic studies to assess for bladder compliance, capacity, contractility, and conscious sensation to inform possible outlet surgery in the future.       Cystoscopy     Date/Time  7/30/2024 10:00 AM     Performed by  Rick Espino MD   Authorized by  Rick Espino MD     Universal Protocol:  Consent: Verbal consent obtained. Written consent obtained.  Risks and benefits: risks, benefits and alternatives were  discussed  Consent given by: patient  Patient understanding: patient states understanding of the procedure being performed  Patient consent: the patient's understanding of the procedure matches consent given  Procedure consent: procedure consent matches procedure scheduled  Relevant documents: relevant documents present and verified  Test results: test results available and properly labeled  Site marked: the operative site was not marked  Radiology Images displayed and confirmed. If images not available, report reviewed: imaging studies available  Required items: required blood products, implants, devices, and special equipment available  Patient identity confirmed: verbally with patient and provided demographic data      Procedure Details:  Procedure type: cystoscopy    Patient tolerance: Patient tolerated the procedure well with no immediate complications    Additional Procedure Details: Office Cystoscopy Procedure Note    Indication:     urinary retention     Informed consent   The risks, benefits, complications, treatment options, and expected outcomes were discussed with the patient. The patient concurred with the proposed plan and provided informed consent.    Anesthesia  Lidocaine jelly 2%    Antibiotic prophylaxis   None    Procedure  The patient was placed in the supineposition, was prepped and draped in the usual manner using sterile technique, and 2% lidocaine jelly instilled into the urethra.  A 17 F flexible cystoscope was then inserted into the urethra and the urethra and bladder carefully examined.  The following findings were noted:    Findings:  Urethra:  Abnormal, erosion to the level of the corona is present, no strictures.  Intact sphincter.  Prostate:  Mild lateral lobe hypertrophy, no median lobe, no lesions  Bladder:  The bladder is capacious and smooth, this is not what would be expected for a obstructed bladder with the lack of apparent hypertrophy of the detrusor muscle, concern for  neurogenic bladder here  Ureteral orifices:  orthotopic  Other findings:  None, retroflexed view confirms    Specimens: None                 Complications:    None; patient tolerated the procedure well           Disposition: To home            Condition: Stable    Plan: Mildly obstructive bladder outlet.  Large and capacious bladder.  I suspect a neurogenic bladder.  I recommend suprapubic catheter placement.  If the patient very much dislikes his suprapubic catheter he can undergo urodynamic studies to assess for bladder compliance, capacity, contractility, and conscious sensation to inform possible outlet surgery in the future.

## 2024-07-31 ENCOUNTER — PREP FOR PROCEDURE (OUTPATIENT)
Dept: INTERVENTIONAL RADIOLOGY/VASCULAR | Facility: CLINIC | Age: 74
End: 2024-07-31

## 2024-07-31 DIAGNOSIS — R31.0 GROSS HEMATURIA: Primary | ICD-10-CM

## 2024-08-15 ENCOUNTER — NURSE TRIAGE (OUTPATIENT)
Age: 74
End: 2024-08-15

## 2024-08-15 ENCOUNTER — TELEPHONE (OUTPATIENT)
Age: 74
End: 2024-08-15

## 2024-08-15 NOTE — TELEPHONE ENCOUNTER
Patient's daughter called in, inquiring why SPT was recommended over outlet sx, inquiring if it is due to his health, what risks/benefits are involved, and wondering if going through the necessary testing (urodynamics) is worth putting him through to determine if he is candidate for the outlet sx rather than the SPT placement.   Explained this was likely reviewed during office visit. Please advise if we should schedule another office visit to review this.

## 2024-08-15 NOTE — TELEPHONE ENCOUNTER
Correct SPT was recommended over surgery due to his overall poor health. If wishes to discuss further can schedule additional follow up visit

## 2024-08-15 NOTE — TELEPHONE ENCOUNTER
Spoke with Michael and notified her of SPT recommendations- discussed the urodynamic and SPT and she asked to have information about SPT emailed to [email or web address]

## 2024-09-05 ENCOUNTER — APPOINTMENT (EMERGENCY)
Dept: CT IMAGING | Facility: HOSPITAL | Age: 74
End: 2024-09-05
Attending: EMERGENCY MEDICINE
Payer: MEDICARE

## 2024-09-05 ENCOUNTER — HOSPITAL ENCOUNTER (EMERGENCY)
Facility: HOSPITAL | Age: 74
Discharge: HOME | End: 2024-09-05
Attending: EMERGENCY MEDICINE
Payer: MEDICARE

## 2024-09-05 VITALS
DIASTOLIC BLOOD PRESSURE: 67 MMHG | SYSTOLIC BLOOD PRESSURE: 152 MMHG | HEART RATE: 45 BPM | OXYGEN SATURATION: 93 % | BODY MASS INDEX: 32.96 KG/M2 | HEIGHT: 67 IN | TEMPERATURE: 97.8 F | RESPIRATION RATE: 16 BRPM | WEIGHT: 210 LBS

## 2024-09-05 DIAGNOSIS — N39.0 UTI (URINARY TRACT INFECTION): Primary | ICD-10-CM

## 2024-09-05 LAB
2HR DELTA HS TROPONIN: 0 NG/L
ALBUMIN SERPL BCG-MCNC: 3.8 G/DL (ref 3.5–5)
ALP SERPL-CCNC: 71 U/L (ref 34–104)
ALT SERPL W P-5'-P-CCNC: 9 U/L (ref 7–52)
ANION GAP SERPL CALCULATED.3IONS-SCNC: 4 MMOL/L (ref 4–13)
AST SERPL W P-5'-P-CCNC: 14 U/L (ref 13–39)
BACTERIA UR QL AUTO: ABNORMAL /HPF
BASOPHILS # BLD AUTO: 0.05 THOUSANDS/ÂΜL (ref 0–0.1)
BASOPHILS NFR BLD AUTO: 1 % (ref 0–1)
BILIRUB SERPL-MCNC: 0.71 MG/DL (ref 0.2–1)
BILIRUB UR QL STRIP: NEGATIVE
BUN SERPL-MCNC: 20 MG/DL (ref 5–25)
CALCIUM SERPL-MCNC: 8.9 MG/DL (ref 8.4–10.2)
CARDIAC TROPONIN I PNL SERPL HS: 26 NG/L
CARDIAC TROPONIN I PNL SERPL HS: 26 NG/L
CHLORIDE SERPL-SCNC: 103 MMOL/L (ref 96–108)
CLARITY UR: ABNORMAL
CO2 SERPL-SCNC: 30 MMOL/L (ref 21–32)
COLOR UR: YELLOW
CREAT SERPL-MCNC: 1.25 MG/DL (ref 0.6–1.3)
EOSINOPHIL # BLD AUTO: 0.27 THOUSAND/ÂΜL (ref 0–0.61)
EOSINOPHIL NFR BLD AUTO: 4 % (ref 0–6)
ERYTHROCYTE [DISTWIDTH] IN BLOOD BY AUTOMATED COUNT: 13.2 % (ref 11.6–15.1)
GFR SERPL CREATININE-BSD FRML MDRD: 55 ML/MIN/1.73SQ M
GLUCOSE SERPL-MCNC: 96 MG/DL (ref 65–140)
GLUCOSE UR STRIP-MCNC: NEGATIVE MG/DL
HCT VFR BLD AUTO: 40.7 % (ref 36.5–49.3)
HGB BLD-MCNC: 13.2 G/DL (ref 12–17)
HGB UR QL STRIP.AUTO: ABNORMAL
IMM GRANULOCYTES # BLD AUTO: 0.03 THOUSAND/UL (ref 0–0.2)
IMM GRANULOCYTES NFR BLD AUTO: 0 % (ref 0–2)
KETONES UR STRIP-MCNC: NEGATIVE MG/DL
LACTATE SERPL-SCNC: 1 MMOL/L (ref 0.5–2)
LEUKOCYTE ESTERASE UR QL STRIP: ABNORMAL
LIPASE SERPL-CCNC: 41 U/L (ref 11–82)
LYMPHOCYTES # BLD AUTO: 1.52 THOUSANDS/ÂΜL (ref 0.6–4.47)
LYMPHOCYTES NFR BLD AUTO: 22 % (ref 14–44)
MCH RBC QN AUTO: 31.2 PG (ref 26.8–34.3)
MCHC RBC AUTO-ENTMCNC: 32.4 G/DL (ref 31.4–37.4)
MCV RBC AUTO: 96 FL (ref 82–98)
MONOCYTES # BLD AUTO: 0.65 THOUSAND/ÂΜL (ref 0.17–1.22)
MONOCYTES NFR BLD AUTO: 9 % (ref 4–12)
NEUTROPHILS # BLD AUTO: 4.38 THOUSANDS/ÂΜL (ref 1.85–7.62)
NEUTS SEG NFR BLD AUTO: 64 % (ref 43–75)
NITRITE UR QL STRIP: NEGATIVE
NON-SQ EPI CELLS URNS QL MICRO: ABNORMAL /HPF
NRBC BLD AUTO-RTO: 0 /100 WBCS
PH UR STRIP.AUTO: 6 [PH]
PLATELET # BLD AUTO: 273 THOUSANDS/UL (ref 149–390)
PMV BLD AUTO: 10.8 FL (ref 8.9–12.7)
POTASSIUM SERPL-SCNC: 4.2 MMOL/L (ref 3.5–5.3)
PROT SERPL-MCNC: 7.1 G/DL (ref 6.4–8.4)
PROT UR STRIP-MCNC: ABNORMAL MG/DL
RBC # BLD AUTO: 4.23 MILLION/UL (ref 3.88–5.62)
RBC #/AREA URNS AUTO: ABNORMAL /HPF
SODIUM SERPL-SCNC: 137 MMOL/L (ref 135–147)
SP GR UR STRIP.AUTO: 1.01 (ref 1–1.03)
UROBILINOGEN UR STRIP-ACNC: <2 MG/DL
WBC # BLD AUTO: 6.9 THOUSAND/UL (ref 4.31–10.16)
WBC #/AREA URNS AUTO: ABNORMAL /HPF

## 2024-09-05 PROCEDURE — 81001 URINALYSIS AUTO W/SCOPE: CPT | Performed by: EMERGENCY MEDICINE

## 2024-09-05 PROCEDURE — 85025 COMPLETE CBC W/AUTO DIFF WBC: CPT | Performed by: EMERGENCY MEDICINE

## 2024-09-05 PROCEDURE — 80053 COMPREHEN METABOLIC PANEL: CPT | Performed by: EMERGENCY MEDICINE

## 2024-09-05 PROCEDURE — 36415 COLL VENOUS BLD VENIPUNCTURE: CPT | Performed by: EMERGENCY MEDICINE

## 2024-09-05 PROCEDURE — 83605 ASSAY OF LACTIC ACID: CPT | Performed by: EMERGENCY MEDICINE

## 2024-09-05 PROCEDURE — 74177 CT ABD & PELVIS W/CONTRAST: CPT

## 2024-09-05 PROCEDURE — 84484 ASSAY OF TROPONIN QUANT: CPT | Performed by: EMERGENCY MEDICINE

## 2024-09-05 PROCEDURE — 99284 EMERGENCY DEPT VISIT MOD MDM: CPT | Performed by: EMERGENCY MEDICINE

## 2024-09-05 PROCEDURE — 87086 URINE CULTURE/COLONY COUNT: CPT | Performed by: EMERGENCY MEDICINE

## 2024-09-05 PROCEDURE — 83690 ASSAY OF LIPASE: CPT | Performed by: EMERGENCY MEDICINE

## 2024-09-05 PROCEDURE — 99285 EMERGENCY DEPT VISIT HI MDM: CPT

## 2024-09-05 PROCEDURE — 93005 ELECTROCARDIOGRAM TRACING: CPT

## 2024-09-05 RX ORDER — CEPHALEXIN 500 MG/1
500 CAPSULE ORAL EVERY 12 HOURS SCHEDULED
Qty: 14 CAPSULE | Refills: 0 | Status: ON HOLD | OUTPATIENT
Start: 2024-09-05 | End: 2024-09-12

## 2024-09-05 RX ADMIN — IOHEXOL 100 ML: 350 INJECTION, SOLUTION INTRAVENOUS at 10:28

## 2024-09-05 RX ADMIN — CEPHALEXIN 500 MG: 250 CAPSULE ORAL at 11:23

## 2024-09-05 NOTE — ED TRIAGE NOTES
"Pt arrives stating that he has not had a bowel movement in three days. Also states that he saw red in his indwelling catheter this morning but it is now \"normal color\". Denies pain, just a bloated feeling. Did try OTC laxatives with no relief.   "

## 2024-09-05 NOTE — DISCHARGE INSTRUCTIONS
Bladder findings may be due to under distention and Colon catheter placement placement. Cannot exclude cystitis. Correlate with UA.     No other evidence of acute abdominopelvic process.     Colonic diverticulosis.     Atherosclerotic disease. Multifocal moderate to severe right common, external and proximal femoral artery stenosis. Moderate focal stenosis of the proximal left common iliac artery. This could be followed up with nonemergent abdominal aortic CTA with   bilateral lower extremity runoff if clinically warranted.     Additional chronic findings and negatives as above.

## 2024-09-05 NOTE — ED PROVIDER NOTES
History  Chief Complaint   Patient presents with    Constipation     74-year-old male presents for evaluation of small amount of blood found in Colon catheter bag.  Patient also reports not having a bowel movement for the last 2-3 days.  Denies abdominal pain, vomiting.  Patient reports blood in urine has resolved.  Denies fever, back pain.        Prior to Admission Medications   Prescriptions Last Dose Informant Patient Reported? Taking?   acetaminophen (TYLENOL) 325 mg tablet   Yes No   Sig: Take 325 mg by mouth every 6 (six) hours as needed for mild pain   amiodarone 200 mg tablet   No No   Sig: Take 1 tablet (200 mg total) by mouth daily   apixaban (ELIQUIS) 5 mg  Self No No   Sig: Take 1 tablet (5 mg total) by mouth 2 (two) times a day Do not start before November 21, 2023.   aspirin 81 mg chewable tablet  Self No No   Sig: Chew 1 tablet (81 mg total) daily   atorvastatin (LIPITOR) 40 mg tablet  Self No No   Sig: Take 1 tablet (40 mg total) by mouth daily with dinner   clotrimazole (LOTRIMIN) 1 % cream   Yes No   Sig: Apply topically 2 (two) times a day   finasteride (PROSCAR) 5 mg tablet   No No   Sig: Take 1 tablet (5 mg total) by mouth daily   furosemide (LASIX) 40 mg tablet  Self No No   Sig: Take 1 tablet (40 mg total) by mouth daily Do not start before November 16, 2023.   lisinopril (ZESTRIL) 5 mg tablet   No No   Sig: Take 1 tablet (5 mg total) by mouth daily   meloxicam (MOBIC) 15 mg tablet   Yes No   Sig: Take 15 mg by mouth daily   metoprolol succinate (TOPROL-XL) 100 mg 24 hr tablet  Self No No   Sig: Take 1 tablet (100 mg total) by mouth daily   metoprolol succinate (TOPROL-XL) 50 mg 24 hr tablet  Self No No   Sig: Take 1 tablet (50 mg total) by mouth every evening   mupirocin (BACTROBAN) 2 % ointment   Yes No   Sig: Apply topically 3 (three) times a day   nitroglycerin (NITROSTAT) 0.4 mg SL tablet  Self No No   Sig: Place 1 tablet (0.4 mg total) under the tongue every 5 (five) minutes as needed  for chest pain   nystatin (MYCOSTATIN) powder   No No   Sig: Apply topically 3 (three) times a day for 7 days Do not start before January 24, 2024.   Patient not taking: Reported on 4/3/2024   sacubitril-valsartan (ENTRESTO) 49-51 MG TABS   Yes No   Sig: Take 1 tablet by mouth 2 (two) times a day   tamsulosin (FLOMAX) 0.4 mg  Self No No   Sig: Take 1 capsule (0.4 mg total) by mouth daily with dinner   traMADol (ULTRAM) 50 mg tablet   Yes No   Sig: Take 50 mg by mouth every 6 (six) hours as needed for moderate pain      Facility-Administered Medications: None       Past Medical History:   Diagnosis Date    Alcohol intoxication ([facility]) 10/15/2020    BPH (benign prostatic hyperplasia)     Chronic HFrEF (heart failure with reduced ejection fraction) ([facility]) 11/2023    Coronary artery calcification seen on CT scan 11/10/2023    Coronary artery disease 12/14/2023    Deep vein thrombosis (DVT) of left lower extremity ([facility]) 11/2023    Diabetes mellitus ([facility]) 11/2023    Fall from slip, trip, or stumble 10/15/2020    History of phimosis of penis     History of urinary calculi     Hypertension 1988    Skin cancer     Tobacco abuse     quit around 2000       Past Surgical History:   Procedure Laterality Date    BLADDER STONE REMOVAL  2014    CARDIAC CATHETERIZATION N/A 12/14/2023    Procedure: Cardiac catheterization;  Surgeon: Dave Royal MD;  Location: [facility];  Service: Cardiology    ORIF ANKLE FRACTURE Left     SKIN LESION EXCISION N/A 3/18/2024    Procedure: WIDE LOCAL EXCISION OF BACK MELANOMA;  Surgeon: Lillie La MD;  Location: [facility];  Service: Surgical Oncology    TOTAL HIP ARTHROPLASTY Right        Family History   Problem Relation Age of Onset    Breast cancer Mother     Heart attack Father 61    Other Sister         s/p hysterectomy    Cerebral palsy Brother     Skin cancer Other         family history    No Known Problems Son     No Known Problems Daughter     Sudden death Neg Hx      I have  reviewed and agree with the history as documented.    E-Cigarette/Vaping    E-Cigarette Use Never User      E-Cigarette/Vaping Substances    Nicotine No     THC No     CBD No     Flavoring No     Other No     Unknown No      Social History     Tobacco Use    Smoking status: Former     Types: Cigarettes     Start date:      Quit date:      Years since quittin.7    Smokeless tobacco: Never    Tobacco comments:     Quit over 30 years ago (Updated 2023).    Vaping Use    Vaping status: Never Used   Substance Use Topics    Alcohol use: Not Currently    Drug use: Never       Review of Systems   Constitutional:  Negative for fever.   Genitourinary:  Positive for hematuria.       Physical Exam  Physical Exam  Vitals and nursing note reviewed.   Constitutional:       General: He is not in acute distress.     Appearance: He is well-developed.   HENT:      Head: Normocephalic and atraumatic.      Right Ear: External ear normal.      Left Ear: External ear normal.      Nose: Nose normal.   Eyes:      General: No scleral icterus.  Pulmonary:      Effort: Pulmonary effort is normal. No respiratory distress.   Abdominal:      General: There is no distension.      Palpations: Abdomen is soft.      Comments: Mildly tender in lower abdomen   Musculoskeletal:         General: No deformity. Normal range of motion.      Cervical back: Normal range of motion and neck supple.   Skin:     General: Skin is warm.      Findings: No rash.   Neurological:      General: No focal deficit present.      Mental Status: He is alert.      Gait: Gait normal.   Psychiatric:         Mood and Affect: Mood normal.         Vital Signs  ED Triage Vitals [24 0909]   Temperature Pulse Respirations Blood Pressure SpO2   97.8 °F (36.6 °C) (!) 46 18 (!) 176/72 96 %      Temp Source Heart Rate Source Patient Position - Orthostatic VS BP Location FiO2 (%)   Oral Monitor Sitting Left arm --      Pain Score       No Pain           Vitals:     09/05/24 0909 09/05/24 1000 09/05/24 1130 09/05/24 1230   BP: (!) 176/72 144/67 138/60 152/67   Pulse: (!) 46 (!) 46 (!) 49 (!) 45   Patient Position - Orthostatic VS: Sitting            Visual Acuity      ED Medications  Medications   iohexol (OMNIPAQUE) 350 MG/ML injection (MULTI-DOSE) 100 mL (100 mL Intravenous Given 9/5/24 1028)   cephalexin (KEFLEX) capsule 500 mg (500 mg Oral Given 9/5/24 1123)       Diagnostic Studies  Results Reviewed       Procedure Component Value Units Date/Time    HS Troponin I 2hr [[id number]]  (Normal) Collected: 09/05/24 1126    Lab Status: Final result Specimen: Blood from Arm, Right Updated: 09/05/24 1155     hs TnI 2hr 26 ng/L      Delta 2hr hsTnI 0 ng/L     HS Troponin I 4hr [[id number]]     Lab Status: No result Specimen: Blood     Urine Microscopic [[id number]]  (Abnormal) Collected: 09/05/24 0932    Lab Status: Final result Specimen: Urine, Indwelling Colon Catheter Updated: 09/05/24 1017     RBC, UA Innumerable /hpf      WBC, UA Innumerable /hpf      Epithelial Cells Moderate /hpf      Bacteria, UA Innumerable /hpf     Urine culture [[id number]] Collected: 09/05/24 0932    Lab Status: In process Specimen: Urine, Indwelling Colon Catheter Updated: 09/05/24 1017    HS Troponin 0hr (reflex protocol) [[id number]]  (Normal) Collected: 09/05/24 0926    Lab Status: Final result Specimen: Blood from Arm, Right Updated: 09/05/24 0956     hs TnI 0hr 26 ng/L     Lactic acid, plasma (w/reflex if result > 2.0) [[id number]]  (Normal) Collected: 09/05/24 0926    Lab Status: Final result Specimen: Blood from Arm, Right Updated: 09/05/24 0955     LACTIC ACID 1.0 mmol/L     Narrative:      Result may be elevated if tourniquet was used during collection.    UA w Reflex to Microscopic w Reflex to Culture [[id number]]  (Abnormal) Collected: 09/05/24 0932    Lab Status: Final result Specimen: Urine, Indwelling Colon Catheter Updated: 09/05/24 0952     Color, UA Yellow     Clarity, UA Cloudy     Specific  Gravity, UA 1.015     pH, UA 6.0     Leukocytes, UA Large     Nitrite, UA Negative     Protein, UA 30 (1+) mg/dl      Glucose, UA Negative mg/dl      Ketones, UA Negative mg/dl      Urobilinogen, UA <2.0 mg/dl      Bilirubin, UA Negative     Occult Blood, UA Large    CMP [[id number]] Collected: 09/05/24 0926    Lab Status: Final result Specimen: Blood from Arm, Right Updated: 09/05/24 0949     Sodium 137 mmol/L      Potassium 4.2 mmol/L      Chloride 103 mmol/L      CO2 30 mmol/L      ANION GAP 4 mmol/L      BUN 20 mg/dL      Creatinine 1.25 mg/dL      Glucose 96 mg/dL      Calcium 8.9 mg/dL      AST 14 U/L      ALT 9 U/L      Alkaline Phosphatase 71 U/L      Total Protein 7.1 g/dL      Albumin 3.8 g/dL      Total Bilirubin 0.71 mg/dL      eGFR 55 ml/min/1.73sq m     Narrative:      National Kidney Disease Foundation guidelines for Chronic Kidney Disease (CKD):     Stage 1 with normal or high GFR (GFR > 90 mL/min/1.73 square meters)    Stage 2 Mild CKD (GFR = 60-89 mL/min/1.73 square meters)    Stage 3A Moderate CKD (GFR = 45-59 mL/min/1.73 square meters)    Stage 3B Moderate CKD (GFR = 30-44 mL/min/1.73 square meters)    Stage 4 Severe CKD (GFR = 15-29 mL/min/1.73 square meters)    Stage 5 End Stage CKD (GFR <15 mL/min/1.73 square meters)  Note: GFR calculation is accurate only with a steady state creatinine    Lipase [[id number]]  (Normal) Collected: 09/05/24 0926    Lab Status: Final result Specimen: Blood from Arm, Right Updated: 09/05/24 0949     Lipase 41 u/L     CBC and differential [[id number]] Collected: 09/05/24 0926    Lab Status: Final result Specimen: Blood from Arm, Right Updated: 09/05/24 0933     WBC 6.90 Thousand/uL      RBC 4.23 Million/uL      Hemoglobin 13.2 g/dL      Hematocrit 40.7 %      MCV 96 fL      MCH 31.2 pg      MCHC 32.4 g/dL      RDW 13.2 %      MPV 10.8 fL      Platelets 273 Thousands/uL      nRBC 0 /100 WBCs      Segmented % 64 %      Immature Grans % 0 %      Lymphocytes % 22 %       Monocytes % 9 %      Eosinophils Relative 4 %      Basophils Relative 1 %      Absolute Neutrophils 4.38 Thousands/µL      Absolute Immature Grans 0.03 Thousand/uL      Absolute Lymphocytes 1.52 Thousands/µL      Absolute Monocytes 0.65 Thousand/µL      Eosinophils Absolute 0.27 Thousand/µL      Basophils Absolute 0.05 Thousands/µL                    CT Abdomen pelvis with contrast   Final Result by Tien Villa MD (09/05 1102)      Bladder findings may be due to under distention and Colon catheter placement placement. Cannot exclude cystitis. Correlate with UA.      No other evidence of acute abdominopelvic process.      Colonic diverticulosis.      Atherosclerotic disease. Multifocal moderate to severe right common, external and proximal femoral artery stenosis. Moderate focal stenosis of the proximal left common iliac artery. This could be followed up with nonemergent abdominal aortic CTA with    bilateral lower extremity runoff if clinically warranted.      Additional chronic findings and negatives as above.      The study was marked in EPIC for immediate notification.         Workstation performed: [id number]                    Procedures  Procedures         ED Course                                 SBIRT 20yo+      Flowsheet Row Most Recent Value   Initial Alcohol Screen: US AUDIT-C     1. How often do you have a drink containing alcohol? 0 Filed at: 09/05/2024 0910   2. How many drinks containing alcohol do you have on a typical day you are drinking?  0 Filed at: 09/05/2024 0910   3a. Male UNDER 65: How often do you have five or more drinks on one occasion? 0 Filed at: 09/05/2024 0910   3b. FEMALE Any Age, or MALE 65+: How often do you have 4 or more drinks on one occassion? 0 Filed at: 09/05/2024 0910   Audit-C Score 0 Filed at: 09/05/2024 0910   WILLIE: How many times in the past year have you...    Used an illegal drug or used a prescription medication for non-medical reasons? Never Filed at:  09/05/2024 0910                      Medical Decision Making  74-year-old male presenting with hematuria and constipation.  Obtain labs, urinalysis, CT abdomen pelvis.    Discussed all results with patient.  Will treat for urinary tract infection.  Follow-up with PCP peer return precautions discussed    Amount and/or Complexity of Data Reviewed  Labs: ordered.  Radiology: ordered.    Risk  Prescription drug management.                 Disposition  Final diagnoses:   UTI (urinary tract infection)     Time reflects when diagnosis was documented in both MDM as applicable and the Disposition within this note       Time User Action Codes Description Comment    9/5/2024 12:40 PM Austin Hodgson [N39.0] UTI (urinary tract infection)           ED Disposition       ED Disposition   Discharge    Condition   Stable    Date/Time   Thu Sep 5, 2024 1239    Comment   Toro Rodriguez discharge to home/self care.                   Follow-up Information       Follow up With Specialties Details Why Contact Info Additional Information    [facility], Nurse Practitioner   [address] [facility] [address]  [phone]        [facility] Emergency Medicine  If symptoms worsen [address] [facility] [address] [phone] [facility] Department, [address] [place], [place] [address]            Discharge Medication List as of 9/5/2024 12:40 PM        START taking these medications    Details   cephalexin (KEFLEX) 500 mg capsule Take 1 capsule (500 mg total) by mouth every 12 (twelve) hours for 7 days, Starting Thu 9/5/2024, Until Thu 9/12/2024, Print           CONTINUE these medications which have NOT CHANGED    Details   acetaminophen (TYLENOL) 325 mg tablet Take 325 mg by mouth every 6 (six) hours as needed for mild pain, Historical Med      amiodarone 200 mg tablet Take 1 tablet (200 mg  total) by mouth daily, Starting Fri 5/31/2024, Normal      apixaban (ELIQUIS) 5 mg Take 1 tablet (5 mg total) by mouth 2 (two) times a day Do not start before November 21, 2023., Starting Tue 11/21/2023, No Print      aspirin 81 mg chewable tablet Chew 1 tablet (81 mg total) daily, Starting Sat 12/16/2023, No Print      atorvastatin (LIPITOR) 40 mg tablet Take 1 tablet (40 mg total) by mouth daily with dinner, Starting Wed 11/15/2023, No Print      clotrimazole (LOTRIMIN) 1 % cream Apply topically 2 (two) times a day, Historical Med      finasteride (PROSCAR) 5 mg tablet Take 1 tablet (5 mg total) by mouth daily, Starting Thu 4/18/2024, Until Sun 4/13/2025, Normal      furosemide (LASIX) 40 mg tablet Take 1 tablet (40 mg total) by mouth daily Do not start before November 16, 2023., Starting Thu 11/16/2023, No Print      lisinopril (ZESTRIL) 5 mg tablet Take 1 tablet (5 mg total) by mouth daily, Starting Thu 5/2/2024, No Print      meloxicam (MOBIC) 15 mg tablet Take 15 mg by mouth daily, Historical Med      !! metoprolol succinate (TOPROL-XL) 100 mg 24 hr tablet Take 1 tablet (100 mg total) by mouth daily, Starting Sat 12/16/2023, No Print      !! metoprolol succinate (TOPROL-XL) 50 mg 24 hr tablet Take 1 tablet (50 mg total) by mouth every evening, Starting Fri 12/15/2023, No Print      mupirocin (BACTROBAN) 2 % ointment Apply topically 3 (three) times a day, Historical Med      nitroglycerin (NITROSTAT) 0.4 mg SL tablet Place 1 tablet (0.4 mg total) under the tongue every 5 (five) minutes as needed for chest pain, Starting Fri 12/15/2023, No Print      nystatin (MYCOSTATIN) powder Apply topically 3 (three) times a day for 7 days Do not start before January 24, 2024., Starting Wed 1/24/2024, Until Tue 3/12/2024, Normal      sacubitril-valsartan (ENTRESTO) 49-51 MG TABS Take 1 tablet by mouth 2 (two) times a day, Historical Med      tamsulosin (FLOMAX) 0.4 mg Take 1 capsule (0.4 mg total) by mouth daily with dinner,  Starting Fri 12/15/2023, No Print      traMADol (ULTRAM) 50 mg tablet Take 50 mg by mouth every 6 (six) hours as needed for moderate pain, Historical Med       !! - Potential duplicate medications found. Please discuss with provider.          No discharge procedures on file.    PDMP Review         Value Time User    PDMP Reviewed  Yes 11/15/2023 12:20 PM Krysta Castillo PA-C            ED Provider  Electronically Signed by             Austin Hodgson DO  09/05/24 2565

## 2024-09-06 LAB
ATRIAL RATE: 47 BPM
BACTERIA UR CULT: NORMAL
P AXIS: 30 DEGREES
PR INTERVAL: 146 MS
QRS AXIS: 42 DEGREES
QRSD INTERVAL: 126 MS
QT INTERVAL: 542 MS
QTC INTERVAL: 479 MS
T WAVE AXIS: 87 DEGREES
VENTRICULAR RATE: 47 BPM

## 2024-09-06 PROCEDURE — 93010 ELECTROCARDIOGRAM REPORT: CPT | Performed by: INTERNAL MEDICINE

## 2024-09-12 ENCOUNTER — HOSPITAL ENCOUNTER (INPATIENT)
Facility: HOSPITAL | Age: 74
LOS: 5 days | Discharge: NURSING, REHAB OR LONG-TERM CARE | DRG: 699 | End: 2024-09-17
Attending: EMERGENCY MEDICINE | Admitting: INTERNAL MEDICINE
Payer: MEDICARE

## 2024-09-12 DIAGNOSIS — N39.0 UTI (URINARY TRACT INFECTION): Primary | ICD-10-CM

## 2024-09-12 DIAGNOSIS — R31.9 HEMATURIA: ICD-10-CM

## 2024-09-12 DIAGNOSIS — R53.1 GENERALIZED WEAKNESS: ICD-10-CM

## 2024-09-12 DIAGNOSIS — R10.84 GENERALIZED ABDOMINAL PAIN: ICD-10-CM

## 2024-09-12 DIAGNOSIS — I50.23 ACUTE ON CHRONIC SYSTOLIC (CONGESTIVE) HEART FAILURE (HCC): ICD-10-CM

## 2024-09-12 PROBLEM — T83.511A URINARY TRACT INFECTION ASSOCIATED WITH INDWELLING URETHRAL CATHETER  (HCC): Status: ACTIVE | Noted: 2024-09-12

## 2024-09-12 PROBLEM — T83.510A URINARY TRACT INFECTION ASSOCIATED WITH CYSTOSTOMY CATHETER  (HCC): Status: ACTIVE | Noted: 2024-09-12

## 2024-09-12 LAB
ALBUMIN SERPL BCG-MCNC: 3.6 G/DL (ref 3.5–5)
ALP SERPL-CCNC: 76 U/L (ref 34–104)
ALT SERPL W P-5'-P-CCNC: 8 U/L (ref 7–52)
ANION GAP SERPL CALCULATED.3IONS-SCNC: 6 MMOL/L (ref 4–13)
AST SERPL W P-5'-P-CCNC: 12 U/L (ref 13–39)
BACTERIA UR QL AUTO: ABNORMAL /HPF
BASOPHILS # BLD AUTO: 0.04 THOUSANDS/ΜL (ref 0–0.1)
BASOPHILS NFR BLD AUTO: 1 % (ref 0–1)
BILIRUB SERPL-MCNC: 0.57 MG/DL (ref 0.2–1)
BILIRUB UR QL STRIP: NEGATIVE
BUN SERPL-MCNC: 18 MG/DL (ref 5–25)
CALCIUM SERPL-MCNC: 8.6 MG/DL (ref 8.4–10.2)
CHLORIDE SERPL-SCNC: 103 MMOL/L (ref 96–108)
CLARITY UR: ABNORMAL
CO2 SERPL-SCNC: 29 MMOL/L (ref 21–32)
COLOR UR: YELLOW
CREAT SERPL-MCNC: 1.22 MG/DL (ref 0.6–1.3)
EOSINOPHIL # BLD AUTO: 0.27 THOUSAND/ΜL (ref 0–0.61)
EOSINOPHIL NFR BLD AUTO: 5 % (ref 0–6)
ERYTHROCYTE [DISTWIDTH] IN BLOOD BY AUTOMATED COUNT: 13.3 % (ref 11.6–15.1)
GFR SERPL CREATININE-BSD FRML MDRD: 57 ML/MIN/1.73SQ M
GLUCOSE SERPL-MCNC: 87 MG/DL (ref 65–140)
GLUCOSE UR STRIP-MCNC: NEGATIVE MG/DL
HCT VFR BLD AUTO: 37.6 % (ref 36.5–49.3)
HGB BLD-MCNC: 12.5 G/DL (ref 12–17)
HGB UR QL STRIP.AUTO: ABNORMAL
IMM GRANULOCYTES # BLD AUTO: 0.02 THOUSAND/UL (ref 0–0.2)
IMM GRANULOCYTES NFR BLD AUTO: 0 % (ref 0–2)
KETONES UR STRIP-MCNC: NEGATIVE MG/DL
LEUKOCYTE ESTERASE UR QL STRIP: ABNORMAL
LYMPHOCYTES # BLD AUTO: 1.54 THOUSANDS/ΜL (ref 0.6–4.47)
LYMPHOCYTES NFR BLD AUTO: 28 % (ref 14–44)
MAGNESIUM SERPL-MCNC: 2.2 MG/DL (ref 1.9–2.7)
MCH RBC QN AUTO: 32 PG (ref 26.8–34.3)
MCHC RBC AUTO-ENTMCNC: 33.2 G/DL (ref 31.4–37.4)
MCV RBC AUTO: 96 FL (ref 82–98)
MONOCYTES # BLD AUTO: 0.57 THOUSAND/ΜL (ref 0.17–1.22)
MONOCYTES NFR BLD AUTO: 10 % (ref 4–12)
MUCOUS THREADS UR QL AUTO: ABNORMAL
NEUTROPHILS # BLD AUTO: 3.11 THOUSANDS/ΜL (ref 1.85–7.62)
NEUTS SEG NFR BLD AUTO: 56 % (ref 43–75)
NITRITE UR QL STRIP: POSITIVE
NON-SQ EPI CELLS URNS QL MICRO: ABNORMAL /HPF
NRBC BLD AUTO-RTO: 0 /100 WBCS
PH UR STRIP.AUTO: 5.5 [PH]
PLATELET # BLD AUTO: 237 THOUSANDS/UL (ref 149–390)
PMV BLD AUTO: 11.7 FL (ref 8.9–12.7)
POTASSIUM SERPL-SCNC: 3.9 MMOL/L (ref 3.5–5.3)
PROT SERPL-MCNC: 6.9 G/DL (ref 6.4–8.4)
PROT UR STRIP-MCNC: ABNORMAL MG/DL
RBC # BLD AUTO: 3.91 MILLION/UL (ref 3.88–5.62)
RBC #/AREA URNS AUTO: ABNORMAL /HPF
SODIUM SERPL-SCNC: 138 MMOL/L (ref 135–147)
SP GR UR STRIP.AUTO: 1.01 (ref 1–1.03)
UROBILINOGEN UR STRIP-ACNC: <2 MG/DL
WBC # BLD AUTO: 5.55 THOUSAND/UL (ref 4.31–10.16)
WBC #/AREA URNS AUTO: ABNORMAL /HPF

## 2024-09-12 PROCEDURE — 80053 COMPREHEN METABOLIC PANEL: CPT | Performed by: EMERGENCY MEDICINE

## 2024-09-12 PROCEDURE — 87077 CULTURE AEROBIC IDENTIFY: CPT | Performed by: EMERGENCY MEDICINE

## 2024-09-12 PROCEDURE — 81001 URINALYSIS AUTO W/SCOPE: CPT | Performed by: EMERGENCY MEDICINE

## 2024-09-12 PROCEDURE — 99284 EMERGENCY DEPT VISIT MOD MDM: CPT | Performed by: EMERGENCY MEDICINE

## 2024-09-12 PROCEDURE — 99223 1ST HOSP IP/OBS HIGH 75: CPT | Performed by: INTERNAL MEDICINE

## 2024-09-12 PROCEDURE — 96365 THER/PROPH/DIAG IV INF INIT: CPT

## 2024-09-12 PROCEDURE — 87147 CULTURE TYPE IMMUNOLOGIC: CPT | Performed by: INTERNAL MEDICINE

## 2024-09-12 PROCEDURE — 87086 URINE CULTURE/COLONY COUNT: CPT | Performed by: EMERGENCY MEDICINE

## 2024-09-12 PROCEDURE — 87147 CULTURE TYPE IMMUNOLOGIC: CPT | Performed by: EMERGENCY MEDICINE

## 2024-09-12 PROCEDURE — 36415 COLL VENOUS BLD VENIPUNCTURE: CPT | Performed by: EMERGENCY MEDICINE

## 2024-09-12 PROCEDURE — 87186 SC STD MICRODIL/AGAR DIL: CPT | Performed by: EMERGENCY MEDICINE

## 2024-09-12 PROCEDURE — 99285 EMERGENCY DEPT VISIT HI MDM: CPT

## 2024-09-12 PROCEDURE — 85025 COMPLETE CBC W/AUTO DIFF WBC: CPT | Performed by: EMERGENCY MEDICINE

## 2024-09-12 PROCEDURE — 87081 CULTURE SCREEN ONLY: CPT | Performed by: INTERNAL MEDICINE

## 2024-09-12 PROCEDURE — 83735 ASSAY OF MAGNESIUM: CPT | Performed by: EMERGENCY MEDICINE

## 2024-09-12 RX ORDER — POLYETHYLENE GLYCOL 3350 17 G/17G
17 POWDER, FOR SOLUTION ORAL DAILY PRN
Status: DISCONTINUED | OUTPATIENT
Start: 2024-09-12 | End: 2024-09-17 | Stop reason: HOSPADM

## 2024-09-12 RX ORDER — ACETAMINOPHEN 325 MG/1
650 TABLET ORAL EVERY 6 HOURS PRN
Status: DISCONTINUED | OUTPATIENT
Start: 2024-09-12 | End: 2024-09-17 | Stop reason: HOSPADM

## 2024-09-12 RX ORDER — CEFTRIAXONE 1 G/50ML
1000 INJECTION, SOLUTION INTRAVENOUS ONCE
Status: COMPLETED | OUTPATIENT
Start: 2024-09-12 | End: 2024-09-12

## 2024-09-12 RX ORDER — LANOLIN ALCOHOL/MO/W.PET/CERES
6 CREAM (GRAM) TOPICAL
Status: DISCONTINUED | OUTPATIENT
Start: 2024-09-12 | End: 2024-09-17 | Stop reason: HOSPADM

## 2024-09-12 RX ORDER — ONDANSETRON 2 MG/ML
4 INJECTION INTRAMUSCULAR; INTRAVENOUS EVERY 4 HOURS PRN
Status: DISCONTINUED | OUTPATIENT
Start: 2024-09-12 | End: 2024-09-17 | Stop reason: HOSPADM

## 2024-09-12 RX ORDER — AMIODARONE HYDROCHLORIDE 200 MG/1
200 TABLET ORAL DAILY
Status: DISCONTINUED | OUTPATIENT
Start: 2024-09-13 | End: 2024-09-17 | Stop reason: HOSPADM

## 2024-09-12 RX ORDER — FINASTERIDE 5 MG/1
5 TABLET, FILM COATED ORAL DAILY
Status: DISCONTINUED | OUTPATIENT
Start: 2024-09-13 | End: 2024-09-17 | Stop reason: HOSPADM

## 2024-09-12 RX ORDER — TAMSULOSIN HYDROCHLORIDE 0.4 MG/1
0.4 CAPSULE ORAL
Status: DISCONTINUED | OUTPATIENT
Start: 2024-09-12 | End: 2024-09-17 | Stop reason: HOSPADM

## 2024-09-12 RX ORDER — CALCIUM CARBONATE 500 MG/1
500 TABLET, CHEWABLE ORAL 2 TIMES DAILY PRN
Status: DISCONTINUED | OUTPATIENT
Start: 2024-09-12 | End: 2024-09-17 | Stop reason: HOSPADM

## 2024-09-12 RX ORDER — TRAMADOL HYDROCHLORIDE 50 MG/1
50 TABLET ORAL EVERY 6 HOURS PRN
Status: DISCONTINUED | OUTPATIENT
Start: 2024-09-12 | End: 2024-09-17 | Stop reason: HOSPADM

## 2024-09-12 RX ORDER — DOCUSATE SODIUM 100 MG/1
100 CAPSULE, LIQUID FILLED ORAL 2 TIMES DAILY PRN
Status: DISCONTINUED | OUTPATIENT
Start: 2024-09-12 | End: 2024-09-17 | Stop reason: HOSPADM

## 2024-09-12 RX ORDER — VANCOMYCIN HYDROCHLORIDE 750 MG/150ML
10 INJECTION, SOLUTION INTRAVENOUS EVERY 12 HOURS
Status: DISCONTINUED | OUTPATIENT
Start: 2024-09-12 | End: 2024-09-14

## 2024-09-12 RX ORDER — ATORVASTATIN CALCIUM 40 MG/1
40 TABLET, FILM COATED ORAL
Status: DISCONTINUED | OUTPATIENT
Start: 2024-09-12 | End: 2024-09-17 | Stop reason: HOSPADM

## 2024-09-12 RX ORDER — METOPROLOL SUCCINATE 50 MG/1
50 TABLET, EXTENDED RELEASE ORAL EVERY EVENING
Status: DISCONTINUED | OUTPATIENT
Start: 2024-09-12 | End: 2024-09-13

## 2024-09-12 RX ORDER — ASPIRIN 81 MG/1
81 TABLET, CHEWABLE ORAL DAILY
Status: DISCONTINUED | OUTPATIENT
Start: 2024-09-13 | End: 2024-09-17 | Stop reason: HOSPADM

## 2024-09-12 RX ORDER — LISINOPRIL 5 MG/1
5 TABLET ORAL DAILY
Status: DISCONTINUED | OUTPATIENT
Start: 2024-09-13 | End: 2024-09-17 | Stop reason: HOSPADM

## 2024-09-12 RX ADMIN — CEFTRIAXONE 1000 MG: 1 INJECTION, SOLUTION INTRAVENOUS at 15:19

## 2024-09-12 RX ADMIN — APIXABAN 5 MG: 5 TABLET, FILM COATED ORAL at 18:37

## 2024-09-12 RX ADMIN — METOPROLOL SUCCINATE 50 MG: 50 TABLET, EXTENDED RELEASE ORAL at 18:38

## 2024-09-12 RX ADMIN — ATORVASTATIN CALCIUM 40 MG: 40 TABLET, FILM COATED ORAL at 18:37

## 2024-09-12 RX ADMIN — VANCOMYCIN HYDROCHLORIDE 750 MG: 750 INJECTION, SOLUTION INTRAVENOUS at 18:30

## 2024-09-12 RX ADMIN — SODIUM CHLORIDE 500 ML: 0.9 INJECTION, SOLUTION INTRAVENOUS at 15:19

## 2024-09-12 RX ADMIN — TAMSULOSIN HYDROCHLORIDE 0.4 MG: 0.4 CAPSULE ORAL at 18:37

## 2024-09-12 NOTE — H&P
H&P - Hospitalist   Name: Toro Rodriguez 74 y.o. male I MRN: [id number]  Unit/Bed#: [facility] 02 I Date of Admission: 9/12/2024   Date of Service: 9/12/2024 I Hospital Day: 0     Assessment & Plan  Urinary tract infection associated with indwelling urethral catheter  (HCC)  Recent diagnosed with UTI and placed on Keflex twice daily.  Urine culture at that time growing mixed contaminants.  Patient noted blood in his urine this morning, UA suspicious for UTI  His last urine culture grew Enterococcus thus started on IV vancomycin.  He was given a dose of IV ceftriaxone in the ER  Follow-up on repeat urine culture and tailor antibiotics appropriately  Generalized weakness  Overall malaise, generalized weakness for the past day.  Has been on Keflex for UTI but noticed discoloration and some blood in his urine as well as generalized weakness today  PT and OT evaluations  Essential hypertension  Blood pressure controlled  Resume home lisinopril, Lasix, Entresto, Toprol-XL  Monitor blood pressure  Coronary artery disease involving native coronary artery of native heart  He presently denies chest pain  Noted to have some bradycardia in the ER  Continue Toprol-XL but reduce morning dose to 75 mg.  Continue evening dose at 50 mg.  Continue aspirin  Benign prostatic hyperplasia with urinary retention  BPH with urinary retention, following with urology and will have SPT placed as an outpatient  Deep vein thrombosis (DVT) of left lower extremity (HCC)  Continue Eliquis  Chronic systolic (congestive) heart failure (HCC)  Wt Readings from Last 3 Encounters:   09/12/24 95.3 kg (210 lb)   09/05/24 95.3 kg (210 lb)   07/30/24 95.3 kg (210 lb)   Appears compensated presently.  No weight gain, shortness of breath or leg swelling  Continue Entresto, Lasix, Toprol-XL  EF is 40%, follows with cardiology  Daily weights while hospitalized  Chronic kidney disease, stage 3a (HCC)  Lab Results   Component Value Date    EGFR 57 09/12/2024    EGFR 55  09/05/2024    EGFR 43 07/10/2024    CREATININE 1.22 09/12/2024    CREATININE 1.25 09/05/2024    CREATININE 1.52 (H) 07/10/2024   His creatinine is stable    VTE Pharmacologic Prophylaxis: VTE Score: 5 High Risk (Score >/= 5) - Pharmacological DVT Prophylaxis Ordered: apixaban (Eliquis). Sequential Compression Devices Ordered.  Code Status: Prior     Anticipated Length of Stay: Patient will be admitted on an inpatient basis with an anticipated length of stay of greater than 2 midnights secondary to UTI, failure of outpatient antibiotics, generalized weakness needing physical Occupational Therapy evaluations.    Total Time for Visit, including Counseling / Coordination of Care: 75 Minutes. Greater than 50% of this total time spent on direct patient counseling and coordination of care.    Chief Complaint: hematuria, weakness    History of Present Illness:  Toro Rodriguez is a 74 y.o. male with a PMH of urinary retention with Colon catheter in place, CHF, hypertension complaining of generalized weakness, dysuria and bloody urine over the past day.  Patient was recently diagnosed with UTI and sent home with Keflex twice daily.  Was feeling okay up until this morning, sudden feeling of what he describes as listlessness, malaise and fatigue.  Unable to ambulate due to the symptoms.  Also noted some bleeding and discolored urine at that time.  There were no fevers or chills.  No lower abdominal pain presently.    Review of Systems:  Review of Systems   Constitutional:  Negative for activity change, appetite change, chills, fatigue and fever.   HENT:  Negative for congestion, rhinorrhea, sinus pressure and sore throat.    Eyes:  Negative for photophobia, pain and visual disturbance.   Respiratory:  Negative for cough, shortness of breath and wheezing.    Cardiovascular:  Negative for chest pain, palpitations and leg swelling.   Gastrointestinal:  Negative for abdominal distention, abdominal pain, constipation, diarrhea,  nausea and vomiting.   Endocrine: Negative for cold intolerance, heat intolerance, polydipsia and polyuria.   Genitourinary:  Positive for hematuria. Negative for difficulty urinating, dysuria, flank pain and frequency.   Musculoskeletal:  Negative for arthralgias, back pain and joint swelling.   Skin:  Negative for color change, pallor and rash.   Allergic/Immunologic: Negative.    Neurological:  Positive for weakness. Negative for dizziness, syncope, light-headedness and headaches.   Hematological: Negative.    Psychiatric/Behavioral: Negative.         Past Medical and Surgical History:   Past Medical History:   Diagnosis Date    Alcohol intoxication (Formerly [facility]) 10/15/2020    BPH (benign prostatic hyperplasia)     Chronic HFrEF (heart failure with reduced ejection fraction) (Formerly [facility]) 11/2023    Coronary artery calcification seen on CT scan 11/10/2023    Coronary artery disease 12/14/2023    Deep vein thrombosis (DVT) of left lower extremity ([facility]) 11/2023    Diabetes mellitus ([facility]) 11/2023    Fall from slip, trip, or stumble 10/15/2020    History of phimosis of penis     History of urinary calculi     Hypertension 1988    Skin cancer     Tobacco abuse     quit around 2000       Past Surgical History:   Procedure Laterality Date    BLADDER STONE REMOVAL  2014    CARDIAC CATHETERIZATION N/A 12/14/2023    Procedure: Cardiac catheterization;  Surgeon: Dave Royal MD;  Location: [facility];  Service: Cardiology    ORIF ANKLE FRACTURE Left     SKIN LESION EXCISION N/A 3/18/2024    Procedure: WIDE LOCAL EXCISION OF BACK MELANOMA;  Surgeon: Lillie La MD;  Location: [facility];  Service: Surgical Oncology    TOTAL HIP ARTHROPLASTY Right        Meds/Allergies:  Prior to Admission medications    Medication Sig Start Date End Date Taking? Authorizing Provider   acetaminophen (TYLENOL) 325 mg tablet Take 325 mg by mouth every 6 (six) hours as needed for mild pain    Historical Provider, MD   amiodarone 200 mg tablet  Take 1 tablet (200 mg total) by mouth daily 5/31/24   Rob Roach MD   apixaban (ELIQUIS) 5 mg Take 1 tablet (5 mg total) by mouth 2 (two) times a day Do not start before November 21, 2023. 11/21/23   Krysta Castillo PA-C   aspirin 81 mg chewable tablet Chew 1 tablet (81 mg total) daily 12/16/23   CARLIE Rincon   atorvastatin (LIPITOR) 40 mg tablet Take 1 tablet (40 mg total) by mouth daily with dinner 11/15/23   Krysta Castillo PA-C   cephalexin (KEFLEX) 500 mg capsule Take 1 capsule (500 mg total) by mouth every 12 (twelve) hours for 7 days 9/5/24 9/12/24  Austin Hodgson DO   clotrimazole (LOTRIMIN) 1 % cream Apply topically 2 (two) times a day    Historical Provider, MD   finasteride (PROSCAR) 5 mg tablet Take 1 tablet (5 mg total) by mouth daily 4/18/24 4/13/25  Rick Espino MD   furosemide (LASIX) 40 mg tablet Take 1 tablet (40 mg total) by mouth daily Do not start before November 16, 2023. 11/16/23   Krysta Castillo PA-C   lisinopril (ZESTRIL) 5 mg tablet Take 1 tablet (5 mg total) by mouth daily 5/2/24   Brook Fuentes MD   meloxicam (MOBIC) 15 mg tablet Take 15 mg by mouth daily    Historical Provider, MD   metoprolol succinate (TOPROL-XL) 100 mg 24 hr tablet Take 1 tablet (100 mg total) by mouth daily 12/16/23   CARLIE Rincon   metoprolol succinate (TOPROL-XL) 50 mg 24 hr tablet Take 1 tablet (50 mg total) by mouth every evening 12/15/23   CARLIE Rincon   mupirocin (BACTROBAN) 2 % ointment Apply topically 3 (three) times a day    Historical Provider, MD   nitroglycerin (NITROSTAT) 0.4 mg SL tablet Place 1 tablet (0.4 mg total) under the tongue every 5 (five) minutes as needed for chest pain 12/15/23   CARLIE Rincon   nystatin (MYCOSTATIN) powder Apply topically 3 (three) times a day for 7 days Do not start before January 24, 2024.  Patient not taking: Reported on 4/3/2024 1/24/24 3/12/24  CARLIE Steele   sacubitril-valsartan (ENTRESTO) 49-51 MG TABS Take 1 tablet by  "mouth 2 (two) times a day    Historical Provider, MD   tamsulosin (FLOMAX) 0.4 mg Take 1 capsule (0.4 mg total) by mouth daily with dinner 12/15/23   CARLIE Rincon   traMADol (ULTRAM) 50 mg tablet Take 50 mg by mouth every 6 (six) hours as needed for moderate pain    Historical Provider, MD       All medications reviewed.    Allergies:   Allergies   Allergen Reactions    Zosyn [Piperacillin Sod-Tazobactam So] Rash       Social History:  Marital Status:      Substance Use History:   Social History     Substance and Sexual Activity   Alcohol Use Not Currently     Social History     Tobacco Use   Smoking Status Former    Types: Cigarettes    Start date:     Quit date:     Years since quittin.7   Smokeless Tobacco Never   Tobacco Comments    Quit over 30 years ago (Updated 2023).      Social History     Substance and Sexual Activity   Drug Use Never       Family History:  Non-contributory    Physical Exam:     Vitals:   Blood Pressure: 160/70 (24 1530)  Pulse: (!) 49 (24 1530)  Temperature: (!) 97.3 °F (36.3 °C) (24 1304)  Respirations: 17 (24 1530)  Height: 5' 7\" (170.2 cm) (24 1304)  Weight - Scale: 95.3 kg (210 lb) (24 1304)  SpO2: 94 % (24 1530)    Physical Exam  Vitals and nursing note reviewed.   Constitutional:       General: He is not in acute distress.     Appearance: Normal appearance. He is not ill-appearing.   HENT:      Head: Normocephalic and atraumatic.   Eyes:      General: No scleral icterus.        Right eye: No discharge.         Left eye: No discharge.      Pupils: Pupils are equal, round, and reactive to light.   Cardiovascular:      Rate and Rhythm: Regular rhythm. Bradycardia present.      Pulses: Normal pulses.      Heart sounds: No murmur heard.     No friction rub. No gallop.   Pulmonary:      Effort: Pulmonary effort is normal. No respiratory distress.      Breath sounds: Normal breath sounds. No wheezing, rhonchi or rales. "   Abdominal:      General: Bowel sounds are normal. There is no distension.      Palpations: Abdomen is soft.      Tenderness: There is no abdominal tenderness. There is no guarding or rebound.   Genitourinary:     Comments: + farmer, dark urine, no blood or clots  Musculoskeletal:         General: No swelling or deformity.      Cervical back: Neck supple.      Right lower leg: No edema.      Left lower leg: No edema.   Skin:     General: Skin is warm and dry.      Capillary Refill: Capillary refill takes less than 2 seconds.      Coloration: Skin is not jaundiced or pale.      Findings: No erythema or rash.   Neurological:      General: No focal deficit present.      Mental Status: He is alert and oriented to person, place, and time. Mental status is at baseline.      Cranial Nerves: No cranial nerve deficit.      Sensory: No sensory deficit.      Motor: No weakness.   Psychiatric:         Mood and Affect: Mood normal.         Behavior: Behavior normal.          Additional Data:     Lab Results:  Results from last 7 days   Lab Units 09/12/24  1343   WBC Thousand/uL 5.55   HEMOGLOBIN g/dL 12.5   HEMATOCRIT % 37.6   PLATELETS Thousands/uL 237   SEGS PCT % 56   LYMPHO PCT % 28   MONO PCT % 10   EOS PCT % 5     Results from last 7 days   Lab Units 09/12/24  1343   SODIUM mmol/L 138   POTASSIUM mmol/L 3.9   CHLORIDE mmol/L 103   CO2 mmol/L 29   BUN mg/dL 18   CREATININE mg/dL 1.22   ANION GAP mmol/L 6   CALCIUM mg/dL 8.6   ALBUMIN g/dL 3.6   TOTAL BILIRUBIN mg/dL 0.57   ALK PHOS U/L 76   ALT U/L 8   AST U/L 12*   GLUCOSE RANDOM mg/dL 87                       Imaging: All pertinent imaging reviewed.  No orders to display       EKG and Other Studies Reviewed on Admission:   Prior pertinent studies and records reviewed in [facility]Care Everywhere.    ** Please Note: This note has been constructed using a voice recognition system. **

## 2024-09-12 NOTE — ASSESSMENT & PLAN NOTE
Blood pressure controlled  Resume home lisinopril, Lasix, Entresto, Toprol-XL  Monitor blood pressure

## 2024-09-12 NOTE — ASSESSMENT & PLAN NOTE
Wt Readings from Last 3 Encounters:   09/13/24 87.5 kg (192 lb 12.8 oz)   09/05/24 95.3 kg (210 lb)   07/30/24 95.3 kg (210 lb)   Appears compensated presently.  No weight gain, shortness of breath or leg swelling  Continue Lisinopril, Lasix, Toprol-XL  EF is 40%, follows with cardiology  Daily weights while hospitalized

## 2024-09-12 NOTE — ASSESSMENT & PLAN NOTE
Lab Results   Component Value Date    EGFR 57 09/12/2024    EGFR 55 09/05/2024    EGFR 43 07/10/2024    CREATININE 1.22 09/12/2024    CREATININE 1.25 09/05/2024    CREATININE 1.52 (H) 07/10/2024   His creatinine is stable

## 2024-09-12 NOTE — ED PROVIDER NOTES
1. UTI (urinary tract infection)    2. Hematuria    3. Generalized weakness      ED Disposition       ED Disposition   Admit    Condition   Stable    Date/Time   u Sep 12, 2024  4:01 PM    Comment   Case was discussed with Dr. Fleming and the patient's admission status was agreed to be Admission Status: inpatient status to the service of Dr. Fleming.               Assessment & Plan       Medical Decision Making  Obtain blood work, UA  IV fluids and continue to monitor patient for any worsening symptoms.    Patient's blood work was essentially unremarkable.  UA is suggestive of continued UTI.  Patient is currently on p.o. antibiotics Keflex on day 4.  Previous urine culture grew out Enterococcus facialis that is susceptible to vancomycin.  Patient has penicillin allergy.  IV vancomycin started and patient is admitted for UTI failing outpatient treatment.  Patient agrees with admission plans.    Amount and/or Complexity of Data Reviewed  External Data Reviewed: labs, radiology, ECG and notes.  Labs: ordered. Decision-making details documented in ED Course.    Risk  Prescription drug management.  Decision regarding hospitalization.                       Medications   sodium chloride 0.9 % bolus 500 mL (500 mL Intravenous New Bag 9/12/24 1519)   vancomycin (VANCOCIN) 2000 mg in sodium chloride 0.9% 500 mL IVPB (has no administration in time range)   cefTRIAXone (ROCEPHIN) IVPB (premix in dextrose) 1,000 mg 50 mL (1,000 mg Intravenous New Bag 9/12/24 1519)       History of Present Illness       74-year-old male with past history of hypertension, diabetes, CHF, DVT, CAD, BPH, chronic indwelling Colon catheter, presents to the ED for evaluation of generalized weakness and hematuria.  Patient was seen at our emergency department last week for abdominal pain.  Patient was found to have UTI on UA as well as CT scan suggestive of cystitis.  Patient was placed on Keflex.  Patient is currently on day 4 of Keflex however patient  is complaining of generalized weakness and more fatigue since yesterday.  Patient noted hematuria in his Colon bag today that is new.  Subsequently patient came to the ED for further evaluation.  Patient denies any fevers but has had chills at the assisted living facility.      Blood in Urine  Pertinent negatives include no abdominal pain, chills, dysuria, fever or vomiting.           Review of Systems   Constitutional:  Negative for chills and fever.   HENT:  Negative for ear pain and sore throat.    Eyes:  Negative for pain and visual disturbance.   Respiratory:  Negative for cough and shortness of breath.    Cardiovascular:  Negative for chest pain and palpitations.   Gastrointestinal:  Negative for abdominal pain and vomiting.   Genitourinary:  Positive for hematuria. Negative for dysuria.   Musculoskeletal:  Negative for arthralgias and back pain.   Skin:  Negative for color change and rash.   Neurological:  Negative for seizures and syncope.   All other systems reviewed and are negative.          Objective     ED Triage Vitals [09/12/24 1304]   Temperature Pulse Blood Pressure Respirations SpO2 Patient Position - Orthostatic VS   (!) 97.3 °F (36.3 °C) (!) 53 139/66 16 96 % Sitting      Temp src Heart Rate Source BP Location FiO2 (%) Pain Score    -- Monitor -- -- No Pain        Physical Exam  Vitals and nursing note reviewed.   Constitutional:       General: He is not in acute distress.     Appearance: He is well-developed.   HENT:      Head: Normocephalic and atraumatic.   Eyes:      Conjunctiva/sclera: Conjunctivae normal.   Cardiovascular:      Rate and Rhythm: Normal rate and regular rhythm.      Heart sounds: No murmur heard.  Pulmonary:      Effort: Pulmonary effort is normal. No respiratory distress.      Breath sounds: Normal breath sounds.   Abdominal:      General: Abdomen is flat. Bowel sounds are normal. There is no distension.      Palpations: Abdomen is soft.      Tenderness: There is no  abdominal tenderness.      Comments: Abdomen is soft, nondistended, with bowel sound present to all 4 quadrants.  No tenderness noted to palpation of abdomen.   Musculoskeletal:         General: No swelling.      Cervical back: Neck supple.   Skin:     General: Skin is warm and dry.      Capillary Refill: Capillary refill takes less than 2 seconds.   Neurological:      Mental Status: He is alert.   Psychiatric:         Mood and Affect: Mood normal.         Labs Reviewed   COMPREHENSIVE METABOLIC PANEL - Abnormal       Result Value    Sodium 138      Potassium 3.9      Chloride 103      CO2 29      ANION GAP 6      BUN 18      Creatinine 1.22      Glucose 87      Calcium 8.6      AST 12 (*)     ALT 8      Alkaline Phosphatase 76      Total Protein 6.9      Albumin 3.6      Total Bilirubin 0.57      eGFR 57      Narrative:     National Kidney Disease Foundation guidelines for Chronic Kidney Disease (CKD):     Stage 1 with normal or high GFR (GFR > 90 mL/min/1.73 square meters)    Stage 2 Mild CKD (GFR = 60-89 mL/min/1.73 square meters)    Stage 3A Moderate CKD (GFR = 45-59 mL/min/1.73 square meters)    Stage 3B Moderate CKD (GFR = 30-44 mL/min/1.73 square meters)    Stage 4 Severe CKD (GFR = 15-29 mL/min/1.73 square meters)    Stage 5 End Stage CKD (GFR <15 mL/min/1.73 square meters)  Note: GFR calculation is accurate only with a steady state creatinine   URINALYSIS WITH REFLEX TO SCOPE - Abnormal    Color, UA Yellow      Clarity, UA Cloudy      Specific Gravity, UA 1.015      pH, UA 5.5      Leukocytes, UA Small (*)     Nitrite, UA Positive (*)     Protein, UA Trace (*)     Glucose, UA Negative      Ketones, UA Negative      Urobilinogen, UA <2.0      Bilirubin, UA Negative      Occult Blood, UA Large (*)    URINE MICROSCOPIC - Abnormal    RBC, UA 30-50 (*)     WBC, UA 4-10 (*)     Epithelial Cells Occasional      Bacteria, UA Moderate (*)     MUCUS THREADS Occasional (*)    MAGNESIUM - Normal    Magnesium 2.2      URINE CULTURE   CBC AND DIFFERENTIAL    WBC 5.55      RBC 3.91      Hemoglobin 12.5      Hematocrit 37.6      MCV 96      MCH 32.0      MCHC 33.2      RDW 13.3      MPV 11.7      Platelets 237      nRBC 0      Segmented % 56      Immature Grans % 0      Lymphocytes % 28      Monocytes % 10      Eosinophils Relative 5      Basophils Relative 1      Absolute Neutrophils 3.11      Absolute Immature Grans 0.02      Absolute Lymphocytes 1.54      Absolute Monocytes 0.57      Eosinophils Absolute 0.27      Basophils Absolute 0.04       No orders to display       Procedures       Marino Beltran DO  09/12/24 [id number]

## 2024-09-12 NOTE — ASSESSMENT & PLAN NOTE
Recent diagnosed with UTI and placed on Keflex twice daily.  Urine culture at that time growing mixed contaminants.  Patient noted blood in his urine this morning, UA suspicious for UTI  His last urine culture grew Enterococcus thus started on IV vancomycin.  He was given a dose of IV ceftriaxone in the ER  Follow-up on repeat urine culture and tailor antibiotics appropriately

## 2024-09-12 NOTE — ASSESSMENT & PLAN NOTE
He presently denies chest pain  Noted to have some bradycardia in the ER  Continue Toprol-XL but reduce morning dose to 75 mg.  Continue evening dose at 50 mg.  Continue aspirin

## 2024-09-12 NOTE — ED TRIAGE NOTES
Pt arrives via EMS from [facility] stating that his urine has been dark, but this morning he noticed that there was red in his urinary bag. Pt has an indwelling catheter for about a year. Recently treated for a UTI

## 2024-09-12 NOTE — ASSESSMENT & PLAN NOTE
Wt Readings from Last 3 Encounters:   09/12/24 95.3 kg (210 lb)   09/05/24 95.3 kg (210 lb)   07/30/24 95.3 kg (210 lb)   Appears compensated presently.  No weight gain, shortness of breath or leg swelling  Continue Entresto, Lasix, Toprol-XL  EF is 40%, follows with cardiology  Daily weights while hospitalized

## 2024-09-12 NOTE — ASSESSMENT & PLAN NOTE
Overall malaise, generalized weakness for the past day.  Has been on Keflex for UTI but noticed discoloration and some blood in his urine as well as generalized weakness today  PT and OT evaluations

## 2024-09-12 NOTE — PLAN OF CARE
Problem: Potential for Falls  Goal: Patient will remain free of falls  Description: INTERVENTIONS:  - Educate patient/family on patient safety including physical limitations  - Instruct patient to call for assistance with activity   - Consult OT/PT to assist with strengthening/mobility   - Keep Call bell within reach  - Keep bed low and locked with side rails adjusted as appropriate  - Keep care items and personal belongings within reach  - Initiate and maintain comfort rounds  - Make Fall Risk Sign visible to staff  - Offer Toileting every 2 Hours, in advance of need  - Initiate/Maintain bed alarm  - Obtain necessary fall risk management equipment: yellow socks   Problem: PAIN - ADULT  Goal: Verbalizes/displays adequate comfort level or baseline comfort level  Description: Interventions:  - Encourage patient to monitor pain and request assistance  - Assess pain using appropriate pain scale  - Administer analgesics based on type and severity of pain and evaluate response  - Implement non-pharmacological measures as appropriate and evaluate response  - Consider cultural and social influences on pain and pain management  - Notify physician/advanced practitioner if interventions unsuccessful or patient reports new pain  Outcome: Progressing     Problem: INFECTION - ADULT  Goal: Absence or prevention of progression during hospitalization  Description: INTERVENTIONS:  - Assess and monitor for signs and symptoms of infection  - Monitor lab/diagnostic results  - Monitor all insertion sites, i.e. indwelling lines, tubes, and drains  - Monitor endotracheal if appropriate and nasal secretions for changes in amount and color  - [place] appropriate cooling/warming therapies per order  - Administer medications as ordered  - Instruct and encourage patient and family to use good hand hygiene technique  - Identify and instruct in appropriate isolation precautions for identified infection/condition  Outcome: Progressing      Problem: SAFETY ADULT  Goal: Maintain or return to baseline ADL function  Description: INTERVENTIONS:  -  Assess patient's ability to carry out ADLs; assess patient's baseline for ADL function and identify physical deficits which impact ability to perform ADLs (bathing, care of mouth/teeth, toileting, grooming, dressing, etc.)  - Assess/evaluate cause of self-care deficits   - Assess range of motion  - Assess patient's mobility; develop plan if impaired  - Assess patient's need for assistive devices and provide as appropriate  - Encourage maximum independence but intervene and supervise when necessary  - Involve family in performance of ADLs  - Assess for home care needs following discharge   - Consider OT consult to assist with ADL evaluation and planning for discharge  - Provide patient education as appropriate  Outcome: Progressing     Problem: DISCHARGE PLANNING  Goal: Discharge to home or other facility with appropriate resources  Description: INTERVENTIONS:  - Identify barriers to discharge w/patient and caregiver  - Arrange for needed discharge resources and transportation as appropriate  - Identify discharge learning needs (meds, wound care, etc.)  - Arrange for interpretive services to assist at discharge as needed  - Refer to Case Management Department for coordinating discharge planning if the patient needs post-hospital services based on physician/advanced practitioner order or complex needs related to functional status, cognitive ability, or social support system  Outcome: Progressing     - Apply yellow socks and bracelet for high fall risk patients  - Consider moving patient to room near nurses station  Outcome: Progressing

## 2024-09-12 NOTE — PROGRESS NOTES
Toro Rodriguez is a 74 y.o. male who is currently ordered Vancomycin IV with management by the Pharmacy Consult service.  Relevant clinical data and objective / subjective history reviewed.  Vancomycin Assessment:  Indication and Goal AUC/Trough: Urinary tract infection (goal -600, trough >10), -600, trough >10  Clinical Status: New start  Micro:   Urine culture pending  Renal Function:  SCr: 1.22 mg/dL  CrCl: 56.2 mL/min  Renal replacement: Not on dialysis  Days of Therapy: 1  Current Dose: 750mg IV Q12H  Vancomycin Plan:  New Dosing:    Estimated AUC: 482 mcg*hr/mL  Estimated Trough: 15.8 mcg/mL  Next Level: With morning labs at approximately 0530 on 9/14/24  Renal Function Monitoring: Daily BMP and UOP  Pharmacy will continue to follow closely for s/sx of nephrotoxicity, infusion reactions and appropriateness of therapy.  BMP and CBC will be ordered per protocol. We will continue to follow the patient’s culture results and clinical progress daily.    Valeria Becker, Pharmacist

## 2024-09-13 LAB
ANION GAP SERPL CALCULATED.3IONS-SCNC: 9 MMOL/L (ref 4–13)
BASOPHILS # BLD AUTO: 0.04 THOUSANDS/ΜL (ref 0–0.1)
BASOPHILS NFR BLD AUTO: 1 % (ref 0–1)
BUN SERPL-MCNC: 18 MG/DL (ref 5–25)
CALCIUM SERPL-MCNC: 8.2 MG/DL (ref 8.4–10.2)
CHLORIDE SERPL-SCNC: 105 MMOL/L (ref 96–108)
CO2 SERPL-SCNC: 25 MMOL/L (ref 21–32)
CREAT SERPL-MCNC: 1.13 MG/DL (ref 0.6–1.3)
EOSINOPHIL # BLD AUTO: 0.24 THOUSAND/ΜL (ref 0–0.61)
EOSINOPHIL NFR BLD AUTO: 5 % (ref 0–6)
ERYTHROCYTE [DISTWIDTH] IN BLOOD BY AUTOMATED COUNT: 13.2 % (ref 11.6–15.1)
GFR SERPL CREATININE-BSD FRML MDRD: 62 ML/MIN/1.73SQ M
GLUCOSE SERPL-MCNC: 112 MG/DL (ref 65–140)
GLUCOSE SERPL-MCNC: 77 MG/DL (ref 65–140)
HCT VFR BLD AUTO: 37.2 % (ref 36.5–49.3)
HGB BLD-MCNC: 12.5 G/DL (ref 12–17)
IMM GRANULOCYTES # BLD AUTO: 0.04 THOUSAND/UL (ref 0–0.2)
IMM GRANULOCYTES NFR BLD AUTO: 1 % (ref 0–2)
LYMPHOCYTES # BLD AUTO: 1.35 THOUSANDS/ΜL (ref 0.6–4.47)
LYMPHOCYTES NFR BLD AUTO: 25 % (ref 14–44)
MCH RBC QN AUTO: 32.3 PG (ref 26.8–34.3)
MCHC RBC AUTO-ENTMCNC: 33.6 G/DL (ref 31.4–37.4)
MCV RBC AUTO: 96 FL (ref 82–98)
MONOCYTES # BLD AUTO: 0.56 THOUSAND/ΜL (ref 0.17–1.22)
MONOCYTES NFR BLD AUTO: 11 % (ref 4–12)
NEUTROPHILS # BLD AUTO: 3.09 THOUSANDS/ΜL (ref 1.85–7.62)
NEUTS SEG NFR BLD AUTO: 57 % (ref 43–75)
NRBC BLD AUTO-RTO: 0 /100 WBCS
PLATELET # BLD AUTO: 222 THOUSANDS/UL (ref 149–390)
PMV BLD AUTO: 10.6 FL (ref 8.9–12.7)
POTASSIUM SERPL-SCNC: 4.5 MMOL/L (ref 3.5–5.3)
RBC # BLD AUTO: 3.87 MILLION/UL (ref 3.88–5.62)
SODIUM SERPL-SCNC: 139 MMOL/L (ref 135–147)
WBC # BLD AUTO: 5.32 THOUSAND/UL (ref 4.31–10.16)

## 2024-09-13 PROCEDURE — 97163 PT EVAL HIGH COMPLEX 45 MIN: CPT

## 2024-09-13 PROCEDURE — 99232 SBSQ HOSP IP/OBS MODERATE 35: CPT | Performed by: PHYSICIAN ASSISTANT

## 2024-09-13 PROCEDURE — 85025 COMPLETE CBC W/AUTO DIFF WBC: CPT | Performed by: INTERNAL MEDICINE

## 2024-09-13 PROCEDURE — 80048 BASIC METABOLIC PNL TOTAL CA: CPT | Performed by: INTERNAL MEDICINE

## 2024-09-13 PROCEDURE — 82948 REAGENT STRIP/BLOOD GLUCOSE: CPT

## 2024-09-13 RX ORDER — METOPROLOL SUCCINATE 50 MG/1
50 TABLET, EXTENDED RELEASE ORAL EVERY 12 HOURS
Status: DISCONTINUED | OUTPATIENT
Start: 2024-09-13 | End: 2024-09-14

## 2024-09-13 RX ADMIN — TAMSULOSIN HYDROCHLORIDE 0.4 MG: 0.4 CAPSULE ORAL at 17:12

## 2024-09-13 RX ADMIN — APIXABAN 5 MG: 5 TABLET, FILM COATED ORAL at 17:12

## 2024-09-13 RX ADMIN — METOPROLOL SUCCINATE 50 MG: 50 TABLET, EXTENDED RELEASE ORAL at 09:47

## 2024-09-13 RX ADMIN — APIXABAN 5 MG: 5 TABLET, FILM COATED ORAL at 09:48

## 2024-09-13 RX ADMIN — AMIODARONE HYDROCHLORIDE 200 MG: 200 TABLET ORAL at 09:48

## 2024-09-13 RX ADMIN — ATORVASTATIN CALCIUM 40 MG: 40 TABLET, FILM COATED ORAL at 17:12

## 2024-09-13 RX ADMIN — VANCOMYCIN HYDROCHLORIDE 750 MG: 750 INJECTION, SOLUTION INTRAVENOUS at 05:06

## 2024-09-13 RX ADMIN — FINASTERIDE 5 MG: 5 TABLET, FILM COATED ORAL at 09:48

## 2024-09-13 RX ADMIN — VANCOMYCIN HYDROCHLORIDE 750 MG: 750 INJECTION, SOLUTION INTRAVENOUS at 17:12

## 2024-09-13 RX ADMIN — ASPIRIN 81 MG CHEWABLE TABLET 81 MG: 81 TABLET CHEWABLE at 09:48

## 2024-09-13 RX ADMIN — LISINOPRIL 5 MG: 5 TABLET ORAL at 09:48

## 2024-09-13 NOTE — PLAN OF CARE
Problem: PHYSICAL THERAPY ADULT  Goal: Performs mobility at highest level of function for planned discharge setting.  See evaluation for individualized goals.  Description: Treatment/Interventions: Functional transfer training, LE strengthening/ROM, Therapeutic exercise, Endurance training, Patient/family training, Bed mobility, Gait training, Compensatory technique education, Spoke to nursing, OT          See flowsheet documentation for full assessment, interventions and recommendations.  Note: Prognosis: Good  Problem List: Decreased strength, Decreased endurance, Impaired balance, Decreased mobility  Assessment: Toro Rodriguez is a 74 y.o. Male who presents to [facility] on 9/12/24 from his longterm w/ (+) UTI and generalized weakness. Orders for PT eval and treat received, w/ activity orders of up and out of bed as tolerated and fall precautions. Pt presents w/ comorbidities of urinary retention with Colon catheter in place, CHF, hypertension. At baseline, pt mobilizes independently w/ a rollator, and reports 0 falls in the last 6 months. Upon evaluation, pt presents w/ the following deficits: weakness, impaired coordination, impaired balance, and decreased endurance. Pt currently demonstrates supervision for transfers and supervision with intermittent CG assist with a RW for ambulation. The patient's AM-PAC Basic Mobility Inpatient Short Form Raw Score is 17. A Raw score of less than or equal to 17 suggests the patient may benefit from discharge to post-acute rehabilitation services. Please also refer to the recommendation of the Physical Therapist for safe discharge planning. Based on current status, Level 3 rehab intensity is recommended at time of discharge. At this time, pt would benefit from continued skilled PT services, in the acute care setting, in order to address the abovementioned deficits and maximize independence and functional gains prior to discharge.        Rehab Resource Intensity Level, PT: III  (Minimum Resource Intensity)    See flowsheet documentation for full assessment.

## 2024-09-13 NOTE — PLAN OF CARE
Problem: PAIN - ADULT  Goal: Verbalizes/displays adequate comfort level or baseline comfort level  Description: Interventions:  - Encourage patient to monitor pain and request assistance  - Assess pain using appropriate pain scale  - Administer analgesics based on type and severity of pain and evaluate response  - Implement non-pharmacological measures as appropriate and evaluate response  - Consider cultural and social influences on pain and pain management  - Notify physician/advanced practitioner if interventions unsuccessful or patient reports new pain  Outcome: Progressing     Problem: INFECTION - ADULT  Goal: Absence or prevention of progression during hospitalization  Description: INTERVENTIONS:  - Assess and monitor for signs and symptoms of infection  - Monitor lab/diagnostic results  - Monitor all insertion sites, i.e. indwelling lines, tubes, and drains  - Monitor endotracheal if appropriate and nasal secretions for changes in amount and color  - [place] appropriate cooling/warming therapies per order  - Administer medications as ordered  - Instruct and encourage patient and family to use good hand hygiene technique  - Identify and instruct in appropriate isolation precautions for identified infection/condition  Outcome: Progressing     Problem: SAFETY ADULT  Goal: Maintain or return to baseline ADL function  Description: INTERVENTIONS:  -  Assess patient's ability to carry out ADLs; assess patient's baseline for ADL function and identify physical deficits which impact ability to perform ADLs (bathing, care of mouth/teeth, toileting, grooming, dressing, etc.)  - Assess/evaluate cause of self-care deficits   - Assess range of motion  - Assess patient's mobility; develop plan if impaired  - Assess patient's need for assistive devices and provide as appropriate  - Encourage maximum independence but intervene and supervise when necessary  - Involve family in performance of ADLs  - Assess for home care  needs following discharge   - Consider OT consult to assist with ADL evaluation and planning for discharge  - Provide patient education as appropriate  Outcome: Progressing     Problem: Potential for Falls  Goal: Patient will remain free of falls  Description: INTERVENTIONS:  - Educate patient/family on patient safety including physical limitations  - Instruct patient to call for assistance with activity   - Consult OT/PT to assist with strengthening/mobility   - Keep Call bell within reach  - Keep bed low and locked with side rails adjusted as appropriate  - Keep care items and personal belongings within reach  - Initiate and maintain comfort rounds  - Make Fall Risk Sign visible to staff  - Offer Toileting every 2 Hours, in advance of need  - Initiate/Maintain bed alarm  - Obtain necessary fall risk management equipment: yellow socks   Problem: DISCHARGE PLANNING  Goal: Discharge to home or other facility with appropriate resources  Description: INTERVENTIONS:  - Identify barriers to discharge w/patient and caregiver  - Arrange for needed discharge resources and transportation as appropriate  - Identify discharge learning needs (meds, wound care, etc.)  - Arrange for interpretive services to assist at discharge as needed  - Refer to Case Management Department for coordinating discharge planning if the patient needs post-hospital services based on physician/advanced practitioner order or complex needs related to functional status, cognitive ability, or social support system  Outcome: Progressing     - Apply yellow socks and bracelet for high fall risk patients  - Consider moving patient to room near nurses station  Outcome: Progressing

## 2024-09-13 NOTE — PROGRESS NOTES
Toro Rodriguez is a 74 y.o. male who is currently ordered Vancomycin IV with management by the Pharmacy Consult service.  Relevant clinical data and objective / subjective history reviewed.  Vancomycin Assessment:  Indication and Goal AUC/Trough: Urinary tract infection (goal -600, trough >10), -600, trough >10  Clinical Status: stable  Micro:   Pending  Renal Function:  SCr: 1.13 mg/dL  CrCl: 61.2 mL/min  Renal replacement: Not on dialysis  Days of Therapy: 2  Current Dose: 750mg every 12 hours  Vancomycin Plan:  New Dosing: no change  Estimated AUC: 470 mcg*hr/mL  Estimated Trough: 15.2 mcg/mL  Next Level: 9/14/24 at 0530  Renal Function Monitoring: Daily BMP and UOP  Pharmacy will continue to follow closely for s/sx of nephrotoxicity, infusion reactions and appropriateness of therapy.  BMP and CBC will be ordered per protocol. We will continue to follow the patient’s culture results and clinical progress daily.    Randolph Kang, Pharmacist, PharmD, BCPS

## 2024-09-13 NOTE — PROGRESS NOTES
Progress Note - Hospitalist   Name: Toro Rodriguez 74 y.o. male I MRN: [id number]  Unit/Bed#: -01 I Date of Admission: 9/12/2024   Date of Service: 9/13/2024 I Hospital Day: 1    Assessment & Plan  Urinary tract infection associated with indwelling urethral catheter  (HCC)  Recent diagnosed with UTI and placed on Keflex twice daily.  Urine culture at that time growing mixed contaminants.  Patient noted blood in his urine this morning, UA suspicious for UTI  His last urine culture grew Enterococcus thus started on IV vancomycin.  He was given a dose of IV ceftriaxone in the ER  Follow-up on repeat urine culture and tailor antibiotics appropriately  Generalized weakness  Overall malaise, generalized weakness for the past day.  Has been on Keflex for UTI but noticed discoloration and some blood in his urine as well as generalized weakness today  PT and OT evaluations  Essential hypertension  Blood pressure controlled  Resume home lisinopril, Lasix, Entresto, Toprol-XL  Monitor blood pressure  Coronary artery disease involving native coronary artery of native heart  He presently denies chest pain  Noted to have some bradycardia in the ER  Continue Toprol-XL but reduce morning dose to 50 mg.  Continue evening dose at 50 mg.  Continue aspirin  Benign prostatic hyperplasia with urinary retention  BPH with urinary retention, following with urology and will have SPT placed as an outpatient  Deep vein thrombosis (DVT) of left lower extremity (HCC)  Continue Eliquis  Chronic systolic (congestive) heart failure (HCC)  Wt Readings from Last 3 Encounters:   09/13/24 87.5 kg (192 lb 12.8 oz)   09/05/24 95.3 kg (210 lb)   07/30/24 95.3 kg (210 lb)   Appears compensated presently.  No weight gain, shortness of breath or leg swelling  Continue Lisinopril, Lasix, Toprol-XL  EF is 40%, follows with cardiology  Daily weights while hospitalized  Chronic kidney disease, stage 3a ([facility])  Lab Results   Component Value Date    EGFR 62  2024    EGFR 57 2024    EGFR 55 2024    CREATININE 1.13 2024    CREATININE 1.22 2024    CREATININE 1.25 2024   His creatinine is stable    VTE Pharmacologic Prophylaxis: VTE Score: 5 High Risk (Score >/= 5) - Pharmacological DVT Prophylaxis Ordered: apixaban (Eliquis). Sequential Compression Devices Ordered.    Mobility:   Basic Mobility Inpatient Raw Score: 17  JH-HLM Goal: 5: Stand one or more mins  JH-HLM Achieved: 6: Walk 10 steps or more  JH-HLM Goal achieved. Continue to encourage appropriate mobility.    Patient Centered Rounds: I performed bedside rounds with nursing staff today.   Discussions with Specialists or Other Care Team Provider: emerson, rn    Education and Discussions with Family / Patient: Updated  (daughter) via phone.    Current Length of Stay: 1 day(s)  Current Patient Status: Inpatient   Certification Statement: The patient will continue to require additional inpatient hospital stay due to UTI and IV antibiotics awaiting urine culture  Discharge Plan: Anticipate discharge in 24-48 hrs to discharge location to be determined pending rehab evaluations.    Code Status: Level 1 - Full Code    Subjective   Patient feels a little bit better today in regards to his weakness.  No fevers overnight.  Urine is still dark.    Objective     Vitals:   Temp (24hrs), Av.6 °F (36.4 °C), Min:97.3 °F (36.3 °C), Max:98 °F (36.7 °C)    Temp:  [97.3 °F (36.3 °C)-98 °F (36.7 °C)] 97.7 °F (36.5 °C)  HR:  [42-53] 53  Resp:  [16-20] 20  BP: (139-181)/(61-75) 144/61  SpO2:  [94 %-96 %] 94 %  Body mass index is 30.2 kg/m².     Input and Output Summary (last 24 hours):     Intake/Output Summary (Last 24 hours) at 2024 1028  Last data filed at 2024 1017  Gross per 24 hour   Intake 550 ml   Output 2470 ml   Net -1920 ml       Physical Exam  Vitals and nursing note reviewed.   Constitutional:       General: He is not in acute distress.     Appearance: Normal  appearance. He is well-developed. He is not ill-appearing or diaphoretic.   HENT:      Head: Normocephalic and atraumatic.      Mouth/Throat:      Mouth: Oropharynx is clear and moist. Mucous membranes are moist.   Eyes:      General: No scleral icterus.     Extraocular Movements: EOM normal.   Cardiovascular:      Rate and Rhythm: Regular rhythm. Bradycardia present.      Heart sounds: Normal heart sounds. No murmur heard.     No friction rub. No gallop.   Pulmonary:      Effort: Pulmonary effort is normal. No respiratory distress.      Breath sounds: Normal breath sounds. No wheezing, rhonchi or rales.   Abdominal:      General: Bowel sounds are normal. There is no distension.      Palpations: Abdomen is soft.      Tenderness: There is no abdominal tenderness. There is no guarding or rebound.   Genitourinary:     Comments: Colon catheter in place with dark yellow urine  Musculoskeletal:         General: No swelling or deformity.      Cervical back: Neck supple.      Right lower leg: No edema.      Left lower leg: No edema.   Skin:     General: Skin is warm and dry.      Capillary Refill: Capillary refill takes less than 2 seconds.      Coloration: Skin is not jaundiced or pale.      Findings: No erythema or rash.   Neurological:      General: No focal deficit present.      Mental Status: He is alert and oriented to person, place, and time. Mental status is at baseline.   Psychiatric:         Mood and Affect: Mood and affect and mood normal.         Behavior: Behavior normal.        Lines/Drains:  Lines/Drains/Airways       Active Status       Name Placement date Placement time Site Days    Urethral Catheter Coude 18 Fr. 07/10/24  1640  Coude  64                  Urinary Catheter:  Goal for removal: N/A - Chronic Colon                 Lab Results: I have reviewed the following results:    Results from last 7 days   Lab Units 09/13/24  0325   WBC Thousand/uL 5.32   HEMOGLOBIN g/dL 12.5   HEMATOCRIT % 37.2    PLATELETS Thousands/uL 222   SEGS PCT % 57   LYMPHO PCT % 25   MONO PCT % 11   EOS PCT % 5     Results from last 7 days   Lab Units 09/13/24  0325 09/12/24  1343   SODIUM mmol/L 139 138   POTASSIUM mmol/L 4.5 3.9   CHLORIDE mmol/L 105 103   CO2 mmol/L 25 29   BUN mg/dL 18 18   CREATININE mg/dL 1.13 1.22   ANION GAP mmol/L 9 6   CALCIUM mg/dL 8.2* 8.6   ALBUMIN g/dL  --  3.6   TOTAL BILIRUBIN mg/dL  --  0.57   ALK PHOS U/L  --  76   ALT U/L  --  8   AST U/L  --  12*   GLUCOSE RANDOM mg/dL 77 87                       Recent Cultures (last 7 days):         Imaging Review: No pertinent imaging studies reviewed.  Other Studies: No additional pertinent studies reviewed.    Last 24 Hours Medication List:     Current Facility-Administered Medications:     acetaminophen (TYLENOL) tablet 650 mg, Q6H PRN    amiodarone tablet 200 mg, Daily    apixaban (ELIQUIS) tablet 5 mg, BID    aspirin chewable tablet 81 mg, Daily    atorvastatin (LIPITOR) tablet 40 mg, Daily With Dinner    calcium carbonate (TUMS) chewable tablet 500 mg, BID PRN    docusate sodium (COLACE) capsule 100 mg, BID PRN    finasteride (PROSCAR) tablet 5 mg, Daily    lisinopril (ZESTRIL) tablet 5 mg, Daily    melatonin tablet 6 mg, HS PRN    metoprolol succinate (TOPROL-XL) 24 hr tablet 50 mg, Q12H    ondansetron (ZOFRAN) injection 4 mg, Q4H PRN    polyethylene glycol (MIRALAX) packet 17 g, Daily PRN    tamsulosin (FLOMAX) capsule 0.4 mg, Daily With Dinner    traMADol (ULTRAM) tablet 50 mg, Q6H PRN    vancomycin (VANCOCIN) IVPB (premix in dextrose) 750 mg 150 mL, Q12H, Last Rate: 750 mg (09/13/24 0506)    Administrative Statements   Today, Patient Was Seen By: Rick Crawley PA-C      **Please Note: This note may have been constructed using a voice recognition system.**

## 2024-09-13 NOTE — ASSESSMENT & PLAN NOTE
He presently denies chest pain  Noted to have some bradycardia in the ER  Continue Toprol-XL but reduce morning dose to 50 mg.  Continue evening dose at 50 mg.  Continue aspirin

## 2024-09-13 NOTE — PHYSICAL THERAPY NOTE
PHYSICAL THERAPY EVALUATION NOTE      Patient Name: Toro Rodriguez  Today's Date: 2024    AGE:   74 y.o.  Mrn:   [id number]  ADMIT DX:  Blood in urine [R31.9]  UTI (urinary tract infection) [N39.0]  Hematuria [R31.9]  Generalized weakness [R53.1]    Past Medical History:   Diagnosis Date    Alcohol intoxication ([facility]) 10/15/2020    BPH (benign prostatic hyperplasia)     Chronic HFrEF (heart failure with reduced ejection fraction) ([facility]) 2023    Coronary artery calcification seen on CT scan 11/10/2023    Coronary artery disease 2023    Deep vein thrombosis (DVT) of left lower extremity ([facility]) 2023    Diabetes mellitus ([facility]) 2023    Fall from slip, trip, or stumble 10/15/2020    History of phimosis of penis     History of urinary calculi     Hypertension     Skin cancer     Tobacco abuse     quit around      Length Of Stay: 1  PHYSICAL THERAPY EVALUATION :   Patient's identity confirmed via 2 patient identifiers (full name and ) at start of session       24 1305   PT Last Visit   PT Visit Date 24   Note Type   Note type Evaluation   Pain Assessment   Pain Assessment Tool 0-10   Pain Score No Pain  (at rest,. reports 8/10 after amb trial)   Pain Location/Orientation Orientation: Right  (thigh)   Hospital Pain Intervention(s) Repositioned   Restrictions/Precautions   Weight Bearing Precautions Per Order No   Other Precautions Chair Alarm;Bed Alarm;Fall Risk;Pain;Hard of hearing   Home Living   Type of Home Assisted living  ([place] assisted care [facility])   Home Layout One level;Access   Bathroom Shower/Tub Walk-in shower   Bathroom Toilet Standard   Bathroom Equipment Grab bars in shower;Built-in shower seat;Grab bars around toilet   Bathroom Accessibility Accessible   Home Equipment   (rollator)   Additional Comments has his own room with a roommate   Prior Function   Level of [place]  "Independent with functional mobility  (independent with bed mobility and transfers; help with bathing. can do lower body dressing but reports it is a struggle.)   Lives With Facility staff  (has a roommate)   IADLs Family/Friend/Other provides medication management;Family/Friend/Other provides meals   Falls in the last 6 months 1 to 4  (1 slip and fall in the bedroom)   Vocational Retired   General   Family/Caregiver Present No   Cognition   Overall Cognitive Status WFL   Arousal/Participation Alert   Orientation Level Oriented X4   Memory Within functional limits   Following Commands Follows one step commands with increased time or repetition   Comments Pleasant and cooperative throughout session.   Subjective   Subjective \"I walked earlier today too\".   RUE Assessment   RUE Assessment WFL   LUE Assessment   LUE Assessment WFL   RLE Assessment   RLE Assessment WFL   LLE Assessment   LLE Assessment WFL   Light Touch   RLE Light Touch Grossly intact   LLE Light Touch Grossly intact   Bed Mobility   Additional Comments not assessed. Pt received in chair.   Transfers   Sit to Stand 5  Supervision   Additional items Armrests   Stand to Sit 5  Supervision   Additional items Armrests   Ambulation/Elevation   Gait pattern   (BLE externally rotated with flat arches, short step length and increased trunk sway.)   Gait Assistance 5  Supervision  (intermittent CG assist)   Additional items Assist x 1;Verbal cues;Tactile cues   Assistive Device Rolling walker   Distance 250   Stair Management Assistance Not tested   Ambulation/Elevation Additional Comments During last 50ft of trial, pt reported onset of fatigue. Pt required a standing rest breakwith min/CG assist for trunk support. ultimately his recliner had to be brought to the doorway of his room to allow for seated rest break. After standing break, was able to ambulate 25ft to chair. Pt transferred to chair and was repositioned in room with BLE elevated.   Balance   Static " Sitting Good   Dynamic Sitting Fair +   Static Standing Fair   Dynamic Standing Fair -   Ambulatory Fair -   Activity Tolerance   Activity Tolerance Patient limited by fatigue   Medical Staff Made Aware FRANCIS Garcia   Nurse Made Aware GLORIA Sultana   Assessment   Prognosis Good   Problem List Decreased strength;Decreased endurance;Impaired balance;Decreased mobility   Assessment Toro Rodriguez is a 74 y.o. Male who [facility] from his [facility] w/ (+) UTI and generalized weakness. Orders for PT eval and treat received, w/ activity orders of up and out of bed as tolerated and fall precautions. Pt presents w/ comorbidities of urinary retention with Colon catheter in place, CHF, hypertension. At baseline, pt mobilizes independently w/ a rollator, and reports 0 falls in the last 6 months. Upon evaluation, pt presents w/ the following deficits: weakness, impaired coordination, impaired balance, and decreased endurance. Pt currently demonstrates supervision for transfers and supervision with intermittent CG assist with a RW for ambulation. The patient's AM-PAC Basic Mobility Inpatient Short Form Raw Score is 17. A Raw score of less than or equal to 17 suggests the patient may benefit from discharge to post-acute rehabilitation services. Please also refer to the recommendation of the Physical Therapist for safe discharge planning. Based on current status, Level 3 rehab intensity is recommended at time of discharge. At this time, pt would benefit from continued skilled PT services, in the acute care setting, in order to address the abovementioned deficits and maximize independence and functional gains prior to discharge.   Goals   Patient Goals to feel better   [facility] Expiration Date 09/27/24   Short Term Goal #1 1. supine to sit independently. 2. sit to stand modified independently. 3. ambulate with RW >250ft modified independently. 4. LE HEP with supervision assist.   PT Treatment Day 0   Plan   Treatment/Interventions  Functional transfer training;LE strengthening/ROM;Therapeutic exercise;Endurance training;Patient/family training;Bed mobility;Gait training;Compensatory technique education;Spoke to nursing;OT   PT Frequency 3-5x/wk   Discharge Recommendation   Rehab Resource Intensity Level, PT III (Minimum Resource Intensity)   AM-PAC Basic Mobility Inpatient   Turning in Flat Bed Without Bedrails 3   Lying on Back to Sitting on Edge of Flat Bed Without Bedrails 3   Moving Bed to Chair 3   Standing Up From Chair Using Arms 3   Walk in Room 3   Climb 3-5 Stairs With Railing 2   Basic Mobility Inpatient Raw Score 17   Basic Mobility Standardized Score 39.67   [facility] Highest Level Of Mobility   -HL Goal 5: Stand one or more mins   -HL Achieved 7: Walk 25 feet or more   End of Consult   Patient Position at End of Consult Bedside chair;Bed/Chair alarm activated;All needs within reach       The patient's AM-PAC Basic Mobility Inpatient Short Form Raw Score is 17, Standardized Score is 39.67. A standardized score less then or equal to 38.32 (raw score of 17) suggests the patient may benefit from discharge to post-acute rehabilitation services which may not coincide with above PT recommendations. However please refer to therapist recommendation for discharge planning given other factors that may influence destination.      Pt would benefit from skilled inpatient PT during this admission in order to facilitate progress towards goals to maximize functional independence    [place] Guillory, PT

## 2024-09-13 NOTE — PLAN OF CARE
Problem: Potential for Falls  Goal: Patient will remain free of falls  Description: INTERVENTIONS:  - Educate patient/family on patient safety including physical limitations  - Instruct patient to call for assistance with activity   - Consult OT/PT to assist with strengthening/mobility   - Keep Call bell within reach  - Keep bed low and locked with side rails adjusted as appropriate  - Keep care items and personal belongings within reach  - Initiate and maintain comfort rounds  - Make Fall Risk Sign visible to staff  - Offer Toileting every 2 Hours, in advance of need  - Initiate/Maintain bed alarm  - Obtain necessary fall risk management equipment  - Apply yellow socks and bracelet for high fall risk patients  - Consider moving patient to room near nurses station  Outcome: Progressing     Problem: PAIN - ADULT  Goal: Verbalizes/displays adequate comfort level or baseline comfort level  Description: Interventions:  - Encourage patient to monitor pain and request assistance  - Assess pain using appropriate pain scale  - Administer analgesics based on type and severity of pain and evaluate response  - Implement non-pharmacological measures as appropriate and evaluate response  - Consider cultural and social influences on pain and pain management  - Notify physician/advanced practitioner if interventions unsuccessful or patient reports new pain  Outcome: Progressing     Problem: INFECTION - ADULT  Goal: Absence or prevention of progression during hospitalization  Description: INTERVENTIONS:  - Assess and monitor for signs and symptoms of infection  - Monitor lab/diagnostic results  - Monitor all insertion sites, i.e. indwelling lines, tubes, and drains  - Monitor endotracheal if appropriate and nasal secretions for changes in amount and color  - [place] appropriate cooling/warming therapies per order  - Administer medications as ordered  - Instruct and encourage patient and family to use good hand hygiene  technique  - Identify and instruct in appropriate isolation precautions for identified infection/condition  Outcome: Progressing  Goal: Absence of fever/infection during neutropenic period  Description: INTERVENTIONS:  - Monitor WBC    Outcome: Progressing     Problem: SAFETY ADULT  Goal: Patient will remain free of falls  Description: INTERVENTIONS:  - Educate patient/family on patient safety including physical limitations  - Instruct patient to call for assistance with activity   - Consult OT/PT to assist with strengthening/mobility   - Keep Call bell within reach  - Keep bed low and locked with side rails adjusted as appropriate  - Keep care items and personal belongings within reach  - Initiate and maintain comfort rounds  - Make Fall Risk Sign visible to staff  - Offer Toileting every 2 Hours, in advance of need  - Initiate/Maintain bed alarm  - Obtain necessary fall risk management equipment  - Apply yellow socks and bracelet for high fall risk patients  - Consider moving patient to room near nurses station  Outcome: Progressing  Goal: Maintain or return to baseline ADL function  Description: INTERVENTIONS:  -  Assess patient's ability to carry out ADLs; assess patient's baseline for ADL function and identify physical deficits which impact ability to perform ADLs (bathing, care of mouth/teeth, toileting, grooming, dressing, etc.)  - Assess/evaluate cause of self-care deficits   - Assess range of motion  - Assess patient's mobility; develop plan if impaired  - Assess patient's need for assistive devices and provide as appropriate  - Encourage maximum independence but intervene and supervise when necessary  - Involve family in performance of ADLs  - Assess for home care needs following discharge   - Consider OT consult to assist with ADL evaluation and planning for discharge  - Provide patient education as appropriate  Outcome: Progressing  Goal: Maintains/Returns to pre admission functional level  Description:  INTERVENTIONS:  - Perform AM-PAC 6 Click Basic Mobility/ Daily Activity assessment daily.  - Set and communicate daily mobility goal to care team and patient/family/caregiver.   - Collaborate with rehabilitation services on mobility goals if consulted  - Perform Range of Motion 4 times a day.  - Reposition patient every 2 hours.  - Dangle patient 3 times a day  - Stand patient 3 times a day  - Ambulate patient 3 times a day  - Out of bed to chair 3 times a day   - Out of bed for meals 3 times a day  - Out of bed for toileting  - Record patient progress and toleration of activity level   Outcome: Progressing     Problem: DISCHARGE PLANNING  Goal: Discharge to home or other facility with appropriate resources  Description: INTERVENTIONS:  - Identify barriers to discharge w/patient and caregiver  - Arrange for needed discharge resources and transportation as appropriate  - Identify discharge learning needs (meds, wound care, etc.)  - Arrange for interpretive services to assist at discharge as needed  - Refer to Case Management Department for coordinating discharge planning if the patient needs post-hospital services based on physician/advanced practitioner order or complex needs related to functional status, cognitive ability, or social support system  Outcome: Progressing     Problem: Knowledge Deficit  Goal: Patient/family/caregiver demonstrates understanding of disease process, treatment plan, medications, and discharge instructions  Description: Complete learning assessment and assess knowledge base.  Interventions:  - Provide teaching at level of understanding  - Provide teaching via preferred learning methods  Outcome: Progressing     Problem: Prexisting or High Potential for Compromised Skin Integrity  Goal: Skin integrity is maintained or improved  Description: INTERVENTIONS:  - Identify patients at risk for skin breakdown  - Assess and monitor skin integrity  - Assess and monitor nutrition and hydration  status  - Monitor labs   - Assess for incontinence   - Turn and reposition patient  - Assist with mobility/ambulation  - Relieve pressure over bony prominences  - Avoid friction and shearing  - Provide appropriate hygiene as needed including keeping skin clean and dry  - Evaluate need for skin moisturizer/barrier cream  - Collaborate with interdisciplinary team   - Patient/family teaching  - Consider wound care consult   Outcome: Progressing

## 2024-09-13 NOTE — ASSESSMENT & PLAN NOTE
Lab Results   Component Value Date    EGFR 62 09/13/2024    EGFR 57 09/12/2024    EGFR 55 09/05/2024    CREATININE 1.13 09/13/2024    CREATININE 1.22 09/12/2024    CREATININE 1.25 09/05/2024   His creatinine is stable

## 2024-09-13 NOTE — CASE MANAGEMENT
Case Management Discharge Planning Note    Patient name Toro Rodriguez  Location /-01 MRN [id number]  : 1950 Date 2024       Current Admission Date: 2024  Current Admission Diagnosis:Urinary tract infection associated with indwelling urethral catheter  ([facility])   Patient Active Problem List    Diagnosis Date Noted Date Diagnosed    Urinary tract infection associated with indwelling urethral catheter  ([facility]) 2024     Encounter to discuss test results 2024     Urethral erosion by catheter, initial encounter  ([facility]) 2024     Anemia 2024     Hematuria 2024     Chronic venous hypertension (idiopathic) with ulcer of left lower extremity (CODE) ([facility]) 2024     Chronic kidney disease, stage 3a ([facility]) 2024     Encounter for geriatric assessment 2024     Melanoma of back ([facility]) 2024     Dilated cardiomyopathy ([facility]) 12/15/2023     Obesity, morbid ([facility]) 2023     Type 2 diabetes mellitus with chronic kidney disease, without long-term current use of insulin, unspecified CKD stage ([facility]) 11/15/2023     Essential hypertension 11/10/2023     Generalized weakness 11/10/2023     Chronic systolic (congestive) heart failure ([facility]) 11/10/2023     Deep vein thrombosis (DVT) of left lower extremity ([facility]) 11/10/2023     Coronary artery disease involving native coronary artery of native heart 11/10/2023     Benign prostatic hyperplasia with urinary retention        LOS (days): 1  Geometric Mean LOS (GMLOS) (days): 3.3  Days to GMLOS:2.3     OBJECTIVE:  Risk of Unplanned Readmission Score: 25.46         Current admission status: Inpatient   Preferred Pharmacy:   [facility] [address]  [address] [address]  Phone: [phone] Fax: [phone]    [facility] [address]  [address] [address]  Phone: [phone] Fax:  [phone]    Primary Care Provider: CARLIE Foster    Primary Insurance: MEDICARE  Secondary Insurance: BLUE CROSS    DISCHARGE DETAILS:    Additional Comments: CM attempted open but pt was asleep. CM left VM for pt's daughter Shanique ([phone]) to complete CM assessment.

## 2024-09-14 LAB
ANION GAP SERPL CALCULATED.3IONS-SCNC: 7 MMOL/L (ref 4–13)
BACTERIA UR CULT: ABNORMAL
BASOPHILS # BLD AUTO: 0.03 THOUSANDS/ΜL (ref 0–0.1)
BASOPHILS NFR BLD AUTO: 1 % (ref 0–1)
BUN SERPL-MCNC: 21 MG/DL (ref 5–25)
CALCIUM SERPL-MCNC: 8.5 MG/DL (ref 8.4–10.2)
CHLORIDE SERPL-SCNC: 105 MMOL/L (ref 96–108)
CO2 SERPL-SCNC: 24 MMOL/L (ref 21–32)
CREAT SERPL-MCNC: 1.08 MG/DL (ref 0.6–1.3)
EOSINOPHIL # BLD AUTO: 0.3 THOUSAND/ΜL (ref 0–0.61)
EOSINOPHIL NFR BLD AUTO: 6 % (ref 0–6)
ERYTHROCYTE [DISTWIDTH] IN BLOOD BY AUTOMATED COUNT: 13.3 % (ref 11.6–15.1)
GFR SERPL CREATININE-BSD FRML MDRD: 66 ML/MIN/1.73SQ M
GLUCOSE SERPL-MCNC: 91 MG/DL (ref 65–140)
HCT VFR BLD AUTO: 38.2 % (ref 36.5–49.3)
HGB BLD-MCNC: 12.2 G/DL (ref 12–17)
IMM GRANULOCYTES # BLD AUTO: 0.01 THOUSAND/UL (ref 0–0.2)
IMM GRANULOCYTES NFR BLD AUTO: 0 % (ref 0–2)
LYMPHOCYTES # BLD AUTO: 1.36 THOUSANDS/ΜL (ref 0.6–4.47)
LYMPHOCYTES NFR BLD AUTO: 26 % (ref 14–44)
MCH RBC QN AUTO: 30.7 PG (ref 26.8–34.3)
MCHC RBC AUTO-ENTMCNC: 31.9 G/DL (ref 31.4–37.4)
MCV RBC AUTO: 96 FL (ref 82–98)
MONOCYTES # BLD AUTO: 0.51 THOUSAND/ΜL (ref 0.17–1.22)
MONOCYTES NFR BLD AUTO: 10 % (ref 4–12)
NEUTROPHILS # BLD AUTO: 2.97 THOUSANDS/ΜL (ref 1.85–7.62)
NEUTS SEG NFR BLD AUTO: 57 % (ref 43–75)
NRBC BLD AUTO-RTO: 0 /100 WBCS
PLATELET # BLD AUTO: 236 THOUSANDS/UL (ref 149–390)
PMV BLD AUTO: 11.1 FL (ref 8.9–12.7)
POTASSIUM SERPL-SCNC: 4.4 MMOL/L (ref 3.5–5.3)
RBC # BLD AUTO: 3.97 MILLION/UL (ref 3.88–5.62)
SODIUM SERPL-SCNC: 136 MMOL/L (ref 135–147)
VANCOMYCIN TROUGH SERPL-MCNC: 12.9 UG/ML (ref 10–20)
WBC # BLD AUTO: 5.18 THOUSAND/UL (ref 4.31–10.16)

## 2024-09-14 PROCEDURE — 85025 COMPLETE CBC W/AUTO DIFF WBC: CPT | Performed by: INTERNAL MEDICINE

## 2024-09-14 PROCEDURE — 80048 BASIC METABOLIC PNL TOTAL CA: CPT | Performed by: INTERNAL MEDICINE

## 2024-09-14 PROCEDURE — 99232 SBSQ HOSP IP/OBS MODERATE 35: CPT | Performed by: PHYSICIAN ASSISTANT

## 2024-09-14 PROCEDURE — 97166 OT EVAL MOD COMPLEX 45 MIN: CPT

## 2024-09-14 PROCEDURE — 80202 ASSAY OF VANCOMYCIN: CPT | Performed by: PHYSICIAN ASSISTANT

## 2024-09-14 RX ORDER — METOPROLOL SUCCINATE 25 MG/1
25 TABLET, EXTENDED RELEASE ORAL EVERY 12 HOURS SCHEDULED
Status: DISCONTINUED | OUTPATIENT
Start: 2024-09-14 | End: 2024-09-17 | Stop reason: HOSPADM

## 2024-09-14 RX ORDER — CEFEPIME HYDROCHLORIDE 2 G/50ML
2000 INJECTION, SOLUTION INTRAVENOUS EVERY 8 HOURS
Status: DISCONTINUED | OUTPATIENT
Start: 2024-09-14 | End: 2024-09-16

## 2024-09-14 RX ADMIN — METOPROLOL SUCCINATE 25 MG: 25 TABLET, EXTENDED RELEASE ORAL at 20:04

## 2024-09-14 RX ADMIN — TAMSULOSIN HYDROCHLORIDE 0.4 MG: 0.4 CAPSULE ORAL at 17:01

## 2024-09-14 RX ADMIN — CEFEPIME HYDROCHLORIDE 2000 MG: 2 INJECTION, SOLUTION INTRAVENOUS at 12:46

## 2024-09-14 RX ADMIN — FINASTERIDE 5 MG: 5 TABLET, FILM COATED ORAL at 07:52

## 2024-09-14 RX ADMIN — ASPIRIN 81 MG CHEWABLE TABLET 81 MG: 81 TABLET CHEWABLE at 07:51

## 2024-09-14 RX ADMIN — AMIODARONE HYDROCHLORIDE 200 MG: 200 TABLET ORAL at 07:52

## 2024-09-14 RX ADMIN — VANCOMYCIN HYDROCHLORIDE 750 MG: 750 INJECTION, SOLUTION INTRAVENOUS at 06:28

## 2024-09-14 RX ADMIN — LISINOPRIL 5 MG: 5 TABLET ORAL at 07:52

## 2024-09-14 RX ADMIN — ATORVASTATIN CALCIUM 40 MG: 40 TABLET, FILM COATED ORAL at 17:01

## 2024-09-14 RX ADMIN — CEFEPIME HYDROCHLORIDE 2000 MG: 2 INJECTION, SOLUTION INTRAVENOUS at 18:58

## 2024-09-14 RX ADMIN — APIXABAN 5 MG: 5 TABLET, FILM COATED ORAL at 07:52

## 2024-09-14 RX ADMIN — APIXABAN 5 MG: 5 TABLET, FILM COATED ORAL at 17:01

## 2024-09-14 NOTE — PLAN OF CARE
Problem: Potential for Falls  Goal: Patient will remain free of falls  Description: INTERVENTIONS:  - Educate patient/family on patient safety including physical limitations  - Instruct patient to call for assistance with activity   - Consult OT/PT to assist with strengthening/mobility   - Keep Call bell within reach  - Keep bed low and locked with side rails adjusted as appropriate  - Keep care items and personal belongings within reach  - Initiate and maintain comfort rounds  - Make Fall Risk Sign visible to staff  - Offer Toileting every x Hours, in advance of need  - Initiate/Maintain xalarm  - Obtain necessary fall risk management equipment: x  - Apply yellow socks and bracelet for high fall risk patients  - Consider moving patient to room near nurses station  Outcome: Progressing     Problem: PAIN - ADULT  Goal: Verbalizes/displays adequate comfort level or baseline comfort level  Description: Interventions:  - Encourage patient to monitor pain and request assistance  - Assess pain using appropriate pain scale  - Administer analgesics based on type and severity of pain and evaluate response  - Implement non-pharmacological measures as appropriate and evaluate response  - Consider cultural and social influences on pain and pain management  - Notify physician/advanced practitioner if interventions unsuccessful or patient reports new pain  Outcome: Progressing     Problem: INFECTION - ADULT  Goal: Absence or prevention of progression during hospitalization  Description: INTERVENTIONS:  - Assess and monitor for signs and symptoms of infection  - Monitor lab/diagnostic results  - Monitor all insertion sites, i.e. indwelling lines, tubes, and drains  - Monitor endotracheal if appropriate and nasal secretions for changes in amount and color  - [place] appropriate cooling/warming therapies per order  - Administer medications as ordered  - Instruct and encourage patient and family to use good hand hygiene  technique  - Identify and instruct in appropriate isolation precautions for identified infection/condition  Outcome: Progressing  Goal: Absence of fever/infection during neutropenic period  Description: INTERVENTIONS:  - Monitor WBC    Outcome: Progressing     Problem: SAFETY ADULT  Goal: Patient will remain free of falls  Description: INTERVENTIONS:  - Educate patient/family on patient safety including physical limitations  - Instruct patient to call for assistance with activity   - Consult OT/PT to assist with strengthening/mobility   - Keep Call bell within reach  - Keep bed low and locked with side rails adjusted as appropriate  - Keep care items and personal belongings within reach  - Initiate and maintain comfort rounds  - Make Fall Risk Sign visible to staff  - Offer Toileting every x Hours, in advance of need  - Initiate/Maintain xalarm  - Obtain necessary fall risk management equipment: x  - Apply yellow socks and bracelet for high fall risk patients  - Consider moving patient to room near nurses station  Outcome: Progressing  Goal: Maintain or return to baseline ADL function  Description: INTERVENTIONS:  -  Assess patient's ability to carry out ADLs; assess patient's baseline for ADL function and identify physical deficits which impact ability to perform ADLs (bathing, care of mouth/teeth, toileting, grooming, dressing, etc.)  - Assess/evaluate cause of self-care deficits   - Assess range of motion  - Assess patient's mobility; develop plan if impaired  - Assess patient's need for assistive devices and provide as appropriate  - Encourage maximum independence but intervene and supervise when necessary  - Involve family in performance of ADLs  - Assess for home care needs following discharge   - Consider OT consult to assist with ADL evaluation and planning for discharge  - Provide patient education as appropriate  Outcome: Progressing  Goal: Maintains/Returns to pre admission functional level  Description:  INTERVENTIONS:  - Perform AM-PAC 6 Click Basic Mobility/ Daily Activity assessment daily.  - Set and communicate daily mobility goal to care team and patient/family/caregiver.   - Collaborate with rehabilitation services on mobility goals if consulted  - Perform Range of Motion x times a day.  - Reposition patient every x hours.  - Dangle patient x times a day  - Stand patient x times a day  - Ambulate patient x times a day  - Out of bed to chair x times a day   - Out of bed for meals x times a day  - Out of bed for toileting  - Record patient progress and toleration of activity level   Outcome: Progressing     Problem: DISCHARGE PLANNING  Goal: Discharge to home or other facility with appropriate resources  Description: INTERVENTIONS:  - Identify barriers to discharge w/patient and caregiver  - Arrange for needed discharge resources and transportation as appropriate  - Identify discharge learning needs (meds, wound care, etc.)  - Arrange for interpretive services to assist at discharge as needed  - Refer to Case Management Department for coordinating discharge planning if the patient needs post-hospital services based on physician/advanced practitioner order or complex needs related to functional status, cognitive ability, or social support system  Outcome: Progressing     Problem: Knowledge Deficit  Goal: Patient/family/caregiver demonstrates understanding of disease process, treatment plan, medications, and discharge instructions  Description: Complete learning assessment and assess knowledge base.  Interventions:  - Provide teaching at level of understanding  - Provide teaching via preferred learning methods  Outcome: Progressing     Problem: Prexisting or High Potential for Compromised Skin Integrity  Goal: Skin integrity is maintained or improved  Description: INTERVENTIONS:  - Identify patients at risk for skin breakdown  - Assess and monitor skin integrity  - Assess and monitor nutrition and hydration  status  - Monitor labs   - Assess for incontinence   - Turn and reposition patient  - Assist with mobility/ambulation  - Relieve pressure over bony prominences  - Avoid friction and shearing  - Provide appropriate hygiene as needed including keeping skin clean and dry  - Evaluate need for skin moisturizer/barrier cream  - Collaborate with interdisciplinary team   - Patient/family teaching  - Consider wound care consult   Outcome: Progressing

## 2024-09-14 NOTE — ASSESSMENT & PLAN NOTE
Lab Results   Component Value Date    EGFR 58 09/16/2024    EGFR 66 09/14/2024    EGFR 62 09/13/2024    CREATININE 1.19 09/16/2024    CREATININE 1.08 09/14/2024    CREATININE 1.13 09/13/2024   His creatinine is stable

## 2024-09-14 NOTE — PROGRESS NOTES
Toro Rodriguez is a 74 y.o. male who is currently ordered Vancomycin IV with management by the Pharmacy Consult service.  Relevant clinical data and objective / subjective history reviewed.  Vancomycin Assessment:  Indication and Goal AUC/Trough: Urinary tract infection (goal -600, trough >10), -600, trough >10  Clinical Status: stable  Micro:   Pending  Renal Function:  SCr: 1.08 mg/dL  CrCl: 61.4 mL/min  Renal replacement: Not on dialysis  Days of Therapy: 3  Current Dose: 750mg every 12 hours  Vancomycin Plan: trough level resulted 12.9, which is therapeutic. However, current dose predicts AUC~600. Thus, dose changed as below.  New Dosinmg every 24 hours  Estimated AUC: 492 mcg*hr/mL  Estimated Trough: 13.2 mcg/mL  Next Level: 24 at 0500  Renal Function Monitoring: Daily BMP and UOP  Pharmacy will continue to follow closely for s/sx of nephrotoxicity, infusion reactions and appropriateness of therapy.  BMP and CBC will be ordered per protocol. We will continue to follow the patient’s culture results and clinical progress daily.    Randolph Kang, Pharmacist, PharmD, BCPS

## 2024-09-14 NOTE — ASSESSMENT & PLAN NOTE
He presently denies chest pain  Noted to have some bradycardia in the ER  Continue Toprol-XL but reduce dose to 25 mg twice daily due to persistent bradycardia  Continue aspirin

## 2024-09-14 NOTE — OCCUPATIONAL THERAPY NOTE
Occupational Therapy Evaluation     Patient Name: Toro Rodriguez  Today's Date: 9/14/2024  Problem List  Principal Problem:    Urinary tract infection associated with indwelling urethral catheter  ([facility])  Active Problems:    Essential hypertension    Generalized weakness    Chronic systolic (congestive) heart failure (HCC)    Deep vein thrombosis (DVT) of left lower extremity ([facility])    Coronary artery disease involving native coronary artery of native heart    Benign prostatic hyperplasia with urinary retention    Chronic kidney disease, stage 3a ([facility])    Past Medical History  Past Medical History:   Diagnosis Date    Alcohol intoxication ([facility]) 10/15/2020    BPH (benign prostatic hyperplasia)     Chronic HFrEF (heart failure with reduced ejection fraction) ([facility]) 11/2023    Coronary artery calcification seen on CT scan 11/10/2023    Coronary artery disease 12/14/2023    Deep vein thrombosis (DVT) of left lower extremity ([facility]) 11/2023    Diabetes mellitus ([facility]) 11/2023    Fall from slip, trip, or stumble 10/15/2020    History of phimosis of penis     History of urinary calculi     Hypertension 1988    Skin cancer     Tobacco abuse     quit around 2000     Past Surgical History  Past Surgical History:   Procedure Laterality Date    BLADDER STONE REMOVAL  2014    CARDIAC CATHETERIZATION N/A 12/14/2023    Procedure: Cardiac catheterization;  Surgeon: Dave Royal MD;  [facility];  Service: Cardiology    ORIF ANKLE FRACTURE Left     SKIN LESION EXCISION N/A 3/18/2024    Procedure: WIDE LOCAL EXCISION OF BACK MELANOMA;  Surgeon: Lillie La MD;  Location: [facility];  Service: Surgical Oncology    TOTAL HIP ARTHROPLASTY Right            09/14/24 1120   OT Last Visit   OT Visit Date 09/14/24   Note Type   Note type Evaluation   Pain Assessment   Pain Assessment Tool 0-10   Pain Score No Pain   Restrictions/Precautions   Weight Bearing Precautions Per Order No   Other Precautions Chair Alarm;Bed  "Alarm;Fall Risk;Hard of hearing   Home Living   Type of Home Assisted living  ([facility])   Home Layout One level  (0 RUDY)   Bathroom Shower/Tub Walk-in shower   Bathroom Toilet Standard   Bathroom Equipment Built-in shower seat;Grab bars around toilet;Grab bars in shower   Bathroom Accessibility Accessible   Home Equipment Other (Comment)  (Rollator)   Prior Function   Level of [place] Independent with functional mobility;Needs assistance with ADLs;Needs assistance with IADLS   Lives With Facility staff;Other (Comment)  (Roommate)   Receives Help From Personal care attendant   IADLs Family/Friend/Other provides medication management;Family/Friend/Other provides meals;Family/Friend/Other provides transportation   Falls in the last 6 months 1 to 4   Vocational Retired   Comments Pt reports he gets assistance for LB dressing on his shower days in the bathroom & on non-shower days he can complete himself. Assistance for bathing   Lifestyle   Autonomy Typically IND with dressing, A for bathing, Mod I with RW   Reciprocal Relationships Has a roommate who he gets along with, staff dispense medications/cook/do laundry   General   Family/Caregiver Present No   Subjective   Subjective \"Oh I wanted to get up for lunch anyway!\"   ADL   Eating Assistance 7  Independent   Grooming Assistance 7  Independent   UB Bathing Assistance 5  Supervision/Setup   LB Bathing Assistance 5  Supervision/Setup   UB Dressing Assistance 7  Independent   LB Dressing Assistance 7  Independent   LB Dressing Deficit Don/doff R sock;Don/doff L sock   Toileting Assistance  7  Independent   Bed Mobility   Supine to Sit 6  Modified independent   Additional items Bedrails   Transfers   Sit to Stand 6  Modified independent   Additional items Armrests   Stand to Sit 6  Modified independent   Additional items Armrests   Functional Mobility   Functional Mobility 5  Supervision   Additional Comments Greater than functional household distance through " unit halls   Additional items Rolling walker   Balance   Static Sitting Good   Dynamic Sitting Fair +   Static Standing Fair   Dynamic Standing Fair -   Ambulatory Fair -   Activity Tolerance   Activity Tolerance Patient tolerated treatment well   Nurse Made Aware GLORIA FLEMING Assessment   RUE Assessment WFL   LUE Assessment   LUE Assessment WFL   Cognition   Overall Cognitive Status WFL   Arousal/Participation Alert   Attention Within functional limits   Orientation Level Oriented X4   Memory Within functional limits   Following Commands Follows one step commands with increased time or repetition   Assessment   Prognosis Good   Assessment Pt is a 74 y.o. male seen for OT evaluation at [facility], admitted 9/12/2024 w/ Urinary tract infection associated with indwelling urethral catheter  (HCC). OT completed expanded review of pt's medical and social history. Comorbidities affecting pt's functional performance at time of assessment include: HTN, generalized weakness, h/o DVT, obesity, DM2. Prior to admission, pt was living at [facility] which is one level with 0 RUDY. Pt was IND with ADLs, A for IADLs, & Mod I for functional mobility with RW. Upon evaluation, pt presents to OT as IND For Adls & S for functional mobility with RW. Based on findings, pt is of moderate complexity. The patient's raw score on the AM-PAC Daily Activity inpatient short form is 23, standardized score is 51.12, greater than 39.4. Patients at this level are likely to benefit from DC to home. Please refer to the recommendation of the Occupational Therapist for safe DC planning. At this time, OT recommendations at time of discharge are no OT needs. No further acute OT needs indicated at this time - Recommend pt continue to be OOB for meals, ambulation to/from BR, perform self care tasks, and mobility in hallway with nursing. D/C from OT caseload with above recommendations.   Goals   Patient Goals Pt wants to go home   Plan    OT Frequency Eval only   Discharge Recommendation   Rehab Resource Intensity Level, OT No post-acute rehabilitation needs   AM-PAC Daily Activity Inpatient   Lower Body Dressing 4   Bathing 3   Toileting 4   Upper Body Dressing 4   Grooming 4   Eating 4   Daily Activity Raw Score 23   Daily Activity Standardized Score (Calc for Raw Score >=11) 51.12   AM-PAC Applied Cognition Inpatient   Following a Speech/Presentation 4   Understanding Ordinary Conversation 4   Taking Medications 4   Remembering Where Things Are Placed or Put Away 4   Remembering List of 4-5 Errands 4   Taking Care of Complicated Tasks 4   Applied Cognition Raw Score 24   Applied Cognition Standardized Score 62.21   End of Consult   Education Provided Yes   Patient Position at End of Consult Bedside chair;Bed/Chair alarm activated;All needs within reach   Nurse Communication Nurse aware of consult     Radha Vieira OTR/L

## 2024-09-14 NOTE — PROGRESS NOTES
Progress Note - Hospitalist   Name: Toro Rodriguez 74 y.o. male I MRN: [id number]  Unit/Bed#: -01 I Date of Admission: 9/12/2024   Date of Service: 9/14/2024 I Hospital Day: 2    Assessment & Plan  Urinary tract infection associated with indwelling urethral catheter  (HCC)  Recent diagnosed with UTI and placed on Keflex twice daily.  Urine culture at that time growing mixed contaminants.  Patient noted blood in his urine this morning, UA suspicious for UTI  His last urine culture grew Enterococcus thus started on IV vancomycin.  He was given a dose of IV ceftriaxone in the ER  Urine culture with growth of oxidase positive gram-negative sharifa  Stop IV vancomycin and start on IV cefepime to cover for Pseudomonas until final results  Generalized weakness  Overall malaise, generalized weakness for the past day.  Has been on Keflex for UTI but noticed discoloration and some blood in his urine as well as generalized weakness today  PT and OT evaluations - level 3  Essential hypertension  Blood pressure controlled  Resume home lisinopril, Lasix, Entresto, Toprol-XL  Monitor blood pressure  Coronary artery disease involving native coronary artery of native heart  He presently denies chest pain  Noted to have some bradycardia in the ER  Continue Toprol-XL but reduce dose to 25 mg twice daily due to persistent bradycardia  Continue aspirin  Benign prostatic hyperplasia with urinary retention  BPH with urinary retention, following with urology and will have SPT placed as an outpatient  Deep vein thrombosis (DVT) of left lower extremity (HCC)  Continue Eliquis  Chronic systolic (congestive) heart failure (HCC)  Wt Readings from Last 3 Encounters:   09/14/24 87.4 kg (192 lb 9.6 oz)   09/05/24 95.3 kg (210 lb)   07/30/24 95.3 kg (210 lb)   Appears compensated presently.  No weight gain, shortness of breath or leg swelling  Continue Lisinopril, Lasix, Toprol-XL  Beta-blocker dose reduced, 25 mg twice daily  EF is 40%, follows  with cardiology  Daily weights while hospitalized  Chronic kidney disease, stage 3a (HCC)  Lab Results   Component Value Date    EGFR 66 2024    EGFR 62 2024    EGFR 57 2024    CREATININE 1.08 2024    CREATININE 1.13 2024    CREATININE 1.22 2024   His creatinine is stable    VTE Pharmacologic Prophylaxis: VTE Score: 5 High Risk (Score >/= 5) - Pharmacological DVT Prophylaxis Ordered: apixaban (Eliquis). Sequential Compression Devices Ordered.    Mobility:   Basic Mobility Inpatient Raw Score: 17  JH-HLM Goal: 5: Stand one or more mins  JH-HLM Achieved: 2: Bed activities/Dependent transfer  JH-HLM Goal achieved. Continue to encourage appropriate mobility.    Patient Centered Rounds: I performed bedside rounds with nursing staff today.   Discussions with Specialists or Other Care Team Provider: emerson, rn    Education and Discussions with Family / Patient: Patient declined call to .     Current Length of Stay: 2 day(s)  Current Patient Status: Inpatient   Certification Statement: The patient will continue to require additional inpatient hospital stay due to UTI awaiting final results of urine culture due to concern for MDRO  Discharge Plan: Anticipate discharge in 24-48 hrs to home with home services.    Code Status: Level 1 - Full Code    Subjective   No overnight events reported.  Patient continued to feel better.  Urine is still dark    Objective     Vitals:   Temp (24hrs), Av.8 °F (36.6 °C), Min:97.5 °F (36.4 °C), Max:98.1 °F (36.7 °C)    Temp:  [97.5 °F (36.4 °C)-98.1 °F (36.7 °C)] 97.5 °F (36.4 °C)  HR:  [48-63] 51  Resp:  [18-19] 19  BP: (130-144)/(57-69) 144/69  SpO2:  [92 %-96 %] 92 %  Body mass index is 30.17 kg/m².     Input and Output Summary (last 24 hours):     Intake/Output Summary (Last 24 hours) at 2024 1214  Last data filed at 2024 0822  Gross per 24 hour   Intake 280 ml   Output 1750 ml   Net -1470 ml       Physical Exam  Vitals and nursing  note reviewed.   Constitutional:       General: He is not in acute distress.     Appearance: Normal appearance. He is well-developed. He is not ill-appearing or diaphoretic.   HENT:      Head: Normocephalic and atraumatic.      Mouth/Throat:      Mouth: Mucous membranes are moist.   Eyes:      General: No scleral icterus.  Cardiovascular:      Rate and Rhythm: Regular rhythm. Bradycardia present.      Heart sounds: Normal heart sounds. No murmur heard.     No friction rub. No gallop.   Pulmonary:      Effort: Pulmonary effort is normal. No respiratory distress.      Breath sounds: Normal breath sounds. No wheezing, rhonchi or rales.   Abdominal:      General: Bowel sounds are normal. There is no distension.      Palpations: Abdomen is soft.      Tenderness: There is no abdominal tenderness. There is no guarding or rebound.   Genitourinary:     Comments: Colon catheter in place with dark yellow urine  Musculoskeletal:         General: No swelling or deformity.      Cervical back: Neck supple.      Right lower leg: No edema.      Left lower leg: No edema.   Skin:     General: Skin is warm and dry.      Capillary Refill: Capillary refill takes less than 2 seconds.      Coloration: Skin is not jaundiced or pale.      Findings: No erythema or rash.   Neurological:      General: No focal deficit present.      Mental Status: He is alert and oriented to person, place, and time. Mental status is at baseline.   Psychiatric:         Mood and Affect: Mood normal.         Behavior: Behavior normal.          Lines/Drains:  Lines/Drains/Airways       Active Status       Name Placement date Placement time Site Days    Urethral Catheter Coude 18 Fr. 07/10/24  1640  Coude  65                  Urinary Catheter:  Goal for removal: N/A - Chronic Colon                 Lab Results: I have reviewed the following results:    Results from last 7 days   Lab Units 09/14/24  0544   WBC Thousand/uL 5.18   HEMOGLOBIN g/dL 12.2   HEMATOCRIT % 38.2    PLATELETS Thousands/uL 236   SEGS PCT % 57   LYMPHO PCT % 26   MONO PCT % 10   EOS PCT % 6     Results from last 7 days   Lab Units 09/14/24  0544 09/13/24  0325 09/12/24  1343   SODIUM mmol/L 136   < > 138   POTASSIUM mmol/L 4.4   < > 3.9   CHLORIDE mmol/L 105   < > 103   CO2 mmol/L 24   < > 29   BUN mg/dL 21   < > 18   CREATININE mg/dL 1.08   < > 1.22   ANION GAP mmol/L 7   < > 6   CALCIUM mg/dL 8.5   < > 8.6   ALBUMIN g/dL  --   --  3.6   TOTAL BILIRUBIN mg/dL  --   --  0.57   ALK PHOS U/L  --   --  76   ALT U/L  --   --  8   AST U/L  --   --  12*   GLUCOSE RANDOM mg/dL 91   < > 87    < > = values in this interval not displayed.         Results from last 7 days   Lab Units 09/13/24  2213   POC GLUCOSE mg/dl 112               Recent Cultures (last 7 days):   Results from last 7 days   Lab Units 09/12/24  1344   URINE CULTURE  >100,000 cfu/ml Oxidase Positive gram negative sharifa*       Imaging Review: No pertinent imaging studies reviewed.  Other Studies: No additional pertinent studies reviewed.    Last 24 Hours Medication List:     Current Facility-Administered Medications:     acetaminophen (TYLENOL) tablet 650 mg, Q6H PRN    amiodarone tablet 200 mg, Daily    apixaban (ELIQUIS) tablet 5 mg, BID    aspirin chewable tablet 81 mg, Daily    atorvastatin (LIPITOR) tablet 40 mg, Daily With Dinner    calcium carbonate (TUMS) chewable tablet 500 mg, BID PRN    cefepime (MAXIPIME) IVPB (premix in dextrose) 2,000 mg 50 mL, Q8H    docusate sodium (COLACE) capsule 100 mg, BID PRN    finasteride (PROSCAR) tablet 5 mg, Daily    lisinopril (ZESTRIL) tablet 5 mg, Daily    melatonin tablet 6 mg, HS PRN    metoprolol succinate (TOPROL-XL) 24 hr tablet 50 mg, Q12H    ondansetron (ZOFRAN) injection 4 mg, Q4H PRN    polyethylene glycol (MIRALAX) packet 17 g, Daily PRN    tamsulosin (FLOMAX) capsule 0.4 mg, Daily With Dinner    traMADol (ULTRAM) tablet 50 mg, Q6H PRN    [START ON 9/15/2024] vancomycin (VANCOCIN) 1250 mg in sodium  chloride 0.9% 250 mL IVPB, Q24H    Administrative Statements   Today, Patient Was Seen By: Rick Crawley PA-C      **Please Note: This note may have been constructed using a voice recognition system.**

## 2024-09-14 NOTE — ASSESSMENT & PLAN NOTE
Lab Results   Component Value Date    EGFR 66 09/14/2024    EGFR 62 09/13/2024    EGFR 57 09/12/2024    CREATININE 1.08 09/14/2024    CREATININE 1.13 09/13/2024    CREATININE 1.22 09/12/2024   His creatinine is stable

## 2024-09-14 NOTE — ASSESSMENT & PLAN NOTE
Wt Readings from Last 3 Encounters:   09/14/24 87.4 kg (192 lb 9.6 oz)   09/05/24 95.3 kg (210 lb)   07/30/24 95.3 kg (210 lb)   Appears compensated presently.  No weight gain, shortness of breath or leg swelling  Continue Lisinopril, Lasix, Toprol-XL  Beta-blocker dose reduced, 25 mg twice daily  EF is 40%, follows with cardiology  Daily weights while hospitalized

## 2024-09-14 NOTE — CASE MANAGEMENT
Case Management Assessment & Discharge Planning Note    Patient name Toro Rodriguez  Location /-01 MRN [id number]  : 1950 Date 2024       Current Admission Date: 2024  Current Admission Diagnosis:Urinary tract infection associated with indwelling urethral catheter  ([facility])   Patient Active Problem List    Diagnosis Date Noted Date Diagnosed    Urinary tract infection associated with indwelling urethral catheter  ([facility]) 2024     Encounter to discuss test results 2024     Urethral erosion by catheter, initial encounter  ([facility]) 2024     Anemia 2024     Hematuria 2024     Chronic venous hypertension (idiopathic) with ulcer of left lower extremity (CODE) ([facility]) 2024     Chronic kidney disease, stage 3a ([facility]) 2024     Encounter for geriatric assessment 2024     Melanoma of back ([facility]) 2024     Dilated cardiomyopathy ([facility]) 12/15/2023     Obesity, morbid ([facility]) 2023     Type 2 diabetes mellitus with chronic kidney disease, without long-term current use of insulin, unspecified CKD stage ([facility]) 11/15/2023     Essential hypertension 11/10/2023     Generalized weakness 11/10/2023     Chronic systolic (congestive) heart failure ([facility]) 11/10/2023     Deep vein thrombosis (DVT) of left lower extremity ([facility]) 11/10/2023     Coronary artery disease involving native coronary artery of native heart 11/10/2023     Benign prostatic hyperplasia with urinary retention        LOS (days): 2  Geometric Mean LOS (GMLOS) (days): 3.3  Days to GMLOS:1.3     OBJECTIVE:    Risk of Unplanned Readmission Score: 22.31         Current admission status: Inpatient       Preferred Pharmacy:   Pharmacy of [address]  [address] [facility] [address]  Phone: [phone] Fax: [phone]    [facility]- [place], [place] - [facility] [address]  [address] [facility] [address]  Phone:  [phone] Fax: [phone]    Primary Care Provider: CARLIE Foster    Primary Insurance: MEDICARE  Secondary Insurance: BLUE CROSS    ASSESSMENT:  Active Health Care Proxies       Shanique Rodriguez Health Care Agent - Daughter   Primary Phone: [phone] (Mobile)                 Advance Directives  Does patient have a Health Care POA?: Yes  Does patient have Advance Directives?: Yes  Advance Directives: Living will, Power of  for health care  Primary Contact: Shanique Rodriguez: dtr: [phone]    Readmission Root Cause  30 Day Readmission: No    Patient Information  Admitted from:: Home  Mental Status: Alert  During Assessment patient was accompanied by: Not accompanied during assessment  Assessment information provided by:: Patient  Primary Caregiver: Self  Support Systems: Daughter  County of Residence: [place]  What city do you live in?: [place]  Home entry access options. Select all that apply.: No steps to enter home  Living Arrangements: Other (Comment) ([facility])  Is patient a ?: No    Activities of Daily Living Prior to Admission  Functional Status: Independent  Completes ADLs independently?: Yes  Ambulates independently?: Yes (ambulates with walker)  Does patient use assisted devices?: Yes  Assisted Devices (DME) used: Walker  Does patient have a history of Outpatient Therapy (PT/OT)?: No  Does the patient have a history of Short-Term Rehab?: No  Does patient have a history of HHC?: No    Patient Information Continued  Income Source: Pension/skilled nursing  Does patient have prescription coverage?: Yes  Does patient receive dialysis treatments?: No  Does patient have a history of substance abuse?: No  Does patient have a history of Mental Health Diagnosis?: No         Means of Transportation  [facility] Other (Comment) (medical transport)      Social Determinants of Health (SDOH)      Flowsheet Row Most Recent Value   Housing Stability    In the  last 12 months, was there a time when you were not able to pay the mortgage or rent on time? N   In the past 12 months, how many times have you moved where you were living? 0   At any time in the past 12 months, were you homeless or living in a shelter (including now)? N   Transportation Needs    In the past 12 months, has lack of transportation kept you from medical appointments or from getting medications? no   In the past 12 months, has lack of transportation kept you from meetings, work, or from getting things needed for daily living? No   Food Insecurity    Within the past 12 months, you worried that your food would run out before you got the money to buy more. Never true   Within the past 12 months, the food you bought just didn't last and you didn't have money to get more. Never true   Utilities    In the past 12 months has the electric, gas, oil, or water company threatened to shut off services in your home? No            DISCHARGE DETAILS:    Freedom of Choice: Yes  Comments - Freedom of Choice: requesting [facility] Care-referral sent via [employer]  CM contacted family/caregiver?: Yes  Were Treatment Team discharge recommendations reviewed with patient/caregiver?: Yes    Contacts  Patient Contacts: Shanique Mcintosh dtr  Relationship to Patient:: Family  Contact Method: Phone  Phone Number: [phone]  Reason/Outcome: Emergency Contact, Continuity of Care, Discharge Planning    Requested Home Health Care         Is the patient interested in HHC at discharge?: Yes  Home Health Discipline requested:: Nursing, Occupational Therapy, Physical Therapy  Home Health Agency Name:: [facility] External Referral Reason (only applicable if external HHA name selected): Patient has established relationship with provider  Home Health Follow-Up Provider:: PCP  Home Health Services Needed:: Evaluate Functional Status and Safety, Gait/ADL Training, Strengthening/Theraputic Exercises to Improve Function  Homebound Criteria Met::  Uses an Assist Device (i.e. cane, walker, etc)  Supporting Clincal Findings:: Limited Endurance    Additional Comments: Met with Pt. Pt presents AA&Ox3. Discussed role of case management. Pt resides at [facility] on 1st floor, ramp to enter. Pt ambulates with walker. Gets prescriptions through [facility]. Hx of [facility]. Hx of [facility]. Pt reports his dtr is POA and he has living will. CM informed of discharge plan back to [facility] on Sunday. Pt agreeable for [facility]. Referral sent to [facility] via [employer].Updated Pt's dtr(Shanique) per Pt's request of discharge plan. Updated Juan at [facility] of discharge plan for tomorrow. Will arrange wheelchair van transport. CM to follow.

## 2024-09-14 NOTE — ASSESSMENT & PLAN NOTE
Recent diagnosed with UTI and placed on Keflex twice daily.  Urine culture at that time growing mixed contaminants.  Patient noted blood in his urine this morning, UA suspicious for UTI  His last urine culture grew Enterococcus thus started on IV vancomycin.  He was given a dose of IV ceftriaxone in the ER  Urine culture with growth of oxidase positive gram-negative sharifa  Stop IV vancomycin and start on IV cefepime to cover for Pseudomonas until final results

## 2024-09-14 NOTE — ASSESSMENT & PLAN NOTE
Wt Readings from Last 3 Encounters:   09/17/24 87.7 kg (193 lb 6.4 oz)   09/05/24 95.3 kg (210 lb)   07/30/24 95.3 kg (210 lb)   Appears compensated presently.  No weight gain, shortness of breath or leg swelling  Continue Lisinopril, Lasix, Toprol-XL  Beta-blocker dose reduced, 25 mg twice daily  EF is 40%, follows with cardiology  Daily weights while hospitalized

## 2024-09-14 NOTE — PLAN OF CARE
Problem: Potential for Falls  Goal: Patient will remain free of falls  Description: INTERVENTIONS:  - Educate patient/family on patient safety including physical limitations  - Instruct patient to call for assistance with activity   - Consult OT/PT to assist with strengthening/mobility   - Keep Call bell within reach  - Keep bed low and locked with side rails adjusted as appropriate  - Keep care items and personal belongings within reach  - Initiate and maintain comfort rounds  - Make Fall Risk Sign visible to staff  - Offer Toileting every 3 Hours, in advance of need  - Initiate/Maintain bed alarm  - Obtain necessary fall risk management equipment:   - Apply yellow socks and bracelet for high fall risk patients  - Consider moving patient to room near nurses station  Outcome: Progressing     Problem: PAIN - ADULT  Goal: Verbalizes/displays adequate comfort level or baseline comfort level  Description: Interventions:  - Encourage patient to monitor pain and request assistance  - Assess pain using appropriate pain scale  - Administer analgesics based on type and severity of pain and evaluate response  - Implement non-pharmacological measures as appropriate and evaluate response  - Consider cultural and social influences on pain and pain management  - Notify physician/advanced practitioner if interventions unsuccessful or patient reports new pain  Outcome: Progressing     Problem: INFECTION - ADULT  Goal: Absence or prevention of progression during hospitalization  Description: INTERVENTIONS:  - Assess and monitor for signs and symptoms of infection  - Monitor lab/diagnostic results  - Monitor all insertion sites, i.e. indwelling lines, tubes, and drains  - Monitor endotracheal if appropriate and nasal secretions for changes in amount and color  - [place] appropriate cooling/warming therapies per order  - Administer medications as ordered  - Instruct and encourage patient and family to use good hand hygiene  technique  - Identify and instruct in appropriate isolation precautions for identified infection/condition  Outcome: Progressing  Goal: Absence of fever/infection during neutropenic period  Description: INTERVENTIONS:  - Monitor WBC    Outcome: Progressing

## 2024-09-14 NOTE — ASSESSMENT & PLAN NOTE
Recent diagnosed with UTI and placed on Keflex twice daily.  Urine culture at that time growing mixed contaminants.  Patient noted blood in his urine this morning, UA suspicious for UTI  His last urine culture grew Enterococcus thus started on IV vancomycin.  He was given a dose of IV ceftriaxone in the ER  Urine culture with growth of Pseudomonas  Transition to p.o. Cipro for 3 more days to complete 7 days of antibiotics given complicated UTI

## 2024-09-14 NOTE — ASSESSMENT & PLAN NOTE
Overall malaise, generalized weakness for the past day.  Has been on Keflex for UTI but noticed discoloration and some blood in his urine as well as generalized weakness today  PT and OT evaluations - level 3  To [facility] today

## 2024-09-14 NOTE — ASSESSMENT & PLAN NOTE
Overall malaise, generalized weakness for the past day.  Has been on Keflex for UTI but noticed discoloration and some blood in his urine as well as generalized weakness today  PT and OT evaluations - level 3

## 2024-09-14 NOTE — PLAN OF CARE
Problem: Potential for Falls  Goal: Patient will remain free of falls  Description: INTERVENTIONS:  - Educate patient/family on patient safety including physical limitations  - Instruct patient to call for assistance with activity   - Consult OT/PT to assist with strengthening/mobility   - Keep Call bell within reach  - Keep bed low and locked with side rails adjusted as appropriate  - Keep care items and personal belongings within reach  - Initiate and maintain comfort rounds  - Make Fall Risk Sign visible to staff  - Offer Toileting every 2 Hours, in advance of need  - Initiate/Maintain bed alarm  - Obtain necessary fall risk management equipment:   - Apply yellow socks and bracelet for high fall risk patients  - Consider moving patient to room near nurses station  Outcome: Progressing     Problem: PAIN - ADULT  Goal: Verbalizes/displays adequate comfort level or baseline comfort level  Description: Interventions:  - Encourage patient to monitor pain and request assistance  - Assess pain using appropriate pain scale  - Administer analgesics based on type and severity of pain and evaluate response  - Implement non-pharmacological measures as appropriate and evaluate response  - Consider cultural and social influences on pain and pain management  - Notify physician/advanced practitioner if interventions unsuccessful or patient reports new pain  Outcome: Progressing     Problem: INFECTION - ADULT  Goal: Absence or prevention of progression during hospitalization  Description: INTERVENTIONS:  - Assess and monitor for signs and symptoms of infection  - Monitor lab/diagnostic results  - Monitor all insertion sites, i.e. indwelling lines, tubes, and drains  - Monitor endotracheal if appropriate and nasal secretions for changes in amount and color  - [place] appropriate cooling/warming therapies per order  - Administer medications as ordered  - Instruct and encourage patient and family to use good hand hygiene  technique  - Identify and instruct in appropriate isolation precautions for identified infection/condition  Outcome: Progressing  Goal: Absence of fever/infection during neutropenic period  Description: INTERVENTIONS:  - Monitor WBC    Outcome: Progressing

## 2024-09-14 NOTE — DISCHARGE SUMMARY
Discharge Summary - Hospitalist   Name: Toro Rodriguez 74 y.o. male I MRN: [id number]  Unit/Bed#: -01 I Date of Admission: 9/12/2024   Date of Service: 9/17/2024 I Hospital Day: 5     Assessment & Plan  Urinary tract infection associated with indwelling urethral catheter  (HCC)  Recent diagnosed with UTI and placed on Keflex twice daily.  Urine culture at that time growing mixed contaminants.  Patient noted blood in his urine this morning, UA suspicious for UTI  His last urine culture grew Enterococcus thus started on IV vancomycin.  He was given a dose of IV ceftriaxone in the ER  Urine culture with growth of Pseudomonas  Transition to p.o. Cipro for 3 more days to complete 7 days of antibiotics given complicated UTI  Generalized weakness  Overall malaise, generalized weakness for the past day.  Has been on Keflex for UTI but noticed discoloration and some blood in his urine as well as generalized weakness today  PT and OT evaluations - level 3  To [facility] today  Essential hypertension  Blood pressure controlled  Resume home lisinopril, Lasix, Toprol-XL  Monitor blood pressure  Coronary artery disease involving native coronary artery of native heart  He presently denies chest pain  Noted to have some bradycardia in the ER  Continue Toprol-XL but reduce dose to 25 mg twice daily due to persistent bradycardia  Continue aspirin  Benign prostatic hyperplasia with urinary retention  BPH with urinary retention, following with urology and will have SPT placed as an outpatient  Deep vein thrombosis (DVT) of left lower extremity (HCC)  Continue Eliquis  Chronic systolic (congestive) heart failure (HCC)  Wt Readings from Last 3 Encounters:   09/17/24 87.7 kg (193 lb 6.4 oz)   09/05/24 95.3 kg (210 lb)   07/30/24 95.3 kg (210 lb)   Appears compensated presently.  No weight gain, shortness of breath or leg swelling  Continue Lisinopril, Lasix, Toprol-XL  Beta-blocker dose reduced, 25 mg twice daily  EF is  40%, follows with cardiology  Daily weights while hospitalized  Chronic kidney disease, stage 3a (HCC)  Lab Results   Component Value Date    EGFR 58 09/16/2024    EGFR 66 09/14/2024    EGFR 62 09/13/2024    CREATININE 1.19 09/16/2024    CREATININE 1.08 09/14/2024    CREATININE 1.13 09/13/2024   His creatinine is stable  Generalized abdominal pain (Resolved: 9/17/2024)  Patient reported generalized abdominal pain this morning with associated symptoms of multiple episodes of loose stool and poor appetite  On physical examination: no tenderness or guarding present, no hyperactive bowel sounds   Resolved with bentyl     Medical Problems       Resolved Problems  Date Reviewed: 9/13/2024   None       Discharging Physician / Practitioner: Rick Crawley PA-C  PCP: CARLIE Foster  Admission Date:   Admission Orders (From admission, onward)       Ordered        09/12/24 1602  INPATIENT ADMISSION  Once                          Discharge Date: 09/17/24     Consultations During Hospital Stay:  None     Procedures Performed:   None     Significant Findings / Test Results:   UC growing Pseudomonas aeruginosa    Incidental Findings:   None      Test Results Pending at Discharge (will require follow up):   none     Outpatient Tests Requested:  none    Complications:  none    Reason for Admission: weakness, UTI    Hospital Course:   Toro Rodriguez is a 74 y.o. male patient who originally presented to the hospital on 9/12/2024 due to generalized weakness, malaise, bloody urine.  Patient found to have UTI, was recently placed on Keflex which did not improve his symptoms.  He was started on IV vancomycin upon admission.  Initial urine culture with growth of a gram-positive sharifa suspicious for Pseudomonas so patient was switched to IV vancomycin.  Final results of cultures did indeed grow Pseudomonas aeruginosa.  Patient felt better after getting treatment for UTI.  Has been ambulatory.  He is able to go back to his  "assisted living today.  Will be discharged with antibiotics, Cipro for a total of 7 days of abx.     Please see above list of diagnoses and related plan for additional information.     Condition at Discharge: stable    Discharge Day Visit / Exam:   Subjective: Patient feeling much better, no abdominal pain this morning.  No fevers.  Vitals: Blood Pressure: 153/65 (09/17/24 0741)  Pulse: (!) 53 (09/17/24 0741)  Temperature: 97.9 °F (36.6 °C) (09/17/24 0741)  Temp Source: Temporal (09/17/24 0741)  Respirations: 18 (09/15/24 1941)  Height: 5' 7\" (170.2 cm) (09/12/24 1832)  Weight - Scale: 87.7 kg (193 lb 6.4 oz) (09/17/24 0530)  SpO2: 95 % (09/17/24 0741)  Exam:   Physical Exam  Vitals and nursing note reviewed.   Constitutional:       General: He is not in acute distress.     Appearance: Normal appearance. He is not ill-appearing.   HENT:      Head: Normocephalic and atraumatic.   Eyes:      General: No scleral icterus.        Right eye: No discharge.         Left eye: No discharge.      Pupils: Pupils are equal, round, and reactive to light.   Cardiovascular:      Rate and Rhythm: Regular rhythm. Bradycardia present.      Pulses: Normal pulses.      Heart sounds: No murmur heard.     No friction rub. No gallop.   Pulmonary:      Effort: Pulmonary effort is normal. No respiratory distress.      Breath sounds: Normal breath sounds. No wheezing, rhonchi or rales.   Abdominal:      General: Bowel sounds are normal. There is no distension.      Palpations: Abdomen is soft.      Tenderness: There is no abdominal tenderness. There is no guarding or rebound.   Genitourinary:     Comments: + farmer, dark urine, no blood or clots  Musculoskeletal:         General: No swelling or deformity.      Cervical back: Neck supple.      Right lower leg: No edema.      Left lower leg: No edema.   Skin:     General: Skin is warm and dry.      Capillary Refill: Capillary refill takes less than 2 seconds.      Coloration: Skin is not " jaundiced or pale.      Findings: No erythema or rash.   Neurological:      General: No focal deficit present.      Mental Status: He is alert and oriented to person, place, and time. Mental status is at baseline.      Cranial Nerves: No cranial nerve deficit.      Sensory: No sensory deficit.      Motor: No weakness.   Psychiatric:         Mood and Affect: Mood normal.         Behavior: Behavior normal.          Discussion with Family: Patient declined call to .     Discharge instructions/Information to patient and family:   See after visit summary for information provided to patient and family.      Provisions for Follow-Up Care:  See after visit summary for information related to follow-up care and any pertinent home health orders.       Disposition:   Assisted Living Facility at [facility]    Planned Readmission: no     Discharge Statement:  I spent 45 minutes discharging the patient. This time was spent on the day of discharge. I had direct contact with the patient on the day of discharge. Greater than 50% of the total time was spent examining patient, answering all patient questions, arranging and discussing plan of care with patient as well as directly providing post-discharge instructions.  Additional time then spent on discharge activities.    Discharge Medications:  See after visit summary for reconciled discharge medications provided to patient and/or family.      **Please Note: This note may have been constructed using a voice recognition system**

## 2024-09-15 LAB
MRSA NOSE QL CULT: ABNORMAL
MRSA NOSE QL CULT: ABNORMAL

## 2024-09-15 PROCEDURE — 99232 SBSQ HOSP IP/OBS MODERATE 35: CPT | Performed by: PHYSICIAN ASSISTANT

## 2024-09-15 RX ADMIN — METOPROLOL SUCCINATE 25 MG: 25 TABLET, EXTENDED RELEASE ORAL at 19:40

## 2024-09-15 RX ADMIN — CEFEPIME HYDROCHLORIDE 2000 MG: 2 INJECTION, SOLUTION INTRAVENOUS at 03:52

## 2024-09-15 RX ADMIN — TAMSULOSIN HYDROCHLORIDE 0.4 MG: 0.4 CAPSULE ORAL at 17:50

## 2024-09-15 RX ADMIN — ATORVASTATIN CALCIUM 40 MG: 40 TABLET, FILM COATED ORAL at 17:50

## 2024-09-15 RX ADMIN — LISINOPRIL 5 MG: 5 TABLET ORAL at 08:28

## 2024-09-15 RX ADMIN — CEFEPIME HYDROCHLORIDE 2000 MG: 2 INJECTION, SOLUTION INTRAVENOUS at 12:52

## 2024-09-15 RX ADMIN — APIXABAN 5 MG: 5 TABLET, FILM COATED ORAL at 08:28

## 2024-09-15 RX ADMIN — METOPROLOL SUCCINATE 25 MG: 25 TABLET, EXTENDED RELEASE ORAL at 08:28

## 2024-09-15 RX ADMIN — FINASTERIDE 5 MG: 5 TABLET, FILM COATED ORAL at 08:28

## 2024-09-15 RX ADMIN — CEFEPIME HYDROCHLORIDE 2000 MG: 2 INJECTION, SOLUTION INTRAVENOUS at 19:40

## 2024-09-15 RX ADMIN — ASPIRIN 81 MG CHEWABLE TABLET 81 MG: 81 TABLET CHEWABLE at 08:28

## 2024-09-15 RX ADMIN — AMIODARONE HYDROCHLORIDE 200 MG: 200 TABLET ORAL at 08:28

## 2024-09-15 RX ADMIN — APIXABAN 5 MG: 5 TABLET, FILM COATED ORAL at 17:50

## 2024-09-15 NOTE — ASSESSMENT & PLAN NOTE
Overall malaise, generalized weakness for the past day.  Has been on Keflex for UTI but noticed discoloration and some blood in his urine as well as generalized weakness   PT and OT evaluations - level 3  Back to [facility] when medically stable

## 2024-09-15 NOTE — ASSESSMENT & PLAN NOTE
Wt Readings from Last 3 Encounters:   09/15/24 87.1 kg (192 lb 1.6 oz)   09/05/24 95.3 kg (210 lb)   07/30/24 95.3 kg (210 lb)   Appears compensated presently.  No weight gain, shortness of breath or leg swelling  Continue Lisinopril, Lasix, Toprol-XL  Beta-blocker dose reduced, 25 mg twice daily  EF is 40%, follows with cardiology  Daily weights while hospitalized

## 2024-09-15 NOTE — PROGRESS NOTES
Progress Note - Hospitalist   Name: Toro Rodriguez 74 y.o. male I MRN: [id number]  Unit/Bed#: -01 I Date of Admission: 9/12/2024   Date of Service: 9/15/2024 I Hospital Day: 3    Assessment & Plan  Urinary tract infection associated with indwelling urethral catheter  (HCC)  Recent diagnosed with UTI and placed on Keflex twice daily.  Urine culture at that time growing mixed contaminants.  Patient noted blood in his urine this morning, UA suspicious for UTI  His last urine culture grew Enterococcus thus started on IV vancomycin.  He was given a dose of IV ceftriaxone in the ER  Urine culture with growth of oxidase positive gram-negative sharifa  Continue IV cefepime to cover for Pseudomonas until final results  Likely dc with FQ but will follow up final sensis  Generalized weakness  Overall malaise, generalized weakness for the past day.  Has been on Keflex for UTI but noticed discoloration and some blood in his urine as well as generalized weakness   PT and OT evaluations - level 3  Back to [facility] when medically stable  Essential hypertension  Blood pressure controlled  Resume home lisinopril, Lasix, Entresto, Toprol-XL  Monitor blood pressure  Coronary artery disease involving native coronary artery of native heart  He presently denies chest pain  Noted to have some bradycardia in the ER  Continue Toprol-XL but reduce dose to 25 mg twice daily due to persistent bradycardia  Continue aspirin  Benign prostatic hyperplasia with urinary retention  BPH with urinary retention, following with urology and will have SPT placed as an outpatient  Deep vein thrombosis (DVT) of left lower extremity (HCC)  Continue Eliquis  Chronic systolic (congestive) heart failure (HCC)  Wt Readings from Last 3 Encounters:   09/15/24 87.1 kg (192 lb 1.6 oz)   09/05/24 95.3 kg (210 lb)   07/30/24 95.3 kg (210 lb)   Appears compensated presently.  No weight gain, shortness of breath or leg swelling  Continue Lisinopril, Lasix,  Toprol-XL  Beta-blocker dose reduced, 25 mg twice daily  EF is 40%, follows with cardiology  Daily weights while hospitalized  Chronic kidney disease, stage 3a (HCC)  Lab Results   Component Value Date    EGFR 66 2024    EGFR 62 2024    EGFR 57 2024    CREATININE 1.08 2024    CREATININE 1.13 2024    CREATININE 1.22 2024   His creatinine is stable  Mobility:   Basic Mobility Inpatient Raw Score: 17  -[facility] Goal: 5: Stand one or more mins  -HLM Achieved: 5: Stand (1 or more minutes)  -HLM Goal achieved. Continue to encourage appropriate mobility.    VTE Pharmacologic Prophylaxis: VTE Score: 5 High Risk (Score >/= 5) - Pharmacological DVT Prophylaxis Ordered: apixaban (Eliquis). Sequential Compression Devices Ordered.    Education and Discussions with Family / Patient: Patient declined call to .     Time Spent for Care: 35 minutes. More than 50% of total time spent on counseling and coordination of care as described above.    Current Length of Stay: 3 day(s)  Current Patient Status: Inpatient   Certification Statement: The patient will continue to require additional inpatient hospital stay due to UTI due to Pseudomonas awaiting final sensitivities prior to discharge  Discharge Plan: Anticipate discharge tomorrow to prior assisted or independent living facility.    Code Status: Level 1 - Full Code    Subjective:   No overnight events reported.  He is doing better today, has no complaints specifically and is feeling a little bit better overall.  Urine still dark but improving    Objective:     Vitals:   Temp (24hrs), Av.5 °F (36.4 °C), Min:97.5 °F (36.4 °C), Max:97.5 °F (36.4 °C)    Temp:  [97.5 °F (36.4 °C)] 97.5 °F (36.4 °C)  HR:  [48-58] 58  Resp:  [17] 17  BP: (141-156)/(62-77) 156/77  SpO2:  [94 %-95 %] 95 %  Body mass index is 30.09 kg/m².     Input and Output Summary (last 24 hours):     Intake/Output Summary (Last 24 hours) at 9/15/2024 1200  Last data  filed at 9/15/2024 0701  Gross per 24 hour   Intake 600 ml   Output 1850 ml   Net -1250 ml       Physical Exam:   Physical Exam  Vitals and nursing note reviewed.   Constitutional:       General: He is not in acute distress.     Appearance: Normal appearance. He is not ill-appearing.   HENT:      Head: Normocephalic and atraumatic.   Eyes:      General: No scleral icterus.        Right eye: No discharge.         Left eye: No discharge.      Pupils: Pupils are equal, round, and reactive to light.   Cardiovascular:      Rate and Rhythm: Regular rhythm. Bradycardia present.      Pulses: Normal pulses.      Heart sounds: No murmur heard.     No friction rub. No gallop.   Pulmonary:      Effort: Pulmonary effort is normal. No respiratory distress.      Breath sounds: Normal breath sounds. No wheezing, rhonchi or rales.   Abdominal:      General: Bowel sounds are normal. There is no distension.      Palpations: Abdomen is soft.      Tenderness: There is no abdominal tenderness. There is no guarding or rebound.   Genitourinary:     Comments: + farmer, dark urine, no blood or clots  Musculoskeletal:         General: No swelling or deformity.      Cervical back: Neck supple.      Right lower leg: No edema.      Left lower leg: No edema.   Skin:     General: Skin is warm and dry.      Capillary Refill: Capillary refill takes less than 2 seconds.      Coloration: Skin is not jaundiced or pale.      Findings: No erythema or rash.   Neurological:      General: No focal deficit present.      Mental Status: He is alert and oriented to person, place, and time. Mental status is at baseline.      Cranial Nerves: No cranial nerve deficit.      Sensory: No sensory deficit.      Motor: No weakness.   Psychiatric:         Mood and Affect: Mood normal.         Behavior: Behavior normal.          Additional Data:     Labs:  Results from last 7 days   Lab Units 09/14/24  0544   WBC Thousand/uL 5.18   HEMOGLOBIN g/dL 12.2   HEMATOCRIT % 38.2    PLATELETS Thousands/uL 236   SEGS PCT % 57   LYMPHO PCT % 26   MONO PCT % 10   EOS PCT % 6     Results from last 7 days   Lab Units 09/14/24  0544 09/13/24  0325 09/12/24  1343   SODIUM mmol/L 136   < > 138   POTASSIUM mmol/L 4.4   < > 3.9   CHLORIDE mmol/L 105   < > 103   CO2 mmol/L 24   < > 29   BUN mg/dL 21   < > 18   CREATININE mg/dL 1.08   < > 1.22   ANION GAP mmol/L 7   < > 6   CALCIUM mg/dL 8.5   < > 8.6   ALBUMIN g/dL  --   --  3.6   TOTAL BILIRUBIN mg/dL  --   --  0.57   ALK PHOS U/L  --   --  76   ALT U/L  --   --  8   AST U/L  --   --  12*   GLUCOSE RANDOM mg/dL 91   < > 87    < > = values in this interval not displayed.         Results from last 7 days   Lab Units 09/13/24  2213   POC GLUCOSE mg/dl 112               Imaging: No additional pertinent studies reviewed.    Recent Cultures (last 7 days):   Results from last 7 days   Lab Units 09/12/24  1344   URINE CULTURE  >100,000 cfu/ml Pseudomonas aeruginosa*       Last 24 Hours Medication List:   Current Facility-Administered Medications   Medication Dose Route Frequency Provider Last Rate    acetaminophen  650 mg Oral Q6H PRN Rick Crawley PA-C      amiodarone  200 mg Oral Daily Rick Crawley PA-C      apixaban  5 mg Oral BID Rick Crawley PA-C      aspirin  81 mg Oral Daily Rick Crawley PA-C      atorvastatin  40 mg Oral Daily With Dinner Rick Crawley PA-C      calcium carbonate  500 mg Oral BID PRN Rick Crawley PA-C      cefepime  2,000 mg Intravenous Q8H Rick Crawley PA-C 2,000 mg (09/15/24 0352)    docusate sodium  100 mg Oral BID PRN Rcik Crawley PA-C      finasteride  5 mg Oral Daily Rick Crawley PA-C      lisinopril  5 mg Oral Daily Rick Crawley PA-C      melatonin  6 mg Oral HS PRN Rick Crawley PA-C      metoprolol succinate  25 mg Oral Q12H ARI Rick Crawley PA-C      ondansetron  4 mg Intravenous Q4H PRN Rick Crawley PA-C      polyethylene glycol  17 g Oral Daily PRN Rick Crawley PA-C       tamsulosin  0.4 mg Oral Daily With Dinner Rick Crawley PA-C      traMADol  50 mg Oral Q6H PRN Rick Crawley PA-C          Today, Patient Was Seen By: Rick Crawley PA-C    **Please Note: This note may have been constructed using a voice recognition system.**

## 2024-09-15 NOTE — ASSESSMENT & PLAN NOTE
Recent diagnosed with UTI and placed on Keflex twice daily.  Urine culture at that time growing mixed contaminants.  Patient noted blood in his urine this morning, UA suspicious for UTI  His last urine culture grew Enterococcus thus started on IV vancomycin.  He was given a dose of IV ceftriaxone in the ER  Urine culture with growth of oxidase positive gram-negative sharifa  Continue IV cefepime to cover for Pseudomonas until final results  Likely dc with FQ but will follow up final sensis

## 2024-09-15 NOTE — PLAN OF CARE
Problem: Potential for Falls  Goal: Patient will remain free of falls  Description: INTERVENTIONS:  - Educate patient/family on patient safety including physical limitations  - Instruct patient to call for assistance with activity   - Consult OT/PT to assist with strengthening/mobility   - Keep Call bell within reach  - Keep bed low and locked with side rails adjusted as appropriate  - Keep care items and personal belongings within reach  - Initiate and maintain comfort rounds  - Make Fall Risk Sign visible to staff  - Offer Toileting every x Hours, in advance of need  - Initiate/Maintain xalarm  - Obtain necessary fall risk management equipment: x  - Apply yellow socks and bracelet for high fall risk patients  - Consider moving patient to room near nurses station  Outcome: Progressing     Problem: PAIN - ADULT  Goal: Verbalizes/displays adequate comfort level or baseline comfort level  Description: Interventions:  - Encourage patient to monitor pain and request assistance  - Assess pain using appropriate pain scale  - Administer analgesics based on type and severity of pain and evaluate response  - Implement non-pharmacological measures as appropriate and evaluate response  - Consider cultural and social influences on pain and pain management  - Notify physician/advanced practitioner if interventions unsuccessful or patient reports new pain  Outcome: Progressing     Problem: INFECTION - ADULT  Goal: Absence or prevention of progression during hospitalization  Description: INTERVENTIONS:  - Assess and monitor for signs and symptoms of infection  - Monitor lab/diagnostic results  - Monitor all insertion sites, i.e. indwelling lines, tubes, and drains  - Monitor endotracheal if appropriate and nasal secretions for changes in amount and color  - [place] appropriate cooling/warming therapies per order  - Administer medications as ordered  - Instruct and encourage patient and family to use good hand hygiene  technique  - Identify and instruct in appropriate isolation precautions for identified infection/condition  Outcome: Progressing  Goal: Absence of fever/infection during neutropenic period  Description: INTERVENTIONS:  - Monitor WBC    Outcome: Progressing     Problem: SAFETY ADULT  Goal: Patient will remain free of falls  Description: INTERVENTIONS:  - Educate patient/family on patient safety including physical limitations  - Instruct patient to call for assistance with activity   - Consult OT/PT to assist with strengthening/mobility   - Keep Call bell within reach  - Keep bed low and locked with side rails adjusted as appropriate  - Keep care items and personal belongings within reach  - Initiate and maintain comfort rounds  - Make Fall Risk Sign visible to staff  - Offer Toileting every xx Hours, in advance of need  - Initiate/Maintain xalarm  - Obtain necessary fall risk management equipment: x  - Apply yellow socks and bracelet for high fall risk patients  - Consider moving patient to room near nurses station  Outcome: Progressing  Goal: Maintain or return to baseline ADL function  Description: INTERVENTIONS:  -  Assess patient's ability to carry out ADLs; assess patient's baseline for ADL function and identify physical deficits which impact ability to perform ADLs (bathing, care of mouth/teeth, toileting, grooming, dressing, etc.)  - Assess/evaluate cause of self-care deficits   - Assess range of motion  - Assess patient's mobility; develop plan if impaired  - Assess patient's need for assistive devices and provide as appropriate  - Encourage maximum independence but intervene and supervise when necessary  - Involve family in performance of ADLs  - Assess for home care needs following discharge   - Consider OT consult to assist with ADL evaluation and planning for discharge  - Provide patient education as appropriate  Outcome: Progressing  Goal: Maintains/Returns to pre admission functional level  Description:  INTERVENTIONS:  - Perform AM-PAC 6 Click Basic Mobility/ Daily Activity assessment daily.  - Set and communicate daily mobility goal to care team and patient/family/caregiver.   - Collaborate with rehabilitation services on mobility goals if consulted  - Perform Range of Motion x times a day.  - Reposition patient every xx hours.  - Dangle patient xxxx times a day  - Stand patient xx times a day  - Ambulate patient x times a day  - Out of bed to chair x times a day   - Out of bed for meals x times a day  - Out of bed for toileting  - Record patient progress and toleration of activity level   Outcome: Progressing     Problem: DISCHARGE PLANNING  Goal: Discharge to home or other facility with appropriate resources  Description: INTERVENTIONS:  - Identify barriers to discharge w/patient and caregiver  - Arrange for needed discharge resources and transportation as appropriate  - Identify discharge learning needs (meds, wound care, etc.)  - Arrange for interpretive services to assist at discharge as needed  - Refer to Case Management Department for coordinating discharge planning if the patient needs post-hospital services based on physician/advanced practitioner order or complex needs related to functional status, cognitive ability, or social support system  Outcome: Progressing     Problem: Knowledge Deficit  Goal: Patient/family/caregiver demonstrates understanding of disease process, treatment plan, medications, and discharge instructions  Description: Complete learning assessment and assess knowledge base.  Interventions:  - Provide teaching at level of understanding  - Provide teaching via preferred learning methods  Outcome: Progressing     Problem: Prexisting or High Potential for Compromised Skin Integrity  Goal: Skin integrity is maintained or improved  Description: INTERVENTIONS:  - Identify patients at risk for skin breakdown  - Assess and monitor skin integrity  - Assess and monitor nutrition and hydration  status  - Monitor labs   - Assess for incontinence   - Turn and reposition patient  - Assist with mobility/ambulation  - Relieve pressure over bony prominences  - Avoid friction and shearing  - Provide appropriate hygiene as needed including keeping skin clean and dry  - Evaluate need for skin moisturizer/barrier cream  - Collaborate with interdisciplinary team   - Patient/family teaching  - Consider wound care consult   Outcome: Progressing

## 2024-09-16 PROBLEM — R10.84 GENERALIZED ABDOMINAL PAIN: Status: ACTIVE | Noted: 2024-09-16

## 2024-09-16 LAB
ANION GAP SERPL CALCULATED.3IONS-SCNC: 8 MMOL/L (ref 4–13)
BACTERIA UR CULT: ABNORMAL
BASOPHILS # BLD AUTO: 0.05 THOUSANDS/ΜL (ref 0–0.1)
BASOPHILS NFR BLD AUTO: 1 % (ref 0–1)
BUN SERPL-MCNC: 22 MG/DL (ref 5–25)
CALCIUM SERPL-MCNC: 8.8 MG/DL (ref 8.4–10.2)
CHLORIDE SERPL-SCNC: 104 MMOL/L (ref 96–108)
CO2 SERPL-SCNC: 25 MMOL/L (ref 21–32)
CREAT SERPL-MCNC: 1.19 MG/DL (ref 0.6–1.3)
EOSINOPHIL # BLD AUTO: 0.26 THOUSAND/ΜL (ref 0–0.61)
EOSINOPHIL NFR BLD AUTO: 4 % (ref 0–6)
ERYTHROCYTE [DISTWIDTH] IN BLOOD BY AUTOMATED COUNT: 13.2 % (ref 11.6–15.1)
GFR SERPL CREATININE-BSD FRML MDRD: 58 ML/MIN/1.73SQ M
GLUCOSE SERPL-MCNC: 111 MG/DL (ref 65–140)
HCT VFR BLD AUTO: 40.6 % (ref 36.5–49.3)
HGB BLD-MCNC: 13.4 G/DL (ref 12–17)
IMM GRANULOCYTES # BLD AUTO: 0.02 THOUSAND/UL (ref 0–0.2)
IMM GRANULOCYTES NFR BLD AUTO: 0 % (ref 0–2)
LYMPHOCYTES # BLD AUTO: 1.18 THOUSANDS/ΜL (ref 0.6–4.47)
LYMPHOCYTES NFR BLD AUTO: 17 % (ref 14–44)
MCH RBC QN AUTO: 31.5 PG (ref 26.8–34.3)
MCHC RBC AUTO-ENTMCNC: 33 G/DL (ref 31.4–37.4)
MCV RBC AUTO: 95 FL (ref 82–98)
MONOCYTES # BLD AUTO: 0.48 THOUSAND/ΜL (ref 0.17–1.22)
MONOCYTES NFR BLD AUTO: 7 % (ref 4–12)
NEUTROPHILS # BLD AUTO: 4.9 THOUSANDS/ΜL (ref 1.85–7.62)
NEUTS SEG NFR BLD AUTO: 71 % (ref 43–75)
NRBC BLD AUTO-RTO: 0 /100 WBCS
PLATELET # BLD AUTO: 268 THOUSANDS/UL (ref 149–390)
PMV BLD AUTO: 10.5 FL (ref 8.9–12.7)
POTASSIUM SERPL-SCNC: 4.1 MMOL/L (ref 3.5–5.3)
RBC # BLD AUTO: 4.26 MILLION/UL (ref 3.88–5.62)
SODIUM SERPL-SCNC: 137 MMOL/L (ref 135–147)
WBC # BLD AUTO: 6.89 THOUSAND/UL (ref 4.31–10.16)

## 2024-09-16 PROCEDURE — 85025 COMPLETE CBC W/AUTO DIFF WBC: CPT

## 2024-09-16 PROCEDURE — 99232 SBSQ HOSP IP/OBS MODERATE 35: CPT

## 2024-09-16 PROCEDURE — 80048 BASIC METABOLIC PNL TOTAL CA: CPT

## 2024-09-16 RX ORDER — CIPROFLOXACIN 500 MG/1
500 TABLET, FILM COATED ORAL EVERY 12 HOURS SCHEDULED
Status: DISCONTINUED | OUTPATIENT
Start: 2024-09-16 | End: 2024-09-17 | Stop reason: HOSPADM

## 2024-09-16 RX ORDER — DICYCLOMINE HYDROCHLORIDE 10 MG/1
10 CAPSULE ORAL
Status: DISCONTINUED | OUTPATIENT
Start: 2024-09-16 | End: 2024-09-17 | Stop reason: HOSPADM

## 2024-09-16 RX ORDER — SODIUM CHLORIDE 9 MG/ML
75 INJECTION, SOLUTION INTRAVENOUS CONTINUOUS
Status: DISPENSED | OUTPATIENT
Start: 2024-09-16 | End: 2024-09-17

## 2024-09-16 RX ORDER — SIMETHICONE 80 MG
80 TABLET,CHEWABLE ORAL EVERY 6 HOURS PRN
Status: DISCONTINUED | OUTPATIENT
Start: 2024-09-16 | End: 2024-09-17 | Stop reason: HOSPADM

## 2024-09-16 RX ADMIN — CEFEPIME HYDROCHLORIDE 2000 MG: 2 INJECTION, SOLUTION INTRAVENOUS at 04:13

## 2024-09-16 RX ADMIN — FINASTERIDE 5 MG: 5 TABLET, FILM COATED ORAL at 08:46

## 2024-09-16 RX ADMIN — SODIUM CHLORIDE 75 ML/HR: 0.9 INJECTION, SOLUTION INTRAVENOUS at 17:56

## 2024-09-16 RX ADMIN — LISINOPRIL 5 MG: 5 TABLET ORAL at 08:46

## 2024-09-16 RX ADMIN — APIXABAN 5 MG: 5 TABLET, FILM COATED ORAL at 08:46

## 2024-09-16 RX ADMIN — METOPROLOL SUCCINATE 25 MG: 25 TABLET, EXTENDED RELEASE ORAL at 21:47

## 2024-09-16 RX ADMIN — APIXABAN 5 MG: 5 TABLET, FILM COATED ORAL at 17:00

## 2024-09-16 RX ADMIN — DICYCLOMINE HYDROCHLORIDE 10 MG: 10 CAPSULE ORAL at 11:33

## 2024-09-16 RX ADMIN — SIMETHICONE 80 MG: 80 TABLET, CHEWABLE ORAL at 08:46

## 2024-09-16 RX ADMIN — ASPIRIN 81 MG CHEWABLE TABLET 81 MG: 81 TABLET CHEWABLE at 08:46

## 2024-09-16 RX ADMIN — DICYCLOMINE HYDROCHLORIDE 10 MG: 10 CAPSULE ORAL at 21:47

## 2024-09-16 RX ADMIN — CIPROFLOXACIN 500 MG: 500 TABLET ORAL at 11:33

## 2024-09-16 RX ADMIN — TAMSULOSIN HYDROCHLORIDE 0.4 MG: 0.4 CAPSULE ORAL at 16:16

## 2024-09-16 RX ADMIN — DICYCLOMINE HYDROCHLORIDE 10 MG: 10 CAPSULE ORAL at 16:16

## 2024-09-16 RX ADMIN — CIPROFLOXACIN 500 MG: 500 TABLET ORAL at 21:47

## 2024-09-16 RX ADMIN — AMIODARONE HYDROCHLORIDE 200 MG: 200 TABLET ORAL at 08:46

## 2024-09-16 RX ADMIN — ATORVASTATIN CALCIUM 40 MG: 40 TABLET, FILM COATED ORAL at 16:16

## 2024-09-16 RX ADMIN — SIMETHICONE 80 MG: 80 TABLET, CHEWABLE ORAL at 02:44

## 2024-09-16 RX ADMIN — METOPROLOL SUCCINATE 25 MG: 25 TABLET, EXTENDED RELEASE ORAL at 08:46

## 2024-09-16 NOTE — PLAN OF CARE
Problem: Potential for Falls  Goal: Patient will remain free of falls  Description: INTERVENTIONS:  - Educate patient/family on patient safety including physical limitations  - Instruct patient to call for assistance with activity   - Consult OT/PT to assist with strengthening/mobility   - Keep Call bell within reach  - Keep bed low and locked with side rails adjusted as appropriate  - Keep care items and personal belongings within reach  - Initiate and maintain comfort rounds  - Apply yellow socks and bracelet for high fall risk patients  - Consider moving patient to room near nurses station  Outcome: Progressing     Problem: PAIN - ADULT  Goal: Verbalizes/displays adequate comfort level or baseline comfort level  Description: Interventions:  - Encourage patient to monitor pain and request assistance  - Assess pain using appropriate pain scale  - Administer analgesics based on type and severity of pain and evaluate response  - Implement non-pharmacological measures as appropriate and evaluate response  - Consider cultural and social influences on pain and pain management  - Notify physician/advanced practitioner if interventions unsuccessful or patient reports new pain  Outcome: Progressing

## 2024-09-16 NOTE — PROGRESS NOTES
Progress Note - Hospitalist   Name: Toro Rodriguez 74 y.o. male I MRN: [id number]  Unit/Bed#: -01 I Date of Admission: 9/12/2024   Date of Service: 9/16/2024 I Hospital Day: 4    Assessment & Plan  Urinary tract infection associated with indwelling urethral catheter  (HCC)  Recent diagnosed with UTI and placed on Keflex twice daily.  Urine culture at that time growing mixed contaminants.  Patient noted blood in his urine this morning, UA suspicious for UTI  His last urine culture grew Enterococcus thus started on IV vancomycin.  He was given a dose of IV ceftriaxone in the ER  Urine culture with growth of oxidase positive gram-negative sharifa  Discontinue IV cefepime   Sensitives culture finalized  Transitioned to Cipro PO     Generalized abdominal pain  Patient reported generalized abdominal pain this morning with associated symptoms of multiple episodes of loose stool and poor appetite  On physical examination: no tenderness or guarding present, no hyperactive bowel sounds   He is complaining of cramping  Bentyl prn   Continue to monitor- if patient has worsening abdominal pain will obtain imaging   Generalized weakness  Overall malaise, generalized weakness for the past day.  Has been on Keflex for UTI but noticed discoloration and some blood in his urine as well as generalized weakness   PT and OT evaluations - level 3  Back to [facility] when medically stable  Essential hypertension  Blood pressure controlled  Resume home lisinopril, Lasix, Entresto, Toprol-XL  Monitor blood pressure  Coronary artery disease involving native coronary artery of native heart  He presently denies chest pain  Noted to have some bradycardia in the ER  Continue Toprol-XL but reduce dose to 25 mg twice daily due to persistent bradycardia  Continue aspirin  Benign prostatic hyperplasia with urinary retention  BPH with urinary retention, following with urology and will have SPT placed as an outpatient  Deep vein thrombosis  "(DVT) of left lower extremity (HCC)  Continue Eliquis  Chronic systolic (congestive) heart failure (HCC)  Wt Readings from Last 3 Encounters:   09/16/24 88.2 kg (194 lb 7.1 oz)   09/05/24 95.3 kg (210 lb)   07/30/24 95.3 kg (210 lb)   Appears compensated presently.  No weight gain, shortness of breath or leg swelling  Continue Lisinopril, Lasix, Toprol-XL  Beta-blocker dose reduced, 25 mg twice daily  EF is 40%, follows with cardiology  Daily weights while hospitalized  Chronic kidney disease, stage 3a (HCC)  Lab Results   Component Value Date    EGFR 66 09/14/2024    EGFR 62 09/13/2024    EGFR 57 09/12/2024    CREATININE 1.08 09/14/2024    CREATININE 1.13 09/13/2024    CREATININE 1.22 09/12/2024   His creatinine is stable    VTE Pharmacologic Prophylaxis: VTE Score: 5 Moderate Risk (Score 3-4) - Pharmacological DVT Prophylaxis Ordered: apixaban (Eliquis).    Mobility:   Basic Mobility Inpatient Raw Score: 17  JH-HLM Goal: 5: Stand one or more mins  JH-HLM Achieved: 5: Stand (1 or more minutes)  JH-HLM Goal achieved. Continue to encourage appropriate mobility.    Patient Centered Rounds: I performed bedside rounds with nursing staff today.   Discussions with Specialists or Other Care Team Provider: Case Management     Education and Discussions with Family / Patient: Patient declined call to .     Current Length of Stay: 4 day(s)  Current Patient Status: Inpatient   Certification Statement: The patient will continue to require additional inpatient hospital stay due to poor PO intake, abdominal pain not tolerating diet   Discharge Plan: Anticipate discharge in 24-48 hrs to prior assisted or independent living facility.    Code Status: Level 1 - Full Code    Subjective   Patient seen and examined this morning at bedside. Reports to feeling \"awful\" is endorsing stomach cramping, reports he has multiple episodes of loose stool overnight. Denies any current, but states his appetite is poor and he is unable " to tolerate breakfast.     Denies any fever, chills, chest pain, shortness of breath, or abdominal pain.     Objective     Vitals:   Temp (24hrs), Av.8 °F (36.6 °C), Min:97.7 °F (36.5 °C), Max:97.9 °F (36.6 °C)    Temp:  [97.7 °F (36.5 °C)-97.9 °F (36.6 °C)] 97.7 °F (36.5 °C)  HR:  [55-58] 58  Resp:  [18] 18  BP: (137-170)/(58-81) 170/81  SpO2:  [93 %-96 %] 96 %  Body mass index is 30.45 kg/m².     Input and Output Summary (last 24 hours):     Intake/Output Summary (Last 24 hours) at 2024 1140  Last data filed at 2024 0801  Gross per 24 hour   Intake 360 ml   Output 1775 ml   Net -1415 ml       Physical Exam  Vitals and nursing note reviewed.   Constitutional:       General: He is not in acute distress.     Appearance: He is not diaphoretic.   Eyes:      General: No scleral icterus.     Pupils: Pupils are equal, round, and reactive to light.   Cardiovascular:      Rate and Rhythm: Normal rate and regular rhythm.   Pulmonary:      Breath sounds: Normal breath sounds.   Abdominal:      General: There is no distension.      Tenderness: There is no abdominal tenderness. There is no guarding.   Neurological:      Mental Status: He is alert and oriented to person, place, and time.   Psychiatric:         Mood and Affect: Mood is anxious.          Lines/Drains:  Lines/Drains/Airways       Active Status       Name Placement date Placement time Site Days    Urethral Catheter Coude 18 Fr. 07/10/24  1640  Coude  67                  Urinary Catheter:  Goal for removal: N/A - Chronic Colon                 Lab Results: I have reviewed the following results: CBC/BMP: No new results in last 24 hours.    Results from last 7 days   Lab Units 24  0544   WBC Thousand/uL 5.18   HEMOGLOBIN g/dL 12.2   HEMATOCRIT % 38.2   PLATELETS Thousands/uL 236   SEGS PCT % 57   LYMPHO PCT % 26   MONO PCT % 10   EOS PCT % 6     Results from last 7 days   Lab Units 24  0544 24  0325 24  1343   SODIUM mmol/L 136   <  > 138   POTASSIUM mmol/L 4.4   < > 3.9   CHLORIDE mmol/L 105   < > 103   CO2 mmol/L 24   < > 29   BUN mg/dL 21   < > 18   CREATININE mg/dL 1.08   < > 1.22   ANION GAP mmol/L 7   < > 6   CALCIUM mg/dL 8.5   < > 8.6   ALBUMIN g/dL  --   --  3.6   TOTAL BILIRUBIN mg/dL  --   --  0.57   ALK PHOS U/L  --   --  76   ALT U/L  --   --  8   AST U/L  --   --  12*   GLUCOSE RANDOM mg/dL 91   < > 87    < > = values in this interval not displayed.         Results from last 7 days   Lab Units 09/13/24  2213   POC GLUCOSE mg/dl 112               Recent Cultures (last 7 days):   Results from last 7 days   Lab Units 09/12/24  1344   URINE CULTURE  >100,000 cfu/ml Pseudomonas aeruginosa*  50,000-59,000 cfu/ml Citrobacter freundii*  20,000-29,000 cfu/ml Staphylococcus aureus*       Imaging Review: No pertinent imaging studies reviewed.  Other Studies: No additional pertinent studies reviewed.    Last 24 Hours Medication List:     Current Facility-Administered Medications:     acetaminophen (TYLENOL) tablet 650 mg, Q6H PRN    amiodarone tablet 200 mg, Daily    apixaban (ELIQUIS) tablet 5 mg, BID    aspirin chewable tablet 81 mg, Daily    atorvastatin (LIPITOR) tablet 40 mg, Daily With Dinner    calcium carbonate (TUMS) chewable tablet 500 mg, BID PRN    ciprofloxacin (CIPRO) tablet 500 mg, Q12H ARI    dicyclomine (BENTYL) capsule 10 mg, 4x Daily (AC & HS)    docusate sodium (COLACE) capsule 100 mg, BID PRN    finasteride (PROSCAR) tablet 5 mg, Daily    lisinopril (ZESTRIL) tablet 5 mg, Daily    melatonin tablet 6 mg, HS PRN    metoprolol succinate (TOPROL-XL) 24 hr tablet 25 mg, Q12H ARI    ondansetron (ZOFRAN) injection 4 mg, Q4H PRN    polyethylene glycol (MIRALAX) packet 17 g, Daily PRN    simethicone (MYLICON) chewable tablet 80 mg, Q6H PRN    tamsulosin (FLOMAX) capsule 0.4 mg, Daily With Dinner    traMADol (ULTRAM) tablet 50 mg, Q6H PRN    Administrative Statements   Today, Patient Was Seen By: Jaci Li,  [facility]      **Please Note: This note may have been constructed using a voice recognition system.**

## 2024-09-16 NOTE — ASSESSMENT & PLAN NOTE
Overall malaise, generalized weakness for the past day.  Has been on Keflex for UTI but noticed discoloration and some blood in his urine as well as generalized weakness   PT and OT evaluations - level 3  Back to [facility] when medically stable

## 2024-09-16 NOTE — ASSESSMENT & PLAN NOTE
Wt Readings from Last 3 Encounters:   09/16/24 88.2 kg (194 lb 7.1 oz)   09/05/24 95.3 kg (210 lb)   07/30/24 95.3 kg (210 lb)   Appears compensated presently.  No weight gain, shortness of breath or leg swelling  Continue Lisinopril, Lasix, Toprol-XL  Beta-blocker dose reduced, 25 mg twice daily  EF is 40%, follows with cardiology  Daily weights while hospitalized

## 2024-09-16 NOTE — PLAN OF CARE

## 2024-09-16 NOTE — ASSESSMENT & PLAN NOTE
Patient reported generalized abdominal pain this morning with associated symptoms of multiple episodes of loose stool and poor appetite  On physical examination: no tenderness or guarding present, no hyperactive bowel sounds   He is complaining of cramping  Bentyl prn   Continue to monitor- if patient has worsening abdominal pain will obtain imaging

## 2024-09-16 NOTE — ASSESSMENT & PLAN NOTE
Recent diagnosed with UTI and placed on Keflex twice daily.  Urine culture at that time growing mixed contaminants.  Patient noted blood in his urine this morning, UA suspicious for UTI  His last urine culture grew Enterococcus thus started on IV vancomycin.  He was given a dose of IV ceftriaxone in the ER  Urine culture with growth of oxidase positive gram-negative sharifa  Discontinue IV cefepime   Sensitives culture finalized  Transitioned to Cipro PO

## 2024-09-17 VITALS
WEIGHT: 193.4 LBS | TEMPERATURE: 97.9 F | DIASTOLIC BLOOD PRESSURE: 65 MMHG | HEIGHT: 67 IN | RESPIRATION RATE: 18 BRPM | BODY MASS INDEX: 30.35 KG/M2 | HEART RATE: 53 BPM | SYSTOLIC BLOOD PRESSURE: 153 MMHG | OXYGEN SATURATION: 95 %

## 2024-09-17 PROBLEM — R10.84 GENERALIZED ABDOMINAL PAIN: Status: RESOLVED | Noted: 2024-09-16 | Resolved: 2024-09-17

## 2024-09-17 PROCEDURE — 99239 HOSP IP/OBS DSCHRG MGMT >30: CPT | Performed by: PHYSICIAN ASSISTANT

## 2024-09-17 RX ORDER — CIPROFLOXACIN 500 MG/1
500 TABLET, FILM COATED ORAL EVERY 12 HOURS SCHEDULED
Qty: 6 TABLET | Refills: 0 | Status: SHIPPED | OUTPATIENT
Start: 2024-09-17 | End: 2024-09-20

## 2024-09-17 RX ORDER — METOPROLOL SUCCINATE 25 MG/1
25 TABLET, EXTENDED RELEASE ORAL EVERY 12 HOURS SCHEDULED
Qty: 60 TABLET | Refills: 0 | Status: SHIPPED | OUTPATIENT
Start: 2024-09-17 | End: 2024-10-17

## 2024-09-17 RX ORDER — DICYCLOMINE HYDROCHLORIDE 10 MG/1
10 CAPSULE ORAL 3 TIMES DAILY PRN
Qty: 20 CAPSULE | Refills: 0 | Status: SHIPPED | OUTPATIENT
Start: 2024-09-17

## 2024-09-17 RX ADMIN — LISINOPRIL 5 MG: 5 TABLET ORAL at 07:43

## 2024-09-17 RX ADMIN — AMIODARONE HYDROCHLORIDE 200 MG: 200 TABLET ORAL at 07:43

## 2024-09-17 RX ADMIN — DICYCLOMINE HYDROCHLORIDE 10 MG: 10 CAPSULE ORAL at 06:20

## 2024-09-17 RX ADMIN — CIPROFLOXACIN 500 MG: 500 TABLET ORAL at 07:43

## 2024-09-17 RX ADMIN — APIXABAN 5 MG: 5 TABLET, FILM COATED ORAL at 07:43

## 2024-09-17 RX ADMIN — METOPROLOL SUCCINATE 25 MG: 25 TABLET, EXTENDED RELEASE ORAL at 07:43

## 2024-09-17 RX ADMIN — FINASTERIDE 5 MG: 5 TABLET, FILM COATED ORAL at 07:43

## 2024-09-17 RX ADMIN — ASPIRIN 81 MG CHEWABLE TABLET 81 MG: 81 TABLET CHEWABLE at 07:43

## 2024-09-17 NOTE — CASE MANAGEMENT
Case Management Discharge Planning Note    Patient name Toro Rodriguez  Location /-01 MRN [id number]  : 1950 Date 2024       Current Admission Date: 2024  Current Admission Diagnosis:Urinary tract infection associated with indwelling urethral catheter  ([facility])   Patient Active Problem List    Diagnosis Date Noted Date Diagnosed    Urinary tract infection associated with indwelling urethral catheter  ([facility]) 2024     Encounter to discuss test results 2024     Urethral erosion by catheter, initial encounter  ([facility]) 2024     Anemia 2024     Hematuria 2024     Chronic venous hypertension (idiopathic) with ulcer of left lower extremity (CODE) ([facility]) 2024     Chronic kidney disease, stage 3a ([facility]) 2024     Encounter for geriatric assessment 2024     Melanoma of back ([facility]) 2024     Dilated cardiomyopathy ([facility]) 12/15/2023     Obesity, morbid ([facility]) 2023     Type 2 diabetes mellitus with chronic kidney disease, without long-term current use of insulin, unspecified CKD stage ([facility]) 11/15/2023     Essential hypertension 11/10/2023     Generalized weakness 11/10/2023     Chronic systolic (congestive) heart failure ([facility]) 11/10/2023     Deep vein thrombosis (DVT) of left lower extremity ([facility]) 11/10/2023     Coronary artery disease involving native coronary artery of native heart 11/10/2023     Benign prostatic hyperplasia with urinary retention 2014       LOS (days): 5  Geometric Mean LOS (GMLOS) (days): 3.3  Days to GMLOS:-1.5     OBJECTIVE:  Risk of Unplanned Readmission Score: 23.93         Current admission status: Inpatient   Preferred Pharmacy:   [facility] of [facility] [address]  [address] [address]  Phone: [phone] Fax: [phone]    [facility]- [place], [place] - [facility] [address]  [address] [address]  Phone: [phone] Fax:  [phone]    Primary Care Provider: CARLIE Foster    Primary Insurance: MEDICARE  Secondary Insurance: BLUE CROSS    DISCHARGE DETAILS:    Transport at Discharge : Wheelchair van  Dispatcher Contacted: Yes  Number/Name of Dispatcher: Roundtrip  Transported by (Company and Unit #): [employer]  ETA of Transport (Date): 09/17/24  ETA of Transport (Time): 1130     Additional Comments: Per Roundtrip, pt to be transported back to [facility] at 11:30 AM via [employer] [facility]. Pt, pt's daughter, RN, JULIAN Crawley, and [facility] aware of transportation time.

## 2024-09-17 NOTE — CASE MANAGEMENT
Case Management Discharge Planning Note    Patient name Toro Rodriguez  Location /-01 MRN [id number]  : 1950 Date 2024       Current Admission Date: 2024  Current Admission Diagnosis:Urinary tract infection associated with indwelling urethral catheter  ([facility])   Patient Active Problem List    Diagnosis Date Noted Date Diagnosed    Urinary tract infection associated with indwelling urethral catheter  ([facility]) 2024     Encounter to discuss test results 2024     Urethral erosion by catheter, initial encounter  ([facility]) 2024     Anemia 2024     Hematuria 2024     Chronic venous hypertension (idiopathic) with ulcer of left lower extremity (CODE) ([facility]) 2024     Chronic kidney disease, stage 3a ([facility]) 2024     Encounter for geriatric assessment 2024     Melanoma of back ([facility]) 2024     Dilated cardiomyopathy ([facility]) 12/15/2023     Obesity, morbid ([facility]) 2023     Type 2 diabetes mellitus with chronic kidney disease, without long-term current use of insulin, unspecified CKD stage ([facility]) 11/15/2023     Essential hypertension 11/10/2023     Generalized weakness 11/10/2023     Chronic systolic (congestive) heart failure ([facility]) 11/10/2023     Deep vein thrombosis (DVT) of left lower extremity ([facility]) 11/10/2023     Coronary artery disease involving native coronary artery of native heart 11/10/2023     Benign prostatic hyperplasia with urinary retention 2014       LOS (days): 5  Geometric Mean LOS (GMLOS) (days): 3.3  Days to GMLOS:-1.4     OBJECTIVE:  Risk of Unplanned Readmission Score: 23.93         Current admission status: Inpatient   Preferred Pharmacy:   [facility] of [facility] [address]  [address] [facility] [address]  Phone: [phone] Fax: [phone]    [facility]- [place], [place] - [facility] [address]  [address] [facility] [address]  Phone: [phone] Fax:  [phone]    Primary Care Provider: CARLIE Foster    Primary Insurance: MEDICARE  Secondary Insurance: BLUE CROSS    DISCHARGE DETAILS:    IMM Given (Date):: 09/17/24  IMM Given to:: Patient     Additional Comments: Per JULIAN Crawley pt medically stable to return to [facility] today. [facility] confirmed that they are able to receive pt today. CM spoke with pt at bedside to review IMM, pt expressed understanding and agreement with discharge plan, no intent to appeal. IMM in scan bin to be entered into pt's EHR. Pt requesting [facility] transport back to [facility] and is aware of OOP cost. Pt, pt's daughter, RN, Eleno Goldman, and [facility] aware of plan for discharge today. CM department to follow for confirmation of transportation time.    Accepting Facility Name, City & State : [facility]  Receiving Facility/Agency Phone Number: [phone]

## 2024-09-17 NOTE — PLAN OF CARE
Problem: Potential for Falls  Goal: Patient will remain free of falls  Description: INTERVENTIONS:  - Educate patient/family on patient safety including physical limitations  - Instruct patient to call for assistance with activity   - Consult OT/PT to assist with strengthening/mobility   - Keep Call bell within reach  - Keep bed low and locked with side rails adjusted as appropriate  - Keep care items and personal belongings within reach  - Initiate and maintain comfort rounds  - Make Fall Risk Sign visible to staff    Problem: INFECTION - ADULT  Goal: Absence or prevention of progression during hospitalization  Description: INTERVENTIONS:  - Assess and monitor for signs and symptoms of infection  - Monitor lab/diagnostic results  - Monitor all insertion sites, i.e. indwelling lines, tubes, and drains  - Monitor endotracheal if appropriate and nasal secretions for changes in amount and color  - [place] appropriate cooling/warming therapies per order  - Administer medications as ordered  - Instruct and encourage patient and family to use good hand hygiene technique  - Identify and instruct in appropriate isolation precautions for identified infection/condition  Outcome: Progressing   - Apply yellow socks and bracelet for high fall risk patients  - Consider moving patient to room near nurses station  Outcome: Progressing     Problem: PAIN - ADULT  Goal: Verbalizes/displays adequate comfort level or baseline comfort level  Description: Interventions:  - Encourage patient to monitor pain and request assistance  - Assess pain using appropriate pain scale  - Administer analgesics based on type and severity of pain and evaluate response  - Implement non-pharmacological measures as appropriate and evaluate response  - Consider cultural and social influences on pain and pain management  - Notify physician/advanced practitioner if interventions unsuccessful or patient reports new pain  Outcome: Progressing

## 2024-09-17 NOTE — ASSESSMENT & PLAN NOTE
Patient reported generalized abdominal pain this morning with associated symptoms of multiple episodes of loose stool and poor appetite  On physical examination: no tenderness or guarding present, no hyperactive bowel sounds   Resolved with bentyl

## 2024-09-18 ENCOUNTER — TELEPHONE (OUTPATIENT)
Dept: INTERVENTIONAL RADIOLOGY/VASCULAR | Facility: HOSPITAL | Age: 74
End: 2024-09-18

## 2024-09-19 ENCOUNTER — TELEPHONE (OUTPATIENT)
Dept: INTERVENTIONAL RADIOLOGY/VASCULAR | Facility: HOSPITAL | Age: 74
End: 2024-09-19

## 2024-09-22 ENCOUNTER — HOSPITAL ENCOUNTER (INPATIENT)
Facility: HOSPITAL | Age: 74
LOS: 6 days | Discharge: HOME HEALTH | DRG: 699 | End: 2024-09-28
Attending: EMERGENCY MEDICINE | Admitting: INTERNAL MEDICINE
Payer: MEDICARE

## 2024-09-22 ENCOUNTER — APPOINTMENT (EMERGENCY)
Dept: CT IMAGING | Facility: HOSPITAL | Age: 74
DRG: 699 | End: 2024-09-22
Payer: MEDICARE

## 2024-09-22 DIAGNOSIS — R33.9 URINARY RETENTION: ICD-10-CM

## 2024-09-22 DIAGNOSIS — N39.0 URINARY TRACT INFECTION: ICD-10-CM

## 2024-09-22 DIAGNOSIS — I82.409 ACUTE DVT (DEEP VENOUS THROMBOSIS) (HCC): ICD-10-CM

## 2024-09-22 DIAGNOSIS — I42.0 DILATED CARDIOMYOPATHY (HCC): ICD-10-CM

## 2024-09-22 DIAGNOSIS — R31.9 HEMATURIA: Primary | ICD-10-CM

## 2024-09-22 LAB
ALBUMIN SERPL BCG-MCNC: 3.6 G/DL (ref 3.5–5)
ALP SERPL-CCNC: 69 U/L (ref 34–104)
ALT SERPL W P-5'-P-CCNC: 6 U/L (ref 7–52)
ANION GAP SERPL CALCULATED.3IONS-SCNC: 8 MMOL/L (ref 4–13)
APTT PPP: 37 SECONDS (ref 23–34)
AST SERPL W P-5'-P-CCNC: 16 U/L (ref 13–39)
BACTERIA UR QL AUTO: ABNORMAL /HPF
BASOPHILS # BLD AUTO: 0.06 THOUSANDS/ΜL (ref 0–0.1)
BASOPHILS NFR BLD AUTO: 1 % (ref 0–1)
BILIRUB SERPL-MCNC: 0.49 MG/DL (ref 0.2–1)
BILIRUB UR QL STRIP: NEGATIVE
BUN SERPL-MCNC: 32 MG/DL (ref 5–25)
CALCIUM SERPL-MCNC: 8.5 MG/DL (ref 8.4–10.2)
CHLORIDE SERPL-SCNC: 105 MMOL/L (ref 96–108)
CLARITY UR: ABNORMAL
CO2 SERPL-SCNC: 22 MMOL/L (ref 21–32)
COLOR UR: ABNORMAL
CREAT SERPL-MCNC: 1.4 MG/DL (ref 0.6–1.3)
EOSINOPHIL # BLD AUTO: 0.51 THOUSAND/ΜL (ref 0–0.61)
EOSINOPHIL NFR BLD AUTO: 11 % (ref 0–6)
ERYTHROCYTE [DISTWIDTH] IN BLOOD BY AUTOMATED COUNT: 13.2 % (ref 11.6–15.1)
GFR SERPL CREATININE-BSD FRML MDRD: 48 ML/MIN/1.73SQ M
GLUCOSE SERPL-MCNC: 96 MG/DL (ref 65–140)
GLUCOSE UR STRIP-MCNC: NEGATIVE MG/DL
GRAN CASTS #/AREA URNS LPF: ABNORMAL /[LPF]
HCT VFR BLD AUTO: 37.4 % (ref 36.5–49.3)
HGB BLD-MCNC: 12.1 G/DL (ref 12–17)
HGB UR QL STRIP.AUTO: ABNORMAL
IMM GRANULOCYTES # BLD AUTO: 0.02 THOUSAND/UL (ref 0–0.2)
IMM GRANULOCYTES NFR BLD AUTO: 0 % (ref 0–2)
INR PPP: 1.33 (ref 0.85–1.19)
KETONES UR STRIP-MCNC: ABNORMAL MG/DL
LEUKOCYTE ESTERASE UR QL STRIP: ABNORMAL
LYMPHOCYTES # BLD AUTO: 1.35 THOUSANDS/ΜL (ref 0.6–4.47)
LYMPHOCYTES NFR BLD AUTO: 29 % (ref 14–44)
MCH RBC QN AUTO: 31.6 PG (ref 26.8–34.3)
MCHC RBC AUTO-ENTMCNC: 32.4 G/DL (ref 31.4–37.4)
MCV RBC AUTO: 98 FL (ref 82–98)
MONOCYTES # BLD AUTO: 0.5 THOUSAND/ΜL (ref 0.17–1.22)
MONOCYTES NFR BLD AUTO: 11 % (ref 4–12)
MUCOUS THREADS UR QL AUTO: ABNORMAL
NEUTROPHILS # BLD AUTO: 2.22 THOUSANDS/ΜL (ref 1.85–7.62)
NEUTS SEG NFR BLD AUTO: 48 % (ref 43–75)
NITRITE UR QL STRIP: NEGATIVE
NON-SQ EPI CELLS URNS QL MICRO: ABNORMAL /HPF
NRBC BLD AUTO-RTO: 0 /100 WBCS
PH UR STRIP.AUTO: 6 [PH]
PLATELET # BLD AUTO: 290 THOUSANDS/UL (ref 149–390)
PMV BLD AUTO: 10 FL (ref 8.9–12.7)
POTASSIUM SERPL-SCNC: 5 MMOL/L (ref 3.5–5.3)
PROT SERPL-MCNC: 6.7 G/DL (ref 6.4–8.4)
PROT UR STRIP-MCNC: ABNORMAL MG/DL
PROTHROMBIN TIME: 17 SECONDS (ref 12.3–15)
RBC # BLD AUTO: 3.83 MILLION/UL (ref 3.88–5.62)
RBC #/AREA URNS AUTO: ABNORMAL /HPF
SODIUM SERPL-SCNC: 135 MMOL/L (ref 135–147)
SP GR UR STRIP.AUTO: 1.02 (ref 1–1.03)
UROBILINOGEN UR STRIP-ACNC: <2 MG/DL
WBC # BLD AUTO: 4.66 THOUSAND/UL (ref 4.31–10.16)
WBC #/AREA URNS AUTO: ABNORMAL /HPF

## 2024-09-22 PROCEDURE — 81001 URINALYSIS AUTO W/SCOPE: CPT

## 2024-09-22 PROCEDURE — 99285 EMERGENCY DEPT VISIT HI MDM: CPT

## 2024-09-22 PROCEDURE — 36415 COLL VENOUS BLD VENIPUNCTURE: CPT

## 2024-09-22 PROCEDURE — 99284 EMERGENCY DEPT VISIT MOD MDM: CPT

## 2024-09-22 PROCEDURE — 85730 THROMBOPLASTIN TIME PARTIAL: CPT

## 2024-09-22 PROCEDURE — 74176 CT ABD & PELVIS W/O CONTRAST: CPT

## 2024-09-22 PROCEDURE — 85610 PROTHROMBIN TIME: CPT

## 2024-09-22 PROCEDURE — 80053 COMPREHEN METABOLIC PANEL: CPT

## 2024-09-22 PROCEDURE — 96360 HYDRATION IV INFUSION INIT: CPT

## 2024-09-22 PROCEDURE — 96361 HYDRATE IV INFUSION ADD-ON: CPT

## 2024-09-22 PROCEDURE — 85025 COMPLETE CBC W/AUTO DIFF WBC: CPT

## 2024-09-22 RX ORDER — METOPROLOL SUCCINATE 25 MG/1
25 TABLET, EXTENDED RELEASE ORAL ONCE
Status: COMPLETED | OUTPATIENT
Start: 2024-09-22 | End: 2024-09-22

## 2024-09-22 RX ORDER — ATORVASTATIN CALCIUM 40 MG/1
40 TABLET, FILM COATED ORAL
Status: DISCONTINUED | OUTPATIENT
Start: 2024-09-22 | End: 2024-09-23

## 2024-09-22 RX ORDER — METOPROLOL SUCCINATE 25 MG/1
25 TABLET, EXTENDED RELEASE ORAL DAILY
Status: DISCONTINUED | OUTPATIENT
Start: 2024-09-23 | End: 2024-09-22

## 2024-09-22 RX ORDER — CIPROFLOXACIN 500 MG/1
500 TABLET, FILM COATED ORAL ONCE
Status: COMPLETED | OUTPATIENT
Start: 2024-09-22 | End: 2024-09-22

## 2024-09-22 RX ORDER — LIDOCAINE HYDROCHLORIDE 20 MG/ML
1 JELLY TOPICAL ONCE
Status: DISCONTINUED | OUTPATIENT
Start: 2024-09-22 | End: 2024-09-22

## 2024-09-22 RX ADMIN — SODIUM CHLORIDE 500 ML: 0.9 INJECTION, SOLUTION INTRAVENOUS at 17:44

## 2024-09-22 RX ADMIN — METOPROLOL SUCCINATE 25 MG: 25 TABLET, EXTENDED RELEASE ORAL at 22:47

## 2024-09-22 RX ADMIN — CIPROFLOXACIN 500 MG: 500 TABLET ORAL at 22:47

## 2024-09-22 RX ADMIN — ATORVASTATIN CALCIUM 40 MG: 40 TABLET, FILM COATED ORAL at 22:47

## 2024-09-22 NOTE — Clinical Note
Case was discussed with ROSALBA Kaye and the patient's admission status was agreed to be Admission Status: observation status to the service of Dr. Fuentes .

## 2024-09-22 NOTE — ED PROVIDER NOTES
1. Hematuria    2. Urinary tract infection      ED Disposition       ED Disposition   Admit    Condition   Stable    Date/Time   Sun Sep 22, 2024 11:30 PM    Comment   Case was discussed with ROSALBA Kaye and the patient's admission status was agreed to be Admission Status: inpatient status to the service of Dr. Fuentes .               Assessment & Plan       Medical Decision Making  DDx including but not limited to: UTI, hemorrhagic cystitis, anemia, coagulopathy    Patient recently hospitalized for UTI and had a delay in getting oral antibiotics to complete treatment.  He presents for 1 day of painless hematuria.  Abdominal exam benign.  No fevers or chills, will obtain labs, UA to evaluate for current kidney function, anemia.  Will start with manual irrigation to attempt to clear blood in catheter.     Problems Addressed:  Hematuria: acute illness or injury    Amount and/or Complexity of Data Reviewed  Labs: ordered. Decision-making details documented in ED Course.  Radiology: ordered. Decision-making details documented in ED Course.    Risk  OTC drugs.  Prescription drug management.  Decision regarding hospitalization.                ED Course as of 09/22/24 2331   Sun Sep 22, 2024   1745 Creatinine(!): 1.40  Will give 500 cc of fluid resuscitation given slight increase in creatinine with elevated BUN   1851 Urine initially cleared with manual irrigation, now bright red Gatorade color and appearance.  Will start CBI   2010 Urine has cleared once more with CBI, will And monitor for return of hematuria.  If it returns, [facility]   2130 When CBI paused, urine starts to turn pink to then bloody with clots, given on Eliquis with persistent hematuria, suspect patient may need to stay for observation given persistent hematuria   2132 ROSALBA Dimas messaged regarding patient case   2135 ROSALBA Dimas recommends CT abdomen pelvis without contrast to further evaluate for clot burden bladder  "given persistent hematuria with clotting.  Will obtain CT imaging.  Will place patient's nighttime medications ciprofloxacin which she last took this morning.  Will plan for observation stay pending CT scan stable   2317 CT abdomen pelvis wo contrast  IMPRESSION:     Decompressed bladder limiting evaluation for clot burden. Mild amount of intraluminal hyperdense material surrounding the Colon catheter balloon consistent with blood products/clot.  Mild right hydroureteronephrosis, new from prior. Inflammatory changes regional to the right ureter, indeterminate etiology. Ascending urothelial infection cannot be entirely excluded.  This study was marked for \"Immediate\" notification in EPIC.     2320 Patient updated on CT results.  He is currently resting comfortably.  CBI has been paused for 1.5 hours.  Urine is pink but without any new clots or significant dark hematuria.   2330 ROSALBA Mikki accepting of admission to [facility] given we may need to restart CBI       Medications   atorvastatin (LIPITOR) tablet 40 mg (40 mg Oral Given 9/22/24 2247)   sodium chloride 0.9 % bolus 500 mL (0 mL Intravenous Stopped 9/22/24 2111)   ciprofloxacin (CIPRO) tablet 500 mg (500 mg Oral Given 9/22/24 2247)   metoprolol succinate (TOPROL-XL) 24 hr tablet 25 mg (25 mg Oral Given 9/22/24 2247)       History of Present Illness       The patient is a 74-year-old male with PMH of HTN, HFrEF, CAD, DVT on Eliquis, T2DM, and BPH with indwelling Colon presenting for evaluation of hematuria.  The patient awoke this morning and noted his urine to be slightly darker than it has been.  As the day went on, he looked in the bag and noted it to be pink-tinged and bloody with few clots.  He denies having the Colon accidentally pulled on or any trauma.  On review of his chart, he was hospitalized earlier this week for 5 days from 9/12 through 9/17 (5 days PTA) for UTI with hematuria.  He was given IV antibiotics and transition to oral ciprofloxacin.  He went " home on Tuesday but did not receive his oral antibiotics till Friday.  He has been taking ciprofloxacin since 2 days ago.  He is uncertain if the delay in getting the oral antibiotics may have caused this hematuria.  He denies associated fevers, chills, abdominal pain, nausea, vomiting, and change in bowel.      History provided by:  Patient   used: No        Review of Systems   Constitutional:  Negative for chills, fatigue and fever.   Gastrointestinal:  Negative for abdominal pain, diarrhea, nausea and vomiting.   Genitourinary:  Positive for hematuria. Negative for decreased urine volume, difficulty urinating, dysuria, flank pain, frequency, penile discharge, penile pain, penile swelling, scrotal swelling and testicular pain.   Musculoskeletal:  Negative for back pain and gait problem.   All other systems reviewed and are negative.          Objective     ED Triage Vitals   Temperature Pulse Blood Pressure Respirations SpO2 Patient Position - Orthostatic VS   09/22/24 1651 09/22/24 1652 09/22/24 1652 09/22/24 1652 09/22/24 1652 09/22/24 1652   (!) 97.4 °F (36.3 °C) (!) 51 146/69 18 95 % Sitting      Temp src Heart Rate Source BP Location FiO2 (%) Pain Score    -- 09/22/24 1652 09/22/24 1652 -- 09/22/24 1652     Monitor Left arm  No Pain        Physical Exam  Vitals and nursing note reviewed.   Constitutional:       General: He is not in acute distress.     Appearance: Normal appearance. He is normal weight. He is not ill-appearing or toxic-appearing.   HENT:      Head: Normocephalic and atraumatic.      Nose: Nose normal.      Mouth/Throat:      Mouth: Mucous membranes are moist.      Pharynx: Oropharynx is clear.   Eyes:      Extraocular Movements: Extraocular movements intact.      Conjunctiva/sclera: Conjunctivae normal.   Cardiovascular:      Rate and Rhythm: Normal rate.   Pulmonary:      Effort: Pulmonary effort is normal. No respiratory distress.   Abdominal:      General: There is no  distension.      Palpations: Abdomen is soft.      Tenderness: There is no abdominal tenderness. There is no right CVA tenderness, left CVA tenderness, guarding or rebound.   Musculoskeletal:         General: Normal range of motion.      Cervical back: Normal range of motion and neck supple.   Skin:     General: Skin is warm and dry.      Capillary Refill: Capillary refill takes less than 2 seconds.   Neurological:      General: No focal deficit present.      Mental Status: He is alert and oriented to person, place, and time. Mental status is at baseline.         Labs Reviewed   CBC AND DIFFERENTIAL - Abnormal       Result Value    WBC 4.66      RBC 3.83 (*)     Hemoglobin 12.1      Hematocrit 37.4      MCV 98      MCH 31.6      MCHC 32.4      RDW 13.2      MPV 10.0      Platelets 290      nRBC 0      Segmented % 48      Immature Grans % 0      Lymphocytes % 29      Monocytes % 11      Eosinophils Relative 11 (*)     Basophils Relative 1      Absolute Neutrophils 2.22      Absolute Immature Grans 0.02      Absolute Lymphocytes 1.35      Absolute Monocytes 0.50      Eosinophils Absolute 0.51      Basophils Absolute 0.06     COMPREHENSIVE METABOLIC PANEL - Abnormal    Sodium 135      Potassium 5.0      Chloride 105      CO2 22      ANION GAP 8      BUN 32 (*)     Creatinine 1.40 (*)     Glucose 96      Calcium 8.5      AST 16      ALT 6 (*)     Alkaline Phosphatase 69      Total Protein 6.7      Albumin 3.6      Total Bilirubin 0.49      eGFR 48      Narrative:     [employer] Kid[employer] Foundation guidelines for Chronic Kidney Disease (CKD):     Stage 1 with normal or high GFR (GFR > 90 mL/min/1.73 square meters)    Stage 2 Mild CKD (GFR = 60-89 mL/min/1.73 square meters)    Stage 3A Moderate CKD (GFR = 45-59 mL/min/1.73 square meters)    Stage 3B Moderate CKD (GFR = 30-44 mL/min/1.73 square meters)    Stage 4 Severe CKD (GFR = 15-29 mL/min/1.73 square meters)    Stage 5 End Stage CKD (GFR <15 mL/min/1.73 square  meters)  Note: GFR calculation is accurate only with a steady state creatinine   PROTIME-INR - Abnormal    Protime 17.0 (*)     INR 1.33 (*)     Narrative:     INR Therapeutic Range    Indication                                             INR Range      Atrial Fibrillation                                               2.0-3.0  Hypercoagulable State                                    2.0.2.3  Left Ventricular Asist Device                            2.0-3.0  Mechanical Heart Valve                                  -    Aortic(with afib, MI, embolism, HF, LA enlargement,    and/or coagulopathy)                                     2.0-3.0 (2.5-3.5)     Mitral                                                             2.5-3.5  Prosthetic/Bioprosthetic Heart Valve               2.0-3.0  Venous thromboembolism (VTE: VT, PE        2.0-3.0   APTT - Abnormal    PTT 37 (*)    UA W REFLEX TO MICROSCOPIC WITH REFLEX TO CULTURE - Abnormal    Color, UA Red      Clarity, UA Cloudy      Specific Gravity, UA 1.020      pH, UA 6.0      Leukocytes, UA Large (*)     Nitrite, UA Negative      Protein, UA 30 (1+) (*)     Glucose, UA Negative      Ketones, UA Trace (*)     Urobilinogen, UA <2.0      Bilirubin, UA Negative      Occult Blood, UA Large (*)    URINE MICROSCOPIC - Abnormal    RBC, UA Innumerable (*)     WBC, UA 4-10 (*)     Epithelial Cells Moderate (*)     Bacteria, UA Moderate (*)     MUCUS THREADS Moderate (*)     Granular Casts, UA 3-5 (*)      CT abdomen pelvis wo contrast   Final Interpretation by Austin Brandon DO (09/22 2312)      Decompressed bladder limiting evaluation for clot burden. Mild amount of intraluminal hyperdense material surrounding the Colon catheter balloon consistent with blood products/clot.   Mild right hydroureteronephrosis, new from prior. Inflammatory changes regional to the right ureter, indeterminate etiology. Ascending urothelial infection cannot be entirely excluded.   This study was marked  "for \"Immediate\" notification in EPIC.         Workstation performed: [id number]             Procedures    ED Medication and Procedure Management   Prior to Admission Medications   Prescriptions Last Dose Informant Patient Reported? Taking?   acetaminophen (TYLENOL) 325 mg tablet   Yes No   Sig: Take 325 mg by mouth every 6 (six) hours as needed for mild pain   amiodarone 200 mg tablet   No No   Sig: Take 1 tablet (200 mg total) by mouth daily   Patient not taking: Reported on 9/12/2024   apixaban (ELIQUIS) 5 mg  Self No No   Sig: Take 1 tablet (5 mg total) by mouth 2 (two) times a day Do not start before November 21, 2023.   aspirin 81 mg chewable tablet  Self No No   Sig: Chew 1 tablet (81 mg total) daily   atorvastatin (LIPITOR) 40 mg tablet  Self No No   Sig: Take 1 tablet (40 mg total) by mouth daily with dinner   clotrimazole (LOTRIMIN) 1 % cream   Yes No   Sig: Apply topically 2 (two) times a day   dicyclomine (BENTYL) 10 mg capsule   No No   Sig: Take 1 capsule (10 mg total) by mouth 3 (three) times a day as needed (abdominal pain)   finasteride (PROSCAR) 5 mg tablet   No No   Sig: Take 1 tablet (5 mg total) by mouth daily   furosemide (LASIX) 40 mg tablet  Self No No   Sig: Take 1 tablet (40 mg total) by mouth daily Do not start before November 16, 2023.   lisinopril (ZESTRIL) 5 mg tablet   No No   Sig: Take 1 tablet (5 mg total) by mouth daily   metoprolol succinate (TOPROL-XL) 25 mg 24 hr tablet   No No   Sig: Take 1 tablet (25 mg total) by mouth every 12 (twelve) hours   mupirocin (BACTROBAN) 2 % ointment   Yes No   Sig: Apply topically 3 (three) times a day   nitroglycerin (NITROSTAT) 0.4 mg SL tablet  Self No No   Sig: Place 1 tablet (0.4 mg total) under the tongue every 5 (five) minutes as needed for chest pain   tamsulosin (FLOMAX) 0.4 mg  Self No No   Sig: Take 1 capsule (0.4 mg total) by mouth daily with dinner   traMADol (ULTRAM) 50 mg tablet   Yes No   Sig: Take 50 mg by mouth every 6 (six) hours " as needed for moderate pain      Facility-Administered Medications: None     Patient's Medications   Discharge Prescriptions    No medications on file     No discharge procedures on file.     CARLIE Steele  09/22/24 [id number]

## 2024-09-23 PROBLEM — I47.29 NSVT (NONSUSTAINED VENTRICULAR TACHYCARDIA) (HCC): Status: ACTIVE | Noted: 2024-09-23

## 2024-09-23 PROBLEM — N39.0 UTI (URINARY TRACT INFECTION): Status: ACTIVE | Noted: 2024-09-23

## 2024-09-23 LAB
ANION GAP SERPL CALCULATED.3IONS-SCNC: 8 MMOL/L (ref 4–13)
BUN SERPL-MCNC: 27 MG/DL (ref 5–25)
CALCIUM SERPL-MCNC: 8.2 MG/DL (ref 8.4–10.2)
CHLORIDE SERPL-SCNC: 106 MMOL/L (ref 96–108)
CO2 SERPL-SCNC: 25 MMOL/L (ref 21–32)
CREAT SERPL-MCNC: 1.26 MG/DL (ref 0.6–1.3)
ERYTHROCYTE [DISTWIDTH] IN BLOOD BY AUTOMATED COUNT: 13.2 % (ref 11.6–15.1)
GFR SERPL CREATININE-BSD FRML MDRD: 55 ML/MIN/1.73SQ M
GLUCOSE SERPL-MCNC: 76 MG/DL (ref 65–140)
HCT VFR BLD AUTO: 34.7 % (ref 36.5–49.3)
HCT VFR BLD AUTO: 36.2 % (ref 36.5–49.3)
HGB BLD-MCNC: 11.3 G/DL (ref 12–17)
HGB BLD-MCNC: 11.9 G/DL (ref 12–17)
MAGNESIUM SERPL-MCNC: 1.9 MG/DL (ref 1.9–2.7)
MCH RBC QN AUTO: 31.3 PG (ref 26.8–34.3)
MCHC RBC AUTO-ENTMCNC: 32.6 G/DL (ref 31.4–37.4)
MCV RBC AUTO: 96 FL (ref 82–98)
PHOSPHATE SERPL-MCNC: 3.4 MG/DL (ref 2.3–4.1)
PLATELET # BLD AUTO: 271 THOUSANDS/UL (ref 149–390)
PMV BLD AUTO: 10.2 FL (ref 8.9–12.7)
POTASSIUM SERPL-SCNC: 4.4 MMOL/L (ref 3.5–5.3)
RBC # BLD AUTO: 3.61 MILLION/UL (ref 3.88–5.62)
SODIUM SERPL-SCNC: 139 MMOL/L (ref 135–147)
WBC # BLD AUTO: 4.88 THOUSAND/UL (ref 4.31–10.16)

## 2024-09-23 PROCEDURE — 80048 BASIC METABOLIC PNL TOTAL CA: CPT | Performed by: PHYSICIAN ASSISTANT

## 2024-09-23 PROCEDURE — 99223 1ST HOSP IP/OBS HIGH 75: CPT | Performed by: INTERNAL MEDICINE

## 2024-09-23 PROCEDURE — 87147 CULTURE TYPE IMMUNOLOGIC: CPT | Performed by: INTERNAL MEDICINE

## 2024-09-23 PROCEDURE — 87081 CULTURE SCREEN ONLY: CPT | Performed by: INTERNAL MEDICINE

## 2024-09-23 PROCEDURE — 85014 HEMATOCRIT: CPT | Performed by: INTERNAL MEDICINE

## 2024-09-23 PROCEDURE — 83735 ASSAY OF MAGNESIUM: CPT | Performed by: PHYSICIAN ASSISTANT

## 2024-09-23 PROCEDURE — 85018 HEMOGLOBIN: CPT | Performed by: INTERNAL MEDICINE

## 2024-09-23 PROCEDURE — 99222 1ST HOSP IP/OBS MODERATE 55: CPT | Performed by: PHYSICIAN ASSISTANT

## 2024-09-23 PROCEDURE — NC001 PR NO CHARGE: Performed by: UROLOGY

## 2024-09-23 PROCEDURE — 84100 ASSAY OF PHOSPHORUS: CPT | Performed by: PHYSICIAN ASSISTANT

## 2024-09-23 PROCEDURE — 85027 COMPLETE CBC AUTOMATED: CPT | Performed by: PHYSICIAN ASSISTANT

## 2024-09-23 RX ORDER — TAMSULOSIN HYDROCHLORIDE 0.4 MG/1
0.4 CAPSULE ORAL
Status: DISCONTINUED | OUTPATIENT
Start: 2024-09-23 | End: 2024-09-28 | Stop reason: HOSPADM

## 2024-09-23 RX ORDER — ATORVASTATIN CALCIUM 40 MG/1
40 TABLET, FILM COATED ORAL
Status: DISCONTINUED | OUTPATIENT
Start: 2024-09-23 | End: 2024-09-28 | Stop reason: HOSPADM

## 2024-09-23 RX ORDER — MAGNESIUM HYDROXIDE/ALUMINUM HYDROXICE/SIMETHICONE 120; 1200; 1200 MG/30ML; MG/30ML; MG/30ML
30 SUSPENSION ORAL EVERY 6 HOURS PRN
Status: DISCONTINUED | OUTPATIENT
Start: 2024-09-23 | End: 2024-09-28 | Stop reason: HOSPADM

## 2024-09-23 RX ORDER — DICYCLOMINE HYDROCHLORIDE 10 MG/1
10 CAPSULE ORAL 3 TIMES DAILY PRN
Status: DISCONTINUED | OUTPATIENT
Start: 2024-09-23 | End: 2024-09-28 | Stop reason: HOSPADM

## 2024-09-23 RX ORDER — CEFEPIME HYDROCHLORIDE 1 G/50ML
1000 INJECTION, SOLUTION INTRAVENOUS EVERY 12 HOURS
Status: DISCONTINUED | OUTPATIENT
Start: 2024-09-23 | End: 2024-09-28 | Stop reason: HOSPADM

## 2024-09-23 RX ORDER — FUROSEMIDE 40 MG
40 TABLET ORAL DAILY
Status: DISCONTINUED | OUTPATIENT
Start: 2024-09-23 | End: 2024-09-28 | Stop reason: HOSPADM

## 2024-09-23 RX ORDER — ONDANSETRON 2 MG/ML
4 INJECTION INTRAMUSCULAR; INTRAVENOUS EVERY 6 HOURS PRN
Status: DISCONTINUED | OUTPATIENT
Start: 2024-09-23 | End: 2024-09-28 | Stop reason: HOSPADM

## 2024-09-23 RX ORDER — ACETAMINOPHEN 325 MG/1
650 TABLET ORAL EVERY 6 HOURS PRN
Status: DISCONTINUED | OUTPATIENT
Start: 2024-09-23 | End: 2024-09-28 | Stop reason: HOSPADM

## 2024-09-23 RX ORDER — AMIODARONE HYDROCHLORIDE 200 MG/1
200 TABLET ORAL DAILY
Status: DISCONTINUED | OUTPATIENT
Start: 2024-09-23 | End: 2024-09-28 | Stop reason: HOSPADM

## 2024-09-23 RX ORDER — METOPROLOL SUCCINATE 25 MG/1
25 TABLET, EXTENDED RELEASE ORAL EVERY 12 HOURS SCHEDULED
Status: DISCONTINUED | OUTPATIENT
Start: 2024-09-23 | End: 2024-09-28 | Stop reason: HOSPADM

## 2024-09-23 RX ORDER — CIPROFLOXACIN 500 MG/1
500 TABLET, FILM COATED ORAL EVERY 12 HOURS SCHEDULED
Status: DISCONTINUED | OUTPATIENT
Start: 2024-09-23 | End: 2024-09-23

## 2024-09-23 RX ORDER — SENNOSIDES 8.6 MG
1 TABLET ORAL
Status: DISCONTINUED | OUTPATIENT
Start: 2024-09-23 | End: 2024-09-28 | Stop reason: HOSPADM

## 2024-09-23 RX ORDER — LISINOPRIL 5 MG/1
5 TABLET ORAL DAILY
Status: DISCONTINUED | OUTPATIENT
Start: 2024-09-23 | End: 2024-09-28 | Stop reason: HOSPADM

## 2024-09-23 RX ORDER — FINASTERIDE 5 MG/1
5 TABLET, FILM COATED ORAL DAILY
Status: DISCONTINUED | OUTPATIENT
Start: 2024-09-23 | End: 2024-09-28 | Stop reason: HOSPADM

## 2024-09-23 RX ORDER — ASPIRIN 81 MG/1
81 TABLET, CHEWABLE ORAL DAILY
Status: DISCONTINUED | OUTPATIENT
Start: 2024-09-23 | End: 2024-09-24

## 2024-09-23 RX ADMIN — METOPROLOL SUCCINATE 25 MG: 25 TABLET, EXTENDED RELEASE ORAL at 08:21

## 2024-09-23 RX ADMIN — ATORVASTATIN CALCIUM 40 MG: 40 TABLET, FILM COATED ORAL at 17:11

## 2024-09-23 RX ADMIN — FINASTERIDE 5 MG: 5 TABLET, FILM COATED ORAL at 08:20

## 2024-09-23 RX ADMIN — APIXABAN 5 MG: 5 TABLET, FILM COATED ORAL at 09:56

## 2024-09-23 RX ADMIN — ASPIRIN 81 MG CHEWABLE TABLET 81 MG: 81 TABLET CHEWABLE at 08:20

## 2024-09-23 RX ADMIN — APIXABAN 5 MG: 5 TABLET, FILM COATED ORAL at 17:11

## 2024-09-23 RX ADMIN — AMIODARONE HYDROCHLORIDE 200 MG: 200 TABLET ORAL at 08:20

## 2024-09-23 RX ADMIN — TAMSULOSIN HYDROCHLORIDE 0.4 MG: 0.4 CAPSULE ORAL at 21:42

## 2024-09-23 RX ADMIN — METOPROLOL SUCCINATE 25 MG: 25 TABLET, EXTENDED RELEASE ORAL at 21:44

## 2024-09-23 RX ADMIN — CEFEPIME HYDROCHLORIDE 1000 MG: 1 INJECTION, SOLUTION INTRAVENOUS at 14:43

## 2024-09-23 RX ADMIN — FUROSEMIDE 40 MG: 40 TABLET ORAL at 08:20

## 2024-09-23 RX ADMIN — CIPROFLOXACIN 500 MG: 500 TABLET ORAL at 08:20

## 2024-09-23 RX ADMIN — LISINOPRIL 5 MG: 5 TABLET ORAL at 08:20

## 2024-09-23 NOTE — ASSESSMENT & PLAN NOTE
Lab Results   Component Value Date    EGFR 55 09/23/2024    EGFR 48 09/22/2024    EGFR 58 09/16/2024    CREATININE 1.26 09/23/2024    CREATININE 1.40 (H) 09/22/2024    CREATININE 1.19 09/16/2024

## 2024-09-23 NOTE — ASSESSMENT & PLAN NOTE
"First noticed dark urine in the morning of 9/22 and then progressed to gross hematuria throughout the day  Manual irrigation and CBI x 2 attempted in the ED, however continued to have recurrent hematuria prompting admission  CT A/P: \"Decompressed bladder limiting evaluation for clot burden. Mild amount of intraluminal hyperdense material surrounding the Colon catheter balloon consistent with blood products/clot. Mild right hydroureteronephrosis, new from prior. Inflammatory changes regional to the right ureter, indeterminate etiology.\"  At time of admission, CBI had been paused for 3 hours and urine is light yellow in Colon catheter - will continue holding CBI and perform manual irrigation qshift and PRN   Hold Eliquis for now-consider resuming in the morning if urine remains clear overnight (last dose was 9/22 AM), would monitor for 12 hours on Eliquis to ensure no recurrence of hematuria  Continue ciprofloxacin for completion of 3 full days of treatment  If develops recurrent hematuria-will consult urology  "

## 2024-09-23 NOTE — ASSESSMENT & PLAN NOTE
Home regimen: Amiodarone 200 Mg daily, metoprolol succinate 25 Mg twice daily  Continue home medications

## 2024-09-23 NOTE — ASSESSMENT & PLAN NOTE
Recently admitted 9/12-9/17 for UTI treated 3 days of vancomycin, 3 days of cefepime with transition to Cipro plan of 3 more days of antibiotics to complete 7-day course  Did not receive antibiotic at facility until 9/20 evening - therefore will order 1 additional days worth of antibiotic

## 2024-09-23 NOTE — PROGRESS NOTES
Progress Note -     Name: Toro Rodriguez 74 y.o. male I MRN: [id number]  Unit/Bed#: -01 I Date of Admission: 9/22/2024   Date of Service: 9/23/2024 I Hospital Day: 1     Assessment & Plan  Hematuria  - collected urine in farmer catheter appears yellow and translucent  - hemoglobin is stable and continue to trend  - continue ciprofloxacin for treatment of UTI which is likely the source of patient's hematuria  - hold Eliqus   - continue monitoring I/Os and vitals  Essential hypertension  - continue home medications  Chronic systolic (congestive) heart failure (HCC)  Wt Readings from Last 3 Encounters:   09/23/24 88.5 kg (195 lb 1.7 oz)   09/17/24 87.7 kg (193 lb 6.4 oz)   09/05/24 95.3 kg (210 lb)     Deep vein thrombosis (DVT) of left lower extremity (HCC)    Coronary artery disease involving native coronary artery of native heart    Chronic kidney disease, stage 3a (HCC)  Lab Results   Component Value Date    EGFR 55 09/23/2024    EGFR 48 09/22/2024    EGFR 58 09/16/2024    CREATININE 1.26 09/23/2024    CREATININE 1.40 (H) 09/22/2024    CREATININE 1.19 09/16/2024     Urinary tract infection associated with indwelling urethral catheter  (HCC)  - continue abx and follow cultures        History of Present Illness   Toro Rodriguez is a 74-year-old male with PMHx of CKDIII, HF, HTN, CAD, and NSVT who was admitted x1 day ago for painless hematuria. The hematuria was noted Sunday morning and throughout the day. Patient reports he has not had a BM since 9/21 and attributes his constipation to the prescribed ciprofloxacin. He is currently tolerating a diet. The patient denies any fevers, chills, N/V, abdominal pain, or headache.    Objective      Temp:  [97.4 °F (36.3 °C)-98.1 °F (36.7 °C)] 97.9 °F (36.6 °C)  HR:  [46-52] 48  Resp:  [18-20] 18  BP: (124-185)/() 168/71  O2 Device: None (Room air)          I/O last 24 hours:  In: 660 [P.O.:660]  Out: 1160 [Urine:1160]  Lines/Drains/Airways       Active Status        Name Placement date Placement time Site Days    Urethral Catheter Coude 18 Fr. 07/10/24  1640  Coude  74    Urethral Catheter Three way 18 Fr. 09/22/24  1900  Three way  less than 1                  Physical Exam  Vitals and nursing note reviewed.   Constitutional:       General: He is not in acute distress.     Appearance: Normal appearance. He is well-developed.   HENT:      Head: Normocephalic and atraumatic.   Eyes:      Conjunctiva/sclera: Conjunctivae normal.   Cardiovascular:      Rate and Rhythm: Normal rate and regular rhythm.      Heart sounds: No murmur heard.  Pulmonary:      Effort: Pulmonary effort is normal. No respiratory distress.      Breath sounds: Normal breath sounds. No wheezing, rhonchi or rales.   Abdominal:      General: Bowel sounds are normal.      Palpations: Abdomen is soft.      Tenderness: There is no abdominal tenderness. There is no guarding or rebound.   Genitourinary:     Penis: No discharge or lesions.       Comments: Colon catheter present  Musculoskeletal:      Comments: Trace LE edema   Skin:     General: Skin is warm and dry.      Capillary Refill: Capillary refill takes less than 2 seconds.   Neurological:      Mental Status: He is alert.      Gait: Gait abnormal.      Comments: Assistance with walker (baseline)   Psychiatric:         Mood and Affect: Mood normal.           Lab Results: I have reviewed the following results:   Imaging Review:   VTE Pharmacologic Prophylaxis: VTE covered by:  apixaban, Oral, 5 mg at 09/23/24 0969     VTE Mechanical Prophylaxis: sequential compression device    Aleyda RICHARDS

## 2024-09-23 NOTE — CONSULTS
Consultation - Surgery-General   Name: Toro Rodriguez 74 y.o. male I MRN: [id number]  Unit/Bed#: -01 I Date of Admission: 9/22/2024   Date of Service: 9/23/2024 I Hospital Day: 1   Inpatient consult to Urology  Consult performed by: Parisa Shepherd PA-C  Consult ordered by: Jaydon Fleming MD        Physician Requesting Evaluation: Jaydon Fleming MD   Reason for Evaluation / Principal Problem: UTI    Assessment & Plan  Hematuria  -Hgb stable, eliquis being held  -most likely 2/2 eliquis and UTI  -manual irrigation prn, monitor PVRs  -flomax for enlarged prostate  -Cont to treat UTI, currently on cipro  -possible SPT placement this Friday- to be decided if this can be continues pending urine cx/pt's overall outcome    Essential hypertension  -home meds  Chronic systolic (congestive) heart failure (HCC)  Wt Readings from Last 3 Encounters:   09/23/24 88.5 kg (195 lb 1.7 oz)   09/17/24 87.7 kg (193 lb 6.4 oz)   09/05/24 95.3 kg (210 lb)   -cont to monitor  -cardiac diet  -last echo in 2/2024:- LVEF 35-40%  -daily weights  Deep vein thrombosis (DVT) of left lower extremity (HCC)  -eliquis to be restarted  Chronic kidney disease, stage 3a (HCC)  Lab Results   Component Value Date    EGFR 55 09/23/2024    EGFR 48 09/22/2024    EGFR 58 09/16/2024    CREATININE 1.26 09/23/2024    CREATININE 1.40 (H) 09/22/2024    CREATININE 1.19 09/16/2024   -cont to trend  Urinary tract infection associated with indwelling urethral catheter  (HCC)  -follow cx  -prior cx noted  -on cipro  -CT findings noted. Wbc wnl.   NSVT (nonsustained ventricular tachycardia) ([facility])  -mngt as per primary      History of Present Illness   Toro Rodriguez is a 74 y.o. male with CKD, systolic heart failure, HTN, NSVT, and CAD who presents with hematuria noted Sunday morning.     Toro Rodriguez is a 74-year-old male with PMHx of CKDIII, HF, HTN, CAD, and NSVT, dilated cardiomyopathy who was admitted x1 day ago for painless hematuria. The hematuria was  noted Sunday morning and throughout the day. Patient reports he has not had a BM since 9/21 and attributes his constipation to the prescribed ciprofloxacin. He is currently tolerating a diet. The patient denies any fevers, chills, N/V, abdominal pain, or headache. Pt has hx of urinary retention, he ha shx of enlarged prostate and he had needed a farmer cath in the past. He is due for a spt this coming Friday.  Pt is in a wheelchair, last seen by Dr Espino in April 2024.   Pt reports his retention issue has been present his melanoma surgery. Prior tx include flomax. Pt was recently treated for a  UTI and was given abx. His cultures were growing pseudomonas and staph and citrobacter freundii.     Review of Systems     CONSTITUTIONAL: Denies any fever, chills, rigors, and weight loss.  HEENT: No facial trauma, earache or tinnitus. Denies hearing loss or visual disturbances.  CARDIOVASCULAR: No chest pain or palpitations.   RESPIRATORY: Denies any cough, hemoptysis, shortness of breath or dyspnea on exertion.  GASTROINTESTINAL: No constipation, no diarrhea, no abdominal hernias, no nausea or vomiting  GENITOURINARY: see hpi  NEUROLOGIC:  negative for dizziness, weakness, vertigo, denies headaches.   MUSCULOSKELETAL: Denies any muscle or joint pain.   SKIN: Denies skin rashes or itching.   ENDOCRINE: Denies excessive thirst. Denies intolerance to heat or cold.       I have reviewed the patient's PMH, PSH, Social History, Family History, Meds, and Allergies  Historical Information   Past Medical History:   Diagnosis Date    Alcohol intoxication ([facility]) 10/15/2020    BPH (benign prostatic hyperplasia)     Chronic HFrEF (heart failure with reduced ejection fraction) ([facility]) 11/2023    Coronary artery calcification seen on CT scan 11/10/2023    Coronary artery disease 12/14/2023    Deep vein thrombosis (DVT) of left lower extremity ([facility]) 11/2023    Diabetes mellitus (HCC) 11/2023    Fall from slip, trip, or stumble 10/15/2020     History of phimosis of penis     History of urinary calculi     Hypertension     Skin cancer     Tobacco abuse     quit around      Past Surgical History:   Procedure Laterality Date    BLADDER STONE REMOVAL      CARDIAC CATHETERIZATION N/A 2023    Procedure: Cardiac catheterization;  Surgeon: Dave Royal MD;  Location: [facility];  Service: Cardiology    ORIF ANKLE FRACTURE Left     SKIN LESION EXCISION N/A 3/18/2024    Procedure: WIDE LOCAL EXCISION OF BACK MELANOMA;  Surgeon: Lillie La MD;  Location: [facility];  Service: Surgical Oncology    TOTAL HIP ARTHROPLASTY Right      Social History     Tobacco Use    Smoking status: Former     Types: Cigarettes     Start date:      Quit date:      Years since quittin.7    Smokeless tobacco: Never    Tobacco comments:     Quit over 30 years ago (Updated 2023).    Vaping Use    Vaping status: Never Used   Substance and Sexual Activity    Alcohol use: Not Currently    Drug use: Never    Sexual activity: Not on file     E-Cigarette/Vaping    E-Cigarette Use Never User      E-Cigarette/Vaping Substances    Nicotine No     THC No     CBD No     Flavoring No     Other No     Unknown No      Family History   Problem Relation Age of Onset    Breast cancer Mother     Heart attack Father 61    Other Sister         s/p hysterectomy    Cerebral palsy Brother     Skin cancer Other         family history    No Known Problems Son     No Known Problems Daughter     Sudden death Neg Hx      Social History     Tobacco Use    Smoking status: Former     Types: Cigarettes     Start date:      Quit date:      Years since quittin.7    Smokeless tobacco: Never    Tobacco comments:     Quit over 30 years ago (Updated 2023).    Vaping Use    Vaping status: Never Used   Substance and Sexual Activity    Alcohol use: Not Currently    Drug use: Never    Sexual activity: Not on file       Current Facility-Administered Medications:      acetaminophen (TYLENOL) tablet 650 mg, Q6H PRN    aluminum-magnesium hydroxide-simethicone (MAALOX) oral suspension 30 mL, Q6H PRN    amiodarone tablet 200 mg, Daily    [Held by provider] apixaban (ELIQUIS) tablet 5 mg, BID    aspirin chewable tablet 81 mg, Daily    atorvastatin (LIPITOR) tablet 40 mg, Daily With Dinner    ciprofloxacin (CIPRO) tablet 500 mg, Q12H ARI    dicyclomine (BENTYL) capsule 10 mg, TID PRN    finasteride (PROSCAR) tablet 5 mg, Daily    furosemide (LASIX) tablet 40 mg, Daily    lisinopril (ZESTRIL) tablet 5 mg, Daily    metoprolol succinate (TOPROL-XL) 24 hr tablet 25 mg, Q12H ARI    ondansetron (ZOFRAN) injection 4 mg, Q6H PRN    senna (SENOKOT) tablet 8.6 mg, HS PRN    tamsulosin (FLOMAX) capsule 0.4 mg, HS  Prior to Admission Medications   Prescriptions Last Dose Informant Patient Reported? Taking?   acetaminophen (TYLENOL) 325 mg tablet   Yes No   Sig: Take 325 mg by mouth every 6 (six) hours as needed for mild pain   amiodarone 200 mg tablet 9/22/2024  No Yes   Sig: Take 1 tablet (200 mg total) by mouth daily   apixaban (ELIQUIS) 5 mg 9/22/2024 at 24646 Self No Yes   Sig: Take 1 tablet (5 mg total) by mouth 2 (two) times a day Do not start before November 21, 2023.   aspirin 81 mg chewable tablet 9/22/2024 Self No Yes   Sig: Chew 1 tablet (81 mg total) daily   atorvastatin (LIPITOR) 40 mg tablet 9/22/2024 Self No Yes   Sig: Take 1 tablet (40 mg total) by mouth daily with dinner   clotrimazole (LOTRIMIN) 1 % cream   Yes No   Sig: Apply topically 2 (two) times a day   dicyclomine (BENTYL) 10 mg capsule   No No   Sig: Take 1 capsule (10 mg total) by mouth 3 (three) times a day as needed (abdominal pain)   finasteride (PROSCAR) 5 mg tablet 9/22/2024  No Yes   Sig: Take 1 tablet (5 mg total) by mouth daily   furosemide (LASIX) 40 mg tablet 9/22/2024 Self No Yes   Sig: Take 1 tablet (40 mg total) by mouth daily Do not start before November 16, 2023.   lisinopril (ZESTRIL) 5 mg tablet  9/22/2024  No Yes   Sig: Take 1 tablet (5 mg total) by mouth daily   metoprolol succinate (TOPROL-XL) 25 mg 24 hr tablet 9/22/2024  No Yes   Sig: Take 1 tablet (25 mg total) by mouth every 12 (twelve) hours   mupirocin (BACTROBAN) 2 % ointment   Yes No   Sig: Apply topically 3 (three) times a day   nitroglycerin (NITROSTAT) 0.4 mg SL tablet  Self No No   Sig: Place 1 tablet (0.4 mg total) under the tongue every 5 (five) minutes as needed for chest pain   tamsulosin (FLOMAX) 0.4 mg 9/22/2024 Self No Yes   Sig: Take 1 capsule (0.4 mg total) by mouth daily with dinner   Patient taking differently: Take 0.4 mg by mouth daily at bedtime      Facility-Administered Medications: None     Zosyn [piperacillin sod-tazobactam so]    Objective      Temp:  [97.4 °F (36.3 °C)-98.1 °F (36.7 °C)] 98.1 °F (36.7 °C)  HR:  [46-52] 52  Resp:  [18-20] 18  BP: (124-185)/() 137/65  O2 Device: None (Room air)          I/O         09/21 0701 09/22 0700 09/22 0701 09/23 0700 09/23 0701  09/24 0700    P.O.  240     Total Intake(mL/kg)  240 (2.7)     Urine (mL/kg/hr)  1160     Total Output  1160     Net  -920                  Lines/Drains/Airways       Active Status       Name Placement date Placement time Site Days    Urethral Catheter Coude 18 Fr. 07/10/24  1640  Coude  74    Urethral Catheter Three way 18 Fr. 09/22/24  1900  Three way  less than 1                  Physical Exam   General Appearance: NAD, cooperative, alert  Eyes: Anicteric, conjunctiva clear  HENT:  Normocephalic, atraumatic, normal mucosa.  external ears normal, external nose normal, no drainage  Neck:    Supple, symmetrical, trachea midline  Resp:  Clear to auscultation bilaterally; no rales, rhonchi or wheezing; respirations unlabored   CV:  S1 S2, Regular rate and rhythm; no murmur, rub, or gallop.  GI:  Soft, non-tender, non-distended; normal bowel sounds; no masses, no organomegaly   Colon in place  Musculoskeletal: No cyanosis, clubbing or edema. Normal  ROM.  Skin:   No jaundice, rashes, or lesions   Psych: Normal affect, good eye contact  Neuro: No gross deficits, AAOx3, speech nl, naqvi      Lab Results: I have reviewed the following results: CBC/BMP:   .     09/23/24  0520   WBC 4.88   HGB 11.3*   HCT 34.7*      SODIUM 139   K 4.4      CO2 25   BUN 27*   CREATININE 1.26   GLUC 76   MG 1.9   PHOS 3.4      Imaging Review: Personally reviewed the following image studies/reports in PACS and discussed pertinent findings with Radiology: CT abdomen/pelvis. My interpretation of the radiology images/reports is:  .  Other Studies: Other studies reviewed include: prior urine culture    VTE Pharmacologic Prophylaxis: Heparin  VTE Mechanical Prophylaxis: sequential compression device

## 2024-09-23 NOTE — ASSESSMENT & PLAN NOTE
Lab Results   Component Value Date    EGFR 55 09/23/2024    EGFR 48 09/22/2024    EGFR 58 09/16/2024    CREATININE 1.26 09/23/2024    CREATININE 1.40 (H) 09/22/2024    CREATININE 1.19 09/16/2024   -cont to trend

## 2024-09-23 NOTE — ASSESSMENT & PLAN NOTE
Wt Readings from Last 3 Encounters:   09/22/24 87.5 kg (193 lb)   09/17/24 87.7 kg (193 lb 6.4 oz)   09/05/24 95.3 kg (210 lb)   Examines euvolemic on admission  Home regimen: Lasix 40 Mg daily  Last echo 2/2024: LVEF 35 to 40%  Continue home Lasix  Cardiac diet  Daily weights

## 2024-09-23 NOTE — ASSESSMENT & PLAN NOTE
Home regimen: Lasix 40 Mg daily, metoprolol succinate 25 Mg twice daily, lisinopril 5 Mg daily  Continue home medications

## 2024-09-23 NOTE — ASSESSMENT & PLAN NOTE
-Hgb stable, eliquis being held  -most likely 2/2 eliquis and UTI  -manual irrigation prn, monitor PVRs  -flomax for enlarged prostate  -Cont to treat UTI, currently on cipro  -possible SPT placement this Friday- to be decided if this can be continues pending urine cx/pt's overall outcome

## 2024-09-23 NOTE — ASSESSMENT & PLAN NOTE
History of CTA with  of RCA  Follows with cardiology, Dr. Roach, outpatient  Will continue aspirin for now-if develops hematuria despite holding Eliquis, would hold aspirin as well  Continue statin

## 2024-09-23 NOTE — CASE MANAGEMENT
Case Management Progress Note    Patient name Toro Rodriguez  Location /-01 MRN [id number]  : 1950 Date 2024       LOS (days): 1  Geometric Mean LOS (GMLOS) (days): 3.3  Days to GMLOS:2.6        OBJECTIVE:        Current admission status: Inpatient  Preferred Pharmacy:   Pharmacy of [address]  [address] [facility] [address]  Phone: [phone] Fax: [phone]    [facility] [address] [facility]  [address] [facility] [address]  Phone: [phone] Fax: [phone]    Primary Care Provider: CARLIE Foster    Primary Insurance: MEDICARE  Secondary Insurance: BLUE CROSS    PROGRESS NOTE:    Received a call back from Juan at [facility] to discuss pt's dc planning. Juan reports patient did receive his medications; he is not aware of there being a delay. He confirmed the vancomycin was received on time.  Juan states all medications are to be sent to their pharmacy prior to discharge and their generally is not a delay in receiving unless the medication is not available for any reason.

## 2024-09-23 NOTE — ASSESSMENT & PLAN NOTE
Lab Results   Component Value Date    EGFR 48 09/22/2024    EGFR 58 09/16/2024    EGFR 66 09/14/2024    CREATININE 1.40 (H) 09/22/2024    CREATININE 1.19 09/16/2024    CREATININE 1.08 09/14/2024   Baseline creatinine 1.0-1.2  Creatinine on admission 1.40  Received 500 cc normal saline in the ED  Examines euvolemic on admit, resume home Lasix in morning

## 2024-09-23 NOTE — H&P
"H&P - Hospitalist   Name: Toro Rodriguez 74 y.o. male I MRN: [id number]  Unit/Bed#: -01 I Date of Admission: 9/22/2024   Date of Service: 9/23/2024 I Hospital Day: 1     Assessment & Plan  Hematuria  First noticed dark urine in the morning of 9/22 and then progressed to gross hematuria throughout the day  Manual irrigation and CBI x 2 attempted in the ED, however continued to have recurrent hematuria prompting admission  CT A/P: \"Decompressed bladder limiting evaluation for clot burden. Mild amount of intraluminal hyperdense material surrounding the Colon catheter balloon consistent with blood products/clot. Mild right hydroureteronephrosis, new from prior. Inflammatory changes regional to the right ureter, indeterminate etiology.\"  At time of admission, CBI had been paused for 3 hours and urine is light yellow in Colon catheter - will continue holding CBI and perform manual irrigation qshift and PRN   Hold Eliquis for now-consider resuming in the morning if urine remains clear overnight (last dose was 9/22 AM), would monitor for 12 hours on Eliquis to ensure no recurrence of hematuria  Continue ciprofloxacin for completion of 3 full days of treatment  If develops recurrent hematuria-will consult urology  Urinary tract infection associated with indwelling urethral catheter  (HCC)  Recently admitted 9/12-9/17 for UTI treated 3 days of vancomycin, 3 days of cefepime with transition to Cipro plan of 3 more days of antibiotics to complete 7-day course  Did not receive antibiotic at facility until 9/20 evening - therefore will order 1 additional days worth of antibiotic   Chronic kidney disease, stage 3a (HCC)  Lab Results   Component Value Date    EGFR 48 09/22/2024    EGFR 58 09/16/2024    EGFR 66 09/14/2024    CREATININE 1.40 (H) 09/22/2024    CREATININE 1.19 09/16/2024    CREATININE 1.08 09/14/2024   Baseline creatinine 1.0-1.2  Creatinine on admission 1.40  Received 500 cc normal saline in the ED  Examines " "euvolemic on admit, resume home Lasix in morning  Essential hypertension  Home regimen: Lasix 40 Mg daily, metoprolol succinate 25 Mg twice daily, lisinopril 5 Mg daily  Continue home medications  Chronic systolic (congestive) heart failure (HCC)  Wt Readings from Last 3 Encounters:   09/22/24 87.5 kg (193 lb)   09/17/24 87.7 kg (193 lb 6.4 oz)   09/05/24 95.3 kg (210 lb)   Examines euvolemic on admission  Home regimen: Lasix 40 Mg daily  Last echo 2/2024: LVEF 35 to 40%  Continue home Lasix  Cardiac diet  Daily weights  Deep vein thrombosis (DVT) of left lower extremity (HCC)  On Eliquis, currently on hold due to hematuria-if urine remains clear by morning would resume  Coronary artery disease involving native coronary artery of native heart  History of CTA with  of RCA  Follows with cardiology, Dr. Roach, outpatient  Will continue aspirin for now-if develops hematuria despite holding Eliquis, would hold aspirin as well  Continue statin  NSVT (nonsustained ventricular tachycardia) (HCC)  Home regimen: Amiodarone 200 Mg daily, metoprolol succinate 25 Mg twice daily  Continue home medications    VTE Pharmacologic Prophylaxis: VTE Score: 7 High Risk (Score >/= 5) - Pharmacological DVT Prophylaxis Contraindicated. Sequential Compression Devices Ordered.  Currently held due to hematuria  Code Status: Level 1 - Full Code   Discussion with family: Patient declined call to .     Anticipated Length of Stay: Patient will be admitted on an inpatient basis with an anticipated length of stay of greater than 2 midnights secondary to hematuria.    History of Present Illness   Chief Complaint: \" My urine looks like blood\"    Toro Rodriguez is a 74 y.o. male with a PMH of CKD, systolic heart failure, HTN, NSVT, and CAD who presents with hematuria noted Sunday morning.  Patient reports when he awoke he noted dark urine that looked like coffee.  Throughout the day it cleared a little bit but then began to be gross " hematuria.  He denies any pain.  No fevers or chills.  No chest pain or dyspnea.  Reports that he did not start his oral antibiotic until Friday evening as that is when the facility obtained it from the pharmacy.  Reports he has been otherwise feeling well since discharge from the hospital.    Review of Systems   Constitutional:  Negative for chills and fever.   HENT:  Negative for congestion.    Respiratory:  Negative for cough and shortness of breath.    Cardiovascular:  Negative for chest pain and leg swelling.   Gastrointestinal:  Negative for abdominal pain, constipation, diarrhea, nausea and vomiting.   Genitourinary:  Positive for difficulty urinating (Urinary catheter at baseline) and hematuria. Negative for dysuria.   Musculoskeletal:  Positive for gait problem (Uses walker at baseline).   Neurological:  Negative for weakness and numbness.   All other systems reviewed and are negative.      I have reviewed the patient's PMH, PSH, Social History, Family History, Meds, and Allergies  Social History:  Marital Status:    Occupation: Retired  Patient Pre-hospital Living Situation: Assisted Living  Patient Pre-hospital Level of Mobility: walks with walker  Patient Pre-hospital Diet Restrictions: Cardiac diet    Objective     Vitals:   Blood Pressure: 137/65 (09/23/24 0014)  Pulse: (!) 52 (09/23/24 0014)  Temperature: 98.1 °F (36.7 °C) (09/23/24 0014)  Respirations: 18 (09/22/24 2245)  Weight - Scale: 87.5 kg (193 lb) (09/22/24 1652)  SpO2: 93 % (09/23/24 0014)    Physical Exam  Vitals and nursing note reviewed.   Constitutional:       General: He is not in acute distress.     Appearance: Normal appearance. He is not ill-appearing.      Comments: Pleasant and conversational   HENT:      Head: Normocephalic.      Nose: Nose normal.      Mouth/Throat:      Mouth: Mucous membranes are moist.   Eyes:      Conjunctiva/sclera: Conjunctivae normal.   Cardiovascular:      Rate and Rhythm: Normal rate and regular  rhythm.   Pulmonary:      Effort: Pulmonary effort is normal.      Breath sounds: Normal breath sounds.   Abdominal:      General: Abdomen is flat.      Palpations: Abdomen is soft.      Tenderness: There is no abdominal tenderness. There is no guarding or rebound.   Musculoskeletal:         General: Normal range of motion.      Cervical back: Normal range of motion.      Comments: Trace edema to bilateral lower extremities   Skin:     General: Skin is warm and dry.      Coloration: Skin is not pale.   Neurological:      General: No focal deficit present.      Mental Status: He is alert.      Comments: Oriented to self, place, month, year, president   Psychiatric:         Mood and Affect: Mood normal.         Thought Content: Thought content normal.         Lines/Drains:  Lines/Drains/Airways       Active Status       Name Placement date Placement time Site Days    Urethral Catheter Coude 18 Fr. 07/10/24  1640  Coude  74    Urethral Catheter Three way 18 Fr. 09/22/24  1900  Three way  less than 1                  Urinary Catheter:  Goal for removal: N/A- Discharging with Colon             Additional Data:   Lab Results: I have reviewed the following results:   Results from last 7 days   Lab Units 09/22/24  1659   WBC Thousand/uL 4.66   HEMOGLOBIN g/dL 12.1   HEMATOCRIT % 37.4   PLATELETS Thousands/uL 290   SEGS PCT % 48   LYMPHO PCT % 29   MONO PCT % 11   EOS PCT % 11*     Results from last 7 days   Lab Units 09/22/24  1659   SODIUM mmol/L 135   POTASSIUM mmol/L 5.0   CHLORIDE mmol/L 105   CO2 mmol/L 22   BUN mg/dL 32*   CREATININE mg/dL 1.40*   ANION GAP mmol/L 8   CALCIUM mg/dL 8.5   ALBUMIN g/dL 3.6   TOTAL BILIRUBIN mg/dL 0.49   ALK PHOS U/L 69   ALT U/L 6*   AST U/L 16   GLUCOSE RANDOM mg/dL 96     Results from last 7 days   Lab Units 09/22/24  1724   INR  1.33*         Lab Results   Component Value Date    HGBA1C 5.9 (H) 04/18/2024    HGBA1C 8.2 (H) 12/14/2023    HGBA1C 7.9 (H) 11/15/2023           Imaging  Review: Personally reviewed the following image studies in PACS and associated radiology reports: CT abdomen/pelvis. My interpretation of the radiology images/reports is: Urinary bladder is compressed with Colon catheter but appears to have hyperdense area surrounding catheter tip, concerning for blood products.  Other Studies: No additional pertinent studies reviewed.    Administrative Statements       ** Please Note: This note has been constructed using a voice recognition system. **

## 2024-09-23 NOTE — PROGRESS NOTES
Patient:  MOO GILLIAM    MRN:  [id number]    Bonniein Request ID:  [id number]    Level of care reserved:  Home Health Agency    Partner Reserved:  [facility] [place] [id number] [phone]    Clinical needs requested:    Geography searched:  [id number]    Start of Service:    Request sent:  3:50pm EDT on 9/23/2024 by Sandie Villegas    Partner reserved:  4:20pm EDT on 9/23/2024 by Sandie Villegas    Choice list shared:  4:20pm EDT on 9/23/2024 by Sandie Villegas

## 2024-09-23 NOTE — ASSESSMENT & PLAN NOTE
- collected urine in farmer catheter appears yellow and translucent  - hemoglobin is stable and continue to trend  - continue ciprofloxacin for treatment of UTI which is likely the source of patient's hematuria  - hold Eliqus   - continue monitoring I/Os and vitals

## 2024-09-23 NOTE — ASSESSMENT & PLAN NOTE
Wt Readings from Last 3 Encounters:   09/23/24 88.5 kg (195 lb 1.7 oz)   09/17/24 87.7 kg (193 lb 6.4 oz)   09/05/24 95.3 kg (210 lb)   -cont to monitor  -cardiac diet  -last echo in 2/2024:- LVEF 35-40%  -daily weights

## 2024-09-23 NOTE — CASE MANAGEMENT
Case Management Assessment & Discharge Planning Note    Patient name Toro Rodriguez  Location /-01 MRN [id number]  : 1950 Date 2024       Current Admission Date: 2024  Current Admission Diagnosis:Hematuria   Patient Active Problem List    Diagnosis Date Noted Date Diagnosed    NSVT (nonsustained ventricular tachycardia) ([facility]) 2024     UTI (urinary tract infection) 2024     Urinary tract infection associated with indwelling urethral catheter  ([facility]) 2024     Encounter to discuss test results 2024     Urethral erosion by catheter, initial encounter  ([facility]) 2024     Anemia 2024     Hematuria 2024     Chronic venous hypertension (idiopathic) with ulcer of left lower extremity (CODE) ([facility]) 2024     Chronic kidney disease, stage 3a ([facility]) 2024     Encounter for geriatric assessment 2024     Melanoma of back ([facility]) 2024     Dilated cardiomyopathy ([facility]) 12/15/2023     Obesity, morbid ([facility]) 2023     Type 2 diabetes mellitus with chronic kidney disease, without long-term current use of insulin, unspecified CKD stage ([facility]) 11/15/2023     Essential hypertension 11/10/2023     Generalized weakness 11/10/2023     Chronic systolic (congestive) heart failure ([facility]) 11/10/2023     Deep vein thrombosis (DVT) of left lower extremity ([facility]) 11/10/2023     Coronary artery disease involving native coronary artery of native heart 11/10/2023     Benign prostatic hyperplasia with urinary retention        LOS (days): 1  Geometric Mean LOS (GMLOS) (days): 3.3  Days to GMLOS:2.6     OBJECTIVE:  PATIENT READMITTED TO HOSPITAL  Risk of Unplanned Readmission Score: 40.04         Current admission status: Inpatient       Preferred Pharmacy:   [facility]  [address]  [address] [facility] [address]  [address] [facility] [address]  Phone: [phone] Fax: [phone]    [facility] [place]  [place] - [address]  [address] [facility] [address]  Phone: [phone] Fax: [phone]    Primary Care Provider: CARLIE Foster    Primary Insurance: MEDICARE  Secondary Insurance: BLUE CROSS    ASSESSMENT:  Active Health Care Proxies       Shanique Rodriguez Health Care Agent - Daughter   Primary Phone: [phone] (Mobile)                 Advance Directives  Does patient have a Health Care POA?: Yes  Does patient have Advance Directives?: Yes  Advance Directives: Living will, Power of  for health care  Primary Contact: Shanique (daughter)         Readmission Root Cause  30 Day Readmission: Yes  Who directed you to return to the hospital?: Other (comment) ([facility])  Did you understand whom to contact if you had questions or problems?: Yes  Did you get your prescriptions before you left the hospital?: No  Reason:: Delivery service not offered  Were you able to get your prescriptions filled when you left the hospital?: No  Reason::  (patient states there was an issue with the facility receiving the medications)  Did you take your medications as prescribed?: No (did not receive the medication)  Were you able to get to your follow-up appointments?: No  Reason:: Readmitted prior to appointment  During previous admission, was a post-acute recommendation made?: Yes  What post-acute resources were offered?: [facility] ([facility])  Patient was readmitted due to: presents with hematuria noted Sunday morning    Patient Information  Admitted from:: Facility ([facility])  Mental Status: Alert  During Assessment patient was accompanied by: Not accompanied during assessment  Assessment information provided by:: Patient  Primary Caregiver: Self  Support Systems: Daughter  County of Residence: [place]  What city do you live in?: [place]  Home entry access options. Select all that apply.: No steps to enter home  Type of Current Residence: Facility  Upon entering residence, is there  a bedroom on the main floor (no further steps)?: Yes  Upon entering residence, is there a bathroom on the main floor (no further steps)?: Yes    Activities of Daily Living Prior to Admission  Functional Status: Independent  Completes ADLs independently?: Yes  Ambulates independently?: Yes  Does patient use assisted devices?: Yes  Assisted Devices (DME) used: Walker  Does patient currently own DME?: Yes  What DME does the patient currently own?: Walker  Does patient have a history of Outpatient Therapy (PT/OT)?: No  Does the patient have a history of Short-Term Rehab?: No  Does patient have a [facility] of HHC?: [facility] ([facility]  Does patient currently have HHC?: Yes    Current Home Health Care  Type of Current Home Care Services: Home OT, Home PT, Nurse visit  Current Home Health Agency:: [facility]  Current Home Health Follow-Up Provider:: PCP    Patient Information Continued  Does patient have prescription coverage?: Yes  Does patient receive dialysis treatments?: No  Does patient have a history of substance abuse?: No  Does patient have a history of Mental Health Diagnosis?: No                Social Determinants of Health (SDOH)      Flowsheet Row Most Recent Value   Housing Stability    In the last 12 months, was there a time when you were not able to pay the mortgage or rent on time? N   In the past 12 months, how many times have you moved where you were living? 0   At any time in the past 12 months, were you homeless or living in a shelter (including now)? N   Transportation Needs    In the past 12 months, has lack of transportation kept you from medical appointments or from getting medications? no   In the past 12 months, has lack of transportation kept you from meetings, work, or from getting things needed for daily living? No   Food Insecurity    Within the past 12 months, you worried that your food would run out before you got the money to buy more. Never true   Within the past 12 months, the food you bought just  didn't last and you didn't have money to get more. Never true   Utilities    In the past 12 months has the electric, gas, oil, or water company threatened to shut off services in your home? No            DISCHARGE DETAILS:    Discharge planning discussed with:: patient  Freedom of Choice: Yes  Comments - Freedom of Choice: patient is requesting to resume services with [facility]  CM contacted family/caregiver?: No- see comments (reports being in contact with family)  Were Treatment Team discharge recommendations reviewed with patient/caregiver?: Yes  Did patient/caregiver verbalize understanding of patient care needs?: Yes  Were patient/caregiver advised of the risks associated with not following Treatment Team discharge recommendations?: Yes         Requested Home Health Care         Is the patient interested in [facility] at discharge?: Yes  Home Health Discipline requested:: Nursing, Occupational Therapy, Physical Therapy  Home Health Agency Name:: [facility] External Referral Reason (only applicable if external HHA name selected): Patient has established relationship with provider  Home Health Follow-Up Provider:: PCP  Home Health Services Needed:: Evaluate Functional Status and Safety, Gait/ADL Training, Strengthening/Theraputic Exercises to Improve Function  Homebound Criteria Met:: Requires the Assistance of Another Person for Safe Ambulation or to Leave the Home  Supporting Clincal Findings:: Limited Endurance    DME Referral Provided  Referral made for DME?: No    Other Referral/Resources/Interventions Provided:  Interventions: HHC  Referral Comments: Referral to [facility]    Treatment Team Recommendation: Assisted Living  Discharge Destination Plan:: Assisted Living  Transport at Discharge : Wheelchair van     Additional Comments: Met with pt to discuss the role of CM and to discuss any help pt may need prior to dc. Pt resides at [facility] on 1st floor with ramp to enter. Pt ambulates with walker. Pt  reports receiving medications through [facility] but states they had difficultly receiving them when he returned this last time. CM left message for Juan at [facility] to further discuss; awaiting call back. Pt reports being current with [facility]. AIDIN referral sent. [facility] accepted; added to [place] instructions. Hx of rehab at [facility]. Will  need WC Van transport at [place].

## 2024-09-23 NOTE — ASSESSMENT & PLAN NOTE
Wt Readings from Last 3 Encounters:   09/23/24 88.5 kg (195 lb 1.7 oz)   09/17/24 87.7 kg (193 lb 6.4 oz)   09/05/24 95.3 kg (210 lb)

## 2024-09-23 NOTE — PLAN OF CARE
Problem: PAIN - ADULT  Goal: Verbalizes/displays adequate comfort level or baseline comfort level  Description: Interventions:  - Encourage patient to monitor pain and request assistance  - Assess pain using appropriate pain scale  - Administer analgesics based on type and severity of pain and evaluate response  - Implement non-pharmacological measures as appropriate and evaluate response  - Consider cultural and social influences on pain and pain management  - Notify physician/advanced practitioner if interventions unsuccessful or patient reports new pain  Outcome: Progressing     Problem: INFECTION - ADULT  Goal: Absence or prevention of progression during hospitalization  Description: INTERVENTIONS:  - Assess and monitor for signs and symptoms of infection  - Monitor lab/diagnostic results  - Monitor all insertion sites, i.e. indwelling lines, tubes, and drains  - Monitor endotracheal if appropriate and nasal secretions for changes in amount and color  - Lyndon appropriate cooling/warming therapies per order  - Administer medications as ordered  - Instruct and encourage patient and family to use good hand hygiene technique  - Identify and instruct in appropriate isolation precautions for identified infection/condition  Outcome: Progressing     Problem: GENITOURINARY - ADULT  Goal: Urinary catheter remains patent  Description: INTERVENTIONS:  - Assess patency of urinary catheter  - If patient has a chronic farmer, consider changing catheter if non-functioning  - Follow guidelines for intermittent irrigation of non-functioning urinary catheter  Outcome: Progressing

## 2024-09-24 LAB
ANION GAP SERPL CALCULATED.3IONS-SCNC: 6 MMOL/L (ref 4–13)
BASOPHILS # BLD AUTO: 0.05 THOUSANDS/ΜL (ref 0–0.1)
BASOPHILS NFR BLD AUTO: 1 % (ref 0–1)
BUN SERPL-MCNC: 28 MG/DL (ref 5–25)
CALCIUM SERPL-MCNC: 8.5 MG/DL (ref 8.4–10.2)
CHLORIDE SERPL-SCNC: 104 MMOL/L (ref 96–108)
CO2 SERPL-SCNC: 28 MMOL/L (ref 21–32)
CREAT SERPL-MCNC: 1.24 MG/DL (ref 0.6–1.3)
EOSINOPHIL # BLD AUTO: 0.39 THOUSAND/ΜL (ref 0–0.61)
EOSINOPHIL NFR BLD AUTO: 7 % (ref 0–6)
ERYTHROCYTE [DISTWIDTH] IN BLOOD BY AUTOMATED COUNT: 13.2 % (ref 11.6–15.1)
GFR SERPL CREATININE-BSD FRML MDRD: 56 ML/MIN/1.73SQ M
GLUCOSE SERPL-MCNC: 89 MG/DL (ref 65–140)
HCT VFR BLD AUTO: 35.5 % (ref 36.5–49.3)
HCT VFR BLD AUTO: 38.2 % (ref 36.5–49.3)
HCT VFR BLD AUTO: 39 % (ref 36.5–49.3)
HGB BLD-MCNC: 11.7 G/DL (ref 12–17)
HGB BLD-MCNC: 12.4 G/DL (ref 12–17)
HGB BLD-MCNC: 12.7 G/DL (ref 12–17)
IMM GRANULOCYTES # BLD AUTO: 0.02 THOUSAND/UL (ref 0–0.2)
IMM GRANULOCYTES NFR BLD AUTO: 0 % (ref 0–2)
LYMPHOCYTES # BLD AUTO: 1.47 THOUSANDS/ΜL (ref 0.6–4.47)
LYMPHOCYTES NFR BLD AUTO: 27 % (ref 14–44)
MCH RBC QN AUTO: 31.7 PG (ref 26.8–34.3)
MCHC RBC AUTO-ENTMCNC: 33 G/DL (ref 31.4–37.4)
MCV RBC AUTO: 96 FL (ref 82–98)
MONOCYTES # BLD AUTO: 0.58 THOUSAND/ΜL (ref 0.17–1.22)
MONOCYTES NFR BLD AUTO: 11 % (ref 4–12)
MRSA NOSE QL CULT: ABNORMAL
MRSA NOSE QL CULT: ABNORMAL
NEUTROPHILS # BLD AUTO: 2.85 THOUSANDS/ΜL (ref 1.85–7.62)
NEUTS SEG NFR BLD AUTO: 54 % (ref 43–75)
NRBC BLD AUTO-RTO: 0 /100 WBCS
PLATELET # BLD AUTO: 274 THOUSANDS/UL (ref 149–390)
PMV BLD AUTO: 10.5 FL (ref 8.9–12.7)
POTASSIUM SERPL-SCNC: 4.2 MMOL/L (ref 3.5–5.3)
RBC # BLD AUTO: 3.69 MILLION/UL (ref 3.88–5.62)
SODIUM SERPL-SCNC: 138 MMOL/L (ref 135–147)
WBC # BLD AUTO: 5.36 THOUSAND/UL (ref 4.31–10.16)

## 2024-09-24 PROCEDURE — 80048 BASIC METABOLIC PNL TOTAL CA: CPT | Performed by: INTERNAL MEDICINE

## 2024-09-24 PROCEDURE — 87086 URINE CULTURE/COLONY COUNT: CPT | Performed by: PHYSICIAN ASSISTANT

## 2024-09-24 PROCEDURE — 85018 HEMOGLOBIN: CPT | Performed by: INTERNAL MEDICINE

## 2024-09-24 PROCEDURE — 99232 SBSQ HOSP IP/OBS MODERATE 35: CPT | Performed by: INTERNAL MEDICINE

## 2024-09-24 PROCEDURE — 99232 SBSQ HOSP IP/OBS MODERATE 35: CPT | Performed by: UROLOGY

## 2024-09-24 PROCEDURE — 76942 ECHO GUIDE FOR BIOPSY: CPT

## 2024-09-24 PROCEDURE — 85014 HEMATOCRIT: CPT | Performed by: INTERNAL MEDICINE

## 2024-09-24 PROCEDURE — 85025 COMPLETE CBC W/AUTO DIFF WBC: CPT | Performed by: INTERNAL MEDICINE

## 2024-09-24 RX ADMIN — LISINOPRIL 5 MG: 5 TABLET ORAL at 07:49

## 2024-09-24 RX ADMIN — METOPROLOL SUCCINATE 25 MG: 25 TABLET, EXTENDED RELEASE ORAL at 07:48

## 2024-09-24 RX ADMIN — AMIODARONE HYDROCHLORIDE 200 MG: 200 TABLET ORAL at 07:49

## 2024-09-24 RX ADMIN — TAMSULOSIN HYDROCHLORIDE 0.4 MG: 0.4 CAPSULE ORAL at 21:29

## 2024-09-24 RX ADMIN — CEFEPIME HYDROCHLORIDE 1000 MG: 1 INJECTION, SOLUTION INTRAVENOUS at 14:28

## 2024-09-24 RX ADMIN — CEFEPIME HYDROCHLORIDE 1000 MG: 1 INJECTION, SOLUTION INTRAVENOUS at 02:26

## 2024-09-24 RX ADMIN — FINASTERIDE 5 MG: 5 TABLET, FILM COATED ORAL at 07:48

## 2024-09-24 RX ADMIN — ATORVASTATIN CALCIUM 40 MG: 40 TABLET, FILM COATED ORAL at 16:57

## 2024-09-24 RX ADMIN — METOPROLOL SUCCINATE 25 MG: 25 TABLET, EXTENDED RELEASE ORAL at 21:29

## 2024-09-24 RX ADMIN — ASPIRIN 81 MG CHEWABLE TABLET 81 MG: 81 TABLET CHEWABLE at 07:48

## 2024-09-24 RX ADMIN — FUROSEMIDE 40 MG: 40 TABLET ORAL at 07:48

## 2024-09-24 NOTE — ASSESSMENT & PLAN NOTE
Recently admitted 9/12-9/17 for UTI treated 3 days of vancomycin, 3 days of cefepime with transition to Cipro plan of 3 more days of antibiotics to complete 7-day course  On cefepime  Follow-up urine culture results which are ordered today

## 2024-09-24 NOTE — PLAN OF CARE
Problem: GENITOURINARY - ADULT  Goal: Maintains or returns to baseline urinary function  Description: INTERVENTIONS:  - Assess urinary function  - Encourage oral fluids to ensure adequate hydration if ordered  - Administer IV fluids as ordered to ensure adequate hydration  - Administer ordered medications as needed  - Offer frequent toileting  - Follow urinary retention protocol if ordered  Outcome: Progressing  Goal: Absence of urinary retention  Description: INTERVENTIONS:  - Assess patient’s ability to void and empty bladder  - Monitor I/O  - Bladder scan as needed  - Discuss with physician/AP medications to alleviate retention as needed  - Discuss catheterization for long term situations as appropriate  Outcome: Progressing  Goal: Urinary catheter remains patent  Description: INTERVENTIONS:  - Assess patency of urinary catheter  - If patient has a chronic farmer, consider changing catheter if non-functioning  - Follow guidelines for intermittent irrigation of non-functioning urinary catheter  Outcome: Progressing

## 2024-09-24 NOTE — PLAN OF CARE
Problem: Potential for Falls  Goal: Patient will remain free of falls  Description: INTERVENTIONS:  - Educate patient/family on patient safety including physical limitations  - Instruct patient to call for assistance with activity   - Consult OT/PT to assist with strengthening/mobility   - Keep Call bell within reach  - Keep bed low and locked with side rails adjusted as appropriate  - Keep care items and personal belongings within reach  - Initiate and maintain comfort rounds  - Make Fall Risk Sign visible to staff  - Apply yellow socks and bracelet for high fall risk patients  - Consider moving patient to room near nurses station  Outcome: Progressing     Problem: PAIN - ADULT  Goal: Verbalizes/displays adequate comfort level or baseline comfort level  Description: Interventions:  - Encourage patient to monitor pain and request assistance  - Assess pain using appropriate pain scale  - Administer analgesics based on type and severity of pain and evaluate response  - Implement non-pharmacological measures as appropriate and evaluate response  - Consider cultural and social influences on pain and pain management  - Notify physician/advanced practitioner if interventions unsuccessful or patient reports new pain  Outcome: Progressing     Problem: INFECTION - ADULT  Goal: Absence or prevention of progression during hospitalization  Description: INTERVENTIONS:  - Assess and monitor for signs and symptoms of infection  - Monitor lab/diagnostic results  - Monitor all insertion sites, i.e. indwelling lines, tubes, and drains  - Monitor endotracheal if appropriate and nasal secretions for changes in amount and color  - Crowley appropriate cooling/warming therapies per order  - Administer medications as ordered  - Instruct and encourage patient and family to use good hand hygiene technique  - Identify and instruct in appropriate isolation precautions for identified infection/condition  Outcome: Progressing

## 2024-09-24 NOTE — ASSESSMENT & PLAN NOTE
Wt Readings from Last 3 Encounters:   09/24/24 89.8 kg (197 lb 15.6 oz)   09/17/24 87.7 kg (193 lb 6.4 oz)   09/05/24 95.3 kg (210 lb)   Examines euvolemic on admission  Home regimen: Lasix 40 Mg daily  Last echo 2/2024: LVEF 35 to 40%  Continue home Lasix  Cardiac diet  Daily weights

## 2024-09-24 NOTE — TELEMEDICINE
e-Consult (IPC)  - Interventional Radiology  Toro Rodriguez 74 y.o. male MRN: [id number]  Unit/Bed#: -01 Encounter: [id number]          Interventional Radiology has been consulted to evaluate Toro Rodriguez    We were consulted by Parisa Shepherd concerning this patient with hematuria for suprapubic tube placement..    Inpatient Consult to IR  Consult performed by: Alexander Hinojosa MD  Consult ordered by: Parisa Shepherd PA-C        09/24/24    Assessment/Recommendation:   74 yr old male originally scheduled as outpatient this Friday for suprapubic tube now is an inpatient for hematuria. Now on antibiotics also. On Eliquis which will be held prior to procedure tentative for this Friday. Repeat cultures also being drawn to exclude UTI prior to SPT placement.  Patient to be kept NPO for Friday. Coordinate with IR for timing.    5-10 minutes, >50% of the total time devoted to medical consultative verbal/EMR discussion between providers. Written report will be generated in the EMR.     Thank you for allowing Interventional Radiology to participate in the care of Toro Rodriguez. Please don't hesitate to call or TigerText us with any questions.     Alexander Hinojosa MD

## 2024-09-24 NOTE — PLAN OF CARE
Problem: PAIN - ADULT  Goal: Verbalizes/displays adequate comfort level or baseline comfort level  Description: Interventions:  - Encourage patient to monitor pain and request assistance  - Assess pain using appropriate pain scale  - Administer analgesics based on type and severity of pain and evaluate response  - Implement non-pharmacological measures as appropriate and evaluate response  - Consider cultural and social influences on pain and pain management  - Notify physician/advanced practitioner if interventions unsuccessful or patient reports new pain  Outcome: Progressing     Problem: INFECTION - ADULT  Goal: Absence or prevention of progression during hospitalization  Description: INTERVENTIONS:  - Assess and monitor for signs and symptoms of infection  - Monitor lab/diagnostic results  - Monitor all insertion sites, i.e. indwelling lines, tubes, and drains  - Monitor endotracheal if appropriate and nasal secretions for changes in amount and color  - Carter appropriate cooling/warming therapies per order  - Administer medications as ordered  - Instruct and encourage patient and family to use good hand hygiene technique  - Identify and instruct in appropriate isolation precautions for identified infection/condition  Outcome: Progressing  Goal: Absence of fever/infection during neutropenic period  Description: INTERVENTIONS:  - Monitor WBC    Outcome: Progressing     Problem: DISCHARGE PLANNING  Goal: Discharge to home or other facility with appropriate resources  Description: INTERVENTIONS:  - Identify barriers to discharge w/patient and caregiver  - Arrange for needed discharge resources and transportation as appropriate  - Identify discharge learning needs (meds, wound care, etc.)  - Arrange for interpretive services to assist at discharge as needed  - Refer to Case Management Department for coordinating discharge planning if the patient needs post-hospital services based on physician/advanced  practitioner order or complex needs related to functional status, cognitive ability, or social support system  Outcome: Progressing

## 2024-09-24 NOTE — CASE MANAGEMENT
Case Management Discharge Planning Note    Patient name Toro Rodriguez  Location /-01 MRN [id number]  : 1950 Date 2024       Current Admission Date: 2024  Current Admission Diagnosis:Hematuria   Patient Active Problem List    Diagnosis Date Noted Date Diagnosed    NSVT (nonsustained ventricular tachycardia) ([facility]) 2024     UTI (urinary tract infection) 2024     Urinary tract infection associated with indwelling urethral catheter  ([facility]) 2024     Encounter to discuss test results 2024     Urethral erosion by catheter, initial encounter  ([facility]) 2024     Anemia 2024     Hematuria 2024     Chronic venous hypertension (idiopathic) with ulcer of left lower extremity (CODE) ([facility]) 2024     Chronic kidney disease, stage 3a ([facility]) 2024     Encounter for geriatric assessment 2024     Melanoma of back ([facility]) 2024     Dilated cardiomyopathy ([facility]) 12/15/2023     Obesity, morbid ([facility]) 2023     Type 2 diabetes mellitus with chronic kidney disease, without long-term current use of insulin, unspecified CKD stage ([facility]) 11/15/2023     Essential hypertension 11/10/2023     Generalized weakness 11/10/2023     Chronic systolic (congestive) heart failure ([facility]) 11/10/2023     Deep vein thrombosis (DVT) of left lower extremity ([facility]) 11/10/2023     Coronary artery disease involving native coronary artery of native heart 11/10/2023     Benign prostatic hyperplasia with urinary retention 2014       LOS (days): 2  Geometric Mean LOS (GMLOS) (days): 3.3  Days to GMLOS:1.7     OBJECTIVE:  Risk of Unplanned Readmission Score: 32.59         Current admission status: Inpatient   Preferred Pharmacy:   [facility] [address] [address]  Phone: [phone] Fax: [phone]    [facility]- [place] - [facility] [address]  [address]  [place] [address]  Phone: [phone] Fax: [phone]    Primary Care Provider: CARLIE Foster    Primary Insurance: MEDICARE  Secondary Insurance: BLUE CROSS    DISCHARGE DETAILS:  Patient discussed at Patient Care Rounds , he is with hematuria and his Eliquis is held. IR consulted for possible suprapubic cath placement on Friday.  CM to remains available to assist as needed.

## 2024-09-24 NOTE — ASSESSMENT & PLAN NOTE
- collected urine is red, no clots  - hemoglobin is stable and continue to trend  - abx are continues, repeat urine cx ordered  - eliquis restarted. Hematuria noted.  - cont with manual irrigation  -continue monitoring I/Os and vitals  -Case d/w urology and IR, may get spt if urine is clear, and eliquis is held and pt remains stable

## 2024-09-24 NOTE — ASSESSMENT & PLAN NOTE
History of CTA with  of RCA  Follows with cardiology, Dr. Roach, outpatient  Aspirin and Eliquis on hold for possible suprapubic catheter placement  Continue statin

## 2024-09-24 NOTE — ASSESSMENT & PLAN NOTE
"First noticed dark urine in the morning of 9/22 and then progressed to gross hematuria throughout the day  Manual irrigation and CBI x 2 attempted in the ED, however continued to have recurrent hematuria prompting admission  CT A/P: \"Decompressed bladder limiting evaluation for clot burden. Mild amount of intraluminal hyperdense material surrounding the Colon catheter balloon consistent with blood products/clot. Mild right hydroureteronephrosis, new from prior. Inflammatory changes regional to the right ureter, indeterminate etiology.\"  At time of admission, CBI had been paused for 3 hours and urine is light yellow in Colon catheter - will continue holding CBI and perform manual irrigation qshift and PRN   Urology consult appreciated  Monitor H&H  On cefepime  Urology spoke with IR for suprapubic catheter placement.  Urine cultures are ordered.  Aspirin and Eliquis held  Possible suprapubic catheter placement on Friday if urine cultures are negative  "

## 2024-09-24 NOTE — PROGRESS NOTES
"Progress Note - Hospitalist   Name: Toro Rodriguez 74 y.o. male I MRN: [id number]  Unit/Bed#: -01 I Date of Admission: 9/22/2024   Date of Service: 9/24/2024 I Hospital Day: 2    Assessment & Plan  Hematuria  First noticed dark urine in the morning of 9/22 and then progressed to gross hematuria throughout the day  Manual irrigation and CBI x 2 attempted in the ED, however continued to have recurrent hematuria prompting admission  CT A/P: \"Decompressed bladder limiting evaluation for clot burden. Mild amount of intraluminal hyperdense material surrounding the Colon catheter balloon consistent with blood products/clot. Mild right hydroureteronephrosis, new from prior. Inflammatory changes regional to the right ureter, indeterminate etiology.\"  At time of admission, CBI had been paused for 3 hours and urine is light yellow in Colon catheter - will continue holding CBI and perform manual irrigation qshift and PRN   Urology consult appreciated  Monitor H&H  On cefepime  Urology spoke with IR for suprapubic catheter placement.  Urine cultures are ordered.  Aspirin and Eliquis held  Possible suprapubic catheter placement on Friday if urine cultures are negative  Urinary tract infection associated with indwelling urethral catheter  (HCC)  Recently admitted 9/12-9/17 for UTI treated 3 days of vancomycin, 3 days of cefepime with transition to Cipro plan of 3 more days of antibiotics to complete 7-day course  On cefepime  Follow-up urine culture results which are ordered today  Chronic kidney disease, stage 3a (HCC)  Lab Results   Component Value Date    EGFR 56 09/24/2024    EGFR 55 09/23/2024    EGFR 48 09/22/2024    CREATININE 1.24 09/24/2024    CREATININE 1.26 09/23/2024    CREATININE 1.40 (H) 09/22/2024   Baseline creatinine 1.0-1.2  Creatinine on admission 1.40  Received 500 cc normal saline in the ED  Examines euvolemic on admit  Continue Lasix  Trend BMP  Essential hypertension  Home regimen: Lasix 40 Mg daily, " metoprolol succinate 25 Mg twice daily, lisinopril 5 Mg daily  Continue home medications  Chronic systolic (congestive) heart failure (HCC)  Wt Readings from Last 3 Encounters:   09/24/24 89.8 kg (197 lb 15.6 oz)   09/17/24 87.7 kg (193 lb 6.4 oz)   09/05/24 95.3 kg (210 lb)   Examines euvolemic on admission  Home regimen: Lasix 40 Mg daily  Last echo 2/2024: LVEF 35 to 40%  Continue home Lasix  Cardiac diet  Daily weights  Deep vein thrombosis (DVT) of left lower extremity (HCC)  On Eliquis, currently on hold due to hematuria-if urine remains clear by morning would resume  Coronary artery disease involving native coronary artery of native heart  History of CTA with  of RCA  Follows with cardiology, Dr. Roach, outpatient  Aspirin and Eliquis on hold for possible suprapubic catheter placement  Continue statin  NSVT (nonsustained ventricular tachycardia) (HCC)  Home regimen: Amiodarone 200 Mg daily, metoprolol succinate 25 Mg twice daily  Continue home medications  UTI (urinary tract infection)  See above    Labs & Imaging: No pertinent imaging studies reviewed.    VTE Prophylaxis: in place.    Code Status:   Level 1 - Full Code    Patient Centered Rounds: I have performed bedside rounds with nursing staff today.    Mobility:   Basic Mobility Inpatient Raw Score: 24  JH-HLM Goal: 8: Walk 250 feet or more  JH-HLM Achieved: 3: Sit at edge of bed  JH-HLM Goal NOT achieved. Continue with multidisciplinary rounding and encourage appropriate mobility to improve upon JH-HLM goals.    Discussions with Specialists or Other Care Team Provider: Urology    Education and Discussions with Family / Patient: Daughter on phone    Total Time Spent on Date of Encounter in care of patient: 35 mins. This time was spent on one or more of the following: performing physical exam; counseling and coordination of care; obtaining or reviewing history; documenting in the medical record; reviewing/ordering tests, medications or procedures;  communicating with other healthcare professionals and discussing with patient's family/caregivers.    Current Length of Stay: 2 day(s)    Current Patient Status: Inpatient   Certification Statement: The patient will continue to require additional inpatient hospital stay due to see my assessment and plan.     Subjective:   Patient is seen and examined at bedside.  No new complaints.  Afebrile.  Still with mild hematuria  All other ROS are negative.    Objective:    Vitals: Blood pressure 140/91, pulse 55, temperature (!) 97.3 °F (36.3 °C), temperature source Temporal, resp. rate 18, weight 89.8 kg (197 lb 15.6 oz), SpO2 91%.,Body mass index is 31.01 kg/m².  SPO2 RA Rest      Flowsheet Row ED to Hosp-Admission (Current) from 9/22/2024 in [facility] Med Surg Unit   SpO2 91 %   SpO2 Activity At Rest   O2 Device None (Room air)   O2 Flow Rate --          I&O:   Intake/Output Summary (Last 24 hours) at 9/24/2024 1149  Last data filed at 9/24/2024 0904  Gross per 24 hour   Intake 1020 ml   Output 2985 ml   Net -1965 ml       Physical Exam:    General- Alert, sitting in chair. Not in any acute distress.  Neck- Supple, No JVD  CVS- regular, S1 and S2 normal  Chest- Bilateral Air entry, No rhochi, crackles or wheezing present.  Abdomen- soft, nontender, not distended, no guarding or rigidity, BS+  Extremities-  No pedal edema, No calf tenderness                         Normal ROM in all extremities.  CNS-   Alert, awake and orientedx3. No focal deficits present.    Invasive Devices       Peripheral Intravenous Line  Duration             Peripheral IV 09/22/24 Right Antecubital 2 days              Drain  Duration             Urethral Catheter Three way 18 Fr. 1 day                          Social History  reviewed  Family History   Problem Relation Age of Onset    Breast cancer Mother     Heart attack Father 61    Other Sister         s/p hysterectomy    Cerebral palsy Brother     Skin cancer Other          family history    No Known Problems Son     No Known Problems Daughter     Sudden death Neg Hx     reviewed    Meds:  Current Facility-Administered Medications   Medication Dose Route Frequency Provider Last Rate Last Admin    acetaminophen (TYLENOL) tablet 650 mg  650 mg Oral Q6H PRN Odette Kaye PA-C        aluminum-magnesium hydroxide-simethicone (MAALOX) oral suspension 30 mL  30 mL Oral Q6H PRN Odette Kaye PA-C        amiodarone tablet 200 mg  200 mg Oral Daily BARBY HinesC   200 mg at 09/24/24 0749    atorvastatin (LIPITOR) tablet 40 mg  40 mg Oral Daily With Dinner Odette Kaye PA-C   40 mg at 09/23/24 1711    cefepime (MAXIPIME) IVPB (premix in dextrose) 1,000 mg 50 mL  1,000 mg Intravenous Q12H Jaydon Fleming  mL/hr at 09/24/24 0226 1,000 mg at 09/24/24 0226    dicyclomine (BENTYL) capsule 10 mg  10 mg Oral TID PRN Odette Kaye PA-C        finasteride (PROSCAR) tablet 5 mg  5 mg Oral Daily BARBY HinesC   5 mg at 09/24/24 0748    furosemide (LASIX) tablet 40 mg  40 mg Oral Daily BARBY HinesC   40 mg at 09/24/24 0748    lisinopril (ZESTRIL) tablet 5 mg  5 mg Oral Daily BARBY HinesC   5 mg at 09/24/24 0749    metoprolol succinate (TOPROL-XL) 24 hr tablet 25 mg  25 mg Oral Q12H ARI BARBY HinesC   25 mg at 09/24/24 0748    ondansetron (ZOFRAN) injection 4 mg  4 mg Intravenous Q6H PRN Odette Kaye PA-C        senna (SENOKOT) tablet 8.6 mg  1 tablet Oral HS PRN Odette Kaye PA-C        tamsulosin (FLOMAX) capsule 0.4 mg  0.4 mg Oral HS Odette Kaye PA-C   0.4 mg at 09/23/24 2142        Medications Prior to Admission:     amiodarone 200 mg tablet    apixaban (ELIQUIS) 5 mg    aspirin 81 mg chewable tablet    atorvastatin (LIPITOR) 40 mg tablet    finasteride (PROSCAR) 5 mg tablet    furosemide (LASIX) 40 mg tablet    lisinopril (ZESTRIL) 5 mg tablet    metoprolol succinate (TOPROL-XL) 25 mg 24 hr tablet     "tamsulosin (FLOMAX) 0.4 mg    acetaminophen (TYLENOL) 325 mg tablet    clotrimazole (LOTRIMIN) 1 % cream    dicyclomine (BENTYL) 10 mg capsule    mupirocin (BACTROBAN) 2 % ointment    nitroglycerin (NITROSTAT) 0.4 mg SL tablet    Labs:  Results from last 7 days   Lab Units 09/24/24  0840 09/24/24  0522 09/23/24 2047 09/23/24  0520 09/22/24  1659   WBC Thousand/uL  --  5.36  --  4.88 4.66   HEMOGLOBIN g/dL 12.4 11.7* 11.9* 11.3* 12.1   HEMATOCRIT % 38.2 35.5* 36.2* 34.7* 37.4   PLATELETS Thousands/uL  --  274  --  271 290   SEGS PCT %  --  54  --   --  48   LYMPHO PCT %  --  27  --   --  29   MONO PCT %  --  11  --   --  11   EOS PCT %  --  7*  --   --  11*     Results from last 7 days   Lab Units 09/24/24  0522 09/23/24  0520 09/22/24  1659   POTASSIUM mmol/L 4.2 4.4 5.0   CHLORIDE mmol/L 104 106 105   CO2 mmol/L 28 25 22   BUN mg/dL 28* 27* 32*   CREATININE mg/dL 1.24 1.26 1.40*   CALCIUM mg/dL 8.5 8.2* 8.5   ALK PHOS U/L  --   --  69   ALT U/L  --   --  6*   AST U/L  --   --  16     No results found for: \"TROPONINI\", \"CKMB\", \"CKTOTAL\"  Results from last 7 days   Lab Units 09/22/24  1724   INR  1.33*     Lab Results   Component Value Date    BLOODCX No Growth After 5 Days. 11/10/2023    BLOODCX No Growth After 5 Days. 11/10/2023    URINECX >100,000 cfu/ml Pseudomonas aeruginosa (A) 09/12/2024    URINECX 50,000-59,000 cfu/ml Citrobacter freundii (A) 09/12/2024    URINECX 20,000-29,000 cfu/ml Staphylococcus aureus (A) 09/12/2024         Imaging:  Results for orders placed during the hospital encounter of 07/10/24    XR chest 1 view portable    Narrative  XR CHEST PORTABLE    INDICATION: cough.    COMPARISON: 11/15/2023    FINDINGS:    Right lower lung field ill-defined opacity silhouetting the right heart border, preserving the diaphragm suggesting a right middle lobe pneumonia. No pneumothorax or pleural effusion. Stable right apical pleural thickening.    Enlarged cardiac silhouette, unchanged.    Severe " degenerative changes in the left shoulder with chronic deformity of the right shoulder.    Normal upper abdomen.    Impression  Right lower lung field consolidation concerning for pneumonia.    Findings concur with the preliminary report by the referring clinician already in PACS and/or our electronic record EPIC.        Workstation performed: [id number]    No results found for this or any previous visit.      Last 24 Hours Medication List:   Current Facility-Administered Medications   Medication Dose Route Frequency Provider Last Rate    acetaminophen  650 mg Oral Q6H PRN Odette Kaye PA-C      aluminum-magnesium hydroxide-simethicone  30 mL Oral Q6H PRN Odette Kaye PA-C      amiodarone  200 mg Oral Daily Odette Kaye PA-C      atorvastatin  40 mg Oral Daily With Dinner Odette Kaye PA-C      cefepime  1,000 mg Intravenous Q12H Jaydon Fleming MD 1,000 mg (09/24/24 0226)    dicyclomine  10 mg Oral TID PRN Odette Kaye PA-C      finasteride  5 mg Oral Daily Odette Kaye PA-C      furosemide  40 mg Oral Daily Odette Kaye PA-C      lisinopril  5 mg Oral Daily Odette Kaye PA-C      metoprolol succinate  25 mg Oral Q12H ARI Odette Kaye PA-C      ondansetron  4 mg Intravenous Q6H PRN Odette Kaye PA-C      senna  1 tablet Oral HS PRN Odette Kaye PA-C      tamsulosin  0.4 mg Oral HS Odette Kaye PA-C          Today, Patient Was Seen By: Jaydon Fleming MD    ** Please Note: Dictation voice to text software may have been used in the creation of this document. **

## 2024-09-24 NOTE — PROGRESS NOTES
Progress Note - Surgery-General   Name: Toro Rodriguez 74 y.o. male I MRN: [id number]  Unit/Bed#: -01 I Date of Admission: 9/22/2024   Date of Service: 9/24/2024 I Hospital Day: 2    Assessment & Plan  Hematuria  - collected urine is red, no clots  - hemoglobin is stable and continue to trend  - abx are continues, repeat urine cx ordered  - eliquis restarted. Hematuria noted.  - cont with manual irrigation  -continue monitoring I/Os and vitals  -Case d/w urology and IR, may get spt if urine is clear, and eliquis is held and pt remains stable  Essential hypertension  - continue home medications  Chronic systolic (congestive) heart failure (HCC)  Wt Readings from Last 3 Encounters:   09/24/24 89.8 kg (197 lb 15.6 oz)   09/17/24 87.7 kg (193 lb 6.4 oz)   09/05/24 95.3 kg (210 lb)     Deep vein thrombosis (DVT) of left lower extremity (HCC)  -mngt per primary, eliquis restarted  Coronary artery disease involving native coronary artery of native heart    Chronic kidney disease, stage 3a ([facility])  Lab Results   Component Value Date    EGFR 56 09/24/2024    EGFR 55 09/23/2024    EGFR 48 09/22/2024    CREATININE 1.24 09/24/2024    CREATININE 1.26 09/23/2024    CREATININE 1.40 (H) 09/22/2024     Urinary tract infection associated with indwelling urethral catheter  (HCC)  - continue abx and follow cultures  NSVT (nonsustained ventricular tachycardia) ([facility])    UTI (urinary tract infection)  -on abx  -farmer exchanged on admission  -urine cx         History of Present Illness   Sitting in bed  No complaints    Objective      Temp:  [97.3 °F (36.3 °C)-98.1 °F (36.7 °C)] 97.3 °F (36.3 °C)  HR:  [48-56] 55  Resp:  [18] 18  BP: (136-151)/(58-91) 140/91  O2 Device: None (Room air)          I/O         09/22 0701 09/23 0700 09/23 0701  09/24 0700 09/24 0701 09/25 0700    P.O. 240 900     Total Intake(mL/kg) 240 (2.7) 900 (10)     Urine (mL/kg/hr) 1160 2900 (1.3)     Total Output 1160 2900     Net -925 -7012                   Lines/Drains/Airways       Active Status       Name Placement date Placement time Site Days    Urethral Catheter Three way 18 Fr. 09/22/24  1900  Three way  1                  Physical Exam General Appearance: NAD, cooperative, alert  Eyes: Anicteric, conjunctiva clear  HENT:  Normocephalic, atraumatic, normal mucosa.  external ears normal, external nose normal, no drainage  Neck:    Supple, symmetrical, trachea midline  Resp:  Clear to auscultation bilaterally; no rales, rhonchi or wheezing; respirations unlabored   CV:  S1 S2, Regular rate and rhythm; no murmur, rub, or gallop.  GI:  Soft, non-tender, non-distended; normal bowel sounds; no masses, no organomegaly   Hematuria noted, no clots    Psych: Normal affect, good eye contact  Neuro: No gross deficits, AAOx3, speech nl, naqvi      Lab Results: I have reviewed the following results: CBC/BMP:   .     09/24/24  0522 09/24/24  0840   WBC 5.36  --    HGB 11.7* 12.4   HCT 35.5* 38.2     --    SODIUM 138  --    K 4.2  --      --    CO2 28  --    BUN 28*  --    CREATININE 1.24  --    GLUC 89  --       Imaging Review: No pertinent imaging studies reviewed.  Other Studies: No additional pertinent studies reviewed.    VTE Pharmacologic Prophylaxis: VTE covered by:    None     VTE Mechanical Prophylaxis: sequential compression device

## 2024-09-24 NOTE — ASSESSMENT & PLAN NOTE
Wt Readings from Last 3 Encounters:   09/24/24 89.8 kg (197 lb 15.6 oz)   09/17/24 87.7 kg (193 lb 6.4 oz)   09/05/24 95.3 kg (210 lb)

## 2024-09-24 NOTE — ASSESSMENT & PLAN NOTE
Lab Results   Component Value Date    EGFR 56 09/24/2024    EGFR 55 09/23/2024    EGFR 48 09/22/2024    CREATININE 1.24 09/24/2024    CREATININE 1.26 09/23/2024    CREATININE 1.40 (H) 09/22/2024      Accidental fall

## 2024-09-24 NOTE — ASSESSMENT & PLAN NOTE
Lab Results   Component Value Date    EGFR 56 09/24/2024    EGFR 55 09/23/2024    EGFR 48 09/22/2024    CREATININE 1.24 09/24/2024    CREATININE 1.26 09/23/2024    CREATININE 1.40 (H) 09/22/2024   Baseline creatinine 1.0-1.2  Creatinine on admission 1.40  Received 500 cc normal saline in the ED  Examines euvolemic on admit  Continue Lasix  Trend BMP

## 2024-09-25 ENCOUNTER — TELEMEDICINE (OUTPATIENT)
Dept: UROLOGY | Facility: HOSPITAL | Age: 74
End: 2024-09-25

## 2024-09-25 DIAGNOSIS — T83.89XA URETHRAL EROSION BY CATHETER, INITIAL ENCOUNTER  (HCC): ICD-10-CM

## 2024-09-25 DIAGNOSIS — N36.8 URETHRAL EROSION BY CATHETER, INITIAL ENCOUNTER  (HCC): ICD-10-CM

## 2024-09-25 DIAGNOSIS — N39.0 URINARY TRACT INFECTION ASSOCIATED WITH INDWELLING URETHRAL CATHETER, SUBSEQUENT ENCOUNTER: ICD-10-CM

## 2024-09-25 DIAGNOSIS — N18.31 CHRONIC KIDNEY DISEASE, STAGE 3A (HCC): ICD-10-CM

## 2024-09-25 DIAGNOSIS — N40.1 BENIGN PROSTATIC HYPERPLASIA WITH URINARY RETENTION: Chronic | ICD-10-CM

## 2024-09-25 DIAGNOSIS — R33.8 BENIGN PROSTATIC HYPERPLASIA WITH URINARY RETENTION: Chronic | ICD-10-CM

## 2024-09-25 DIAGNOSIS — E11.22 TYPE 2 DIABETES MELLITUS WITH CHRONIC KIDNEY DISEASE, WITHOUT LONG-TERM CURRENT USE OF INSULIN, UNSPECIFIED CKD STAGE (HCC): Primary | ICD-10-CM

## 2024-09-25 DIAGNOSIS — T83.511D URINARY TRACT INFECTION ASSOCIATED WITH INDWELLING URETHRAL CATHETER, SUBSEQUENT ENCOUNTER: ICD-10-CM

## 2024-09-25 LAB
ANION GAP SERPL CALCULATED.3IONS-SCNC: 8 MMOL/L (ref 4–13)
BACTERIA UR CULT: NORMAL
BASOPHILS # BLD AUTO: 0.04 THOUSANDS/ΜL (ref 0–0.1)
BASOPHILS NFR BLD AUTO: 1 % (ref 0–1)
BUN SERPL-MCNC: 26 MG/DL (ref 5–25)
CALCIUM SERPL-MCNC: 8.6 MG/DL (ref 8.4–10.2)
CHLORIDE SERPL-SCNC: 101 MMOL/L (ref 96–108)
CO2 SERPL-SCNC: 27 MMOL/L (ref 21–32)
CREAT SERPL-MCNC: 1.24 MG/DL (ref 0.6–1.3)
EOSINOPHIL # BLD AUTO: 0.49 THOUSAND/ΜL (ref 0–0.61)
EOSINOPHIL NFR BLD AUTO: 8 % (ref 0–6)
ERYTHROCYTE [DISTWIDTH] IN BLOOD BY AUTOMATED COUNT: 13.1 % (ref 11.6–15.1)
GFR SERPL CREATININE-BSD FRML MDRD: 56 ML/MIN/1.73SQ M
GLUCOSE SERPL-MCNC: 92 MG/DL (ref 65–140)
HCT VFR BLD AUTO: 37.4 % (ref 36.5–49.3)
HCT VFR BLD AUTO: 37.7 % (ref 36.5–49.3)
HGB BLD-MCNC: 12.1 G/DL (ref 12–17)
HGB BLD-MCNC: 12.2 G/DL (ref 12–17)
IMM GRANULOCYTES # BLD AUTO: 0.03 THOUSAND/UL (ref 0–0.2)
IMM GRANULOCYTES NFR BLD AUTO: 1 % (ref 0–2)
LYMPHOCYTES # BLD AUTO: 1.48 THOUSANDS/ΜL (ref 0.6–4.47)
LYMPHOCYTES NFR BLD AUTO: 24 % (ref 14–44)
MCH RBC QN AUTO: 31.5 PG (ref 26.8–34.3)
MCHC RBC AUTO-ENTMCNC: 32.6 G/DL (ref 31.4–37.4)
MCV RBC AUTO: 97 FL (ref 82–98)
MONOCYTES # BLD AUTO: 0.58 THOUSAND/ΜL (ref 0.17–1.22)
MONOCYTES NFR BLD AUTO: 9 % (ref 4–12)
NEUTROPHILS # BLD AUTO: 3.53 THOUSANDS/ΜL (ref 1.85–7.62)
NEUTS SEG NFR BLD AUTO: 57 % (ref 43–75)
NRBC BLD AUTO-RTO: 0 /100 WBCS
PLATELET # BLD AUTO: 306 THOUSANDS/UL (ref 149–390)
PMV BLD AUTO: 10.6 FL (ref 8.9–12.7)
POTASSIUM SERPL-SCNC: 4.5 MMOL/L (ref 3.5–5.3)
RBC # BLD AUTO: 3.87 MILLION/UL (ref 3.88–5.62)
SODIUM SERPL-SCNC: 136 MMOL/L (ref 135–147)
WBC # BLD AUTO: 6.15 THOUSAND/UL (ref 4.31–10.16)

## 2024-09-25 PROCEDURE — 99232 SBSQ HOSP IP/OBS MODERATE 35: CPT | Performed by: INTERNAL MEDICINE

## 2024-09-25 PROCEDURE — 99232 SBSQ HOSP IP/OBS MODERATE 35: CPT | Performed by: UROLOGY

## 2024-09-25 PROCEDURE — 80048 BASIC METABOLIC PNL TOTAL CA: CPT | Performed by: INTERNAL MEDICINE

## 2024-09-25 PROCEDURE — 85014 HEMATOCRIT: CPT | Performed by: INTERNAL MEDICINE

## 2024-09-25 PROCEDURE — 85018 HEMOGLOBIN: CPT | Performed by: INTERNAL MEDICINE

## 2024-09-25 PROCEDURE — 85025 COMPLETE CBC W/AUTO DIFF WBC: CPT | Performed by: INTERNAL MEDICINE

## 2024-09-25 RX ADMIN — LISINOPRIL 5 MG: 5 TABLET ORAL at 07:54

## 2024-09-25 RX ADMIN — CEFEPIME HYDROCHLORIDE 1000 MG: 1 INJECTION, SOLUTION INTRAVENOUS at 15:26

## 2024-09-25 RX ADMIN — FINASTERIDE 5 MG: 5 TABLET, FILM COATED ORAL at 07:53

## 2024-09-25 RX ADMIN — AMIODARONE HYDROCHLORIDE 200 MG: 200 TABLET ORAL at 07:55

## 2024-09-25 RX ADMIN — CEFEPIME HYDROCHLORIDE 1000 MG: 1 INJECTION, SOLUTION INTRAVENOUS at 02:35

## 2024-09-25 RX ADMIN — FUROSEMIDE 40 MG: 40 TABLET ORAL at 07:54

## 2024-09-25 RX ADMIN — ATORVASTATIN CALCIUM 40 MG: 40 TABLET, FILM COATED ORAL at 16:45

## 2024-09-25 RX ADMIN — TAMSULOSIN HYDROCHLORIDE 0.4 MG: 0.4 CAPSULE ORAL at 21:02

## 2024-09-25 RX ADMIN — METOPROLOL SUCCINATE 25 MG: 25 TABLET, EXTENDED RELEASE ORAL at 21:02

## 2024-09-25 RX ADMIN — METOPROLOL SUCCINATE 25 MG: 25 TABLET, EXTENDED RELEASE ORAL at 07:53

## 2024-09-25 NOTE — ASSESSMENT & PLAN NOTE
Lab Results   Component Value Date    EGFR 56 09/25/2024    EGFR 56 09/24/2024    EGFR 55 09/23/2024    CREATININE 1.24 09/25/2024    CREATININE 1.24 09/24/2024    CREATININE 1.26 09/23/2024   -cont to trend

## 2024-09-25 NOTE — PROGRESS NOTES
"Progress Note - Hospitalist   Name: Toro Rodriguez 74 y.o. male I MRN: [id number]  Unit/Bed#: -01 I Date of Admission: 9/22/2024   Date of Service: 9/25/2024 I Hospital Day: 3    Assessment & Plan  Hematuria  First noticed dark urine in the morning of 9/22 and then progressed to gross hematuria throughout the day  Manual irrigation and CBI x 2 attempted in the ED, however continued to have recurrent hematuria prompting admission  CT A/P: \"Decompressed bladder limiting evaluation for clot burden. Mild amount of intraluminal hyperdense material surrounding the Colon catheter balloon consistent with blood products/clot. Mild right hydroureteronephrosis, new from prior. Inflammatory changes regional to the right ureter, indeterminate etiology.\"  At time of admission, CBI had been paused for 3 hours and urine is light yellow in Colon catheter - will continue holding CBI and perform manual irrigation qshift and PRN   Urology consult appreciated  Monitor H&H  Noted to still have hematuria this morning.  Continue manual irrigation  On cefepime  Urology spoke with IR for suprapubic catheter placement.  Urine cultures are pending.  Aspirin and Eliquis held  Possible suprapubic catheter placement on Friday if urine cultures are negative  Urinary tract infection associated with indwelling urethral catheter  (HCC)  Recently admitted 9/12-9/17 for UTI treated 3 days of vancomycin, 3 days of cefepime with transition to Cipro plan of 3 more days of antibiotics to complete 7-day course  On cefepime  Follow-up urine culture results which are pending  Chronic kidney disease, stage 3a ([facility])  Lab Results   Component Value Date    EGFR 56 09/25/2024    EGFR 56 09/24/2024    EGFR 55 09/23/2024    CREATININE 1.24 09/25/2024    CREATININE 1.24 09/24/2024    CREATININE 1.26 09/23/2024   Baseline creatinine 1.0-1.2  Creatinine on admission 1.40  Received 500 cc normal saline in the ED  Examines euvolemic on admit  Continue Lasix  Trend " BMP  Essential hypertension  Home regimen: Lasix 40 Mg daily, metoprolol succinate 25 Mg twice daily, lisinopril 5 Mg daily  Continue home medications  Chronic systolic (congestive) heart failure (HCC)  Wt Readings from Last 3 Encounters:   09/25/24 89.4 kg (197 lb 1.5 oz)   09/17/24 87.7 kg (193 lb 6.4 oz)   09/05/24 95.3 kg (210 lb)   Examines euvolemic on admission  Home regimen: Lasix 40 Mg daily  Last echo 2/2024: LVEF 35 to 40%  Continue home Lasix  Cardiac diet  Daily weights  Deep vein thrombosis (DVT) of left lower extremity (HCC)  On Eliquis, currently on hold due to hematuria-if urine remains clear by morning would resume  Coronary artery disease involving native coronary artery of native heart  History of CTA with  of RCA  Follows with cardiology, Dr. Roach, outpatient  Aspirin and Eliquis on hold for possible suprapubic catheter placement  Continue statin  NSVT (nonsustained ventricular tachycardia) (HCC)  Home regimen: Amiodarone 200 Mg daily, metoprolol succinate 25 Mg twice daily  Continue home medications  UTI (urinary tract infection)  See above    Labs & Imaging: No pertinent imaging studies reviewed.    VTE Prophylaxis: in place.    Code Status:   Level 1 - Full Code    Patient Centered Rounds: I have performed bedside rounds with nursing staff today.    Mobility:   Basic Mobility Inpatient Raw Score: 24  JH-HLM Goal: 8: Walk 250 feet or more  JH-HLM Achieved: 4: Move to chair/commode  JH-HLM Goal NOT achieved. Continue with multidisciplinary rounding and encourage appropriate mobility to improve upon JH-HLM goals.    Discussions with Specialists or Other Care Team Provider: Urology    Education and Discussions with Family / Patient: Daughter-no response    Total Time Spent on Date of Encounter in care of patient: 35 mins. This time was spent on one or more of the following: performing physical exam; counseling and coordination of care; obtaining or reviewing history; documenting in the medical  record; reviewing/ordering tests, medications or procedures; communicating with other healthcare professionals and discussing with patient's family/caregivers.    Current Length of Stay: 3 day(s)    Current Patient Status: Inpatient   Certification Statement: The patient will continue to require additional inpatient hospital stay due to see my assessment and plan.     Subjective:   Patient is seen and examined at bedside.  Patient has hematuria.  Denies any other complaints.  Afebrile  All other ROS are negative.    Objective:    Vitals: Blood pressure 136/66, pulse 58, temperature 97.9 °F (36.6 °C), resp. rate 18, weight 89.4 kg (197 lb 1.5 oz), SpO2 97%.,Body mass index is 30.87 kg/m².  SPO2 RA Rest      Flowsheet Row ED to Hosp-Admission (Current) from 9/22/2024 in [facility] Med [facility]urg Unit   SpO2 97 %   SpO2 Activity At Rest   O2 Device Nasal cannula   O2 Flow Rate --          I&O:   Intake/Output Summary (Last 24 hours) at 9/25/2024 1005  Last data filed at 9/25/2024 0900  Gross per 24 hour   Intake 1500 ml   Output 3125 ml   Net -1625 ml       Physical Exam:    General- Alert, lying comfortably in bed. Not in any acute distress.  Neck- Supple, No JVD  CVS- regular, S1 and S2 normal  Chest- Bilateral Air entry, No rhochi, crackles or wheezing present.  Abdomen- soft, nontender, not distended, no guarding or rigidity, BS+  Extremities-  No pedal edema, No calf tenderness  Colon catheter in place  CNS-   Alert, awake and orientedx3. No focal deficits present.    Invasive Devices       Peripheral Intravenous Line  Duration             Peripheral IV 09/25/24 Dorsal (posterior);Left Forearm <1 day              Drain  Duration             Urethral Catheter Three way 18 Fr. 2 days                          Social History  reviewed  Family History   Problem Relation Age of Onset    Breast cancer Mother     Heart attack Father 61    Other Sister         s/p hysterectomy    Cerebral palsy Brother      Skin cancer Other         family history    No Known Problems Son     No Known Problems Daughter     Sudden death Neg Hx     reviewed    Meds:  Current Facility-Administered Medications   Medication Dose Route Frequency Provider Last Rate Last Admin    acetaminophen (TYLENOL) tablet 650 mg  650 mg Oral Q6H PRN Odette Kaye PA-C        aluminum-magnesium hydroxide-simethicone (MAALOX) oral suspension 30 mL  30 mL Oral Q6H PRN Odette Kaye PA-C        amiodarone tablet 200 mg  200 mg Oral Daily BARBY HinesC   200 mg at 09/25/24 0755    atorvastatin (LIPITOR) tablet 40 mg  40 mg Oral Daily With Dinner BARBY HinesC   40 mg at 09/24/24 1657    cefepime (MAXIPIME) IVPB (premix in dextrose) 1,000 mg 50 mL  1,000 mg Intravenous Q12H Jaydon Fleming  mL/hr at 09/25/24 0235 1,000 mg at 09/25/24 0235    dicyclomine (BENTYL) capsule 10 mg  10 mg Oral TID PRN Odette Kaye PA-C        finasteride (PROSCAR) tablet 5 mg  5 mg Oral Daily BARBY HinesC   5 mg at 09/25/24 0753    furosemide (LASIX) tablet 40 mg  40 mg Oral Daily EMMETT Hines-C   40 mg at 09/25/24 0754    lisinopril (ZESTRIL) tablet 5 mg  5 mg Oral Daily BARBY HinesC   5 mg at 09/25/24 0754    metoprolol succinate (TOPROL-XL) 24 hr tablet 25 mg  25 mg Oral Q12H ARI BARBY HinesC   25 mg at 09/25/24 0753    ondansetron (ZOFRAN) injection 4 mg  4 mg Intravenous Q6H PRN Odette Kaye PA-C        senna (SENOKOT) tablet 8.6 mg  1 tablet Oral HS PRN Odette Kaye PA-C        tamsulosin (FLOMAX) capsule 0.4 mg  0.4 mg Oral HS Odette Kaye PA-C   0.4 mg at 09/24/24 1319        Medications Prior to Admission:     amiodarone 200 mg tablet    apixaban (ELIQUIS) 5 mg    aspirin 81 mg chewable tablet    atorvastatin (LIPITOR) 40 mg tablet    finasteride (PROSCAR) 5 mg tablet    furosemide (LASIX) 40 mg tablet    lisinopril (ZESTRIL) 5 mg tablet    metoprolol succinate (TOPROL-XL)  "25 mg 24 hr tablet    tamsulosin (FLOMAX) 0.4 mg    acetaminophen (TYLENOL) 325 mg tablet    clotrimazole (LOTRIMIN) 1 % cream    dicyclomine (BENTYL) 10 mg capsule    mupirocin (BACTROBAN) 2 % ointment    nitroglycerin (NITROSTAT) 0.4 mg SL tablet    Labs:  Results from last 7 days   Lab Units 09/25/24 0437 09/24/24 2108 09/24/24  0840 09/24/24 0522 09/23/24 2047 09/23/24  0520 09/22/24  1659   WBC Thousand/uL 6.15  --   --  5.36  --  4.88 4.66   HEMOGLOBIN g/dL 12.2 12.7 12.4 11.7*   < > 11.3* 12.1   HEMATOCRIT % 37.4 39.0 38.2 35.5*   < > 34.7* 37.4   PLATELETS Thousands/uL 306  --   --  274  --  271 290   SEGS PCT % 57  --   --  54  --   --  48   LYMPHO PCT % 24  --   --  27  --   --  29   MONO PCT % 9  --   --  11  --   --  11   EOS PCT % 8*  --   --  7*  --   --  11*    < > = values in this interval not displayed.     Results from last 7 days   Lab Units 09/25/24 0437 09/24/24 0522 09/23/24 0520 09/22/24  1659   POTASSIUM mmol/L 4.5 4.2 4.4 5.0   CHLORIDE mmol/L 101 104 106 105   CO2 mmol/L 27 28 25 22   BUN mg/dL 26* 28* 27* 32*   CREATININE mg/dL 1.24 1.24 1.26 1.40*   CALCIUM mg/dL 8.6 8.5 8.2* 8.5   ALK PHOS U/L  --   --   --  69   ALT U/L  --   --   --  6*   AST U/L  --   --   --  16     No results found for: \"TROPONINI\", \"CKMB\", \"CKTOTAL\"  Results from last 7 days   Lab Units 09/22/24  1724   INR  1.33*     Lab Results   Component Value Date    BLOODCX No Growth After 5 Days. 11/10/2023    BLOODCX No Growth After 5 Days. 11/10/2023    URINECX >100,000 cfu/ml Pseudomonas aeruginosa (A) 09/12/2024    URINECX 50,000-59,000 cfu/ml Citrobacter freundii (A) 09/12/2024    URINECX 20,000-29,000 cfu/ml Staphylococcus aureus (A) 09/12/2024         Imaging:  Results for orders placed during the hospital encounter of 07/10/24    XR chest 1 view portable    Narrative  XR CHEST PORTABLE    INDICATION: cough.    COMPARISON: 11/15/2023    FINDINGS:    Right lower lung field ill-defined opacity silhouetting the " right heart border, preserving the diaphragm suggesting a right middle lobe pneumonia. No pneumothorax or pleural effusion. Stable right apical pleural thickening.    Enlarged cardiac silhouette, unchanged.    Severe degenerative changes in the left shoulder with chronic deformity of the right shoulder.    Normal upper abdomen.    Impression  Right lower lung field consolidation concerning for pneumonia.    Findings concur with the preliminary report by the referring clinician already in PACS and/or our electronic record EPIC.        Workstation performed: [id number]    No results found for this or any previous visit.      Last 24 Hours Medication List:   Current Facility-Administered Medications   Medication Dose Route Frequency Provider Last Rate    acetaminophen  650 mg Oral Q6H PRN Odette Kaye PA-C      aluminum-magnesium hydroxide-simethicone  30 mL Oral Q6H PRN Odette Kaye PA-C      amiodarone  200 mg Oral Daily Odette Kaye, JULIAN      atorvastatin  40 mg Oral Daily With Dinner Odette Kaye PA-C      cefepime  1,000 mg Intravenous Q12H Jaydon Fleming MD 1,000 mg (09/25/24 0235)    dicyclomine  10 mg Oral TID PRN Odette Kaye PA-C      finasteride  5 mg Oral Daily Odette Kaye PA-C      furosemide  40 mg Oral Daily Odette Kaye PA-C      lisinopril  5 mg Oral Daily Odette Kaye, PA-LENNOX      metoprolol succinate  25 mg Oral Q12H [facility] EMMETT Hines-LENNOX      ondansetron  4 mg Intravenous Q6H PRN Odette Kaye PA-C      senna  1 tablet Oral HS PRN Odette Kaye PA-C      tamsulosin  0.4 mg Oral HS Odette Kaye PA-C          Today, Patient Was Seen By: Jaydon Fleming MD    ** Please Note: Dictation voice to text software may have been used in the creation of this document. **

## 2024-09-25 NOTE — ASSESSMENT & PLAN NOTE
Urinary retention ongoing - farmer inserted   Currently hospitalized for recurrent hematuria and possible UTI   Cystoscopy July 2024 by Dr Espino -- mildly enlarged lateral lobes   Continue taking finasteride 5 mg and flomax 0.4 mg daily as prescribed   Refill as needed   Considering urodynamics to confirm probable neurogenic bladder   Will need to maintain chronic farmer - possible SPT placement 9/27 with IR if he is medically optimized   Encouraged adequate hydration and avoidance of bladder irritants / constipation

## 2024-09-25 NOTE — PROGRESS NOTES
Progress Note - Urology   Name: Toro Rodriguez 74 y.o. male I MRN: [id number]  Unit/Bed#: -01 I Date of Admission: 9/22/2024   Date of Service: 9/25/2024 I Hospital Day: 3    Assessment & Plan  Hematuria  -Hgb stable  -most likely 2/2 eliquis and UTI  -urine cx pending  -holding eliquis and asa-possibly SPT on Friday  -manual irrigation prn, monitor PVRs  -flomax for enlarged prostate  -Cont to treat UTI, follow cx  -possible SPT placement this Friday- to be decided if this can be continues pending urine cx/pt's overall outcome    Essential hypertension  -home meds  Chronic systolic (congestive) heart failure (HCC)  Wt Readings from Last 3 Encounters:   09/25/24 89.4 kg (197 lb 1.5 oz)   09/17/24 87.7 kg (193 lb 6.4 oz)   09/05/24 95.3 kg (210 lb)   -cont to monitor  -cardiac diet  -last echo in 2/2024:- LVEF 35-40%  -daily weights  Deep vein thrombosis (DVT) of left lower extremity (HCC)  -eliquis to be restarted  Chronic kidney disease, stage 3a ([facility])  Lab Results   Component Value Date    EGFR 56 09/25/2024    EGFR 56 09/24/2024    EGFR 55 09/23/2024    CREATININE 1.24 09/25/2024    CREATININE 1.24 09/24/2024    CREATININE 1.26 09/23/2024   -cont to trend  Urinary tract infection associated with indwelling urethral catheter  (HCC)  -follow cx  -prior cx noted  -on cipro  -CT findings noted. Wbc wnl.   NSVT (nonsustained ventricular tachycardia) ([facility])  -mngt as per primary  Coronary artery disease involving native coronary artery of native heart    UTI (urinary tract infection)          History of Present Illness   Lying in bed  No complaints    Objective      Temp:  [97.5 °F (36.4 °C)-97.9 °F (36.6 °C)] 97.9 °F (36.6 °C)  HR:  [49-58] 58  BP: (136-144)/(66) 136/66  O2 Device: Nasal cannula          I/O         09/23 0701 09/24 0700 09/24 0701 09/25 0700 09/25 0701 09/26 0700    P.O. 900 1560     Total Intake(mL/kg) 900 (10) 1560 (17.4)     Urine (mL/kg/hr) 2900 (1.3) 3560 (1.7)     Total Output 2900  3560     Net -2000 -2000                  Lines/Drains/Airways       Active Status       Name Placement date Placement time Site Days    Urethral Catheter Three way 18 Fr. 09/22/24  1900  Three way  2                  Physical Exam General Appearance: NAD, cooperative, alert  Eyes: Anicteric, conjunctiva clear  HENT:  Normocephalic, atraumatic, normal mucosa.  external ears normal, external nose normal, no drainage  Neck:    Supple, symmetrical, trachea midline  Resp:  Clear to auscultation bilaterally; no rales, rhonchi or wheezing; respirations unlabored   CV:  S1 S2, Regular rate and rhythm; no murmur, rub, or gallop.  GI:  Soft, non-tender, non-distended; normal bowel sounds; no masses, no organomegaly   Hematuria noted, no clots. Slightly worse than yesterday    Psych: Normal affect, good eye contact  Neuro: No gross deficits, AAOx3, speech nl, naqvi      Lab Results: I have reviewed the following results: CBC/BMP:   .     09/25/24  0437   WBC 6.15   HGB 12.2   HCT 37.4      SODIUM 136   K 4.5      CO2 27   BUN 26*   CREATININE 1.24   GLUC 92      Imaging Review: No pertinent imaging studies reviewed.  Other Studies: No additional pertinent studies reviewed.    VTE Pharmacologic Prophylaxis: VTE covered by:    None      VTE Mechanical Prophylaxis: sequential compression device

## 2024-09-25 NOTE — ASSESSMENT & PLAN NOTE
-follow cx  -prior cx noted  -on cipro  -CT findings noted. Wbc wnl.    Go for blood tests as directed. Your doctor will do lab tests at regular visits to monitor the effects of this medicine. Please follow up with your doctor and keep your health care provider appointments.

## 2024-09-25 NOTE — PROGRESS NOTES
Virtual Visit    Name: Toro Rodriguez      : 1950      MRN: [id number]  Encounter Provider: Alexandra Bowles PA-C  Encounter Date: 2024   Encounter department: [facility] [place]    Assessment & Plan  Type 2 diabetes mellitus with chronic kidney disease, without long-term current use of insulin, unspecified CKD stage ([facility])    Lab Results   Component Value Date    HGBA1C 5.9 (H) 2024   Could be contributing to ongoing urinary retention / possible urinary retention   Benign prostatic hyperplasia with urinary retention  Urinary retention ongoing - farmer inserted   Currently hospitalized for recurrent hematuria and possible UTI   Cystoscopy 2024 by Dr Espino -- mildly enlarged lateral lobes   Continue taking finasteride 5 mg and flomax 0.4 mg daily as prescribed   Refill as needed   Considering urodynamics to confirm probable neurogenic bladder   Will need to maintain chronic farmer - possible SPT placement  with IR if he is medically optimized   Encouraged adequate hydration and avoidance of bladder irritants / constipation   Chronic kidney disease, stage 3a ([facility])  Lab Results   Component Value Date    EGFR 56 2024    EGFR 56 2024    EGFR 55 2024    CREATININE 1.24 2024    CREATININE 1.24 2024    CREATININE 1.26 2024   Creatinine remains stable  Maintain farmer catheter to avoid repeat CHUY  Urethral erosion by catheter, initial encounter  ([facility])  Pain reported at glans penis   Recommend SPT placement - IR following inpatient   Urinary tract infection associated with indwelling urethral catheter, subsequent encounter  Currently being treated for repeat UTI at [facility]   Etiology - incomplete bladder emptying, indwelling farmer cathater    History of Present Illness     Toro Rodriguez is a 74 y.o. male seen in consultation via video visit from [facility] accompanied by his daughter.  Patient presented to  the emergency department on 9/22 for repeat gross hematuria and possible UTI.  He remains admitted and is receiving prophylactic antibiotics.  Patient has been dealing with urinary retention over the past few years that has gotten worse over time.  He has known BPH and is on Flomax 0.4 mg as well as finasteride 5 mg daily.  Recent cystoscopy completed July 2024 by Dr. Espino revealed mildly enlarged lateral lobes of the prostate but no obvious signs of bladder outlet obstruction.  It is assumed that the patient has neurogenic bladder as he does not get the sensation to urinate even if the bladder is full.  Patient's daughter states urodynamic testing had been discussed and she would like to proceed.  At this time the patient remains inpatient and may be undergoing SPT placement this Friday (9/27) by interventional radiology depending on his overall clinical status and infection risk.  For the time being, Farmer remains inserted draining clear cherry red urine. I informed the daughter I recommend proceeding with SPT placement and reaching out to the office if they would still like to undergo urodynamic testing, if this is the case SPT can be capped. There is a high likelihood he will need chronic farmer catheter placement.  Both the patient and his daughter vocalized their understanding and are agreeable with plan. No further  intervention warranted at this time.     History obtained from : patient    Review of Systems   Constitutional:  Positive for activity change, appetite change and fatigue. Negative for chills and fever.   Respiratory:  Negative for apnea, cough and choking.    Cardiovascular:  Negative for chest pain and leg swelling.   Gastrointestinal:  Negative for abdominal distention, abdominal pain, constipation, diarrhea, nausea and vomiting.   Genitourinary:  Positive for difficulty urinating (farmer remains inserted) and hematuria (bright red, minimal clots). Negative for decreased urine volume,  dysuria, flank pain, frequency, penile pain, testicular pain and urgency.   Neurological:  Negative for dizziness and headaches.   Psychiatric/Behavioral: Negative.       Medical History Reviewed by provider this encounter:  Tobacco  Allergies  Meds  Problems  Med Hx  Surg Hx  Fam Hx       Current Facility-Administered Medications on File Prior to Visit   Medication Dose Route Frequency Provider Last Rate Last Admin    acetaminophen (TYLENOL) tablet 650 mg  650 mg Oral Q6H PRN Odette Kaye PA-C        aluminum-magnesium hydroxide-simethicone (MAALOX) oral suspension 30 mL  30 mL Oral Q6H PRN Odette Kaye PA-C        amiodarone tablet 200 mg  200 mg Oral Daily BARBY HinesC   200 mg at 09/25/24 0755    atorvastatin (LIPITOR) tablet 40 mg  40 mg Oral Daily With Dinner BARBY HinesC   40 mg at 09/24/24 1657    cefepime (MAXIPIME) IVPB (premix in dextrose) 1,000 mg 50 mL  1,000 mg Intravenous Q12H Jaydon Fleming  mL/hr at 09/25/24 0235 1,000 mg at 09/25/24 0235    dicyclomine (BENTYL) capsule 10 mg  10 mg Oral TID PRN Odette Kaye PA-C        finasteride (PROSCAR) tablet 5 mg  5 mg Oral Daily EMMETT Hines-C   5 mg at 09/25/24 0753    furosemide (LASIX) tablet 40 mg  40 mg Oral Daily EMMETT Hines-C   40 mg at 09/25/24 0754    lisinopril (ZESTRIL) tablet 5 mg  5 mg Oral Daily EMMETT Hines-C   5 mg at 09/25/24 0754    metoprolol succinate (TOPROL-XL) 24 hr tablet 25 mg  25 mg Oral Q12H ARI BARBY HinesC   25 mg at 09/25/24 0753    ondansetron (ZOFRAN) injection 4 mg  4 mg Intravenous Q6H PRN Odette Kaye PA-C        senna (SENOKOT) tablet 8.6 mg  1 tablet Oral HS PRN Odette Kaye PA-C        tamsulosin (FLOMAX) capsule 0.4 mg  0.4 mg Oral HS Odette L Kaye, PA-C   0.4 mg at 09/24/24 6194     Current Outpatient Medications on File Prior to Visit   Medication Sig Dispense Refill    acetaminophen (TYLENOL) 325 mg tablet  Take 325 mg by mouth every 6 (six) hours as needed for mild pain      amiodarone 200 mg tablet Take 1 tablet (200 mg total) by mouth daily 90 tablet 2    apixaban (ELIQUIS) 5 mg Take 1 tablet (5 mg total) by mouth 2 (two) times a day Do not start before 2023.  0    aspirin 81 mg chewable tablet Chew 1 tablet (81 mg total) daily      atorvastatin (LIPITOR) 40 mg tablet Take 1 tablet (40 mg total) by mouth daily with dinner  0    clotrimazole (LOTRIMIN) 1 % cream Apply topically 2 (two) times a day      dicyclomine (BENTYL) 10 mg capsule Take 1 capsule (10 mg total) by mouth 3 (three) times a day as needed (abdominal pain) 20 capsule 0    finasteride (PROSCAR) 5 mg tablet Take 1 tablet (5 mg total) by mouth daily 90 tablet 3    furosemide (LASIX) 40 mg tablet Take 1 tablet (40 mg total) by mouth daily Do not start before 2023.  0    lisinopril (ZESTRIL) 5 mg tablet Take 1 tablet (5 mg total) by mouth daily      metoprolol succinate (TOPROL-XL) 25 mg 24 hr tablet Take 1 tablet (25 mg total) by mouth every 12 (twelve) hours 60 tablet 0    mupirocin (BACTROBAN) 2 % ointment Apply topically 3 (three) times a day      nitroglycerin (NITROSTAT) 0.4 mg SL tablet Place 1 tablet (0.4 mg total) under the tongue every 5 (five) minutes as needed for chest pain      tamsulosin (FLOMAX) 0.4 mg Take 1 capsule (0.4 mg total) by mouth daily with dinner (Patient taking differently: Take 0.4 mg by mouth daily at bedtime)        Social History     Tobacco Use    Smoking status: Former     Types: Cigarettes     Start date:      Quit date: 1962     Years since quittin.7    Smokeless tobacco: Never    Tobacco comments:     Quit over 30 years ago (Updated 2023).    Vaping Use    Vaping status: Never Used   Substance and Sexual Activity    Alcohol use: Not Currently    Drug use: Never    Sexual activity: Not on file           Objective     There were no vitals taken for this visit.  Physical Exam  Vitals  "and nursing note reviewed.   Constitutional:       General: He is not in acute distress.     Appearance: Normal appearance. He is not ill-appearing.      Comments: Could not complete full physical exam as this was a virtual visit.    HENT:      Head: Normocephalic.   Musculoskeletal:      Cervical back: Normal range of motion.   Neurological:      Mental Status: He is alert.       Results  No results found for: \"PSA\"  Lab Results   Component Value Date    CALCIUM 8.6 09/25/2024    K 4.5 09/25/2024    CO2 27 09/25/2024     09/25/2024    BUN 26 (H) 09/25/2024    CREATININE 1.24 09/25/2024     Lab Results   Component Value Date    WBC 6.15 09/25/2024    HGB 12.2 09/25/2024    HCT 37.4 09/25/2024    MCV 97 09/25/2024     09/25/2024       Office Urine Dip  No results found for this or any previous visit (from the past 1 hour(s)).]        "

## 2024-09-25 NOTE — ASSESSMENT & PLAN NOTE
"First noticed dark urine in the morning of 9/22 and then progressed to gross hematuria throughout the day  Manual irrigation and CBI x 2 attempted in the ED, however continued to have recurrent hematuria prompting admission  CT A/P: \"Decompressed bladder limiting evaluation for clot burden. Mild amount of intraluminal hyperdense material surrounding the Colon catheter balloon consistent with blood products/clot. Mild right hydroureteronephrosis, new from prior. Inflammatory changes regional to the right ureter, indeterminate etiology.\"  At time of admission, CBI had been paused for 3 hours and urine is light yellow in Colon catheter - will continue holding CBI and perform manual irrigation qshift and PRN   Urology consult appreciated  Monitor H&H  Noted to still have hematuria this morning.  Continue manual irrigation  On cefepime  Urology spoke with IR for suprapubic catheter placement.  Urine cultures are pending.  Aspirin and Eliquis held  Possible suprapubic catheter placement on Friday if urine cultures are negative  "

## 2024-09-25 NOTE — ASSESSMENT & PLAN NOTE
Wt Readings from Last 3 Encounters:   09/25/24 89.4 kg (197 lb 1.5 oz)   09/17/24 87.7 kg (193 lb 6.4 oz)   09/05/24 95.3 kg (210 lb)   -cont to monitor  -cardiac diet  -last echo in 2/2024:- LVEF 35-40%  -daily weights

## 2024-09-25 NOTE — ASSESSMENT & PLAN NOTE
Lab Results   Component Value Date    EGFR 56 09/25/2024    EGFR 56 09/24/2024    EGFR 55 09/23/2024    CREATININE 1.24 09/25/2024    CREATININE 1.24 09/24/2024    CREATININE 1.26 09/23/2024   Creatinine remains stable  Maintain farmer catheter to avoid repeat CHUY

## 2024-09-25 NOTE — PLAN OF CARE
Problem: Potential for Falls  Goal: Patient will remain free of falls  Description: INTERVENTIONS:  - Educate patient/family on patient safety including physical limitations  - Instruct patient to call for assistance with activity   - Consult OT/PT to assist with strengthening/mobility   - Keep Call bell within reach  - Keep bed low and locked with side rails adjusted as appropriate  - Keep care items and personal belongings within reach  - Initiate and maintain comfort rounds  - Make Fall Risk Sign visible to staff  - Offer Toileting every x Hours, in advance of need  - Initiate/Maintain xalarm  - Obtain necessary fall risk management equipment: xxxxxx  - Apply yellow socks and bracelet for high fall risk patients  - Consider moving patient to room near nurses station  Outcome: Progressing     Problem: PAIN - ADULT  Goal: Verbalizes/displays adequate comfort level or baseline comfort level  Description: Interventions:  - Encourage patient to monitor pain and request assistance  - Assess pain using appropriate pain scale  - Administer analgesics based on type and severity of pain and evaluate response  - Implement non-pharmacological measures as appropriate and evaluate response  - Consider cultural and social influences on pain and pain management  - Notify physician/advanced practitioner if interventions unsuccessful or patient reports new pain  Outcome: Progressing     Problem: INFECTION - ADULT  Goal: Absence or prevention of progression during hospitalization  Description: INTERVENTIONS:  - Assess and monitor for signs and symptoms of infection  - Monitor lab/diagnostic results  - Monitor all insertion sites, i.e. indwelling lines, tubes, and drains  - Monitor endotracheal if appropriate and nasal secretions for changes in amount and color  - [place] appropriate cooling/warming therapies per order  - Administer medications as ordered  - Instruct and encourage patient and family to use good hand hygiene  technique  - Identify and instruct in appropriate isolation precautions for identified infection/condition  Outcome: Progressing  Goal: Absence of fever/infection during neutropenic period  Description: INTERVENTIONS:  - Monitor WBC    Outcome: Progressing     Problem: DISCHARGE PLANNING  Goal: Discharge to home or other facility with appropriate resources  Description: INTERVENTIONS:  - Identify barriers to discharge w/patient and caregiver  - Arrange for needed discharge resources and transportation as appropriate  - Identify discharge learning needs (meds, wound care, etc.)  - Arrange for interpretive services to assist at discharge as needed  - Refer to Case Management Department for coordinating discharge planning if the patient needs post-hospital services based on physician/advanced practitioner order or complex needs related to functional status, cognitive ability, or social support system  Outcome: Progressing     Problem: Knowledge Deficit  Goal: Patient/family/caregiver demonstrates understanding of disease process, treatment plan, medications, and discharge instructions  Description: Complete learning assessment and assess knowledge base.  Interventions:  - Provide teaching at level of understanding  - Provide teaching via preferred learning methods  Outcome: Progressing     Problem: GENITOURINARY - ADULT  Goal: Maintains or returns to baseline urinary function  Description: INTERVENTIONS:  - Assess urinary function  - Encourage oral fluids to ensure adequate hydration if ordered  - Administer IV fluids as ordered to ensure adequate hydration  - Administer ordered medications as needed  - Offer frequent toileting  - Follow urinary retention protocol if ordered  Outcome: Progressing  Goal: Absence of urinary retention  Description: INTERVENTIONS:  - Assess patient’s ability to void and empty bladder  - Monitor I/O  - Bladder scan as needed  - Discuss with physician/AP medications to alleviate retention as  needed  - Discuss catheterization for long term situations as appropriate  Outcome: Progressing  Goal: Urinary catheter remains patent  Description: INTERVENTIONS:  - Assess patency of urinary catheter  - If patient has a chronic farmer, consider changing catheter if non-functioning  - Follow guidelines for intermittent irrigation of non-functioning urinary catheter  Outcome: Progressing     Problem: Prexisting or High Potential for Compromised Skin Integrity  Goal: Skin integrity is maintained or improved  Description: INTERVENTIONS:  - Identify patients at risk for skin breakdown  - Assess and monitor skin integrity  - Assess and monitor nutrition and hydration status  - Monitor labs   - Assess for incontinence   - Turn and reposition patient  - Assist with mobility/ambulation  - Relieve pressure over bony prominences  - Avoid friction and shearing  - Provide appropriate hygiene as needed including keeping skin clean and dry  - Evaluate need for skin moisturizer/barrier cream  - Collaborate with interdisciplinary team   - Patient/family teaching  - Consider wound care consult   Outcome: Progressing

## 2024-09-25 NOTE — ASSESSMENT & PLAN NOTE
Lab Results   Component Value Date    EGFR 56 09/25/2024    EGFR 56 09/24/2024    EGFR 55 09/23/2024    CREATININE 1.24 09/25/2024    CREATININE 1.24 09/24/2024    CREATININE 1.26 09/23/2024   Baseline creatinine 1.0-1.2  Creatinine on admission 1.40  Received 500 cc normal saline in the ED  Examines euvolemic on admit  Continue Lasix  Trend BMP

## 2024-09-25 NOTE — ASSESSMENT & PLAN NOTE
Lab Results   Component Value Date    HGBA1C 5.9 (H) 04/18/2024   Could be contributing to ongoing urinary retention / possible urinary retention

## 2024-09-25 NOTE — ASSESSMENT & PLAN NOTE
Wt Readings from Last 3 Encounters:   09/25/24 89.4 kg (197 lb 1.5 oz)   09/17/24 87.7 kg (193 lb 6.4 oz)   09/05/24 95.3 kg (210 lb)   Examines euvolemic on admission  Home regimen: Lasix 40 Mg daily  Last echo 2/2024: LVEF 35 to 40%  Continue home Lasix  Cardiac diet  Daily weights

## 2024-09-25 NOTE — ASSESSMENT & PLAN NOTE
-Hgb stable  -most likely 2/2 eliquis and UTI  -urine cx pending  -holding eliquis and asa-possibly SPT on Friday  -manual irrigation prn, monitor PVRs  -flomax for enlarged prostate  -Cont to treat UTI, follow cx  -possible SPT placement this Friday- to be decided if this can be continues pending urine cx/pt's overall outcome

## 2024-09-25 NOTE — ASSESSMENT & PLAN NOTE
Currently being treated for repeat UTI at [facility]   Etiology - incomplete bladder emptying, indwelling farmer cathater

## 2024-09-25 NOTE — ASSESSMENT & PLAN NOTE
Recently admitted 9/12-9/17 for UTI treated 3 days of vancomycin, 3 days of cefepime with transition to Cipro plan of 3 more days of antibiotics to complete 7-day course  On cefepime  Follow-up urine culture results which are pending

## 2024-09-26 LAB
ANION GAP SERPL CALCULATED.3IONS-SCNC: 9 MMOL/L (ref 4–13)
BASOPHILS # BLD AUTO: 0.05 THOUSANDS/ΜL (ref 0–0.1)
BASOPHILS NFR BLD AUTO: 1 % (ref 0–1)
BUN SERPL-MCNC: 33 MG/DL (ref 5–25)
CALCIUM SERPL-MCNC: 8.9 MG/DL (ref 8.4–10.2)
CHLORIDE SERPL-SCNC: 100 MMOL/L (ref 96–108)
CO2 SERPL-SCNC: 26 MMOL/L (ref 21–32)
CREAT SERPL-MCNC: 1.4 MG/DL (ref 0.6–1.3)
EOSINOPHIL # BLD AUTO: 0.41 THOUSAND/ΜL (ref 0–0.61)
EOSINOPHIL NFR BLD AUTO: 6 % (ref 0–6)
ERYTHROCYTE [DISTWIDTH] IN BLOOD BY AUTOMATED COUNT: 13.1 % (ref 11.6–15.1)
GFR SERPL CREATININE-BSD FRML MDRD: 48 ML/MIN/1.73SQ M
GLUCOSE SERPL-MCNC: 92 MG/DL (ref 65–140)
HCT VFR BLD AUTO: 37.4 % (ref 36.5–49.3)
HCT VFR BLD AUTO: 38.1 % (ref 36.5–49.3)
HCT VFR BLD AUTO: 38.2 % (ref 36.5–49.3)
HGB BLD-MCNC: 12.2 G/DL (ref 12–17)
HGB BLD-MCNC: 12.5 G/DL (ref 12–17)
HGB BLD-MCNC: 12.6 G/DL (ref 12–17)
IMM GRANULOCYTES # BLD AUTO: 0.02 THOUSAND/UL (ref 0–0.2)
IMM GRANULOCYTES NFR BLD AUTO: 0 % (ref 0–2)
INR PPP: 1.16 (ref 0.85–1.19)
LYMPHOCYTES # BLD AUTO: 1.77 THOUSANDS/ΜL (ref 0.6–4.47)
LYMPHOCYTES NFR BLD AUTO: 25 % (ref 14–44)
MCH RBC QN AUTO: 31.3 PG (ref 26.8–34.3)
MCHC RBC AUTO-ENTMCNC: 32.6 G/DL (ref 31.4–37.4)
MCV RBC AUTO: 96 FL (ref 82–98)
MONOCYTES # BLD AUTO: 0.53 THOUSAND/ΜL (ref 0.17–1.22)
MONOCYTES NFR BLD AUTO: 8 % (ref 4–12)
NEUTROPHILS # BLD AUTO: 4.31 THOUSANDS/ΜL (ref 1.85–7.62)
NEUTS SEG NFR BLD AUTO: 60 % (ref 43–75)
NRBC BLD AUTO-RTO: 0 /100 WBCS
PLATELET # BLD AUTO: 336 THOUSANDS/UL (ref 149–390)
PMV BLD AUTO: 11 FL (ref 8.9–12.7)
POTASSIUM SERPL-SCNC: 4.5 MMOL/L (ref 3.5–5.3)
PROTHROMBIN TIME: 15.4 SECONDS (ref 12.3–15)
RBC # BLD AUTO: 3.9 MILLION/UL (ref 3.88–5.62)
SODIUM SERPL-SCNC: 135 MMOL/L (ref 135–147)
WBC # BLD AUTO: 7.09 THOUSAND/UL (ref 4.31–10.16)

## 2024-09-26 PROCEDURE — 85014 HEMATOCRIT: CPT | Performed by: INTERNAL MEDICINE

## 2024-09-26 PROCEDURE — 85025 COMPLETE CBC W/AUTO DIFF WBC: CPT | Performed by: INTERNAL MEDICINE

## 2024-09-26 PROCEDURE — 99232 SBSQ HOSP IP/OBS MODERATE 35: CPT | Performed by: INTERNAL MEDICINE

## 2024-09-26 PROCEDURE — 85018 HEMOGLOBIN: CPT | Performed by: INTERNAL MEDICINE

## 2024-09-26 PROCEDURE — 80048 BASIC METABOLIC PNL TOTAL CA: CPT | Performed by: INTERNAL MEDICINE

## 2024-09-26 PROCEDURE — 99232 SBSQ HOSP IP/OBS MODERATE 35: CPT | Performed by: UROLOGY

## 2024-09-26 PROCEDURE — 85610 PROTHROMBIN TIME: CPT | Performed by: PHYSICIAN ASSISTANT

## 2024-09-26 RX ADMIN — ATORVASTATIN CALCIUM 40 MG: 40 TABLET, FILM COATED ORAL at 16:19

## 2024-09-26 RX ADMIN — AMIODARONE HYDROCHLORIDE 200 MG: 200 TABLET ORAL at 08:40

## 2024-09-26 RX ADMIN — TAMSULOSIN HYDROCHLORIDE 0.4 MG: 0.4 CAPSULE ORAL at 20:28

## 2024-09-26 RX ADMIN — METOPROLOL SUCCINATE 25 MG: 25 TABLET, EXTENDED RELEASE ORAL at 08:40

## 2024-09-26 RX ADMIN — FUROSEMIDE 40 MG: 40 TABLET ORAL at 08:40

## 2024-09-26 RX ADMIN — CEFEPIME HYDROCHLORIDE 1000 MG: 1 INJECTION, SOLUTION INTRAVENOUS at 03:15

## 2024-09-26 RX ADMIN — METOPROLOL SUCCINATE 25 MG: 25 TABLET, EXTENDED RELEASE ORAL at 20:29

## 2024-09-26 RX ADMIN — CEFEPIME HYDROCHLORIDE 1000 MG: 1 INJECTION, SOLUTION INTRAVENOUS at 15:12

## 2024-09-26 RX ADMIN — FINASTERIDE 5 MG: 5 TABLET, FILM COATED ORAL at 08:40

## 2024-09-26 RX ADMIN — LISINOPRIL 5 MG: 5 TABLET ORAL at 08:40

## 2024-09-26 NOTE — ASSESSMENT & PLAN NOTE
Recently admitted 9/12-9/17 for UTI treated 3 days of vancomycin, 3 days of cefepime with transition to Cipro plan of 3 more days of antibiotics to complete 7-day course  On cefepime  Urine cultures are negative

## 2024-09-26 NOTE — PROGRESS NOTES
Progress Note - Surgery-General   Name: Toro Rodriguez 74 y.o. male I MRN: [id number]  Unit/Bed#: -01 I Date of Admission: 9/22/2024   Date of Service: 9/26/2024 I Hospital Day: 4    Assessment & Plan  Hematuria  - collected urine is red, no clots  - hemoglobin is stable and continue to trend  - abx are continued  - eliquis restarted. Hematuria noted.  - cont with manual irrigation  -urine cx clear  -continue monitoring I/Os and vitals  -spt jasvir, npo after midnight  Essential hypertension  - continue home medications  Chronic systolic (congestive) heart failure (HCC)  Wt Readings from Last 3 Encounters:   09/26/24 89.9 kg (198 lb 3.1 oz)   09/17/24 87.7 kg (193 lb 6.4 oz)   09/05/24 95.3 kg (210 lb)     Deep vein thrombosis (DVT) of left lower extremity (HCC)  -mngt per primary, eliquis restarted  Coronary artery disease involving native coronary artery of native heart    Chronic kidney disease, stage 3a ([facility])  Lab Results   Component Value Date    EGFR 48 09/26/2024    EGFR 56 09/25/2024    EGFR 56 09/24/2024    CREATININE 1.40 (H) 09/26/2024    CREATININE 1.24 09/25/2024    CREATININE 1.24 09/24/2024     Urinary tract infection associated with indwelling urethral catheter  (HCC)  - continue abx and follow cultures  NSVT (nonsustained ventricular tachycardia) ([facility])    UTI (urinary tract infection)  -on abx  -farmer exchanged on admission  -urine cx         History of Present Illness   Lying in bed  No complaints    Objective      Temp:  [97.9 °F (36.6 °C)] 97.9 °F (36.6 °C)  HR:  [54-57] 54  BP: (133-151)/() 133/60  O2 Device: Nasal cannula          I/O         09/24 0701 09/25 0700 09/25 0701 09/26 0700 09/26 0701 09/27 0700    P.O. 1560 1440 300    Total Intake(mL/kg) 1560 (17.4) 1440 (16) 300 (3.3)    Urine (mL/kg/hr) 3560 (1.7) 3000 (1.4) 800 (2.2)    Total Output 3560 3000 800    Net -2000 -1560 -500                 Lines/Drains/Airways       Active Status       Name Placement date Placement  time Site Days    Urethral Catheter Three way 18 Fr. 09/22/24  1900  Three way  3                  Physical Exam General Appearance: NAD, cooperative, alert  Eyes: Anicteric, conjunctiva clear  HENT:  Normocephalic, atraumatic, normal mucosa.  external ears normal, external nose normal, no drainage  Neck:    Supple, symmetrical, trachea midline  Resp:  Clear to auscultation bilaterally; no rales, rhonchi or wheezing; respirations unlabored   CV:  S1 S2, Regular rate and rhythm; no murmur, rub, or gallop.  GI:  Soft, non-tender, non-distended; normal bowel sounds; no masses, no organomegaly   Hematuria noted, no clots. Slightly worse than yesterday    Psych: Normal affect, good eye contact  Neuro: No gross deficits, AAOx3, speech nl, naqvi      Lab Results: I have reviewed the following results: CBC/BMP:   .     09/26/24  0320 09/26/24  0904   WBC 7.09  --    HGB 12.2 12.6   HCT 37.4 38.1     --    SODIUM 135  --    K 4.5  --      --    CO2 26  --    BUN 33*  --    CREATININE 1.40*  --    GLUC 92  --       Imaging Review: No pertinent imaging studies reviewed.  Other Studies: No additional pertinent studies reviewed.    VTE Pharmacologic Prophylaxis: VTE covered by:    None      VTE Mechanical Prophylaxis: sequential compression device

## 2024-09-26 NOTE — ASSESSMENT & PLAN NOTE
Wt Readings from Last 3 Encounters:   09/26/24 89.9 kg (198 lb 3.1 oz)   09/17/24 87.7 kg (193 lb 6.4 oz)   09/05/24 95.3 kg (210 lb)

## 2024-09-26 NOTE — PLAN OF CARE
Problem: Potential for Falls  Goal: Patient will remain free of falls  Description: INTERVENTIONS:  - Educate patient/family on patient safety including physical limitations  - Instruct patient to call for assistance with activity   - Consult OT/PT to assist with strengthening/mobility   - Keep Call bell within reach  - Keep bed low and locked with side rails adjusted as appropriate  - Keep care items and personal belongings within reach  - Initiate and maintain comfort rounds  - Make Fall Risk Sign visible to staff  - Offer Toileting every x Hours, in advance of need  - Initiate/Maintain xalarm  - Obtain necessary fall risk management equipment: x  - Apply yellow socks and bracelet for high fall risk patients  - Consider moving patient to room near nurses station  Outcome: Progressing     Problem: PAIN - ADULT  Goal: Verbalizes/displays adequate comfort level or baseline comfort level  Description: Interventions:  - Encourage patient to monitor pain and request assistance  - Assess pain using appropriate pain scale  - Administer analgesics based on type and severity of pain and evaluate response  - Implement non-pharmacological measures as appropriate and evaluate response  - Consider cultural and social influences on pain and pain management  - Notify physician/advanced practitioner if interventions unsuccessful or patient reports new pain  Outcome: Progressing     Problem: INFECTION - ADULT  Goal: Absence or prevention of progression during hospitalization  Description: INTERVENTIONS:  - Assess and monitor for signs and symptoms of infection  - Monitor lab/diagnostic results  - Monitor all insertion sites, i.e. indwelling lines, tubes, and drains  - Monitor endotracheal if appropriate and nasal secretions for changes in amount and color  - [place] appropriate cooling/warming therapies per order  - Administer medications as ordered  - Instruct and encourage patient and family to use good hand hygiene  technique  - Identify and instruct in appropriate isolation precautions for identified infection/condition  Outcome: Progressing  Goal: Absence of fever/infection during neutropenic period  Description: INTERVENTIONS:  - Monitor WBC    Outcome: Progressing     Problem: DISCHARGE PLANNING  Goal: Discharge to home or other facility with appropriate resources  Description: INTERVENTIONS:  - Identify barriers to discharge w/patient and caregiver  - Arrange for needed discharge resources and transportation as appropriate  - Identify discharge learning needs (meds, wound care, etc.)  - Arrange for interpretive services to assist at discharge as needed  - Refer to Case Management Department for coordinating discharge planning if the patient needs post-hospital services based on physician/advanced practitioner order or complex needs related to functional status, cognitive ability, or social support system  Outcome: Progressing     Problem: Knowledge Deficit  Goal: Patient/family/caregiver demonstrates understanding of disease process, treatment plan, medications, and discharge instructions  Description: Complete learning assessment and assess knowledge base.  Interventions:  - Provide teaching at level of understanding  - Provide teaching via preferred learning methods  Outcome: Progressing     Problem: GENITOURINARY - ADULT  Goal: Maintains or returns to baseline urinary function  Description: INTERVENTIONS:  - Assess urinary function  - Encourage oral fluids to ensure adequate hydration if ordered  - Administer IV fluids as ordered to ensure adequate hydration  - Administer ordered medications as needed  - Offer frequent toileting  - Follow urinary retention protocol if ordered  Outcome: Progressing  Goal: Absence of urinary retention  Description: INTERVENTIONS:  - Assess patient’s ability to void and empty bladder  - Monitor I/O  - Bladder scan as needed  - Discuss with physician/AP medications to alleviate retention as  needed  - Discuss catheterization for long term situations as appropriate  Outcome: Progressing  Goal: Urinary catheter remains patent  Description: INTERVENTIONS:  - Assess patency of urinary catheter  - If patient has a chronic farmer, consider changing catheter if non-functioning  - Follow guidelines for intermittent irrigation of non-functioning urinary catheter  Outcome: Progressing     Problem: Prexisting or High Potential for Compromised Skin Integrity  Goal: Skin integrity is maintained or improved  Description: INTERVENTIONS:  - Identify patients at risk for skin breakdown  - Assess and monitor skin integrity  - Assess and monitor nutrition and hydration status  - Monitor labs   - Assess for incontinence   - Turn and reposition patient  - Assist with mobility/ambulation  - Relieve pressure over bony prominences  - Avoid friction and shearing  - Provide appropriate hygiene as needed including keeping skin clean and dry  - Evaluate need for skin moisturizer/barrier cream  - Collaborate with interdisciplinary team   - Patient/family teaching  - Consider wound care consult   Outcome: Progressing

## 2024-09-26 NOTE — PROGRESS NOTES
"Progress Note - Hospitalist   Name: Toro Rodriguez 74 y.o. male I MRN: [id number]  Unit/Bed#: -01 I Date of Admission: 9/22/2024   Date of Service: 9/26/2024 I Hospital Day: 4    Assessment & Plan  Hematuria  First noticed dark urine in the morning of 9/22 and then progressed to gross hematuria throughout the day  Manual irrigation and CBI x 2 attempted in the ED, however continued to have recurrent hematuria prompting admission  CT A/P: \"Decompressed bladder limiting evaluation for clot burden. Mild amount of intraluminal hyperdense material surrounding the Colon catheter balloon consistent with blood products/clot. Mild right hydroureteronephrosis, new from prior. Inflammatory changes regional to the right ureter, indeterminate etiology.\"  At time of admission, CBI had been paused for 3 hours and urine is light yellow in Colon catheter - will continue holding CBI and perform manual irrigation qshift and PRN   Urology consult appreciated  Monitor H&H  Noted to still have hematuria this morning.  Continue manual irrigation  On cefepime  Urology spoke with IR for suprapubic catheter placement.  Urine cultures are negative.  Aspirin and Eliquis held  Possible suprapubic catheter placement on Friday. Pt is NPO  Urinary tract infection associated with indwelling urethral catheter  (HCC)  Recently admitted 9/12-9/17 for UTI treated 3 days of vancomycin, 3 days of cefepime with transition to Cipro plan of 3 more days of antibiotics to complete 7-day course  On cefepime  Urine cultures are negative  Chronic kidney disease, stage 3a ([facility])  Lab Results   Component Value Date    EGFR 48 09/26/2024    EGFR 56 09/25/2024    EGFR 56 09/24/2024    CREATININE 1.40 (H) 09/26/2024    CREATININE 1.24 09/25/2024    CREATININE 1.24 09/24/2024   Baseline creatinine 1.0-1.2  Creatinine on admission 1.40  Received 500 cc normal saline in the ED  Examines euvolemic on admit  Continue Lasix  Trend BMP  Essential hypertension  Home " regimen: Lasix 40 Mg daily, metoprolol succinate 25 Mg twice daily, lisinopril 5 Mg daily  Continue home medications  Chronic systolic (congestive) heart failure (HCC)  Wt Readings from Last 3 Encounters:   09/26/24 89.9 kg (198 lb 3.1 oz)   09/17/24 87.7 kg (193 lb 6.4 oz)   09/05/24 95.3 kg (210 lb)   Examines euvolemic on admission  Home regimen: Lasix 40 Mg daily  Last echo 2/2024: LVEF 35 to 40%  Continue home Lasix  Cardiac diet  Daily weights  Deep vein thrombosis (DVT) of left lower extremity (HCC)  On Eliquis, currently on hold due to hematuria-if urine remains clear by morning would resume  Coronary artery disease involving native coronary artery of native heart  History of CTA with  of RCA  Follows with cardiology, Dr. Roach, outpatient  Aspirin and Eliquis on hold for possible suprapubic catheter placement  Continue statin  NSVT (nonsustained ventricular tachycardia) (HCC)  Home regimen: Amiodarone 200 Mg daily, metoprolol succinate 25 Mg twice daily  Continue home medications  UTI (urinary tract infection)  See above    Labs & Imaging: No pertinent imaging studies reviewed.    VTE Prophylaxis: in place.    Code Status:   Level 1 - Full Code    Patient Centered Rounds: I have performed bedside rounds with nursing staff today.    Mobility:   Basic Mobility Inpatient Raw Score: 24  JH-HLM Goal: 8: Walk 250 feet or more  JH-HLM Achieved: 4: Move to chair/commode  JH-HLM Goal achieved. Continue to encourage appropriate mobility.    Discussions with Specialists or Other Care Team Provider: Urology    Education and Discussions with Family / Patient: [place]    Total Time Spent on Date of Encounter in care of patient: 35 mins. This time was spent on one or more of the following: performing physical exam; counseling and coordination of care; obtaining or reviewing history; documenting in the medical record; reviewing/ordering tests, medications or procedures; communicating with other healthcare  professionals and discussing with patient's family/caregivers.    Current Length of Stay: 4 day(s)    Current Patient Status: Inpatient   Certification Statement: The patient will continue to require additional inpatient hospital stay due to see my assessment and plan.     Subjective:   Patient is seen and examined at bedside.  No new complaints.  Afebrile  All other ROS are negative.    Objective:    Vitals: Blood pressure (!) 151/111, pulse 57, temperature 97.9 °F (36.6 °C), resp. rate 18, weight 89.9 kg (198 lb 3.1 oz), SpO2 92%.,Body mass index is 31.04 kg/m².  SPO2 RA Rest      Flowsheet Row ED to Hosp-Admission (Current) from 9/22/2024 in [facility] Med Surg Unit   SpO2 92 %   SpO2 Activity At Rest   O2 Device None (Room air)   O2 Flow Rate --          I&O:   Intake/Output Summary (Last 24 hours) at 9/26/2024 0712  Last data filed at 9/26/2024 0541  Gross per 24 hour   Intake 1440 ml   Output 3000 ml   Net -1560 ml       Physical Exam:    General- Alert, lying comfortably in bed. Not in any acute distress.  Neck- Supple, No JVD  CVS- regular, S1 and S2 normal  Chest- Bilateral Air entry, No rhochi, crackles or wheezing present.  Abdomen- soft, nontender, not distended, no guarding or rigidity, BS+  Extremities-  No pedal edema, No calf tenderness                         Normal ROM in all extremities.  CNS-   Alert, awake and orientedx3. No focal deficits present.    Invasive Devices       Peripheral Intravenous Line  Duration             Peripheral IV 09/25/24 Dorsal (posterior);Left Forearm 1 day              Drain  Duration             Urethral Catheter Three way 18 Fr. 3 days                          Social History  reviewed  Family History   Problem Relation Age of Onset    Breast cancer Mother     Heart attack Father 61    Other Sister         s/p hysterectomy    Cerebral palsy Brother     Skin cancer Other         family history    No Known Problems Son     No Known Problems Daughter      Sudden death Neg Hx     reviewed    Meds:  Current Facility-Administered Medications   Medication Dose Route Frequency Provider Last Rate Last Admin    acetaminophen (TYLENOL) tablet 650 mg  650 mg Oral Q6H PRN Odette Kaye PA-C        aluminum-magnesium hydroxide-simethicone (MAALOX) oral suspension 30 mL  30 mL Oral Q6H PRN Odette Kaye PA-C        amiodarone tablet 200 mg  200 mg Oral Daily BARBY HinesC   200 mg at 09/25/24 0755    atorvastatin (LIPITOR) tablet 40 mg  40 mg Oral Daily With Dinner BARBY HinesC   40 mg at 09/25/24 1645    cefepime (MAXIPIME) IVPB (premix in dextrose) 1,000 mg 50 mL  1,000 mg Intravenous Q12H Jaydon Fleming  mL/hr at 09/26/24 0315 1,000 mg at 09/26/24 0315    dicyclomine (BENTYL) capsule 10 mg  10 mg Oral TID PRN Odette Kaye PA-C        finasteride (PROSCAR) tablet 5 mg  5 mg Oral Daily BARBY HinesC   5 mg at 09/25/24 0753    furosemide (LASIX) tablet 40 mg  40 mg Oral Daily EMMETT Hines-C   40 mg at 09/25/24 0754    lisinopril (ZESTRIL) tablet 5 mg  5 mg Oral Daily EMMETT Hines-C   5 mg at 09/25/24 0754    metoprolol succinate (TOPROL-XL) 24 hr tablet 25 mg  25 mg Oral Q12H ARI BARBY HinesC   25 mg at 09/25/24 2102    ondansetron (ZOFRAN) injection 4 mg  4 mg Intravenous Q6H PRN Odette Kaye PA-C        senna (SENOKOT) tablet 8.6 mg  1 tablet Oral HS PRN Odette Kaye PA-C        tamsulosin (FLOMAX) capsule 0.4 mg  0.4 mg Oral HS BARBY HinesC   0.4 mg at 09/25/24 2102        Medications Prior to Admission:     amiodarone 200 mg tablet    apixaban (ELIQUIS) 5 mg    aspirin 81 mg chewable tablet    atorvastatin (LIPITOR) 40 mg tablet    finasteride (PROSCAR) 5 mg tablet    furosemide (LASIX) 40 mg tablet    lisinopril (ZESTRIL) 5 mg tablet    metoprolol succinate (TOPROL-XL) 25 mg 24 hr tablet    tamsulosin (FLOMAX) 0.4 mg    acetaminophen (TYLENOL) 325 mg tablet     "clotrimazole (LOTRIMIN) 1 % cream    dicyclomine (BENTYL) 10 mg capsule    mupirocin (BACTROBAN) 2 % ointment    nitroglycerin (NITROSTAT) 0.4 mg SL tablet    Labs:  Results from last 7 days   Lab Units 09/26/24 0320 09/25/24 2058 09/25/24 0437 09/24/24  0840 09/24/24  0522   WBC Thousand/uL 7.09  --  6.15  --  5.36   HEMOGLOBIN g/dL 12.2 12.1 12.2   < > 11.7*   HEMATOCRIT % 37.4 37.7 37.4   < > 35.5*   PLATELETS Thousands/uL 336  --  306  --  274   SEGS PCT % 60  --  57  --  54   LYMPHO PCT % 25  --  24  --  27   MONO PCT % 8  --  9  --  11   EOS PCT % 6  --  8*  --  7*    < > = values in this interval not displayed.     Results from last 7 days   Lab Units 09/26/24 0320 09/25/24 0437 09/24/24  0522 09/23/24  0520 09/22/24  1659   POTASSIUM mmol/L 4.5 4.5 4.2   < > 5.0   CHLORIDE mmol/L 100 101 104   < > 105   CO2 mmol/L 26 27 28   < > 22   BUN mg/dL 33* 26* 28*   < > 32*   CREATININE mg/dL 1.40* 1.24 1.24   < > 1.40*   CALCIUM mg/dL 8.9 8.6 8.5   < > 8.5   ALK PHOS U/L  --   --   --   --  69   ALT U/L  --   --   --   --  6*   AST U/L  --   --   --   --  16    < > = values in this interval not displayed.     No results found for: \"TROPONINI\", \"CKMB\", \"CKTOTAL\"  Results from last 7 days   Lab Units 09/22/24  1724   INR  1.33*     Lab Results   Component Value Date    BLOODCX No Growth After 5 Days. 11/10/2023    BLOODCX No Growth After 5 Days. 11/10/2023    URINECX No Growth <1000 cfu/mL 09/24/2024    URINECX >100,000 cfu/ml Pseudomonas aeruginosa (A) 09/12/2024    URINECX 50,000-59,000 cfu/ml Citrobacter freundii (A) 09/12/2024    URINECX 20,000-29,000 cfu/ml Staphylococcus aureus (A) 09/12/2024         Imaging:  Results for orders placed during the hospital encounter of 07/10/24    XR chest 1 view portable    Narrative  XR CHEST PORTABLE    INDICATION: cough.    COMPARISON: 11/15/2023    FINDINGS:    Right lower lung field ill-defined opacity silhouetting the right heart border, preserving the diaphragm " suggesting a right middle lobe pneumonia. No pneumothorax or pleural effusion. Stable right apical pleural thickening.    Enlarged cardiac silhouette, unchanged.    Severe degenerative changes in the left shoulder with chronic deformity of the right shoulder.    Normal upper abdomen.    Impression  Right lower lung field consolidation concerning for pneumonia.    Findings concur with the preliminary report by the referring clinician already in PACS and/or our electronic record EPIC.        Workstation performed: [id number]    No results found for this or any previous visit.      Last 24 Hours Medication List:   Current Facility-Administered Medications   Medication Dose Route Frequency Provider Last Rate    acetaminophen  650 mg Oral Q6H PRN Odette Kaye, PA-C      aluminum-magnesium hydroxide-simethicone  30 mL Oral Q6H PRN EMMETT Hines-C      amiodarone  200 mg Oral Daily Odette Kaye, PA-C      atorvastatin  40 mg Oral Daily With Dinner EMMETT Hines-C      cefepime  1,000 mg Intravenous Q12H Jaydon Fleming MD 1,000 mg (09/26/24 0315)    dicyclomine  10 mg Oral TID PRN EMMETT Hines-C      finasteride  5 mg Oral Daily Odette Kaye, PA-C      furosemide  40 mg Oral Daily Odette Kaye, PA-C      lisinopril  5 mg Oral Daily Odette Kaye, PA-C      metoprolol succinate  25 mg Oral Q12H [facility] Odette Kaye, PA-C      ondansetron  4 mg Intravenous Q6H PRN EMMETT Hines-LENNOX      senna  1 tablet Oral HS PRN Odette Kaye, PA-C      tamsulosin  0.4 mg Oral HS Odette Kaye PA-C          Today, Patient Was Seen By: Jaydon Fleming MD    ** Please Note: Dictation voice to text software may have been used in the creation of this document. **

## 2024-09-26 NOTE — ASSESSMENT & PLAN NOTE
Lab Results   Component Value Date    EGFR 48 09/26/2024    EGFR 56 09/25/2024    EGFR 56 09/24/2024    CREATININE 1.40 (H) 09/26/2024    CREATININE 1.24 09/25/2024    CREATININE 1.24 09/24/2024

## 2024-09-26 NOTE — PLAN OF CARE
Problem: GENITOURINARY - ADULT  Goal: Maintains or returns to baseline urinary function  Description: INTERVENTIONS:  - Assess urinary function  - Encourage oral fluids to ensure adequate hydration if ordered  - Administer IV fluids as ordered to ensure adequate hydration  - Administer ordered medications as needed  - Offer frequent toileting  - Follow urinary retention protocol if ordered  Outcome: Progressing  Goal: Absence of urinary retention  Description: INTERVENTIONS:  - Assess patient’s ability to void and empty bladder  - Monitor I/O  - Bladder scan as needed  - Discuss with physician/AP medications to alleviate retention as needed  - Discuss catheterization for long term situations as appropriate  Outcome: Progressing  Goal: Urinary catheter remains patent  Description: INTERVENTIONS:  - Assess patency of urinary catheter  - If patient has a chronic farmer, consider changing catheter if non-functioning  - Follow guidelines for intermittent irrigation of non-functioning urinary catheter  Outcome: Progressing     Problem: Prexisting or High Potential for Compromised Skin Integrity  Goal: Skin integrity is maintained or improved  Description: INTERVENTIONS:  - Identify patients at risk for skin breakdown  - Assess and monitor skin integrity  - Assess and monitor nutrition and hydration status  - Monitor labs   - Assess for incontinence   - Turn and reposition patient  - Assist with mobility/ambulation  - Relieve pressure over bony prominences  - Avoid friction and shearing  - Provide appropriate hygiene as needed including keeping skin clean and dry  - Evaluate need for skin moisturizer/barrier cream  - Collaborate with interdisciplinary team   - Patient/family teaching  - Consider wound care consult   Outcome: Progressing

## 2024-09-26 NOTE — ASSESSMENT & PLAN NOTE
"First noticed dark urine in the morning of 9/22 and then progressed to gross hematuria throughout the day  Manual irrigation and CBI x 2 attempted in the ED, however continued to have recurrent hematuria prompting admission  CT A/P: \"Decompressed bladder limiting evaluation for clot burden. Mild amount of intraluminal hyperdense material surrounding the Colon catheter balloon consistent with blood products/clot. Mild right hydroureteronephrosis, new from prior. Inflammatory changes regional to the right ureter, indeterminate etiology.\"  At time of admission, CBI had been paused for 3 hours and urine is light yellow in Colon catheter - will continue holding CBI and perform manual irrigation qshift and PRN   Urology consult appreciated  Monitor H&H  Noted to still have hematuria this morning.  Continue manual irrigation  On cefepime  Urology spoke with IR for suprapubic catheter placement.  Urine cultures are negative.  Aspirin and Eliquis held  Possible suprapubic catheter placement on Friday. Pt is NPO  "

## 2024-09-26 NOTE — ASSESSMENT & PLAN NOTE
- collected urine is red, no clots  - hemoglobin is stable and continue to trend  - abx are continued  - eliquis restarted. Hematuria noted.  - cont with manual irrigation  -urine cx clear  -continue monitoring I/Os and vitals  -spt jasvir, npo after midnight

## 2024-09-26 NOTE — ASSESSMENT & PLAN NOTE
Lab Results   Component Value Date    EGFR 48 09/26/2024    EGFR 56 09/25/2024    EGFR 56 09/24/2024    CREATININE 1.40 (H) 09/26/2024    CREATININE 1.24 09/25/2024    CREATININE 1.24 09/24/2024   Baseline creatinine 1.0-1.2  Creatinine on admission 1.40  Received 500 cc normal saline in the ED  Examines euvolemic on admit  Continue Lasix  Trend BMP

## 2024-09-26 NOTE — ASSESSMENT & PLAN NOTE
Wt Readings from Last 3 Encounters:   09/26/24 89.9 kg (198 lb 3.1 oz)   09/17/24 87.7 kg (193 lb 6.4 oz)   09/05/24 95.3 kg (210 lb)   Examines euvolemic on admission  Home regimen: Lasix 40 Mg daily  Last echo 2/2024: LVEF 35 to 40%  Continue home Lasix  Cardiac diet  Daily weights

## 2024-09-27 ENCOUNTER — TELEPHONE (OUTPATIENT)
Dept: OTHER | Facility: HOSPITAL | Age: 74
End: 2024-09-27

## 2024-09-27 ENCOUNTER — ANESTHESIA (INPATIENT)
Dept: INTERVENTIONAL RADIOLOGY/VASCULAR | Facility: HOSPITAL | Age: 74
DRG: 699 | End: 2024-09-27
Payer: MEDICARE

## 2024-09-27 ENCOUNTER — APPOINTMENT (INPATIENT)
Dept: INTERVENTIONAL RADIOLOGY/VASCULAR | Facility: HOSPITAL | Age: 74
DRG: 699 | End: 2024-09-27
Attending: RADIOLOGY
Payer: MEDICARE

## 2024-09-27 LAB
ANION GAP SERPL CALCULATED.3IONS-SCNC: 7 MMOL/L (ref 4–13)
BASOPHILS # BLD AUTO: 0.05 THOUSANDS/ΜL (ref 0–0.1)
BASOPHILS NFR BLD AUTO: 1 % (ref 0–1)
BUN SERPL-MCNC: 35 MG/DL (ref 5–25)
CALCIUM SERPL-MCNC: 8.8 MG/DL (ref 8.4–10.2)
CHLORIDE SERPL-SCNC: 100 MMOL/L (ref 96–108)
CO2 SERPL-SCNC: 27 MMOL/L (ref 21–32)
CREAT SERPL-MCNC: 1.36 MG/DL (ref 0.6–1.3)
EOSINOPHIL # BLD AUTO: 0.54 THOUSAND/ΜL (ref 0–0.61)
EOSINOPHIL NFR BLD AUTO: 8 % (ref 0–6)
ERYTHROCYTE [DISTWIDTH] IN BLOOD BY AUTOMATED COUNT: 13 % (ref 11.6–15.1)
GFR SERPL CREATININE-BSD FRML MDRD: 50 ML/MIN/1.73SQ M
GLUCOSE SERPL-MCNC: 90 MG/DL (ref 65–140)
HCT VFR BLD AUTO: 37.1 % (ref 36.5–49.3)
HGB BLD-MCNC: 12.3 G/DL (ref 12–17)
IMM GRANULOCYTES # BLD AUTO: 0.03 THOUSAND/UL (ref 0–0.2)
IMM GRANULOCYTES NFR BLD AUTO: 1 % (ref 0–2)
LYMPHOCYTES # BLD AUTO: 1.44 THOUSANDS/ΜL (ref 0.6–4.47)
LYMPHOCYTES NFR BLD AUTO: 22 % (ref 14–44)
MCH RBC QN AUTO: 31.9 PG (ref 26.8–34.3)
MCHC RBC AUTO-ENTMCNC: 33.2 G/DL (ref 31.4–37.4)
MCV RBC AUTO: 96 FL (ref 82–98)
MONOCYTES # BLD AUTO: 0.57 THOUSAND/ΜL (ref 0.17–1.22)
MONOCYTES NFR BLD AUTO: 9 % (ref 4–12)
NEUTROPHILS # BLD AUTO: 3.97 THOUSANDS/ΜL (ref 1.85–7.62)
NEUTS SEG NFR BLD AUTO: 59 % (ref 43–75)
NRBC BLD AUTO-RTO: 0 /100 WBCS
PLATELET # BLD AUTO: 315 THOUSANDS/UL (ref 149–390)
PMV BLD AUTO: 9.8 FL (ref 8.9–12.7)
POTASSIUM SERPL-SCNC: 4.8 MMOL/L (ref 3.5–5.3)
RBC # BLD AUTO: 3.86 MILLION/UL (ref 3.88–5.62)
SODIUM SERPL-SCNC: 134 MMOL/L (ref 135–147)
WBC # BLD AUTO: 6.6 THOUSAND/UL (ref 4.31–10.16)

## 2024-09-27 PROCEDURE — 51102 DRAIN BL W/CATH INSERTION: CPT

## 2024-09-27 PROCEDURE — NC001 PR NO CHARGE: Performed by: RADIOLOGY

## 2024-09-27 PROCEDURE — 0T9B40Z DRAINAGE OF BLADDER WITH DRAINAGE DEVICE, PERCUTANEOUS ENDOSCOPIC APPROACH: ICD-10-PCS | Performed by: INTERNAL MEDICINE

## 2024-09-27 PROCEDURE — 85025 COMPLETE CBC W/AUTO DIFF WBC: CPT | Performed by: INTERNAL MEDICINE

## 2024-09-27 PROCEDURE — 51102 DRAIN BL W/CATH INSERTION: CPT | Performed by: RADIOLOGY

## 2024-09-27 PROCEDURE — 76942 ECHO GUIDE FOR BIOPSY: CPT | Performed by: RADIOLOGY

## 2024-09-27 PROCEDURE — C1769 GUIDE WIRE: HCPCS

## 2024-09-27 PROCEDURE — 80048 BASIC METABOLIC PNL TOTAL CA: CPT | Performed by: INTERNAL MEDICINE

## 2024-09-27 PROCEDURE — 99232 SBSQ HOSP IP/OBS MODERATE 35: CPT | Performed by: INTERNAL MEDICINE

## 2024-09-27 PROCEDURE — 99232 SBSQ HOSP IP/OBS MODERATE 35: CPT | Performed by: PHYSICIAN ASSISTANT

## 2024-09-27 PROCEDURE — C1725 CATH, TRANSLUMIN NON-LASER: HCPCS

## 2024-09-27 RX ORDER — SODIUM CHLORIDE, SODIUM LACTATE, POTASSIUM CHLORIDE, CALCIUM CHLORIDE 600; 310; 30; 20 MG/100ML; MG/100ML; MG/100ML; MG/100ML
INJECTION, SOLUTION INTRAVENOUS CONTINUOUS PRN
Status: DISCONTINUED | OUTPATIENT
Start: 2024-09-27 | End: 2024-09-27

## 2024-09-27 RX ORDER — ONDANSETRON 2 MG/ML
INJECTION INTRAMUSCULAR; INTRAVENOUS AS NEEDED
Status: DISCONTINUED | OUTPATIENT
Start: 2024-09-27 | End: 2024-09-27

## 2024-09-27 RX ORDER — EPHEDRINE SULFATE 50 MG/ML
INJECTION INTRAVENOUS AS NEEDED
Status: DISCONTINUED | OUTPATIENT
Start: 2024-09-27 | End: 2024-09-27

## 2024-09-27 RX ORDER — LIDOCAINE HYDROCHLORIDE 10 MG/ML
INJECTION, SOLUTION EPIDURAL; INFILTRATION; INTRACAUDAL; PERINEURAL AS NEEDED
Status: DISCONTINUED | OUTPATIENT
Start: 2024-09-27 | End: 2024-09-27

## 2024-09-27 RX ORDER — PROPOFOL 10 MG/ML
INJECTION, EMULSION INTRAVENOUS AS NEEDED
Status: DISCONTINUED | OUTPATIENT
Start: 2024-09-27 | End: 2024-09-27

## 2024-09-27 RX ORDER — FENTANYL CITRATE/PF 50 MCG/ML
50 SYRINGE (ML) INJECTION
Status: DISCONTINUED | OUTPATIENT
Start: 2024-09-27 | End: 2024-09-28 | Stop reason: HOSPADM

## 2024-09-27 RX ORDER — LIDOCAINE HYDROCHLORIDE 10 MG/ML
INJECTION, SOLUTION EPIDURAL; INFILTRATION; INTRACAUDAL; PERINEURAL AS NEEDED
Status: COMPLETED | OUTPATIENT
Start: 2024-09-27 | End: 2024-09-27

## 2024-09-27 RX ORDER — ONDANSETRON 2 MG/ML
4 INJECTION INTRAMUSCULAR; INTRAVENOUS ONCE AS NEEDED
Status: DISCONTINUED | OUTPATIENT
Start: 2024-09-27 | End: 2024-09-28 | Stop reason: HOSPADM

## 2024-09-27 RX ADMIN — AMIODARONE HYDROCHLORIDE 200 MG: 200 TABLET ORAL at 11:51

## 2024-09-27 RX ADMIN — FINASTERIDE 5 MG: 5 TABLET, FILM COATED ORAL at 11:51

## 2024-09-27 RX ADMIN — METOPROLOL SUCCINATE 25 MG: 25 TABLET, EXTENDED RELEASE ORAL at 11:51

## 2024-09-27 RX ADMIN — ONDANSETRON 4 MG: 2 INJECTION INTRAMUSCULAR; INTRAVENOUS at 10:19

## 2024-09-27 RX ADMIN — LIDOCAINE HYDROCHLORIDE 50 MG: 10 INJECTION, SOLUTION EPIDURAL; INFILTRATION; INTRACAUDAL; PERINEURAL at 09:59

## 2024-09-27 RX ADMIN — PROPOFOL 30 MG: 10 INJECTION, EMULSION INTRAVENOUS at 10:00

## 2024-09-27 RX ADMIN — SODIUM CHLORIDE, SODIUM LACTATE, POTASSIUM CHLORIDE, AND CALCIUM CHLORIDE: .6; .31; .03; .02 INJECTION, SOLUTION INTRAVENOUS at 09:48

## 2024-09-27 RX ADMIN — CEFEPIME HYDROCHLORIDE 1000 MG: 1 INJECTION, SOLUTION INTRAVENOUS at 14:33

## 2024-09-27 RX ADMIN — FUROSEMIDE 40 MG: 40 TABLET ORAL at 11:50

## 2024-09-27 RX ADMIN — METOPROLOL SUCCINATE 25 MG: 25 TABLET, EXTENDED RELEASE ORAL at 20:19

## 2024-09-27 RX ADMIN — TAMSULOSIN HYDROCHLORIDE 0.4 MG: 0.4 CAPSULE ORAL at 20:19

## 2024-09-27 RX ADMIN — EPHEDRINE SULFATE 15 MG: 50 INJECTION INTRAVENOUS at 10:07

## 2024-09-27 RX ADMIN — LIDOCAINE HYDROCHLORIDE 10 ML: 10 INJECTION, SOLUTION EPIDURAL; INFILTRATION; INTRACAUDAL; PERINEURAL at 10:16

## 2024-09-27 RX ADMIN — LISINOPRIL 5 MG: 5 TABLET ORAL at 11:51

## 2024-09-27 RX ADMIN — ATORVASTATIN CALCIUM 40 MG: 40 TABLET, FILM COATED ORAL at 17:37

## 2024-09-27 RX ADMIN — CEFEPIME HYDROCHLORIDE 1000 MG: 1 INJECTION, SOLUTION INTRAVENOUS at 02:22

## 2024-09-27 RX ADMIN — PROPOFOL 60 MG: 10 INJECTION, EMULSION INTRAVENOUS at 09:59

## 2024-09-27 RX ADMIN — IOHEXOL 6 ML: 350 INJECTION, SOLUTION INTRAVENOUS at 10:21

## 2024-09-27 RX ADMIN — EPHEDRINE SULFATE 15 MG: 50 INJECTION INTRAVENOUS at 10:10

## 2024-09-27 NOTE — BRIEF OP NOTE (RAD/CATH)
IR SUPRAPUBIC CATHETER PLACEMENT  Procedure Note    PATIENT NAME: Toro Rodriguez  : 1950  MRN: [id number]     Pre-op Diagnosis:   1. Hematuria    2. Urinary tract infection    3. Urinary retention      Post-op Diagnosis:   1. Hematuria    2. Urinary tract infection    3. Urinary retention        Surgeon:   Marlon Gloria DO  Assistants:     No qualified resident was available.    Estimated Blood Loss: none  Findings: 18F SPT placed.    Specimens: none    Complications:  none    Anesthesia: local and general    Marlon Gloria DO     Date: 2024  Time: 10:23 AM

## 2024-09-27 NOTE — ASSESSMENT & PLAN NOTE
"First noticed dark urine in the morning of 9/22 and then progressed to gross hematuria throughout the day  Manual irrigation and CBI x 2 attempted in the ED, however continued to have recurrent hematuria prompting admission  CT A/P: \"Decompressed bladder limiting evaluation for clot burden. Mild amount of intraluminal hyperdense material surrounding the Colon catheter balloon consistent with blood products/clot. Mild right hydroureteronephrosis, new from prior. Inflammatory changes regional to the right ureter, indeterminate etiology.\"  At time of admission, CBI had been paused for 3 hours and urine is light yellow in Colon catheter - will continue holding CBI and perform manual irrigation qshift and PRN   Urology consult appreciated  Monitor H&H  Noted to still have hematuria this morning.  Continue manual irrigation  On cefepime  Urology spoke with IR for suprapubic catheter placement.  Urine cultures are negative.  Aspirin and Eliquis held  Possible suprapubic catheter placement today. Pt is NPO  "

## 2024-09-27 NOTE — QUICK NOTE
Patient seen and examined at bedside following placement of suprapubic catheter in setting of urethral erosion.   Patient without any complaints of pain at incision site.   SPT bag in place with 800 mL pink tinged urine. No clots appreciated in bag or farmer tubing. Ureteral catheter bag with minimal clear urine output. Incision site C/D/I.   Monitor I/Os  Maintain ureteral farmer in place until discharge.   Restart Eliquis Monday 09/30/2023  Recommend outpt follow up with IR in approximately 6-8 weeks.   Outpt Urology follow up.     Tram Goins PA-C

## 2024-09-27 NOTE — CASE MANAGEMENT
Case Management Discharge Planning Note    Patient name Toro Rodriguez  Location /-01 MRN [id number]  : 1950 Date 2024       Current Admission Date: 2024  Current Admission Diagnosis:Hematuria   Patient Active Problem List    Diagnosis Date Noted Date Diagnosed    NSVT (nonsustained ventricular tachycardia) ([facility]) 2024     UTI (urinary tract infection) 2024     Urinary tract infection associated with indwelling urethral catheter  ([facility]) 2024     Encounter to discuss test results 2024     Urethral erosion by catheter, initial encounter  ([facility]) 2024     Anemia 2024     Hematuria 2024     Chronic venous hypertension (idiopathic) with ulcer of left lower extremity (CODE) ([facility]) 2024     Chronic kidney disease, stage 3a ([facility]) 2024     Encounter for geriatric assessment 2024     Melanoma of back ([facility]) 2024     Dilated cardiomyopathy ([facility]) 12/15/2023     Obesity, morbid ([facility]) 2023     Type 2 diabetes mellitus with chronic kidney disease, without long-term current use of insulin, unspecified CKD stage ([facility]) 11/15/2023     Essential hypertension 11/10/2023     Generalized weakness 11/10/2023     Chronic systolic (congestive) heart failure ([facility]) 11/10/2023     Deep vein thrombosis (DVT) of left lower extremity ([facility]) 11/10/2023     Coronary artery disease involving native coronary artery of native heart 11/10/2023     Benign prostatic hyperplasia with urinary retention 2014       LOS (days): 5  Geometric Mean LOS (GMLOS) (days): 3.3  Days to GMLOS:-1.3     OBJECTIVE:  Risk of Unplanned Readmission Score: 31.29         Current admission status: Inpatient   Preferred Pharmacy:   [facility] [address] [facility]  [address] [address]  Phone: [phone] Fax: [phone]    [facility] [place] - [facility] [address]  [address]  [place] [place] [facility] [address]  Phone: [phone] Fax: [phone]    Primary Care Provider: CARLIE Foster    Primary Insurance: MEDICARE  Secondary Insurance: BLUE CROSS    DISCHARGE DETAILS:     Pt is a tentative d/c for tomorrow.   Spoke with Juan from [facility] ( [phone] )  to update him on the pt's tentative d/c and to make sure that they are able to care for the pt's new superpubic cath.   Juan stated they are able to manage the pt's care with the new cath. Pt will also have [facility] for additional care.   Updated pt's attending.     At d/c-- RN to call report to [phone] ext [phone] for the RN station.   Pt's daughter Shanique to provide transport.

## 2024-09-27 NOTE — ASSESSMENT & PLAN NOTE
Lab Results   Component Value Date    EGFR 50 09/27/2024    EGFR 48 09/26/2024    EGFR 56 09/25/2024    CREATININE 1.36 (H) 09/27/2024    CREATININE 1.40 (H) 09/26/2024    CREATININE 1.24 09/25/2024

## 2024-09-27 NOTE — SEDATION DOCUMENTATION
SPT inserted. Patient tolerated well with anesthesia support. Colon remains intact per Urology note on 9/25 for future dynamic testing. Transferred to PACU via bed.

## 2024-09-27 NOTE — TELEPHONE ENCOUNTER
Toro Rodriguez  Is a 74-year-old male seen in urologic consultation for chronic Colon, suprapubic tube placed during admission in IR.  Will require first exchange in 6 weeks.  Please contact patient/caregiver with hospital follow-up appointment date and time once discharged.

## 2024-09-27 NOTE — PROGRESS NOTES
Patient arrived to IR for SPT placement.    /60 (BP Location: Right arm)   Pulse (!) 51   Temp (!) 97 °F (36.1 °C) (Temporal)   Resp 20   Wt 91.2 kg (201 lb 1 oz)   SpO2 94%   BMI 31.49 kg/m²       The procedure and risks were discussed with the patient.  All questions were answered. Informed consent was obtained.

## 2024-09-27 NOTE — ASSESSMENT & PLAN NOTE
Wt Readings from Last 3 Encounters:   09/27/24 91.2 kg (201 lb 1 oz)   09/17/24 87.7 kg (193 lb 6.4 oz)   09/05/24 95.3 kg (210 lb)

## 2024-09-27 NOTE — PROGRESS NOTES
"Progress Note - Hospitalist   Name: Toro Rodriguez 74 y.o. male I MRN: [id number]  Unit/Bed#: -01 I Date of Admission: 9/22/2024   Date of Service: 9/27/2024 I Hospital Day: 5    Assessment & Plan  Hematuria  First noticed dark urine in the morning of 9/22 and then progressed to gross hematuria throughout the day  Manual irrigation and CBI x 2 attempted in the ED, however continued to have recurrent hematuria prompting admission  CT A/P: \"Decompressed bladder limiting evaluation for clot burden. Mild amount of intraluminal hyperdense material surrounding the Colon catheter balloon consistent with blood products/clot. Mild right hydroureteronephrosis, new from prior. Inflammatory changes regional to the right ureter, indeterminate etiology.\"  At time of admission, CBI had been paused for 3 hours and urine is light yellow in Colon catheter - will continue holding CBI and perform manual irrigation qshift and PRN   Urology consult appreciated  Monitor H&H  Noted to still have hematuria this morning.  Continue manual irrigation  On cefepime  Urology spoke with IR for suprapubic catheter placement.  Urine cultures are negative.  Aspirin and Eliquis held  Possible suprapubic catheter placement today. Pt is NPO  Urinary tract infection associated with indwelling urethral catheter  (HCC)  Recently admitted 9/12-9/17 for UTI treated 3 days of vancomycin, 3 days of cefepime with transition to Cipro plan of 3 more days of antibiotics to complete 7-day course  On cefepime  Urine cultures are negative  Chronic kidney disease, stage 3a ([facility])  Lab Results   Component Value Date    EGFR 50 09/27/2024    EGFR 48 09/26/2024    EGFR 56 09/25/2024    CREATININE 1.36 (H) 09/27/2024    CREATININE 1.40 (H) 09/26/2024    CREATININE 1.24 09/25/2024   Baseline creatinine 1.0-1.2  Creatinine on admission 1.40  Received 500 cc normal saline in the ED  Examines euvolemic on admit  Continue Lasix  Trend BMP  Essential hypertension  Home " regimen: Lasix 40 Mg daily, metoprolol succinate 25 Mg twice daily, lisinopril 5 Mg daily  Continue home medications  Chronic systolic (congestive) heart failure (HCC)  Wt Readings from Last 3 Encounters:   09/27/24 91.2 kg (201 lb 1 oz)   09/17/24 87.7 kg (193 lb 6.4 oz)   09/05/24 95.3 kg (210 lb)   Examines euvolemic on admission  Home regimen: Lasix 40 Mg daily  Last echo 2/2024: LVEF 35 to 40%  Continue home Lasix  Cardiac diet  Daily weights  Deep vein thrombosis (DVT) of left lower extremity (HCC)  On Eliquis, currently on hold due to hematuria-if urine remains clear by morning would resume  Coronary artery disease involving native coronary artery of native heart  History of CTA with  of RCA  Follows with cardiology, Dr. Roach, outpatient  Aspirin and Eliquis on hold for possible suprapubic catheter placement  Continue statin  NSVT (nonsustained ventricular tachycardia) (HCC)  Home regimen: Amiodarone 200 Mg daily, metoprolol succinate 25 Mg twice daily  Continue home medications  UTI (urinary tract infection)  See above    Labs & Imaging: No pertinent imaging studies reviewed.    VTE Prophylaxis: in place.    Code Status:   Level 1 - Full Code    Patient Centered Rounds: I have performed bedside rounds with nursing staff today.    Mobility:   Basic Mobility Inpatient Raw Score: 19  JH-HLM Goal: 6: Walk 10 steps or more  JH-HLM Achieved: 7: Walk 25 feet or more  JH-HLM Goal NOT achieved. Continue with multidisciplinary rounding and encourage appropriate mobility to improve upon JH-HLM goals.    Discussions with Specialists or Other Care Team Provider: CM    Education and Discussions with Family / Patient: Daughter    Total Time Spent on Date of Encounter in care of patient: 35 mins. This time was spent on one or more of the following: performing physical exam; counseling and coordination of care; obtaining or reviewing history; documenting in the medical record; reviewing/ordering tests, medications or  procedures; communicating with other healthcare professionals and discussing with patient's family/caregivers.    Current Length of Stay: 5 day(s)    Current Patient Status: Inpatient   Certification Statement: The patient will continue to require additional inpatient hospital stay due to see my assessment and plan.     Subjective:   Patient is seen and examined at bedside.  No new complaints.  Afebrile  All other ROS are negative.    Objective:    Vitals: Blood pressure 135/60, pulse (!) 51, temperature (!) 97 °F (36.1 °C), temperature source Temporal, resp. rate 20, weight 91.2 kg (201 lb 1 oz), SpO2 94%.,Body mass index is 31.49 kg/m².  SPO2 RA Rest      Flowsheet Row ED to Hosp-Admission (Current) from 9/22/2024 in [facility]   SpO2 94 %   SpO2 Activity At Rest   O2 Device Nasal cannula   O2 Flow Rate --          I&O:   Intake/Output Summary (Last 24 hours) at 9/27/2024 1009  Last data filed at 9/27/2024 0530  Gross per 24 hour   Intake 1000 ml   Output 1750 ml   Net -750 ml       Physical Exam:    General- Alert, lying comfortably in bed. Not in any acute distress.  Neck- Supple, No JVD  CVS- regular, S1 and S2 normal  Chest- Bilateral Air entry, No rhochi, crackles or wheezing present.  Abdomen- soft, nontender, not distended, no guarding or rigidity, BS+  Extremities-  No pedal edema, No calf tenderness                         Normal ROM in all extremities.  CNS-   Alert, awake and orientedx3. No focal deficits present.    Invasive Devices       Peripheral Intravenous Line  Duration             Peripheral IV 09/25/24 Dorsal (posterior);Left Forearm 2 days              Drain  Duration             Urethral Catheter Three way 18 Fr. 4 days              Airway  Duration             Supraglottic Airway LMA 4 <1 day                          Social History  reviewed  Family History   Problem Relation Age of Onset    Breast cancer Mother     Heart attack Father 61    Other Sister          s/p hysterectomy    Cerebral palsy Brother     Skin cancer Other         family history    No Known Problems Son     No Known Problems Daughter     Sudden death Neg Hx     reviewed    Meds:  Current Facility-Administered Medications   Medication Dose Route Frequency Provider Last Rate Last Admin    acetaminophen (TYLENOL) tablet 650 mg  650 mg Oral Q6H PRN Odette Kaye PA-C        aluminum-magnesium hydroxide-simethicone (MAALOX) oral suspension 30 mL  30 mL Oral Q6H PRN Odette Kaye PA-C        amiodarone tablet 200 mg  200 mg Oral Daily BARBY HinesC   200 mg at 09/26/24 0840    atorvastatin (LIPITOR) tablet 40 mg  40 mg Oral Daily With Dinner Odette Kaye PA-C   40 mg at 09/26/24 1619    cefepime (MAXIPIME) IVPB (premix in dextrose) 1,000 mg 50 mL  1,000 mg Intravenous Q12H Jaydon Fleming  mL/hr at 09/27/24 0222 1,000 mg at 09/27/24 0222    dicyclomine (BENTYL) capsule 10 mg  10 mg Oral TID PRN Odette Kaye PA-C        finasteride (PROSCAR) tablet 5 mg  5 mg Oral Daily BARBY HinesC   5 mg at 09/26/24 0840    furosemide (LASIX) tablet 40 mg  40 mg Oral Daily EMMETT Hines-C   40 mg at 09/26/24 0840    lisinopril (ZESTRIL) tablet 5 mg  5 mg Oral Daily EMMETT Hines-C   5 mg at 09/26/24 0840    metoprolol succinate (TOPROL-XL) 24 hr tablet 25 mg  25 mg Oral Q12H ARI BARBY HinesC   25 mg at 09/26/24 2029    ondansetron (ZOFRAN) injection 4 mg  4 mg Intravenous Q6H PRN Odette Kaye PA-C        senna (SENOKOT) tablet 8.6 mg  1 tablet Oral HS PRN Odette Kaye PA-C        tamsulosin (FLOMAX) capsule 0.4 mg  0.4 mg Oral HS BARBY HinesC   0.4 mg at 09/26/24 2028     Facility-Administered Medications Ordered in Other Encounters   Medication Dose Route Frequency Provider Last Rate Last Admin    ePHEDrine injection   Intravenous PRN Irena Davenport CRNA   15 mg at 09/27/24 1007    phenylephrine bolus from bag    "Intravenous PRN Irena Davenport CRNA   200 mcg at 09/27/24 1005    propofol (DIPRIVAN) 200 MG/20ML bolus injection   Intravenous PRN Irena Davenport CRNA   30 mg at 09/27/24 1000        Medications Prior to Admission:     amiodarone 200 mg tablet    apixaban (ELIQUIS) 5 mg    aspirin 81 mg chewable tablet    atorvastatin (LIPITOR) 40 mg tablet    finasteride (PROSCAR) 5 mg tablet    furosemide (LASIX) 40 mg tablet    lisinopril (ZESTRIL) 5 mg tablet    metoprolol succinate (TOPROL-XL) 25 mg 24 hr tablet    tamsulosin (FLOMAX) 0.4 mg    acetaminophen (TYLENOL) 325 mg tablet    clotrimazole (LOTRIMIN) 1 % cream    dicyclomine (BENTYL) 10 mg capsule    mupirocin (BACTROBAN) 2 % ointment    nitroglycerin (NITROSTAT) 0.4 mg SL tablet    Labs:  Results from last 7 days   Lab Units 09/27/24  0547 09/26/24 1954 09/26/24  0904 09/26/24 0320 09/25/24 2058 09/25/24 0437   WBC Thousand/uL 6.60  --   --  7.09  --  6.15   HEMOGLOBIN g/dL 12.3 12.5 12.6 12.2   < > 12.2   HEMATOCRIT % 37.1 38.2 38.1 37.4   < > 37.4   PLATELETS Thousands/uL 315  --   --  336  --  306   SEGS PCT % 59  --   --  60  --  57   LYMPHO PCT % 22  --   --  25  --  24   MONO PCT % 9  --   --  8  --  9   EOS PCT % 8*  --   --  6  --  8*    < > = values in this interval not displayed.     Results from last 7 days   Lab Units 09/27/24  0547 09/26/24  0320 09/25/24  0437 09/23/24  0520 09/22/24  1659   POTASSIUM mmol/L 4.8 4.5 4.5   < > 5.0   CHLORIDE mmol/L 100 100 101   < > 105   CO2 mmol/L 27 26 27   < > 22   BUN mg/dL 35* 33* 26*   < > 32*   CREATININE mg/dL 1.36* 1.40* 1.24   < > 1.40*   CALCIUM mg/dL 8.8 8.9 8.6   < > 8.5   ALK PHOS U/L  --   --   --   --  69   ALT U/L  --   --   --   --  6*   AST U/L  --   --   --   --  16    < > = values in this interval not displayed.     No results found for: \"TROPONINI\", \"CKMB\", \"CKTOTAL\"  Results from last 7 days   Lab Units 09/26/24  0904 09/22/24  1724   INR  1.16 1.33*     Lab Results   Component Value Date    " BLOODCX No Growth After 5 Days. 11/10/2023    BLOODCX No Growth After 5 Days. 11/10/2023    URINECX No Growth <1000 cfu/mL 09/24/2024    URINECX >100,000 cfu/ml Pseudomonas aeruginosa (A) 09/12/2024    URINECX 50,000-59,000 cfu/ml Citrobacter freundii (A) 09/12/2024    URINECX 20,000-29,000 cfu/ml Staphylococcus aureus (A) 09/12/2024         Imaging:  Results for orders placed during the hospital encounter of 07/10/24    XR chest 1 view portable    Narrative  XR CHEST PORTABLE    INDICATION: cough.    COMPARISON: 11/15/2023    FINDINGS:    Right lower lung field ill-defined opacity silhouetting the right heart border, preserving the diaphragm suggesting a right middle lobe pneumonia. No pneumothorax or pleural effusion. Stable right apical pleural thickening.    Enlarged cardiac silhouette, unchanged.    Severe degenerative changes in the left shoulder with chronic deformity of the right shoulder.    Normal upper abdomen.    Impression  Right lower lung field consolidation concerning for pneumonia.    Findings concur with the preliminary report by the referring clinician already in PACS and/or our electronic record EPIC.        Workstation performed: [id number]    No results found for this or any previous visit.      Last 24 Hours Medication List:   Current Facility-Administered Medications   Medication Dose Route Frequency Provider Last Rate    acetaminophen  650 mg Oral Q6H PRN Odette Kaye PA-C      aluminum-magnesium hydroxide-simethicone  30 mL Oral Q6H PRN Odette Kaye PA-C      amiodarone  200 mg Oral Daily Odette Kaye PA-C      atorvastatin  40 mg Oral Daily With Dinner Odette Kaye PA-C      cefepime  1,000 mg Intravenous Q12H Jaydon Fleming MD 1,000 mg (09/27/24 0222)    dicyclomine  10 mg Oral TID PRN Odette Kaye PA-C      finasteride  5 mg Oral Daily Odette Kaye PA-C      furosemide  40 mg Oral Daily Odette Kaye PA-C      lisinopril  5 mg Oral Daily  Odette Kaye PA-C      metoprolol succinate  25 mg Oral Q12H [facility] Odette Kaye PA-C      ondansetron  4 mg Intravenous Q6H PRN Odette Kaye PA-C      senna  1 tablet Oral HS PRN Odette Kaye PA-C      tamsulosin  0.4 mg Oral HS Odette Kaye PA-C       Facility-Administered Medications Ordered in Other Encounters   Medication Dose Route Frequency Provider Last Rate    ePHEDrine   Intravenous PRN Irena Davenport CRNA      phenylephrine   Intravenous PRN Irena Davenport CRNA      propofol   Intravenous PRN Irena Davenport CRNA          Today, Patient Was Seen By: Jaydon Fleming MD    ** Please Note: Dictation voice to text software may have been used in the creation of this document. **

## 2024-09-27 NOTE — PLAN OF CARE
Problem: Potential for Falls  Goal: Patient will remain free of falls  Description: INTERVENTIONS:  - Educate patient/family on patient safety including physical limitations  - Instruct patient to call for assistance with activity   - Consult OT/PT to assist with strengthening/mobility   - Keep Call bell within reach  - Keep bed low and locked with side rails adjusted as appropriate  - Keep care items and personal belongings within reach  - Initiate and maintain comfort rounds  - Make Fall Risk Sign visible to staff  - Offer Toileting every 2 Hours, in advance of need  - Initiate/Maintain alarm  - Obtain necessary fall risk management equipment  - Apply yellow socks and bracelet for high fall risk patients  - Consider moving patient to room near nurses station  Outcome: Progressing     Problem: PAIN - ADULT  Goal: Verbalizes/displays adequate comfort level or baseline comfort level  Description: Interventions:  - Encourage patient to monitor pain and request assistance  - Assess pain using appropriate pain scale  - Administer analgesics based on type and severity of pain and evaluate response  - Implement non-pharmacological measures as appropriate and evaluate response  - Consider cultural and social influences on pain and pain management  - Notify physician/advanced practitioner if interventions unsuccessful or patient reports new pain  Outcome: Progressing     Problem: INFECTION - ADULT  Goal: Absence or prevention of progression during hospitalization  Description: INTERVENTIONS:  - Assess and monitor for signs and symptoms of infection  - Monitor lab/diagnostic results  - Monitor all insertion sites, i.e. indwelling lines, tubes, and drains  - Monitor endotracheal if appropriate and nasal secretions for changes in amount and color  - [place] appropriate cooling/warming therapies per order  - Administer medications as ordered  - Instruct and encourage patient and family to use good hand hygiene technique  -  Identify and instruct in appropriate isolation precautions for identified infection/condition  Outcome: Progressing  Goal: Absence of fever/infection during neutropenic period  Description: INTERVENTIONS:  - Monitor WBC    Outcome: Progressing     Problem: DISCHARGE PLANNING  Goal: Discharge to home or other facility with appropriate resources  Description: INTERVENTIONS:  - Identify barriers to discharge w/patient and caregiver  - Arrange for needed discharge resources and transportation as appropriate  - Identify discharge learning needs (meds, wound care, etc.)  - Arrange for interpretive services to assist at discharge as needed  - Refer to Case Management Department for coordinating discharge planning if the patient needs post-hospital services based on physician/advanced practitioner order or complex needs related to functional status, cognitive ability, or social support system  Outcome: Progressing     Problem: Knowledge Deficit  Goal: Patient/family/caregiver demonstrates understanding of disease process, treatment plan, medications, and discharge instructions  Description: Complete learning assessment and assess knowledge base.  Interventions:  - Provide teaching at level of understanding  - Provide teaching via preferred learning methods  Outcome: Progressing     Problem: GENITOURINARY - ADULT  Goal: Maintains or returns to baseline urinary function  Description: INTERVENTIONS:  - Assess urinary function  - Encourage oral fluids to ensure adequate hydration if ordered  - Administer IV fluids as ordered to ensure adequate hydration  - Administer ordered medications as needed  - Offer frequent toileting  - Follow urinary retention protocol if ordered  Outcome: Progressing  Goal: Absence of urinary retention  Description: INTERVENTIONS:  - Assess patient’s ability to void and empty bladder  - Monitor I/O  - Bladder scan as needed  - Discuss with physician/AP medications to alleviate retention as needed  -  Discuss catheterization for long term situations as appropriate  Outcome: Progressing  Goal: Urinary catheter remains patent  Description: INTERVENTIONS:  - Assess patency of urinary catheter  - If patient has a chronic farmer, consider changing catheter if non-functioning  - Follow guidelines for intermittent irrigation of non-functioning urinary catheter  Outcome: Progressing     Problem: Prexisting or High Potential for Compromised Skin Integrity  Goal: Skin integrity is maintained or improved  Description: INTERVENTIONS:  - Identify patients at risk for skin breakdown  - Assess and monitor skin integrity  - Assess and monitor nutrition and hydration status  - Monitor labs   - Assess for incontinence   - Turn and reposition patient  - Assist with mobility/ambulation  - Relieve pressure over bony prominences  - Avoid friction and shearing  - Provide appropriate hygiene as needed including keeping skin clean and dry  - Evaluate need for skin moisturizer/barrier cream  - Collaborate with interdisciplinary team   - Patient/family teaching  - Consider wound care consult   Outcome: Progressing

## 2024-09-27 NOTE — ASSESSMENT & PLAN NOTE
-urine is clear  - hemoglobin is stable and continue to trend  - irrigation prn, cont to monitor urine  -urine cx clear  -continue monitoring I/Os and vitals  - SPT today, pt may have hematuria. Cont to monitor. Hold eliquis is possible

## 2024-09-27 NOTE — DISCHARGE INSTRUCTIONS
"uprapubic catheter placement   The Basics   Written by the doctors and editors at [facility]   What is a suprapubic catheter? -- A suprapubic catheter is a thin tube that is used to drain urine. It is placed through the skin of the lower belly, directly into the bladder (figure 1).  Normally, urine drains from the kidneys through the ureters, into the bladder, then out of the body through the urethra (figure 2). A suprapubic catheter lets the urine drain from the bladder into a collection bag instead. You might have a suprapubic catheter for a few days to a few months, depending on why you need it.  The surgery to place a suprapubic catheter is called a \"suprapubic cystostomy.\" In this procedure, a doctor makes a small opening just below the belly button. This opening is called a \"stoma.\" The catheter is placed through the stoma and into the bladder.  In some cases, your doctor will place a suprapubic catheter in an operating room. It might also be placed in the emergency department, or at the same time as another procedure.  Why might I need a suprapubic catheter? -- Some diseases can block the normal flow of urine through the ureters. This causes urine to back up, which can damage the kidneys and cause infection.  You might need a suprapubic catheter:   If there is a blockage in your bladder or urethra   After prostate, bladder, or gynecologic surgery   When other kinds of catheters do not work   If you need a catheter for a long time   If you have urinary retention (when your bladder does not empty completely) for a long time  How do I prepare for suprapubic catheter placement? -- The doctor or nurse will tell you if you need to do anything special to prepare. Before your procedure, your doctor will do an exam. They might send you to get tests, such as:   Lab tests   X-rays   MRI or CT scan   Ultrasound  Your doctor will also ask you about your \"health history.\" This involves asking you questions about any health " "problems you have or had in the past, past surgeries, and any medicines you take. Tell them about:   Any medicines you are taking - This includes any prescription or \"over-the-counter\" medicines you use, plus any herbal supplements you take. It helps to write down and bring a list of any medicines you take, or bring a bag with all of your medicines with you.   Any allergies you have   Any bleeding or clotting problems you have - Certain medicines, including some herbs and supplements, can increase the risk of bleeding. Some health conditions also increase this risk.  You will also get information about:   Eating and drinking before your procedure - In some cases, you might need to \"fast\" before surgery. This means not eating or drinking anything for a period of time. In other cases, you might be allowed to have liquids until a short time before the procedure. Whether you need to fast, and for how long, depends on the procedure you are having.   Lowering the risk of infection - In some cases, you might need to trim (not shave) your body hair before your procedure. You might also need to wash the area with a special soap.   What help you will need when you go home - For example, you might need to have someone else bring you home or stay with you for some time while you recover.  Ask the doctor or nurse if you have questions or if there is anything you do not understand.  What happens during suprapubic catheter placement? -- When it is time for the procedure:   You will get an \"IV,\" which is a thin tube that goes into a vein. This can be used to give you fluids and medicines.   You will get anesthesia medicines. This is to make sure that you do not feel pain during the procedure. Types of anesthesia that might be used for placing a suprapubic catheter include:   Local - This type of anesthesia uses medicine to numb a small part of your body so you don't feel pain.   General - This type of anesthesia makes you " unconscious so you can't feel, see, or hear anything during the procedure. If you have general anesthesia, you might get a breathing tube to help you breathe.   The doctors and nurses will monitor your breathing, blood pressure, and heart rate during the procedure.   The doctor will give you a shot in the skin of your belly to numb the area. Then, they will use an ultrasound or special X-rays to see your bladder.   The doctor will make a small cut over your bladder. They will insert the catheter through the cut.   The doctor will place stitches around the catheter to hold it in place. Then, they will connect the catheter to a drainage bag. They will cover the area around the catheter with clean bandages.   The procedure takes about an hour.  What happens after suprapubic catheter placement? -- After your procedure, you will be taken to a recovery room. The staff will watch you closely as your anesthesia wears off.  Some people can go home after a short time, and others need to stay in the hospital. Before you go home, you will learn how to take care of the catheter.  What are the risks of having a suprapubic catheter? -- Your doctor will talk to you about all of the possible risks, and answer your questions. Possible risks include:   Bleeding or blood in your urine   Infection   Injury to other organs   Problems with kidney function   Bladder spasms   The catheter getting pulled out   The catheter getting blocked or kinked   Urine leaking around the catheter  What follow-up care do I need? -- The doctor will want to see you again after the suprapubic catheter is placed. Go to these appointments.  What else should I know?    You might have a small amount of clear to light yellow drainage around the catheter for a few days. This is normal. It is also normal to see some blood in the drainage bag for a few days.   You can take a shower after 48 hours or when your doctor tells you to. Keep your wound dry when you shower.  Wrap the catheter and drainage bag with plastic before you shower. Do this each time you shower until the wound fully heals. Once it has healed, you can wash the area with mild soap and water during showers.   Do not soak in a tub or hot tub or swim while the catheter is in place.  Before you go home from the hospital, make sure that you know what problems to look out for and when you should call the doctor. Make sure that you understand your doctor's or nurse's instructions. Ask questions about anything you do not understand.  All topics are updated as new evidence becomes available and our peer review process is complete.  This topic retrieved from [facility] ([place]) on: Mar 16, 2024.  Topic 102095 Version 2.0  Release: 32.2.4 - C32.74  © 2024 UpToDate, Inc. and/or its affiliates. All rights reserved.  figure 1: Suprapubic catheter     A suprapubic catheter is placed through the skin on the lower belly into the bladder. The catheter drains urine from the bladder into a drainage bag outside of the body.  Graphic 499868 Version 1.0  figure 2: Anatomy of the urinary tract     Urine is made by the kidneys. It passes from the kidneys into the bladder through 2 tubes called the ureters. Then, it leaves the bladder through another tube called the urethra.  Graphic 82788 Version 8.0  Consumer Information Use and Disclaimer   Disclaimer: This generalized information is a limited summary of diagnosis, treatment, and/or medication information. It is not meant to be comprehensive and should be used as a tool to help the user understand and/or assess potential diagnostic and treatment options. It does NOT include all information about conditions, treatments, medications, side effects, or risks that may apply to a specific patient. It is not intended to be medical advice or a substitute for the medical advice, diagnosis, or treatment of a health care provider based on the health care provider's examination and assessment of a patient's  specific and unique circumstances. Patients must speak with a health care provider for complete information about their health, medical questions, and treatment options, including any risks or benefits regarding use of medications. This information does not endorse any treatments or medications as safe, effective, or approved for treating a specific patient. UpToDate, Inc. and its affiliates disclaim any warranty or liability relating to this information or the use thereof.The use of this information is governed by the Terms of Use, available at https://www.woltersTumblruwer.com/en/know/clinical-effectiveness-terms. 2024© UpToDate, Inc. and its affiliates and/or licensors. All rights reserved.  Copyright   © 2024 UpToDate, Inc. and/or its affiliates. All rights reserved.

## 2024-09-27 NOTE — ASSESSMENT & PLAN NOTE
Lab Results   Component Value Date    EGFR 50 09/27/2024    EGFR 48 09/26/2024    EGFR 56 09/25/2024    CREATININE 1.36 (H) 09/27/2024    CREATININE 1.40 (H) 09/26/2024    CREATININE 1.24 09/25/2024   Baseline creatinine 1.0-1.2  Creatinine on admission 1.40  Received 500 cc normal saline in the ED  Examines euvolemic on admit  Continue Lasix  Trend BMP

## 2024-09-27 NOTE — PROGRESS NOTES
Progress Note - Surgery-General   Name: Toro Rodriguez 74 y.o. male I MRN: [id number]  Unit/Bed#: -01 I Date of Admission: 9/22/2024   Date of Service: 9/27/2024 I Hospital Day: 5    Assessment & Plan  Hematuria  -urine is clear  - hemoglobin is stable and continue to trend  - irrigation prn, cont to monitor urine  -urine cx clear  -continue monitoring I/Os and vitals  - SPT today, pt may have hematuria. Cont to monitor. Hold eliquis is possible  Essential hypertension  - continue home medications  Chronic systolic (congestive) heart failure (HCC)  Wt Readings from Last 3 Encounters:   09/27/24 91.2 kg (201 lb 1 oz)   09/17/24 87.7 kg (193 lb 6.4 oz)   09/05/24 95.3 kg (210 lb)     Deep vein thrombosis (DVT) of left lower extremity (HCC)  -mngt per primary, eliquis  being held  -hold till Monday if possible  Coronary artery disease involving native coronary artery of native heart    Chronic kidney disease, stage 3a ([facility])  Lab Results   Component Value Date    EGFR 50 09/27/2024    EGFR 48 09/26/2024    EGFR 56 09/25/2024    CREATININE 1.36 (H) 09/27/2024    CREATININE 1.40 (H) 09/26/2024    CREATININE 1.24 09/25/2024     Urinary tract infection associated with indwelling urethral catheter  (HCC)  - continue abx and follow cultures  NSVT (nonsustained ventricular tachycardia) ([facility])    UTI (urinary tract infection)  -on abx  -farmer exchanged on admission  -urine cx neg  -SPT this am       History of Present Illness   Lying in bed  No complaints    Objective      Temp:  [97 °F (36.1 °C)-97.9 °F (36.6 °C)] 97.1 °F (36.2 °C)  HR:  [51-62] 56  Resp:  [14-20] 17  BP: (105-143)/(39-61) 116/59  O2 Device: None (Room air)          I/O         09/25 0701 09/26 0700 09/26 0701  09/27 0700 09/27 0701 09/28 0700    P.O. 1440 1000 0    I.V. (mL/kg)   100 (1.1)    Total Intake(mL/kg) 1440 (16) 1000 (11) 100 (1.1)    Urine (mL/kg/hr) 3000 (1.4) 2550 (1.2) 1150 (2.9)    Blood   0    Total Output 3000 2550 1150    Net -1560  -1550 -1050                 Lines/Drains/Airways       Active Status       Name Placement date Placement time Site Days    Urethral Catheter Three way 18 Fr. 09/22/24  1900  Three way  4    Suprapubic Catheter 18 Fr. 09/27/24  1017  --  less than 1                  Physical Exam General Appearance: NAD, cooperative, alert  Eyes: Anicteric, conjunctiva clear  HENT:  Normocephalic, atraumatic, normal mucosa.  external ears normal, external nose normal, no drainage  Neck:    Supple, symmetrical, trachea midline  Resp:  Clear to auscultation bilaterally; no rales, rhonchi or wheezing; respirations unlabored   CV:  S1 S2, Regular rate and rhythm; no murmur, rub, or gallop.  GI:  Soft, non-tender, non-distended; normal bowel sounds; no masses, no organomegaly   Hematuria noted, no clots. Slightly worse than yesterday  Urine clear  Psych: Normal affect, good eye contact  Neuro: No gross deficits, AAOx3, speech nl, naqvi      Lab Results: I have reviewed the following results: CBC/BMP:   .     09/27/24  0547   WBC 6.60   HGB 12.3   HCT 37.1      SODIUM 134*   K 4.8      CO2 27   BUN 35*   CREATININE 1.36*   GLUC 90      Imaging Review: No pertinent imaging studies reviewed.  Other Studies: No additional pertinent studies reviewed.    VTE Pharmacologic Prophylaxis: VTE covered by:    None      VTE Mechanical Prophylaxis: sequential compression device

## 2024-09-27 NOTE — PLAN OF CARE
Problem: Potential for Falls  Goal: Patient will remain free of falls  Description: INTERVENTIONS:  - Educate patient/family on patient safety including physical limitations  - Instruct patient to call for assistance with activity   - Consult OT/PT to assist with strengthening/mobility   - Keep Call bell within reach  - Keep bed low and locked with side rails adjusted as appropriate  - Keep care items and personal belongings within reach  - Initiate and maintain comfort rounds  - Make Fall Risk Sign visible to staff  - Offer Toileting every 2 Hours, in advance of need  - Initiate/Maintain call bell and bed alarm  - Obtain necessary fall risk management equipment: call bell usage and bed alarm  - Apply yellow socks and bracelet for high fall risk patients  - Consider moving patient to room near nurses station  Outcome: Progressing     Problem: PAIN - ADULT  Goal: Verbalizes/displays adequate comfort level or baseline comfort level  Description: Interventions:  - Encourage patient to monitor pain and request assistance  - Assess pain using appropriate pain scale  - Administer analgesics based on type and severity of pain and evaluate response  - Implement non-pharmacological measures as appropriate and evaluate response  - Consider cultural and social influences on pain and pain management  - Notify physician/advanced practitioner if interventions unsuccessful or patient reports new pain  Outcome: Progressing     Problem: INFECTION - ADULT  Goal: Absence or prevention of progression during hospitalization  Description: INTERVENTIONS:  - Assess and monitor for signs and symptoms of infection  - Monitor lab/diagnostic results  - Monitor all insertion sites, i.e. indwelling lines, tubes, and drains  - Monitor endotracheal if appropriate and nasal secretions for changes in amount and color  - [place] appropriate cooling/warming therapies per order  - Administer medications as ordered  - Instruct and encourage patient  and family to use good hand hygiene technique  - Identify and instruct in appropriate isolation precautions for identified infection/condition  Outcome: Progressing  Goal: Absence of fever/infection during neutropenic period  Description: INTERVENTIONS:  - Monitor WBC    Outcome: Progressing

## 2024-09-27 NOTE — ANESTHESIA POSTPROCEDURE EVALUATION
Post-Op Assessment Note    CV Status:  Stable  Pain Score: 0    Pain management: adequate       Mental Status:  Sleepy   Hydration Status:  Euvolemic and stable   PONV Controlled:  None   Airway Patency:  Patent     Post Op Vitals Reviewed: Yes    No anethesia notable event occurred.    Staff: JOHNSON               /61 (09/27/24 1035)    Temp (!) 97.1 °F (36.2 °C) (09/27/24 1035)    Pulse 58 (09/27/24 1035)   Resp 19 (09/27/24 1035)    SpO2 99 % (09/27/24 1035)

## 2024-09-27 NOTE — ASSESSMENT & PLAN NOTE
Wt Readings from Last 3 Encounters:   09/27/24 91.2 kg (201 lb 1 oz)   09/17/24 87.7 kg (193 lb 6.4 oz)   09/05/24 95.3 kg (210 lb)   Examines euvolemic on admission  Home regimen: Lasix 40 Mg daily  Last echo 2/2024: LVEF 35 to 40%  Continue home Lasix  Cardiac diet  Daily weights

## 2024-09-27 NOTE — ANESTHESIA PREPROCEDURE EVALUATION
Procedure:  IR SUPRAPUBIC CATHETER PLACEMENT    Relevant Problems   ANESTHESIA   (-) History of anesthesia complications      CARDIO   (+) Coronary artery disease involving native coronary artery of native heart   (+) Deep vein thrombosis (DVT) of left lower extremity (HCC)   (+) Essential hypertension      ENDO   (+) Type 2 diabetes mellitus with chronic kidney disease, without long-term current use of insulin, unspecified CKD stage (HCC)      /RENAL   (+) Benign prostatic hyperplasia with urinary retention   (+) Chronic kidney disease, stage 3a (HCC)      HEMATOLOGY   (+) Anemia      Cardiovascular/Peripheral Vascular   (+) Chronic systolic (congestive) heart failure (HCC)   (+) NSVT (nonsustained ventricular tachycardia) (HCC)     Left Ventricle: Left ventricular cavity size is normal. Wall thickness is mildly increased. The left ventricular ejection fraction is 35-40% by biplane measurement. Systolic function is moderately reduced. There is moderate global hypokinesis with specific regional wall abnormalities. Diastolic function is mildly abnormal, consistent with grade I (abnormal) relaxation.    The following segments are akinetic: basal inferior, basal inferolateral, mid inferior and mid inferolateral.    The remainder of the myocardium is hypokinetic.    Right Ventricle: Right ventricular cavity size is normal. Systolic function is normal.    Left Atrium: The atrium is moderately dilated.    Right Atrium: The atrium is dilated.    Mitral Valve: There is moderate regurgitation.        Physical Exam    Airway    Mallampati score: II  TM Distance: >3 FB  Neck ROM: full     Dental   Comment: Poor dentition, denies loose     Cardiovascular      Pulmonary      Other Findings        Anesthesia Plan  ASA Score- 4     Anesthesia Type- general with ASA Monitors.         Additional Monitors:     Airway Plan: LMA.           Plan Factors-    Chart reviewed. EKG reviewed.  Existing labs reviewed. Patient summary  reviewed.                  Induction- intravenous.    Postoperative Plan-     Perioperative Resuscitation Plan - Level 1 - Full Code.       Informed Consent- Anesthetic plan and risks discussed with patient.  I personally reviewed this patient with the CRNA. Discussed and agreed on the Anesthesia Plan with the CRNA..

## 2024-09-28 VITALS
OXYGEN SATURATION: 91 % | HEART RATE: 62 BPM | WEIGHT: 192.4 LBS | SYSTOLIC BLOOD PRESSURE: 144 MMHG | DIASTOLIC BLOOD PRESSURE: 62 MMHG | RESPIRATION RATE: 17 BRPM | BODY MASS INDEX: 30.13 KG/M2 | TEMPERATURE: 98.1 F

## 2024-09-28 LAB
ANION GAP SERPL CALCULATED.3IONS-SCNC: 8 MMOL/L (ref 4–13)
BASOPHILS # BLD AUTO: 0.05 THOUSANDS/ΜL (ref 0–0.1)
BASOPHILS NFR BLD AUTO: 1 % (ref 0–1)
BUN SERPL-MCNC: 34 MG/DL (ref 5–25)
CALCIUM SERPL-MCNC: 8.6 MG/DL (ref 8.4–10.2)
CHLORIDE SERPL-SCNC: 98 MMOL/L (ref 96–108)
CO2 SERPL-SCNC: 29 MMOL/L (ref 21–32)
CREAT SERPL-MCNC: 1.48 MG/DL (ref 0.6–1.3)
EOSINOPHIL # BLD AUTO: 0.38 THOUSAND/ΜL (ref 0–0.61)
EOSINOPHIL NFR BLD AUTO: 5 % (ref 0–6)
ERYTHROCYTE [DISTWIDTH] IN BLOOD BY AUTOMATED COUNT: 13.2 % (ref 11.6–15.1)
GFR SERPL CREATININE-BSD FRML MDRD: 45 ML/MIN/1.73SQ M
GLUCOSE SERPL-MCNC: 96 MG/DL (ref 65–140)
HCT VFR BLD AUTO: 36.7 % (ref 36.5–49.3)
HGB BLD-MCNC: 11.9 G/DL (ref 12–17)
IMM GRANULOCYTES # BLD AUTO: 0.03 THOUSAND/UL (ref 0–0.2)
IMM GRANULOCYTES NFR BLD AUTO: 0 % (ref 0–2)
LYMPHOCYTES # BLD AUTO: 1.35 THOUSANDS/ΜL (ref 0.6–4.47)
LYMPHOCYTES NFR BLD AUTO: 16 % (ref 14–44)
MCH RBC QN AUTO: 31.2 PG (ref 26.8–34.3)
MCHC RBC AUTO-ENTMCNC: 32.4 G/DL (ref 31.4–37.4)
MCV RBC AUTO: 96 FL (ref 82–98)
MONOCYTES # BLD AUTO: 0.57 THOUSAND/ΜL (ref 0.17–1.22)
MONOCYTES NFR BLD AUTO: 7 % (ref 4–12)
NEUTROPHILS # BLD AUTO: 5.96 THOUSANDS/ΜL (ref 1.85–7.62)
NEUTS SEG NFR BLD AUTO: 71 % (ref 43–75)
NRBC BLD AUTO-RTO: 0 /100 WBCS
PLATELET # BLD AUTO: 313 THOUSANDS/UL (ref 149–390)
PMV BLD AUTO: 9.9 FL (ref 8.9–12.7)
POTASSIUM SERPL-SCNC: 4.6 MMOL/L (ref 3.5–5.3)
RBC # BLD AUTO: 3.81 MILLION/UL (ref 3.88–5.62)
SODIUM SERPL-SCNC: 135 MMOL/L (ref 135–147)
WBC # BLD AUTO: 8.34 THOUSAND/UL (ref 4.31–10.16)

## 2024-09-28 PROCEDURE — 85025 COMPLETE CBC W/AUTO DIFF WBC: CPT | Performed by: INTERNAL MEDICINE

## 2024-09-28 PROCEDURE — 99239 HOSP IP/OBS DSCHRG MGMT >30: CPT | Performed by: INTERNAL MEDICINE

## 2024-09-28 PROCEDURE — 80048 BASIC METABOLIC PNL TOTAL CA: CPT | Performed by: INTERNAL MEDICINE

## 2024-09-28 RX ORDER — ASPIRIN 81 MG/1
81 TABLET, CHEWABLE ORAL DAILY
Start: 2024-09-30

## 2024-09-28 RX ADMIN — LISINOPRIL 5 MG: 5 TABLET ORAL at 09:53

## 2024-09-28 RX ADMIN — CEFEPIME HYDROCHLORIDE 1000 MG: 1 INJECTION, SOLUTION INTRAVENOUS at 02:06

## 2024-09-28 RX ADMIN — FINASTERIDE 5 MG: 5 TABLET, FILM COATED ORAL at 09:53

## 2024-09-28 RX ADMIN — AMIODARONE HYDROCHLORIDE 200 MG: 200 TABLET ORAL at 09:53

## 2024-09-28 RX ADMIN — FUROSEMIDE 40 MG: 40 TABLET ORAL at 09:52

## 2024-09-28 RX ADMIN — METOPROLOL SUCCINATE 25 MG: 25 TABLET, EXTENDED RELEASE ORAL at 09:52

## 2024-09-28 NOTE — PLAN OF CARE
Problem: Potential for Falls  Goal: Patient will remain free of falls  Description: INTERVENTIONS:  - Educate patient/family on patient safety including physical limitations  - Instruct patient to call for assistance with activity   - Consult OT/PT to assist with strengthening/mobility   - Keep Call bell within reach  - Keep bed low and locked with side rails adjusted as appropriate  - Keep care items and personal belongings within reach  - Initiate and maintain comfort rounds  - Make Fall Risk Sign visible to staff  - Offer Toileting every 2 Hours, in advance of need  - Initiate/Maintain call bell alarm  - Obtain necessary fall risk management equipment: call bell usage  - Apply yellow socks and bracelet for high fall risk patients  - Consider moving patient to room near nurses station  Outcome: Progressing     Problem: PAIN - ADULT  Goal: Verbalizes/displays adequate comfort level or baseline comfort level  Description: Interventions:  - Encourage patient to monitor pain and request assistance  - Assess pain using appropriate pain scale  - Administer analgesics based on type and severity of pain and evaluate response  - Implement non-pharmacological measures as appropriate and evaluate response  - Consider cultural and social influences on pain and pain management  - Notify physician/advanced practitioner if interventions unsuccessful or patient reports new pain  Outcome: Progressing     Problem: INFECTION - ADULT  Goal: Absence or prevention of progression during hospitalization  Description: INTERVENTIONS:  - Assess and monitor for signs and symptoms of infection  - Monitor lab/diagnostic results  - Monitor all insertion sites, i.e. indwelling lines, tubes, and drains  - Monitor endotracheal if appropriate and nasal secretions for changes in amount and color  - [place] appropriate cooling/warming therapies per order  - Administer medications as ordered  - Instruct and encourage patient and family to use good  hand hygiene technique  - Identify and instruct in appropriate isolation precautions for identified infection/condition  Outcome: Progressing  Goal: Absence of fever/infection during neutropenic period  Description: INTERVENTIONS:  - Monitor WBC    Outcome: Progressing

## 2024-09-28 NOTE — ASSESSMENT & PLAN NOTE
Lab Results   Component Value Date    EGFR 45 09/28/2024    EGFR 50 09/27/2024    EGFR 48 09/26/2024    CREATININE 1.48 (H) 09/28/2024    CREATININE 1.36 (H) 09/27/2024    CREATININE 1.40 (H) 09/26/2024   Baseline creatinine 1.0-1.2  Creatinine on admission 1.40  Received 500 cc normal saline in the ED  Examines euvolemic on admit  Continue Lasix  Trend BMP

## 2024-09-28 NOTE — CASE MANAGEMENT
Case Management Discharge Planning Note    Patient name Toro Rodriguez  Location /-01 MRN [id number]  : 1950 Date 2024       Current Admission Date: 2024  Current Admission Diagnosis:Hematuria   Patient Active Problem List    Diagnosis Date Noted Date Diagnosed    NSVT (nonsustained ventricular tachycardia) ([facility]) 2024     UTI (urinary tract infection) 2024     Urinary tract infection associated with indwelling urethral catheter  ([facility]) 2024     Encounter to discuss test results 2024     Urethral erosion by catheter, initial encounter  ([facility]) 2024     Anemia 2024     Hematuria 2024     Chronic venous hypertension (idiopathic) with ulcer of left lower extremity (CODE) ([facility]) 2024     Chronic kidney disease, stage 3a ([facility]) 2024     Encounter for geriatric assessment 2024     Melanoma of back ([facility]) 2024     Dilated cardiomyopathy ([facility]) 12/15/2023     Obesity, morbid ([facility]) 2023     Type 2 diabetes mellitus with chronic kidney disease, without long-term current use of insulin, unspecified CKD stage ([facility]) 11/15/2023     Essential hypertension 11/10/2023     Generalized weakness 11/10/2023     Chronic systolic (congestive) heart failure ([facility]) 11/10/2023     Deep vein thrombosis (DVT) of left lower extremity ([facility]) 11/10/2023     Coronary artery disease involving native coronary artery of native heart 11/10/2023     Benign prostatic hyperplasia with urinary retention 2014       LOS (days): 6  Geometric Mean LOS (GMLOS) (days): 3.3  Days to GMLOS:-2.3     OBJECTIVE:  Risk of Unplanned Readmission Score: 32.95         Current admission status: Inpatient   Preferred Pharmacy:   [facility] [address]  [address] [address]  Phone: [phone] Fax: [phone]    [facility] [place] - [facility] [address]  [address]  [place] [address]  Phone: [phone] Fax: [phone]    Primary Care Provider: CARLIE Foster    Primary Insurance: MEDICARE  Secondary Insurance: BLUE CROSS    DISCHARGE DETAILS:    Discharge planning discussed with:: Pt and pt's daughter  Freedom of Choice: Yes     CM contacted family/caregiver?: Yes  Were Treatment Team discharge recommendations reviewed with patient/caregiver?: Yes  Did patient/caregiver verbalize understanding of patient care needs?: Yes  Were patient/caregiver advised of the risks associated with not following Treatment Team discharge recommendations?: Yes    Contacts  Patient Contacts: Shanique Mcintosh dtr  Relationship to Patient:: Family  Contact Method: Phone  Phone Number: [phone]  Reason/Outcome: Discharge Planning              Other Referral/Resources/Interventions Provided:  Interventions: Transportation  Referral Comments: CM spoke with Nurse Noelle at [facility]. Pt is able to go back. Daughter cannot transport. CM arranged WC Van for 3pm today via roundtrip. CM informed MIO, RN, [facility], and daughter.       Discussed with Patient  there may be an out of pocket expense for transport.

## 2024-09-28 NOTE — ASSESSMENT & PLAN NOTE
Wt Readings from Last 3 Encounters:   09/28/24 87.3 kg (192 lb 6.4 oz)   09/17/24 87.7 kg (193 lb 6.4 oz)   09/05/24 95.3 kg (210 lb)   Examines euvolemic on admission  Home regimen: Lasix 40 Mg daily  Last echo 2/2024: LVEF 35 to 40%  Continue home Lasix  Cardiac diet  Daily weights

## 2024-09-28 NOTE — PLAN OF CARE
Problem: Potential for Falls  Goal: Patient will remain free of falls  Description: INTERVENTIONS:  - Educate patient/family on patient safety including physical limitations  - Instruct patient to call for assistance with activity   - Consult OT/PT to assist with strengthening/mobility   - Keep Call bell within reach  - Keep bed low and locked with side rails adjusted as appropriate  - Keep care items and personal belongings within reach  - Initiate and maintain comfort rounds  - Make Fall Risk Sign visible to staff  - Offer Toileting every 2 Hours, in advance of need  - Initiate/Maintain alarm  - Obtain necessary fall risk management equipment  - Apply yellow socks and bracelet for high fall risk patients  - Consider moving patient to room near nurses station  Outcome: Progressing     Problem: PAIN - ADULT  Goal: Verbalizes/displays adequate comfort level or baseline comfort level  Description: Interventions:  - Encourage patient to monitor pain and request assistance  - Assess pain using appropriate pain scale  - Administer analgesics based on type and severity of pain and evaluate response  - Implement non-pharmacological measures as appropriate and evaluate response  - Consider cultural and social influences on pain and pain management  - Notify physician/advanced practitioner if interventions unsuccessful or patient reports new pain  Outcome: Progressing     Problem: INFECTION - ADULT  Goal: Absence or prevention of progression during hospitalization  Description: INTERVENTIONS:  - Assess and monitor for signs and symptoms of infection  - Monitor lab/diagnostic results  - Monitor all insertion sites, i.e. indwelling lines, tubes, and drains  - Monitor endotracheal if appropriate and nasal secretions for changes in amount and color  - [place] appropriate cooling/warming therapies per order  - Administer medications as ordered  - Instruct and encourage patient and family to use good hand hygiene technique  -  Identify and instruct in appropriate isolation precautions for identified infection/condition  Outcome: Progressing  Goal: Absence of fever/infection during neutropenic period  Description: INTERVENTIONS:  - Monitor WBC    Outcome: Progressing     Problem: DISCHARGE PLANNING  Goal: Discharge to home or other facility with appropriate resources  Description: INTERVENTIONS:  - Identify barriers to discharge w/patient and caregiver  - Arrange for needed discharge resources and transportation as appropriate  - Identify discharge learning needs (meds, wound care, etc.)  - Arrange for interpretive services to assist at discharge as needed  - Refer to Case Management Department for coordinating discharge planning if the patient needs post-hospital services based on physician/advanced practitioner order or complex needs related to functional status, cognitive ability, or social support system  Outcome: Progressing     Problem: Knowledge Deficit  Goal: Patient/family/caregiver demonstrates understanding of disease process, treatment plan, medications, and discharge instructions  Description: Complete learning assessment and assess knowledge base.  Interventions:  - Provide teaching at level of understanding  - Provide teaching via preferred learning methods  Outcome: Progressing     Problem: GENITOURINARY - ADULT  Goal: Maintains or returns to baseline urinary function  Description: INTERVENTIONS:  - Assess urinary function  - Encourage oral fluids to ensure adequate hydration if ordered  - Administer IV fluids as ordered to ensure adequate hydration  - Administer ordered medications as needed  - Offer frequent toileting  - Follow urinary retention protocol if ordered  Outcome: Progressing  Goal: Absence of urinary retention  Description: INTERVENTIONS:  - Assess patient’s ability to void and empty bladder  - Monitor I/O  - Bladder scan as needed  - Discuss with physician/AP medications to alleviate retention as needed  -  Discuss catheterization for long term situations as appropriate  Outcome: Progressing  Goal: Urinary catheter remains patent  Description: INTERVENTIONS:  - Assess patency of urinary catheter  - If patient has a chronic farmer, consider changing catheter if non-functioning  - Follow guidelines for intermittent irrigation of non-functioning urinary catheter  Outcome: Progressing     Problem: Prexisting or High Potential for Compromised Skin Integrity  Goal: Skin integrity is maintained or improved  Description: INTERVENTIONS:  - Identify patients at risk for skin breakdown  - Assess and monitor skin integrity  - Assess and monitor nutrition and hydration status  - Monitor labs   - Assess for incontinence   - Turn and reposition patient  - Assist with mobility/ambulation  - Relieve pressure over bony prominences  - Avoid friction and shearing  - Provide appropriate hygiene as needed including keeping skin clean and dry  - Evaluate need for skin moisturizer/barrier cream  - Collaborate with interdisciplinary team   - Patient/family teaching  - Consider wound care consult   Outcome: Progressing

## 2024-09-28 NOTE — DISCHARGE INSTR - AVS FIRST PAGE
Follow-up with PCP in 1 week  Follow-up with urology and IR as outpatient  Restart Eliquis and aspirin on September 30, 2024

## 2024-09-28 NOTE — NURSING NOTE
Attempted to call report. Gave call back number. Awaiting call back for patient. Continuing care...

## 2024-09-28 NOTE — DISCHARGE SUMMARY
"Discharge Summary - Hospitalist   Name: Toro Rodriguez 74 y.o. male I MRN: [id number]  Unit/Bed#: -01 I Date of Admission: 9/22/2024   Date of Service: 9/28/2024 I Hospital Day: 6     Assessment & Plan  Hematuria  First noticed dark urine in the morning of 9/22 and then progressed to gross hematuria throughout the day  Manual irrigation and CBI x 2 attempted in the ED, however continued to have recurrent hematuria prompting admission  CT A/P: \"Decompressed bladder limiting evaluation for clot burden. Mild amount of intraluminal hyperdense material surrounding the Colon catheter balloon consistent with blood products/clot. Mild right hydroureteronephrosis, new from prior. Inflammatory changes regional to the right ureter, indeterminate etiology.\"  At time of admission, CBI had been paused for 3 hours and urine is light yellow in Colon catheter - will continue holding CBI and perform manual irrigation qshift and PRN   Urology consult appreciated  Monitor H&H  Noted to still have hematuria this morning.  Continue manual irrigation  On cefepime  Urology spoke with IR for suprapubic catheter placement.  Urine cultures are negative.  Aspirin and Eliquis held  Possible suprapubic catheter placement today. Pt is NPO  Urinary tract infection associated with indwelling urethral catheter  (HCC)  Recently admitted 9/12-9/17 for UTI treated 3 days of vancomycin, 3 days of cefepime with transition to Cipro plan of 3 more days of antibiotics to complete 7-day course  On cefepime  Urine cultures are negative  Chronic kidney disease, stage 3a ([facility])  Lab Results   Component Value Date    EGFR 45 09/28/2024    EGFR 50 09/27/2024    EGFR 48 09/26/2024    CREATININE 1.48 (H) 09/28/2024    CREATININE 1.36 (H) 09/27/2024    CREATININE 1.40 (H) 09/26/2024   Baseline creatinine 1.0-1.2  Creatinine on admission 1.40  Received 500 cc normal saline in the ED  Examines euvolemic on admit  Continue Lasix  Trend BMP  Essential " hypertension  Home regimen: Lasix 40 Mg daily, metoprolol succinate 25 Mg twice daily, lisinopril 5 Mg daily  Continue home medications  Chronic systolic (congestive) heart failure (HCC)  Wt Readings from Last 3 Encounters:   09/28/24 87.3 kg (192 lb 6.4 oz)   09/17/24 87.7 kg (193 lb 6.4 oz)   09/05/24 95.3 kg (210 lb)   Examines euvolemic on admission  Home regimen: Lasix 40 Mg daily  Last echo 2/2024: LVEF 35 to 40%  Continue home Lasix  Cardiac diet  Daily weights  Deep vein thrombosis (DVT) of left lower extremity (HCC)  On Eliquis, currently on hold due to hematuria-if urine remains clear by morning would resume  Coronary artery disease involving native coronary artery of native heart  History of CTA with  of RCA  Follows with cardiology, Dr. Roach, outpatient  Aspirin and Eliquis on hold for possible suprapubic catheter placement  Continue statin  NSVT (nonsustained ventricular tachycardia) (HCC)  Home regimen: Amiodarone 200 Mg daily, metoprolol succinate 25 Mg twice daily  Continue home medications  UTI (urinary tract infection)  See above   Hospital Course:     Toro Rodriguez is a 74 y.o. male patient who originally presented to the hospital on   Admission Orders (From admission, onward)       Ordered        09/22/24 2330  INPATIENT ADMISSION  Once                         due to hematuria.  Patient was getting treated for UTI with ciprofloxacin.  Patient was switched to cefepime while inpatient.  Patient was seen by urology and started on manual irrigation.  Patient hematuria resolved and urology recommended suprapubic catheter placement.  Patient underwent suprapubic catheter placement on 9/27 by IR.  Colon catheter was discontinued per urology recommendation.  Patient urine culture is negative and antibiotics were discontinued at discharge  Patient as per urology can restart Eliquis and aspirin on Monday, September 30  Patient is cleared for discharge by urology.  Patient was seen by case management  and will be discharged back to [facility] with home care services  Discussed with patient and he is in agreement with the discharge plan  On Exam-  Chest-bilateral air entry, clear to auscultation  Abdomen-soft, nontender, suprapubic catheter in place  Heart-S1 S2 regular  Extremities-no pedal edema or calf tenderness  Neuro-alert awake oriented x3.  No focal deficits    Please see above list of diagnoses and related plan for additional information.   Follow-up with PCP in 1 week  Follow-up with urology and IR as outpatient  Restart Eliquis and aspirin on September 30, 2024    Condition at Discharge:  good      Discharge instructions/Information to patient and family:   See after visit summary for information provided to patient and family.      Provisions for Follow-Up Care:  See after visit summary for information related to follow-up care and any pertinent home health orders.      Disposition:     [facility] [place] assisted with home care services       Discharge Statement:  I spent 40 minutes discharging the patient. This time was spent on the day of discharge. I had direct contact with the patient on the day of discharge. Greater than 50% of the total time was spent examining patient, answering all patient questions, arranging and discussing plan of care with patient as well as directly providing post-discharge instructions.  Additional time then spent on discharge activities.    Discharge Medications:  See after visit summary for reconciled discharge medications provided to patient and family.      ** Please Note: This note has been constructed using a voice recognition system **

## 2024-09-30 ENCOUNTER — HOSPITAL ENCOUNTER (EMERGENCY)
Facility: HOSPITAL | Age: 74
Discharge: HOME | End: 2024-09-30
Attending: EMERGENCY MEDICINE | Admitting: EMERGENCY MEDICINE
Payer: MEDICARE

## 2024-09-30 VITALS
RESPIRATION RATE: 18 BRPM | HEART RATE: 59 BPM | OXYGEN SATURATION: 91 % | SYSTOLIC BLOOD PRESSURE: 160 MMHG | TEMPERATURE: 98.1 F | DIASTOLIC BLOOD PRESSURE: 78 MMHG

## 2024-09-30 DIAGNOSIS — Z93.59 SUPRAPUBIC CATHETER (HCC): ICD-10-CM

## 2024-09-30 DIAGNOSIS — I82.502 CHRONIC DEEP VEIN THROMBOSIS (DVT) OF LEFT LOWER EXTREMITY (HCC): ICD-10-CM

## 2024-09-30 DIAGNOSIS — I10 ESSENTIAL HYPERTENSION: Chronic | ICD-10-CM

## 2024-09-30 DIAGNOSIS — R31.9 HEMATURIA: Primary | ICD-10-CM

## 2024-09-30 DIAGNOSIS — I50.23 ACUTE ON CHRONIC SYSTOLIC (CONGESTIVE) HEART FAILURE (HCC): ICD-10-CM

## 2024-09-30 LAB
ANION GAP SERPL CALCULATED.3IONS-SCNC: 8 MMOL/L (ref 4–13)
APTT PPP: 33 SECONDS (ref 23–34)
BACTERIA UR QL AUTO: ABNORMAL /HPF
BASOPHILS # BLD AUTO: 0.04 THOUSANDS/ÂΜL (ref 0–0.1)
BASOPHILS NFR BLD AUTO: 1 % (ref 0–1)
BILIRUB UR QL STRIP: NEGATIVE
BUN SERPL-MCNC: 46 MG/DL (ref 5–25)
CALCIUM SERPL-MCNC: 9 MG/DL (ref 8.4–10.2)
CHLORIDE SERPL-SCNC: 102 MMOL/L (ref 96–108)
CLARITY UR: ABNORMAL
CO2 SERPL-SCNC: 29 MMOL/L (ref 21–32)
COLOR UR: YELLOW
CREAT SERPL-MCNC: 1.77 MG/DL (ref 0.6–1.3)
EOSINOPHIL # BLD AUTO: 0.42 THOUSAND/ÂΜL (ref 0–0.61)
EOSINOPHIL NFR BLD AUTO: 8 % (ref 0–6)
ERYTHROCYTE [DISTWIDTH] IN BLOOD BY AUTOMATED COUNT: 13.2 % (ref 11.6–15.1)
GFR SERPL CREATININE-BSD FRML MDRD: 36 ML/MIN/1.73SQ M
GLUCOSE SERPL-MCNC: 119 MG/DL (ref 65–140)
GLUCOSE UR STRIP-MCNC: NEGATIVE MG/DL
GRAN CASTS #/AREA URNS LPF: ABNORMAL /[LPF]
HCT VFR BLD AUTO: 36 % (ref 36.5–49.3)
HGB BLD-MCNC: 11.9 G/DL (ref 12–17)
HGB UR QL STRIP.AUTO: ABNORMAL
IMM GRANULOCYTES # BLD AUTO: 0.02 THOUSAND/UL (ref 0–0.2)
IMM GRANULOCYTES NFR BLD AUTO: 0 % (ref 0–2)
INR PPP: 1.26 (ref 0.85–1.19)
KETONES UR STRIP-MCNC: NEGATIVE MG/DL
LEUKOCYTE ESTERASE UR QL STRIP: ABNORMAL
LYMPHOCYTES # BLD AUTO: 1.17 THOUSANDS/ÂΜL (ref 0.6–4.47)
LYMPHOCYTES NFR BLD AUTO: 23 % (ref 14–44)
MCH RBC QN AUTO: 32 PG (ref 26.8–34.3)
MCHC RBC AUTO-ENTMCNC: 33.1 G/DL (ref 31.4–37.4)
MCV RBC AUTO: 97 FL (ref 82–98)
MONOCYTES # BLD AUTO: 0.46 THOUSAND/ÂΜL (ref 0.17–1.22)
MONOCYTES NFR BLD AUTO: 9 % (ref 4–12)
MUCOUS THREADS UR QL AUTO: ABNORMAL
NEUTROPHILS # BLD AUTO: 3.1 THOUSANDS/ÂΜL (ref 1.85–7.62)
NEUTS SEG NFR BLD AUTO: 59 % (ref 43–75)
NITRITE UR QL STRIP: NEGATIVE
NON-SQ EPI CELLS URNS QL MICRO: ABNORMAL /HPF
NRBC BLD AUTO-RTO: 0 /100 WBCS
PH UR STRIP.AUTO: 6.5 [PH]
PLATELET # BLD AUTO: 336 THOUSANDS/UL (ref 149–390)
PMV BLD AUTO: 10.2 FL (ref 8.9–12.7)
POTASSIUM SERPL-SCNC: 4.3 MMOL/L (ref 3.5–5.3)
PROT UR STRIP-MCNC: ABNORMAL MG/DL
PROTHROMBIN TIME: 16.3 SECONDS (ref 12.3–15)
RBC # BLD AUTO: 3.72 MILLION/UL (ref 3.88–5.62)
RBC #/AREA URNS AUTO: ABNORMAL /HPF
SODIUM SERPL-SCNC: 139 MMOL/L (ref 135–147)
SP GR UR STRIP.AUTO: 1.01 (ref 1–1.03)
UROBILINOGEN UR STRIP-ACNC: <2 MG/DL
WBC # BLD AUTO: 5.21 THOUSAND/UL (ref 4.31–10.16)
WBC #/AREA URNS AUTO: ABNORMAL /HPF

## 2024-09-30 PROCEDURE — 85025 COMPLETE CBC W/AUTO DIFF WBC: CPT | Performed by: EMERGENCY MEDICINE

## 2024-09-30 PROCEDURE — 99285 EMERGENCY DEPT VISIT HI MDM: CPT | Performed by: EMERGENCY MEDICINE

## 2024-09-30 PROCEDURE — 80048 BASIC METABOLIC PNL TOTAL CA: CPT | Performed by: EMERGENCY MEDICINE

## 2024-09-30 PROCEDURE — 81001 URINALYSIS AUTO W/SCOPE: CPT | Performed by: EMERGENCY MEDICINE

## 2024-09-30 PROCEDURE — 99284 EMERGENCY DEPT VISIT MOD MDM: CPT

## 2024-09-30 PROCEDURE — 36415 COLL VENOUS BLD VENIPUNCTURE: CPT | Performed by: EMERGENCY MEDICINE

## 2024-09-30 PROCEDURE — 85610 PROTHROMBIN TIME: CPT | Performed by: EMERGENCY MEDICINE

## 2024-09-30 PROCEDURE — 85730 THROMBOPLASTIN TIME PARTIAL: CPT | Performed by: EMERGENCY MEDICINE

## 2024-09-30 NOTE — TELEPHONE ENCOUNTER
LVM to let PT know that apt was changed from 10/23 to 11/11 in the [place] office with Alexandra .  I did tell the PT that I would be sending a letter so he is aware of the apt change.

## 2024-09-30 NOTE — ED PROVIDER NOTES
Final diagnoses:   Hematuria   Suprapubic catheter (HCC)   Essential hypertension   Chronic systolic (congestive) heart failure (HCC)   Chronic deep vein thrombosis (DVT) of left lower extremity (HCC)     ED Disposition       ED Disposition   Discharge    Condition   Stable    Date/Time   Mon Sep 30, 2024  8:14 PM    Comment   Toro Rodriguez discharge to home/self care.                   Assessment & Plan       Medical Decision Making  Differential diagnosis: Recurrent hematuria, urinary tract infection, anemia, renal failure    Amount and/or Complexity of Data Reviewed  External Data Reviewed: notes.     Details: Recent discharge summary reviewed  Labs: ordered. Decision-making details documented in ED Course.        ED Course as of 09/30/24 2023   Mon Sep 30, 2024   1901 Creatinine(!): 1.77  Elevated when compared to previous   1916 Patient continues to spontaneously drain urine after prepubic flushed.  Urine still has small clots and slight blood tinge.  Will discuss with urology   1928 Discussed with urology CRWESLEY Piger who recommends discharge with increased oral intake and holding blood thinner until 24 hours without hematuria. Will discuss risks with patient given hx of CHF and well as DVT   1953 I had a discussion with the patient regarding the urology recommendations to hold the blood thinner until urine is clear of blood for 24 hours.  I discussed why the patient is on the blood thinner with the DVT and the risk for clot and stroke with the patient.  The patient understands these risks and is agreeable to holding the blood thinner as he is already improving.  I also discussed the recommendation regarding increasing fluid intake and to be cautious with that given his history of heart failure.  The patient understands this and will only slightly increase his fluid intake.  The patient is agreeable with the plan for discharge and close outpatient urology follow-up       Medications - No data to display    ED  Risk Strat Scores                                               History of Present Illness       Chief Complaint   Patient presents with    Blood in Urine     Pt to ED via EMS c/o blood in urine. EMS reported that pt was discharged on Saturday and was just restarted on his blood thinner today. EMS stated that pt noticed the blood in his urine starting today. Pt denies CP, SOB, and dizziness.        Past Medical History:   Diagnosis Date    Alcohol intoxication ([facility]) 10/15/2020    BPH (benign prostatic hyperplasia)     Chronic HFrEF (heart failure with reduced ejection fraction) ([facility]) 11/2023    Coronary artery calcification seen on CT scan 11/10/2023    Coronary artery disease 12/14/2023    Deep vein thrombosis (DVT) of left lower extremity ([facility]) 11/2023    Diabetes mellitus ([facility]) 11/2023    Fall from slip, trip, or stumble 10/15/2020    History of phimosis of penis     History of urinary calculi     Hypertension 1988    Skin cancer     Tobacco abuse     quit around 2000      Past Surgical History:   Procedure Laterality Date    BLADDER STONE REMOVAL  2014    CARDIAC CATHETERIZATION N/A 12/14/2023    Procedure: Cardiac catheterization;  Surgeon: Dave Royal MD;  Location: [facility];  Service: Cardiology    IR SUPRAPUBIC CATHETER PLACEMENT  9/27/2024    ORIF ANKLE FRACTURE Left     SKIN LESION EXCISION N/A 3/18/2024    Procedure: WIDE LOCAL EXCISION OF BACK MELANOMA;  Surgeon: Lillie La MD;  Location: [facility];  Service: Surgical Oncology    TOTAL HIP ARTHROPLASTY Right       Family History   Problem Relation Age of Onset    Breast cancer Mother     Heart attack Father 61    Other Sister         s/p hysterectomy    Cerebral palsy Brother     Skin cancer Other         family history    No Known Problems Son     No Known Problems Daughter     Sudden death Neg Hx       Social History     Tobacco Use    Smoking status: Former     Types: Cigarettes     Start date: 1990     Quit date: 1962     Years  since quittin.7    Smokeless tobacco: Never    Tobacco comments:     Quit over 30 years ago (Updated 2023).    Vaping Use    Vaping status: Never Used   Substance Use Topics    Alcohol use: Not Currently    Drug use: Never      E-Cigarette/Vaping    E-Cigarette Use Never User       E-Cigarette/Vaping Substances    Nicotine No     THC No     CBD No     Flavoring No     Other No     Unknown No       I have reviewed and agree with the history as documented.     72-year-old male with new suprapubic catheter presents for the evaluation of recurrent hematuria beginning today.  The patient was just discharged in the hospital after having recurrent hematuria that necessitated the placement of a suprapubic Colon catheter while in the hospital.  The patient states that he restarted his Lasix today as well as his blood thinner.  The patient denies any abdominal pain, fever, chills or trouble with urine drainage through the suprapubic catheter.      Blood in Urine        Review of Systems   Genitourinary:  Positive for hematuria.           Objective       ED Triage Vitals   Temperature Pulse Blood Pressure Respirations SpO2 Patient Position - Orthostatic VS   24   98.1 °F (36.7 °C) 74 144/67 18 96 % Sitting      Temp Source Heart Rate Source BP Location FiO2 (%) Pain Score    24 -- 24    Oral Monitor Right arm  No Pain      Vitals      Date and Time Temp Pulse SpO2 Resp BP Pain Score FACES Pain Rating User   24 1900 -- 59 92 % 19 151/66 -- -- RN   24 -- 68 92 % 18 138/63 -- -- RN   24 -- -- -- -- 144/67 No Pain -- RN   24 98.1 °F (36.7 °C) 74 96 % 18 -- -- -- RN            Physical Exam  Vitals and nursing note reviewed.   Constitutional:       General: He is not in acute distress.     Appearance: He is well-developed.   HENT:      Head: Normocephalic  and atraumatic.      Right Ear: External ear normal.      Left Ear: External ear normal.      Mouth/Throat:      Pharynx: No oropharyngeal exudate.   Eyes:      General: No scleral icterus.     Pupils: Pupils are equal, round, and reactive to light.   Cardiovascular:      Rate and Rhythm: Normal rate and regular rhythm.      Heart sounds: Normal heart sounds.   Pulmonary:      Effort: Pulmonary effort is normal. No respiratory distress.      Breath sounds: Normal breath sounds.   Abdominal:      General: Bowel sounds are normal.      Palpations: Abdomen is soft.      Tenderness: There is no abdominal tenderness. There is no guarding or rebound.   Genitourinary:     Penis: Normal.       Comments: Prepubic catheter appears in place with ostomy site clean dry and intact  Musculoskeletal:         General: Normal range of motion.      Cervical back: Normal range of motion and neck supple.   Skin:     General: Skin is warm and dry.      Findings: No rash.   Neurological:      Mental Status: He is alert and oriented to person, place, and time.         Results Reviewed       Procedure Component Value Units Date/Time    Urine Microscopic [[id number]]  (Abnormal) Collected: 09/30/24 1944    Lab Status: Final result Specimen: Urine, Suprapubic catheter Updated: 09/30/24 2011     RBC, UA 30-50 /hpf      WBC, UA 1-2 /hpf      Epithelial Cells None Seen /hpf      Bacteria, UA Occasional /hpf      MUCUS THREADS Occasional     Granular Casts, UA 0-3    UA w Reflex to Microscopic w Reflex to Culture [[id number]]  (Abnormal) Collected: 09/30/24 1944    Lab Status: Final result Specimen: Urine, Suprapubic catheter Updated: 09/30/24 2011     Color, UA Yellow     Clarity, UA Slightly Cloudy     Specific Gravity, UA 1.015     pH, UA 6.5     Leukocytes, UA Small     Nitrite, UA Negative     Protein, UA 30 (1+) mg/dl      Glucose, UA Negative mg/dl      Ketones, UA Negative mg/dl      Urobilinogen, UA <2.0 mg/dl      Bilirubin, UA Negative      Occult Blood, UA Large    Protime-INR [[id number]]  (Abnormal) Collected: 09/30/24 1835    Lab Status: Final result Specimen: Blood from Arm, Left Updated: 09/30/24 1902     Protime 16.3 seconds      INR 1.26    Narrative:      INR Therapeutic Range    Indication                                             INR Range      Atrial Fibrillation                                               2.0-3.0  Hypercoagulable State                                    2.0.2.3  Left Ventricular Asist Device                            2.0-3.0  Mechanical Heart Valve                                  -    Aortic(with afib, MI, embolism, HF, LA enlargement,    and/or coagulopathy)                                     2.0-3.0 (2.5-3.5)     Mitral                                                             2.5-3.5  Prosthetic/Bioprosthetic Heart Valve               2.0-3.0  Venous thromboembolism (VTE: VT, PE        2.0-3.0    APTT [[id number]]  (Normal) Collected: 09/30/24 1835    Lab Status: Final result Specimen: Blood from Arm, Left Updated: 09/30/24 1902     PTT 33 seconds     Basic metabolic panel [[id number]]  (Abnormal) Collected: 09/30/24 1835    Lab Status: Final result Specimen: Blood from Arm, Left Updated: 09/30/24 1900     Sodium 139 mmol/L      Potassium 4.3 mmol/L      Chloride 102 mmol/L      CO2 29 mmol/L      ANION GAP 8 mmol/L      BUN 46 mg/dL      Creatinine 1.77 mg/dL      Glucose 119 mg/dL      Calcium 9.0 mg/dL      eGFR 36 ml/min/1.73sq m     Narrative:      National Kidney Disease Foundation guidelines for Chronic Kidney Disease (CKD):     Stage 1 with normal or high GFR (GFR > 90 mL/min/1.73 square meters)    Stage 2 Mild CKD (GFR = 60-89 mL/min/1.73 square meters)    Stage 3A Moderate CKD (GFR = 45-59 mL/min/1.73 square meters)    Stage 3B Moderate CKD (GFR = 30-44 mL/min/1.73 square meters)    Stage 4 Severe CKD (GFR = 15-29 mL/min/1.73 square meters)    Stage 5 End Stage CKD (GFR <15 mL/min/1.73 square  meters)  Note: GFR calculation is accurate only with a steady state creatinine    CBC and differential [[id number]]  (Abnormal) Collected: 09/30/24 1835    Lab Status: Final result Specimen: Blood from Arm, Left Updated: 09/30/24 1846     WBC 5.21 Thousand/uL      RBC 3.72 Million/uL      Hemoglobin 11.9 g/dL      Hematocrit 36.0 %      MCV 97 fL      MCH 32.0 pg      MCHC 33.1 g/dL      RDW 13.2 %      MPV 10.2 fL      Platelets 336 Thousands/uL      nRBC 0 /100 WBCs      Segmented % 59 %      Immature Grans % 0 %      Lymphocytes % 23 %      Monocytes % 9 %      Eosinophils Relative 8 %      Basophils Relative 1 %      Absolute Neutrophils 3.10 Thousands/µL      Absolute Immature Grans 0.02 Thousand/uL      Absolute Lymphocytes 1.17 Thousands/µL      Absolute Monocytes 0.46 Thousand/µL      Eosinophils Absolute 0.42 Thousand/µL      Basophils Absolute 0.04 Thousands/µL             No orders to display       Procedures    ED Medication and Procedure Management   Prior to Admission Medications   Prescriptions Last Dose Informant Patient Reported? Taking?   acetaminophen (TYLENOL) 325 mg tablet   Yes No   Sig: Take 325 mg by mouth every 6 (six) hours as needed for mild pain   amiodarone 200 mg tablet   No No   Sig: Take 1 tablet (200 mg total) by mouth daily   apixaban (ELIQUIS) 5 mg   No No   Sig: Take 1 tablet (5 mg total) by mouth 2 (two) times a day Do not start before September 30, 2024.   aspirin 81 mg chewable tablet   No No   Sig: Chew 1 tablet (81 mg total) daily Do not start before September 30, 2024.   atorvastatin (LIPITOR) 40 mg tablet  Self No No   Sig: Take 1 tablet (40 mg total) by mouth daily with dinner   clotrimazole (LOTRIMIN) 1 % cream   Yes No   Sig: Apply topically 2 (two) times a day   dicyclomine (BENTYL) 10 mg capsule   No No   Sig: Take 1 capsule (10 mg total) by mouth 3 (three) times a day as needed (abdominal pain)   finasteride (PROSCAR) 5 mg tablet   No No   Sig: Take 1 tablet (5 mg  total) by mouth daily   furosemide (LASIX) 40 mg tablet  Self No No   Sig: Take 1 tablet (40 mg total) by mouth daily Do not start before November 16, 2023.   lisinopril (ZESTRIL) 5 mg tablet   No No   Sig: Take 1 tablet (5 mg total) by mouth daily   metoprolol succinate (TOPROL-XL) 25 mg 24 hr tablet   No No   Sig: Take 1 tablet (25 mg total) by mouth every 12 (twelve) hours   mupirocin (BACTROBAN) 2 % ointment   Yes No   Sig: Apply topically 3 (three) times a day   nitroglycerin (NITROSTAT) 0.4 mg SL tablet  Self No No   Sig: Place 1 tablet (0.4 mg total) under the tongue every 5 (five) minutes as needed for chest pain   tamsulosin (FLOMAX) 0.4 mg  Self No No   Sig: Take 1 capsule (0.4 mg total) by mouth daily with dinner   Patient taking differently: Take 0.4 mg by mouth daily at bedtime      Facility-Administered Medications: None     Current Discharge Medication List        CONTINUE these medications which have NOT CHANGED    Details   acetaminophen (TYLENOL) 325 mg tablet Take 325 mg by mouth every 6 (six) hours as needed for mild pain      amiodarone 200 mg tablet Take 1 tablet (200 mg total) by mouth daily  Qty: 90 tablet, Refills: 2    Associated Diagnoses: NSVT (nonsustained ventricular tachycardia) ([facility])      apixaban (ELIQUIS) 5 mg Take 1 tablet (5 mg total) by mouth 2 (two) times a day Do not start before September 30, 2024.    Associated Diagnoses: Acute DVT (deep venous thrombosis) ([facility])      aspirin 81 mg chewable tablet Chew 1 tablet (81 mg total) daily Do not start before September 30, 2024.    Associated Diagnoses: Dilated cardiomyopathy (HCC)      atorvastatin (LIPITOR) 40 mg tablet Take 1 tablet (40 mg total) by mouth daily with dinner  Refills: 0    Associated Diagnoses: Acute combined systolic and diastolic congestive heart failure (HCC)      clotrimazole (LOTRIMIN) 1 % cream Apply topically 2 (two) times a day      dicyclomine (BENTYL) 10 mg capsule Take 1 capsule (10 mg total) by mouth 3  (three) times a day as needed (abdominal pain)  Qty: 20 capsule, Refills: 0    Associated Diagnoses: Generalized abdominal pain      finasteride (PROSCAR) 5 mg tablet Take 1 tablet (5 mg total) by mouth daily  Qty: 90 tablet, Refills: 3    Associated Diagnoses: Urinary retention due to benign prostatic hyperplasia      furosemide (LASIX) 40 mg tablet Take 1 tablet (40 mg total) by mouth daily Do not start before November 16, 2023.  Refills: 0    Associated Diagnoses: Acute combined systolic and diastolic congestive heart failure (HCC)      lisinopril (ZESTRIL) 5 mg tablet Take 1 tablet (5 mg total) by mouth daily    Associated Diagnoses: Chronic kidney disease, stage 3a (HCC); Essential hypertension      metoprolol succinate (TOPROL-XL) 25 mg 24 hr tablet Take 1 tablet (25 mg total) by mouth every 12 (twelve) hours  Qty: 60 tablet, Refills: 0    Associated Diagnoses: Acute on chronic systolic (congestive) heart failure (HCC)      mupirocin (BACTROBAN) 2 % ointment Apply topically 3 (three) times a day      nitroglycerin (NITROSTAT) 0.4 mg SL tablet Place 1 tablet (0.4 mg total) under the tongue every 5 (five) minutes as needed for chest pain    Associated Diagnoses: Dilated cardiomyopathy (HCC)      tamsulosin (FLOMAX) 0.4 mg Take 1 capsule (0.4 mg total) by mouth daily with dinner    Associated Diagnoses: Urinary retention           No discharge procedures on file.  ED SEPSIS DOCUMENTATION   Time reflects when diagnosis was documented in both MDM as applicable and the Disposition within this note       Time User Action Codes Description Comment    9/30/2024  8:15 PM Alessio Alonso Add [R31.9] Hematuria     9/30/2024  8:15 PM Alessio Alonso Add [Z93.59] Suprapubic catheter (HCC)     9/30/2024  8:15 PM Alessio Alonso Add [I10] Essential hypertension     9/30/2024  8:15 PM Alessio Alonso Add [I50.23] Chronic systolic (congestive) heart failure (HCC)     9/30/2024  8:16 PM Alessio Alonso Add [I82.502] Chronic deep  vein thrombosis (DVT) of left lower extremity (HCC)                  Alessio Alonso,   09/30/24 2024

## 2024-09-30 NOTE — CASE MANAGEMENT
Case Management Progress Note    Patient name Toro Rodriguez  Location /-01 MRN [id number]  : 1950 Date 2024       LOS (days): 6  Geometric Mean LOS (GMLOS) (days): 3.3  Days to GMLOS:-2.4        OBJECTIVE:        Current admission status: Inpatient  Preferred Pharmacy:   Pharmacy of [address] [address]  Phone: [phone] Fax: [phone]    [facility] [address]  [address] [facility] [address]  Phone: [phone] Fax: [phone]    Primary Care Provider: CARLIE Foster    Primary Insurance: MEDICARE  Secondary Insurance: BLUE CROSS    PROGRESS NOTE:    Notification made to OP CM Handoff: [facility] OP CM regarding discharge planning and disposition.   Spoke with Isabel CIFUENTES.

## 2024-10-01 NOTE — DISCHARGE INSTRUCTIONS
Hold blood thinning medication until you are hematuria has resolved for 24 hours.    Ensure that you are drinking an adequate amount of fluids to ensure good urine output.

## 2024-10-09 ENCOUNTER — PATIENT MESSAGE (OUTPATIENT)
Dept: UROLOGY | Facility: HOSPITAL | Age: 74
End: 2024-10-09

## 2024-10-09 ENCOUNTER — TELEPHONE (OUTPATIENT)
Age: 74
End: 2024-10-09

## 2024-10-09 NOTE — TELEPHONE ENCOUNTER
Alice the daughter/on HIPAA form, pt has has a history of hematuria. Eliquis had been stopped for few days and then restarted.    They are reaching out about the Elqius to find out if a different dosage or due to the history of hematuria is something else recommended.     Please advise

## 2024-10-09 NOTE — PATIENT COMMUNICATION
Left message in detail for Shanique to call Phil cardiologist about Eliquis and message was forwarded to provider. Office requires 48-72hrs for provider response and to please call office if anything further is needed.

## 2024-10-09 NOTE — TELEPHONE ENCOUNTER
Spoke to Shanique (EC) for Toro regarding my chart message and Eliquis concerns and questions. Our clinical team contacted her via voicemail and verbally to inform Shanique to contact the cardiologist for Toro to address Eliquis concerns and orders. She verbalized understanding and she is also aware that our office will contact her regarding urodynamics testing with in the next seven days. Please advise of any further recommendations.

## 2024-10-10 NOTE — TELEPHONE ENCOUNTER
Called, spoke to pt's daughter. Message relayed as given. Pt's daughter verbalized understanding.

## 2024-10-14 ENCOUNTER — HOSPITAL ENCOUNTER (INPATIENT)
Facility: HOSPITAL | Age: 74
LOS: 4 days | Discharge: NURSING, REHAB OR LONG-TERM CARE | DRG: 813 | End: 2024-10-18
Admitting: INTERNAL MEDICINE
Payer: MEDICARE

## 2024-10-14 DIAGNOSIS — R31.0 GROSS HEMATURIA: Primary | ICD-10-CM

## 2024-10-14 DIAGNOSIS — N39.0 UTI (URINARY TRACT INFECTION): ICD-10-CM

## 2024-10-14 DIAGNOSIS — Z93.59 SUPRAPUBIC CATHETER (HCC): ICD-10-CM

## 2024-10-14 LAB
ANION GAP SERPL CALCULATED.3IONS-SCNC: 9 MMOL/L (ref 4–13)
APTT PPP: 34 SECONDS (ref 23–34)
BACTERIA UR QL AUTO: ABNORMAL /HPF
BASOPHILS # BLD AUTO: 0.05 THOUSANDS/ΜL (ref 0–0.1)
BASOPHILS NFR BLD AUTO: 1 % (ref 0–1)
BILIRUB UR QL STRIP: NEGATIVE
BUN SERPL-MCNC: 36 MG/DL (ref 5–25)
CALCIUM SERPL-MCNC: 8.8 MG/DL (ref 8.4–10.2)
CHLORIDE SERPL-SCNC: 102 MMOL/L (ref 96–108)
CLARITY UR: ABNORMAL
CO2 SERPL-SCNC: 25 MMOL/L (ref 21–32)
COLOR UR: ABNORMAL
CREAT SERPL-MCNC: 1.66 MG/DL (ref 0.6–1.3)
EOSINOPHIL # BLD AUTO: 0.39 THOUSAND/ΜL (ref 0–0.61)
EOSINOPHIL NFR BLD AUTO: 7 % (ref 0–6)
ERYTHROCYTE [DISTWIDTH] IN BLOOD BY AUTOMATED COUNT: 13.3 % (ref 11.6–15.1)
GFR SERPL CREATININE-BSD FRML MDRD: 39 ML/MIN/1.73SQ M
GLUCOSE SERPL-MCNC: 90 MG/DL (ref 65–140)
GLUCOSE UR STRIP-MCNC: NEGATIVE MG/DL
HCT VFR BLD AUTO: 34.3 % (ref 36.5–49.3)
HGB BLD-MCNC: 11.2 G/DL (ref 12–17)
HGB UR QL STRIP.AUTO: ABNORMAL
IMM GRANULOCYTES # BLD AUTO: 0.03 THOUSAND/UL (ref 0–0.2)
IMM GRANULOCYTES NFR BLD AUTO: 1 % (ref 0–2)
INR PPP: 1.16 (ref 0.85–1.19)
KETONES UR STRIP-MCNC: NEGATIVE MG/DL
LEUKOCYTE ESTERASE UR QL STRIP: ABNORMAL
LYMPHOCYTES # BLD AUTO: 1.14 THOUSANDS/ΜL (ref 0.6–4.47)
LYMPHOCYTES NFR BLD AUTO: 20 % (ref 14–44)
MCH RBC QN AUTO: 31.7 PG (ref 26.8–34.3)
MCHC RBC AUTO-ENTMCNC: 32.7 G/DL (ref 31.4–37.4)
MCV RBC AUTO: 97 FL (ref 82–98)
MONOCYTES # BLD AUTO: 0.52 THOUSAND/ΜL (ref 0.17–1.22)
MONOCYTES NFR BLD AUTO: 9 % (ref 4–12)
NEUTROPHILS # BLD AUTO: 3.5 THOUSANDS/ΜL (ref 1.85–7.62)
NEUTS SEG NFR BLD AUTO: 62 % (ref 43–75)
NITRITE UR QL STRIP: NEGATIVE
NON-SQ EPI CELLS URNS QL MICRO: ABNORMAL /HPF
NRBC BLD AUTO-RTO: 0 /100 WBCS
PH UR STRIP.AUTO: 7 [PH]
PLATELET # BLD AUTO: 262 THOUSANDS/UL (ref 149–390)
PMV BLD AUTO: 10.2 FL (ref 8.9–12.7)
POTASSIUM SERPL-SCNC: 4.3 MMOL/L (ref 3.5–5.3)
PROT UR STRIP-MCNC: ABNORMAL MG/DL
PROTHROMBIN TIME: 15.4 SECONDS (ref 12.3–15)
RBC # BLD AUTO: 3.53 MILLION/UL (ref 3.88–5.62)
RBC #/AREA URNS AUTO: ABNORMAL /HPF
SODIUM SERPL-SCNC: 136 MMOL/L (ref 135–147)
SP GR UR STRIP.AUTO: 1.01 (ref 1–1.03)
UROBILINOGEN UR STRIP-ACNC: <2 MG/DL
WBC # BLD AUTO: 5.63 THOUSAND/UL (ref 4.31–10.16)
WBC #/AREA URNS AUTO: ABNORMAL /HPF

## 2024-10-14 PROCEDURE — 87086 URINE CULTURE/COLONY COUNT: CPT

## 2024-10-14 PROCEDURE — 87081 CULTURE SCREEN ONLY: CPT | Performed by: INTERNAL MEDICINE

## 2024-10-14 PROCEDURE — 85730 THROMBOPLASTIN TIME PARTIAL: CPT

## 2024-10-14 PROCEDURE — 36415 COLL VENOUS BLD VENIPUNCTURE: CPT

## 2024-10-14 PROCEDURE — 99284 EMERGENCY DEPT VISIT MOD MDM: CPT

## 2024-10-14 PROCEDURE — 81001 URINALYSIS AUTO W/SCOPE: CPT

## 2024-10-14 PROCEDURE — 87186 SC STD MICRODIL/AGAR DIL: CPT

## 2024-10-14 PROCEDURE — 99222 1ST HOSP IP/OBS MODERATE 55: CPT | Performed by: PHYSICIAN ASSISTANT

## 2024-10-14 PROCEDURE — 87077 CULTURE AEROBIC IDENTIFY: CPT

## 2024-10-14 PROCEDURE — 85610 PROTHROMBIN TIME: CPT

## 2024-10-14 PROCEDURE — 80048 BASIC METABOLIC PNL TOTAL CA: CPT

## 2024-10-14 PROCEDURE — G0008 ADMIN INFLUENZA VIRUS VAC: HCPCS | Performed by: INTERNAL MEDICINE

## 2024-10-14 PROCEDURE — 85025 COMPLETE CBC W/AUTO DIFF WBC: CPT

## 2024-10-14 PROCEDURE — 90662 IIV NO PRSV INCREASED AG IM: CPT | Performed by: INTERNAL MEDICINE

## 2024-10-14 RX ORDER — TAMSULOSIN HYDROCHLORIDE 0.4 MG/1
0.4 CAPSULE ORAL
Status: DISCONTINUED | OUTPATIENT
Start: 2024-10-14 | End: 2024-10-18 | Stop reason: HOSPADM

## 2024-10-14 RX ORDER — LISINOPRIL 5 MG/1
5 TABLET ORAL DAILY
Status: DISCONTINUED | OUTPATIENT
Start: 2024-10-15 | End: 2024-10-18 | Stop reason: HOSPADM

## 2024-10-14 RX ORDER — ATORVASTATIN CALCIUM 40 MG/1
40 TABLET, FILM COATED ORAL
Status: DISCONTINUED | OUTPATIENT
Start: 2024-10-15 | End: 2024-10-18 | Stop reason: HOSPADM

## 2024-10-14 RX ORDER — FINASTERIDE 5 MG/1
5 TABLET, FILM COATED ORAL DAILY
Status: DISCONTINUED | OUTPATIENT
Start: 2024-10-15 | End: 2024-10-18 | Stop reason: HOSPADM

## 2024-10-14 RX ORDER — ONDANSETRON 2 MG/ML
4 INJECTION INTRAMUSCULAR; INTRAVENOUS EVERY 6 HOURS PRN
Status: DISCONTINUED | OUTPATIENT
Start: 2024-10-14 | End: 2024-10-18 | Stop reason: HOSPADM

## 2024-10-14 RX ORDER — ACETAMINOPHEN 325 MG/1
650 TABLET ORAL EVERY 6 HOURS PRN
Status: DISCONTINUED | OUTPATIENT
Start: 2024-10-14 | End: 2024-10-18 | Stop reason: HOSPADM

## 2024-10-14 RX ORDER — AMIODARONE HYDROCHLORIDE 200 MG/1
200 TABLET ORAL DAILY
Status: DISCONTINUED | OUTPATIENT
Start: 2024-10-15 | End: 2024-10-18 | Stop reason: HOSPADM

## 2024-10-14 RX ORDER — METOPROLOL SUCCINATE 25 MG/1
25 TABLET, EXTENDED RELEASE ORAL EVERY 12 HOURS SCHEDULED
Status: DISCONTINUED | OUTPATIENT
Start: 2024-10-14 | End: 2024-10-18 | Stop reason: HOSPADM

## 2024-10-14 RX ORDER — FUROSEMIDE 40 MG/1
40 TABLET ORAL DAILY
Status: DISCONTINUED | OUTPATIENT
Start: 2024-10-15 | End: 2024-10-18 | Stop reason: HOSPADM

## 2024-10-14 RX ADMIN — CEPHALEXIN 500 MG: 250 CAPSULE ORAL at 21:20

## 2024-10-14 RX ADMIN — INFLUENZA A VIRUS A/VICTORIA/4897/2022 IVR-238 (H1N1) ANTIGEN (FORMALDEHYDE INACTIVATED), INFLUENZA A VIRUS A/CALIFORNIA/122/2022 SAN-022 (H3N2) ANTIGEN (FORMALDEHYDE INACTIVATED), AND INFLUENZA B VIRUS B/MICHIGAN/01/2021 ANTIGEN (FORMALDEHYDE INACTIVATED) 0.5 ML: 60; 60; 60 INJECTION, SUSPENSION INTRAMUSCULAR at 23:54

## 2024-10-14 NOTE — ED NOTES
Skin excoriation noted to the right of suprapubic location. Site cleaned and barrier cream applied      Chiquita Mcclure RN  10/14/24 2046

## 2024-10-14 NOTE — ED PROVIDER NOTES
Time reflects when diagnosis was documented in both MDM as applicable and the Disposition within this note       Time User Action Codes Description Comment    10/14/2024  8:06 PM Reji Storey [R31.0] Gross hematuria     10/14/2024  9:04 PM Reji Storey [N39.0] UTI (urinary tract infection)     10/14/2024  9:04 PM Reji Storey [Z93.59] Suprapubic catheter (HCC)           ED Disposition       ED Disposition   Admit    Condition   Stable    Date/Time   Mon Oct 14, 2024  9:49 PM    Comment   Case was discussed with [facility] and the patient's admission status was agreed to be Admission Status: inpatient status to the service of Dr. Fuentes .               Assessment & Plan       Medical Decision Making  Presentation was concerning for hematuria likely from bladder.  Consideration for urinary tract infection.  Will perform manual irrigation.  Appears to have mild improvement from decreasing clot burden however patient still having recurrent hematuria.  Stable at time of admission to [facility] medical service.  Neurology was consulted due to patient's complex urologic history.  They recommend admission for repeat manual irrigation as well as starting patient on Eliquis for left leg DVT.    Amount and/or Complexity of Data Reviewed  Labs: ordered. Decision-making details documented in ED Course.    Risk  Prescription drug management.  Decision regarding hospitalization.        ED Course as of 10/14/24 2204   Mon Oct 14, 2024   1959 Message to CARLIE Gaona about hematuria after restarting Eliquis.   2053 Bladder scan 0 mLs   2104 Urine Microscopic(!)  Will start patient on Keflex for UTI   2104 Leukocytes, UA(!): Small   2104 POCT URINE PROTEIN(!): 100 (2+)   2104 Blood, UA(!): Large       Medications   cephalexin (KEFLEX) capsule 500 mg (500 mg Oral Given 10/14/24 2120)       ED Risk Strat Scores                           SBIRT 20yo+      Flowsheet Row Most Recent Value   Initial Alcohol Screen: US  AUDIT-C     1. How often do you have a drink containing alcohol? 0 Filed at: 10/14/2024 1803   2. How many drinks containing alcohol do you have on a typical day you are drinking?  0 Filed at: 10/14/2024 1803   3a. Male UNDER 65: How often do you have five or more drinks on one occasion? 0 Filed at: 10/14/2024 1803   3b. FEMALE Any Age, or MALE 65+: How often do you have 4 or more drinks on one occassion? 0 Filed at: 10/14/2024 1803   Audit-C Score 0 Filed at: 10/14/2024 1803   WILLIE: How many times in the past year have you...    Used an illegal drug or used a prescription medication for non-medical reasons? Never Filed at: 10/14/2024 1803                            History of Present Illness       Chief Complaint   Patient presents with    Blood in Urine     Pt with suprapubic cath. Pt had cath replaced last week. Pt reports restarting eliquis today and noticed blood in bag       Past Medical History:   Diagnosis Date    Alcohol intoxication (HCC) 10/15/2020    BPH (benign prostatic hyperplasia)     Chronic HFrEF (heart failure with reduced ejection fraction) ([facility]) 11/2023    Coronary artery calcification seen on CT scan 11/10/2023    Coronary artery disease 12/14/2023    Deep vein thrombosis (DVT) of left lower extremity ([facility]) 11/2023    Diabetes mellitus ([facility]) 11/2023    Fall from slip, trip, or stumble 10/15/2020    History of phimosis of penis     History of urinary calculi     Hypertension 1988    Skin cancer     Tobacco abuse     quit around 2000      Past Surgical History:   Procedure Laterality Date    BLADDER STONE REMOVAL  2014    CARDIAC CATHETERIZATION N/A 12/14/2023    Procedure: Cardiac catheterization;  Surgeon: Dave Royal MD;  Location: [facility];  Service: Cardiology    IR SUPRAPUBIC CATHETER PLACEMENT  9/27/2024    ORIF ANKLE FRACTURE Left     SKIN LESION EXCISION N/A 3/18/2024    Procedure: WIDE LOCAL EXCISION OF BACK MELANOMA;  Surgeon: Lillie La MD;  Location: [facility];   Service: Surgical Oncology    TOTAL HIP ARTHROPLASTY Right       Family History   Problem Relation Age of Onset    Breast cancer Mother     Heart attack Father 61    Other Sister         s/p hysterectomy    Cerebral palsy Brother     Skin cancer Other         family history    No Known Problems Son     No Known Problems Daughter     Sudden death Neg Hx       Social History     Tobacco Use    Smoking status: Former     Types: Cigarettes     Start date:      Quit date:      Years since quittin.8    Smokeless tobacco: Never    Tobacco comments:     Quit over 30 years ago (Updated 2023).    Vaping Use    Vaping status: Never Used   Substance Use Topics    Alcohol use: Not Currently    Drug use: Never      E-Cigarette/Vaping    E-Cigarette Use Never User       E-Cigarette/Vaping Substances    Nicotine No     THC No     CBD No     Flavoring No     Other No     Unknown No       I have reviewed and agree with the history as documented.     74-year-old male presents emergency department complaining of hematuria.  Patient has a past medical history of DVT left lower extremity requiring Eliquis, CKD stage III, hematuria, BPH, high blood pressure.  The patient had 3 recent emergency department visits in the last 2 months for urinary tract infection and hematuria.  He was admitted to the hospital  and .  ER visit on .  It appears during hospitalization on  he had suprapubic IR guided catheter placed on .  Patient mentions this was due to recurrent urinary tract infections.  Has had CBI performed a few times.  Patient complains of hematuria since starting Eliquis earlier today.        Review of Systems   Genitourinary:  Positive for hematuria.   All other systems reviewed and are negative.          Objective       ED Triage Vitals [10/14/24 1804]   Temperature Pulse Blood Pressure Respirations SpO2 Patient Position - Orthostatic VS   98 °F (36.7 °C) (!) 54 (!) 183/84 16 98 % Lying      Temp  Source Heart Rate Source BP Location FiO2 (%) Pain Score    Temporal Monitor Left arm -- No Pain      Vitals      Date and Time Temp Pulse SpO2 Resp BP Pain Score FACES Pain Rating User   10/14/24 2030 -- 51 92 % 16 181/74 -- --    10/14/24 1900 -- 46 98 % 16 172/77 -- --    10/14/24 1815 -- 56 97 % 16 183/84 -- -- AM   10/14/24 1804 98 °F (36.7 °C) 54 98 % 16 183/84 No Pain -- CM            Physical Exam  Vitals and nursing note reviewed. Exam conducted with a chaperone present.   Constitutional:       General: He is not in acute distress.     Appearance: He is well-developed.   HENT:      Head: Normocephalic and atraumatic.   Eyes:      Conjunctiva/sclera: Conjunctivae normal.   Cardiovascular:      Rate and Rhythm: Normal rate and regular rhythm.      Heart sounds: No murmur heard.  Pulmonary:      Effort: Pulmonary effort is normal. No respiratory distress.      Breath sounds: Normal breath sounds.   Abdominal:      Palpations: Abdomen is soft.      Tenderness: There is no abdominal tenderness.       Musculoskeletal:         General: No swelling.      Cervical back: Neck supple.   Skin:     General: Skin is warm and dry.      Capillary Refill: Capillary refill takes less than 2 seconds.   Neurological:      General: No focal deficit present.      Mental Status: He is alert and oriented to person, place, and time. Mental status is at baseline.      GCS: GCS eye subscore is 4. GCS verbal subscore is 5. GCS motor subscore is 6.      Cranial Nerves: No cranial nerve deficit.      Sensory: No sensory deficit.      Motor: No weakness.      Coordination: Coordination normal.      Gait: Gait normal.   Psychiatric:         Mood and Affect: Mood normal.         Results Reviewed       Procedure Component Value Units Date/Time    Basic metabolic panel [[id number]]  (Abnormal) Collected: 10/14/24 1938    Lab Status: Final result Specimen: Blood from Arm, Left Updated: 10/14/24 2011     Sodium 136 mmol/L      Potassium 4.3  mmol/L      Chloride 102 mmol/L      CO2 25 mmol/L      ANION GAP 9 mmol/L      BUN 36 mg/dL      Creatinine 1.66 mg/dL      Glucose 90 mg/dL      Calcium 8.8 mg/dL      eGFR 39 ml/min/1.73sq m     Narrative:      National Kidney Disease Foundation guidelines for Chronic Kidney Disease (CKD):     Stage 1 with normal or high GFR (GFR > 90 mL/min/1.73 square meters)    Stage 2 Mild CKD (GFR = 60-89 mL/min/1.73 square meters)    Stage 3A Moderate CKD (GFR = 45-59 mL/min/1.73 square meters)    Stage 3B Moderate CKD (GFR = 30-44 mL/min/1.73 square meters)    Stage 4 Severe CKD (GFR = 15-29 mL/min/1.73 square meters)    Stage 5 End Stage CKD (GFR <15 mL/min/1.73 square meters)  Note: GFR calculation is accurate only with a steady state creatinine    Protime-INR [[id number]]  (Abnormal) Collected: 10/14/24 1938    Lab Status: Final result Specimen: Blood from Arm, Left Updated: 10/14/24 2006     Protime 15.4 seconds      INR 1.16    Narrative:      INR Therapeutic Range    Indication                                             INR Range      Atrial Fibrillation                                               2.0-3.0  Hypercoagulable State                                    2.0.2.3  Left Ventricular Asist Device                            2.0-3.0  Mechanical Heart Valve                                  -    Aortic(with afib, MI, embolism, HF, LA enlargement,    and/or coagulopathy)                                     2.0-3.0 (2.5-3.5)     Mitral                                                             2.5-3.5  Prosthetic/Bioprosthetic Heart Valve               2.0-3.0  Venous thromboembolism (VTE: VT, PE        2.0-3.0    APTT [[id number]]  (Normal) Collected: 10/14/24 1938    Lab Status: Final result Specimen: Blood from Arm, Left Updated: 10/14/24 2006     PTT 34 seconds     Urine Microscopic [[id number]]  (Abnormal) Collected: 10/14/24 1939    Lab Status: Final result Specimen: Urine, Suprapubic catheter Updated: 10/14/24  2001     RBC, UA Innumerable /hpf      WBC, UA       Field obscured, unable to enumerate     /hpf     Epithelial Cells       Field obscured, unable to enumerate     /hpf     Bacteria, UA       Field obscured, unable to enumerate     /hpf    Urine culture [[id number]] Collected: 10/14/24 1939    Lab Status: In process Specimen: Urine, Suprapubic catheter Updated: 10/14/24 2001    UA w Reflex to Microscopic w Reflex to Culture [[id number]]  (Abnormal) Collected: 10/14/24 1939    Lab Status: Final result Specimen: Urine, Suprapubic catheter Updated: 10/14/24 1959     Color, UA Dark Red     Clarity, UA Turbid     Specific Gravity, UA 1.015     pH, UA 7.0     Leukocytes, UA Small     Nitrite, UA Negative     Protein,  (2+) mg/dl      Glucose, UA Negative mg/dl      Ketones, UA Negative mg/dl      Urobilinogen, UA <2.0 mg/dl      Bilirubin, UA Negative     Occult Blood, UA Large    CBC and differential [[id number]]  (Abnormal) Collected: 10/14/24 1938    Lab Status: Final result Specimen: Blood from Arm, Left Updated: 10/14/24 1947     WBC 5.63 Thousand/uL      RBC 3.53 Million/uL      Hemoglobin 11.2 g/dL      Hematocrit 34.3 %      MCV 97 fL      MCH 31.7 pg      MCHC 32.7 g/dL      RDW 13.3 %      MPV 10.2 fL      Platelets 262 Thousands/uL      nRBC 0 /100 WBCs      Segmented % 62 %      Immature Grans % 1 %      Lymphocytes % 20 %      Monocytes % 9 %      Eosinophils Relative 7 %      Basophils Relative 1 %      Absolute Neutrophils 3.50 Thousands/µL      Absolute Immature Grans 0.03 Thousand/uL      Absolute Lymphocytes 1.14 Thousands/µL      Absolute Monocytes 0.52 Thousand/µL      Eosinophils Absolute 0.39 Thousand/µL      Basophils Absolute 0.05 Thousands/µL             No orders to display       Procedures    ED Medication and Procedure Management   Prior to Admission Medications   Prescriptions Last Dose Informant Patient Reported? Taking?   acetaminophen (TYLENOL) 325 mg tablet   Yes No   Sig: Take 325 mg  by mouth every 6 (six) hours as needed for mild pain   amiodarone 200 mg tablet   No No   Sig: Take 1 tablet (200 mg total) by mouth daily   apixaban (ELIQUIS) 5 mg   No No   Sig: Take 1 tablet (5 mg total) by mouth 2 (two) times a day Do not start before September 30, 2024.   aspirin 81 mg chewable tablet   No No   Sig: Chew 1 tablet (81 mg total) daily Do not start before September 30, 2024.   atorvastatin (LIPITOR) 40 mg tablet  Self No No   Sig: Take 1 tablet (40 mg total) by mouth daily with dinner   clotrimazole (LOTRIMIN) 1 % cream   Yes No   Sig: Apply topically 2 (two) times a day   dicyclomine (BENTYL) 10 mg capsule   No No   Sig: Take 1 capsule (10 mg total) by mouth 3 (three) times a day as needed (abdominal pain)   finasteride (PROSCAR) 5 mg tablet   No No   Sig: Take 1 tablet (5 mg total) by mouth daily   furosemide (LASIX) 40 mg tablet  Self No No   Sig: Take 1 tablet (40 mg total) by mouth daily Do not start before November 16, 2023.   lisinopril (ZESTRIL) 5 mg tablet   No No   Sig: Take 1 tablet (5 mg total) by mouth daily   metoprolol succinate (TOPROL-XL) 25 mg 24 hr tablet   No No   Sig: Take 1 tablet (25 mg total) by mouth every 12 (twelve) hours   mupirocin (BACTROBAN) 2 % ointment   Yes No   Sig: Apply topically 3 (three) times a day   nitroglycerin (NITROSTAT) 0.4 mg SL tablet  Self No No   Sig: Place 1 tablet (0.4 mg total) under the tongue every 5 (five) minutes as needed for chest pain   tamsulosin (FLOMAX) 0.4 mg  Self No No   Sig: Take 1 capsule (0.4 mg total) by mouth daily with dinner   Patient taking differently: Take 0.4 mg by mouth daily at bedtime      Facility-Administered Medications: None     Patient's Medications   Discharge Prescriptions    No medications on file     No discharge procedures on file.  ED SEPSIS DOCUMENTATION   Time reflects when diagnosis was documented in both MDM as applicable and the Disposition within this note       Time User Action Codes Description  Comment    10/14/2024  8:06 PM Reji Storey [R31.0] Gross hematuria     10/14/2024  9:04 PM Reji Storey [N39.0] UTI (urinary tract infection)     10/14/2024  9:04 PM Reji Storey [Z93.59] Suprapubic catheter (HCC)                  Reji Storey DO  10/14/24 2209

## 2024-10-14 NOTE — ED NOTES
This Rn attempted to irrigate suprapubic catheter. Unsuccessful at this time      Chiquita Mcclure RN  10/14/24 8746

## 2024-10-15 ENCOUNTER — APPOINTMENT (INPATIENT)
Dept: CT IMAGING | Facility: HOSPITAL | Age: 74
DRG: 813 | End: 2024-10-15
Payer: MEDICARE

## 2024-10-15 PROBLEM — N02.9 RECURRENT HEMATURIA: Status: ACTIVE | Noted: 2024-04-29

## 2024-10-15 LAB
ANION GAP SERPL CALCULATED.3IONS-SCNC: 7 MMOL/L (ref 4–13)
BASOPHILS # BLD AUTO: 0.07 THOUSANDS/ΜL (ref 0–0.1)
BASOPHILS NFR BLD AUTO: 1 % (ref 0–1)
BUN SERPL-MCNC: 34 MG/DL (ref 5–25)
CALCIUM SERPL-MCNC: 8.5 MG/DL (ref 8.4–10.2)
CHLORIDE SERPL-SCNC: 106 MMOL/L (ref 96–108)
CO2 SERPL-SCNC: 27 MMOL/L (ref 21–32)
CREAT SERPL-MCNC: 1.53 MG/DL (ref 0.6–1.3)
EOSINOPHIL # BLD AUTO: 0.46 THOUSAND/ΜL (ref 0–0.61)
EOSINOPHIL NFR BLD AUTO: 8 % (ref 0–6)
ERYTHROCYTE [DISTWIDTH] IN BLOOD BY AUTOMATED COUNT: 13.6 % (ref 11.6–15.1)
GFR SERPL CREATININE-BSD FRML MDRD: 43 ML/MIN/1.73SQ M
GLUCOSE SERPL-MCNC: 109 MG/DL (ref 65–140)
HCT VFR BLD AUTO: 33.2 % (ref 36.5–49.3)
HGB BLD-MCNC: 11 G/DL (ref 12–17)
IMM GRANULOCYTES # BLD AUTO: 0.01 THOUSAND/UL (ref 0–0.2)
IMM GRANULOCYTES NFR BLD AUTO: 0 % (ref 0–2)
LYMPHOCYTES # BLD AUTO: 1.17 THOUSANDS/ΜL (ref 0.6–4.47)
LYMPHOCYTES NFR BLD AUTO: 22 % (ref 14–44)
MAGNESIUM SERPL-MCNC: 2.2 MG/DL (ref 1.9–2.7)
MCH RBC QN AUTO: 31.9 PG (ref 26.8–34.3)
MCHC RBC AUTO-ENTMCNC: 33.1 G/DL (ref 31.4–37.4)
MCV RBC AUTO: 96 FL (ref 82–98)
MONOCYTES # BLD AUTO: 0.69 THOUSAND/ΜL (ref 0.17–1.22)
MONOCYTES NFR BLD AUTO: 13 % (ref 4–12)
NEUTROPHILS # BLD AUTO: 3.05 THOUSANDS/ΜL (ref 1.85–7.62)
NEUTS SEG NFR BLD AUTO: 56 % (ref 43–75)
NRBC BLD AUTO-RTO: 0 /100 WBCS
PLATELET # BLD AUTO: 268 THOUSANDS/UL (ref 149–390)
PMV BLD AUTO: 10.9 FL (ref 8.9–12.7)
POTASSIUM SERPL-SCNC: 3.6 MMOL/L (ref 3.5–5.3)
RBC # BLD AUTO: 3.45 MILLION/UL (ref 3.88–5.62)
SODIUM SERPL-SCNC: 140 MMOL/L (ref 135–147)
WBC # BLD AUTO: 5.45 THOUSAND/UL (ref 4.31–10.16)

## 2024-10-15 PROCEDURE — 85025 COMPLETE CBC W/AUTO DIFF WBC: CPT | Performed by: INTERNAL MEDICINE

## 2024-10-15 PROCEDURE — 74178 CT ABD&PLV WO CNTR FLWD CNTR: CPT

## 2024-10-15 PROCEDURE — 83735 ASSAY OF MAGNESIUM: CPT | Performed by: INTERNAL MEDICINE

## 2024-10-15 PROCEDURE — 80048 BASIC METABOLIC PNL TOTAL CA: CPT | Performed by: INTERNAL MEDICINE

## 2024-10-15 PROCEDURE — 99232 SBSQ HOSP IP/OBS MODERATE 35: CPT | Performed by: UROLOGY

## 2024-10-15 PROCEDURE — 99223 1ST HOSP IP/OBS HIGH 75: CPT | Performed by: INTERNAL MEDICINE

## 2024-10-15 RX ORDER — POLYETHYLENE GLYCOL 3350 17 G/17G
17 POWDER, FOR SOLUTION ORAL 2 TIMES DAILY
Status: DISCONTINUED | OUTPATIENT
Start: 2024-10-15 | End: 2024-10-18 | Stop reason: HOSPADM

## 2024-10-15 RX ORDER — AMOXICILLIN 250 MG
2 CAPSULE ORAL 2 TIMES DAILY
Status: DISCONTINUED | OUTPATIENT
Start: 2024-10-15 | End: 2024-10-18 | Stop reason: HOSPADM

## 2024-10-15 RX ADMIN — FINASTERIDE 5 MG: 5 TABLET, FILM COATED ORAL at 09:03

## 2024-10-15 RX ADMIN — METOPROLOL SUCCINATE 25 MG: 25 TABLET, EXTENDED RELEASE ORAL at 00:42

## 2024-10-15 RX ADMIN — IOHEXOL 100 ML: 350 INJECTION, SOLUTION INTRAVENOUS at 12:42

## 2024-10-15 RX ADMIN — LISINOPRIL 5 MG: 5 TABLET ORAL at 09:03

## 2024-10-15 RX ADMIN — ATORVASTATIN CALCIUM 40 MG: 40 TABLET, FILM COATED ORAL at 17:25

## 2024-10-15 RX ADMIN — SENNOSIDES AND DOCUSATE SODIUM 2 TABLET: 8.6; 5 TABLET ORAL at 17:25

## 2024-10-15 RX ADMIN — TAMSULOSIN HYDROCHLORIDE 0.4 MG: 0.4 CAPSULE ORAL at 00:41

## 2024-10-15 RX ADMIN — METOPROLOL SUCCINATE 25 MG: 25 TABLET, EXTENDED RELEASE ORAL at 21:37

## 2024-10-15 RX ADMIN — AMIODARONE HYDROCHLORIDE 200 MG: 200 TABLET ORAL at 09:03

## 2024-10-15 RX ADMIN — POLYETHYLENE GLYCOL 3350 17 G: 17 POWDER, FOR SOLUTION ORAL at 14:51

## 2024-10-15 RX ADMIN — TAMSULOSIN HYDROCHLORIDE 0.4 MG: 0.4 CAPSULE ORAL at 21:37

## 2024-10-15 RX ADMIN — FUROSEMIDE 40 MG: 40 TABLET ORAL at 09:03

## 2024-10-15 NOTE — PROGRESS NOTES
Progress Note - Surgery-General   Name: Toro Rodriguez 74 y.o. male I MRN: [id number]  Unit/Bed#: -01 I Date of Admission: 10/14/2024   Date of Service: 10/15/2024 I Hospital Day: 1    Assessment & Plan  Hematuria  -pt with spt  -hematuria after pt restarted Eliquis  -Urine is clear this am  -Hgb 11.0  -Hydro noted on last admission  -Cr 1.53 (1.66)  -cont with manual irrigation  -UA noted, urine cx pending  -will get CT to eval for clot burden/hydro  Essential hypertension  -home meds  Chronic systolic (congestive) heart failure (HCC)  Wt Readings from Last 3 Encounters:   10/15/24 86.7 kg (191 lb 1.5 oz)   09/28/24 87.3 kg (192 lb 6.4 oz)   09/17/24 87.7 kg (193 lb 6.4 oz)       Deep vein thrombosis (DVT) of left lower extremity (HCC)  -eliquis held, on subq hep  Coronary artery disease involving native coronary artery of native heart    Benign prostatic hyperplasia with urinary retention  -s/p SPT placement  Chronic kidney disease, stage 3a (HCC)  Lab Results   Component Value Date    EGFR 43 10/15/2024    EGFR 39 10/14/2024    EGFR 36 09/30/2024    CREATININE 1.53 (H) 10/15/2024    CREATININE 1.66 (H) 10/14/2024    CREATININE 1.77 (H) 09/30/2024         Subjective   Lying in bed  Denies any pain    Objective :  Temp:  [97.7 °F (36.5 °C)-98 °F (36.7 °C)] 97.7 °F (36.5 °C)  HR:  [46-56] 52  BP: (144-183)/(74-92) 144/92  Resp:  [16] 16  SpO2:  [92 %-98 %] 95 %  O2 Device: None (Room air)    I/O         10/13 0701  10/14 0700 10/14 0701  10/15 0700 10/15 0701  10/16 0700    P.O.  480     I.V. (mL/kg)  500 (5.8)     Total Intake(mL/kg)  980 (11.3)     Urine (mL/kg/hr)  1075     Total Output  1075     Net  -95                  Lines/Drains/Airways       Active Status       Name Placement date Placement time Site Days    Suprapubic Catheter 18 Fr. 09/27/24  1017  --  17                  Physical Exam  General appearance: alert and oriented, in no acute distress  Head: Normocephalic, without obvious abnormality,  atraumatic, sclerae anicteric, mucous membranes moist  Neck: no JVD and supple, symmetrical, trachea midline  Lungs: clear to auscultation, no wheezes or rales  Heart:   regular rate regular rhythm, S1-S2 normal, no murmur  Abdomen:  non distended, soft, no guarding, no rebound, active bowel sounds  SPT in place  Urine is clear    Groin with rash, rash cream in place  Skin: Warm, dry  Extremities nice and dry. R thumb deformity  Nursing notes and vital signs reviewed      Lab Results: I have reviewed the following results:  Recent Labs     10/14/24  1938 10/15/24  0512   WBC 5.63 5.45   HGB 11.2* 11.0*   HCT 34.3* 33.2*    268   SODIUM 136 140   K 4.3 3.6    106   CO2 25 27   BUN 36* 34*   CREATININE 1.66* 1.53*   GLUC 90 109   MG  --  2.2   PTT 34  --    INR 1.16  --        Imaging Results Review: No pertinent imaging studies reviewed. CT renal ordered  Other Study Results Review: No additional pertinent studies reviewed.    VTE Pharmacologic Prophylaxis: Heparin  VTE Mechanical Prophylaxis: sequential compression device

## 2024-10-15 NOTE — PLAN OF CARE
Problem: PAIN - ADULT  Goal: Verbalizes/displays adequate comfort level or baseline comfort level  Description: Interventions:  - Encourage patient to monitor pain and request assistance  - Assess pain using appropriate pain scale  - Administer analgesics based on type and severity of pain and evaluate response  - Implement non-pharmacological measures as appropriate and evaluate response  - Consider cultural and social influences on pain and pain management  - Notify physician/advanced practitioner if interventions unsuccessful or patient reports new pain  Outcome: Progressing     Problem: INFECTION - ADULT  Goal: Absence or prevention of progression during hospitalization  Description: INTERVENTIONS:  - Assess and monitor for signs and symptoms of infection  - Monitor lab/diagnostic results  - Monitor all insertion sites, i.e. indwelling lines, tubes, and drains  - Monitor endotracheal if appropriate and nasal secretions for changes in amount and color  - [place] appropriate cooling/warming therapies per order  - Administer medications as ordered  - Instruct and encourage patient and family to use good hand hygiene technique  - Identify and instruct in appropriate isolation precautions for identified infection/condition  Outcome: Progressing  Goal: Absence of fever/infection during neutropenic period  Description: INTERVENTIONS:  - Monitor WBC    Outcome: Progressing     Problem: SAFETY ADULT  Goal: Patient will remain free of falls  Description: INTERVENTIONS:  - Educate patient/family on patient safety including physical limitations  - Instruct patient to call for assistance with activity   - Consult OT/PT to assist with strengthening/mobility   - Keep Call bell within reach  - Keep bed low and locked with side rails adjusted as appropriate  - Keep care items and personal belongings within reach  - Initiate and maintain comfort rounds  - Make Fall Risk Sign visible to staff  - Offer Toileting every 3 Hours,  in advance of need  - Initiate/Maintain bed alarm  - Obtain necessary fall risk management equipment: bed rails/ non slip socks  - Apply yellow socks and bracelet for high fall risk patients  - Consider moving patient to room near nurses station  Outcome: Progressing  Goal: Maintain or return to baseline ADL function  Description: INTERVENTIONS:  -  Assess patient's ability to carry out ADLs; assess patient's baseline for ADL function and identify physical deficits which impact ability to perform ADLs (bathing, care of mouth/teeth, toileting, grooming, dressing, etc.)  - Assess/evaluate cause of self-care deficits   - Assess range of motion  - Assess patient's mobility; develop plan if impaired  - Assess patient's need for assistive devices and provide as appropriate  - Encourage maximum independence but intervene and supervise when necessary  - Involve family in performance of ADLs  - Assess for home care needs following discharge   - Consider OT consult to assist with ADL evaluation and planning for discharge  - Provide patient education as appropriate  Outcome: Progressing  Goal: Maintains/Returns to pre admission functional level  Description: INTERVENTIONS:  - Perform AM-PAC 6 Click Basic Mobility/ Daily Activity assessment daily.  - Set and communicate daily mobility goal to care team and patient/family/caregiver.   - Collaborate with rehabilitation services on mobility goals if consulted  - Perform Range of Motion 3 times a day.  - Reposition patient every 3 hours.  - Dangle patient 3 times a day  - Stand patient 3 times a day  - Ambulate patient 3 times a day  - Out of bed to chair 3 times a day   - Out of bed for meals 3 times a day  - Out of bed for toileting  - Record patient progress and toleration of activity level   Outcome: Progressing     Problem: DISCHARGE PLANNING  Goal: Discharge to home or other facility with appropriate resources  Description: INTERVENTIONS:  - Identify barriers to discharge  w/patient and caregiver  - Arrange for needed discharge resources and transportation as appropriate  - Identify discharge learning needs (meds, wound care, etc.)  - Arrange for interpretive services to assist at discharge as needed  - Refer to Case Management Department for coordinating discharge planning if the patient needs post-hospital services based on physician/advanced practitioner order or complex needs related to functional status, cognitive ability, or social support system  Outcome: Progressing     Problem: Knowledge Deficit  Goal: Patient/family/caregiver demonstrates understanding of disease process, treatment plan, medications, and discharge instructions  Description: Complete learning assessment and assess knowledge base.  Interventions:  - Provide teaching at level of understanding  - Provide teaching via preferred learning methods  Outcome: Progressing     Problem: GENITOURINARY - ADULT  Goal: Maintains or returns to baseline urinary function  Description: INTERVENTIONS:  - Assess urinary function  - Encourage oral fluids to ensure adequate hydration if ordered  - Administer IV fluids as ordered to ensure adequate hydration  - Administer ordered medications as needed  - Offer frequent toileting  - Follow urinary retention protocol if ordered  Outcome: Progressing  Goal: Absence of urinary retention  Description: INTERVENTIONS:  - Assess patient’s ability to void and empty bladder  - Monitor I/O  - Bladder scan as needed  - Discuss with physician/AP medications to alleviate retention as needed  - Discuss catheterization for long term situations as appropriate  Outcome: Progressing  Goal: Urinary catheter remains patent  Description: INTERVENTIONS:  - Assess patency of urinary catheter  - If patient has a chronic farmer, consider changing catheter if non-functioning  - Follow guidelines for intermittent irrigation of non-functioning urinary catheter  Outcome: Progressing

## 2024-10-15 NOTE — ASSESSMENT & PLAN NOTE
-Has had intermittently 2 weeks, had cleared but resumed Eliquis for DVT today and hematuria returned  -No change in urine output per patient  -Nurse irrigated SPT and had a few small clots.   -Per ED provider urine has cleared significantly since arrival and is now light pink in tubing  -Manually irrigated 200ml and urine is faint pink/clear without clots or obstruction   -Urine flowly freely into bag  -Hgb 11.2, was 11.9 on 9/30, vitals stable  -Discussed with Mariana Tracy, recommends manual irrigation 250ml every 2-4 hours  -May need CBI if hematuria worsens  -Continue to monitor urine output and urine color  -Trend hgb, vitals

## 2024-10-15 NOTE — ASSESSMENT & PLAN NOTE
-On Eliquis, held during admission 2 weeks ago, resumed 9/30 with subsequent hematuria and medication held again until today  -Resumed Eliquis today with return of hematuria.   -Continue to hold Eliquis if medically able     no

## 2024-10-15 NOTE — H&P
H&P - Hospitalist   Name: Toro Rodriguez 74 y.o. male I MRN: [id number]  Unit/Bed#: -01 I Date of Admission: 10/14/2024   Date of Service: 10/15/2024 I Hospital Day: 1     Assessment & Plan  Hematuria  Presented due to hematuria within suprapubic catheter.  Eliquis had been on hold due to hematuria.  Resumed morning of 10/14.  Took 1 dose.  Shortly after started with bleeding.   On Eliquis due to history of DVT.   Recent admission for similar 9/22-9/28. Patient restarted eliquis after admission and then had hematuria again on 9/30. Seen in the ER and told to hold Eliquis. Once hematuria free for 24 hours could resume eliquis.   Hg 11.2   Baseline about 12.   Continue to monitor. Transfuse < 7.   Urine sample (WBC WNL. No sirs criteria.)  UA: small amount of leuks, negative nitrite   Micro: Unable to enumerate wbc or bacteria due to RBC.   Do not suspect urine infection at this time. Hold further abx.   Appreciate urology recommendations   Manual irrigation. Low threshold to upgrade for CBI   Hold eliquis and aspirin   Deep vein thrombosis (DVT) of left lower extremity (HCC)  Eliquis on hold due to hematuria   Essential hypertension  Continue home lasix, metoprolol succinate bid and lisinopril   Chronic systolic (congestive) heart failure (HCC)  Wt Readings from Last 3 Encounters:   10/14/24 86.4 kg (190 lb 8 oz)   09/28/24 87.3 kg (192 lb 6.4 oz)   09/17/24 87.7 kg (193 lb 6.4 oz)     Appears euvolemic   Continue lasix 40 mg daily   I/O and daily weights   Last echo 02/2024: EF 35-40%.   Coronary artery disease involving native coronary artery of native heart    Benign prostatic hyperplasia with urinary retention  Continue flomax and proscar   Chronic kidney disease, stage 3a (HCC)  Lab Results   Component Value Date    EGFR 39 10/14/2024    EGFR 36 09/30/2024    EGFR 45 09/28/2024    CREATININE 1.66 (H) 10/14/2024    CREATININE 1.77 (H) 09/30/2024    CREATININE 1.48 (H) 09/28/2024   Cr 1.66   Baseline  1.4-1.6   Avoid nephrotoxic agents   Continue to monitor   NSVT (nonsustained ventricular tachycardia) (HCC)  Continue home amiodarone and metoprolol succinate       VTE Pharmacologic Prophylaxis: VTE Score: 3 Moderate Risk (Score 3-4) - Pharmacological DVT Prophylaxis Contraindicated. Sequential Compression Devices Ordered.  Code Status: Level 1 - Full Code   Discussion with family: Patient declined call to .     Anticipated Length of Stay: Patient will be admitted on an inpatient basis with an anticipated length of stay of greater than 2 midnights secondary to hematuria.    History of Present Illness   Chief Complaint: Blood in my urine    Toro Rodriguez is a 74 y.o. male with a PMH of CKD 3, NSVT, DVT, CHF, CAD, HTN who presents with hematuria and his suprapubic catheter starting around lunchtime.  Recent admission for similar 9/22 through 9/28.  After discharge patient had been instructed to resume his Eliquis.  Unfortunately hematuria had returned and patient returns to the ER on 9/30.  Urology was contacted and told patient to hold Eliquis and not to restart until he had a full 24 hours with no hematuria.  Patient did not take his next dose till morning of 10/14.  By around lunchtime noticed that his urine had turned orange in color.  By around dinnertime it was dark red and he noticed some clots.  Denies having any abdominal pain, nausea, vomiting, fevers or chills.  In the ER urology contacted.  Not recommending CBI at this time.  Manual irrigation.  At time of admission patient resting comfortable.  Urine fruit punch in color.     Review of Systems   Constitutional:  Negative for fatigue and fever.   HENT:  Negative for sore throat.    Respiratory:  Negative for cough, chest tightness and shortness of breath.    Cardiovascular:  Negative for chest pain.   Gastrointestinal:  Negative for abdominal distention, abdominal pain, diarrhea, nausea and vomiting.   Genitourinary:  Positive for  hematuria. Negative for difficulty urinating.   Musculoskeletal:  Negative for arthralgias.   Neurological:  Negative for weakness and headaches.   Psychiatric/Behavioral:  Negative for agitation and behavioral problems.    All other systems reviewed and are negative.      Historical Information   Past Medical History:   Diagnosis Date    Alcohol intoxication ([facility]) 10/15/2020    BPH (benign prostatic hyperplasia)     Chronic HFrEF (heart failure with reduced ejection fraction) ([facility]) 2023    Coronary artery calcification seen on CT scan 11/10/2023    Coronary artery disease 2023    Deep vein thrombosis (DVT) of left lower extremity ([facility]) 2023    Diabetes mellitus ([facility]) 2023    Fall from slip, trip, or stumble 10/15/2020    History of phimosis of penis     History of urinary calculi     Hypertension     Skin cancer     Tobacco abuse     quit around      Past Surgical History:   Procedure Laterality Date    BLADDER STONE REMOVAL      CARDIAC CATHETERIZATION N/A 2023    Procedure: Cardiac catheterization;  Surgeon: Dave Royal MD;  Location: [facility];  Service: Cardiology    IR SUPRAPUBIC CATHETER PLACEMENT  2024    ORIF ANKLE FRACTURE Left     SKIN LESION EXCISION N/A 3/18/2024    Procedure: WIDE LOCAL EXCISION OF BACK MELANOMA;  Surgeon: Lillie La MD;  Location: [facility] Main [facility];  Service: Surgical Oncology    TOTAL HIP ARTHROPLASTY Right      Social History     Tobacco Use    Smoking status: Former     Types: Cigarettes     Start date:      Quit date:      Years since quittin.8    Smokeless tobacco: Never    Tobacco comments:     Quit over 30 years ago (Updated 2023).    Vaping Use    Vaping status: Never Used   Substance and Sexual Activity    Alcohol use: Not Currently    Drug use: Never    Sexual activity: Not on file     E-Cigarette/Vaping    E-Cigarette Use Never User      E-Cigarette/Vaping Substances    Nicotine No     THC No     CBD No      Flavoring No     Other No     Unknown No      Family history non-contributory  Social History:  Marital Status:    Occupation: Known  Patient Pre-hospital Living Situation: Home  Patient Pre-hospital Level of Mobility: walks  Patient Pre-hospital Diet Restrictions: Cardiac    Meds/Allergies   I have reviewed home medications with patient personally.  Prior to Admission medications    Medication Sig Start Date End Date Taking? Authorizing Provider   amiodarone 200 mg tablet Take 1 tablet (200 mg total) by mouth daily 5/31/24  Yes Rob Roach MD   apixaban (ELIQUIS) 5 mg Take 1 tablet (5 mg total) by mouth 2 (two) times a day Do not start before September 30, 2024. 9/30/24  Yes Jaydon Fleming MD   aspirin 81 mg chewable tablet Chew 1 tablet (81 mg total) daily Do not start before September 30, 2024. 9/30/24  Yes Jaydon Fleming MD   atorvastatin (LIPITOR) 40 mg tablet Take 1 tablet (40 mg total) by mouth daily with dinner 11/15/23  Yes Krysta Castillo PA-C   clotrimazole (LOTRIMIN) 1 % cream Apply topically 2 (two) times a day   Yes Historical Provider, MD   finasteride (PROSCAR) 5 mg tablet Take 1 tablet (5 mg total) by mouth daily 4/18/24 4/13/25 Yes Rick Espino MD   furosemide (LASIX) 40 mg tablet Take 1 tablet (40 mg total) by mouth daily Do not start before November 16, 2023. 11/16/23  Yes Krysta Castillo PA-C   lisinopril (ZESTRIL) 5 mg tablet Take 1 tablet (5 mg total) by mouth daily 5/2/24  Yes Brook Fuentes MD   metoprolol succinate (TOPROL-XL) 25 mg 24 hr tablet Take 1 tablet (25 mg total) by mouth every 12 (twelve) hours 9/17/24 10/17/24 Yes Rick Crawley PA-C   mupirocin (BACTROBAN) 2 % ointment Apply topically 3 (three) times a day   Yes Historical Provider, MD   tamsulosin (FLOMAX) 0.4 mg Take 1 capsule (0.4 mg total) by mouth daily with dinner  Patient taking differently: Take 0.4 mg by mouth daily at bedtime 12/15/23  Yes CARLIE Rincon   acetaminophen (TYLENOL) 325  mg tablet Take 325 mg by mouth every 6 (six) hours as needed for mild pain    Historical Provider, MD   dicyclomine (BENTYL) 10 mg capsule Take 1 capsule (10 mg total) by mouth 3 (three) times a day as needed (abdominal pain) 9/17/24   Rick Crawley PA-C   nitroglycerin (NITROSTAT) 0.4 mg SL tablet Place 1 tablet (0.4 mg total) under the tongue every 5 (five) minutes as needed for chest pain 12/15/23   CARLIE Rincon     Allergies   Allergen Reactions    Zosyn [Piperacillin Sod-Tazobactam So] Rash       Objective :  Temp:  [97.7 °F (36.5 °C)-98 °F (36.7 °C)] 97.7 °F (36.5 °C)  HR:  [46-56] 52  BP: (144-183)/(74-92) 144/92  Resp:  [16] 16  SpO2:  [92 %-98 %] 95 %  O2 Device: None (Room air)    Physical Exam  Vitals and nursing note reviewed.   Constitutional:       Appearance: Normal appearance.   HENT:      Head: Normocephalic.   Eyes:      Extraocular Movements: Extraocular movements intact.      Pupils: Pupils are equal, round, and reactive to light.   Cardiovascular:      Rate and Rhythm: Normal rate and regular rhythm.      Heart sounds: No murmur heard.     No gallop.   Pulmonary:      Effort: No respiratory distress.      Breath sounds: Normal breath sounds. No wheezing.   Abdominal:      General: Bowel sounds are normal. There is no distension.      Tenderness: There is no abdominal tenderness.      Comments: Suprapubic   Musculoskeletal:         General: Normal range of motion.      Cervical back: Normal range of motion.      Right lower leg: No edema.      Left lower leg: No edema.   Skin:     General: Skin is warm.   Neurological:      General: No focal deficit present.      Mental Status: He is alert and oriented to person, place, and time. Mental status is at baseline.   Psychiatric:         Mood and Affect: Mood normal.         Behavior: Behavior normal.         Thought Content: Thought content normal.          Lines/Drains:            Lab Results: I have reviewed the following results:  Results  from last 7 days   Lab Units 10/14/24  1938   WBC Thousand/uL 5.63   HEMOGLOBIN g/dL 11.2*   HEMATOCRIT % 34.3*   PLATELETS Thousands/uL 262   SEGS PCT % 62   LYMPHO PCT % 20   MONO PCT % 9   EOS PCT % 7*     Results from last 7 days   Lab Units 10/14/24  1938   SODIUM mmol/L 136   POTASSIUM mmol/L 4.3   CHLORIDE mmol/L 102   CO2 mmol/L 25   BUN mg/dL 36*   CREATININE mg/dL 1.66*   ANION GAP mmol/L 9   CALCIUM mg/dL 8.8   GLUCOSE RANDOM mg/dL 90     Results from last 7 days   Lab Units 10/14/24  1938   INR  1.16         Lab Results   Component Value Date    HGBA1C 5.9 (H) 04/18/2024    HGBA1C 8.2 (H) 12/14/2023    HGBA1C 7.9 (H) 11/15/2023           Imaging Results Review: No pertinent imaging studies reviewed.  Other Study Results Review: No additional pertinent studies reviewed.    Administrative Statements   I have spent a total time of 55 minutes in caring for this patient on the day of the visit/encounter including Diagnostic results, Risks and benefits of tx options, Importance of tx compliance, Reviewing / ordering tests, medicine, procedures  , and Obtaining or reviewing history  .    ** Please Note: This note has been constructed using a voice recognition system. **

## 2024-10-15 NOTE — CASE MANAGEMENT
Case Management Discharge Planning Note    Patient name Toro Rodriguez  Location /-01 MRN [id number]  : 1950 Date 10/15/2024       Current Admission Date: 10/14/2024  Current Admission Diagnosis:Hematuria   Patient Active Problem List    Diagnosis Date Noted Date Diagnosed    NSVT (nonsustained ventricular tachycardia) ([facility]) 2024     UTI (urinary tract infection) 2024     Urinary tract infection associated with indwelling urethral catheter  ([facility]) 2024     Encounter to discuss test results 2024     Urethral erosion by catheter, initial encounter  ([facility]) 2024     Anemia 2024     Hematuria 2024     Chronic venous hypertension (idiopathic) with ulcer of left lower extremity (CODE) ([facility]) 2024     Chronic kidney disease, stage 3a ([facility]) 2024     Encounter for geriatric assessment 2024     Melanoma of back ([facility]) 2024     Dilated cardiomyopathy ([facility]) 12/15/2023     Obesity, morbid ([facility]) 2023     Type 2 diabetes mellitus with chronic kidney disease, without long-term current use of insulin, unspecified CKD stage ([facility]) 11/15/2023     Essential hypertension 11/10/2023     Generalized weakness 11/10/2023     Chronic systolic (congestive) heart failure ([facility]) 11/10/2023     Deep vein thrombosis (DVT) of left lower extremity ([facility]) 11/10/2023     Coronary artery disease involving native coronary artery of native heart 11/10/2023     Benign prostatic hyperplasia with urinary retention        LOS (days): 1  Geometric Mean LOS (GMLOS) (days): 2.3  Days to GMLOS:1.6     OBJECTIVE:  Risk of Unplanned Readmission Score: 37         Current admission status: Inpatient   Preferred Pharmacy:   [facility]- [place], [place] - [facility] [address]  [address] [address]  Phone: [phone] Fax: [phone]    Primary Care Provider: CARLIE Foster    Primary Insurance: MEDICARE  Secondary  Insurance: BLUE CROSS    DISCHARGE DETAILS:                                          Other Referral/Resources/Interventions Provided:  Interventions: C  Referral Comments: CM reserved [facility] in [place]. Per [facility], Senior living office will resume care upon discharge.         Treatment Team Recommendation: Home with Home Health Care  Discharge Destination Plan:: Home with Home Health Care         Notification made to OP CM Handoff: [facility] OP CM regarding discharge planning and disposition.

## 2024-10-15 NOTE — PROGRESS NOTES
Patient:  MOO GILLIAM    MRN:  [id number]    [place] Request ID:  [id number]    Level of care reserved:  Home Health Agency    Partner Reserved:  [facility] [place] [address] [phone]    Clinical needs requested:    Geography searched:  [id number]    Start of Service:    Request sent:  12:33pm EDT on 10/15/2024 by Allen Phelan    Partner reserved:  1:47pm EDT on 10/15/2024 by Allen Pehlan    Choice list shared:  1:46pm EDT on 10/15/2024 by Allen Phelan

## 2024-10-15 NOTE — ASSESSMENT & PLAN NOTE
Lab Results   Component Value Date    EGFR 39 10/14/2024    EGFR 36 09/30/2024    EGFR 45 09/28/2024    CREATININE 1.66 (H) 10/14/2024    CREATININE 1.77 (H) 09/30/2024    CREATININE 1.48 (H) 09/28/2024   -Per [facility] note last admission Cr baseline 1-1.2  -Cr 1.66 currently  -Avoid nephrotoxins  -Continue to trend  -Management per [facility]IM

## 2024-10-15 NOTE — CASE MANAGEMENT
Case Management Assessment & Discharge Planning Note    Patient name Toro Rodriguez  Location /-01 MRN [id number]  : 1950 Date 10/15/2024       Current Admission Date: 10/14/2024  Current Admission Diagnosis:Hematuria   Patient Active Problem List    Diagnosis Date Noted Date Diagnosed    NSVT (nonsustained ventricular tachycardia) ([facility]) 2024     UTI (urinary tract infection) 2024     Urinary tract infection associated with indwelling urethral catheter  ([facility]) 2024     Encounter to discuss test results 2024     Urethral erosion by catheter, initial encounter  ([facility]) 2024     Anemia 2024     Hematuria 2024     Chronic venous hypertension (idiopathic) with ulcer of left lower extremity (CODE) ([facility]) 2024     Chronic kidney disease, stage 3a ([facility]) 2024     Encounter for geriatric assessment 2024     Melanoma of back ([facility]) 2024     Dilated cardiomyopathy ([facility]) 12/15/2023     Obesity, morbid ([facility]) 2023     Type 2 diabetes mellitus with chronic kidney disease, without long-term current use of insulin, unspecified CKD stage ([facility]) 11/15/2023     Essential hypertension 11/10/2023     Generalized weakness 11/10/2023     Chronic systolic (congestive) heart failure ([facility]) 11/10/2023     Deep vein thrombosis (DVT) of left lower extremity ([facility]) 11/10/2023     Coronary artery disease involving native coronary artery of native heart 11/10/2023     Benign prostatic hyperplasia with urinary retention        LOS (days): 1  Geometric Mean LOS (GMLOS) (days): 2.3  Days to GMLOS:1.7     OBJECTIVE:  PATIENT READMITTED TO HOSPITAL  Risk of Unplanned Readmission Score: 37         Current admission status: Inpatient       Preferred Pharmacy:   [facility]- [place], [place] - [facility] [address]  [address] [address]  Phone: [phone] Fax: [phone]    Primary Care Provider: Ml Roberson  CARLIE Guzman    Primary Insurance: MEDICARE  Secondary Insurance: BLUE CROSS    ASSESSMENT:  Active Health Care Proxies       Shanique Rodriguez Health Care Agent - Daughter   Primary Phone: [phone] (Mobile)                 Advance Directives  Does patient have a Health Care POA?: Yes  Does patient have Advance Directives?: Yes  Advance Directives: Power of  for health care  Primary Contact: Shanique Rodriguez dtr.         Readmission Root Cause  30 Day Readmission: Yes  During your hospital stay, did someone (provider, nurse, ) explain your care to you in a way you could understand?: Yes  Did you feel medically stable to leave the hospital?: Yes  Were you able to pay for your medication at the pharmacy?: Yes  Did you have reliable transportation to take you to your appointments?: Yes  During previous admission, was a post-acute recommendation made?: Yes  What post-acute resources were offered?: [facility]  Patient was readmitted due to: Hematurria.  Action Plan: D/C eliquis. Bladder irrigation Q4hrs.    Patient Information  Admitted from:: Facility  Mental Status: Alert  During Assessment patient was accompanied by: Not accompanied during assessment  Assessment information provided by:: Patient  Primary Caregiver: Other (Comment)  Caregiver's Name:: Staff at [facility]  Caregiver's Relationship to Patient:: Facility Staff  Support Systems: Daughter, Friend, Adventism/david community, Self, Other (Comment) ([facility])  County of Residence: [place]  What city do you live in?: [place].  Home entry access options. Select all that apply.: Ramp  Type of Current Residence: Other (Comment) (Assisted Living.)  Living Arrangements: Lives in Facility, Other (Comment) (Pt has a roommate)  Is patient a ?: No    Activities of Daily Living Prior to Admission  Functional Status: Assistance  Completes ADLs independently?: No  Level of ADL dependence: Assistance  Ambulates independently?: Yes  Does  patient use assisted devices?: Yes  Assisted Devices (DME) used: Rollator  Does patient currently own DME?: Yes  What DME does the patient currently own?: Walker (Pt stated that he traded his walker for a rollator with another resident at [facility].)  Does patient have a history of Outpatient Therapy (PT/OT)?: Yes  Does the patient have a history of Short-Term Rehab?: Yes ([facility])  Does patient have a history of HHC?: Yes  Does patient currently have HHC?: Yes    Current Home Health Care  Type of Current Home Care Services: Home OT, Home PT, Nurse visit  Home Health Agency Name:: [facility]  Current Home Health Follow-Up Provider:: PCP    Patient Information Continued  Income Source: SSI/SSD  Does patient have prescription coverage?: Yes  Does patient receive dialysis treatments?: No  Does patient have a history of substance abuse?: No  Does patient have a history of Mental Health Diagnosis?: No         Means of Transportation  Means of Transport to Appts:: Family transport      Social Determinants of Health (SDOH)      Flowsheet Row Most Recent Value   Housing Stability    In the last 12 months, was there a time when you were not able to pay the mortgage or rent on time? N   In the past 12 months, how many times have you moved where you were living? 0   At any time in the past 12 months, were you homeless or living in a shelter (including now)? N   Transportation Needs    In the past 12 months, has lack of transportation kept you from medical appointments or from getting medications? no   In the past 12 months, has lack of transportation kept you from meetings, work, or from getting things needed for daily living? No   Food Insecurity    Within the past 12 months, you worried that your food would run out before you got the money to buy more. Never true   Within the past 12 months, the food you bought just didn't last and you didn't have money to get more. Never true   Utilities    In the past 12  months has the electric, gas, oil, or water company threatened to shut off services in your home? No            DISCHARGE DETAILS:    Discharge planning discussed with:: Pt  Freedom of Choice: Yes     CM contacted family/caregiver?: No- see comments (Pt is in contact with family.)  Were Treatment Team discharge recommendations reviewed with patient/caregiver?: Yes  Did patient/caregiver verbalize understanding of patient care needs?: Yes  Were patient/caregiver advised of the risks associated with not following Treatment Team discharge recommendations?: Yes         Requested Home Health Care         Is the patient interested in HHC at discharge?: Yes  Home Health Discipline requested:: Occupational Therapy, Physical Therapy, Nursing  Home Health Agency Name:: [facility] External Referral Reason (only applicable if external A name selected): Patient has established relationship with provider  Home Health Follow-Up Provider:: PCP  Home Health Services Needed:: Urinary Incontinence Catheter Management, Gait/ADL Training, Evaluate Functional Status and Safety, Strengthening/Theraputic Exercises to Improve Function  Homebound Criteria Met:: Uses an Assist Device (i.e. cane, walker, etc), Requires the Assistance of Another Person for Safe Ambulation or to Leave the Home  Supporting Clincal Findings:: Limited Endurance, Fatigues Easliy in Short Distances    DME Referral Provided  Referral made for DME?: No    Other Referral/Resources/Interventions Provided:  Interventions: HHC  Referral Comments: CM made BRENDA referral to [facility]. Pt explained that he is current with them. CM confirmed with Juan, , that this was the case.         Treatment Team Recommendation: Home with Home Health Care  Discharge Destination Plan:: Home with Home Health Care  Transport at Discharge : Wheelchair van                                      Additional Comments: CM met with pt at bedside to discern discharge  needs. Pt lives at [facility] and has been for about a year, ramp to enter, room on the 1st fl. Reports being independent with walking, but needing assistance with ADLs such as bathing. Uses a Rollator. Owns a walker. Traded walker for rollator with another resident. Hx of OPPT and STR through [facility]. [facility] awaiting accepting. No SDOH concerns, no inpt psych stays, no D&A treatment. Daughter Shanique is medical POA, and pt would need transport at discharge.

## 2024-10-15 NOTE — ASSESSMENT & PLAN NOTE
Wt Readings from Last 3 Encounters:   10/14/24 86.4 kg (190 lb 8 oz)   09/28/24 87.3 kg (192 lb 6.4 oz)   09/17/24 87.7 kg (193 lb 6.4 oz)     Appears euvolemic   Continue lasix 40 mg daily   I/O and daily weights   Last echo 02/2024: EF 35-40%.

## 2024-10-15 NOTE — ASSESSMENT & PLAN NOTE
Wt Readings from Last 3 Encounters:   10/15/24 86.7 kg (191 lb 1.5 oz)   09/28/24 87.3 kg (192 lb 6.4 oz)   09/17/24 87.7 kg (193 lb 6.4 oz)

## 2024-10-15 NOTE — ASSESSMENT & PLAN NOTE
Presented due to hematuria within suprapubic catheter.  Eliquis had been on hold due to hematuria.  Resumed morning of 10/14.  Took 1 dose.  Shortly after started with bleeding.   On Eliquis due to history of DVT.   Recent admission for similar 9/22-9/28. Patient restarted eliquis after admission and then had hematuria again on 9/30. Seen in the ER and told to hold Eliquis. Once hematuria free for 24 hours could resume eliquis.   Hg 11.2   Baseline about 12.   Continue to monitor. Transfuse < 7.   Urine sample (WBC WNL. No sirs criteria.)  UA: small amount of leuks, negative nitrite   Micro: Unable to enumerate wbc or bacteria due to RBC.   Do not suspect urine infection at this time. Hold further abx.   Appreciate urology recommendations   Manual irrigation. Low threshold to upgrade for CBI   Hold eliquis and aspirin

## 2024-10-15 NOTE — ASSESSMENT & PLAN NOTE
Lab Results   Component Value Date    EGFR 39 10/14/2024    EGFR 36 09/30/2024    EGFR 45 09/28/2024    CREATININE 1.66 (H) 10/14/2024    CREATININE 1.77 (H) 09/30/2024    CREATININE 1.48 (H) 09/28/2024   Cr 1.66   Baseline 1.4-1.6   Avoid nephrotoxic agents   Continue to monitor

## 2024-10-15 NOTE — ASSESSMENT & PLAN NOTE
Lab Results   Component Value Date    EGFR 43 10/15/2024    EGFR 39 10/14/2024    EGFR 36 09/30/2024    CREATININE 1.53 (H) 10/15/2024    CREATININE 1.66 (H) 10/14/2024    CREATININE 1.77 (H) 09/30/2024

## 2024-10-15 NOTE — ASSESSMENT & PLAN NOTE
-pt with spt  -hematuria after pt restarted Eliquis  -Urine is clear this am  -Hgb 11.0  -Hydro noted on last admission  -Cr 1.53 (1.66)  -cont with manual irrigation  -UA noted, urine cx pending  -will get CT to eval for clot burden/hydro

## 2024-10-15 NOTE — PLAN OF CARE
Problem: INFECTION - ADULT  Goal: Absence or prevention of progression during hospitalization  Description: INTERVENTIONS:  - Assess and monitor for signs and symptoms of infection  - Monitor lab/diagnostic results  - Monitor all insertion sites, i.e. indwelling lines, tubes, and drains  - Monitor endotracheal if appropriate and nasal secretions for changes in amount and color  - [place] appropriate cooling/warming therapies per order  - Administer medications as ordered  - Instruct and encourage patient and family to use good hand hygiene technique  - Identify and instruct in appropriate isolation precautions for identified infection/condition  Outcome: Progressing  Goal: Absence of fever/infection during neutropenic period  Description: INTERVENTIONS:  - Monitor WBC    Outcome: Progressing

## 2024-10-15 NOTE — ED NOTES
Rn irrigated bladder with 90 cc's of sterile water. Multiple clots came out and were getting stuck. All 90 cc's did trickle out fully. Hard to pull back due to resistance and pain for patient.      Lizzette Dunn  10/14/24 2019

## 2024-10-15 NOTE — CONSULTS
Consultation - Surgery-General   Name: Toro Rodriguez 74 y.o. male I MRN: [id number]  Unit/Bed#: ED 03 I Date of Admission: 10/14/2024   Date of Service: 10/14/2024 I Hospital Day: 0   Inpatient consult to Urology  Consult performed by: Kristine Gill PA-C  Consult ordered by: Angelica Madrid PA-C        Physician Requesting Evaluation: Reji Storey DO   Reason for Evaluation / Principal Problem: hematuria    Assessment & Plan  Hematuria  -Has had intermittently 2 weeks, had cleared but resumed Eliquis for DVT today and hematuria returned  -No change in urine output per patient  -Nurse irrigated SPT and had a few small clots.   -Per ED provider urine has cleared significantly since arrival and is now light pink in tubing  -Manually irrigated 200ml and urine is faint pink/clear without clots or obstruction   -Urine flowly freely into bag  -Hgb 11.2, was 11.9 on 9/30, vitals stable  -Discussed with Mariana Tracy, recommends manual irrigation 250ml every 2-4 hours  -May need CBI if hematuria worsens  -Continue to monitor urine output and urine color  -Trend hgb, vitals  Benign prostatic hyperplasia with urinary retention  -Had chronic indwelling farmer with urethral erosion  -Suprapubic catheter placed 9/27/24  Chronic kidney disease, stage 3a (HCC)  Lab Results   Component Value Date    EGFR 39 10/14/2024    EGFR 36 09/30/2024    EGFR 45 09/28/2024    CREATININE 1.66 (H) 10/14/2024    CREATININE 1.77 (H) 09/30/2024    CREATININE 1.48 (H) 09/28/2024   -Per [facility] note last admission Cr baseline 1-1.2  -Cr 1.66 currently  -Avoid nephrotoxins  -Continue to trend  -Management per [facility]  Deep vein thrombosis (DVT) of left lower extremity (HCC)  -On Eliquis, held during admission 2 weeks ago, resumed 9/30 with subsequent hematuria and medication held again until today  -Resumed Eliquis today with return of hematuria.   -Continue to hold Eliquis if medically able    Urology service will follow.    History of Present  Illness   Toro Rodriguez is a 74 y.o. male with history of CAD, NSVT, DVT on eliquis, CHF, HTN, CKD3, recent UTI, who presents with hematuria starting today around lunchtime. He states urine was dark red earlier. Patient reports he resumed Eliquis this morning after it had been held for hematuria for the past 2 weeks.  Patient had SPT placed 9/27/24 with hematuria following placement and was discharged after resolution. Hematuria returned again 9/30 after resuming Eliquis and medication was held again until today.  He reports some skin discomfort near SPT from bandage. Denies fever, chills, abdominal pain, decrease in urine output, or other symptoms at this time.    Review of Systems   Constitutional:  Negative for chills and fever.   Respiratory:  Negative for shortness of breath.    Cardiovascular:  Negative for chest pain.   Gastrointestinal:  Negative for abdominal pain.   Genitourinary:  Positive for difficulty urinating and hematuria. Negative for decreased urine volume.   All other systems reviewed and are negative.    I have reviewed the patient's PMH, PSH, Social History, Family History, Meds, and Allergies  Historical Information   Past Medical History:   Diagnosis Date    Alcohol intoxication ([facility]) 10/15/2020    BPH (benign prostatic hyperplasia)     Chronic HFrEF (heart failure with reduced ejection fraction) ([facility]) 11/2023    Coronary artery calcification seen on CT scan 11/10/2023    Coronary artery disease 12/14/2023    Deep vein thrombosis (DVT) of left lower extremity ([facility]) 11/2023    Diabetes mellitus ([facility]) 11/2023    Fall from slip, trip, or stumble 10/15/2020    History of phimosis of penis     History of urinary calculi     Hypertension 1988    Skin cancer     Tobacco abuse     quit around 2000     Past Surgical History:   Procedure Laterality Date    BLADDER STONE REMOVAL  2014    CARDIAC CATHETERIZATION N/A 12/14/2023    Procedure: Cardiac catheterization;  Surgeon: Dave Royal MD;   [facility];  Service: Cardiology    IR SUPRAPUBIC CATHETER PLACEMENT  2024    ORIF ANKLE FRACTURE Left     SKIN LESION EXCISION N/A 3/18/2024    Procedure: WIDE LOCAL EXCISION OF BACK MELANOMA;  Surgeon: Lillie La MD;  Location: [facility];  Service: Surgical Oncology    TOTAL HIP ARTHROPLASTY Right      Social History     Tobacco Use    Smoking status: Former     Types: Cigarettes     Start date:      Quit date:      Years since quittin.8    Smokeless tobacco: Never    Tobacco comments:     Quit over 30 years ago (Updated 2023).    Vaping Use    Vaping status: Never Used   Substance and Sexual Activity    Alcohol use: Not Currently    Drug use: Never    Sexual activity: Not on file     E-Cigarette/Vaping    E-Cigarette Use Never User      E-Cigarette/Vaping Substances    Nicotine No     THC No     CBD No     Flavoring No     Other No     Unknown No      Family History   Problem Relation Age of Onset    Breast cancer Mother     Heart attack Father 61    Other Sister         s/p hysterectomy    Cerebral palsy Brother     Skin cancer Other         family history    No Known Problems Son     No Known Problems Daughter     Sudden death Neg Hx        Objective :  Temp:  [98 °F (36.7 °C)] 98 °F (36.7 °C)  HR:  [46-56] 51  BP: (172-183)/(74-84) 181/74  Resp:  [16] 16  SpO2:  [92 %-98 %] 92 %  O2 Device: None (Room air)    I/O       None            Physical Exam  Vitals reviewed.   Constitutional:       General: He is not in acute distress.     Appearance: He is not ill-appearing.   HENT:      Head: Normocephalic.   Eyes:      Conjunctiva/sclera: Conjunctivae normal.   Cardiovascular:      Rate and Rhythm: Normal rate and regular rhythm.      Heart sounds: Normal heart sounds.   Pulmonary:      Effort: Pulmonary effort is normal.      Breath sounds: Normal breath sounds.   Abdominal:      General: There is no distension.      Palpations: Abdomen is soft.      Tenderness: There  is abdominal tenderness (mild suprapubic tenderness). There is no guarding or rebound.      Comments: Suprapubic catheter in place. Rash/excoriation around tubing   Skin:     General: Skin is warm and dry.      Findings: Rash present.   Neurological:      General: No focal deficit present.      Mental Status: He is alert and oriented to person, place, and time.   Psychiatric:         Mood and Affect: Mood normal.         Behavior: Behavior normal.            Lab Results: I have reviewed the following results:CBC/BMP:   .     10/14/24  1938   WBC 5.63   HGB 11.2*   HCT 34.3*      SODIUM 136   K 4.3      CO2 25   BUN 36*   CREATININE 1.66*   GLUC 90      Recent Labs     10/14/24  1938   WBC 5.63   HGB 11.2*   HCT 34.3*      SODIUM 136   K 4.3      CO2 25   BUN 36*   CREATININE 1.66*   GLUC 90   PTT 34   INR 1.16     Lab Results   Component Value Date    COLORU Dark Red 10/14/2024    CLARITYU Turbid 10/14/2024    SPECGRAV 1.015 10/14/2024    PHUR 7.0 10/14/2024    LEUKOCYTESUR Small (A) 10/14/2024    NITRITE Negative 10/14/2024    GLUCOSEU Negative 10/14/2024    KETONESU Negative 10/14/2024    BILIRUBINUR Negative 10/14/2024    BLOODU Large (A) 10/14/2024   ,   Lab Results   Component Value Date    URINECX No Growth <1000 cfu/mL 09/24/2024       Imaging Results Review: No pertinent imaging studies reviewed.  Other Study Results Review: No additional pertinent studies reviewed.    VTE Prophylaxis: Reason for no pharmacologic prophylaxis held for hematuria

## 2024-10-16 ENCOUNTER — TELEPHONE (OUTPATIENT)
Dept: UROLOGY | Facility: HOSPITAL | Age: 74
End: 2024-10-16

## 2024-10-16 DIAGNOSIS — R33.9 URINARY RETENTION: Primary | ICD-10-CM

## 2024-10-16 DIAGNOSIS — E11.22 TYPE 2 DIABETES MELLITUS WITH CHRONIC KIDNEY DISEASE, WITHOUT LONG-TERM CURRENT USE OF INSULIN, UNSPECIFIED CKD STAGE (HCC): ICD-10-CM

## 2024-10-16 PROBLEM — N28.9 RENAL LESION: Status: ACTIVE | Noted: 2024-10-16

## 2024-10-16 LAB
ANION GAP SERPL CALCULATED.3IONS-SCNC: 9 MMOL/L (ref 4–13)
BUN SERPL-MCNC: 34 MG/DL (ref 5–25)
CALCIUM SERPL-MCNC: 8.4 MG/DL (ref 8.4–10.2)
CHLORIDE SERPL-SCNC: 104 MMOL/L (ref 96–108)
CO2 SERPL-SCNC: 25 MMOL/L (ref 21–32)
CREAT SERPL-MCNC: 1.74 MG/DL (ref 0.6–1.3)
ERYTHROCYTE [DISTWIDTH] IN BLOOD BY AUTOMATED COUNT: 13.7 % (ref 11.6–15.1)
GFR SERPL CREATININE-BSD FRML MDRD: 37 ML/MIN/1.73SQ M
GLUCOSE SERPL-MCNC: 85 MG/DL (ref 65–140)
HCT VFR BLD AUTO: 34.6 % (ref 36.5–49.3)
HGB BLD-MCNC: 11.2 G/DL (ref 12–17)
MCH RBC QN AUTO: 31.5 PG (ref 26.8–34.3)
MCHC RBC AUTO-ENTMCNC: 32.4 G/DL (ref 31.4–37.4)
MCV RBC AUTO: 98 FL (ref 82–98)
MRSA NOSE QL CULT: ABNORMAL
MRSA NOSE QL CULT: ABNORMAL
PLATELET # BLD AUTO: 257 THOUSANDS/UL (ref 149–390)
PMV BLD AUTO: 10.7 FL (ref 8.9–12.7)
POTASSIUM SERPL-SCNC: 4.3 MMOL/L (ref 3.5–5.3)
RBC # BLD AUTO: 3.55 MILLION/UL (ref 3.88–5.62)
SODIUM SERPL-SCNC: 138 MMOL/L (ref 135–147)
WBC # BLD AUTO: 6.79 THOUSAND/UL (ref 4.31–10.16)

## 2024-10-16 PROCEDURE — 85027 COMPLETE CBC AUTOMATED: CPT | Performed by: INTERNAL MEDICINE

## 2024-10-16 PROCEDURE — 80048 BASIC METABOLIC PNL TOTAL CA: CPT | Performed by: INTERNAL MEDICINE

## 2024-10-16 PROCEDURE — 97163 PT EVAL HIGH COMPLEX 45 MIN: CPT

## 2024-10-16 PROCEDURE — 99232 SBSQ HOSP IP/OBS MODERATE 35: CPT | Performed by: PHYSICIAN ASSISTANT

## 2024-10-16 PROCEDURE — 97166 OT EVAL MOD COMPLEX 45 MIN: CPT

## 2024-10-16 PROCEDURE — 99232 SBSQ HOSP IP/OBS MODERATE 35: CPT | Performed by: INTERNAL MEDICINE

## 2024-10-16 RX ORDER — CEFTRIAXONE 1 G/50ML
1000 INJECTION, SOLUTION INTRAVENOUS EVERY 24 HOURS
Status: DISCONTINUED | OUTPATIENT
Start: 2024-10-16 | End: 2024-10-17

## 2024-10-16 RX ADMIN — METOPROLOL SUCCINATE 25 MG: 25 TABLET, EXTENDED RELEASE ORAL at 22:49

## 2024-10-16 RX ADMIN — TAMSULOSIN HYDROCHLORIDE 0.4 MG: 0.4 CAPSULE ORAL at 22:49

## 2024-10-16 RX ADMIN — FINASTERIDE 5 MG: 5 TABLET, FILM COATED ORAL at 10:00

## 2024-10-16 RX ADMIN — SENNOSIDES AND DOCUSATE SODIUM 2 TABLET: 8.6; 5 TABLET ORAL at 17:19

## 2024-10-16 RX ADMIN — METOPROLOL SUCCINATE 25 MG: 25 TABLET, EXTENDED RELEASE ORAL at 10:01

## 2024-10-16 RX ADMIN — AMIODARONE HYDROCHLORIDE 200 MG: 200 TABLET ORAL at 10:00

## 2024-10-16 RX ADMIN — POLYETHYLENE GLYCOL 3350 17 G: 17 POWDER, FOR SOLUTION ORAL at 22:49

## 2024-10-16 RX ADMIN — CEFTRIAXONE 1000 MG: 1 INJECTION, SOLUTION INTRAVENOUS at 14:01

## 2024-10-16 RX ADMIN — ATORVASTATIN CALCIUM 40 MG: 40 TABLET, FILM COATED ORAL at 17:19

## 2024-10-16 NOTE — OCCUPATIONAL THERAPY NOTE
Occupational Therapy Evaluation     Patient Name: Toro Rodriguez  Today's Date: 10/16/2024  Problem List  Principal Problem:    Recurrent hematuria  Active Problems:    Essential hypertension    Chronic systolic (congestive) heart failure (HCC)    Deep vein thrombosis (DVT) of left lower extremity ([facility])    Coronary artery disease involving native coronary artery of native heart    Benign prostatic hyperplasia with urinary retention    Chronic kidney disease, stage 3a ([facility])    NSVT (nonsustained ventricular tachycardia) ([facility])    Past Medical History  Past Medical History:   Diagnosis Date    Alcohol intoxication ([facility]) 10/15/2020    BPH (benign prostatic hyperplasia)     Chronic HFrEF (heart failure with reduced ejection fraction) ([facility]) 11/2023    Coronary artery calcification seen on CT scan 11/10/2023    Coronary artery disease 12/14/2023    Deep vein thrombosis (DVT) of left lower extremity ([facility]) 11/2023    Diabetes mellitus ([facility]) 11/2023    Fall from slip, trip, or stumble 10/15/2020    History of phimosis of penis     History of urinary calculi     Hypertension 1988    Skin cancer     Tobacco abuse     quit around 2000     Past Surgical History  Past Surgical History:   Procedure Laterality Date    BLADDER STONE REMOVAL  2014    CARDIAC CATHETERIZATION N/A 12/14/2023    Procedure: Cardiac catheterization;  Surgeon: Dave Royal MD;  [facility];  Service: Cardiology    IR SUPRAPUBIC CATHETER PLACEMENT  9/27/2024    ORIF ANKLE FRACTURE Left     SKIN LESION EXCISION N/A 3/18/2024    Procedure: WIDE LOCAL EXCISION OF BACK MELANOMA;  Surgeon: Lillie La MD;  Location: [facility];  Service: Surgical Oncology    TOTAL HIP ARTHROPLASTY Right            10/16/24 0933   OT Last Visit   OT Visit Date 10/16/24   Note Type   Note type Evaluation   Pain Assessment   Pain Assessment Tool 0-10   Pain Score No Pain   Restrictions/Precautions   Weight Bearing Precautions Per Order No   Home Living  "  Type of Home Assisted living  ([facility])   Home Layout One level;Access;Ramped entrance   Bathroom Shower/Tub Walk-in shower   Bathroom Toilet Standard   Bathroom Equipment Grab bars in shower  (2 GB; pt states \"plastic laawn chair with towel over in shower.\")   Bathroom Accessibility Accessible   Home Equipment Walker  (rollator)   Additional Comments Pt reports that he has an adjustable bed at home   Prior Function   Level of [place] Needs assistance with IADLS;Needs assistance with ADLs;Independent with functional mobility  (assitance with showers)   Lives With Facility staff;Other (Comment)  (roomate)   Receives Help From Other (Comment)  (staff)   IADLs Family/Friend/Other provides transportation;Family/Friend/Other provides meals;Family/Friend/Other provides medication management   Falls in the last 6 months 0   Comments Pt states he recieves assistance with showers   Lifestyle   Autonomy Pt states that PTA he required assistance with showering but independent with dressing. Pt states he required assistance for IADLs, (-) , (-) falls in the last 6 months.   Reciprocal Relationships roomate   Service to Others Retired  for [employer]   Intrinsic Gratification enjoyed cooking   General   Family/Caregiver Present No   Subjective   Subjective \"I enjoyed cooking.\"   ADL   Eating Assistance 7  Independent   Grooming Assistance 7  Independent   UB Bathing Assistance 6  Modified Independent   LB Bathing Assistance 6  Modified Independent   UB Dressing Assistance 6  Modified independent   LB Dressing Assistance 6  Modified independent   Toileting Assistance  6  Modified independent   Bed Mobility   Supine to Sit 6  Modified independent   Additional items HOB elevated   Transfers   Sit to Stand 6  Modified independent   Additional items Armrests   Stand to Sit 6  Modified independent   Additional items Armrests   Additional Comments Pt demonstrated initial supervision level with " transfers; imporved to Emeterio through session   Balance   Static Sitting Normal   Dynamic Sitting Normal   Static Standing Good   Dynamic Standing Good   Activity Tolerance   Activity Tolerance Patient tolerated treatment well   Medical Staff Made Aware PT Arlene; GLORIA FLEMING Assessment   RUE Assessment WFL   LUE Assessment   LUE Assessment WFL   Psychosocial   Psychosocial (WDL) WDL   Cognition   Overall Cognitive Status WFL   Arousal/Participation Alert;Responsive;Cooperative   Attention Within functional limits   Orientation Level Oriented X4   Memory Within functional limits   Following Commands Follows one step commands without difficulty   Comments Pt demonstrated dificulty hearing   Assessment   Assessment Pt is a 74 y.o. male seen for OT evaluation at [facility], admitted 10/14/2024 w/ Recurrent hematuria. Per chart review completed by OT, comorbidities affecting the pt's functional performance at this time include: BPH, Chronic HFrEF, CAD, hx of DVT LLE, DM, and HTN. Pt states PTA he was living at [facility]. PT reports he lives on the first floor with ramped entrance. Pt was A w/ ADLs and A w/ IADLS, (-) driving, required use of rollator walker. Pt states he receives [facility] OT at least 1x per week. During OT IE pt demonstrated  Emeterio for bed mobility, Emeterio for functional transfers, Emeterio for functional mobility, Emeterio for UB ADLs and Emeterio for LB ADLS. Pt is currently demonstrating that he is performing at his functional baseline and does not demonstrate the need for continued IP OT services at this time. The patient's raw score on the AM-PAC Daily Activity inpatient short form is 21, standardized score is 44.27, greater than 39.4. Patients at this level are likely to benefit from DC to home. However, please refer to therapist recommendation for discharge planning given other factors that may influence destination. At this time, OT recommendations at time of discharge are DC with Level III - Low Rehab Resource  Intensity resources.  Continue with home care services.   Goals   Patient Goals To get better   Plan   OT Frequency Eval only   Discharge Recommendation   Rehab Resource Intensity Level, OT III (Minimum Resource Intensity)   AM-PAC Daily Activity Inpatient   Lower Body Dressing 3   Bathing 3   Toileting 3   Upper Body Dressing 4   Grooming 4   Eating 4   Daily Activity Raw Score 21   Daily Activity Standardized Score (Calc for Raw Score >=11) 44.27   AM-PAC Applied Cognition Inpatient   Following a Speech/Presentation 3   Understanding Ordinary Conversation 4   Taking Medications 3   Remembering Where Things Are Placed or Put Away 4   Remembering List of 4-5 Errands 4   Taking Care of Complicated Tasks 4   Applied Cognition Raw Score 22   Applied Cognition Standardized Score 47.83   End of Consult   Education Provided Yes   Patient Position at End of Consult Bedside chair;Bed/Chair alarm activated;All needs within reach   Nurse Communication Nurse aware of consult         Noelle Alcazar OTR/L

## 2024-10-16 NOTE — PROGRESS NOTES
"Progress Note - Hospitalist   Name: Toro Rodriguez 74 y.o. male I MRN: [id number]  Unit/Bed#: -01 I Date of Admission: 10/14/2024   Date of Service: 10/16/2024 I Hospital Day: 2    Assessment & Plan  Recurrent hematuria  Presented due to hematuria within suprapubic catheter.  Eliquis had been on hold due to hematuria.  Resumed morning of 10/14.  Took 1 dose.  Shortly after started with bleeding.   On Eliquis due to history of DVT.   Recent admission for similar 9/22-9/28. Patient restarted eliquis after admission and then had hematuria again on 9/30. Seen in the ER and told to hold Eliquis. Once hematuria free for 24 hours could resume eliquis.   Recent Labs     10/14/24  1938 10/15/24  0512 10/16/24  0523   HGB 11.2* 11.0* 11.2*      Continue to monitor. Transfuse < 7.   Urine sample (WBC WNL. No sirs criteria.)  UA: small amount of leuks, negative nitrite   Micro: Unable to enumerate wbc or bacteria due to RBC.   Urine culture pending   On manual irrigation, hematuria has now resolved  Outpatient urology follow-up  Deep vein thrombosis (DVT) of left lower extremity (HCC)  Single episode  In November/2023  Discussed with hematology, no longer needs anticoagulation/Eliquis  Discussed with patient/daughter, both are agreeable with plan  Will discontinue Eliquis indefinitely  Essential hypertension  BP (!) 119/48 (BP Location: Right arm)   Pulse 65   Temp 97.9 °F (36.6 °C) (Oral)   Resp 20   Ht 5' 7\" (1.702 m)   Wt 88 kg (194 lb 0.1 oz)   SpO2 94%   BMI 30.39 kg/m²   Continue with Toprol-XL 25 mg twice daily  Will hold lisinopril 5 mg daily and Lasix 40 mg daily for 10/16  Vitals as per routine  Chronic systolic (congestive) heart failure (HCC)  Wt Readings from Last 3 Encounters:   10/16/24 88 kg (194 lb 0.1 oz)   09/28/24 87.3 kg (192 lb 6.4 oz)   09/17/24 87.7 kg (193 lb 6.4 oz)     Appears euvolemic   On Lasix 40 mg daily, held while inpatient  I/O and daily weights   Last echo 02/2024: EF 35-40%. "   Coronary artery disease involving native coronary artery of native heart  Continue with Toprol-XL and statin  Outpatient cardiology follow-up  Benign prostatic hyperplasia with urinary retention  Continue flomax and proscar   Chronic kidney disease, stage 3a (HCC)  Lab Results   Component Value Date    EGFR 37 10/16/2024    EGFR 43 10/15/2024    EGFR 39 10/14/2024    CREATININE 1.74 (H) 10/16/2024    CREATININE 1.53 (H) 10/15/2024    CREATININE 1.66 (H) 10/14/2024   Cr 1.66   Baseline 1.4-1.6   Avoid nephrotoxic agents   Continue to monitor   Continue to hold lisinopril and Lasix  NSVT (nonsustained ventricular tachycardia) (HCC)  Continue home amiodarone and metoprolol succinate   Renal lesion  As per CT imaging-1.2 cm right renal lesion is more dense than a simple cyst, though there is no definite enhancement, and therefore finding may represent a cyst with debris.    follow-up CT with renal protocol is advised in 6 months     Outpatient urology follow-up    VTE Pharmacologic Prophylaxis: VTE Score: 3 Moderate Risk (Score 3-4) - Pharmacological DVT Prophylaxis Contraindicated. Sequential Compression Devices Ordered.    Mobility:   Basic Mobility Inpatient Raw Score: 23  JH-HLM Goal: 7: Walk 25 feet or more  JH-HLM Achieved: 7: Walk 25 feet or more  JH-HLM Goal achieved. Continue to encourage appropriate mobility.    Patient Centered Rounds: I performed bedside rounds with nursing staff today.   Discussions with Specialists or Other Care Team Provider: yes    Education and Discussions with Family / Patient: Updated  (daughter) via phone.    Current Length of Stay: 2 day(s)  Current Patient Status: Inpatient   Certification Statement: The patient will continue to require additional inpatient hospital stay due to pending urine culture   Discharge Plan: Anticipate discharge tomorrow to prior assisted or independent living facility.    Code Status: Level 1 - Full Code    Subjective   Seen and examined  at bedside  Awake and alert  Denies any complaints  Denies chest pain or shortness of breath or abdominal pain  Tolerating diet    Objective :  Temp:  [97.9 °F (36.6 °C)-98.1 °F (36.7 °C)] 97.9 °F (36.6 °C)  HR:  [54-65] 65  BP: (119-138)/(48-58) 119/48  Resp:  [17-20] 20  SpO2:  [91 %-95 %] 94 %  O2 Device: Nasal cannula  Nasal Cannula O2 Flow Rate (L/min):  [1 L/min] 1 L/min    Body mass index is 30.39 kg/m².     Input and Output Summary (last 24 hours):     Intake/Output Summary (Last 24 hours) at 10/16/2024 1302  Last data filed at 10/16/2024 1001  Gross per 24 hour   Intake 1375 ml   Output 2516 ml   Net -1141 ml       Physical Exam  Vitals reviewed.   Constitutional:       Appearance: Normal appearance.   HENT:      Head: Atraumatic.      Mouth/Throat:      Mouth: Mucous membranes are dry.   Cardiovascular:      Rate and Rhythm: Normal rate.      Heart sounds: Normal heart sounds.   Pulmonary:      Effort: No respiratory distress.   Abdominal:      General: There is no distension.   Musculoskeletal:      Right lower leg: No edema.      Left lower leg: No edema.   Skin:     General: Skin is dry.      Capillary Refill: Capillary refill takes less than 2 seconds.   Neurological:      Mental Status: He is alert. Mental status is at baseline.   Psychiatric:         Mood and Affect: Mood normal.           Lines/Drains:  Lines/Drains/Airways       Active Status       Name Placement date Placement time Site Days    Suprapubic Catheter 18 Fr. 09/27/24  1017  --  19                            Lab Results: I have reviewed the following results:   Results from last 7 days   Lab Units 10/16/24  0523 10/15/24  0512   WBC Thousand/uL 6.79 5.45   HEMOGLOBIN g/dL 11.2* 11.0*   HEMATOCRIT % 34.6* 33.2*   PLATELETS Thousands/uL 257 268   SEGS PCT %  --  56   LYMPHO PCT %  --  22   MONO PCT %  --  13*   EOS PCT %  --  8*     Results from last 7 days   Lab Units 10/16/24  0523   SODIUM mmol/L 138   POTASSIUM mmol/L 4.3   CHLORIDE  mmol/L 104   CO2 mmol/L 25   BUN mg/dL 34*   CREATININE mg/dL 1.74*   ANION GAP mmol/L 9   CALCIUM mg/dL 8.4   GLUCOSE RANDOM mg/dL 85     Results from last 7 days   Lab Units 10/14/24  1938   INR  1.16                   Recent Cultures (last 7 days):   Results from last 7 days   Lab Units 10/14/24  1939   URINE CULTURE  10,000-19,000 cfu/ml Gram Negative Armando*       Imaging Results Review: I reviewed radiology reports from this admission including: CT abdomen/pelvis.  Other Study Results Review: No additional pertinent studies reviewed.    Last 24 Hours Medication List:     Current Facility-Administered Medications:     acetaminophen (TYLENOL) tablet 650 mg, Q6H PRN    amiodarone tablet 200 mg, Daily    atorvastatin (LIPITOR) tablet 40 mg, Daily With Dinner    cefTRIAXone (ROCEPHIN) IVPB (premix in dextrose) 1,000 mg 50 mL, Q24H    finasteride (PROSCAR) tablet 5 mg, Daily    [Held by provider] furosemide (LASIX) tablet 40 mg, Daily    [Held by provider] lisinopril (ZESTRIL) tablet 5 mg, Daily    metoprolol succinate (TOPROL-XL) 24 hr tablet 25 mg, Q12H ARI    ondansetron (ZOFRAN) injection 4 mg, Q6H PRN    polyethylene glycol (MIRALAX) packet 17 g, BID    senna-docusate sodium (SENOKOT S) 8.6-50 mg per tablet 2 tablet, BID    tamsulosin (FLOMAX) capsule 0.4 mg, HS    Administrative Statements   Today, Patient Was Seen By: Elif Santa MD  I have spent a total time of 39 minutes in caring for this patient on the day of the visit/encounter including Patient and family education, Importance of tx compliance, Impressions, Counseling / Coordination of care, Documenting in the medical record, Reviewing / ordering tests, medicine, procedures  , Obtaining or reviewing history  , and Communicating with other healthcare professionals .    **Please Note: This note may have been constructed using a voice recognition system.**

## 2024-10-16 NOTE — ASSESSMENT & PLAN NOTE
Wt Readings from Last 3 Encounters:   10/16/24 88 kg (194 lb 0.1 oz)   09/28/24 87.3 kg (192 lb 6.4 oz)   09/17/24 87.7 kg (193 lb 6.4 oz)     Appears euvolemic   On Lasix 40 mg daily, held while inpatient  I/O and daily weights   Last echo 02/2024: EF 35-40%.

## 2024-10-16 NOTE — PLAN OF CARE
Problem: PAIN - ADULT  Goal: Verbalizes/displays adequate comfort level or baseline comfort level  Description: Interventions:  - Encourage patient to monitor pain and request assistance  - Assess pain using appropriate pain scale  - Administer analgesics based on type and severity of pain and evaluate response  - Implement non-pharmacological measures as appropriate and evaluate response  - Consider cultural and social influences on pain and pain management  - Notify physician/advanced practitioner if interventions unsuccessful or patient reports new pain  Outcome: Progressing     Problem: INFECTION - ADULT  Goal: Absence or prevention of progression during hospitalization  Description: INTERVENTIONS:  - Assess and monitor for signs and symptoms of infection  - Monitor lab/diagnostic results  - Monitor all insertion sites, i.e. indwelling lines, tubes, and drains  - Monitor endotracheal if appropriate and nasal secretions for changes in amount and color  - [place] appropriate cooling/warming therapies per order  - Administer medications as ordered  - Instruct and encourage patient and family to use good hand hygiene technique  - Identify and instruct in appropriate isolation precautions for identified infection/condition  Outcome: Progressing  Goal: Absence of fever/infection during neutropenic period  Description: INTERVENTIONS:  - Monitor WBC    Outcome: Progressing     Problem: DISCHARGE PLANNING  Goal: Discharge to home or other facility with appropriate resources  Description: INTERVENTIONS:  - Identify barriers to discharge w/patient and caregiver  - Arrange for needed discharge resources and transportation as appropriate  - Identify discharge learning needs (meds, wound care, etc.)  - Arrange for interpretive services to assist at discharge as needed  - Refer to Case Management Department for coordinating discharge planning if the patient needs post-hospital services based on physician/advanced  practitioner order or complex needs related to functional status, cognitive ability, or social support system  Outcome: Progressing     Problem: Knowledge Deficit  Goal: Patient/family/caregiver demonstrates understanding of disease process, treatment plan, medications, and discharge instructions  Description: Complete learning assessment and assess knowledge base.  Interventions:  - Provide teaching at level of understanding  - Provide teaching via preferred learning methods  Outcome: Progressing     Problem: GENITOURINARY - ADULT  Goal: Maintains or returns to baseline urinary function  Description: INTERVENTIONS:  - Assess urinary function  - Encourage oral fluids to ensure adequate hydration if ordered  - Administer IV fluids as ordered to ensure adequate hydration  - Administer ordered medications as needed  - Offer frequent toileting  - Follow urinary retention protocol if ordered  Outcome: Progressing

## 2024-10-16 NOTE — ASSESSMENT & PLAN NOTE
As per CT imaging-1.2 cm right renal lesion is more dense than a simple cyst, though there is no definite enhancement, and therefore finding may represent a cyst with debris.    follow-up CT with renal protocol is advised in 6 months     Outpatient urology follow-up

## 2024-10-16 NOTE — PLAN OF CARE
Problem: PAIN - ADULT  Goal: Verbalizes/displays adequate comfort level or baseline comfort level  Description: Interventions:  - Encourage patient to monitor pain and request assistance  - Assess pain using appropriate pain scale  - Administer analgesics based on type and severity of pain and evaluate response  - Implement non-pharmacological measures as appropriate and evaluate response  - Consider cultural and social influences on pain and pain management  - Notify physician/advanced practitioner if interventions unsuccessful or patient reports new pain  Outcome: Progressing     Problem: INFECTION - ADULT  Goal: Absence or prevention of progression during hospitalization  Description: INTERVENTIONS:  - Assess and monitor for signs and symptoms of infection  - Monitor lab/diagnostic results  - Monitor all insertion sites, i.e. indwelling lines, tubes, and drains  - Monitor endotracheal if appropriate and nasal secretions for changes in amount and color  - [place] appropriate cooling/warming therapies per order  - Administer medications as ordered  - Instruct and encourage patient and family to use good hand hygiene technique  - Identify and instruct in appropriate isolation precautions for identified infection/condition  Outcome: Progressing  Goal: Absence of fever/infection during neutropenic period  Description: INTERVENTIONS:  - Monitor WBC    Outcome: Progressing     Problem: SAFETY ADULT  Goal: Patient will remain free of falls  Description: INTERVENTIONS:  - Educate patient/family on patient safety including physical limitations  - Instruct patient to call for assistance with activity   - Consult OT/PT to assist with strengthening/mobility   - Keep Call bell within reach  - Keep bed low and locked with side rails adjusted as appropriate  - Keep care items and personal belongings within reach  - Initiate and maintain comfort rounds  - Make Fall Risk Sign visible to staff  - Offer Toileting every 2 Hours,  in advance of need  - Initiate/Maintain 2 alarms  - Obtain necessary fall risk management equipment: 2  - Apply yellow socks and bracelet for high fall risk patients  - Consider moving patient to room near nurses station  Outcome: Progressing  Goal: Maintain or return to baseline ADL function  Description: INTERVENTIONS:  -  Assess patient's ability to carry out ADLs; assess patient's baseline for ADL function and identify physical deficits which impact ability to perform ADLs (bathing, care of mouth/teeth, toileting, grooming, dressing, etc.)  - Assess/evaluate cause of self-care deficits   - Assess range of motion  - Assess patient's mobility; develop plan if impaired  - Assess patient's need for assistive devices and provide as appropriate  - Encourage maximum independence but intervene and supervise when necessary  - Involve family in performance of ADLs  - Assess for home care needs following discharge   - Consider OT consult to assist with ADL evaluation and planning for discharge  - Provide patient education as appropriate  Outcome: Progressing  Goal: Maintains/Returns to pre admission functional level  Description: INTERVENTIONS:  - Perform AM-PAC 6 Click Basic Mobility/ Daily Activity assessment daily.  - Set and communicate daily mobility goal to care team and patient/family/caregiver.   - Collaborate with rehabilitation services on mobility goals if consulted  - Perform Range of Motion 2 times a day.  - Reposition patient every 2 hours.  - Dangle patient 2 times a day  - Stand patient 2 times a day  - Ambulate patient 2 times a day  - Out of bed to chair 2 times a day   - Out of bed for meals 2 times a day  - Out of bed for toileting  - Record patient progress and toleration of activity level   Outcome: Progressing

## 2024-10-16 NOTE — PROGRESS NOTES
Progress Note - Urology   Name: Toro Rodriguez 74 y.o. male I MRN: [id number]  Unit/Bed#: -01 I Date of Admission: 10/14/2024   Date of Service: 10/16/2024 I Hospital Day: 2    Assessment & Plan  Recurrent hematuria  -Has had intermittently 2 weeks, had cleared but resumed Eliquis for DVT today and hematuria returned  -urine has remained clear off eliquis  -CT noted with mild hydro  -Cont with bowel regimen  -Pt voiding well  -Cont SPT   -OP follow up in the urology office  -we will sign off, please call with concerns    Benign prostatic hyperplasia with urinary retention  -Had chronic indwelling farmer with urethral erosion  -Suprapubic catheter placed 9/27/24  Chronic kidney disease, stage 3a (HCC)  Lab Results   Component Value Date    EGFR 37 10/16/2024    EGFR 43 10/15/2024    EGFR 39 10/14/2024    CREATININE 1.74 (H) 10/16/2024    CREATININE 1.53 (H) 10/15/2024    CREATININE 1.66 (H) 10/14/2024     Deep vein thrombosis (DVT) of left lower extremity (HCC)  -On Eliquis, held during admission 2 weeks ago, resumed 9/30 with subsequent hematuria and medication held again until today  -Resumed Eliquis today with return of hematuria.   --Eliquis to be held indefinitely     Essential hypertension    Chronic systolic (congestive) heart failure (HCC)  Wt Readings from Last 3 Encounters:   10/16/24 88 kg (194 lb 0.1 oz)   09/28/24 87.3 kg (192 lb 6.4 oz)   09/17/24 87.7 kg (193 lb 6.4 oz)             Coronary artery disease involving native coronary artery of native heart    NSVT (nonsustained ventricular tachycardia) (HCC)          Subjective   Lying in bed  Eating breakfast  No concerns or complaints    Objective :  Temp:  [97.9 °F (36.6 °C)-98.1 °F (36.7 °C)] 97.9 °F (36.6 °C)  HR:  [54-65] 65  BP: (119-138)/(48-58) 119/48  Resp:  [17-20] 20  SpO2:  [91 %-95 %] 94 %  O2 Device: Nasal cannula  Nasal Cannula O2 Flow Rate (L/min):  [1 L/min] 1 L/min    I/O         10/14 0701  10/15 0700 10/15 0701  10/16 0700 10/16  0701  10/17 0700    P.O. 480 1315 300    I.V. (mL/kg) 500 (5.8) 10 (0.1)     Total Intake(mL/kg) 980 (11.3) 1325 (15.1) 300 (3.4)    Urine (mL/kg/hr) 1075 2250 (1.1) 225 (0.5)    Stool  241     Total Output 1075 2491 225    Net -95 -1166 +75                 Lines/Drains/Airways       Active Status       Name Placement date Placement time Site Days    Suprapubic Catheter 18 Fr. 09/27/24  1017  --  19                  Physical Exam  Awake, alert NAD  NC, AT  CTAB  RRR  Urine clear in the bag    Lab Results: I have reviewed the following results:  Recent Labs     10/14/24  1938 10/15/24  0512 10/16/24  0523   WBC 5.63 5.45 6.79   HGB 11.2* 11.0* 11.2*   HCT 34.3* 33.2* 34.6*    268 257   SODIUM 136 140 138   K 4.3 3.6 4.3    106 104   CO2 25 27 25   BUN 36* 34* 34*   CREATININE 1.66* 1.53* 1.74*   GLUC 90 109 85   MG  --  2.2  --    PTT 34  --   --    INR 1.16  --   --

## 2024-10-16 NOTE — Clinical Note
Patient is a 73y/o M who presented with hematuria within suprapubic catheter. Patient resides at [facility] where he is ind with mobility with a rollator and has assistance with ADL's. Current medical status includes hard of hearing, fall risk, bed/chair alarm, catheter, decreased balance. Patient tolerated session well. No c/o pain. Completed bed mobility, transfers, and amb without assistance. Patient reporting that he feels close to his baseline. At this time, no further inpatient P.T. needs. Recommendig level 3 resources and use of a rollator at all times. The patient's AM-PAC Basic Mobility Inpatient Short Form Raw Score is 23. A Raw score of greater than 17 suggests the patient may benefit from discharge to home. Please also refer to the recommendation of the Physical Therapist for safe discharge planning.

## 2024-10-16 NOTE — ASSESSMENT & PLAN NOTE
-Has had intermittently 2 weeks, had cleared but resumed Eliquis for DVT today and hematuria returned  -urine has remained clear off eliquis  -CT noted with mild hydro  -Cont with bowel regimen  -Pt voiding well  -Cont SPT   -OP follow up in the urology office  -we will sign off, please call with concerns

## 2024-10-16 NOTE — PHYSICAL THERAPY NOTE
PHYSICAL THERAPY EVAL  Physical Therapy Evaluation    Performed at least 2 patient identifiers during session:  Patient Active Problem List   Diagnosis    Essential hypertension    Generalized weakness    Chronic systolic (congestive) heart failure (HCC)    Deep vein thrombosis (DVT) of left lower extremity (HCC)    Obesity, morbid (HCC)    Coronary artery disease involving native coronary artery of native heart    Type 2 diabetes mellitus with chronic kidney disease, without long-term current use of insulin, unspecified CKD stage (HCC)    Benign prostatic hyperplasia with urinary retention    Dilated cardiomyopathy (HCC)    Melanoma of back (HCC)    Chronic venous hypertension (idiopathic) with ulcer of left lower extremity (CODE) ([facility])    Chronic kidney disease, stage 3a ([facility])    Encounter for geriatric assessment    Recurrent hematuria    Anemia    Encounter to discuss test results    Urethral erosion by catheter, initial encounter  ([facility])    Urinary tract infection associated with indwelling urethral catheter  ([facility])    NSVT (nonsustained ventricular tachycardia) ([facility])    UTI (urinary tract infection)       Past Medical History:   Diagnosis Date    Alcohol intoxication ([facility]) 10/15/2020    BPH (benign prostatic hyperplasia)     Chronic HFrEF (heart failure with reduced ejection fraction) ([facility]) 11/2023    Coronary artery calcification seen on CT scan 11/10/2023    Coronary artery disease 12/14/2023    Deep vein thrombosis (DVT) of left lower extremity (HCC) 11/2023    Diabetes mellitus (HCC) 11/2023    Fall from slip, trip, or stumble 10/15/2020    History of phimosis of penis     History of urinary calculi     Hypertension 1988    Skin cancer     Tobacco abuse     quit around 2000       Past Surgical History:   Procedure Laterality Date    BLADDER STONE REMOVAL  2014    CARDIAC CATHETERIZATION N/A 12/14/2023    Procedure: Cardiac  "catheterization;  Surgeon: Dave Royal MD;  [facility]: [facility];  Service: Cardiology    IR SUPRAPUBIC CATHETER PLACEMENT  9/27/2024    ORIF ANKLE FRACTURE Left     SKIN LESION EXCISION N/A 3/18/2024    Procedure: WIDE LOCAL EXCISION OF BACK MELANOMA;  Surgeon: Lillie La MD;  Location: [facility];  Service: Surgical Oncology    TOTAL HIP ARTHROPLASTY Right             10/16/24 0952   PT Last Visit   PT Visit Date 10/16/24   Note Type   Note type Evaluation   Pain Assessment   Pain Assessment Tool 0-10   Pain Score No Pain   Restrictions/Precautions   Weight Bearing Precautions Per Order No   Home Living   Type of Home Other (Comment)  ([facility])   Home Layout One level;Ramped entrance   Bathroom Shower/Tub Walk-in shower   Bathroom Toilet Standard   Bathroom Equipment Grab bars in shower  (2 GB; pt states \"plastic laawn chair with towel over in shower.\")   Bathroom Accessibility Accessible   Home Equipment   (Rollator)   Prior Function   Level of [place] Independent with functional mobility;Needs assistance with IADLS;Needs assistance with ADLs   Lives With Facility staff   Receives Help From Personal care attendant   IADLs Family/Friend/Other provides transportation;Family/Friend/Other provides meals;Family/Friend/Other provides medication management   Falls in the last 6 months 0   Comments Per chart- he has assistance with showers   General   Additional Pertinent History Recent hospital stay for suprapubic catheter placement. Reports that he has been getting homecare services (RN, PT and OT)   Family/Caregiver Present No   Cognition   Overall Cognitive Status WFL   Arousal/Participation Alert   Orientation Level Oriented X4   Memory Within functional limits   Following Commands Follows one step commands without difficulty   Comments Hard of hearing   Subjective   Subjective \"I've been getting up to the commode.   RLE Assessment   RLE Assessment WFL   LLE Assessment   LLE Assessment " WFL   Bed Mobility   Supine to Sit 6  Modified independent   Additional items HOB elevated   Additional Comments Reports that his bed at home is adjustable.   Transfers   Sit to Stand 6  Modified independent   Additional items Armrests   Stand to Sit 6  Modified independent   Additional items Armrests   Additional Comments Initially supervision level progressed to mod I during session.   Ambulation/Elevation   Gait pattern Decreased foot clearance;Wide DAVID   Gait Assistance 6  Modified independent   Assistive Device Rolling walker   Distance 200ft   Balance   Static Sitting Normal   Dynamic Sitting Normal   Static Standing Good   Dynamic Standing Good   Ambulatory Good   Endurance Deficit   Endurance Deficit No   Activity Tolerance   Activity Tolerance Patient tolerated treatment well   Medical Staff Made Aware OTNoelle   Assessment   Prognosis Good   Problem List Impaired balance;Obesity   Assessment Patient is a 73y/o M who presented with hematuria within suprapubic catheter. Patient resides at [facility] where he is ind with mobility with a rollator and has assistance with ADL's. Current medical status includes hard of hearing, fall risk, bed/chair alarm, catheter, decreased balance. Patient tolerated session well. No c/o pain. Completed bed mobility, transfers, and amb without assistance. Patient reporting that he feels close to his baseline. At this time, no further inpatient P.T. needs. Recommendig level 3 resources and use of a rollator at all times. The patient's AM-PAC Basic Mobility Inpatient Short Form Raw Score is 23. A Raw score of greater than 17 suggests the patient may benefit from discharge to home. Please also refer to the recommendation of the Physical Therapist for safe discharge planning.   Barriers to Discharge None   Goals   Patient Goals To go home   PT Treatment Day 0   Plan   Treatment/Interventions   (D/C P.T.)   Discharge Recommendation   Rehab Resource Intensity Level, PT III  (Minimum Resource Intensity)  (Continue with home care services)   Additional Comments Owns a rollator. Will continue to use at all times   AM-PAC Basic Mobility Inpatient   Turning in Flat Bed Without Bedrails 4   Lying on Back to Sitting on Edge of Flat Bed Without Bedrails 4   Moving Bed to Chair 4   Standing Up From Chair Using Arms 4   Walk in Room 4   Climb 3-5 Stairs With Railing 3   Basic Mobility Inpatient Raw Score 23   Basic Mobility Standardized Score 50.88   [facility] Highest Level Of Mobility   -HLM Goal 7: Walk 25 feet or more   -HLM Achieved 7: Walk 25 feet or more   End of Consult   Patient Position at End of Consult Bedside chair;Bed/Chair alarm activated;All needs within reach     Shanique Lo, PT             Patient Name: Toro Rodriguez  Today's Date: 10/16/2024

## 2024-10-16 NOTE — ASSESSMENT & PLAN NOTE
Lab Results   Component Value Date    EGFR 37 10/16/2024    EGFR 43 10/15/2024    EGFR 39 10/14/2024    CREATININE 1.74 (H) 10/16/2024    CREATININE 1.53 (H) 10/15/2024    CREATININE 1.66 (H) 10/14/2024   Cr 1.66   Baseline 1.4-1.6   Avoid nephrotoxic agents   Continue to monitor   Continue to hold lisinopril and Lasix

## 2024-10-16 NOTE — TELEPHONE ENCOUNTER
Order placed for urodynamics testing as patient reports ongoing incomplete emptying and would like to guarantee farmer catheter is needed.

## 2024-10-16 NOTE — CASE MANAGEMENT
Case Management Discharge Planning Note    Patient name Toro Rodriguez  Location /-01 MRN [id number]  : 1950 Date 10/16/2024       Current Admission Date: 10/14/2024  Current Admission Diagnosis:Recurrent hematuria   Patient Active Problem List    Diagnosis Date Noted Date Diagnosed    Renal lesion 10/16/2024     NSVT (nonsustained ventricular tachycardia) ([facility]) 2024     UTI (urinary tract infection) 2024     Urinary tract infection associated with indwelling urethral catheter  ([facility]) 2024     Encounter to discuss test results 2024     Urethral erosion by catheter, initial encounter  ([facility]) 2024     Anemia 2024     Recurrent hematuria 2024     Chronic venous hypertension (idiopathic) with ulcer of left lower extremity (CODE) ([facility]) 2024     Chronic kidney disease, stage 3a ([facility]) 2024     Encounter for geriatric assessment 2024     Melanoma of back ([facility]) 2024     Dilated cardiomyopathy ([facility]) 12/15/2023     Obesity, morbid ([facility]) 2023     Type 2 diabetes mellitus with chronic kidney disease, without long-term current use of insulin, unspecified CKD stage ([facility]) 11/15/2023     Essential hypertension 11/10/2023     Generalized weakness 11/10/2023     Chronic systolic (congestive) heart failure ([facility]) 11/10/2023     Deep vein thrombosis (DVT) of left lower extremity ([facility]) 11/10/2023     Coronary artery disease involving native coronary artery of native heart 11/10/2023     Benign prostatic hyperplasia with urinary retention        LOS (days): 2  Geometric Mean LOS (GMLOS) (days): 3.6  Days to GMLOS:1.8     OBJECTIVE:  Risk of Unplanned Readmission Score: 37.77         Current admission status: Inpatient   Preferred Pharmacy:   [facility]- [place], [place] - [facility] [address] [address]  Phone: [phone] Fax: [phone]    Primary Care Provider: Ml Roberson  CARLIE Guzamn    Primary Insurance: MEDICARE  Secondary Insurance: BLUE CROSS    DISCHARGE DETAILS:           CM informed Juan from [facility] that pt is a discharge tomorrow and that [facility] is reserved to work with patient at discharge. Juan stated that [facility] can accept when pt is discharged.

## 2024-10-16 NOTE — ASSESSMENT & PLAN NOTE
Lab Results   Component Value Date    EGFR 37 10/16/2024    EGFR 43 10/15/2024    EGFR 39 10/14/2024    CREATININE 1.74 (H) 10/16/2024    CREATININE 1.53 (H) 10/15/2024    CREATININE 1.66 (H) 10/14/2024

## 2024-10-16 NOTE — ASSESSMENT & PLAN NOTE
Presented due to hematuria within suprapubic catheter.  Eliquis had been on hold due to hematuria.  Resumed morning of 10/14.  Took 1 dose.  Shortly after started with bleeding.   On Eliquis due to history of DVT.   Recent admission for similar 9/22-9/28. Patient restarted eliquis after admission and then had hematuria again on 9/30. Seen in the ER and told to hold Eliquis. Once hematuria free for 24 hours could resume eliquis.   Recent Labs     10/14/24  1938 10/15/24  0512 10/16/24  0523   HGB 11.2* 11.0* 11.2*      Continue to monitor. Transfuse < 7.   Urine sample (WBC WNL. No sirs criteria.)  UA: small amount of leuks, negative nitrite   Micro: Unable to enumerate wbc or bacteria due to RBC.   Urine culture pending   On manual irrigation, hematuria has now resolved  Outpatient urology follow-up

## 2024-10-16 NOTE — ASSESSMENT & PLAN NOTE
Single episode  In November/2023  Discussed with hematology, no longer needs anticoagulation/Eliquis  Discussed with patient/daughter, both are agreeable with plan  Will discontinue Eliquis indefinitely

## 2024-10-16 NOTE — ASSESSMENT & PLAN NOTE
Wt Readings from Last 3 Encounters:   10/16/24 88 kg (194 lb 0.1 oz)   09/28/24 87.3 kg (192 lb 6.4 oz)   09/17/24 87.7 kg (193 lb 6.4 oz)

## 2024-10-16 NOTE — ASSESSMENT & PLAN NOTE
"BP (!) 119/48 (BP Location: Right arm)   Pulse 65   Temp 97.9 °F (36.6 °C) (Oral)   Resp 20   Ht 5' 7\" (1.702 m)   Wt 88 kg (194 lb 0.1 oz)   SpO2 94%   BMI 30.39 kg/m²   Continue with Toprol-XL 25 mg twice daily  Will hold lisinopril 5 mg daily and Lasix 40 mg daily for 10/16  Vitals as per routine  "

## 2024-10-16 NOTE — ASSESSMENT & PLAN NOTE
-On Eliquis, held during admission 2 weeks ago, resumed 9/30 with subsequent hematuria and medication held again until today  -Resumed Eliquis today with return of hematuria.   --Eliquis to be held indefinitely

## 2024-10-17 LAB
ANION GAP SERPL CALCULATED.3IONS-SCNC: 9 MMOL/L (ref 4–13)
BACTERIA UR CULT: ABNORMAL
BACTERIA UR CULT: ABNORMAL
BASOPHILS # BLD AUTO: 0.04 THOUSANDS/ΜL (ref 0–0.1)
BASOPHILS NFR BLD AUTO: 1 % (ref 0–1)
BUN SERPL-MCNC: 33 MG/DL (ref 5–25)
CALCIUM SERPL-MCNC: 8.4 MG/DL (ref 8.4–10.2)
CHLORIDE SERPL-SCNC: 105 MMOL/L (ref 96–108)
CO2 SERPL-SCNC: 25 MMOL/L (ref 21–32)
CREAT SERPL-MCNC: 1.58 MG/DL (ref 0.6–1.3)
EOSINOPHIL # BLD AUTO: 0.59 THOUSAND/ΜL (ref 0–0.61)
EOSINOPHIL NFR BLD AUTO: 9 % (ref 0–6)
ERYTHROCYTE [DISTWIDTH] IN BLOOD BY AUTOMATED COUNT: 13.5 % (ref 11.6–15.1)
GFR SERPL CREATININE-BSD FRML MDRD: 41 ML/MIN/1.73SQ M
GLUCOSE SERPL-MCNC: 89 MG/DL (ref 65–140)
HCT VFR BLD AUTO: 35.9 % (ref 36.5–49.3)
HGB BLD-MCNC: 11.4 G/DL (ref 12–17)
IMM GRANULOCYTES # BLD AUTO: 0.04 THOUSAND/UL (ref 0–0.2)
IMM GRANULOCYTES NFR BLD AUTO: 1 % (ref 0–2)
LYMPHOCYTES # BLD AUTO: 1.11 THOUSANDS/ΜL (ref 0.6–4.47)
LYMPHOCYTES NFR BLD AUTO: 17 % (ref 14–44)
MCH RBC QN AUTO: 31.1 PG (ref 26.8–34.3)
MCHC RBC AUTO-ENTMCNC: 31.8 G/DL (ref 31.4–37.4)
MCV RBC AUTO: 98 FL (ref 82–98)
MONOCYTES # BLD AUTO: 0.65 THOUSAND/ΜL (ref 0.17–1.22)
MONOCYTES NFR BLD AUTO: 10 % (ref 4–12)
NEUTROPHILS # BLD AUTO: 4.16 THOUSANDS/ΜL (ref 1.85–7.62)
NEUTS SEG NFR BLD AUTO: 62 % (ref 43–75)
NRBC BLD AUTO-RTO: 0 /100 WBCS
PLATELET # BLD AUTO: 251 THOUSANDS/UL (ref 149–390)
PMV BLD AUTO: 10.7 FL (ref 8.9–12.7)
POTASSIUM SERPL-SCNC: 4.5 MMOL/L (ref 3.5–5.3)
RBC # BLD AUTO: 3.67 MILLION/UL (ref 3.88–5.62)
SODIUM SERPL-SCNC: 139 MMOL/L (ref 135–147)
WBC # BLD AUTO: 6.59 THOUSAND/UL (ref 4.31–10.16)

## 2024-10-17 PROCEDURE — 85025 COMPLETE CBC W/AUTO DIFF WBC: CPT | Performed by: INTERNAL MEDICINE

## 2024-10-17 PROCEDURE — 99232 SBSQ HOSP IP/OBS MODERATE 35: CPT | Performed by: INTERNAL MEDICINE

## 2024-10-17 PROCEDURE — 80048 BASIC METABOLIC PNL TOTAL CA: CPT | Performed by: INTERNAL MEDICINE

## 2024-10-17 RX ADMIN — TAMSULOSIN HYDROCHLORIDE 0.4 MG: 0.4 CAPSULE ORAL at 21:26

## 2024-10-17 RX ADMIN — POLYETHYLENE GLYCOL 3350 17 G: 17 POWDER, FOR SOLUTION ORAL at 09:40

## 2024-10-17 RX ADMIN — METOPROLOL SUCCINATE 25 MG: 25 TABLET, EXTENDED RELEASE ORAL at 21:26

## 2024-10-17 RX ADMIN — METOPROLOL SUCCINATE 25 MG: 25 TABLET, EXTENDED RELEASE ORAL at 09:40

## 2024-10-17 RX ADMIN — ATORVASTATIN CALCIUM 40 MG: 40 TABLET, FILM COATED ORAL at 18:05

## 2024-10-17 RX ADMIN — SENNOSIDES AND DOCUSATE SODIUM 2 TABLET: 8.6; 5 TABLET ORAL at 09:40

## 2024-10-17 RX ADMIN — SENNOSIDES AND DOCUSATE SODIUM 2 TABLET: 8.6; 5 TABLET ORAL at 18:05

## 2024-10-17 RX ADMIN — FINASTERIDE 5 MG: 5 TABLET, FILM COATED ORAL at 09:40

## 2024-10-17 RX ADMIN — AMIODARONE HYDROCHLORIDE 200 MG: 200 TABLET ORAL at 09:40

## 2024-10-17 NOTE — ASSESSMENT & PLAN NOTE
Presented due to hematuria within suprapubic catheter.  Eliquis had been on hold due to hematuria.  Resumed morning of 10/14.  Took 1 dose.  Shortly after started with bleeding.   On Eliquis due to history of DVT.   Recent admission for similar 9/22-9/28. Patient restarted eliquis after admission and then had hematuria again on 9/30. Seen in the ER and told to hold Eliquis. Once hematuria free for 24 hours could resume eliquis.   Recent Labs     10/15/24  0512 10/16/24  0523 10/17/24  0512   HGB 11.0* 11.2* 11.4*      Continue to monitor. Transfuse < 7.   Urine sample (WBC WNL. No sirs criteria.)  UA: small amount of leuks, negative nitrite   Micro: Unable to enumerate wbc or bacteria due to RBC.   Urine cultures- achromobacter xylosoxidans  Does not meet criteria for cystitis treatment as per [facility]  UTI treatment guide  On manual irrigation, hematuria has now resolved  Outpatient urology follow-up

## 2024-10-17 NOTE — ASSESSMENT & PLAN NOTE
"/61   Pulse (!) 54   Temp (!) 97.3 °F (36.3 °C)   Resp 16   Ht 5' 7\" (1.702 m)   Wt 88.2 kg (194 lb 7.1 oz)   SpO2 93%   BMI 30.45 kg/m²   Continue with Toprol-XL 25 mg twice daily  Will hold lisinopril 5 mg daily and Lasix 40 mg daily for 10/16  Vitals as per routine  "

## 2024-10-17 NOTE — PROGRESS NOTES
"Progress Note - Hospitalist   Name: Toro Rodriguez 74 y.o. male I MRN: [id number]  Unit/Bed#: -01 I Date of Admission: 10/14/2024   Date of Service: 10/17/2024 I Hospital Day: 3    Assessment & Plan  Recurrent hematuria  Presented due to hematuria within suprapubic catheter.  Eliquis had been on hold due to hematuria.  Resumed morning of 10/14.  Took 1 dose.  Shortly after started with bleeding.   On Eliquis due to history of DVT.   Recent admission for similar 9/22-9/28. Patient restarted eliquis after admission and then had hematuria again on 9/30. Seen in the ER and told to hold Eliquis. Once hematuria free for 24 hours could resume eliquis.   Recent Labs     10/15/24  0512 10/16/24  0523 10/17/24  0512   HGB 11.0* 11.2* 11.4*      Continue to monitor. Transfuse < 7.   Urine sample (WBC WNL. No sirs criteria.)  UA: small amount of leuks, negative nitrite   Micro: Unable to enumerate wbc or bacteria due to RBC.   Urine cultures- achromobacter xylosoxidans  Does not meet criteria for cystitis treatment as per [facility]  UTI treatment guide  On manual irrigation, hematuria has now resolved  Outpatient urology follow-up  Deep vein thrombosis (DVT) of left lower extremity (HCC)  Single episode  In November/2023  Discussed with hematology, no longer needs anticoagulation/Eliquis  Discussed with patient/daughter, both are agreeable with plan  Will discontinue Eliquis indefinitely  Essential hypertension  /61   Pulse (!) 54   Temp (!) 97.3 °F (36.3 °C)   Resp 16   Ht 5' 7\" (1.702 m)   Wt 88.2 kg (194 lb 7.1 oz)   SpO2 93%   BMI 30.45 kg/m²   Continue with Toprol-XL 25 mg twice daily  Will hold lisinopril 5 mg daily and Lasix 40 mg daily for 10/16  Vitals as per routine  Chronic systolic (congestive) heart failure (HCC)  Wt Readings from Last 3 Encounters:   10/17/24 88.2 kg (194 lb 7.1 oz)   09/28/24 87.3 kg (192 lb 6.4 oz)   09/17/24 87.7 kg (193 lb 6.4 oz)     Appears euvolemic   On Lasix 40 mg daily, " held while inpatient  I/O and daily weights   Last echo 02/2024: EF 35-40%.   Coronary artery disease involving native coronary artery of native heart  Continue with Toprol-XL and statin  Outpatient cardiology follow-up  Benign prostatic hyperplasia with urinary retention  Continue flomax and proscar   Chronic kidney disease, stage 3a (HCC)  Lab Results   Component Value Date    EGFR 41 10/17/2024    EGFR 37 10/16/2024    EGFR 43 10/15/2024    CREATININE 1.58 (H) 10/17/2024    CREATININE 1.74 (H) 10/16/2024    CREATININE 1.53 (H) 10/15/2024   Cr 1.66   Baseline 1.4-1.6   Avoid nephrotoxic agents   Continue to monitor   Continue to hold lisinopril and Lasix  NSVT (nonsustained ventricular tachycardia) (HCC)  Continue home amiodarone and metoprolol succinate   Renal lesion  As per CT imaging-1.2 cm right renal lesion is more dense than a simple cyst, though there is no definite enhancement, and therefore finding may represent a cyst with debris.    follow-up CT with renal protocol is advised in 6 months     Outpatient urology follow-up    VTE Pharmacologic Prophylaxis: VTE Score: 3 Moderate Risk (Score 3-4) - Pharmacological DVT Prophylaxis Contraindicated. Sequential Compression Devices Ordered.    Mobility:   Basic Mobility Inpatient Raw Score: 23  JH-HLM Goal: 7: Walk 25 feet or more  JH-HLM Achieved: 7: Walk 25 feet or more  JH-HLM Goal achieved. Continue to encourage appropriate mobility.    Patient Centered Rounds: I performed bedside rounds with nursing staff today.   Discussions with Specialists or Other Care Team Provider: yes    Education and Discussions with Family / Patient: Updated  (daughter) via phone.    Current Length of Stay: 3 day(s)  Current Patient Status: Inpatient   Certification Statement: tomorrow  Discharge Plan: tomorrow     Code Status: Level 1 - Full Code    Subjective   Seen and examined at bedside  Awake and alert  Denies any complaints  Denies chest pain or shortness of  breath or abdominal pain  Tolerating diet    Objective :  Temp:  [97.3 °F (36.3 °C)-98.3 °F (36.8 °C)] 97.3 °F (36.3 °C)  HR:  [53-59] 54  BP: (131-148)/(60-72) 131/61  Resp:  [16-18] 16  SpO2:  [93 %-94 %] 93 %  O2 Device: None (Room air)    Body mass index is 30.45 kg/m².     Input and Output Summary (last 24 hours):     Intake/Output Summary (Last 24 hours) at 10/17/2024 1532  Last data filed at 10/17/2024 1344  Gross per 24 hour   Intake 1390 ml   Output 925 ml   Net 465 ml       Physical Exam  Vitals reviewed.   Constitutional:       Appearance: Normal appearance.   HENT:      Head: Atraumatic.      Mouth/Throat:      Mouth: Mucous membranes are dry.   Cardiovascular:      Rate and Rhythm: Normal rate.      Heart sounds: Normal heart sounds.   Pulmonary:      Effort: No respiratory distress.   Abdominal:      General: There is no distension.   Musculoskeletal:      Right lower leg: No edema.      Left lower leg: No edema.   Skin:     General: Skin is dry.      Capillary Refill: Capillary refill takes less than 2 seconds.   Neurological:      Mental Status: He is alert. Mental status is at baseline.   Psychiatric:         Mood and Affect: Mood normal.           Lines/Drains:  Lines/Drains/Airways       Active Status       Name Placement date Placement time Site Days    Suprapubic Catheter 18 Fr. 09/27/24  1017  --  20                            Lab Results: I have reviewed the following results:   Results from last 7 days   Lab Units 10/17/24  0512   WBC Thousand/uL 6.59   HEMOGLOBIN g/dL 11.4*   HEMATOCRIT % 35.9*   PLATELETS Thousands/uL 251   SEGS PCT % 62   LYMPHO PCT % 17   MONO PCT % 10   EOS PCT % 9*     Results from last 7 days   Lab Units 10/17/24  0512   SODIUM mmol/L 139   POTASSIUM mmol/L 4.5   CHLORIDE mmol/L 105   CO2 mmol/L 25   BUN mg/dL 33*   CREATININE mg/dL 1.58*   ANION GAP mmol/L 9   CALCIUM mg/dL 8.4   GLUCOSE RANDOM mg/dL 89     Results from last 7 days   Lab Units 10/14/24  1938   INR   1.16                   Recent Cultures (last 7 days):   Results from last 7 days   Lab Units 10/14/24  1939   URINE CULTURE  10,000-19,000 cfu/ml Achromobacter xylosoxidans*  <10,000 cfu/ml Achromobacter xylosoxidans*       Imaging Results Review: I reviewed radiology reports from this admission including: CT abdomen/pelvis.  Other Study Results Review: No additional pertinent studies reviewed.    Last 24 Hours Medication List:     Current Facility-Administered Medications:     acetaminophen (TYLENOL) tablet 650 mg, Q6H PRN    amiodarone tablet 200 mg, Daily    atorvastatin (LIPITOR) tablet 40 mg, Daily With Dinner    finasteride (PROSCAR) tablet 5 mg, Daily    [Held by provider] furosemide (LASIX) tablet 40 mg, Daily    [Held by provider] lisinopril (ZESTRIL) tablet 5 mg, Daily    metoprolol succinate (TOPROL-XL) 24 hr tablet 25 mg, Q12H ARI    ondansetron (ZOFRAN) injection 4 mg, Q6H PRN    polyethylene glycol (MIRALAX) packet 17 g, BID    senna-docusate sodium (SENOKOT S) 8.6-50 mg per tablet 2 tablet, BID    tamsulosin (FLOMAX) capsule 0.4 mg, HS    Administrative Statements   Today, Patient Was Seen By: Elif Santa MD  I have spent a total time of 39 minutes in caring for this patient on the day of the visit/encounter including Patient and family education, Importance of tx compliance, Impressions, Counseling / Coordination of care, Documenting in the medical record, Reviewing / ordering tests, medicine, procedures  , Obtaining or reviewing history  , and Communicating with other healthcare professionals .    **Please Note: This note may have been constructed using a voice recognition system.**

## 2024-10-17 NOTE — ASSESSMENT & PLAN NOTE
Wt Readings from Last 3 Encounters:   10/17/24 88.2 kg (194 lb 7.1 oz)   09/28/24 87.3 kg (192 lb 6.4 oz)   09/17/24 87.7 kg (193 lb 6.4 oz)     Appears euvolemic   On Lasix 40 mg daily, held while inpatient  I/O and daily weights   Last echo 02/2024: EF 35-40%.

## 2024-10-17 NOTE — PLAN OF CARE
Problem: PAIN - ADULT  Goal: Verbalizes/displays adequate comfort level or baseline comfort level  Description: Interventions:  - Encourage patient to monitor pain and request assistance  - Assess pain using appropriate pain scale  - Administer analgesics based on type and severity of pain and evaluate response  - Implement non-pharmacological measures as appropriate and evaluate response  - Consider cultural and social influences on pain and pain management  - Notify physician/advanced practitioner if interventions unsuccessful or patient reports new pain  Outcome: Progressing     Problem: INFECTION - ADULT  Goal: Absence or prevention of progression during hospitalization  Description: INTERVENTIONS:  - Assess and monitor for signs and symptoms of infection  - Monitor lab/diagnostic results  - Monitor all insertion sites, i.e. indwelling lines, tubes, and drains  - Monitor endotracheal if appropriate and nasal secretions for changes in amount and color  - [place] appropriate cooling/warming therapies per order  - Administer medications as ordered  - Instruct and encourage patient and family to use good hand hygiene technique  - Identify and instruct in appropriate isolation precautions for identified infection/condition  Outcome: Progressing  Goal: Absence of fever/infection during neutropenic period  Description: INTERVENTIONS:  - Monitor WBC    Outcome: Progressing     Problem: SAFETY ADULT  Goal: Patient will remain free of falls  Description: INTERVENTIONS:  - Educate patient/family on patient safety including physical limitations  - Instruct patient to call for assistance with activity   - Consult OT/PT to assist with strengthening/mobility   - Keep Call bell within reach  - Keep bed low and locked with side rails adjusted as appropriate  - Keep care items and personal belongings within reach  - Initiate and maintain comfort rounds  - Make Fall Risk Sign visible to staff  - Offer Toileting every 2 Hours,  in advance of need  - Initiate/Maintain 2 alarms  - Obtain necessary fall risk management equipment: 2  - Apply yellow socks and bracelet for high fall risk patients  - Consider moving patient to room near nurses station  Outcome: Progressing  Goal: Maintain or return to baseline ADL function  Description: INTERVENTIONS:  -  Assess patient's ability to carry out ADLs; assess patient's baseline for ADL function and identify physical deficits which impact ability to perform ADLs (bathing, care of mouth/teeth, toileting, grooming, dressing, etc.)  - Assess/evaluate cause of self-care deficits   - Assess range of motion  - Assess patient's mobility; develop plan if impaired  - Assess patient's need for assistive devices and provide as appropriate  - Encourage maximum independence but intervene and supervise when necessary  - Involve family in performance of ADLs  - Assess for home care needs following discharge   - Consider OT consult to assist with ADL evaluation and planning for discharge  - Provide patient education as appropriate  Outcome: Progressing  Goal: Maintains/Returns to pre admission functional level  Description: INTERVENTIONS:  - Perform AM-PAC 6 Click Basic Mobility/ Daily Activity assessment daily.  - Set and communicate daily mobility goal to care team and patient/family/caregiver.   - Collaborate with rehabilitation services on mobility goals if consulted  - Perform Range of Motion 2 times a day.  - Reposition patient every 2 hours.  - Dangle patient 2 times a day  - Stand patient 2 times a day  - Ambulate patient 2 times a day  - Out of bed to chair 2 times a day   - Out of bed for meals 2 times a day  - Out of bed for toileting  - Record patient progress and toleration of activity level   Outcome: Progressing

## 2024-10-17 NOTE — CASE MANAGEMENT
Case Management Discharge Planning Note    Patient name Toro Rodriguez  Location /-01 MRN [id number]  : 1950 Date 10/17/2024       Current Admission Date: 10/14/2024  Current Admission Diagnosis:Recurrent hematuria   Patient Active Problem List    Diagnosis Date Noted Date Diagnosed    Renal lesion 10/16/2024     NSVT (nonsustained ventricular tachycardia) ([facility]) 2024     UTI (urinary tract infection) 2024     Urinary tract infection associated with indwelling urethral catheter  ([facility]) 2024     Encounter to discuss test results 2024     Urethral erosion by catheter, initial encounter  ([facility]) 2024     Anemia 2024     Recurrent hematuria 2024     Chronic venous hypertension (idiopathic) with ulcer of left lower extremity (CODE) ([facility]) 2024     Chronic kidney disease, stage 3a ([facility]) 2024     Encounter for geriatric assessment 2024     Melanoma of back ([facility]) 2024     Dilated cardiomyopathy ([facility]) 12/15/2023     Obesity, morbid ([facility]) 2023     Type 2 diabetes mellitus with chronic kidney disease, without long-term current use of insulin, unspecified CKD stage ([facility]) 11/15/2023     Essential hypertension 11/10/2023     Generalized weakness 11/10/2023     Chronic systolic (congestive) heart failure ([facility]) 11/10/2023     Deep vein thrombosis (DVT) of left lower extremity ([facility]) 11/10/2023     Coronary artery disease involving native coronary artery of native heart 11/10/2023     Benign prostatic hyperplasia with urinary retention        LOS (days): 3  Geometric Mean LOS (GMLOS) (days): 3.6  Days to GMLOS:1     OBJECTIVE:  Risk of Unplanned Readmission Score: 38.11         Current admission status: Inpatient   Preferred Pharmacy:   [facility]- [place], [place] - [facility] [address]  [address] [address]  Phone: [phone] Fax: [phone]    Primary Care Provider: Ml Roberson  CARLIE Guzman    Primary Insurance: MEDICARE  Secondary Insurance: BLUE CROSS    DISCHARGE DETAILS:    IMM Given to:: Patient (IMM reviewed with patient at bedside. Pt verbalized understanding of appeal rights. IMM placed in scan bin.)

## 2024-10-17 NOTE — PLAN OF CARE
Problem: PAIN - ADULT  Goal: Verbalizes/displays adequate comfort level or baseline comfort level  Description: Interventions:  - Encourage patient to monitor pain and request assistance  - Assess pain using appropriate pain scale  - Administer analgesics based on type and severity of pain and evaluate response  - Implement non-pharmacological measures as appropriate and evaluate response  - Consider cultural and social influences on pain and pain management  - Notify physician/advanced practitioner if interventions unsuccessful or patient reports new pain  Outcome: Progressing     Problem: INFECTION - ADULT  Goal: Absence or prevention of progression during hospitalization  Description: INTERVENTIONS:  - Assess and monitor for signs and symptoms of infection  - Monitor lab/diagnostic results  - Monitor all insertion sites, i.e. indwelling lines, tubes, and drains  - Monitor endotracheal if appropriate and nasal secretions for changes in amount and color  - [place] appropriate cooling/warming therapies per order  - Administer medications as ordered  - Instruct and encourage patient and family to use good hand hygiene technique  - Identify and instruct in appropriate isolation precautions for identified infection/condition  Outcome: Progressing  Goal: Absence of fever/infection during neutropenic period  Description: INTERVENTIONS:  - Monitor WBC    Outcome: Progressing     Problem: DISCHARGE PLANNING  Goal: Discharge to home or other facility with appropriate resources  Description: INTERVENTIONS:  - Identify barriers to discharge w/patient and caregiver  - Arrange for needed discharge resources and transportation as appropriate  - Identify discharge learning needs (meds, wound care, etc.)  - Arrange for interpretive services to assist at discharge as needed  - Refer to Case Management Department for coordinating discharge planning if the patient needs post-hospital services based on physician/advanced  practitioner order or complex needs related to functional status, cognitive ability, or social support system  Outcome: Progressing     Problem: Knowledge Deficit  Goal: Patient/family/caregiver demonstrates understanding of disease process, treatment plan, medications, and discharge instructions  Description: Complete learning assessment and assess knowledge base.  Interventions:  - Provide teaching at level of understanding  - Provide teaching via preferred learning methods  Outcome: Progressing     Problem: GENITOURINARY - ADULT  Goal: Maintains or returns to baseline urinary function  Description: INTERVENTIONS:  - Assess urinary function  - Encourage oral fluids to ensure adequate hydration if ordered  - Administer IV fluids as ordered to ensure adequate hydration  - Administer ordered medications as needed  - Offer frequent toileting  - Follow urinary retention protocol if ordered  Outcome: Progressing  Goal: Absence of urinary retention  Description: INTERVENTIONS:  - Assess patient's ability to void and empty bladder  - Monitor I/O  - Bladder scan as needed  - Discuss with physician/AP medications to alleviate retention as needed  - Discuss catheterization for long term situations as appropriate  Outcome: Progressing  Goal: Urinary catheter remains patent  Description: INTERVENTIONS:  - Assess patency of urinary catheter  - If patient has a chronic farmer, consider changing catheter if non-functioning  - Follow guidelines for intermittent irrigation of non-functioning urinary catheter  Outcome: Progressing     Problem: SAFETY ADULT  Goal: Patient will remain free of falls  Description: INTERVENTIONS:  - Educate patient/family on patient safety including physical limitations  - Instruct patient to call for assistance with activity   - Consult OT/PT to assist with strengthening/mobility   - Keep Call bell within reach  - Keep bed low and locked with side rails adjusted as appropriate  - Keep care items and  personal belongings within reach  - Initiate and maintain comfort rounds  - Make Fall Risk Sign visible to staff  - Offer Toileting every 2 Hours, in advance of need  - Initiate/Maintain 2 alarms  - Obtain necessary fall risk management equipment: 2  - Apply yellow socks and bracelet for high fall risk patients  - Consider moving patient to room near nurses station  Outcome: Progressing

## 2024-10-17 NOTE — ASSESSMENT & PLAN NOTE
Lab Results   Component Value Date    EGFR 41 10/17/2024    EGFR 37 10/16/2024    EGFR 43 10/15/2024    CREATININE 1.58 (H) 10/17/2024    CREATININE 1.74 (H) 10/16/2024    CREATININE 1.53 (H) 10/15/2024   Cr 1.66   Baseline 1.4-1.6   Avoid nephrotoxic agents   Continue to monitor   Continue to hold lisinopril and Lasix

## 2024-10-18 VITALS
HEART RATE: 51 BPM | DIASTOLIC BLOOD PRESSURE: 64 MMHG | WEIGHT: 196.43 LBS | TEMPERATURE: 97.4 F | RESPIRATION RATE: 20 BRPM | SYSTOLIC BLOOD PRESSURE: 143 MMHG | HEIGHT: 67 IN | OXYGEN SATURATION: 95 % | BODY MASS INDEX: 30.83 KG/M2

## 2024-10-18 PROBLEM — N02.9 RECURRENT HEMATURIA: Status: RESOLVED | Noted: 2024-04-29 | Resolved: 2024-10-18

## 2024-10-18 LAB
ANION GAP SERPL CALCULATED.3IONS-SCNC: 7 MMOL/L (ref 4–13)
BASOPHILS # BLD AUTO: 0.05 THOUSANDS/ΜL (ref 0–0.1)
BASOPHILS NFR BLD AUTO: 1 % (ref 0–1)
BUN SERPL-MCNC: 32 MG/DL (ref 5–25)
CALCIUM SERPL-MCNC: 8.6 MG/DL (ref 8.4–10.2)
CHLORIDE SERPL-SCNC: 103 MMOL/L (ref 96–108)
CO2 SERPL-SCNC: 25 MMOL/L (ref 21–32)
CREAT SERPL-MCNC: 1.55 MG/DL (ref 0.6–1.3)
EOSINOPHIL # BLD AUTO: 0.66 THOUSAND/ΜL (ref 0–0.61)
EOSINOPHIL NFR BLD AUTO: 11 % (ref 0–6)
ERYTHROCYTE [DISTWIDTH] IN BLOOD BY AUTOMATED COUNT: 13.5 % (ref 11.6–15.1)
GFR SERPL CREATININE-BSD FRML MDRD: 42 ML/MIN/1.73SQ M
GLUCOSE SERPL-MCNC: 76 MG/DL (ref 65–140)
HCT VFR BLD AUTO: 37 % (ref 36.5–49.3)
HGB BLD-MCNC: 11.9 G/DL (ref 12–17)
IMM GRANULOCYTES # BLD AUTO: 0.01 THOUSAND/UL (ref 0–0.2)
IMM GRANULOCYTES NFR BLD AUTO: 0 % (ref 0–2)
LYMPHOCYTES # BLD AUTO: 1.39 THOUSANDS/ΜL (ref 0.6–4.47)
LYMPHOCYTES NFR BLD AUTO: 22 % (ref 14–44)
MCH RBC QN AUTO: 31.3 PG (ref 26.8–34.3)
MCHC RBC AUTO-ENTMCNC: 32.2 G/DL (ref 31.4–37.4)
MCV RBC AUTO: 97 FL (ref 82–98)
MONOCYTES # BLD AUTO: 0.66 THOUSAND/ΜL (ref 0.17–1.22)
MONOCYTES NFR BLD AUTO: 11 % (ref 4–12)
NEUTROPHILS # BLD AUTO: 3.5 THOUSANDS/ΜL (ref 1.85–7.62)
NEUTS SEG NFR BLD AUTO: 55 % (ref 43–75)
NRBC BLD AUTO-RTO: 0 /100 WBCS
PLATELET # BLD AUTO: 269 THOUSANDS/UL (ref 149–390)
PMV BLD AUTO: 11.3 FL (ref 8.9–12.7)
POTASSIUM SERPL-SCNC: 4.6 MMOL/L (ref 3.5–5.3)
RBC # BLD AUTO: 3.8 MILLION/UL (ref 3.88–5.62)
SODIUM SERPL-SCNC: 135 MMOL/L (ref 135–147)
WBC # BLD AUTO: 6.27 THOUSAND/UL (ref 4.31–10.16)

## 2024-10-18 PROCEDURE — 99239 HOSP IP/OBS DSCHRG MGMT >30: CPT | Performed by: INTERNAL MEDICINE

## 2024-10-18 PROCEDURE — 85025 COMPLETE CBC W/AUTO DIFF WBC: CPT | Performed by: INTERNAL MEDICINE

## 2024-10-18 PROCEDURE — 80048 BASIC METABOLIC PNL TOTAL CA: CPT | Performed by: INTERNAL MEDICINE

## 2024-10-18 RX ORDER — POLYETHYLENE GLYCOL 3350 17 G/17G
17 POWDER, FOR SOLUTION ORAL DAILY
Qty: 30 EACH | Refills: 2 | Status: SHIPPED | OUTPATIENT
Start: 2024-10-18

## 2024-10-18 RX ADMIN — LISINOPRIL 5 MG: 5 TABLET ORAL at 09:28

## 2024-10-18 RX ADMIN — METOPROLOL SUCCINATE 25 MG: 25 TABLET, EXTENDED RELEASE ORAL at 09:28

## 2024-10-18 RX ADMIN — AMIODARONE HYDROCHLORIDE 200 MG: 200 TABLET ORAL at 09:28

## 2024-10-18 RX ADMIN — FINASTERIDE 5 MG: 5 TABLET, FILM COATED ORAL at 09:28

## 2024-10-18 RX ADMIN — FUROSEMIDE 40 MG: 40 TABLET ORAL at 09:28

## 2024-10-18 RX ADMIN — SENNOSIDES AND DOCUSATE SODIUM 2 TABLET: 8.6; 5 TABLET ORAL at 09:28

## 2024-10-18 NOTE — DISCHARGE INSTR - AVS FIRST PAGE
Please follow-up with urology  Eliquis has been discontinued indefinitely  Continue with aspirin  Repeat CBC within a week

## 2024-10-18 NOTE — ASSESSMENT & PLAN NOTE
"/64   Pulse (!) 51   Temp (!) 97.4 °F (36.3 °C)   Resp 20   Ht 5' 7\" (1.702 m)   Wt 89.1 kg (196 lb 6.9 oz)   SpO2 95%   BMI 30.77 kg/m²   Continue with Toprol-XL 25 mg twice daily  lisinopril 5 mg daily and Lasix 40 mg daily  Vitals as per routine  "

## 2024-10-18 NOTE — PLAN OF CARE
Problem: PAIN - ADULT  Goal: Verbalizes/displays adequate comfort level or baseline comfort level  Description: Interventions:  - Encourage patient to monitor pain and request assistance  - Assess pain using appropriate pain scale  - Administer analgesics based on type and severity of pain and evaluate response  - Implement non-pharmacological measures as appropriate and evaluate response  - Consider cultural and social influences on pain and pain management  - Notify physician/advanced practitioner if interventions unsuccessful or patient reports new pain  Outcome: Progressing     Problem: INFECTION - ADULT  Goal: Absence or prevention of progression during hospitalization  Description: INTERVENTIONS:  - Assess and monitor for signs and symptoms of infection  - Monitor lab/diagnostic results  - Monitor all insertion sites, i.e. indwelling lines, tubes, and drains  - Monitor endotracheal if appropriate and nasal secretions for changes in amount and color  - [place] appropriate cooling/warming therapies per order  - Administer medications as ordered  - Instruct and encourage patient and family to use good hand hygiene technique  - Identify and instruct in appropriate isolation precautions for identified infection/condition  Outcome: Progressing     Problem: SAFETY ADULT  Goal: Patient will remain free of falls  Description: INTERVENTIONS:  - Educate patient/family on patient safety including physical limitations  - Instruct patient to call for assistance with activity   - Consult OT/PT to assist with strengthening/mobility   - Keep Call bell within reach  - Keep bed low and locked with side rails adjusted as appropriate  - Keep care items and personal belongings within reach  - Initiate and maintain comfort rounds  - Make Fall Risk Sign visible to staff  - Offer Toileting every 2 Hours, in advance of need  - Initiate/Maintain 2 alarms  - Obtain necessary fall risk management equipment: 2  - Apply yellow socks and  bracelet for high fall risk patients  - Consider moving patient to room near nurses station  Outcome: Progressing

## 2024-10-18 NOTE — ASSESSMENT & PLAN NOTE
Wt Readings from Last 3 Encounters:   10/18/24 89.1 kg (196 lb 6.9 oz)   09/28/24 87.3 kg (192 lb 6.4 oz)   09/17/24 87.7 kg (193 lb 6.4 oz)     Appears euvolemic   On Lasix 40 mg daily  I/O and daily weights   Last echo 02/2024: EF 35-40%.

## 2024-10-18 NOTE — ASSESSMENT & PLAN NOTE
Lab Results   Component Value Date    EGFR 42 10/18/2024    EGFR 41 10/17/2024    EGFR 37 10/16/2024    CREATININE 1.55 (H) 10/18/2024    CREATININE 1.58 (H) 10/17/2024    CREATININE 1.74 (H) 10/16/2024   Cr 1.66   Baseline 1.4-1.6   Avoid nephrotoxic agents   Continue to monitor   Continue to hold lisinopril and Lasix

## 2024-10-18 NOTE — ASSESSMENT & PLAN NOTE
Presented due to hematuria within suprapubic catheter.  Eliquis had been on hold due to hematuria.  Resumed morning of 10/14.  Took 1 dose.  Shortly after started with bleeding.   On Eliquis due to history of DVT.   Recent admission for similar 9/22-9/28. Patient restarted eliquis after admission and then had hematuria again on 9/30. Seen in the ER and told to hold Eliquis. Once hematuria free for 24 hours could resume eliquis.   Recent Labs     10/16/24  0523 10/17/24  0512 10/18/24  0334   HGB 11.2* 11.4* 11.9*      Continue to monitor. Transfuse < 7.   Urine sample (WBC WNL. No sirs criteria.)  UA: small amount of leuks, negative nitrite   Micro: Unable to enumerate wbc or bacteria due to RBC.   Urine cultures- achromobacter xylosoxidans  Does not meet criteria for cystitis treatment as per [facility]  UTI treatment guide  On manual irrigation, hematuria has now resolved  Outpatient urology follow-up

## 2024-10-18 NOTE — DISCHARGE SUMMARY
"Discharge Summary - Hospitalist   Name: Toro Rodriguez 74 y.o. male I MRN: [id number]  Unit/Bed#: -01 I Date of Admission: 10/14/2024   Date of Service: 10/18/2024 I Hospital Day: 4     Assessment & Plan  Recurrent hematuria (Resolved: 10/18/2024)  Presented due to hematuria within suprapubic catheter.  Eliquis had been on hold due to hematuria.  Resumed morning of 10/14.  Took 1 dose.  Shortly after started with bleeding.   On Eliquis due to history of DVT.   Recent admission for similar 9/22-9/28. Patient restarted eliquis after admission and then had hematuria again on 9/30. Seen in the ER and told to hold Eliquis. Once hematuria free for 24 hours could resume eliquis.   Recent Labs     10/16/24  0523 10/17/24  0512 10/18/24  0334   HGB 11.2* 11.4* 11.9*      Continue to monitor. Transfuse < 7.   Urine sample (WBC WNL. No sirs criteria.)  UA: small amount of leuks, negative nitrite   Micro: Unable to enumerate wbc or bacteria due to RBC.   Urine cultures- achromobacter xylosoxidans  Does not meet criteria for cystitis treatment as per [facility]  UTI treatment guide  On manual irrigation, hematuria has now resolved  Outpatient urology follow-up  Deep vein thrombosis (DVT) of left lower extremity (HCC)  Single episode  In November/2023  Discussed with hematology, no longer needs anticoagulation/Eliquis  Discussed with patient/daughter, both are agreeable with plan  Will discontinue Eliquis indefinitely  Essential hypertension  /64   Pulse (!) 51   Temp (!) 97.4 °F (36.3 °C)   Resp 20   Ht 5' 7\" (1.702 m)   Wt 89.1 kg (196 lb 6.9 oz)   SpO2 95%   BMI 30.77 kg/m²   Continue with Toprol-XL 25 mg twice daily  lisinopril 5 mg daily and Lasix 40 mg daily  Vitals as per routine  Chronic systolic (congestive) heart failure (HCC)  Wt Readings from Last 3 Encounters:   10/18/24 89.1 kg (196 lb 6.9 oz)   09/28/24 87.3 kg (192 lb 6.4 oz)   09/17/24 87.7 kg (193 lb 6.4 oz)     Appears euvolemic   On Lasix 40 mg " daily  I/O and daily weights   Last echo 02/2024: EF 35-40%.   Coronary artery disease involving native coronary artery of native heart  Continue with Toprol-XL and statin  Outpatient cardiology follow-up  Benign prostatic hyperplasia with urinary retention  Continue flomax and proscar   Chronic kidney disease, stage 3a (HCC)  Lab Results   Component Value Date    EGFR 42 10/18/2024    EGFR 41 10/17/2024    EGFR 37 10/16/2024    CREATININE 1.55 (H) 10/18/2024    CREATININE 1.58 (H) 10/17/2024    CREATININE 1.74 (H) 10/16/2024   Cr 1.66   Baseline 1.4-1.6   Avoid nephrotoxic agents   Continue to monitor   Continue to hold lisinopril and Lasix  NSVT (nonsustained ventricular tachycardia) (HCC)  Continue home amiodarone and metoprolol succinate   Renal lesion  As per CT imaging-1.2 cm right renal lesion is more dense than a simple cyst, though there is no definite enhancement, and therefore finding may represent a cyst with debris.    follow-up CT with renal protocol is advised in 6 months     Outpatient urology follow-up     Admission Date: 10/14/2024 1800  Discharge Date: 10/18/24  Admitting Diagnosis: Blood in urine [R31.9]  UTI (urinary tract infection) [N39.0]  Suprapubic catheter (HCC) [Z93.59]  Gross hematuria [R31.0]  Discharge Diagnosis:   Medical Problems       Resolved Problems  Date Reviewed: 10/16/2024            Resolved    * (Principal) Recurrent hematuria 10/18/2024     Resolved by  Elif Santa MD          HPI:  as per, Angelica Madrid PA-C , on 10/15 Toro Rodriguez is a 74 y.o. male with a PMH of CKD 3, NSVT, DVT, CHF, CAD, HTN who presents with hematuria and his suprapubic catheter starting around lunchtime.  Recent admission for similar 9/22 through 9/28.  After discharge patient had been instructed to resume his Eliquis.  Unfortunately hematuria had returned and patient returns to the ER on 9/30.  Urology was contacted and told patient to hold Eliquis and not to restart until he had a full 24 hours with  no hematuria.  Patient did not take his next dose till morning of 10/14.  By around lunchtime noticed that his urine had turned orange in color.  By around dinnertime it was dark red and he noticed some clots.  Denies having any abdominal pain, nausea, vomiting, fevers or chills.  In the ER urology contacted.  Not recommending CBI at this time.  Manual irrigation.  At time of admission patient resting comfortable.  Urine fruit punch in color.     Procedures Performed: No orders of the defined types were placed in this encounter.      Summary of Hospital Course: Patient with prior history of DVT-single episode on Eliquis presented with gross hematuria.  Patient has been having recurrent hematuria, every day in time he was being resumed on Eliquis.  He was seen and evaluated by urology, he was maintained on regular bladder irrigation with clearing of urine and resolution of hematuria eventually.  Discussed with oncology, patient does not need any further continuation of Eliquis.  Discussed with patient and daughter, both were agreeable.  Patient is being discharged back to assisted living.    Significant Findings, Care, Treatment and Services Provided: see above     Complications: none    Condition at Discharge: stable       Discharge instructions/Information to patient and family:   See After Visit Summary (AVS) for information provided to patient and family.      Provisions for Follow-Up Care:  See after visit summary for information related to follow-up care and any pertinent home health orders.      PCP: CARLIE Foster    Disposition: Assisted living/personal care at [facility]     Planned Readmission: No     Discharge Medications:  See after visit summary for reconciled discharge medications provided to patient and family.      Discharge Statement:  I have spent a total time of 36 minutes in caring for this patient on the day of the visit/encounter. >30 minutes of time was spent on:  Risks and benefits of tx options, Patient and family education, Importance of tx compliance, Counseling / Coordination of care, Documenting in the medical record, Reviewing / ordering tests, medicine, procedures  , and Communicating with other healthcare professionals .

## 2024-10-18 NOTE — TELEPHONE ENCOUNTER
LINDA for Toro that he is scheduled for urodynamics at our [place] office and there are only 2 office that office this and [place] is closer that [place]. He is scheduled for 11/27 @ 10:30 - will mail appointment card and address

## 2024-10-18 NOTE — CASE MANAGEMENT
Case Management Discharge Planning Note    Patient name Toro Rodriguez  Location /-01 MRN [id number]  : 1950 Date 10/18/2024       Current Admission Date: 10/14/2024  Current Admission Diagnosis:Recurrent hematuria   Patient Active Problem List    Diagnosis Date Noted Date Diagnosed    Renal lesion 10/16/2024     NSVT (nonsustained ventricular tachycardia) ([facility]) 2024     UTI (urinary tract infection) 2024     Urinary tract infection associated with indwelling urethral catheter  ([facility]) 2024     Encounter to discuss test results 2024     Urethral erosion by catheter, initial encounter  ([facility]) 2024     Anemia 2024     Recurrent hematuria 2024     Chronic venous hypertension (idiopathic) with ulcer of left lower extremity (CODE) ([facility]) 2024     Chronic kidney disease, stage 3a ([facility]) 2024     Encounter for geriatric assessment 2024     Melanoma of back ([facility]) 2024     Dilated cardiomyopathy ([facility]) 12/15/2023     Obesity, morbid ([facility]) 2023     Type 2 diabetes mellitus with chronic kidney disease, without long-term current use of insulin, unspecified CKD stage ([facility]) 11/15/2023     Essential hypertension 11/10/2023     Generalized weakness 11/10/2023     Chronic systolic (congestive) heart failure ([facility]) 11/10/2023     Deep vein thrombosis (DVT) of left lower extremity ([facility]) 11/10/2023     Coronary artery disease involving native coronary artery of native heart 11/10/2023     Benign prostatic hyperplasia with urinary retention 2014       LOS (days): 4  Geometric Mean LOS (GMLOS) (days): 3.6  Days to GMLOS:0.1     OBJECTIVE:  Risk of Unplanned Readmission Score: 38.16         Current admission status: Inpatient   Preferred Pharmacy:   [facility]- [place], [place] - [facility] [address] [address]  Phone: [phone] Fax: [phone]    Primary Care Provider: Ml Roberson  CARLIE Guzman    Primary Insurance: MEDICARE  Secondary Insurance: BLUE CROSS    DISCHARGE DETAILS:                                          Other Referral/Resources/Interventions Provided:  Interventions: Transportation  Referral Comments: WC van transport scheduled for 1pm today via [employer]. CM informed pt, pt's daughter, [facility] (left 3 voice messages), [facility], and RN.               Transport at Discharge : Wheelchair van     Number/Name of Dispatcher: [phone]  Transported by (Company and Unit #): [employer]  ETA of Transport (Date): 10/18/24  ETA of Transport (Time): 1300        Discussed with Patient  there may be an out of pocket expense for transport.

## 2024-10-23 PROBLEM — T83.511A URINARY TRACT INFECTION ASSOCIATED WITH INDWELLING URETHRAL CATHETER  (HCC): Status: RESOLVED | Noted: 2024-09-12 | Resolved: 2024-10-23

## 2024-10-23 PROBLEM — N39.0 URINARY TRACT INFECTION ASSOCIATED WITH INDWELLING URETHRAL CATHETER  (HCC): Status: RESOLVED | Noted: 2024-09-12 | Resolved: 2024-10-23

## 2024-10-23 PROBLEM — N39.0 UTI (URINARY TRACT INFECTION): Status: RESOLVED | Noted: 2024-09-23 | Resolved: 2024-10-23

## 2024-10-31 ENCOUNTER — HOSPITAL ENCOUNTER (INPATIENT)
Facility: HOSPITAL | Age: 74
LOS: 1 days | Discharge: NURSING, REHAB OR LONG-TERM CARE | DRG: 683 | End: 2024-11-02
Attending: EMERGENCY MEDICINE | Admitting: INTERNAL MEDICINE
Payer: MEDICARE

## 2024-10-31 ENCOUNTER — APPOINTMENT (EMERGENCY)
Dept: CT IMAGING | Facility: HOSPITAL | Age: 74
DRG: 683 | End: 2024-10-31
Payer: MEDICARE

## 2024-10-31 ENCOUNTER — APPOINTMENT (EMERGENCY)
Dept: RADIOLOGY | Facility: HOSPITAL | Age: 74
DRG: 683 | End: 2024-10-31
Payer: MEDICARE

## 2024-10-31 ENCOUNTER — TELEPHONE (OUTPATIENT)
Dept: UROLOGY | Facility: CLINIC | Age: 74
End: 2024-10-31

## 2024-10-31 DIAGNOSIS — N39.0 URINARY TRACT INFECTION: ICD-10-CM

## 2024-10-31 DIAGNOSIS — N18.31 CHRONIC KIDNEY DISEASE, STAGE 3A (HCC): ICD-10-CM

## 2024-10-31 DIAGNOSIS — N28.89 RIGHT RENAL MASS: ICD-10-CM

## 2024-10-31 DIAGNOSIS — I10 ESSENTIAL HYPERTENSION: Chronic | ICD-10-CM

## 2024-10-31 DIAGNOSIS — R31.9 HEMATURIA: Primary | ICD-10-CM

## 2024-10-31 DIAGNOSIS — N13.30 HYDRONEPHROSIS: ICD-10-CM

## 2024-10-31 DIAGNOSIS — I50.41 ACUTE COMBINED SYSTOLIC AND DIASTOLIC CONGESTIVE HEART FAILURE (HCC): ICD-10-CM

## 2024-10-31 DIAGNOSIS — B37.9 CANDIDIASIS: ICD-10-CM

## 2024-10-31 DIAGNOSIS — N32.89 BLADDER MASS: ICD-10-CM

## 2024-10-31 DIAGNOSIS — N17.9 AKI (ACUTE KIDNEY INJURY) (HCC): ICD-10-CM

## 2024-10-31 PROBLEM — R31.0 GROSS HEMATURIA: Status: ACTIVE | Noted: 2024-10-31

## 2024-10-31 LAB
2HR DELTA HS TROPONIN: -3 NG/L
ALBUMIN SERPL BCG-MCNC: 3.7 G/DL (ref 3.5–5)
ALP SERPL-CCNC: 69 U/L (ref 34–104)
ALT SERPL W P-5'-P-CCNC: 10 U/L (ref 7–52)
ANION GAP SERPL CALCULATED.3IONS-SCNC: 7 MMOL/L (ref 4–13)
ANION GAP SERPL CALCULATED.3IONS-SCNC: 7 MMOL/L (ref 4–13)
APTT PPP: 29 SECONDS (ref 23–34)
AST SERPL W P-5'-P-CCNC: 22 U/L (ref 13–39)
ATRIAL RATE: 56 BPM
BACTERIA UR QL AUTO: ABNORMAL /HPF
BASOPHILS # BLD AUTO: 0.05 THOUSANDS/ΜL (ref 0–0.1)
BASOPHILS NFR BLD AUTO: 1 % (ref 0–1)
BILIRUB SERPL-MCNC: 0.65 MG/DL (ref 0.2–1)
BILIRUB UR QL STRIP: NEGATIVE
BUN SERPL-MCNC: 40 MG/DL (ref 5–25)
BUN SERPL-MCNC: 42 MG/DL (ref 5–25)
CALCIUM SERPL-MCNC: 8.2 MG/DL (ref 8.4–10.2)
CALCIUM SERPL-MCNC: 8.6 MG/DL (ref 8.4–10.2)
CARDIAC TROPONIN I PNL SERPL HS: 20 NG/L
CARDIAC TROPONIN I PNL SERPL HS: 23 NG/L
CHLORIDE SERPL-SCNC: 103 MMOL/L (ref 96–108)
CHLORIDE SERPL-SCNC: 106 MMOL/L (ref 96–108)
CLARITY UR: ABNORMAL
CO2 SERPL-SCNC: 24 MMOL/L (ref 21–32)
CO2 SERPL-SCNC: 25 MMOL/L (ref 21–32)
COLOR UR: ABNORMAL
CREAT SERPL-MCNC: 1.92 MG/DL (ref 0.6–1.3)
CREAT SERPL-MCNC: 2.03 MG/DL (ref 0.6–1.3)
EOSINOPHIL # BLD AUTO: 0.48 THOUSAND/ΜL (ref 0–0.61)
EOSINOPHIL NFR BLD AUTO: 7 % (ref 0–6)
ERYTHROCYTE [DISTWIDTH] IN BLOOD BY AUTOMATED COUNT: 13.5 % (ref 11.6–15.1)
GFR SERPL CREATININE-BSD FRML MDRD: 30 ML/MIN/1.73SQ M
GFR SERPL CREATININE-BSD FRML MDRD: 33 ML/MIN/1.73SQ M
GLUCOSE SERPL-MCNC: 113 MG/DL (ref 65–140)
GLUCOSE SERPL-MCNC: 83 MG/DL (ref 65–140)
GLUCOSE UR STRIP-MCNC: NEGATIVE MG/DL
HCT VFR BLD AUTO: 36.6 % (ref 36.5–49.3)
HGB BLD-MCNC: 12 G/DL (ref 12–17)
HGB UR QL STRIP.AUTO: ABNORMAL
IMM GRANULOCYTES # BLD AUTO: 0.03 THOUSAND/UL (ref 0–0.2)
IMM GRANULOCYTES NFR BLD AUTO: 1 % (ref 0–2)
INR PPP: 1.04 (ref 0.85–1.19)
KETONES UR STRIP-MCNC: NEGATIVE MG/DL
LEUKOCYTE ESTERASE UR QL STRIP: ABNORMAL
LYMPHOCYTES # BLD AUTO: 1.25 THOUSANDS/ΜL (ref 0.6–4.47)
LYMPHOCYTES NFR BLD AUTO: 19 % (ref 14–44)
MCH RBC QN AUTO: 31.6 PG (ref 26.8–34.3)
MCHC RBC AUTO-ENTMCNC: 32.8 G/DL (ref 31.4–37.4)
MCV RBC AUTO: 96 FL (ref 82–98)
MONOCYTES # BLD AUTO: 0.67 THOUSAND/ΜL (ref 0.17–1.22)
MONOCYTES NFR BLD AUTO: 10 % (ref 4–12)
NEUTROPHILS # BLD AUTO: 4.02 THOUSANDS/ΜL (ref 1.85–7.62)
NEUTS SEG NFR BLD AUTO: 62 % (ref 43–75)
NITRITE UR QL STRIP: POSITIVE
NON-SQ EPI CELLS URNS QL MICRO: ABNORMAL /HPF
NRBC BLD AUTO-RTO: 0 /100 WBCS
P AXIS: 26 DEGREES
PH UR STRIP.AUTO: 6 [PH]
PLATELET # BLD AUTO: 291 THOUSANDS/UL (ref 149–390)
PMV BLD AUTO: 10.1 FL (ref 8.9–12.7)
POTASSIUM SERPL-SCNC: 3.7 MMOL/L (ref 3.5–5.3)
POTASSIUM SERPL-SCNC: 5.1 MMOL/L (ref 3.5–5.3)
PR INTERVAL: 156 MS
PROT SERPL-MCNC: 7.2 G/DL (ref 6.4–8.4)
PROT UR STRIP-MCNC: ABNORMAL MG/DL
PROTHROMBIN TIME: 14.1 SECONDS (ref 12.3–15)
QRS AXIS: 38 DEGREES
QRSD INTERVAL: 132 MS
QT INTERVAL: 498 MS
QTC INTERVAL: 480 MS
RBC # BLD AUTO: 3.8 MILLION/UL (ref 3.88–5.62)
RBC #/AREA URNS AUTO: ABNORMAL /HPF
SODIUM SERPL-SCNC: 135 MMOL/L (ref 135–147)
SODIUM SERPL-SCNC: 137 MMOL/L (ref 135–147)
SP GR UR STRIP.AUTO: 1.01 (ref 1–1.03)
T WAVE AXIS: 19 DEGREES
TSH SERPL DL<=0.05 MIU/L-ACNC: 2.3 UIU/ML (ref 0.45–4.5)
UROBILINOGEN UR STRIP-ACNC: <2 MG/DL
VENTRICULAR RATE: 56 BPM
WBC # BLD AUTO: 6.5 THOUSAND/UL (ref 4.31–10.16)
WBC #/AREA URNS AUTO: ABNORMAL /HPF

## 2024-10-31 PROCEDURE — 80053 COMPREHEN METABOLIC PANEL: CPT | Performed by: PHYSICIAN ASSISTANT

## 2024-10-31 PROCEDURE — 74176 CT ABD & PELVIS W/O CONTRAST: CPT

## 2024-10-31 PROCEDURE — 93010 ELECTROCARDIOGRAM REPORT: CPT | Performed by: INTERNAL MEDICINE

## 2024-10-31 PROCEDURE — 87186 SC STD MICRODIL/AGAR DIL: CPT | Performed by: PHYSICIAN ASSISTANT

## 2024-10-31 PROCEDURE — 87147 CULTURE TYPE IMMUNOLOGIC: CPT | Performed by: INTERNAL MEDICINE

## 2024-10-31 PROCEDURE — 36415 COLL VENOUS BLD VENIPUNCTURE: CPT | Performed by: PHYSICIAN ASSISTANT

## 2024-10-31 PROCEDURE — 80048 BASIC METABOLIC PNL TOTAL CA: CPT | Performed by: INTERNAL MEDICINE

## 2024-10-31 PROCEDURE — 85730 THROMBOPLASTIN TIME PARTIAL: CPT | Performed by: PHYSICIAN ASSISTANT

## 2024-10-31 PROCEDURE — 87077 CULTURE AEROBIC IDENTIFY: CPT | Performed by: PHYSICIAN ASSISTANT

## 2024-10-31 PROCEDURE — 87086 URINE CULTURE/COLONY COUNT: CPT | Performed by: PHYSICIAN ASSISTANT

## 2024-10-31 PROCEDURE — 84484 ASSAY OF TROPONIN QUANT: CPT | Performed by: PHYSICIAN ASSISTANT

## 2024-10-31 PROCEDURE — 85610 PROTHROMBIN TIME: CPT | Performed by: PHYSICIAN ASSISTANT

## 2024-10-31 PROCEDURE — 84443 ASSAY THYROID STIM HORMONE: CPT | Performed by: INTERNAL MEDICINE

## 2024-10-31 PROCEDURE — 93005 ELECTROCARDIOGRAM TRACING: CPT

## 2024-10-31 PROCEDURE — 85025 COMPLETE CBC W/AUTO DIFF WBC: CPT | Performed by: PHYSICIAN ASSISTANT

## 2024-10-31 PROCEDURE — 99222 1ST HOSP IP/OBS MODERATE 55: CPT | Performed by: INTERNAL MEDICINE

## 2024-10-31 PROCEDURE — 99285 EMERGENCY DEPT VISIT HI MDM: CPT

## 2024-10-31 PROCEDURE — 99204 OFFICE O/P NEW MOD 45 MIN: CPT | Performed by: UROLOGY

## 2024-10-31 PROCEDURE — 99285 EMERGENCY DEPT VISIT HI MDM: CPT | Performed by: PHYSICIAN ASSISTANT

## 2024-10-31 PROCEDURE — 81001 URINALYSIS AUTO W/SCOPE: CPT | Performed by: PHYSICIAN ASSISTANT

## 2024-10-31 PROCEDURE — 87081 CULTURE SCREEN ONLY: CPT | Performed by: INTERNAL MEDICINE

## 2024-10-31 PROCEDURE — 71045 X-RAY EXAM CHEST 1 VIEW: CPT

## 2024-10-31 PROCEDURE — 96365 THER/PROPH/DIAG IV INF INIT: CPT

## 2024-10-31 RX ORDER — AMIODARONE HYDROCHLORIDE 200 MG/1
200 TABLET ORAL DAILY
Status: DISCONTINUED | OUTPATIENT
Start: 2024-10-31 | End: 2024-11-02 | Stop reason: HOSPADM

## 2024-10-31 RX ORDER — FINASTERIDE 5 MG/1
5 TABLET, FILM COATED ORAL DAILY
Status: DISCONTINUED | OUTPATIENT
Start: 2024-10-31 | End: 2024-11-02 | Stop reason: HOSPADM

## 2024-10-31 RX ORDER — ACETAMINOPHEN 325 MG/1
650 TABLET ORAL EVERY 6 HOURS PRN
Status: DISCONTINUED | OUTPATIENT
Start: 2024-10-31 | End: 2024-11-02 | Stop reason: HOSPADM

## 2024-10-31 RX ORDER — CEFEPIME HYDROCHLORIDE 2 G/50ML
2000 INJECTION, SOLUTION INTRAVENOUS ONCE
Status: COMPLETED | OUTPATIENT
Start: 2024-10-31 | End: 2024-10-31

## 2024-10-31 RX ORDER — POLYETHYLENE GLYCOL 3350 17 G/17G
17 POWDER, FOR SOLUTION ORAL DAILY
Status: DISCONTINUED | OUTPATIENT
Start: 2024-10-31 | End: 2024-10-31

## 2024-10-31 RX ORDER — DOCUSATE SODIUM 100 MG/1
100 CAPSULE, LIQUID FILLED ORAL 2 TIMES DAILY
Status: DISCONTINUED | OUTPATIENT
Start: 2024-10-31 | End: 2024-11-02 | Stop reason: HOSPADM

## 2024-10-31 RX ORDER — SODIUM CHLORIDE 9 MG/ML
75 INJECTION, SOLUTION INTRAVENOUS CONTINUOUS
Status: DISCONTINUED | OUTPATIENT
Start: 2024-10-31 | End: 2024-11-02

## 2024-10-31 RX ORDER — METOPROLOL SUCCINATE 25 MG/1
25 TABLET, EXTENDED RELEASE ORAL EVERY 12 HOURS SCHEDULED
Status: DISCONTINUED | OUTPATIENT
Start: 2024-10-31 | End: 2024-11-02 | Stop reason: HOSPADM

## 2024-10-31 RX ORDER — NYSTATIN 100000 [USP'U]/G
POWDER TOPICAL 2 TIMES DAILY
Status: DISCONTINUED | OUTPATIENT
Start: 2024-10-31 | End: 2024-11-02 | Stop reason: HOSPADM

## 2024-10-31 RX ORDER — ATORVASTATIN CALCIUM 40 MG/1
40 TABLET, FILM COATED ORAL
Status: DISCONTINUED | OUTPATIENT
Start: 2024-10-31 | End: 2024-11-02 | Stop reason: HOSPADM

## 2024-10-31 RX ORDER — TAMSULOSIN HYDROCHLORIDE 0.4 MG/1
0.4 CAPSULE ORAL
Status: DISCONTINUED | OUTPATIENT
Start: 2024-10-31 | End: 2024-11-02 | Stop reason: HOSPADM

## 2024-10-31 RX ORDER — ASPIRIN 81 MG/1
81 TABLET, CHEWABLE ORAL DAILY
Status: DISCONTINUED | OUTPATIENT
Start: 2024-10-31 | End: 2024-11-02 | Stop reason: HOSPADM

## 2024-10-31 RX ORDER — METOPROLOL SUCCINATE 25 MG/1
25 TABLET, EXTENDED RELEASE ORAL EVERY 12 HOURS SCHEDULED
Status: DISCONTINUED | OUTPATIENT
Start: 2024-10-31 | End: 2024-10-31

## 2024-10-31 RX ADMIN — TAMSULOSIN HYDROCHLORIDE 0.4 MG: 0.4 CAPSULE ORAL at 20:57

## 2024-10-31 RX ADMIN — SODIUM CHLORIDE 100 ML/HR: 0.9 INJECTION, SOLUTION INTRAVENOUS at 11:30

## 2024-10-31 RX ADMIN — SODIUM CHLORIDE 500 ML: 0.9 INJECTION, SOLUTION INTRAVENOUS at 09:30

## 2024-10-31 RX ADMIN — FINASTERIDE 5 MG: 5 TABLET, FILM COATED ORAL at 12:42

## 2024-10-31 RX ADMIN — DOCUSATE SODIUM 100 MG: 100 CAPSULE, LIQUID FILLED ORAL at 17:46

## 2024-10-31 RX ADMIN — ATORVASTATIN CALCIUM 40 MG: 40 TABLET, FILM COATED ORAL at 16:44

## 2024-10-31 RX ADMIN — NYSTATIN: 100000 POWDER TOPICAL at 17:46

## 2024-10-31 RX ADMIN — DOCUSATE SODIUM 100 MG: 100 CAPSULE, LIQUID FILLED ORAL at 12:42

## 2024-10-31 RX ADMIN — AMIODARONE HYDROCHLORIDE 200 MG: 200 TABLET ORAL at 12:53

## 2024-10-31 RX ADMIN — NYSTATIN: 100000 POWDER TOPICAL at 12:42

## 2024-10-31 RX ADMIN — SODIUM CHLORIDE 100 ML/HR: 0.9 INJECTION, SOLUTION INTRAVENOUS at 12:02

## 2024-10-31 RX ADMIN — CEFEPIME HYDROCHLORIDE 2000 MG: 2 INJECTION, SOLUTION INTRAVENOUS at 09:56

## 2024-10-31 NOTE — TELEPHONE ENCOUNTER
Patient seen as consult [place] for gross hematuria with suprapubic tube.  Urine was yellow.    He had suprapubic tube placed on 9/27/2024.  He still need to be scheduled for suprapubic tube exchange with [facility] about 6 weeks after.  It looks like a few of these visits have been canceled.

## 2024-10-31 NOTE — CONSULTS
Consultation - Surgery-General   Name: Toro Rodriguez 74 y.o. male I MRN: [id number]  Unit/Bed#: ED 10 I Date of Admission: 10/31/2024   Date of Service: 10/31/2024 I Hospital Day: 0   Inpatient consult to Urology  Consult performed by: Pati Perez PA-C  Consult ordered by: Yasmeen Braun PA-C        Physician Requesting Evaluation: Giselle Rae*   Reason for Evaluation / Principal Problem: Hematuria    Assessment & Plan  Gross hematuria  Pt is 75 y/o male with pmh HTN, CHF, DVT, CAD, T2DM, BPH, h/o melanoma and CKD3a who presents with new onset of hematuria today.     CT renal:   1. Bladder partially obscured by dense streak artifact from the patient's right hip arthroplasty, and the bladder is collapsed, but there is possible asymmetric soft tissue within the right aspect of the bladder, and correlation with cystoscopy findings   is recommended as a bladder mass in this region cannot be excluded.  2. Tiny nonobstructing bilateral renal calculi. No ureteric calculi identified.  3. Mildly hyperdense right renal lesion,, increased in density when compared to the prior study from 10/15/2024 and which likely represents interval hemorrhage within a hemorrhagic renal cyst, but recommend continued follow-up with CT abdomen pelvis   without and with contrast using a renal protocol in 6 months time to document stability.  3. Stable mild to moderate right hydronephrosis and proximal hydroureter without definite obstructing stone or mass identified on this noncontrast study.  4. Additional stable bilateral simple renal cysts for which no further imaging evaluation or follow-up is needed.    VSS, afeb  Hgb 12.0  Creat 2.03 (Baseline around 1.55)  SPT is now draining clear yellow urine after manual irrigation by nurse        Plan:  Hand irrigate prn hematuria  Trend hgb, stable today  Trend creat  Check bladder scan q4 to ensure SPT not obstructed  SPT placed 9/27 by IR so, if it becomes obstructed  would need IR to eval.  Discussed with ER and Urology      CHUY (acute kidney injury) (HCC)  IVF  Trend renal indicis  Avoid nephrotoxic agents           History of Present Illness   Toro Rodriguez is a 74 y.o. male with pmh HTN, CHF, DVT, CAD, T2DM, BPH, h/o melanoma and CKD3a who presents with new onset of hematuria today.       Review of Systems   Constitutional:  Negative for chills and fever.   HENT:  Negative for ear pain and sore throat.    Eyes:  Negative for pain and visual disturbance.   Respiratory:  Negative for cough and shortness of breath.    Cardiovascular:  Negative for chest pain and palpitations.   Gastrointestinal:  Negative for abdominal pain and vomiting.   Genitourinary:  Positive for hematuria. Negative for dysuria.   Musculoskeletal:  Negative for arthralgias and back pain.   Skin:  Negative for color change and rash.   Neurological:  Negative for seizures and syncope.   All other systems reviewed and are negative.    I have reviewed the patient's PMH, PSH, Social History, Family History, Meds, and Allergies    Objective :  Temp:  [97 °F (36.1 °C)] 97 °F (36.1 °C)  HR:  [50-57] 50  BP: (118-127)/(56-60) 127/60  Resp:  [13-18] 13  SpO2:  [96 %-97 %] 96 %  O2 Device: None (Room air)      Physical Exam  Vitals and nursing note reviewed.   Constitutional:       General: He is not in acute distress.     Appearance: He is well-developed.   HENT:      Head: Normocephalic and atraumatic.   Eyes:      Conjunctiva/sclera: Conjunctivae normal.   Cardiovascular:      Rate and Rhythm: Normal rate and regular rhythm.      Heart sounds: No murmur heard.  Pulmonary:      Effort: Pulmonary effort is normal. No respiratory distress.      Breath sounds: Normal breath sounds.   Abdominal:      Palpations: Abdomen is soft.      Tenderness: There is no abdominal tenderness.   Genitourinary:     Comments: Urine is now clear and yellow after hand irrigation.   Musculoskeletal:         General: No swelling.       Cervical back: Neck supple.   Skin:     General: Skin is warm and dry.      Capillary Refill: Capillary refill takes less than 2 seconds.   Neurological:      General: No focal deficit present.      Mental Status: He is alert and oriented to person, place, and time.   Psychiatric:         Mood and Affect: Mood normal.         Lab Results: I have reviewed the following results:  Recent Labs     10/31/24  0852   WBC 6.50   HGB 12.0   HCT 36.6      SODIUM 135   K 5.1      CO2 25   BUN 42*   CREATININE 2.03*   GLUC 83   AST 22   ALT 10   ALB 3.7   TBILI 0.65   ALKPHOS 69   PTT 29   INR 1.04   HSTNI0 23       Imaging Results Review: I reviewed radiology reports from this admission including: CT abdomen/pelvis.  Other Study Results Review: No additional pertinent studies reviewed.    VTE Pharmacologic Prophylaxis: Reason for no pharmacologic prophylaxis hematuria  VTE Mechanical Prophylaxis: reason for no mechanical VTE prophylaxis in ER

## 2024-10-31 NOTE — ASSESSMENT & PLAN NOTE
Continue with Toprol-XL and statin  Outpatient cardiology follow-up  Currently denies any chest pain

## 2024-10-31 NOTE — PLAN OF CARE
Problem: Prexisting or High Potential for Compromised Skin Integrity  Goal: Skin integrity is maintained or improved  Description: INTERVENTIONS:  - Identify patients at risk for skin breakdown  - Assess and monitor skin integrity  - Assess and monitor nutrition and hydration status  - Monitor labs   - Assess for incontinence   - Turn and reposition patient  - Assist with mobility/ambulation  - Relieve pressure over bony prominences  - Avoid friction and shearing  - Provide appropriate hygiene as needed including keeping skin clean and dry  - Evaluate need for skin moisturizer/barrier cream  - Collaborate with interdisciplinary team   - Patient/family teaching  - Consider wound care consult   Outcome: Progressing     Problem: PAIN - ADULT  Goal: Verbalizes/displays adequate comfort level or baseline comfort level  Description: Interventions:  - Encourage patient to monitor pain and request assistance  - Assess pain using appropriate pain scale  - Administer analgesics based on type and severity of pain and evaluate response  - Implement non-pharmacological measures as appropriate and evaluate response  - Consider cultural and social influences on pain and pain management  - Notify physician/advanced practitioner if interventions unsuccessful or patient reports new pain  Outcome: Progressing     Problem: INFECTION - ADULT  Goal: Absence or prevention of progression during hospitalization  Description: INTERVENTIONS:  - Assess and monitor for signs and symptoms of infection  - Monitor lab/diagnostic results  - Monitor all insertion sites, i.e. indwelling lines, tubes, and drains  - Monitor endotracheal if appropriate and nasal secretions for changes in amount and color  - [place] appropriate cooling/warming therapies per order  - Administer medications as ordered  - Instruct and encourage patient and family to use good hand hygiene technique  - Identify and instruct in appropriate isolation precautions for  identified infection/condition  Outcome: Progressing  Goal: Absence of fever/infection during neutropenic period  Description: INTERVENTIONS:  - Monitor WBC    Outcome: Progressing     Problem: SAFETY ADULT  Goal: Patient will remain free of falls  Description: INTERVENTIONS:  - Educate patient/family on patient safety including physical limitations  - Instruct patient to call for assistance with activity   - Consult OT/PT to assist with strengthening/mobility   - Keep Call bell within reach  - Keep bed low and locked with side rails adjusted as appropriate  - Keep care items and personal belongings within reach  - Initiate and maintain comfort rounds  - Make Fall Risk Sign visible to staff  - Offer Toileting every 2 Hours, in advance of need  - Initiate/Maintain  alarm  - Obtain necessary fall risk management equipment:    - Apply yellow socks and bracelet for high fall risk patients  - Consider moving patient to room near nurses station  Outcome: Progressing  Goal: Maintain or return to baseline ADL function  Description: INTERVENTIONS:  -  Assess patient's ability to carry out ADLs; assess patient's baseline for ADL function and identify physical deficits which impact ability to perform ADLs (bathing, care of mouth/teeth, toileting, grooming, dressing, etc.)  - Assess/evaluate cause of self-care deficits   - Assess range of motion  - Assess patient's mobility; develop plan if impaired  - Assess patient's need for assistive devices and provide as appropriate  - Encourage maximum independence but intervene and supervise when necessary  - Involve family in performance of ADLs  - Assess for home care needs following discharge   - Consider OT consult to assist with ADL evaluation and planning for discharge  - Provide patient education as appropriate  Outcome: Progressing  Goal: Maintains/Returns to pre admission functional level  Description: INTERVENTIONS:  - Perform AM-PAC 6 Click Basic Mobility/ Daily Activity  assessment daily.  - Set and communicate daily mobility goal to care team and patient/family/caregiver.   - Collaborate with rehabilitation services on mobility goals if consulted  - Perform Range of Motion 3 times a day.  - Reposition patient every 2 hours.  - Dangle patient 3 times a day  - Stand patient 3 times a day  - Ambulate patient 3 times a day  - Out of bed to chair 3 times a day   - Out of bed for meals 3 times a day  - Out of bed for toileting  - Record patient progress and toleration of activity level   Outcome: Progressing     Problem: DISCHARGE PLANNING  Goal: Discharge to home or other facility with appropriate resources  Description: INTERVENTIONS:  - Identify barriers to discharge w/patient and caregiver  - Arrange for needed discharge resources and transportation as appropriate  - Identify discharge learning needs (meds, wound care, etc.)  - Arrange for interpretive services to assist at discharge as needed  - Refer to Case Management Department for coordinating discharge planning if the patient needs post-hospital services based on physician/advanced practitioner order or complex needs related to functional status, cognitive ability, or social support system  Outcome: Progressing     Problem: Knowledge Deficit  Goal: Patient/family/caregiver demonstrates understanding of disease process, treatment plan, medications, and discharge instructions  Description: Complete learning assessment and assess knowledge base.  Interventions:  - Provide teaching at level of understanding  - Provide teaching via preferred learning methods  Outcome: Progressing     Problem: Nutrition/Hydration-ADULT  Goal: Nutrient/Hydration intake appropriate for improving, restoring or maintaining nutritional needs  Description: Monitor and assess patient's nutrition/hydration status for malnutrition. Collaborate with interdisciplinary team and initiate plan and interventions as ordered.  Monitor patient's weight and dietary  intake as ordered or per policy. Utilize nutrition screening tool and intervene as necessary. Determine patient's food preferences and provide high-protein, high-caloric foods as appropriate.     INTERVENTIONS:  - Monitor oral intake, urinary output, labs, and treatment plans  - Assess nutrition and hydration status and recommend course of action  - Evaluate amount of meals eaten  - Assist patient with eating if necessary   - Allow adequate time for meals  - Recommend/ encourage appropriate diets, oral nutritional supplements, and vitamin/mineral supplements  - Order, calculate, and assess calorie counts as needed  - Recommend, monitor, and adjust tube feedings and TPN/PPN based on assessed needs  - Assess need for intravenous fluids  - Provide specific nutrition/hydration education as appropriate  - Include patient/family/caregiver in decisions related to nutrition  Outcome: Progressing     Problem: MOBILITY - ADULT  Goal: Maintain or return to baseline ADL function  Description: INTERVENTIONS:  -  Assess patient's ability to carry out ADLs; assess patient's baseline for ADL function and identify physical deficits which impact ability to perform ADLs (bathing, care of mouth/teeth, toileting, grooming, dressing, etc.)  - Assess/evaluate cause of self-care deficits   - Assess range of motion  - Assess patient's mobility; develop plan if impaired  - Assess patient's need for assistive devices and provide as appropriate  - Encourage maximum independence but intervene and supervise when necessary  - Involve family in performance of ADLs  - Assess for home care needs following discharge   - Consider OT consult to assist with ADL evaluation and planning for discharge  - Provide patient education as appropriate  Outcome: Progressing  Goal: Maintains/Returns to pre admission functional level  Description: INTERVENTIONS:  - Perform AM-PAC 6 Click Basic Mobility/ Daily Activity assessment daily.  - Set and communicate daily  mobility goal to care team and patient/family/caregiver.   - Collaborate with rehabilitation services on mobility goals if consulted  - Perform Range of Motion 3 times a day.  - Reposition patient every 2 hours.  - Dangle patient 3 times a day  - Stand patient 3 times a day  - Ambulate patient 3 times a day  - Out of bed to chair 3 times a day   - Out of bed for meals 3 times a day  - Out of bed for toileting  - Record patient progress and toleration of activity level   Outcome: Progressing     Problem: DISCHARGE PLANNING - CARE MANAGEMENT  Goal: Discharge to post-acute care or home with appropriate resources  Description: INTERVENTIONS:  - Conduct assessment to determine patient/family and health care team treatment goals, and need for post-acute services based on payer coverage, community resources, and patient preferences, and barriers to discharge  - Address psychosocial, clinical, and financial barriers to discharge as identified in assessment in conjunction with the patient/family and health care team  - Arrange appropriate level of post-acute services according to patient’s   needs and preference and payer coverage in collaboration with the physician and health care team  - Communicate with and update the patient/family, physician, and health care team regarding progress on the discharge plan  - Arrange appropriate transportation to post-acute venues  Outcome: Progressing

## 2024-10-31 NOTE — ASSESSMENT & PLAN NOTE
Wt Readings from Last 3 Encounters:   10/31/24 85.3 kg (188 lb)   10/18/24 89.1 kg (196 lb 6.9 oz)   09/28/24 87.3 kg (192 lb 6.4 oz)     Appears dry  Continue to hold Lasix 40 mg daily  Monitor volume status closely  Most recent echo in February/2024 with EF of 35-40%

## 2024-10-31 NOTE — ED PROVIDER NOTES
Time reflects when diagnosis was documented in both MDM as applicable and the Disposition within this note       Time User Action Codes Description Comment    10/31/2024  9:42 AM Yasmeen Braun Add [R31.9] Hematuria     10/31/2024  9:42 AM Yasmeen Braun Add [N39.0] Urinary tract infection     10/31/2024  9:42 AM Fernandez Braunine M Add [N17.9] CHUY (acute kidney injury) (HCC)     10/31/2024  9:54 AM Yasmeen Braun M Add [B37.9] Candidiasis     10/31/2024  9:54 AM Yasmeen Braun M Modify [B37.9] Candidiasis abdominal wall    10/31/2024 10:56 AM Yasmeen Braun Add [N28.89] Right renal mass     10/31/2024 10:56 AM Yasmeen Braun M Add [N32.89] Bladder mass     10/31/2024 10:56 AM Yasmeen Braun Add [N13.30] Hydronephrosis           ED Disposition       ED Disposition   Admit    Condition   Stable    Date/Time   Thu Oct 31, 2024 11:16 AM    Comment   Case was discussed with Dr. Santa and the patient's admission status was agreed to be Admission Status: observation status to the service of Dr. Santa .               Assessment & Plan       Medical Decision Making  Patient with recurrent hematuria, will irrigate catheter, order labs, CT scan to r/o anemia, obstructing stone, UTI, pyelonephritis.  Patient with an CHUY, possible UTI, will give IV fluids, abx, urine culture pending and admit due to CHUY.  Discussed need to f/u with urology concerning bladder mass and renal mass.  Patient with skin candidiasis, will prescribe nystatin powder.    Patient feeling lightheaded, will order EKG, trop, chest xray to r/o cardiac arrhythmia, electrolyte abnormality, ACS.       Amount and/or Complexity of Data Reviewed  External Data Reviewed: labs, ECG and notes.  Labs: ordered.  Radiology: ordered.  ECG/medicine tests: ordered and independent interpretation performed.  Discussion of management or test interpretation with external provider(s): D/w urology.     Risk  Prescription drug management.  Decision  "regarding hospitalization.        ED Course as of 10/31/24 1123   Thu Oct 31, 2024   0941 Messaged urology about hematuria.    0945 Discussed with Sepideh Pro from urology, will place consult, obtain CT scan.  Suggested patient be admitted due to CHUY.    0948 Patient has about 40mL on bladder scan after irrigating bladder.    1034 Patient currently has clear urine draining.        Medications   nystatin (MYCOSTATIN) powder (has no administration in time range)   acetaminophen (TYLENOL) tablet 650 mg (has no administration in time range)   docusate sodium (COLACE) capsule 100 mg (has no administration in time range)   polyethylene glycol (MIRALAX) packet 17 g (has no administration in time range)   sodium chloride 0.9 % infusion (has no administration in time range)   sodium chloride 0.9 % bolus 500 mL (0 mL Intravenous Stopped 10/31/24 1043)   cefepime (MAXIPIME) IVPB (premix in dextrose) 2,000 mg 50 mL (0 mg Intravenous Stopped 10/31/24 1043)       ED Risk Strat Scores                           SBIRT 20yo+      Flowsheet Row Most Recent Value   Initial Alcohol Screen: US AUDIT-C     1. How often do you have a drink containing alcohol? 0 Filed at: 10/31/2024 0832   2. How many drinks containing alcohol do you have on a typical day you are drinking?  0 Filed at: 10/31/2024 0832   3a. Male UNDER 65: How often do you have five or more drinks on one occasion? 0 Filed at: 10/31/2024 0832   3b. FEMALE Any Age, or MALE 65+: How often do you have 4 or more drinks on one occassion? 0 Filed at: 10/31/2024 0832   Audit-C Score 0 Filed at: 10/31/2024 0832   WILLIE: How many times in the past year have you...    Used an illegal drug or used a prescription medication for non-medical reasons? Never Filed at: 10/31/2024 0832                            History of Present Illness       Chief Complaint   Patient presents with    Blood in Urine     Patient reports to ED c/o blood noted in catheter bag this morning. Patient states \"I just " "feel strange\".        Past Medical History:   Diagnosis Date    Alcohol intoxication ([facility]) 10/15/2020    BPH (benign prostatic hyperplasia)     Chronic HFrEF (heart failure with reduced ejection fraction) ([facility]) 2023    Coronary artery calcification seen on CT scan 11/10/2023    Coronary artery disease 2023    Deep vein thrombosis (DVT) of left lower extremity ([facility]) 2023    Diabetes mellitus ([facility]) 2023    Fall from slip, trip, or stumble 10/15/2020    History of phimosis of penis     History of urinary calculi     Hypertension     Skin cancer     Tobacco abuse     quit around       Past Surgical History:   Procedure Laterality Date    BLADDER STONE REMOVAL      CARDIAC CATHETERIZATION N/A 2023    Procedure: Cardiac catheterization;  Surgeon: Dave Royal MD;  Location: [facility];  Service: Cardiology    IR SUPRAPUBIC CATHETER PLACEMENT  2024    ORIF ANKLE FRACTURE Left     SKIN LESION EXCISION N/A 3/18/2024    Procedure: WIDE LOCAL EXCISION OF BACK MELANOMA;  Surgeon: Lillie La MD;  Location: [facility];  Service: Surgical Oncology    TOTAL HIP ARTHROPLASTY Right       Family History   Problem Relation Age of Onset    Breast cancer Mother     Heart attack Father 61    Other Sister         s/p hysterectomy    Cerebral palsy Brother     Skin cancer Other         family history    No Known Problems Son     No Known Problems Daughter     Sudden death Neg Hx       Social History     Tobacco Use    Smoking status: Former     Types: Cigarettes     Start date:      Quit date: 2     Years since quittin.8    Smokeless tobacco: Never    Tobacco comments:     Quit over 30 years ago (Updated 2023).    Vaping Use    Vaping status: Never Used   Substance Use Topics    Alcohol use: Not Currently    Drug use: Never      E-Cigarette/Vaping    E-Cigarette Use Never User       E-Cigarette/Vaping Substances    Nicotine No     THC No     CBD No     Flavoring No     " Other No     Unknown No       I have reviewed and agree with the history as documented.       Blood in Urine  Pertinent negatives include no abdominal pain, chills or fever.     Patient is a 75 y/o M with h/o suprapubic catheter, CHF, DM that was BIBA from  for hematuria.  Patient has dark brown urine in his farmer bag.  He states he feels lightheaded.  Patient states he was taken off Eliquis due to recurrent hematuria. He currently takes aspirin.  He denies abdominal pain.  No chest pain.      Review of Systems   Constitutional:  Negative for chills and fever.   Cardiovascular:  Negative for chest pain.   Gastrointestinal:  Negative for abdominal pain.   Genitourinary:  Positive for hematuria.   Skin:  Positive for rash (around suprapubic catheter).   Neurological:  Positive for light-headedness. Negative for weakness.   Psychiatric/Behavioral:  Negative for confusion.    All other systems reviewed and are negative.          Objective       ED Triage Vitals   Temperature Pulse Blood Pressure Respirations SpO2 Patient Position - Orthostatic VS   10/31/24 0831 10/31/24 0832 10/31/24 0832 10/31/24 0832 10/31/24 0832 --   (!) 97 °F (36.1 °C) 57 118/56 18 97 %       Temp Source Heart Rate Source BP Location FiO2 (%) Pain Score    10/31/24 0831 10/31/24 0832 -- -- 10/31/24 0832    Temporal Monitor   No Pain      Vitals      Date and Time Temp Pulse SpO2 Resp BP Pain Score FACES Pain Rating User   10/31/24 1030 -- 50 96 % 13 127/60 -- -- MI   10/31/24 0832 -- 57 97 % 18 118/56 No Pain -- RD   10/31/24 0831 97 °F (36.1 °C) -- -- -- -- -- -- RD            Physical Exam  Vitals and nursing note reviewed.   Constitutional:       General: He is not in acute distress.     Appearance: Normal appearance. He is well-developed and well-groomed. He is not ill-appearing or diaphoretic.   HENT:      Head: Normocephalic and atraumatic.      Mouth/Throat:      Mouth: Mucous membranes are moist.   Eyes:      Conjunctiva/sclera:  Conjunctivae normal.   Cardiovascular:      Rate and Rhythm: Regular rhythm. Bradycardia present.   Pulmonary:      Effort: Pulmonary effort is normal.      Breath sounds: Normal breath sounds.   Abdominal:      General: Abdomen is flat. Bowel sounds are normal.      Palpations: Abdomen is soft.      Tenderness: There is no abdominal tenderness.      Comments: Suprapubic catheter with erythematous skin surrounding catheter with satellite lesions c/w candidiasis.  No  warmth or purulent discharge.     Musculoskeletal:         General: Normal range of motion.      Cervical back: Normal range of motion.      Right lower le+ Pitting Edema present.      Left lower le+ Pitting Edema present.   Skin:     General: Skin is warm and dry.      Findings: Rash (candidiasis to lower abdomen) present.   Neurological:      Mental Status: He is alert. Mental status is at baseline.      GCS: GCS eye subscore is 4. GCS verbal subscore is 5. GCS motor subscore is 6.   Psychiatric:         Behavior: Behavior is cooperative.         Results Reviewed       Procedure Component Value Units Date/Time    TSH, 3rd generation [[id number]] Collected: 10/31/24 0852    Lab Status: In process Specimen: Blood from Hand, Right Updated: 10/31/24 1119    HS Troponin I 2hr [[id number]] Collected: 10/31/24 1104    Lab Status: In process Specimen: Blood from Hand, Right Updated: 10/31/24 1106    Protime-INR [[id number]]  (Normal) Collected: 10/31/24 0852    Lab Status: Final result Specimen: Blood from Arm, Right Updated: 10/31/24 1056     Protime 14.1 seconds      INR 1.04    Narrative:      INR Therapeutic Range    Indication                                             INR Range      Atrial Fibrillation                                               2.0-3.0  Hypercoagulable State                                    2.0.2.3  Left Ventricular Asist Device                            2.0-3.0  Mechanical Heart Valve                                  -     Aortic(with afib, MI, embolism, HF, LA enlargement,    and/or coagulopathy)                                     2.0-3.0 (2.5-3.5)     Mitral                                                             2.5-3.5  Prosthetic/Bioprosthetic Heart Valve               2.0-3.0  Venous thromboembolism (VTE: VT, PE        2.0-3.0    APTT [[id number]]  (Normal) Collected: 10/31/24 0852    Lab Status: Final result Specimen: Blood from Arm, Right Updated: 10/31/24 1056     PTT 29 seconds     HS Troponin I 4hr [[id number]]     Lab Status: No result Specimen: Blood     HS Troponin 0hr (reflex protocol) [[id number]]  (Normal) Collected: 10/31/24 0852    Lab Status: Final result Specimen: Blood from Hand, Right Updated: 10/31/24 0920     hs TnI 0hr 23 ng/L     Comprehensive metabolic panel [[id number]]  (Abnormal) Collected: 10/31/24 0852    Lab Status: Final result Specimen: Blood from Hand, Right Updated: 10/31/24 0916     Sodium 135 mmol/L      Potassium 5.1 mmol/L      Chloride 103 mmol/L      CO2 25 mmol/L      ANION GAP 7 mmol/L      BUN 42 mg/dL      Creatinine 2.03 mg/dL      Glucose 83 mg/dL      Calcium 8.6 mg/dL      AST 22 U/L      ALT 10 U/L      Alkaline Phosphatase 69 U/L      Total Protein 7.2 g/dL      Albumin 3.7 g/dL      Total Bilirubin 0.65 mg/dL      eGFR 30 ml/min/1.73sq m     Narrative:      National Kid[employer] Disease Foundation guidelines for Chronic Kidney Disease (CKD):     Stage 1 with normal or high GFR (GFR > 90 mL/min/1.73 square meters)    Stage 2 Mild CKD (GFR = 60-89 mL/min/1.73 square meters)    Stage 3A Moderate CKD (GFR = 45-59 mL/min/1.73 square meters)    Stage 3B Moderate CKD (GFR = 30-44 mL/min/1.73 square meters)    Stage 4 Severe CKD (GFR = 15-29 mL/min/1.73 square meters)    Stage 5 End Stage CKD (GFR <15 mL/min/1.73 square meters)  Note: GFR calculation is accurate only with a steady state creatinine    Urine Microscopic [[id number]]  (Abnormal) Collected: 10/31/24 0855    Lab Status: Final result  Specimen: Urine, Suprapubic catheter Updated: 10/31/24 0913     RBC, UA Innumerable /hpf      WBC, UA Innumerable /hpf      Epithelial Cells None Seen /hpf      Bacteria, UA Occasional /hpf     Urine culture [[id number]] Collected: 10/31/24 0855    Lab Status: In process Specimen: Urine, Suprapubic catheter Updated: 10/31/24 0913    UA w Reflex to Microscopic w Reflex to Culture [[id number]]  (Abnormal) Collected: 10/31/24 0855    Lab Status: Final result Specimen: Urine, Suprapubic catheter Updated: 10/31/24 0907     Color, UA Red     Clarity, UA Turbid     Specific Gravity, UA 1.015     pH, UA 6.0     Leukocytes, UA Large     Nitrite, UA Positive     Protein,  (2+) mg/dl      Glucose, UA Negative mg/dl      Ketones, UA Negative mg/dl      Urobilinogen, UA <2.0 mg/dl      Bilirubin, UA Negative     Occult Blood, UA Large    CBC and differential [[id number]]  (Abnormal) Collected: 10/31/24 0852    Lab Status: Final result Specimen: Blood from Hand, Right Updated: 10/31/24 0858     WBC 6.50 Thousand/uL      RBC 3.80 Million/uL      Hemoglobin 12.0 g/dL      Hematocrit 36.6 %      MCV 96 fL      MCH 31.6 pg      MCHC 32.8 g/dL      RDW 13.5 %      MPV 10.1 fL      Platelets 291 Thousands/uL      nRBC 0 /100 WBCs      Segmented % 62 %      Immature Grans % 1 %      Lymphocytes % 19 %      Monocytes % 10 %      Eosinophils Relative 7 %      Basophils Relative 1 %      Absolute Neutrophils 4.02 Thousands/µL      Absolute Immature Grans 0.03 Thousand/uL      Absolute Lymphocytes 1.25 Thousands/µL      Absolute Monocytes 0.67 Thousand/µL      Eosinophils Absolute 0.48 Thousand/µL      Basophils Absolute 0.05 Thousands/µL             CT renal stone study abdomen pelvis without contrast   Final Interpretation by Tierra Win MD (10/31 1052)         1. Bladder partially obscured by dense streak artifact from the patient's right hip arthroplasty, and the bladder is collapsed, but there is possible asymmetric soft tissue  within the right aspect of the bladder, and correlation with cystoscopy findings    is recommended as a bladder mass in this region cannot be excluded.   2. Tiny nonobstructing bilateral renal calculi. No ureteric calculi identified.   3. Mildly hyperdense right renal lesion,, increased in density when compared to the prior study from 10/15/2024 and which likely represents interval hemorrhage within a hemorrhagic renal cyst, but recommend continued follow-up with CT abdomen pelvis    without and with contrast using a renal protocol in 6 months time to document stability.   3. Stable mild to moderate right hydronephrosis and proximal hydroureter without definite obstructing stone or mass identified on this noncontrast study.   4. Additional stable bilateral simple renal cysts for which no further imaging evaluation or follow-up is needed.   5. No bowel wall thickening or bowel obstruction. Colonic diverticulosis without diverticulitis.   6. Stable peripheral reticular opacities involving the visualized lung bases, without obvious honeycombing, most compatible with an early interstitial lung disease/pulmonary fibrosis.      The study was marked in EPIC for immediate notification.         Workstation performed: [id number]         XR chest 1 view portable   Final Interpretation by Mary Cortes MD (10/31 1003)      Chronic interstitial opacities in the right lung base.            Resident: JAMES Moffett I, the attending radiologist, have reviewed the images and agree with the final report above.      Workstation performed: [id number]             ECG 12 Lead Documentation Only    Date/Time: 10/31/2024 9:09 AM    Performed by: Yasmeen Braun PA-C  Authorized by: Yasmeen Braun PA-C    Indications / Diagnosis:  Lightheaded  ECG reviewed by me, the ED Provider: yes    Patient location:  ED  Previous ECG:     Previous ECG:  Compared to current    Similarity:  No change  Rate:     ECG rate:   56  Rhythm:     Rhythm: sinus bradycardia    Conduction:     Conduction: abnormal      Abnormal conduction: non-specific intraventricular conduction delay    ST segments:     ST segments:  Normal      ED Medication and Procedure Management   Prior to Admission Medications   Prescriptions Last Dose Informant Patient Reported? Taking?   acetaminophen (TYLENOL) 325 mg tablet   Yes No   Sig: Take 325 mg by mouth every 6 (six) hours as needed for mild pain   amiodarone 200 mg tablet   No No   Sig: Take 1 tablet (200 mg total) by mouth daily   aspirin 81 mg chewable tablet   No No   Sig: Chew 1 tablet (81 mg total) daily Do not start before September 30, 2024.   atorvastatin (LIPITOR) 40 mg tablet  Self No No   Sig: Take 1 tablet (40 mg total) by mouth daily with dinner   clotrimazole (LOTRIMIN) 1 % cream   Yes No   Sig: Apply topically 2 (two) times a day   dicyclomine (BENTYL) 10 mg capsule   No No   Sig: Take 1 capsule (10 mg total) by mouth 3 (three) times a day as needed (abdominal pain)   finasteride (PROSCAR) 5 mg tablet   No No   Sig: Take 1 tablet (5 mg total) by mouth daily   furosemide (LASIX) 40 mg tablet  Self No No   Sig: Take 1 tablet (40 mg total) by mouth daily Do not start before November 16, 2023.   lisinopril (ZESTRIL) 5 mg tablet   No No   Sig: Take 1 tablet (5 mg total) by mouth daily   metoprolol succinate (TOPROL-XL) 25 mg 24 hr tablet   No No   Sig: Take 1 tablet (25 mg total) by mouth every 12 (twelve) hours   mupirocin (BACTROBAN) 2 % ointment   Yes No   Sig: Apply topically 3 (three) times a day   nitroglycerin (NITROSTAT) 0.4 mg SL tablet  Self No No   Sig: Place 1 tablet (0.4 mg total) under the tongue every 5 (five) minutes as needed for chest pain   polyethylene glycol (MIRALAX) 17 g packet   No No   Sig: Take 17 g by mouth daily   tamsulosin (FLOMAX) 0.4 mg  Self No No   Sig: Take 1 capsule (0.4 mg total) by mouth daily with dinner   Patient taking differently: Take 0.4 mg by mouth daily  at bedtime      Facility-Administered Medications: None     Patient's Medications   Discharge Prescriptions    No medications on file     No discharge procedures on file.  ED SEPSIS DOCUMENTATION   Time reflects when diagnosis was documented in both MDM as applicable and the Disposition within this note       Time User Action Codes Description Comment    10/31/2024  9:42 AM Yasmeen Braun Add [R31.9] Hematuria     10/31/2024  9:42 AM Yasmeen Braun Add [N39.0] Urinary tract infection     10/31/2024  9:42 AM Yasmeen Braun Add [N17.9] CHUY (acute kidney injury) (HCC)     10/31/2024  9:54 AM Yasmeen Braun Add [B37.9] Candidiasis     10/31/2024  9:54 AM Yasmeen Braun Modify [B37.9] Candidiasis abdominal wall    10/31/2024 10:56 AM Yasmeen Braun Add [N28.89] Right renal mass     10/31/2024 10:56 AM Yasmeen Braun Add [N32.89] Bladder mass     10/31/2024 10:56 AM Yasmeen Braun Add [N13.30] Hydronephrosis                  Yasmeen Braun PA-C  10/31/24 1123

## 2024-10-31 NOTE — ASSESSMENT & PLAN NOTE
"BP (!) 107/48 (BP Location: Left arm)   Pulse 57   Temp (!) 97 °F (36.1 °C) (Temporal)   Resp 18   Ht 5' 7\" (1.702 m)   Wt 85.3 kg (188 lb) Comment: per patient  SpO2 91%   BMI 29.44 kg/m²     Continue with Toprol-XL 25 mg twice daily with holding parameters  Hold lisinopril 5 mg daily  Hold Lasix 40 mg daily  Vitals as per routine  "

## 2024-10-31 NOTE — ASSESSMENT & PLAN NOTE
Pt is 75 y/o male with pmh HTN, CHF, DVT, CAD, T2DM, BPH, h/o melanoma and CKD3a who presents with new onset of hematuria today.     CT renal:   1. Bladder partially obscured by dense streak artifact from the patient's right hip arthroplasty, and the bladder is collapsed, but there is possible asymmetric soft tissue within the right aspect of the bladder, and correlation with cystoscopy findings   is recommended as a bladder mass in this region cannot be excluded.  2. Tiny nonobstructing bilateral renal calculi. No ureteric calculi identified.  3. Mildly hyperdense right renal lesion,, increased in density when compared to the prior study from 10/15/2024 and which likely represents interval hemorrhage within a hemorrhagic renal cyst, but recommend continued follow-up with CT abdomen pelvis   without and with contrast using a renal protocol in 6 months time to document stability.  3. Stable mild to moderate right hydronephrosis and proximal hydroureter without definite obstructing stone or mass identified on this noncontrast study.  4. Additional stable bilateral simple renal cysts for which no further imaging evaluation or follow-up is needed.    VSS, afeb  Hgb 12.0  Creat 2.03 (Baseline around 1.55)  SPT is now draining clear yellow urine after manual irrigation by nurse        Plan:  Hand irrigate prn hematuria  Trend hgb, stable today  Trend creat  Check bladder scan q4 to ensure SPT not obstructed  SPT placed 9/27 by IR so, if it becomes obstructed would need IR to eval.  Discussed with ER and Urology

## 2024-10-31 NOTE — ASSESSMENT & PLAN NOTE
Patient with recurrent hematuria with suprapubic catheter.  Was previously on Eliquis, discontinued on recent admission.  Upon manual irrigation, hematuria resolved  Hemoglobin stable    CT scan-Bladder partially obscured by dense streak artifact from the patient's right hip arthroplasty, and the bladder is collapsed, but there is possible asymmetric soft tissue within the right aspect of the bladder.Tiny nonobstructing bilateral renal calculi. No ureteric calculi identified.Stable mild to moderate right hydronephrosis and proximal hydroureter without definite obstructing stone or mass identified on this noncontrast study.  Continue with serial bladder scans  Urology following, appreciate recommendations    Recent Labs     10/31/24  0852   HGB 12.0

## 2024-10-31 NOTE — H&P
"H&P - Hospitalist   Name: Toro Rodriguez 74 y.o. male I MRN: [id number]  Unit/Bed#: -01 I Date of Admission: 10/31/2024   Date of Service: 10/31/2024 I Hospital Day: 0     Assessment & Plan  CHUY (acute kidney injury) ([facility])  Recent Labs     10/31/24  0852 10/31/24  1425   CREATININE 2.03* 1.92*   EGFR 30 33     Estimated Creatinine Clearance: 34.2 mL/min (A) (by C-G formula based on SCr of 1.92 mg/dL (H)).     Baseline 1.4-1.6  Etiology likely prerenal with continued use of offending agents including Lasix/lisinopril  Continue to hold  Avoid nephrotoxic agents  Continue with IV fluids  Monitor BUNs/creatinine  Essential hypertension  BP (!) 107/48 (BP Location: Left arm)   Pulse 57   Temp (!) 97 °F (36.1 °C) (Temporal)   Resp 18   Ht 5' 7\" (1.702 m)   Wt 85.3 kg (188 lb) Comment: per patient  SpO2 91%   BMI 29.44 kg/m²     Continue with Toprol-XL 25 mg twice daily with holding parameters  Hold lisinopril 5 mg daily  Hold Lasix 40 mg daily  Vitals as per routine  Chronic systolic (congestive) heart failure ([facility])  Wt Readings from Last 3 Encounters:   10/31/24 85.3 kg (188 lb)   10/18/24 89.1 kg (196 lb 6.9 oz)   09/28/24 87.3 kg (192 lb 6.4 oz)     Appears dry  Continue to hold Lasix 40 mg daily  Monitor volume status closely  Most recent echo in February/2024 with EF of 35-40%        Coronary artery disease involving native coronary artery of native heart  Continue with Toprol-XL and statin  Outpatient cardiology follow-up  Currently denies any chest pain  Type 2 diabetes mellitus with chronic kidney disease, without long-term current use of insulin, unspecified CKD stage ([facility])  Lab Results   Component Value Date    HGBA1C 5.9 (H) 04/18/2024       No results for input(s): \"POCGLU\" in the last 72 hours.    Blood Sugar Average: Last 72 hrs:    Well-controlled  Not on any medication  Check glucose on daily BMPs  Benign prostatic hyperplasia with urinary retention  With suprapubic catheter in " "place  Continue with Flomax and finasteride  Renal lesion  As seen on the CT imaging during last admission  Repeat CT imaging 10/31- Mildly hyperdense right renal lesion,, increased in density when compared to the prior study from 10/15/2024 and which likely represents interval hemorrhage within a hemorrhagic renal cyst, but recommend continued follow-up with CT abdomen pelvis   without and with contrast using a renal protocol in 6 months time to document stability.  Gross hematuria  Patient with recurrent hematuria with suprapubic catheter.  Was previously on Eliquis, discontinued on recent admission.  Upon manual irrigation, hematuria resolved  Hemoglobin stable    CT scan-Bladder partially obscured by dense streak artifact from the patient's right hip arthroplasty, and the bladder is collapsed, but there is possible asymmetric soft tissue within the right aspect of the bladder.Tiny nonobstructing bilateral renal calculi. No ureteric calculi identified.Stable mild to moderate right hydronephrosis and proximal hydroureter without definite obstructing stone or mass identified on this noncontrast study.  Continue with serial bladder scans  Urology following, appreciate recommendations    Recent Labs     10/31/24  0852   HGB 12.0          VTE Pharmacologic Prophylaxis:   Moderate Risk (Score 3-4) - Pharmacological DVT Prophylaxis Contraindicated. Sequential Compression Devices Ordered.  Code Status: Level 1 - Full Code patient  Discussion with family:  not available .     Anticipated Length of Stay: Patient will be admitted on an observation basis with an anticipated length of stay of less than 2 midnights secondary to hematuria .    History of Present Illness   Chief Complaint:   Chief Complaint   Patient presents with    Blood in Urine     Patient reports to ED c/o blood noted in catheter bag this morning. Patient states \"I just feel strange\".          Toro Rodriguez is a 74 y.o. male with a PMH of  .  Recurrent " hematuria, CAD, hypertension, BPH, obesity presented with an episode of hematuria and not feeling well.  On manual irrigation in ED, hematuria resolved.  However patient noted to have CHUY superimposed on CKD.  Confirm level on full CODE STATUS.  Will be admitted inpatient for urology workup and management of CHUY.    Review of Systems   Constitutional:  Positive for activity change, appetite change and fatigue.   HENT:  Negative for congestion.    Respiratory:  Negative for cough and shortness of breath.    Cardiovascular:  Negative for chest pain, palpitations and leg swelling.   Gastrointestinal:  Negative for abdominal pain.   Genitourinary:  Positive for hematuria. Negative for dysuria.   Musculoskeletal:  Positive for arthralgias.   Neurological:  Positive for weakness.   Psychiatric/Behavioral:  Negative for agitation and confusion.        Historical Information   Past Medical History:   Diagnosis Date    Alcohol intoxication ([facility]) 10/15/2020    BPH (benign prostatic hyperplasia)     Chronic HFrEF (heart failure with reduced ejection fraction) ([facility]) 11/2023    Coronary artery calcification seen on CT scan 11/10/2023    Coronary artery disease 12/14/2023    Deep vein thrombosis (DVT) of left lower extremity ([facility]) 11/2023    Diabetes mellitus ([facility]) 11/2023    Fall from slip, trip, or stumble 10/15/2020    History of phimosis of penis     History of urinary calculi     Hypertension 1988    Skin cancer     Tobacco abuse     quit around 2000     Past Surgical History:   Procedure Laterality Date    BLADDER STONE REMOVAL  2014    CARDIAC CATHETERIZATION N/A 12/14/2023    Procedure: Cardiac catheterization;  Surgeon: Dave Royal MD;  Location: [facility];  Service: Cardiology    IR SUPRAPUBIC CATHETER PLACEMENT  9/27/2024    ORIF ANKLE FRACTURE Left     SKIN LESION EXCISION N/A 3/18/2024    Procedure: WIDE LOCAL EXCISION OF BACK MELANOMA;  Surgeon: Lillie La MD;  Location: [facility];  Service:  Surgical Oncology    TOTAL HIP ARTHROPLASTY Right      Social History     Tobacco Use    Smoking status: Former     Types: Cigarettes     Start date:      Quit date:      Years since quittin.8    Smokeless tobacco: Never    Tobacco comments:     Quit over 30 years ago (Updated 2023).    Vaping Use    Vaping status: Never Used   Substance and Sexual Activity    Alcohol use: Not Currently    Drug use: Never    Sexual activity: Not on file     E-Cigarette/Vaping    E-Cigarette Use Never User      E-Cigarette/Vaping Substances    Nicotine No     THC No     CBD No     Flavoring No     Other No     Unknown No      Family History   Problem Relation Age of Onset    Breast cancer Mother     Heart attack Father 61    Other Sister         s/p hysterectomy    Cerebral palsy Brother     Skin cancer Other         family history    No Known Problems Son     No Known Problems Daughter     Sudden death Neg Hx      Social History:  Marital Status:        Meds/Allergies   I have reviewed home medications with patient personally.  Prior to Admission medications    Medication Sig Start Date End Date Taking? Authorizing Provider   acetaminophen (TYLENOL) 325 mg tablet Take 325 mg by mouth every 6 (six) hours as needed for mild pain   Yes Historical Provider, MD   amiodarone 200 mg tablet Take 1 tablet (200 mg total) by mouth daily 24  Yes Rob Roach MD   atorvastatin (LIPITOR) 40 mg tablet Take 1 tablet (40 mg total) by mouth daily with dinner 11/15/23  Yes Krysta Castillo PA-C   clotrimazole (LOTRIMIN) 1 % cream Apply topically 2 (two) times a day   Yes Historical Provider, MD   finasteride (PROSCAR) 5 mg tablet Take 1 tablet (5 mg total) by mouth daily 24 Yes Rick Espino MD   furosemide (LASIX) 40 mg tablet Take 1 tablet (40 mg total) by mouth daily Do not start before 2023. 23  Yes Krysta Castillo PA-C   mupirocin (BACTROBAN) 2 % ointment Apply topically 3  (three) times a day   Yes Mundo Barba MD   polyethylene glycol (MIRALAX) 17 g packet Take 17 g by mouth daily 10/18/24  Yes Elif Santa MD   tamsulosin (FLOMAX) 0.4 mg Take 1 capsule (0.4 mg total) by mouth daily with dinner  Patient taking differently: Take 0.4 mg by mouth daily at bedtime 12/15/23  Yes CARLIE Rincon   aspirin 81 mg chewable tablet Chew 1 tablet (81 mg total) daily Do not start before September 30, 2024. 9/30/24   Jaydon Fleming MD   dicyclomine (BENTYL) 10 mg capsule Take 1 capsule (10 mg total) by mouth 3 (three) times a day as needed (abdominal pain) 9/17/24   Rick Crawley PA-C   lisinopril (ZESTRIL) 5 mg tablet Take 1 tablet (5 mg total) by mouth daily 5/2/24   Brook Fuentes MD   metoprolol succinate (TOPROL-XL) 25 mg 24 hr tablet Take 1 tablet (25 mg total) by mouth every 12 (twelve) hours 9/17/24 10/17/24  Rick Crawley PA-C   nitroglycerin (NITROSTAT) 0.4 mg SL tablet Place 1 tablet (0.4 mg total) under the tongue every 5 (five) minutes as needed for chest pain 12/15/23   CARLIE Rincon     Allergies   Allergen Reactions    Zosyn [Piperacillin Sod-Tazobactam So] Rash       Objective :  Temp:  [97 °F (36.1 °C)-97.2 °F (36.2 °C)] 97 °F (36.1 °C)  HR:  [50-57] 57  BP: (107-150)/(48-65) 107/48  Resp:  [13-18] 18  SpO2:  [91 %-97 %] 91 %  O2 Device: None (Room air)    Physical Exam  Vitals reviewed.   Constitutional:       Appearance: He is obese.   HENT:      Head: Atraumatic.      Mouth/Throat:      Mouth: Mucous membranes are dry.   Eyes:      General: No scleral icterus.  Cardiovascular:      Rate and Rhythm: Normal rate.      Heart sounds: Normal heart sounds.   Pulmonary:      Effort: No respiratory distress.   Abdominal:      General: Bowel sounds are normal.   Musculoskeletal:      Right lower leg: No edema.      Left lower leg: No edema.   Skin:     General: Skin is dry.      Capillary Refill: Capillary refill takes less than 2 seconds.   Neurological:       Mental Status: He is alert and oriented to person, place, and time. Mental status is at baseline.   Psychiatric:         Mood and Affect: Mood normal.          Lines/Drains:            Lab Results: I have reviewed the following results:  Results from last 7 days   Lab Units 10/31/24  0852   WBC Thousand/uL 6.50   HEMOGLOBIN g/dL 12.0   HEMATOCRIT % 36.6   PLATELETS Thousands/uL 291   SEGS PCT % 62   LYMPHO PCT % 19   MONO PCT % 10   EOS PCT % 7*     Results from last 7 days   Lab Units 10/31/24  1425 10/31/24  0852   SODIUM mmol/L 137 135   POTASSIUM mmol/L 3.7 5.1   CHLORIDE mmol/L 106 103   CO2 mmol/L 24 25   BUN mg/dL 40* 42*   CREATININE mg/dL 1.92* 2.03*   ANION GAP mmol/L 7 7   CALCIUM mg/dL 8.2* 8.6   ALBUMIN g/dL  --  3.7   TOTAL BILIRUBIN mg/dL  --  0.65   ALK PHOS U/L  --  69   ALT U/L  --  10   AST U/L  --  22   GLUCOSE RANDOM mg/dL 113 83     Results from last 7 days   Lab Units 10/31/24  0852   INR  1.04         Lab Results   Component Value Date    HGBA1C 5.9 (H) 04/18/2024    HGBA1C 8.2 (H) 12/14/2023    HGBA1C 7.9 (H) 11/15/2023           Imaging Results Review: I personally reviewed the following image studies/reports in PACS and discussed pertinent findings with Radiology: CT abdomen/pelvis. My interpretation of the radiology images/reports is:  .  Other Study Results Review: No additional pertinent studies reviewed.    Administrative Statements   I have spent a total time of 75 minutes in caring for this patient on the day of the visit/encounter including Patient and family education, Importance of tx compliance, Risk factor reductions, Impressions, Counseling / Coordination of care, Documenting in the medical record, Reviewing / ordering tests, medicine, procedures  , Obtaining or reviewing history  , and Communicating with other healthcare professionals .    ** Please Note: This note has been constructed using a voice recognition system. **

## 2024-10-31 NOTE — CASE MANAGEMENT
Case Management Assessment & Discharge Planning Note    Patient name Toro Rodriguez  Location /-01 MRN [id number]  : 1950 Date 10/31/2024       Current Admission Date: 10/31/2024  Current Admission Diagnosis:Gross hematuria   Patient Active Problem List    Diagnosis Date Noted Date Diagnosed    Gross hematuria 10/31/2024     Renal lesion 10/16/2024     NSVT (nonsustained ventricular tachycardia) ([facility] 2024     Encounter to discuss test results 2024     Urethral erosion by catheter, initial encounter  ([facility]) 2024     Anemia 2024     Chronic venous hypertension (idiopathic) with ulcer of left lower extremity (CODE) ([facility]) 2024     Chronic kidney disease, stage 3a ([facility]) 2024     Encounter for geriatric assessment 2024     Melanoma of back ([facility]) 2024     Dilated cardiomyopathy ([facility]) 12/15/2023     CHUY (acute kidney injury) ([facility]) 2023     Obesity, morbid ([facility]) 2023     Type 2 diabetes mellitus with chronic kidney disease, without long-term current use of insulin, unspecified CKD stage ([facility]) 11/15/2023     Essential hypertension 11/10/2023     Generalized weakness 11/10/2023     Chronic systolic (congestive) heart failure ([facility]) 11/10/2023     Deep vein thrombosis (DVT) of left lower extremity ([facility]) 11/10/2023     Coronary artery disease involving native coronary artery of native heart 11/10/2023     Benign prostatic hyperplasia with urinary retention        LOS (days): 0  Geometric Mean LOS (GMLOS) (days):   Days to GMLOS:     OBJECTIVE:              Current admission status: Observation       [place]   [facility] [address]  [address] [address]  Phone: [phone] Fax: [phone]    Primary Care Provider: CARLIE Foster    Primary Insurance: MEDICARE  Secondary Insurance: BLUE CROSS    ASSESSMENT:  Active Health Care Proxies        MichaelLen antonion Health Care Agent - Daughter   Primary Phone: [phone] (Mobile)                 Advance Directives  Does patient have a Health Care POA?: Yes  Does patient have Advance Directives?: Yes  Advance Directives: Living will, Power of  for health care  Primary Contact: Shanique (daughter)    Obs Notice Signed: 10/31/24    Readmission Root Cause  30 Day Readmission: No    Patient Information  Admitted from:: Facility ([facility])  Mental Status: Alert  During Assessment patient was accompanied by: Not accompanied during assessment  Assessment information provided by:: Patient  Primary Caregiver: Self  Support Systems: Daughter  County of Residence: [place]  What city do you live in?: [place]  Home entry access options. Select all that apply.: Ramp  Type of Current Residence: Facility ([facility] [place])  Upon entering residence, is there a bedroom on the main floor (no further steps)?: Yes  Upon entering residence, is there a bathroom on the main floor (no further steps)?: Yes  Living Arrangements: Lives in Facility  Is patient a ?: No    Activities of Daily Living Prior to Admission  Functional Status: Assistance  Completes ADLs independently?: No  Level of ADL dependence: Assistance  Ambulates independently?: Yes  Does patient use assisted devices?: Yes  Assisted Devices (DME) used: Walker, Rollator  Does patient currently own DME?: Yes  What DME does the patient currently own?: Rollator, Walker  Does patient have a history of Outpatient Therapy (PT/OT)?: Yes  Does the patient have a history of Short-Term Rehab?: Yes ([facility])  Does patient have a history of HHC?: Yes ([facility])  Does patient currently have HHC?: Yes    Current Home Health Care  Type of Current Home Care Services: Home PT, Home OT, Nurse visit  Home Health Agency Name:: [facility]  Current Home Health Follow-Up Provider:: PCP    Patient Information Continued  Does patient have prescription coverage?:  Yes  Does patient receive dialysis treatments?: No  Does patient have a history of substance abuse?: No  Does patient have a history of Mental Health Diagnosis?: No         Means of Transportation  Means of Transport to Appts:: Family transport          DISCHARGE DETAILS:    Discharge planning discussed with:: patient and Juan at [facility]  [place] of Choice: Yes  Comments - Freedom of Choice: patient states plan is to return to [facility] with [facility]  CM contacted family/caregiver?: No- see comments (reports being in contact with family)  Were Treatment Team discharge recommendations reviewed with patient/caregiver?: Yes  Did patient/caregiver verbalize understanding of patient care needs?: Yes  Were patient/caregiver advised of the risks associated with not following Treatment Team discharge recommendations?: Yes         Requested Home Health Care         Is the patient interested in [facility] at discharge?: Yes  Home Health Discipline requested:: Nursing, Physical Therapy, Occupational Therapy  Home Health Agency Name:: [facility] External Referral Reason (only applicable if external HH name selected): Patient has established relationship with provider  Home Health Follow-Up Provider:: PCP  Home Health Services Needed:: Evaluate Functional Status and Safety, Gait/ADL Training, Strengthening/Theraputic Exercises to Improve Function, Urinary Incontinence Catheter Management  Homebound Criteria Met:: Requires the Assistance of Another Person for Safe Ambulation or to Leave the Home  Supporting Clincal Findings:: Limited Endurance    DME Referral Provided  Referral made for DME?: No    Other Referral/Resources/Interventions Provided:  Interventions: HHC  Referral Comments: Referral to [facility]         Treatment Team Recommendation: Home with Home Health Care  Discharge Destination Plan:: Home with Home Health Care  Transport at Discharge : Wheelchair van       Additional Comments: Met with pt to  discuss the role of CM and to discuss any help pt may need prior to dc. Pt resides at [facility]; 1st floor setup with ramp to enter. Pt ambulates indptly with a rollator. Pt also has a RW. Pt received minimal assistance with ALD's. Hx of outpt therapy. Hx of rehab at [facility]. Currently receiveing [facility] for nursin, PT, and OT. Pt is requesting to resume services at dc; AIDIN referral sent to [facility]--accepted. CM added [facility] to pt's dc instructions. Pt states he will need  Van transport home at dc; he is aware and agreeable to cost. CM spoke to Juan at [facility] to review and confirm above; Juan agrees with plan for pt to return when medically stable with [facility].

## 2024-10-31 NOTE — ASSESSMENT & PLAN NOTE
As seen on the CT imaging during last admission  Repeat CT imaging 10/31- Mildly hyperdense right renal lesion,, increased in density when compared to the prior study from 10/15/2024 and which likely represents interval hemorrhage within a hemorrhagic renal cyst, but recommend continued follow-up with CT abdomen pelvis   without and with contrast using a renal protocol in 6 months time to document stability.

## 2024-10-31 NOTE — ASSESSMENT & PLAN NOTE
"Lab Results   Component Value Date    HGBA1C 5.9 (H) 04/18/2024       No results for input(s): \"POCGLU\" in the last 72 hours.    Blood Sugar Average: Last 72 hrs:    Well-controlled  Not on any medication  Check glucose on daily BMPs  "

## 2024-10-31 NOTE — ED NOTES
Per order from provider, flushed suprapubic catheter. One first attempt, 30ml of sterile water easily flushed in with 30ml returned with clots. An additional 30ml was easily flushed in, however there was no return of fluid/urine. A second nurse attempted withdrawal with no success. Provider notified and another 30ml was flushed in with no return. Per provider, hold any more flushes and see if catheter drains on its own. Will monitor.      Carrie Willams RN  10/31/24 [id number]

## 2024-10-31 NOTE — ASSESSMENT & PLAN NOTE
Recent Labs     10/31/24  0852 10/31/24  1425   CREATININE 2.03* 1.92*   EGFR 30 33     Estimated Creatinine Clearance: 34.2 mL/min (A) (by C-G formula based on SCr of 1.92 mg/dL (H)).     Baseline 1.4-1.6  Etiology likely prerenal with continued use of offending agents including Lasix/lisinopril  Continue to hold  Avoid nephrotoxic agents  Continue with IV fluids  Monitor BUNs/creatinine

## 2024-11-01 PROBLEM — R82.90 ABNORMAL URINALYSIS: Status: ACTIVE | Noted: 2024-11-01

## 2024-11-01 LAB
ANION GAP SERPL CALCULATED.3IONS-SCNC: 5 MMOL/L (ref 4–13)
BUN SERPL-MCNC: 38 MG/DL (ref 5–25)
CALCIUM SERPL-MCNC: 7.7 MG/DL (ref 8.4–10.2)
CHLORIDE SERPL-SCNC: 109 MMOL/L (ref 96–108)
CO2 SERPL-SCNC: 23 MMOL/L (ref 21–32)
CREAT SERPL-MCNC: 1.74 MG/DL (ref 0.6–1.3)
ERYTHROCYTE [DISTWIDTH] IN BLOOD BY AUTOMATED COUNT: 13.8 % (ref 11.6–15.1)
GFR SERPL CREATININE-BSD FRML MDRD: 37 ML/MIN/1.73SQ M
GLUCOSE SERPL-MCNC: 89 MG/DL (ref 65–140)
HCT VFR BLD AUTO: 31.8 % (ref 36.5–49.3)
HGB BLD-MCNC: 10.1 G/DL (ref 12–17)
MCH RBC QN AUTO: 31.3 PG (ref 26.8–34.3)
MCHC RBC AUTO-ENTMCNC: 31.8 G/DL (ref 31.4–37.4)
MCV RBC AUTO: 99 FL (ref 82–98)
MRSA NOSE QL CULT: ABNORMAL
MRSA NOSE QL CULT: ABNORMAL
PLATELET # BLD AUTO: 253 THOUSANDS/UL (ref 149–390)
PMV BLD AUTO: 9.8 FL (ref 8.9–12.7)
POTASSIUM SERPL-SCNC: 4.4 MMOL/L (ref 3.5–5.3)
RBC # BLD AUTO: 3.23 MILLION/UL (ref 3.88–5.62)
SODIUM SERPL-SCNC: 137 MMOL/L (ref 135–147)
WBC # BLD AUTO: 5.52 THOUSAND/UL (ref 4.31–10.16)

## 2024-11-01 PROCEDURE — 80048 BASIC METABOLIC PNL TOTAL CA: CPT | Performed by: INTERNAL MEDICINE

## 2024-11-01 PROCEDURE — 99232 SBSQ HOSP IP/OBS MODERATE 35: CPT | Performed by: INTERNAL MEDICINE

## 2024-11-01 PROCEDURE — 85027 COMPLETE CBC AUTOMATED: CPT | Performed by: INTERNAL MEDICINE

## 2024-11-01 RX ADMIN — NYSTATIN: 100000 POWDER TOPICAL at 09:27

## 2024-11-01 RX ADMIN — TAMSULOSIN HYDROCHLORIDE 0.4 MG: 0.4 CAPSULE ORAL at 20:08

## 2024-11-01 RX ADMIN — ASPIRIN 81 MG CHEWABLE TABLET 81 MG: 81 TABLET CHEWABLE at 09:25

## 2024-11-01 RX ADMIN — NYSTATIN: 100000 POWDER TOPICAL at 17:18

## 2024-11-01 RX ADMIN — AMIODARONE HYDROCHLORIDE 200 MG: 200 TABLET ORAL at 09:25

## 2024-11-01 RX ADMIN — DOCUSATE SODIUM 100 MG: 100 CAPSULE, LIQUID FILLED ORAL at 09:25

## 2024-11-01 RX ADMIN — METOPROLOL SUCCINATE 25 MG: 25 TABLET, EXTENDED RELEASE ORAL at 09:25

## 2024-11-01 RX ADMIN — ATORVASTATIN CALCIUM 40 MG: 40 TABLET, FILM COATED ORAL at 17:17

## 2024-11-01 RX ADMIN — FINASTERIDE 5 MG: 5 TABLET, FILM COATED ORAL at 09:25

## 2024-11-01 RX ADMIN — METOPROLOL SUCCINATE 25 MG: 25 TABLET, EXTENDED RELEASE ORAL at 20:09

## 2024-11-01 RX ADMIN — DOCUSATE SODIUM 100 MG: 100 CAPSULE, LIQUID FILLED ORAL at 17:17

## 2024-11-01 NOTE — PLAN OF CARE
Problem: Prexisting or High Potential for Compromised Skin Integrity  Goal: Skin integrity is maintained or improved  Description: INTERVENTIONS:  - Identify patients at risk for skin breakdown  - Assess and monitor skin integrity  - Assess and monitor nutrition and hydration status  - Monitor labs   - Assess for incontinence   - Turn and reposition patient  - Assist with mobility/ambulation  - Relieve pressure over bony prominences  - Avoid friction and shearing  - Provide appropriate hygiene as needed including keeping skin clean and dry  - Evaluate need for skin moisturizer/barrier cream  - Collaborate with interdisciplinary team   - Patient/family teaching  - Consider wound care consult   Outcome: Progressing     Problem: PAIN - ADULT  Goal: Verbalizes/displays adequate comfort level or baseline comfort level  Description: Interventions:  - Encourage patient to monitor pain and request assistance  - Assess pain using appropriate pain scale  - Administer analgesics based on type and severity of pain and evaluate response  - Implement non-pharmacological measures as appropriate and evaluate response  - Consider cultural and social influences on pain and pain management  - Notify physician/advanced practitioner if interventions unsuccessful or patient reports new pain  Outcome: Progressing     Problem: INFECTION - ADULT  Goal: Absence or prevention of progression during hospitalization  Description: INTERVENTIONS:  - Assess and monitor for signs and symptoms of infection  - Monitor lab/diagnostic results  - Monitor all insertion sites, i.e. indwelling lines, tubes, and drains  - Monitor endotracheal if appropriate and nasal secretions for changes in amount and color  - [place] appropriate cooling/warming therapies per order  - Administer medications as ordered  - Instruct and encourage patient and family to use good hand hygiene technique  - Identify and instruct in appropriate isolation precautions for  identified infection/condition  Outcome: Progressing  Goal: Absence of fever/infection during neutropenic period  Description: INTERVENTIONS:  - Monitor WBC    Outcome: Progressing     Problem: SAFETY ADULT  Goal: Patient will remain free of falls  Description: INTERVENTIONS:  - Educate patient/family on patient safety including physical limitations  - Instruct patient to call for assistance with activity   - Consult OT/PT to assist with strengthening/mobility   - Keep Call bell within reach  - Keep bed low and locked with side rails adjusted as appropriate  - Keep care items and personal belongings within reach  - Initiate and maintain comfort rounds  - Make Fall Risk Sign visible to staff  - Offer Toileting every x Hours, in advance of need  - Initiate/Maintain xalarm  - Obtain necessary fall risk management equipment: x  - Apply yellow socks and bracelet for high fall risk patients  - Consider moving patient to room near nurses station  Outcome: Progressing  Goal: Maintain or return to baseline ADL function  Description: INTERVENTIONS:  -  Assess patient's ability to carry out ADLs; assess patient's baseline for ADL function and identify physical deficits which impact ability to perform ADLs (bathing, care of mouth/teeth, toileting, grooming, dressing, etc.)  - Assess/evaluate cause of self-care deficits   - Assess range of motion  - Assess patient's mobility; develop plan if impaired  - Assess patient's need for assistive devices and provide as appropriate  - Encourage maximum independence but intervene and supervise when necessary  - Involve family in performance of ADLs  - Assess for home care needs following discharge   - Consider OT consult to assist with ADL evaluation and planning for discharge  - Provide patient education as appropriate  Outcome: Progressing  Goal: Maintains/Returns to pre admission functional level  Description: INTERVENTIONS:  - Perform AM-PAC 6 Click Basic Mobility/ Daily Activity  assessment daily.  - Set and communicate daily mobility goal to care team and patient/family/caregiver.   - Collaborate with rehabilitation services on mobility goals if consulted  - Perform Range of Motion x times a day.  - Reposition patient every x hours.  - Dangle patient x times a day  - Stand patient x times a day  - Ambulate patient x times a day  - Out of bed to chair x times a day   - Out of bed for meals x times a day  - Out of bed for toileting  - Record patient progress and toleration of activity level   Outcome: Progressing     Problem: DISCHARGE PLANNING  Goal: Discharge to home or other facility with appropriate resources  Description: INTERVENTIONS:  - Identify barriers to discharge w/patient and caregiver  - Arrange for needed discharge resources and transportation as appropriate  - Identify discharge learning needs (meds, wound care, etc.)  - Arrange for interpretive services to assist at discharge as needed  - Refer to Case Management Department for coordinating discharge planning if the patient needs post-hospital services based on physician/advanced practitioner order or complex needs related to functional status, cognitive ability, or social support system  Outcome: Progressing     Problem: Knowledge Deficit  Goal: Patient/family/caregiver demonstrates understanding of disease process, treatment plan, medications, and discharge instructions  Description: Complete learning assessment and assess knowledge base.  Interventions:  - Provide teaching at level of understanding  - Provide teaching via preferred learning methods  Outcome: Progressing     Problem: Nutrition/Hydration-ADULT  Goal: Nutrient/Hydration intake appropriate for improving, restoring or maintaining nutritional needs  Description: Monitor and assess patient's nutrition/hydration status for malnutrition. Collaborate with interdisciplinary team and initiate plan and interventions as ordered.  Monitor patient's weight and dietary  intake as ordered or per policy. Utilize nutrition screening tool and intervene as necessary. Determine patient's food preferences and provide high-protein, high-caloric foods as appropriate.     INTERVENTIONS:  - Monitor oral intake, urinary output, labs, and treatment plans  - Assess nutrition and hydration status and recommend course of action  - Evaluate amount of meals eaten  - Assist patient with eating if necessary   - Allow adequate time for meals  - Recommend/ encourage appropriate diets, oral nutritional supplements, and vitamin/mineral supplements  - Order, calculate, and assess calorie counts as needed  - Recommend, monitor, and adjust tube feedings and TPN/PPN based on assessed needs  - Assess need for intravenous fluids  - Provide specific nutrition/hydration education as appropriate  - Include patient/family/caregiver in decisions related to nutrition  Outcome: Progressing     Problem: MOBILITY - ADULT  Goal: Maintain or return to baseline ADL function  Description: INTERVENTIONS:  -  Assess patient's ability to carry out ADLs; assess patient's baseline for ADL function and identify physical deficits which impact ability to perform ADLs (bathing, care of mouth/teeth, toileting, grooming, dressing, etc.)  - Assess/evaluate cause of self-care deficits   - Assess range of motion  - Assess patient's mobility; develop plan if impaired  - Assess patient's need for assistive devices and provide as appropriate  - Encourage maximum independence but intervene and supervise when necessary  - Involve family in performance of ADLs  - Assess for home care needs following discharge   - Consider OT consult to assist with ADL evaluation and planning for discharge  - Provide patient education as appropriate  Outcome: Progressing  Goal: Maintains/Returns to pre admission functional level  Description: INTERVENTIONS:  - Perform AM-PAC 6 Click Basic Mobility/ Daily Activity assessment daily.  - Set and communicate daily  mobility goal to care team and patient/family/caregiver.   - Collaborate with rehabilitation services on mobility goals if consulted  - Perform Range of Motion x times a day.  - Reposition patient every x hours.  - Dangle patient x times a day  - Stand patient x times a day  - Ambulate patient x times a day  - Out of bed to chair x times a day   - Out of bed for meals x times a day  - Out of bed for toileting  - Record patient progress and toleration of activity level   Outcome: Progressing     Problem: DISCHARGE PLANNING - CARE MANAGEMENT  Goal: Discharge to post-acute care or home with appropriate resources  Description: INTERVENTIONS:  - Conduct assessment to determine patient/family and health care team treatment goals, and need for post-acute services based on payer coverage, community resources, and patient preferences, and barriers to discharge  - Address psychosocial, clinical, and financial barriers to discharge as identified in assessment in conjunction with the patient/family and health care team  - Arrange appropriate level of post-acute services according to patient’s   needs and preference and payer coverage in collaboration with the physician and health care team  - Communicate with and update the patient/family, physician, and health care team regarding progress on the discharge plan  - Arrange appropriate transportation to post-acute venues  Outcome: Progressing     Problem: Potential for Falls  Goal: Patient will remain free of falls  Description: INTERVENTIONS:  - Educate patient/family on patient safety including physical limitations  - Instruct patient to call for assistance with activity   - Consult OT/PT to assist with strengthening/mobility   - Keep Call bell within reach  - Keep bed low and locked with side rails adjusted as appropriate  - Keep care items and personal belongings within reach  - Initiate and maintain comfort rounds  - Make Fall Risk Sign visible to staff  - Offer Toileting  every x Hours, in advance of need  - Initiate/Maintain xalarm  - Obtain necessary fall risk management equipment: x  - Apply yellow socks and bracelet for high fall risk patients  - Consider moving patient to room near nurses station  Outcome: Progressing

## 2024-11-01 NOTE — PLAN OF CARE
Problem: Prexisting or High Potential for Compromised Skin Integrity  Goal: Skin integrity is maintained or improved  Description: INTERVENTIONS:  - Identify patients at risk for skin breakdown  - Assess and monitor skin integrity  - Assess and monitor nutrition and hydration status  - Monitor labs   - Assess for incontinence   - Turn and reposition patient  - Assist with mobility/ambulation  - Relieve pressure over bony prominences  - Avoid friction and shearing  - Provide appropriate hygiene as needed including keeping skin clean and dry  - Evaluate need for skin moisturizer/barrier cream  - Collaborate with interdisciplinary team   - Patient/family teaching  - Consider wound care consult   Outcome: Progressing     Problem: PAIN - ADULT  Goal: Verbalizes/displays adequate comfort level or baseline comfort level  Description: Interventions:  - Encourage patient to monitor pain and request assistance  - Assess pain using appropriate pain scale  - Administer analgesics based on type and severity of pain and evaluate response  - Implement non-pharmacological measures as appropriate and evaluate response  - Consider cultural and social influences on pain and pain management  - Notify physician/advanced practitioner if interventions unsuccessful or patient reports new pain  Outcome: Progressing     Problem: INFECTION - ADULT  Goal: Absence or prevention of progression during hospitalization  Description: INTERVENTIONS:  - Assess and monitor for signs and symptoms of infection  - Monitor lab/diagnostic results  - Monitor all insertion sites, i.e. indwelling lines, tubes, and drains  - Monitor endotracheal if appropriate and nasal secretions for changes in amount and color  - [place] appropriate cooling/warming therapies per order  - Administer medications as ordered  - Instruct and encourage patient and family to use good hand hygiene technique  - Identify and instruct in appropriate isolation precautions for  identified infection/condition  Outcome: Progressing  Goal: Absence of fever/infection during neutropenic period  Description: INTERVENTIONS:  - Monitor WBC    Outcome: Progressing     Problem: SAFETY ADULT  Goal: Patient will remain free of falls  Description: INTERVENTIONS:  - Educate patient/family on patient safety including physical limitations  - Instruct patient to call for assistance with activity   - Consult OT/PT to assist with strengthening/mobility   - Keep Call bell within reach  - Keep bed low and locked with side rails adjusted as appropriate  - Keep care items and personal belongings within reach  - Initiate and maintain comfort rounds  - Make Fall Risk Sign visible to staff  - Offer Toileting every 2 Hours, in advance of need  - Initiate/Maintain alarm  - Obtain necessary fall risk management equipment  - Apply yellow socks and bracelet for high fall risk patients  - Consider moving patient to room near nurses station  Outcome: Progressing  Goal: Maintain or return to baseline ADL function  Description: INTERVENTIONS:  -  Assess patient's ability to carry out ADLs; assess patient's baseline for ADL function and identify physical deficits which impact ability to perform ADLs (bathing, care of mouth/teeth, toileting, grooming, dressing, etc.)  - Assess/evaluate cause of self-care deficits   - Assess range of motion  - Assess patient's mobility; develop plan if impaired  - Assess patient's need for assistive devices and provide as appropriate  - Encourage maximum independence but intervene and supervise when necessary  - Involve family in performance of ADLs  - Assess for home care needs following discharge   - Consider OT consult to assist with ADL evaluation and planning for discharge  - Provide patient education as appropriate  Outcome: Progressing  Goal: Maintains/Returns to pre admission functional level  Description: INTERVENTIONS:  - Perform AM-PAC 6 Click Basic Mobility/ Daily Activity  assessment daily.  - Set and communicate daily mobility goal to care team and patient/family/caregiver.   - Collaborate with rehabilitation services on mobility goals if consulted  - Perform Range of Motion 2 times a day.  - Reposition patient every 2 hours.  - Dangle patient 2 times a day  - Stand patient 2 times a day  - Ambulate patient 2 times a day  - Out of bed to chair 2 times a day   - Out of bed for meals 2 times a day  - Out of bed for toileting  - Record patient progress and toleration of activity level   Outcome: Progressing     Problem: DISCHARGE PLANNING  Goal: Discharge to home or other facility with appropriate resources  Description: INTERVENTIONS:  - Identify barriers to discharge w/patient and caregiver  - Arrange for needed discharge resources and transportation as appropriate  - Identify discharge learning needs (meds, wound care, etc.)  - Arrange for interpretive services to assist at discharge as needed  - Refer to Case Management Department for coordinating discharge planning if the patient needs post-hospital services based on physician/advanced practitioner order or complex needs related to functional status, cognitive ability, or social support system  Outcome: Progressing     Problem: Knowledge Deficit  Goal: Patient/family/caregiver demonstrates understanding of disease process, treatment plan, medications, and discharge instructions  Description: Complete learning assessment and assess knowledge base.  Interventions:  - Provide teaching at level of understanding  - Provide teaching via preferred learning methods  Outcome: Progressing     Problem: Nutrition/Hydration-ADULT  Goal: Nutrient/Hydration intake appropriate for improving, restoring or maintaining nutritional needs  Description: Monitor and assess patient's nutrition/hydration status for malnutrition. Collaborate with interdisciplinary team and initiate plan and interventions as ordered.  Monitor patient's weight and dietary  intake as ordered or per policy. Utilize nutrition screening tool and intervene as necessary. Determine patient's food preferences and provide high-protein, high-caloric foods as appropriate.     INTERVENTIONS:  - Monitor oral intake, urinary output, labs, and treatment plans  - Assess nutrition and hydration status and recommend course of action  - Evaluate amount of meals eaten  - Assist patient with eating if necessary   - Allow adequate time for meals  - Recommend/ encourage appropriate diets, oral nutritional supplements, and vitamin/mineral supplements  - Order, calculate, and assess calorie counts as needed  - Recommend, monitor, and adjust tube feedings and TPN/PPN based on assessed needs  - Assess need for intravenous fluids  - Provide specific nutrition/hydration education as appropriate  - Include patient/family/caregiver in decisions related to nutrition  Outcome: Progressing     Problem: MOBILITY - ADULT  Goal: Maintain or return to baseline ADL function  Description: INTERVENTIONS:  -  Assess patient's ability to carry out ADLs; assess patient's baseline for ADL function and identify physical deficits which impact ability to perform ADLs (bathing, care of mouth/teeth, toileting, grooming, dressing, etc.)  - Assess/evaluate cause of self-care deficits   - Assess range of motion  - Assess patient's mobility; develop plan if impaired  - Assess patient's need for assistive devices and provide as appropriate  - Encourage maximum independence but intervene and supervise when necessary  - Involve family in performance of ADLs  - Assess for home care needs following discharge   - Consider OT consult to assist with ADL evaluation and planning for discharge  - Provide patient education as appropriate  Outcome: Progressing  Goal: Maintains/Returns to pre admission functional level  Description: INTERVENTIONS:  - Perform AM-PAC 6 Click Basic Mobility/ Daily Activity assessment daily.  - Set and communicate daily  mobility goal to care team and patient/family/caregiver.   - Collaborate with rehabilitation services on mobility goals if consulted  - Perform Range of Motion 2 times a day.  - Reposition patient every 2 hours.  - Dangle patient 2 times a day  - Stand patient 2 times a day  - Ambulate patient 2 times a day  - Out of bed to chair 2 times a day   - Out of bed for meals 2 times a day  - Out of bed for toileting  - Record patient progress and toleration of activity level   Outcome: Progressing     Problem: DISCHARGE PLANNING - CARE MANAGEMENT  Goal: Discharge to post-acute care or home with appropriate resources  Description: INTERVENTIONS:  - Conduct assessment to determine patient/family and health care team treatment goals, and need for post-acute services based on payer coverage, community resources, and patient preferences, and barriers to discharge  - Address psychosocial, clinical, and financial barriers to discharge as identified in assessment in conjunction with the patient/family and health care team  - Arrange appropriate level of post-acute services according to patient’s   needs and preference and payer coverage in collaboration with the physician and health care team  - Communicate with and update the patient/family, physician, and health care team regarding progress on the discharge plan  - Arrange appropriate transportation to post-acute venues  Outcome: Progressing     Problem: Potential for Falls  Goal: Patient will remain free of falls  Description: INTERVENTIONS:  - Educate patient/family on patient safety including physical limitations  - Instruct patient to call for assistance with activity   - Consult OT/PT to assist with strengthening/mobility   - Keep Call bell within reach  - Keep bed low and locked with side rails adjusted as appropriate  - Keep care items and personal belongings within reach  - Initiate and maintain comfort rounds  - Make Fall Risk Sign visible to staff  - Offer Toileting  every 2 Hours, in advance of need  - Initiate/Maintain alarm  - Obtain necessary fall risk management equipment  - Apply yellow socks and bracelet for high fall risk patients  - Consider moving patient to room near nurses station  Outcome: Progressing

## 2024-11-01 NOTE — ASSESSMENT & PLAN NOTE
"/65   Pulse (!) 50   Temp 98.1 °F (36.7 °C) (Oral)   Resp 18   Ht 5' 7\" (1.702 m)   Wt 85.3 kg (188 lb) Comment: per patient  SpO2 95%   BMI 29.44 kg/m²     Continue with Toprol-XL 25 mg twice daily with holding parameters  Hold lisinopril 5 mg daily  Hold Lasix 40 mg daily  Vitals as per routine  "

## 2024-11-01 NOTE — CASE MANAGEMENT
Case Management Discharge Planning Note    Patient name Toro Rodriguez  Location /-01 MRN [id number]  : 1950 Date 2024       Current Admission Date: 10/31/2024  Current Admission Diagnosis:CHUY (acute kidney injury) ([facility])   Patient Active Problem List    Diagnosis Date Noted Date Diagnosed    Gross hematuria 10/31/2024     Renal lesion 10/16/2024     NSVT (nonsustained ventricular tachycardia) ([facility]) 2024     Encounter to discuss test results 2024     Urethral erosion by catheter, initial encounter  ([facility]) 2024     Anemia 2024     Chronic venous hypertension (idiopathic) with ulcer of left lower extremity (CODE) ([facility]) 2024     Chronic kidney disease, stage 3a ([facility]) 2024     Encounter for geriatric assessment 2024     Melanoma of back ([facility]) 2024     Dilated cardiomyopathy ([facility]) 12/15/2023     CHUY (acute kidney injury) ([facility]) 2023     Obesity, morbid ([facility]) 2023     Type 2 diabetes mellitus with chronic kidney disease, without long-term current use of insulin, unspecified CKD stage ([facility]) 11/15/2023     Essential hypertension 11/10/2023     Generalized weakness 11/10/2023     Chronic systolic (congestive) heart failure ([facility]) 11/10/2023     Deep vein thrombosis (DVT) of left lower extremity ([facility]) 11/10/2023     Coronary artery disease involving native coronary artery of native heart 11/10/2023     Benign prostatic hyperplasia with urinary retention        LOS (days): 0  Geometric Mean LOS (GMLOS) (days): 2.3  Days to GMLOS:2.2     OBJECTIVE:  Risk of Unplanned Readmission Score: 40.64         Current admission status: Inpatient   Preferred Pharmacy:   [facility]- [place], [place] - [facility] [address] [address]  Phone: [phone] Fax: [phone]    Primary Care Provider: CARLIE Foster    Primary Insurance: MEDICARE  Secondary Insurance: BLUE  CROSS    DISCHARGE DETAILS:     Pt will be ready to return to [facility] tomorrow per MD. Pt will need transport. CM requested [facility] transport via [employer]. Awaiting confirmed  time.

## 2024-11-01 NOTE — ASSESSMENT & PLAN NOTE
In the setting of suprapubic catheter  Patient without any suprapubic pain or urinary complaints  Likely colonization  Monitor off antibiotics

## 2024-11-01 NOTE — ASSESSMENT & PLAN NOTE
Patient with recurrent hematuria with suprapubic catheter.  Was previously on Eliquis, discontinued on recent admission.  Upon manual irrigation, hematuria resolved  Hemoglobin stable    CT scan-Bladder partially obscured by dense streak artifact from the patient's right hip arthroplasty, and the bladder is collapsed, but there is possible asymmetric soft tissue within the right aspect of the bladder.Tiny nonobstructing bilateral renal calculi. No ureteric calculi identified.Stable mild to moderate right hydronephrosis and proximal hydroureter without definite obstructing stone or mass identified on this noncontrast study.  Continue with serial bladder scans  Urology following, appreciate recommendations    Recent Labs     10/31/24  0852 11/01/24  0437   HGB 12.0 10.1*

## 2024-11-01 NOTE — ASSESSMENT & PLAN NOTE
Recent Labs     10/31/24  0852 10/31/24  1425 11/01/24  0437   CREATININE 2.03* 1.92* 1.74*   EGFR 30 33 37     Estimated Creatinine Clearance: 37.7 mL/min (A) (by C-G formula based on SCr of 1.74 mg/dL (H)).     Baseline 1.4-1.6  Creatinine on admission 2.03  Etiology likely prerenal with continued use of offending agents including Lasix/lisinopril  Continue to hold Lasix/lisinopril  Avoid nephrotoxic agents  Continue with IV fluids  Monitor BUNs/creatinine

## 2024-11-01 NOTE — PROGRESS NOTES
"Progress Note - Hospitalist   Name: Toro Rodriguez 74 y.o. male I MRN: [id number]  Unit/Bed#: -01 I Date of Admission: 10/31/2024   Date of Service: 11/1/2024 I Hospital Day: 0    Assessment & Plan  CHUY (acute kidney injury) (HCC)  Recent Labs     10/31/24  0852 10/31/24  1425 11/01/24  0437   CREATININE 2.03* 1.92* 1.74*   EGFR 30 33 37     Estimated Creatinine Clearance: 37.7 mL/min (A) (by C-G formula based on SCr of 1.74 mg/dL (H)).     Baseline 1.4-1.6  Creatinine on admission 2.03  Etiology likely prerenal with continued use of offending agents including Lasix/lisinopril  Continue to hold Lasix/lisinopril  Avoid nephrotoxic agents  Continue with IV fluids  Monitor BUNs/creatinine  Essential hypertension  /65   Pulse (!) 50   Temp 98.1 °F (36.7 °C) (Oral)   Resp 18   Ht 5' 7\" (1.702 m)   Wt 85.3 kg (188 lb) Comment: per patient  SpO2 95%   BMI 29.44 kg/m²     Continue with Toprol-XL 25 mg twice daily with holding parameters  Hold lisinopril 5 mg daily  Hold Lasix 40 mg daily  Vitals as per routine  Chronic systolic (congestive) heart failure (HCC)  Wt Readings from Last 3 Encounters:   10/31/24 85.3 kg (188 lb)   10/18/24 89.1 kg (196 lb 6.9 oz)   09/28/24 87.3 kg (192 lb 6.4 oz)     Appears dry  Continue to hold Lasix 40 mg daily  Monitor volume status closely  Most recent echo in February/2024 with EF of 35-40%        Coronary artery disease involving native coronary artery of native heart  Continue with Toprol-XL and statin  Outpatient cardiology follow-up  Currently denies any chest pain  Type 2 diabetes mellitus with chronic kidney disease, without long-term current use of insulin, unspecified CKD stage (HCC)  Lab Results   Component Value Date    HGBA1C 5.9 (H) 04/18/2024       No results for input(s): \"POCGLU\" in the last 72 hours.    Blood Sugar Average: Last 72 hrs:    Well-controlled  Not on any medication  Check glucose on daily BMPs  Benign prostatic hyperplasia with urinary " retention  With suprapubic catheter in place  Continue with Flomax and finasteride  Renal lesion  As seen on the CT imaging during last admission  Repeat CT imaging 10/31- Mildly hyperdense right renal lesion,, increased in density when compared to the prior study from 10/15/2024 and which likely represents interval hemorrhage within a hemorrhagic renal cyst, but recommend continued follow-up with CT abdomen pelvis   without and with contrast using a renal protocol in 6 months time to document stability.  Gross hematuria  Patient with recurrent hematuria with suprapubic catheter.  Was previously on Eliquis, discontinued on recent admission.  Upon manual irrigation, hematuria resolved  Hemoglobin stable    CT scan-Bladder partially obscured by dense streak artifact from the patient's right hip arthroplasty, and the bladder is collapsed, but there is possible asymmetric soft tissue within the right aspect of the bladder.Tiny nonobstructing bilateral renal calculi. No ureteric calculi identified.Stable mild to moderate right hydronephrosis and proximal hydroureter without definite obstructing stone or mass identified on this noncontrast study.  Continue with serial bladder scans  Urology following, appreciate recommendations    Recent Labs     10/31/24  0852 11/01/24  0437   HGB 12.0 10.1*      Abnormal urinalysis  In the setting of suprapubic catheter  Patient without any suprapubic pain or urinary complaints  Likely colonization  Monitor off antibiotics    VTE Pharmacologic Prophylaxis:   Moderate Risk (Score 3-4) - Pharmacological DVT Prophylaxis Ordered: heparin.    Mobility:   Basic Mobility Inpatient Raw Score: 14  JH-HLM Goal: 4: Move to chair/commode  JH-HLM Achieved: 5: Stand (1 or more minutes)  JH-HLM Goal achieved. Continue to encourage appropriate mobility.    Patient Centered Rounds: I performed bedside rounds with nursing staff today.   Discussions with Specialists or Other Care Team Provider:  yes    Education and Discussions with Family / Patient:  yes.     Current Length of Stay: 0 day(s)  Current Patient Status: Inpatient   Certification Statement: The patient will continue to require additional inpatient hospital stay due to pending eval  Discharge Plan: Anticipate discharge tomorrow to prior assisted or independent living facility.    Code Status: Level 1 - Full Code    Subjective   seen and examined at bedside awake and alert  No complaints      Objective :  Temp:  [97 °F (36.1 °C)-98.1 °F (36.7 °C)] 98.1 °F (36.7 °C)  HR:  [50-65] 50  BP: (107-150)/(48-65) 150/65  Resp:  [18] 18  SpO2:  [88 %-95 %] 95 %  O2 Device: None (Room air)    Body mass index is 29.44 kg/m².     Input and Output Summary (last 24 hours):     Intake/Output Summary (Last 24 hours) at 11/1/2024 1408  Last data filed at 11/1/2024 1243  Gross per 24 hour   Intake 1060 ml   Output 1825 ml   Net -765 ml       Physical Exam  Vitals reviewed.   Constitutional:       Appearance: Normal appearance.   HENT:      Head: Atraumatic.      Mouth/Throat:      Mouth: Mucous membranes are dry.   Cardiovascular:      Rate and Rhythm: Normal rate and regular rhythm.      Heart sounds: Normal heart sounds.   Pulmonary:      Effort: No respiratory distress.   Abdominal:      General: Bowel sounds are normal.      Tenderness: There is no abdominal tenderness.   Musculoskeletal:      Right lower leg: No edema.      Left lower leg: No edema.   Skin:     General: Skin is dry.      Capillary Refill: Capillary refill takes less than 2 seconds.   Neurological:      Mental Status: He is alert. Mental status is at baseline.   Psychiatric:         Mood and Affect: Mood normal.           Lines/Drains:  Lines/Drains/Airways       Active Status       Name Placement date Placement time Site Days    Suprapubic Catheter 18 Fr. 09/27/24  1017  --  35                            Lab Results: I have reviewed the following results:   Results from last 7 days   Lab Units  11/01/24  0437 10/31/24  0852   WBC Thousand/uL 5.52 6.50   HEMOGLOBIN g/dL 10.1* 12.0   HEMATOCRIT % 31.8* 36.6   PLATELETS Thousands/uL 253 291   SEGS PCT %  --  62   LYMPHO PCT %  --  19   MONO PCT %  --  10   EOS PCT %  --  7*     Results from last 7 days   Lab Units 11/01/24  0437 10/31/24  1425 10/31/24  0852   SODIUM mmol/L 137   < > 135   POTASSIUM mmol/L 4.4   < > 5.1   CHLORIDE mmol/L 109*   < > 103   CO2 mmol/L 23   < > 25   BUN mg/dL 38*   < > 42*   CREATININE mg/dL 1.74*   < > 2.03*   ANION GAP mmol/L 5   < > 7   CALCIUM mg/dL 7.7*   < > 8.6   ALBUMIN g/dL  --   --  3.7   TOTAL BILIRUBIN mg/dL  --   --  0.65   ALK PHOS U/L  --   --  69   ALT U/L  --   --  10   AST U/L  --   --  22   GLUCOSE RANDOM mg/dL 89   < > 83    < > = values in this interval not displayed.     Results from last 7 days   Lab Units 10/31/24  0852   INR  1.04                   Recent Cultures (last 7 days):   Results from last 7 days   Lab Units 10/31/24  0855   URINE CULTURE  >100,000 cfu/ml Achromobacter xylosoxidans*       Imaging Results Review: No pertinent imaging studies reviewed.  Other Study Results Review: No additional pertinent studies reviewed.    Last 24 Hours Medication List:     Current Facility-Administered Medications:     acetaminophen (TYLENOL) tablet 650 mg, Q6H PRN    amiodarone tablet 200 mg, Daily    aspirin chewable tablet 81 mg, Daily    atorvastatin (LIPITOR) tablet 40 mg, Daily With Dinner    docusate sodium (COLACE) capsule 100 mg, BID    finasteride (PROSCAR) tablet 5 mg, Daily    metoprolol succinate (TOPROL-XL) 24 hr tablet 25 mg, Q12H ARI    nystatin (MYCOSTATIN) powder, BID    sodium chloride 0.9 % infusion, Continuous, Last Rate: 75 mL/hr (11/01/24 0927)    tamsulosin (FLOMAX) capsule 0.4 mg, HS    Administrative Statements   Today, Patient Was Seen By: Elif Santa MD  I have spent a total time of 36 minutes in caring for this patient on the day of the visit/encounter including Patient and family  education, Importance of tx compliance, Impressions, Counseling / Coordination of care, Documenting in the medical record, Reviewing / ordering tests, medicine, procedures  , Obtaining or reviewing history  , and Communicating with other healthcare professionals .    **Please Note: This note may have been constructed using a voice recognition system.**

## 2024-11-01 NOTE — CASE MANAGEMENT
Case Management Discharge Planning Note    Patient name Toro Rodriguez  Location /-01 MRN [id number]  : 1950 Date 2024       Current Admission Date: 10/31/2024  Current Admission Diagnosis:CHUY (acute kidney injury) ([facility])   Patient Active Problem List    Diagnosis Date Noted Date Diagnosed    Abnormal urinalysis 2024     Gross hematuria 10/31/2024     Renal lesion 10/16/2024     NSVT (nonsustained ventricular tachycardia) ([facility]) 2024     Encounter to discuss test results 2024     Urethral erosion by catheter, initial encounter  ([facility]) 2024     Anemia 2024     Chronic venous hypertension (idiopathic) with ulcer of left lower extremity (CODE) ([facility]) 2024     Chronic kidney disease, stage 3a ([facility]) 2024     Encounter for geriatric assessment 2024     Melanoma of back ([facility]) 2024     Dilated cardiomyopathy ([facility]) 12/15/2023     CHUY (acute kidney injury) ([facility]) 2023     Obesity, morbid ([facility]) 2023     Type 2 diabetes mellitus with chronic kidney disease, without long-term current use of insulin, unspecified CKD stage ([facility]) 11/15/2023     Essential hypertension 11/10/2023     Generalized weakness 11/10/2023     Chronic systolic (congestive) heart failure ([facility]) 11/10/2023     Deep vein thrombosis (DVT) of left lower extremity ([facility]) 11/10/2023     Coronary artery disease involving native coronary artery of native heart 11/10/2023     Benign prostatic hyperplasia with urinary retention        LOS (days): 0  Geometric Mean LOS (GMLOS) (days): 2.3  Days to GMLOS:2.1     OBJECTIVE:  Risk of Unplanned Readmission Score: 40.64         Current admission status: Inpatient   [facility]   [facility] [address] [address]  Phone: [phone] Fax: [phone]    Primary Care Provider: CARLIE Foster    Primary Insurance:  MEDICARE  Secondary Insurance: BLUE CROSS    DISCHARGE DETAILS:     IMM reviewed with patient, patient agrees with discharge determination.       IMM Given (Date):: 11/01/24 (340P)  IMM Given to:: Patient

## 2024-11-01 NOTE — PLAN OF CARE
Problem: Prexisting or High Potential for Compromised Skin Integrity  Goal: Skin integrity is maintained or improved  Description: INTERVENTIONS:  - Identify patients at risk for skin breakdown  - Assess and monitor skin integrity  - Assess and monitor nutrition and hydration status  - Monitor labs   - Assess for incontinence   - Turn and reposition patient  - Assist with mobility/ambulation  - Relieve pressure over bony prominences  - Avoid friction and shearing  - Provide appropriate hygiene as needed including keeping skin clean and dry  - Evaluate need for skin moisturizer/barrier cream  - Collaborate with interdisciplinary team   - Patient/family teaching  - Consider wound care consult   Outcome: Progressing     Problem: PAIN - ADULT  Goal: Verbalizes/displays adequate comfort level or baseline comfort level  Description: Interventions:  - Encourage patient to monitor pain and request assistance  - Assess pain using appropriate pain scale  - Administer analgesics based on type and severity of pain and evaluate response  - Implement non-pharmacological measures as appropriate and evaluate response  - Consider cultural and social influences on pain and pain management  - Notify physician/advanced practitioner if interventions unsuccessful or patient reports new pain  Outcome: Progressing     Problem: INFECTION - ADULT  Goal: Absence or prevention of progression during hospitalization  Description: INTERVENTIONS:  - Assess and monitor for signs and symptoms of infection  - Monitor lab/diagnostic results  - Monitor all insertion sites, i.e. indwelling lines, tubes, and drains  - Monitor endotracheal if appropriate and nasal secretions for changes in amount and color  - [place] appropriate cooling/warming therapies per order  - Administer medications as ordered  - Instruct and encourage patient and family to use good hand hygiene technique  - Identify and instruct in appropriate isolation precautions for  identified infection/condition  Outcome: Progressing  Goal: Absence of fever/infection during neutropenic period  Description: INTERVENTIONS:  - Monitor WBC    Outcome: Progressing     Problem: SAFETY ADULT  Goal: Patient will remain free of falls  Description: INTERVENTIONS:  - Educate patient/family on patient safety including physical limitations  - Instruct patient to call for assistance with activity   - Consult OT/PT to assist with strengthening/mobility   - Keep Call bell within reach  - Keep bed low and locked with side rails adjusted as appropriate  - Keep care items and personal belongings within reach  - Initiate and maintain comfort rounds  - Make Fall Risk Sign visible to staff  - Offer Toileting every 2 Hours, in advance of need  - Initiate/Maintain alarm  - Obtain necessary fall risk management equipment  - Apply yellow socks and bracelet for high fall risk patients  - Consider moving patient to room near nurses station  Outcome: Progressing  Goal: Maintain or return to baseline ADL function  Description: INTERVENTIONS:  -  Assess patient's ability to carry out ADLs; assess patient's baseline for ADL function and identify physical deficits which impact ability to perform ADLs (bathing, care of mouth/teeth, toileting, grooming, dressing, etc.)  - Assess/evaluate cause of self-care deficits   - Assess range of motion  - Assess patient's mobility; develop plan if impaired  - Assess patient's need for assistive devices and provide as appropriate  - Encourage maximum independence but intervene and supervise when necessary  - Involve family in performance of ADLs  - Assess for home care needs following discharge   - Consider OT consult to assist with ADL evaluation and planning for discharge  - Provide patient education as appropriate  Outcome: Progressing  Goal: Maintains/Returns to pre admission functional level  Description: INTERVENTIONS:  - Perform AM-PAC 6 Click Basic Mobility/ Daily Activity  assessment daily.  - Set and communicate daily mobility goal to care team and patient/family/caregiver.   - Collaborate with rehabilitation services on mobility goals if consulted  - Perform Range of Motion 2 times a day.  - Reposition patient every 2 hours.  - Dangle patient 2 times a day  - Stand patient 2 times a day  - Ambulate patient 2 times a day  - Out of bed to chair 2 times a day   - Out of bed for meals 2 times a day  - Out of bed for toileting  - Record patient progress and toleration of activity level   Outcome: Progressing     Problem: DISCHARGE PLANNING  Goal: Discharge to home or other facility with appropriate resources  Description: INTERVENTIONS:  - Identify barriers to discharge w/patient and caregiver  - Arrange for needed discharge resources and transportation as appropriate  - Identify discharge learning needs (meds, wound care, etc.)  - Arrange for interpretive services to assist at discharge as needed  - Refer to Case Management Department for coordinating discharge planning if the patient needs post-hospital services based on physician/advanced practitioner order or complex needs related to functional status, cognitive ability, or social support system  Outcome: Progressing     Problem: Knowledge Deficit  Goal: Patient/family/caregiver demonstrates understanding of disease process, treatment plan, medications, and discharge instructions  Description: Complete learning assessment and assess knowledge base.  Interventions:  - Provide teaching at level of understanding  - Provide teaching via preferred learning methods  Outcome: Progressing     Problem: Nutrition/Hydration-ADULT  Goal: Nutrient/Hydration intake appropriate for improving, restoring or maintaining nutritional needs  Description: Monitor and assess patient's nutrition/hydration status for malnutrition. Collaborate with interdisciplinary team and initiate plan and interventions as ordered.  Monitor patient's weight and dietary  intake as ordered or per policy. Utilize nutrition screening tool and intervene as necessary. Determine patient's food preferences and provide high-protein, high-caloric foods as appropriate.     INTERVENTIONS:  - Monitor oral intake, urinary output, labs, and treatment plans  - Assess nutrition and hydration status and recommend course of action  - Evaluate amount of meals eaten  - Assist patient with eating if necessary   - Allow adequate time for meals  - Recommend/ encourage appropriate diets, oral nutritional supplements, and vitamin/mineral supplements  - Order, calculate, and assess calorie counts as needed  - Recommend, monitor, and adjust tube feedings and TPN/PPN based on assessed needs  - Assess need for intravenous fluids  - Provide specific nutrition/hydration education as appropriate  - Include patient/family/caregiver in decisions related to nutrition  Outcome: Progressing     Problem: MOBILITY - ADULT  Goal: Maintain or return to baseline ADL function  Description: INTERVENTIONS:  -  Assess patient's ability to carry out ADLs; assess patient's baseline for ADL function and identify physical deficits which impact ability to perform ADLs (bathing, care of mouth/teeth, toileting, grooming, dressing, etc.)  - Assess/evaluate cause of self-care deficits   - Assess range of motion  - Assess patient's mobility; develop plan if impaired  - Assess patient's need for assistive devices and provide as appropriate  - Encourage maximum independence but intervene and supervise when necessary  - Involve family in performance of ADLs  - Assess for home care needs following discharge   - Consider OT consult to assist with ADL evaluation and planning for discharge  - Provide patient education as appropriate  Outcome: Progressing  Goal: Maintains/Returns to pre admission functional level  Description: INTERVENTIONS:  - Perform AM-PAC 6 Click Basic Mobility/ Daily Activity assessment daily.  - Set and communicate daily  mobility goal to care team and patient/family/caregiver.   - Collaborate with rehabilitation services on mobility goals if consulted  - Perform Range of Motion 2 times a day.  - Reposition patient every 2 hours.  - Dangle patient 2 times a day  - Stand patient 2 times a day  - Ambulate patient 2 times a day  - Out of bed to chair 2 times a day   - Out of bed for meals 2 times a day  - Out of bed for toileting  - Record patient progress and toleration of activity level   Outcome: Progressing     Problem: DISCHARGE PLANNING - CARE MANAGEMENT  Goal: Discharge to post-acute care or home with appropriate resources  Description: INTERVENTIONS:  - Conduct assessment to determine patient/family and health care team treatment goals, and need for post-acute services based on payer coverage, community resources, and patient preferences, and barriers to discharge  - Address psychosocial, clinical, and financial barriers to discharge as identified in assessment in conjunction with the patient/family and health care team  - Arrange appropriate level of post-acute services according to patient’s   needs and preference and payer coverage in collaboration with the physician and health care team  - Communicate with and update the patient/family, physician, and health care team regarding progress on the discharge plan  - Arrange appropriate transportation to post-acute venues  Outcome: Progressing

## 2024-11-01 NOTE — CASE MANAGEMENT
Case Management Discharge Planning Note    Patient name Toro Rodriguez  Location /-01 MRN [id number]  : 1950 Date 2024       Current Admission Date: 10/31/2024  Current Admission Diagnosis:CHUY (acute kidney injury) ([facility])   Patient Active Problem List    Diagnosis Date Noted Date Diagnosed    Gross hematuria 10/31/2024     Renal lesion 10/16/2024     NSVT (nonsustained ventricular tachycardia) ([facility]) 2024     Encounter to discuss test results 2024     Urethral erosion by catheter, initial encounter  ([facility]) 2024     Anemia 2024     Chronic venous hypertension (idiopathic) with ulcer of left lower extremity (CODE) ([facility]) 2024     Chronic kidney disease, stage 3a ([facility]) 2024     Encounter for geriatric assessment 2024     Melanoma of back ([facility]) 2024     Dilated cardiomyopathy ([facility]) 12/15/2023     CHUY (acute kidney injury) ([facility]) 2023     Obesity, morbid ([facility]) 2023     Type 2 diabetes mellitus with chronic kidney disease, without long-term current use of insulin, unspecified CKD stage ([facility]) 11/15/2023     Essential hypertension 11/10/2023     Generalized weakness 11/10/2023     Chronic systolic (congestive) heart failure ([facility]) 11/10/2023     Deep vein thrombosis (DVT) of left lower extremity ([facility]) 11/10/2023     Coronary artery disease involving native coronary artery of native heart 11/10/2023     Benign prostatic hyperplasia with urinary retention        LOS (days): 0  Geometric Mean LOS (GMLOS) (days): 2.3  Days to GMLOS:2.2     OBJECTIVE:  Risk of Unplanned Readmission Score: 40.64         Current admission status: Inpatient   Preferred Pharmacy:   [facility] [place] [place] [facility] [address] [address]  Phone: [phone] Fax: [phone]    Primary Care Provider: CARLIE Foster    Primary Insurance: MEDICARE  Secondary Insurance: BLUE  CROSS    DISCHARGE DETAILS:     CM received 12p  time for pt to return to [facility] via [facility]. CM notified MD and RN via secure chat. Pt notified in person.        Transport at Discharge : Wheelchair van     Number/Name of Dispatcher: [phone]  Transported by (Company and Unit #): [employer]  ETA of Transport (Date): 11/02/24  ETA of Transport (Time): 1200

## 2024-11-01 NOTE — TELEPHONE ENCOUNTER
Patient scheduled at [facility] 11/7 @ 11:40 as he has canceled 2 visits in [place]. Patient inpatient possible can be change in hospital

## 2024-11-02 VITALS
OXYGEN SATURATION: 95 % | WEIGHT: 188 LBS | DIASTOLIC BLOOD PRESSURE: 72 MMHG | HEIGHT: 67 IN | BODY MASS INDEX: 29.51 KG/M2 | HEART RATE: 56 BPM | TEMPERATURE: 97.2 F | SYSTOLIC BLOOD PRESSURE: 165 MMHG | RESPIRATION RATE: 20 BRPM

## 2024-11-02 LAB
ANION GAP SERPL CALCULATED.3IONS-SCNC: 6 MMOL/L (ref 4–13)
BACTERIA UR CULT: ABNORMAL
BASOPHILS # BLD AUTO: 0.04 THOUSANDS/ΜL (ref 0–0.1)
BASOPHILS NFR BLD AUTO: 1 % (ref 0–1)
BUN SERPL-MCNC: 29 MG/DL (ref 5–25)
CALCIUM SERPL-MCNC: 8.1 MG/DL (ref 8.4–10.2)
CHLORIDE SERPL-SCNC: 109 MMOL/L (ref 96–108)
CO2 SERPL-SCNC: 22 MMOL/L (ref 21–32)
CREAT SERPL-MCNC: 1.49 MG/DL (ref 0.6–1.3)
EOSINOPHIL # BLD AUTO: 0.35 THOUSAND/ΜL (ref 0–0.61)
EOSINOPHIL NFR BLD AUTO: 6 % (ref 0–6)
ERYTHROCYTE [DISTWIDTH] IN BLOOD BY AUTOMATED COUNT: 13.6 % (ref 11.6–15.1)
GFR SERPL CREATININE-BSD FRML MDRD: 44 ML/MIN/1.73SQ M
GLUCOSE SERPL-MCNC: 115 MG/DL (ref 65–140)
HCT VFR BLD AUTO: 32.7 % (ref 36.5–49.3)
HGB BLD-MCNC: 10.7 G/DL (ref 12–17)
IMM GRANULOCYTES # BLD AUTO: 0.03 THOUSAND/UL (ref 0–0.2)
IMM GRANULOCYTES NFR BLD AUTO: 1 % (ref 0–2)
LYMPHOCYTES # BLD AUTO: 1.18 THOUSANDS/ΜL (ref 0.6–4.47)
LYMPHOCYTES NFR BLD AUTO: 19 % (ref 14–44)
MCH RBC QN AUTO: 32 PG (ref 26.8–34.3)
MCHC RBC AUTO-ENTMCNC: 32.7 G/DL (ref 31.4–37.4)
MCV RBC AUTO: 98 FL (ref 82–98)
MONOCYTES # BLD AUTO: 0.66 THOUSAND/ΜL (ref 0.17–1.22)
MONOCYTES NFR BLD AUTO: 11 % (ref 4–12)
NEUTROPHILS # BLD AUTO: 3.81 THOUSANDS/ΜL (ref 1.85–7.62)
NEUTS SEG NFR BLD AUTO: 62 % (ref 43–75)
NRBC BLD AUTO-RTO: 0 /100 WBCS
PLATELET # BLD AUTO: 256 THOUSANDS/UL (ref 149–390)
PMV BLD AUTO: 9.6 FL (ref 8.9–12.7)
POTASSIUM SERPL-SCNC: 4.4 MMOL/L (ref 3.5–5.3)
RBC # BLD AUTO: 3.34 MILLION/UL (ref 3.88–5.62)
SODIUM SERPL-SCNC: 137 MMOL/L (ref 135–147)
WBC # BLD AUTO: 6.07 THOUSAND/UL (ref 4.31–10.16)

## 2024-11-02 PROCEDURE — 85025 COMPLETE CBC W/AUTO DIFF WBC: CPT | Performed by: INTERNAL MEDICINE

## 2024-11-02 PROCEDURE — 99239 HOSP IP/OBS DSCHRG MGMT >30: CPT | Performed by: INTERNAL MEDICINE

## 2024-11-02 PROCEDURE — 80048 BASIC METABOLIC PNL TOTAL CA: CPT | Performed by: INTERNAL MEDICINE

## 2024-11-02 RX ORDER — FUROSEMIDE 40 MG/1
40 TABLET ORAL DAILY
Start: 2024-11-04

## 2024-11-02 RX ORDER — LISINOPRIL 5 MG/1
5 TABLET ORAL DAILY
Start: 2024-11-04

## 2024-11-02 RX ADMIN — METOPROLOL SUCCINATE 25 MG: 25 TABLET, EXTENDED RELEASE ORAL at 08:13

## 2024-11-02 RX ADMIN — ASPIRIN 81 MG CHEWABLE TABLET 81 MG: 81 TABLET CHEWABLE at 08:13

## 2024-11-02 RX ADMIN — NYSTATIN: 100000 POWDER TOPICAL at 08:14

## 2024-11-02 RX ADMIN — DOCUSATE SODIUM 100 MG: 100 CAPSULE, LIQUID FILLED ORAL at 08:13

## 2024-11-02 RX ADMIN — FINASTERIDE 5 MG: 5 TABLET, FILM COATED ORAL at 08:13

## 2024-11-02 RX ADMIN — AMIODARONE HYDROCHLORIDE 200 MG: 200 TABLET ORAL at 08:13

## 2024-11-02 NOTE — DISCHARGE INSTR - AVS FIRST PAGE
Please see your PCP/family physician within 7-10 days of discharge to discuss recent hospitalization  Please follow-up with urology  Please follow-up with nephrology  Please repeat blood work, CBC and BMP within a week  Encourage oral hydration  Start taking Lasix/lisinopril from Monday 11/4

## 2024-11-02 NOTE — ASSESSMENT & PLAN NOTE
Patient with recurrent hematuria with suprapubic catheter.  Was previously on Eliquis, discontinued on recent admission.  Upon manual irrigation, hematuria resolved  Hemoglobin stable  Aspirin resumed    CT scan-Bladder partially obscured by dense streak artifact from the patient's right hip arthroplasty, and the bladder is collapsed, but there is possible asymmetric soft tissue within the right aspect of the bladder.Tiny nonobstructing bilateral renal calculi. No ureteric calculi identified.Stable mild to moderate right hydronephrosis and proximal hydroureter without definite obstructing stone or mass identified on this noncontrast study.    Outpatient urology follow-up    Recent Labs     10/31/24  0852 11/01/24  0437 11/02/24  0226   HGB 12.0 10.1* 10.7*

## 2024-11-02 NOTE — ASSESSMENT & PLAN NOTE
"Lab Results   Component Value Date    HGBA1C 5.9 (H) 04/18/2024       No results for input(s): \"POCGLU\" in the last 72 hours.    Blood Sugar Average: Last 72 hrs:    Well-controlled  Not on any medication    "

## 2024-11-02 NOTE — ASSESSMENT & PLAN NOTE
"/72   Pulse 56   Temp (!) 97.2 °F (36.2 °C)   Resp 20   Ht 5' 7\" (1.702 m)   Wt 85.3 kg (188 lb) Comment: per patient  SpO2 95%   BMI 29.44 kg/m²     Continue with Toprol-XL 25 mg twice daily with holding parameters  Lasix and lisinopril to be resumed in 48 hours    "

## 2024-11-02 NOTE — ASSESSMENT & PLAN NOTE
Recent Labs     10/31/24  1425 11/01/24  0437 11/02/24  0226   CREATININE 1.92* 1.74* 1.49*   EGFR 33 37 44     Estimated Creatinine Clearance: 44 mL/min (A) (by C-G formula based on SCr of 1.49 mg/dL (H)).     Baseline 1.4-1.6  Creatinine on admission 2.03  Etiology likely prerenal with continued use of offending agents including Lasix/lisinopril  Continue to hold Lasix/lisinopril  Avoid nephrotoxic agents  CHUY has now resolved  Patient is suggested to start Lasix/lisinopril in 48 hours  Repeat BMP within 1 week  Outpatient nephrology follow-up

## 2024-11-02 NOTE — DISCHARGE SUMMARY
"Discharge Summary - Hospitalist   Name: Toro Rodriguez 74 y.o. male I MRN: [id number]  Unit/Bed#: -01 I Date of Admission: 10/31/2024   Date of Service: 11/2/2024 I Hospital Day: 1     Assessment & Plan  CHUY (acute kidney injury) (HCC)  Recent Labs     10/31/24  1425 11/01/24  0437 11/02/24  0226   CREATININE 1.92* 1.74* 1.49*   EGFR 33 37 44     Estimated Creatinine Clearance: 44 mL/min (A) (by C-G formula based on SCr of 1.49 mg/dL (H)).     Baseline 1.4-1.6  Creatinine on admission 2.03  Etiology likely prerenal with continued use of offending agents including Lasix/lisinopril  Continue to hold Lasix/lisinopril  Avoid nephrotoxic agents  CHUY has now resolved  Patient is suggested to start Lasix/lisinopril in 48 hours  Repeat BMP within 1 week  Outpatient nephrology follow-up  Essential hypertension  /72   Pulse 56   Temp (!) 97.2 °F (36.2 °C)   Resp 20   Ht 5' 7\" (1.702 m)   Wt 85.3 kg (188 lb) Comment: per patient  SpO2 95%   BMI 29.44 kg/m²     Continue with Toprol-XL 25 mg twice daily with holding parameters  Lasix and lisinopril to be resumed in 48 hours    Chronic systolic (congestive) heart failure (HCC)  Wt Readings from Last 3 Encounters:   10/31/24 85.3 kg (188 lb)   10/18/24 89.1 kg (196 lb 6.9 oz)   09/28/24 87.3 kg (192 lb 6.4 oz)     Currently euvolemic  Home regimen Lasix 40 mg daily, to be restarted in 48 hours  Monitor volume status closely  Most recent echo in February/2024 with EF of 35-40%        Coronary artery disease involving native coronary artery of native heart  Continue with Toprol-XL and statin  Outpatient cardiology follow-up  Currently denies any chest pain  Type 2 diabetes mellitus with chronic kidney disease, without long-term current use of insulin, unspecified CKD stage (HCC)  Lab Results   Component Value Date    HGBA1C 5.9 (H) 04/18/2024       No results for input(s): \"POCGLU\" in the last 72 hours.    Blood Sugar Average: Last 72 hrs:    Well-controlled  Not " on any medication    Benign prostatic hyperplasia with urinary retention  With suprapubic catheter in place  Continue with Flomax and finasteride  Renal lesion  As seen on the CT imaging during last admission  Repeat CT imaging 10/31- Mildly hyperdense right renal lesion,, increased in density when compared to the prior study from 10/15/2024 and which likely represents interval hemorrhage within a hemorrhagic renal cyst, but recommend continued follow-up with CT abdomen pelvis   without and with contrast using a renal protocol in 6 months time to document stability.  Gross hematuria  Patient with recurrent hematuria with suprapubic catheter.  Was previously on Eliquis, discontinued on recent admission.  Upon manual irrigation, hematuria resolved  Hemoglobin stable  Aspirin resumed    CT scan-Bladder partially obscured by dense streak artifact from the patient's right hip arthroplasty, and the bladder is collapsed, but there is possible asymmetric soft tissue within the right aspect of the bladder.Tiny nonobstructing bilateral renal calculi. No ureteric calculi identified.Stable mild to moderate right hydronephrosis and proximal hydroureter without definite obstructing stone or mass identified on this noncontrast study.    Outpatient urology follow-up    Recent Labs     10/31/24  0852 11/01/24  0437 11/02/24  0226   HGB 12.0 10.1* 10.7*      Abnormal urinalysis  In the setting of suprapubic catheter  Patient without any suprapubic pain or urinary complaints  Likely colonization  Monitor off antibiotics     Admission Date: 10/31/2024 0830  Discharge Date: 11/02/24  Admitting Diagnosis: Hydronephrosis [N13.30]  Blood in urine [R31.9]  Urinary tract infection [N39.0]  Hematuria [R31.9]  Right renal mass [N28.89]  Bladder mass [N32.89]  CHUY (acute kidney injury) (HCC) [N17.9]  Candidiasis [B37.9]  Discharge Diagnosis:   Medical Problems       Resolved Problems  Date Reviewed: 10/16/2024   None         Procedures  Performed:   Orders Placed This Encounter   Procedures    ED ECG Documentation Only       Summary of Hospital Course: Patient presented with recurrent hematuria, that resolved on manual irrigation in ED.  He was also noted to have CHUY superimposed on CKD, that improved with IV fluids and holding Lasix and lisinopril.  Hemoglobin remained stable after resuming aspirin.  Patient will have outpatient urology follow-up for further workup.  Discharge plan discussed in detail with patient, who was agreeable.  Tried to reach out to patient's daughter to discuss discharge planning, however unable to contact.    Significant Findings, Care, Treatment and Services Provided: see above     Complications: none    Condition at Discharge: stable       Discharge instructions/Information to patient and family:   See After Visit Summary (AVS) for information provided to patient and family.      Provisions for Follow-Up Care:  See after visit summary for information related to follow-up care and any pertinent home health orders.      PCP: CARLIE Foster    Disposition: Assisted living/personal care at [facility]     Planned Readmission: no     Discharge Medications:  See after visit summary for reconciled discharge medications provided to patient and family.      Discharge Statement:  I have spent a total time of 36 minutes in caring for this patient on the day of the visit/encounter. >30 minutes of time was spent on: Patient and family education, Importance of tx compliance, Risk factor reductions, Impressions, Counseling / Coordination of care, Documenting in the medical record, Reviewing / ordering tests, medicine, procedures  , and Communicating with other healthcare professionals .

## 2024-11-02 NOTE — ASSESSMENT & PLAN NOTE
Wt Readings from Last 3 Encounters:   10/31/24 85.3 kg (188 lb)   10/18/24 89.1 kg (196 lb 6.9 oz)   09/28/24 87.3 kg (192 lb 6.4 oz)     Currently euvolemic  Home regimen Lasix 40 mg daily, to be restarted in 48 hours  Monitor volume status closely  Most recent echo in February/2024 with EF of 35-40%

## 2024-11-04 NOTE — TELEPHONE ENCOUNTER
Called and LM for Toro that he is tentativly scheduled for first SPT exchange at [facility] on  11/7 @ 11:40

## 2024-11-04 NOTE — CASE MANAGEMENT
Case Management Progress Note    Patient name Toro Rodriguez  Location /-01 MRN [id number]  : 1950 Date 2024       LOS (days): 1  Geometric Mean LOS (GMLOS) (days): 2.3  Days to GMLOS:1.2        OBJECTIVE:        Current admission status: Inpatient  Preferred Pharmacy:   [facility]- [address] [address]  Phone: [phone] Fax: [phone]    Primary Care Provider: CARLIE Foster    Primary Insurance: MEDICARE  Secondary Insurance: BLUE CROSS    PROGRESS NOTE:    Notification made to OP CM Handoff: [facility] OP CM regarding discharge planning and disposition.   Message left for Isabel CIFUENTES

## 2024-11-05 NOTE — TELEPHONE ENCOUNTER
Called Shanique and LINDA that her dad Toro is scheduled for his first SPT exchange that was placed on 9/27 he is scheduled 11/7 @11:40 @ [place] [facility] - once we exchange he can have [facility]NA exchange -0 He has canceled 2 appointment in the [place] office.

## 2024-11-06 NOTE — TELEPHONE ENCOUNTER
Phone would not  or leave message  for Michael that Beverly scheduled 1st SPT exchange on 11/7 @ 11:40 at the [place] office - once we exchange can have VNA exchange - he has already canceled 2 appointment in [place] office     Phone disconnected

## 2024-11-06 NOTE — TELEPHONE ENCOUNTER
LM that patient is scheduled 1st SPT exchange on 11/7 @ 11:40 at the [place] office - once we exchange can have VNA exchange - he has already canceled 2 appointment in [place] office

## 2024-11-07 ENCOUNTER — PREP FOR PROCEDURE (OUTPATIENT)
Dept: INTERVENTIONAL RADIOLOGY/VASCULAR | Facility: CLINIC | Age: 74
End: 2024-11-07

## 2024-11-07 ENCOUNTER — OFFICE VISIT (OUTPATIENT)
Dept: UROLOGY | Facility: CLINIC | Age: 74
End: 2024-11-07
Payer: MEDICARE

## 2024-11-07 ENCOUNTER — HOSPITAL ENCOUNTER (OUTPATIENT)
Dept: RADIOLOGY | Facility: HOSPITAL | Age: 74
Discharge: HOME | End: 2024-11-07
Attending: RADIOLOGY
Payer: MEDICARE

## 2024-11-07 VITALS
HEIGHT: 67 IN | WEIGHT: 193 LBS | HEART RATE: 64 BPM | DIASTOLIC BLOOD PRESSURE: 70 MMHG | BODY MASS INDEX: 30.29 KG/M2 | SYSTOLIC BLOOD PRESSURE: 142 MMHG | OXYGEN SATURATION: 96 %

## 2024-11-07 DIAGNOSIS — T83.89XA URETHRAL EROSION BY CATHETER, INITIAL ENCOUNTER  (HCC): ICD-10-CM

## 2024-11-07 DIAGNOSIS — R31.9 HEMATURIA: ICD-10-CM

## 2024-11-07 DIAGNOSIS — N36.8 URETHRAL EROSION BY CATHETER, INITIAL ENCOUNTER  (HCC): Primary | ICD-10-CM

## 2024-11-07 DIAGNOSIS — R33.9 URINARY RETENTION: Primary | ICD-10-CM

## 2024-11-07 DIAGNOSIS — N36.8 URETHRAL EROSION BY CATHETER, INITIAL ENCOUNTER  (HCC): ICD-10-CM

## 2024-11-07 DIAGNOSIS — T83.89XA URETHRAL EROSION BY CATHETER, INITIAL ENCOUNTER  (HCC): Primary | ICD-10-CM

## 2024-11-07 DIAGNOSIS — T83.010A SUPRAPUBIC CATHETER DYSFUNCTION, INITIAL ENCOUNTER (HCC): ICD-10-CM

## 2024-11-07 PROCEDURE — 52000 CYSTOURETHROSCOPY: CPT | Performed by: PHYSICIAN ASSISTANT

## 2024-11-07 PROCEDURE — 99214 OFFICE O/P EST MOD 30 MIN: CPT | Performed by: PHYSICIAN ASSISTANT

## 2024-11-07 PROCEDURE — 51705 CHANGE OF BLADDER TUBE: CPT | Performed by: RADIOLOGY

## 2024-11-07 PROCEDURE — 51705 CHANGE OF BLADDER TUBE: CPT

## 2024-11-07 PROCEDURE — C1887 CATHETER, GUIDING: HCPCS

## 2024-11-07 PROCEDURE — 75984 XRAY CONTROL CATHETER CHANGE: CPT

## 2024-11-07 PROCEDURE — C1729 CATH, DRAINAGE: HCPCS

## 2024-11-07 PROCEDURE — 75984 XRAY CONTROL CATHETER CHANGE: CPT | Performed by: RADIOLOGY

## 2024-11-07 PROCEDURE — C1769 GUIDE WIRE: HCPCS

## 2024-11-07 RX ORDER — APIXABAN 5 MG/1
5 TABLET, FILM COATED ORAL 2 TIMES DAILY
COMMUNITY
Start: 2024-11-07

## 2024-11-07 RX ADMIN — IOHEXOL 50 ML: 350 INJECTION, SOLUTION INTRAVENOUS at 14:30

## 2024-11-07 NOTE — PROGRESS NOTES
Ambulatory Visit  Name: Toro Rodriguez      : 1950      MRN: [id number]  Encounter Provider: Tracy Toro PA-C  Encounter Date: 2024   [facility] [place]    Assessment & Plan  Suprapubic catheter dysfunction, initial encounter (HCC)  here today for his first SPT change (24 insertion) tube is out. i am unable to get any catheters back through the cystostomy. i cystoscoped him from below and no balloon seen. bladder filled to 500 and still see no tract the bladder was without debris or bleeding one small adherent clot anterior.  I then scoped the suprapubic tract and is just blind end to fat. He has a urethral farmer in now to drain him. I personally discussed with urology and IR on call and pt will be driven by his daughter to [facility] for outpatient IR procedure today to salvage his existing cystostomy tract. He is NPO from 8am this morning.  Orders:    Transfer to other facility    Cystostomy tube change    Urinary retention  SPT exchange unsuccessful today, see above. Currently has urethral farmer temporarily until IR procedure  Orders:    Transfer to other facility    Hematuria    Orders:    Ambulatory referral to Urology          Cystostomy tube change     Date/Time  2024 12:05 PM     Performed by  Tracy Toro PA-C   Authorized by  Tracy Toro PA-C     [place] Protocol   Procedure performed by: (dora mares rn)  Consent: Verbal consent obtained.  Risks and benefits: risks, benefits and alternatives were discussed  Consent given by: patient  Patient understanding: patient states understanding of the procedure being performed  Required items: required blood products, implants, devices, and special equipment available  Patient identity confirmed: verbally with patient      Local anesthesia used: no     Anesthesia   Local anesthesia used: no     Procedure Details   Procedure Notes: Plan  exchange SPT 18fr  silicone    Procedure: Suprapubic Tube Change   [unfilled]    Current catheter removed without difficulty after deflation of an intact balloon. Site prepped with Hibiclens, new 18F and 16Fr suprapubic tube attempts to traverse existing cystostomy tract was unsuccessful.     Complications:  Unsuccessful exchange. See subsequent cystoscopy note. Urethral farmer catheter placed.      Condition: stable      Plan: maintain urethral farmer catheter and present to [facility] - IR attending expecting his arrival for SPT replacement under fluoroscopy.          Patient Transportation: confirmed (daughter Shanique driving)            Cystoscopy     Date/Time  11/7/2024 12:20 PM     Performed by  Tracy Toro PA-C   Authorized by  Tracy Troo PA-C     [place] Protocol:  Procedure performed by: (dora eng ma)  Consent: Verbal consent obtained. Written consent obtained.  Risks and benefits: risks, benefits and alternatives were discussed  Consent given by: patient and power of   Required items: required blood products, implants, devices, and special equipment available  Patient identity confirmed: verbally with patient      Procedure Details:  Procedure type: cystoscopy    Additional Procedure Details: Genitalia was prepped with hibiclens, lidocaine Uro-Jet applied transurethrally for local anesthesia.  The cystoscope was inserted per urethra the urethra and prostate were without obstruction, upon entering the bladder it was noted to be empty allowed filling to 500 cc there was one adherent small clot on the anterior superior surface of the bladder bladder walls were without bleeding erythema or mass.  The right lateral wall has a 1-2 cm shallow diverticulum both ureteral orifice ease were visualized.  There was no catheter balloon within the bladder lumen and despite filling unable to see a cystostomy tract.  I then backed the cystoscope out of the urethra.  The cystostomy site was also prepped with  Hibiclens gentle insertion of the cystoscope via the suprapubic tract the first 1-2 cm had a nice tunnel, but thereafter tract to the patient's left to a blind end of fat.  I did not ever enter the bladder.  Procedure was aborted and a urethral Colon catheter was placed 16 French coudé via the urethra.  Patient will keep this urethral Colon catheter I discussed with the on-call interventional radiologist patient is to be driven to [facility] by his daughter, he is n.p.o. since around 8 AM this morning, hopefully attempts to traverse and/or dilate his cystostomy tract are successful, he would probably need later procedure with sedation if they had to make a new tract.  Hopefully the current tract is salvageable.  Patient is agreeable to private transfer.        History of Present Illness     Toro Rodriguez is a 74 y.o. male who presents urinary retention had suprapubic tube placed 9/27/2024 here today for his initial 18 French tube exchange.    His urinary retention is not a new problem he underwent cystoscopy in July 2024 showing mildly enlarged lateral lobes he has been on Flomax and finasteride this year.  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  He is scheduled for urodynamics later this month    He has had Colon catheter for several months which has been transition to a suprapubic tube more recently.  He had some hematuria after the tube placement.  CT scan 1.3 cm right complex renal cyst other small simple cysts.  Left side simple cyst.  Bilateral small renal calculi nonobstructing, no ureteral calculi.  No bladder calculi.  Bladder some soft tissue density this was during episode of hematuria likely represents blood, cannot exclude mass, cystoscopy 4 months prior no bladder mass or stone.    History obtained from : patient and his daughter Shanique and chart review of three recent hospital stays, IR procedure,a nd 2 urology visits.  Review  "of Systems   Constitutional: Negative.    Respiratory: Negative.     Cardiovascular: Negative.    Genitourinary:  Positive for difficulty urinating. Negative for decreased urine volume, dysuria, flank pain, frequency, hematuria and urgency.   Musculoskeletal: Negative.              Objective     /70 (BP Location: Left arm, Patient Position: Sitting, Cuff Size: Large)   Pulse 64   Ht 5' 7\" (1.702 m)   Wt 87.5 kg (193 lb)   SpO2 96%   BMI 30.23 kg/m²   Physical Exam  Vitals and nursing note reviewed.   Constitutional:       General: He is not in acute distress.     Appearance: He is well-developed. He is not diaphoretic.   HENT:      Head: Normocephalic and atraumatic.   Pulmonary:      Effort: Pulmonary effort is normal.   Abdominal:      Comments: suprapubic cystostomy with clear cola color urine   Musculoskeletal:      Right lower leg: No edema.      Left lower leg: No edema.   Skin:     General: Skin is warm.   Neurological:      Mental Status: He is alert and oriented to person, place, and time.       Results  No results found for: \"PSA\"  Lab Results   Component Value Date    CALCIUM 8.1 (L) 11/02/2024    K 4.4 11/02/2024    CO2 22 11/02/2024     (H) 11/02/2024    BUN 29 (H) 11/02/2024    CREATININE 1.49 (H) 11/02/2024     Lab Results   Component Value Date    WBC 6.07 11/02/2024    HGB 10.7 (L) 11/02/2024    HCT 32.7 (L) 11/02/2024    MCV 98 11/02/2024     11/02/2024       Office Urine Dip  No results found for this or any previous visit (from the past 1 hour(s)).]    Administrative Statements         "

## 2024-11-07 NOTE — DISCHARGE INSTRUCTIONS
Suprapubic Cystostomy     WHAT YOU NEED TO KNOW:   Suprapubic cystostomy is surgery to create a stoma (opening) through your abdomen into your bladder. This opening is where a catheter is inserted to drain urine. You may need a cystostomy if your urine flow is blocked.    DISCHARGE INSTRUCTIONS:   Resume your normal diet. Small sips of flat soda will help with mild nausea.    Follow up with your healthcare provider as directed: You may need to return to have your suprapubic catheter changed or removed. Write down your questions so you remember to ask them during your visits.     Empty your urine drainage bag: Empty your urine drainage bag when it is ½ to ? full, or every 8 hours. If you have a smaller leg bag, empty it every 3 to 4 hours. Do the following when you empty your urine drainage bag:  Hold the urine bag over a toilet or large container.    Remove the drain spout from its sleeve at the bottom of the urine bag. Do not touch the tip of the drain spout. Open the slide valve on the spout.   Let the urine flow out of the urine bag into the toilet or container. Do not let the drainage tube touch anything.   Clean the end of the drain spout with alcohol when the bag is empty. Ask which cleaning solution is best to use.    Close the slide valve and put the drain spout into its sleeve at the bottom of the urine bag. Write down how much urine was in your bag if you were asked to keep a record.    Prevent an infection:   Clean the stoma and skin around it daily. Wash your hands before and after cystostomy care. Put on a new pair of clean medical gloves.   Change your urine bag or clean reusable bags. Ask how often you need to change or clean your urine drainage bag. You may need to change your reusable bag at least once a week.   Keep the bag below your waist. This will prevent urine from flowing back into your bladder and causing an infection or other problems. Also, keep the tube free of kinks so the urine will  drain properly. Do not pull on the catheter. This can cause pain and bleeding and may cause the catheter to come out.    Contact Interventional Radiology at [phone] ([facility] PATIENTS: Contact Interventional Radiology at [phone]) ([facility] PATIENTS: Contact Interventional Radiology at [phone]) if any of the following occur:  You have a fever or chills.  Persistent nausea or vomiting  You have severe pain.  You have a burning pain in your stoma.   Your stoma is red, swollen, or draining pus.   You have blood in your urine.   You have questions or concerns about your condition or care.  Seek care immediately or call 911 if:   No urine is draining into the urine bag.

## 2024-11-09 ENCOUNTER — NURSE TRIAGE (OUTPATIENT)
Dept: OTHER | Facility: OTHER | Age: 74
End: 2024-11-09

## 2024-11-09 ENCOUNTER — HOSPITAL ENCOUNTER (EMERGENCY)
Facility: HOSPITAL | Age: 74
Discharge: HOME | End: 2024-11-10
Attending: EMERGENCY MEDICINE | Admitting: EMERGENCY MEDICINE
Payer: MEDICARE

## 2024-11-09 DIAGNOSIS — R31.9 HEMATURIA: Primary | ICD-10-CM

## 2024-11-09 LAB
BASOPHILS # BLD AUTO: 0.04 THOUSANDS/ÂΜL (ref 0–0.1)
BASOPHILS NFR BLD AUTO: 1 % (ref 0–1)
EOSINOPHIL # BLD AUTO: 0.34 THOUSAND/ÂΜL (ref 0–0.61)
EOSINOPHIL NFR BLD AUTO: 6 % (ref 0–6)
ERYTHROCYTE [DISTWIDTH] IN BLOOD BY AUTOMATED COUNT: 13.5 % (ref 11.6–15.1)
HCT VFR BLD AUTO: 34.6 % (ref 36.5–49.3)
HGB BLD-MCNC: 11.1 G/DL (ref 12–17)
IMM GRANULOCYTES # BLD AUTO: 0.02 THOUSAND/UL (ref 0–0.2)
IMM GRANULOCYTES NFR BLD AUTO: 0 % (ref 0–2)
LYMPHOCYTES # BLD AUTO: 1.2 THOUSANDS/ÂΜL (ref 0.6–4.47)
LYMPHOCYTES NFR BLD AUTO: 21 % (ref 14–44)
MCH RBC QN AUTO: 31.7 PG (ref 26.8–34.3)
MCHC RBC AUTO-ENTMCNC: 32.1 G/DL (ref 31.4–37.4)
MCV RBC AUTO: 99 FL (ref 82–98)
MONOCYTES # BLD AUTO: 0.56 THOUSAND/ÂΜL (ref 0.17–1.22)
MONOCYTES NFR BLD AUTO: 10 % (ref 4–12)
NEUTROPHILS # BLD AUTO: 3.5 THOUSANDS/ÂΜL (ref 1.85–7.62)
NEUTS SEG NFR BLD AUTO: 62 % (ref 43–75)
NRBC BLD AUTO-RTO: 0 /100 WBCS
PLATELET # BLD AUTO: 276 THOUSANDS/UL (ref 149–390)
PMV BLD AUTO: 9.7 FL (ref 8.9–12.7)
RBC # BLD AUTO: 3.5 MILLION/UL (ref 3.88–5.62)
WBC # BLD AUTO: 5.66 THOUSAND/UL (ref 4.31–10.16)

## 2024-11-09 PROCEDURE — 36415 COLL VENOUS BLD VENIPUNCTURE: CPT | Performed by: EMERGENCY MEDICINE

## 2024-11-09 PROCEDURE — 85025 COMPLETE CBC W/AUTO DIFF WBC: CPT | Performed by: EMERGENCY MEDICINE

## 2024-11-09 PROCEDURE — 81001 URINALYSIS AUTO W/SCOPE: CPT | Performed by: EMERGENCY MEDICINE

## 2024-11-09 PROCEDURE — 85730 THROMBOPLASTIN TIME PARTIAL: CPT | Performed by: EMERGENCY MEDICINE

## 2024-11-09 PROCEDURE — 99285 EMERGENCY DEPT VISIT HI MDM: CPT | Performed by: EMERGENCY MEDICINE

## 2024-11-09 PROCEDURE — 80048 BASIC METABOLIC PNL TOTAL CA: CPT | Performed by: EMERGENCY MEDICINE

## 2024-11-09 PROCEDURE — 99284 EMERGENCY DEPT VISIT MOD MDM: CPT

## 2024-11-09 PROCEDURE — 85610 PROTHROMBIN TIME: CPT | Performed by: EMERGENCY MEDICINE

## 2024-11-09 PROCEDURE — 87077 CULTURE AEROBIC IDENTIFY: CPT | Performed by: EMERGENCY MEDICINE

## 2024-11-09 PROCEDURE — 87086 URINE CULTURE/COLONY COUNT: CPT | Performed by: EMERGENCY MEDICINE

## 2024-11-09 PROCEDURE — 87186 SC STD MICRODIL/AGAR DIL: CPT | Performed by: EMERGENCY MEDICINE

## 2024-11-09 NOTE — TELEPHONE ENCOUNTER
"Regarding: change in color of urine  ----- Message from Delmis CONTRERAS sent at 11/9/2024  9:15 AM EST -----  \" My father's urine was light pink now, instead of that bright red this morning. He wants to keep monitoring at home for the next 2 hours. Would that be okay before going to the ER ? \"    "

## 2024-11-09 NOTE — TELEPHONE ENCOUNTER
Daughter called stating since her last call, pt's urine has become less red and is now a light pink color.  Pt has no fever, no pain.  Advice offered per protocol and daughter verbalizes understanding of previously expressed plan to go to ED at [facility] for evaluation

## 2024-11-09 NOTE — TELEPHONE ENCOUNTER
"Reason for Disposition  • Blood in urine  (Exception: Could be normal menstrual bleeding.)    Answer Assessment - Initial Assessment Questions  1. COLOR of URINE: \"Describe the color of the urine.\"  (e.g., tea-colored, pink, red, bloody) \"Do you have blood clots in your urine?\" (e.g., none, pea, grape, small coin)      Was bright red in bag but pt now reports urine is lighter in color in the tube  2. ONSET: \"When did the bleeding start?\"       today  3. EPISODES: \"How many times has there been blood in the urine?\" or \"How many times today?\"      This has happened several times previously  4. PAIN with URINATION: \"Is there any pain with passing your urine?\" If Yes, ask: \"How bad is the pain?\"  (Scale 1-10; or mild, moderate, severe)      No pain  5. FEVER: \"Do you have a fever?\" If Yes, ask: \"What is your temperature, how was it measured, and when did it start?\"      No fever   6. ASSOCIATED SYMPTOMS: \"Are you passing urine more frequently than usual?\"      Has a supra pubic cath  7. OTHER SYMPTOMS: \"Do you have any other symptoms?\" (e.g., back/flank pain, abdomen pain, vomiting)      Received IV antibiotics yesterday at [facility]    Protocols used: Urine - Blood In-Adult-    "

## 2024-11-09 NOTE — TELEPHONE ENCOUNTER
"Reason for Disposition   Passing pure blood or large blood clots (i.e., size > a dime)  (Exception: Miguel or small strands.)    Answer Assessment - Initial Assessment Questions  1. COLOR of URINE: \"Describe the color of the urine.\"  (e.g., tea-colored, pink, red, bloody) \"Do you have blood clots in your urine?\" (e.g., none, pea, grape, small coin)      Red urine output from suprapubic catheter    2. ONSET: \"When did the bleeding start?\"       Intermittently comes and goes    3. EPISODES: \"How many times has there been blood in the urine?\" or \"How many times today?\"      Recurrent    4. PAIN with URINATION: \"Is there any pain with passing your urine?\" If Yes, ask: \"How bad is the pain?\"  (Scale 1-10; or mild, moderate, severe)      Denies    5. FEVER: \"Do you have a fever?\" If Yes, ask: \"What is your temperature, how was it measured, and when did it start?\"      Denies    6. ASSOCIATED SYMPTOMS: \"Are you passing urine more frequently than usual?\"      Generally weak    Protocols used: Urine - Blood In-Adult-      Daughter is not currently with father who resides in assisted living; facility notified her of increased darkness of urine.  Father had reported generalized weakness.    Referred to ED for evaluation.  Daughter to notify facility of recommendation.   "

## 2024-11-10 ENCOUNTER — TELEPHONE (OUTPATIENT)
Dept: OTHER | Facility: OTHER | Age: 74
End: 2024-11-10

## 2024-11-10 ENCOUNTER — APPOINTMENT (EMERGENCY)
Dept: CT IMAGING | Facility: HOSPITAL | Age: 74
End: 2024-11-10
Payer: MEDICARE

## 2024-11-10 VITALS
TEMPERATURE: 98.2 F | HEIGHT: 67 IN | BODY MASS INDEX: 31 KG/M2 | WEIGHT: 197.53 LBS | RESPIRATION RATE: 18 BRPM | HEART RATE: 60 BPM | OXYGEN SATURATION: 93 % | DIASTOLIC BLOOD PRESSURE: 60 MMHG | SYSTOLIC BLOOD PRESSURE: 122 MMHG

## 2024-11-10 LAB
ANION GAP SERPL CALCULATED.3IONS-SCNC: 8 MMOL/L (ref 4–13)
APTT PPP: 31 SECONDS (ref 23–34)
BACTERIA UR QL AUTO: ABNORMAL /HPF
BILIRUB UR QL STRIP: NEGATIVE
BUN SERPL-MCNC: 33 MG/DL (ref 5–25)
CALCIUM SERPL-MCNC: 8.4 MG/DL (ref 8.4–10.2)
CHLORIDE SERPL-SCNC: 103 MMOL/L (ref 96–108)
CLARITY UR: ABNORMAL
CO2 SERPL-SCNC: 25 MMOL/L (ref 21–32)
COLOR UR: ABNORMAL
CREAT SERPL-MCNC: 1.8 MG/DL (ref 0.6–1.3)
GFR SERPL CREATININE-BSD FRML MDRD: 35 ML/MIN/1.73SQ M
GLUCOSE SERPL-MCNC: 118 MG/DL (ref 65–140)
GLUCOSE UR STRIP-MCNC: NEGATIVE MG/DL
HGB UR QL STRIP.AUTO: ABNORMAL
INR PPP: 1.03 (ref 0.85–1.19)
KETONES UR STRIP-MCNC: NEGATIVE MG/DL
LEUKOCYTE ESTERASE UR QL STRIP: ABNORMAL
NITRITE UR QL STRIP: NEGATIVE
NON-SQ EPI CELLS URNS QL MICRO: ABNORMAL /HPF
PH UR STRIP.AUTO: 6 [PH]
POTASSIUM SERPL-SCNC: 3.9 MMOL/L (ref 3.5–5.3)
PROT UR STRIP-MCNC: ABNORMAL MG/DL
PROTHROMBIN TIME: 14 SECONDS (ref 12.3–15)
RBC #/AREA URNS AUTO: ABNORMAL /HPF
SODIUM SERPL-SCNC: 136 MMOL/L (ref 135–147)
SP GR UR STRIP.AUTO: >=1.03 (ref 1–1.03)
UROBILINOGEN UR STRIP-ACNC: <2 MG/DL
WBC #/AREA URNS AUTO: ABNORMAL /HPF

## 2024-11-10 PROCEDURE — 74177 CT ABD & PELVIS W/CONTRAST: CPT

## 2024-11-10 PROCEDURE — 96361 HYDRATE IV INFUSION ADD-ON: CPT

## 2024-11-10 PROCEDURE — 96360 HYDRATION IV INFUSION INIT: CPT

## 2024-11-10 RX ORDER — LISINOPRIL 5 MG/1
5 TABLET ORAL DAILY
Status: DISCONTINUED | OUTPATIENT
Start: 2024-11-10 | End: 2024-11-10 | Stop reason: HOSPADM

## 2024-11-10 RX ORDER — FUROSEMIDE 40 MG/1
40 TABLET ORAL ONCE
Status: COMPLETED | OUTPATIENT
Start: 2024-11-10 | End: 2024-11-10

## 2024-11-10 RX ORDER — AMIODARONE HYDROCHLORIDE 200 MG/1
200 TABLET ORAL ONCE
Status: COMPLETED | OUTPATIENT
Start: 2024-11-10 | End: 2024-11-10

## 2024-11-10 RX ORDER — METOPROLOL SUCCINATE 50 MG/1
50 TABLET, EXTENDED RELEASE ORAL DAILY
Status: DISCONTINUED | OUTPATIENT
Start: 2024-11-10 | End: 2024-11-10 | Stop reason: HOSPADM

## 2024-11-10 RX ADMIN — LISINOPRIL 5 MG: 5 TABLET ORAL at 09:48

## 2024-11-10 RX ADMIN — IOHEXOL 100 ML: 350 INJECTION, SOLUTION INTRAVENOUS at 00:38

## 2024-11-10 RX ADMIN — METOPROLOL SUCCINATE 50 MG: 50 TABLET, EXTENDED RELEASE ORAL at 09:48

## 2024-11-10 RX ADMIN — FUROSEMIDE 40 MG: 40 TABLET ORAL at 09:48

## 2024-11-10 RX ADMIN — SODIUM CHLORIDE 500 ML: 0.9 INJECTION, SOLUTION INTRAVENOUS at 01:16

## 2024-11-10 RX ADMIN — AMIODARONE HYDROCHLORIDE 200 MG: 200 TABLET ORAL at 09:48

## 2024-11-10 NOTE — ED NOTES
Nurse noting that the urine coming from the suprapubic is currently running clear with no clots or blood seen.      Brandi Harris RN  11/10/24 5552

## 2024-11-10 NOTE — ED NOTES
Received report from GLORIA Paz. Pt is discharged and will be picked up at 11 by a wheelchair can. Pt is sleeping at this time.      Carrie Willams RN  11/10/24 1658

## 2024-11-10 NOTE — ED TRIAGE NOTES
PT states having noted blood in his urinary catheter bag at 2300. Pt states his urine appeared normal at 2130. PT states having had this cath changed Thursday but not blood was present until this evening.

## 2024-11-10 NOTE — ED NOTES
Pt remains sleeping at this time. Breakfast tray delivered.      Carrie Willams RN  11/10/24 [id number]

## 2024-11-10 NOTE — ED PROVIDER NOTES
Time reflects when diagnosis was documented in both MDM as applicable and the Disposition within this note       Time User Action Codes Description Comment    11/10/2024  5:26 AM Angie Massey Add [R31.9] Hematuria           ED Disposition       ED Disposition   Discharge    Condition   Stable    Date/Time   Sun Nov 10, 2024  5:26 AM    Comment   Toro Rodriguez discharge to home/self care.                   Assessment & Plan       Medical Decision Making  74-year-old male with hematuria, obtain lab work to evaluate for coagulopathy, thrombocytopenia, anemia, CT to further evaluate for intra-abdominal injury in light of recent procedure, bladder clot, active bleeding.  There is no fevers, chills, patient started on antibiotics yesterday for urinary tract infection which she will continue, has follow-up with urology    Amount and/or Complexity of Data Reviewed  Labs: ordered. Decision-making details documented in ED Course.  Radiology: ordered.    Risk  Prescription drug management.        ED Course as of 11/10/24 0526   Sat Nov 09, 2024   2330 Medical record reviewed , patient with previous suprapubic catheter dislodgement, seen by urology for cystoscopy, ureteral catheter placed initially on 11/7/24 and patient was transferred to [facility] for IR insertion of suprapubic catheter. Patient did have a small clot noted on cystoscopy in the office that day, no active bleeding    Sun Nov 10, 2024   0002 Hemoglobin(!): 11.1  Baseline   0111 Creatinine(!): 1.80  Within baseline   0345 Patient with likely hematoma in bladder, will discuss with urology further disposition admission versus close follow-up outpatient   0349 Urology recommends placing three-way and irrigating, if urine clears after 1 bag okay to go home otherwise admit for further irrigation   0525 Urine cleared from the catheter bag, no acute complaints at this point, stable for discharge at this time no indication of further inpatient evaluation or treatment        Medications   iohexol (OMNIPAQUE) 350 MG/ML injection (MULTI-DOSE) 100 mL (100 mL Intravenous Given 11/10/24 0038)   sodium chloride 0.9 % bolus 500 mL (0 mL Intravenous Stopped 11/10/24 0248)       ED Risk Strat Scores                           SBIRT 22yo+      Flowsheet Row Most Recent Value   Initial Alcohol Screen: US AUDIT-C     1. How often do you have a drink containing alcohol? 0 Filed at: 11/09/2024 7499   2. How many drinks containing alcohol do you have on a typical day you are drinking?  0 Filed at: 11/09/2024 2356   3a. Male UNDER 65: How often do you have five or more drinks on one occasion? 0 Filed at: 11/09/2024 2356   3b. FEMALE Any Age, or MALE 65+: How often do you have 4 or more drinks on one occassion? 0 Filed at: 11/09/2024 2356   Audit-C Score 0 Filed at: 11/09/2024 2356   WILLIE: How many times in the past year have you...    Used an illegal drug or used a prescription medication for non-medical reasons? Never Filed at: 11/09/2024 5226                            History of Present Illness       Chief Complaint   Patient presents with    Blood in Urine       Past Medical History:   Diagnosis Date    Alcohol intoxication (HCC) 10/15/2020    BPH (benign prostatic hyperplasia)     Chronic HFrEF (heart failure with reduced ejection fraction) ([facility]) 11/2023    Coronary artery calcification seen on CT scan 11/10/2023    Coronary artery disease 12/14/2023    Deep vein thrombosis (DVT) of left lower extremity ([facility]) 11/2023    Diabetes mellitus (HCC) 11/2023    Fall from slip, trip, or stumble 10/15/2020    History of phimosis of penis     History of urinary calculi     Hypertension 1988    Skin cancer     Tobacco abuse     quit around 2000      Past Surgical History:   Procedure Laterality Date    BLADDER STONE REMOVAL  2014    CARDIAC CATHETERIZATION N/A 12/14/2023    Procedure: Cardiac catheterization;  Surgeon: Dave Royal MD;  Location: [facility];  Service: Cardiology    IR  SUPRAPUBIC CATHETER CHECK/CHANGE/REINSERTION/UPSIZE  2024    IR SUPRAPUBIC CATHETER PLACEMENT  2024    ORIF ANKLE FRACTURE Left     SKIN LESION EXCISION N/A 3/18/2024    Procedure: WIDE LOCAL EXCISION OF BACK MELANOMA;  Surgeon: Lillie La MD;  Location: [facility];  Service: Surgical Oncology    TOTAL HIP ARTHROPLASTY Right       Family History   Problem Relation Age of Onset    Breast cancer Mother     Heart attack Father 61    Other Sister         s/p hysterectomy    Cerebral palsy Brother     Skin cancer Other         family history    No Known Problems Son     No Known Problems Daughter     Sudden death Neg Hx       Social History     Tobacco Use    Smoking status: Former     Types: Cigarettes     Start date:      Quit date:      Years since quittin.9    Smokeless tobacco: Never    Tobacco comments:     Quit over 30 years ago (Updated 2023).    Vaping Use    Vaping status: Never Used   Substance Use Topics    Alcohol use: Not Currently    Drug use: Never      E-Cigarette/Vaping    E-Cigarette Use Never User       E-Cigarette/Vaping Substances    Nicotine No     THC No     CBD No     Flavoring No     Other No     Unknown No       I have reviewed and agree with the history as documented.     74-year-old male with recurrent hematuria, no abdominal pain, had suprapubic catheter replaced by IR 2 days ago, had some bleeding yesterday went to [place] and was started on IV antibiotics, has not picked up oral antibiotics yet but states that they will be delivered tomorrow, is unsure which antibiotics he is taking.  No fevers, no chills, today had multiple episodes of hematuria however the urinary catheter has been draining.  He not on blood thinning medications though previously was on Eliquis.        Review of Systems   Constitutional:  Negative for appetite change, chills and fever.   HENT:  Negative for rhinorrhea and sore throat.    Eyes:  Negative for photophobia and visual  disturbance.   Respiratory:  Negative for cough and shortness of breath.    Cardiovascular:  Negative for chest pain and palpitations.   Gastrointestinal:  Negative for abdominal pain and diarrhea.   Genitourinary:  Positive for hematuria. Negative for dysuria, frequency, genital sores and urgency.   Skin:  Negative for rash.   Neurological:  Negative for dizziness and weakness.   All other systems reviewed and are negative.          Objective       ED Triage Vitals   Temperature Pulse Blood Pressure Respirations SpO2 Patient Position - Orthostatic VS   11/10/24 0210 11/09/24 2328 11/09/24 2328 11/09/24 2328 11/09/24 2328 11/10/24 0200   98.2 °F (36.8 °C) 71 141/66 20 97 % Lying      Temp Source Heart Rate Source BP Location FiO2 (%) Pain Score    11/10/24 0210 11/10/24 0200 11/09/24 2328 -- 11/09/24 2328    Oral Monitor Right arm  No Pain      Vitals      Date and Time Temp Pulse SpO2 Resp BP Pain Score FACES Pain Rating User   11/10/24 0500 -- 54 97 % 22 168/82 -- --    11/10/24 0445 -- 56 98 % 22 193/79 -- --    11/10/24 0210 98.2 °F (36.8 °C) -- -- -- -- -- --    11/10/24 0200 -- 59 94 % 20 136/70 -- --    11/09/24 2328 -- 71 97 % 20 141/66 No Pain -- WM            Physical Exam  Vitals and nursing note reviewed.   Constitutional:       Appearance: He is well-developed.   HENT:      Head: Normocephalic and atraumatic.      Right Ear: External ear normal.      Left Ear: External ear normal.   Eyes:      Conjunctiva/sclera: Conjunctivae normal.      Pupils: Pupils are equal, round, and reactive to light.   Neck:      Vascular: No JVD.      Trachea: No tracheal deviation.   Cardiovascular:      Rate and Rhythm: Normal rate and regular rhythm.      Heart sounds: Normal heart sounds. No murmur heard.     No friction rub. No gallop.   Pulmonary:      Effort: Pulmonary effort is normal. No respiratory distress.      Breath sounds: No stridor. No wheezing or rales.   Abdominal:      General: There is no  distension.      Palpations: Abdomen is soft. There is no mass.      Tenderness: There is no abdominal tenderness. There is no guarding or rebound.      Comments: Suprapubic catheter site without any surrounding erythema dressing clear dry intact, suprapubic catheter draining pink-tinged urine with no clots   Musculoskeletal:         General: Normal range of motion.      Cervical back: Normal range of motion and neck supple.   Skin:     General: Skin is warm and dry.      Coloration: Skin is not pale.      Findings: No erythema or rash.   Neurological:      Mental Status: He is alert and oriented to person, place, and time.      Cranial Nerves: No cranial nerve deficit.         Results Reviewed       Procedure Component Value Units Date/Time    Urine Microscopic [[id number]]  (Abnormal) Collected: 11/09/24 2343    Lab Status: Final result Specimen: Urine, Suprapubic catheter Updated: 11/10/24 0028     RBC, UA       Field obscured, unable to enumerate     /hpf     WBC, UA       Field obscured, unable to enumerate     /hpf     Epithelial Cells       Field obscured, unable to enumerate     /hpf     Bacteria, UA       Field obscured, unable to enumerate     /hpf    Urine culture [[id number]] Collected: 11/09/24 2343    Lab Status: In process Specimen: Urine, Suprapubic catheter Updated: 11/10/24 0028    Protime-INR [[id number]]  (Normal) Collected: 11/09/24 2354    Lab Status: Final result Specimen: Blood from Arm, Right Updated: 11/10/24 0017     Protime 14.0 seconds      INR 1.03    Narrative:      INR Therapeutic Range    Indication                                             INR Range      Atrial Fibrillation                                               2.0-3.0  Hypercoagulable State                                    2.0.2.3  Left Ventricular Asist Device                            2.0-3.0  Mechanical Heart Valve                                  -    Aortic(with afib, MI, embolism, HF, LA enlargement,    and/or  coagulopathy)                                     2.0-3.0 (2.5-3.5)     Mitral                                                             2.5-3.5  Prosthetic/Bioprosthetic Heart Valve               2.0-3.0  Venous thromboembolism (VTE: VT, PE        2.0-3.0    APTT [[id number]]  (Normal) Collected: 11/09/24 2354    Lab Status: Final result Specimen: Blood from Arm, Right Updated: 11/10/24 0017     PTT 31 seconds     Basic metabolic panel [[id number]]  (Abnormal) Collected: 11/09/24 2354    Lab Status: Final result Specimen: Blood from Arm, Right Updated: 11/10/24 0013     Sodium 136 mmol/L      Potassium 3.9 mmol/L      Chloride 103 mmol/L      CO2 25 mmol/L      ANION GAP 8 mmol/L      BUN 33 mg/dL      Creatinine 1.80 mg/dL      Glucose 118 mg/dL      Calcium 8.4 mg/dL      eGFR 35 ml/min/1.73sq m     Narrative:      National Kidney Disease Foundation guidelines for Chronic Kidney Disease (CKD):     Stage 1 with normal or high GFR (GFR > 90 mL/min/1.73 square meters)    Stage 2 Mild CKD (GFR = 60-89 mL/min/1.73 square meters)    Stage 3A Moderate CKD (GFR = 45-59 mL/min/1.73 square meters)    Stage 3B Moderate CKD (GFR = 30-44 mL/min/1.73 square meters)    Stage 4 Severe CKD (GFR = 15-29 mL/min/1.73 square meters)    Stage 5 End Stage CKD (GFR <15 mL/min/1.73 square meters)  Note: GFR calculation is accurate only with a steady state creatinine    UA w Reflex to Microscopic w Reflex to Culture [[id number]]  (Abnormal) Collected: 11/09/24 2343    Lab Status: Final result Specimen: Urine, Suprapubic catheter Updated: 11/10/24 0007     Color, UA Dark Red     Clarity, UA Turbid     Specific Gravity, UA >=1.030     pH, UA 6.0     Leukocytes, UA Moderate     Nitrite, UA Negative     Protein,  (2+) mg/dl      Glucose, UA Negative mg/dl      Ketones, UA Negative mg/dl      Urobilinogen, UA <2.0 mg/dl      Bilirubin, UA Negative     Occult Blood, UA Large    CBC and differential [[id number]]  (Abnormal) Collected:  11/09/24 2354    Lab Status: Final result Specimen: Blood from Arm, Right Updated: 11/09/24 2359     WBC 5.66 Thousand/uL      RBC 3.50 Million/uL      Hemoglobin 11.1 g/dL      Hematocrit 34.6 %      MCV 99 fL      MCH 31.7 pg      MCHC 32.1 g/dL      RDW 13.5 %      MPV 9.7 fL      Platelets 276 Thousands/uL      nRBC 0 /100 WBCs      Segmented % 62 %      Immature Grans % 0 %      Lymphocytes % 21 %      Monocytes % 10 %      Eosinophils Relative 6 %      Basophils Relative 1 %      Absolute Neutrophils 3.50 Thousands/µL      Absolute Immature Grans 0.02 Thousand/uL      Absolute Lymphocytes 1.20 Thousands/µL      Absolute Monocytes 0.56 Thousand/µL      Eosinophils Absolute 0.34 Thousand/µL      Basophils Absolute 0.04 Thousands/µL             CT abdomen pelvis with contrast   Final Interpretation by Erik Crowe MD (11/10 0331)      1.  Suprapubic pigtail catheter in place within the bladder. There is a 4.0 x 3.2 x 3.0 soft tissue density within the right bladder which may reflect hematoma versus mass. Correlate with cystoscopy.   2.  Stable moderate right hydroureteronephrosis.   3.  2 mm nonobstructive calculus in the right kidney.   4.  Diverticulosis without evidence of diverticulitis.      Workstation performed: [id number]             Procedures    ED Medication and Procedure Management   Prior to Admission Medications   Prescriptions Last Dose Informant Patient Reported? Taking?   Eliquis 5 MG  Self Yes No   Sig: Take 5 mg by mouth 2 (two) times a day   Patient not taking: Reported on 11/7/2024   acetaminophen (TYLENOL) 325 mg tablet  Self Yes No   Sig: Take 325 mg by mouth every 6 (six) hours as needed for mild pain   amiodarone 200 mg tablet  Self No No   Sig: Take 1 tablet (200 mg total) by mouth daily   aspirin 81 mg chewable tablet  Self No No   Sig: Chew 1 tablet (81 mg total) daily Do not start before September 30, 2024.   atorvastatin (LIPITOR) 40 mg tablet  Self No No   Sig: Take 1 tablet (40 mg  total) by mouth daily with dinner   clotrimazole (LOTRIMIN) 1 % cream  Self Yes No   Sig: Apply topically 2 (two) times a day   finasteride (PROSCAR) 5 mg tablet  Self No No   Sig: Take 1 tablet (5 mg total) by mouth daily   furosemide (LASIX) 40 mg tablet  Self No No   Sig: Take 1 tablet (40 mg total) by mouth daily Do not start before November 4, 2024.   lisinopril (ZESTRIL) 5 mg tablet  Self No No   Sig: Take 1 tablet (5 mg total) by mouth daily Do not start before November 4, 2024.   metoprolol succinate (TOPROL-XL) 25 mg 24 hr tablet   No No   Sig: Take 1 tablet (25 mg total) by mouth every 12 (twelve) hours   mupirocin (BACTROBAN) 2 % ointment  Self Yes No   Sig: Apply topically 3 (three) times a day   nitroglycerin (NITROSTAT) 0.4 mg SL tablet  Self No No   Sig: Place 1 tablet (0.4 mg total) under the tongue every 5 (five) minutes as needed for chest pain   polyethylene glycol (MIRALAX) 17 g packet  Self No No   Sig: Take 17 g by mouth daily   tamsulosin (FLOMAX) 0.4 mg  Self No No   Sig: Take 1 capsule (0.4 mg total) by mouth daily with dinner   Patient taking differently: Take 0.4 mg by mouth daily at bedtime      Facility-Administered Medications: None     Patient's Medications   Discharge Prescriptions    No medications on file     No discharge procedures on file.  ED SEPSIS DOCUMENTATION   Time reflects when diagnosis was documented in both MDM as applicable and the Disposition within this note       Time User Action Codes Description Comment    11/10/2024  5:26 AM Angie Massey [R31.9] Hematuria                  Angie Massey DO  11/10/24 0526

## 2024-11-10 NOTE — DISCHARGE INSTRUCTIONS
Please follow-up with urology for further care, if symptoms worsen please return to the emergency department.  Please continue taking antibiotics as directed.

## 2024-11-10 NOTE — TELEPHONE ENCOUNTER
PT's daughter called in requesting a call back to schedule a follow up appointment with office for PT's recent ER visit for blood in urine.

## 2024-11-11 NOTE — TELEPHONE ENCOUNTER
Patient had SPT placed by IR on 9/27/24 went to er on 9/30 with blood in urine - told to hold blood thinner. Started Eliquis  back up 10/9/24. Went to ED 10/13 for gross hematuria and other issues. Discharged 11/2/24 Had first SPT scheduled for 11/7 - no SPT present and unable to get SPT placed - has farmer catheter. went  to IR and SPT placed 11/7 has patient has urethral erosion. Presented to ED on 11/9/24 with blood in urine Patient is on eliquis and aspirin.  He is scheduled for urodynamics on 11/27 and first SPT exchange 12/18 at [place].  Please advise if he needs a f/u appointment from ER vist for blood in urine on 11/9

## 2024-11-11 NOTE — TELEPHONE ENCOUNTER
Patients daughter calling in to see when patient should be following up in regards to ER follow up for gross hematuria. I did make her aware that message was sent to provider today. Please call patients daughter back with update.       CB: [phone]-- Shanique Polo

## 2024-11-12 ENCOUNTER — TELEPHONE (OUTPATIENT)
Dept: OTHER | Facility: HOSPITAL | Age: 74
End: 2024-11-12

## 2024-11-12 LAB — BACTERIA UR CULT: ABNORMAL

## 2024-11-12 NOTE — TELEPHONE ENCOUNTER
Called Toro and notified him that we saw that his urine tested positive for an infection and just checking to that he was prescribed an ABX

## 2024-11-12 NOTE — TELEPHONE ENCOUNTER
Urine culture returned positive for infection ordered by outside facility.  Per CARLIE Cho who messaged me yesterday 11/11/2024--the patient was started on antibiotics but I do not see this in his chart.  Please reach out to the patient to see if he has any current symptoms and/or if he was given antibiotics.

## 2024-11-12 NOTE — TELEPHONE ENCOUNTER
----- Message from CARLIE Cho sent at 11/11/2024  4:51 PM EST -----  Indwelling catheter. Started on abx at outside facility, not listed in chart. A/w c/s. Had appointment at urology today.

## 2024-11-13 ENCOUNTER — RESULTS FOLLOW-UP (OUTPATIENT)
Dept: EMERGENCY DEPT | Facility: HOSPITAL | Age: 74
End: 2024-11-13

## 2024-11-13 NOTE — TELEPHONE ENCOUNTER
Call placed and spoke with the patient's daughter, Shanique, who is questioning when her father needs to return to IR .    IR placed a 14 fr all purpose drain on 11/7/2024. Patient to return in 2-3 weeks for a farmer placement.  Advised Shanique IR will contact her to schedule another procedure.    Patient has appointment on 12/18/2024 with Tien JORDAN for initial change. Will cancel that appointment and wait for the date that IR can place a farmer.    Patient has appointment for UDS on 11/27/2024. Shanique mentioned the family requested the UDS to determine if her father will need bladder drainage indefinitely.  Will send encounter to [facility] asking if UDS can still be performed on 11/27/2024.    Shanique states her father's urine has been clear for 3 days now, no further bleeding. Advised to encourage her father to drink at least 32oz water. Best treatment is to drink an adequate amount of water daily.    Will call Shanique back regarding the UDS appointment.

## 2024-11-13 NOTE — TELEPHONE ENCOUNTER
Follow-up appointment for hematuria is not needed.  Hematuria was related to SPT placement that happened 2 days prior.

## 2024-11-13 NOTE — TELEPHONE ENCOUNTER
Called and spoke with Shanique  Follow-up appointment for hematuria is not needed.  Hematuria was related to SPT placement that happened 2 days prior.   He was taken off Eliquis and still has blood, per Shanique.  Also questioning his SPT tube  which one documentation states 14 f and another states Pigtail - which is what she says gloria has - does he need to go back to IR to have this replaced or does he keep appointment for first exchange 12/18/24 ( there is no documentation from IR in chart that I can see)

## 2024-11-13 NOTE — TELEPHONE ENCOUNTER
"I reviewed IR's notes    \"Impression:  Successful replacement of suprapubic tube through previous tract. 14 [place] all-purpose drain was placed to serve as a suprapubic tube. We will have him return to convert back to a Colon with sedation in about 2 to 3 weeks.\"    Patient will need to report back to interventional radiology in 2 to 3 weeks to have catheter reinserted.  They will probably be reaching out to him to schedule this appointment.  "

## 2024-11-14 ENCOUNTER — APPOINTMENT (OUTPATIENT)
Dept: LAB | Facility: HOSPITAL | Age: 74
End: 2024-11-14
Payer: MEDICARE

## 2024-11-14 DIAGNOSIS — K59.09 CHRONIC CONSTIPATION: ICD-10-CM

## 2024-11-14 DIAGNOSIS — N30.01 ACUTE CYSTITIS WITH HEMATURIA: ICD-10-CM

## 2024-11-14 DIAGNOSIS — N18.31 STAGE 3A CHRONIC KIDNEY DISEASE (CKD) (HCC): ICD-10-CM

## 2024-11-14 DIAGNOSIS — Z93.59 PRESENCE OF SUPRAPUBIC CATHETER (HCC): ICD-10-CM

## 2024-11-14 DIAGNOSIS — N40.1 BENIGN PROSTATIC HYPERPLASIA WITH LOWER URINARY TRACT SYMPTOMS, SYMPTOM DETAILS UNSPECIFIED: ICD-10-CM

## 2024-11-14 DIAGNOSIS — I50.22 CHRONIC HFREF (HEART FAILURE WITH REDUCED EJECTION FRACTION) (HCC): ICD-10-CM

## 2024-11-14 LAB
ANION GAP SERPL CALCULATED.3IONS-SCNC: 8 MMOL/L (ref 4–13)
BUN SERPL-MCNC: 41 MG/DL (ref 5–25)
CALCIUM SERPL-MCNC: 8.8 MG/DL (ref 8.4–10.2)
CHLORIDE SERPL-SCNC: 102 MMOL/L (ref 96–108)
CO2 SERPL-SCNC: 29 MMOL/L (ref 21–32)
CREAT SERPL-MCNC: 1.99 MG/DL (ref 0.6–1.3)
GFR SERPL CREATININE-BSD FRML MDRD: 31 ML/MIN/1.73SQ M
GLUCOSE SERPL-MCNC: 97 MG/DL (ref 65–140)
POTASSIUM SERPL-SCNC: 4.8 MMOL/L (ref 3.5–5.3)
SODIUM SERPL-SCNC: 139 MMOL/L (ref 135–147)

## 2024-11-14 PROCEDURE — 36415 COLL VENOUS BLD VENIPUNCTURE: CPT

## 2024-11-14 PROCEDURE — 80048 BASIC METABOLIC PNL TOTAL CA: CPT

## 2024-11-14 NOTE — TELEPHONE ENCOUNTER
yes needs to return to IR for upsizing his 14fr drain sp to a farmer type tube spt as planned  defer office exchange 6 weeks after that upsizing, depending how it goes- had trouble first time. will wait see how IR says their first one goes. Yes for UDS on 11/27 as planned

## 2024-11-14 NOTE — RESULT ENCOUNTER NOTE
LMOM x 3.  Certified letter sent to address on file.  Patient will need to be started on bactrim if symptomatic.

## 2024-11-19 ENCOUNTER — TELEPHONE (OUTPATIENT)
Age: 74
End: 2024-11-19

## 2024-11-19 NOTE — TELEPHONE ENCOUNTER
Rec'd call from daughter Shanique to schedule an appointment with dermatology, Patient has history of skin cancer offered an appointment that was available for 11/20/2024 but wasn't able to make it then I scheduled the patient for 01/22/2025 and advised to please call periodically to see if there's any cancellations

## 2024-11-21 ENCOUNTER — TELEPHONE (OUTPATIENT)
Age: 74
End: 2024-11-21

## 2024-11-21 NOTE — TELEPHONE ENCOUNTER
Patients daughter, Shanique, calling to schedule appt with Hematology.  She wasn't sure of diagnosis.  I gave her hopeline fax # [phone] to have his pcp send referral.

## 2024-11-25 DIAGNOSIS — I50.22 CHRONIC HFREF (HEART FAILURE WITH REDUCED EJECTION FRACTION) (HCC): Primary | ICD-10-CM

## 2024-11-25 DIAGNOSIS — I10 ESSENTIAL HYPERTENSION: ICD-10-CM

## 2024-11-25 DIAGNOSIS — I50.41 ACUTE COMBINED SYSTOLIC AND DIASTOLIC CONGESTIVE HEART FAILURE (HCC): ICD-10-CM

## 2024-11-25 NOTE — TELEPHONE ENCOUNTER
Requested medication(s) are due for refill today: Yes  Patient has already received a courtesy refill: No  Other reason request has been forwarded to provider: Please refill for pt, this is an updated Rx with new dosage

## 2024-11-26 ENCOUNTER — TELEPHONE (OUTPATIENT)
Dept: INTERVENTIONAL RADIOLOGY/VASCULAR | Facility: HOSPITAL | Age: 74
End: 2024-11-26

## 2024-11-26 RX ORDER — FUROSEMIDE 20 MG/1
20 TABLET ORAL DAILY
Qty: 30 TABLET | Refills: 5 | Status: SHIPPED | OUTPATIENT
Start: 2024-11-26

## 2024-11-27 ENCOUNTER — PROCEDURE VISIT (OUTPATIENT)
Dept: UROLOGY | Facility: MEDICAL CENTER | Age: 74
End: 2024-11-27
Payer: MEDICARE

## 2024-11-27 VITALS
SYSTOLIC BLOOD PRESSURE: 140 MMHG | WEIGHT: 197 LBS | DIASTOLIC BLOOD PRESSURE: 70 MMHG | HEIGHT: 67 IN | BODY MASS INDEX: 30.92 KG/M2 | HEART RATE: 69 BPM | OXYGEN SATURATION: 96 %

## 2024-11-27 DIAGNOSIS — R33.9 URINARY RETENTION: Primary | ICD-10-CM

## 2024-11-27 PROCEDURE — 51726 COMPLEX CYSTOMETROGRAM: CPT

## 2024-11-27 PROCEDURE — 51784 ANAL/URINARY MUSCLE STUDY: CPT

## 2024-11-27 RX ORDER — DICYCLOMINE HYDROCHLORIDE 10 MG/1
10 CAPSULE ORAL
COMMUNITY
Start: 2024-11-20 | End: 2024-12-03 | Stop reason: CLARIF

## 2024-11-27 RX ORDER — CEFUROXIME AXETIL 500 MG/1
500 TABLET ORAL
COMMUNITY
Start: 2024-11-11

## 2024-11-27 NOTE — PROGRESS NOTES
"CC: \"I have a catheter, I couldn't empty\"     14 Fr all purpose drain inserted by IR intact and draining and plugged prior to urodynamic testing    CMG:       Position:  sitting           Fill sensation:     16 ml,     pdet   -1 cmH2O       First urge:         221 ml,     pdet   2 cmH2O        Normal urge:    317 ml,     pdet    8 cmH2O       Must urge:         not verbalized    Permission to void:              626 ml,     pdet     8 cmH2O       Max pdet during void    9 cm H2O       Voided volume:   18  ml    Bladder stability:   stable          Compliance:  normal         EMG activity:       Normal during filling,        normal during voiding    Void attempts:   Volume Pdet  Voided volume      370     18       0      501     22       12      601     0        0      626     0        0  (c/o pain and discomfort)    Comments:   After void attempt at 370 ml, started to leak with no detrusor activity, penis was manually clamped and fill restarted.   Voided 12 ml at 501 ml with pdet of 22 cmH2O while clamped, and when unclamped, he started to leak and drip.   Void attempt at end of test unsuccessful when clamped with no detrusor activity, and then started to drip as soon as penis was unclamped.    Total out (leak and void) was 93 ml   14 Fr all purpose drain into SP site connected to farmer bag and emptied a total of 500 ml post test    Urodynamics    Date/Time: 11/27/2024 10:00 AM    Performed by: Yuridia Byrd RN  Authorized by: Charles Cruz MD  [place] Protocol:  procedure performed by consultantConsent: Verbal consent obtained. Written consent obtained.  Risks and benefits: risks, benefits and alternatives were discussed  Consent given by: patient  Patient understanding: patient states understanding of the procedure being performed  Patient consent: the patient's understanding of the procedure matches consent given  Procedure consent: procedure consent matches procedure scheduled  Patient identity confirmed: " verbally with patient  Procedure - Urodynamics:  Procedure details: CMG and EMG    Voiding Pressure Study: No      Post-procedure:     Patient tolerance: Patient tolerated procedure well with no immediate complications

## 2024-11-29 ENCOUNTER — TELEPHONE (OUTPATIENT)
Dept: INTERVENTIONAL RADIOLOGY/VASCULAR | Facility: HOSPITAL | Age: 74
End: 2024-11-29

## 2024-12-03 ENCOUNTER — HOSPITAL ENCOUNTER (OUTPATIENT)
Dept: INTERVENTIONAL RADIOLOGY/VASCULAR | Facility: HOSPITAL | Age: 74
Discharge: HOME | End: 2024-12-03
Attending: RADIOLOGY
Payer: MEDICARE

## 2024-12-03 VITALS
TEMPERATURE: 98.1 F | RESPIRATION RATE: 20 BRPM | HEART RATE: 50 BPM | DIASTOLIC BLOOD PRESSURE: 75 MMHG | OXYGEN SATURATION: 94 % | SYSTOLIC BLOOD PRESSURE: 173 MMHG

## 2024-12-03 DIAGNOSIS — T83.89XA URETHRAL EROSION BY CATHETER, INITIAL ENCOUNTER  (HCC): ICD-10-CM

## 2024-12-03 DIAGNOSIS — N36.8 URETHRAL EROSION BY CATHETER, INITIAL ENCOUNTER  (HCC): ICD-10-CM

## 2024-12-03 PROCEDURE — 51705 CHANGE OF BLADDER TUBE: CPT

## 2024-12-03 PROCEDURE — 75984 XRAY CONTROL CATHETER CHANGE: CPT

## 2024-12-03 RX ORDER — DIPHENHYDRAMINE HYDROCHLORIDE 50 MG/ML
INJECTION INTRAMUSCULAR; INTRAVENOUS AS NEEDED
Status: COMPLETED | OUTPATIENT
Start: 2024-12-03 | End: 2024-12-03

## 2024-12-03 RX ORDER — LIDOCAINE HYDROCHLORIDE 10 MG/ML
INJECTION, SOLUTION EPIDURAL; INFILTRATION; INTRACAUDAL; PERINEURAL AS NEEDED
Status: COMPLETED | OUTPATIENT
Start: 2024-12-03 | End: 2024-12-03

## 2024-12-03 RX ORDER — FENTANYL CITRATE 50 UG/ML
INJECTION, SOLUTION INTRAMUSCULAR; INTRAVENOUS AS NEEDED
Status: COMPLETED | OUTPATIENT
Start: 2024-12-03 | End: 2024-12-03

## 2024-12-03 RX ADMIN — FENTANYL CITRATE 100 MCG: 50 INJECTION, SOLUTION INTRAMUSCULAR; INTRAVENOUS at 10:14

## 2024-12-03 RX ADMIN — LIDOCAINE HYDROCHLORIDE 9 ML: 10 INJECTION, SOLUTION EPIDURAL; INFILTRATION; INTRACAUDAL; PERINEURAL at 10:15

## 2024-12-03 RX ADMIN — DIPHENHYDRAMINE HYDROCHLORIDE 50 MG: 50 INJECTION INTRAMUSCULAR; INTRAVENOUS at 10:08

## 2024-12-03 RX ADMIN — IOHEXOL 12 ML: 350 INJECTION, SOLUTION INTRAVENOUS at 10:27

## 2024-12-03 NOTE — BRIEF OP NOTE (RAD/CATH)
INTERVENTIONAL RADIOLOGY PROCEDURE NOTE    Date: 12/3/2024    Procedure: SPT upsize  Procedure Summary       Date: 12/03/24 Room / Location: [facility] Interventional Radiology    Anesthesia Start:  Anesthesia Stop:     Procedure: IR SUPRAPUBIC CATHETER CHECK/CHANGE/REINSERTION/UPSIZE Diagnosis:       Urethral erosion by catheter, initial encounter  ([facility])      (convert 14F APD to farmer)    Scheduled Providers:  Responsible Provider:     Anesthesia Type: Not recorded ASA Status: Not recorded            Preoperative diagnosis:   1. Urethral erosion by catheter, initial encounter  ([facility])         Postoperative diagnosis: Same.    Surgeon: Alexander Hinojosa MD     Assistant: None. No qualified resident was available.    Blood loss: None    Specimens: None     Findings: Under fluoro existing APD injected and a large filling defect seen in bladder which may correlate with the filling defect seen on CT 11/10/24 and maybe a hematoma. The existing catheter removed over wire and tract dilated and a 16 Fr Farmer placed and 10 ml balloon inflated.    Follow US in a few weeks to evaluate suspected hematoma suggested.    Complications: None immediate.    Anesthesia: local and IV Fentanyl

## 2024-12-03 NOTE — SEDATION DOCUMENTATION
Pigtail SPT successfully exchanged for balloon tip SPT. Patient tolerated well. Awake and stable for transfer to [facility]U for 1 hr recovery.

## 2024-12-03 NOTE — DISCHARGE INSTRUCTIONS
Suprapubic Cystostomy     WHAT YOU NEED TO KNOW:   Suprapubic cystostomy is surgery to create a stoma (opening) through your abdomen into your bladder. This opening is where a catheter is inserted to drain urine. You may need a cystostomy if your urine flow is blocked.    DISCHARGE INSTRUCTIONS:   Resume your normal diet. Small sips of flat soda will help with mild nausea.    Follow up with your healthcare provider as directed: You may need to return to have your suprapubic catheter changed or removed. Write down your questions so you remember to ask them during your visits.     Empty your urine drainage bag: Empty your urine drainage bag when it is ½ to ? full, or every 8 hours. If you have a smaller leg bag, empty it every 3 to 4 hours. Do the following when you empty your urine drainage bag:  Hold the urine bag over a toilet or large container.    Remove the drain spout from its sleeve at the bottom of the urine bag. Do not touch the tip of the drain spout. Open the slide valve on the spout.   Let the urine flow out of the urine bag into the toilet or container. Do not let the drainage tube touch anything.   Clean the end of the drain spout with alcohol when the bag is empty. Ask which cleaning solution is best to use.    Close the slide valve and put the drain spout into its sleeve at the bottom of the urine bag. Write down how much urine was in your bag if you were asked to keep a record.    Prevent an infection:   Clean the stoma and skin around it daily. Wash your hands before and after cystostomy care. Put on a new pair of clean medical gloves.   Change your urine bag or clean reusable bags. Ask how often you need to change or clean your urine drainage bag. You may need to change your reusable bag at least once a week.   Keep the bag below your waist. This will prevent urine from flowing back into your bladder and causing an infection or other problems. Also, keep the tube free of kinks so the urine will  drain properly. Do not pull on the catheter. This can cause pain and bleeding and may cause the catheter to come out.    Contact Interventional Radiology at [phone] ([facility] PATIENTS: Contact Interventional Radiology at [phone]) ([facility] PATIENTS: Contact Interventional Radiology at [phone]) if any of the following occur:  You have a fever or chills.  Persistent nausea or vomiting  You have severe pain.  You have a burning pain in your stoma.   Your stoma is red, swollen, or draining pus.   You have blood in your urine.   You have questions or concerns about your condition or care.  Seek care immediately or call 911 if:   No urine is draining into the urine bag.

## 2024-12-05 ENCOUNTER — APPOINTMENT (EMERGENCY)
Dept: CT IMAGING | Facility: HOSPITAL | Age: 74
End: 2024-12-05
Payer: MEDICARE

## 2024-12-05 ENCOUNTER — APPOINTMENT (EMERGENCY)
Dept: RADIOLOGY | Facility: HOSPITAL | Age: 74
End: 2024-12-05
Payer: MEDICARE

## 2024-12-05 ENCOUNTER — HOSPITAL ENCOUNTER (EMERGENCY)
Facility: HOSPITAL | Age: 74
Discharge: HOME | End: 2024-12-05
Attending: EMERGENCY MEDICINE
Payer: MEDICARE

## 2024-12-05 ENCOUNTER — PATIENT MESSAGE (OUTPATIENT)
Dept: UROLOGY | Facility: CLINIC | Age: 74
End: 2024-12-05

## 2024-12-05 VITALS
HEART RATE: 58 BPM | HEIGHT: 67 IN | SYSTOLIC BLOOD PRESSURE: 141 MMHG | OXYGEN SATURATION: 97 % | RESPIRATION RATE: 20 BRPM | TEMPERATURE: 98.2 F | WEIGHT: 194 LBS | DIASTOLIC BLOOD PRESSURE: 63 MMHG | BODY MASS INDEX: 30.45 KG/M2

## 2024-12-05 DIAGNOSIS — R31.9 HEMATURIA: ICD-10-CM

## 2024-12-05 DIAGNOSIS — N20.1 RIGHT URETERAL STONE: Primary | ICD-10-CM

## 2024-12-05 DIAGNOSIS — N32.89 BLADDER MASS: ICD-10-CM

## 2024-12-05 DIAGNOSIS — R93.5 ABNORMAL CT OF THE ABDOMEN: ICD-10-CM

## 2024-12-05 LAB
2HR DELTA HS TROPONIN: -1 NG/L
ALBUMIN SERPL BCG-MCNC: 3.7 G/DL (ref 3.5–5)
ALP SERPL-CCNC: 79 U/L (ref 34–104)
ALT SERPL W P-5'-P-CCNC: 12 U/L (ref 7–52)
ANION GAP SERPL CALCULATED.3IONS-SCNC: 9 MMOL/L (ref 4–13)
APTT PPP: 31 SECONDS (ref 23–34)
AST SERPL W P-5'-P-CCNC: 16 U/L (ref 13–39)
BACTERIA UR QL AUTO: ABNORMAL /HPF
BASOPHILS # BLD AUTO: 0.04 THOUSANDS/ÂΜL (ref 0–0.1)
BASOPHILS NFR BLD AUTO: 1 % (ref 0–1)
BILIRUB SERPL-MCNC: 0.6 MG/DL (ref 0.2–1)
BILIRUB UR QL STRIP: NEGATIVE
BUN SERPL-MCNC: 43 MG/DL (ref 5–25)
CALCIUM SERPL-MCNC: 8.6 MG/DL (ref 8.4–10.2)
CARDIAC TROPONIN I PNL SERPL HS: 22 NG/L (ref ?–50)
CARDIAC TROPONIN I PNL SERPL HS: 23 NG/L (ref ?–50)
CHLORIDE SERPL-SCNC: 103 MMOL/L (ref 96–108)
CLARITY UR: CLEAR
CO2 SERPL-SCNC: 25 MMOL/L (ref 21–32)
COLOR UR: COLORLESS
CREAT SERPL-MCNC: 2.03 MG/DL (ref 0.6–1.3)
EOSINOPHIL # BLD AUTO: 0.57 THOUSAND/ÂΜL (ref 0–0.61)
EOSINOPHIL NFR BLD AUTO: 10 % (ref 0–6)
ERYTHROCYTE [DISTWIDTH] IN BLOOD BY AUTOMATED COUNT: 13.5 % (ref 11.6–15.1)
GFR SERPL CREATININE-BSD FRML MDRD: 30 ML/MIN/1.73SQ M
GLUCOSE SERPL-MCNC: 92 MG/DL (ref 65–140)
GLUCOSE UR STRIP-MCNC: NEGATIVE MG/DL
HCT VFR BLD AUTO: 38.6 % (ref 36.5–49.3)
HGB BLD-MCNC: 12.2 G/DL (ref 12–17)
HGB UR QL STRIP.AUTO: ABNORMAL
IMM GRANULOCYTES # BLD AUTO: 0.03 THOUSAND/UL (ref 0–0.2)
IMM GRANULOCYTES NFR BLD AUTO: 1 % (ref 0–2)
INR PPP: 1.03 (ref 0.85–1.19)
KETONES UR STRIP-MCNC: NEGATIVE MG/DL
LEUKOCYTE ESTERASE UR QL STRIP: ABNORMAL
LIPASE SERPL-CCNC: 50 U/L (ref 11–82)
LYMPHOCYTES # BLD AUTO: 1.01 THOUSANDS/ÂΜL (ref 0.6–4.47)
LYMPHOCYTES NFR BLD AUTO: 18 % (ref 14–44)
MCH RBC QN AUTO: 31.3 PG (ref 26.8–34.3)
MCHC RBC AUTO-ENTMCNC: 31.6 G/DL (ref 31.4–37.4)
MCV RBC AUTO: 99 FL (ref 82–98)
MONOCYTES # BLD AUTO: 0.57 THOUSAND/ÂΜL (ref 0.17–1.22)
MONOCYTES NFR BLD AUTO: 10 % (ref 4–12)
NEUTROPHILS # BLD AUTO: 3.26 THOUSANDS/ÂΜL (ref 1.85–7.62)
NEUTS SEG NFR BLD AUTO: 60 % (ref 43–75)
NITRITE UR QL STRIP: NEGATIVE
NON-SQ EPI CELLS URNS QL MICRO: ABNORMAL /HPF
NRBC BLD AUTO-RTO: 0 /100 WBCS
PH UR STRIP.AUTO: 7 [PH]
PLATELET # BLD AUTO: 263 THOUSANDS/UL (ref 149–390)
PMV BLD AUTO: 10.7 FL (ref 8.9–12.7)
POTASSIUM SERPL-SCNC: 4.6 MMOL/L (ref 3.5–5.3)
PROT SERPL-MCNC: 6.9 G/DL (ref 6.4–8.4)
PROT UR STRIP-MCNC: ABNORMAL MG/DL
PROTHROMBIN TIME: 14 SECONDS (ref 12.3–15)
RBC # BLD AUTO: 3.9 MILLION/UL (ref 3.88–5.62)
RBC #/AREA URNS AUTO: ABNORMAL /HPF
SODIUM SERPL-SCNC: 137 MMOL/L (ref 135–147)
SP GR UR STRIP.AUTO: <1.005 (ref 1–1.03)
UROBILINOGEN UR STRIP-ACNC: <2 MG/DL
WBC # BLD AUTO: 5.48 THOUSAND/UL (ref 4.31–10.16)
WBC #/AREA URNS AUTO: ABNORMAL /HPF

## 2024-12-05 PROCEDURE — 85025 COMPLETE CBC W/AUTO DIFF WBC: CPT

## 2024-12-05 PROCEDURE — 96361 HYDRATE IV INFUSION ADD-ON: CPT

## 2024-12-05 PROCEDURE — 81001 URINALYSIS AUTO W/SCOPE: CPT

## 2024-12-05 PROCEDURE — 85730 THROMBOPLASTIN TIME PARTIAL: CPT

## 2024-12-05 PROCEDURE — 71045 X-RAY EXAM CHEST 1 VIEW: CPT

## 2024-12-05 PROCEDURE — 84484 ASSAY OF TROPONIN QUANT: CPT

## 2024-12-05 PROCEDURE — 99284 EMERGENCY DEPT VISIT MOD MDM: CPT

## 2024-12-05 PROCEDURE — 85610 PROTHROMBIN TIME: CPT

## 2024-12-05 PROCEDURE — 80053 COMPREHEN METABOLIC PANEL: CPT

## 2024-12-05 PROCEDURE — 74177 CT ABD & PELVIS W/CONTRAST: CPT

## 2024-12-05 PROCEDURE — 83690 ASSAY OF LIPASE: CPT

## 2024-12-05 PROCEDURE — 93005 ELECTROCARDIOGRAM TRACING: CPT

## 2024-12-05 PROCEDURE — 96374 THER/PROPH/DIAG INJ IV PUSH: CPT

## 2024-12-05 PROCEDURE — 36415 COLL VENOUS BLD VENIPUNCTURE: CPT

## 2024-12-05 PROCEDURE — 99285 EMERGENCY DEPT VISIT HI MDM: CPT

## 2024-12-05 RX ORDER — ONDANSETRON 2 MG/ML
4 INJECTION INTRAMUSCULAR; INTRAVENOUS ONCE
Status: COMPLETED | OUTPATIENT
Start: 2024-12-05 | End: 2024-12-05

## 2024-12-05 RX ORDER — TAMSULOSIN HYDROCHLORIDE 0.4 MG/1
0.4 CAPSULE ORAL ONCE
Status: DISCONTINUED | OUTPATIENT
Start: 2024-12-05 | End: 2024-12-05

## 2024-12-05 RX ADMIN — ONDANSETRON 4 MG: 2 INJECTION, SOLUTION INTRAMUSCULAR; INTRAVENOUS at 08:44

## 2024-12-05 RX ADMIN — SODIUM CHLORIDE 1000 ML: 0.9 INJECTION, SOLUTION INTRAVENOUS at 08:45

## 2024-12-05 RX ADMIN — IOHEXOL 100 ML: 350 INJECTION, SOLUTION INTRAVENOUS at 09:43

## 2024-12-05 NOTE — DISCHARGE INSTRUCTIONS
Increase your fluids to maintain your hydration.  Strain all urine until you pass a stone.  I have placed a referral to urology and reached out to ROSALBA Pro.  She will reach out to the urology office for you to closely follow-up with.     Promptly return to the ER if you develop fever, new or worsening pain, vomiting, persistent blood in urine or large blood clots, decreased urine output from Colon catheter for greater than 6 hours, weakness, confusion, lethargy.    If you develop the signs or symptoms, try to present to [facility] as this is the site where Urology will be able to see you.

## 2024-12-05 NOTE — QUICK NOTE
Patient presented [facility] [place] [facility] department due to hematuria and abdominal pain.  He had unsuccessful suprapubic tube exchange in the office and was referred to interventional radiology with suprapubic tube placement 12/3.  He is complaining of periumbilical abdominal discomfort.  CT was obtained in the emergency department.    CT:CT scan from 1 month ago did reveal a possible bladder hematoma or mass. Repeat CT today with new 2 mm distal right ureteral stone.  Also noted a 1.5 cm right renal upper pole nodule which appears more dense than CT from 11/10.  Recommendation is for follow-up CT renal protocol in 3 months for monitoring.  Moderate right hydroureteronephrosis unchanged however new 2 mm distal stone was noted.  Patient had CT renal protocol 10/15/2024 as well which revealed moderate right hydroureteronephrosis increasing since September 2024.  At that time a 1.2 cm right renal lesion appeared more dense than a simple cyst with no definite enhancement with recommendations for follow-up CT renal protocol in 6 months    Afebrile, vital signs stable  WBC 5.48  Creat 2.03 (baseline 1.8-1.99)      Plan:  Monitor urine output and color  Can be discharged home on Flomax  Strain all urine  ER precautions for any fevers, chills, severe pain excetra; patient should present to [place] emergency department for any of these symptoms  Follow-up with urology outpatient for possible cystoscopy; however had cystoscopy 7/30/2024 with unremarkable bladder

## 2024-12-05 NOTE — ED PROVIDER NOTES
Time reflects when diagnosis was documented in both MDM as applicable and the Disposition within this note       Time User Action Codes Description Comment    12/5/2024 11:31 AM Tracy Ingram Add [N20.1] Right ureteral stone     12/5/2024 11:31 AM Hussein Ingrama Add [R31.9] Hematuria     12/5/2024 11:32 AM ShHussein andradea Add [N32.89] Bladder mass     12/5/2024 11:32 AM Tracy Ingram Add [R93.5] Abnormal CT of the abdomen           ED Disposition       ED Disposition   Discharge    Condition   Stable    Date/Time   Thu Dec 5, 2024 11:31 AM    Comment   Toro Rodriguez discharge to home/self care.                   Assessment & Plan       Medical Decision Making  DDx including but not limited to: UTI, hemorrhagic cystitis, bladder wall hematoma, coagulopathy, anemia, recent instrumentation, appendicitis    Patient with 1 day of new upper abdominal pain also with blood clots to Colon bag.  Colon bag drained prior to arrival and the urine is currently a very light pink tinge with no blood clots.  The patient has no suprapubic tenderness or complaint of bladder spasms.  He does have periumbilical abdominal pain which she has not had before.  Reviewed his most recent imaging from approximately 1 month ago with possible bladder wall hematoma.  Discussed that possibly this hematoma has broken up and is the cause for his new hematuria.  Will further evaluate with repeat CT scan.  Will obtain labs including coags to further evaluate for coagulopathy, anemia.  Will obtain urine to reevaluate for UTI.  Will medicate with fluids and Zofran, patient currently deferring pain medication.    Labs reviewed with elevation of creatinine and BUN, given fluid resuscitation as he does appear slightly dehydrated on exam.  Not greater than 1.5 times baseline, no CHUY.  Remainder of labs overall stable.  UA negative for infection.  CT imaging with new 2 mm right ureteral stone, do suspect this as possible cause of patient's new complaint of pain  this morning as well as self-limiting hematuria.  While here, he has had urine draining to his bag that is yellow, not pink-tinged or with clots.  I did discuss with ROSALBA Pro from urology plan for him to follow-up on the 16th as scheduled.  He is already on Flomax and Proscar.  No new medications indicated.  I discussed all the results with the patient including the findings of atherosclerosis on CT imaging.  He has 2+ pulses throughout, is warm and well-perfused, with cap refill of 3 seconds.  No ischemic limb or signs or symptoms of peripheral arterial disease.  Will have him follow-up with primary care and/or vascular as needed if he does develop any of these signs or symptoms.  He is in agreement with plan for discharge to home with close follow-up with urology.  He demonstrates strict return precautions by teach back method.  Has no further questions or concerns at this time.    Problems Addressed:  Abnormal CT of the abdomen: undiagnosed new problem with uncertain prognosis  Bladder mass: acute illness or injury  Hematuria: acute illness or injury  Right ureteral stone: acute illness or injury    Amount and/or Complexity of Data Reviewed  Labs: ordered. Decision-making details documented in ED Course.  Radiology: ordered and independent interpretation performed. Decision-making details documented in ED Course.    Risk  OTC drugs.  Prescription drug management.        ED Course as of 12/05/24 1250   Thu Dec 05, 2024   1051 CT abdomen pelvis with contrast  IMPRESSION:     Stable moderate right hydronephroureterosis extending through the UVJ. New 2 mm right distal ureteral calculus proximal to the UVJ; this is not the cause of obstruction. No perinephric collection.     Right lateral bladder wall masslike thickening, similar. This may represent hematoma or a mass. Mild fat stranding adjacent to the bladder. Advise correlation with UA and urine cytology.     1.5 cm right renal upper pole nodule appears more  dense since 11/10/2024. This may represent progressive intracystic hemorrhage. This can be followed up with dedicated renal MRI or CT urogram in 3 months.     No discernible flow in the right common iliac artery, similar. Trace flow in the right external iliac artery likely retrograde. Trace flow is present in the right common femoral artery. Correlate with pulses.     Additional chronic findings and negatives as above.     1120 XR chest 1 view portable  IMPRESSION:     No acute cardiopulmonary disease.     Stable chronic interstitial opacities of the right lung base with mild left basilar atelectasis.     1130 ROSALBA Knotek consulted and recommends flomax, increasing fluids home, and strict return precautions   1203 hs TnI 2hr: 22  Noted negative       Medications   sodium chloride 0.9 % bolus 1,000 mL (0 mL Intravenous Stopped 12/5/24 0945)   ondansetron (ZOFRAN) injection 4 mg (4 mg Intravenous Given 12/5/24 0844)   iohexol (OMNIPAQUE) 350 MG/ML injection (MULTI-DOSE) 100 mL (100 mL Intravenous Given 12/5/24 0943)       ED Risk Strat Scores                           SBIRT 22yo+      Flowsheet Row Most Recent Value   Initial Alcohol Screen: US AUDIT-C     1. How often do you have a drink containing alcohol? 0 Filed at: 12/05/2024 0751   2. How many drinks containing alcohol do you have on a typical day you are drinking?  0 Filed at: 12/05/2024 0751   3a. Male UNDER 65: How often do you have five or more drinks on one occasion? 0 Filed at: 12/05/2024 0751   3b. FEMALE Any Age, or MALE 65+: How often do you have 4 or more drinks on one occassion? 0 Filed at: 12/05/2024 0751   Audit-C Score 0 Filed at: 12/05/2024 0751   WILLIE: How many times in the past year have you...    Used an illegal drug or used a prescription medication for non-medical reasons? Never Filed at: 12/05/2024 0751                            History of Present Illness       Chief Complaint   Patient presents with    Blood in Urine     Pt presents via  "EMS from [place] assisted, c/o hematuria since this morning and \"clots in bag\". Pt reports feeling cramping in upper abd and dizzy. Denies chest pain/SOB       Past Medical History:   Diagnosis Date    Alcohol intoxication ([facility]) 10/15/2020    BPH (benign prostatic hyperplasia)     Chronic HFrEF (heart failure with reduced ejection fraction) (Formerly [facility]) 2023    Coronary artery calcification seen on CT scan 11/10/2023    Coronary artery disease 2023    Deep vein thrombosis (DVT) of left lower extremity ([facility]) 2023    Diabetes mellitus ([facility]) 2023    Fall from slip, trip, or stumble 10/15/2020    History of phimosis of penis     History of urinary calculi     Hypertension     Skin cancer     Tobacco abuse     quit around       Past Surgical History:   Procedure Laterality Date    BLADDER STONE REMOVAL      CARDIAC CATHETERIZATION N/A 2023    Procedure: Cardiac catheterization;  Surgeon: Dave Royal MD;  Location: [facility];  Service: Cardiology    IR SUPRAPUBIC CATHETER CHECK/CHANGE/REINSERTION/UPSIZE  2024    IR SUPRAPUBIC CATHETER CHECK/CHANGE/REINSERTION/UPSIZE  12/3/2024    IR SUPRAPUBIC CATHETER PLACEMENT  2024    ORIF ANKLE FRACTURE Left     SKIN LESION EXCISION N/A 2024    Procedure: WIDE LOCAL EXCISION OF BACK MELANOMA;  Surgeon: Lillie La MD;  Location: [facility];  Service: Surgical Oncology    TOTAL HIP ARTHROPLASTY Right       Family History   Problem Relation Age of Onset    Breast cancer Mother     Heart attack Father 61    Other Sister         s/p hysterectomy    Cerebral palsy Brother     Skin cancer Other         family history    No Known Problems Son     No Known Problems Daughter     Sudden death Neg Hx       Social History     Tobacco Use    Smoking status: Former     Types: Cigarettes     Start date:      Quit date:      Years since quittin.9    Smokeless tobacco: Never    Tobacco comments:     Quit over 30 years ago " (Updated 12/2023).    Vaping Use    Vaping status: Never Used   Substance Use Topics    Alcohol use: Not Currently    Drug use: Never      E-Cigarette/Vaping    E-Cigarette Use Never User       E-Cigarette/Vaping Substances    Nicotine No     THC No     CBD No     Flavoring No     Other No     Unknown No       I have reviewed and agree with the history as documented.     The patient is a 74-year-old male with PMH of HTN, CAD, HFrEF, T2DM, and recurrent hematuria and BPH s/p suprapubic catheter placement presenting from [facility] for evaluation of abdominal pain and hematuria.  The patient woke this morning and noticed some dime size blood clots to his bag and pink to dark tatum yellow urine.  As his day started, he also noticed new upper abdominal pain described as constant, aching, as well as a feeling of bloating.  He denies having pain like this before.  He endorses associated nausea but denies vomiting.  He denies associated fevers, chills, chest pain, shortness of breath, palpitations, change in bowel, flank pain, bladder spasms, leakage of urine from his penis, decreased urinary drainage into the catheter.  On review of his chart, he was last seen by urology 2 days ago for urodynamic testing.      History provided by:  Patient and EMS personnel   used: No        Review of Systems   Constitutional:  Negative for chills, fatigue and fever.   Respiratory:  Negative for cough and shortness of breath.    Cardiovascular:  Negative for chest pain, palpitations and leg swelling.   Gastrointestinal:  Positive for abdominal pain (Periumbilical abdominal pain) and nausea. Negative for constipation, diarrhea and vomiting.   Genitourinary:  Positive for hematuria. Negative for decreased urine volume, difficulty urinating, dysuria, flank pain, frequency, penile pain, penile swelling, scrotal swelling and testicular pain.   Musculoskeletal:  Negative for back pain and gait problem.   Skin:   Negative for rash and wound.   All other systems reviewed and are negative.          Objective       ED Triage Vitals [12/05/24 0751]   Temperature Pulse Blood Pressure Respirations SpO2 Patient Position - Orthostatic VS   98.2 °F (36.8 °C) (!) 50 126/76 18 97 % Sitting      Temp Source Heart Rate Source BP Location FiO2 (%) Pain Score    Oral Monitor Left arm -- No Pain      Vitals      Date and Time Temp Pulse SpO2 Resp BP Pain Score FACES Pain Rating User   12/05/24 1200 -- 47 98 % 17 152/68 -- -- SS   12/05/24 1130 -- 49 98 % 15 148/69 -- -- SS   12/05/24 0945 -- 48 95 % 16 143/67 -- -- SS   12/05/24 0751 98.2 °F (36.8 °C) 50 97 % 18 126/76 No Pain -- SS            Physical Exam  Vitals and nursing note reviewed.   Constitutional:       General: He is not in acute distress.     Appearance: Normal appearance. He is normal weight. He is not ill-appearing or toxic-appearing.   HENT:      Head: Normocephalic and atraumatic.      Nose: Nose normal.      Mouth/Throat:      Mouth: Mucous membranes are moist.      Pharynx: Oropharynx is clear.   Eyes:      Extraocular Movements: Extraocular movements intact.      Conjunctiva/sclera: Conjunctivae normal.   Cardiovascular:      Rate and Rhythm: Normal rate.   Pulmonary:      Effort: Pulmonary effort is normal. No respiratory distress.   Abdominal:      General: Abdomen is protuberant. Bowel sounds are normal.      Palpations: Abdomen is soft.      Tenderness: There is abdominal tenderness in the periumbilical area. There is no right CVA tenderness, left CVA tenderness, guarding or rebound.       Musculoskeletal:         General: Normal range of motion.      Cervical back: Normal range of motion and neck supple.   Skin:     General: Skin is warm and dry.      Capillary Refill: Capillary refill takes less than 2 seconds.   Neurological:      General: No focal deficit present.      Mental Status: He is alert and oriented to person, place, and time. Mental status is at  baseline.         Results Reviewed       Procedure Component Value Units Date/Time    HS Troponin I 2hr [[id number]]  (Normal) Collected: 12/05/24 1132    Lab Status: Final result Specimen: Blood from Arm, Left Updated: 12/05/24 1159     hs TnI 2hr 22 ng/L      Delta 2hr hsTnI -1 ng/L     Urine Microscopic [[id number]]  (Abnormal) Collected: 12/05/24 0959    Lab Status: Final result Specimen: Urine, Indwelling Colon Catheter Updated: 12/05/24 1033     RBC, UA 4-10 /hpf      WBC, UA 2-4 /hpf      Epithelial Cells None Seen /hpf      Bacteria, UA Occasional /hpf     UA w Reflex to Microscopic w Reflex to Culture [[id number]]  (Abnormal) Collected: 12/05/24 0959    Lab Status: Final result Specimen: Urine, Indwelling Colon Catheter Updated: 12/05/24 1011     Color, UA Colorless     Clarity, UA Clear     Specific Gravity, UA <1.005     pH, UA 7.0     Leukocytes, UA Moderate     Nitrite, UA Negative     Protein, UA Trace mg/dl      Glucose, UA Negative mg/dl      Ketones, UA Negative mg/dl      Urobilinogen, UA <2.0 mg/dl      Bilirubin, UA Negative     Occult Blood, UA Large    HS Troponin 0hr (reflex protocol) [[id number]]  (Normal) Collected: 12/05/24 0827    Lab Status: Final result Specimen: Blood from Arm, Left Updated: 12/05/24 0856     hs TnI 0hr 23 ng/L     Comprehensive metabolic panel [[id number]]  (Abnormal) Collected: 12/05/24 0827    Lab Status: Final result Specimen: Blood from Arm, Left Updated: 12/05/24 0850     Sodium 137 mmol/L      Potassium 4.6 mmol/L      Chloride 103 mmol/L      CO2 25 mmol/L      ANION GAP 9 mmol/L      BUN 43 mg/dL      Creatinine 2.03 mg/dL      Glucose 92 mg/dL      Calcium 8.6 mg/dL      AST 16 U/L      ALT 12 U/L      Alkaline Phosphatase 79 U/L      Total Protein 6.9 g/dL      Albumin 3.7 g/dL      Total Bilirubin 0.60 mg/dL      eGFR 30 ml/min/1.73sq m     Narrative:      National Kid[employer] Foundation guidelines for Chronic Kidney Disease (CKD):     Stage 1 with normal or  high GFR (GFR > 90 mL/min/1.73 square meters)    Stage 2 Mild CKD (GFR = 60-89 mL/min/1.73 square meters)    Stage 3A Moderate CKD (GFR = 45-59 mL/min/1.73 square meters)    Stage 3B Moderate CKD (GFR = 30-44 mL/min/1.73 square meters)    Stage 4 Severe CKD (GFR = 15-29 mL/min/1.73 square meters)    Stage 5 End Stage CKD (GFR <15 mL/min/1.73 square meters)  Note: GFR calculation is accurate only with a steady state creatinine    Lipase [[id number]]  (Normal) Collected: 12/05/24 0827    Lab Status: Final result Specimen: Blood from Arm, Left Updated: 12/05/24 0850     Lipase 50 u/L     Protime-INR [[id number]]  (Normal) Collected: 12/05/24 0827    Lab Status: Final result Specimen: Blood from Arm, Left Updated: 12/05/24 0849     Protime 14.0 seconds      INR 1.03    Narrative:      INR Therapeutic Range    Indication                                             INR Range      Atrial Fibrillation                                               2.0-3.0  Hypercoagulable State                                    2.0.2.3  Left Ventricular Asist Device                            2.0-3.0  Mechanical Heart Valve                                  -    Aortic(with afib, MI, embolism, HF, LA enlargement,    and/or coagulopathy)                                     2.0-3.0 (2.5-3.5)     Mitral                                                             2.5-3.5  Prosthetic/Bioprosthetic Heart Valve               2.0-3.0  Venous thromboembolism (VTE: VT, PE        2.0-3.0    APTT [[id number]]  (Normal) Collected: 12/05/24 0827    Lab Status: Final result Specimen: Blood from Arm, Left Updated: 12/05/24 0849     PTT 31 seconds     CBC and differential [[id number]]  (Abnormal) Collected: 12/05/24 0827    Lab Status: Final result Specimen: Blood from Arm, Left Updated: 12/05/24 0834     WBC 5.48 Thousand/uL      RBC 3.90 Million/uL      Hemoglobin 12.2 g/dL      Hematocrit 38.6 %      MCV 99 fL      MCH 31.3 pg      MCHC 31.6 g/dL      RDW  13.5 %      MPV 10.7 fL      Platelets 263 Thousands/uL      nRBC 0 /100 WBCs      Segmented % 60 %      Immature Grans % 1 %      Lymphocytes % 18 %      Monocytes % 10 %      Eosinophils Relative 10 %      Basophils Relative 1 %      Absolute Neutrophils 3.26 Thousands/µL      Absolute Immature Grans 0.03 Thousand/uL      Absolute Lymphocytes 1.01 Thousands/µL      Absolute Monocytes 0.57 Thousand/µL      Eosinophils Absolute 0.57 Thousand/µL      Basophils Absolute 0.04 Thousands/µL             CT abdomen pelvis with contrast   Final Interpretation by Tien Villa MD (12/05 1042)      Stable moderate right hydronephroureterosis extending through the UVJ. New 2 mm right distal ureteral calculus proximal to the UVJ; this is not the cause of obstruction. No perinephric collection.      Right lateral bladder wall masslike thickening, similar. This may represent hematoma or a mass. Mild fat stranding adjacent to the bladder. Advise correlation with UA and urine cytology.      1.5 cm right renal upper pole nodule appears more dense since 11/10/2024. This may represent progressive intracystic hemorrhage. This can be followed up with dedicated renal MRI or CT urogram in 3 months.      No discernible flow in the right common iliac artery, similar. Trace flow in the right external iliac artery likely retrograde. Trace flow is present in the right common femoral artery. Correlate with pulses.      Additional chronic findings and negatives as above.      The study was marked in EPIC for immediate notification.         Workstation performed: [id number]         XR chest 1 view portable   ED Interpretation by CARLIE Steele (12/05 0857)   No acute cardiopulmonary disease identified by me.      Final Interpretation by Mary Cortes MD (12/05 0435)      No acute cardiopulmonary disease.      Stable chronic interstitial opacities of the right lung base with mild left basilar atelectasis.             Resident: ENEDELIA LYN I, the attending radiologist, have reviewed the images and agree with the final report above.      Workstation performed: [id number]             ECG 12 Lead Documentation Only    Date/Time: 12/5/2024 8:39 AM    Performed by: CARLIE Steele  Authorized by: CARLIE Steele    Indications / Diagnosis:  Periumbilical abdominal pain with nausea  ECG reviewed by me, the ED Provider: yes    Patient location:  ED  Previous ECG:     Previous ECG:  Compared to current    Comparison ECG info:  10/31/2024    Similarity:  No change    Comparison to cardiac monitor: Yes    Interpretation:     Interpretation: non-specific    Rate:     ECG rate:  48    ECG rate assessment: bradycardic    Rhythm:     Rhythm: sinus bradycardia    Ectopy:     Ectopy: none    QRS:     QRS axis:  Normal    QRS intervals:  Wide  Conduction:     Conduction: abnormal      Abnormal conduction: non-specific intraventricular conduction delay    ST segments:     ST segments:  Non-specific  T waves:     T waves: non-specific    Comments:      Sinus bradycardia, normal axis, nonspecific intraventricular block, no changes when compared to prior      ED Medication and Procedure Management   Prior to Admission Medications   Prescriptions Last Dose Informant Patient Reported? Taking?   Eliquis 5 MG  Self Yes No   Sig: Take 5 mg by mouth 2 (two) times a day   Patient not taking: Reported on 11/7/2024   acetaminophen (TYLENOL) 325 mg tablet  Self Yes No   Sig: Take 325 mg by mouth every 6 (six) hours as needed for mild pain   amiodarone 200 mg tablet  Self No No   Sig: Take 1 tablet (200 mg total) by mouth daily   aspirin 81 mg chewable tablet  Self No No   Sig: Chew 1 tablet (81 mg total) daily Do not start before September 30, 2024.   atorvastatin (LIPITOR) 40 mg tablet  Self No No   Sig: Take 1 tablet (40 mg total) by mouth daily with dinner   cefuroxime (CEFTIN) 500 mg tablet   Yes No   Sig: Take 500 mg by mouth    clotrimazole (LOTRIMIN) 1 % cream  Self Yes No   Sig: Apply topically 2 (two) times a day   finasteride (PROSCAR) 5 mg tablet  Self No No   Sig: Take 1 tablet (5 mg total) by mouth daily   furosemide (LASIX) 20 mg tablet   No No   Sig: Take 1 tablet (20 mg total) by mouth daily   lisinopril (ZESTRIL) 5 mg tablet  Self No No   Sig: Take 1 tablet (5 mg total) by mouth daily Do not start before November 4, 2024.   metoprolol succinate (TOPROL-XL) 25 mg 24 hr tablet   No No   Sig: Take 1 tablet (25 mg total) by mouth every 12 (twelve) hours   mupirocin (BACTROBAN) 2 % ointment  Self Yes No   Sig: Apply topically 3 (three) times a day   nitroglycerin (NITROSTAT) 0.4 mg SL tablet  Self No No   Sig: Place 1 tablet (0.4 mg total) under the tongue every 5 (five) minutes as needed for chest pain   polyethylene glycol (MIRALAX) 17 g packet  Self No No   Sig: Take 17 g by mouth daily   tamsulosin (FLOMAX) 0.4 mg  Self No No   Sig: Take 1 capsule (0.4 mg total) by mouth daily with dinner      Facility-Administered Medications: None     Patient's Medications   Discharge Prescriptions    No medications on file       ED SEPSIS DOCUMENTATION   Time reflects when diagnosis was documented in both MDM as applicable and the Disposition within this note       Time User Action Codes Description Comment    12/5/2024 11:31 AM Tracy Ingram [N20.1] Right ureteral stone     12/5/2024 11:31 AM Tracy Ingram [R31.9] Hematuria     12/5/2024 11:32 AM Tracy Ingrma [N32.89] Bladder mass     12/5/2024 11:32 AM Tracy Ingram [R93.5] Abnormal CT of the abdomen                  CARLIE Steele  12/05/24 1250

## 2024-12-06 LAB
ATRIAL RATE: 48 BPM
P AXIS: 33 DEGREES
PR INTERVAL: 184 MS
QRS AXIS: 29 DEGREES
QRSD INTERVAL: 128 MS
QT INTERVAL: 518 MS
QTC INTERVAL: 462 MS
T WAVE AXIS: 2 DEGREES
VENTRICULAR RATE: 48 BPM

## 2024-12-06 PROCEDURE — 93010 ELECTROCARDIOGRAM REPORT: CPT | Performed by: INTERNAL MEDICINE

## 2024-12-10 ENCOUNTER — CONSULT (OUTPATIENT)
Dept: NEPHROLOGY | Facility: CLINIC | Age: 74
End: 2024-12-10
Payer: MEDICARE

## 2024-12-10 ENCOUNTER — HOSPITAL ENCOUNTER (OUTPATIENT)
Dept: ULTRASOUND IMAGING | Facility: HOSPITAL | Age: 74
Discharge: HOME | End: 2024-12-10
Attending: STUDENT IN AN ORGANIZED HEALTH CARE EDUCATION/TRAINING PROGRAM
Payer: MEDICARE

## 2024-12-10 VITALS
HEIGHT: 67 IN | DIASTOLIC BLOOD PRESSURE: 60 MMHG | WEIGHT: 193 LBS | HEART RATE: 63 BPM | BODY MASS INDEX: 30.29 KG/M2 | SYSTOLIC BLOOD PRESSURE: 140 MMHG

## 2024-12-10 DIAGNOSIS — N18.30 BENIGN HYPERTENSION WITH CHRONIC KIDNEY DISEASE, STAGE III (HCC): ICD-10-CM

## 2024-12-10 DIAGNOSIS — N18.32 TYPE 2 DIABETES MELLITUS WITH STAGE 3B CHRONIC KIDNEY DISEASE, WITHOUT LONG-TERM CURRENT USE OF INSULIN (HCC): ICD-10-CM

## 2024-12-10 DIAGNOSIS — M89.9 CHRONIC KIDNEY DISEASE-MINERAL BONE DISORDER (CKD-MBD) WITH STAGE 3B CHRONIC KIDNEY DISEASE (HCC): ICD-10-CM

## 2024-12-10 DIAGNOSIS — E83.9 CHRONIC KIDNEY DISEASE-MINERAL BONE DISORDER (CKD-MBD) WITH STAGE 3B CHRONIC KIDNEY DISEASE (HCC): ICD-10-CM

## 2024-12-10 DIAGNOSIS — N18.32 STAGE 3B CHRONIC KIDNEY DISEASE (HCC): ICD-10-CM

## 2024-12-10 DIAGNOSIS — N18.32 CHRONIC KIDNEY DISEASE-MINERAL BONE DISORDER (CKD-MBD) WITH STAGE 3B CHRONIC KIDNEY DISEASE (HCC): ICD-10-CM

## 2024-12-10 DIAGNOSIS — N13.2 HYDRONEPHROSIS WITH URINARY OBSTRUCTION DUE TO RENAL CALCULUS: ICD-10-CM

## 2024-12-10 DIAGNOSIS — N17.9 AKI (ACUTE KIDNEY INJURY) (HCC): Primary | ICD-10-CM

## 2024-12-10 DIAGNOSIS — I50.23 ACUTE ON CHRONIC SYSTOLIC (CONGESTIVE) HEART FAILURE (HCC): ICD-10-CM

## 2024-12-10 DIAGNOSIS — E11.22 TYPE 2 DIABETES MELLITUS WITH STAGE 3B CHRONIC KIDNEY DISEASE, WITHOUT LONG-TERM CURRENT USE OF INSULIN (HCC): ICD-10-CM

## 2024-12-10 DIAGNOSIS — I12.9 BENIGN HYPERTENSION WITH CHRONIC KIDNEY DISEASE, STAGE III (HCC): ICD-10-CM

## 2024-12-10 DIAGNOSIS — R31.9 HEMATURIA: ICD-10-CM

## 2024-12-10 PROBLEM — N18.31 CHRONIC KIDNEY DISEASE, STAGE 3A (HCC): Status: RESOLVED | Noted: 2024-03-08 | Resolved: 2024-12-10

## 2024-12-10 PROBLEM — R82.90 ABNORMAL URINALYSIS: Status: RESOLVED | Noted: 2024-11-01 | Resolved: 2024-12-10

## 2024-12-10 PROBLEM — R53.1 GENERALIZED WEAKNESS: Status: RESOLVED | Noted: 2023-11-10 | Resolved: 2024-12-10

## 2024-12-10 PROCEDURE — 76775 US EXAM ABDO BACK WALL LIM: CPT

## 2024-12-10 PROCEDURE — 99204 OFFICE O/P NEW MOD 45 MIN: CPT | Performed by: STUDENT IN AN ORGANIZED HEALTH CARE EDUCATION/TRAINING PROGRAM

## 2024-12-10 PROCEDURE — 99214 OFFICE O/P EST MOD 30 MIN: CPT | Performed by: STUDENT IN AN ORGANIZED HEALTH CARE EDUCATION/TRAINING PROGRAM

## 2024-12-10 PROCEDURE — G2211 COMPLEX E/M VISIT ADD ON: HCPCS | Performed by: STUDENT IN AN ORGANIZED HEALTH CARE EDUCATION/TRAINING PROGRAM

## 2024-12-10 RX ORDER — AMLODIPINE BESYLATE 5 MG/1
5 TABLET ORAL DAILY
Qty: 90 TABLET | Refills: 1 | Status: ON HOLD | OUTPATIENT
Start: 2024-12-10

## 2024-12-10 NOTE — ASSESSMENT & PLAN NOTE
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

## 2024-12-10 NOTE — PATIENT INSTRUCTIONS
Thank you for coming to your visit today. As we discussed you kidney function is abnormal, your creatinine is 2.03mg/dL. Your electrolytes are normal. Please follow the recommendations below       Recommend low sodium (salt) food    Avoid nonsteroidal anti-inflammatory drugs such as Naprosyn, ibuprofen, Aleve, Advil, Celebrex, Meloxicam (Mobic) etc.  You can use Tylenol as needed if you do not have any liver condition to be concerned about    Try to avoid medications such as pantoprazole or  Protonix/Nexium or Esomeprazole)/Prilosec or omeprazole/Prevacid or lansoprazole/AcipHex or Rabeprazole.  If you are able to, use Pepcid as this is safer for your kidneys.    Try to exercise at least 30 minutes 3 days a week to begin with with an ultimate goal of 5 days a week for at least 30 minutes    Try to lose 5-10 lb by your next visit  Hold Lisinopril and start Amlodipine 5mg . If your BP is more than 150 then increase the Amlodipine 10mg   Repeat ultrasound     Next Visit in 3 months with results   If you need to see us earlier we can change the appointment for you      Joselyn Reyes Bahamonde, MD  Nephrology Attending

## 2024-12-10 NOTE — ASSESSMENT & PLAN NOTE
Lab Results   Component Value Date    EGFR 30 12/05/2024    EGFR 31 11/14/2024    EGFR 35 11/09/2024    CREATININE 2.03 (H) 12/05/2024    CREATININE 1.99 (H) 11/14/2024    CREATININE 1.80 (H) 11/09/2024     Volume: Euvolemic  Blood pressure: Borderline hypertension, /60  Recommend:  Low-sodium diet  Continue with torsemide 20  Hold lisinopril in the settings of CHUY  Start amlodipine 5 mg, recommend monitor BP.  If BP> 150 increase amlodipine to 10 mg daily    Orders:    amLODIPine (NORVASC) 5 mg tablet; Take 1 tablet (5 mg total) by mouth daily

## 2024-12-10 NOTE — ASSESSMENT & PLAN NOTE
Lab Results   Component Value Date    EGFR 30 12/05/2024    EGFR 31 11/14/2024    EGFR 35 11/09/2024    CREATININE 2.03 (H) 12/05/2024    CREATININE 1.99 (H) 11/14/2024    CREATININE 1.80 (H) 11/09/2024   Calcium 8.6 mg/dL  We will check PTH  No indication of binders at this time      Orders:    PTH, intact; Future    Phosphorus; Future    Ambulatory Referral to CKD Education Program; Future

## 2024-12-10 NOTE — ASSESSMENT & PLAN NOTE
Etiology: Likely secondary to obstructive uropathy with right side hydronephrosis since September 2024  Baseline creatinine: Appears to be 1.2 to 1.5 mg/dL before CHUY  Current creatinine: 2 mg/dL, trending up  Peak creatinine: Trending  UA: Microhematuria  Renal imaging : Right side hydronephrosis with ureteral stone, hematoma versus bladder mass  No urgent indication of dialysis at this time but need to monitor kidney function closely  Avoid NSAIDs  Orders:    Basic metabolic panel; Future    Albumin / creatinine urine ratio; Future    Ambulatory referral to Nephrology

## 2024-12-10 NOTE — PROGRESS NOTES
Name: Toro Rodriguez      : 1950      MRN: [id number]  Encounter Provider: Joselyn Reyes Bahamonde, MD  Encounter Date: 12/10/2024   Encounter department: [facility] [place]  :  Assessment & Plan  Type 2 diabetes mellitus with stage 3b chronic kidney disease, without long-term current use of insulin ([facility])    Lab Results   Component Value Date    HGBA1C 5.9 (H) 2024   #DM  HbA1c 5.9  Advised to maintain a good DM control to prevent progression of CKD   Maintain healthy diet (vegetables, fruits, whole grains, nonfat or low fat)  Weight loss  Physical activity (5 to 10 minutes to start the increase to 30 min a day)           Benign hypertension with chronic kidney disease, stage III ([facility])  Lab Results   Component Value Date    EGFR 30 2024    EGFR 31 2024    EGFR 35 2024    CREATININE 2.03 (H) 2024    CREATININE 1.99 (H) 2024    CREATININE 1.80 (H) 2024     Volume: Euvolemic  Blood pressure: Borderline hypertension, /60  Recommend:  Low-sodium diet  Continue with torsemide 20  Hold lisinopril in the settings of CHUY  Start amlodipine 5 mg, recommend monitor BP.  If BP> 150 increase amlodipine to 10 mg daily    Orders:    amLODIPine (NORVASC) 5 mg tablet; Take 1 tablet (5 mg total) by mouth daily    CHUY (acute kidney injury) ([facility])    Etiology: Likely secondary to obstructive uropathy with right side hydronephrosis since 2024  Baseline creatinine: Appears to be 1.2 to 1.5 mg/dL before CHUY  Current creatinine: 2 mg/dL, trending up  Peak creatinine: Trending  UA: Microhematuria  Renal imaging : Right side hydronephrosis with ureteral stone, hematoma versus bladder mass  No urgent indication of dialysis at this time but need to monitor kidney function closely  Avoid NSAIDs  Orders:    Basic metabolic panel; Future    Albumin / creatinine urine ratio; Future    Ambulatory referral to Nephrology    Stage 3b chronic kidney disease  (HCC)  Lab Results   Component Value Date    EGFR 30 12/05/2024    EGFR 31 11/14/2024    EGFR 35 11/09/2024    CREATININE 2.03 (H) 12/05/2024    CREATININE 1.99 (H) 11/14/2024    CREATININE 1.80 (H) 11/09/2024     Baseline creatinine: 0.2 to 1.5 mg/dL  Etiology: 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)  Lab Results   Component Value Date    EGFR 30 12/05/2024    EGFR 31 11/14/2024    EGFR 35 11/09/2024    CREATININE 2.03 (H) 12/05/2024    CREATININE 1.99 (H) 11/14/2024    CREATININE 1.80 (H) 11/09/2024   Calcium 8.6 mg/dL  We will check PTH  No indication of binders at this time      Orders:    PTH, intact; Future    Phosphorus; Future    Ambulatory Referral to CKD Education Program; Future    Chronic systolic (congestive) heart failure (HCC)  Wt Readings from Last 3 Encounters:   12/05/24 88 kg (194 lb)   11/27/24 89.4 kg (197 lb)   11/09/24 89.6 kg (197 lb 8.5 oz)   Euvolemic  Continue with torsemide 20 mg  Low-sodium diet       Hydronephrosis with urinary obstruction due to renal calculus  Right-sided hydroureteronephrosis with hematoma versus mass in the bladder  Currently with suprapubic catheter with persistent right hydro  Complicated with worsening CHUY  Message sent to urology who is seeing the patient next week  Urgent ultrasound  Orders:    US kidney and bladder; Future        History of Present Illness   HPI  Toro Rodriguez is a 74 y.o. male hypertension, diabetes, right-sided hydroureteronephrosis, nephrolithiasis, CHF, suprapubic catheter who presents worsening kidney function  History obtained from: patient for initial evaluation of    Review of Systems   Constitutional:  Negative for activity change and appetite change.   HENT:  Negative for congestion and dental problem.    Eyes:  Negative for discharge.   Respiratory:  Negative for shortness of breath.    Cardiovascular:   Negative for chest pain and leg swelling.   Gastrointestinal:  Negative for abdominal distention and abdominal pain.   Endocrine: Negative for cold intolerance.   Genitourinary:  Negative for dysuria.   Musculoskeletal:  Negative for arthralgias and back pain.   Skin:  Negative for color change and pallor.   Neurological:  Negative for dizziness.   Psychiatric/Behavioral:  Negative for agitation.      Pertinent Medical History   Has been in the ED multiple times with hematuria, currently 8 worsening CHUY       Medical History Reviewed by provider this encounter:     .  Past Medical History   Past Medical History:   Diagnosis Date    Alcohol intoxication ([facility]) 10/15/2020    BPH (benign prostatic hyperplasia)     Chronic HFrEF (heart failure with reduced ejection fraction) ([facility]) 11/2023    Coronary artery calcification seen on CT scan 11/10/2023    Coronary artery disease 12/14/2023    Deep vein thrombosis (DVT) of left lower extremity ([facility]) 11/2023    Diabetes mellitus ([facility]) 11/2023    Fall from slip, trip, or stumble 10/15/2020    History of phimosis of penis     History of urinary calculi     Hypertension 1988    Skin cancer     Tobacco abuse     quit around 2000     Past Surgical History:   Procedure Laterality Date    BLADDER STONE REMOVAL  2014    CARDIAC CATHETERIZATION N/A 12/14/2023    Procedure: Cardiac catheterization;  Surgeon: Dave Royal MD;  Location: [facility];  Service: Cardiology    IR SUPRAPUBIC CATHETER CHECK/CHANGE/REINSERTION/UPSIZE  11/07/2024    IR SUPRAPUBIC CATHETER CHECK/CHANGE/REINSERTION/UPSIZE  12/3/2024    IR SUPRAPUBIC CATHETER PLACEMENT  09/27/2024    ORIF ANKLE FRACTURE Left     SKIN LESION EXCISION N/A 03/18/2024    Procedure: WIDE LOCAL EXCISION OF BACK MELANOMA;  Surgeon: Lillie La MD;  Location: [facility];  Service: Surgical Oncology    TOTAL HIP ARTHROPLASTY Right      Family History   Problem Relation Age of Onset    Breast cancer Mother     Heart attack  Father 61    Other Sister         s/p hysterectomy    Cerebral palsy Brother     Skin cancer Other         family history    No Known Problems Son     No Known Problems Daughter     Sudden death Neg Hx       reports that he quit smoking about 62 years ago. His smoking use included cigarettes. He started smoking about 34 years ago. He has never used smokeless tobacco. He reports that he does not currently use alcohol. He reports that he does not use drugs.  Current Outpatient Medications on File Prior to Visit   Medication Sig Dispense Refill    acetaminophen (TYLENOL) 325 mg tablet Take 325 mg by mouth every 6 (six) hours as needed for mild pain      amiodarone 200 mg tablet Take 1 tablet (200 mg total) by mouth daily 90 tablet 2    aspirin 81 mg chewable tablet Chew 1 tablet (81 mg total) daily Do not start before September 30, 2024.      atorvastatin (LIPITOR) 40 mg tablet Take 1 tablet (40 mg total) by mouth daily with dinner  0    cefuroxime (CEFTIN) 500 mg tablet Take 500 mg by mouth      clotrimazole (LOTRIMIN) 1 % cream Apply topically 2 (two) times a day      Eliquis 5 MG Take 5 mg by mouth 2 (two) times a day (Patient not taking: Reported on 11/7/2024)      finasteride (PROSCAR) 5 mg tablet Take 1 tablet (5 mg total) by mouth daily 90 tablet 3    furosemide (LASIX) 20 mg tablet Take 1 tablet (20 mg total) by mouth daily 30 tablet 5    lisinopril (ZESTRIL) 5 mg tablet Take 1 tablet (5 mg total) by mouth daily Do not start before November 4, 2024.      metoprolol succinate (TOPROL-XL) 25 mg 24 hr tablet Take 1 tablet (25 mg total) by mouth every 12 (twelve) hours 60 tablet 0    mupirocin (BACTROBAN) 2 % ointment Apply topically 3 (three) times a day      nitroglycerin (NITROSTAT) 0.4 mg SL tablet Place 1 tablet (0.4 mg total) under the tongue every 5 (five) minutes as needed for chest pain      polyethylene glycol (MIRALAX) 17 g packet Take 17 g by mouth daily 30 each 2    tamsulosin (FLOMAX) 0.4 mg Take 1  capsule (0.4 mg total) by mouth daily with dinner       No current facility-administered medications on file prior to visit.     Allergies   Allergen Reactions    Zosyn [Piperacillin Sod-Tazobactam So] Rash      Current Outpatient Medications on File Prior to Visit   Medication Sig Dispense Refill    acetaminophen (TYLENOL) 325 mg tablet Take 325 mg by mouth every 6 (six) hours as needed for mild pain      amiodarone 200 mg tablet Take 1 tablet (200 mg total) by mouth daily 90 tablet 2    aspirin 81 mg chewable tablet Chew 1 tablet (81 mg total) daily Do not start before September 30, 2024.      atorvastatin (LIPITOR) 40 mg tablet Take 1 tablet (40 mg total) by mouth daily with dinner  0    cefuroxime (CEFTIN) 500 mg tablet Take 500 mg by mouth      clotrimazole (LOTRIMIN) 1 % cream Apply topically 2 (two) times a day      Eliquis 5 MG Take 5 mg by mouth 2 (two) times a day (Patient not taking: Reported on 11/7/2024)      finasteride (PROSCAR) 5 mg tablet Take 1 tablet (5 mg total) by mouth daily 90 tablet 3    furosemide (LASIX) 20 mg tablet Take 1 tablet (20 mg total) by mouth daily 30 tablet 5    lisinopril (ZESTRIL) 5 mg tablet Take 1 tablet (5 mg total) by mouth daily Do not start before November 4, 2024.      metoprolol succinate (TOPROL-XL) 25 mg 24 hr tablet Take 1 tablet (25 mg total) by mouth every 12 (twelve) hours 60 tablet 0    mupirocin (BACTROBAN) 2 % ointment Apply topically 3 (three) times a day      nitroglycerin (NITROSTAT) 0.4 mg SL tablet Place 1 tablet (0.4 mg total) under the tongue every 5 (five) minutes as needed for chest pain      polyethylene glycol (MIRALAX) 17 g packet Take 17 g by mouth daily 30 each 2    tamsulosin (FLOMAX) 0.4 mg Take 1 capsule (0.4 mg total) by mouth daily with dinner       No current facility-administered medications on file prior to visit.      Social History     Tobacco Use    Smoking status: Former     Types: Cigarettes     Start date: 1990     Quit date: 1962      Years since quittin.9    Smokeless tobacco: Never    Tobacco comments:     Quit over 30 years ago (Updated 2023).    Vaping Use    Vaping status: Never Used   Substance and Sexual Activity    Alcohol use: Not Currently    Drug use: Never    Sexual activity: Not on file        Objective   There were no vitals taken for this visit.     Physical Exam  General:  no acute distress at this time  Skin:  No acute rash  Eyes:  No scleral icterus and noninjected  ENT:  mucous membranes moist  Neck:  no carotid bruits  Chest:  Clear to auscultation percussion, good respiratory effort, no use of accessory respiratory muscles  CVS:  Regular rate and rhythm without rub   Abdomen:  soft and nontender   Extremities: Unilateral chronic left lower extremity edema   Neuro:  No gross focality  Psych:  Alert , cooperative  : Suprapubic catheter with Colon bag with clear urine

## 2024-12-10 NOTE — ASSESSMENT & PLAN NOTE
Lab Results   Component Value Date    HGBA1C 5.9 (H) 04/18/2024   #DM  HbA1c 5.9  Advised to maintain a good DM control to prevent progression of CKD   Maintain healthy diet (vegetables, fruits, whole grains, nonfat or low fat)  Weight loss  Physical activity (5 to 10 minutes to start the increase to 30 min a day)

## 2024-12-10 NOTE — ASSESSMENT & PLAN NOTE
Wt Readings from Last 3 Encounters:   12/05/24 88 kg (194 lb)   11/27/24 89.4 kg (197 lb)   11/09/24 89.6 kg (197 lb 8.5 oz)   Euvolemic  Continue with torsemide 20 mg  Low-sodium diet

## 2024-12-11 ENCOUNTER — TELEPHONE (OUTPATIENT)
Age: 74
End: 2024-12-11

## 2024-12-11 NOTE — TELEPHONE ENCOUNTER
GALLO, from [facility] says to hold Lisinopril. Asking how long it should be held for. Please advise, thank you.

## 2024-12-13 ENCOUNTER — HOSPITAL ENCOUNTER (EMERGENCY)
Facility: HOSPITAL | Age: 74
Discharge: NURSING, REHAB OR LONG-TERM CARE | DRG: 699 | End: 2024-12-13
Attending: EMERGENCY MEDICINE
Payer: MEDICARE

## 2024-12-13 ENCOUNTER — APPOINTMENT (EMERGENCY)
Dept: CT IMAGING | Facility: HOSPITAL | Age: 74
DRG: 699 | End: 2024-12-13
Payer: MEDICARE

## 2024-12-13 VITALS
DIASTOLIC BLOOD PRESSURE: 66 MMHG | HEART RATE: 66 BPM | RESPIRATION RATE: 20 BRPM | SYSTOLIC BLOOD PRESSURE: 148 MMHG | OXYGEN SATURATION: 95 % | TEMPERATURE: 97.3 F

## 2024-12-13 DIAGNOSIS — R31.9 HEMATURIA: Primary | ICD-10-CM

## 2024-12-13 LAB
ALBUMIN SERPL BCG-MCNC: 3.6 G/DL (ref 3.5–5)
ALP SERPL-CCNC: 72 U/L (ref 34–104)
ALT SERPL W P-5'-P-CCNC: 13 U/L (ref 7–52)
ANION GAP SERPL CALCULATED.3IONS-SCNC: 6 MMOL/L (ref 4–13)
APTT PPP: 30 SECONDS (ref 23–34)
AST SERPL W P-5'-P-CCNC: 15 U/L (ref 13–39)
BASOPHILS # BLD AUTO: 0.03 THOUSANDS/ΜL (ref 0–0.1)
BASOPHILS NFR BLD AUTO: 1 % (ref 0–1)
BILIRUB SERPL-MCNC: 0.37 MG/DL (ref 0.2–1)
BUN SERPL-MCNC: 34 MG/DL (ref 5–25)
CALCIUM SERPL-MCNC: 8.1 MG/DL (ref 8.4–10.2)
CHLORIDE SERPL-SCNC: 106 MMOL/L (ref 96–108)
CO2 SERPL-SCNC: 25 MMOL/L (ref 21–32)
CREAT SERPL-MCNC: 1.95 MG/DL (ref 0.6–1.3)
EOSINOPHIL # BLD AUTO: 0.22 THOUSAND/ΜL (ref 0–0.61)
EOSINOPHIL NFR BLD AUTO: 5 % (ref 0–6)
ERYTHROCYTE [DISTWIDTH] IN BLOOD BY AUTOMATED COUNT: 13.3 % (ref 11.6–15.1)
GFR SERPL CREATININE-BSD FRML MDRD: 32 ML/MIN/1.73SQ M
GLUCOSE SERPL-MCNC: 121 MG/DL (ref 65–140)
HCT VFR BLD AUTO: 34.3 % (ref 36.5–49.3)
HGB BLD-MCNC: 11.1 G/DL (ref 12–17)
IMM GRANULOCYTES # BLD AUTO: 0.03 THOUSAND/UL (ref 0–0.2)
IMM GRANULOCYTES NFR BLD AUTO: 1 % (ref 0–2)
INR PPP: 1 (ref 0.85–1.19)
LYMPHOCYTES # BLD AUTO: 0.79 THOUSANDS/ΜL (ref 0.6–4.47)
LYMPHOCYTES NFR BLD AUTO: 17 % (ref 14–44)
MCH RBC QN AUTO: 31.6 PG (ref 26.8–34.3)
MCHC RBC AUTO-ENTMCNC: 32.4 G/DL (ref 31.4–37.4)
MCV RBC AUTO: 98 FL (ref 82–98)
MONOCYTES # BLD AUTO: 0.49 THOUSAND/ΜL (ref 0.17–1.22)
MONOCYTES NFR BLD AUTO: 11 % (ref 4–12)
NEUTROPHILS # BLD AUTO: 3.01 THOUSANDS/ΜL (ref 1.85–7.62)
NEUTS SEG NFR BLD AUTO: 65 % (ref 43–75)
NRBC BLD AUTO-RTO: 0 /100 WBCS
PLATELET # BLD AUTO: 271 THOUSANDS/UL (ref 149–390)
PMV BLD AUTO: 9.8 FL (ref 8.9–12.7)
POTASSIUM SERPL-SCNC: 4.2 MMOL/L (ref 3.5–5.3)
PROT SERPL-MCNC: 6.7 G/DL (ref 6.4–8.4)
PROTHROMBIN TIME: 13.8 SECONDS (ref 12.3–15)
RBC # BLD AUTO: 3.51 MILLION/UL (ref 3.88–5.62)
SODIUM SERPL-SCNC: 137 MMOL/L (ref 135–147)
WBC # BLD AUTO: 4.57 THOUSAND/UL (ref 4.31–10.16)

## 2024-12-13 PROCEDURE — 85730 THROMBOPLASTIN TIME PARTIAL: CPT | Performed by: EMERGENCY MEDICINE

## 2024-12-13 PROCEDURE — 85610 PROTHROMBIN TIME: CPT | Performed by: EMERGENCY MEDICINE

## 2024-12-13 PROCEDURE — 87186 SC STD MICRODIL/AGAR DIL: CPT | Performed by: EMERGENCY MEDICINE

## 2024-12-13 PROCEDURE — 99284 EMERGENCY DEPT VISIT MOD MDM: CPT

## 2024-12-13 PROCEDURE — 99285 EMERGENCY DEPT VISIT HI MDM: CPT | Performed by: EMERGENCY MEDICINE

## 2024-12-13 PROCEDURE — 80053 COMPREHEN METABOLIC PANEL: CPT | Performed by: EMERGENCY MEDICINE

## 2024-12-13 PROCEDURE — 74176 CT ABD & PELVIS W/O CONTRAST: CPT

## 2024-12-13 PROCEDURE — 87077 CULTURE AEROBIC IDENTIFY: CPT | Performed by: EMERGENCY MEDICINE

## 2024-12-13 PROCEDURE — 87086 URINE CULTURE/COLONY COUNT: CPT | Performed by: EMERGENCY MEDICINE

## 2024-12-13 PROCEDURE — 85025 COMPLETE CBC W/AUTO DIFF WBC: CPT | Performed by: EMERGENCY MEDICINE

## 2024-12-13 PROCEDURE — 36415 COLL VENOUS BLD VENIPUNCTURE: CPT | Performed by: EMERGENCY MEDICINE

## 2024-12-13 NOTE — DISCHARGE INSTRUCTIONS
You need further evaluation through urology.  You have some bladder wall thickening on CAT scan which needs further evaluation through urology rule out hematoma or cancerous lesion

## 2024-12-13 NOTE — PATIENT COMMUNICATION
Patient's daughter called today to reschedule the 12/16 appt with PAMELA Bowles in [place] due to being unable to bring the patient to the appt.    Pt is rescheduled to 12/23 at 10:40 AM with PAMELA Bowles. The office's address was confirmed.     No further action needed at this time.

## 2024-12-13 NOTE — ED PROVIDER NOTES
Time reflects when diagnosis was documented in both MDM as applicable and the Disposition within this note       Time User Action Codes Description Comment    12/13/2024  6:00 PM Sancho Yoon Add [R31.9] Hematuria     12/13/2024  6:00 PM Sancho Yoon Modify [R31.9] Hematuria Cleared with flushing needs urology follow-up          ED Disposition       ED Disposition   Discharge    Condition   Stable    Date/Time   Fri Dec 13, 2024  6:00 PM    Comment   oTro Rodriguez discharge to home/self care.                   Assessment & Plan       Medical Decision Making  74-year-old male with recurrent current worsening hematuria will check labs and CAT scan for reevaluation worsening bleeding mass or stone    Amount and/or Complexity of Data Reviewed  Labs: ordered.  Radiology: ordered.        ED Course as of 12/13/24 1802   Fri Dec 13, 2024   1800 Urine cleared after flushing currently only pink-tinged okay for discharge and follow-up with urology       Medications - No data to display    ED Risk Strat Scores                                              History of Present Illness       Chief Complaint   Patient presents with    Blood in Urine     Pt presents to ED via EMS from [facility] after reporting blood in the urine. Pt has a suprapubic catheter.        Past Medical History:   Diagnosis Date    Alcohol intoxication (HCC) 10/15/2020    BPH (benign prostatic hyperplasia)     Chronic HFrEF (heart failure with reduced ejection fraction) ([facility]) 11/2023    Coronary artery calcification seen on CT scan 11/10/2023    Coronary artery disease 12/14/2023    Deep vein thrombosis (DVT) of left lower extremity (HCC) 11/2023    Diabetes mellitus (HCC) 11/2023    Fall from slip, trip, or stumble 10/15/2020    History of phimosis of penis     History of urinary calculi     Hypertension 1988    Skin cancer     Tobacco abuse     quit around 2000      Past Surgical History:   Procedure Laterality Date    BLADDER STONE REMOVAL   2014    CARDIAC CATHETERIZATION N/A 2023    Procedure: Cardiac catheterization;  Surgeon: Dave Royal MD;  Location: [facility];  Service: Cardiology    IR SUPRAPUBIC CATHETER CHECK/CHANGE/REINSERTION/UPSIZE  2024    IR SUPRAPUBIC CATHETER CHECK/CHANGE/REINSERTION/UPSIZE  12/3/2024    IR SUPRAPUBIC CATHETER PLACEMENT  2024    ORIF ANKLE FRACTURE Left     SKIN LESION EXCISION N/A 2024    Procedure: WIDE LOCAL EXCISION OF BACK MELANOMA;  Surgeon: Lillie La MD;  Location: [facility];  Service: Surgical Oncology    TOTAL HIP ARTHROPLASTY Right       Family History   Problem Relation Age of Onset    Breast cancer Mother     Heart attack Father 61    Other Sister         s/p hysterectomy    Cerebral palsy Brother     Skin cancer Other         family history    No Known Problems Son     No Known Problems Daughter     Sudden death Neg Hx       Social History     Tobacco Use    Smoking status: Former     Types: Cigarettes     Start date:      Quit date:      Years since quittin.9    Smokeless tobacco: Never    Tobacco comments:     Quit over 30 years ago (Updated 2023).    Vaping Use    Vaping status: Never Used   Substance Use Topics    Alcohol use: Not Currently    Drug use: Never      E-Cigarette/Vaping    E-Cigarette Use Never User       E-Cigarette/Vaping Substances    Nicotine No     THC No     CBD No     Flavoring No     Other No     Unknown No       I have reviewed and agree with the history as documented.     This is a 74-year-old male who presents via ambulance from assisted living for worsening hematuria since noon.  He is on aspirin.  Patient had a suprapubic catheter placed on 3 November secondary to repeated infections.  He was seen here on the fifth and found to have a right sided hydronephrosis 2 mm stone proximally and hematuria , there was no bladder infection.  Denies any fevers chills nausea or vomiting.  Urine in the Colon bag is grossly bloody.   He is currently on Flomax and Proscar he also had a possible hemorrhagic right renal cyst on CAT scan and a questionable bladder mass versus hematoma.  He was on Eliquis for DVT but it was stopped more than 1 month ago      History provided by:  Patient and EMS personnel  Medical Problem  Location:  Suprapubic urinary catheter bag  Quality:  Gross hematuria  Severity:  Severe  Onset quality:  Sudden  Duration:  3 hours  Timing:  Constant  Progression:  Worsening  Chronicity:  New  Context:  Gross hematuria in urine bag  Worsened by:  Aspirin use  Associated symptoms: no abdominal pain and no fever        Review of Systems   Constitutional:  Negative for fever.   Gastrointestinal:  Negative for abdominal pain.   Genitourinary:  Positive for hematuria.   All other systems reviewed and are negative.          Objective       ED Triage Vitals   Temperature Pulse Blood Pressure Respirations SpO2 Patient Position - Orthostatic VS   12/13/24 1510 12/13/24 1510 12/13/24 1511 12/13/24 1510 12/13/24 1510 12/13/24 1510   (!) 97.3 °F (36.3 °C) 60 153/65 20 95 % Sitting      Temp Source Heart Rate Source BP Location FiO2 (%) Pain Score    12/13/24 1510 12/13/24 1510 12/13/24 1510 -- 12/13/24 1510    Temporal Monitor Left arm  No Pain      Vitals      Date and Time Temp Pulse SpO2 Resp BP Pain Score FACES Pain Rating User   12/13/24 1630 -- 52 93 % 20 156/70 -- -- TH   12/13/24 1511 -- -- -- -- 153/65 -- --    12/13/24 1510 97.3 °F (36.3 °C) 60 95 % 20 -- No Pain -- TH            Physical Exam  Vitals and nursing note reviewed.   Constitutional:       General: He is not in acute distress.     Appearance: He is not toxic-appearing.   HENT:      Head: Normocephalic and atraumatic.      Right Ear: External ear normal.      Left Ear: External ear normal.   Eyes:      General:         Right eye: No discharge.         Left eye: No discharge.      Extraocular Movements: Extraocular movements intact.      Pupils: Pupils are equal,  round, and reactive to light.   Cardiovascular:      Rate and Rhythm: Normal rate and regular rhythm.      Pulses: Normal pulses.      Heart sounds: No murmur heard.     No friction rub. No gallop.   Pulmonary:      Effort: No respiratory distress.      Breath sounds: No stridor. No wheezing, rhonchi or rales.   Abdominal:      General: There is no distension.      Palpations: Abdomen is soft.      Tenderness: There is no abdominal tenderness.   Musculoskeletal:      Cervical back: Neck supple. No rigidity.      Right lower leg: No edema.      Left lower leg: Edema present.   Skin:     General: Skin is warm and dry.      Coloration: Skin is not jaundiced.      Findings: No bruising, erythema or rash.   Neurological:      General: No focal deficit present.      Mental Status: He is alert and oriented to person, place, and time.   Psychiatric:         Mood and Affect: Mood normal.         Behavior: Behavior normal.         Thought Content: Thought content normal.         Results Reviewed       Procedure Component Value Units Date/Time    Protime-INR [[id number]]  (Normal) Collected: 12/13/24 1546    Lab Status: Final result Specimen: Blood from Arm, Right Updated: 12/13/24 1626     Protime 13.8 seconds      INR 1.00    Narrative:      INR Therapeutic Range    Indication                                             INR Range      Atrial Fibrillation                                               2.0-3.0  Hypercoagulable State                                    2.0.2.3  Left Ventricular Asist Device                            2.0-3.0  Mechanical Heart Valve                                  -    Aortic(with afib, MI, embolism, HF, LA enlargement,    and/or coagulopathy)                                     2.0-3.0 (2.5-3.5)     Mitral                                                             2.5-3.5  Prosthetic/Bioprosthetic Heart Valve               2.0-3.0  Venous thromboembolism (VTE: VT, PE        2.0-3.0    APTT  [[id number]]  (Normal) Collected: 12/13/24 1546    Lab Status: Final result Specimen: Blood from Arm, Right Updated: 12/13/24 1626     PTT 30 seconds     Urine culture [[id number]] Collected: 12/13/24 1621    Lab Status: In process Specimen: Urine, Suprapubic catheter Updated: 12/13/24 1625    Comprehensive metabolic panel [[id number]]  (Abnormal) Collected: 12/13/24 1546    Lab Status: Final result Specimen: Blood from Arm, Right Updated: 12/13/24 1623     Sodium 137 mmol/L      Potassium 4.2 mmol/L      Chloride 106 mmol/L      CO2 25 mmol/L      ANION GAP 6 mmol/L      BUN 34 mg/dL      Creatinine 1.95 mg/dL      Glucose 121 mg/dL      Calcium 8.1 mg/dL      AST 15 U/L      ALT 13 U/L      Alkaline Phosphatase 72 U/L      Total Protein 6.7 g/dL      Albumin 3.6 g/dL      Total Bilirubin 0.37 mg/dL      eGFR 32 ml/min/1.73sq m     Narrative:      [employer] Kid[employer] guidelines for Chronic Kidney Disease (CKD):     Stage 1 with normal or high GFR (GFR > 90 mL/min/1.73 square meters)    Stage 2 Mild CKD (GFR = 60-89 mL/min/1.73 square meters)    Stage 3A Moderate CKD (GFR = 45-59 mL/min/1.73 square meters)    Stage 3B Moderate CKD (GFR = 30-44 mL/min/1.73 square meters)    Stage 4 Severe CKD (GFR = 15-29 mL/min/1.73 square meters)    Stage 5 End Stage CKD (GFR <15 mL/min/1.73 square meters)  Note: GFR calculation is accurate only with a steady state creatinine    CBC and differential [[id number]]  (Abnormal) Collected: 12/13/24 1546    Lab Status: Final result Specimen: Blood from Arm, Right Updated: 12/13/24 1555     WBC 4.57 Thousand/uL      RBC 3.51 Million/uL      Hemoglobin 11.1 g/dL      Hematocrit 34.3 %      MCV 98 fL      MCH 31.6 pg      MCHC 32.4 g/dL      RDW 13.3 %      MPV 9.8 fL      Platelets 271 Thousands/uL      nRBC 0 /100 WBCs      Segmented % 65 %      Immature Grans % 1 %      Lymphocytes % 17 %      Monocytes % 11 %      Eosinophils Relative 5 %      Basophils Relative 1 %       Absolute Neutrophils 3.01 Thousands/µL      Absolute Immature Grans 0.03 Thousand/uL      Absolute Lymphocytes 0.79 Thousands/µL      Absolute Monocytes 0.49 Thousand/µL      Eosinophils Absolute 0.22 Thousand/µL      Basophils Absolute 0.03 Thousands/µL             CT abdomen pelvis wo contrast   Final Interpretation by Tye Cruz MD (12/13 1711)      1.  Stable moderate right hydroureteronephrosis to the level of the urinary bladder with nonobstructing 2 mm dependent calculus in the distal right ureter. Hydroureteronephrosis is favored to be secondary to marked irregular thickening of the right    lateral bladder wall, present dating back to multiple prior examinations. While this finding again may reflect a chronic hematoma, underlying bladder mass/neoplasm is not excluded on this unenhanced examination. Recommend urologic consultation with    cystoscopy for further evaluation, if clinically indicated.   2.  Redemonstrated 1.2 cm right upper pole hyperattenuating lesion, favoring a hemorrhagic cyst. Agree with prior recommendation for follow-up dedicated renal MRI or CT urogram in 3 months.      The study was marked in EPIC for immediate notification.      Workstation performed: [id number]             Procedures    ED Medication and Procedure Management   Prior to Admission Medications   Prescriptions Last Dose Informant Patient Reported? Taking?   acetaminophen (TYLENOL) 325 mg tablet  Self Yes No   Sig: Take 325 mg by mouth every 6 (six) hours as needed for mild pain   amLODIPine (NORVASC) 5 mg tablet   No No   Sig: Take 1 tablet (5 mg total) by mouth daily   amiodarone 200 mg tablet  Self No No   Sig: Take 1 tablet (200 mg total) by mouth daily   aspirin 81 mg chewable tablet  Self No No   Sig: Chew 1 tablet (81 mg total) daily Do not start before September 30, 2024.   atorvastatin (LIPITOR) 40 mg tablet  Self No No   Sig: Take 1 tablet (40 mg total) by mouth daily with dinner   finasteride (PROSCAR) 5 mg  tablet  Self No No   Sig: Take 1 tablet (5 mg total) by mouth daily   furosemide (LASIX) 20 mg tablet   No No   Sig: Take 1 tablet (20 mg total) by mouth daily   lisinopril (ZESTRIL) 5 mg tablet  Self No No   Sig: Take 1 tablet (5 mg total) by mouth daily Do not start before November 4, 2024.   metoprolol succinate (TOPROL-XL) 25 mg 24 hr tablet   No No   Sig: Take 1 tablet (25 mg total) by mouth every 12 (twelve) hours   nitroglycerin (NITROSTAT) 0.4 mg SL tablet  Self No No   Sig: Place 1 tablet (0.4 mg total) under the tongue every 5 (five) minutes as needed for chest pain   polyethylene glycol (MIRALAX) 17 g packet  Self No No   Sig: Take 17 g by mouth daily   tamsulosin (FLOMAX) 0.4 mg  Self No No   Sig: Take 1 capsule (0.4 mg total) by mouth daily with dinner      Facility-Administered Medications: None     Patient's Medications   Discharge Prescriptions    No medications on file     No discharge procedures on file.  ED SEPSIS DOCUMENTATION   Time reflects when diagnosis was documented in both MDM as applicable and the Disposition within this note       Time User Action Codes Description Comment    12/13/2024  6:00 PM Sancho Yoon Add [R31.9] Hematuria     12/13/2024  6:00 PM Sancho Yoon Modify [R31.9] Hematuria Cleared with flushing needs urology follow-up                 Sancho Yoon DO  12/13/24 0493

## 2024-12-13 NOTE — ED NOTES
Manually irrigated catheter. Minimal clots removed with equal input and output. GLORIA Peña at bedside during procedure. No further blood noted on return. New tubing and bag placed at this time.      Leeann Ruiz  12/13/24 [id number]

## 2024-12-14 ENCOUNTER — HOSPITAL ENCOUNTER (INPATIENT)
Facility: HOSPITAL | Age: 74
LOS: 1 days | DRG: 699 | End: 2024-12-15
Attending: EMERGENCY MEDICINE | Admitting: INTERNAL MEDICINE
Payer: MEDICARE

## 2024-12-14 ENCOUNTER — RESULTS FOLLOW-UP (OUTPATIENT)
Dept: EMERGENCY DEPT | Facility: HOSPITAL | Age: 74
End: 2024-12-14

## 2024-12-14 DIAGNOSIS — R31.9 HEMATURIA: Primary | ICD-10-CM

## 2024-12-14 LAB
ALBUMIN SERPL BCG-MCNC: 3.7 G/DL (ref 3.5–5)
ALP SERPL-CCNC: 77 U/L (ref 34–104)
ALT SERPL W P-5'-P-CCNC: 12 U/L (ref 7–52)
ANION GAP SERPL CALCULATED.3IONS-SCNC: 9 MMOL/L (ref 4–13)
AST SERPL W P-5'-P-CCNC: 17 U/L (ref 13–39)
BASOPHILS # BLD AUTO: 0.05 THOUSANDS/ÂΜL (ref 0–0.1)
BASOPHILS NFR BLD AUTO: 1 % (ref 0–1)
BILIRUB SERPL-MCNC: 0.36 MG/DL (ref 0.2–1)
BUN SERPL-MCNC: 36 MG/DL (ref 5–25)
CALCIUM SERPL-MCNC: 8.2 MG/DL (ref 8.4–10.2)
CHLORIDE SERPL-SCNC: 104 MMOL/L (ref 96–108)
CO2 SERPL-SCNC: 24 MMOL/L (ref 21–32)
CREAT SERPL-MCNC: 2.11 MG/DL (ref 0.6–1.3)
EOSINOPHIL # BLD AUTO: 0.31 THOUSAND/ÂΜL (ref 0–0.61)
EOSINOPHIL NFR BLD AUTO: 5 % (ref 0–6)
ERYTHROCYTE [DISTWIDTH] IN BLOOD BY AUTOMATED COUNT: 13.4 % (ref 11.6–15.1)
GFR SERPL CREATININE-BSD FRML MDRD: 29 ML/MIN/1.73SQ M
GLUCOSE SERPL-MCNC: 106 MG/DL (ref 65–140)
HCT VFR BLD AUTO: 35.5 % (ref 36.5–49.3)
HGB BLD-MCNC: 11.6 G/DL (ref 12–17)
IMM GRANULOCYTES # BLD AUTO: 0.02 THOUSAND/UL (ref 0–0.2)
IMM GRANULOCYTES NFR BLD AUTO: 0 % (ref 0–2)
LIPASE SERPL-CCNC: 72 U/L (ref 11–82)
LYMPHOCYTES # BLD AUTO: 1.12 THOUSANDS/ÂΜL (ref 0.6–4.47)
LYMPHOCYTES NFR BLD AUTO: 18 % (ref 14–44)
MCH RBC QN AUTO: 32.4 PG (ref 26.8–34.3)
MCHC RBC AUTO-ENTMCNC: 32.7 G/DL (ref 31.4–37.4)
MCV RBC AUTO: 99 FL (ref 82–98)
MONOCYTES # BLD AUTO: 0.64 THOUSAND/ÂΜL (ref 0.17–1.22)
MONOCYTES NFR BLD AUTO: 10 % (ref 4–12)
NEUTROPHILS # BLD AUTO: 4.25 THOUSANDS/ÂΜL (ref 1.85–7.62)
NEUTS SEG NFR BLD AUTO: 66 % (ref 43–75)
NRBC BLD AUTO-RTO: 0 /100 WBCS
PLATELET # BLD AUTO: 314 THOUSANDS/UL (ref 149–390)
PMV BLD AUTO: 9.9 FL (ref 8.9–12.7)
POTASSIUM SERPL-SCNC: 4.2 MMOL/L (ref 3.5–5.3)
PROT SERPL-MCNC: 6.9 G/DL (ref 6.4–8.4)
RBC # BLD AUTO: 3.58 MILLION/UL (ref 3.88–5.62)
SODIUM SERPL-SCNC: 137 MMOL/L (ref 135–147)
WBC # BLD AUTO: 6.39 THOUSAND/UL (ref 4.31–10.16)

## 2024-12-14 PROCEDURE — 83690 ASSAY OF LIPASE: CPT | Performed by: EMERGENCY MEDICINE

## 2024-12-14 PROCEDURE — 99284 EMERGENCY DEPT VISIT MOD MDM: CPT

## 2024-12-14 PROCEDURE — 85025 COMPLETE CBC W/AUTO DIFF WBC: CPT | Performed by: EMERGENCY MEDICINE

## 2024-12-14 PROCEDURE — 80053 COMPREHEN METABOLIC PANEL: CPT | Performed by: EMERGENCY MEDICINE

## 2024-12-14 PROCEDURE — 99285 EMERGENCY DEPT VISIT HI MDM: CPT | Performed by: EMERGENCY MEDICINE

## 2024-12-14 PROCEDURE — 36415 COLL VENOUS BLD VENIPUNCTURE: CPT | Performed by: EMERGENCY MEDICINE

## 2024-12-14 NOTE — ED NOTES
Discharge delayed due to pending transportation via roundtrip. No transport has claimed ride at this time, still searching for availability.      Leeann Ruiz  12/13/24 2047

## 2024-12-15 ENCOUNTER — HOSPITAL ENCOUNTER (INPATIENT)
Facility: HOSPITAL | Age: 74
LOS: 10 days | Discharge: HOME HEALTH | DRG: 829 | End: 2024-12-25
Attending: SURGERY | Admitting: SURGERY
Payer: MEDICARE

## 2024-12-15 ENCOUNTER — APPOINTMENT (INPATIENT)
Dept: CT IMAGING | Facility: HOSPITAL | Age: 74
DRG: 699 | End: 2024-12-15
Payer: MEDICARE

## 2024-12-15 ENCOUNTER — APPOINTMENT (INPATIENT)
Dept: RADIOLOGY | Facility: HOSPITAL | Age: 74
DRG: 829 | End: 2024-12-15
Payer: MEDICARE

## 2024-12-15 ENCOUNTER — ANESTHESIA EVENT (INPATIENT)
Dept: PERIOP | Facility: HOSPITAL | Age: 74
DRG: 829 | End: 2024-12-15
Payer: MEDICARE

## 2024-12-15 ENCOUNTER — TELEPHONE (OUTPATIENT)
Dept: UROLOGY | Facility: CLINIC | Age: 74
End: 2024-12-15

## 2024-12-15 ENCOUNTER — ANESTHESIA (INPATIENT)
Dept: PERIOP | Facility: HOSPITAL | Age: 74
DRG: 829 | End: 2024-12-15
Payer: MEDICARE

## 2024-12-15 VITALS
HEIGHT: 67 IN | DIASTOLIC BLOOD PRESSURE: 56 MMHG | TEMPERATURE: 98 F | RESPIRATION RATE: 20 BRPM | WEIGHT: 190.92 LBS | SYSTOLIC BLOOD PRESSURE: 115 MMHG | HEART RATE: 60 BPM | BODY MASS INDEX: 29.97 KG/M2 | OXYGEN SATURATION: 95 %

## 2024-12-15 DIAGNOSIS — E83.9 CHRONIC KIDNEY DISEASE-MINERAL BONE DISORDER (CKD-MBD) WITH STAGE 3B CHRONIC KIDNEY DISEASE (HCC): ICD-10-CM

## 2024-12-15 DIAGNOSIS — R31.9 HEMATURIA: ICD-10-CM

## 2024-12-15 DIAGNOSIS — M89.9 CHRONIC KIDNEY DISEASE-MINERAL BONE DISORDER (CKD-MBD) WITH STAGE 3B CHRONIC KIDNEY DISEASE (HCC): ICD-10-CM

## 2024-12-15 DIAGNOSIS — R33.8 CLOT RETENTION OF URINE: ICD-10-CM

## 2024-12-15 DIAGNOSIS — N32.89 BLADDER MASS: Primary | ICD-10-CM

## 2024-12-15 DIAGNOSIS — N18.32 CHRONIC KIDNEY DISEASE-MINERAL BONE DISORDER (CKD-MBD) WITH STAGE 3B CHRONIC KIDNEY DISEASE (HCC): ICD-10-CM

## 2024-12-15 DIAGNOSIS — R45.89 DEPRESSED MOOD: ICD-10-CM

## 2024-12-15 DIAGNOSIS — N13.30 HYDROURETERONEPHROSIS: ICD-10-CM

## 2024-12-15 DIAGNOSIS — N13.30 HYDRONEPHROSIS, UNSPECIFIED HYDRONEPHROSIS TYPE: ICD-10-CM

## 2024-12-15 PROBLEM — D62 ABLA (ACUTE BLOOD LOSS ANEMIA): Status: ACTIVE | Noted: 2024-04-30

## 2024-12-15 PROBLEM — T83.010A SUPRAPUBIC CATHETER DYSFUNCTION (HCC): Status: ACTIVE | Noted: 2024-12-15

## 2024-12-15 PROBLEM — T83.510A URINARY TRACT INFECTION ASSOCIATED WITH CYSTOSTOMY CATHETER  (HCC): Status: ACTIVE | Noted: 2024-09-23

## 2024-12-15 LAB
ABO GROUP BLD: NORMAL
ALBUMIN SERPL BCG-MCNC: 3.4 G/DL (ref 3.5–5)
ALP SERPL-CCNC: 70 U/L (ref 34–104)
ALT SERPL W P-5'-P-CCNC: 12 U/L (ref 7–52)
ANION GAP SERPL CALCULATED.3IONS-SCNC: 7 MMOL/L (ref 4–13)
ANION GAP SERPL CALCULATED.3IONS-SCNC: 9 MMOL/L (ref 4–13)
APTT PPP: 31 SECONDS (ref 23–34)
AST SERPL W P-5'-P-CCNC: 14 U/L (ref 13–39)
BACTERIA UR CULT: ABNORMAL
BACTERIA UR QL AUTO: ABNORMAL /HPF
BILIRUB SERPL-MCNC: 0.36 MG/DL (ref 0.2–1)
BILIRUB UR QL STRIP: NEGATIVE
BLD GP AB SCN SERPL QL: NEGATIVE
BLD GP AB SCN SERPL QL: NEGATIVE
BUN SERPL-MCNC: 32 MG/DL (ref 5–25)
BUN SERPL-MCNC: 36 MG/DL (ref 5–25)
CA-I BLD-SCNC: 0.98 MMOL/L (ref 1.12–1.32)
CALCIUM ALBUM COR SERPL-MCNC: 8.5 MG/DL (ref 8.3–10.1)
CALCIUM SERPL-MCNC: 7.8 MG/DL (ref 8.4–10.2)
CALCIUM SERPL-MCNC: 8 MG/DL (ref 8.4–10.2)
CHLORIDE SERPL-SCNC: 106 MMOL/L (ref 96–108)
CHLORIDE SERPL-SCNC: 107 MMOL/L (ref 96–108)
CLARITY UR: ABNORMAL
CO2 SERPL-SCNC: 22 MMOL/L (ref 21–32)
CO2 SERPL-SCNC: 24 MMOL/L (ref 21–32)
COLOR UR: ABNORMAL
CREAT SERPL-MCNC: 1.8 MG/DL (ref 0.6–1.3)
CREAT SERPL-MCNC: 2.02 MG/DL (ref 0.6–1.3)
ERYTHROCYTE [DISTWIDTH] IN BLOOD BY AUTOMATED COUNT: 13.3 % (ref 11.6–15.1)
ERYTHROCYTE [DISTWIDTH] IN BLOOD BY AUTOMATED COUNT: 17.1 % (ref 11.6–15.1)
GFR SERPL CREATININE-BSD FRML MDRD: 31 ML/MIN/1.73SQ M
GFR SERPL CREATININE-BSD FRML MDRD: 35 ML/MIN/1.73SQ M
GLUCOSE SERPL-MCNC: 100 MG/DL (ref 65–140)
GLUCOSE SERPL-MCNC: 103 MG/DL (ref 65–140)
GLUCOSE SERPL-MCNC: 108 MG/DL (ref 65–140)
GLUCOSE SERPL-MCNC: 98 MG/DL (ref 65–140)
GLUCOSE SERPL-MCNC: 99 MG/DL (ref 65–140)
GLUCOSE UR STRIP-MCNC: NEGATIVE MG/DL
HCT VFR BLD AUTO: 22.8 % (ref 36.5–49.3)
HCT VFR BLD AUTO: 27.8 % (ref 36.5–49.3)
HCT VFR BLD AUTO: 28.7 % (ref 36.5–49.3)
HCT VFR BLD AUTO: 32.2 % (ref 36.5–49.3)
HGB BLD-MCNC: 10.4 G/DL (ref 12–17)
HGB BLD-MCNC: 7.2 G/DL (ref 12–17)
HGB BLD-MCNC: 8.9 G/DL (ref 12–17)
HGB BLD-MCNC: 9.7 G/DL (ref 12–17)
HGB UR QL STRIP.AUTO: ABNORMAL
INR PPP: 1.09 (ref 0.85–1.19)
KETONES UR STRIP-MCNC: NEGATIVE MG/DL
LEUKOCYTE ESTERASE UR QL STRIP: ABNORMAL
MAGNESIUM SERPL-MCNC: 2.1 MG/DL (ref 1.9–2.7)
MAGNESIUM SERPL-MCNC: 2.2 MG/DL (ref 1.9–2.7)
MCH RBC QN AUTO: 31.4 PG (ref 26.8–34.3)
MCH RBC QN AUTO: 32.2 PG (ref 26.8–34.3)
MCHC RBC AUTO-ENTMCNC: 32.3 G/DL (ref 31.4–37.4)
MCHC RBC AUTO-ENTMCNC: 33.8 G/DL (ref 31.4–37.4)
MCV RBC AUTO: 100 FL (ref 82–98)
MCV RBC AUTO: 93 FL (ref 82–98)
NITRITE UR QL STRIP: NEGATIVE
NON-SQ EPI CELLS URNS QL MICRO: ABNORMAL /HPF
PH UR STRIP.AUTO: 7 [PH]
PHOSPHATE SERPL-MCNC: 3.7 MG/DL (ref 2.3–4.1)
PHOSPHATE SERPL-MCNC: 4.3 MG/DL (ref 2.3–4.1)
PLATELET # BLD AUTO: 212 THOUSANDS/UL (ref 149–390)
PLATELET # BLD AUTO: 272 THOUSANDS/UL (ref 149–390)
PMV BLD AUTO: 10 FL (ref 8.9–12.7)
PMV BLD AUTO: 10.1 FL (ref 8.9–12.7)
POTASSIUM SERPL-SCNC: 3.9 MMOL/L (ref 3.5–5.3)
POTASSIUM SERPL-SCNC: 4.7 MMOL/L (ref 3.5–5.3)
PROT SERPL-MCNC: 6.4 G/DL (ref 6.4–8.4)
PROT UR STRIP-MCNC: ABNORMAL MG/DL
PROTHROMBIN TIME: 14.6 SECONDS (ref 12.3–15)
RBC # BLD AUTO: 3.09 MILLION/UL (ref 3.88–5.62)
RBC # BLD AUTO: 3.23 MILLION/UL (ref 3.88–5.62)
RBC #/AREA URNS AUTO: ABNORMAL /HPF
RH BLD: POSITIVE
SODIUM SERPL-SCNC: 137 MMOL/L (ref 135–147)
SODIUM SERPL-SCNC: 138 MMOL/L (ref 135–147)
SP GR UR STRIP.AUTO: 1.01 (ref 1–1.03)
SPECIMEN EXPIRATION DATE: NORMAL
SPECIMEN EXPIRATION DATE: NORMAL
UROBILINOGEN UR STRIP-ACNC: <2 MG/DL
WBC # BLD AUTO: 7.27 THOUSAND/UL (ref 4.31–10.16)
WBC # BLD AUTO: 7.53 THOUSAND/UL (ref 4.31–10.16)
WBC #/AREA URNS AUTO: ABNORMAL /HPF

## 2024-12-15 PROCEDURE — C1769 GUIDE WIRE: HCPCS | Performed by: UROLOGY

## 2024-12-15 PROCEDURE — 85014 HEMATOCRIT: CPT | Performed by: STUDENT IN AN ORGANIZED HEALTH CARE EDUCATION/TRAINING PROGRAM

## 2024-12-15 PROCEDURE — 83735 ASSAY OF MAGNESIUM: CPT

## 2024-12-15 PROCEDURE — 88342 IMHCHEM/IMCYTCHM 1ST ANTB: CPT | Performed by: STUDENT IN AN ORGANIZED HEALTH CARE EDUCATION/TRAINING PROGRAM

## 2024-12-15 PROCEDURE — NC001 PR NO CHARGE: Performed by: UROLOGY

## 2024-12-15 PROCEDURE — 84100 ASSAY OF PHOSPHORUS: CPT

## 2024-12-15 PROCEDURE — 87077 CULTURE AEROBIC IDENTIFY: CPT | Performed by: EMERGENCY MEDICINE

## 2024-12-15 PROCEDURE — 82330 ASSAY OF CALCIUM: CPT

## 2024-12-15 PROCEDURE — 99222 1ST HOSP IP/OBS MODERATE 55: CPT | Performed by: PHYSICIAN ASSISTANT

## 2024-12-15 PROCEDURE — 86901 BLOOD TYPING SEROLOGIC RH(D): CPT | Performed by: NURSE ANESTHETIST, CERTIFIED REGISTERED

## 2024-12-15 PROCEDURE — 80053 COMPREHEN METABOLIC PANEL: CPT | Performed by: PHYSICIAN ASSISTANT

## 2024-12-15 PROCEDURE — 85610 PROTHROMBIN TIME: CPT | Performed by: PHYSICIAN ASSISTANT

## 2024-12-15 PROCEDURE — 86900 BLOOD TYPING SEROLOGIC ABO: CPT | Performed by: NURSE ANESTHETIST, CERTIFIED REGISTERED

## 2024-12-15 PROCEDURE — 85018 HEMOGLOBIN: CPT | Performed by: STUDENT IN AN ORGANIZED HEALTH CARE EDUCATION/TRAINING PROGRAM

## 2024-12-15 PROCEDURE — 88360 TUMOR IMMUNOHISTOCHEM/MANUAL: CPT | Performed by: STUDENT IN AN ORGANIZED HEALTH CARE EDUCATION/TRAINING PROGRAM

## 2024-12-15 PROCEDURE — 81001 URINALYSIS AUTO W/SCOPE: CPT | Performed by: EMERGENCY MEDICINE

## 2024-12-15 PROCEDURE — 87086 URINE CULTURE/COLONY COUNT: CPT | Performed by: EMERGENCY MEDICINE

## 2024-12-15 PROCEDURE — 0T5B8ZZ DESTRUCTION OF BLADDER, VIA NATURAL OR ARTIFICIAL OPENING ENDOSCOPIC: ICD-10-PCS | Performed by: UROLOGY

## 2024-12-15 PROCEDURE — 30233N1 TRANSFUSION OF NONAUTOLOGOUS RED BLOOD CELLS INTO PERIPHERAL VEIN, PERCUTANEOUS APPROACH: ICD-10-PCS | Performed by: EMERGENCY MEDICINE

## 2024-12-15 PROCEDURE — 52001 CYSTO W/IRRG&EVAC MLT CLOTS: CPT | Performed by: UROLOGY

## 2024-12-15 PROCEDURE — 85018 HEMOGLOBIN: CPT | Performed by: PHYSICIAN ASSISTANT

## 2024-12-15 PROCEDURE — 99223 1ST HOSP IP/OBS HIGH 75: CPT | Performed by: UROLOGY

## 2024-12-15 PROCEDURE — 52235 CYSTOSCOPY AND TREATMENT: CPT | Performed by: UROLOGY

## 2024-12-15 PROCEDURE — 85014 HEMATOCRIT: CPT | Performed by: PHYSICIAN ASSISTANT

## 2024-12-15 PROCEDURE — P9016 RBC LEUKOCYTES REDUCED: HCPCS

## 2024-12-15 PROCEDURE — 85730 THROMBOPLASTIN TIME PARTIAL: CPT | Performed by: PHYSICIAN ASSISTANT

## 2024-12-15 PROCEDURE — 0TBB8ZZ EXCISION OF BLADDER, VIA NATURAL OR ARTIFICIAL OPENING ENDOSCOPIC: ICD-10-PCS | Performed by: UROLOGY

## 2024-12-15 PROCEDURE — 82948 REAGENT STRIP/BLOOD GLUCOSE: CPT

## 2024-12-15 PROCEDURE — 74176 CT ABD & PELVIS W/O CONTRAST: CPT

## 2024-12-15 PROCEDURE — 87081 CULTURE SCREEN ONLY: CPT | Performed by: STUDENT IN AN ORGANIZED HEALTH CARE EDUCATION/TRAINING PROGRAM

## 2024-12-15 PROCEDURE — 86920 COMPATIBILITY TEST SPIN: CPT

## 2024-12-15 PROCEDURE — 88341 IMHCHEM/IMCYTCHM EA ADD ANTB: CPT | Performed by: STUDENT IN AN ORGANIZED HEALTH CARE EDUCATION/TRAINING PROGRAM

## 2024-12-15 PROCEDURE — 87186 SC STD MICRODIL/AGAR DIL: CPT | Performed by: EMERGENCY MEDICINE

## 2024-12-15 PROCEDURE — 86901 BLOOD TYPING SEROLOGIC RH(D): CPT | Performed by: STUDENT IN AN ORGANIZED HEALTH CARE EDUCATION/TRAINING PROGRAM

## 2024-12-15 PROCEDURE — 85027 COMPLETE CBC AUTOMATED: CPT

## 2024-12-15 PROCEDURE — 99222 1ST HOSP IP/OBS MODERATE 55: CPT | Performed by: SURGERY

## 2024-12-15 PROCEDURE — 85027 COMPLETE CBC AUTOMATED: CPT | Performed by: PHYSICIAN ASSISTANT

## 2024-12-15 PROCEDURE — 30233N1 TRANSFUSION OF NONAUTOLOGOUS RED BLOOD CELLS INTO PERIPHERAL VEIN, PERCUTANEOUS APPROACH: ICD-10-PCS | Performed by: STUDENT IN AN ORGANIZED HEALTH CARE EDUCATION/TRAINING PROGRAM

## 2024-12-15 PROCEDURE — 80048 BASIC METABOLIC PNL TOTAL CA: CPT

## 2024-12-15 PROCEDURE — 99239 HOSP IP/OBS DSCHRG MGMT >30: CPT | Performed by: STUDENT IN AN ORGANIZED HEALTH CARE EDUCATION/TRAINING PROGRAM

## 2024-12-15 PROCEDURE — 86850 RBC ANTIBODY SCREEN: CPT | Performed by: NURSE ANESTHETIST, CERTIFIED REGISTERED

## 2024-12-15 PROCEDURE — 84100 ASSAY OF PHOSPHORUS: CPT | Performed by: PHYSICIAN ASSISTANT

## 2024-12-15 PROCEDURE — 88307 TISSUE EXAM BY PATHOLOGIST: CPT | Performed by: STUDENT IN AN ORGANIZED HEALTH CARE EDUCATION/TRAINING PROGRAM

## 2024-12-15 PROCEDURE — 99223 1ST HOSP IP/OBS HIGH 75: CPT | Performed by: STUDENT IN AN ORGANIZED HEALTH CARE EDUCATION/TRAINING PROGRAM

## 2024-12-15 PROCEDURE — 86923 COMPATIBILITY TEST ELECTRIC: CPT

## 2024-12-15 PROCEDURE — 83735 ASSAY OF MAGNESIUM: CPT | Performed by: PHYSICIAN ASSISTANT

## 2024-12-15 PROCEDURE — 86850 RBC ANTIBODY SCREEN: CPT | Performed by: STUDENT IN AN ORGANIZED HEALTH CARE EDUCATION/TRAINING PROGRAM

## 2024-12-15 PROCEDURE — 86900 BLOOD TYPING SEROLOGIC ABO: CPT | Performed by: STUDENT IN AN ORGANIZED HEALTH CARE EDUCATION/TRAINING PROGRAM

## 2024-12-15 PROCEDURE — 71045 X-RAY EXAM CHEST 1 VIEW: CPT

## 2024-12-15 RX ORDER — METOPROLOL SUCCINATE 25 MG/1
25 TABLET, EXTENDED RELEASE ORAL EVERY 12 HOURS SCHEDULED
Status: DISCONTINUED | OUTPATIENT
Start: 2024-12-15 | End: 2024-12-15

## 2024-12-15 RX ORDER — SENNOSIDES 8.6 MG
1 TABLET ORAL
Status: CANCELLED | OUTPATIENT
Start: 2024-12-15

## 2024-12-15 RX ORDER — AMIODARONE HYDROCHLORIDE 200 MG/1
200 TABLET ORAL DAILY
Status: DISCONTINUED | OUTPATIENT
Start: 2024-12-16 | End: 2024-12-25 | Stop reason: HOSPADM

## 2024-12-15 RX ORDER — TAMSULOSIN HYDROCHLORIDE 0.4 MG/1
0.4 CAPSULE ORAL
Status: CANCELLED | OUTPATIENT
Start: 2024-12-15

## 2024-12-15 RX ORDER — ATORVASTATIN CALCIUM 40 MG/1
40 TABLET, FILM COATED ORAL
Status: DISCONTINUED | OUTPATIENT
Start: 2024-12-15 | End: 2024-12-15 | Stop reason: HOSPADM

## 2024-12-15 RX ORDER — FENTANYL CITRATE/PF 50 MCG/ML
25 SYRINGE (ML) INJECTION
Refills: 0 | Status: DISCONTINUED | OUTPATIENT
Start: 2024-12-15 | End: 2024-12-15 | Stop reason: HOSPADM

## 2024-12-15 RX ORDER — ACETAMINOPHEN 325 MG/1
650 TABLET ORAL EVERY 6 HOURS PRN
Status: DISCONTINUED | OUTPATIENT
Start: 2024-12-15 | End: 2024-12-25 | Stop reason: HOSPADM

## 2024-12-15 RX ORDER — METOPROLOL SUCCINATE 25 MG/1
25 TABLET, EXTENDED RELEASE ORAL EVERY 12 HOURS SCHEDULED
Status: DISCONTINUED | OUTPATIENT
Start: 2024-12-15 | End: 2024-12-25 | Stop reason: HOSPADM

## 2024-12-15 RX ORDER — FINASTERIDE 5 MG/1
5 TABLET, FILM COATED ORAL DAILY
Status: DISCONTINUED | OUTPATIENT
Start: 2024-12-16 | End: 2024-12-25 | Stop reason: HOSPADM

## 2024-12-15 RX ORDER — TAMSULOSIN HYDROCHLORIDE 0.4 MG/1
0.4 CAPSULE ORAL
Status: DISCONTINUED | OUTPATIENT
Start: 2024-12-15 | End: 2024-12-15 | Stop reason: HOSPADM

## 2024-12-15 RX ORDER — AMIODARONE HYDROCHLORIDE 200 MG/1
200 TABLET ORAL DAILY
Status: CANCELLED | OUTPATIENT
Start: 2024-12-16

## 2024-12-15 RX ORDER — ASPIRIN 81 MG/1
81 TABLET, CHEWABLE ORAL DAILY
Status: DISCONTINUED | OUTPATIENT
Start: 2024-12-16 | End: 2024-12-16

## 2024-12-15 RX ORDER — TAMSULOSIN HYDROCHLORIDE 0.4 MG/1
0.4 CAPSULE ORAL
Status: DISCONTINUED | OUTPATIENT
Start: 2024-12-15 | End: 2024-12-25 | Stop reason: HOSPADM

## 2024-12-15 RX ORDER — CHLORHEXIDINE GLUCONATE ORAL RINSE 1.2 MG/ML
15 SOLUTION DENTAL EVERY 12 HOURS SCHEDULED
Status: DISCONTINUED | OUTPATIENT
Start: 2024-12-15 | End: 2024-12-25 | Stop reason: HOSPADM

## 2024-12-15 RX ORDER — POLYETHYLENE GLYCOL 3350 17 G/17G
17 POWDER, FOR SOLUTION ORAL DAILY
Status: CANCELLED | OUTPATIENT
Start: 2024-12-16

## 2024-12-15 RX ORDER — LIDOCAINE HYDROCHLORIDE 10 MG/ML
INJECTION, SOLUTION EPIDURAL; INFILTRATION; INTRACAUDAL; PERINEURAL AS NEEDED
Status: DISCONTINUED | OUTPATIENT
Start: 2024-12-15 | End: 2024-12-15

## 2024-12-15 RX ORDER — CEFAZOLIN SODIUM 2 G/50ML
SOLUTION INTRAVENOUS AS NEEDED
Status: DISCONTINUED | OUTPATIENT
Start: 2024-12-15 | End: 2024-12-15

## 2024-12-15 RX ORDER — SODIUM CHLORIDE, SODIUM GLUCONATE, SODIUM ACETATE, POTASSIUM CHLORIDE, MAGNESIUM CHLORIDE, SODIUM PHOSPHATE, DIBASIC, AND POTASSIUM PHOSPHATE .53; .5; .37; .037; .03; .012; .00082 G/100ML; G/100ML; G/100ML; G/100ML; G/100ML; G/100ML; G/100ML
500 INJECTION, SOLUTION INTRAVENOUS ONCE
Status: COMPLETED | OUTPATIENT
Start: 2024-12-15 | End: 2024-12-15

## 2024-12-15 RX ORDER — AMLODIPINE BESYLATE 5 MG/1
5 TABLET ORAL DAILY
Status: CANCELLED | OUTPATIENT
Start: 2024-12-16

## 2024-12-15 RX ORDER — HYDROMORPHONE HCL/PF 1 MG/ML
0.5 SYRINGE (ML) INJECTION EVERY 4 HOURS PRN
Refills: 0 | Status: DISCONTINUED | OUTPATIENT
Start: 2024-12-15 | End: 2024-12-15 | Stop reason: HOSPADM

## 2024-12-15 RX ORDER — HYDROMORPHONE HCL IN WATER/PF 6 MG/30 ML
0.2 PATIENT CONTROLLED ANALGESIA SYRINGE INTRAVENOUS EVERY 2 HOUR PRN
Refills: 0 | Status: DISCONTINUED | OUTPATIENT
Start: 2024-12-15 | End: 2024-12-24

## 2024-12-15 RX ORDER — FUROSEMIDE 20 MG/1
20 TABLET ORAL DAILY
Status: DISCONTINUED | OUTPATIENT
Start: 2024-12-16 | End: 2024-12-16

## 2024-12-15 RX ORDER — POLYETHYLENE GLYCOL 3350 17 G/17G
17 POWDER, FOR SOLUTION ORAL DAILY
Status: DISCONTINUED | OUTPATIENT
Start: 2024-12-15 | End: 2024-12-15 | Stop reason: HOSPADM

## 2024-12-15 RX ORDER — SODIUM CHLORIDE, SODIUM GLUCONATE, SODIUM ACETATE, POTASSIUM CHLORIDE, MAGNESIUM CHLORIDE, SODIUM PHOSPHATE, DIBASIC, AND POTASSIUM PHOSPHATE .53; .5; .37; .037; .03; .012; .00082 G/100ML; G/100ML; G/100ML; G/100ML; G/100ML; G/100ML; G/100ML
50 INJECTION, SOLUTION INTRAVENOUS CONTINUOUS
Status: CANCELLED | OUTPATIENT
Start: 2024-12-15 | End: 2024-12-15

## 2024-12-15 RX ORDER — ASPIRIN 81 MG/1
81 TABLET, CHEWABLE ORAL DAILY
Status: CANCELLED | OUTPATIENT
Start: 2024-12-16

## 2024-12-15 RX ORDER — FINASTERIDE 5 MG/1
5 TABLET, FILM COATED ORAL DAILY
Status: DISCONTINUED | OUTPATIENT
Start: 2024-12-15 | End: 2024-12-15 | Stop reason: HOSPADM

## 2024-12-15 RX ORDER — ATORVASTATIN CALCIUM 40 MG/1
40 TABLET, FILM COATED ORAL
Status: CANCELLED | OUTPATIENT
Start: 2024-12-15

## 2024-12-15 RX ORDER — SODIUM CHLORIDE 9 MG/ML
INJECTION, SOLUTION INTRAVENOUS CONTINUOUS PRN
Status: DISCONTINUED | OUTPATIENT
Start: 2024-12-15 | End: 2024-12-15

## 2024-12-15 RX ORDER — ATORVASTATIN CALCIUM 40 MG/1
40 TABLET, FILM COATED ORAL
Status: DISCONTINUED | OUTPATIENT
Start: 2024-12-15 | End: 2024-12-25 | Stop reason: HOSPADM

## 2024-12-15 RX ORDER — AMLODIPINE BESYLATE 5 MG/1
5 TABLET ORAL DAILY
Status: DISCONTINUED | OUTPATIENT
Start: 2024-12-16 | End: 2024-12-25 | Stop reason: HOSPADM

## 2024-12-15 RX ORDER — HYDROMORPHONE HCL/PF 1 MG/ML
0.5 SYRINGE (ML) INJECTION EVERY 4 HOURS PRN
Refills: 0 | Status: DISCONTINUED | OUTPATIENT
Start: 2024-12-15 | End: 2024-12-15

## 2024-12-15 RX ORDER — CALCIUM GLUCONATE 20 MG/ML
2 INJECTION, SOLUTION INTRAVENOUS ONCE
Status: COMPLETED | OUTPATIENT
Start: 2024-12-15 | End: 2024-12-15

## 2024-12-15 RX ORDER — LISINOPRIL 5 MG/1
5 TABLET ORAL DAILY
Status: DISCONTINUED | OUTPATIENT
Start: 2024-12-16 | End: 2024-12-16

## 2024-12-15 RX ORDER — ALBUMIN (HUMAN) 12.5 G/50ML
12.5 SOLUTION INTRAVENOUS ONCE
Status: DISCONTINUED | OUTPATIENT
Start: 2024-12-15 | End: 2024-12-15 | Stop reason: HOSPADM

## 2024-12-15 RX ORDER — ONDANSETRON 2 MG/ML
4 INJECTION INTRAMUSCULAR; INTRAVENOUS EVERY 6 HOURS PRN
Status: DISCONTINUED | OUTPATIENT
Start: 2024-12-15 | End: 2024-12-15 | Stop reason: HOSPADM

## 2024-12-15 RX ORDER — SODIUM CHLORIDE, SODIUM GLUCONATE, SODIUM ACETATE, POTASSIUM CHLORIDE, MAGNESIUM CHLORIDE, SODIUM PHOSPHATE, DIBASIC, AND POTASSIUM PHOSPHATE .53; .5; .37; .037; .03; .012; .00082 G/100ML; G/100ML; G/100ML; G/100ML; G/100ML; G/100ML; G/100ML
50 INJECTION, SOLUTION INTRAVENOUS CONTINUOUS
Status: DISCONTINUED | OUTPATIENT
Start: 2024-12-15 | End: 2024-12-15 | Stop reason: HOSPADM

## 2024-12-15 RX ORDER — OXYBUTYNIN CHLORIDE 5 MG/1
5 TABLET ORAL 3 TIMES DAILY PRN
Status: DISCONTINUED | OUTPATIENT
Start: 2024-12-15 | End: 2024-12-15 | Stop reason: HOSPADM

## 2024-12-15 RX ORDER — INSULIN LISPRO 100 [IU]/ML
1-6 INJECTION, SOLUTION INTRAVENOUS; SUBCUTANEOUS
Status: DISCONTINUED | OUTPATIENT
Start: 2024-12-15 | End: 2024-12-18

## 2024-12-15 RX ORDER — AMLODIPINE BESYLATE 5 MG/1
5 TABLET ORAL DAILY
Status: DISCONTINUED | OUTPATIENT
Start: 2024-12-15 | End: 2024-12-15 | Stop reason: HOSPADM

## 2024-12-15 RX ORDER — ALBUMIN HUMAN 50 G/1000ML
12.5 SOLUTION INTRAVENOUS ONCE
Status: COMPLETED | OUTPATIENT
Start: 2024-12-15 | End: 2024-12-15

## 2024-12-15 RX ORDER — PROPOFOL 10 MG/ML
INJECTION, EMULSION INTRAVENOUS AS NEEDED
Status: DISCONTINUED | OUTPATIENT
Start: 2024-12-15 | End: 2024-12-15

## 2024-12-15 RX ORDER — AMIODARONE HYDROCHLORIDE 200 MG/1
200 TABLET ORAL DAILY
Status: DISCONTINUED | OUTPATIENT
Start: 2024-12-15 | End: 2024-12-15 | Stop reason: HOSPADM

## 2024-12-15 RX ORDER — OXYBUTYNIN CHLORIDE 5 MG/1
5 TABLET ORAL 3 TIMES DAILY PRN
Status: DISCONTINUED | OUTPATIENT
Start: 2024-12-15 | End: 2024-12-25 | Stop reason: HOSPADM

## 2024-12-15 RX ORDER — CEFTRIAXONE 1 G/50ML
1000 INJECTION, SOLUTION INTRAVENOUS EVERY 24 HOURS
Status: DISCONTINUED | OUTPATIENT
Start: 2024-12-15 | End: 2024-12-15 | Stop reason: HOSPADM

## 2024-12-15 RX ORDER — ACETAMINOPHEN 325 MG/1
650 TABLET ORAL EVERY 6 HOURS PRN
Status: CANCELLED | OUTPATIENT
Start: 2024-12-15

## 2024-12-15 RX ORDER — CEFTRIAXONE 1 G/50ML
1000 INJECTION, SOLUTION INTRAVENOUS EVERY 24 HOURS
Status: CANCELLED | OUTPATIENT
Start: 2024-12-16

## 2024-12-15 RX ORDER — ASPIRIN 81 MG/1
81 TABLET, CHEWABLE ORAL DAILY
Status: DISCONTINUED | OUTPATIENT
Start: 2024-12-15 | End: 2024-12-15 | Stop reason: HOSPADM

## 2024-12-15 RX ORDER — LISINOPRIL 5 MG/1
5 TABLET ORAL DAILY
Status: CANCELLED | OUTPATIENT
Start: 2024-12-16

## 2024-12-15 RX ORDER — METOPROLOL SUCCINATE 25 MG/1
25 TABLET, EXTENDED RELEASE ORAL EVERY 12 HOURS SCHEDULED
Status: CANCELLED | OUTPATIENT
Start: 2024-12-15

## 2024-12-15 RX ORDER — ONDANSETRON 2 MG/ML
4 INJECTION INTRAMUSCULAR; INTRAVENOUS EVERY 6 HOURS PRN
Status: CANCELLED | OUTPATIENT
Start: 2024-12-15

## 2024-12-15 RX ORDER — GLYCOPYRROLATE 0.2 MG/ML
INJECTION INTRAMUSCULAR; INTRAVENOUS AS NEEDED
Status: DISCONTINUED | OUTPATIENT
Start: 2024-12-15 | End: 2024-12-15

## 2024-12-15 RX ORDER — POLYETHYLENE GLYCOL 3350 17 G/17G
17 POWDER, FOR SOLUTION ORAL DAILY
Status: DISCONTINUED | OUTPATIENT
Start: 2024-12-16 | End: 2024-12-25 | Stop reason: HOSPADM

## 2024-12-15 RX ORDER — METOPROLOL SUCCINATE 25 MG/1
25 TABLET, EXTENDED RELEASE ORAL EVERY 12 HOURS SCHEDULED
Status: DISCONTINUED | OUTPATIENT
Start: 2024-12-15 | End: 2024-12-15 | Stop reason: HOSPADM

## 2024-12-15 RX ORDER — FENTANYL CITRATE 50 UG/ML
INJECTION, SOLUTION INTRAMUSCULAR; INTRAVENOUS AS NEEDED
Status: DISCONTINUED | OUTPATIENT
Start: 2024-12-15 | End: 2024-12-15

## 2024-12-15 RX ORDER — FUROSEMIDE 20 MG/1
20 TABLET ORAL DAILY
Status: DISCONTINUED | OUTPATIENT
Start: 2024-12-15 | End: 2024-12-15 | Stop reason: HOSPADM

## 2024-12-15 RX ORDER — HYDROMORPHONE HCL/PF 1 MG/ML
0.5 SYRINGE (ML) INJECTION EVERY 4 HOURS PRN
Refills: 0 | Status: CANCELLED | OUTPATIENT
Start: 2024-12-15

## 2024-12-15 RX ORDER — OXYCODONE HYDROCHLORIDE 5 MG/1
5 TABLET ORAL EVERY 4 HOURS PRN
Refills: 0 | Status: DISCONTINUED | OUTPATIENT
Start: 2024-12-15 | End: 2024-12-25 | Stop reason: HOSPADM

## 2024-12-15 RX ORDER — ONDANSETRON 2 MG/ML
INJECTION INTRAMUSCULAR; INTRAVENOUS AS NEEDED
Status: DISCONTINUED | OUTPATIENT
Start: 2024-12-15 | End: 2024-12-15

## 2024-12-15 RX ORDER — FUROSEMIDE 20 MG/1
20 TABLET ORAL DAILY
Status: CANCELLED | OUTPATIENT
Start: 2024-12-16

## 2024-12-15 RX ORDER — TRANEXAMIC ACID 100 MG/ML
INJECTION, SOLUTION INTRAVENOUS AS NEEDED
Status: DISCONTINUED | OUTPATIENT
Start: 2024-12-15 | End: 2024-12-15

## 2024-12-15 RX ORDER — SENNOSIDES 8.6 MG
1 TABLET ORAL
Status: DISCONTINUED | OUTPATIENT
Start: 2024-12-15 | End: 2024-12-19

## 2024-12-15 RX ORDER — SENNOSIDES 8.6 MG
1 TABLET ORAL
Status: DISCONTINUED | OUTPATIENT
Start: 2024-12-15 | End: 2024-12-15 | Stop reason: HOSPADM

## 2024-12-15 RX ORDER — SODIUM CHLORIDE, SODIUM GLUCONATE, SODIUM ACETATE, POTASSIUM CHLORIDE, MAGNESIUM CHLORIDE, SODIUM PHOSPHATE, DIBASIC, AND POTASSIUM PHOSPHATE .53; .5; .37; .037; .03; .012; .00082 G/100ML; G/100ML; G/100ML; G/100ML; G/100ML; G/100ML; G/100ML
50 INJECTION, SOLUTION INTRAVENOUS CONTINUOUS
Status: DISCONTINUED | OUTPATIENT
Start: 2024-12-15 | End: 2024-12-15

## 2024-12-15 RX ORDER — OXYBUTYNIN CHLORIDE 5 MG/1
5 TABLET ORAL 3 TIMES DAILY PRN
Status: CANCELLED | OUTPATIENT
Start: 2024-12-15

## 2024-12-15 RX ORDER — FINASTERIDE 5 MG/1
5 TABLET, FILM COATED ORAL DAILY
Status: CANCELLED | OUTPATIENT
Start: 2024-12-16

## 2024-12-15 RX ORDER — ONDANSETRON 2 MG/ML
4 INJECTION INTRAMUSCULAR; INTRAVENOUS EVERY 6 HOURS PRN
Status: DISCONTINUED | OUTPATIENT
Start: 2024-12-15 | End: 2024-12-25 | Stop reason: HOSPADM

## 2024-12-15 RX ORDER — SODIUM CHLORIDE, SODIUM GLUCONATE, SODIUM ACETATE, POTASSIUM CHLORIDE, MAGNESIUM CHLORIDE, SODIUM PHOSPHATE, DIBASIC, AND POTASSIUM PHOSPHATE .53; .5; .37; .037; .03; .012; .00082 G/100ML; G/100ML; G/100ML; G/100ML; G/100ML; G/100ML; G/100ML
250 INJECTION, SOLUTION INTRAVENOUS ONCE
Status: COMPLETED | OUTPATIENT
Start: 2024-12-15 | End: 2024-12-15

## 2024-12-15 RX ORDER — LISINOPRIL 5 MG/1
5 TABLET ORAL DAILY
Status: DISCONTINUED | OUTPATIENT
Start: 2024-12-15 | End: 2024-12-15 | Stop reason: HOSPADM

## 2024-12-15 RX ORDER — ACETAMINOPHEN 325 MG/1
650 TABLET ORAL EVERY 6 HOURS PRN
Status: DISCONTINUED | OUTPATIENT
Start: 2024-12-15 | End: 2024-12-15 | Stop reason: HOSPADM

## 2024-12-15 RX ADMIN — SODIUM CHLORIDE: 0.9 INJECTION, SOLUTION INTRAVENOUS at 15:12

## 2024-12-15 RX ADMIN — Medication 2.5 MG: at 18:43

## 2024-12-15 RX ADMIN — GLYCOPYRROLATE 0.2 MCG: 0.2 INJECTION, SOLUTION INTRAMUSCULAR; INTRAVENOUS at 15:46

## 2024-12-15 RX ADMIN — CHLORHEXIDINE GLUCONATE 0.12% ORAL RINSE 15 ML: 1.2 LIQUID ORAL at 20:08

## 2024-12-15 RX ADMIN — OXYBUTYNIN CHLORIDE 5 MG: 5 TABLET ORAL at 01:59

## 2024-12-15 RX ADMIN — ONDANSETRON 4 MG: 2 INJECTION INTRAMUSCULAR; INTRAVENOUS at 15:19

## 2024-12-15 RX ADMIN — FENTANYL CITRATE 50 MCG: 50 INJECTION INTRAMUSCULAR; INTRAVENOUS at 15:54

## 2024-12-15 RX ADMIN — TAMSULOSIN HYDROCHLORIDE 0.4 MG: 0.4 CAPSULE ORAL at 18:43

## 2024-12-15 RX ADMIN — ATORVASTATIN CALCIUM 40 MG: 40 TABLET, FILM COATED ORAL at 18:43

## 2024-12-15 RX ADMIN — Medication 10 MG: at 15:40

## 2024-12-15 RX ADMIN — SODIUM CHLORIDE, SODIUM GLUCONATE, SODIUM ACETATE, POTASSIUM CHLORIDE, MAGNESIUM CHLORIDE, SODIUM PHOSPHATE, DIBASIC, AND POTASSIUM PHOSPHATE 500 ML: .53; .5; .37; .037; .03; .012; .00082 INJECTION, SOLUTION INTRAVENOUS at 09:45

## 2024-12-15 RX ADMIN — Medication 5 MG: at 15:30

## 2024-12-15 RX ADMIN — ALBUMIN (HUMAN) 12.5 G: 12.5 INJECTION, SOLUTION INTRAVENOUS at 07:33

## 2024-12-15 RX ADMIN — ALBUMIN (HUMAN) 12.5 G: 12.5 INJECTION, SOLUTION INTRAVENOUS at 08:54

## 2024-12-15 RX ADMIN — SODIUM CHLORIDE, SODIUM GLUCONATE, SODIUM ACETATE, POTASSIUM CHLORIDE, MAGNESIUM CHLORIDE, SODIUM PHOSPHATE, DIBASIC, AND POTASSIUM PHOSPHATE 500 ML: .53; .5; .37; .037; .03; .012; .00082 INJECTION, SOLUTION INTRAVENOUS at 03:15

## 2024-12-15 RX ADMIN — PROPOFOL 80 MG: 10 INJECTION, EMULSION INTRAVENOUS at 15:19

## 2024-12-15 RX ADMIN — SODIUM CHLORIDE, SODIUM GLUCONATE, SODIUM ACETATE, POTASSIUM CHLORIDE, MAGNESIUM CHLORIDE, SODIUM PHOSPHATE, DIBASIC, AND POTASSIUM PHOSPHATE 250 ML: .53; .5; .37; .037; .03; .012; .00082 INJECTION, SOLUTION INTRAVENOUS at 06:43

## 2024-12-15 RX ADMIN — SODIUM CHLORIDE, SODIUM GLUCONATE, SODIUM ACETATE, POTASSIUM CHLORIDE, MAGNESIUM CHLORIDE, SODIUM PHOSPHATE, DIBASIC, AND POTASSIUM PHOSPHATE 50 ML/HR: .53; .5; .37; .037; .03; .012; .00082 INJECTION, SOLUTION INTRAVENOUS at 05:42

## 2024-12-15 RX ADMIN — CALCIUM GLUCONATE 2 G: 20 INJECTION, SOLUTION INTRAVENOUS at 19:46

## 2024-12-15 RX ADMIN — TRANEXAMIC ACID 1000 MG: 1 INJECTION, SOLUTION INTRAVENOUS at 15:48

## 2024-12-15 RX ADMIN — NOREPINEPHRINE BITARTRATE 16 MCG: 1 INJECTION, SOLUTION, CONCENTRATE INTRAVENOUS at 15:29

## 2024-12-15 RX ADMIN — NOREPINEPHRINE BITARTRATE 16 MCG: 1 INJECTION, SOLUTION, CONCENTRATE INTRAVENOUS at 15:26

## 2024-12-15 RX ADMIN — PHENYLEPHRINE HYDROCHLORIDE 100 MCG: 10 INJECTION INTRAVENOUS at 15:19

## 2024-12-15 RX ADMIN — SODIUM CHLORIDE, SODIUM GLUCONATE, SODIUM ACETATE, POTASSIUM CHLORIDE, MAGNESIUM CHLORIDE, SODIUM PHOSPHATE, DIBASIC, AND POTASSIUM PHOSPHATE 50 ML/HR: .53; .5; .37; .037; .03; .012; .00082 INJECTION, SOLUTION INTRAVENOUS at 07:33

## 2024-12-15 RX ADMIN — NOREPINEPHRINE BITARTRATE 4 MCG/MIN: 1 INJECTION, SOLUTION, CONCENTRATE INTRAVENOUS at 15:36

## 2024-12-15 RX ADMIN — CEFAZOLIN SODIUM 2000 MG: 2 SOLUTION INTRAVENOUS at 15:29

## 2024-12-15 RX ADMIN — FENTANYL CITRATE 50 MCG: 50 INJECTION INTRAMUSCULAR; INTRAVENOUS at 15:44

## 2024-12-15 RX ADMIN — LIDOCAINE HYDROCHLORIDE 50 MG: 10 INJECTION, SOLUTION EPIDURAL; INFILTRATION; INTRACAUDAL; PERINEURAL at 15:19

## 2024-12-15 RX ADMIN — CEFTRIAXONE 1000 MG: 1 INJECTION, SOLUTION INTRAVENOUS at 10:42

## 2024-12-15 NOTE — ASSESSMENT & PLAN NOTE
Home regimen: amlodipine 5mg daily, lasix 20mg daily, metoprolol succinate 25mg BID  Home lisinopril 5mg daily currently on hold in outpatient setting d/t [facility] continue holding inpatient  Pt with hypotension, therefore all anti-HTN are on hold except for metoprolol with increased hold parameter   Patient status post multiple boluses and albumin.  Patient's blood pressure continues to remain labile.  Continue IVF

## 2024-12-15 NOTE — ED PROVIDER NOTES
Time reflects when diagnosis was documented in both MDM as applicable and the Disposition within this note       Time User Action Codes Description Comment    12/14/2024 11:39 PM Austin Hodgson Add [R31.9] Hematuria           ED Disposition       ED Disposition   Admit    Condition   Stable    Date/Time   Sat Dec 14, 2024 11:39 PM    Comment   Case was discussed with MIO and the patient's admission status was agreed to be Admission Status: inpatient status to the service of Dr. Fuentes .               Assessment & Plan       Medical Decision Making  74-year-old male with gross hematuria.  Manual flushing attempted but unsuccessful.  Repeat labs to assess hemoglobin.  Insert indwelling three-way Colon catheter for continuous bladder irrigation.  Discussed case with urology team.  Will admit stepdown level 2.    Amount and/or Complexity of Data Reviewed  Labs: ordered.    Risk  Decision regarding hospitalization.             Medications       ED Risk Strat Scores                          SBIRT 20yo+      Flowsheet Row Most Recent Value   Initial Alcohol Screen: US AUDIT-C     1. How often do you have a drink containing alcohol? 0 Filed at: 12/14/2024 2158   2. How many drinks containing alcohol do you have on a typical day you are drinking?  0 Filed at: 12/14/2024 2158   3a. Male UNDER 65: How often do you have five or more drinks on one occasion? 0 Filed at: 12/14/2024 2158   3b. FEMALE Any Age, or MALE 65+: How often do you have 4 or more drinks on one occassion? 0 Filed at: 12/14/2024 2158   Audit-C Score 0 Filed at: 12/14/2024 2158   WILLIE: How many times in the past year have you...    Used an illegal drug or used a prescription medication for non-medical reasons? Never Filed at: 12/14/2024 2158                            History of Present Illness       Chief Complaint   Patient presents with    Blood in Urine     Patient to ED with reports of blood in his urine and RLQ pain.       Past Medical History:    Diagnosis Date    Alcohol intoxication ([facility]) 10/15/2020    BPH (benign prostatic hyperplasia)     Chronic HFrEF (heart failure with reduced ejection fraction) ([facility]) 2023    Coronary artery calcification seen on CT scan 11/10/2023    Coronary artery disease 2023    Deep vein thrombosis (DVT) of left lower extremity ([facility]) 2023    Diabetes mellitus ([facility]) 2023    Fall from slip, trip, or stumble 10/15/2020    History of phimosis of penis     History of urinary calculi     Hypertension     Skin cancer     Tobacco abuse     quit around       Past Surgical History:   Procedure Laterality Date    BLADDER STONE REMOVAL      CARDIAC CATHETERIZATION N/A 2023    Procedure: Cardiac catheterization;  Surgeon: Dave Royal MD;  Location: [facility];  Service: Cardiology    IR SUPRAPUBIC CATHETER CHECK/CHANGE/REINSERTION/UPSIZE  2024    IR SUPRAPUBIC CATHETER CHECK/CHANGE/REINSERTION/UPSIZE  12/3/2024    IR SUPRAPUBIC CATHETER PLACEMENT  2024    ORIF ANKLE FRACTURE Left     SKIN LESION EXCISION N/A 2024    Procedure: WIDE LOCAL EXCISION OF BACK MELANOMA;  Surgeon: Lillie La MD;  Location: [facility];  Service: Surgical Oncology    TOTAL HIP ARTHROPLASTY Right       Family History   Problem Relation Age of Onset    Breast cancer Mother     Heart attack Father 61    Other Sister         s/p hysterectomy    Cerebral palsy Brother     Skin cancer Other         family history    No Known Problems Son     No Known Problems Daughter     Sudden death Neg Hx       Social History     Tobacco Use    Smoking status: Former     Types: Cigarettes     Start date:      Quit date: 2     Years since quittin.9    Smokeless tobacco: Never    Tobacco comments:     Quit over 30 years ago (Updated 2023).    Vaping Use    Vaping status: Never Used   Substance Use Topics    Alcohol use: Not Currently    Drug use: Never      E-Cigarette/Vaping    E-Cigarette Use Never  User       E-Cigarette/Vaping Substances    Nicotine No     THC No     CBD No     Flavoring No     Other No     Unknown No       I have reviewed and agree with the history as documented.     74-year-old male presents for evaluation of gross hematuria and suprapubic pain that started a few hours ago.  Patient was evaluated here yesterday with similar symptoms.  Colon catheter was able to be flushed manually without difficulty and urine cleared.  Patient was discharged in stable condition.  Patient returns today now with minimal drainage from Colon catheter.        Review of Systems   Constitutional:  Negative for fever.   Genitourinary:  Positive for hematuria.           Objective       ED Triage Vitals   Temperature Pulse Blood Pressure Respirations SpO2 Patient Position - Orthostatic VS   12/14/24 2200 12/14/24 2158 12/14/24 2158 12/14/24 2158 12/14/24 2158 12/14/24 2158   (!) 97.3 °F (36.3 °C) 66 141/65 16 95 % Lying      Temp Source Heart Rate Source BP Location FiO2 (%) Pain Score    12/14/24 2200 12/14/24 2300 12/14/24 2158 -- 12/15/24 0117    Temporal Monitor Right arm  No Pain      Vitals      Date and Time Temp Pulse SpO2 Resp BP Pain Score FACES Pain Rating User   12/15/24 0117 97.7 °F (36.5 °C) 60 99 % 20 133/54 No Pain -- CAM   12/15/24 0000 -- 68 97 % 18 190/80 -- -- CK   12/14/24 2300 -- 65 97 % 18 174/80 -- -- CK   12/14/24 2200 97.3 °F (36.3 °C) 67 95 % -- 150/68 -- -- IB   12/14/24 2158 -- 66 95 % 16 141/65 -- -- NMB            Physical Exam  Vitals and nursing note reviewed.   Constitutional:       General: He is not in acute distress.     Appearance: He is well-developed.   HENT:      Head: Normocephalic and atraumatic.      Right Ear: External ear normal.      Left Ear: External ear normal.      Nose: Nose normal.   Eyes:      General: No scleral icterus.  Pulmonary:      Effort: Pulmonary effort is normal. No respiratory distress.   Abdominal:      General: There is no distension.       Palpations: Abdomen is soft.   Genitourinary:     Comments: Suprapubic catheter appears clean dry intact  Musculoskeletal:         General: No deformity. Normal range of motion.      Cervical back: Normal range of motion and neck supple.   Skin:     General: Skin is warm.      Findings: No rash.   Neurological:      General: No focal deficit present.      Mental Status: He is alert.      Gait: Gait normal.   Psychiatric:         Mood and Affect: Mood normal.         Results Reviewed       Procedure Component Value Units Date/Time    Urine Microscopic [[id number]]  (Abnormal) Collected: 12/15/24 0012    Lab Status: Final result Specimen: Urine, Indwelling Colon Catheter Updated: 12/15/24 0056     RBC, UA Innumerable /hpf      WBC, UA       Field obscured, unable to enumerate     /hpf     Epithelial Cells       Field obscured, unable to enumerate     /hpf     Bacteria, UA       Field obscured, unable to enumerate     /hpf    Urine culture [[id number]] Collected: 12/15/24 0012    Lab Status: In process Specimen: Urine, Indwelling Colon Catheter Updated: 12/15/24 0056    UA w Reflex to Microscopic w Reflex to Culture [[id number]]  (Abnormal) Collected: 12/15/24 0012    Lab Status: Final result Specimen: Urine, Indwelling Colon Catheter Updated: 12/15/24 0027     Color, UA Dark Red     Clarity, UA Cloudy     Specific Gravity, UA 1.010     pH, UA 7.0     Leukocytes, UA Small     Nitrite, UA Negative     Protein,  (2+) mg/dl      Glucose, UA Negative mg/dl      Ketones, UA Negative mg/dl      Urobilinogen, UA <2.0 mg/dl      Bilirubin, UA Negative     Occult Blood, UA Large    Comprehensive metabolic panel [[id number]]  (Abnormal) Collected: 12/14/24 2235    Lab Status: Final result Specimen: Blood from Arm, Left Updated: 12/14/24 2255     Sodium 137 mmol/L      Potassium 4.2 mmol/L      Chloride 104 mmol/L      CO2 24 mmol/L      ANION GAP 9 mmol/L      BUN 36 mg/dL      Creatinine 2.11 mg/dL      Glucose 106 mg/dL       Calcium 8.2 mg/dL      AST 17 U/L      ALT 12 U/L      Alkaline Phosphatase 77 U/L      Total Protein 6.9 g/dL      Albumin 3.7 g/dL      Total Bilirubin 0.36 mg/dL      eGFR 29 ml/min/1.73sq m     Narrative:      National Kid[employer] Foundation guidelines for Chronic Kidney Disease (CKD):     Stage 1 with normal or high GFR (GFR > 90 mL/min/1.73 square meters)    Stage 2 Mild CKD (GFR = 60-89 mL/min/1.73 square meters)    Stage 3A Moderate CKD (GFR = 45-59 mL/min/1.73 square meters)    Stage 3B Moderate CKD (GFR = 30-44 mL/min/1.73 square meters)    Stage 4 Severe CKD (GFR = 15-29 mL/min/1.73 square meters)    Stage 5 End Stage CKD (GFR <15 mL/min/1.73 square meters)  Note: GFR calculation is accurate only with a steady state creatinine    Lipase [[id number]]  (Normal) Collected: 12/14/24 2235    Lab Status: Final result Specimen: Blood from Arm, Left Updated: 12/14/24 2255     Lipase 72 u/L     CBC and differential [[id number]]  (Abnormal) Collected: 12/14/24 2235    Lab Status: Final result Specimen: Blood from Arm, Left Updated: 12/14/24 2241     WBC 6.39 Thousand/uL      RBC 3.58 Million/uL      Hemoglobin 11.6 g/dL      Hematocrit 35.5 %      MCV 99 fL      MCH 32.4 pg      MCHC 32.7 g/dL      RDW 13.4 %      MPV 9.9 fL      Platelets 314 Thousands/uL      nRBC 0 /100 WBCs      Segmented % 66 %      Immature Grans % 0 %      Lymphocytes % 18 %      Monocytes % 10 %      Eosinophils Relative 5 %      Basophils Relative 1 %      Absolute Neutrophils 4.25 Thousands/µL      Absolute Immature Grans 0.02 Thousand/uL      Absolute Lymphocytes 1.12 Thousands/µL      Absolute Monocytes 0.64 Thousand/µL      Eosinophils Absolute 0.31 Thousand/µL      Basophils Absolute 0.05 Thousands/µL             No orders to display       Procedures    ED Medication and Procedure Management   Prior to Admission Medications   Prescriptions Last Dose Informant Patient Reported? Taking?   acetaminophen (TYLENOL) 325 mg tablet Past  Month Self Yes Yes   Sig: Take 325 mg by mouth every 6 (six) hours as needed for mild pain   amLODIPine (NORVASC) 5 mg tablet 12/14/2024  No Yes   Sig: Take 1 tablet (5 mg total) by mouth daily   amiodarone 200 mg tablet 12/14/2024 Self No Yes   Sig: Take 1 tablet (200 mg total) by mouth daily   aspirin 81 mg chewable tablet 12/14/2024 Self No Yes   Sig: Chew 1 tablet (81 mg total) daily Do not start before September 30, 2024.   atorvastatin (LIPITOR) 40 mg tablet 12/14/2024 Self No Yes   Sig: Take 1 tablet (40 mg total) by mouth daily with dinner   finasteride (PROSCAR) 5 mg tablet 12/14/2024 Self No Yes   Sig: Take 1 tablet (5 mg total) by mouth daily   furosemide (LASIX) 20 mg tablet 12/14/2024  No Yes   Sig: Take 1 tablet (20 mg total) by mouth daily   lisinopril (ZESTRIL) 5 mg tablet 12/14/2024 Self No Yes   Sig: Take 1 tablet (5 mg total) by mouth daily Do not start before November 4, 2024.   metoprolol succinate (TOPROL-XL) 25 mg 24 hr tablet 12/14/2024  No Yes   Sig: Take 1 tablet (25 mg total) by mouth every 12 (twelve) hours   nitroglycerin (NITROSTAT) 0.4 mg SL tablet More than a month Self No No   Sig: Place 1 tablet (0.4 mg total) under the tongue every 5 (five) minutes as needed for chest pain   polyethylene glycol (MIRALAX) 17 g packet 12/14/2024 Self No Yes   Sig: Take 17 g by mouth daily   tamsulosin (FLOMAX) 0.4 mg 12/14/2024 Self No Yes   Sig: Take 1 capsule (0.4 mg total) by mouth daily with dinner      Facility-Administered Medications: None     Current Discharge Medication List        CONTINUE these medications which have NOT CHANGED    Details   acetaminophen (TYLENOL) 325 mg tablet Take 325 mg by mouth every 6 (six) hours as needed for mild pain      amiodarone 200 mg tablet Take 1 tablet (200 mg total) by mouth daily  Qty: 90 tablet, Refills: 2    Associated Diagnoses: NSVT (nonsustained ventricular tachycardia) (HCC)      amLODIPine (NORVASC) 5 mg tablet Take 1 tablet (5 mg total) by mouth  daily  Qty: 90 tablet, Refills: 1    Associated Diagnoses: Benign hypertension with chronic kidney disease, stage III (HCC)      aspirin 81 mg chewable tablet Chew 1 tablet (81 mg total) daily Do not start before September 30, 2024.    Associated Diagnoses: Dilated cardiomyopathy (HCC)      atorvastatin (LIPITOR) 40 mg tablet Take 1 tablet (40 mg total) by mouth daily with dinner  Refills: 0    Associated Diagnoses: Acute combined systolic and diastolic congestive heart failure (HCC)      finasteride (PROSCAR) 5 mg tablet Take 1 tablet (5 mg total) by mouth daily  Qty: 90 tablet, Refills: 3    Associated Diagnoses: Urinary retention due to benign prostatic hyperplasia      furosemide (LASIX) 20 mg tablet Take 1 tablet (20 mg total) by mouth daily  Qty: 30 tablet, Refills: 5    Associated Diagnoses: Chronic HFrEF (heart failure with reduced ejection fraction) ([facility]); Acute combined systolic and diastolic congestive heart failure (HCC); Essential hypertension      lisinopril (ZESTRIL) 5 mg tablet Take 1 tablet (5 mg total) by mouth daily Do not start before November 4, 2024.    Associated Diagnoses: Chronic kidney disease, stage 3a ([facility]); Essential hypertension      metoprolol succinate (TOPROL-XL) 25 mg 24 hr tablet Take 1 tablet (25 mg total) by mouth every 12 (twelve) hours  Qty: 60 tablet, Refills: 0    Associated Diagnoses: Acute on chronic systolic (congestive) heart failure (HCC)      polyethylene glycol (MIRALAX) 17 g packet Take 17 g by mouth daily  Qty: 30 each, Refills: 2    Associated Diagnoses: Gross hematuria      tamsulosin (FLOMAX) 0.4 mg Take 1 capsule (0.4 mg total) by mouth daily with dinner    Associated Diagnoses: Urinary retention      nitroglycerin (NITROSTAT) 0.4 mg SL tablet Place 1 tablet (0.4 mg total) under the tongue every 5 (five) minutes as needed for chest pain    Associated Diagnoses: Dilated cardiomyopathy (HCC)           No discharge procedures on file.  ED SEPSIS DOCUMENTATION   Time  reflects when diagnosis was documented in both MDM as applicable and the Disposition within this note       Time User Action Codes Description Comment    12/14/2024 11:39 PM Austin Hodgson Add [R31.9] Hematuria                  Austin Hodgson DO  12/15/24 0215

## 2024-12-15 NOTE — ASSESSMENT & PLAN NOTE
"Lab Results   Component Value Date    HGBA1C 5.9 (H) 04/18/2024       No results for input(s): \"POCGLU\" in the last 72 hours.    Blood Sugar Average: Last 72 hrs:  diet controlled   Monitor blood sugar on AM labs   "

## 2024-12-15 NOTE — ASSESSMENT & PLAN NOTE
-Eliquis discontinued on previous admission due to history of hematuria  -Patient was on home aspirin, now on hold

## 2024-12-15 NOTE — ASSESSMENT & PLAN NOTE
Wt Readings from Last 3 Encounters:   12/15/24 86.6 kg (190 lb 14.7 oz)   12/10/24 87.5 kg (193 lb)   12/05/24 88 kg (194 lb)     -Diuretics on hold  -Medical management

## 2024-12-15 NOTE — DISCHARGE INSTRUCTIONS
.    Nephrostomy Tube Care     WHAT YOU NEED TO KNOW:   A nephrostomy tube is a catheter (thin plastic tube) that is inserted through your skin and into your kidney. The nephrostomy tube drains urine from your kidney into a collecting bag outside your body. You may need a nephrostomy tube when something is blocking the normal flow of urine. A nephrostomy tube may be used for a short or a long period of time. The nephrostomy tube comes out of your back, so you will need someone to help care for your nephrostomy tube.    DISCHARGE INSTRUCTIONS:     Resume your normal diet. Small sips of flat soda will help with mild nausea.     How to clean the skin around the nephrostomy tube and change the bandage:  Since the nephrostomy tube comes out of your back, you will not be able to care for it by yourself. Ask someone to follow the general directions below to check and care for your nephrostomy tube.   Gather the items you will need.          Disposable (single use) under-pad, and a clean washcloth  Plain soap, warm water, and new medical gloves  Sterile gauze bandages  Clear adhesive dressing or medical tape  Skin barrier  Protective skin film  Trash bag  Remove the old bandage, and check the tube entry site.    Have the patient lie on his side with the nephrostomy tube entry site facing up. Place the under-pad where it will catch drainage as you are working with the nephrostomy tube.   Wash your hands with soap and water. Put on new medical gloves.  Gently remove the old bandage, without pulling on the tube. Do this by holding the skin beside the tube with one hand. With the other hand, gently remove sticky tape and the skin barrier by pulling in the same direction as hair growth. Do not touch the side of the bandage that is placed over or around the tube. Throw the bandage and skin barrier away in a trash bag.  Look for signs of infection, such as skin redness and swelling. Report any skin changes to healthcare  providers.  Clean the tube entry site.    Hold the tube in place to keep it from being pulled out while you are cleaning around it.  You will need to clean the area twice. For the first cleaning, wet a new gauze bandage with soap and water.  Begin at the entry site of the tube. Wipe the skin in circles, moving away from the entry site. Remove blood and any other material with the gauze. Do this as often as needed. Use a new gauze bandage each time you clean the area, moving away from the entry site.   For the second cleaning, wet a new gauze bandage with water. Begin at the entry site of the tube. Wipe the skin in circles, moving away from the entry site. Use a new gauze bandage each time you clean the area, moving away from the entry site.   Gently pat the skin with a clean washcloth to dry it.    Apply the skin barrier and bandages.    Roll up a bandage to make it thick, and place it under  the place where the tube enters the skin. Place it to support the tube, and stop it from kinking or bending. Tape the bandage in place, and apply more bandages if directed by a healthcare provider.   Bring the tubing forward to the front and tape it to the skin. Do not stretch the tube tight, because this may pull the nephrostomy tube out.  How often to change the bandage.  Change the bandage around the tube, every other day. If your bandages  get dirty or wet, change them right away, and as often as needed. If your nephrostomy tube is to be used for a long period of time, the tube needs to be changed every 2 to 3 months. Healthcare providers will tell you when you need to make an appointment to have your tube changed.     How to care for the urine drainage bag:   Ask if you need to measure and write down how much urine is in the bag before you empty it. Drain urine out of the drainage bag when it is ½ to ? full. Open the spout at the bottom of the bag to empty the urine into the toilet.   You may need to detach the drainage  bag from the nephrostomy tube to change it.. If so, attach a new drainage bag tightly to the nephrostomy tube.     How to prevent problems with your nephrostomy tube:   Change bandages, directed.  This helps to prevent infection. Throw away or clean your drainage bag as directed by your healthcare provider.    Wipe the connecting ends of the drainage bag with alcohol before you reconnect the bag to the tube.  This helps prevent infection.     Keep the tube taped to your skin and connected to a drainage bag placed below the level of your kidneys.  This helps prevent urine from backing up into your kidneys. You may wear a small drainage bag strapped to your leg to let you move around more easily.    Check the catheter to be sure it is in place after you change your clothes or do other activities.  Do not wear tight clothing over the tube. Place the tubing over your thigh rather than under it when you are sitting down. Be sure that nothing is pulling on the nephrostomy tube when you move around.    Change positions if you see little or no urine in your drainage bag.  Check to see if the urine tube is twisted or bent. Be sure that you are not sitting or lying on the tube. If there are no kinks and there is little or no urine in the drainage bag, tell your healthcare provider.    Flush out the tube as directed. Some tubes get flushed one time a day with 10 mls of NSS You will be given a prescription for the flushes.  To flush the nephrostomy tube, clean both connections with alcohol swap. Twist off the drainage bag tube and twist the saline syringe into the nephrostomy tube and flush briskly. Remove the syringe and twist the drainage bag tube back into the nephrostomy tube.  Keep the site covered while you shower.  Tape a piece of clear adhesive plastic over the dressing to keep it dry while you shower. Do not take tub baths.    Contact Interventional Radiology at [phone] (PAUL PATIENTS: Contact Interventional  Radiology at [phone]) ([facility]I PATIENTS: Contact Interventional Radiology at [phone]) if:  The skin around the nephrostomy tube is red, swollen, itches, or has a rash.   You have a fever.  You have lower back or hip pain.  There are changes in how your urine looks or smells.  You have little or no urine draining from the nephrostomy tube.   You have nausea and are vomiting.  The black ian on your tube has moved, or the tube is longer than when it was put in.   You have questions or concerns about your condition or care.  The nephrostomy tube comes out completely.   There is blood, pus, or a bad smell coming from the place where the tube enters your skin.  Urine is leaking around the tube.    Tube Capping Trial        If your tube is capped and has no drainage bag, the urine will flow through the ureter         and into the bladder for you to urinate normally. This is called a capping trial.                   Continue to flush your tube one time per day. You will have an extra drainage bag to       keep at home in case the capping trial fails. Call the IR department if you experience        any of the following: Pain, fever greater than 101. Persistent nausea or vomiting.      The following pharmacies carry the flush syringes.       [facility]                     [facility]                         [facility]       [address]                     [address]                    [phone]  [place]                       [place]  Phone [phone]            Phone [phone]                 [facility]                                                                                                   [phone]  [facility]             [facility]                             [address] [place] [address]   [place] CHRISTINE                                  [phone]  Phone [phone]            Phone [phone]    [facility]                                                                         [facility] [phone]  [address]  [place]   Phone [phone]

## 2024-12-15 NOTE — ASSESSMENT & PLAN NOTE
-SPT is clotted, three-way is running and actively draining  -Keep in place to maintain tract patency  -Will need replacement in near future   Warm

## 2024-12-15 NOTE — QUICK NOTE
Attempted to call daughter Shanique. Remy VM with update that pt is to be transferred to B for under surgical critical care under SD 1 for urological procedure and intervention.    Gayle Romero

## 2024-12-15 NOTE — ASSESSMENT & PLAN NOTE
Maintained on amiodarone 200mg and metoprolol succinate 25mg BID, continue   Tele stable on admit

## 2024-12-15 NOTE — H&P
"H&P - Hospitalist   Name: Toro Rodriguez 74 y.o. male I MRN: [id number]  Unit/Bed#: -01 SDU I Date of Admission: 12/14/2024   Date of Service: 12/15/2024 I Hospital Day: 1     Assessment & Plan  Hematuria  Has been having intermittent hematuria for the last few months a/w SPT   Has required manual irrigation and occasional CBI  Seen in ED on 12/13-hematuria resolved with manual irrigation so was able to be discharged home  CT A/P at that time \"redemonstrated 2 mm dependent distal right ureteral calculus.  Marked irregular thickening of the right lateral bladder wall, present dating back to multiple prior examinations. While this finding again may reflect a chronic hematoma, underlying bladder mass/neoplasm is not excluded on this unenhanced examination.\"  Previously was on aspirin and Eliquis   Eliquis was discontinued indefinitely during 10/31-11/2 admission after review with hematology as it was for Hx of PE  Continues on aspirin - hold ASA s/d to hematuria for now (VTE prophylaxis also held)   Presented 12/14 evening due to recurrent hematuria and then abdominal cramping with no output in SPT bag   Urethral farmer placed in the ED with 600cc output of gross hematuria on insertion   Discussed with urology on admission - recommendations:  continue CBI and manual irrigation Q4H and PRN  Recommends repeat imaging if Hgb drops significantly or farmer catheter is hard to irrigate due to clotting off   Oxybutynin PRN for bladder spasms  Abnormal UA on admit, suspect colonization therefore will hold on abx at this time   Intermittent hypotension after arrival to the floor - hold anti-HTN except for metoprolol due to hx of NSVT and place on IVF   Anemia  Hgb ranging between 10-12 since September  Hgb on admit 11.6 - dropped to 10.4 this AM   Recheck ordered for 1000   Hydroureteronephrosis  CT A/P: \"  Stable moderate right hydroureteronephrosis to the level of the urinary bladder with nonobstructing 2 mm dependent " "calculus in the distal right ureter. Hydroureteronephrosis is favored to be secondary to marked irregular thickening of the right lateral bladder wall, present dating back to multiple prior examinations. \"  Urology following   Stage 3b chronic kidney disease ([facility])  Lab Results   Component Value Date    EGFR 31 12/15/2024    EGFR 29 12/14/2024    EGFR 32 12/13/2024    CREATININE 2.02 (H) 12/15/2024    CREATININE 2.11 (H) 12/14/2024    CREATININE 1.95 (H) 12/13/2024   Has had increased renal function since 10/24 - believed to be secondary to obstructive uropathy with R hydronephrosis since 09/24   Following with nephrology outpatient   Baseline Cr prior to [facility] was 1.2-1.5   Cr hovering around 1.9-2.0  Cr on admit 2.11   Suprapubic catheter dysfunction ([facility])  Suprapubic catheter unable to be flushed  Urology recommends keeping SPT in place to maintain tract patency  Urology to set up with IR replacement of suprapubic in near future  Type 2 diabetes mellitus with stage 3b chronic kidney disease, without long-term current use of insulin ([facility])  Lab Results   Component Value Date    HGBA1C 5.9 (H) 04/18/2024       No results for input(s): \"POCGLU\" in the last 72 hours.    Blood Sugar Average: Last 72 hrs:  diet controlled   Monitor blood sugar on AM labs   Essential hypertension  Home regimen: amlodipine 5mg daily, lasix 20mg daily, metoprolol succinate 25mg BID  Home lisinopril 5mg daily currently on hold in outpatient setting d/t CHUY continue holding inpatient  Pt with hypotension, therefore all anti-HTN are on hold except for metoprolol with increased hold parameter   Chronic systolic (congestive) heart failure (HCC)  Wt Readings from Last 3 Encounters:   12/15/24 86.6 kg (190 lb 14.7 oz)   12/10/24 87.5 kg (193 lb)   12/05/24 88 kg (194 lb)   Last echo 02/2024: LVEF 35-40% and G1DD.   Home diuretics lasix 20mg daily   Hold diuertics in setting of hypotension and gross hematuria   I/o and daily weights   Deep vein " "thrombosis (DVT) of left lower extremity (HCC)  Hx of DVT in the past   Previously on eliquis, however was cleared to stop indefinitely by hematology during last admit   Coronary artery disease involving native coronary artery of native heart  HX of CAD with  of RCA   Follows with cardiology outpatient   Maintained on ASA and statin outpatient - continue statin but hold ASA due to hematuria   NSVT (nonsustained ventricular tachycardia) (HCC)  Maintained on amiodarone 200mg and metoprolol succinate 25mg BID, continue   Tele stable on admit       VTE Pharmacologic Prophylaxis: VTE Score: 4 Moderate Risk (Score 3-4) - Pharmacological DVT Prophylaxis Contraindicated. Sequential Compression Devices Ordered.  Code Status: Level 1 - Full Code   Discussion with family: Patient declined call to .     Anticipated Length of Stay: Patient will be admitted on an inpatient basis with an anticipated length of stay of greater than 2 midnights secondary to hematuria.    History of Present Illness   Chief Complaint: \" There was blood in the bag\"    Toro Rodriguez is a 74 y.o. male with a PMH of CKD, CAD, NSVT, history of PE, NIDDM 2, and suprapubic catheter who presents with problem associated with suprapubic catheter.  Patient reports that he had mild hematuria after dinner that resolved with liquid intake.  He then had recurrent gross hematuria.  He then had no drainage in the suprapubic bag and began to experience lower abdominal cramping.  Denies fevers or chills.  No chest pain or dyspnea.  Denies nausea or vomiting.  No other acute complaints other than those mentioned above.    Review of Systems   Constitutional:  Negative for chills and fever.   HENT:  Negative for congestion.    Respiratory:  Negative for cough and shortness of breath.    Cardiovascular:  Negative for chest pain and leg swelling.   Gastrointestinal:  Positive for abdominal pain. Negative for nausea and vomiting.   Genitourinary:  Positive " for difficulty urinating and hematuria.   Musculoskeletal:  Positive for gait problem (Walker at baseline).   Neurological:  Negative for weakness and numbness.   All other systems reviewed and are negative.      Historical Information   Past Medical History:   Diagnosis Date    Alcohol intoxication ([facility]) 10/15/2020    BPH (benign prostatic hyperplasia)     Chronic HFrEF (heart failure with reduced ejection fraction) ([facility]) 2023    Coronary artery calcification seen on CT scan 11/10/2023    Coronary artery disease 2023    Deep vein thrombosis (DVT) of left lower extremity ([facility]) 2023    Diabetes mellitus ([facility]) 2023    Fall from slip, trip, or stumble 10/15/2020    History of phimosis of penis     History of urinary calculi     Hypertension     Skin cancer     Tobacco abuse     quit around      Past Surgical History:   Procedure Laterality Date    BLADDER STONE REMOVAL      CARDIAC CATHETERIZATION N/A 2023    Procedure: Cardiac catheterization;  Surgeon: Dave Royal MD;  Location: [facility];  Service: Cardiology    IR SUPRAPUBIC CATHETER CHECK/CHANGE/REINSERTION/UPSIZE  2024    IR SUPRAPUBIC CATHETER CHECK/CHANGE/REINSERTION/UPSIZE  12/3/2024    IR SUPRAPUBIC CATHETER PLACEMENT  2024    ORIF ANKLE FRACTURE Left     SKIN LESION EXCISION N/A 2024    Procedure: WIDE LOCAL EXCISION OF BACK MELANOMA;  Surgeon: Lillie La MD;  Location: [facility];  Service: Surgical Oncology    TOTAL HIP ARTHROPLASTY Right      Social History     Tobacco Use    Smoking status: Former     Types: Cigarettes     Start date:      Quit date: 2     Years since quittin.9    Smokeless tobacco: Never    Tobacco comments:     Quit over 30 years ago (Updated 2023).    Vaping Use    Vaping status: Never Used   Substance and Sexual Activity    Alcohol use: Not Currently    Drug use: Never    Sexual activity: Not on file     E-Cigarette/Vaping    E-Cigarette Use Never  User      E-Cigarette/Vaping Substances    Nicotine No     THC No     CBD No     Flavoring No     Other No     Unknown No      Family history non-contributory  Social History:  Marital Status:    Occupation: Retired  Patient Pre-hospital Living Situation: Assisted Living  Patient Pre-hospital Level of Mobility: walks with walker  Patient Pre-hospital Diet Restrictions: Carb conscious and sodium restriction    Meds/Allergies   I have reviewed home medications with patient personally.  Prior to Admission medications    Medication Sig Start Date End Date Taking? Authorizing Provider   acetaminophen (TYLENOL) 325 mg tablet Take 325 mg by mouth every 6 (six) hours as needed for mild pain   Yes Historical Provider, MD   amiodarone 200 mg tablet Take 1 tablet (200 mg total) by mouth daily 5/31/24  Yes Rob Roach MD   amLODIPine (NORVASC) 5 mg tablet Take 1 tablet (5 mg total) by mouth daily 12/10/24  Yes Joselyn Reyes Bahamonde, MD   aspirin 81 mg chewable tablet Chew 1 tablet (81 mg total) daily Do not start before September 30, 2024. 9/30/24  Yes Jaydon Fleming MD   atorvastatin (LIPITOR) 40 mg tablet Take 1 tablet (40 mg total) by mouth daily with dinner 11/15/23  Yes Krysta Castillo PA-C   finasteride (PROSCAR) 5 mg tablet Take 1 tablet (5 mg total) by mouth daily 4/18/24 4/13/25 Yes Rick Espino MD   furosemide (LASIX) 20 mg tablet Take 1 tablet (20 mg total) by mouth daily 11/26/24  Yes Rob Roach MD   lisinopril (ZESTRIL) 5 mg tablet Take 1 tablet (5 mg total) by mouth daily Do not start before November 4, 2024. 11/4/24  Yes Elif Santa MD   metoprolol succinate (TOPROL-XL) 25 mg 24 hr tablet Take 1 tablet (25 mg total) by mouth every 12 (twelve) hours 9/17/24 12/15/24 Yes Rick Crawley PA-C   polyethylene glycol (MIRALAX) 17 g packet Take 17 g by mouth daily 10/18/24  Yes Elif Santa MD   tamsulosin (FLOMAX) 0.4 mg Take 1 capsule (0.4 mg total) by mouth daily with dinner 12/15/23  Yes  CARLIE Rincon   nitroglycerin (NITROSTAT) 0.4 mg SL tablet Place 1 tablet (0.4 mg total) under the tongue every 5 (five) minutes as needed for chest pain 12/15/23   CARLIE Rincon     Allergies   Allergen Reactions    Zosyn [Piperacillin Sod-Tazobactam So] Rash       Objective :  Temp:  [97.3 °F (36.3 °C)-97.8 °F (36.6 °C)] 97.8 °F (36.6 °C)  HR:  [58-68] 59  BP: ()/(43-80) 96/51  Resp:  [16-25] 20  SpO2:  [95 %-99 %] 95 %  O2 Device: None (Room air)    Physical Exam  Vitals and nursing note reviewed.   Constitutional:       General: He is not in acute distress.     Appearance: Normal appearance. He is not ill-appearing.      Comments: Pleasant and conversational.  Occasionally winces in pain with bladder spasms   HENT:      Head: Normocephalic.      Nose: Nose normal.      Mouth/Throat:      Mouth: Mucous membranes are moist.   Eyes:      Conjunctiva/sclera: Conjunctivae normal.   Cardiovascular:      Rate and Rhythm: Normal rate and regular rhythm.   Pulmonary:      Effort: Pulmonary effort is normal. No respiratory distress.      Breath sounds: Normal breath sounds. No wheezing, rhonchi or rales.   Abdominal:      General: Abdomen is flat.      Palpations: Abdomen is soft.      Tenderness: There is no abdominal tenderness.      Comments: Suprapubic catheter in place not draining.  Colon catheter in place with punch colored urine while CBI is running   Musculoskeletal:         General: Normal range of motion.      Cervical back: Normal range of motion.      Right lower leg: No edema.      Left lower leg: No edema.   Skin:     General: Skin is warm and dry.      Coloration: Skin is pale.   Neurological:      General: No focal deficit present.      Mental Status: He is alert and oriented to person, place, and time.   Psychiatric:         Mood and Affect: Mood normal.         Thought Content: Thought content normal.          Lines/Drains:  Lines/Drains/Airways       Active Status       Name Placement  date Placement time Site Days    Urethral Catheter 22 Fr. 12/14/24  2326  --  less than 1    Suprapubic Catheter 16 Fr. 12/03/24  1019  --  11    Continuous Bladder Irrigation Three-way 12/15/24  --  Three-way  less than 1                  Urinary Catheter:  Goal for removal: N/A- Discharging with Colon               Lab Results: I have reviewed the following results:  Results from last 7 days   Lab Units 12/15/24  0251 12/14/24  2235   WBC Thousand/uL 7.27 6.39   HEMOGLOBIN g/dL 10.4* 11.6*   HEMATOCRIT % 32.2* 35.5*   PLATELETS Thousands/uL 272 314   SEGS PCT %  --  66   LYMPHO PCT %  --  18   MONO PCT %  --  10   EOS PCT %  --  5     Results from last 7 days   Lab Units 12/15/24  0251   SODIUM mmol/L 137   POTASSIUM mmol/L 3.9   CHLORIDE mmol/L 106   CO2 mmol/L 22   BUN mg/dL 36*   CREATININE mg/dL 2.02*   ANION GAP mmol/L 9   CALCIUM mg/dL 8.0*   ALBUMIN g/dL 3.4*   TOTAL BILIRUBIN mg/dL 0.36   ALK PHOS U/L 70   ALT U/L 12   AST U/L 14   GLUCOSE RANDOM mg/dL 103     Results from last 7 days   Lab Units 12/15/24  0251   INR  1.09         Lab Results   Component Value Date    HGBA1C 5.9 (H) 04/18/2024    HGBA1C 8.2 (H) 12/14/2023    HGBA1C 7.9 (H) 11/15/2023           Imaging Results Review: I reviewed radiology reports from this admission including: CT abdomen/pelvis.  Other Study Results Review: EKG was personally reviewed and my interpretation is: No EKG obtained..    Administrative Statements       ** Please Note: This note has been constructed using a voice recognition system. **

## 2024-12-15 NOTE — ASSESSMENT & PLAN NOTE
"Has been having intermittent hematuria for the last few months a/w SPT   Has required manual irrigation and occasional CBI  Seen in ED on 12/13-hematuria resolved with manual irrigation so was able to be discharged home  CT A/P at that time \"redemonstrated 2 mm dependent distal right ureteral calculus.  Marked irregular thickening of the right lateral bladder wall, present dating back to multiple prior examinations. While this finding again may reflect a chronic hematoma, underlying bladder mass/neoplasm is not excluded on this unenhanced examination.\"  Previously was on aspirin and Eliquis   Eliquis was discontinued indefinitely during 10/31-11/2 admission after review with hematology as it was for Hx of PE  Continues on aspirin - hold ASA s/d to hematuria for now (VTE prophylaxis also held)   Presented 12/14 evening due to recurrent hematuria and then abdominal cramping with no output in SPT bag   Urethral farmer placed in the ED with 600cc output of gross hematuria on insertion   Discussed with urology on admission - recommendations:  continue CBI and manual irrigation Q4H and PRN  Repeat imaging pending due to significant drop in hemoglobin and continued ginger hematuria  Oxybutynin PRN for bladder spasms  Continue IVF  Discussed with urology patient requiring transfer to [facility] for further urologic evaluation and potential procedure  "

## 2024-12-15 NOTE — ASSESSMENT & PLAN NOTE
Lab Results   Component Value Date    EGFR 31 12/15/2024    EGFR 29 12/14/2024    EGFR 32 12/13/2024    CREATININE 2.02 (H) 12/15/2024    CREATININE 2.11 (H) 12/14/2024    CREATININE 1.95 (H) 12/13/2024   Has had increased renal function since 10/24 - believed to be secondary to obstructive uropathy with R hydronephrosis since 09/24   Following with nephrology outpatient   Baseline Cr prior to [facility] was 1.2-1.5   Cr hovering around 1.9-2.0  Cr on admit 2.11

## 2024-12-15 NOTE — ASSESSMENT & PLAN NOTE
Recent SPC placed by IR 12/3 for chronic retention  CT 12/13 with stable R hydroureteronephrosis to level of bladder with nonobstructing 2mm stone in distal R ureter favored to be secondary to marked irregular thickening of R lateral bladder wall which may reflect chronic hematoma  Recent cysto 11/7/24 with no bladder masses    Called to bedside because patient with significant clot on manual irrigation withdrawal, 700 ml removed, unable to get CBI drainage to turn pink after 12 L irrigate overnight, CBI is draining, drainage remains cherry red.  CPT is clotted.  Patient is now hypotensive, started on albumin patient is complaining of bladder pain.    WBC 7.27 (6.39)  HGB 10.4 (11.6) drawn at 0251  UA Small leuk, 2+ prot, Lg blood, neg nitrite, microscopic field obscured by RBCs, culture Pending  Urine cx 12/13 >100,000 GNR    Current blood pressure 98/53 after adding albumin     Plan:  Will add stat H&H  Repeat CT scan to evaluate clot burden  Continue CBI, titrate to pink as able  Q4 manual bladder irrigation, and as needed  Continue n.p.o. for now  Require transfer for urologic intervention based on pending results  PRN oxybutynin for bladder spasm

## 2024-12-15 NOTE — PROGRESS NOTES
Progress Note - Urology   Name: Toro Rodriguez 74 y.o. male I MRN: [id number]  Unit/Bed#: -01 SDU I Date of Admission: 12/14/2024   Date of Service: 12/15/2024 I Hospital Day: 1    Assessment & Plan  Hematuria  Recent SPC placed by IR 12/3 for chronic retention  CT 12/13 with stable R hydroureteronephrosis to level of bladder with nonobstructing 2mm stone in distal R ureter favored to be secondary to marked irregular thickening of R lateral bladder wall which may reflect chronic hematoma  Recent cysto 11/7/24 with no bladder masses    Called to bedside because patient with significant clot on manual irrigation withdrawal, 700 ml removed, unable to get CBI drainage to turn pink after 12 L irrigate overnight, CBI is draining, drainage remains cherry red.  CPT is clotted.  Patient is now hypotensive, started on albumin patient is complaining of bladder pain.    WBC 7.27 (6.39)  HGB 10.4 (11.6) drawn at 0251  UA Small leuk, 2+ prot, Lg blood, neg nitrite, microscopic field obscured by RBCs, culture Pending  Urine cx 12/13 >100,000 GNR    Current blood pressure 98/53 after adding albumin     Plan:  Will add stat H&H  Repeat CT scan to evaluate clot burden  Continue CBI, titrate to pink as able  Q4 manual bladder irrigation, and as needed  Continue n.p.o. for now  Require transfer for urologic intervention based on pending results  PRN oxybutynin for bladder spasm    Essential hypertension  -Meds on hold as patient is now hypotensive  -Currently on IV fluids and getting albumin  -Continue to trend BP  Chronic systolic (congestive) heart failure (HCC)  Wt Readings from Last 3 Encounters:   12/15/24 86.6 kg (190 lb 14.7 oz)   12/10/24 87.5 kg (193 lb)   12/05/24 88 kg (194 lb)     -Diuretics on hold  -Medical management  Deep vein thrombosis (DVT) of left lower extremity (HCC)  -History of DVT in the past, Eliquis held by hematology due to history of hematuria  Coronary artery disease involving native coronary  "artery of native heart  -History of CAD  -Aspirin on hold  Type 2 diabetes mellitus with stage 3b chronic kidney disease, without long-term current use of insulin ([facility])  Lab Results   Component Value Date    HGBA1C 5.9 (H) 04/18/2024       No results for input(s): \"POCGLU\" in the last 72 hours.    Blood Sugar Average: Last 72 hrs:  -Medical management    Anemia  -Baseline appears to be 10-12  -Hemoglobin on admit 11.6, dropped 10.4 today, repeat pending  -Continue to trend hemoglobin, transfuse as indicated  NSVT (nonsustained ventricular tachycardia) ([facility])  -Eliquis discontinued on previous admission due to history of hematuria  -Patient was on home aspirin, now on hold  Stage 3b chronic kidney disease ([facility])  Lab Results   Component Value Date    EGFR 31 12/15/2024    EGFR 29 12/14/2024    EGFR 32 12/13/2024    CREATININE 2.02 (H) 12/15/2024    CREATININE 2.11 (H) 12/14/2024    CREATININE 1.95 (H) 12/13/2024     Suprapubic catheter dysfunction (HCC)  -SPT is clotted, three-way is running and actively draining  -Keep in place to maintain tract patency  -Will need replacement in near future  Hydroureteronephrosis  CT A/P: \"  Stable moderate right hydroureteronephrosis to the level of the urinary bladder with nonobstructing 2 mm dependent calculus in the distal right ureter. Hydroureteronephrosis is favored to be secondary to marked irregular thickening of the right lateral bladder wall, present dating back to multiple prior examinations. \"    Please contact the SecureChat role for the Urology service with any questions/concerns.    Subjective   Patient states he feels dizzy.  Complains of pain over bladder and spasms.  Large amount of clot evacuated this a.m.  Patient dropped his blood pressure and is currently on IV fluids and albumin.  Colon drainage remains cherry red after 12 L irrigation overnight    Objective :  Temp:  [97.3 °F (36.3 °C)-97.9 °F (36.6 °C)] 97.9 °F (36.6 °C)  HR:  [58-68] 59  BP: " ()/(42-80) 101/53  Resp:  [11-25] 14  SpO2:  [95 %-99 %] 96 %  O2 Device: None (Room air)    I/O         12/13 0701 12/14 0700 12/14 0701  12/15 0700 12/15 0701 12/16 0700    P.O.  60     I.V. (mL/kg)  536.7 (6.2) 500 (5.8)    IV Piggyback   50    Total Intake(mL/kg)  596.7 (6.9) 550 (6.4)    Urine (mL/kg/hr)  1750 600 (6.4)    Total Output  1750 600    Net  -1153.3 -50                 Lines/Drains/Airways       Active Status       Name Placement date Placement time Site Days    Urethral Catheter 22 Fr. 12/14/24  2326  --  less than 1    Suprapubic Catheter 16 Fr. 12/03/24  1019  --  11    Continuous Bladder Irrigation Three-way 12/15/24  --  Three-way  less than 1                  Physical Exam  Physical Exam:   General appearance: alert and oriented, ill-appearing, frail  Head: Normocephalic, without obvious abnormality, atraumatic, sclerae anicteric, mucous membranes moist  Neck: no JVD and supple, symmetrical, trachea midline  Lungs: clear to auscultation, no wheezes or rales  Heart:   Regular rate and rhythm  Abdomen:   Flat, soft, tenderness over bladder, no fullness appreciated, few bowel sounds  Extremities:   No edema, redness or tenderness in the calves or thighs  Skin: Warm, dry; pale  Nursing notes and vital signs reviewed      Lab Results: I have reviewed the following results:  Recent Labs     12/15/24  0251   WBC 7.27   HGB 10.4*   HCT 32.2*      SODIUM 137   K 3.9      CO2 22   BUN 36*   CREATININE 2.02*   GLUC 103   MG 2.1   PHOS 4.3*   AST 14   ALT 12   ALB 3.4*   TBILI 0.36   ALKPHOS 70   PTT 31   INR 1.09       Imaging Results Review: I reviewed radiology reports from this admission including: CT abdomen/pelvis.  Other Study Results Review: No additional pertinent studies reviewed.    VTE Pharmacologic Prophylaxis: Reason for no pharmacologic prophylaxis hematuria  VTE Mechanical Prophylaxis: sequential compression device    Elsy Astl-Adithya

## 2024-12-15 NOTE — ASSESSMENT & PLAN NOTE
"Has been having intermittent hematuria for the last few months a/w SPT   Has required manual irrigation and occasional CBI  Seen in ED on 12/13-hematuria resolved with manual irrigation so was able to be discharged home  CT A/P at that time \"redemonstrated 2 mm dependent distal right ureteral calculus.  Marked irregular thickening of the right lateral bladder wall, present dating back to multiple prior examinations. While this finding again may reflect a chronic hematoma, underlying bladder mass/neoplasm is not excluded on this unenhanced examination.\"  Previously was on aspirin and Eliquis   Eliquis was discontinued indefinitely during 10/31-11/2 admission after review with hematology as it was for Hx of PE  Continues on aspirin - hold ASA s/d to hematuria for now (VTE prophylaxis also held)   Presented 12/14 evening due to recurrent hematuria and then abdominal cramping with no output in SPT bag   Urethral farmer placed in the ED with 600cc output of gross hematuria on insertion   Discussed with urology on admission - recommendations:  continue CBI and manual irrigation Q4H and PRN  Recommends repeat imaging if Hgb drops significantly or farmer catheter is hard to irrigate due to clotting off   Oxybutynin PRN for bladder spasms  Abnormal UA on admit, suspect colonization therefore will hold on abx at this time   Intermittent hypotension after arrival to the floor - hold anti-HTN except for metoprolol due to hx of NSVT and place on IVF   "

## 2024-12-15 NOTE — QUICK NOTE
Films reviewed, large clot burden within the bladder, will require cystoscopy and clot evacuation with fulguration of bleeding vessels.  He also has a distal right ureteral stone.  We will attempt right ureteral stent placement.  If this is not successful due to everything going on with his suprapubic catheter and bleeding with possible inability to visualize the right ureteral orifice he will need a nephrostomy tube placed on the right-hand side.    He has been transfused packed red blood cells with increase in his hemoglobin from 7.2-8.9.  This is below his baseline earlier this month of 12.2.  Given ongoing hemorrhage he may require further transfusion of blood products.

## 2024-12-15 NOTE — ASSESSMENT & PLAN NOTE
Hgb ranging between 10-12 since September  Hgb on admit 11.6 - dropped to 10.4 this AM   Hemoglobin now at 7.2  Consent obtained  Status post transfusion of uncrossed blood 1 unit  Patient to receive 2 more units of crossmatched blood  Follow H&H q6h

## 2024-12-15 NOTE — CONSULTS
"UROLOGY CONSULTATION NOTE     Patient Identifiers: Toro Rodriguez (MRN [id number])  Service Requesting Consultation: [facility] internal medicine  Service Providing Consultation:  Urology, Rick Espino MD    Date of Service: 12/15/2024  Consults    Reason for Consultation: Gross hematuria, distal right ureteral stone, bacteriuria/UTI      ASSESSMENT:     74 y.o. old male with urinary retention, indwelling suprapubic catheter, acute blood loss anemia, right ureteral stone, bacteriuria/UTI with hypotension.    I spoke with him about cystoscopy with clot evacuation and fulguration of bleeding vessels with all indicated procedures to include right ureteral stent placement.  Informed consent signed.    .      PLAN:     Proceed to cystoscopy with clot evacuation and fulguration of bleeding vessels along with right ureteral stent placement.    Further management per primary team of multiple medical comorbid conditions    Portions of the above record have been created with voice recognition software.  Occasional wrong word or \"sound alike\" substitution may have occurred due to the inherent limitations of voice recognition software.  Read the chart carefully and recognize, using context, where substitution may have occurred.    Thank you for allowing me to participate in this patients’ care.  Please do not hesitate to call with any additional questions.  Rick Espino MD    History of Present Illness:     Toro Rodriguez is a 74 y.o. old with a history of urinary retention status post suprapubic catheter placement.  Has been having off-and-on hematuria for quite some time.  This has acutely worsened with acute blood loss anemia and a large clot burden within the bladder.  He has ongoing hematuria despite conservative measures to manage this.    He is in agreement to proceed to the operating room for cystoscopy with clot evacuation and fulguration of bleeding vessels with all indicated procedures to include right ureteral " stent placement.  Marked on his right arm.    The following portions of the patient's history were reviewed and updated as appropriate: allergies, current medications, past family history, past medical history, past social history, past surgical history and problem list.    Past Medical, Past Surgical History:     Past Medical History:   Diagnosis Date    Alcohol intoxication ([facility]) 10/15/2020    BPH (benign prostatic hyperplasia)     Chronic HFrEF (heart failure with reduced ejection fraction) ([facility]) 11/2023    Coronary artery calcification seen on CT scan 11/10/2023    Coronary artery disease 12/14/2023    Deep vein thrombosis (DVT) of left lower extremity ([facility]) 11/2023    Diabetes mellitus ([facility]) 11/2023    Fall from slip, trip, or stumble 10/15/2020    History of phimosis of penis     History of urinary calculi     Hypertension 1988    Skin cancer     Tobacco abuse     quit around 2000   :    Past Surgical History:   Procedure Laterality Date    BLADDER STONE REMOVAL  2014    CARDIAC CATHETERIZATION N/A 12/14/2023    Procedure: Cardiac catheterization;  Surgeon: Dave Royal MD;  Location: [facility];  Service: Cardiology    IR SUPRAPUBIC CATHETER CHECK/CHANGE/REINSERTION/UPSIZE  11/07/2024    IR SUPRAPUBIC CATHETER CHECK/CHANGE/REINSERTION/UPSIZE  12/3/2024    IR SUPRAPUBIC CATHETER PLACEMENT  09/27/2024    ORIF ANKLE FRACTURE Left     SKIN LESION EXCISION N/A 03/18/2024    Procedure: WIDE LOCAL EXCISION OF BACK MELANOMA;  Surgeon: Lillie La MD;  Location: [facility];  Service: Surgical Oncology    TOTAL HIP ARTHROPLASTY Right    :    Medications, Allergies:   No current facility-administered medications for this encounter.    Allergies:  Allergies   Allergen Reactions    Zosyn [Piperacillin Sod-Tazobactam So] Rash   :    Social and Family History:   Social History:   Social History     Tobacco Use    Smoking status: Former     Types: Cigarettes     Start date: 1990     Quit date: 1962     Years  since quittin.9    Smokeless tobacco: Never    Tobacco comments:     Quit over 30 years ago (Updated 2023).    Vaping Use    Vaping status: Never Used   Substance Use Topics    Alcohol use: Not Currently    Drug use: Never   .    Social History     Tobacco Use   Smoking Status Former    Types: Cigarettes    Start date:     Quit date:     Years since quittin.9   Smokeless Tobacco Never   Tobacco Comments    Quit over 30 years ago (Updated 2023).        Family History:  Family History   Problem Relation Age of Onset    Breast cancer Mother     Heart attack Father 61    Other Sister         s/p hysterectomy    Cerebral palsy Brother     Skin cancer Other         family history    No Known Problems Son     No Known Problems Daughter     Sudden death Neg Hx    :     Review of Systems:     General: Fever, chills, or night sweats: negative  Cardiac: Negative for chest pain.    Pulmonary: Negative for shortness of breath.  Gastrointestinal: Abdominal pain positive.  Nausea, vomiting, or diarrhea negative,  Genitourinary: See HPI above.  Patient does have hematuria.  All other systems were queried and were negative except as listed above in HPI and here in the ROS.    Physical Exam:   There were no vitals taken for this visit.Temp (24hrs), Av.8 °F (36.6 °C), Min:97.3 °F (36.3 °C), Max:98.2 °F (36.8 °C)  current;    No intake/output data recorded.  General: Patient is alert and oriented, appears ill, pale    Cardiac: Peripheral edema: negative, peripheral pulses are present     Pulmonary: Non-labored breathing, speaking in full sentences, no wheezing, no coughing    Abdomen: Soft, non-tender, non-distended.  .      Genitourinary: suprapubic distension noted      Neurological: Cranial nerves II-XII are intact, no sensory deficits, no neurological deficits    Musculoskeletal: Extremities are warm, ROM is limited    Psychiatric: The patient's train of thought is linear and logical, mood and affect are  normal      Skin: pale    SHELTON: bright red urine consistent with active bleeding    Labs:     Lab Results   Component Value Date    HGB 8.9 (L) 12/15/2024    HCT 27.8 (L) 12/15/2024    WBC 7.27 12/15/2024     12/15/2024   ]    Lab Results   Component Value Date    K 3.9 12/15/2024     12/15/2024    CO2 22 12/15/2024    BUN 36 (H) 12/15/2024    CREATININE 2.02 (H) 12/15/2024    CALCIUM 8.0 (L) 12/15/2024   ]    Imaging:   I personally reviewed the images and report of the following studies, and reviewed them with the patient:    CT Abdomen/Pelvis: large clot burden within the bladder , right ureteral stone also noted

## 2024-12-15 NOTE — DISCHARGE SUMMARY
"Discharge Summary - Hospitalist   Name: Toro Rodriguez 74 y.o. male I MRN: [id number]  Unit/Bed#: -01 SDU I Date of Admission: 12/14/2024   Date of Service: 12/15/2024 I Hospital Day: 1     Assessment & Plan  Hematuria  Has been having intermittent hematuria for the last few months a/w SPT   Has required manual irrigation and occasional CBI  Seen in ED on 12/13-hematuria resolved with manual irrigation so was able to be discharged home  CT A/P at that time \"redemonstrated 2 mm dependent distal right ureteral calculus.  Marked irregular thickening of the right lateral bladder wall, present dating back to multiple prior examinations. While this finding again may reflect a chronic hematoma, underlying bladder mass/neoplasm is not excluded on this unenhanced examination.\"  Previously was on aspirin and Eliquis   Eliquis was discontinued indefinitely during 10/31-11/2 admission after review with hematology as it was for Hx of PE  Continues on aspirin - hold ASA s/d to hematuria for now (VTE prophylaxis also held)   Presented 12/14 evening due to recurrent hematuria and then abdominal cramping with no output in SPT bag   Urethral farmer placed in the ED with 600cc output of gross hematuria on insertion   Discussed with urology on admission - recommendations:  continue CBI and manual irrigation Q4H and PRN  Repeat imaging pending due to significant drop in hemoglobin and continued ginger hematuria  Oxybutynin PRN for bladder spasms  Continue IVF  Discussed with urology patient requiring transfer to [facility] for further urologic evaluation and potential procedure  ABLA (acute blood loss anemia)  Hgb ranging between 10-12 since September  Hgb on admit 11.6 - dropped to 10.4 this AM   Hemoglobin now at 7.2  Consent obtained  Status post transfusion of uncrossed blood 1 unit  Patient to receive 2 more units of crossmatched blood  Follow H&H q6h  Hydroureteronephrosis  CT A/P: \"  Stable moderate right hydroureteronephrosis to " "the level of the urinary bladder with nonobstructing 2 mm dependent calculus in the distal right ureter. Hydroureteronephrosis is favored to be secondary to marked irregular thickening of the right lateral bladder wall, present dating back to multiple prior examinations. \"  Urology following   Stage 3b chronic kidney disease ([facility])  Lab Results   Component Value Date    EGFR 31 12/15/2024    EGFR 29 12/14/2024    EGFR 32 12/13/2024    CREATININE 2.02 (H) 12/15/2024    CREATININE 2.11 (H) 12/14/2024    CREATININE 1.95 (H) 12/13/2024   Has had increased renal function since 10/24 - believed to be secondary to obstructive uropathy with R hydronephrosis since 09/24   Following with nephrology outpatient   Baseline Cr prior to CHUY was 1.2-1.5   Cr hovering around 1.9-2.0  Cr on admit 2.11   Suprapubic catheter dysfunction ([facility])  Suprapubic catheter unable to be flushed  Urology recommends keeping SPT in place to maintain tract patency  Urology to set up with IR replacement of suprapubic in near future  Type 2 diabetes mellitus with stage 3b chronic kidney disease, without long-term current use of insulin ([facility])  Lab Results   Component Value Date    HGBA1C 5.9 (H) 04/18/2024       No results for input(s): \"POCGLU\" in the last 72 hours.    Blood Sugar Average: Last 72 hrs:  diet controlled   Monitor blood sugar on AM labs   Essential hypertension  Home regimen: amlodipine 5mg daily, lasix 20mg daily, metoprolol succinate 25mg BID  Home lisinopril 5mg daily currently on hold in outpatient setting d/t CHUY continue holding inpatient  Pt with hypotension, therefore all anti-HTN are on hold except for metoprolol with increased hold parameter   Patient status post multiple boluses and albumin.  Patient's blood pressure continues to remain labile.  Continue IVF  Chronic systolic (congestive) heart failure ([facility])  Wt Readings from Last 3 Encounters:   12/15/24 86.6 kg (190 lb 14.7 oz)   12/10/24 87.5 kg (193 lb)   12/05/24 88 kg " (194 lb)   Last echo 02/2024: LVEF 35-40% and G1DD.   Home diuretics lasix 20mg daily   Hold diuertics in setting of hypotension and gross hematuria   Continue to monitor resp status while receiving IVF and boluses   I/o and daily weights   Deep vein thrombosis (DVT) of left lower extremity ([facility])  Hx of DVT in the past   Previously on eliquis, however was cleared to stop indefinitely by hematology during last admit   Coronary artery disease involving native coronary artery of native heart  HX of CAD with  of RCA   Follows with cardiology outpatient   Maintained on ASA and statin outpatient - continue statin but hold ASA due to hematuria   NSVT (nonsustained ventricular tachycardia) (H[facility])  Maintained on amiodarone 200mg and metoprolol succinate 25mg BID, continue   Tele stable on admit   Urinary tract infection associated with cystostomy catheter  ([facility])  With ginger hematuria  Urine analysis not reflexed to culture  Initiated ceftriaxone awaiting sensitivities     Medical Problems       Resolved Problems  Date Reviewed: 10/16/2024   None       Discharging Physician / Practitioner: Gayle Romero MD  PCP: CARLIE Foster  Admission Date:   Admission Orders (From admission, onward)       Ordered        12/14/24 2340  INPATIENT ADMISSION  Once                          Discharge/Transfer Date: 12/15/24    Disposition:   Transfer to: [facility]  Reason for Transfer: urology  Accepting Provider at [facility]: Dr. Devon Estrada    Consultations During Hospital Stay:  urology    Procedures Performed:   CT abdomen pelvis wo contrast   Final Result      Increased distention of the bladder with high density suggesting high clot burden.      Persistent hydronephrosis on the right. Probable small calculus within the distal dilated right ureter which may be nonobstructing.      The study was marked in EPIC for immediate notification. Communication was also performed utilizing secure text.      Workstation  performed: [id number]               Significant Findings / Test Results:   See above    Incidental Findings:   See above   I reviewed the above mentioned incidental findings with the patient and/or family and they expressed understanding.    Test Results Pending at Discharge (will require follow up):   Urine cx x2     Outpatient Tests Requested:  None known at this time    Complications:  see below    Reason for Admission: hematuria     Hospital Course:   Toro Rodriguez is a 74 y.o. male patient who originally presented to the hospital on 12/14/2024 due to presented due to continued hematuria.  He was initially evaluated the night prior due to this and manual irrigation was cleared so patient was discharged home.  Patient presents again today due to worsening hematuria.  Aspirin was held on admission urology was consulted.  Patient placed in SD 2 for CBI.  Patient continued to have ginger hematuria and hypotension.  Patient was started on IV fluids and received multiple boluses.  Up on reevaluation of hemoglobin patient's hemoglobin dropped significantly.  Patient ordered for 3 units of blood in total.  1 prepared.  Blood pressure remains labile.  Urine culture growing greater than 100,000 gram-negative rods.  Patient started on ceftriaxone still awaiting sensitivities.  Repeat CT scan shows a results above.  Urology recommending transfer for further evaluation possible urologic procedure.    Hospital Course: No notes on file      Please see above list of diagnoses and related plan for additional information.     Condition at Discharge: serious    Discharge Day Visit / Exam:   Subjective:  Toro was seen and examined at bedside.  Overnight patient's blood pressures have been labile and hypotensive patient receiving albumin and Isolyte boluses..  Discussed plan of care.  All questions and concerns were answered and addressed.  Patient feeling lightheaded and dizzy as well as weak fatigued and having bladder spasms.   "Discussed with urology.  Repeat CT scan pending.  Hemoglobin dropped to 7.  Patient to receive a total of 3 units PRBCs. Pt s/p albumin bolus Isolyte bolus and continuous IV fluids.  Vitals: Blood Pressure: (!) 87/47 (12/15/24 1140)  Pulse: 60 (12/15/24 1140)  Temperature: 98 °F (36.7 °C) (12/15/24 1140)  Temp Source: Oral (12/15/24 1140)  Respirations: 16 (12/15/24 1140)  Height: 5' 7\" (170.2 cm) (12/15/24 0117)  Weight - Scale: 86.6 kg (190 lb 14.7 oz) (12/15/24 0117)  SpO2: 98 % (12/15/24 1140)  Physical Exam  Vitals and nursing note reviewed.   Constitutional:       General: He is not in acute distress.     Appearance: He is obese. He is ill-appearing.   HENT:      Head: Normocephalic and atraumatic.   Cardiovascular:      Rate and Rhythm: Normal rate and regular rhythm.      Pulses: Normal pulses.      Heart sounds: Murmur heard.   Pulmonary:      Effort: Pulmonary effort is normal.      Breath sounds: Normal breath sounds.   Abdominal:      General: Abdomen is flat. Bowel sounds are normal.      Palpations: Abdomen is soft.      Tenderness: There is abdominal tenderness (lower abd).   Genitourinary:     Comments: Cbi, spc and farmer showing ginger hematuria  Musculoskeletal:      Right lower leg: No edema.      Left lower leg: No edema.   Skin:     General: Skin is warm.      Coloration: Skin is pale.   Neurological:      General: No focal deficit present.      Mental Status: He is alert and oriented to person, place, and time.         Discussion with Family: Updated  (daughter) via phone.     Administrative Statements   Discharge Statement:  I have spent a total time of 40 minutes in caring for this patient on the day of the visit/encounter. >30 minutes of time was spent on: Diagnostic results, Prognosis, Risks and benefits of tx options, Instructions for management, Patient and family education, Importance of tx compliance, Risk factor reductions, Impressions, Counseling / Coordination of care, " Documenting in the medical record, Reviewing / ordering tests, medicine, procedures  , and Communicating with other healthcare professionals .    **Please Note: This note may have been constructed using a voice recognition system.**

## 2024-12-15 NOTE — ASSESSMENT & PLAN NOTE
HX of CAD with  of RCA   Follows with cardiology outpatient   Maintained on ASA and statin outpatient - continue statin but hold ASA due to hematuria

## 2024-12-15 NOTE — H&P
H&P - Critical Care/ICU   Name: Toro Rodriguez 74 y.o. male I MRN: [id number]  Unit/Bed#: [facility] 515-01 I Date of Admission: 12/15/2024   Date of Service: 12/15/2024 I Hospital Day: 0       Assessment & Plan   Neuro / Psych:   Diagnosis: Postoperative pain  Tylenol as needed for mild pain  Oxycodone as needed for moderate, severe pain  Dilaudid as needed for breakthrough pain      CV:   Diagnosis: History of HFrEF, CAD, hypertension, NSVT  Continue home amiodarone, metoprolol  Hold home amlodipine and lisinopril, consider resuming if better blood pressure control needed  Hold home Lasix, plan to diurese as needed  Hold home aspirin in setting of bleeding  Continuous cardiac monitoring  Monitor vitals per unit routine    Pulm:  Diagnosis: Acute hypoxia  Suspect secondary to fluid overload given poor LVEF and 2 units PRBC transfusion today  Check chest x-ray  Oxygen supplement PRN, goal O2Sat > 90%  Continuous pulse oximetry    GI:   Bowel regimen  Continue senna, MiraLAX    Renal/:  Diagnosis: Gross hematuria status post cystoscopy with clot evacuation and intraluminal mass debulking and resection bed fulguration  Hemostasis achieved during cystoscopy  Mass noted to be obscuring right ureter, patient may require percutaneous nephrostomy tube if worsening hydronephrosis or kidney function  Every 6 H&H  Continue cbi  Monitor for signs/symptoms of anemia  Monitor Colon and suprapubic catheter output  Oxybutynin 5 mg 3 times daily as needed for bladder spasm  Urology following, appreciate recommendations  Diagnosis: CKD 3  Monitor I's and O's  Avoid unnecessary nephrotoxic medications  Monitor metabolic panel  Diagnosis: History of BPH with chronic urinary retention status post suprapubic catheter placement  Continue home finasteride, tamsulosin    F/E/N:   Fluid: maintain euvolemic  Electrolytes: replete PRN to maintain goal  Goal Na 135-145, Mg > 2, Phos > 3, K goal > 4  Daily BMP  Nutrition:  Carb controlled diet  with 2 L fluid restriction    Heme/Onc:   Diagnosis: Acute blood loss anemia  Secondary to bleeding bladder mass with hemostasis now achieved  Monitor H&H  Transfuse PRBCs for hemoglobin less than 7 or ongoing bleeding with hemodynamic instability  Diagnosis: Bladder mass  Per urology differential diagnosis includes urethral carcinoma, prostate cancer, rectal cancer, leiomyosarcoma  Follow-up tissue pathology    Endo:   Diagnosis: History of diabetes mellitus  Sliding scale insulin, Accu-Cheks ACHS, hypoglycemia protocol    ID:   Diagnosis: Urinary tract infection  Continue ceftriaxone 1 g every 24 hours for total duration of 5 to 7 days  Monitor WBC, fever curve    MSK/Skin:   Diagnosis: pressure ulcer ppx  Frequent turning  Frequent pressure off-loading to prevent skin breakdown  PT/OT when able  Diagnosis: VTE ppx  Pharmacologic: Pharmacologic VTE Prophylaxis contraindicated due to pending stability of hemoglobin  Mechanical: SCD's    Disposition: Critical care    History of Present Illness   Toro Rodriguez is a 74 y.o. with history of hypertension, CAD, HFrEF, chronic urinary retention status post suprapubic catheter, BPH, diabetes who arrives on the critical care unit upon returning from operating room after cystoscopy.  Patient initially presented to outside hospital with gross hematuria and had three-way Colon placed and CBI started.  He was noted to have labile blood pressures so he was transferred to [facility] for cystoscopy.  He did require 2 units PRBCs due to ongoing blood loss and anemia.  During procedure, urology noted large mass obstructing the right ureter with invasion into the bladder wall.  They performed debulking of the mass and fulguration of resection bed and achieved hemostasis.  Postoperatively, patient is having some pain but otherwise has no complaints.  He was noted to be hypoxic and was started on supplemental oxygen via nasal cannula.  Denies headache, lightheadedness, chest pain, shortness  of breath, nausea, vomiting, numbness, weakness.    History obtained from chart review and the patient.  Review of Systems: See HPI for Review of Systems    Historical Information   Past Medical History:  10/15/2020: Alcohol intoxication ([facility])  No date: BPH (benign prostatic hyperplasia)  11/2023: Chronic HFrEF (heart failure with reduced ejection fraction)   ([facility])  11/10/2023: Coronary artery calcification seen on CT scan  12/14/2023: Coronary artery disease  11/2023: Deep vein thrombosis (DVT) of left lower extremity ([facility])  11/2023: Diabetes mellitus ([facility])  10/15/2020: Fall from slip, trip, or stumble  No date: History of phimosis of penis  No date: History of urinary calculi  1988: Hypertension  No date: Skin cancer  No date: Tobacco abuse      Comment:  quit around 2000 Past Surgical History:  2014: BLADDER STONE REMOVAL  12/14/2023: CARDIAC CATHETERIZATION; N/A      Comment:  Procedure: Cardiac catheterization;  Surgeon: Dave Royal MD;  Location: [facility] CARDIAC [facility]ATH LAB;  Service:                Cardiology  11/07/2024: IR SUPRAPUBIC CATHETER CHECK/CHANGE/REINSERTION/UPSIZE  12/3/2024: IR SUPRAPUBIC CATHETER CHECK/CHANGE/REINSERTION/UPSIZE  09/27/2024: IR SUPRAPUBIC CATHETER PLACEMENT  No date: ORIF ANKLE FRACTURE; Left  03/18/2024: SKIN LESION EXCISION; N/A      Comment:  Procedure: WIDE LOCAL EXCISION OF BACK MELANOMA;                 Surgeon: Lillie La MD;  Location: [facility];                 Service: Surgical Oncology  No date: TOTAL HIP ARTHROPLASTY; Right   Current Outpatient Medications   Medication Instructions    acetaminophen (TYLENOL) 325 mg, Every 6 hours PRN    amiodarone 200 mg, Oral, Daily    amLODIPine (NORVASC) 5 mg, Oral, Daily    aspirin 81 mg, Oral, Daily    atorvastatin (LIPITOR) 40 mg, Oral, Daily with dinner    finasteride (PROSCAR) 5 mg, Oral, Daily    furosemide (LASIX) 20 mg, Oral, Daily    lisinopril (ZESTRIL) 5 mg, Oral, Daily    metoprolol succinate  (TOPROL-XL) 25 mg, Oral, Every 12 hours scheduled    nitroglycerin (NITROSTAT) 0.4 mg, Sublingual, Every 5 minutes PRN    polyethylene glycol (MIRALAX) 17 g, Oral, Daily    tamsulosin (FLOMAX) 0.4 mg, Oral, Daily with dinner    Allergies   Allergen Reactions    Zosyn [Piperacillin Sod-Tazobactam So] Rash      Social History     Tobacco Use    Smoking status: Former     Types: Cigarettes     Start date:      Quit date:      Years since quittin.9    Smokeless tobacco: Never    Tobacco comments:     Quit over 30 years ago (Updated 2023).    Vaping Use    Vaping status: Never Used   Substance Use Topics    Alcohol use: Not Currently    Drug use: Never    Family History   Problem Relation Age of Onset    Breast cancer Mother     Heart attack Father 61    Other Sister         s/p hysterectomy    Cerebral palsy Brother     Skin cancer Other         family history    No Known Problems Son     No Known Problems Daughter     Sudden death Neg Hx           Objective :                   Vitals I/O      Most Recent Min/Max in 24hrs   Temp (!) 97.3 °F (36.3 °C) Temp  Min: 97.3 °F (36.3 °C)  Max: 98.2 °F (36.8 °C)   Pulse 83 Pulse  Min: 57  Max: 93   Resp (!) 38 Resp  Min: 11  Max: 38   /52 BP  Min: 59/25  Max: 190/80   O2 Sat 94 % SpO2  Min: 93 %  Max: 100 %      Intake/Output Summary (Last 24 hours) at 12/15/2024 1940  Last data filed at 12/15/2024 1800  Gross per 24 hour   Intake 850 ml   Output 1000 ml   Net -150 ml       Diet Fantasma/CHO Controlled; Consistent Carbohydrate Diet Level 1 (4 carb servings/60 grams CHO/meal); Fluid Restriction 2000 ML    Invasive Monitoring           Physical Exam   Physical Exam  Eyes:      Extraocular Movements: Extraocular movements intact.      Pupils: Pupils are equal, round, and reactive to light.   Skin:     General: Skin is warm and dry.      Findings: No ecchymosis or laceration.      Comments: Suprapubic catheter site with some surrounding redness and tenderness but no  warmth or edema and no purulent drainage noted   HENT:      Head: Normocephalic and atraumatic.      Mouth/Throat:      Mouth: Mucous membranes are moist.   Cardiovascular:      Rate and Rhythm: Normal rate and regular rhythm.      Pulses: Normal pulses.   Musculoskeletal:         General: Normal range of motion.      Right lower leg: Trace Edema present.      Left lower leg: Trace Edema present.   Abdominal: General: There is no distension.      Palpations: Abdomen is soft.      Tenderness: There is abdominal tenderness (Mild lower abdominal). There is no guarding.   Constitutional:       General: He is not in acute distress.     Appearance: He is not ill-appearing.   Pulmonary:      Effort: Pulmonary effort is normal.      Breath sounds: Normal breath sounds.   Neurological:      General: No focal deficit present.      Mental Status: He is alert and oriented to person, place and time.      Motor: Strength full and intact in all extremities.   Genitourinary/Anorectal:     Comments: Suprapubic catheter.  Urinary drainage is clear pale yellow  Colon present.        Diagnostic Studies        Lab Results: I have reviewed the following results:     Medications:  Scheduled PRN   [START ON 12/16/2024] amiodarone, 200 mg, Daily  [Held by provider] amLODIPine, 5 mg, Daily  [Held by provider] aspirin, 81 mg, Daily  atorvastatin, 40 mg, Daily With Dinner  calcium gluconate, 2 g, Once  [START ON 12/16/2024] cefTRIAXone, 1,000 mg, Q24H  chlorhexidine, 15 mL, Q12H ARI  [START ON 12/16/2024] finasteride, 5 mg, Daily  [Held by provider] furosemide, 20 mg, Daily  insulin lispro, 1-6 Units, TID AC  insulin lispro, 1-6 Units, HS  [Held by provider] lisinopril, 5 mg, Daily  metoprolol succinate, 25 mg, Q12H ARI  [START ON 12/16/2024] polyethylene glycol, 17 g, Daily  tamsulosin, 0.4 mg, Daily With Dinner      acetaminophen, 650 mg, Q6H PRN  HYDROmorphone, 0.2 mg, Q2H PRN  naloxone, 0.04 mg, Q1MIN PRN  ondansetron, 4 mg, Q6H  PRN  oxybutynin, 5 mg, TID PRN  oxyCODONE, 2.5 mg, Q4H PRN   Or  oxyCODONE, 5 mg, Q4H PRN  phenol, 1 spray, Q2H PRN  senna, 1 tablet, HS PRN       Continuous          Labs:   CBC    Recent Labs     12/14/24  2235 12/15/24  0251 12/15/24  0804 12/15/24  1126   WBC 6.39 7.27  --   --    HGB 11.6* 10.4* 7.2* 8.9*   HCT 35.5* 32.2* 22.8* 27.8*    272  --   --      BMP    Recent Labs     12/15/24  0251 12/15/24  1843   SODIUM 137 138   K 3.9 4.7    107   CO2 22 24   AGAP 9 7   BUN 36* 32*   CREATININE 2.02* 1.80*   CALCIUM 8.0* 7.8*       Coags    Recent Labs     12/15/24  0251   INR 1.09   PTT 31        Additional Electrolytes  Recent Labs     12/15/24  0251 12/15/24  1843   MG 2.1 2.2   PHOS 4.3* 3.7   CAIONIZED  --  0.98*          Blood Gas    No recent results  No recent results LFTs  Recent Labs     12/14/24  2235 12/15/24  0251   ALT 12 12   AST 17 14   ALKPHOS 77 70   ALB 3.7 3.4*   TBILI 0.36 0.36       Infectious  No recent results  Glucose  Recent Labs     12/14/24  2235 12/15/24  0251 12/15/24  1843   GLUC 106 103 100

## 2024-12-15 NOTE — ASSESSMENT & PLAN NOTE
"CT A/P: \"  Stable moderate right hydroureteronephrosis to the level of the urinary bladder with nonobstructing 2 mm dependent calculus in the distal right ureter. Hydroureteronephrosis is favored to be secondary to marked irregular thickening of the right lateral bladder wall, present dating back to multiple prior examinations. \"  "

## 2024-12-15 NOTE — OP NOTE
OPERATIVE REPORT  PATIENT NAME: Toro Rodriguez    :  1950  MRN: [id number]  Pt Location: [facility] CYSTO ROOM 01    SURGERY DATE: 12/15/2024    Surgeons and Role:     * Rick Espino MD - Primary    Preop Diagnosis:  Hematuria [R31.9]    Post-Op Diagnosis Codes:     * Hematuria [R31.9]    Procedure(s):  CYSTOSCOPY EVACUATION OF CLOTS    Specimen(s):  ID Type Source Tests Collected by Time Destination   2 : bladder tumor trigun Tissue Urinary Bladder TISSUE EXAM Rick Espino MD 12/15/2024 1634        Estimated Blood Loss:   600 mL    Drains:  Urethral Catheter Straight-tip;Three way 24 Fr. (Active)   Number of days: 0       Suprapubic Catheter 16 Fr. (Active)   Site Assessment Pink 12/15/24 0508   Dressing Status Intact 12/15/24 0508   Dressing Type Dry dressing 12/15/24 0508   Collection Container Standard drainage bag 12/15/24 0508   Securement Method Taped 12/15/24 0508   Reason for Continuing Urinary Catheterization past POD 1 Other (Comment) 12/15/24 0508   Irrigant Normal saline 12/15/24 1359   Suprapubic Irrigation Intake (mL) 300 mL 12/15/24 1359   Output (mL) 900 mL 12/15/24 1359   Number of days: 12       Continuous Bladder Irrigation Three-way (Active)   Securement Method Securing device (Describe) 12/15/24 1359   Irrigant Normal saline 12/15/24 0626   CBI Irrigation Intake (mL) 3000 mL 12/15/24 1359   CBI Colon Output (mL) 2750 mL 12/15/24 1359   CBI Net Output (mL) -250 mL 12/15/24 1359   Colon Output Appearance Bloody;Blood clots 12/15/24 1359   Number of days: 0       Anesthesia Type:   General    Operative Indications:  Hematuria [R31.9]  Clot retention of urine  Suprapubic catheter dysfunction    Operative Findings:  Copious clot noted within the bladder.  No trigonal anatomy is noted given a large infiltrative tumor which is actively bleeding.  This measures at least 5 cm and covers the entire trigone and most of the posterior wall of the bladder.  This was resected and debulked and  extensively cauterized with a button electrode.  Of note, no access to the right ureter is available.  Should the patient have worsening kidney function or worsening hydronephrosis or signs of sepsis he will require nephrostomy tube placement versus nephroureteral catheter placement.    Differential diagnosis of mass includes urothelial carcinoma, prostate cancer, or rectal cancer or leiomyosarcoma    Further care will be determined based on final pathology which should be available in the coming weeks.    Continuous bladder irrigation running clear at the end of today's procedure      Complications:   None    Procedure and Technique:    This is a 74-year-old gentleman with urinary retention and urethral erosion status post suprapubic catheter placement.  With bleeding since that time off and on.  This was recalcitrant to conservative measures and he was transferred urgently to the [facility] for cystoscopy with clot evacuation and fulguration of bleeding vessels with a plan for ureteral stent placement as well.  He underwent the informed consent process and wished to proceed.    He was brought to the operating room and placed under general anesthesia by way of a laryngeal mask airway.  Preoperative antibiotics were given.  He was prepared and draped in the usual lithotomy position taking care to pad all pressure points.    A timeout confirm the proper patient position and procedure.    A resectoscope with a visual obturator was used to enter into the bladder which showed copious organized clot.  12 cycles of the Ellik evacuator were necessary for debulking this clot.  Following this the area of the suprapubic catheter was examined with the resectoscope and seen to be hemostatic.  The 16 [employer] of the [place] silicone catheter is draining well in this area.    At the base of the bladder is noted a infiltrating and large tumor measuring at least 5 cm which is bulky and nodular covering the entire trigone without  any ureteral anatomy being identifiable.  A bipolar loop was used to debulk approximately 60% of this tumor and this does appear to infiltrate into the bladder wall.  The 60% that was removed represents the portion that was intraluminal and actively bleeding.  We resected into the bladder wall as much as safely possible without perforating the patient's bladder.  Again, this appears to be an infiltrative tumor with a differential of urothelial carcinoma, prostate cancer, or rectal cancer or leiomyosarcoma.    After extensive resection a button electrode was used for extensive fulguration of the resection bed which was effective in achieving hemostasis.    A 24 Vietnamese hematuria catheter was in place with 30 cc in the balloon.  This was seen to irrigate nicely.  The suprapubic catheter was also seen to irrigate nicely.  The previously resected bladder tumor was removed by way of the [employer] evacuator evacuating these tumor chips.  These were sent for permanent section.    Continuous bladder irrigation was then initiated and the patient was awakened from anesthesia.    Postoperative debrief was performed.    The operative findings today were reviewed in detail with the patient's daughter.       I was present for the entire procedure. and A qualified resident physician was not available.    Patient Disposition:  PACU     Plan: The patient is status post cystoscopy with clot evacuation and fulguration bleeding vessels as well as extensive transurethral resection of bladder tumor.  His urine is currently running clear.  He is admitted to the stepdown service.  Urology following.  We will arrange for outpatient follow-up for pathology review and Colon catheter removal and determination of neck steps in his care.  I would favor a trimodality/bladder sparing approach given the patient's age and comorbid medical conditions.                 SIGNATURE: Rick Espino MD  DATE: December 15, 2024  TIME: 5:15 PM

## 2024-12-15 NOTE — ANESTHESIA PREPROCEDURE EVALUATION
Procedure:  CYSTOSCOPY EVACUATION OF CLOTS (Bladder)  CYSTOSCOPY RETROGRADE PYELOGRAM WITH INSERTION STENT URETERAL (Right: Bladder)    Relevant Problems   ANESTHESIA (within normal limits)      CARDIO   (+) Chronic venous hypertension (idiopathic) with ulcer of left lower extremity (CODE) (HCC)   (+) Coronary artery disease involving native coronary artery of native heart   (+) Deep vein thrombosis (DVT) of left lower extremity (HCC)   (+) Essential hypertension   (-) Angina at rest (HCC)   (-) Angina of effort (HCC)   (-) Chest pain   (-) FREDERICK (dyspnea on exertion)      ENDO   (+) Type 2 diabetes mellitus with stage 3b chronic kidney disease, without long-term current use of insulin (HCC)      GI/HEPATIC   (-) Chronic liver disease      /RENAL   (+) CHUY (acute kidney injury) (HCC)   (+) Benign hypertension with chronic kidney disease, stage III (HCC)   (+) Benign prostatic hyperplasia with urinary retention   (+) 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 Wall thickness is mildly increased. The left ventricular ejection fraction is 35-40% by biplane measurement. Systolic function is moderately reduced. There is moderate global hypokinesis with specific regional wall abnormalities. Diastolic function is mildly abnormal, consistent with grade I (abnormal) relaxation.    The following segments are akinetic: basal inferior, basal inferolateral, mid inferior and mid inferolateral.    The remainder of the myocardium is hypokinetic.    Right Ventricle: Right ventricular  cavity size is normal. Systolic function is normal.    Left Atrium: The atrium is moderately dilated.    Right Atrium: The atrium is dilated.    Mitral Valve: There is moderate regurgitation.    H/H: 8.9/27.8  Physical Exam    Airway    Mallampati score: II  TM Distance: >3 FB  Neck ROM: full     Dental   Comment: Multiple missing/broken teeth, no loose teeth     Cardiovascular      Pulmonary      Other Findings        Anesthesia Plan  ASA Score- 3     Anesthesia Type- general with ASA Monitors.         Additional Monitors:     Airway Plan: LMA.           Plan Factors-Exercise tolerance (METS): >4 METS.    Chart reviewed.   Existing labs reviewed. Patient summary reviewed.                  Induction- intravenous.    Postoperative Plan- Plan for postoperative opioid use. Planned trial extubation        Informed Consent- Anesthetic plan and risks discussed with patient.  I personally reviewed this patient with the CRNA. Discussed and agreed on the Anesthesia Plan with the CRNA..

## 2024-12-15 NOTE — ASSESSMENT & PLAN NOTE
Wt Readings from Last 3 Encounters:   12/15/24 86.6 kg (190 lb 14.7 oz)   12/10/24 87.5 kg (193 lb)   12/05/24 88 kg (194 lb)   Last echo 02/2024: LVEF 35-40% and G1DD.   Home diuretics lasix 20mg daily   Hold diuertics in setting of hypotension and gross hematuria   Continue to monitor resp status while receiving IVF and boluses   I/o and daily weights

## 2024-12-15 NOTE — ASSESSMENT & PLAN NOTE
Wt Readings from Last 3 Encounters:   12/15/24 86.6 kg (190 lb 14.7 oz)   12/10/24 87.5 kg (193 lb)   12/05/24 88 kg (194 lb)   Last echo 02/2024: LVEF 35-40% and G1DD.   Home diuretics lasix 20mg daily   Hold diuertics in setting of hypotension and gross hematuria   I/o and daily weights

## 2024-12-15 NOTE — ASSESSMENT & PLAN NOTE
Hx of DVT in the past   Previously on eliquis, however was cleared to stop indefinitely by hematology during last admit

## 2024-12-15 NOTE — DISCHARGE INSTR - AVS FIRST PAGE
Toro,    Today you underwent cystoscopy with clot evacuation and fulguration of bleeding vessels along with transurethral resection of bladder tumor.  The source of your bleeding was a large and infiltrative appearing bladder mass at the base of the bladder.  We resected as much of this as we safely could but you do still have some tumor in this area as it was not safe to attempt to remove complete removal as I am concerned that it is going through the bladder wall into surrounding structures.    Please note that urgency and frequency of urination as well as blood in the urine are not uncommon after surgery like this.  The bleeding typically resolves and then may recur again 4 to 5 weeks from now during the process of shutting your initial scabs and blood clots from the initial surgery.  That process is typically self-limited and does not require intervention.    Do note that in some cases of high risk disease where muscle is not present in the specimen that you may require a repeat resection of the tumor resection site to see if any cancer remains.  This will be discussed with you in the office if this is necessary.    If you have questions or concerns as you recover, do let us know.     We look forward to seeing you back in the office,  Dr. Espino

## 2024-12-15 NOTE — ASSESSMENT & PLAN NOTE
"CT A/P: \"  Stable moderate right hydroureteronephrosis to the level of the urinary bladder with nonobstructing 2 mm dependent calculus in the distal right ureter. Hydroureteronephrosis is favored to be secondary to marked irregular thickening of the right lateral bladder wall, present dating back to multiple prior examinations. \"  Urology following   "

## 2024-12-15 NOTE — CONSULTS
Consultation - Urology   Toro Rodriguez 74 y.o. male MRN: [id number]  Unit/Bed#: ED 04 Encounter: [id number]      Assessment/ Plan:    Hematuria   Recent SPC placed by IR 12/3 for chronic retention  CT 12/13 with stable R hydroureteronephrosis to level of bladder with nonobstructing 2mm stone in distal R ureter favored to be secondary to marked irregular thickening of R lateral bladder wall which may reflect chronic hematoma  Recent cysto 11/7/24 with no bladder masses  Hgb stable 11.6 today from 11.1 yesterday      Plan:  Trend hgb   Continue CBI, titrate to pink output   Q4 manual bladder irrigation   NPO MN pending re-evaluation   Consider repeat CT and transfer to [facility] for intervention if catheter clogs and/or hgb drops  PRN oxybutynin for bladder spasm  _______________________________________________________________  Physician Requesting Consult: Brook Fuentes MD    Additional consultants: N/A    Reason for Consult / Principal Problem: Hematuria, occluded SPC    HPI: Toro Rodriguez is a 74 y.o. year old male with DM2, HTN, dilated CMP, CKD3, obesity, chronic urinary retention s/p recent SPC placement 12/3 who presents with hematuria since yesterday. He came to the ED yesterday for same which cleared with manual irrigation and he was discharged home. Unfortunately hematuria recurred this evening after dinner and he came back to the ED today. He is currently having bladder spasms intermittently and some pain at the urethral orifice. He denies any abdominal or back pain.     Historical Information   Past Medical History:   Diagnosis Date    Alcohol intoxication ([facility]) 10/15/2020    BPH (benign prostatic hyperplasia)     Chronic HFrEF (heart failure with reduced ejection fraction) ([facility]) 11/2023    Coronary artery calcification seen on CT scan 11/10/2023    Coronary artery disease 12/14/2023    Deep vein thrombosis (DVT) of left lower extremity ([facility]) 11/2023    Diabetes mellitus ([facility]) 11/2023    Fall from slip,  trip, or stumble 10/15/2020    History of phimosis of penis     History of urinary calculi     Hypertension     Skin cancer     Tobacco abuse     quit around      Past Surgical History:   Procedure Laterality Date    BLADDER STONE REMOVAL      CARDIAC CATHETERIZATION N/A 2023    Procedure: Cardiac catheterization;  Surgeon: Dave Royal MD;  Location: [facility];  Service: Cardiology    IR SUPRAPUBIC CATHETER CHECK/CHANGE/REINSERTION/UPSIZE  2024    IR SUPRAPUBIC CATHETER CHECK/CHANGE/REINSERTION/UPSIZE  12/3/2024    IR SUPRAPUBIC CATHETER PLACEMENT  2024    ORIF ANKLE FRACTURE Left     SKIN LESION EXCISION N/A 2024    Procedure: WIDE LOCAL EXCISION OF BACK MELANOMA;  Surgeon: Lillie La MD;  Location: [facility];  Service: Surgical Oncology    TOTAL HIP ARTHROPLASTY Right      Social History   Social History     Substance and Sexual Activity   Alcohol Use Not Currently     Social History     Substance and Sexual Activity   Drug Use Never     Social History     Tobacco Use   Smoking Status Former    Types: Cigarettes    Start date:     Quit date:     Years since quittin.9   Smokeless Tobacco Never   Tobacco Comments    Quit over 30 years ago (Updated 2023).      Family History: Family history non-contributory    Meds/Allergies   Home meds:   Prior to Admission medications    Medication Sig Start Date End Date Taking? Authorizing Provider   acetaminophen (TYLENOL) 325 mg tablet Take 325 mg by mouth every 6 (six) hours as needed for mild pain    Historical Provider, MD   amiodarone 200 mg tablet Take 1 tablet (200 mg total) by mouth daily 24   Rob Roach MD   amLODIPine (NORVASC) 5 mg tablet Take 1 tablet (5 mg total) by mouth daily 12/10/24   Joselyn Reyes Bahamonde, MD   aspirin 81 mg chewable tablet Chew 1 tablet (81 mg total) daily Do not start before 2024. 24   Jaydon Fleming MD   atorvastatin (LIPITOR) 40 mg  tablet Take 1 tablet (40 mg total) by mouth daily with dinner 11/15/23   Krysta Castillo PA-C   finasteride (PROSCAR) 5 mg tablet Take 1 tablet (5 mg total) by mouth daily 4/18/24 4/13/25  Rick Espino MD   furosemide (LASIX) 20 mg tablet Take 1 tablet (20 mg total) by mouth daily 11/26/24   Rob Roach MD   lisinopril (ZESTRIL) 5 mg tablet Take 1 tablet (5 mg total) by mouth daily Do not start before November 4, 2024. 11/4/24   Elif Santa MD   metoprolol succinate (TOPROL-XL) 25 mg 24 hr tablet Take 1 tablet (25 mg total) by mouth every 12 (twelve) hours 9/17/24 12/3/24  Rick Crawley PA-C   nitroglycerin (NITROSTAT) 0.4 mg SL tablet Place 1 tablet (0.4 mg total) under the tongue every 5 (five) minutes as needed for chest pain 12/15/23   CARLIE Rincon   polyethylene glycol (MIRALAX) 17 g packet Take 17 g by mouth daily 10/18/24   Elif Santa MD   tamsulosin (FLOMAX) 0.4 mg Take 1 capsule (0.4 mg total) by mouth daily with dinner 12/15/23   CARLIE Rincon     Scheduled Meds:  Continuous Infusions:No current facility-administered medications for this encounter.    PRN Meds:      ALLERGIES:   Allergies   Allergen Reactions    Zosyn [Piperacillin Sod-Tazobactam So] Rash       Review of Systems:  General: negative  Cardiovascular: no chest pain or dyspnea on exertion  Respiratory: no cough, shortness of breath, or wheezing  Gastrointestinal: no abdominal pain, change in bowel habits, or black or bloody stools  Genitourinary: positive for - hematuria and bladder spasms, pain at the urethral orifice  Musculoskeletal: negative  Neurological: no TIA or stroke symptoms  Hematological and Lymphatic: negative  Dermatological : negative  Psychological: negative  Ophthalmic: negative  ENT: negative      Objective   Vitals:  Blood pressure (!) 190/80, pulse 68, temperature (!) 97.3 °F (36.3 °C), temperature source Temporal, resp. rate 18, SpO2 97%.  There is no height or weight on file to calculate  BMI.    SpO2: SpO2: 99 %    I/Os:  I/O         12/13 0701  12/14 0700 12/14 0701  12/15 0700    Other  3000    Total Intake  3000    Urine  4800    Total Output  4800    Net  -1800                  Invasive Lines/Tubes:  Invasive Devices       Peripheral Intravenous Line  Duration             Peripheral IV 12/14/24 Left Antecubital <1 day              Drain  Duration             Suprapubic Catheter 16 Fr. 11 days    Urethral Catheter 22 Fr. <1 day                    Physical Exam  Constitutional:       Appearance: Normal appearance.   HENT:      Head: Normocephalic and atraumatic.      Nose: Nose normal.      Mouth/Throat:      Mouth: Mucous membranes are moist.   Eyes:      Extraocular Movements: Extraocular movements intact.      Pupils: Pupils are equal, round, and reactive to light.   Cardiovascular:      Rate and Rhythm: Normal rate.   Pulmonary:      Effort: Pulmonary effort is normal.   Abdominal:      General: Abdomen is flat. Bowel sounds are normal. There is no distension.      Palpations: Abdomen is soft. There is no mass.      Tenderness: There is no abdominal tenderness. There is no guarding.   Genitourinary:     Comments: There is blood at the urethral orifice. CBI running, urine is punch colored with some clots in the bag. SPC site in fold of pannus with some skin irritation. Less than 50cc blood in the SPC bag (to gravity)  Musculoskeletal:      Cervical back: Neck supple.   Skin:     General: Skin is warm and dry.   Neurological:      Mental Status: He is alert and oriented to person, place, and time.   Psychiatric:         Mood and Affect: Mood normal.         Behavior: Behavior normal.            Lab Results and Cultures:   CBC:   Results from last 7 days   Lab Units 12/14/24 2235 12/13/24  1546   WBC Thousand/uL 6.39 4.57   HEMOGLOBIN g/dL 11.6* 11.1*   HEMATOCRIT % 35.5* 34.3*   PLATELETS Thousands/uL 314 271     BMP/CMP:  Results from last 7 days   Lab Units 12/14/24 2235 12/13/24  1546  "  POTASSIUM mmol/L 4.2 4.2   CHLORIDE mmol/L 104 106   CO2 mmol/L 24 25   BUN mg/dL 36* 34*   CREATININE mg/dL 2.11* 1.95*   CALCIUM mg/dL 8.2* 8.1*     Coags:   Results from last 7 days   Lab Units 12/13/24  1546   INR  1.00   PTT seconds 30     Lipid panel:     HgbA1c:   Lab Results   Component Value Date    HGBA1C 5.9 (H) 04/18/2024    HGBA1C 8.2 (H) 12/14/2023    HGBA1C 7.9 (H) 11/15/2023       Urinalysis:   Lab Results   Component Value Date    COLORU Colorless 12/05/2024    CLARITYU Clear 12/05/2024    SPECGRAV <1.005 (L) 12/05/2024    PHUR 7.0 12/05/2024    LEUKOCYTESUR Moderate (A) 12/05/2024    NITRITE Negative 12/05/2024    GLUCOSEU Negative 12/05/2024    KETONESU Negative 12/05/2024    BILIRUBINUR Negative 12/05/2024    BLOODU Large (A) 12/05/2024   ,   Urine Culture:   Lab Results   Component Value Date    URINECX >100,000 cfu/ml Gram Negative Armando (A) 12/13/2024     Wound Culure: No results found for: \"WOUNDCULT\"  Blood Culture:   Lab Results   Component Value Date    BLOODCX No Growth After 5 Days. 11/10/2023    BLOODCX No Growth After 5 Days. 11/10/2023       Imaging Studies: Results Review Statement: I reviewed radiology reports from this admission including: CT abdomen/pelvis.  EKG, Pathology, and Other Studies: Results Review Statement: No pertinent imaging studies reviewed.  VTE Prophylaxis: Reason for no pharmacologic prophylaxis hematuria      Code Status: Prior      Counseling / Coordination of Care  Counseling/Coordination of Care: Total floor / unit time spent today 30 minutes. Greater than 50% of total time was spent with the patient and / or family counseling and / or coordination of care. A description of the counseling / coordination of care: Discussion of patient with urology PAMELA Ramirez PA-C  12/15/2024     "

## 2024-12-15 NOTE — ASSESSMENT & PLAN NOTE
Home regimen: amlodipine 5mg daily, lasix 20mg daily, metoprolol succinate 25mg BID  Home lisinopril 5mg daily currently on hold in outpatient setting d/t [facility] continue holding inpatient  Pt with hypotension, therefore all anti-HTN are on hold except for metoprolol with increased hold parameter

## 2024-12-15 NOTE — QUICK NOTE
Urology follow-up    HGB dropped to 7.2 on repeat from this a.m.  Patient is currently being transfused.  Continues with IV fluids and albumin with continued hypotension and bloody CBI drainage.    Catheter is able to be flushed with continued large amount of clot removal.    Final CT scan reading is pending but bladder does not appear to be distended on imaging compared to previous    Nurses are accounting for CBI input.  Drainage is consistent with what is being input into bladder.  Patient without significant pain at this time.  Still some dizziness.  No increased distention on exam.    PLAN  Will plan for transfer to hospitals for urologic intervention, discussed with Joanna Lees  Medical service aware, will need at least stepdown level of care  Will continue CBI with close monitoring to assure output matches what is being input, will hold if patient becomes more distended or increased tenderness  Continue to monitor and treat blood pressure as indicated  Continue blood transfusion  Keep patient n.p.o.  Patient aware and all questions answered.    Elsy Miller

## 2024-12-15 NOTE — ASSESSMENT & PLAN NOTE
-Baseline appears to be 10-12  -Hemoglobin on admit 11.6, dropped 10.4 today, repeat pending  -Continue to trend hemoglobin, transfuse as indicated

## 2024-12-15 NOTE — ASSESSMENT & PLAN NOTE
Lab Results   Component Value Date    EGFR 31 12/15/2024    EGFR 29 12/14/2024    EGFR 32 12/13/2024    CREATININE 2.02 (H) 12/15/2024    CREATININE 2.11 (H) 12/14/2024    CREATININE 1.95 (H) 12/13/2024

## 2024-12-15 NOTE — ANESTHESIA POSTPROCEDURE EVALUATION
Post-Op Assessment Note    CV Status:  Stable  Pain Score: 0    Pain management: adequate       Mental Status:  Sleepy and awake   Hydration Status:  Stable   PONV Controlled:  None   Airway Patency:  Patent     Post Op Vitals Reviewed: Yes    No anethesia notable event occurred.    Staff: CRNA, Anesthesiologist           Last Filed PACU Vitals:  Vitals Value Taken Time   Temp 98 °F (36.7 °C) 12/15/24 1654   Pulse 91 12/15/24 1656   BP 97/56 12/15/24 1656   Resp 18 12/15/24 1656   SpO2 99 % 12/15/24 1656   Vitals shown include unfiled device data.    Modified Windy:  Activity: 2 (12/15/2024  4:54 PM)  Respiration: 2 (12/15/2024  4:54 PM)  Circulation: 2 (12/15/2024  4:54 PM)  Consciousness: 2 (12/15/2024  4:54 PM)  Oxygen Saturation: 1 (12/15/2024  4:54 PM)  Modified Windy Score: 9 (12/15/2024  4:54 PM)

## 2024-12-15 NOTE — NURSING NOTE
Provider aware of drop in BP.  IVF bolus and IVF administration ordered. Provider at bedside to evaluate patient.    Pt continues on CBI with red output, clots noted in bag. Manual irrigation done at 0100 by surgical PA and again at 0430 with clots again noted with irrigation.

## 2024-12-15 NOTE — PLAN OF CARE
Problem: Potential for Falls  Goal: Patient will remain free of falls  Description: INTERVENTIONS:  - Educate patient/family on patient safety including physical limitations  - Instruct patient to call for assistance with activity   - Consult OT/PT to assist with strengthening/mobility   - Keep Call bell within reach  - Keep bed low and locked with side rails adjusted as appropriate  - Keep care items and personal belongings within reach  - Initiate and maintain comfort rounds  - Make Fall Risk Sign visible to staff  - Offer Toileting every 2 Hours, in advance of need  - Initiate/Maintain bed alarm  - Apply yellow socks and bracelet for high fall risk patients  - Consider moving patient to room near nurses station  Outcome: Progressing     Problem: Prexisting or High Potential for Compromised Skin Integrity  Goal: Skin integrity is maintained or improved  Description: INTERVENTIONS:  - Identify patients at risk for skin breakdown  - Assess and monitor skin integrity  - Assess and monitor nutrition and hydration status  - Monitor labs   - Assess for incontinence   - Turn and reposition patient  - Assist with mobility/ambulation  - Relieve pressure over bony prominences  - Avoid friction and shearing  - Provide appropriate hygiene as needed including keeping skin clean and dry  - Evaluate need for skin moisturizer/barrier cream  - Collaborate with interdisciplinary team   - Patient/family teaching  - Consider wound care consult   Outcome: Progressing     Problem: PAIN - ADULT  Goal: Verbalizes/displays adequate comfort level or baseline comfort level  Description: Interventions:  - Encourage patient to monitor pain and request assistance  - Assess pain using appropriate pain scale  - Administer analgesics based on type and severity of pain and evaluate response  - Implement non-pharmacological measures as appropriate and evaluate response  - Consider cultural and social influences on pain and pain management  -  Notify physician/advanced practitioner if interventions unsuccessful or patient reports new pain  Outcome: Progressing     Problem: INFECTION - ADULT  Goal: Absence or prevention of progression during hospitalization  Description: INTERVENTIONS:  - Assess and monitor for signs and symptoms of infection  - Monitor lab/diagnostic results  - Monitor all insertion sites, i.e. indwelling lines, tubes, and drains  - Monitor endotracheal if appropriate and nasal secretions for changes in amount and color  - [place] appropriate cooling/warming therapies per order  - Administer medications as ordered  - Instruct and encourage patient and family to use good hand hygiene technique  - Identify and instruct in appropriate isolation precautions for identified infection/condition  Outcome: Progressing     Problem: SAFETY ADULT  Goal: Patient will remain free of falls  Description: INTERVENTIONS:  - Educate patient/family on patient safety including physical limitations  - Instruct patient to call for assistance with activity   - Consult OT/PT to assist with strengthening/mobility   - Keep Call bell within reach  - Keep bed low and locked with side rails adjusted as appropriate  - Keep care items and personal belongings within reach  - Initiate and maintain comfort rounds  - Make Fall Risk Sign visible to staff  - Offer Toileting every 2 Hours, in advance of need  - Initiate/Maintain bed alarm  - Apply yellow socks and bracelet for high fall risk patients  - Consider moving patient to room near nurses station  Outcome: Progressing  Goal: Maintain or return to baseline ADL function  Description: INTERVENTIONS:  -  Assess patient's ability to carry out ADLs; assess patient's baseline for ADL function and identify physical deficits which impact ability to perform ADLs (bathing, care of mouth/teeth, toileting, grooming, dressing, etc.)  - Assess/evaluate cause of self-care deficits   - Assess range of motion  - Assess patient's  mobility; develop plan if impaired  - Assess patient's need for assistive devices and provide as appropriate  - Encourage maximum independence but intervene and supervise when necessary  - Involve family in performance of ADLs  - Assess for home care needs following discharge   - Consider OT consult to assist with ADL evaluation and planning for discharge  - Provide patient education as appropriate  Outcome: Progressing  Goal: Maintains/Returns to pre admission functional level  Description: INTERVENTIONS:  - Perform AM-PAC 6 Click Basic Mobility/ Daily Activity assessment daily.  - Set and communicate daily mobility goal to care team and patient/family/caregiver.   - Collaborate with rehabilitation services on mobility goals if consulted  - Perform Range of Motion 2 times a day.  - Reposition patient every 2 hours.  - Dangle patient 2 times a day  - Stand patient 2 times a day  - Ambulate patient 2 times a day  - Out of bed to chair 2 times a day   - Out of bed for meals 2 times a day  - Out of bed for toileting  - Record patient progress and toleration of activity level   Outcome: Progressing     Problem: DISCHARGE PLANNING  Goal: Discharge to home or other facility with appropriate resources  Description: INTERVENTIONS:  - Identify barriers to discharge w/patient and caregiver  - Arrange for needed discharge resources and transportation as appropriate  - Identify discharge learning needs (meds, wound care, etc.)  - Arrange for interpretive services to assist at discharge as needed  - Refer to Case Management Department for coordinating discharge planning if the patient needs post-hospital services based on physician/advanced practitioner order or complex needs related to functional status, cognitive ability, or social support system  Outcome: Progressing     Problem: Knowledge Deficit  Goal: Patient/family/caregiver demonstrates understanding of disease process, treatment plan, medications, and discharge  instructions  Description: Complete learning assessment and assess knowledge base.  Interventions:  - Provide teaching at level of understanding  - Provide teaching via preferred learning methods  Outcome: Progressing     Problem: GENITOURINARY - ADULT  Goal: Maintains or returns to baseline urinary function  Description: INTERVENTIONS:  - Assess urinary function  - Encourage oral fluids to ensure adequate hydration if ordered  - Administer IV fluids as ordered to ensure adequate hydration  - Administer ordered medications as needed  - Offer frequent toileting  - Follow urinary retention protocol if ordered  Outcome: Progressing  Goal: Absence of urinary retention  Description: INTERVENTIONS:  - Assess patient’s ability to void and empty bladder  - Monitor I/O  - Bladder scan as needed  - Discuss with physician/AP medications to alleviate retention as needed  - Discuss catheterization for long term situations as appropriate  Outcome: Progressing  Goal: Urinary catheter remains patent  Description: INTERVENTIONS:  - Assess patency of urinary catheter  - If patient has a chronic farmer, consider changing catheter if non-functioning  - Follow guidelines for intermittent irrigation of non-functioning urinary catheter  Outcome: Progressing     Problem: SKIN/TISSUE INTEGRITY - ADULT  Goal: Skin Integrity remains intact(Skin Breakdown Prevention)  Description: Assess:  -Perform Chris assessment every shift  -Clean and moisturize skin every shift  -Inspect skin when repositioning, toileting, and assisting with ADLS  -Assess extremities for adequate circulation and sensation     Bed Management:  -Have minimal linens on bed & keep smooth, unwrinkled  -Change linens as needed when moist or perspiring  -Avoid sitting or lying in one position for more than 2 hours while in bed  -Keep HOB at 30degrees     Toileting:  -Offer bedside commode  -Use incontinent care products after each incontinent episode such as barrier cream      Activity:  -Mobilize patient 2 times a day  -Encourage activity and walks on unit  -Encourage or provide ROM exercises   -Turn and reposition patient every 2 Hours  -Use appropriate equipment to lift or move patient in bed  -Instruct/ Assist with weight shifting every 2 when out of bed in chair  -Consider limitation of chair time 2 hour intervals    Skin Care:  -Avoid use of baby powder, tape, friction and shearing, hot water or constrictive clothing  -Relieve pressure over bony prominences using allevyn   -Do not massage red bony areas  Goal: Incision(s), wounds(s) or drain site(s) healing without S/S of infection  Description: INTERVENTIONS  - Assess and document dressing, incision, wound bed, drain sites and surrounding tissue  - Provide patient and family education  Outcome: Progressing  Goal: Pressure injury heals and does not worsen  Description: Interventions:  - Implement low air loss mattress or specialty surface (Criteria met)  - Apply silicone foam dressing  - Instruct/assist with weight shifting every 30 minutes when in chair   - Limit chair time to 2 hour intervals  - Use special pressure reducing interventions such as waffle cushion when in chair   - Apply fecal or urinary incontinence containment device   - Perform passive or active ROM every shift  - Turn and reposition patient & offload bony prominences every 2 hours   - Utilize friction reducing device or surface for transfers   - Consider consults to  interdisciplinary teams such as urology  - Consider nutrition services referral as needed  Outcome: Progressing

## 2024-12-15 NOTE — ASSESSMENT & PLAN NOTE
-Meds on hold as patient is now hypotensive  -Currently on IV fluids and getting albumin  -Continue to trend BP

## 2024-12-15 NOTE — PLAN OF CARE
Problem: Potential for Falls  Goal: Patient will remain free of falls  Description: INTERVENTIONS:  - Educate patient/family on patient safety including physical limitations  - Instruct patient to call for assistance with activity   - Consult OT/PT to assist with strengthening/mobility   - Keep Call bell within reach  - Keep bed low and locked with side rails adjusted as appropriate  - Keep care items and personal belongings within reach  - Initiate and maintain comfort rounds  - Make Fall Risk Sign visible to staff  - Offer Toileting every 2 Hours, in advance of need  - Initiate/Maintain bed alarm    - Apply yellow socks and bracelet for high fall risk patients  - Consider moving patient to room near nurses station  Outcome: Progressing     Problem: Prexisting or High Potential for Compromised Skin Integrity  Goal: Skin integrity is maintained or improved  Description: INTERVENTIONS:  - Identify patients at risk for skin breakdown  - Assess and monitor skin integrity  - Assess and monitor nutrition and hydration status  - Monitor labs   - Assess for incontinence   - Turn and reposition patient  - Assist with mobility/ambulation  - Relieve pressure over bony prominences  - Avoid friction and shearing  - Provide appropriate hygiene as needed including keeping skin clean and dry  - Evaluate need for skin moisturizer/barrier cream  - Collaborate with interdisciplinary team   - Patient/family teaching  - Consider wound care consult   Outcome: Progressing     Problem: PAIN - ADULT  Goal: Verbalizes/displays adequate comfort level or baseline comfort level  Description: Interventions:  - Encourage patient to monitor pain and request assistance  - Assess pain using appropriate pain scale  - Administer analgesics based on type and severity of pain and evaluate response  - Implement non-pharmacological measures as appropriate and evaluate response  - Consider cultural and social influences on pain and pain management  -  Notify physician/advanced practitioner if interventions unsuccessful or patient reports new pain  Outcome: Progressing     Problem: INFECTION - ADULT  Goal: Absence or prevention of progression during hospitalization  Description: INTERVENTIONS:  - Assess and monitor for signs and symptoms of infection  - Monitor lab/diagnostic results  - Monitor all insertion sites, i.e. indwelling lines, tubes, and drains  - Monitor endotracheal if appropriate and nasal secretions for changes in amount and color  - [place] appropriate cooling/warming therapies per order  - Administer medications as ordered  - Instruct and encourage patient and family to use good hand hygiene technique  - Identify and instruct in appropriate isolation precautions for identified infection/condition  Outcome: Progressing     Problem: SAFETY ADULT  Goal: Patient will remain free of falls  Description: INTERVENTIONS:  - Educate patient/family on patient safety including physical limitations  - Instruct patient to call for assistance with activity   - Consult OT/PT to assist with strengthening/mobility   - Keep Call bell within reach  - Keep bed low and locked with side rails adjusted as appropriate  - Keep care items and personal belongings within reach  - Initiate and maintain comfort rounds  - Make Fall Risk Sign visible to staff  - Offer Toileting every 2 Hours, in advance of need  - Initiate/Maintain bed alarm  - Apply yellow socks and bracelet for high fall risk patients  - Consider moving patient to room near nurses station  Outcome: Progressing  Goal: Maintain or return to baseline ADL function  Description: INTERVENTIONS:  -  Assess patient's ability to carry out ADLs; assess patient's baseline for ADL function and identify physical deficits which impact ability to perform ADLs (bathing, care of mouth/teeth, toileting, grooming, dressing, etc.)  - Assess/evaluate cause of self-care deficits   - Assess range of motion  - Assess patient's  mobility; develop plan if impaired  - Assess patient's need for assistive devices and provide as appropriate  - Encourage maximum independence but intervene and supervise when necessary  - Involve family in performance of ADLs  - Assess for home care needs following discharge   - Consider OT consult to assist with ADL evaluation and planning for discharge  - Provide patient education as appropriate  Outcome: Progressing  Goal: Maintains/Returns to pre admission functional level  Description: INTERVENTIONS:  - Perform AM-PAC 6 Click Basic Mobility/ Daily Activity assessment daily.  - Set and communicate daily mobility goal to care team and patient/family/caregiver.   - Collaborate with rehabilitation services on mobility goals if consulted  - Perform Range of Motion 2 times a day.  - Reposition patient every 2 hours.  - Out of bed for toileting  - Record patient progress and toleration of activity level   Outcome: Progressing     Problem: DISCHARGE PLANNING  Goal: Discharge to home or other facility with appropriate resources  Description: INTERVENTIONS:  - Identify barriers to discharge w/patient and caregiver  - Arrange for needed discharge resources and transportation as appropriate  - Identify discharge learning needs (meds, wound care, etc.)  - Arrange for interpretive services to assist at discharge as needed  - Refer to Case Management Department for coordinating discharge planning if the patient needs post-hospital services based on physician/advanced practitioner order or complex needs related to functional status, cognitive ability, or social support system  Outcome: Progressing     Problem: Knowledge Deficit  Goal: Patient/family/caregiver demonstrates understanding of disease process, treatment plan, medications, and discharge instructions  Description: Complete learning assessment and assess knowledge base.  Interventions:  - Provide teaching at level of understanding  - Provide teaching via preferred  learning methods  Outcome: Progressing     Problem: GENITOURINARY - ADULT  Goal: Maintains or returns to baseline urinary function  Description: INTERVENTIONS:  - Assess urinary function  - Encourage oral fluids to ensure adequate hydration if ordered  - Administer IV fluids as ordered to ensure adequate hydration  - Administer ordered medications as needed  - Offer frequent toileting  - Follow urinary retention protocol if ordered  Outcome: Progressing  Goal: Absence of urinary retention  Description: INTERVENTIONS:  - Assess patient’s ability to void and empty bladder  - Monitor I/O  - Bladder scan as needed  - Discuss with physician/AP medications to alleviate retention as needed  - Discuss catheterization for long term situations as appropriate  Outcome: Progressing  Goal: Urinary catheter remains patent  Description: INTERVENTIONS:  - Assess patency of urinary catheter  - If patient has a chronic farmer, consider changing catheter if non-functioning  - Follow guidelines for intermittent irrigation of non-functioning urinary catheter  Outcome: Progressing     Problem: SKIN/TISSUE INTEGRITY - ADULT  Goal: Skin Integrity remains intact(Skin Breakdown Prevention)  Description: Assess:  -Perform Chris assessment every 2  -Clean and moisturize skin every daily   -Inspect skin when repositioning, toileting, and assisting with ADLS  -Assess extremities for adequate circulation and sensation     Bed Management:  -Have minimal linens on bed & keep smooth, unwrinkled  -Change linens as needed when moist or perspiring  -Avoid sitting or lying in one position for more than 2 hours while in bed  -Keep HOB at 30degrees     Toileting:  -Offer bedside commode  -Assess for incontinence every 2 hours   -  Activity:  -Mobilize patient 2 times a day  -Encourage activity and walks on unit  -Encourage or provide ROM exercises   -Turn and reposition patient every 2 Hours  -Use appropriate equipment to lift or move patient in  bed  -Instruct/ Assist with weight shifting every 60 minutes  when out of bed in chair  -Consider limitation of chair time 4 hour intervals    Skin Care:  -Avoid use of baby powder, tape, friction and shearing, hot water or constrictive clothing  -Relieve pressure over bony prominences using allevyn  -Do not massage red bony areas    Next Steps:  Outcome: Progressing  Goal: Incision(s), wounds(s) or drain site(s) healing without S/S of infection  Description: INTERVENTIONS  - Assess and document dressing, incision, wound bed, drain sites and surrounding tissue  - Provide patient and family education  - Perform skin care/dressing changes every daily  Outcome: Progressing  Goal: Pressure injury heals and does not worsen  Description: Interventions:  - Implement low air loss mattress or specialty surface (Criteria met)  - Apply silicone foam dressing  - Instruct/assist with weight shifting every 60 minutes  minutes when in chair   - Limit chair time to 4 hour intervals  - Use special pressure reducing interventions such as chair cushion when in chair   - Apply fecal or urinary incontinence containment device   - Perform passive or active ROM every day  - Turn and reposition patient & offload bony prominences every 2 hours   - Utilize friction reducing device or surface for transfers   - Consider consults to  interdisciplinary teams such as PT/OT  -- Consider nutrition services referral as needed  Outcome: Progressing     Problem: HEMATOLOGIC - ADULT  Goal: Maintains hematologic stability  Description: INTERVENTIONS  - Assess for signs and symptoms of bleeding or hemorrhage  - Monitor labs  - Administer supportive blood products/factors as ordered and appropriate  Outcome: Progressing

## 2024-12-15 NOTE — TELEPHONE ENCOUNTER
Status post clot evacuation and fulguration of bleeding vessels and TURBT.  Please arrange for a discussion visit in 2 or 3 weeks with me for pathology review.  Please have the patient's daughter also at this visit if at all possible.  His urethral Colon catheter can be removed in 1 week or so with preservation of the suprapubic catheter which should be exchanged every 6 weeks.

## 2024-12-15 NOTE — ANESTHESIA POSTPROCEDURE EVALUATION
Post-Op Assessment Note    CV Status:  Stable    Pain management: adequate       Hydration Status:  Stable   Airway Patency:  Patent     Post Op Vitals Reviewed: Yes    No anethesia notable event occurred.    Staff: Anesthesiologist           Last Filed PACU Vitals:  Vitals Value Taken Time   Temp 98 °F (36.7 °C) 12/15/24 1654   Pulse 77 12/15/24 1727   BP 92/55 12/15/24 1715   Resp 15 12/15/24 1727   SpO2 94 % 12/15/24 1727   Vitals shown include unfiled device data.    Modified Windy:  Activity: 2 (12/15/2024  4:54 PM)  Respiration: 2 (12/15/2024  4:54 PM)  Circulation: 2 (12/15/2024  4:54 PM)  Consciousness: 2 (12/15/2024  4:54 PM)  Oxygen Saturation: 1 (12/15/2024  4:54 PM)  Modified Windy Score: 9 (12/15/2024  4:54 PM)

## 2024-12-15 NOTE — ASSESSMENT & PLAN NOTE
Hgb ranging between 10-12 since September  Hgb on admit 11.6 - dropped to 10.4 this AM   Recheck ordered for 1000

## 2024-12-15 NOTE — ASSESSMENT & PLAN NOTE
Suprapubic catheter unable to be flushed  Urology recommends keeping SPT in place to maintain tract patency  Urology to set up with IR replacement of suprapubic in near future

## 2024-12-16 ENCOUNTER — APPOINTMENT (INPATIENT)
Dept: RADIOLOGY | Facility: HOSPITAL | Age: 74
DRG: 829 | End: 2024-12-16
Payer: MEDICARE

## 2024-12-16 ENCOUNTER — PATIENT MESSAGE (OUTPATIENT)
Dept: UROLOGY | Facility: CLINIC | Age: 74
End: 2024-12-16

## 2024-12-16 PROBLEM — N32.89 BLADDER MASS: Status: ACTIVE | Noted: 2024-12-16

## 2024-12-16 LAB
ABO GROUP BLD BPU: NORMAL
ANION GAP SERPL CALCULATED.3IONS-SCNC: 6 MMOL/L (ref 4–13)
ANION GAP SERPL CALCULATED.3IONS-SCNC: 7 MMOL/L (ref 4–13)
BPU ID: NORMAL
BUN SERPL-MCNC: 26 MG/DL (ref 5–25)
BUN SERPL-MCNC: 29 MG/DL (ref 5–25)
CALCIUM SERPL-MCNC: 8.2 MG/DL (ref 8.4–10.2)
CALCIUM SERPL-MCNC: 8.6 MG/DL (ref 8.4–10.2)
CHLORIDE SERPL-SCNC: 104 MMOL/L (ref 96–108)
CHLORIDE SERPL-SCNC: 107 MMOL/L (ref 96–108)
CO2 SERPL-SCNC: 23 MMOL/L (ref 21–32)
CO2 SERPL-SCNC: 26 MMOL/L (ref 21–32)
CREAT SERPL-MCNC: 1.83 MG/DL (ref 0.6–1.3)
CREAT SERPL-MCNC: 2.09 MG/DL (ref 0.6–1.3)
CROSSMATCH: NORMAL
ERYTHROCYTE [DISTWIDTH] IN BLOOD BY AUTOMATED COUNT: 16.8 % (ref 11.6–15.1)
GFR SERPL CREATININE-BSD FRML MDRD: 29 ML/MIN/1.73SQ M
GFR SERPL CREATININE-BSD FRML MDRD: 35 ML/MIN/1.73SQ M
GLUCOSE SERPL-MCNC: 102 MG/DL (ref 65–140)
GLUCOSE SERPL-MCNC: 107 MG/DL (ref 65–140)
GLUCOSE SERPL-MCNC: 109 MG/DL (ref 65–140)
GLUCOSE SERPL-MCNC: 110 MG/DL (ref 65–140)
GLUCOSE SERPL-MCNC: 118 MG/DL (ref 65–140)
GLUCOSE SERPL-MCNC: 122 MG/DL (ref 65–140)
HCT VFR BLD AUTO: 26.5 % (ref 36.5–49.3)
HCT VFR BLD AUTO: 29.3 % (ref 36.5–49.3)
HCT VFR BLD AUTO: 29.6 % (ref 36.5–49.3)
HGB BLD-MCNC: 8.7 G/DL (ref 12–17)
HGB BLD-MCNC: 9.2 G/DL (ref 12–17)
HGB BLD-MCNC: 9.7 G/DL (ref 12–17)
MCH RBC QN AUTO: 30.6 PG (ref 26.8–34.3)
MCHC RBC AUTO-ENTMCNC: 32.8 G/DL (ref 31.4–37.4)
MCV RBC AUTO: 93 FL (ref 82–98)
PLATELET # BLD AUTO: 202 THOUSANDS/UL (ref 149–390)
PMV BLD AUTO: 10 FL (ref 8.9–12.7)
POTASSIUM SERPL-SCNC: 4.4 MMOL/L (ref 3.5–5.3)
POTASSIUM SERPL-SCNC: 4.6 MMOL/L (ref 3.5–5.3)
RBC # BLD AUTO: 2.84 MILLION/UL (ref 3.88–5.62)
SODIUM SERPL-SCNC: 136 MMOL/L (ref 135–147)
SODIUM SERPL-SCNC: 137 MMOL/L (ref 135–147)
UNIT DISPENSE STATUS: NORMAL
UNIT PRODUCT CODE: NORMAL
UNIT PRODUCT VOLUME: 350 ML
UNIT RH: NORMAL
WBC # BLD AUTO: 7.45 THOUSAND/UL (ref 4.31–10.16)

## 2024-12-16 PROCEDURE — 97167 OT EVAL HIGH COMPLEX 60 MIN: CPT

## 2024-12-16 PROCEDURE — 87147 CULTURE TYPE IMMUNOLOGIC: CPT | Performed by: SURGERY

## 2024-12-16 PROCEDURE — 85014 HEMATOCRIT: CPT

## 2024-12-16 PROCEDURE — 85014 HEMATOCRIT: CPT | Performed by: STUDENT IN AN ORGANIZED HEALTH CARE EDUCATION/TRAINING PROGRAM

## 2024-12-16 PROCEDURE — 99232 SBSQ HOSP IP/OBS MODERATE 35: CPT | Performed by: NURSE PRACTITIONER

## 2024-12-16 PROCEDURE — 80048 BASIC METABOLIC PNL TOTAL CA: CPT | Performed by: STUDENT IN AN ORGANIZED HEALTH CARE EDUCATION/TRAINING PROGRAM

## 2024-12-16 PROCEDURE — 82948 REAGENT STRIP/BLOOD GLUCOSE: CPT

## 2024-12-16 PROCEDURE — 97163 PT EVAL HIGH COMPLEX 45 MIN: CPT

## 2024-12-16 PROCEDURE — 99232 SBSQ HOSP IP/OBS MODERATE 35: CPT | Performed by: EMERGENCY MEDICINE

## 2024-12-16 PROCEDURE — 85018 HEMOGLOBIN: CPT

## 2024-12-16 PROCEDURE — 85027 COMPLETE CBC AUTOMATED: CPT | Performed by: STUDENT IN AN ORGANIZED HEALTH CARE EDUCATION/TRAINING PROGRAM

## 2024-12-16 PROCEDURE — 80048 BASIC METABOLIC PNL TOTAL CA: CPT

## 2024-12-16 PROCEDURE — 76775 US EXAM ABDO BACK WALL LIM: CPT

## 2024-12-16 PROCEDURE — 85018 HEMOGLOBIN: CPT | Performed by: STUDENT IN AN ORGANIZED HEALTH CARE EDUCATION/TRAINING PROGRAM

## 2024-12-16 PROCEDURE — 87081 CULTURE SCREEN ONLY: CPT | Performed by: SURGERY

## 2024-12-16 RX ADMIN — CHLORHEXIDINE GLUCONATE 0.12% ORAL RINSE 15 ML: 1.2 LIQUID ORAL at 21:34

## 2024-12-16 RX ADMIN — METOPROLOL SUCCINATE 25 MG: 25 TABLET, EXTENDED RELEASE ORAL at 21:34

## 2024-12-16 RX ADMIN — CHLORHEXIDINE GLUCONATE 0.12% ORAL RINSE 15 ML: 1.2 LIQUID ORAL at 08:05

## 2024-12-16 RX ADMIN — AMIODARONE HYDROCHLORIDE 200 MG: 200 TABLET ORAL at 08:06

## 2024-12-16 RX ADMIN — METOPROLOL SUCCINATE 25 MG: 25 TABLET, EXTENDED RELEASE ORAL at 08:16

## 2024-12-16 RX ADMIN — CEFTRIAXONE SODIUM 1000 MG: 10 INJECTION, POWDER, FOR SOLUTION INTRAVENOUS at 08:06

## 2024-12-16 RX ADMIN — Medication 1 SPRAY: at 00:07

## 2024-12-16 RX ADMIN — TAMSULOSIN HYDROCHLORIDE 0.4 MG: 0.4 CAPSULE ORAL at 16:31

## 2024-12-16 RX ADMIN — ATORVASTATIN CALCIUM 40 MG: 40 TABLET, FILM COATED ORAL at 16:31

## 2024-12-16 RX ADMIN — POLYETHYLENE GLYCOL 3350 17 G: 17 POWDER, FOR SOLUTION ORAL at 08:15

## 2024-12-16 RX ADMIN — OXYCODONE HYDROCHLORIDE 5 MG: 5 TABLET ORAL at 05:16

## 2024-12-16 RX ADMIN — FINASTERIDE 5 MG: 5 TABLET, FILM COATED ORAL at 08:06

## 2024-12-16 RX ADMIN — Medication 2.5 MG: at 00:05

## 2024-12-16 NOTE — PLAN OF CARE
Problem: PHYSICAL THERAPY ADULT  Goal: Performs mobility at highest level of function for planned discharge setting.  See evaluation for individualized goals.  Description: Treatment/Interventions: ADL retraining, Functional transfer training, LE strengthening/ROM, Therapeutic exercise, Endurance training, Patient/family training, Bed mobility, Gait training, Spoke to nursing, Spoke to case management, OT  Equipment Recommended: Walker       See flowsheet documentation for full assessment, interventions and recommendations.  Note: Prognosis: Good  Problem List: Decreased strength, Decreased endurance, Impaired balance, Decreased mobility, Pain  Assessment: PT completed evaluation of 74 y.o. male admitted to [facility] on 12/15/2024 with Clot retention of urine. Patient's current status instabilities include multiple lines, farmer, critical care unit, ongoing pain, continuous O2/HR monitoring, regression in function from baseline. Patient  has a past medical history of Alcohol intoxication ([facility]) (10/15/2020), BPH (benign prostatic hyperplasia), Chronic HFrEF (heart failure with reduced ejection fraction) ([facility]) (11/2023), Coronary artery calcification seen on CT scan (11/10/2023), Coronary artery disease (12/14/2023), Deep vein thrombosis (DVT) of left lower extremity ([facility]) (11/2023), Diabetes mellitus ([facility]) (11/2023), Fall from slip, trip, or stumble (10/15/2020), History of phimosis of penis, History of urinary calculi, Hypertension (1988), Skin cancer, and Tobacco abuse. At baseline, pt resides at [facility] and requires assistance with ADLs and IADLs prior to hospital admission. Patient presents at time of PT evaluation functioning below baseline and currently w/ overall mobility deficits due to: impaired balance, decreased endurance, gait dysfunction, decreased activity tolerance and fall risk. During PT evaluation, patient currently is requiring min assist x1 for bed mobility skills;  mod  assist x1 for functional transfers and  mod assist x1 for ambulation with b/l HHA. Patient performed supine to sit to edge of bed requiring HOB elevated, increased time, verbal cues for setup, use of bed rails, and trunk/LE management. Patient ambulated 2' with b/l HHA, requiring occasional verbal cues for navigation, and improving stride/step length. Patient noted with b/l knee buckling and generalized weakness/fatigue, requiring increased assistance for ambulation to reduce risk of falls. Pt left OOB in bedside chair with alarm and all needs met. This patient is functioning below their baseline and is recommended for level III (return to [facility] with PT services). Patient will benefit from continued skilled PT this admission to achieve maximal function and safety. The patients AM-PAC Basic Mobility Inpatient Short From Raw Score is 15 . Based on AM-PAC scoring and patient presentation, PT currently recommending Level III (Minimum Resource Intensity). Please also refer to the recommendation of the Physical Therapist for safe discharge planning.  Barriers to Discharge: None     Rehab Resource Intensity Level, PT: III (Minimum Resource Intensity) (return to senior care with PT services)    See flowsheet documentation for full assessment.

## 2024-12-16 NOTE — OCCUPATIONAL THERAPY NOTE
Occupational Therapy Evaluation     Patient Name: Toro Rodriguez  Today's Date: 12/16/2024  Problem List  Principal Problem:    Clot retention of urine  Active Problems:    CHUY (acute kidney injury) ([facility])    Hydronephrosis    Bladder mass    Past Medical History  Past Medical History:   Diagnosis Date    Alcohol intoxication ([facility]) 10/15/2020    BPH (benign prostatic hyperplasia)     Chronic HFrEF (heart failure with reduced ejection fraction) ([facility]) 11/2023    Coronary artery calcification seen on CT scan 11/10/2023    Coronary artery disease 12/14/2023    Deep vein thrombosis (DVT) of left lower extremity ([facility]) 11/2023    Diabetes mellitus ([facility]) 11/2023    Fall from slip, trip, or stumble 10/15/2020    History of phimosis of penis     History of urinary calculi     Hypertension 1988    Skin cancer     Tobacco abuse     quit around 2000     Past Surgical History  Past Surgical History:   Procedure Laterality Date    BLADDER STONE REMOVAL  2014    CARDIAC CATHETERIZATION N/A 12/14/2023    Procedure: Cardiac catheterization;  Surgeon: Dave Royal MD;  Location: [facility];  Service: Cardiology    IR SUPRAPUBIC CATHETER CHECK/CHANGE/REINSERTION/UPSIZE  11/07/2024    IR SUPRAPUBIC CATHETER CHECK/CHANGE/REINSERTION/UPSIZE  12/3/2024    IR SUPRAPUBIC CATHETER PLACEMENT  09/27/2024    ORIF ANKLE FRACTURE Left     IL CYSTO W/IRRIG & EVAC MULTPLE OBSTRUCTING CLOTS N/A 12/15/2024    Procedure: CYSTOSCOPY EVACUATION OF CLOTS;  Surgeon: Rick Espino MD;  Location: [facility];  Service: Urology    SKIN LESION EXCISION N/A 03/18/2024    Procedure: WIDE LOCAL EXCISION OF BACK MELANOMA;  Surgeon: Lillie La MD;  Location: [facility];  Service: Surgical Oncology    TOTAL HIP ARTHROPLASTY Right           12/16/24 0914   OT Last Visit   OT Visit Date 12/16/24   Note Type   Note type Evaluation   Pain Assessment   Pain Assessment Tool 0-10   Pain Score 5   Pain Location/Orientation Location: Abdomen    Patient's Stated Pain Goal No pain   Hospital Pain Intervention(s) Repositioned;Ambulation/increased activity;Emotional support   Restrictions/Precautions   Weight Bearing Precautions Per Order No   Other Precautions Chair Alarm;Bed Alarm;Telemetry;Multiple lines;Fall Risk   Home Living   Type of Home Assisted living  ([facility])   Home Layout One level   Bathroom Shower/Tub Walk-in shower   Bathroom Toilet Standard   Bathroom Equipment Grab bars in shower   Bathroom Accessibility Accessible   Home Equipment Walker  (rollator, adjustable bed)   Prior Function   Level of [place] Needs assistance with IADLS;Independent with functional mobility  (showers are supervised, requires assistance for toileting.)   Lives With Facility staff   Receives Help From Personal care attendant   IADLs Family/Friend/Other provides meals;Family/Friend/Other provides transportation;Family/Friend/Other provides medication management   Falls in the last 6 months 0   Vocational Retired   Lifestyle   Autonomy pta, pt was mostly I w ADL (showers were supervised). facility manages meds/meals and provides transportation as needed. uses rollator for fm.   Reciprocal Relationships supportive staff   Service to Others retired    Intrinsic Gratification cooking   ADL   Where Assessed Edge of bed   Eating Assistance 5  Supervision/Setup   Grooming Assistance 4  Minimal Assistance   UB Bathing Assistance 4  Minimal Assistance   LB Bathing Assistance 3  Moderate Assistance   UB Dressing Assistance 4  Minimal Assistance   LB Dressing Assistance 3  Moderate Assistance   Toileting Assistance  3  Moderate Assistance   Functional Assistance 3  Moderate Assistance   Bed Mobility   Supine to Sit 4  Minimal assistance   Additional items Assist x 1;HOB elevated;Increased time required;Verbal cues   Additional Comments found in bed, left in chair w all needs in reach and alarm on.   Transfers   Sit to Stand 3  Moderate assistance    Additional items Assist x 1;Increased time required;Verbal cues   Stand to Sit 3  Moderate assistance   Additional items Assist x 1;Increased time required;Verbal cues   Additional Comments HHA   Functional Mobility   Functional Mobility 3  Moderate assistance   Additional Comments ax1, EOB>chair only 2' several lines   Additional items Hand hold assistance   Balance   Static Sitting Fair +   Dynamic Sitting Fair   Static Standing Fair -   Dynamic Standing Poor   Ambulatory Poor   Activity Tolerance   Activity Tolerance Patient limited by fatigue   Medical Staff Made Aware dpt 2' pts med complexity, comorbidities and regression from baseline.   Nurse Made Aware cleared   RUE Assessment   RUE Assessment WFL   LUE Assessment   LUE Assessment WFL   Hand Function   Gross Motor Coordination Functional   Fine Motor Coordination Functional   Cognition   Overall Cognitive Status WFL   Arousal/Participation Alert;Responsive;Cooperative   Attention Within functional limits   Orientation Level Oriented X4   Memory Within functional limits   Following Commands Follows all commands and directions without difficulty   Comments pt cooperative w G safety awareness and insight to condition t/o session. requires occasional vc for redirection   Assessment   Limitation Decreased ADL status;Decreased endurance;Decreased self-care trans;Decreased high-level ADLs;Decreased Safe judgement during ADL   Prognosis Good   Assessment Pt is a 74 y.o. male  admitted 12/15/24 w urinary retention s/p suprapubic catheter placement. Pt underwent cystoscopy w clot evacuation, fulguration of bleeding vessels and transurethral resection of bladder tumor, urethral catheter placement 12/15/24. Pod 1 w active OT eval and treat orders. PMH includes  has a past medical history of Alcohol intoxication (HCC), BPH (benign prostatic hyperplasia), Chronic HFrEF (heart failure with reduced ejection fraction) (HCC), Coronary artery calcification seen on CT scan,  Coronary artery disease, Deep vein thrombosis (DVT) of left lower extremity (HCC), Diabetes mellitus (HCC), Fall from slip, trip, or stumble, History of phimosis of penis, History of urinary calculi, Hypertension, Skin cancer, and Tobacco abuse. Pt lives at [facility], where he requires assistance for iadl tasks, is mostly ind w adl (showers are supervised) and used rollator for mobility.  Currently, pt is Min Ax1 for UB ADL, Mod Ax1 for LB ADL, and completed transfers/FM w Mod Ax1. Pt is limited at this time 2* decreased endurance/activtiy tolerance, decreased cognition, decreased ADL/High-level ADL status, decreased self-care trans, decreased safety awareness, limited home support and is a fall risk. This impacts pt's ability to complete UB and LB dressing and bathing, toileting, transfers, functional mobility, community mobility, home and health maintenance, and safe engagement in typical daily routine. The patient's raw score on the AM-PAC Daily Activity inpatient short form is 17, standardized score is 37.26, less than 39.4. Patients at this level are likely to benefit from discharge to post-acute rehabilitation services. Please refer to the recommendation of the Occupational Therapist for safe discharge planning.  From OT standpoint, pt should D/C to level 3 return w rehab services when medically stable. Pt will benefit from continued acute OT services 2-3 x/wk for 10-14 days to meet goals.   Goals   Patient Goals get better/home   LTG Time Frame 10-14   Plan   Treatment Interventions ADL retraining;Functional transfer training;Endurance training;Patient/family training;Cognitive reorientation;Equipment evaluation/education;Compensatory technique education;Energy conservation;Activityengagement   Goal Expiration Date 12/30/24   OT Frequency 2-3x/wk   Discharge Recommendation   Rehab Resource Intensity Level, OT III (Minimum Resource Intensity)  (return w rehab services)   -[facility] Daily Activity Inpatient    Lower Body Dressing 2   Bathing 2   Toileting 2   Upper Body Dressing 3   Grooming 4   Eating 4   Daily Activity Raw Score 17   Daily Activity Standardized Score (Calc for Raw Score >=11) 37.26   AM-PAC Applied Cognition Inpatient   Following a Speech/Presentation 4   Understanding Ordinary Conversation 4   Taking Medications 4   Remembering Where Things Are Placed or Put Away 4   Remembering List of 4-5 Errands 3   Taking Care of Complicated Tasks 3   Applied Cognition Raw Score 22   Applied Cognition Standardized Score 47.83   Modified [place] Scale   Modified [place] Scale 4   End of Consult   Education Provided Yes   Patient Position at End of Consult Bedside chair;All needs within reach;Bed/Chair alarm activated   Nurse Communication Nurse aware of consult     Pt will complete functional mobility with Mod I using appropriate DME as needed.     Pt will complete UB dressing and bathing with Mod I using appropriate DME as needed.     Pt will complete LB dressing and bathing with Mod I using appropriate DME as needed.    Pt will complete transfers with Mod I using appropriate DME as needed.     Pt will complete toileting with Mod I using appropriate DME as needed.     Pt will utilize energy conservation techniques throughout functional activity/ADL s/p skilled education.     Pt will demonstrate increased safety awareness during functional tasks/ADL's s/p skilled education.     Pt will increase activity tolerance to 30 minutes in order to complete ADL's/ functional tasks, using appropriate DME as needed.     Pt will engage in ongoing cog assessment w/ G participation to assist with safe d/c planning.               BAR Mao, OTR/L

## 2024-12-16 NOTE — ASSESSMENT & PLAN NOTE
S/p transurethral resection of bladder tumor 12/15  Follow-up with urology outpatient in approximately 2 weeks for pathology review and plan of care

## 2024-12-16 NOTE — PROGRESS NOTES
Progress Note - Urology   Name: Toro Rodriguez 74 y.o. male I MRN: [id number]  Unit/Bed#: [facility] 515-01 I Date of Admission: 12/15/2024   Date of Service: 12/16/2024 I Hospital Day: 1    Assessment & Plan  Clot retention of urine  S/p cystoscopy with clot evacuation, fulguration of bleeding vessels and extensive transurethral resection of bladder tumor and placement of penile urethral catheter 12/15  Unable to visualize right ureter; IR consult for PCN for increased CHUY, worsening hydronephrosis or sepsis  WBC 7.45  Creat 2.09, up from 1.8  Afebrile, vital signs stable  Wean CBI to off  Patient will maintain both urethral Colon catheter and SPT at this time  Plan for urethral Colon catheter removal in urology office in approximately 1 week  CHUY (acute kidney injury) (HCC)  Creat 2.09, trending up  Unable to visualize ureter during cystoscopy, clot evacuation, TURBT 12/15  IR consult for possible PCN versus PCNU if creatinine continues to trend up or if patient decompensates.  ureter was unable to be visualized during surgical procedure  Medical optimization per primary team  Continue to trend labs  Bladder mass  S/p transurethral resection of bladder tumor 12/15  Follow-up with urology outpatient in approximately 2 weeks for pathology review and plan of care  Hydronephrosis  Persistent right hydronephrosis with probable small calculus in the distal right ureter which may be nonobstructing  Creat 2.09, trending up  Unable to access right ureter during cystoscopy, TURBT and clot evacuation 12/15  IR consult for possible PCN versus PCNU if creatinine continues to rise  Repeat creatinine this afternoon  Patient would like to avoid nephrostomy tube if possible  Continue to hold Eliquis  Repeat creatinine, trend labs  Afebrile, vital signs stable    Urology service will follow.  Please contact the SecureChat role for the Urology service with any questions/concerns.    Subjective   Patient out of bed in chair offers no  complaints at this time.  We reviewed his surgical procedure and plans for follow-up.  He is aware that we are unable to access his right ureter and there is a possible right ureteral nonobstructing stone.  He is aware that his kidney function has trended up however only slightly from 1.8 yesterday.  If patient's kidney function continues to trend up, he develops fevers or decompensates urology is recommending IR consult for PCN placement.  Patient voiced understanding of the same.    Objective :  Temp:  [97.3 °F (36.3 °C)-98.2 °F (36.8 °C)] 97.8 °F (36.6 °C)  HR:  [55-93] 60  BP: ()/(32-61) 137/61  Resp:  [13-38] 18  SpO2:  [93 %-100 %] 97 %  O2 Device: Nasal cannula  Nasal Cannula O2 Flow Rate (L/min):  [2 L/min] 2 L/min    I/O         12/14 0701  12/15 0700 12/15 0701  12/16 0700 12/16 0701  12/17 0700    P.O.  680 0    I.V. (mL/kg)  830 (9.9)     IV Piggyback  150     Total Intake(mL/kg)  1660 (19.7) 0 (0)    Urine (mL/kg/hr)  6550 450 (3.5)    Blood  600     Total Output  7150 450    Net  -5490 -450                 Lines/Drains/Airways       Active Status       Name Placement date Placement time Site Days    Urethral Catheter Straight-tip;Three way 24 Fr. 12/15/24  1639  Straight-tip;Three way  less than 1    Suprapubic Catheter 16 Fr. 12/03/24  1019  --  12    Continuous Bladder Irrigation Three-way 12/15/24  --  Three-way  1                  Physical Exam  Vitals reviewed.   Constitutional:       General: He is not in acute distress.     Appearance: Normal appearance. He is not ill-appearing, toxic-appearing or diaphoretic.   HENT:      Head: Normocephalic and atraumatic.   Eyes:      General: No scleral icterus.  Cardiovascular:      Rate and Rhythm: Normal rate.   Pulmonary:      Effort: Pulmonary effort is normal. No respiratory distress.   Abdominal:      General: Abdomen is flat. There is no distension.      Palpations: Abdomen is soft.      Tenderness: There is no abdominal tenderness.    Genitourinary:     Comments: Colon catheter draining clear yellow urine at this time.  CBI clamped  Musculoskeletal:         General: No swelling.      Cervical back: Normal range of motion.   Skin:     General: Skin is warm and dry.      Coloration: Skin is not jaundiced or pale.      Findings: No rash.   Neurological:      General: No focal deficit present.      Mental Status: He is alert and oriented to person, place, and time.      Gait: Gait normal.   Psychiatric:         Mood and Affect: Mood normal.         Behavior: Behavior normal.         Thought Content: Thought content normal.         Judgment: Judgment normal.           Lab Results: I have reviewed the following results:  Recent Labs     12/15/24  0251 12/15/24  0804 12/15/24  1843 12/16/24  0001 12/16/24  0540   WBC 7.27  --  7.53  --  7.45   HGB 10.4*   < > 9.7*   < > 8.7*   HCT 32.2*   < > 28.7*   < > 26.5*     --  212  --  202   SODIUM 137  --  138  --  137   K 3.9  --  4.7  --  4.6     --  107  --  107   CO2 22  --  24  --  23   BUN 36*  --  32*  --  29*   CREATININE 2.02*  --  1.80*  --  2.09*   GLUC 103  --  100  --  110   CAIONIZED  --   --  0.98*  --   --    MG 2.1  --  2.2  --   --    PHOS 4.3*  --  3.7  --   --    AST 14  --   --   --   --    ALT 12  --   --   --   --    ALB 3.4*  --   --   --   --    TBILI 0.36  --   --   --   --    ALKPHOS 70  --   --   --   --    PTT 31  --   --   --   --    INR 1.09  --   --   --   --     < > = values in this interval not displayed.       Imaging Results Review: I reviewed radiology reports from this admission including: CT abdomen/pelvis.  Other Study Results Review: No additional pertinent studies reviewed.    VTE Pharmacologic Prophylaxis: VTE covered by:    None     VTE Mechanical Prophylaxis: sequential compression device

## 2024-12-16 NOTE — PROGRESS NOTES
Progress Note - Trauma   Name: Toro Rodriguez 74 y.o. male I MRN: [id number]  Unit/Bed#: [facility] 515-01 I Date of Admission: 12/15/2024   Date of Service: 12/16/2024 I Hospital Day: 1      Assessment & Plan   Neuro / Psych:   Diagnosis: Postoperative pain  Tylenol as needed for mild pain  Oxycodone as needed for moderate, severe pain  Dilaudid as needed for breakthrough pain     CV:   Diagnosis: History of HFrEF, CAD, hypertension, NSVT  Continue home amiodarone, metoprolol  Hold home amlodipine and lisinopril, consider resuming if better blood pressure control needed  Hold home Lasix, plan to diurese as needed  Hold home aspirin in setting of bleeding  Continuous cardiac monitoring  Monitor vitals per unit routine     Pulm:  Diagnosis: Acute hypoxia  Suspect secondary to fluid overload given poor LVEF and 2 units PRBC transfusion  Oxygen supplement PRN, goal O2Sat > 90%  Continuous pulse oximetry     GI:   Bowel regimen  Continue senna, MiraLAX     Renal/:  Diagnosis: Gross hematuria status post cystoscopy with clot evacuation and intraluminal mass debulking and resection bed fulguration  Hemostasis achieved during cystoscopy  Mass noted to be obscuring right ureter, patient may require percutaneous nephrostomy tube if worsening hydronephrosis or kidney function  Every 6 H&H  Continue cbi  Monitor for signs/symptoms of anemia  Monitor Colon and suprapubic catheter output  Oxybutynin 5 mg 3 times daily as needed for bladder spasm  Urology following, appreciate recommendations  Diagnosis: CKD 3  Monitor I's and O's  Avoid unnecessary nephrotoxic medications  Monitor metabolic panel  Diagnosis: History of BPH with chronic urinary retention status post suprapubic catheter placement  Continue home finasteride, tamsulosin     F/E/N:   Fluid: maintain euvolemic  Electrolytes: replete PRN to maintain goal  Goal Na 135-145, Mg > 2, Phos > 3, K goal > 4  Daily BMP  Nutrition:  Carb controlled diet with 2 L fluid  restriction     Heme/Onc:   Diagnosis: Acute blood loss anemia  Secondary to bleeding bladder mass with hemostasis now achieved  Monitor H&H  Transfuse PRBCs for hemoglobin less than 7 or ongoing bleeding with hemodynamic instability  Diagnosis: Bladder mass  Per urology differential diagnosis includes urethral carcinoma, prostate cancer, rectal cancer, leiomyosarcoma  Follow-up tissue pathology     Endo:   Diagnosis: History of diabetes mellitus  Sliding scale insulin, Accu-Cheks ACHS, hypoglycemia protocol     ID:   Diagnosis: Urinary tract infection  Continue ceftriaxone 1 g every 24 hours for total duration of 5 to 7 days  Monitor WBC, fever curve     MSK/Skin:   Diagnosis: pressure ulcer ppx  Frequent turning  Frequent pressure off-loading to prevent skin breakdown  PT/OT when able  Diagnosis: VTE ppx  Pharmacologic: Pharmacologic VTE Prophylaxis contraindicated due to pending stability of hemoglobin  Mechanical: SCD's    Disposition: Stepdown Level 2    ICU Core Measures     A: Assess, Prevent, and Manage Pain Has pain been assessed? Yes  Need for changes to pain regimen? No   B: Both SAT/SAT  N/A   C: Choice of Sedation RASS Goal: 0 Alert and Calm  Need for changes to sedation or analgesia regimen? NA   D: Delirium CAM-ICU: Negative   E: Early Mobility  Plan for early mobility? Yes   F: Family Engagement Plan for family engagement today? Yes       Antibiotic Review: Awaiting culture results.     Review of Invasive Devices:    Colon Plan: Colon placed by urology. Removal plans per their team and Post operative urological requirements        Prophylaxis:  VTE Contraindicated secondary to:  bleed pending stabilization of hgb   Stress Ulcer  not ordered         24 Hour Events : no events since admission  Subjective   Patient assessed at bedside.  He says his pain is overall well-controlled.  Denies chest pain, shortness of breath, abdominal pain, nausea, vomiting, numbness, weakness.  Review of Systems: See HPI  for Review of Systems    Objective :                   Vitals I/O      Most Recent Min/Max in 24hrs   Temp 97.8 °F (36.6 °C) Temp  Min: 97.3 °F (36.3 °C)  Max: 98.2 °F (36.8 °C)   Pulse 56 Pulse  Min: 55  Max: 93   Resp 13 Resp  Min: 13  Max: 38   /61 BP  Min: 59/25  Max: 137/58   O2 Sat 97 % SpO2  Min: 93 %  Max: 100 %      Intake/Output Summary (Last 24 hours) at 12/16/2024 0726  Last data filed at 12/16/2024 0600  Gross per 24 hour   Intake 1660 ml   Output 7150 ml   Net -5490 ml       Diet Fantasma/CHO Controlled; Consistent Carbohydrate Diet Level 1 (4 carb servings/60 grams CHO/meal); Fluid Restriction 2000 ML    Invasive Monitoring           Physical Exam   Physical Exam  Eyes:      Extraocular Movements: Extraocular movements intact.      Pupils: Pupils are equal, round, and reactive to light.   Skin:     General: Skin is warm and dry.      Findings: No ecchymosis or laceration.      Comments: Suprapubic catheter site with some surrounding redness and tenderness but no warmth or edema and no purulent drainage noted   HENT:      Head: Normocephalic and atraumatic.      Mouth/Throat:      Mouth: Mucous membranes are moist.   Cardiovascular:      Rate and Rhythm: Normal rate and regular rhythm.      Pulses: Normal pulses.   Musculoskeletal:         General: Normal range of motion.      Right lower leg: Trace Edema present.      Left lower leg: Trace Edema present.   Abdominal: General: There is no distension.      Palpations: Abdomen is soft.      Tenderness: There is abdominal tenderness (Mild lower abdominal). There is no guarding.   Constitutional:       General: He is not in acute distress.     Appearance: He is not ill-appearing.   Pulmonary:      Effort: Pulmonary effort is normal.      Breath sounds: Normal breath sounds.   Neurological:      General: No focal deficit present.      Mental Status: He is alert and oriented to person, place and time.      Motor: Strength full and intact in all extremities.    Genitourinary/Anorectal:     Comments: Suprapubic catheter.  Urinary drainage is clear pale yellow  Colon present.        Diagnostic Studies        Lab Results: I have reviewed the following results:     Medications:  Scheduled PRN   amiodarone, 200 mg, Daily  [Held by provider] amLODIPine, 5 mg, Daily  [Held by provider] aspirin, 81 mg, Daily  atorvastatin, 40 mg, Daily With Dinner  cefTRIAXone, 1,000 mg, Q24H  chlorhexidine, 15 mL, Q12H ARI  finasteride, 5 mg, Daily  [Held by provider] furosemide, 20 mg, Daily  insulin lispro, 1-6 Units, TID AC  insulin lispro, 1-6 Units, HS  [Held by provider] lisinopril, 5 mg, Daily  metoprolol succinate, 25 mg, Q12H ARI  polyethylene glycol, 17 g, Daily  tamsulosin, 0.4 mg, Daily With Dinner      acetaminophen, 650 mg, Q6H PRN  HYDROmorphone, 0.2 mg, Q2H PRN  naloxone, 0.04 mg, Q1MIN PRN  ondansetron, 4 mg, Q6H PRN  oxybutynin, 5 mg, TID PRN  oxyCODONE, 2.5 mg, Q4H PRN   Or  oxyCODONE, 5 mg, Q4H PRN  phenol, 1 spray, Q2H PRN  senna, 1 tablet, HS PRN       Continuous          Labs:   CBC    Recent Labs     12/15/24  1843 12/16/24  0001 12/16/24  0540   WBC 7.53  --  7.45   HGB 9.7* 9.2* 8.7*   HCT 28.7* 29.3* 26.5*     --  202     BMP    Recent Labs     12/15/24  1843 12/16/24  0540   SODIUM 138 137   K 4.7 4.6    107   CO2 24 23   AGAP 7 7   BUN 32* 29*   CREATININE 1.80* 2.09*   CALCIUM 7.8* 8.2*       Coags    Recent Labs     12/15/24  0251   INR 1.09   PTT 31        Additional Electrolytes  Recent Labs     12/15/24  0251 12/15/24  1843   MG 2.1 2.2   PHOS 4.3* 3.7   CAIONIZED  --  0.98*          Blood Gas    No recent results  No recent results LFTs  Recent Labs     12/14/24  2235 12/15/24  0251   ALT 12 12   AST 17 14   ALKPHOS 77 70   ALB 3.7 3.4*   TBILI 0.36 0.36       Infectious  No recent results  Glucose  Recent Labs     12/14/24  2235 12/15/24  0251 12/15/24  1843 12/16/24  0540   GLUC 106 103 100 110

## 2024-12-16 NOTE — PROGRESS NOTES
Father Tobias gave a blessing with prayer and offered anointing which the patient declined.    12/16/24 1500   Clinical Encounter Type   Visited With Patient   Mandaen Encounters   Mandaen Needs Prayer   Sacramental Encounters   Sacrament of Sick-Anointing Patient declined anointing

## 2024-12-16 NOTE — ASSESSMENT & PLAN NOTE
S/p cystoscopy with clot evacuation, fulguration of bleeding vessels and extensive transurethral resection of bladder tumor and placement of penile urethral catheter 12/15  Unable to visualize right ureter; IR consult for PCN for increased CHUY, worsening hydronephrosis or sepsis  WBC 7.45  Creat 2.09, up from 1.8  Afebrile, vital signs stable  Wean CBI to off  Patient will maintain both urethral Colon catheter and SPT at this time  Plan for urethral Colon catheter removal in urology office in approximately 1 week

## 2024-12-16 NOTE — PLAN OF CARE
Problem: OCCUPATIONAL THERAPY ADULT  Goal: Performs self-care activities at highest level of function for planned discharge setting.  See evaluation for individualized goals.  Description: Treatment Interventions: ADL retraining, Functional transfer training, Endurance training, Patient/family training, Cognitive reorientation, Equipment evaluation/education, Compensatory technique education, Energy conservation, Activityengagement          See flowsheet documentation for full assessment, interventions and recommendations.   Note: Limitation: Decreased ADL status, Decreased endurance, Decreased self-care trans, Decreased high-level ADLs, Decreased Safe judgement during ADL  Prognosis: Good  Assessment: Pt is a 74 y.o. male  admitted 12/15/24 w urinary retention s/p suprapubic catheter placement. Pt underwent cystoscopy w clot evacuation, fulguration of bleeding vessels and transurethral resection of bladder tumor, urethral catheter placement 12/15/24. Pod 1 w active OT eval and treat orders. PMH includes  has a past medical history of Alcohol intoxication ([facility]), BPH (benign prostatic hyperplasia), Chronic HFrEF (heart failure with reduced ejection fraction) ([facility]), Coronary artery calcification seen on CT scan, Coronary artery disease, Deep vein thrombosis (DVT) of left lower extremity ([facility]), Diabetes mellitus (HCC), Fall from slip, trip, or stumble, History of phimosis of penis, History of urinary calculi, Hypertension, Skin cancer, and Tobacco abuse. Pt lives at [facility], where he requires assistance for iadl tasks, is mostly ind w adl (showers are supervised) and used rollator for mobility.  Currently, pt is Min Ax1 for UB ADL, Mod Ax1 for LB ADL, and completed transfers/FM w Mod Ax1. Pt is limited at this time 2* decreased endurance/activtiy tolerance, decreased cognition, decreased ADL/High-level ADL status, decreased self-care trans, decreased safety awareness, limited home support and is a fall risk.  This impacts pt's ability to complete UB and LB dressing and bathing, toileting, transfers, functional mobility, community mobility, home and health maintenance, and safe engagement in typical daily routine. The patient's raw score on the AM-PAC Daily Activity inpatient short form is 17, standardized score is 37.26, less than 39.4. Patients at this level are likely to benefit from discharge to post-acute rehabilitation services. Please refer to the recommendation of the Occupational Therapist for safe discharge planning.  From OT standpoint, pt should D/C to level 3 return w rehab services when medically stable. Pt will benefit from continued acute OT services 2-3 x/wk for 10-14 days to meet goals.     Rehab Resource Intensity Level, OT: III (Minimum Resource Intensity) (return w rehab services)

## 2024-12-16 NOTE — ASSESSMENT & PLAN NOTE
Creat 2.09, trending up  Unable to visualize ureter during cystoscopy, clot evacuation, TURBT 12/15  IR consult for possible PCN versus PCNU if creatinine continues to trend up or if patient decompensates.  ureter was unable to be visualized during surgical procedure  Medical optimization per primary team  Continue to trend labs

## 2024-12-16 NOTE — ASSESSMENT & PLAN NOTE
Persistent right hydronephrosis with probable small calculus in the distal right ureter which may be nonobstructing  Creat 2.09, trending up  Unable to access right ureter during cystoscopy, TURBT and clot evacuation 12/15  IR consult for possible PCN versus PCNU if creatinine continues to rise  Repeat creatinine this afternoon  Patient would like to avoid nephrostomy tube if possible  Continue to hold Eliquis  Repeat creatinine, trend labs  Afebrile, vital signs stable

## 2024-12-16 NOTE — PHYSICAL THERAPY NOTE
Physical Therapy Evaluation     Patient's Name: Toro Rodriguez    Admitting Diagnosis  Hematuria [R31.9]    Problem List  Patient Active Problem List   Diagnosis    Essential hypertension    Chronic systolic (congestive) heart failure (HCC)    Deep vein thrombosis (DVT) of left lower extremity (HCC)    Obesity, morbid (HCC)    CHUY (acute kidney injury) ([facility])    Coronary artery disease involving native coronary artery of native heart    Type 2 diabetes mellitus with stage 3b chronic kidney disease, without long-term current use of insulin (HCC)    Benign prostatic hyperplasia with urinary retention    Dilated cardiomyopathy (HCC)    Melanoma of back (HCC)    Chronic venous hypertension (idiopathic) with ulcer of left lower extremity (CODE) ([facility])    Encounter for geriatric assessment    ABLA (acute blood loss anemia)    Encounter to discuss test results    Urethral erosion by catheter, initial encounter  ([facility])    NSVT (nonsustained ventricular tachycardia) ([facility])    Urinary tract infection associated with cystostomy catheter  ([facility])    Renal lesion    Gross hematuria    Benign hypertension with chronic kidney disease, stage III ([facility])    Stage 3b chronic kidney disease (HCC)    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 ([facility])    Hematuria    Suprapubic catheter dysfunction ([facility])    Hydronephrosis    Clot retention of urine    Bladder mass       Past Medical History  Past Medical History:   Diagnosis Date    Alcohol intoxication ([facility]) 10/15/2020    BPH (benign prostatic hyperplasia)     Chronic HFrEF (heart failure with reduced ejection fraction) ([facility]) 11/2023    Coronary artery calcification seen on CT scan 11/10/2023    Coronary artery disease 12/14/2023    Deep vein thrombosis (DVT) of left lower extremity ([facility]) 11/2023    Diabetes mellitus ([facility]) 11/2023    Fall from slip, trip, or stumble 10/15/2020    History of phimosis of penis     History of urinary calculi      Hypertension 1988    Skin cancer     Tobacco abuse     quit around 2000       Past Surgical History  Past Surgical History:   Procedure Laterality Date    BLADDER STONE REMOVAL  2014    CARDIAC CATHETERIZATION N/A 12/14/2023    Procedure: Cardiac catheterization;  Surgeon: Dave Royal MD;  [facility];  Service: Cardiology    IR SUPRAPUBIC CATHETER CHECK/CHANGE/REINSERTION/UPSIZE  11/07/2024    IR SUPRAPUBIC CATHETER CHECK/CHANGE/REINSERTION/UPSIZE  12/3/2024    IR SUPRAPUBIC CATHETER PLACEMENT  09/27/2024    ORIF ANKLE FRACTURE Left     NJ CYSTO W/IRRIG & EVAC MULTPLE OBSTRUCTING CLOTS N/A 12/15/2024    Procedure: CYSTOSCOPY EVACUATION OF CLOTS;  Surgeon: Rick Espino MD;  Location: [facility];  Service: Urology    SKIN LESION EXCISION N/A 03/18/2024    Procedure: WIDE LOCAL EXCISION OF BACK MELANOMA;  Surgeon: Lillie La MD;  Location: [facility];  Service: Surgical Oncology    TOTAL HIP ARTHROPLASTY Right         12/16/24 0915   PT Last Visit   PT Visit Date 12/16/24   Note Type   Note type Evaluation   Additional Comments 74 y.o. male admitted to [facility] on 12/15/2024 with Clot retention of urine.   Pain Assessment   Pain Assessment Tool 0-10   Pain Score 5   Pain Location/Orientation Location: Abdomen   Pain Onset/Description Onset: Ongoing   Effect of Pain on Daily Activities limits mobility   Patient's Stated Pain Goal No pain   Hospital Pain Intervention(s) Repositioned;Ambulation/increased activity;Emotional support;Rest   Restrictions/Precautions   Weight Bearing Precautions Per Order No   Other Precautions Chair Alarm;Bed Alarm;Cognitive;Multiple lines;Telemetry;Fall Risk;Pain   Home Living   Type of Home Assisted living   Home Layout One level;Performs ADLs on one level;Able to live on main level with bedroom/bathroom;Ramped entrance  ([facility])   Bathroom Shower/Tub Walk-in shower   Bathroom Toilet Standard   Bathroom Equipment Grab bars in  "shower;Shower chair;Grab bars around toilet   Bathroom Accessibility Accessible   Home Equipment Walker  (Rollator)   Additional Comments At baseline, pt resides at [facility] and requires assistance with ADLs and IADLs prior to hospital admission   Prior Function   Level of [place] Needs assistance with ADLs;Needs assistance with IADLS;Independent with functional mobility  (Patient reports requiring increased assistance with bathing. Patient states he ambulates >300' to dinning miranda with use of RW)   Lives With Facility staff   Receives Help From Personal care attendant;Home health   IADLs Family/Friend/Other provides transportation;Family/Friend/Other provides meals;Family/Friend/Other provides medication management   Falls in the last 6 months 0   Vocational Retired   General   Additional Pertinent History Patient  has a past medical history of Alcohol intoxication ([facility]) (10/15/2020), BPH (benign prostatic hyperplasia), Chronic HFrEF (heart failure with reduced ejection fraction) ([facility]) (11/2023), Coronary artery calcification seen on CT scan (11/10/2023), Coronary artery disease (12/14/2023), Deep vein thrombosis (DVT) of left lower extremity ([facility]) (11/2023), Diabetes mellitus ([facility]) (11/2023), Fall from slip, trip, or stumble (10/15/2020), History of phimosis of penis, History of urinary calculi, Hypertension (1988), Skin cancer, and Tobacco abuse.   Family/Caregiver Present No   Cognition   Overall Cognitive Status WFL   Arousal/Participation Alert   Orientation Level Oriented X4   Memory Within functional limits   Following Commands Follows one step commands without difficulty   Comments patient pleasant and cooperative, requires increased processing time, fair safety awareness and insight of functional deficits   Subjective   Subjective \"I can try\"   RLE Assessment   RLE Assessment   (4-/5 grossly)   LLE Assessment   LLE Assessment   (4-/5 grossly)   Bed Mobility   Supine to Sit 4  Minimal " assistance   Additional items Assist x 1;HOB elevated;Increased time required;Verbal cues;LE management   Sit to Supine Unable to assess   Additional Comments patient left OOB in bedside chair with alarm and all needs met   Transfers   Sit to Stand 3  Moderate assistance   Additional items Assist x 1;Increased time required;Verbal cues   Stand to Sit 3  Moderate assistance   Additional items Assist x 1;Increased time required;Verbal cues   Additional Comments b/l HHA   Ambulation/Elevation   Gait pattern Improper Weight shift;R Knee Rosario;L Knee Rosario;Forward Flexion;Decreased foot clearance;Short stride;Step to;Excessively slow   Gait Assistance 3  Moderate assist  (+ close stand by for safety)   Additional items Assist x 1;Verbal cues;Tactile cues   Assistive Device Other (Comment)  (b/l HHA)   Distance 2'   Stair Management Assistance Not tested   Ambulation/Elevation Additional Comments Patient ambulated 2' with b/l HHA, requiring occasional verbal cues for navigation, and improving stride/step length. Patient noted with b/l knee buckling and generalized weakness/fatigue, requiring increased assistance for ambulation to reduce risk of falls.   Balance   Static Sitting Fair +   Dynamic Sitting Fair   Static Standing Fair -   Dynamic Standing Poor +   Ambulatory Poor   Endurance Deficit   Endurance Deficit Yes   Endurance Deficit Description generalized weakness, fatigue, pain   Activity Tolerance   Activity Tolerance Patient limited by fatigue;Patient limited by pain   Medical Staff Made Aware FRANCIS Pino; This high complexity evaluation was performed with an occupational therapist due to the patient's co-morbidities, clinically unstable presentation, and present impairments which are a regression from the patient's baseline.   Nurse Made Aware RN cleared and updated   Assessment   Prognosis Good   Problem List Decreased strength;Decreased endurance;Impaired balance;Decreased mobility;Pain   Assessment PT  completed evaluation of 74 y.o. male admitted to [facility] on 12/15/2024 with Clot retention of urine. Patient's current status instabilities include multiple lines, farmer, critical care unit, ongoing pain, continuous O2/HR monitoring, regression in function from baseline. Patient  has a past medical history of Alcohol intoxication ([facility]) (10/15/2020), BPH (benign prostatic hyperplasia), Chronic HFrEF (heart failure with reduced ejection fraction) ([facility]) (11/2023), Coronary artery calcification seen on CT scan (11/10/2023), Coronary artery disease (12/14/2023), Deep vein thrombosis (DVT) of left lower extremity ([facility]) (11/2023), Diabetes mellitus ([facility]) (11/2023), Fall from slip, trip, or stumble (10/15/2020), History of phimosis of penis, History of urinary calculi, Hypertension (1988), Skin cancer, and Tobacco abuse. At baseline, pt resides at [facility] and requires assistance with ADLs and IADLs prior to hospital admission.       Patient presents at time of PT evaluation functioning below baseline and currently w/ overall mobility deficits due to: impaired balance, decreased endurance, gait dysfunction, decreased activity tolerance and fall risk. During PT evaluation, patient currently is requiring min assist x1 for bed mobility skills;  mod assist x1 for functional transfers and  mod assist x1 for ambulation with b/l HHA. Patient performed supine to sit to edge of bed requiring HOB elevated, increased time, verbal cues for setup, use of bed rails, and trunk/LE management. Patient ambulated 2' with b/l HHA, requiring occasional verbal cues for navigation, and improving stride/step length. Patient noted with b/l knee buckling and generalized weakness/fatigue, requiring increased assistance for ambulation to reduce risk of falls. Pt left OOB in bedside chair with alarm and all needs met.     This patient is functioning below their baseline and is recommended for level III (return to [facility] with PT  services). Patient will benefit from continued skilled PT this admission to achieve maximal function and safety. The patients AM-PAC Basic Mobility Inpatient Short From Raw Score is 15 . Based on AM-PAC scoring and patient presentation, PT currently recommending Level III (Minimum Resource Intensity). Please also refer to the recommendation of the Physical Therapist for safe discharge planning.   Barriers to Discharge None   Goals   Patient Goals to get better   STG Expiration Date 12/30/24   Short Term Goal #1 1) Perform bed mobility mod-I to participate in frequent repositioning and improve skin integrity; 2) Perform functional transfers mod-I to promote I with toileting and OOB mobility; 3) Ambulate 100 feet mod-I with least restrictive device to participate in household and community level mobility; 4) Improve b/l LE strength by 1/2 grade in order to improve efficiency of tranfers; 5) Improve balance by 1 grade to reduce risk for falls; 6) Improve overall activity tolerance to 60 minutes in order to increase patient's ability to engage in mobility tasks   PT Treatment Day 0   Plan   Treatment/Interventions ADL retraining;Functional transfer training;LE strengthening/ROM;Therapeutic exercise;Endurance training;Patient/family training;Bed mobility;Gait training;Spoke to nursing;Spoke to case management;OT   PT Frequency 2-3x/wk   Discharge Recommendation   Rehab Resource Intensity Level, PT III (Minimum Resource Intensity)  (return to [facility] with PT services)   Equipment Recommended Walker   Walker Package Recommended Wheeled walker   Change/add to Walker Package? No   AM-PAC Basic Mobility Inpatient   Turning in Flat Bed Without Bedrails 3   Lying on Back to Sitting on Edge of Flat Bed Without Bedrails 3   Moving Bed to Chair 3   Standing Up From Chair Using Arms 2   Walk in Room 2   Climb 3-5 Stairs With Railing 2   Basic Mobility Inpatient Raw Score 15   Basic Mobility Standardized Score 36.97   [facility]  Highest Level Of Mobility   JH-HLM Goal 4: Move to chair/commode   JH-HLM Achieved 6: Walk 10 steps or more   End of Consult   Patient Position at End of Consult Bedside chair;Bed/Chair alarm activated;All needs within reach           Verónica Padilla, PT, DPT

## 2024-12-16 NOTE — CASE MANAGEMENT
Case Management Assessment & Discharge Planning Note    Patient name Toro Rodriguez  Location /-01 MRN [id number]  : 1950 Date 2024       Current Admission Date: 12/15/2024  Current Admission Diagnosis:Clot retention of urine   Patient Active Problem List    Diagnosis Date Noted Date Diagnosed    Bladder mass 2024     Hematuria 12/15/2024     Suprapubic catheter dysfunction ([facility]) 12/15/2024     Hydronephrosis 12/15/2024     Clot retention of urine 12/15/2024     Benign hypertension with chronic kidney disease, stage III ([facility]) 12/10/2024     Stage 3b chronic kidney disease ([facility]) 12/10/2024     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 ([facility]) 12/10/2024     Gross hematuria 10/31/2024     Renal lesion 10/16/2024     NSVT (nonsustained ventricular tachycardia) ([facility]) 2024     Urinary tract infection associated with cystostomy catheter  ([facility]) 2024     Encounter to discuss test results 2024     Urethral erosion by catheter, initial encounter  ([facility]) 2024     ABLA (acute blood loss anemia) 2024     Chronic venous hypertension (idiopathic) with ulcer of left lower extremity (CODE) ([facility]) 2024     Encounter for geriatric assessment 2024     Melanoma of back ([facility]) 2024     Dilated cardiomyopathy ([facility]) 12/15/2023     CHUY (acute kidney injury) ([facility]) 2023     Obesity, morbid ([facility]) 2023     Type 2 diabetes mellitus with stage 3b chronic kidney disease, without long-term current use of insulin ([facility]) 11/15/2023     Essential hypertension 11/10/2023     Chronic systolic (congestive) heart failure ([facility]) 11/10/2023     Deep vein thrombosis (DVT) of left lower extremity ([facility]) 11/10/2023     Coronary artery disease involving native coronary artery of native heart 11/10/2023     Benign prostatic hyperplasia with urinary retention        LOS (days): 1  Geometric Mean LOS (GMLOS) (days):  3.7  Days to GMLOS:2.7     OBJECTIVE:    Risk of Unplanned Readmission Score: 52.13         Current admission status: Inpatient       [place]   [facility] [address]  [address] [address]  Phone: [phone] Fax: [phone]    Primary Care Provider: CARLIE Foster    Primary Insurance: MEDICARE  Secondary Insurance: BLUE CROSS    ASSESSMENT:  Active Health Care Proxies       Shanique Rodriguez Health Care Agent - Daughter   Primary Phone: [phone] (Mobile)                                Patient Information  Admitted from:: Facility  Mental Status: Alert  During Assessment patient was accompanied by: Not accompanied during assessment  Assessment information provided by:: Patient, Other - please comment (pt & tc to assisted living)  Support Systems: Daughter  Home entry access options. Select all that apply.: Ramp  Type of Current Residence: Facility (Resides at [facility])  Living Arrangements: Lives in Facility    Activities of Daily Living Prior to Admission  Functional Status: Independent  Completes ADLs independently?: Yes  Ambulates independently?: Yes  Does patient use assisted devices?: Yes  Assisted Devices (DME) used: Rollator  Does patient have a history of Outpatient Therapy (PT/OT)?: Yes  Does the patient have a history of Short-Term Rehab?: Yes (Cait Paulino)  Does patient have a history of HHC?: Yes ([facility])         Patient Information Continued  Income Source: SSI/SSD  Does patient have prescription coverage?: Yes  Does patient have a history of substance abuse?: No  Does patient have a history of Mental Health Diagnosis?: No         Means of Transportation  Means of Transport to Appts:: Family transport          DISCHARGE DETAILS:    Discharge planning discussed with:: patient & halfway  Freedom of Choice: Yes     CM contacted family/caregiver?: Yes  Were Treatment Team discharge  recommendations reviewed with patient/caregiver?: Yes  Did patient/caregiver verbalize understanding of patient care needs?: Yes  Were patient/caregiver advised of the risks associated with not following Treatment Team discharge recommendations?: Yes    Contacts  Patient Contacts: Shanique Rodriguez: andriy  Relationship to Patient:: Family  Contact Method: Phone  Phone Number: [phone]  Reason/Outcome: Emergency Contact              Other Referral/Resources/Interventions Provided:  Referral Comments: TC to [facility] [phone] & verified pt is from their facility in assisted living care. S/w  whom reports pt adl's at baseline Is mnimal assist for shower, dresses independently. Had farmer. They do medication managment. Pt amublated independently with rollator. Family would transport. Was getting [facility] there for therapy. Reviewed therapy recommendations & they can resume [facility] when pt returns. They just need a referral &t hey will set it up. S/w pt in room & explained CM role. Verified dc plan is for pt to return. He had [facility] for farmer care & therapy. Agreeable for them to set up again for him. Cm to follow.

## 2024-12-17 PROBLEM — J95.89 ACUTE POSTOPERATIVE RESPIRATORY INSUFFICIENCY: Status: RESOLVED | Noted: 2024-12-17 | Resolved: 2024-12-17

## 2024-12-17 PROBLEM — N30.01 ACUTE CYSTITIS WITH HEMATURIA: Status: ACTIVE | Noted: 2024-12-17

## 2024-12-17 PROBLEM — N18.32 ACUTE KIDNEY INJURY SUPERIMPOSED ON STAGE 3B CHRONIC KIDNEY DISEASE (HCC): Status: ACTIVE | Noted: 2023-12-13

## 2024-12-17 PROBLEM — J95.89 ACUTE POSTOPERATIVE RESPIRATORY INSUFFICIENCY: Status: ACTIVE | Noted: 2024-12-17

## 2024-12-17 PROBLEM — I50.20 HFREF (HEART FAILURE WITH REDUCED EJECTION FRACTION) (HCC): Status: ACTIVE | Noted: 2024-12-17

## 2024-12-17 PROBLEM — K59.01 SLOW TRANSIT CONSTIPATION: Status: ACTIVE | Noted: 2024-12-17

## 2024-12-17 LAB
ABO GROUP BLD BPU: NORMAL
ABO GROUP BLD BPU: NORMAL
ANION GAP SERPL CALCULATED.3IONS-SCNC: 5 MMOL/L (ref 4–13)
BACTERIA UR CULT: ABNORMAL
BPU ID: NORMAL
BPU ID: NORMAL
BUN SERPL-MCNC: 26 MG/DL (ref 5–25)
CA-I BLD-SCNC: 1.09 MMOL/L (ref 1.12–1.32)
CALCIUM SERPL-MCNC: 8.3 MG/DL (ref 8.4–10.2)
CHLORIDE SERPL-SCNC: 105 MMOL/L (ref 96–108)
CO2 SERPL-SCNC: 25 MMOL/L (ref 21–32)
CREAT SERPL-MCNC: 1.85 MG/DL (ref 0.6–1.3)
CROSSMATCH: NORMAL
CROSSMATCH: NORMAL
ERYTHROCYTE [DISTWIDTH] IN BLOOD BY AUTOMATED COUNT: 16.8 % (ref 11.6–15.1)
GFR SERPL CREATININE-BSD FRML MDRD: 34 ML/MIN/1.73SQ M
GLUCOSE SERPL-MCNC: 100 MG/DL (ref 65–140)
GLUCOSE SERPL-MCNC: 100 MG/DL (ref 65–140)
GLUCOSE SERPL-MCNC: 101 MG/DL (ref 65–140)
GLUCOSE SERPL-MCNC: 105 MG/DL (ref 65–140)
GLUCOSE SERPL-MCNC: 98 MG/DL (ref 65–140)
HCT VFR BLD AUTO: 25.6 % (ref 36.5–49.3)
HCT VFR BLD AUTO: 27.5 % (ref 36.5–49.3)
HGB BLD-MCNC: 8.1 G/DL (ref 12–17)
HGB BLD-MCNC: 9 G/DL (ref 12–17)
MAGNESIUM SERPL-MCNC: 2.1 MG/DL (ref 1.9–2.7)
MCH RBC QN AUTO: 30.2 PG (ref 26.8–34.3)
MCHC RBC AUTO-ENTMCNC: 31.6 G/DL (ref 31.4–37.4)
MCV RBC AUTO: 96 FL (ref 82–98)
MRSA NOSE QL CULT: ABNORMAL
PHOSPHATE SERPL-MCNC: 3.3 MG/DL (ref 2.3–4.1)
PLATELET # BLD AUTO: 206 THOUSANDS/UL (ref 149–390)
PMV BLD AUTO: 10.1 FL (ref 8.9–12.7)
POTASSIUM SERPL-SCNC: 4.6 MMOL/L (ref 3.5–5.3)
RBC # BLD AUTO: 2.68 MILLION/UL (ref 3.88–5.62)
SODIUM SERPL-SCNC: 135 MMOL/L (ref 135–147)
UNIT DISPENSE STATUS: NORMAL
UNIT DISPENSE STATUS: NORMAL
UNIT PRODUCT CODE: NORMAL
UNIT PRODUCT CODE: NORMAL
UNIT PRODUCT VOLUME: 300 ML
UNIT PRODUCT VOLUME: 300 ML
UNIT RH: NORMAL
UNIT RH: NORMAL
WBC # BLD AUTO: 8.39 THOUSAND/UL (ref 4.31–10.16)

## 2024-12-17 PROCEDURE — 97530 THERAPEUTIC ACTIVITIES: CPT

## 2024-12-17 PROCEDURE — 82948 REAGENT STRIP/BLOOD GLUCOSE: CPT

## 2024-12-17 PROCEDURE — 85014 HEMATOCRIT: CPT | Performed by: NURSE PRACTITIONER

## 2024-12-17 PROCEDURE — 85027 COMPLETE CBC AUTOMATED: CPT

## 2024-12-17 PROCEDURE — 84100 ASSAY OF PHOSPHORUS: CPT

## 2024-12-17 PROCEDURE — 99232 SBSQ HOSP IP/OBS MODERATE 35: CPT | Performed by: NURSE PRACTITIONER

## 2024-12-17 PROCEDURE — 83735 ASSAY OF MAGNESIUM: CPT

## 2024-12-17 PROCEDURE — 97116 GAIT TRAINING THERAPY: CPT

## 2024-12-17 PROCEDURE — 85018 HEMOGLOBIN: CPT | Performed by: NURSE PRACTITIONER

## 2024-12-17 PROCEDURE — 80048 BASIC METABOLIC PNL TOTAL CA: CPT

## 2024-12-17 PROCEDURE — 82330 ASSAY OF CALCIUM: CPT

## 2024-12-17 RX ORDER — DOCUSATE SODIUM 100 MG/1
100 CAPSULE, LIQUID FILLED ORAL 2 TIMES DAILY
Status: DISCONTINUED | OUTPATIENT
Start: 2024-12-17 | End: 2024-12-25 | Stop reason: HOSPADM

## 2024-12-17 RX ORDER — HYDROCODONE BITARTRATE AND HOMATROPINE METHYLBROMIDE ORAL SOLUTION 5; 1.5 MG/5ML; MG/5ML
2.5 LIQUID ORAL EVERY 4 HOURS PRN
Refills: 0 | Status: DISCONTINUED | OUTPATIENT
Start: 2024-12-17 | End: 2024-12-25 | Stop reason: HOSPADM

## 2024-12-17 RX ORDER — GUAIFENESIN/DEXTROMETHORPHAN 100-10MG/5
10 SYRUP ORAL EVERY 4 HOURS PRN
Status: DISCONTINUED | OUTPATIENT
Start: 2024-12-17 | End: 2024-12-25 | Stop reason: HOSPADM

## 2024-12-17 RX ORDER — CIPROFLOXACIN 500 MG/1
500 TABLET, FILM COATED ORAL EVERY 12 HOURS SCHEDULED
Status: DISCONTINUED | OUTPATIENT
Start: 2024-12-17 | End: 2024-12-19

## 2024-12-17 RX ADMIN — FINASTERIDE 5 MG: 5 TABLET, FILM COATED ORAL at 10:13

## 2024-12-17 RX ADMIN — ATORVASTATIN CALCIUM 40 MG: 40 TABLET, FILM COATED ORAL at 17:45

## 2024-12-17 RX ADMIN — CHLORHEXIDINE GLUCONATE 0.12% ORAL RINSE 15 ML: 1.2 LIQUID ORAL at 21:55

## 2024-12-17 RX ADMIN — CIPROFLOXACIN HYDROCHLORIDE 500 MG: 500 TABLET, FILM COATED ORAL at 10:12

## 2024-12-17 RX ADMIN — DOCUSATE SODIUM 100 MG: 100 CAPSULE, LIQUID FILLED ORAL at 19:06

## 2024-12-17 RX ADMIN — TAMSULOSIN HYDROCHLORIDE 0.4 MG: 0.4 CAPSULE ORAL at 17:45

## 2024-12-17 RX ADMIN — DOCUSATE SODIUM 100 MG: 100 CAPSULE, LIQUID FILLED ORAL at 12:14

## 2024-12-17 RX ADMIN — AMIODARONE HYDROCHLORIDE 200 MG: 200 TABLET ORAL at 12:14

## 2024-12-17 RX ADMIN — POLYETHYLENE GLYCOL 3350 17 G: 17 POWDER, FOR SOLUTION ORAL at 10:13

## 2024-12-17 RX ADMIN — AMLODIPINE BESYLATE 5 MG: 5 TABLET ORAL at 10:11

## 2024-12-17 RX ADMIN — METOPROLOL SUCCINATE 25 MG: 25 TABLET, EXTENDED RELEASE ORAL at 21:57

## 2024-12-17 RX ADMIN — CHLORHEXIDINE GLUCONATE 0.12% ORAL RINSE 15 ML: 1.2 LIQUID ORAL at 10:13

## 2024-12-17 RX ADMIN — CIPROFLOXACIN HYDROCHLORIDE 500 MG: 500 TABLET, FILM COATED ORAL at 21:57

## 2024-12-17 RX ADMIN — ACETAMINOPHEN 650 MG: 325 TABLET, FILM COATED ORAL at 02:19

## 2024-12-17 RX ADMIN — GUAIFENESIN AND DEXTROMETHORPHAN 10 ML: 100; 10 SYRUP ORAL at 02:19

## 2024-12-17 RX ADMIN — METOPROLOL SUCCINATE 25 MG: 25 TABLET, EXTENDED RELEASE ORAL at 10:11

## 2024-12-17 NOTE — ASSESSMENT & PLAN NOTE
Wt Readings from Last 3 Encounters:   12/16/24 84.1 kg (185 lb 6.5 oz)   12/15/24 86.6 kg (190 lb 14.7 oz)   12/10/24 87.5 kg (193 lb)   Echo 2/24- LV wall thickness is mildly increased EF 35-40%, systolic function is moderately reduced, moderate global hypokinesis, G1DD. LA is moderately dilated and RA is dilated. MV moderate regurgitation.  Follows with Dr. Roach outpatient   PTA on lasix 20mg daily- currently on hold given CHUY and hypotension post op, consider resuming in the next 24 hrs   C/w GDMT:  BB:  metoprolol 25mg q12h   ACE/ARB/ARNI:  lisinopril 5mg daily on hold d/t hypotension and CHUY post op  MRA:  n/a  SGLT2i: n/a  Currently on room air   Daily weights, Strict I & Os  Cardiac diet, low sodium <2g, fluid restriction 2000ml

## 2024-12-17 NOTE — ASSESSMENT & PLAN NOTE
Lab Results   Component Value Date    CREATININE 1.85 (H) 12/17/2024    CREATININE 1.83 (H) 12/16/2024    CREATININE 2.09 (H) 12/16/2024   Creatinine reviewed, appears BL has increased since September to 1.5-1.8  Creat peaked at 2.09  Creat improved today to 1.85- repeat am bmp and monitor   Unable to visualize right ureter in the OR; per urology no need for IR consult for PCN at this time as creatinine has improved.   Avoid nephrotoxic drugs and hypotension- holding lasix and lisinopril    C/w SPC and farmer- plan to remove farmer in one week per urology

## 2024-12-17 NOTE — ASSESSMENT & PLAN NOTE
Creat 1.85  Unable to visualize ureter during cystoscopy, clot evacuation, TURBT 12/15  IR consult for possible PCN versus PCNU if creatinine trends up or if patient decompensates.  ureter was unable to be visualized during surgical procedure  Medical optimization per primary team  Continue to trend labs

## 2024-12-17 NOTE — PLAN OF CARE
Problem: OCCUPATIONAL THERAPY ADULT  Goal: Performs self-care activities at highest level of function for planned discharge setting.  See evaluation for individualized goals.  Description: Treatment Interventions: ADL retraining, Functional transfer training, Endurance training, Patient/family training, Cognitive reorientation, Equipment evaluation/education, Compensatory technique education, Energy conservation, Activityengagement          See flowsheet documentation for full assessment, interventions and recommendations.   Outcome: Progressing  Note: Limitation: Decreased ADL status, Decreased endurance, Decreased self-care trans, Decreased high-level ADLs, Decreased Safe judgement during ADL  Prognosis: Good  Assessment: Pt seen for skilled OT treatment session on this date w/ interventions focusing on ADL participation, transfer skills, and fxnl mobility. Pt was agreeable and willing to participate in session. Pt engaged in the following tasks: Supervision LB dressing; Min A bed mobility, STS, functional mobility. In comparison to previous session, pt demonstrated improvements in ADL performance and functional mobility distance. Pt required one step directives and frequent rest periods. Pt continues to be functioning below baseline level as occupational performance remains limited by decreased ADL status, decreased activity tolerance, decreased endurance, decreased standing tolerance, decreased standing balance, decreased transfer skills, decreased fxnl mobility, and generalized weakness . The patient's raw score on the -PAC Daily Activity Inpatient Short Form is 19. A raw score of greater than or equal to 19 suggests the patient may benefit from discharge to home. Please refer to the recommendation of the Occupational Therapist for safe discharge planning.    From OT standpoint, recommend Level III (Minimum Resource Intensity) at time of d/c. Pt will benefit from continued OT treatment while in acute care  to address deficits as defined above and maximize level of functional independence with ADLs and functional mobility. Pt left OOB in chair w/ alarm on and all needs within reach at end of session.     Rehab Resource Intensity Level, OT: III (Minimum Resource Intensity)

## 2024-12-17 NOTE — ASSESSMENT & PLAN NOTE
Persistent right hydronephrosis with probable small calculus in the distal right ureter which may be nonobstructing  Ultrasound 12/16: Persistent moderate right hydro; ureters not visualized  Creat 1.85  Unable to access right ureter during cystoscopy, TURBT and clot evacuation 12/15  Will need IR consult for possible PCN versus PCNU if creatinine trends up  Patient would like to avoid nephrostomy tube if possible  Continue to hold Eliquis for now  trend labs  Afebrile, vital signs stable

## 2024-12-17 NOTE — ASSESSMENT & PLAN NOTE
ASA on hold  Continue with Lipitor 40 mg daily and metoprolol 25 mg every 12 hours  Denies chest pain  Left heart cath in 2023 showing Ost RCA to Prox RCA lesion is 100% stenosed.

## 2024-12-17 NOTE — PROGRESS NOTES
Patient:  MOO GILLIAM    MRN:  [id number]    Aidin Request ID:  [id number]    Level of care reserved:  Home Health Agency    Partner Reserved:  [facility] [place] [id number] [phone]    Clinical needs requested:    Geography searched:  [id number]    Start of Service:    Request sent:  8:09am EST on 12/17/2024 by Brittani Patel    Partner reserved:  9:18am EST on 12/17/2024 by Brittani Patel    Choice list shared:  9:18am EST on 12/17/2024 by Brittani Patel

## 2024-12-17 NOTE — PROGRESS NOTES
Progress Note - Hospitalist   Name: Toro Rodriguez 74 y.o. male I MRN: [id number]  Unit/Bed#: -01 I Date of Admission: 12/15/2024   Date of Service: 12/17/2024 I Hospital Day: 2    Assessment & Plan  Clot retention of urine  Pt initially presented to outside hospital with gross hematuria and had three-way Farmer placed and CBI started. He was noted to have labile blood pressures so he was transferred to [facility] for cystoscopy and admitted to the ICU post op for hypotension and hypoxia. He did require 2 units PRBCs due to ongoing blood loss and anemia.   S/p cystoscopy with clot evacuation, fulguration of bleeding vessels and extensive transurethral resection of bladder tumor and placement of penile urethral catheter 12/15  Unable to visualize right ureter in the OR; per urology no need for IR consult for PCN at this time as creatinine has improved.   CBI weaned off 12/16 around 11am   Patient will maintain both urethral Farmer catheter and SPT at this time- plan for urethral Farmer catheter removal in urology office in approximately 1 week  Monitor hgb  Acute kidney injury superimposed on stage 3b chronic kidney disease (HCC)  Lab Results   Component Value Date    CREATININE 1.85 (H) 12/17/2024    CREATININE 1.83 (H) 12/16/2024    CREATININE 2.09 (H) 12/16/2024   Creatinine reviewed, appears BL has increased since September to 1.5-1.8  Creat peaked at 2.09  Creat improved today to 1.85- repeat am bmp and monitor   Unable to visualize right ureter in the OR; per urology no need for IR consult for PCN at this time as creatinine has improved.   Avoid nephrotoxic drugs and hypotension- holding lasix and lisinopril    C/w SPC and farmer- plan to remove farmer in one week per urology   ABLA (acute blood loss anemia)  Lab Results   Component Value Date    HGB 8.1 (L) 12/17/2024    HGB 9.7 (L) 12/16/2024    HGB 8.7 (L) 12/16/2024   Hgb baseline 10-11 dropped to 7.2 on admission with hematuria   S/p 2 units of PRBCs with  improvement in hgb- noted drop from 9.7 yesterday to 8.1- repeat hgb this afternoon   Transfuse for hgb < 7.0  Essential hypertension  PTA norvasc 5mg daily; metoprolol 25mg q12h; lisinopril 5mg daily and lasix 20mg daily   Lisinopril and Lasix on hold given CHUY and hypotension postoperatively  Continue with Norvasc 5 mg daily metoprolol 25 mg every 12 hours  Coronary artery disease involving native coronary artery of native heart  ASA on hold  Continue with Lipitor 40 mg daily and metoprolol 25 mg every 12 hours  Denies chest pain  Left heart cath in 2023 showing Ost RCA to Prox RCA lesion is 100% stenosed.   Type 2 diabetes mellitus with stage 3b chronic kidney disease, without long-term current use of insulin ([facility])  Lab Results   Component Value Date    HGBA1C 5.9 (H) 04/18/2024       Recent Labs     12/16/24  1128 12/16/24  1602 12/16/24  2103 12/17/24  0604   POCGLU 122 107 109 100       Blood Sugar Average: Last 72 hrs:  (P) 105.625  Continue with SSI coverage and Accu-Cheks  Hypoglycemic protocol  Carb count diet  Benign prostatic hyperplasia with urinary retention  Noted chronic suprapubic catheter  Urethral Colon placed postoperatively after cystoscopy plan to remove in 1 week outpatient with urology  Continue with Proscar and Flomax  NSVT (nonsustained ventricular tachycardia) ([facility])  C/w metoprolol 25mg q12h and amiodarone 200mg daily   Hydronephrosis  Unable to visualize right ureter in OR; per urology no need for IR consult for PCN at this time as creatinine has improved.   Renal US 12/17- Moderate right hydronephrosis persists.    Bladder mass  S/p transurethral resection of bladder tumor 12/15  Follow-up with urology outpatient in approximately 2 weeks for pathology review and plan of care  Acute postoperative respiratory insufficiency (Resolved: 12/17/2024)  Noted post op requiring NC   Currently weaned to room air  Monitor   HFrEF (heart failure with reduced ejection fraction) ([facility])  Wt Readings from  Last 3 Encounters:   12/16/24 84.1 kg (185 lb 6.5 oz)   12/15/24 86.6 kg (190 lb 14.7 oz)   12/10/24 87.5 kg (193 lb)   Echo 2/24- LV wall thickness is mildly increased EF 35-40%, systolic function is moderately reduced, moderate global hypokinesis, G1DD. LA is moderately dilated and RA is dilated. MV moderate regurgitation.  Follows with Dr. Roach outpatient   PTA on lasix 20mg daily- currently on hold given CHUY and hypotension post op, consider resuming in the next 24 hrs   C/w GDMT:  BB:  metoprolol 25mg q12h   ACE/ARB/ARNI:  lisinopril 5mg daily on hold d/t hypotension and CHUY post op  MRA:  n/a  SGLT2i: n/a  Currently on room air   Daily weights, Strict I & Os  Cardiac diet, low sodium <2g, fluid restriction 2000ml  Slow transit constipation  Add bowel regimen   Acute cystitis with hematuria  Urine culture + Pseudomonas  Previously was on IV ceftriaxone will transition to oral ciprofloxacin given sensitivities    VTE Pharmacologic Prophylaxis:   High Risk (Score >/= 5) - Pharmacological DVT Prophylaxis Contraindicated. Sequential Compression Devices Ordered.    Mobility:   Basic Mobility Inpatient Raw Score: 15  JH-HLM Goal: 4: Move to chair/commode  JH-HLM Achieved: 2: Bed activities/Dependent transfer  JH-HLM Goal NOT achieved. Continue with multidisciplinary rounding and encourage appropriate mobility to improve upon JH-HLM goals.    Patient Centered Rounds: I performed bedside rounds with nursing staff today.   Discussions with Specialists or Other Care Team Provider: d/w RN and urology     Education and Discussions with Family / Patient: Updated  (daughter) via phone.    Current Length of Stay: 2 day(s)  Current Patient Status: Inpatient   Certification Statement: The patient will continue to require additional inpatient hospital stay due to IR consult   Discharge Plan: Anticipate discharge in 24-48 hrs to home with home services.    Code Status: Level 1 - Full Code    Subjective   Patient  lying in bed.  Reports that he is feeling a little bit better since being admitted to the hospital that his spasms that he was having have resided and that his penis is not as painful as what it was.  He reports that he is constipated and that he took MiraLAX yesterday without a bowel movement yet.  He denies any shortness of breath or chest pain any nausea or vomiting or abdominal pain.    Objective :  Temp:  [98 °F (36.7 °C)-99.2 °F (37.3 °C)] 98.9 °F (37.2 °C)  HR:  [60-76] 60  BP: (100-138)/(40-59) 100/40  Resp:  [16-36] 16  SpO2:  [90 %-96 %] 93 %  O2 Device: None (Room air)  Nasal Cannula O2 Flow Rate (L/min):  [2 L/min] 2 L/min    Body mass index is 29.04 kg/m².     Input and Output Summary (last 24 hours):     Intake/Output Summary (Last 24 hours) at 12/17/2024 1019  Last data filed at 12/17/2024 0601  Gross per 24 hour   Intake 1080 ml   Output 1575 ml   Net -495 ml       Physical Exam  Constitutional:       General: He is not in acute distress.     Appearance: He is not ill-appearing.   Cardiovascular:      Rate and Rhythm: Normal rate and regular rhythm.      Pulses: Normal pulses.      Heart sounds: Normal heart sounds. No murmur heard.  Pulmonary:      Effort: Pulmonary effort is normal. No respiratory distress.      Breath sounds: No wheezing or rales.      Comments: Diminished bilateral lower lobes, room air  Abdominal:      General: Bowel sounds are normal. There is no distension.      Palpations: Abdomen is soft.      Tenderness: There is no abdominal tenderness.   Genitourinary:     Comments: Suprapubic catheter and urethral Colon catheter present  Musculoskeletal:         General: No swelling or tenderness.   Skin:     General: Skin is warm and dry.      Findings: No erythema or rash.   Neurological:      General: No focal deficit present.      Mental Status: He is alert. Mental status is at baseline.   Psychiatric:         Attention and Perception: Attention normal.         Mood and Affect: Mood  normal.         Lines/Drains:  Lines/Drains/Airways       Active Status       Name Placement date Placement time Site Days    Urethral Catheter Straight-tip;Three way 24 Fr. 12/15/24  1639  Straight-tip;Three way  1    Suprapubic Catheter 16 Fr. 12/03/24  1019  --  14    Continuous Bladder Irrigation Three-way 12/15/24  --  Three-way  2                  Urinary Catheter:  Goal for removal: N/A- Discharging with Colon                 Lab Results: I have reviewed the following results:   Results from last 7 days   Lab Units 12/17/24  0437 12/15/24  0251 12/14/24  2235   WBC Thousand/uL 8.39   < > 6.39   HEMOGLOBIN g/dL 8.1*   < > 11.6*   HEMATOCRIT % 25.6*   < > 35.5*   PLATELETS Thousands/uL 206   < > 314   SEGS PCT %  --   --  66   LYMPHO PCT %  --   --  18   MONO PCT %  --   --  10   EOS PCT %  --   --  5    < > = values in this interval not displayed.     Results from last 7 days   Lab Units 12/17/24  0437 12/15/24  1843 12/15/24  0251   SODIUM mmol/L 135   < > 137   POTASSIUM mmol/L 4.6   < > 3.9   CHLORIDE mmol/L 105   < > 106   CO2 mmol/L 25   < > 22   BUN mg/dL 26*   < > 36*   CREATININE mg/dL 1.85*   < > 2.02*   ANION GAP mmol/L 5   < > 9   CALCIUM mg/dL 8.3*   < > 8.0*   ALBUMIN g/dL  --   --  3.4*   TOTAL BILIRUBIN mg/dL  --   --  0.36   ALK PHOS U/L  --   --  70   ALT U/L  --   --  12   AST U/L  --   --  14   GLUCOSE RANDOM mg/dL 98   < > 103    < > = values in this interval not displayed.     Results from last 7 days   Lab Units 12/15/24  0251   INR  1.09     Results from last 7 days   Lab Units 12/17/24  0604 12/16/24  2103 12/16/24  1602 12/16/24  1128 12/16/24  0704 12/15/24  2148 12/15/24  1838 12/15/24  1658   POC GLUCOSE mg/dl 100 109 107 122 102 99 98 108               Recent Cultures (last 7 days):   Results from last 7 days   Lab Units 12/15/24  0012 12/13/24  1621   URINE CULTURE  30,000-39,000 cfu/ml Pseudomonas aeruginosa* >100,000 cfu/ml Pseudomonas aeruginosa*  Streptococcus species*   Lactobacillus species*       Imaging Results Review: I reviewed radiology reports from this admission including: chest xray, CT abdomen/pelvis, xray(s), procedure reports, Ultrasound(s), and Echocardiogram.  Other Study Results Review: No additional pertinent studies reviewed.    Last 24 Hours Medication List:     Current Facility-Administered Medications:     acetaminophen (TYLENOL) tablet 650 mg, Q6H PRN    amiodarone tablet 200 mg, Daily    amLODIPine (NORVASC) tablet 5 mg, Daily    atorvastatin (LIPITOR) tablet 40 mg, Daily With Dinner    chlorhexidine (PERIDEX) 0.12 % oral rinse 15 mL, Q12H ARI    ciprofloxacin (CIPRO) tablet 500 mg, Q12H ARI    dextromethorphan-guaiFENesin (ROBITUSSIN DM) oral syrup 10 mL, Q4H PRN    docusate sodium (COLACE) capsule 100 mg, BID    finasteride (PROSCAR) tablet 5 mg, Daily    HYDROcodone Bit-Homatrop MBr (HYCODAN) oral syrup 2.5 mL, Q4H PRN    HYDROmorphone HCl (DILAUDID) injection 0.2 mg, Q2H PRN    insulin lispro (HumALOG/ADMELOG) 100 units/mL subcutaneous injection 1-6 Units, TID AC **AND** Fingerstick Glucose (POCT), TID AC    insulin lispro (HumALOG/ADMELOG) 100 units/mL subcutaneous injection 1-6 Units, HS    metoprolol succinate (TOPROL-XL) 24 hr tablet 25 mg, Q12H ARI    naloxone (NARCAN) 0.04 mg/mL syringe 0.04 mg, Q1MIN PRN    ondansetron (ZOFRAN) injection 4 mg, Q6H PRN    oxybutynin (DITROPAN) tablet 5 mg, TID PRN    oxyCODONE (ROXICODONE) split tablet 2.5 mg, Q4H PRN **OR** oxyCODONE (ROXICODONE) IR tablet 5 mg, Q4H PRN    phenol (CHLORASEPTIC) 1.4 % mucosal liquid 1 spray, Q2H PRN    polyethylene glycol (MIRALAX) packet 17 g, Daily    senna (SENOKOT) tablet 8.6 mg, HS PRN    tamsulosin (FLOMAX) capsule 0.4 mg, Daily With Dinner    Administrative Statements   Today, Patient Was Seen By: CARLIE Green      **Please Note: This note may have been constructed using a voice recognition system.**

## 2024-12-17 NOTE — ASSESSMENT & PLAN NOTE
S/p cystoscopy with clot evacuation, fulguration of bleeding vessels and extensive transurethral resection of bladder tumor and placement of penile urethral catheter 12/15  Unable to visualize right ureter; will need IR consult for PCN if increased CHUY, worsening hydronephrosis or sepsis  WBC 8.39  Creat 1.85, stable  Afebrile, vital signs stable  Patient will maintain both urethral Colon catheter and SPT at this time  Suprapubic tube to be capped and Colon catheter to gravity drainage  Plan for urethral Colon catheter removal in urology office in approximately 1 week

## 2024-12-17 NOTE — PLAN OF CARE
Problem: PHYSICAL THERAPY ADULT  Goal: Performs mobility at highest level of function for planned discharge setting.  See evaluation for individualized goals.  Description: Treatment/Interventions: ADL retraining, Functional transfer training, LE strengthening/ROM, Therapeutic exercise, Endurance training, Patient/family training, Bed mobility, Gait training, Spoke to nursing, Spoke to case management, OT  Equipment Recommended: Walker       See flowsheet documentation for full assessment, interventions and recommendations.  Outcome: Progressing  Note: Prognosis: Good  Problem List: Decreased strength, Decreased range of motion, Decreased endurance, Impaired balance, Decreased mobility, Decreased coordination, Pain  Assessment: Pt seen for PT session as documented above. Pt progressing w/ all phases of functional mobility and requiring less assist for bed skills; transfers + gait.  Pt w/ improved endurance/ activity tolerance as evidenced by ability to complete multiple gait trials w/ less assist overall. Pt reports still functioning below baseline. PT recommending level 3 resources on d/c w/ rturn back to shelter as long as facility able to provide increased A from pt baseline- otherwise would benefit from short rehab stay on d/c.  PT will continue to follow and progress  Barriers to Discharge: None     Rehab Resource Intensity Level, PT: III (Minimum Resource Intensity) (see assessment above)    See flowsheet documentation for full assessment.

## 2024-12-17 NOTE — ASSESSMENT & PLAN NOTE
PTA norvasc 5mg daily; metoprolol 25mg q12h; lisinopril 5mg daily and lasix 20mg daily   Lisinopril and Lasix on hold given CHUY and hypotension postoperatively  Continue with Norvasc 5 mg daily metoprolol 25 mg every 12 hours

## 2024-12-17 NOTE — OCCUPATIONAL THERAPY NOTE
Occupational Therapy Progress Note     Patient Name: Toro Rodriguez  Today's Date: 12/17/2024  Problem List  Principal Problem:    Clot retention of urine  Active Problems:    Essential hypertension    Acute kidney injury superimposed on stage 3b chronic kidney disease (HCC)    Coronary artery disease involving native coronary artery of native heart    Type 2 diabetes mellitus with stage 3b chronic kidney disease, without long-term current use of insulin (HCC)    Benign prostatic hyperplasia with urinary retention    ABLA (acute blood loss anemia)    NSVT (nonsustained ventricular tachycardia) (HCC)    Hydronephrosis    Bladder mass    HFrEF (heart failure with reduced ejection fraction) (HCC)    Slow transit constipation    Acute cystitis with hematuria       12/17/24 0953   OT Last Visit   OT Visit Date 12/17/24   Note Type   Note Type Treatment   Pain Assessment   Pain Assessment Tool 0-10   Pain Score No Pain   Restrictions/Precautions   Weight Bearing Precautions Per Order No   Other Precautions Chair Alarm;Bed Alarm;Multiple lines;Fall Risk  (suprapubic catheter)   Lifestyle   Autonomy pta, pt was mostly I w ADL (showers were supervised). facility manages meds/meals and provides transportation as needed. uses rollator for fm.   Reciprocal Relationships supportive staff   Service to Others retired    Intrinsic Gratification cooking   ADL   LB Dressing Assistance 5  Supervision/Setup   LB Dressing Deficit Don/doff R sock;Don/doff L sock   LB Dressing Comments Pt able to achieve figure 4 bilaterally to doff/don socks   Bed Mobility   Supine to Sit 4  Minimal assistance   Additional items Assist x 1;Increased time required;Verbal cues   Additional Comments Pt greeted supine and left OOB in chair w/ alarm on and all needs within reach. Pt sat EOB w/ supervision, fair balance   Transfers   Sit to Stand 4  Minimal assistance   Additional items Assist x 1;Increased time required;Verbal cues   Stand to Sit 4   "Minimal assistance   Additional items Assist x 1;Increased time required;Verbal cues   Toilet transfer 5  Supervision  (close S)   Additional items Increased time required;Verbal cues;Standard toilet  (VC for GB use)   Additional Comments w/ RW   Functional Mobility   Functional Mobility 4  Minimal assistance   Additional Comments Pt completes short household distance mobility w/ Min Ax1. Requires chair follow, 1 seated rest break.   Additional items Rolling walker   Subjective   Subjective \"I can walk\"   Cognition   Overall Cognitive Status WFL   Arousal/Participation Alert;Responsive;Cooperative   Attention Within functional limits   Orientation Level Oriented X4   Memory Within functional limits   Following Commands Follows one step commands without difficulty   Comments Pt is pleasant and cooperative.   Activity Tolerance   Activity Tolerance Patient tolerated treatment well   Medical Staff Made Aware PT due to medical complexity and multiple comorbidities; RN cleared   Assessment   Assessment Pt seen for skilled OT treatment session on this date w/ interventions focusing on ADL participation, transfer skills, and fxnl mobility. Pt was agreeable and willing to participate in session. Pt engaged in the following tasks: Supervision LB dressing; Min A bed mobility, STS, functional mobility. In comparison to previous session, pt demonstrated improvements in ADL performance and functional mobility distance. Pt required one step directives and frequent rest periods. Pt continues to be functioning below baseline level as occupational performance remains limited by decreased ADL status, decreased activity tolerance, decreased endurance, decreased standing tolerance, decreased standing balance, decreased transfer skills, decreased fxnl mobility, and generalized weakness . The patient's raw score on the -PAC Daily Activity Inpatient Short Form is 19. A raw score of greater than or equal to 19 suggests the patient may " benefit from discharge to home. Please refer to the recommendation of the Occupational Therapist for safe discharge planning.    From OT standpoint, recommend Level III (Minimum Resource Intensity) at time of d/c. Pt will benefit from continued OT treatment while in acute care to address deficits as defined above and maximize level of functional independence with ADLs and functional mobility. Pt left OOB in chair w/ alarm on and all needs within reach at end of session.   Plan   Treatment Interventions ADL retraining;Functional transfer training;UE strengthening/ROM;Endurance training;Patient/family training;Equipment evaluation/education;Compensatory technique education;Continued evaluation;Energy conservation;Activityengagement   Goal Expiration Date 12/30/24   OT Treatment Day 1   OT Frequency 2-3x/wk   Discharge Recommendation   Rehab Resource Intensity Level, OT III (Minimum Resource Intensity)   AM-PAC Daily Activity Inpatient   Lower Body Dressing 3   Bathing 3   Toileting 3   Upper Body Dressing 3   Grooming 3   Eating 4   Daily Activity Raw Score 19   Daily Activity Standardized Score (Calc for Raw Score >=11) 40.22   AM-PAC Applied Cognition Inpatient   Following a Speech/Presentation 4   Understanding Ordinary Conversation 4   Taking Medications 4   Remembering Where Things Are Placed or Put Away 4   Remembering List of 4-5 Errands 3   Taking Care of Complicated Tasks 3   Applied Cognition Raw Score 22   Applied Cognition Standardized Score 47.83   End of Consult   Education Provided Yes   Patient Position at End of Consult Bedside chair;Bed/Chair alarm activated;All needs within reach   Nurse Communication Nurse aware of consult     BAR Bowen, OTR/L

## 2024-12-17 NOTE — PLAN OF CARE
Problem: PAIN - ADULT  Goal: Verbalizes/displays adequate comfort level or baseline comfort level  Description: Interventions:  - Encourage patient to monitor pain and request assistance  - Assess pain using appropriate pain scale  - Administer analgesics based on type and severity of pain and evaluate response  - Implement non-pharmacological measures as appropriate and evaluate response  - Consider cultural and social influences on pain and pain management  - Notify physician/advanced practitioner if interventions unsuccessful or patient reports new pain  Outcome: Progressing     Problem: INFECTION - ADULT  Goal: Absence or prevention of progression during hospitalization  Description: INTERVENTIONS:  - Assess and monitor for signs and symptoms of infection  - Monitor lab/diagnostic results  - Monitor all insertion sites, i.e. indwelling lines, tubes, and drains  - Monitor endotracheal if appropriate and nasal secretions for changes in amount and color  - [place] appropriate cooling/warming therapies per order  - Administer medications as ordered  - Instruct and encourage patient and family to use good hand hygiene technique  - Identify and instruct in appropriate isolation precautions for identified infection/condition  Outcome: Progressing     Problem: SAFETY ADULT  Goal: Patient will remain free of falls  Description: INTERVENTIONS:  - Educate patient/family on patient safety including physical limitations  - Instruct patient to call for assistance with activity   - Consult OT/PT to assist with strengthening/mobility   - Keep Call bell within reach  - Keep bed low and locked with side rails adjusted as appropriate  - Keep care items and personal belongings within reach  - Initiate and maintain comfort rounds  - Make Fall Risk Sign visible to staff  - Offer Toileting every 2 Hours, in advance of need  - Initiate/Maintain bed alarm  - Apply yellow socks and bracelet for high fall risk patients  - Consider  moving patient to room near nurses station  Outcome: Progressing

## 2024-12-17 NOTE — PHYSICAL THERAPY NOTE
PHYSICAL THERAPY TREATMENT  NAME:  Toro Rodriguez  DATE: 12/17/24    AGE:   74 y.o.  Mrn:   [id number]  ADMIT DX:  Hematuria [R31.9]    Past Medical History:   Diagnosis Date    Alcohol intoxication ([facility]) 10/15/2020    BPH (benign prostatic hyperplasia)     Chronic HFrEF (heart failure with reduced ejection fraction) ([facility]) 11/2023    Coronary artery calcification seen on CT scan 11/10/2023    Coronary artery disease 12/14/2023    Deep vein thrombosis (DVT) of left lower extremity ([facility]) 11/2023    Diabetes mellitus ([facility]) 11/2023    Fall from slip, trip, or stumble 10/15/2020    History of phimosis of penis     History of urinary calculi     Hypertension 1988    Skin cancer     Tobacco abuse     quit around 2000     Past Surgical History:   Procedure Laterality Date    BLADDER STONE REMOVAL  2014    CARDIAC CATHETERIZATION N/A 12/14/2023    Procedure: Cardiac catheterization;  Surgeon: Dave Royal MD;  [facility];  Service: Cardiology    IR SUPRAPUBIC CATHETER CHECK/CHANGE/REINSERTION/UPSIZE  11/07/2024    IR SUPRAPUBIC CATHETER CHECK/CHANGE/REINSERTION/UPSIZE  12/3/2024    IR SUPRAPUBIC CATHETER PLACEMENT  09/27/2024    ORIF ANKLE FRACTURE Left     CA CYSTO W/IRRIG & EVAC MULTPLE OBSTRUCTING CLOTS N/A 12/15/2024    Procedure: CYSTOSCOPY EVACUATION OF CLOTS;  Surgeon: Rick Espino MD;  Location: [facility];  Service: Urology    SKIN LESION EXCISION N/A 03/18/2024    Procedure: WIDE LOCAL EXCISION OF BACK MELANOMA;  Surgeon: Lillei La MD;  Location: [facility];  Service: Surgical Oncology    TOTAL HIP ARTHROPLASTY Right        Length Of Stay: 2     12/17/24 0952   PT Last Visit   PT Visit Date 12/17/24   Note Type   Note Type Treatment   Pain Assessment   Pain Assessment Tool 0-10   Pain Score No Pain   Restrictions/Precautions   Weight Bearing Precautions Per Order No   Braces or Orthoses   (none)   Other Precautions Chair Alarm;Bed Alarm;Multiple lines;Fall Risk  (suprapubic  "catheter)   General   Family/Caregiver Present No   Cognition   Overall Cognitive Status WFL   Arousal/Participation Alert;Cooperative   Attention Within functional limits   Orientation Level Oriented X4   Memory Within functional limits   Following Commands Follows one step commands without difficulty   Comments cooperative; motivated to get up and try to walk some.   Subjective   Subjective \"I want to get up and try to walk\"   Bed Mobility   Supine to Sit 4  Minimal assistance   Additional items Assist x 1;Increased time required;Verbal cues;LE management   Additional Comments sits EOB w/ F+ balance   Transfers   Sit to Stand 4  Minimal assistance   Additional items Assist x 1;Increased time required;Verbal cues   Stand to Sit 4  Minimal assistance   Additional items Assist x 1;Increased time required;Verbal cues   Toilet transfer   (close S w/ grab bar use (pulls to stand))   Additional Comments use of RW   Ambulation/Elevation   Gait pattern Excessively slow;Short stride   Gait Assistance 4  Minimal assist   Additional items Assist x 1;Verbal cues;Tactile cues  (+ chair follow)   Assistive Device Rolling walker   Distance 15' (bathroom) then 60'+45' w/ chair follow (seated rest b/t trials) ; LE weakness and instability but w/o knee buckling today   Balance   Static Sitting Good   Dynamic Sitting Fair +   Static Standing Fair   Dynamic Standing Fair -   Ambulatory Poor +  (rw)   Endurance Deficit   Endurance Deficit Yes   Endurance Deficit Description LE weakness/ instability   Activity Tolerance   Activity Tolerance Patient limited by fatigue   Medical Staff Made Aware OT + RN and CM for dc planning. Co- tx was performed w/ OT today. This pt's participation in skilled therapies requires skilled Ax2 2*medical complexity; decreased activity tolerance; pain; limited endurance and for safety. For these reasons co- tx was indicated to ensure patient could safely and optimally participate in therapy services and " maximize their rehabilitation during treatment time.  PT and OT disciplines addressed separate goals of patient's individualized care plans throughout session.   Nurse Made Aware yes   Exercises   Knee AROM Long Arc Quad Sitting;15 reps   Ankle Pumps Sitting;15 reps   Balance training  standing w/ RW/ dyn reaching   Assessment   Prognosis Good   Problem List Decreased strength;Decreased range of motion;Decreased endurance;Impaired balance;Decreased mobility;Decreased coordination;Pain   Assessment Pt seen for PT session as documented above. Pt progressing w/ all phases of functional mobility and requiring less assist for bed skills; transfers + gait.  Pt w/ improved endurance/ activity tolerance as evidenced by ability to complete multiple gait trials w/ less assist overall. Pt reports still functioning below baseline. PT recommending level 3 resources on d/c w/ rturn back to correction as long as facility able to provide increased A from pt baseline- otherwise would benefit from short rehab stay on d/c.  PT will continue to follow and progress   Goals   Patient Goals to continue to get stronger   STG Expiration Date 12/30/24   PT Treatment Day 1   Plan   Treatment/Interventions ADL retraining;Functional transfer training;LE strengthening/ROM;Therapeutic exercise;Endurance training;Patient/family training;Equipment eval/education;Bed mobility;Gait training;Compensatory technique education;Spoke to nursing;Spoke to case management;OT   Progress Progressing toward goals   PT Frequency 2-3x/wk   Discharge Recommendation   Rehab Resource Intensity Level, PT III (Minimum Resource Intensity)  (see assessment above)   Equipment Recommended Walker   Walker Package Recommended Wheeled walker   AM-PAC Basic Mobility Inpatient   Turning in Flat Bed Without Bedrails 4   Lying on Back to Sitting on Edge of Flat Bed Without Bedrails 3   Moving Bed to Chair 3   Standing Up From Chair Using Arms 3   Walk in Room 3   Climb 3-5 Stairs With  Mauro 1   Basic Mobility Inpatient Raw Score 17   Basic Mobility Standardized Score 39.67   [facility] Highest Level Of Mobility   -HL Goal 5: Stand one or more mins   -HL Achieved 7: Walk 25 feet or more   End of Consult   Patient Position at End of Consult Bedside chair;Bed/Chair alarm activated;All needs within reach       The patient's AM-PAC Basic Mobility Inpatient Short Form Raw Score is 17. A Raw score of greater than 16 suggests the patient may benefit from discharge to home. Please also refer to the recommendation of the Physical Therapist for safe discharge planning.          Dot Loo, PT

## 2024-12-17 NOTE — ASSESSMENT & PLAN NOTE
Lab Results   Component Value Date    HGBA1C 5.9 (H) 04/18/2024       Recent Labs     12/16/24  1128 12/16/24  1602 12/16/24  2103 12/17/24  0604   POCGLU 122 107 109 100       Blood Sugar Average: Last 72 hrs:  (P) 105.625  Continue with SSI coverage and Accu-Cheks  Hypoglycemic protocol  Carb count diet

## 2024-12-17 NOTE — CASE MANAGEMENT
Case Management Discharge Planning Note    Patient name Toro Rodriguez  Location /-01 MRN [id number]  : 1950 Date 2024       Current Admission Date: 12/15/2024  Current Admission Diagnosis:Clot retention of urine   Patient Active Problem List    Diagnosis Date Noted Date Diagnosed    Bladder mass 2024     Hematuria 12/15/2024     Suprapubic catheter dysfunction ([facility]) 12/15/2024     Hydronephrosis 12/15/2024     Clot retention of urine 12/15/2024     Benign hypertension with chronic kidney disease, stage III ([facility]) 12/10/2024     Stage 3b chronic kidney disease ([facility]) 12/10/2024     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 ([facility]) 12/10/2024     Gross hematuria 10/31/2024     Renal lesion 10/16/2024     NSVT (nonsustained ventricular tachycardia) ([facility]) 2024     Urinary tract infection associated with cystostomy catheter  ([facility]) 2024     Encounter to discuss test results 2024     Urethral erosion by catheter, initial encounter  ([facility]) 2024     ABLA (acute blood loss anemia) 2024     Chronic venous hypertension (idiopathic) with ulcer of left lower extremity (CODE) ([facility]) 2024     Encounter for geriatric assessment 2024     Melanoma of back ([facility]) 2024     Dilated cardiomyopathy ([facility]) 12/15/2023     CHUY (acute kidney injury) ([facility]) 2023     Obesity, morbid ([facility]) 2023     Type 2 diabetes mellitus with stage 3b chronic kidney disease, without long-term current use of insulin ([facility]) 11/15/2023     Essential hypertension 11/10/2023     Chronic systolic (congestive) heart failure ([facility]) 11/10/2023     Deep vein thrombosis (DVT) of left lower extremity ([facility]) 11/10/2023     Coronary artery disease involving native coronary artery of native heart 11/10/2023     Benign prostatic hyperplasia with urinary retention        LOS (days): 2  Geometric Mean LOS (GMLOS) (days): 3.7  Days to  GMLOS:1.9     OBJECTIVE:  Risk of Unplanned Readmission Score: 52.8         Current admission status: Inpatient   Preferred Pharm[place]   [facility] [place] [address]  [address] [address]  Phone: [phone] Fax: [phone]    Primary Care Provider: CARLIE Foster    Primary Insurance: MEDICARE  Secondary Insurance: BLUE CROSS    DISCHARGE DETAILS:    Requested Home Health Care         Is the patient interested in HHC at discharge?: Yes  Home Health Discipline requested:: Nursing, Physical Therapy, Occupational Therapy  [facility] Health Agency Name:: [facility] External Referral Reason (only applicable if external HHA name selected): Patient has established relationship with provider  Home Health Follow-Up Provider:: PCP  Home Health Services Needed:: Strengthening/Theraputic Exercises to Improve Function, Evaluate Functional Status and Safety, Gait/ADL Training, Urinary Incontinence Catheter Management  Homebound Criteria Met:: Requires the Assistance of Another Person for Safe Ambulation or to Leave the Home, Uses an Assist Device (i.e. cane, walker, etc)  Supporting Clincal Findings:: Fatigues Easliy in Short Distances, Limited Endurance    Other Referral/Resources/Interventions Provided:  Referral Comments: [facility], [facility] in [facility]

## 2024-12-17 NOTE — ASSESSMENT & PLAN NOTE
Lab Results   Component Value Date    HGB 8.1 (L) 12/17/2024    HGB 9.7 (L) 12/16/2024    HGB 8.7 (L) 12/16/2024   Hgb baseline 10-11 dropped to 7.2 on admission with hematuria   S/p 2 units of PRBCs with improvement in hgb- noted drop from 9.7 yesterday to 8.1- repeat hgb this afternoon   Transfuse for hgb < 7.0

## 2024-12-17 NOTE — PROGRESS NOTES
Progress Note - Urology   Name: Toro Rodriguez 74 y.o. male I MRN: [id number]  Unit/Bed#: -01 I Date of Admission: 12/15/2024   Date of Service: 12/17/2024 I Hospital Day: 2    Assessment & Plan  Clot retention of urine  S/p cystoscopy with clot evacuation, fulguration of bleeding vessels and extensive transurethral resection of bladder tumor and placement of penile urethral catheter 12/15  Unable to visualize right ureter; will need IR consult for PCN if increased CHUY, worsening hydronephrosis or sepsis  WBC 8.39  Creat 1.85, stable  Afebrile, vital signs stable  Patient will maintain both urethral Colon catheter and SPT at this time  Suprapubic tube to be capped and Colon catheter to gravity drainage  Plan for urethral Colon catheter removal in urology office in approximately 1 week  CHUY (acute kidney injury) (HCC)  Creat 1.85  Unable to visualize ureter during cystoscopy, clot evacuation, TURBT 12/15  IR consult for possible PCN versus PCNU if creatinine trends up or if patient decompensates.  ureter was unable to be visualized during surgical procedure  Medical optimization per primary team  Continue to trend labs  Bladder mass  S/p transurethral resection of bladder tumor 12/15  Follow-up with urology outpatient in approximately 2 weeks for pathology review and plan of care  Hydronephrosis  Persistent right hydronephrosis with probable small calculus in the distal right ureter which may be nonobstructing  Ultrasound 12/16: Persistent moderate right hydro; ureters not visualized  Creat 1.85  Unable to access right ureter during cystoscopy, TURBT and clot evacuation 12/15  Will need IR consult for possible PCN versus PCNU if creatinine trends up  Patient would like to avoid nephrostomy tube if possible  Continue to hold Eliquis for now  trend labs  Afebrile, vital signs stable        Subjective   S/p cystoscopy, clot evacuation, TURBT with inability to access right ureter.  Possible right ureteral  nonobstructing stone.  Repeat ultrasound kidney and bladder ongoing moderate right hydronephrosis.  Ureters not visualized.  Creatinine is stable at 1.85.  He has been afebrile with stable vital signs and no leukocytosis.    Objective :  Temp:  [98 °F (36.7 °C)-99.2 °F (37.3 °C)] 98.9 °F (37.2 °C)  HR:  [60-76] 70  BP: (100-138)/(52-61) 138/56  Resp:  [16-36] 16  SpO2:  [88 %-97 %] 93 %  O2 Device: None (Room air)  Nasal Cannula O2 Flow Rate (L/min):  [2 L/min] 2 L/min    I/O         12/15 0701  12/16 0700 12/16 0701 12/17 0700 12/17 0701  12/18 0700    P.O. 680 1380     I.V. (mL/kg) 830 (9.9) 30 (0.4)     IV Piggyback 150 50     Total Intake(mL/kg) 1660 (19.7) 1460 (17.4)     Urine (mL/kg/hr) 1300 2175 (1.1)     Blood 600      Total Output 1900 2175     Net -240 -715                  Lines/Drains/Airways       Active Status       Name Placement date Placement time Site Days    Urethral Catheter Straight-tip;Three way 24 Fr. 12/15/24  1639  Straight-tip;Three way  1    Suprapubic Catheter 16 Fr. 12/03/24  1019  --  13    Continuous Bladder Irrigation Three-way 12/15/24  --  Three-way  2                  Physical Exam  Vitals reviewed.   Constitutional:       General: He is not in acute distress.     Appearance: Normal appearance. He is not ill-appearing, toxic-appearing or diaphoretic.   HENT:      Head: Normocephalic and atraumatic.   Eyes:      General: No scleral icterus.  Cardiovascular:      Rate and Rhythm: Normal rate.   Pulmonary:      Effort: Pulmonary effort is normal. No respiratory distress.   Abdominal:      General: Abdomen is flat. There is no distension.   Genitourinary:     Comments: Off CBI.  Has Colon catheter and SP tube to gravity drainage.  With pale yellow urine with sediment  Musculoskeletal:         General: No swelling.   Skin:     General: Skin is warm and dry.      Coloration: Skin is not jaundiced or pale.      Findings: No rash.   Neurological:      General: No focal deficit present.       Mental Status: He is alert and oriented to person, place, and time.      Gait: Gait normal.   Psychiatric:         Mood and Affect: Mood normal.         Behavior: Behavior normal.           Lab Results: I have reviewed the following results:  Recent Labs     12/15/24  0251 12/15/24  0804 12/17/24  0437   WBC 7.27   < > 8.39   HGB 10.4*   < > 8.1*   HCT 32.2*   < > 25.6*      < > 206   SODIUM 137   < > 135   K 3.9   < > 4.6      < > 105   CO2 22   < > 25   BUN 36*   < > 26*   CREATININE 2.02*   < > 1.85*   GLUC 103   < > 98   CAIONIZED  --    < > 1.09*   MG 2.1   < > 2.1   PHOS 4.3*   < > 3.3   AST 14  --   --    ALT 12  --   --    ALB 3.4*  --   --    TBILI 0.36  --   --    ALKPHOS 70  --   --    PTT 31  --   --    INR 1.09  --   --     < > = values in this interval not displayed.       Imaging Results Review: I reviewed radiology reports from this admission including: CT abdomen/pelvis.  Other Study Results Review: No additional pertinent studies reviewed.    VTE Pharmacologic Prophylaxis: VTE covered by:    None

## 2024-12-17 NOTE — ASSESSMENT & PLAN NOTE
Pt initially presented to outside hospital with gross hematuria and had three-way Colon placed and CBI started. He was noted to have labile blood pressures so he was transferred to [facility] for cystoscopy and admitted to the ICU post op for hypotension and hypoxia. He did require 2 units PRBCs due to ongoing blood loss and anemia.   S/p cystoscopy with clot evacuation, fulguration of bleeding vessels and extensive transurethral resection of bladder tumor and placement of penile urethral catheter 12/15  Unable to visualize right ureter in the OR; per urology no need for IR consult for PCN at this time as creatinine has improved.   CBI weaned off 12/16 around 11am   Patient will maintain both urethral Colon catheter and SPT at this time- plan for urethral Colon catheter removal in urology office in approximately 1 week  Monitor hgb

## 2024-12-17 NOTE — ASSESSMENT & PLAN NOTE
Unable to visualize right ureter in OR; per urology no need for IR consult for PCN at this time as creatinine has improved.   Renal US 12/17- Moderate right hydronephrosis persists.

## 2024-12-17 NOTE — ASSESSMENT & PLAN NOTE
Noted chronic suprapubic catheter  Urethral Colon placed postoperatively after cystoscopy plan to remove in 1 week outpatient with urology  Continue with Proscar and Flomax

## 2024-12-17 NOTE — ASSESSMENT & PLAN NOTE
Urine culture + Pseudomonas  Previously was on IV ceftriaxone will transition to oral ciprofloxacin given sensitivities

## 2024-12-18 LAB
ANION GAP SERPL CALCULATED.3IONS-SCNC: 6 MMOL/L (ref 4–13)
BUN SERPL-MCNC: 28 MG/DL (ref 5–25)
CALCIUM SERPL-MCNC: 8.2 MG/DL (ref 8.4–10.2)
CHLORIDE SERPL-SCNC: 105 MMOL/L (ref 96–108)
CO2 SERPL-SCNC: 24 MMOL/L (ref 21–32)
CREAT SERPL-MCNC: 1.8 MG/DL (ref 0.6–1.3)
ERYTHROCYTE [DISTWIDTH] IN BLOOD BY AUTOMATED COUNT: 16 % (ref 11.6–15.1)
GFR SERPL CREATININE-BSD FRML MDRD: 35 ML/MIN/1.73SQ M
GLUCOSE SERPL-MCNC: 90 MG/DL (ref 65–140)
GLUCOSE SERPL-MCNC: 95 MG/DL (ref 65–140)
GLUCOSE SERPL-MCNC: 97 MG/DL (ref 65–140)
HCT VFR BLD AUTO: 27.1 % (ref 36.5–49.3)
HGB BLD-MCNC: 8.5 G/DL (ref 12–17)
MCH RBC QN AUTO: 30.2 PG (ref 26.8–34.3)
MCHC RBC AUTO-ENTMCNC: 31.4 G/DL (ref 31.4–37.4)
MCV RBC AUTO: 96 FL (ref 82–98)
PLATELET # BLD AUTO: 226 THOUSANDS/UL (ref 149–390)
PMV BLD AUTO: 10 FL (ref 8.9–12.7)
POTASSIUM SERPL-SCNC: 4.6 MMOL/L (ref 3.5–5.3)
RBC # BLD AUTO: 2.81 MILLION/UL (ref 3.88–5.62)
SODIUM SERPL-SCNC: 135 MMOL/L (ref 135–147)
WBC # BLD AUTO: 7.85 THOUSAND/UL (ref 4.31–10.16)

## 2024-12-18 PROCEDURE — 80048 BASIC METABOLIC PNL TOTAL CA: CPT | Performed by: NURSE PRACTITIONER

## 2024-12-18 PROCEDURE — 82948 REAGENT STRIP/BLOOD GLUCOSE: CPT

## 2024-12-18 PROCEDURE — 85027 COMPLETE CBC AUTOMATED: CPT | Performed by: NURSE PRACTITIONER

## 2024-12-18 PROCEDURE — 97530 THERAPEUTIC ACTIVITIES: CPT

## 2024-12-18 PROCEDURE — 99232 SBSQ HOSP IP/OBS MODERATE 35: CPT | Performed by: NURSE PRACTITIONER

## 2024-12-18 PROCEDURE — 99232 SBSQ HOSP IP/OBS MODERATE 35: CPT | Performed by: FAMILY MEDICINE

## 2024-12-18 RX ORDER — SENNOSIDES 8.6 MG
1 TABLET ORAL
Status: DISCONTINUED | OUTPATIENT
Start: 2024-12-18 | End: 2024-12-19

## 2024-12-18 RX ADMIN — FINASTERIDE 5 MG: 5 TABLET, FILM COATED ORAL at 09:14

## 2024-12-18 RX ADMIN — METOPROLOL SUCCINATE 25 MG: 25 TABLET, EXTENDED RELEASE ORAL at 09:22

## 2024-12-18 RX ADMIN — AMIODARONE HYDROCHLORIDE 200 MG: 200 TABLET ORAL at 09:14

## 2024-12-18 RX ADMIN — AMLODIPINE BESYLATE 5 MG: 5 TABLET ORAL at 09:14

## 2024-12-18 RX ADMIN — CIPROFLOXACIN HYDROCHLORIDE 500 MG: 500 TABLET, FILM COATED ORAL at 09:14

## 2024-12-18 RX ADMIN — DOCUSATE SODIUM 100 MG: 100 CAPSULE, LIQUID FILLED ORAL at 17:33

## 2024-12-18 RX ADMIN — METOPROLOL SUCCINATE 25 MG: 25 TABLET, EXTENDED RELEASE ORAL at 21:43

## 2024-12-18 RX ADMIN — ATORVASTATIN CALCIUM 40 MG: 40 TABLET, FILM COATED ORAL at 17:33

## 2024-12-18 RX ADMIN — CIPROFLOXACIN HYDROCHLORIDE 500 MG: 500 TABLET, FILM COATED ORAL at 21:43

## 2024-12-18 RX ADMIN — POLYETHYLENE GLYCOL 3350 17 G: 17 POWDER, FOR SOLUTION ORAL at 09:14

## 2024-12-18 RX ADMIN — CHLORHEXIDINE GLUCONATE 0.12% ORAL RINSE 15 ML: 1.2 LIQUID ORAL at 21:43

## 2024-12-18 RX ADMIN — TAMSULOSIN HYDROCHLORIDE 0.4 MG: 0.4 CAPSULE ORAL at 17:33

## 2024-12-18 RX ADMIN — SENNOSIDES 8.6 MG: 8.6 TABLET, FILM COATED ORAL at 21:43

## 2024-12-18 RX ADMIN — CHLORHEXIDINE GLUCONATE 0.12% ORAL RINSE 15 ML: 1.2 LIQUID ORAL at 09:13

## 2024-12-18 RX ADMIN — DOCUSATE SODIUM 100 MG: 100 CAPSULE, LIQUID FILLED ORAL at 09:14

## 2024-12-18 NOTE — PROGRESS NOTES
Progress Note - Urology   Name: Toro Rodriguez 74 y.o. male I MRN: [id number]  Unit/Bed#: -01 I Date of Admission: 12/15/2024   Date of Service: 12/18/2024 I Hospital Day: 3    Assessment & Plan  Clot retention of urine  S/p cystoscopy with clot evacuation, fulguration of bleeding vessels and extensive transurethral resection of bladder tumor and placement of penile urethral catheter 12/15  Unable to visualize right ureter; will need IR consult for PCN if increased CHUY, worsening hydronephrosis or sepsis  WBC 7.85  Creat 1.80, stable  Afebrile, vital signs stable  Patient will maintain both urethral Colon catheter and SPT at this time  Ordered suprapubic tube to be capped and Colon catheter to gravity drainage 12/17  Plan for urethral Colon catheter removal in urology office in approximately 1 week  Acute kidney injury superimposed on stage 3b chronic kidney disease (HCC)  Creat 1.80  Unable to visualize ureter during cystoscopy, clot evacuation, TURBT 12/15  IR consult for possible PCN versus PCNU if creatinine trends up or if patient decompensates.  ureter was unable to be visualized during surgical procedure  Medical optimization per primary team  Continue to trend labs  Bladder mass  S/p transurethral resection of bladder tumor 12/15  Follow-up with urology outpatient in approximately 2 weeks for pathology review and plan of care  Hydronephrosis  Persistent right hydronephrosis with probable small calculus in the distal right ureter which may be nonobstructing  Ultrasound 12/16: Persistent moderate right hydro; ureters not visualized  Creat 1.85  Unable to access right ureter during cystoscopy, TURBT and clot evacuation 12/15  Will need IR consult for possible PCN versus PCNU if creatinine trends up  Patient would like to avoid nephrostomy tube if possible  Continue to hold Eliquis for now  trend labs  Afebrile, vital signs stable  Benign prostatic hyperplasia with urinary retention  Urodynamic studies  completed, will follow-up with urology outpatient to review results   managed with suprapubic tube exchanged by IR 12/3 due to difficulty with exchange in office  SPT exchange 6 weeks from insertion to allow for maturation of tract  Acute cystitis with hematuria  Urine culture positive Pseudomonas aeruginosa   continue Cipro per primary team        Subjective   Patient resting in bed offers no complaints.  Creatinine has remained stable.  He is afebrile with stable vital signs.  His suprapubic tube is to gravity drainage draining yellow urine with some sediment.  Urethral Colon catheter also to gravity drainage with yellow urine and sediment noted.  His suprapubic tube was just upsized by interventional radiology on 12/3 after unsuccessful exchange in office on 11/7 at which time IR placed a temporary 14 Georgian all-purpose drain.    Objective :  Temp:  [97.8 °F (36.6 °C)-98.2 °F (36.8 °C)] 98.2 °F (36.8 °C)  HR:  [52-68] 68  BP: (100-138)/(40-54) 135/54  Resp:  [18] 18  SpO2:  [89 %-95 %] 94 %  O2 Device: None (Room air)    I/O         12/16 0701  12/17 0700 12/17 0701  12/18 0700 12/18 0701  12/19 0700    P.O. 1380 730     I.V. (mL/kg) 30 (0.4)      IV Piggyback 50      Total Intake(mL/kg) 1460 (17.4) 730 (8.6)     Urine (mL/kg/hr) 2175 (1.1) 2200 (1.1) 450 (1.7)    Blood       Total Output 2175 2200 450    Net -715 -1470 -450                 Lines/Drains/Airways       Active Status       Name Placement date Placement time Site Days    Urethral Catheter Straight-tip;Three way 24 Fr. 12/15/24  1639  Straight-tip;Three way  2    Suprapubic Catheter 16 Fr. 12/03/24  1019  --  14    Continuous Bladder Irrigation Three-way 12/15/24  --  Three-way  3                  Physical Exam  Vitals reviewed.   Constitutional:       General: He is not in acute distress.     Appearance: Normal appearance. He is not ill-appearing, toxic-appearing or diaphoretic.   HENT:      Head: Normocephalic and atraumatic.   Eyes:      General:  No scleral icterus.  Cardiovascular:      Rate and Rhythm: Normal rate.   Pulmonary:      Effort: Pulmonary effort is normal. No respiratory distress.   Abdominal:      General: Abdomen is flat. There is no distension.      Palpations: Abdomen is soft.      Tenderness: There is no abdominal tenderness. There is no guarding or rebound.   Genitourinary:     Comments: Suprapubic tube and urethral Colon catheter draining clear yellow urine.  Both are to gravity bags at this time  Musculoskeletal:         General: No swelling.   Skin:     General: Skin is warm and dry.      Coloration: Skin is not jaundiced or pale.      Findings: No rash.   Neurological:      General: No focal deficit present.      Mental Status: He is alert and oriented to person, place, and time.      Gait: Gait normal.   Psychiatric:         Mood and Affect: Mood normal.         Behavior: Behavior normal.         Thought Content: Thought content normal.         Judgment: Judgment normal.           Lab Results: I have reviewed the following results:  Recent Labs     12/17/24  0437 12/17/24  1530 12/18/24  0511   WBC 8.39  --  7.85   HGB 8.1*   < > 8.5*   HCT 25.6*   < > 27.1*     --  226   SODIUM 135  --  135   K 4.6  --  4.6     --  105   CO2 25  --  24   BUN 26*  --  28*   CREATININE 1.85*  --  1.80*   GLUC 98  --  90   CAIONIZED 1.09*  --   --    MG 2.1  --   --    PHOS 3.3  --   --     < > = values in this interval not displayed.       Imaging Results Review: I reviewed radiology reports from this admission including: CT abdomen/pelvis and Ultrasound(s).  Other Study Results Review: No additional pertinent studies reviewed.    VTE Pharmacologic Prophylaxis: VTE covered by:    None     VTE Mechanical Prophylaxis: sequential compression device

## 2024-12-18 NOTE — ASSESSMENT & PLAN NOTE
Lab Results   Component Value Date    HGBA1C 5.9 (H) 04/18/2024       Recent Labs     12/17/24  1601 12/17/24  2155 12/18/24  0552 12/18/24  1047   POCGLU 101 100 95 97       Blood Sugar Average: Last 72 hrs:  (P) 103.1461181301222699  Glucose has been acceptable, diet controlled at home; will discontinue sliding scale  Hypoglycemic protocol  Carb count diet

## 2024-12-18 NOTE — ASSESSMENT & PLAN NOTE
Currently on MiraLAX and Colace, still no BM in a couple days, feels bloated  Will add senna at bedtime

## 2024-12-18 NOTE — ASSESSMENT & PLAN NOTE
Pt initially presented to outside hospital with gross hematuria and had three-way Farmer placed and CBI started. He was noted to have labile blood pressures so he was transferred to [facility] for cystoscopy and admitted to the ICU post op for hypotension and hypoxia. He did require 2 units PRBCs due to ongoing blood loss and anemia.   S/p cystoscopy with clot evacuation, fulguration of bleeding vessels and extensive transurethral resection of bladder tumor and placement of penile urethral catheter 12/15  Unable to visualize right ureter in the OR; per urology no need for IR consult for PCN at this time as creatinine has improved.   CBI weaned off 12/16 around 11am   Patient currently has both urethral Farmer catheter and SPT at this time- in discussion with urology team- possible farmer removal prior to d/c, otherwise-Farmer catheter removal in urology office in approximately 1 week  Continue to monitor Hg and creatinine

## 2024-12-18 NOTE — ASSESSMENT & PLAN NOTE
Lab Results   Component Value Date    HGB 8.5 (L) 12/18/2024    HGB 9.0 (L) 12/17/2024    HGB 8.1 (L) 12/17/2024   Hgb baseline 10-11 dropped to 7.2 on admission with hematuria - S/p 2 units of PRBCs with improvement in hgb  Transfuse for hgb < 7.0  Today, 12/18 Hg is 8.5   Niacinamide Pregnancy And Lactation Text: These medications are considered safe during pregnancy.

## 2024-12-18 NOTE — ASSESSMENT & PLAN NOTE
Urine culture + Pseudomonas  Previously was on IV ceftriaxone will transition to oral ciprofloxacin given sensitivities, complete 7 days of antibiotic till 12/22

## 2024-12-18 NOTE — ASSESSMENT & PLAN NOTE
Wt Readings from Last 3 Encounters:   12/18/24 85 kg (187 lb 6.3 oz)   12/15/24 86.6 kg (190 lb 14.7 oz)   12/10/24 87.5 kg (193 lb)   Echo 2/24- LV wall thickness is mildly increased EF 35-40%, systolic function is moderately reduced, moderate global hypokinesis, G1DD. LA is moderately dilated and RA is dilated. MV moderate regurgitation.  Follows with Dr. Roach outpatient   PTA on lasix 20mg daily- currently on hold given CHUY and hypotension post op, consider resuming in the next 24 hrs   C/w GDMT:  BB:  metoprolol 25mg q12h   ACE/ARB/ARNI:  lisinopril 5mg daily on hold d/t hypotension and CHUY post op  MRA:  n/a  SGLT2i: n/a  Currently on room air   Daily weights, Strict I & Os  Cardiac diet, low sodium <2g, fluid restriction 2000ml

## 2024-12-18 NOTE — TELEPHONE ENCOUNTER
All appts scheduled. Please call daughter and confirm TOV and Path review appt. DV would like daughter to come to appt with him. The appt with felix can be canceled

## 2024-12-18 NOTE — PROGRESS NOTES
Progress Note - Hospitalist   Name: Toro Rodriguez 74 y.o. male I MRN: [id number]  Unit/Bed#: -01 I Date of Admission: 12/15/2024   Date of Service: 12/18/2024 I Hospital Day: 3    Assessment & Plan  Clot retention of urine  Pt initially presented to outside hospital with gross hematuria and had three-way Farmer placed and CBI started. He was noted to have labile blood pressures so he was transferred to [facility] for cystoscopy and admitted to the ICU post op for hypotension and hypoxia. He did require 2 units PRBCs due to ongoing blood loss and anemia.   S/p cystoscopy with clot evacuation, fulguration of bleeding vessels and extensive transurethral resection of bladder tumor and placement of penile urethral catheter 12/15  Unable to visualize right ureter in the OR; per urology no need for IR consult for PCN at this time as creatinine has improved.   CBI weaned off 12/16 around 11am   Patient currently has both urethral Farmer catheter and SPT at this time- in discussion with urology team- possible farmer removal prior to d/c, otherwise-Farmer catheter removal in urology office in approximately 1 week  Continue to monitor Hg and creatinine  Acute kidney injury superimposed on stage 3b chronic kidney disease (HCC)  Lab Results   Component Value Date    CREATININE 1.80 (H) 12/18/2024    CREATININE 1.85 (H) 12/17/2024    CREATININE 1.83 (H) 12/16/2024   Creatinine reviewed, appears BL has increased since September to 1.5-1.8  Creat peaked at 2.09  Creat continues to improve 1.80 from 1.85 yesterday  Avoid nephrotoxic drugs and hypotension- holding lasix and lisinopril    C/w SPC and farmer for now  Recheck BMP in the morning  ABLA (acute blood loss anemia)  Lab Results   Component Value Date    HGB 8.5 (L) 12/18/2024    HGB 9.0 (L) 12/17/2024    HGB 8.1 (L) 12/17/2024   Hgb baseline 10-11 dropped to 7.2 on admission with hematuria - S/p 2 units of PRBCs with improvement in hgb  Transfuse for hgb < 7.0  Today, 12/18 Hg is  8.5  Essential hypertension  PTA norvasc 5mg daily; metoprolol 25mg q12h; lisinopril 5mg daily and lasix 20mg daily   Lisinopril and Lasix on hold given CHUY and hypotension postoperatively  Continue with Norvasc 5 mg daily metoprolol 25 mg every 12 hours  Blood pressure been acceptable  Coronary artery disease involving native coronary artery of native heart  ASA on hold  Continue with Lipitor 40 mg daily and metoprolol 25 mg every 12 hours  Denies chest pain  Left heart cath in 2023 showing Ost RCA to Prox RCA lesion is 100% stenosed.   Type 2 diabetes mellitus with stage 3b chronic kidney disease, without long-term current use of insulin ([facility])  Lab Results   Component Value Date    HGBA1C 5.9 (H) 04/18/2024       Recent Labs     12/17/24  1601 12/17/24  2155 12/18/24  0552 12/18/24  1047   POCGLU 101 100 95 97       Blood Sugar Average: Last 72 hrs:  (P) 103.8927731428161089  Glucose has been acceptable, diet controlled at home; will discontinue sliding scale  Hypoglycemic protocol  Carb count diet  Benign prostatic hyperplasia with urinary retention  Noted chronic suprapubic catheter  Urethral Colon placed postoperatively after cystoscopy plan to remove in 1 week outpatient with urology  Continue with Proscar and Flomax  NSVT (nonsustained ventricular tachycardia) ([facility])  C/w metoprolol 25mg q12h and amiodarone 200mg daily   Hydronephrosis  Unable to visualize right ureter in OR; per urology no need for IR consult for PCN at this time as creatinine has improved.   Renal US 12/17- Moderate right hydronephrosis persists.    Bladder mass  S/p transurethral resection of bladder tumor 12/15  Follow-up with urology outpatient in approximately 2 weeks for pathology review and plan of care  HFrEF (heart failure with reduced ejection fraction) ([facility])  Wt Readings from Last 3 Encounters:   12/18/24 85 kg (187 lb 6.3 oz)   12/15/24 86.6 kg (190 lb 14.7 oz)   12/10/24 87.5 kg (193 lb)   Echo 2/24- LV wall thickness is mildly  increased EF 35-40%, systolic function is moderately reduced, moderate global hypokinesis, G1DD. LA is moderately dilated and RA is dilated. MV moderate regurgitation.  Follows with Dr. Roach outpatient   PTA on lasix 20mg daily- currently on hold given CHUY and hypotension post op, consider resuming in the next 24 hrs   C/w GDMT:  BB:  metoprolol 25mg q12h   ACE/ARB/ARNI:  lisinopril 5mg daily on hold d/t hypotension and CHUY post op  MRA:  n/a  SGLT2i: n/a  Currently on room air   Daily weights, Strict I & Os  Cardiac diet, low sodium <2g, fluid restriction 2000ml  Slow transit constipation  Currently on MiraLAX and Colace, still no BM in a couple days, feels bloated  Will add senna at bedtime  Acute cystitis with hematuria  Urine culture + Pseudomonas  Previously was on IV ceftriaxone will transition to oral ciprofloxacin given sensitivities, complete 7 days of antibiotic till 12/22    VTE Pharmacologic Prophylaxis:   High Risk (Score >/= 5) - Pharmacological DVT Prophylaxis Contraindicated. Sequential Compression Devices Ordered.    Mobility:   Basic Mobility Inpatient Raw Score: 17  JH-HLM Goal: 5: Stand one or more mins  JH-HLM Achieved: 6: Walk 10 steps or more  JH-HLM Goal achieved. Continue to encourage appropriate mobility.    Patient Centered Rounds: I performed bedside rounds with nursing staff today.   Discussions with Specialists or Other Care Team Provider: urology    Education and Discussions with Family / Patient:  patient.     Current Length of Stay: 3 day(s)  Current Patient Status: Inpatient   Certification Statement: The patient will continue to require additional inpatient hospital stay due to monitor Hg and creatinine  Discharge Plan: Anticipate discharge in 24-48 hrs to home.    Code Status: Level 1 - Full Code    Subjective   Patient seen and  examined at bedside.  He offers no complaints, resting in bed comfortably.  Urine is clear in the Colon    Objective :  Temp:  [97.8 °F (36.6 °C)-98.2 °F  (36.8 °C)] 98.2 °F (36.8 °C)  HR:  [52-68] 68  BP: (133-138)/(50-54) 135/54  Resp:  [18] 18  SpO2:  [89 %-95 %] 94 %  O2 Device: None (Room air)    Body mass index is 29.35 kg/m².     Input and Output Summary (last 24 hours):     Intake/Output Summary (Last 24 hours) at 12/18/2024 1142  Last data filed at 12/18/2024 0701  Gross per 24 hour   Intake 730 ml   Output 2650 ml   Net -1920 ml       Physical Exam  Constitutional:       General: He is not in acute distress.     Appearance: He is not ill-appearing.   Cardiovascular:      Rate and Rhythm: Normal rate and regular rhythm.      Pulses: Normal pulses.      Heart sounds: Normal heart sounds. No murmur heard.  Pulmonary:      Effort: Pulmonary effort is normal. No respiratory distress.      Breath sounds: No wheezing or rales.   Abdominal:      General: Bowel sounds are normal. There is no distension.      Palpations: Abdomen is soft.      Tenderness: There is no abdominal tenderness.   Genitourinary:     Comments: Suprapubic catheter and urethral Colon catheter in place  Musculoskeletal:         General: No swelling or tenderness.   Skin:     General: Skin is warm and dry.      Findings: No erythema or rash.   Neurological:      General: No focal deficit present.      Mental Status: He is alert. Mental status is at baseline.   Psychiatric:         Attention and Perception: Attention normal.         Mood and Affect: Mood normal.           Lines/Drains:  Lines/Drains/Airways       Active Status       Name Placement date Placement time Site Days    Urethral Catheter Straight-tip;Three way 24 Fr. 12/15/24  1639  Straight-tip;Three way  2    Suprapubic Catheter 16 Fr. 12/03/24  1019  --  15    Continuous Bladder Irrigation Three-way 12/15/24  --  Three-way  3                  Urinary Catheter:  Goal for removal: N/A- Discharging with Colon                 Lab Results: I have reviewed the following results:   Results from last 7 days   Lab Units 12/18/24  1076  12/15/24  0251 12/14/24  2235   WBC Thousand/uL 7.85   < > 6.39   HEMOGLOBIN g/dL 8.5*   < > 11.6*   HEMATOCRIT % 27.1*   < > 35.5*   PLATELETS Thousands/uL 226   < > 314   SEGS PCT %  --   --  66   LYMPHO PCT %  --   --  18   MONO PCT %  --   --  10   EOS PCT %  --   --  5    < > = values in this interval not displayed.     Results from last 7 days   Lab Units 12/18/24  0511 12/15/24  1843 12/15/24  0251   SODIUM mmol/L 135   < > 137   POTASSIUM mmol/L 4.6   < > 3.9   CHLORIDE mmol/L 105   < > 106   CO2 mmol/L 24   < > 22   BUN mg/dL 28*   < > 36*   CREATININE mg/dL 1.80*   < > 2.02*   ANION GAP mmol/L 6   < > 9   CALCIUM mg/dL 8.2*   < > 8.0*   ALBUMIN g/dL  --   --  3.4*   TOTAL BILIRUBIN mg/dL  --   --  0.36   ALK PHOS U/L  --   --  70   ALT U/L  --   --  12   AST U/L  --   --  14   GLUCOSE RANDOM mg/dL 90   < > 103    < > = values in this interval not displayed.     Results from last 7 days   Lab Units 12/15/24  0251   INR  1.09     Results from last 7 days   Lab Units 12/18/24  1047 12/18/24  0552 12/17/24  2155 12/17/24  1601 12/17/24  1045 12/17/24  0604 12/16/24  2103 12/16/24  1602 12/16/24  1128 12/16/24  0704 12/15/24  2148 12/15/24  1838   POC GLUCOSE mg/dl 97 95 100 101 105 100 109 107 122 102 99 98               Recent Cultures (last 7 days):   Results from last 7 days   Lab Units 12/15/24  0012 12/13/24  1621   URINE CULTURE  30,000-39,000 cfu/ml Pseudomonas aeruginosa* >100,000 cfu/ml Pseudomonas aeruginosa*  Streptococcus species*  Lactobacillus species*             Last 24 Hours Medication List:     Current Facility-Administered Medications:     acetaminophen (TYLENOL) tablet 650 mg, Q6H PRN    amiodarone tablet 200 mg, Daily    amLODIPine (NORVASC) tablet 5 mg, Daily    atorvastatin (LIPITOR) tablet 40 mg, Daily With Dinner    chlorhexidine (PERIDEX) 0.12 % oral rinse 15 mL, Q12H ARI    ciprofloxacin (CIPRO) tablet 500 mg, Q12H ARI    dextromethorphan-guaiFENesin (ROBITUSSIN DM) oral syrup 10  mL, Q4H PRN    docusate sodium (COLACE) capsule 100 mg, BID    finasteride (PROSCAR) tablet 5 mg, Daily    HYDROcodone Bit-Homatrop MBr (HYCODAN) oral syrup 2.5 mL, Q4H PRN    HYDROmorphone HCl (DILAUDID) injection 0.2 mg, Q2H PRN    insulin lispro (HumALOG/ADMELOG) 100 units/mL subcutaneous injection 1-6 Units, TID AC **AND** Fingerstick Glucose (POCT), TID AC    insulin lispro (HumALOG/ADMELOG) 100 units/mL subcutaneous injection 1-6 Units, HS    metoprolol succinate (TOPROL-XL) 24 hr tablet 25 mg, Q12H ARI    naloxone (NARCAN) 0.04 mg/mL syringe 0.04 mg, Q1MIN PRN    ondansetron (ZOFRAN) injection 4 mg, Q6H PRN    oxybutynin (DITROPAN) tablet 5 mg, TID PRN    oxyCODONE (ROXICODONE) split tablet 2.5 mg, Q4H PRN **OR** oxyCODONE (ROXICODONE) IR tablet 5 mg, Q4H PRN    phenol (CHLORASEPTIC) 1.4 % mucosal liquid 1 spray, Q2H PRN    polyethylene glycol (MIRALAX) packet 17 g, Daily    senna (SENOKOT) tablet 8.6 mg, HS PRN    senna (SENOKOT) tablet 8.6 mg, HS    tamsulosin (FLOMAX) capsule 0.4 mg, Daily With Dinner    Administrative Statements   Today, Patient Was Seen By: Jordana Mercado MD  I have spent a total time of 35 minutes in caring for this patient on the day of the visit/encounter including Importance of tx compliance, Risk factor reductions, Counseling / Coordination of care, Documenting in the medical record, Reviewing / ordering tests, medicine, procedures  , Obtaining or reviewing history  , and Communicating with other healthcare professionals .    **Please Note: This note may have been constructed using a voice recognition system.**

## 2024-12-18 NOTE — ASSESSMENT & PLAN NOTE
Creat 1.80  Unable to visualize ureter during cystoscopy, clot evacuation, TURBT 12/15  IR consult for possible PCN versus PCNU if creatinine trends up or if patient decompensates.  ureter was unable to be visualized during surgical procedure  Medical optimization per primary team  Continue to trend labs

## 2024-12-18 NOTE — OCCUPATIONAL THERAPY NOTE
Occupational Therapy Progress Note     Patient Name: Toro Rodriguez  Today's Date: 12/18/2024  Problem List  Principal Problem:    Clot retention of urine  Active Problems:    Essential hypertension    Acute kidney injury superimposed on stage 3b chronic kidney disease (HCC)    Coronary artery disease involving native coronary artery of native heart    Type 2 diabetes mellitus with stage 3b chronic kidney disease, without long-term current use of insulin (HCC)    Benign prostatic hyperplasia with urinary retention    ABLA (acute blood loss anemia)    NSVT (nonsustained ventricular tachycardia) (HCC)    Hydronephrosis    Bladder mass    HFrEF (heart failure with reduced ejection fraction) (HCC)    Slow transit constipation    Acute cystitis with hematuria            12/18/24 1101   OT Last Visit   OT Visit Date 12/18/24   Note Type   Note Type Treatment   Pain Assessment   Pain Assessment Tool 0-10   Pain Score No Pain   Effect of Pain on Daily Activities Pt reporting no pain, just feeling bloated and constipated in which MD present in room @ this time   Hospital Pain Intervention(s) Repositioned;Ambulation/increased activity;Emotional support   Restrictions/Precautions   Weight Bearing Precautions Per Order No   Other Precautions Chair Alarm;Bed Alarm;Multiple lines;Fall Risk;Pain  (spurapubic farmer)   Lifestyle   Autonomy pta, pt was mostly I w ADL (showers were supervised). facility manages meds/meals and provides transportation as needed. uses rollator for fm.   Reciprocal Relationships supportive staff; daughter that assists with IADLs   Service to Others retired    Intrinsic Gratification cooking   ADL   Where Assessed Edge of bed   Eating Assistance 5  Supervision/Setup   Eating Deficit Setup   Grooming Assistance 5  Supervision/Setup   Grooming Deficit Setup   UB Dressing Assistance 4  Minimal Assistance   UB Dressing Deficit Thread RUE;Thread LUE;Pull over head;Pull around back;Pull down in back  "  Bed Mobility   Supine to Sit 5  Supervision   Additional items HOB elevated;Bedrails;Increased time required;Verbal cues   Sit to Supine Unable to assess   Additional Comments At end of session, pt left sitting upright in recliner with all functional needs in reach with chair alarm activated   Transfers   Sit to Stand 4  Minimal assistance   Additional items Assist x 1;Increased time required;Verbal cues   Stand to Sit 4  Minimal assistance   Additional items Assist x 1;Increased time required;Verbal cues   Additional Comments w/ RW   Functional Mobility   Functional Mobility 4  Minimal assistance   Additional Comments Pt completed household functional mobility distances to simulate common ADL/IADL routines with Min A x1 w/ RW for safety and support with increased time   Additional items Rolling walker   Subjective   Subjective \"I know I have to get up and move.\"   Cognition   Overall Cognitive Status WFL   Arousal/Participation Alert;Responsive;Arousable;Cooperative   Attention Within functional limits   Orientation Level Oriented X4   Memory Within functional limits   Following Commands Follows one step commands without difficulty   Comments Pt pleasant and cooperative   Activity Tolerance   Activity Tolerance Patient tolerated treatment well;Patient limited by fatigue   Medical Staff Made Aware GLORIA perry MD present @ times throughout   Assessment   Assessment Pt is a 73 yo male who actively participated in skilled OT session on 12/18/2024.  Treatment focused to improve functional transfers with fall prevention strategies, static/dynamic balance, postural/trunk control, proper body mechanics, functional use of b/l UE's, higher level cognitive functions, safety awareness, and overall increased activity tolerance in ADL/IADL/leisure tasks. Upon arrival, pt found lying supine in bed and was agreeable to OT session. Pt performed supine <> sit @ supervision level, performed UB dressing with Min A, and performed STS " with Min A x1 w/ RW. Pt completed household functional mobility distances w/ Min A x1 w/ RW to simulate common distances during ADL/IADL routines in which pt demonstrating good insight when needing to take rest breaks. At the end of the session, pt was left sitting upright in recliner with all functional needs in reach. Pt demonstrates gradual functional improvements towards OT goals however continues to be functioning below occupational baseline and is still limited by the following limitations/impairments which were addressed through skilled instruction: generalized weakness, balance, endurance/activity tolerance, postural/trunk control, strength, pain, and safety awareness. At this time, recommend discharge w/ Level 3 resources when medically stable. The patient's raw score on the AM-PAC Daily Activity Inpatient Short Form is 18. A raw score of less than 19 suggests the patient may benefit from discharge to post-acute rehabilitation services however please refer to the recommendation of the Occupational Therapist for safe discharge planning.  Established OT goals will be continued 2-3x/wk to address acute care needs and underlying performance skills to maximize occupational performance and safety to return to [facility].   Plan   Treatment Interventions ADL retraining;Functional transfer training;UE strengthening/ROM;Endurance training;Patient/family training;Equipment evaluation/education;Compensatory technique education;Continued evaluation;Energy conservation;Activityengagement   Goal Expiration Date 12/30/24   OT Treatment Day 2   OT Frequency 2-3x/wk   Discharge Recommendation   Rehab Resource Intensity Level, OT III (Minimum Resource Intensity)   AM-PAC Daily Activity Inpatient   Lower Body Dressing 2   Bathing 3   Toileting 2   Upper Body Dressing 3   Grooming 4   Eating 4   Daily Activity Raw Score 18   Daily Activity Standardized Score (Calc for Raw Score >=11) 38.66   AM-PAC Applied Cognition Inpatient    Following a Speech/Presentation 3   Understanding Ordinary Conversation 4   Taking Medications 4   Remembering Where Things Are Placed or Put Away 4   Remembering List of 4-5 Errands 4   Taking Care of Complicated Tasks 3   Applied Cognition Raw Score 22   Applied Cognition Standardized Score 47.83   End of Consult   Education Provided Yes   Patient Position at End of Consult Bedside chair;Bed/Chair alarm activated;All needs within reach   Nurse Communication Nurse aware of consult     Carlita Leong MS, OTR/L

## 2024-12-18 NOTE — PLAN OF CARE
Problem: Prexisting or High Potential for Compromised Skin Integrity  Goal: Skin integrity is maintained or improved  Description: INTERVENTIONS:  - Identify patients at risk for skin breakdown  - Assess and monitor skin integrity  - Assess and monitor nutrition and hydration status  - Monitor labs   - Assess for incontinence   - Turn and reposition patient  - Assist with mobility/ambulation  - Relieve pressure over bony prominences  - Avoid friction and shearing  - Provide appropriate hygiene as needed including keeping skin clean and dry  - Evaluate need for skin moisturizer/barrier cream  - Collaborate with interdisciplinary team   - Patient/family teaching  - Consider wound care consult   Outcome: Progressing     Problem: PAIN - ADULT  Goal: Verbalizes/displays adequate comfort level or baseline comfort level  Description: Interventions:  - Encourage patient to monitor pain and request assistance  - Assess pain using appropriate pain scale  - Administer analgesics based on type and severity of pain and evaluate response  - Implement non-pharmacological measures as appropriate and evaluate response  - Consider cultural and social influences on pain and pain management  - Notify physician/advanced practitioner if interventions unsuccessful or patient reports new pain  Outcome: Progressing     Problem: INFECTION - ADULT  Goal: Absence or prevention of progression during hospitalization  Description: INTERVENTIONS:  - Assess and monitor for signs and symptoms of infection  - Monitor lab/diagnostic results  - Monitor all insertion sites, i.e. indwelling lines, tubes, and drains  - Monitor endotracheal if appropriate and nasal secretions for changes in amount and color  - [place] appropriate cooling/warming therapies per order  - Administer medications as ordered  - Instruct and encourage patient and family to use good hand hygiene technique  - Identify and instruct in appropriate isolation precautions for  identified infection/condition  Outcome: Progressing  Goal: Absence of fever/infection during neutropenic period  Description: INTERVENTIONS:  - Monitor WBC    Outcome: Progressing     Problem: Knowledge Deficit  Goal: Patient/family/caregiver demonstrates understanding of disease process, treatment plan, medications, and discharge instructions  Description: Complete learning assessment and assess knowledge base.  Interventions:  - Provide teaching at level of understanding  - Provide teaching via preferred learning methods  Outcome: Progressing

## 2024-12-18 NOTE — TELEPHONE ENCOUNTER
Daughter has several questions about how the TOV would work because father has both farmer and subpubic catheters. She would like to know which catheters will be removed for void trail on 12/23 and the overall process. Can someone explain and I'm happy to call her back. Thanks!

## 2024-12-18 NOTE — PLAN OF CARE
Problem: OCCUPATIONAL THERAPY ADULT  Goal: Performs self-care activities at highest level of function for planned discharge setting.  See evaluation for individualized goals.  Description: Treatment Interventions: ADL retraining, Functional transfer training, Endurance training, Patient/family training, Cognitive reorientation, Equipment evaluation/education, Compensatory technique education, Energy conservation, Activityengagement          See flowsheet documentation for full assessment, interventions and recommendations.   Outcome: Progressing  Note: Limitation: Decreased ADL status, Decreased endurance, Decreased self-care trans, Decreased high-level ADLs, Decreased Safe judgement during ADL  Prognosis: Good  Assessment: Pt is a 75 yo male who actively participated in skilled OT session on 12/18/2024.  Treatment focused to improve functional transfers with fall prevention strategies, static/dynamic balance, postural/trunk control, proper body mechanics, functional use of b/l UE's, higher level cognitive functions, safety awareness, and overall increased activity tolerance in ADL/IADL/leisure tasks. Upon arrival, pt found lying supine in bed and was agreeable to OT session. Pt performed supine <> sit @ supervision level, performed UB dressing with Min A, and performed STS with Min A x1 w/ RW. Pt completed household functional mobility distances w/ Min A x1 w/ RW to simulate common distances during ADL/IADL routines in which pt demonstrating good insight when needing to take rest breaks. At the end of the session, pt was left sitting upright in recliner with all functional needs in reach. Pt demonstrates gradual functional improvements towards OT goals however continues to be functioning below occupational baseline and is still limited by the following limitations/impairments which were addressed through skilled instruction: generalized weakness, balance, endurance/activity tolerance, postural/trunk control,  strength, pain, and safety awareness. At this time, recommend discharge w/ Level 3 resources when medically stable. The patient's raw score on the AM-PAC Daily Activity Inpatient Short Form is 18. A raw score of less than 19 suggests the patient may benefit from discharge to post-acute rehabilitation services however please refer to the recommendation of the Occupational Therapist for safe discharge planning.  Established OT goals will be continued 2-3x/wk to address acute care needs and underlying performance skills to maximize occupational performance and safety to return to [facility].     Rehab Resource Intensity Level, OT: III (Minimum Resource Intensity)

## 2024-12-18 NOTE — ASSESSMENT & PLAN NOTE
Urodynamic studies completed, will follow-up with urology outpatient to review results   managed with suprapubic tube exchanged by IR 12/3 due to difficulty with exchange in office  SPT exchange 6 weeks from insertion to allow for maturation of tract

## 2024-12-18 NOTE — ASSESSMENT & PLAN NOTE
S/p cystoscopy with clot evacuation, fulguration of bleeding vessels and extensive transurethral resection of bladder tumor and placement of penile urethral catheter 12/15  Unable to visualize right ureter; will need IR consult for PCN if increased CHUY, worsening hydronephrosis or sepsis  WBC 7.85  Creat 1.80, stable  Afebrile, vital signs stable  Patient will maintain both urethral Colon catheter and SPT at this time  Ordered suprapubic tube to be capped and Colon catheter to gravity drainage 12/17  Plan for urethral Colon catheter removal in urology office in approximately 1 week

## 2024-12-18 NOTE — ASSESSMENT & PLAN NOTE
PTA norvasc 5mg daily; metoprolol 25mg q12h; lisinopril 5mg daily and lasix 20mg daily   Lisinopril and Lasix on hold given CHUY and hypotension postoperatively  Continue with Norvasc 5 mg daily metoprolol 25 mg every 12 hours  Blood pressure been acceptable

## 2024-12-18 NOTE — ASSESSMENT & PLAN NOTE
Lab Results   Component Value Date    CREATININE 1.80 (H) 12/18/2024    CREATININE 1.85 (H) 12/17/2024    CREATININE 1.83 (H) 12/16/2024   Creatinine reviewed, appears BL has increased since September to 1.5-1.8  Creat peaked at 2.09  Creat continues to improve 1.80 from 1.85 yesterday  Avoid nephrotoxic drugs and hypotension- holding lasix and lisinopril    C/w  and darian for now  Recheck BMP in the morning

## 2024-12-19 ENCOUNTER — APPOINTMENT (INPATIENT)
Dept: RADIOLOGY | Facility: HOSPITAL | Age: 74
DRG: 829 | End: 2024-12-19
Payer: MEDICARE

## 2024-12-19 LAB
ABO GROUP BLD BPU: NORMAL
ANION GAP SERPL CALCULATED.3IONS-SCNC: 7 MMOL/L (ref 4–13)
BASOPHILS # BLD AUTO: 0.04 THOUSANDS/ÂΜL (ref 0–0.1)
BASOPHILS NFR BLD AUTO: 1 % (ref 0–1)
BPU ID: NORMAL
BUN SERPL-MCNC: 31 MG/DL (ref 5–25)
CALCIUM SERPL-MCNC: 8.2 MG/DL (ref 8.4–10.2)
CHLORIDE SERPL-SCNC: 102 MMOL/L (ref 96–108)
CO2 SERPL-SCNC: 24 MMOL/L (ref 21–32)
CREAT SERPL-MCNC: 1.73 MG/DL (ref 0.6–1.3)
CROSSMATCH: NORMAL
EOSINOPHIL # BLD AUTO: 0.34 THOUSAND/ÂΜL (ref 0–0.61)
EOSINOPHIL NFR BLD AUTO: 4 % (ref 0–6)
ERYTHROCYTE [DISTWIDTH] IN BLOOD BY AUTOMATED COUNT: 15.8 % (ref 11.6–15.1)
GFR SERPL CREATININE-BSD FRML MDRD: 37 ML/MIN/1.73SQ M
GLUCOSE SERPL-MCNC: 103 MG/DL (ref 65–140)
HCT VFR BLD AUTO: 27.4 % (ref 36.5–49.3)
HGB BLD-MCNC: 8.8 G/DL (ref 12–17)
IMM GRANULOCYTES # BLD AUTO: 0.05 THOUSAND/UL (ref 0–0.2)
IMM GRANULOCYTES NFR BLD AUTO: 1 % (ref 0–2)
LYMPHOCYTES # BLD AUTO: 1.05 THOUSANDS/ÂΜL (ref 0.6–4.47)
LYMPHOCYTES NFR BLD AUTO: 12 % (ref 14–44)
MCH RBC QN AUTO: 30.7 PG (ref 26.8–34.3)
MCHC RBC AUTO-ENTMCNC: 32.1 G/DL (ref 31.4–37.4)
MCV RBC AUTO: 96 FL (ref 82–98)
MONOCYTES # BLD AUTO: 0.91 THOUSAND/ÂΜL (ref 0.17–1.22)
MONOCYTES NFR BLD AUTO: 10 % (ref 4–12)
NEUTROPHILS # BLD AUTO: 6.48 THOUSANDS/ÂΜL (ref 1.85–7.62)
NEUTS SEG NFR BLD AUTO: 72 % (ref 43–75)
NRBC BLD AUTO-RTO: 0 /100 WBCS
PLATELET # BLD AUTO: 270 THOUSANDS/UL (ref 149–390)
PMV BLD AUTO: 10.6 FL (ref 8.9–12.7)
POTASSIUM SERPL-SCNC: 4.3 MMOL/L (ref 3.5–5.3)
RBC # BLD AUTO: 2.87 MILLION/UL (ref 3.88–5.62)
SODIUM SERPL-SCNC: 133 MMOL/L (ref 135–147)
UNIT DISPENSE STATUS: NORMAL
UNIT PRODUCT CODE: NORMAL
UNIT PRODUCT VOLUME: 350 ML
UNIT RH: NORMAL
WBC # BLD AUTO: 8.87 THOUSAND/UL (ref 4.31–10.16)

## 2024-12-19 PROCEDURE — 80048 BASIC METABOLIC PNL TOTAL CA: CPT | Performed by: FAMILY MEDICINE

## 2024-12-19 PROCEDURE — 99232 SBSQ HOSP IP/OBS MODERATE 35: CPT | Performed by: NURSE PRACTITIONER

## 2024-12-19 PROCEDURE — 85025 COMPLETE CBC W/AUTO DIFF WBC: CPT | Performed by: FAMILY MEDICINE

## 2024-12-19 PROCEDURE — 74018 RADEX ABDOMEN 1 VIEW: CPT

## 2024-12-19 PROCEDURE — 99232 SBSQ HOSP IP/OBS MODERATE 35: CPT | Performed by: FAMILY MEDICINE

## 2024-12-19 RX ORDER — BISACODYL 10 MG
10 SUPPOSITORY, RECTAL RECTAL DAILY PRN
Status: DISCONTINUED | OUTPATIENT
Start: 2024-12-19 | End: 2024-12-25 | Stop reason: HOSPADM

## 2024-12-19 RX ORDER — CIPROFLOXACIN 500 MG/1
500 TABLET, FILM COATED ORAL EVERY 12 HOURS SCHEDULED
Status: COMPLETED | OUTPATIENT
Start: 2024-12-19 | End: 2024-12-22

## 2024-12-19 RX ORDER — ASPIRIN 81 MG/1
81 TABLET, CHEWABLE ORAL DAILY
Status: DISCONTINUED | OUTPATIENT
Start: 2024-12-19 | End: 2024-12-25 | Stop reason: HOSPADM

## 2024-12-19 RX ORDER — SENNOSIDES 8.6 MG
2 TABLET ORAL 2 TIMES DAILY
Status: DISCONTINUED | OUTPATIENT
Start: 2024-12-19 | End: 2024-12-25 | Stop reason: HOSPADM

## 2024-12-19 RX ADMIN — METOPROLOL SUCCINATE 25 MG: 25 TABLET, EXTENDED RELEASE ORAL at 21:40

## 2024-12-19 RX ADMIN — TAMSULOSIN HYDROCHLORIDE 0.4 MG: 0.4 CAPSULE ORAL at 15:43

## 2024-12-19 RX ADMIN — SENNOSIDES 17.2 MG: 8.6 TABLET, FILM COATED ORAL at 12:15

## 2024-12-19 RX ADMIN — CIPROFLOXACIN HYDROCHLORIDE 500 MG: 500 TABLET, FILM COATED ORAL at 09:30

## 2024-12-19 RX ADMIN — ASPIRIN 81 MG CHEWABLE TABLET 81 MG: 81 TABLET CHEWABLE at 15:43

## 2024-12-19 RX ADMIN — ATORVASTATIN CALCIUM 40 MG: 40 TABLET, FILM COATED ORAL at 15:43

## 2024-12-19 RX ADMIN — BISACODYL 10 MG: 10 SUPPOSITORY RECTAL at 10:33

## 2024-12-19 RX ADMIN — CIPROFLOXACIN HYDROCHLORIDE 500 MG: 500 TABLET, FILM COATED ORAL at 21:40

## 2024-12-19 RX ADMIN — DOCUSATE SODIUM 100 MG: 100 CAPSULE, LIQUID FILLED ORAL at 09:30

## 2024-12-19 RX ADMIN — AMLODIPINE BESYLATE 5 MG: 5 TABLET ORAL at 09:30

## 2024-12-19 RX ADMIN — CHLORHEXIDINE GLUCONATE 0.12% ORAL RINSE 15 ML: 1.2 LIQUID ORAL at 09:30

## 2024-12-19 RX ADMIN — CHLORHEXIDINE GLUCONATE 0.12% ORAL RINSE 15 ML: 1.2 LIQUID ORAL at 21:40

## 2024-12-19 RX ADMIN — AMIODARONE HYDROCHLORIDE 200 MG: 200 TABLET ORAL at 09:30

## 2024-12-19 RX ADMIN — FINASTERIDE 5 MG: 5 TABLET, FILM COATED ORAL at 09:30

## 2024-12-19 RX ADMIN — POLYETHYLENE GLYCOL 3350 17 G: 17 POWDER, FOR SOLUTION ORAL at 09:30

## 2024-12-19 RX ADMIN — SENNOSIDES 17.2 MG: 8.6 TABLET, FILM COATED ORAL at 17:43

## 2024-12-19 RX ADMIN — DOCUSATE SODIUM 100 MG: 100 CAPSULE, LIQUID FILLED ORAL at 17:43

## 2024-12-19 NOTE — ASSESSMENT & PLAN NOTE
Currently on MiraLAX and Colace, Senna at bedtime, still no BM since 12/14 as per patient, feels bloated  Added Dulcolax suppository, senna changed to 2 tablets twice daily  KUB ordered, consider mineral oil enema if fecal impaction

## 2024-12-19 NOTE — ASSESSMENT & PLAN NOTE
Lab Results   Component Value Date    HGBA1C 5.9 (H) 04/18/2024       Recent Labs     12/17/24  1601 12/17/24  2155 12/18/24  0552 12/18/24  1047   POCGLU 101 100 95 97       Blood Sugar Average: Last 72 hrs:  (P) 103.8  Glucose has been acceptable, diet controlled at home; will discontinue sliding scale  Hypoglycemic protocol  Carb count diet

## 2024-12-19 NOTE — PROGRESS NOTES
Progress Note - Hospitalist   Name: Toro Rodriguez 74 y.o. male I MRN: [id number]  Unit/Bed#: -01 I Date of Admission: 12/15/2024   Date of Service: 12/19/2024 I Hospital Day: 4    Assessment & Plan  Clot retention of urine  Pt initially presented to outside hospital with gross hematuria and had three-way Farmer placed and CBI started. He was noted to have labile blood pressures so he was transferred to [facility] for cystoscopy and admitted to the ICU post op for hypotension and hypoxia. He did require 2 units PRBCs due to ongoing blood loss and anemia.   S/p cystoscopy with clot evacuation, fulguration of bleeding vessels and extensive transurethral resection of bladder tumor and placement of penile urethral catheter 12/15  Unable to visualize right ureter in the OR; per urology no need for IR consult for PCN at this time as creatinine has improved.   CBI weaned off 12/16 around 11am   Patient currently has both urethral Farmer catheter and SPT at this time- in discussion with urology team- possible farmer removal prior to d/c, otherwise-Farmer catheter removal in urology office in approximately 1 week  Continue to monitor Hg and creatinine  Patient was previously on Eliquis for DVT that was stopped about 2 months on previous admissions with no plans to resume  Okay to resume aspirin today, continue to monitor for hematuria  Acute kidney injury superimposed on stage 3b chronic kidney disease (HCC)  Lab Results   Component Value Date    CREATININE 1.73 (H) 12/19/2024    CREATININE 1.80 (H) 12/18/2024    CREATININE 1.85 (H) 12/17/2024   Creatinine reviewed, appears BL has increased since September to 1.5-1.8  Creat peaked at 2.09  Avoid nephrotoxic drugs and hypotension- holding lasix and lisinopril    C/w SPC and farmer for now  Creatinine continues to improve- 1.73 today  ABLA (acute blood loss anemia)  Lab Results   Component Value Date    HGB 8.8 (L) 12/19/2024    HGB 8.5 (L) 12/18/2024    HGB 9.0 (L) 12/17/2024    Hgb baseline 10-11 dropped to 7.2 on admission with hematuria - S/p 2 units of PRBCs with improvement in hgb  Transfuse for hgb < 7.0  Today, 12/19 Hg is 8.8  Essential hypertension  PTA norvasc 5mg daily; metoprolol 25mg q12h; lisinopril 5mg daily and lasix 20mg daily   Lisinopril and Lasix on hold given CHUY and hypotension postoperatively  Continue with Norvasc 5 mg daily metoprolol 25 mg every 12 hours  Blood pressure been acceptable  Coronary artery disease involving native coronary artery of native heart  ASA being on hold, resume today  Continue with Lipitor 40 mg daily and metoprolol 25 mg every 12 hours  Denies chest pain  Left heart cath in 2023 showing Ost RCA to Prox RCA lesion is 100% stenosed.   Type 2 diabetes mellitus with stage 3b chronic kidney disease, without long-term current use of insulin (HCC)  Lab Results   Component Value Date    HGBA1C 5.9 (H) 04/18/2024       Recent Labs     12/17/24  1601 12/17/24  2155 12/18/24  0552 12/18/24  1047   POCGLU 101 100 95 97       Blood Sugar Average: Last 72 hrs:  (P) 103.8  Glucose has been acceptable, diet controlled at home; will discontinue sliding scale  Hypoglycemic protocol  Carb count diet  Benign prostatic hyperplasia with urinary retention  Noted chronic suprapubic catheter  Urethral Colon placed postoperatively after cystoscopy plan to remove in 1 week outpatient with urology  Continue with Proscar and Flomax  NSVT (nonsustained ventricular tachycardia) ([facility])  C/w metoprolol 25mg q12h and amiodarone 200mg daily   Slow transit constipation  Currently on MiraLAX and Colace, Senna at bedtime, still no BM since 12/14 as per patient, feels bloated  Added Dulcolax suppository, senna changed to 2 tablets twice daily  KUB ordered, consider mineral oil enema if fecal impaction  Hydronephrosis  Unable to visualize right ureter in OR; per urology no need for IR consult for PCN at this time as creatinine has improved.   Renal US 12/17- Moderate right  hydronephrosis persists.    Bladder mass  S/p transurethral resection of bladder tumor 12/15  Follow-up with urology outpatient in approximately 2 weeks for pathology review and plan of care  HFrEF (heart failure with reduced ejection fraction) ([facility])  Wt Readings from Last 3 Encounters:   12/19/24 87.9 kg (193 lb 12.6 oz)   12/15/24 86.6 kg (190 lb 14.7 oz)   12/10/24 87.5 kg (193 lb)   Echo 2/24- LV wall thickness is mildly increased EF 35-40%, systolic function is moderately reduced, moderate global hypokinesis, G1DD. LA is moderately dilated and RA is dilated. MV moderate regurgitation.  Follows with Dr. Roach outpatient   PTA on lasix 20mg daily- currently on hold given CHUY and hypotension post op, consider resuming in the next 24 hrs   C/w GDMT:  BB:  metoprolol 25mg q12h   ACE/ARB/ARNI:  lisinopril 5mg daily on hold d/t hypotension and CHUY post op  MRA:  n/a  SGLT2i: n/a  Currently on room air   Daily weights, Strict I & Os  Cardiac diet, low sodium <2g, fluid restriction 2000ml  Acute cystitis with hematuria  Urine culture + Pseudomonas  Previously was on IV ceftriaxone will transition to oral ciprofloxacin given sensitivities, complete 7 days of antibiotic till 12/22    VTE Pharmacologic Prophylaxis:   High Risk (Score >/= 5) - Pharmacological DVT Prophylaxis Contraindicated. Sequential Compression Devices Ordered.    Mobility:   Basic Mobility Inpatient Raw Score: 18  JH-HLM Goal: 6: Walk 10 steps or more  JH-HLM Achieved: 7: Walk 25 feet or more  JH-HLM Goal achieved. Continue to encourage appropriate mobility.    Patient Centered Rounds: I performed bedside rounds with nursing staff today.   Discussions with Specialists or Other Care Team Provider: urology    Education and Discussions with Family / Patient:  discussed with daughter over the phone.     Current Length of Stay: 4 day(s)  Current Patient Status: Inpatient   Certification Statement: The patient will continue to require additional inpatient  hospital stay due to monitor Hg and creatinine  Discharge Plan: Anticipate discharge in 24-48 hrs to home.    Code Status: Level 1 - Full Code    Subjective   Patient seen and  examined at bedside.  He continues to report feeling bloated    Objective :  Temp:  [98.7 °F (37.1 °C)-99 °F (37.2 °C)] 99 °F (37.2 °C)  HR:  [57-71] 57  BP: (120-127)/(50-75) 124/75  Resp:  [16] 16  SpO2:  [93 %-96 %] 96 %    Body mass index is 30.35 kg/m².     Input and Output Summary (last 24 hours):     Intake/Output Summary (Last 24 hours) at 12/19/2024 1530  Last data filed at 12/19/2024 0901  Gross per 24 hour   Intake 636 ml   Output 800 ml   Net -164 ml       Physical Exam  Constitutional:       General: He is not in acute distress.     Appearance: He is not ill-appearing.   Cardiovascular:      Rate and Rhythm: Normal rate and regular rhythm.      Pulses: Normal pulses.      Heart sounds: Normal heart sounds. No murmur heard.  Pulmonary:      Effort: Pulmonary effort is normal. No respiratory distress.      Breath sounds: No wheezing or rales.   Abdominal:      General: Bowel sounds are normal. There is distension.      Palpations: Abdomen is soft.      Tenderness: There is no abdominal tenderness.   Genitourinary:     Comments: Suprapubic catheter and urethral Colon catheter in place  Musculoskeletal:         General: No swelling or tenderness.   Skin:     General: Skin is warm and dry.      Findings: No erythema or rash.   Neurological:      General: No focal deficit present.      Mental Status: He is alert. Mental status is at baseline.   Psychiatric:         Attention and Perception: Attention normal.         Mood and Affect: Mood normal.           Lines/Drains:  Lines/Drains/Airways       Active Status       Name Placement date Placement time Site Days    Urethral Catheter Straight-tip;Three way 24 Fr. 12/15/24  1639  Straight-tip;Three way  3    Suprapubic Catheter 16 Fr. 12/03/24  1019  --  16    Continuous Bladder Irrigation  Three-way 12/15/24  --  Three-way  4                  Urinary Catheter:  Goal for removal: N/A- Discharging with Colon                 Lab Results: I have reviewed the following results:   Results from last 7 days   Lab Units 12/19/24  0432   WBC Thousand/uL 8.87   HEMOGLOBIN g/dL 8.8*   HEMATOCRIT % 27.4*   PLATELETS Thousands/uL 270   SEGS PCT % 72   LYMPHO PCT % 12*   MONO PCT % 10   EOS PCT % 4     Results from last 7 days   Lab Units 12/19/24  0432 12/15/24  1843 12/15/24  0251   SODIUM mmol/L 133*   < > 137   POTASSIUM mmol/L 4.3   < > 3.9   CHLORIDE mmol/L 102   < > 106   CO2 mmol/L 24   < > 22   BUN mg/dL 31*   < > 36*   CREATININE mg/dL 1.73*   < > 2.02*   ANION GAP mmol/L 7   < > 9   CALCIUM mg/dL 8.2*   < > 8.0*   ALBUMIN g/dL  --   --  3.4*   TOTAL BILIRUBIN mg/dL  --   --  0.36   ALK PHOS U/L  --   --  70   ALT U/L  --   --  12   AST U/L  --   --  14   GLUCOSE RANDOM mg/dL 103   < > 103    < > = values in this interval not displayed.     Results from last 7 days   Lab Units 12/15/24  0251   INR  1.09     Results from last 7 days   Lab Units 12/18/24  1047 12/18/24  0552 12/17/24  2155 12/17/24  1601 12/17/24  1045 12/17/24  0604 12/16/24  2103 12/16/24  1602 12/16/24  1128 12/16/24  0704 12/15/24  2148 12/15/24  1838   POC GLUCOSE mg/dl 97 95 100 101 105 100 109 107 122 102 99 98               Recent Cultures (last 7 days):   Results from last 7 days   Lab Units 12/15/24  0012 12/13/24  1621   URINE CULTURE  30,000-39,000 cfu/ml Pseudomonas aeruginosa* >100,000 cfu/ml Pseudomonas aeruginosa*  Streptococcus species*  Lactobacillus species*             Last 24 Hours Medication List:     Current Facility-Administered Medications:     acetaminophen (TYLENOL) tablet 650 mg, Q6H PRN    amiodarone tablet 200 mg, Daily    amLODIPine (NORVASC) tablet 5 mg, Daily    aspirin chewable tablet 81 mg, Daily    atorvastatin (LIPITOR) tablet 40 mg, Daily With Dinner    bisacodyl (DULCOLAX) rectal suppository 10 mg,  Daily PRN    chlorhexidine (PERIDEX) 0.12 % oral rinse 15 mL, Q12H ARI    ciprofloxacin (CIPRO) tablet 500 mg, Q12H ARI    dextromethorphan-guaiFENesin (ROBITUSSIN DM) oral syrup 10 mL, Q4H PRN    docusate sodium (COLACE) capsule 100 mg, BID    finasteride (PROSCAR) tablet 5 mg, Daily    HYDROcodone Bit-Homatrop MBr (HYCODAN) oral syrup 2.5 mL, Q4H PRN    HYDROmorphone HCl (DILAUDID) injection 0.2 mg, Q2H PRN    metoprolol succinate (TOPROL-XL) 24 hr tablet 25 mg, Q12H ARI    naloxone (NARCAN) 0.04 mg/mL syringe 0.04 mg, Q1MIN PRN    ondansetron (ZOFRAN) injection 4 mg, Q6H PRN    oxybutynin (DITROPAN) tablet 5 mg, TID PRN    oxyCODONE (ROXICODONE) split tablet 2.5 mg, Q4H PRN **OR** oxyCODONE (ROXICODONE) IR tablet 5 mg, Q4H PRN    phenol (CHLORASEPTIC) 1.4 % mucosal liquid 1 spray, Q2H PRN    polyethylene glycol (MIRALAX) packet 17 g, Daily    senna (SENOKOT) tablet 17.2 mg, BID    tamsulosin (FLOMAX) capsule 0.4 mg, Daily With Dinner    Administrative Statements   Today, Patient Was Seen By: Jordana Mercado MD  I have spent a total time of 35 minutes in caring for this patient on the day of the visit/encounter including Importance of tx compliance, Risk factor reductions, Counseling / Coordination of care, Documenting in the medical record, Reviewing / ordering tests, medicine, procedures  , Obtaining or reviewing history  , and Communicating with other healthcare professionals .    **Please Note: This note may have been constructed using a voice recognition system.**

## 2024-12-19 NOTE — ASSESSMENT & PLAN NOTE
Persistent right hydronephrosis with probable small calculus in the distal right ureter which may be nonobstructing  Ultrasound 12/16: Persistent moderate right hydro; ureters not visualized  Creat 1.73  Unable to access right ureter during cystoscopy, TURBT and clot evacuation 12/15  Will need IR consult for possible PCN versus PCNU if creatinine trends up  Patient would like to avoid nephrostomy tube if possible  trend labs  Afebrile, vital signs stable

## 2024-12-19 NOTE — ASSESSMENT & PLAN NOTE
Pt initially presented to outside hospital with gross hematuria and had three-way Farmer placed and CBI started. He was noted to have labile blood pressures so he was transferred to [facility] for cystoscopy and admitted to the ICU post op for hypotension and hypoxia. He did require 2 units PRBCs due to ongoing blood loss and anemia.   S/p cystoscopy with clot evacuation, fulguration of bleeding vessels and extensive transurethral resection of bladder tumor and placement of penile urethral catheter 12/15  Unable to visualize right ureter in the OR; per urology no need for IR consult for PCN at this time as creatinine has improved.   CBI weaned off 12/16 around 11am   Patient currently has both urethral Farmer catheter and SPT at this time- in discussion with urology team- possible farmer removal prior to d/c, otherwise-Farmer catheter removal in urology office in approximately 1 week  Continue to monitor Hg and creatinine  Patient was previously on Eliquis for DVT that was stopped about 2 months on previous admissions with no plans to resume  Okay to resume aspirin today, continue to monitor for hematuria

## 2024-12-19 NOTE — ASSESSMENT & PLAN NOTE
S/p cystoscopy with clot evacuation, fulguration of bleeding vessels and extensive transurethral resection of bladder tumor and placement of penile urethral catheter 12/15  Unable to visualize right ureter; will need IR consult for PCN if increased CHUY, worsening hydronephrosis or sepsis  WBC 8.87  Creat 1.73, stable  Afebrile, vital signs stable  Patient will maintain both urethral Colon catheter and SPT at this time  Patient will resume his aspirin  Gross of bowel regimen to relieve constipation  If urine remains clear after constipation is resolved and aspirin resumed urethral Colon catheter can be discontinued

## 2024-12-19 NOTE — PROGRESS NOTES
Progress Note - Urology   Name: Toro Rodriguez 74 y.o. male I MRN: [id number]  Unit/Bed#: -01 I Date of Admission: 12/15/2024   Date of Service: 12/19/2024 I Hospital Day: 4    Assessment & Plan  Clot retention of urine  S/p cystoscopy with clot evacuation, fulguration of bleeding vessels and extensive transurethral resection of bladder tumor and placement of penile urethral catheter 12/15  Unable to visualize right ureter; will need IR consult for PCN if increased CHUY, worsening hydronephrosis or sepsis  WBC 8.87  Creat 1.73, stable  Afebrile, vital signs stable  Patient will maintain both urethral Colon catheter and SPT at this time  Patient will resume his aspirin  Gross of bowel regimen to relieve constipation  If urine remains clear after constipation is resolved and aspirin resumed urethral Colon catheter can be discontinued  Acute kidney injury superimposed on stage 3b chronic kidney disease (HCC)  Creat 1.73  Unable to visualize ureter during cystoscopy, clot evacuation, TURBT 12/15  IR consult for possible PCN versus PCNU if creatinine trends up or if patient decompensates.  ureter was unable to be visualized during surgical procedure  Medical optimization per primary team  Continue to trend labs  Bladder mass  S/p transurethral resection of bladder tumor 12/15  Follow-up with urology outpatient in approximately 2 weeks for pathology review and plan of care  Hydronephrosis  Persistent right hydronephrosis with probable small calculus in the distal right ureter which may be nonobstructing  Ultrasound 12/16: Persistent moderate right hydro; ureters not visualized  Creat 1.73  Unable to access right ureter during cystoscopy, TURBT and clot evacuation 12/15  Will need IR consult for possible PCN versus PCNU if creatinine trends up  Patient would like to avoid nephrostomy tube if possible  trend labs  Afebrile, vital signs stable  Benign prostatic hyperplasia with urinary retention  Urodynamic studies  completed, will follow-up with urology outpatient to review results   managed with suprapubic tube exchanged by IR 12/3 due to difficulty with exchange in office  SPT exchange 6 weeks from insertion to allow for maturation of tract  Acute cystitis with hematuria  Urine culture positive Pseudomonas aeruginosa   continue Cipro per primary team        Subjective   Patient resting in bed offers no complaints.  Colon catheter and suprapubic tube both draining clear yellow urine.  Patient was not resumed on Eliquis as this was discontinued on a prior hospitalization indefinitely after discussion with hematology team per review of the records.  Patient will resume aspirin today.  Patient is also constipated with last bowel movement on Saturday.  Primary team will manage constipation and once patient has a bowel movement and his aspirin is resumed we will plan to remove his urethral Colon catheter.    Objective :  Temp:  [98.7 °F (37.1 °C)-99 °F (37.2 °C)] 99 °F (37.2 °C)  HR:  [57-71] 57  BP: (120-127)/(50-75) 124/75  Resp:  [16] 16  SpO2:  [93 %-96 %] 96 %    I/O         12/17 0701  12/18 0700 12/18 0701  12/19 0700 12/19 0701  12/20 0700    P.O. 730 220 416    I.V. (mL/kg)       IV Piggyback       Total Intake(mL/kg) 730 (8.6) 220 (2.5) 416 (4.7)    Urine (mL/kg/hr) 2200 (1.1) 1250 (0.6)     Total Output 2200 1250     Net -1470 -1030 +416                 Lines/Drains/Airways       Active Status       Name Placement date Placement time Site Days    Urethral Catheter Straight-tip;Three way 24 Fr. 12/15/24  1639  Straight-tip;Three way  3    Suprapubic Catheter 16 Fr. 12/03/24  1019  --  16    Continuous Bladder Irrigation Three-way 12/15/24  --  Three-way  4                  Physical Exam  Vitals reviewed.   Constitutional:       General: He is not in acute distress.     Appearance: Normal appearance. He is not ill-appearing, toxic-appearing or diaphoretic.   HENT:      Head: Normocephalic and atraumatic.   Eyes:       General: No scleral icterus.  Cardiovascular:      Rate and Rhythm: Normal rate.   Pulmonary:      Effort: Pulmonary effort is normal. No respiratory distress.   Abdominal:      General: Abdomen is flat. There is no distension.      Palpations: Abdomen is soft.   Genitourinary:     Comments: Suprapubic tube and Colon catheter both draining clear yellow urine  Musculoskeletal:         General: No swelling.      Cervical back: Normal range of motion.   Skin:     General: Skin is warm and dry.      Coloration: Skin is not jaundiced or pale.      Findings: No rash.   Neurological:      General: No focal deficit present.      Mental Status: He is alert and oriented to person, place, and time.      Gait: Gait normal.   Psychiatric:         Mood and Affect: Mood normal.         Behavior: Behavior normal.           Lab Results: I have reviewed the following results:  Recent Labs     12/17/24  0437 12/17/24  1530 12/19/24  0432   WBC 8.39   < > 8.87   HGB 8.1*   < > 8.8*   HCT 25.6*   < > 27.4*      < > 270   SODIUM 135   < > 133*   K 4.6   < > 4.3      < > 102   CO2 25   < > 24   BUN 26*   < > 31*   CREATININE 1.85*   < > 1.73*   GLUC 98   < > 103   CAIONIZED 1.09*  --   --    MG 2.1  --   --    PHOS 3.3  --   --     < > = values in this interval not displayed.       Imaging Results Review: No pertinent imaging studies reviewed.  Other Study Results Review: No additional pertinent studies reviewed.    VTE Pharmacologic Prophylaxis: VTE covered by:    None     VTE Mechanical Prophylaxis: sequential compression device

## 2024-12-19 NOTE — ASSESSMENT & PLAN NOTE
Lab Results   Component Value Date    HGB 8.8 (L) 12/19/2024    HGB 8.5 (L) 12/18/2024    HGB 9.0 (L) 12/17/2024   Hgb baseline 10-11 dropped to 7.2 on admission with hematuria - S/p 2 units of PRBCs with improvement in hgb  Transfuse for hgb < 7.0  Today, 12/19 Hg is 8.8

## 2024-12-19 NOTE — ASSESSMENT & PLAN NOTE
Wt Readings from Last 3 Encounters:   12/19/24 87.9 kg (193 lb 12.6 oz)   12/15/24 86.6 kg (190 lb 14.7 oz)   12/10/24 87.5 kg (193 lb)   Echo 2/24- LV wall thickness is mildly increased EF 35-40%, systolic function is moderately reduced, moderate global hypokinesis, G1DD. LA is moderately dilated and RA is dilated. MV moderate regurgitation.  Follows with Dr. Roach outpatient   PTA on lasix 20mg daily- currently on hold given CHUY and hypotension post op, consider resuming in the next 24 hrs   C/w GDMT:  BB:  metoprolol 25mg q12h   ACE/ARB/ARNI:  lisinopril 5mg daily on hold d/t hypotension and CHUY post op  MRA:  n/a  SGLT2i: n/a  Currently on room air   Daily weights, Strict I & Os  Cardiac diet, low sodium <2g, fluid restriction 2000ml

## 2024-12-19 NOTE — ASSESSMENT & PLAN NOTE
Lab Results   Component Value Date    CREATININE 1.73 (H) 12/19/2024    CREATININE 1.80 (H) 12/18/2024    CREATININE 1.85 (H) 12/17/2024   Creatinine reviewed, appears BL has increased since September to 1.5-1.8  Creat peaked at 2.09  Avoid nephrotoxic drugs and hypotension- holding lasix and lisinopril    C/w SPC and farmer for now  Creatinine continues to improve- 1.73 today

## 2024-12-19 NOTE — ASSESSMENT & PLAN NOTE
ASA being on hold, resume today  Continue with Lipitor 40 mg daily and metoprolol 25 mg every 12 hours  Denies chest pain  Left heart cath in 2023 showing Ost RCA to Prox RCA lesion is 100% stenosed.

## 2024-12-19 NOTE — ASSESSMENT & PLAN NOTE
Creat 1.73  Unable to visualize ureter during cystoscopy, clot evacuation, TURBT 12/15  IR consult for possible PCN versus PCNU if creatinine trends up or if patient decompensates.  ureter was unable to be visualized during surgical procedure  Medical optimization per primary team  Continue to trend labs

## 2024-12-19 NOTE — PLAN OF CARE
Problem: Prexisting or High Potential for Compromised Skin Integrity  Goal: Skin integrity is maintained or improved  Description: INTERVENTIONS:  - Identify patients at risk for skin breakdown  - Assess and monitor skin integrity  - Assess and monitor nutrition and hydration status  - Monitor labs   - Assess for incontinence   - Turn and reposition patient  - Assist with mobility/ambulation  - Relieve pressure over bony prominences  - Avoid friction and shearing  - Provide appropriate hygiene as needed including keeping skin clean and dry  - Evaluate need for skin moisturizer/barrier cream  - Collaborate with interdisciplinary team   - Patient/family teaching  - Consider wound care consult   Outcome: Progressing     Problem: PAIN - ADULT  Goal: Verbalizes/displays adequate comfort level or baseline comfort level  Description: Interventions:  - Encourage patient to monitor pain and request assistance  - Assess pain using appropriate pain scale  - Administer analgesics based on type and severity of pain and evaluate response  - Implement non-pharmacological measures as appropriate and evaluate response  - Consider cultural and social influences on pain and pain management  - Notify physician/advanced practitioner if interventions unsuccessful or patient reports new pain  Outcome: Progressing     Problem: INFECTION - ADULT  Goal: Absence or prevention of progression during hospitalization  Description: INTERVENTIONS:  - Assess and monitor for signs and symptoms of infection  - Monitor lab/diagnostic results  - Monitor all insertion sites, i.e. indwelling lines, tubes, and drains  - Monitor endotracheal if appropriate and nasal secretions for changes in amount and color  - [place] appropriate cooling/warming therapies per order  - Administer medications as ordered  - Instruct and encourage patient and family to use good hand hygiene technique  - Identify and instruct in appropriate isolation precautions for  identified infection/condition  Outcome: Progressing  Goal: Absence of fever/infection during neutropenic period  Description: INTERVENTIONS:  - Monitor WBC    Outcome: Progressing     Problem: DISCHARGE PLANNING  Goal: Discharge to home or other facility with appropriate resources  Description: INTERVENTIONS:  - Identify barriers to discharge w/patient and caregiver  - Arrange for needed discharge resources and transportation as appropriate  - Identify discharge learning needs (meds, wound care, etc.)  - Arrange for interpretive services to assist at discharge as needed  - Refer to Case Management Department for coordinating discharge planning if the patient needs post-hospital services based on physician/advanced practitioner order or complex needs related to functional status, cognitive ability, or social support system  Outcome: Progressing     Problem: Knowledge Deficit  Goal: Patient/family/caregiver demonstrates understanding of disease process, treatment plan, medications, and discharge instructions  Description: Complete learning assessment and assess knowledge base.  Interventions:  - Provide teaching at level of understanding  - Provide teaching via preferred learning methods  Outcome: Progressing

## 2024-12-20 LAB
ANION GAP SERPL CALCULATED.3IONS-SCNC: 9 MMOL/L (ref 4–13)
BASOPHILS # BLD AUTO: 0.05 THOUSANDS/ÂΜL (ref 0–0.1)
BASOPHILS # BLD AUTO: 0.07 THOUSANDS/ÂΜL (ref 0–0.1)
BASOPHILS NFR BLD AUTO: 1 % (ref 0–1)
BASOPHILS NFR BLD AUTO: 1 % (ref 0–1)
BUN SERPL-MCNC: 28 MG/DL (ref 5–25)
CALCIUM SERPL-MCNC: 8.4 MG/DL (ref 8.4–10.2)
CHLORIDE SERPL-SCNC: 104 MMOL/L (ref 96–108)
CO2 SERPL-SCNC: 22 MMOL/L (ref 21–32)
CREAT SERPL-MCNC: 1.74 MG/DL (ref 0.6–1.3)
EOSINOPHIL # BLD AUTO: 0.38 THOUSAND/ÂΜL (ref 0–0.61)
EOSINOPHIL # BLD AUTO: 0.49 THOUSAND/ÂΜL (ref 0–0.61)
EOSINOPHIL NFR BLD AUTO: 4 % (ref 0–6)
EOSINOPHIL NFR BLD AUTO: 6 % (ref 0–6)
ERYTHROCYTE [DISTWIDTH] IN BLOOD BY AUTOMATED COUNT: 13 % (ref 11.6–15.1)
ERYTHROCYTE [DISTWIDTH] IN BLOOD BY AUTOMATED COUNT: 15.5 % (ref 11.6–15.1)
GFR SERPL CREATININE-BSD FRML MDRD: 37 ML/MIN/1.73SQ M
GLUCOSE SERPL-MCNC: 108 MG/DL (ref 65–140)
HCT VFR BLD AUTO: 28.1 % (ref 36.5–49.3)
HCT VFR BLD AUTO: 43.5 % (ref 36.5–49.3)
HGB BLD-MCNC: 14.3 G/DL (ref 12–17)
HGB BLD-MCNC: 9.1 G/DL (ref 12–17)
IMM GRANULOCYTES # BLD AUTO: 0.02 THOUSAND/UL (ref 0–0.2)
IMM GRANULOCYTES # BLD AUTO: 0.05 THOUSAND/UL (ref 0–0.2)
IMM GRANULOCYTES NFR BLD AUTO: 0 % (ref 0–2)
IMM GRANULOCYTES NFR BLD AUTO: 1 % (ref 0–2)
LYMPHOCYTES # BLD AUTO: 1.13 THOUSANDS/ÂΜL (ref 0.6–4.47)
LYMPHOCYTES # BLD AUTO: 2.02 THOUSANDS/ÂΜL (ref 0.6–4.47)
LYMPHOCYTES NFR BLD AUTO: 15 % (ref 14–44)
LYMPHOCYTES NFR BLD AUTO: 19 % (ref 14–44)
MCH RBC QN AUTO: 30.5 PG (ref 26.8–34.3)
MCH RBC QN AUTO: 30.8 PG (ref 26.8–34.3)
MCHC RBC AUTO-ENTMCNC: 32.4 G/DL (ref 31.4–37.4)
MCHC RBC AUTO-ENTMCNC: 32.9 G/DL (ref 31.4–37.4)
MCV RBC AUTO: 94 FL (ref 82–98)
MCV RBC AUTO: 94 FL (ref 82–98)
MONOCYTES # BLD AUTO: 0.7 THOUSAND/ÂΜL (ref 0.17–1.22)
MONOCYTES # BLD AUTO: 0.94 THOUSAND/ÂΜL (ref 0.17–1.22)
MONOCYTES NFR BLD AUTO: 12 % (ref 4–12)
MONOCYTES NFR BLD AUTO: 7 % (ref 4–12)
NEUTROPHILS # BLD AUTO: 5.17 THOUSANDS/ÂΜL (ref 1.85–7.62)
NEUTROPHILS # BLD AUTO: 7.19 THOUSANDS/ÂΜL (ref 1.85–7.62)
NEUTS SEG NFR BLD AUTO: 66 % (ref 43–75)
NEUTS SEG NFR BLD AUTO: 68 % (ref 43–75)
NRBC BLD AUTO-RTO: 0 /100 WBCS
NRBC BLD AUTO-RTO: 0 /100 WBCS
PLATELET # BLD AUTO: 248 THOUSANDS/UL (ref 149–390)
PLATELET # BLD AUTO: 319 THOUSANDS/UL (ref 149–390)
PMV BLD AUTO: 10.1 FL (ref 8.9–12.7)
PMV BLD AUTO: 9.8 FL (ref 8.9–12.7)
POTASSIUM SERPL-SCNC: 4.2 MMOL/L (ref 3.5–5.3)
RBC # BLD AUTO: 2.98 MILLION/UL (ref 3.88–5.62)
RBC # BLD AUTO: 4.65 MILLION/UL (ref 3.88–5.62)
SODIUM SERPL-SCNC: 135 MMOL/L (ref 135–147)
WBC # BLD AUTO: 10.41 THOUSAND/UL (ref 4.31–10.16)
WBC # BLD AUTO: 7.8 THOUSAND/UL (ref 4.31–10.16)

## 2024-12-20 PROCEDURE — 88341 IMHCHEM/IMCYTCHM EA ADD ANTB: CPT | Performed by: STUDENT IN AN ORGANIZED HEALTH CARE EDUCATION/TRAINING PROGRAM

## 2024-12-20 PROCEDURE — 99232 SBSQ HOSP IP/OBS MODERATE 35: CPT | Performed by: NURSE PRACTITIONER

## 2024-12-20 PROCEDURE — 88307 TISSUE EXAM BY PATHOLOGIST: CPT | Performed by: STUDENT IN AN ORGANIZED HEALTH CARE EDUCATION/TRAINING PROGRAM

## 2024-12-20 PROCEDURE — 88342 IMHCHEM/IMCYTCHM 1ST ANTB: CPT | Performed by: STUDENT IN AN ORGANIZED HEALTH CARE EDUCATION/TRAINING PROGRAM

## 2024-12-20 PROCEDURE — 99232 SBSQ HOSP IP/OBS MODERATE 35: CPT | Performed by: FAMILY MEDICINE

## 2024-12-20 PROCEDURE — 85025 COMPLETE CBC W/AUTO DIFF WBC: CPT | Performed by: FAMILY MEDICINE

## 2024-12-20 PROCEDURE — 88360 TUMOR IMMUNOHISTOCHEM/MANUAL: CPT | Performed by: STUDENT IN AN ORGANIZED HEALTH CARE EDUCATION/TRAINING PROGRAM

## 2024-12-20 PROCEDURE — 80048 BASIC METABOLIC PNL TOTAL CA: CPT | Performed by: FAMILY MEDICINE

## 2024-12-20 RX ORDER — FUROSEMIDE 20 MG/1
20 TABLET ORAL DAILY
Status: DISCONTINUED | OUTPATIENT
Start: 2024-12-20 | End: 2024-12-25 | Stop reason: HOSPADM

## 2024-12-20 RX ORDER — SIMETHICONE 80 MG
80 TABLET,CHEWABLE ORAL EVERY 6 HOURS PRN
Status: DISCONTINUED | OUTPATIENT
Start: 2024-12-20 | End: 2024-12-25 | Stop reason: HOSPADM

## 2024-12-20 RX ADMIN — POLYETHYLENE GLYCOL 3350 17 G: 17 POWDER, FOR SOLUTION ORAL at 09:58

## 2024-12-20 RX ADMIN — CIPROFLOXACIN HYDROCHLORIDE 500 MG: 500 TABLET, FILM COATED ORAL at 21:46

## 2024-12-20 RX ADMIN — ATORVASTATIN CALCIUM 40 MG: 40 TABLET, FILM COATED ORAL at 15:39

## 2024-12-20 RX ADMIN — SIMETHICONE 80 MG: 80 TABLET, CHEWABLE ORAL at 15:46

## 2024-12-20 RX ADMIN — ASPIRIN 81 MG CHEWABLE TABLET 81 MG: 81 TABLET CHEWABLE at 09:58

## 2024-12-20 RX ADMIN — FUROSEMIDE 20 MG: 20 TABLET ORAL at 15:39

## 2024-12-20 RX ADMIN — CHLORHEXIDINE GLUCONATE 0.12% ORAL RINSE 15 ML: 1.2 LIQUID ORAL at 09:58

## 2024-12-20 RX ADMIN — TAMSULOSIN HYDROCHLORIDE 0.4 MG: 0.4 CAPSULE ORAL at 15:40

## 2024-12-20 RX ADMIN — FINASTERIDE 5 MG: 5 TABLET, FILM COATED ORAL at 09:59

## 2024-12-20 RX ADMIN — SENNOSIDES 17.2 MG: 8.6 TABLET, FILM COATED ORAL at 09:58

## 2024-12-20 RX ADMIN — DOCUSATE SODIUM 100 MG: 100 CAPSULE, LIQUID FILLED ORAL at 09:58

## 2024-12-20 RX ADMIN — SENNOSIDES 17.2 MG: 8.6 TABLET, FILM COATED ORAL at 18:25

## 2024-12-20 RX ADMIN — AMIODARONE HYDROCHLORIDE 200 MG: 200 TABLET ORAL at 09:59

## 2024-12-20 RX ADMIN — CIPROFLOXACIN HYDROCHLORIDE 500 MG: 500 TABLET, FILM COATED ORAL at 09:58

## 2024-12-20 RX ADMIN — AMLODIPINE BESYLATE 5 MG: 5 TABLET ORAL at 09:59

## 2024-12-20 RX ADMIN — CHLORHEXIDINE GLUCONATE 0.12% ORAL RINSE 15 ML: 1.2 LIQUID ORAL at 21:45

## 2024-12-20 RX ADMIN — DOCUSATE SODIUM 100 MG: 100 CAPSULE, LIQUID FILLED ORAL at 18:25

## 2024-12-20 NOTE — ASSESSMENT & PLAN NOTE
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 Espino for planning  Will defer additional  discussion and management to  attending medical oncology.

## 2024-12-20 NOTE — PROGRESS NOTES
Progress Note - Urology   Name: Toro Rodriguez 74 y.o. male I MRN: [id number]  Unit/Bed#: -01 I Date of Admission: 12/15/2024   Date of Service: 12/20/2024 I Hospital Day: 5    Assessment & Plan  Clot retention of urine  Gross hematuria secondary to large bladder tumor s/p cystoscopy with clot evacuation, fulguration of bleeding vessels, extensive transurethral resection of bladder tumor and placement of penile urethral catheter 12/15  Inconsistent hemoglobin draws 14.3 versus 9.1 and incongruent with degree of hematuria given grossly clear yellow urine on evaluation.    Plan:  Trend labs--transfuse per internal medicine as needed  Remove urethral Colon catheter.  Maintain suprapubic tube to straight drainage.    Acute kidney injury superimposed on stage 3b chronic kidney disease (HCC)  Positive renal recovery with serum creatinine between 1.7--1.8.  At patient's baseline.  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 Espino for planning  Will defer additional  discussion and management to  attending medical oncology.  Hydronephrosis  Persistent right hydronephrosis to the level of urinary bladder on recent imaging.  Secondary to presence of bladder tumor as well as distal right ureteral calculus.  Of note, per operative report intraoperative finding included inability to visualize and access right ureteral orifice.  Fortunately, patient's renal function has returned to baseline.  However, with any evidence of worsening renal function would reimage for reevaluation and consider percutaneous nephrostomy tube placement.    Benign prostatic hyperplasia with urinary retention  BPH with longstanding LUTS.  Managed with suprapubic tube  Recent UDS evaluation  Acute cystitis with hematuria  On Cipro per internal medicine for low-level Pseudomonas in  recent urine culture.  Appreciate management.    Urology service signing off.  Discharge at the discretion of the internal medicine service.    Subjective   Toro Rodriguez  Is a 74-year-old male known to our service for BPH, chronic lower urinary tract symptoms and urinary retention, status post SP tube, seen in urologic consultation and transferred from [facility] for gross hematuria.  Cystoscopy, clot evacuation and TURBT performed 12/15.  Patient with suprapubic tube and Colon catheter bladder access for irrigation.  Urine is grossly clear.  Patient denies flank, abdominal or suprapubic pain.    Objective :  Temp:  [97.9 °F (36.6 °C)-99 °F (37.2 °C)] 97.9 °F (36.6 °C)  HR:  [57-77] 77  BP: (124-133)/(55-75) 128/56  SpO2:  [89 %-97 %] 95 %    I/O         12/18 0701  12/19 0700 12/19 0701  12/20 0700 12/20 0701  12/21 0700    P.O. 220 416     Total Intake(mL/kg) 220 (2.5) 416 (4.7)     Urine (mL/kg/hr) 1250 (0.6) 1825 (0.9)     Total Output 1250 1825     Net -1030 -1409                  Lines/Drains/Airways       Active Status       Name Placement date Placement time Site Days    Suprapubic Catheter 16 Fr. 12/03/24  1019  --  17    Continuous Bladder Irrigation Three-way 12/15/24  --  Three-way  5                  Physical Exam  General appearance: alert and oriented, in no acute distress, appears stated age, cooperative, and no distress  Head: Normocephalic, without obvious abnormality, atraumatic  Neck: no JVD and supple, symmetrical, trachea midline  Lungs: diminished breath sounds  Heart: regular rate and rhythm, S1, S2 normal, no murmur, click, rub or gallop  Abdomen: soft, non-tender; bowel sounds normal; no masses,  no organomegaly  Extremities: extremities normal, warm and well-perfused; no cyanosis, clubbing, or edema  Pulses: 2+ and symmetric  Neurologic: Grossly normal  Colon & SPT          Lab Results: I have reviewed the following results:  Recent Labs     12/20/24  0519   WBC 7.80  10.41*   HGB 9.1*   14.3   HCT 28.1*  43.5     248   SODIUM 135   K 4.2      CO2 22   BUN 28*   CREATININE 1.74*   GLUC 108       Imaging Results Review: I reviewed radiology reports from this admission including: CT abdomen/pelvis.  Other Study Results Review: Pathology reports reviewed.      CT abdomen pelvis wo contrast [[id number]] Collected: 12/15/24 1206   Order Status: Completed Updated: 12/15/24 1240   Narrative:     CT ABDOMEN AND PELVIS WITHOUT IV CONTRAST    INDICATION: continued hematuris, hypotention, clot burden.    COMPARISON: 12/13/2024    TECHNIQUE: CT examination of the abdomen and pelvis was performed without intravenous contrast limiting the study. Multiplanar 2D reformatted images were created from the source data.    This examination, like all CT scans performed in the [facility], was performed utilizing techniques to minimize radiation dose exposure, including the use of iterative reconstruction and automated exposure control. Radiation dose length  product (DLP) for this visit: 963 mGy-cm    Enteric Contrast: Not administered.    FINDINGS:    ABDOMEN    LOWER CHEST: Dependent changes at the lung bases are noted. Some reticular opacities raise the possibility of fibrosis.    LIVER/BILIARY TREE: Unremarkable.    GALLBLADDER: No calcified gallstones. No pericholecystic inflammatory change.    SPLEEN: Unremarkable.    PANCREAS: Unremarkable.    ADRENAL GLANDS: Unremarkable.    KIDNEYS/URETERS: Hyperdense lesion upper pole right kidney is quite dense more likely a complex cyst and similar to the prior study.    Lower density larger cysts are seen bilaterally. Other areas are too small to characterize..    Severe hydroureteronephrosis on the right is similar to the study of December 13. 2 calcifications in the pelvis abut the wall of the ureter may represent phleboliths adjacent to the ureter more likely than calculi as their position is unchanged since  October. A third calcification  on image 119, series 2 could represent a small nonobstructing calculus as the ureter is still dilated beyond this level. No significant hydronephrosis on the left.    STOMACH AND BOWEL: The stomach is not well distended. No small bowel dilatation. Diffuse colon diverticulosis. Mild to moderate fecal stasis.    APPENDIX: No findings to suggest appendicitis.    ABDOMINOPELVIC CAVITY: No ascites. No pneumoperitoneum. No lymphadenopathy.    VESSELS: Atherosclerosis without abdominal aortic aneurysm.    PELVIS    REPRODUCTIVE ORGANS: Unremarkable for patient's age.    URINARY BLADDER: The bladder is very distended with high density increased since the study of December 13 with only small areas of lower density seen. The bladder lumen suggests a high clot burden despite the presence of Colon catheter and suprapubic  catheter.    ABDOMINAL WALL/INGUINAL REGIONS: Small periumbilical hernia fat.    BONES: No acute fracture or suspicious osseous lesion. Streak artifacts from right hip replacement limit the examination. Degenerative changes of the spine with scoliosis.    Fatty atrophy of the iliac is muscles noted on the right similar to prior study as well as the right gluteal musculature.   Impression:       Increased distention of the bladder with high density suggesting high clot burden.    Persistent hydronephrosis on the right. Probable small calculus within the distal dilated right ureter which may be nonobstructing.    The study was marked in EPIC for immediate notification. Communication was also performed utilizing secure text.    Workstation performed: [id number]   CT abdomen pelvis wo contrast [[id number]] Collected: 12/13/24 1649   Order Status: Completed Updated: 12/13/24 1713   Narrative:     CT ABDOMEN AND PELVIS WITHOUT IV CONTRAST    INDICATION: hematuria. History of suprapubic catheter with previously seen bladder hematoma versus mass.    COMPARISON: Multiple prior CT examinations, most recent CT  abdomen/pelvis 12/5/2024.    TECHNIQUE: CT examination of the abdomen and pelvis was performed without intravenous contrast. Multiplanar 2D reformatted images were created from the source data.    This examination, like all CT scans performed in the [facility], was performed utilizing techniques to minimize radiation dose exposure, including the use of iterative reconstruction and automated exposure control. Radiation dose length  product (DLP) for this visit: 630.12 mGy-cm    Enteric Contrast: Not administered.    FINDINGS:    ABDOMEN    LOWER CHEST: Stable mild peripheral reticulation suggestive of interstitial fibrosis. Stable cardiomegaly. Coronary artery calcifications.    LIVER/BILIARY TREE: Hyperattenuating hepatic parenchyma consistent with amiodarone use. Calcified right hepatic dome granuloma. No suspicious mass. Normal hepatic contours. No biliary dilation.    GALLBLADDER: No calcified gallstones. No pericholecystic inflammatory change.    SPLEEN: Unremarkable.    PANCREAS: Unremarkable.    ADRENAL GLANDS: Unremarkable.    KIDNEYS/URETERS: Stable moderate right hydroureteronephrosis to the level of the urinary bladder. Redemonstrated 2 mm dependent distal right ureteral calculus (2/121). Unchanged 2 mm left lower pole renal calculus. No left hydronephrosis.    Bilateral renal cysts and subcentimeter hypodensities too small to characterize, suboptimally assessed on this unenhanced examination. There is again a hyperattenuating lesion in the right upper pole measuring 1.3 cm (2/49), favoring a hemorrhagic cyst.    STOMACH AND BOWEL: Colonic diverticulosis without findings of acute diverticulitis.    APPENDIX: No findings to suggest appendicitis.    ABDOMINOPELVIC CAVITY: No ascites. No pneumoperitoneum. No lymphadenopathy.    VESSELS: Atherosclerosis without abdominal aortic aneurysm. Note the vascular patency is not assessed on this unenhanced examination.    PELVIS    REPRODUCTIVE ORGANS:  Unremarkable for patient's age.    URINARY BLADDER: Suprapubic catheter in place. Similar marked eccentric bladder wall thickening on the right measuring up to 5.3 x 4.5 cm (2/125) which is mildly hyperattenuating. Associated mild surrounding fat stranding. Findings are suboptimally  evaluated on this unenhanced examination, however grossly similar to [place] [address] and 10/15/2024. There is again a diverticulum arising from the anterior right bladder wall containing intraluminal air (2/129).    ABDOMINAL WALL/INGUINAL REGIONS: Small fat-containing umbilical hernia.    BONES: No acute fracture or suspicious osseous lesion. Spinal degenerative changes. Grade 1 anterolisthesis of L5 on S1. Chronic left L5 pars defect. Right hip prosthesis.   Impression:       1.  Stable moderate right hydroureteronephrosis to the level of the urinary bladder with nonobstructing 2 mm dependent calculus in the distal right ureter. Hydroureteronephrosis is favored to be secondary to marked irregular thickening of the right  lateral bladder wall, present dating back to multiple prior examinations. While this finding again may reflect a chronic hematoma, underlying bladder mass/neoplasm is not excluded on this unenhanced examination. Recommend urologic consultation with  cystoscopy for further evaluation, if clinically indicated.  2.  Redemonstrated 1.2 cm right upper pole hyperattenuating lesion, favoring a hemorrhagic cyst. Agree with prior recommendation for follow-up dedicated renal MRI or CT urogram in 3 months.    The study was marked in EPIC for immediate notification.    Workstation performed: [id number]

## 2024-12-20 NOTE — ASSESSMENT & PLAN NOTE
On Cipro per internal medicine for low-level Pseudomonas in recent urine culture.  Appreciate management.

## 2024-12-20 NOTE — ASSESSMENT & PLAN NOTE
Wt Readings from Last 3 Encounters:   12/20/24 87.9 kg (193 lb 12.6 oz)   12/15/24 86.6 kg (190 lb 14.7 oz)   12/10/24 87.5 kg (193 lb)   Echo 2/24- LV wall thickness is mildly increased EF 35-40%, systolic function is moderately reduced, moderate global hypokinesis, G1DD. LA is moderately dilated and RA is dilated. MV moderate regurgitation.  Follows with Dr. Roach outpatient   PTA on lasix 20mg daily- currently on hold given CHUY and hypotension post op, consider resuming in the next 24 hrs   C/w GDMT:  BB:  metoprolol 25mg q12h   ACE/ARB/ARNI:  lisinopril 5mg daily on hold d/t hypotension and CHUY post op  MRA:  n/a  SGLT2i: n/a  Currently on room air   Daily weights, Strict I & Os  Cardiac diet, low sodium <2g, fluid restriction 2000ml

## 2024-12-20 NOTE — ASSESSMENT & PLAN NOTE
Persistent right hydronephrosis to the level of urinary bladder on recent imaging.  Secondary to presence of bladder tumor as well as distal right ureteral calculus.  Of note, per operative report intraoperative finding included inability to visualize and access right ureteral orifice.  Fortunately, patient's renal function has returned to baseline.  However, with any evidence of worsening renal function would reimage for reevaluation and consider percutaneous nephrostomy tube placement.

## 2024-12-20 NOTE — ASSESSMENT & PLAN NOTE
Currently on MiraLAX and Colace, Senna at bedtime, still no BM since 12/14 as per patient, feels bloated  Added Dulcolax suppository, senna changed to 2 tablets twice daily  KUB ordered on 12/19-nonobstructive gas pattern  Continue above bowel regimen, add simethicone

## 2024-12-20 NOTE — ASSESSMENT & PLAN NOTE
Pt initially presented to outside hospital with gross hematuria and had three-way Farmer placed and CBI started. He was noted to have labile blood pressures so he was transferred to [facility] for cystoscopy and admitted to the ICU post op for hypotension and hypoxia. He did require 2 units PRBCs due to ongoing blood loss and anemia.   S/p cystoscopy with clot evacuation, fulguration of bleeding vessels and extensive transurethral resection of bladder tumor and placement of penile urethral catheter 12/15  Unable to visualize right ureter in the OR; per urology no need for IR consult for PCN at this time as creatinine has improved.   CBI weaned off 12/16 around 11am   Patient had both urethral Farmer catheter and SPT , farmer removed on 12/20 by urology  Continue to monitor Hg and creatinine, creatinine plateaued at this point  Patient was previously on Eliquis for DVT that was stopped about 2 months on previous admissions with no plans to resume  Okay to resume aspirin on 12/19, continue to monitor for hematuria

## 2024-12-20 NOTE — ASSESSMENT & PLAN NOTE
Lab Results   Component Value Date    HGBA1C 5.9 (H) 04/18/2024       Recent Labs     12/17/24  1601 12/17/24  2155 12/18/24  0552 12/18/24  1047   POCGLU 101 100 95 97       Blood Sugar Average: Last 72 hrs:  (P) [id number]  Glucose has been acceptable, diet controlled at home; will discontinue sliding scale  Hypoglycemic protocol  Carb count diet

## 2024-12-20 NOTE — ASSESSMENT & PLAN NOTE
Lab Results   Component Value Date    CREATININE 1.74 (H) 12/20/2024    CREATININE 1.73 (H) 12/19/2024    CREATININE 1.80 (H) 12/18/2024   Creatinine reviewed, appears BL has increased since September to 1.5-1.8  Creat peaked at 2.09  Avoid nephrotoxic drugs and hypotension- holding lasix and lisinopril    C/w SPC and farmer for now  Creatinine continues to improve

## 2024-12-20 NOTE — ASSESSMENT & PLAN NOTE
PTA norvasc 5mg daily; metoprolol 25mg q12h; lisinopril 5mg daily and lasix 20mg daily   Lisinopril and Lasix were on hold given CHUY and hypotension postoperatively  Continue with Norvasc 5 mg daily metoprolol 25 mg every 12 hours  Blood pressure been acceptable  blood pressure is stable and kidney function improved, will resume Lasix today (12/20) -20 mg daily; continue to hold lisinopril

## 2024-12-20 NOTE — TELEPHONE ENCOUNTER
Patient Education        Well Visit, Ages 25 to 48: Care Instructions  Overview     Well visits can help you stay healthy. Your doctor has checked your overall health and may have suggested ways to take good care of yourself. Your doctor also may have recommended tests. At home, you can help prevent illness with healthy eating, regular exercise, and other steps. Follow-up care is a key part of your treatment and safety. Be sure to make and go to all appointments, and call your doctor if you are having problems. It's also a good idea to know your test results and keep a list of the medicines you take. How can you care for yourself at home? · Get screening tests that you and your doctor decide on. Screening helps find diseases before any symptoms appear. · Eat healthy foods. Choose fruits, vegetables, whole grains, protein, and low-fat dairy foods. Limit fat, especially saturated fat. Reduce salt in your diet. · Limit alcohol. If you are a man, have no more than 2 drinks a day or 14 drinks a week. If you are a woman, have no more than 1 drink a day or 7 drinks a week. · Get at least 30 minutes of physical activity on most days of the week. Walking is a good choice. You also may want to do other activities, such as running, swimming, cycling, or playing tennis or team sports. Discuss any changes in your exercise program with your doctor. · Reach and stay at a healthy weight. This will lower your risk for many problems, such as obesity, diabetes, heart disease, and high blood pressure. · Do not smoke or allow others to smoke around you. If you need help quitting, talk to your doctor about stop-smoking programs and medicines. These can increase your chances of quitting for good. · Care for your mental health. It is easy to get weighed down by worry and stress. Learn strategies to manage stress, like deep breathing and mindfulness, and stay connected with your family and community.  If you find you often feel sad Patient's daughter called today to say that the patient will most likely have farmer removed while inpatient prior to discharge. Says that the patient may be discharged within the next two days.     She canceled the 12/23 appt and will call back if it needs to be rescheduled.    or hopeless, talk with your doctor. Treatment can help. · Talk to your doctor about whether you have any risk factors for sexually transmitted infections (STIs). You can help prevent STIs if you wait to have sex with a new partner (or partners) until you've each been tested for STIs. It also helps if you use condoms (male or female condoms) and if you limit your sex partners to one person who only has sex with you. Vaccines are available for some STIs, such as HPV. · Use birth control if it's important to you to prevent pregnancy. Talk with your doctor about the choices available and what might be best for you. · If you think you may have a problem with alcohol or drug use, talk to your doctor. This includes prescription medicines (such as amphetamines and opioids) and illegal drugs (such as cocaine and methamphetamine). Your doctor can help you figure out what type of treatment is best for you. · Protect your skin from too much sun. When you're outdoors from 10 a.m. to 4 p.m., stay in the shade or cover up with clothing and a hat with a wide brim. Wear sunglasses that block UV rays. Even when it's cloudy, put broad-spectrum sunscreen (SPF 30 or higher) on any exposed skin. · See a dentist one or two times a year for checkups and to have your teeth cleaned. · Wear a seat belt in the car. When should you call for help? Watch closely for changes in your health, and be sure to contact your doctor if you have any problems or symptoms that concern you. Where can you learn more? Go to [email or web address] and sign in to your [employer] account. Enter P072 in the Mundi box to learn more about \"Well Visit, Ages 25 to 48: Care Instructions. \"     If you do not have an account, please click on the \"Sign Up Now\" link. Current as of: October 6, 2021               Content Version: 13.1  © 4495-9412 [employer]wise, [employer]. Care instructions adapted under license by [facility] ([facility]). If you have questions about a medical condition or this instruction, always ask your healthcare professional. [place] [address] any warranty or liability for your use of this information.

## 2024-12-20 NOTE — PROGRESS NOTES
Progress Note - Hospitalist   Name: Toro Rodriguez 74 y.o. male I MRN: [id number]  Unit/Bed#: -01 I Date of Admission: 12/15/2024   Date of Service: 12/20/2024 I Hospital Day: 5    Assessment & Plan  Clot retention of urine  Pt initially presented to outside hospital with gross hematuria and had three-way Farmer placed and CBI started. He was noted to have labile blood pressures so he was transferred to [facility] for cystoscopy and admitted to the ICU post op for hypotension and hypoxia. He did require 2 units PRBCs due to ongoing blood loss and anemia.   S/p cystoscopy with clot evacuation, fulguration of bleeding vessels and extensive transurethral resection of bladder tumor and placement of penile urethral catheter 12/15  Unable to visualize right ureter in the OR; per urology no need for IR consult for PCN at this time as creatinine has improved.   CBI weaned off 12/16 around 11am   Patient had both urethral Farmer catheter and SPT , farmer removed on 12/20 by urology  Continue to monitor Hg and creatinine, creatinine plateaued at this point  Patient was previously on Eliquis for DVT that was stopped about 2 months on previous admissions with no plans to resume  Okay to resume aspirin on 12/19, continue to monitor for hematuria  Acute kidney injury superimposed on stage 3b chronic kidney disease (HCC)  Lab Results   Component Value Date    CREATININE 1.74 (H) 12/20/2024    CREATININE 1.73 (H) 12/19/2024    CREATININE 1.80 (H) 12/18/2024   Creatinine reviewed, appears BL has increased since September to 1.5-1.8  Creat peaked at 2.09  Avoid nephrotoxic drugs and hypotension- holding lasix and lisinopril    C/w SPC and farmer for now  Creatinine continues to improve  ABLA (acute blood loss anemia)  Lab Results   Component Value Date    HGB 14.3 12/20/2024    HGB 9.1 (L) 12/20/2024    HGB 8.8 (L) 12/19/2024   Hgb baseline 10-11 dropped to 7.2 on admission with hematuria - S/p 2 units of PRBCs with improvement in  hgb  Transfuse for hgb < 7.0  Hg remains stable  Essential hypertension  PTA norvasc 5mg daily; metoprolol 25mg q12h; lisinopril 5mg daily and lasix 20mg daily   Lisinopril and Lasix were on hold given CHUY and hypotension postoperatively  Continue with Norvasc 5 mg daily metoprolol 25 mg every 12 hours  Blood pressure been acceptable  blood pressure is stable and kidney function improved, will resume Lasix today (12/20) -20 mg daily; continue to hold lisinopril  Coronary artery disease involving native coronary artery of native heart  ASA being on hold, resumed 12/19  Continue with Lipitor 40 mg daily and metoprolol 25 mg every 12 hours  Denies chest pain  Left heart cath in 2023 showing Ost RCA to Prox RCA lesion is 100% stenosed.   Resumed Lasix 20 mg on 12/20  Type 2 diabetes mellitus with stage 3b chronic kidney disease, without long-term current use of insulin ([facility])  Lab Results   Component Value Date    HGBA1C 5.9 (H) 04/18/2024       Recent Labs     12/17/24  1601 12/17/24  2155 12/18/24  0552 12/18/24  1047   POCGLU 101 100 95 97       Blood Sugar Average: Last 72 hrs:  (P) [id number]  Glucose has been acceptable, diet controlled at home; will discontinue sliding scale  Hypoglycemic protocol  Carb count diet  Benign prostatic hyperplasia with urinary retention  Noted chronic suprapubic catheter  Urethral Colon placed postoperatively after cystoscopy plan to remove in 1 week outpatient with urology  Continue with Proscar and Flomax  NSVT (nonsustained ventricular tachycardia) ([facility])  C/w metoprolol 25mg q12h and amiodarone 200mg daily   Slow transit constipation  Currently on MiraLAX and Colace, Senna at bedtime, still no BM since 12/14 as per patient, feels bloated  Added Dulcolax suppository, senna changed to 2 tablets twice daily  KUB ordered on 12/19-nonobstructive gas pattern  Continue above bowel regimen, add simethicone  Hydronephrosis  Unable to visualize right ureter in OR; per urology no  need for IR consult for PCN at this time as creatinine has improved.   Renal US 12/17- Moderate right hydronephrosis persists.    Bladder mass  S/p transurethral resection of bladder tumor 12/15  Follow-up with urology outpatient in approximately 2 weeks for pathology review and plan of care  HFrEF (heart failure with reduced ejection fraction) ([facility])  Wt Readings from Last 3 Encounters:   12/20/24 87.9 kg (193 lb 12.6 oz)   12/15/24 86.6 kg (190 lb 14.7 oz)   12/10/24 87.5 kg (193 lb)   Echo 2/24- LV wall thickness is mildly increased EF 35-40%, systolic function is moderately reduced, moderate global hypokinesis, G1DD. LA is moderately dilated and RA is dilated. MV moderate regurgitation.  Follows with Dr. Roach outpatient   PTA on lasix 20mg daily- currently on hold given CHUY and hypotension post op, consider resuming in the next 24 hrs   C/w GDMT:  BB:  metoprolol 25mg q12h   ACE/ARB/ARNI:  lisinopril 5mg daily on hold d/t hypotension and CHUY post op  MRA:  n/a  SGLT2i: n/a  Currently on room air   Daily weights, Strict I & Os  Cardiac diet, low sodium <2g, fluid restriction 2000ml  Acute cystitis with hematuria  Urine culture + Pseudomonas  Previously was on IV ceftriaxone will transition to oral ciprofloxacin given sensitivities, complete 7 days of antibiotic till 12/22    VTE Pharmacologic Prophylaxis:   High Risk (Score >/= 5) - Pharmacological DVT Prophylaxis Contraindicated. Sequential Compression Devices Ordered.    Mobility:   Basic Mobility Inpatient Raw Score: 18  JH-HLM Goal: 6: Walk 10 steps or more  JH-HLM Achieved: 2: Bed activities/Dependent transfer  JH-HLM Goal achieved. Continue to encourage appropriate mobility.    Patient Centered Rounds: I performed bedside rounds with nursing staff today.   Discussions with Specialists or Other Care Team Provider: urology, RN    Education and Discussions with Family / Patient:  discussed with daughter over the phone on 12/19.     Current Length of Stay: 5  day(s)  Current Patient Status: Inpatient   Certification Statement: The patient will continue to require additional inpatient hospital stay due to monitoring creatinine in light of resuming meds that effect kidney function, monitor for hematuria, management of constipation,   Discharge Plan: Anticipate discharge in 24-48 hrs to home.    Code Status: Level 1 - Full Code    Subjective   Patient seen and  examined at bedside.  He reports having 2 small BM yesterday, feels little better but still bloated    Objective :  Temp:  [97.9 °F (36.6 °C)-99 °F (37.2 °C)] 97.9 °F (36.6 °C)  HR:  [57-77] 77  BP: (124-133)/(55-75) 128/56  SpO2:  [89 %-97 %] 95 %    Body mass index is 30.35 kg/m².     Input and Output Summary (last 24 hours):     Intake/Output Summary (Last 24 hours) at 12/20/2024 1437  Last data filed at 12/20/2024 0500  Gross per 24 hour   Intake --   Output 1825 ml   Net -1825 ml       Physical Exam  Constitutional:       General: He is not in acute distress.     Appearance: He is not ill-appearing.   Cardiovascular:      Rate and Rhythm: Normal rate and regular rhythm.      Pulses: Normal pulses.      Heart sounds: Normal heart sounds. No murmur heard.  Pulmonary:      Effort: Pulmonary effort is normal. No respiratory distress.      Breath sounds: No wheezing or rales.   Abdominal:      General: Bowel sounds are normal. There is distension.      Palpations: Abdomen is soft.      Tenderness: There is no abdominal tenderness.   Genitourinary:     Comments: Suprapubic catheter in place  Musculoskeletal:         General: No swelling or tenderness.   Skin:     General: Skin is warm and dry.      Findings: No erythema or rash.   Neurological:      General: No focal deficit present.      Mental Status: He is alert. Mental status is at baseline.   Psychiatric:         Attention and Perception: Attention normal.         Mood and Affect: Mood normal.           Lines/Drains:  Lines/Drains/Airways       Active Status        Name Placement date Placement time Site Days    Suprapubic Catheter 16 Fr. 12/03/24  1019  --  17    Continuous Bladder Irrigation Three-way 12/15/24  --  Three-way  5                  Urinary Catheter:  Goal for removal: removed farmer today                 Lab Results: I have reviewed the following results:   Results from last 7 days   Lab Units 12/20/24  0519   WBC Thousand/uL 7.80  10.41*   HEMOGLOBIN g/dL 9.1*  14.3   HEMATOCRIT % 28.1*  43.5   PLATELETS Thousands/uL 319  248   SEGS PCT % 66  68   LYMPHO PCT % 15  19   MONO PCT % 12  7   EOS PCT % 6  4     Results from last 7 days   Lab Units 12/20/24  0519 12/15/24  1843 12/15/24  0251   SODIUM mmol/L 135   < > 137   POTASSIUM mmol/L 4.2   < > 3.9   CHLORIDE mmol/L 104   < > 106   CO2 mmol/L 22   < > 22   BUN mg/dL 28*   < > 36*   CREATININE mg/dL 1.74*   < > 2.02*   ANION GAP mmol/L 9   < > 9   CALCIUM mg/dL 8.4   < > 8.0*   ALBUMIN g/dL  --   --  3.4*   TOTAL BILIRUBIN mg/dL  --   --  0.36   ALK PHOS U/L  --   --  70   ALT U/L  --   --  12   AST U/L  --   --  14   GLUCOSE RANDOM mg/dL 108   < > 103    < > = values in this interval not displayed.     Results from last 7 days   Lab Units 12/15/24  0251   INR  1.09     Results from last 7 days   Lab Units 12/18/24  1047 12/18/24  0552 12/17/24  2155 12/17/24  1601 12/17/24  1045 12/17/24  0604 12/16/24  2103 12/16/24  1602 12/16/24  1128 12/16/24  0704 12/15/24  2148 12/15/24  1838   POC GLUCOSE mg/dl 97 95 100 101 105 100 109 107 122 102 99 98               Recent Cultures (last 7 days):   Results from last 7 days   Lab Units 12/15/24  0012 12/13/24  1621   URINE CULTURE  30,000-39,000 cfu/ml Pseudomonas aeruginosa* >100,000 cfu/ml Pseudomonas aeruginosa*  Streptococcus species*  Lactobacillus species*             Last 24 Hours Medication List:     Current Facility-Administered Medications:     acetaminophen (TYLENOL) tablet 650 mg, Q6H PRN    amiodarone tablet 200 mg, Daily    amLODIPine  (NORVASC) tablet 5 mg, Daily    aspirin chewable tablet 81 mg, Daily    atorvastatin (LIPITOR) tablet 40 mg, Daily With Dinner    bisacodyl (DULCOLAX) rectal suppository 10 mg, Daily PRN    chlorhexidine (PERIDEX) 0.12 % oral rinse 15 mL, Q12H ARI    ciprofloxacin (CIPRO) tablet 500 mg, Q12H ARI    dextromethorphan-guaiFENesin (ROBITUSSIN DM) oral syrup 10 mL, Q4H PRN    docusate sodium (COLACE) capsule 100 mg, BID    finasteride (PROSCAR) tablet 5 mg, Daily    HYDROcodone Bit-Homatrop MBr (HYCODAN) oral syrup 2.5 mL, Q4H PRN    HYDROmorphone HCl (DILAUDID) injection 0.2 mg, Q2H PRN    metoprolol succinate (TOPROL-XL) 24 hr tablet 25 mg, Q12H ARI    naloxone (NARCAN) 0.04 mg/mL syringe 0.04 mg, Q1MIN PRN    ondansetron (ZOFRAN) injection 4 mg, Q6H PRN    oxybutynin (DITROPAN) tablet 5 mg, TID PRN    oxyCODONE (ROXICODONE) split tablet 2.5 mg, Q4H PRN **OR** oxyCODONE (ROXICODONE) IR tablet 5 mg, Q4H PRN    phenol (CHLORASEPTIC) 1.4 % mucosal liquid 1 spray, Q2H PRN    polyethylene glycol (MIRALAX) packet 17 g, Daily    senna (SENOKOT) tablet 17.2 mg, BID    tamsulosin (FLOMAX) capsule 0.4 mg, Daily With Dinner    Administrative Statements   Today, Patient Was Seen By: Jordana Mercado MD  I have spent a total time of 40 minutes in caring for this patient on the day of the visit/encounter including Importance of tx compliance, Risk factor reductions, Counseling / Coordination of care, Documenting in the medical record, Reviewing / ordering tests, medicine, procedures  , Obtaining or reviewing history  , and Communicating with other healthcare professionals .    **Please Note: This note may have been constructed using a voice recognition system.**

## 2024-12-20 NOTE — ASSESSMENT & PLAN NOTE
ASA being on hold, resumed 12/19  Continue with Lipitor 40 mg daily and metoprolol 25 mg every 12 hours  Denies chest pain  Left heart cath in 2023 showing Ost RCA to Prox RCA lesion is 100% stenosed.   Resumed Lasix 20 mg on 12/20

## 2024-12-20 NOTE — ASSESSMENT & PLAN NOTE
Gross hematuria secondary to large bladder tumor s/p cystoscopy with clot evacuation, fulguration of bleeding vessels, extensive transurethral resection of bladder tumor and placement of penile urethral catheter 12/15  Inconsistent hemoglobin draws 14.3 versus 9.1 and incongruent with degree of hematuria given grossly clear yellow urine on evaluation.    Plan:  Trend labs--transfuse per internal medicine as needed  Remove urethral Colon catheter.  Maintain suprapubic tube to straight drainage.

## 2024-12-20 NOTE — ASSESSMENT & PLAN NOTE
Lab Results   Component Value Date    HGB 14.3 12/20/2024    HGB 9.1 (L) 12/20/2024    HGB 8.8 (L) 12/19/2024   Hgb baseline 10-11 dropped to 7.2 on admission with hematuria - S/p 2 units of PRBCs with improvement in hgb  Transfuse for hgb < 7.0  Hg remains stable

## 2024-12-21 ENCOUNTER — APPOINTMENT (INPATIENT)
Dept: RADIOLOGY | Facility: HOSPITAL | Age: 74
DRG: 829 | End: 2024-12-21
Payer: MEDICARE

## 2024-12-21 LAB
ANION GAP SERPL CALCULATED.3IONS-SCNC: 10 MMOL/L (ref 4–13)
BASOPHILS # BLD AUTO: 0.07 THOUSANDS/ÂΜL (ref 0–0.1)
BASOPHILS NFR BLD AUTO: 1 % (ref 0–1)
BUN SERPL-MCNC: 32 MG/DL (ref 5–25)
CALCIUM SERPL-MCNC: 8.8 MG/DL (ref 8.4–10.2)
CHLORIDE SERPL-SCNC: 102 MMOL/L (ref 96–108)
CO2 SERPL-SCNC: 22 MMOL/L (ref 21–32)
CREAT SERPL-MCNC: 1.82 MG/DL (ref 0.6–1.3)
EOSINOPHIL # BLD AUTO: 0.56 THOUSAND/ÂΜL (ref 0–0.61)
EOSINOPHIL NFR BLD AUTO: 8 % (ref 0–6)
ERYTHROCYTE [DISTWIDTH] IN BLOOD BY AUTOMATED COUNT: 15.2 % (ref 11.6–15.1)
GFR SERPL CREATININE-BSD FRML MDRD: 35 ML/MIN/1.73SQ M
GLUCOSE SERPL-MCNC: 102 MG/DL (ref 65–140)
HCT VFR BLD AUTO: 31.7 % (ref 36.5–49.3)
HGB BLD-MCNC: 10.4 G/DL (ref 12–17)
IMM GRANULOCYTES # BLD AUTO: 0.03 THOUSAND/UL (ref 0–0.2)
IMM GRANULOCYTES NFR BLD AUTO: 0 % (ref 0–2)
LYMPHOCYTES # BLD AUTO: 1.19 THOUSANDS/ÂΜL (ref 0.6–4.47)
LYMPHOCYTES NFR BLD AUTO: 16 % (ref 14–44)
MCH RBC QN AUTO: 30.4 PG (ref 26.8–34.3)
MCHC RBC AUTO-ENTMCNC: 32.8 G/DL (ref 31.4–37.4)
MCV RBC AUTO: 93 FL (ref 82–98)
MONOCYTES # BLD AUTO: 0.83 THOUSAND/ÂΜL (ref 0.17–1.22)
MONOCYTES NFR BLD AUTO: 11 % (ref 4–12)
NEUTROPHILS # BLD AUTO: 4.67 THOUSANDS/ÂΜL (ref 1.85–7.62)
NEUTS SEG NFR BLD AUTO: 64 % (ref 43–75)
NRBC BLD AUTO-RTO: 0 /100 WBCS
PLATELET # BLD AUTO: 363 THOUSANDS/UL (ref 149–390)
PMV BLD AUTO: 9.2 FL (ref 8.9–12.7)
POTASSIUM SERPL-SCNC: 4.4 MMOL/L (ref 3.5–5.3)
RBC # BLD AUTO: 3.42 MILLION/UL (ref 3.88–5.62)
SODIUM SERPL-SCNC: 134 MMOL/L (ref 135–147)
WBC # BLD AUTO: 7.35 THOUSAND/UL (ref 4.31–10.16)

## 2024-12-21 PROCEDURE — 99232 SBSQ HOSP IP/OBS MODERATE 35: CPT | Performed by: UROLOGY

## 2024-12-21 PROCEDURE — 0T9030Z DRAINAGE OF RIGHT KIDNEY WITH DRAINAGE DEVICE, PERCUTANEOUS APPROACH: ICD-10-PCS | Performed by: RADIOLOGY

## 2024-12-21 PROCEDURE — 74176 CT ABD & PELVIS W/O CONTRAST: CPT

## 2024-12-21 PROCEDURE — 99233 SBSQ HOSP IP/OBS HIGH 50: CPT | Performed by: FAMILY MEDICINE

## 2024-12-21 PROCEDURE — 80048 BASIC METABOLIC PNL TOTAL CA: CPT | Performed by: FAMILY MEDICINE

## 2024-12-21 PROCEDURE — 85025 COMPLETE CBC W/AUTO DIFF WBC: CPT | Performed by: FAMILY MEDICINE

## 2024-12-21 RX ORDER — LEVOFLOXACIN 5 MG/ML
500 INJECTION, SOLUTION INTRAVENOUS ONCE
Status: CANCELLED | OUTPATIENT
Start: 2024-12-21 | End: 2024-12-21

## 2024-12-21 RX ADMIN — METOPROLOL SUCCINATE 25 MG: 25 TABLET, EXTENDED RELEASE ORAL at 20:23

## 2024-12-21 RX ADMIN — METOPROLOL SUCCINATE 25 MG: 25 TABLET, EXTENDED RELEASE ORAL at 09:07

## 2024-12-21 RX ADMIN — DOCUSATE SODIUM 100 MG: 100 CAPSULE, LIQUID FILLED ORAL at 17:13

## 2024-12-21 RX ADMIN — FUROSEMIDE 20 MG: 20 TABLET ORAL at 09:07

## 2024-12-21 RX ADMIN — CIPROFLOXACIN HYDROCHLORIDE 500 MG: 500 TABLET, FILM COATED ORAL at 09:07

## 2024-12-21 RX ADMIN — AMLODIPINE BESYLATE 5 MG: 5 TABLET ORAL at 09:07

## 2024-12-21 RX ADMIN — AMIODARONE HYDROCHLORIDE 200 MG: 200 TABLET ORAL at 09:07

## 2024-12-21 RX ADMIN — MINERAL OIL 1 ENEMA: 100 ENEMA RECTAL at 18:59

## 2024-12-21 RX ADMIN — FINASTERIDE 5 MG: 5 TABLET, FILM COATED ORAL at 09:07

## 2024-12-21 RX ADMIN — CHLORHEXIDINE GLUCONATE 0.12% ORAL RINSE 15 ML: 1.2 LIQUID ORAL at 09:07

## 2024-12-21 RX ADMIN — POLYETHYLENE GLYCOL 3350 17 G: 17 POWDER, FOR SOLUTION ORAL at 09:07

## 2024-12-21 RX ADMIN — CIPROFLOXACIN HYDROCHLORIDE 500 MG: 500 TABLET, FILM COATED ORAL at 20:23

## 2024-12-21 RX ADMIN — ASPIRIN 81 MG CHEWABLE TABLET 81 MG: 81 TABLET CHEWABLE at 09:07

## 2024-12-21 RX ADMIN — SENNOSIDES 17.2 MG: 8.6 TABLET, FILM COATED ORAL at 09:07

## 2024-12-21 RX ADMIN — DOCUSATE SODIUM 100 MG: 100 CAPSULE, LIQUID FILLED ORAL at 09:07

## 2024-12-21 RX ADMIN — CHLORHEXIDINE GLUCONATE 0.12% ORAL RINSE 15 ML: 1.2 LIQUID ORAL at 20:23

## 2024-12-21 RX ADMIN — TAMSULOSIN HYDROCHLORIDE 0.4 MG: 0.4 CAPSULE ORAL at 17:13

## 2024-12-21 RX ADMIN — ATORVASTATIN CALCIUM 40 MG: 40 TABLET, FILM COATED ORAL at 17:13

## 2024-12-21 RX ADMIN — SENNOSIDES 17.2 MG: 8.6 TABLET, FILM COATED ORAL at 17:13

## 2024-12-21 NOTE — ASSESSMENT & PLAN NOTE
Currently on MiraLAX and Colace, Senna at bedtime, still no BM since 12/14 as per patient, feels bloated  Added Dulcolax suppository, senna changed to 2 tablets twice daily  KUB ordered on 12/19-nonobstructive gas pattern  Continue above bowel regimen, added simethicone  No BM yesterday, 12/20/2024  Will add mineral oral enema if no BM after Dulcolax administration

## 2024-12-21 NOTE — PLAN OF CARE
Problem: SAFETY ADULT  Goal: Patient will remain free of falls  Description: INTERVENTIONS:  - Educate patient/family on patient safety including physical limitations  - Instruct patient to call for assistance with activity   - Consult OT/PT to assist with strengthening/mobility   - Keep Call bell within reach  - Keep bed low and locked with side rails adjusted as appropriate  - Keep care items and personal belongings within reach  - Initiate and maintain comfort rounds  - Make Fall Risk Sign visible to staff  - Offer Toileting every 2 Hours, in advance of need  - Initiate/Maintain bed alarm  - Obtain necessary fall risk management equipment: bed alarm, non-slip socks  - Apply yellow socks and bracelet for high fall risk patients  - Consider moving patient to room near nurses station  Outcome: Progressing     Problem: DISCHARGE PLANNING  Goal: Discharge to home or other facility with appropriate resources  Description: INTERVENTIONS:  - Identify barriers to discharge w/patient and caregiver  - Arrange for needed discharge resources and transportation as appropriate  - Identify discharge learning needs (meds, wound care, etc.)  - Arrange for interpretive services to assist at discharge as needed  - Refer to Case Management Department for coordinating discharge planning if the patient needs post-hospital services based on physician/advanced practitioner order or complex needs related to functional status, cognitive ability, or social support system  Outcome: Progressing

## 2024-12-21 NOTE — ASSESSMENT & PLAN NOTE
S/p transurethral resection of bladder tumor 12/15  Follow-up with urology outpatient in approximately 2 weeks for pathology review and plan of care  Hematology/oncology consulted by urology, but consult was canceled as there is no inpatient plan and he has appointment scheduled outpatient with hematology oncology on 1/2/25

## 2024-12-21 NOTE — ASSESSMENT & PLAN NOTE
Lab Results   Component Value Date    CREATININE 1.82 (H) 12/21/2024    CREATININE 1.74 (H) 12/20/2024    CREATININE 1.73 (H) 12/19/2024   Creatinine reviewed, appears BL has increased since September to 1.5-1.8  Creat peaked at 2.09  Avoid nephrotoxic drugs and hypotension- holding lasix and lisinopril    C/w SPC ; farmer removed on 12/20  Creatinine was improving but worsened today 12/21, urology involved, plan as above

## 2024-12-21 NOTE — ASSESSMENT & PLAN NOTE
"Pt initially presented to outside hospital with gross hematuria and had three-way Farmer placed and CBI started. He was noted to have labile blood pressures so he was transferred to [facility] for cystoscopy and admitted to the ICU post op for hypotension and hypoxia. He did require 2 units PRBCs due to ongoing blood loss and anemia.   S/p cystoscopy with clot evacuation, fulguration of bleeding vessels and extensive transurethral resection of bladder tumor and placement of penile urethral catheter 12/15  Unable to visualize right ureter in the OR; per urology no need for IR consult for PCN at this time as creatinine has improved.   CBI weaned off 12/16 around 11am   Patient had both urethral Farmer catheter and SPT , farmer removed on 12/20 by urology  Continue to monitor Hg and creatinine, creatinine plateaued at this point  Patient was previously on Eliquis for DVT that was stopped about 2 months on previous admissions with no plans to resume  Patient's creatinine remained stable but worsened again today, urology contacted, repeat CT renal showed \"Persistent right-sided moderate hydronephrosis and hydroureter to the level of the right ureterovesical junction. No evidence of right ureteral calculus\", urology involved, IR consulted for possible nephrostomy on Monday, 12/23.   aspirin resumed on 12/19, but on hold today, 12/21 for possible procedure on Monday  "

## 2024-12-21 NOTE — PROGRESS NOTES
"Progress Note - Hospitalist   Name: Toro Rodriguez 74 y.o. male I MRN: [id number]  Unit/Bed#: -01 I Date of Admission: 12/15/2024   Date of Service: 12/21/2024 I Hospital Day: 6    Assessment & Plan  Clot retention of urine  Pt initially presented to outside hospital with gross hematuria and had three-way Farmer placed and CBI started. He was noted to have labile blood pressures so he was transferred to [facility] for cystoscopy and admitted to the ICU post op for hypotension and hypoxia. He did require 2 units PRBCs due to ongoing blood loss and anemia.   S/p cystoscopy with clot evacuation, fulguration of bleeding vessels and extensive transurethral resection of bladder tumor and placement of penile urethral catheter 12/15  Unable to visualize right ureter in the OR; per urology no need for IR consult for PCN at this time as creatinine has improved.   CBI weaned off 12/16 around 11am   Patient had both urethral Farmer catheter and SPT , farmer removed on 12/20 by urology  Continue to monitor Hg and creatinine, creatinine plateaued at this point  Patient was previously on Eliquis for DVT that was stopped about 2 months on previous admissions with no plans to resume  Patient's creatinine remained stable but worsened again today, urology contacted, repeat CT renal showed \"Persistent right-sided moderate hydronephrosis and hydroureter to the level of the right ureterovesical junction. No evidence of right ureteral calculus\", urology involved, IR consulted for possible nephrostomy on Monday, 12/23.   aspirin resumed on 12/19, but on hold today, 12/21 for possible procedure on Monday  Acute kidney injury superimposed on stage 3b chronic kidney disease (HCC)  Lab Results   Component Value Date    CREATININE 1.82 (H) 12/21/2024    CREATININE 1.74 (H) 12/20/2024    CREATININE 1.73 (H) 12/19/2024   Creatinine reviewed, appears BL has increased since September to 1.5-1.8  Creat peaked at 2.09  Avoid nephrotoxic drugs and " "hypotension- holding lasix and lisinopril    C/w SPC ; farmer removed on 12/20  Creatinine was improving but worsened today 12/21, urology involved, plan as above  Bladder mass  S/p transurethral resection of bladder tumor 12/15  Follow-up with urology outpatient in approximately 2 weeks for pathology review and plan of care  Hematology/oncology consulted by urology, but consult was canceled as there is no inpatient plan and he has appointment scheduled outpatient with hematology oncology on 1/2/25  ABLA (acute blood loss anemia)  Lab Results   Component Value Date    HGB 10.4 (L) 12/21/2024    HGB 14.3 12/20/2024    HGB 9.1 (L) 12/20/2024   Hgb baseline 10-11 dropped to 7.2 on admission with hematuria - S/p 2 units of PRBCs with improvement in hgb  Transfuse for hgb < 7.0  Hg remains stable  Essential hypertension  PTA norvasc 5mg daily; metoprolol 25mg q12h; lisinopril 5mg daily and lasix 20mg daily   Lisinopril and Lasix were on hold given CHUY and hypotension postoperatively  Continue with Norvasc 5 mg daily metoprolol 25 mg every 12 hours  Blood pressure been acceptable  continue to hold lisinopril and Lasix due to worsening kidney function  Coronary artery disease involving native coronary artery of native heart  ASA being on hold, resumed 12/19, placed again on hold 12/21 for possible procedure on Monday  Continue with Lipitor 40 mg daily and metoprolol 25 mg every 12 hours  Denies chest pain  Left heart cath in 2023 showing Ost RCA to Prox RCA lesion is 100% stenosed.     Type 2 diabetes mellitus with stage 3b chronic kidney disease, without long-term current use of insulin ([facility])  Lab Results   Component Value Date    HGBA1C 5.9 (H) 04/18/2024       No results for input(s): \"POCGLU\" in the last 72 hours.      Blood Sugar Average: Last 72 hrs:  (P) 96  Glucose has been acceptable, diet controlled at home; will discontinue sliding scale  Hypoglycemic protocol  Carb count diet  Benign prostatic hyperplasia with " "urinary retention  chronic suprapubic catheter  Continue with Proscar and Flomax  NSVT (nonsustained ventricular tachycardia) ([facility])  C/w metoprolol 25mg q12h and amiodarone 200mg daily   Slow transit constipation  Currently on MiraLAX and Colace, Senna at bedtime, still no BM since 12/14 as per patient, feels bloated  Added Dulcolax suppository, senna changed to 2 tablets twice daily  KUB ordered on 12/19-nonobstructive gas pattern  Continue above bowel regimen, added simethicone  No BM yesterday, 12/20/2024  Will add mineral oral enema if no BM after Dulcolax administration  Hydronephrosis  Unable to visualize right ureter in OR;   Renal US 12/17- Moderate right hydronephrosis persists.  CT renal from 12/20: \"Persistent right-sided moderate hydronephrosis and hydroureter to the level of the right ureterovesical junction. No evidence of right ureteral calculus. \"  Urology involved, plan for possible nephrostomy by IR    HFrEF (heart failure with reduced ejection fraction) ([facility])  Wt Readings from Last 3 Encounters:   12/21/24 88.1 kg (194 lb 3.6 oz)   12/15/24 86.6 kg (190 lb 14.7 oz)   12/10/24 87.5 kg (193 lb)   Echo 2/24- LV wall thickness is mildly increased EF 35-40%, systolic function is moderately reduced, moderate global hypokinesis, G1DD. LA is moderately dilated and RA is dilated. MV moderate regurgitation.  Follows with Dr. Roach outpatient   PTA on lasix 20mg daily- currently on hold given CHUY and hypotension post op, consider resuming in the next 24 hrs   C/w GDMT:  BB:  metoprolol 25mg q12h   ACE/ARB/ARNI:  lisinopril 5mg daily on hold d/t hypotension and CHUY post op  MRA:  n/a  SGLT2i: n/a  Currently on room air   Daily weights, Strict I & Os  Cardiac diet, low sodium <2g, fluid restriction 2000ml  Acute cystitis with hematuria  Urine culture + Pseudomonas  Previously was on IV ceftriaxone will transition to oral ciprofloxacin given sensitivities, complete 7 days of antibiotic till 12/22    VTE " Pharmacologic Prophylaxis:   Moderate Risk (Score 3-4) - Pharmacological DVT Prophylaxis Contraindicated. Sequential Compression Devices Ordered.    Mobility:   Basic Mobility Inpatient Raw Score: 17  JH-HLM Goal: 5: Stand one or more mins  JH-HLM Achieved: 6: Walk 10 steps or more  JH-HLM Goal achieved. Continue to encourage appropriate mobility.    Patient Centered Rounds: I performed bedside rounds with nursing staff today.   Discussions with Specialists or Other Care Team Provider: urology, RN    Education and Discussions with Family / Patient: Updated  (daughter) via phone.    Current Length of Stay: 6 day(s)  Current Patient Status: Inpatient   Certification Statement: The patient will continue to require additional inpatient hospital stay due to plan as above, possible nephrostomy on Monday  Discharge Plan:  when clinically improved    Code Status: Level 1 - Full Code    Subjective   Patient seen and examined, he reports that he still has some discomfort in his belly, somewhat worsened today compared to yesterday.    Objective :  Temp:  [98.1 °F (36.7 °C)-98.5 °F (36.9 °C)] 98.1 °F (36.7 °C)  HR:  [74-98] 98  BP: (106-126)/(54-63) 124/62  Resp:  [18] 18  SpO2:  [91 %-94 %] 94 %    Body mass index is 30.42 kg/m².     Input and Output Summary (last 24 hours):     Intake/Output Summary (Last 24 hours) at 12/21/2024 1500  Last data filed at 12/21/2024 0917  Gross per 24 hour   Intake 660 ml   Output 2450 ml   Net -1790 ml       Physical Exam  Constitutional:       General: He is not in acute distress.     Appearance: He is not ill-appearing.   Cardiovascular:      Rate and Rhythm: Normal rate and regular rhythm.      Pulses: Normal pulses.      Heart sounds: Normal heart sounds. No murmur heard.  Pulmonary:      Effort: Pulmonary effort is normal. No respiratory distress.      Breath sounds: No wheezing or rales.   Abdominal:      General: Bowel sounds are normal. There is distension.       Palpations: Abdomen is soft.      Tenderness: There is no abdominal tenderness.   Genitourinary:     Comments: Suprapubic catheter in place, urine is yellow clear with some sediments  Musculoskeletal:         General: No swelling or tenderness.   Skin:     General: Skin is warm and dry.      Findings: No erythema or rash.   Neurological:      General: No focal deficit present.      Mental Status: He is alert. Mental status is at baseline.   Psychiatric:         Attention and Perception: Attention normal.         Mood and Affect: Mood normal.           Lines/Drains:  Lines/Drains/Airways       Active Status       Name Placement date Placement time Site Days    Suprapubic Catheter 16 Fr. 12/03/24  1019  --  18                            Lab Results: I have reviewed the following results:   Results from last 7 days   Lab Units 12/21/24  0752   WBC Thousand/uL 7.35   HEMOGLOBIN g/dL 10.4*   HEMATOCRIT % 31.7*   PLATELETS Thousands/uL 363   SEGS PCT % 64   LYMPHO PCT % 16   MONO PCT % 11   EOS PCT % 8*     Results from last 7 days   Lab Units 12/21/24  0622 12/15/24  1843 12/15/24  0251   SODIUM mmol/L 134*   < > 137   POTASSIUM mmol/L 4.4   < > 3.9   CHLORIDE mmol/L 102   < > 106   CO2 mmol/L 22   < > 22   BUN mg/dL 32*   < > 36*   CREATININE mg/dL 1.82*   < > 2.02*   ANION GAP mmol/L 10   < > 9   CALCIUM mg/dL 8.8   < > 8.0*   ALBUMIN g/dL  --   --  3.4*   TOTAL BILIRUBIN mg/dL  --   --  0.36   ALK PHOS U/L  --   --  70   ALT U/L  --   --  12   AST U/L  --   --  14   GLUCOSE RANDOM mg/dL 102   < > 103    < > = values in this interval not displayed.     Results from last 7 days   Lab Units 12/15/24  0251   INR  1.09     Results from last 7 days   Lab Units 12/18/24  1047 12/18/24  0552 12/17/24  2155 12/17/24  1601 12/17/24  1045 12/17/24  0604 12/16/24  2103 12/16/24  1602 12/16/24  1128 12/16/24  0704 12/15/24  2148 12/15/24  1838   POC GLUCOSE mg/dl 97 95 100 101 105 100 109 107 122 102 99 98               Recent  Cultures (last 7 days):   Results from last 7 days   Lab Units 12/15/24  0012   URINE CULTURE  30,000-39,000 cfu/ml Pseudomonas aeruginosa*       Imaging Results Review: I reviewed radiology reports from this admission including: CT abdomen/pelvis and Ultrasound(s).      Last 24 Hours Medication List:     Current Facility-Administered Medications:     acetaminophen (TYLENOL) tablet 650 mg, Q6H PRN    amiodarone tablet 200 mg, Daily    amLODIPine (NORVASC) tablet 5 mg, Daily    [Held by provider] aspirin chewable tablet 81 mg, Daily    atorvastatin (LIPITOR) tablet 40 mg, Daily With Dinner    bisacodyl (DULCOLAX) rectal suppository 10 mg, Daily PRN    chlorhexidine (PERIDEX) 0.12 % oral rinse 15 mL, Q12H ARI    ciprofloxacin (CIPRO) tablet 500 mg, Q12H ARI    dextromethorphan-guaiFENesin (ROBITUSSIN DM) oral syrup 10 mL, Q4H PRN    docusate sodium (COLACE) capsule 100 mg, BID    finasteride (PROSCAR) tablet 5 mg, Daily    [Held by provider] furosemide (LASIX) tablet 20 mg, Daily    HYDROcodone Bit-Homatrop MBr (HYCODAN) oral syrup 2.5 mL, Q4H PRN    HYDROmorphone HCl (DILAUDID) injection 0.2 mg, Q2H PRN    metoprolol succinate (TOPROL-XL) 24 hr tablet 25 mg, Q12H ARI    mineral oil enema 1 enema, Daily PRN    naloxone (NARCAN) 0.04 mg/mL syringe 0.04 mg, Q1MIN PRN    ondansetron (ZOFRAN) injection 4 mg, Q6H PRN    oxybutynin (DITROPAN) tablet 5 mg, TID PRN    oxyCODONE (ROXICODONE) split tablet 2.5 mg, Q4H PRN **OR** oxyCODONE (ROXICODONE) IR tablet 5 mg, Q4H PRN    phenol (CHLORASEPTIC) 1.4 % mucosal liquid 1 spray, Q2H PRN    polyethylene glycol (MIRALAX) packet 17 g, Daily    senna (SENOKOT) tablet 17.2 mg, BID    simethicone (MYLICON) chewable tablet 80 mg, Q6H PRN    tamsulosin (FLOMAX) capsule 0.4 mg, Daily With Dinner    Administrative Statements   Today, Patient Was Seen By: Jordana Mercado MD      **Please Note: This note may have been constructed using a voice recognition system.**

## 2024-12-21 NOTE — ASSESSMENT & PLAN NOTE
Lab Results   Component Value Date    HGB 10.4 (L) 12/21/2024    HGB 14.3 12/20/2024    HGB 9.1 (L) 12/20/2024   Hgb baseline 10-11 dropped to 7.2 on admission with hematuria - S/p 2 units of PRBCs with improvement in hgb  Transfuse for hgb < 7.0  Hg remains stable

## 2024-12-21 NOTE — ASSESSMENT & PLAN NOTE
"Lab Results   Component Value Date    HGBA1C 5.9 (H) 04/18/2024       No results for input(s): \"POCGLU\" in the last 72 hours.      Blood Sugar Average: Last 72 hrs:  (P) 96  Glucose has been acceptable, diet controlled at home; will discontinue sliding scale  Hypoglycemic protocol  Carb count diet  "

## 2024-12-21 NOTE — ASSESSMENT & PLAN NOTE
Wt Readings from Last 3 Encounters:   12/21/24 88.1 kg (194 lb 3.6 oz)   12/15/24 86.6 kg (190 lb 14.7 oz)   12/10/24 87.5 kg (193 lb)   Echo 2/24- LV wall thickness is mildly increased EF 35-40%, systolic function is moderately reduced, moderate global hypokinesis, G1DD. LA is moderately dilated and RA is dilated. MV moderate regurgitation.  Follows with Dr. Roach outpatient   PTA on lasix 20mg daily- currently on hold given CHUY and hypotension post op, consider resuming in the next 24 hrs   C/w GDMT:  BB:  metoprolol 25mg q12h   ACE/ARB/ARNI:  lisinopril 5mg daily on hold d/t hypotension and CHUY post op  MRA:  n/a  SGLT2i: n/a  Currently on room air   Daily weights, Strict I & Os  Cardiac diet, low sodium <2g, fluid restriction 2000ml

## 2024-12-21 NOTE — CONSULTS
e-Consult (IP[facility])  - Interventional Radiology  Toro Rodriguez 74 y.o. male MRN: [id number]  Unit/Bed#: -01 Encounter: [id number]    Interventional Radiology has been consulted to evaluate Toro Rodriguez    We were consulted by Dr. Galvan concerning this patient with obstructive uropathy.    Inpatient Consult to IR  Consult performed by: Reji Kaufman MD  Consult ordered by: Jaent Anderson PA-C        12/21/24    Assessment/Recommendation:   IR consulted for right nephrostomy tube placement.  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  Patient s/p cystoscopy and transurethral resection of bladder tumor.  IR to perform on Monday given creatinine stable for now.  Will hold ASA until after the procedure.     21-30 minutes, >50% of the total time devoted to medical consultative verbal/EMR discussion between providers. Written report will be generated in the EMR.     Thank you for allowing Interventional Radiology to participate in the care of Toro Rodriguez. Please don't hesitate to call or TigerText us with any questions.     Reji Kaufman MD

## 2024-12-21 NOTE — ASSESSMENT & PLAN NOTE
"Unable to visualize right ureter in OR;   Renal US 12/17- Moderate right hydronephrosis persists.  CT renal from 12/20: \"Persistent right-sided moderate hydronephrosis and hydroureter to the level of the right ureterovesical junction. No evidence of right ureteral calculus. \"  Urology involved, plan for possible nephrostomy by IR    "

## 2024-12-21 NOTE — ASSESSMENT & PLAN NOTE
ASA being on hold, resumed 12/19, placed again on hold 12/21 for possible procedure on Monday  Continue with Lipitor 40 mg daily and metoprolol 25 mg every 12 hours  Denies chest pain  Left heart cath in 2023 showing Ost RCA to Prox RCA lesion is 100% stenosed.

## 2024-12-21 NOTE — ASSESSMENT & PLAN NOTE
PTA norvasc 5mg daily; metoprolol 25mg q12h; lisinopril 5mg daily and lasix 20mg daily   Lisinopril and Lasix were on hold given CHUY and hypotension postoperatively  Continue with Norvasc 5 mg daily metoprolol 25 mg every 12 hours  Blood pressure been acceptable  continue to hold lisinopril and Lasix due to worsening kidney function

## 2024-12-22 LAB
ANION GAP SERPL CALCULATED.3IONS-SCNC: 10 MMOL/L (ref 4–13)
BASOPHILS # BLD AUTO: 0.06 THOUSANDS/ÂΜL (ref 0–0.1)
BASOPHILS NFR BLD AUTO: 1 % (ref 0–1)
BUN SERPL-MCNC: 36 MG/DL (ref 5–25)
CALCIUM SERPL-MCNC: 8.9 MG/DL (ref 8.4–10.2)
CHLORIDE SERPL-SCNC: 101 MMOL/L (ref 96–108)
CO2 SERPL-SCNC: 23 MMOL/L (ref 21–32)
CREAT SERPL-MCNC: 2.04 MG/DL (ref 0.6–1.3)
EOSINOPHIL # BLD AUTO: 0.49 THOUSAND/ÂΜL (ref 0–0.61)
EOSINOPHIL NFR BLD AUTO: 6 % (ref 0–6)
ERYTHROCYTE [DISTWIDTH] IN BLOOD BY AUTOMATED COUNT: 15 % (ref 11.6–15.1)
GFR SERPL CREATININE-BSD FRML MDRD: 30 ML/MIN/1.73SQ M
GLUCOSE SERPL-MCNC: 114 MG/DL (ref 65–140)
HCT VFR BLD AUTO: 31.1 % (ref 36.5–49.3)
HGB BLD-MCNC: 10.2 G/DL (ref 12–17)
IMM GRANULOCYTES # BLD AUTO: 0.06 THOUSAND/UL (ref 0–0.2)
IMM GRANULOCYTES NFR BLD AUTO: 1 % (ref 0–2)
LYMPHOCYTES # BLD AUTO: 1.14 THOUSANDS/ÂΜL (ref 0.6–4.47)
LYMPHOCYTES NFR BLD AUTO: 15 % (ref 14–44)
MCH RBC QN AUTO: 30.6 PG (ref 26.8–34.3)
MCHC RBC AUTO-ENTMCNC: 32.8 G/DL (ref 31.4–37.4)
MCV RBC AUTO: 93 FL (ref 82–98)
MONOCYTES # BLD AUTO: 0.81 THOUSAND/ÂΜL (ref 0.17–1.22)
MONOCYTES NFR BLD AUTO: 11 % (ref 4–12)
NEUTROPHILS # BLD AUTO: 5.18 THOUSANDS/ÂΜL (ref 1.85–7.62)
NEUTS SEG NFR BLD AUTO: 66 % (ref 43–75)
NRBC BLD AUTO-RTO: 0 /100 WBCS
PLATELET # BLD AUTO: 385 THOUSANDS/UL (ref 149–390)
PMV BLD AUTO: 9 FL (ref 8.9–12.7)
POTASSIUM SERPL-SCNC: 4.8 MMOL/L (ref 3.5–5.3)
RBC # BLD AUTO: 3.33 MILLION/UL (ref 3.88–5.62)
SODIUM SERPL-SCNC: 134 MMOL/L (ref 135–147)
WBC # BLD AUTO: 7.74 THOUSAND/UL (ref 4.31–10.16)

## 2024-12-22 PROCEDURE — 85025 COMPLETE CBC W/AUTO DIFF WBC: CPT | Performed by: FAMILY MEDICINE

## 2024-12-22 PROCEDURE — 80048 BASIC METABOLIC PNL TOTAL CA: CPT | Performed by: FAMILY MEDICINE

## 2024-12-22 PROCEDURE — 99223 1ST HOSP IP/OBS HIGH 75: CPT | Performed by: INTERNAL MEDICINE

## 2024-12-22 PROCEDURE — 99233 SBSQ HOSP IP/OBS HIGH 50: CPT | Performed by: FAMILY MEDICINE

## 2024-12-22 RX ADMIN — DOCUSATE SODIUM 100 MG: 100 CAPSULE, LIQUID FILLED ORAL at 17:39

## 2024-12-22 RX ADMIN — METOPROLOL SUCCINATE 25 MG: 25 TABLET, EXTENDED RELEASE ORAL at 21:35

## 2024-12-22 RX ADMIN — CHLORHEXIDINE GLUCONATE 0.12% ORAL RINSE 15 ML: 1.2 LIQUID ORAL at 21:35

## 2024-12-22 RX ADMIN — CIPROFLOXACIN HYDROCHLORIDE 500 MG: 500 TABLET, FILM COATED ORAL at 09:39

## 2024-12-22 RX ADMIN — SENNOSIDES 17.2 MG: 8.6 TABLET, FILM COATED ORAL at 17:39

## 2024-12-22 RX ADMIN — CHLORHEXIDINE GLUCONATE 0.12% ORAL RINSE 15 ML: 1.2 LIQUID ORAL at 09:39

## 2024-12-22 RX ADMIN — FINASTERIDE 5 MG: 5 TABLET, FILM COATED ORAL at 09:39

## 2024-12-22 RX ADMIN — POLYETHYLENE GLYCOL 3350 17 G: 17 POWDER, FOR SOLUTION ORAL at 09:38

## 2024-12-22 RX ADMIN — AMLODIPINE BESYLATE 5 MG: 5 TABLET ORAL at 09:39

## 2024-12-22 RX ADMIN — CIPROFLOXACIN HYDROCHLORIDE 500 MG: 500 TABLET, FILM COATED ORAL at 21:35

## 2024-12-22 RX ADMIN — ATORVASTATIN CALCIUM 40 MG: 40 TABLET, FILM COATED ORAL at 17:39

## 2024-12-22 RX ADMIN — TAMSULOSIN HYDROCHLORIDE 0.4 MG: 0.4 CAPSULE ORAL at 17:39

## 2024-12-22 RX ADMIN — SENNOSIDES 17.2 MG: 8.6 TABLET, FILM COATED ORAL at 09:38

## 2024-12-22 RX ADMIN — DOCUSATE SODIUM 100 MG: 100 CAPSULE, LIQUID FILLED ORAL at 09:39

## 2024-12-22 RX ADMIN — AMIODARONE HYDROCHLORIDE 200 MG: 200 TABLET ORAL at 09:39

## 2024-12-22 RX ADMIN — SIMETHICONE 80 MG: 80 TABLET, CHEWABLE ORAL at 09:38

## 2024-12-22 RX ADMIN — BISACODYL 10 MG: 10 SUPPOSITORY RECTAL at 09:39

## 2024-12-22 NOTE — ASSESSMENT & PLAN NOTE
"Unable to visualize right ureter in OR;   Renal US 12/17- Moderate right hydronephrosis persists.  CT renal from 12/20: \"Persistent right-sided moderate hydronephrosis and hydroureter to the level of the right ureterovesical junction. No evidence of right ureteral calculus. \"  Urology involved, plan for possible nephrostomy by IR on 12/23    "

## 2024-12-22 NOTE — PROGRESS NOTES
"Progress Note - Hospitalist   Name: Toro Rodriguez 74 y.o. male I MRN: [id number]  Unit/Bed#: -01 I Date of Admission: 12/15/2024   Date of Service: 12/22/2024 I Hospital Day: 7    Assessment & Plan  Clot retention of urine  Pt initially presented to outside hospital with gross hematuria and had three-way Farmer placed and CBI started. He was noted to have labile blood pressures so he was transferred to [facility] for cystoscopy and admitted to the ICU post op for hypotension and hypoxia. He did require 2 units PRBCs due to ongoing blood loss and anemia.   S/p cystoscopy with clot evacuation, fulguration of bleeding vessels and extensive transurethral resection of bladder tumor and placement of penile urethral catheter 12/15  Unable to visualize right ureter in the OR; CBI weaned off 12/16 around 11am   Patient had both urethral Farmer catheter and SPT , farmer removed on 12/20 by urology  Continue to monitor Hg and creatinine  Patient was previously on Eliquis for DVT that was stopped about 2 months on previous admissions with no plans to resume  Patient's creatinine remained stable but worsened on 12.21, urology contacted, repeat CT renal showed \"Persistent right-sided moderate hydronephrosis and hydroureter to the level of the right ureterovesical junction. No evidence of right ureteral calculus\", urology involved, IR consulted for possible nephrostomy on Monday, 12/23.   aspirin resumed on 12/19, but on hold since 12/21 for possible procedure on Monday  Creatinine continues to worsen, urology following, plan as above  Acute kidney injury superimposed on stage 3b chronic kidney disease (HCC)  Lab Results   Component Value Date    CREATININE 2.04 (H) 12/22/2024    CREATININE 1.82 (H) 12/21/2024    CREATININE 1.74 (H) 12/20/2024   Creatinine reviewed, appears BL has increased since September to 1.5-1.8  Creat peaked at 2.09  Avoid nephrotoxic drugs and hypotension- holding lasix and lisinopril    C/w SPC ; farmer " "removed on 12/20  Creatinine was improving but started to worsen again on 12/21, urology involved, plan as above  Bladder mass  S/p transurethral resection of bladder tumor 12/15  Follow-up with urology outpatient in approximately 2 weeks for pathology review and plan of care  Hematology/oncology consulted by urology, but consult was canceled as there is no inpatient plan and he has appointment scheduled outpatient with hematology oncology on 1/2/25  ABLA (acute blood loss anemia)  Lab Results   Component Value Date    HGB 10.2 (L) 12/22/2024    HGB 10.4 (L) 12/21/2024    HGB 14.3 12/20/2024    HGB 9.1 (L) 12/20/2024   Hgb baseline 10-11 dropped to 7.2 on admission with hematuria - S/p 2 units of PRBCs with improvement in hgb  Transfuse for hgb < 7.0  Hg remains stable  Essential hypertension  PTA norvasc 5mg daily; metoprolol 25mg q12h; lisinopril 5mg daily and lasix 20mg daily   Lisinopril and Lasix were on hold given CHUY and hypotension postoperatively  Continue with Norvasc 5 mg daily metoprolol 25 mg every 12 hours  Blood pressure been acceptable  continue to hold lisinopril and Lasix due to worsening kidney function  Coronary artery disease involving native coronary artery of native heart  ASA being on hold, resumed 12/19, placed again on hold 12/21 for possible procedure on Monday  Continue with Lipitor 40 mg daily and metoprolol 25 mg every 12 hours  Denies chest pain  Left heart cath in 2023 showing Ost RCA to Prox RCA lesion is 100% stenosed.     Type 2 diabetes mellitus with stage 3b chronic kidney disease, without long-term current use of insulin ([facility])  Lab Results   Component Value Date    HGBA1C 5.9 (H) 04/18/2024       No results for input(s): \"POCGLU\" in the last 72 hours.      Blood Sugar Average: Last 72 hrs:    Glucose has been acceptable, diet controlled at home; will discontinue sliding scale  Hypoglycemic protocol  Carb count diet  Benign prostatic hyperplasia with urinary retention  chronic " "suprapubic catheter  Continue with Proscar and Flomax  NSVT (nonsustained ventricular tachycardia) (HCC)  C/w metoprolol 25mg q12h and amiodarone 200mg daily   Slow transit constipation  Currently on MiraLAX and Colace, Senna at bedtime, still no BM since 12/14 as per patient, feels bloated  Added Dulcolax suppository, senna changed to 2 tablets twice daily  KUB ordered on 12/19-nonobstructive gas pattern  Continue above bowel regimen, added simethicone  No BM on 12/20/2024, but very small BM on 12/21  added mineral oral enema if no BM after Dulcolax administration  Hydronephrosis  Unable to visualize right ureter in OR;   Renal US 12/17- Moderate right hydronephrosis persists.  CT renal from 12/20: \"Persistent right-sided moderate hydronephrosis and hydroureter to the level of the right ureterovesical junction. No evidence of right ureteral calculus. \"  Urology involved, plan for possible nephrostomy by IR on 12/23    HFrEF (heart failure with reduced ejection fraction) ([facility])  Wt Readings from Last 3 Encounters:   12/22/24 88.5 kg (195 lb 1.7 oz)   12/15/24 86.6 kg (190 lb 14.7 oz)   12/10/24 87.5 kg (193 lb)   Echo 2/24- LV wall thickness is mildly increased EF 35-40%, systolic function is moderately reduced, moderate global hypokinesis, G1DD. LA is moderately dilated and RA is dilated. MV moderate regurgitation.  Follows with Dr. Roach outpatient   PTA on lasix 20mg daily- currently on hold given CHUY and hypotension post op, consider resuming in the next 24 hrs   C/w GDMT:  BB:  metoprolol 25mg q12h   ACE/ARB/ARNI:  lisinopril 5mg daily on hold d/t hypotension and CHUY   MRA:  n/a  SGLT2i: n/a  Currently on room air   Daily weights, Strict I & Os  Cardiac diet, low sodium <2g, fluid restriction 2000ml  Acute cystitis with hematuria  Urine culture + Pseudomonas  Previously was on IV ceftriaxone will transition to oral ciprofloxacin given sensitivities, complete 7 days of antibiotic till 12/22    VTE Pharmacologic " Prophylaxis:   Moderate Risk (Score 3-4) - Pharmacological DVT Prophylaxis Contraindicated. Sequential Compression Devices Ordered.    Mobility:   Basic Mobility Inpatient Raw Score: 17  JH-HLM Goal: 5: Stand one or more mins  JH-HLM Achieved: 6: Walk 10 steps or more  JH-HLM Goal achieved. Continue to encourage appropriate mobility.    Patient Centered Rounds: I performed bedside rounds with nursing staff today.   Discussions with Specialists or Other Care Team Provider: urology, RN    Education and Discussions with Family / Patient: patient    Current Length of Stay: 7 day(s)  Current Patient Status: Inpatient   Certification Statement: The patient will continue to require additional inpatient hospital stay due to plan as above, possible nephrostomy on Monday  Discharge Plan:  when clinically improved    Code Status: Level 1 - Full Code    Subjective   Patient seen and examined, he reports that his abdomen is better today; no new complains.    Objective :  Temp:  [98.1 °F (36.7 °C)] 98.1 °F (36.7 °C)  HR:  [74-98] 74  BP: (110-124)/(50-62) 110/53  Resp:  [16] 16  SpO2:  [90 %-94 %] 94 %  O2 Device: None (Room air)    Body mass index is 30.56 kg/m².     Input and Output Summary (last 24 hours):     Intake/Output Summary (Last 24 hours) at 12/22/2024 1102  Last data filed at 12/22/2024 0936  Gross per 24 hour   Intake 540 ml   Output 980 ml   Net -440 ml       Physical Exam  Constitutional:       General: He is not in acute distress.     Appearance: He is not ill-appearing.   Cardiovascular:      Rate and Rhythm: Normal rate and regular rhythm.      Pulses: Normal pulses.      Heart sounds: Normal heart sounds. No murmur heard.  Pulmonary:      Effort: Pulmonary effort is normal. No respiratory distress.      Breath sounds: No wheezing or rales.   Abdominal:      General: Bowel sounds are normal. There is distension.      Palpations: Abdomen is soft.      Tenderness: There is no abdominal tenderness.    Genitourinary:     Comments: Suprapubic catheter in place, urine is yellow clear with some sediments  Musculoskeletal:         General: No swelling or tenderness.   Skin:     General: Skin is warm and dry.      Findings: No erythema or rash.   Neurological:      General: No focal deficit present.      Mental Status: He is alert. Mental status is at baseline.   Psychiatric:         Attention and Perception: Attention normal.         Mood and Affect: Mood normal.           Lines/Drains:  Lines/Drains/Airways       Active Status       Name Placement date Placement time Site Days    Suprapubic Catheter 16 Fr. 12/03/24  1019  --  19                            Lab Results: I have reviewed the following results:   Results from last 7 days   Lab Units 12/22/24  0505   WBC Thousand/uL 7.74   HEMOGLOBIN g/dL 10.2*   HEMATOCRIT % 31.1*   PLATELETS Thousands/uL 385   SEGS PCT % 66   LYMPHO PCT % 15   MONO PCT % 11   EOS PCT % 6     Results from last 7 days   Lab Units 12/22/24  0505   SODIUM mmol/L 134*   POTASSIUM mmol/L 4.8   CHLORIDE mmol/L 101   CO2 mmol/L 23   BUN mg/dL 36*   CREATININE mg/dL 2.04*   ANION GAP mmol/L 10   CALCIUM mg/dL 8.9   GLUCOSE RANDOM mg/dL 114           Results from last 7 days   Lab Units 12/18/24  1047 12/18/24  0552 12/17/24  2155 12/17/24  1601 12/17/24  1045 12/17/24  0604 12/16/24  2103 12/16/24  1602 12/16/24  1128 12/16/24  0704 12/15/24  2148 12/15/24  1838   POC GLUCOSE mg/dl 97 95 100 101 105 100 109 107 122 102 99 98               Recent Cultures (last 7 days):           Imaging Results Review: I reviewed radiology reports from this admission including: CT abdomen/pelvis and Ultrasound(s).      Last 24 Hours Medication List:     Current Facility-Administered Medications:     acetaminophen (TYLENOL) tablet 650 mg, Q6H PRN    amiodarone tablet 200 mg, Daily    amLODIPine (NORVASC) tablet 5 mg, Daily    [Held by provider] aspirin chewable tablet 81 mg, Daily    atorvastatin (LIPITOR)  tablet 40 mg, Daily With Dinner    bisacodyl (DULCOLAX) rectal suppository 10 mg, Daily PRN    chlorhexidine (PERIDEX) 0.12 % oral rinse 15 mL, Q12H ARI    ciprofloxacin (CIPRO) tablet 500 mg, Q12H ARI    dextromethorphan-guaiFENesin (ROBITUSSIN DM) oral syrup 10 mL, Q4H PRN    docusate sodium (COLACE) capsule 100 mg, BID    finasteride (PROSCAR) tablet 5 mg, Daily    [Held by provider] furosemide (LASIX) tablet 20 mg, Daily    HYDROcodone Bit-Homatrop MBr (HYCODAN) oral syrup 2.5 mL, Q4H PRN    HYDROmorphone HCl (DILAUDID) injection 0.2 mg, Q2H PRN    metoprolol succinate (TOPROL-XL) 24 hr tablet 25 mg, Q12H RAI    mineral oil enema 1 enema, Daily PRN    naloxone (NARCAN) 0.04 mg/mL syringe 0.04 mg, Q1MIN PRN    ondansetron (ZOFRAN) injection 4 mg, Q6H PRN    oxybutynin (DITROPAN) tablet 5 mg, TID PRN    oxyCODONE (ROXICODONE) split tablet 2.5 mg, Q4H PRN **OR** oxyCODONE (ROXICODONE) IR tablet 5 mg, Q4H PRN    phenol (CHLORASEPTIC) 1.4 % mucosal liquid 1 spray, Q2H PRN    polyethylene glycol (MIRALAX) packet 17 g, Daily    senna (SENOKOT) tablet 17.2 mg, BID    simethicone (MYLICON) chewable tablet 80 mg, Q6H PRN    tamsulosin (FLOMAX) capsule 0.4 mg, Daily With Dinner    Administrative Statements   Today, Patient Was Seen By: Jordana Mercado MD      **Please Note: This note may have been constructed using a voice recognition system.**

## 2024-12-22 NOTE — ASSESSMENT & PLAN NOTE
Lab Results   Component Value Date    CREATININE 2.04 (H) 12/22/2024    CREATININE 1.82 (H) 12/21/2024    CREATININE 1.74 (H) 12/20/2024   Creatinine reviewed, appears BL has increased since September to 1.5-1.8  Creat peaked at 2.09  Avoid nephrotoxic drugs and hypotension- holding lasix and lisinopril    C/w SPC ; farmer removed on 12/20  Creatinine was improving but started to worsen again on 12/21, urology involved, plan as above

## 2024-12-22 NOTE — CONSULTS
Oncology Consult Note  Toro Rodriguez 74 y.o. 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 Skin, left lower back, shave biopsy:     MELANOMA (thickness: at least 0.7 mm) (see note).     Note: SOX10, MART, and PRAME immunostains were reviewed; significant melanocytic architectural disorder is seen, and the lesional cells are positive for PRAME. Please see synoptic report for additional details.     Synoptic report for melanoma of the skin  Thickness: at least 0.7 mm (invasive melanoma extends to deep specimen margin)  Ulceration: not seen  Anatomic (Lukas) level: at least IV  Type: superficial spreading melanoma  Mitoses: 2/mm2  Microsatellites: cannot be determined  Lymphovascular invasion: not seen  Neurotropism: not seen  Tumor regression: present  Tumor-infiltrating lymphocytes (TIL): present, non-brisk  Margin assessment: invasive melanoma extends to deep specimen margin; melanoma in situ extends to peripheral specimen margin  Pathologic stage: at least pT1a  Associated nevus: dermal melanocytic nevus     2/20/2024 Initial Diagnosis    Melanoma of back (HCC)     3/18/2024 Surgery    A. Skin, back, excision:     -Residual focal melanoma (thickness: 0.7 mm); not seen at examined inked specimen margins.     -Prior procedure site changes present.     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      Patient s/p cystoscopy and transurethral resection of bladder tumor.     Patient was unaware of the diagnosis/was not told by urology or primary team    CT AP 12/15/2024  Stable moderate right hydronephroureterosis extending through the UVJ. New 2 mm right distal ureteral calculus proximal to the UVJ; this is not the cause of obstruction. No perinephric collection.     Right lateral bladder wall masslike thickening, similar. This may represent hematoma or a mass. Mild fat stranding adjacent to the bladder. Advise correlation with UA and urine cytology.     1.5 cm right renal upper pole nodule appears more dense since 11/10/2024. This may represent progressive intracystic hemorrhage. This can be followed up with dedicated renal MRI or CT urogram in 3 months.     No discernible flow in the right common iliac artery, similar. Trace flow in the right external iliac artery likely retrograde. Trace flow is present in the right common femoral artery. Correlate with pulses.     No CT chest done to complete staging      Cr has been elevated between 1.7-1.8; is 2.04      Recommendations:    High grade muscle invasive bladder cancer, small cell histology     Will need neoadjuvant therapy with platinum based chemotherapy /VP16 -explained to patient although primary OP oncologist will make final decision on Cisplatin vs Carboplaint based on renal function    Cr has been elevated, now 2.04 due to tumor invasion/right hydronephrosis     Needs to have nephrostomy tube as planned by IR which patient was told is 12/23/2024     Follow renal function post tube    Cannot give platinum based treatment with VP16 as usually Cisplatin or Carboplatin with this Cr due to obstructive uropathy/tumor      Needs CT Chest to complete staging        Will be treated as OP and appointment has been set up for patient but based on him having a lower Cr; he would prefer to be treated at , lives in skilled facility in [place]          History of Presenting Illness:admission HPI 12/15/2024      Toro Rodriguez is a 74 y.o. male with a PMH of CKD, CAD, NSVT, history of PE, NIDDM 2, and suprapubic catheter who presents with problem associated with suprapubic catheter.  Patient reports that he had mild hematuria after dinner that resolved with liquid intake.  He then had recurrent gross hematuria.  He then had no drainage in the suprapubic bag and began to experience lower abdominal cramping.  Denies fevers or chills.  No chest pain or dyspnea.  Denies nausea or vomiting.  No other acute complaints other than those mentioned above.     Offers no complaints today.  Was not told by his primary team/urology that he has invasive bladder cancer so difficult conversation.      Review of Systems - As stated in the HPI otherwise the fourteen point review of systems was negative.    ECOG PS:3    Past Medical History:   Diagnosis Date    Alcohol intoxication ([facility]) 10/15/2020    BPH (benign prostatic hyperplasia)     Chronic HFrEF (heart failure with reduced ejection fraction) ([facility]) 2023    Coronary artery calcification seen on CT scan 11/10/2023    Coronary artery disease 2023    Deep vein thrombosis (DVT) of left lower extremity ([facility]) 2023    Diabetes mellitus ([facility]) 2023    Fall from slip, trip, or stumble 10/15/2020    History of phimosis of penis     History of urinary calculi     Hypertension 1988    Skin cancer     Tobacco abuse     quit around        Social History     Socioeconomic History    Marital status:      Spouse name: Not on file    Number of children: Not on file    Years of education: Not on file    Highest education level: Not on file   Occupational History    Not on file   Tobacco Use    Smoking status: Former     Types: Cigarettes     Start date:      Quit date:      Years since quittin.0    Smokeless tobacco: Never    Tobacco comments:     Quit over 30 years ago (Updated 2023).    Vaping  Use    Vaping status: Never Used   Substance and Sexual Activity    Alcohol use: Not Currently    Drug use: Never    Sexual activity: Not on file   Other Topics Concern    Not on file   Social History Narrative    Not on file     Social Drivers of Health     Financial Resource Strain: Not on file   Food Insecurity: No Food Insecurity (2024)    Nursing - Inadequate Food Risk Classification     Worried About Running Out of Food in the Last Year: Never true     Ran Out of Food in the Last Year: Never true     Ran Out of Food in the Last Year: 1   Transportation Needs: No Transportation Needs (2024)    Nursing - Transportation Risk Classification     Lack of Transportation: Not on file     Lack of Transportation: 2   Physical Activity: Not on file   Stress: Not on file   Social Connections: Not on file   Intimate Partner Violence: Unknown (2024)    Nursing IPS     Feels Physically and Emotionally Safe: Not on file     Physically Hurt by Someone: Not on file     Humiliated or Emotionally Abused by Someone: Not on file     Physically Hurt by Someone: 2     Hurt or Threatened by Someone: 2   Housing Stability: Unknown (2024)    Nursing: Inadequate Housing Risk Classification     Has Housing: Not on file     Worried About Losing Housing: Not on file     Unable to Get Utilities: Not on file     Unable to Pay for Housing in the Last Year: 2     Has Housin       Family History   Problem Relation Age of Onset    Breast cancer Mother     Heart attack Father 61    Other Sister         s/p hysterectomy    Cerebral palsy Brother     Skin cancer Other         family history    No Known Problems Son     No Known Problems Daughter     Sudden death Neg Hx        Allergies   Allergen Reactions    Zosyn [Piperacillin Sod-Tazobactam So] Rash         Current Facility-Administered Medications:     acetaminophen (TYLENOL) tablet 650 mg, 650 mg, Oral, Q6H PRN, Eloina Hernandez PA-C, 650 mg at 24 0219     amiodarone tablet 200 mg, 200 mg, Oral, Daily, Eloina Kayergan, PA-C, 200 mg at 12/22/24 0939    amLODIPine (NORVASC) tablet 5 mg, 5 mg, Oral, Daily, Eloina Kayergan, PA-C, 5 mg at 12/22/24 0939    [Held by provider] aspirin chewable tablet 81 mg, 81 mg, Oral, Daily, Jordana Mercado MD, 81 mg at 12/21/24 0907    atorvastatin (LIPITOR) tablet 40 mg, 40 mg, Oral, Daily With Dinner, Eloina Hernandez, PA-C, 40 mg at 12/21/24 1713    bisacodyl (DULCOLAX) rectal suppository 10 mg, 10 mg, Rectal, Daily PRN, Jordana Mercado MD, 10 mg at 12/22/24 0939    chlorhexidine (PERIDEX) 0.12 % oral rinse 15 mL, 15 mL, Mouth/Throat, Q12H ARI, Eloina Hernandez PA-C, 15 mL at 12/22/24 0939    ciprofloxacin (CIPRO) tablet 500 mg, 500 mg, Oral, Q12H ARI, Jordana Mercado MD, 500 mg at 12/22/24 0939    dextromethorphan-guaiFENesin (ROBITUSSIN DM) oral syrup 10 mL, 10 mL, Oral, Q4H PRN, Cassandra Wiley PA-C, 10 mL at 12/17/24 0219    docusate sodium (COLACE) capsule 100 mg, 100 mg, Oral, BID, CARLIE Green, 100 mg at 12/22/24 0939    finasteride (PROSCAR) tablet 5 mg, 5 mg, Oral, Daily, Eloina Hernandez PA-C, 5 mg at 12/22/24 0939    [Held by provider] furosemide (LASIX) tablet 20 mg, 20 mg, Oral, Daily, Jordana Mercado MD, 20 mg at 12/21/24 0907    HYDROcodone Bit-Homatrop MBr (HYCODAN) oral syrup 2.5 mL, 2.5 mL, Oral, Q4H PRN, Cassandra Wiley PA-C    HYDROmorphone HCl (DILAUDID) injection 0.2 mg, 0.2 mg, Intravenous, Q2H PRN, Eloina Hernandez PA-C    metoprolol succinate (TOPROL-XL) 24 hr tablet 25 mg, 25 mg, Oral, Q12H ARI, Eloina Hernandez PA-C, 25 mg at 12/21/24 2023    mineral oil enema 1 enema, 1 enema, Rectal, Daily PRN, Jordana Mercado MD, 1 enema at 12/21/24 1859    naloxone (NARCAN) 0.04 mg/mL syringe 0.04 mg, 0.04 mg, Intravenous, Q1MIN PRN, Eloina Hernandez PA-C    ondansetron (ZOFRAN) injection 4 mg, 4 mg, Intravenous, Q6H PRN, Eloina Hernandez PA-C    oxybutynin (DITROPAN) tablet 5 mg, 5 mg, Oral, TID PRN,  "Eloina Barbaan, PA-C    oxyCODONE (ROXICODONE) split tablet 2.5 mg, 2.5 mg, Oral, Q4H PRN, 2.5 mg at 12/16/24 0005 **OR** oxyCODONE (ROXICODONE) IR tablet 5 mg, 5 mg, Oral, Q4H PRN, Eloina Mary, PA-C, 5 mg at 12/16/24 0516    phenol (CHLORASEPTIC) 1.4 % mucosal liquid 1 spray, 1 spray, Mouth/Throat, Q2H PRN, Eloina Mary, PA-C, 1 spray at 12/16/24 0007    polyethylene glycol (MIRALAX) packet 17 g, 17 g, Oral, Daily, Eloina Mary, PA-C, 17 g at 12/22/24 0938    senna (SENOKOT) tablet 17.2 mg, 2 tablet, Oral, BID, Jordana Mercado MD, 17.2 mg at 12/22/24 0938    simethicone (MYLICON) chewable tablet 80 mg, 80 mg, Oral, Q6H PRN, Jordana Mercado MD, 80 mg at 12/22/24 0938    tamsulosin (FLOMAX) capsule 0.4 mg, 0.4 mg, Oral, Daily With Dinner, Eloina Kayergan, PA-C, 0.4 mg at 12/21/24 1713      /53   Pulse 74   Temp 98.1 °F (36.7 °C) (Oral)   Resp 16   Ht 5' 7\" (1.702 m)   Wt 88.5 kg (195 lb 1.7 oz)   SpO2 94%   BMI 30.56 kg/m²     General Appearance:    Alert, oriented        Eyes:    PERRL   Ears:    Normal external ear canals, both ears   Nose:   Nares normal, septum midline   Throat:   Mucosa moist. Pharynx without injection.    Neck:   Supple       Lungs:     Clear to auscultation bilaterally   Chest Wall:    No tenderness or deformity    Heart:    Regular rate and rhythm       Abdomen:     Soft, non-tender, bowel sounds +, no organomegaly           Extremities:   Extremities no cyanosis or edema       Skin:   no rash or icterus.    Lymph nodes:   Cervical, supraclavicular, and axillary nodes normal   Neurologic:   CNII-XII intact, normal strength, sensation and reflexes     Throughout               Recent Results (from the past 48 hours)   Basic metabolic panel    Collection Time: 12/21/24  6:22 AM   Result Value Ref Range    Sodium 134 (L) 135 - 147 mmol/L    Potassium 4.4 3.5 - 5.3 mmol/L    Chloride 102 96 - 108 mmol/L    CO2 22 21 - 32 mmol/L    ANION GAP 10 4 - 13 mmol/L    BUN 32 (H) " 5 - 25 mg/dL    Creatinine 1.82 (H) 0.60 - 1.30 mg/dL    Glucose 102 65 - 140 mg/dL    Calcium 8.8 8.4 - 10.2 mg/dL    eGFR 35 ml/min/1.73sq m   CBC and differential    Collection Time: 12/21/24  7:52 AM   Result Value Ref Range    WBC 7.35 4.31 - 10.16 Thousand/uL    RBC 3.42 (L) 3.88 - 5.62 Million/uL    Hemoglobin 10.4 (L) 12.0 - 17.0 g/dL    Hematocrit 31.7 (L) 36.5 - 49.3 %    MCV 93 82 - 98 fL    MCH 30.4 26.8 - 34.3 pg    MCHC 32.8 31.4 - 37.4 g/dL    RDW 15.2 (H) 11.6 - 15.1 %    MPV 9.2 8.9 - 12.7 fL    Platelets 363 149 - 390 Thousands/uL    nRBC 0 /100 WBCs    Segmented % 64 43 - 75 %    Immature Grans % 0 0 - 2 %    Lymphocytes % 16 14 - 44 %    Monocytes % 11 4 - 12 %    Eosinophils Relative 8 (H) 0 - 6 %    Basophils Relative 1 0 - 1 %    Absolute Neutrophils 4.67 1.85 - 7.62 Thousands/µL    Absolute Immature Grans 0.03 0.00 - 0.20 Thousand/uL    Absolute Lymphocytes 1.19 0.60 - 4.47 Thousands/µL    Absolute Monocytes 0.83 0.17 - 1.22 Thousand/µL    Eosinophils Absolute 0.56 0.00 - 0.61 Thousand/µL    Basophils Absolute 0.07 0.00 - 0.10 Thousands/µL   CBC and differential    Collection Time: 12/22/24  5:05 AM   Result Value Ref Range    WBC 7.74 4.31 - 10.16 Thousand/uL    RBC 3.33 (L) 3.88 - 5.62 Million/uL    Hemoglobin 10.2 (L) 12.0 - 17.0 g/dL    Hematocrit 31.1 (L) 36.5 - 49.3 %    MCV 93 82 - 98 fL    MCH 30.6 26.8 - 34.3 pg    MCHC 32.8 31.4 - 37.4 g/dL    RDW 15.0 11.6 - 15.1 %    MPV 9.0 8.9 - 12.7 fL    Platelets 385 149 - 390 Thousands/uL    nRBC 0 /100 WBCs    Segmented % 66 43 - 75 %    Immature Grans % 1 0 - 2 %    Lymphocytes % 15 14 - 44 %    Monocytes % 11 4 - 12 %    Eosinophils Relative 6 0 - 6 %    Basophils Relative 1 0 - 1 %    Absolute Neutrophils 5.18 1.85 - 7.62 Thousands/µL    Absolute Immature Grans 0.06 0.00 - 0.20 Thousand/uL    Absolute Lymphocytes 1.14 0.60 - 4.47 Thousands/µL    Absolute Monocytes 0.81 0.17 - 1.22 Thousand/µL    Eosinophils Absolute 0.49 0.00 - 0.61  Thousand/µL    Basophils Absolute 0.06 0.00 - 0.10 Thousands/µL   Basic metabolic panel    Collection Time: 12/22/24  5:05 AM   Result Value Ref Range    Sodium 134 (L) 135 - 147 mmol/L    Potassium 4.8 3.5 - 5.3 mmol/L    Chloride 101 96 - 108 mmol/L    CO2 23 21 - 32 mmol/L    ANION GAP 10 4 - 13 mmol/L    BUN 36 (H) 5 - 25 mg/dL    Creatinine 2.04 (H) 0.60 - 1.30 mg/dL    Glucose 114 65 - 140 mg/dL    Calcium 8.9 8.4 - 10.2 mg/dL    eGFR 30 ml/min/1.73sq m         CT renal stone study abdomen pelvis wo contrast  Result Date: 12/21/2024  Narrative: CT ABDOMEN AND PELVIS WITHOUT IV CONTRAST - LOW DOSE RENAL STONE INDICATION: worsening CHUY; hydronephrosis. 74-year-old male. Right-sided hydronephrosis and hydroureter demonstrated on prior CT. History of large bladder tumor, status post recent cystoscopy, TURBT and clot evacuation. COMPARISON: CTs of the abdomen and pelvis from 10/15/2024, 10/31/2024, 11/10/2024, 12/5/2024, 12/13/2024 and 12/15/2024. Renal sonogram from 12/16/2024. TECHNIQUE: Low radiation dose thin section CT examination of the abdomen and pelvis was performed without intravenous or oral contrast according to a protocol specifically designed to evaluate for urinary tract calculus. Axial, sagittal, and coronal 2D reformatted images were created from the source data and submitted for interpretation. Evaluation for pathology in the abdomen and pelvis that is unrelated to urinary tract calculi is limited. Radiation dose length product (DLP) for this visit: 641.83 mGy-cm . This examination, like all CT scans performed in the [facility], was performed utilizing techniques to minimize radiation dose exposure, including the use of iterative reconstruction and automated exposure control. URINARY TRACT FINDINGS: RIGHT KIDNEY AND URETER: No calculi. Moderate right hydronephrosis and hydroureter to the level of the ureterovesical junction (although the UVJ is partially obscured by artifact from  a right hip prosthesis). Appearance unchanged since several recent prior CTs. 1.3 cm upper pole hyperdense cyst again demonstrated. LEFT KIDNEY AND URETER: No hydronephrosis or hydroureter. 2.5 cm upper pole cyst and 2.2 cm lower pole cyst again demonstrated. 2 mm lower pole calculus. No ureteral calculi. URINARY BLADDER: Suprapubic catheter in place. Bladder now decompressed with no evidence of residual clot. Small amount of air in the bladder lumen presumably introduced via the catheter. No perivesical fluid. ADDITIONAL FINDINGS: LOWER CHEST: Reticular opacities in both lung bases, indicative of pulmonary fibrosis, more advanced on the right. 2 subpleural nodules in the base of the right lower lobe, measuring 9 mm and 10 mm (series 2, image 8), unchanged since 12/13/2024 but not visible on CTs from 10/15/2024 or 10/31/2024 or earlier CTs dating back to 7/10/2024. Low-attenuation in the left and right ventricular lumens, consistent with the patient's known anemia. 6 mm high attenuation structure in the LV, near the mitral valve, possibly valvular calcification, although associated streak artifact suggests metallic composition. SOLID VISCERA: Limited low radiation dose noncontrast CT evaluation demonstrates no clinically significant abnormality of the imaged portions of the liver, spleen, pancreas, or adrenal glands. GALLBLADDER/BILIARY TREE: 2 mm calcified gallstone. Otherwise unremarkable. STOMACH AND BOWEL: Distended fluid-filled stomach. Small intestine unremarkable. Moderate amount of feces throughout the colon. Diverticulosis in the sigmoid colon without evidence of diverticulitis. APPENDIX: Normal. ABDOMINOPELVIC CAVITY: No ascites. No pneumoperitoneum. No lymphadenopathy. VESSELS: Limited evaluation without IV contrast. Extensive arterial calcification. No aortic aneurysm. REPRODUCTIVE ORGANS: Prostate appears normal in size. ABDOMINAL WALL/INGUINAL REGIONS: Unremarkable. BONES: Status post right hip  arthroplasty. Scoliosis. Advanced degenerative disease in the lumbar and lower thoracic spine. No acute fracture or destructive lesion.     Impression: 1.  Persistent right-sided moderate hydronephrosis and hydroureter to the level of the right ureterovesical junction. No evidence of right ureteral calculus. 2.  2 mm nonobstructing left lower pole renal calculus. Left kidney otherwise unremarkable. 3.  Gastric distention. 4.  Bladder decompressed by suprapubic catheter with no evidence of residual intravesical clot. 5.  Small gallstone without evidence of acute cholecystitis. 6.  Bibasilar pulmonary fibrosis. 7.  2 nodules in the base of the right lower lobe. These do not appear to have been present on a CT from 10/31/2024. Consider the possibility of pulmonary metastases in this patient with bladder cancer. CT of the chest is recommended to evaluate for any additional pulmonary nodules. The study was marked in EPIC for immediate notification. Workstation performed: [id number]     XR abdomen 1 view kub  Result Date: 12/20/2024  Narrative: XR ABDOMEN 1 VIEW KUB INDICATION: no BM since 12/15, checking for fecal impaction etc. COMPARISON: None FINDINGS: Nonobstructive bowel gas pattern. Moderate volume of colonic stool is present. No evidence of pneumoperitoneum on this supine study. Upright or left lateral decubitus imaging is more sensitive to detect subtle free air in the appropriate setting. No pathologic calcification or soft tissue mass. Clear lung bases. Right total hip arthroplasty is present.     Impression: Nonobstructive bowel gas pattern. Moderate volume of colonic stool. Workstation performed: [id number]     XR chest portable ICU  Result Date: 12/17/2024  Narrative: XR CHEST PORTABLE ICU INDICATION: hypoxia. COMPARISON: CXR 12/05/2024, chest CT 11/10/2023. FINDINGS: Clear lungs. No pneumothorax or pleural effusion. Right apical opacity corresponds with benign extrapleural fat on CT. Mild cardiomegaly.  Bones are unremarkable for age. Severe bilateral glenohumeral degenerative disease. Normal upper abdomen.     Impression: No acute cardiopulmonary disease. Workstation performed: [id number]     US kidney and bladder  Result Date: 12/17/2024  Narrative: RENAL ULTRASOUND INDICATION: eval hydronephrosis. COMPARISON: Multiple priors most recently 12/15/2024 TECHNIQUE: Ultrasound of the retroperitoneum was performed with a curvilinear transducer utilizing volumetric sweeps and still imaging techniques. FINDINGS: KIDNEYS: Symmetric and normal size. Right kidney: 10.8 x 5.4 x 5.3 cm. Volume 161.7 mL Left kidney: 11.9 x 4.8 x 4.5 cm. Volume 136.7 mL Right kidney Normal echogenicity and contour. No mass is identified. Benign-appearing cysts present. Moderate hydronephrosis No shadowing calculi. No perinephric fluid collections. Left kidney Normal echogenicity and contour. No mass is identified. Benign-appearing cysts present. No hydronephrosis. No shadowing calculi. No perinephric fluid collections. URETERS: Nonvisualized. BLADDER: Decompressed by a Colon catheter     Impression: Moderate right hydronephrosis persists. Urinary bladder is decompressed by a Colon catheter. Workstation performed: [id number]     CT abdomen pelvis wo contrast  Result Date: 12/15/2024  Narrative: CT ABDOMEN AND PELVIS WITHOUT IV CONTRAST INDICATION: continued hematuris, hypotention, clot burden. COMPARISON: 12/13/2024 TECHNIQUE: CT examination of the abdomen and pelvis was performed without intravenous contrast limiting the study. Multiplanar 2D reformatted images were created from the source data. This examination, like all CT scans performed in the [facility], was performed utilizing techniques to minimize radiation dose exposure, including the use of iterative reconstruction and automated exposure control. Radiation dose length product (DLP) for this visit: 963 mGy-cm Enteric Contrast: Not administered. FINDINGS: ABDOMEN LOWER  CHEST: Dependent changes at the lung bases are noted. Some reticular opacities raise the possibility of fibrosis. LIVER/BILIARY TREE: Unremarkable. GALLBLADDER: No calcified gallstones. No pericholecystic inflammatory change. SPLEEN: Unremarkable. PANCREAS: Unremarkable. ADRENAL GLANDS: Unremarkable. KIDNEYS/URETERS: Hyperdense lesion upper pole right kidney is quite dense more likely a complex cyst and similar to the prior study. Lower density larger cysts are seen bilaterally. Other areas are too small to characterize.. Severe hydroureteronephrosis on the right is similar to the study of December 13. 2 calcifications in the pelvis abut the wall of the ureter may represent phleboliths adjacent to the ureter more likely than calculi as their position is unchanged since October. A third calcification on image 119, series 2 could represent a small nonobstructing calculus as the ureter is still dilated beyond this level. No significant hydronephrosis on the left. STOMACH AND BOWEL: The stomach is not well distended. No small bowel dilatation. Diffuse colon diverticulosis. Mild to moderate fecal stasis. APPENDIX: No findings to suggest appendicitis. ABDOMINOPELVIC CAVITY: No ascites. No pneumoperitoneum. No lymphadenopathy. VESSELS: Atherosclerosis without abdominal aortic aneurysm. PELVIS REPRODUCTIVE ORGANS: Unremarkable for patient's age. URINARY BLADDER: The bladder is very distended with high density increased since the study of December 13 with only small areas of lower density seen. The bladder lumen suggests a high clot burden despite the presence of Colon catheter and suprapubic catheter. ABDOMINAL WALL/INGUINAL REGIONS: Small periumbilical hernia fat. BONES: No acute fracture or suspicious osseous lesion. Streak artifacts from right hip replacement limit the examination. Degenerative changes of the spine with scoliosis. Fatty atrophy of the iliac is muscles noted on the right similar to prior study as well as  the right gluteal musculature.     Impression: Increased distention of the bladder with high density suggesting high clot burden. Persistent hydronephrosis on the right. Probable small calculus within the distal dilated right ureter which may be nonobstructing. The study was marked in EPIC for immediate notification. Communication was also performed utilizing secure text. Workstation performed: [id number]     CT abdomen pelvis wo contrast  Result Date: 12/13/2024  Narrative: CT ABDOMEN AND PELVIS WITHOUT IV CONTRAST INDICATION: hematuria. History of suprapubic catheter with previously seen bladder hematoma versus mass. COMPARISON: Multiple prior CT examinations, most recent CT abdomen/pelvis 12/5/2024. TECHNIQUE: CT examination of the abdomen and pelvis was performed without intravenous contrast. Multiplanar 2D reformatted images were created from the source data. This examination, like all CT scans performed in the [facility], was performed utilizing techniques to minimize radiation dose exposure, including the use of iterative reconstruction and automated exposure control. Radiation dose length product (DLP) for this visit: 630.12 mGy-cm Enteric Contrast: Not administered. FINDINGS: ABDOMEN LOWER CHEST: Stable mild peripheral reticulation suggestive of interstitial fibrosis. Stable cardiomegaly. Coronary artery calcifications. LIVER/BILIARY TREE: Hyperattenuating hepatic parenchyma consistent with amiodarone use. Calcified right hepatic dome granuloma. No suspicious mass. Normal hepatic contours. No biliary dilation. GALLBLADDER: No calcified gallstones. No pericholecystic inflammatory change. SPLEEN: Unremarkable. PANCREAS: Unremarkable. ADRENAL GLANDS: Unremarkable. KIDNEYS/URETERS: Stable moderate right hydroureteronephrosis to the level of the urinary bladder. Redemonstrated 2 mm dependent distal right ureteral calculus (2/121). Unchanged 2 mm left lower pole renal calculus. No left  hydronephrosis. Bilateral renal cysts and subcentimeter hypodensities too small to characterize, suboptimally assessed on this unenhanced examination. There is again a hyperattenuating lesion in the right upper pole measuring 1.3 cm (2/49), favoring a hemorrhagic cyst. STOMACH AND BOWEL: Colonic diverticulosis without findings of acute diverticulitis. APPENDIX: No findings to suggest appendicitis. ABDOMINOPELVIC CAVITY: No ascites. No pneumoperitoneum. No lymphadenopathy. VESSELS: Atherosclerosis without abdominal aortic aneurysm. Note the vascular patency is not assessed on this unenhanced examination. PELVIS REPRODUCTIVE ORGANS: Unremarkable for patient's age. URINARY BLADDER: Suprapubic catheter in place. Similar marked eccentric bladder wall thickening on the right measuring up to 5.3 x 4.5 cm (2/125) which is mildly hyperattenuating. Associated mild surrounding fat stranding. Findings are suboptimally evaluated on this unenhanced examination, however grossly similar to [place] [address] and 10/15/2024. There is again a diverticulum arising from the anterior right bladder wall containing intraluminal air (2/129). ABDOMINAL WALL/INGUINAL REGIONS: Small fat-containing umbilical hernia. BONES: No acute fracture or suspicious osseous lesion. Spinal degenerative changes. Grade 1 anterolisthesis of L5 on S1. Chronic left L5 pars defect. Right hip prosthesis.     Impression: 1.  Stable moderate right hydroureteronephrosis to the level of the urinary bladder with nonobstructing 2 mm dependent calculus in the distal right ureter. Hydroureteronephrosis is favored to be secondary to marked irregular thickening of the right lateral bladder wall, present dating back to multiple prior examinations. While this finding again may reflect a chronic hematoma, underlying bladder mass/neoplasm is not excluded on this unenhanced examination. Recommend urologic consultation with cystoscopy for further evaluation, if clinically indicated. 2.   Redemonstrated 1.2 cm right upper pole hyperattenuating lesion, favoring a hemorrhagic cyst. Agree with prior recommendation for follow-up dedicated renal MRI or CT urogram in 3 months. The study was marked in EPIC for immediate notification. Workstation performed: [id number]     US kidney and bladder  Result Date: 12/10/2024  Narrative: RENAL ULTRASOUND INDICATION: N13.2: Hydronephrosis with renal and ureteral calculous obstruction. COMPARISON: CT from 12/5/2024 TECHNIQUE: Ultrasound of the retroperitoneum was performed with a curvilinear transducer utilizing volumetric sweeps and still imaging techniques. FINDINGS: KIDNEYS: Symmetric and normal size. Right kidney: 11.5 x 5.6 x 4.2 cm. Volume 142.1 mL Left kidney: 11.4 x 6.5 x 5.5 cm. Volume 211.2 mL Right kidney Normal echogenicity and contour. No mass is identified. Tiny right upper pole renal hypoechoic lesion. No suspicious associated vascularity or nodularity.. This measures 8 mm. There is mild to moderate right-sided hydroureteronephrosis, mildly improved since the prior exam allowing for the differences in technique. No shadowing calculi. No perinephric fluid collections. Left kidney Normal echogenicity and contour. There is a left upper pole renal cyst. No evidence for left-sided hydronephrosis. There is left lower pole nephrolithiasis. This measures approximately 2 mm. No perinephric fluid collections. BLADDER: Urinary bladder not discretely visualized with a suprapubic catheter reportedly in place. No focal thickening or mass lesions. Bilateral ureteral jets detected.     Impression: Mild to moderate right-sided hydroureteronephrosis, mildly improved since the prior CT allowing for differences in technique. No evidence for left-sided hydronephrosis with a punctate left lower pole intrarenal calculus noted. Tiny subcentimeter right upper pole hypodense renal lesion without suspicious features. Urinary bladder is under distended secondary to the presence of  a suprapubic catheter and poorly evaluated. Workstation performed: [id number]     XR chest 1 view portable  Result Date: 12/5/2024  Narrative: XR CHEST PORTABLE INDICATION: Periumbilical pain, +N. COMPARISON: CT abdomen pelvis November 10, 2024, chest radiograph October 31, 2024 FINDINGS: Monitoring leads and clips project over the chest. Chronic interstitial opacities of the right lung base. Mild left basilar atelectasis. No focal consolidation. No pneumothorax or pleural effusion. Enlarged cardiac silhouette, unchanged. No acute osseous abnormality. Severe left glenohumeral degenerative changes. Chronic right shoulder arthropathy/deformity. Normal upper abdomen.     Impression: No acute cardiopulmonary disease. Stable chronic interstitial opacities of the right lung base with mild left basilar atelectasis. Resident: ENEDELIA LYN I, the attending radiologist, have reviewed the images and agree with the final report above. Workstation performed: [id number]     CT abdomen pelvis with contrast  Result Date: 12/5/2024  Narrative: CT ABDOMEN AND PELVIS WITH IV CONTRAST INDICATION: Periumbilical abd pain x1 day, +N, new hematuria x1 day to suprapubic catheter with previously seen bladder hematoma. . COMPARISON: CT abdomen and pelvis 11/10/2024 TECHNIQUE: CT examination of the abdomen and pelvis was performed. Multiplanar 2D reformatted images were created from the source data. This examination, like all CT scans performed in the [facility], was performed utilizing techniques to minimize radiation dose exposure, including the use of iterative reconstruction and automated exposure control. Radiation dose length product (DLP) for this visit: 768.82 mGy-cm IV Contrast: 100 mL of iohexol (OMNIPAQUE) 350 Enteric Contrast: Not administered. FINDINGS: ABDOMEN LOWER CHEST: Coronary artery calcification. Bibasilar peripheral reticulation suggestive of pulmonary fibrosis. LIVER/BILIARY TREE: Unremarkable.  GALLBLADDER: No calcified gallstones. No pericholecystic inflammatory change. SPLEEN: Unremarkable. PANCREAS: Unremarkable. ADRENAL GLANDS: Unremarkable. KIDNEYS/URETERS: Stable moderate right hydronephroureterosis extending through the UVJ with delayed nephrogram. No perinephric collection. New 2 mm right distal ureteral calculus approximately 1.5 cm proximal to the UVJ. Stable 2 mm left renal lower pole  nonobstructing calculus. Subcentimeter hypoattenuating renal lesion(s), too small to characterize but statistically likely benign, which do not warrant follow-up (Radiology June 2019). Bilateral renal cysts, the largest of which measures approximately 3 cm on the left. 1.5 cm posterior right renal upper pole nodule appears more dense image 49 series 2. STOMACH AND BOWEL: Colonic diverticulosis without diverticulitis. APPENDIX: Normal. ABDOMINOPELVIC CAVITY: No ascites. No pneumoperitoneum. No lymphadenopathy. VESSELS: Atherosclerotic disease. No abdominal aortic aneurysm. No discernible flow in the right common iliac artery. PinPoint flow is present in the right external iliac artery presumably retrograde flow. There appears to be some flow in the right common femoral artery. Severe focal stenosis of the proximal left common iliac and left common femoral arteries. PELVIS REPRODUCTIVE ORGANS: Unremarkable for patient's age. URINARY BLADDER: Suprapubic catheter in place decompressing the bladder. Eccentric right lateral bladder wall thickening, similar. Trace fat stranding adjacent to the posterior superior bladder. ABDOMINAL WALL/INGUINAL REGIONS: Tiny fat-containing umbilical hernia. Mild left lower quadrant subcutaneous edema or contusion. No abnormal fluid collection. Prior right inguinal hernia repair. BONES: No acute fracture or osseous destructive lesion identified.  Degenerative changes of the spine, pubic symphysis, and multiple joints. Moderate levoconvex lumbar scoliosis. Chronic pars defect on the left  at L5. Grade 1 anterolisthesis of L5 on S1.  Partially visualized right hip prosthesis.     Impression: Stable moderate right hydronephroureterosis extending through the UVJ. New 2 mm right distal ureteral calculus proximal to the UVJ; this is not the cause of obstruction. No perinephric collection. Right lateral bladder wall masslike thickening, similar. This may represent hematoma or a mass. Mild fat stranding adjacent to the bladder. Advise correlation with UA and urine cytology. 1.5 cm right renal upper pole nodule appears more dense since 11/10/2024. This may represent progressive intracystic hemorrhage. This can be followed up with dedicated renal MRI or CT urogram in 3 months. No discernible flow in the right common iliac artery, similar. Trace flow in the right external iliac artery likely retrograde. Trace flow is present in the right common femoral artery. Correlate with pulses. Additional chronic findings and negatives as above. The study was marked in EPIC for immediate notification. Workstation performed: [id number]     IR suprapubic catheter check/change/reinsertion/upsize  Result Date: 12/3/2024  Narrative: Cystostomy/suprapubic tube replacement and upsizing Clinical History: Suprapubic catheter upsizing. Urethral erosion. Contrast: 12 mL of iohexol (OMNIPAQUE) Fluoro time: 1.9 MINUTES Number of Images: 32 Radiation dose: 29 mGy Concious sedation time: N/A Technique: The patient was brought to the interventional radiology suite and placed supine on the table. The skin of the suprapubic catheter was prepped and draped in the usual sterile fashion. Contrast injection through the catheter demonstrated. Large filling defect in the bladder with catheter in place. Ultrasound performed postprocedure demonstrates hypoechoic area which may correlate with the filling defect seen on fluoroscopy and suggestive for hematoma which was seen on CT of 11/10/2024. After local anesthesia was administered to the skin, a  wire was placed and the catheter removed over the wire. Balloon dilatation of the tract was performed with an 8 x 60 mm  balloon and over the wire a 16 [place] Colon catheter was placed. Balloon inflated with 10 mL sterile water. Contrast injection confirmed position.     Impression: Impression: Removal and upsizing of suprapubic catheter for a new 16 [place]i catheter. Filling defect in the bladder correlates with the hypodensity seen on prior CT of 11/10/2024 suggesting hematoma. Follow-up ultrasound in a few weeks recommended to confirm resolution. Findings discussed with patient and family. Being followed up with urology next week Workstation performed: [id number]         I spent 40 minutes on chart review, direct face to face counseling, coordination of care and documentation.     Omaira Roblero MD PhD

## 2024-12-22 NOTE — ASSESSMENT & PLAN NOTE
"Lab Results   Component Value Date    HGBA1C 5.9 (H) 04/18/2024       No results for input(s): \"POCGLU\" in the last 72 hours.      Blood Sugar Average: Last 72 hrs:    Glucose has been acceptable, diet controlled at home; will discontinue sliding scale  Hypoglycemic protocol  Carb count diet  "

## 2024-12-22 NOTE — PLAN OF CARE
Problem: SAFETY ADULT  Goal: Patient will remain free of falls  Description: INTERVENTIONS:  - Educate patient/family on patient safety including physical limitations  - Instruct patient to call for assistance with activity   - Consult OT/PT to assist with strengthening/mobility   - Keep Call bell within reach  - Keep bed low and locked with side rails adjusted as appropriate  - Keep care items and personal belongings within reach  - Initiate and maintain comfort rounds  - Make Fall Risk Sign visible to staff  - Offer Toileting every 2 Hours, in advance of need  - Initiate/Maintain bed alarm  - Obtain necessary fall risk management equipment: bed-alarm, non-slip socks  - Apply yellow socks and bracelet for high fall risk patients  - Consider moving patient to room near nurses station  Outcome: Progressing

## 2024-12-22 NOTE — ASSESSMENT & PLAN NOTE
Currently on MiraLAX and Colace, Senna at bedtime, still no BM since 12/14 as per patient, feels bloated  Added Dulcolax suppository, senna changed to 2 tablets twice daily  KUB ordered on 12/19-nonobstructive gas pattern  Continue above bowel regimen, added simethicone  No BM on 12/20/2024, but very small BM on 12/21  added mineral oral enema if no BM after Dulcolax administration

## 2024-12-22 NOTE — ASSESSMENT & PLAN NOTE
Wt Readings from Last 3 Encounters:   12/22/24 88.5 kg (195 lb 1.7 oz)   12/15/24 86.6 kg (190 lb 14.7 oz)   12/10/24 87.5 kg (193 lb)   Echo 2/24- LV wall thickness is mildly increased EF 35-40%, systolic function is moderately reduced, moderate global hypokinesis, G1DD. LA is moderately dilated and RA is dilated. MV moderate regurgitation.  Follows with Dr. Roach outpatient   PTA on lasix 20mg daily- currently on hold given CHUY and hypotension post op, consider resuming in the next 24 hrs   C/w GDMT:  BB:  metoprolol 25mg q12h   ACE/ARB/ARNI:  lisinopril 5mg daily on hold d/t hypotension and CHUY   MRA:  n/a  SGLT2i: n/a  Currently on room air   Daily weights, Strict I & Os  Cardiac diet, low sodium <2g, fluid restriction 2000ml

## 2024-12-22 NOTE — ASSESSMENT & PLAN NOTE
Lab Results   Component Value Date    HGB 10.2 (L) 12/22/2024    HGB 10.4 (L) 12/21/2024    HGB 14.3 12/20/2024    HGB 9.1 (L) 12/20/2024   Hgb baseline 10-11 dropped to 7.2 on admission with hematuria - S/p 2 units of PRBCs with improvement in hgb  Transfuse for hgb < 7.0  Hg remains stable

## 2024-12-22 NOTE — ASSESSMENT & PLAN NOTE
"Pt initially presented to outside hospital with gross hematuria and had three-way Farmer placed and CBI started. He was noted to have labile blood pressures so he was transferred to [facility] for cystoscopy and admitted to the ICU post op for hypotension and hypoxia. He did require 2 units PRBCs due to ongoing blood loss and anemia.   S/p cystoscopy with clot evacuation, fulguration of bleeding vessels and extensive transurethral resection of bladder tumor and placement of penile urethral catheter 12/15  Unable to visualize right ureter in the OR; CBI weaned off 12/16 around 11am   Patient had both urethral Farmer catheter and SPT , farmer removed on 12/20 by urology  Continue to monitor Hg and creatinine  Patient was previously on Eliquis for DVT that was stopped about 2 months on previous admissions with no plans to resume  Patient's creatinine remained stable but worsened on 12.21, urology contacted, repeat CT renal showed \"Persistent right-sided moderate hydronephrosis and hydroureter to the level of the right ureterovesical junction. No evidence of right ureteral calculus\", urology involved, IR consulted for possible nephrostomy on Monday, 12/23.   aspirin resumed on 12/19, but on hold since 12/21 for possible procedure on Monday  Creatinine continues to worsen, urology following, plan as above  "

## 2024-12-23 ENCOUNTER — APPOINTMENT (INPATIENT)
Dept: RADIOLOGY | Facility: HOSPITAL | Age: 74
DRG: 829 | End: 2024-12-23
Payer: MEDICARE

## 2024-12-23 ENCOUNTER — PATIENT OUTREACH (OUTPATIENT)
Dept: HEMATOLOGY ONCOLOGY | Facility: CLINIC | Age: 74
End: 2024-12-23

## 2024-12-23 ENCOUNTER — DOCUMENTATION (OUTPATIENT)
Dept: HEMATOLOGY ONCOLOGY | Facility: CLINIC | Age: 74
End: 2024-12-23

## 2024-12-23 ENCOUNTER — TELEPHONE (OUTPATIENT)
Dept: HEMATOLOGY ONCOLOGY | Facility: CLINIC | Age: 74
End: 2024-12-23

## 2024-12-23 LAB
ANION GAP SERPL CALCULATED.3IONS-SCNC: 8 MMOL/L (ref 4–13)
BASOPHILS # BLD AUTO: 0.07 THOUSANDS/ÂΜL (ref 0–0.1)
BASOPHILS NFR BLD AUTO: 1 % (ref 0–1)
BUN SERPL-MCNC: 37 MG/DL (ref 5–25)
CALCIUM SERPL-MCNC: 8.8 MG/DL (ref 8.4–10.2)
CHLORIDE SERPL-SCNC: 103 MMOL/L (ref 96–108)
CO2 SERPL-SCNC: 23 MMOL/L (ref 21–32)
CREAT SERPL-MCNC: 2.06 MG/DL (ref 0.6–1.3)
EOSINOPHIL # BLD AUTO: 0.4 THOUSAND/ÂΜL (ref 0–0.61)
EOSINOPHIL NFR BLD AUTO: 6 % (ref 0–6)
ERYTHROCYTE [DISTWIDTH] IN BLOOD BY AUTOMATED COUNT: 15 % (ref 11.6–15.1)
GFR SERPL CREATININE-BSD FRML MDRD: 30 ML/MIN/1.73SQ M
GLUCOSE SERPL-MCNC: 103 MG/DL (ref 65–140)
HCT VFR BLD AUTO: 32 % (ref 36.5–49.3)
HGB BLD-MCNC: 10.3 G/DL (ref 12–17)
IMM GRANULOCYTES # BLD AUTO: 0.02 THOUSAND/UL (ref 0–0.2)
IMM GRANULOCYTES NFR BLD AUTO: 0 % (ref 0–2)
LYMPHOCYTES # BLD AUTO: 1.23 THOUSANDS/ÂΜL (ref 0.6–4.47)
LYMPHOCYTES NFR BLD AUTO: 17 % (ref 14–44)
MCH RBC QN AUTO: 30.3 PG (ref 26.8–34.3)
MCHC RBC AUTO-ENTMCNC: 32.2 G/DL (ref 31.4–37.4)
MCV RBC AUTO: 94 FL (ref 82–98)
MONOCYTES # BLD AUTO: 0.75 THOUSAND/ÂΜL (ref 0.17–1.22)
MONOCYTES NFR BLD AUTO: 10 % (ref 4–12)
NEUTROPHILS # BLD AUTO: 4.78 THOUSANDS/ÂΜL (ref 1.85–7.62)
NEUTS SEG NFR BLD AUTO: 66 % (ref 43–75)
NRBC BLD AUTO-RTO: 0 /100 WBCS
PLATELET # BLD AUTO: 450 THOUSANDS/UL (ref 149–390)
PMV BLD AUTO: 9.6 FL (ref 8.9–12.7)
POTASSIUM SERPL-SCNC: 4.5 MMOL/L (ref 3.5–5.3)
RBC # BLD AUTO: 3.4 MILLION/UL (ref 3.88–5.62)
SODIUM SERPL-SCNC: 134 MMOL/L (ref 135–147)
WBC # BLD AUTO: 7.25 THOUSAND/UL (ref 4.31–10.16)

## 2024-12-23 PROCEDURE — 99232 SBSQ HOSP IP/OBS MODERATE 35: CPT | Performed by: FAMILY MEDICINE

## 2024-12-23 PROCEDURE — 71250 CT THORAX DX C-: CPT

## 2024-12-23 PROCEDURE — 85025 COMPLETE CBC W/AUTO DIFF WBC: CPT | Performed by: FAMILY MEDICINE

## 2024-12-23 PROCEDURE — 80048 BASIC METABOLIC PNL TOTAL CA: CPT | Performed by: FAMILY MEDICINE

## 2024-12-23 RX ADMIN — SENNOSIDES 17.2 MG: 8.6 TABLET, FILM COATED ORAL at 17:17

## 2024-12-23 RX ADMIN — SENNOSIDES 17.2 MG: 8.6 TABLET, FILM COATED ORAL at 10:44

## 2024-12-23 RX ADMIN — CHLORHEXIDINE GLUCONATE 0.12% ORAL RINSE 15 ML: 1.2 LIQUID ORAL at 21:02

## 2024-12-23 RX ADMIN — AMIODARONE HYDROCHLORIDE 200 MG: 200 TABLET ORAL at 10:44

## 2024-12-23 RX ADMIN — CHLORHEXIDINE GLUCONATE 0.12% ORAL RINSE 15 ML: 1.2 LIQUID ORAL at 10:44

## 2024-12-23 RX ADMIN — DOCUSATE SODIUM 100 MG: 100 CAPSULE, LIQUID FILLED ORAL at 17:18

## 2024-12-23 RX ADMIN — DOCUSATE SODIUM 100 MG: 100 CAPSULE, LIQUID FILLED ORAL at 10:44

## 2024-12-23 RX ADMIN — FINASTERIDE 5 MG: 5 TABLET, FILM COATED ORAL at 10:44

## 2024-12-23 RX ADMIN — GLYCERIN 2 DROP: .002; .002; .01 SOLUTION/ DROPS OPHTHALMIC at 18:28

## 2024-12-23 RX ADMIN — TAMSULOSIN HYDROCHLORIDE 0.4 MG: 0.4 CAPSULE ORAL at 17:17

## 2024-12-23 RX ADMIN — ATORVASTATIN CALCIUM 40 MG: 40 TABLET, FILM COATED ORAL at 17:17

## 2024-12-23 RX ADMIN — AMLODIPINE BESYLATE 5 MG: 5 TABLET ORAL at 10:44

## 2024-12-23 NOTE — PROGRESS NOTES
Pastoral Care Progress Note          Chaplaincy Interventions Utilized:   Empowerment: Clarified, confirmed, or reviewed information from treatment team , Encouraged focus on present, Normalized experience of patient/family, Provided anticipatory guidance, Provided anxiety containment, and Provided chaplaincy education    Exploration: Explored hope, Explored emotional needs & resources, Explored relational needs & resources, Facilitated story telling, and Identified, evaluated & reinforced appropriate coping strategies    Collaboration: Consulted with interdisciplinary team and Encouraged adherence to treatment plan     Relationship Building: Cultivated a relationship of care and support and Listened empathically    Ritual: Provided prayer    Encouraged patient to accept his daughter's care without concern about burdening her. Patient is open to further visit from Fr. Collado         Chaplaincpaco Outcomes Achieved:  Debriefed/defused experience, Distress reduced, Expressed gratitude, Expressed intermediate hope, and Identified meaningful connections      Spiritual Coping Strategies Utilized:   Connectedness, Spiritual gratitude, and Positive spiritual reframing           12/22/24 2380   Clinical Encounter Type   Visited With Patient   Routine Visit Follow-up   Referral From Nurse   Faith Encounters   Faith Needs Prayer   Patient Spiritual Encounters   Spiritual Encounter Notes  visited patient who received CA diagnosis today. Conversation and prayer. Referral to Fr. Collado for visit.

## 2024-12-23 NOTE — QUICK NOTE
Discussed with urology.  Vitals and labs are stable.  Will plan for right nephrostomy tube placement on Tuesday, 12/24/2024.  Please keep n.p.o. after midnight.  Please continue to hold aspirin.  Please hold a.m. anticoagulation for Tuesday.    Camila Garibay PA-C

## 2024-12-23 NOTE — ASSESSMENT & PLAN NOTE
"Unable to visualize right ureter in OR;   Renal US 12/17- Moderate right hydronephrosis persists.  CT renal from 12/20: \"Persistent right-sided moderate hydronephrosis and hydroureter to the level of the right ureterovesical junction. No evidence of right ureteral calculus. \"  Urology involved, plan for nephrostomy by IR on 12/24    "

## 2024-12-23 NOTE — ASSESSMENT & PLAN NOTE
Lab Results   Component Value Date    HGB 10.3 (L) 12/23/2024    HGB 10.2 (L) 12/22/2024    HGB 10.4 (L) 12/21/2024   Hgb baseline 10-11 dropped to 7.2 on admission with hematuria - S/p 2 units of PRBCs with improvement in hgb  Transfuse for hgb < 7.0  Hg remains stable

## 2024-12-23 NOTE — ASSESSMENT & PLAN NOTE
Wt Readings from Last 3 Encounters:   12/23/24 84.5 kg (186 lb 4.6 oz)   12/15/24 86.6 kg (190 lb 14.7 oz)   12/10/24 87.5 kg (193 lb)   Echo 2/24- LV wall thickness is mildly increased EF 35-40%, systolic function is moderately reduced, moderate global hypokinesis, G1DD. LA is moderately dilated and RA is dilated. MV moderate regurgitation.  Follows with Dr. Roach outpatient   PTA on lasix 20mg daily- currently on hold given CHUY and hypotension post op, consider resuming in the next 24 hrs   C/w GDMT:  BB:  metoprolol 25mg q12h   ACE/ARB/ARNI:  lisinopril 5mg daily on hold d/t hypotension and HCUY   MRA:  n/a  SGLT2i: n/a  Currently on room air   Daily weights, Strict I & Os  Cardiac diet, low sodium <2g, fluid restriction 2000ml

## 2024-12-23 NOTE — ASSESSMENT & PLAN NOTE
Urine culture + Pseudomonas  Previously was on IV ceftriaxone will transition to oral ciprofloxacin given sensitivities, completed 7 days of antibiotic (12/22)

## 2024-12-23 NOTE — ASSESSMENT & PLAN NOTE
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

## 2024-12-23 NOTE — ASSESSMENT & PLAN NOTE
Lab Results   Component Value Date    HGBA1C 5.9 (H) 04/18/2024         Blood Sugar Average: Last 72 hrs:    Glucose has been acceptable, diet controlled at home; will discontinue sliding scale  Hypoglycemic protocol  Carb count diet

## 2024-12-23 NOTE — TELEPHONE ENCOUNTER
New Patient Intake Form   Patient Details:    Toro Rodriguez  1950    Appointment Information   Who is calling to schedule? Karlene Gomez   If not self, what is the caller's name?   NA   DID YOU CONFIRM INSURANCE WITH PATIENT? E verified, Routed to finance   Referring provider Dr. 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   Bladder bx taken on 12/15     Is there a biopsy ordered or pending?  Please specify dates  If yes, route to NN/OCC   See above     Is patient aware of diagnosis?   Yes     Have you had any imaging or labs done?  If yes, where?  (If imaging done outside of [facility], please remind patient to bring a disk.) Yes-[facility]     If imaging done at outside facility, did you instruct patient to obtain discs and bring to visit? NA   Have you been seen by another Oncologist/Hematologist?  If so, who and where? Surgical Oncology (Dr. La for Melanoma)   Are the records in [facility] or Care Everywhere? Yes   Does the patient have records at another facility/hospital?    If yes, Name of facility, city and state where facility is located. NA     Did you instruct patient to have records faxed to [email or web address] and provide rightfax number?   NA   Preferred [place]   Is the patient willing to be seen by another provider?  (This is for breast patients only) NA     Did you send new patient paperwork?  Email or mail? NA   Miscellaneous Information: The patient is scheduled for his HFU appointment with Dr. Foy on 12/31 at 0900 in the [facility].

## 2024-12-23 NOTE — ASSESSMENT & PLAN NOTE
"Pt initially presented to outside hospital with gross hematuria and had three-way Farmer placed and CBI started. He was noted to have labile blood pressures so he was transferred to [facility] for cystoscopy and admitted to the ICU post op for hypotension and hypoxia. He did require 2 units PRBCs due to ongoing blood loss and anemia.   S/p cystoscopy with clot evacuation, fulguration of bleeding vessels and extensive transurethral resection of bladder tumor and placement of penile urethral catheter 12/15  Unable to visualize right ureter in the OR; CBI weaned off 12/16 around 11am   Patient had both urethral Farmer catheter and SPT , farmer removed on 12/20 by urology  Continue to monitor Hg and creatinine  Patient was previously on Eliquis for DVT that was stopped about 2 months on previous admissions with no plans to resume  Patient's creatinine remained stable but worsened on 12.21, urology contacted, repeat CT renal showed \"Persistent right-sided moderate hydronephrosis and hydroureter to the level of the right ureterovesical junction. No evidence of right ureteral calculus\", urology involved, IR consulted for possible nephrostomy, now planned for 12/24  aspirin resumed on 12/19, but on hold since 12/21 for procedure  Creatinine continues to worsen, urology following, plan as above  "

## 2024-12-23 NOTE — ASSESSMENT & PLAN NOTE
Indication: Lower abdominal pain.



Multiple contiguous axial images obtained through the abdomen and pelvis

without contrast as ordered.



Comparison: None



Lung bases demonstrates minimal bibasilar dependent atelectasis.  No

infiltrate or effusion.  Heart is not enlarged.



Noncontrasted stomach and bowel loops appear nonobstructed.  Normal appendix.

Nonobstructing punctate right renal and faint left renal calculus.



Remaining liver, gallbladder, pancreas, spleen, adrenal glands, kidneys,

ureters, bladder, uterus, and aorta appear unremarkable for noncontrast exam.



Osseous structures intact with mild degenerative changes throughout the spine,

greatest at the lumbosacral junction.



Impression:

1.  Nonobstructing bilateral renal micro-calculi.

2.  Remaining CT abdomen/pelvis without contrast exam negative.



CT DI 34.96 chronic suprapubic catheter  Continue with Proscar and Flomax

## 2024-12-23 NOTE — ASSESSMENT & PLAN NOTE
Currently on MiraLAX and Colace, Senna at bedtime, still no BM since 12/14 as per patient, feels bloated  Added Dulcolax suppository, senna changed to 2 tablets twice daily  KUB ordered on 12/19-nonobstructive gas pattern  Continue above bowel regimen, added simethicone  added mineral oral enema if no BM after Dulcolax administration  Had large BM on 12/22

## 2024-12-23 NOTE — ASSESSMENT & PLAN NOTE
Lab Results   Component Value Date    CREATININE 2.06 (H) 12/23/2024    CREATININE 2.04 (H) 12/22/2024    CREATININE 1.82 (H) 12/21/2024   Creatinine reviewed, appears BL has increased since September to 1.5-1.8  Creat peaked at 2.09  Avoid nephrotoxic drugs and hypotension- holding lasix and lisinopril    C/w SPC ; farmer removed on 12/20  Creatinine was improving but started to worsen again on 12/21, urology involved, plan as above

## 2024-12-23 NOTE — ASSESSMENT & PLAN NOTE
ASA being on hold, resumed 12/19, placed again on hold 12/21 for procedure  Continue with Lipitor 40 mg daily and metoprolol 25 mg every 12 hours  Denies chest pain  Left heart cath in 2023 showing Ost RCA to Prox RCA lesion is 100% stenosed.

## 2024-12-23 NOTE — PROGRESS NOTES
"Progress Note - Hospitalist   Name: Toro Rodriguez 74 y.o. male I MRN: [id number]  Unit/Bed#: -01 I Date of Admission: 12/15/2024   Date of Service: 12/23/2024 I Hospital Day: 8    Assessment & Plan  Clot retention of urine  Pt initially presented to outside hospital with gross hematuria and had three-way Farmer placed and CBI started. He was noted to have labile blood pressures so he was transferred to [facility] for cystoscopy and admitted to the ICU post op for hypotension and hypoxia. He did require 2 units PRBCs due to ongoing blood loss and anemia.   S/p cystoscopy with clot evacuation, fulguration of bleeding vessels and extensive transurethral resection of bladder tumor and placement of penile urethral catheter 12/15  Unable to visualize right ureter in the OR; CBI weaned off 12/16 around 11am   Patient had both urethral Farmer catheter and SPT , farmer removed on 12/20 by urology  Continue to monitor Hg and creatinine  Patient was previously on Eliquis for DVT that was stopped about 2 months on previous admissions with no plans to resume  Patient's creatinine remained stable but worsened on 12.21, urology contacted, repeat CT renal showed \"Persistent right-sided moderate hydronephrosis and hydroureter to the level of the right ureterovesical junction. No evidence of right ureteral calculus\", urology involved, IR consulted for possible nephrostomy, now planned for 12/24  aspirin resumed on 12/19, but on hold since 12/21 for procedure  Creatinine continues to worsen, urology following, plan as above  Acute kidney injury superimposed on stage 3b chronic kidney disease (HCC)  Lab Results   Component Value Date    CREATININE 2.06 (H) 12/23/2024    CREATININE 2.04 (H) 12/22/2024    CREATININE 1.82 (H) 12/21/2024   Creatinine reviewed, appears BL has increased since September to 1.5-1.8  Creat peaked at 2.09  Avoid nephrotoxic drugs and hypotension- holding lasix and lisinopril    C/w SPC ; farmer removed on " 12/20  Creatinine was improving but started to worsen again on 12/21, urology involved, plan as above  Bladder mass  S/p transurethral resection of bladder tumor 12/15  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); patient is aware  Hematology/oncology consulted, recommendations appreciated; needs chest CT for completion of work up  ABLA (acute blood loss anemia)  Lab Results   Component Value Date    HGB 10.3 (L) 12/23/2024    HGB 10.2 (L) 12/22/2024    HGB 10.4 (L) 12/21/2024   Hgb baseline 10-11 dropped to 7.2 on admission with hematuria - S/p 2 units of PRBCs with improvement in hgb  Transfuse for hgb < 7.0  Hg remains stable  Essential hypertension  PTA norvasc 5mg daily; metoprolol 25mg q12h; lisinopril 5mg daily and lasix 20mg daily   Lisinopril and Lasix were on hold given CHUY and hypotension postoperatively  Continue with Norvasc 5 mg daily metoprolol 25 mg every 12 hours  Blood pressure been acceptable  continue to hold lisinopril and Lasix due to worsening kidney function  Coronary artery disease involving native coronary artery of native heart  ASA being on hold, resumed 12/19, placed again on hold 12/21 for procedure  Continue with Lipitor 40 mg daily and metoprolol 25 mg every 12 hours  Denies chest pain  Left heart cath in 2023 showing Ost RCA to Prox RCA lesion is 100% stenosed.     Type 2 diabetes mellitus with stage 3b chronic kidney disease, without long-term current use of insulin ([facility])  Lab Results   Component Value Date    HGBA1C 5.9 (H) 04/18/2024         Blood Sugar Average: Last 72 hrs:    Glucose has been acceptable, diet controlled at home; will discontinue sliding scale  Hypoglycemic protocol  Carb count diet  Benign prostatic hyperplasia with urinary retention  chronic suprapubic catheter  Continue with Proscar and Flomax  NSVT (nonsustained ventricular tachycardia) ([facility])  C/w metoprolol 25mg q12h and amiodarone 200mg daily   Slow transit  "constipation  Currently on MiraLAX and Colace, Senna at bedtime, still no BM since 12/14 as per patient, feels bloated  Added Dulcolax suppository, senna changed to 2 tablets twice daily  KUB ordered on 12/19-nonobstructive gas pattern  Continue above bowel regimen, added simethicone  added mineral oral enema if no BM after Dulcolax administration  Had large BM on 12/22  Hydronephrosis  Unable to visualize right ureter in OR;   Renal US 12/17- Moderate right hydronephrosis persists.  CT renal from 12/20: \"Persistent right-sided moderate hydronephrosis and hydroureter to the level of the right ureterovesical junction. No evidence of right ureteral calculus. \"  Urology involved, plan for nephrostomy by IR on 12/24    HFrEF (heart failure with reduced ejection fraction) ([facility])  Wt Readings from Last 3 Encounters:   12/23/24 84.5 kg (186 lb 4.6 oz)   12/15/24 86.6 kg (190 lb 14.7 oz)   12/10/24 87.5 kg (193 lb)   Echo 2/24- LV wall thickness is mildly increased EF 35-40%, systolic function is moderately reduced, moderate global hypokinesis, G1DD. LA is moderately dilated and RA is dilated. MV moderate regurgitation.  Follows with Dr. Roach outpatient   PTA on lasix 20mg daily- currently on hold given CHUY and hypotension post op, consider resuming in the next 24 hrs   C/w GDMT:  BB:  metoprolol 25mg q12h   ACE/ARB/ARNI:  lisinopril 5mg daily on hold d/t hypotension and CHUY   MRA:  n/a  SGLT2i: n/a  Currently on room air   Daily weights, Strict I & Os  Cardiac diet, low sodium <2g, fluid restriction 2000ml  Acute cystitis with hematuria  Urine culture + Pseudomonas  Previously was on IV ceftriaxone will transition to oral ciprofloxacin given sensitivities, completed 7 days of antibiotic (12/22)    VTE Pharmacologic Prophylaxis:   Moderate Risk (Score 3-4) - Pharmacological DVT Prophylaxis Contraindicated. Sequential Compression Devices Ordered.    Mobility:   Basic Mobility Inpatient Raw Score: 17  -[facility] Goal: 5: Stand one " or more mins  JH-HLM Achieved: 5: Stand (1 or more minutes)  JH-HLM Goal achieved. Continue to encourage appropriate mobility.    Patient Centered Rounds: I performed bedside rounds with nursing staff today.   Discussions with Specialists or Other Care Team Provider: RN    Education and Discussions with Family / Patient:discussed with the patient; attempted to call daughter, not able to reach    Current Length of Stay: 8 day(s)  Current Patient Status: Inpatient   Certification Statement: The patient will continue to require additional inpatient hospital stay due to plan as above, possible nephrostomy on Monday  Discharge Plan:  when clinically improved    Code Status: Level 1 - Full Code    Subjective   Patient seen and examined.  He is upset about the diagnosis, but denies any specific complaints    Objective :  Temp:  [97.6 °F (36.4 °C)] 97.6 °F (36.4 °C)  HR:  [76] 76  BP: (124-126)/(54-70) 126/70  Resp:  [17] 17  SpO2:  [94 %-95 %] 95 %  O2 Device: None (Room air)    Body mass index is 29.18 kg/m².     Input and Output Summary (last 24 hours):     Intake/Output Summary (Last 24 hours) at 12/23/2024 1453  Last data filed at 12/23/2024 0701  Gross per 24 hour   Intake 120 ml   Output 1120 ml   Net -1000 ml       Physical Exam  Constitutional:       General: He is not in acute distress.     Appearance: He is not ill-appearing.   Cardiovascular:      Rate and Rhythm: Normal rate and regular rhythm.      Pulses: Normal pulses.      Heart sounds: Normal heart sounds. No murmur heard.  Pulmonary:      Effort: Pulmonary effort is normal. No respiratory distress.      Breath sounds: No wheezing or rales.   Abdominal:      General: Bowel sounds are normal. There is distension.      Palpations: Abdomen is soft.      Tenderness: There is no abdominal tenderness.   Genitourinary:     Comments: Suprapubic catheter in place, urine is yellow clear with some sediments  Musculoskeletal:         General: No swelling or  tenderness.   Skin:     General: Skin is warm and dry.      Findings: No erythema or rash.   Neurological:      General: No focal deficit present.      Mental Status: He is alert. Mental status is at baseline.   Psychiatric:         Attention and Perception: Attention normal.         Mood and Affect: Mood normal.           Lines/Drains:  Lines/Drains/Airways       Active Status       Name Placement date Placement time Site Days    Suprapubic Catheter 16 Fr. 12/03/24  1019  --  20                            Lab Results: I have reviewed the following results:   Results from last 7 days   Lab Units 12/23/24  0438   WBC Thousand/uL 7.25   HEMOGLOBIN g/dL 10.3*   HEMATOCRIT % 32.0*   PLATELETS Thousands/uL 450*   SEGS PCT % 66   LYMPHO PCT % 17   MONO PCT % 10   EOS PCT % 6     Results from last 7 days   Lab Units 12/23/24  0438   SODIUM mmol/L 134*   POTASSIUM mmol/L 4.5   CHLORIDE mmol/L 103   CO2 mmol/L 23   BUN mg/dL 37*   CREATININE mg/dL 2.06*   ANION GAP mmol/L 8   CALCIUM mg/dL 8.8   GLUCOSE RANDOM mg/dL 103           Results from last 7 days   Lab Units 12/18/24  1047 12/18/24  0552 12/17/24  2155 12/17/24  1601 12/17/24  1045 12/17/24  0604 12/16/24  2103 12/16/24  1602   POC GLUCOSE mg/dl 97 95 100 101 105 100 109 107               Recent Cultures (last 7 days):           Imaging Results Review: I reviewed radiology reports from this admission including: CT abdomen/pelvis and Ultrasound(s).      Last 24 Hours Medication List:     Current Facility-Administered Medications:     acetaminophen (TYLENOL) tablet 650 mg, Q6H PRN    amiodarone tablet 200 mg, Daily    amLODIPine (NORVASC) tablet 5 mg, Daily    [Held by provider] aspirin chewable tablet 81 mg, Daily    atorvastatin (LIPITOR) tablet 40 mg, Daily With Dinner    bisacodyl (DULCOLAX) rectal suppository 10 mg, Daily PRN    chlorhexidine (PERIDEX) 0.12 % oral rinse 15 mL, Q12H ARI    dextromethorphan-guaiFENesin (ROBITUSSIN DM) oral syrup 10 mL, Q4H PRN     docusate sodium (COLACE) capsule 100 mg, BID    finasteride (PROSCAR) tablet 5 mg, Daily    [Held by provider] furosemide (LASIX) tablet 20 mg, Daily    HYDROcodone Bit-Homatrop MBr (HYCODAN) oral syrup 2.5 mL, Q4H PRN    HYDROmorphone HCl (DILAUDID) injection 0.2 mg, Q2H PRN    metoprolol succinate (TOPROL-XL) 24 hr tablet 25 mg, Q12H ARI    mineral oil enema 1 enema, Daily PRN    naloxone (NARCAN) 0.04 mg/mL syringe 0.04 mg, Q1MIN PRN    ondansetron (ZOFRAN) injection 4 mg, Q6H PRN    oxybutynin (DITROPAN) tablet 5 mg, TID PRN    oxyCODONE (ROXICODONE) split tablet 2.5 mg, Q4H PRN **OR** oxyCODONE (ROXICODONE) IR tablet 5 mg, Q4H PRN    phenol (CHLORASEPTIC) 1.4 % mucosal liquid 1 spray, Q2H PRN    polyethylene glycol (MIRALAX) packet 17 g, Daily    senna (SENOKOT) tablet 17.2 mg, BID    simethicone (MYLICON) chewable tablet 80 mg, Q6H PRN    tamsulosin (FLOMAX) capsule 0.4 mg, Daily With Dinner    Administrative Statements   Today, Patient Was Seen By: Jordana Mercado MD      **Please Note: This note may have been constructed using a voice recognition system.**

## 2024-12-23 NOTE — PROGRESS NOTES
All records needed are in patients chart. No records retrieval needed at this time.     Referral received/ Chart reviewed for work up completed   Referral to: hematology oncology  Dx:    Imaging completed: [x][facility] []Other:   [] PET/CT   [] MRI   [x] CT   [] US   [] Mammo   [] Bone scan   [] N/A    Pathology completed: [x][facility] []Other:   Date: 12/15/24   Location:   []N/A    Additional records needed:[][facility] []Other:   [] Genomic report   [] Genetic testing results   [] Office Note   [] Procedure/ Operative note   [] Lab results   [x] N/A      [] Radiation Oncology records retrieval needed (PN to route to rad/onc clerical pool once scheduled)  Date:  Location:       Referring physician:   U

## 2024-12-24 ENCOUNTER — APPOINTMENT (INPATIENT)
Dept: RADIOLOGY | Facility: HOSPITAL | Age: 74
DRG: 829 | End: 2024-12-24
Attending: RADIOLOGY
Payer: MEDICARE

## 2024-12-24 ENCOUNTER — ANESTHESIA EVENT (INPATIENT)
Dept: RADIOLOGY | Facility: HOSPITAL | Age: 74
DRG: 829 | End: 2024-12-24
Payer: MEDICARE

## 2024-12-24 ENCOUNTER — ANESTHESIA (INPATIENT)
Dept: RADIOLOGY | Facility: HOSPITAL | Age: 74
DRG: 829 | End: 2024-12-24
Payer: MEDICARE

## 2024-12-24 ENCOUNTER — PATIENT OUTREACH (OUTPATIENT)
Dept: HEMATOLOGY ONCOLOGY | Facility: CLINIC | Age: 74
End: 2024-12-24

## 2024-12-24 LAB
ANION GAP SERPL CALCULATED.3IONS-SCNC: 9 MMOL/L (ref 4–13)
BASOPHILS # BLD AUTO: 0.09 THOUSANDS/ÂΜL (ref 0–0.1)
BASOPHILS NFR BLD AUTO: 1 % (ref 0–1)
BUN SERPL-MCNC: 36 MG/DL (ref 5–25)
CALCIUM SERPL-MCNC: 8.6 MG/DL (ref 8.4–10.2)
CHLORIDE SERPL-SCNC: 105 MMOL/L (ref 96–108)
CO2 SERPL-SCNC: 23 MMOL/L (ref 21–32)
CREAT SERPL-MCNC: 2.04 MG/DL (ref 0.6–1.3)
EOSINOPHIL # BLD AUTO: 0.54 THOUSAND/ÂΜL (ref 0–0.61)
EOSINOPHIL NFR BLD AUTO: 7 % (ref 0–6)
ERYTHROCYTE [DISTWIDTH] IN BLOOD BY AUTOMATED COUNT: 15.1 % (ref 11.6–15.1)
GFR SERPL CREATININE-BSD FRML MDRD: 30 ML/MIN/1.73SQ M
GLUCOSE SERPL-MCNC: 129 MG/DL (ref 65–140)
GLUCOSE SERPL-MCNC: 98 MG/DL (ref 65–140)
HCT VFR BLD AUTO: 30.4 % (ref 36.5–49.3)
HGB BLD-MCNC: 9.9 G/DL (ref 12–17)
IMM GRANULOCYTES # BLD AUTO: 0.04 THOUSAND/UL (ref 0–0.2)
IMM GRANULOCYTES NFR BLD AUTO: 1 % (ref 0–2)
INR PPP: 1.11 (ref 0.85–1.19)
LYMPHOCYTES # BLD AUTO: 1.34 THOUSANDS/ÂΜL (ref 0.6–4.47)
LYMPHOCYTES NFR BLD AUTO: 18 % (ref 14–44)
MCH RBC QN AUTO: 30.6 PG (ref 26.8–34.3)
MCHC RBC AUTO-ENTMCNC: 32.6 G/DL (ref 31.4–37.4)
MCV RBC AUTO: 94 FL (ref 82–98)
MONOCYTES # BLD AUTO: 0.67 THOUSAND/ÂΜL (ref 0.17–1.22)
MONOCYTES NFR BLD AUTO: 9 % (ref 4–12)
NEUTROPHILS # BLD AUTO: 4.96 THOUSANDS/ÂΜL (ref 1.85–7.62)
NEUTS SEG NFR BLD AUTO: 64 % (ref 43–75)
NRBC BLD AUTO-RTO: 0 /100 WBCS
PLATELET # BLD AUTO: 450 THOUSANDS/UL (ref 149–390)
PMV BLD AUTO: 9.3 FL (ref 8.9–12.7)
POTASSIUM SERPL-SCNC: 4.4 MMOL/L (ref 3.5–5.3)
PROTHROMBIN TIME: 14.6 SECONDS (ref 12.3–15)
RBC # BLD AUTO: 3.24 MILLION/UL (ref 3.88–5.62)
SODIUM SERPL-SCNC: 137 MMOL/L (ref 135–147)
WBC # BLD AUTO: 7.64 THOUSAND/UL (ref 4.31–10.16)

## 2024-12-24 PROCEDURE — 97530 THERAPEUTIC ACTIVITIES: CPT

## 2024-12-24 PROCEDURE — C1729 CATH, DRAINAGE: HCPCS

## 2024-12-24 PROCEDURE — 82948 REAGENT STRIP/BLOOD GLUCOSE: CPT

## 2024-12-24 PROCEDURE — 99232 SBSQ HOSP IP/OBS MODERATE 35: CPT | Performed by: NURSE PRACTITIONER

## 2024-12-24 PROCEDURE — NC001 PR NO CHARGE: Performed by: RADIOLOGY

## 2024-12-24 PROCEDURE — 50432 PLMT NEPHROSTOMY CATHETER: CPT

## 2024-12-24 PROCEDURE — 85025 COMPLETE CBC W/AUTO DIFF WBC: CPT | Performed by: FAMILY MEDICINE

## 2024-12-24 PROCEDURE — 85610 PROTHROMBIN TIME: CPT | Performed by: PHYSICIAN ASSISTANT

## 2024-12-24 PROCEDURE — C1894 INTRO/SHEATH, NON-LASER: HCPCS

## 2024-12-24 PROCEDURE — 97535 SELF CARE MNGMENT TRAINING: CPT

## 2024-12-24 PROCEDURE — 80048 BASIC METABOLIC PNL TOTAL CA: CPT | Performed by: FAMILY MEDICINE

## 2024-12-24 PROCEDURE — 99232 SBSQ HOSP IP/OBS MODERATE 35: CPT | Performed by: INTERNAL MEDICINE

## 2024-12-24 PROCEDURE — 50432 PLMT NEPHROSTOMY CATHETER: CPT | Performed by: RADIOLOGY

## 2024-12-24 RX ORDER — CEFAZOLIN SODIUM 2 G/50ML
SOLUTION INTRAVENOUS AS NEEDED
Status: DISCONTINUED | OUTPATIENT
Start: 2024-12-24 | End: 2024-12-24

## 2024-12-24 RX ORDER — FENTANYL CITRATE/PF 50 MCG/ML
50 SYRINGE (ML) INJECTION
Status: DISCONTINUED | OUTPATIENT
Start: 2024-12-24 | End: 2024-12-25 | Stop reason: HOSPADM

## 2024-12-24 RX ORDER — LIDOCAINE WITH 8.4% SOD BICARB 0.9%(10ML)
SYRINGE (ML) INJECTION AS NEEDED
Status: COMPLETED | OUTPATIENT
Start: 2024-12-24 | End: 2024-12-24

## 2024-12-24 RX ORDER — ROCURONIUM BROMIDE 10 MG/ML
INJECTION, SOLUTION INTRAVENOUS AS NEEDED
Status: DISCONTINUED | OUTPATIENT
Start: 2024-12-24 | End: 2024-12-24

## 2024-12-24 RX ORDER — SODIUM CHLORIDE 9 MG/ML
INJECTION, SOLUTION INTRAVENOUS CONTINUOUS PRN
Status: DISCONTINUED | OUTPATIENT
Start: 2024-12-24 | End: 2024-12-24

## 2024-12-24 RX ORDER — SODIUM CHLORIDE 9 MG/ML
10 INJECTION, SOLUTION INTRAMUSCULAR; INTRAVENOUS; SUBCUTANEOUS DAILY
Qty: 300 ML | Refills: 3 | Status: SHIPPED | OUTPATIENT
Start: 2024-12-24 | End: 2024-12-25

## 2024-12-24 RX ORDER — FENTANYL CITRATE 50 UG/ML
INJECTION, SOLUTION INTRAMUSCULAR; INTRAVENOUS AS NEEDED
Status: DISCONTINUED | OUTPATIENT
Start: 2024-12-24 | End: 2024-12-24

## 2024-12-24 RX ORDER — DEXAMETHASONE SODIUM PHOSPHATE 10 MG/ML
INJECTION, SOLUTION INTRAMUSCULAR; INTRAVENOUS AS NEEDED
Status: DISCONTINUED | OUTPATIENT
Start: 2024-12-24 | End: 2024-12-24

## 2024-12-24 RX ORDER — PROPOFOL 10 MG/ML
INJECTION, EMULSION INTRAVENOUS AS NEEDED
Status: DISCONTINUED | OUTPATIENT
Start: 2024-12-24 | End: 2024-12-24

## 2024-12-24 RX ORDER — ONDANSETRON 2 MG/ML
INJECTION INTRAMUSCULAR; INTRAVENOUS AS NEEDED
Status: DISCONTINUED | OUTPATIENT
Start: 2024-12-24 | End: 2024-12-24

## 2024-12-24 RX ADMIN — POLYETHYLENE GLYCOL 3350 17 G: 17 POWDER, FOR SOLUTION ORAL at 12:16

## 2024-12-24 RX ADMIN — SENNOSIDES 17.2 MG: 8.6 TABLET, FILM COATED ORAL at 12:16

## 2024-12-24 RX ADMIN — DEXAMETHASONE SODIUM PHOSPHATE 10 MG: 10 INJECTION, SOLUTION INTRAMUSCULAR; INTRAVENOUS at 09:28

## 2024-12-24 RX ADMIN — AMLODIPINE BESYLATE 5 MG: 5 TABLET ORAL at 12:16

## 2024-12-24 RX ADMIN — FENTANYL CITRATE 100 MCG: 50 INJECTION, SOLUTION INTRAMUSCULAR; INTRAVENOUS at 09:28

## 2024-12-24 RX ADMIN — PROPOFOL 120 MG: 10 INJECTION, EMULSION INTRAVENOUS at 09:28

## 2024-12-24 RX ADMIN — METOPROLOL SUCCINATE 25 MG: 25 TABLET, EXTENDED RELEASE ORAL at 12:18

## 2024-12-24 RX ADMIN — IOHEXOL 18 ML: 350 INJECTION, SOLUTION INTRAVENOUS at 10:27

## 2024-12-24 RX ADMIN — SENNOSIDES 17.2 MG: 8.6 TABLET, FILM COATED ORAL at 17:25

## 2024-12-24 RX ADMIN — FINASTERIDE 5 MG: 5 TABLET, FILM COATED ORAL at 12:16

## 2024-12-24 RX ADMIN — ATORVASTATIN CALCIUM 40 MG: 40 TABLET, FILM COATED ORAL at 17:25

## 2024-12-24 RX ADMIN — DOCUSATE SODIUM 100 MG: 100 CAPSULE, LIQUID FILLED ORAL at 17:25

## 2024-12-24 RX ADMIN — CEFAZOLIN SODIUM 2000 MG: 2 SOLUTION INTRAVENOUS at 09:49

## 2024-12-24 RX ADMIN — ONDANSETRON 4 MG: 2 INJECTION INTRAMUSCULAR; INTRAVENOUS at 09:28

## 2024-12-24 RX ADMIN — ROCURONIUM BROMIDE 50 MG: 10 INJECTION, SOLUTION INTRAVENOUS at 09:28

## 2024-12-24 RX ADMIN — DOCUSATE SODIUM 100 MG: 100 CAPSULE, LIQUID FILLED ORAL at 12:16

## 2024-12-24 RX ADMIN — TAMSULOSIN HYDROCHLORIDE 0.4 MG: 0.4 CAPSULE ORAL at 17:24

## 2024-12-24 RX ADMIN — CHLORHEXIDINE GLUCONATE 0.12% ORAL RINSE 15 ML: 1.2 LIQUID ORAL at 12:18

## 2024-12-24 RX ADMIN — SODIUM CHLORIDE: 0.9 INJECTION, SOLUTION INTRAVENOUS at 09:28

## 2024-12-24 RX ADMIN — Medication 10 ML: at 10:06

## 2024-12-24 RX ADMIN — CHLORHEXIDINE GLUCONATE 0.12% ORAL RINSE 15 ML: 1.2 LIQUID ORAL at 22:28

## 2024-12-24 RX ADMIN — AMIODARONE HYDROCHLORIDE 200 MG: 200 TABLET ORAL at 12:16

## 2024-12-24 NOTE — ASSESSMENT & PLAN NOTE
PTA norvasc 5mg daily; metoprolol 25mg q12h; lisinopril 5mg daily and lasix 20mg daily   Lisinopril and Lasix were on hold given CHUY and hypotension postoperatively  Continue with Norvasc 5 mg daily metoprolol 25 mg every 12 hours  Blood pressure been acceptable  continue to hold lisinopril and Lasix. Likely to resume in next 24-48h

## 2024-12-24 NOTE — ASSESSMENT & PLAN NOTE
Wt Readings from Last 3 Encounters:   12/24/24 84.5 kg (186 lb 4.6 oz)   12/15/24 86.6 kg (190 lb 14.7 oz)   12/10/24 87.5 kg (193 lb)   Echo 2/24- LV wall thickness is mildly increased EF 35-40%, systolic function is moderately reduced, moderate global hypokinesis, G1DD. LA is moderately dilated and RA is dilated. MV moderate regurgitation.  Follows with Dr. Roach outpatient   PTA on lasix 20mg daily- currently on hold given CHUY and hypotension post op, consider resuming in the next 24 hrs   C/w GDMT:  BB:  metoprolol 25mg q12h   ACE/ARB/ARNI:  lisinopril 5mg daily on hold d/t hypotension and CHUY   MRA:  n/a  SGLT2i: n/a  Currently on room air   Daily weights, Strict I & Os  Cardiac diet, low sodium <2g, fluid restriction 2000ml

## 2024-12-24 NOTE — ASSESSMENT & PLAN NOTE
Lab Results   Component Value Date    CREATININE 2.04 (H) 12/24/2024    CREATININE 2.06 (H) 12/23/2024    CREATININE 2.04 (H) 12/22/2024   Creatinine reviewed, appears BL has increased since September to 1.5-1.8  Creat peaked at 2.09. Has been stable.   Avoid nephrotoxic drugs and hypotension- holding lasix and lisinopril    C/w SPC ; farmer removed on 12/20

## 2024-12-24 NOTE — PLAN OF CARE
Problem: OCCUPATIONAL THERAPY ADULT  Goal: Performs self-care activities at highest level of function for planned discharge setting.  See evaluation for individualized goals.  Description: Treatment Interventions: ADL retraining, Functional transfer training, Endurance training, Patient/family training, Cognitive reorientation, Equipment evaluation/education, Compensatory technique education, Energy conservation, Activityengagement          See flowsheet documentation for full assessment, interventions and recommendations.   Outcome: Progressing  Note: Limitation: Decreased ADL status, Decreased endurance, Decreased self-care trans, Decreased high-level ADLs, Decreased Safe judgement during ADL  Prognosis: Good  Assessment: Pt seen for skilled OT treatment session on this date w/ interventions focusing on ADL participation, standing balance, transfer skills, and fxnl mobility. Pt was agreeable and willing to participate in session. Pt engaged in the following tasks: Supervision grooming, bed mobility; Min A STS, functional mobility. In comparison to previous session, pt demonstrated improvements in ADL performance at sink. Pt required increased emotional support/encouragement this session. Pt continues to be functioning below baseline level as occupational performance remains limited by decreased ADL status, decreased activity tolerance, decreased endurance, decreased standing tolerance, decreased standing balance, decreased transfer skills, decreased fxnl mobility, and generalized weakness . The patient's raw score on the AM-PAC Daily Activity Inpatient Short Form is 17 A raw score of less than 19 suggests the patient may benefit from discharge to home. Please refer to the recommendation of the Occupational Therapist for safe discharge planning.    From OT standpoint, recommend Level III (Minimum Resource Intensity) at time of d/c. Pt will benefit from continued OT treatment while in acute care to address  deficits as defined above and maximize level of functional independence with ADLs and functional mobility. Pt left OOB in chair w/ alarm on and all needs within reach at end of session.     Rehab Resource Intensity Level, OT: III (Minimum Resource Intensity)

## 2024-12-24 NOTE — PLAN OF CARE

## 2024-12-24 NOTE — QUICK NOTE
Quick note:    1.  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  Solitary pulmonary nodules: metastatic disease versus benign lesions  3.  CHUY on CKD 2/2 right hydronephrosis pending PCN    74-year-old male with tobacco use disorder recently diagnosed high-grade muscle invasive small cell neuroendocrine tumor of the bladder s/p transurethral resection of bladder tumor 12/15 complicated by right hydronephrosis with CHUY on CKD pending IR PCN placement.    Initial consultation note on 12/22, ordered CT chest without contrast for further staging w/ impression few solitary pulmonary nodules unclear if granulomas/inflammation versus metastatic disease.    Discussed with patient about results at bedside this a.m., no additional questions at this time.    Recommendations:  Outpatient follow-up status post PCN with reevaluation of kidney function to determine systemic therapy.(Appt on 12/31/24)  May benefit from PET scan in outpatient setting to get better characterization of pulmonary nodules      Miguel Andrew DO  PGY4 Hematology/Oncology Fellow

## 2024-12-24 NOTE — ASSESSMENT & PLAN NOTE
Gross hematuria secondary to large bladder tumor s/p cystoscopy with clot evacuation, fulguration of bleeding vessels, extensive transurethral resection of bladder tumor and placement of penile urethral catheter 12/15  Hematuria resolved  Continue suprapubic tube to gravity drainage  Expect some blood in the urine status post PCN placement 12/24

## 2024-12-24 NOTE — ASSESSMENT & PLAN NOTE
Lab Results   Component Value Date    HGB 9.9 (L) 12/24/2024    HGB 10.3 (L) 12/23/2024    HGB 10.2 (L) 12/22/2024   Hgb baseline 10-11 dropped to 7.2 on admission with hematuria - S/p 2 units of PRBCs with improvement in hgb  Transfuse for hgb < 7.0  Hg remains stable

## 2024-12-24 NOTE — PROGRESS NOTES
"Progress Note - Hospitalist   Name: Toro Rodriguez 74 y.o. male I MRN: [id number]  Unit/Bed#: -01 I Date of Admission: 12/15/2024   Date of Service: 12/24/2024 I Hospital Day: 9    Assessment & Plan  Clot retention of urine  Pt initially presented to outside hospital with gross hematuria and had three-way Farmer placed and CBI started. He was noted to have labile blood pressures so he was transferred to [facility] for cystoscopy and admitted to the ICU post op for hypotension and hypoxia. He did require 2 units PRBCs due to ongoing blood loss and anemia.   S/p cystoscopy with clot evacuation, fulguration of bleeding vessels and extensive transurethral resection of bladder tumor and placement of penile urethral catheter 12/15  Unable to visualize right ureter in the OR; CBI weaned off 12/16 around 11am   Patient had both urethral Farmer catheter and SPT , farmer removed on 12/20 by urology  Continue to monitor Hg and creatinine  Patient was previously on Eliquis for DVT that was stopped about 2 months on previous admissions with no plans to resume  Patient's creatinine remained stable but worsened on 12.21, urology contacted, repeat CT renal showed \"Persistent right-sided moderate hydronephrosis and hydroureter to the level of the right ureterovesical junction. No evidence of right ureteral calculus\", urology involved, IR consulted for possible nephrostomy, now planned for 12/24  aspirin resumed on 12/19, but on hold since 12/21 for procedure. Resume tomorrow.   Creatinine stable at 2.04-2.06. Recheck in am  Acute kidney injury superimposed on stage 3b chronic kidney disease (HCC)  Lab Results   Component Value Date    CREATININE 2.04 (H) 12/24/2024    CREATININE 2.06 (H) 12/23/2024    CREATININE 2.04 (H) 12/22/2024   Creatinine reviewed, appears BL has increased since September to 1.5-1.8  Creat peaked at 2.09. Has been stable.   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   ABLA (acute blood loss anemia)  Lab Results   Component Value Date    HGB 9.9 (L) 12/24/2024    HGB 10.3 (L) 12/23/2024    HGB 10.2 (L) 12/22/2024   Hgb baseline 10-11 dropped to 7.2 on admission with hematuria - S/p 2 units of PRBCs with improvement in hgb  Transfuse for hgb < 7.0  Hg remains stable  Essential hypertension  PTA norvasc 5mg daily; metoprolol 25mg q12h; lisinopril 5mg daily and lasix 20mg daily   Lisinopril and Lasix were on hold given CHUY and hypotension postoperatively  Continue with Norvasc 5 mg daily metoprolol 25 mg every 12 hours  Blood pressure been acceptable  continue to hold lisinopril and Lasix. Likely to resume in next 24-48h  Coronary artery disease involving native coronary artery of native heart  Resume ASA in 24h post procedure.   Continue with Lipitor 40 mg daily and metoprolol 25 mg every 12 hours  Denies chest pain  Left heart cath in 2023 showing Ost RCA to Prox RCA lesion is 100% stenosed.     Type 2 diabetes mellitus with stage 3b chronic kidney disease, without long-term current use of insulin (HCC)  Lab Results   Component Value Date    HGBA1C 5.9 (H) 04/18/2024         Blood Sugar Average: Last 72 hrs:  (P) 129  Glucose has been acceptable, diet controlled at home; will discontinue sliding scale  Hypoglycemic protocol  Carb count diet  Benign prostatic hyperplasia with urinary retention  chronic suprapubic catheter  Continue with Proscar and Flomax  NSVT (nonsustained ventricular tachycardia) (HCC)  C/w metoprolol 25mg q12h and amiodarone 200mg daily   Slow transit constipation  Currently on MiraLAX and Colace, Senna at bedtime, still no BM since 12/14 as per patient, feels bloated  Added  "Dulcolax suppository, senna changed to 2 tablets twice daily  KUB ordered on 12/19-nonobstructive gas pattern  Continue above bowel regimen, added simethicone  added mineral oral enema if no BM after Dulcolax administration  Had large BM on 12/22  Hydronephrosis  Unable to visualize right ureter in OR;   Renal US 12/17- Moderate right hydronephrosis persists.  CT renal from 12/20: \"Persistent right-sided moderate hydronephrosis and hydroureter to the level of the right ureterovesical junction. No evidence of right ureteral calculus. \"  Urology involved, plan for nephrostomy by IR on 12/24    HFrEF (heart failure with reduced ejection fraction) ([facility])  Wt Readings from Last 3 Encounters:   12/24/24 84.5 kg (186 lb 4.6 oz)   12/15/24 86.6 kg (190 lb 14.7 oz)   12/10/24 87.5 kg (193 lb)   Echo 2/24- LV wall thickness is mildly increased EF 35-40%, systolic function is moderately reduced, moderate global hypokinesis, G1DD. LA is moderately dilated and RA is dilated. MV moderate regurgitation.  Follows with Dr. Roach outpatient   PTA on lasix 20mg daily- currently on hold given CHUY and hypotension post op, consider resuming in the next 24 hrs   C/w GDMT:  BB:  metoprolol 25mg q12h   ACE/ARB/ARNI:  lisinopril 5mg daily on hold d/t hypotension and CHUY   MRA:  n/a  SGLT2i: n/a  Currently on room air   Daily weights, Strict I & Os  Cardiac diet, low sodium <2g, fluid restriction 2000ml  Acute cystitis with hematuria  Urine culture + Pseudomonas  Previously was on IV ceftriaxone will transition to oral ciprofloxacin given sensitivities, completed 7 days of antibiotic (12/22)    VTE Pharmacologic Prophylaxis:    held for procedure     Mobility:   Basic Mobility Inpatient Raw Score: 17  JH-HLM Goal: 5: Stand one or more mins  JH-HLM Achieved: 3: Sit at edge of bed  JH-HLM Goal NOT achieved. Continue with multidisciplinary rounding and encourage appropriate mobility to improve upon JH-HLM goals.    Patient Centered Rounds: I " performed bedside rounds with nursing staff today.   Discussions with Specialists or Other Care Team Provider: IR    Education and Discussions with Family / Patient: Attempted to update  (daughter) via phone. Left voicemail.     Current Length of Stay: 9 day(s)  Current Patient Status: Inpatient   Certification Statement: The patient will continue to require additional inpatient hospital stay due to Cr monitor  Discharge Plan: Anticipate discharge in 24-48 hrs to prior assisted or independent living facility.    Code Status: Level 1 - Full Code    Subjective   Feels lousy, no other acute complains.     Objective :  Temp:  [97.5 °F (36.4 °C)-98.9 °F (37.2 °C)] 98 °F (36.7 °C)  HR:  [73-80] 73  BP: (113-164)/(60-90) 147/71  Resp:  [15-17] 15  SpO2:  [94 %-99 %] 98 %  O2 Device: Nasal cannula  Nasal Cannula O2 Flow Rate (L/min):  [2 L/min] 2 L/min    Body mass index is 29.18 kg/m².     Input and Output Summary (last 24 hours):     Intake/Output Summary (Last 24 hours) at 12/24/2024 1306  Last data filed at 12/24/2024 0616  Gross per 24 hour   Intake 180 ml   Output 950 ml   Net -770 ml       Physical Exam  Vitals and nursing note reviewed.   Constitutional:       General: He is not in acute distress.     Appearance: He is not ill-appearing.   Cardiovascular:      Rate and Rhythm: Normal rate and regular rhythm.   Pulmonary:      Effort: Pulmonary effort is normal. No respiratory distress.   Musculoskeletal:      Cervical back: Neck supple.   Neurological:      Mental Status: He is alert.           Lines/Drains:  Lines/Drains/Airways       Active Status       Name Placement date Placement time Site Days    Nephrostomy Right 8 Fr. 12/24/24  1012  Right  less than 1    Suprapubic Catheter 16 Fr. 12/03/24  1019  --  21                            Lab Results: I have reviewed the following results:   Results from last 7 days   Lab Units 12/24/24  0457   WBC Thousand/uL 7.64   HEMOGLOBIN g/dL 9.9*   HEMATOCRIT %  30.4*   PLATELETS Thousands/uL 450*   SEGS PCT % 64   LYMPHO PCT % 18   MONO PCT % 9   EOS PCT % 7*     Results from last 7 days   Lab Units 12/24/24  0457   SODIUM mmol/L 137   POTASSIUM mmol/L 4.4   CHLORIDE mmol/L 105   CO2 mmol/L 23   BUN mg/dL 36*   CREATININE mg/dL 2.04*   ANION GAP mmol/L 9   CALCIUM mg/dL 8.6   GLUCOSE RANDOM mg/dL 98     Results from last 7 days   Lab Units 12/24/24  0457   INR  1.11     Results from last 7 days   Lab Units 12/24/24  1031 12/18/24  1047 12/18/24  0552 12/17/24  2155 12/17/24  1601   POC GLUCOSE mg/dl 129 97 95 100 101               Recent Cultures (last 7 days):         Imaging Results Review: No pertinent imaging studies reviewed.  Other Study Results Review: No additional pertinent studies reviewed.    Last 24 Hours Medication List:     Current Facility-Administered Medications:     acetaminophen (TYLENOL) tablet 650 mg, Q6H PRN    amiodarone tablet 200 mg, Daily    amLODIPine (NORVASC) tablet 5 mg, Daily    Artificial Tears Op Soln 2 drop, Q4H PRN    aspirin chewable tablet 81 mg, Daily    atorvastatin (LIPITOR) tablet 40 mg, Daily With Dinner    bisacodyl (DULCOLAX) rectal suppository 10 mg, Daily PRN    chlorhexidine (PERIDEX) 0.12 % oral rinse 15 mL, Q12H ARI    dextromethorphan-guaiFENesin (ROBITUSSIN DM) oral syrup 10 mL, Q4H PRN    docusate sodium (COLACE) capsule 100 mg, BID    fentaNYL (SUBLIMAZE) injection 50 mcg, Q5 Min PRN    finasteride (PROSCAR) tablet 5 mg, Daily    [Held by provider] furosemide (LASIX) tablet 20 mg, Daily    HYDROcodone Bit-Homatrop MBr (HYCODAN) oral syrup 2.5 mL, Q4H PRN    metoprolol succinate (TOPROL-XL) 24 hr tablet 25 mg, Q12H ARI    mineral oil enema 1 enema, Daily PRN    naloxone (NARCAN) 0.04 mg/mL syringe 0.04 mg, Q1MIN PRN    ondansetron (ZOFRAN) injection 4 mg, Q6H PRN    oxybutynin (DITROPAN) tablet 5 mg, TID PRN    oxyCODONE (ROXICODONE) split tablet 2.5 mg, Q4H PRN **OR** oxyCODONE (ROXICODONE) IR tablet 5 mg, Q4H PRN     phenol (CHLORASEPTIC) 1.4 % mucosal liquid 1 spray, Q2H PRN    polyethylene glycol (MIRALAX) packet 17 g, Daily    senna (SENOKOT) tablet 17.2 mg, BID    simethicone (MYLICON) chewable tablet 80 mg, Q6H PRN    tamsulosin (FLOMAX) capsule 0.4 mg, Daily With Dinner    Administrative Statements   Today, Patient Was Seen By: Russell Mcmanus MD  I have spent a total time of 25 minutes in caring for this patient on the day of the visit/encounter including Diagnostic results, Counseling / Coordination of care, Documenting in the medical record, Reviewing / ordering tests, medicine, procedures  , Obtaining or reviewing history  , and Communicating with other healthcare professionals .    **Please Note: This note may have been constructed using a voice recognition system.**

## 2024-12-24 NOTE — ASSESSMENT & PLAN NOTE
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 Espino for planning  Will defer additional  discussion and management to  attending and medical oncology.

## 2024-12-24 NOTE — SEDATION DOCUMENTATION
Right nephrostomy tube placed by Dr. Sood.  Anesthesia present for case.  Dry dressing in place.  Patient transported to PACU and report given.

## 2024-12-24 NOTE — PROGRESS NOTES
"The history and physical were reviewed, along with progress notes, and the patient was examined. There are no changes since it was written.    /60   Pulse 73   Temp 98.9 °F (37.2 °C)   Resp 17   Ht 5' 7\" (1.702 m)   Wt 84.5 kg (186 lb 4.6 oz)   SpO2 99%   BMI 29.18 kg/m²      "

## 2024-12-24 NOTE — ASSESSMENT & PLAN NOTE
Lab Results   Component Value Date    HGBA1C 5.9 (H) 04/18/2024         Blood Sugar Average: Last 72 hrs:  (P) 129  Glucose has been acceptable, diet controlled at home; will discontinue sliding scale  Hypoglycemic protocol  Carb count diet

## 2024-12-24 NOTE — ANESTHESIA PREPROCEDURE EVALUATION
Procedure:  IR NEPHROSTOMY TUBE PLACEMENT    Relevant Problems   CARDIO   (+) Chronic venous hypertension (idiopathic) with ulcer of left lower extremity (CODE) (HCC)   (+) Coronary artery disease involving native coronary artery of native heart   (+) Deep vein thrombosis (DVT) of left lower extremity (HCC)   (+) Essential hypertension      ENDO   (+) Type 2 diabetes mellitus with stage 3b chronic kidney disease, without long-term current use of insulin (HCC)      /RENAL   (+) Acute kidney injury superimposed on stage 3b chronic kidney disease (HCC)   (+) Benign hypertension with chronic kidney disease, stage III (HCC)   (+) Benign prostatic hyperplasia with urinary retention   (+) 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)   (+) Hydronephrosis   (+) Renal lesion   (+) Stage 3b chronic kidney disease (HCC)      HEMATOLOGY   (+) ABLA (acute blood loss anemia)      Echo (02/2024):  Left Ventricle: Left ventricular cavity size is normal. Wall thickness is mildly increased. The left ventricular ejection fraction is 35-40% by biplane measurement. Systolic function is moderately reduced. There is moderate global hypokinesis with specific regional wall abnormalities. Diastolic function is mildly abnormal, consistent with grade I (abnormal) relaxation.    The following segments are akinetic: basal inferior, basal inferolateral, mid inferior and mid inferolateral.    The remainder of the myocardium is hypokinetic.    Right Ventricle: Right ventricular cavity size is normal. Systolic function is normal.    Left Atrium: The atrium is moderately dilated.    Right Atrium: The atrium is dilated.    Mitral Valve: There is moderate regurgitation.          Physical Exam    Airway    Mallampati score: II  TM Distance: >3 FB  Neck ROM: limited     Dental        Cardiovascular  Rhythm: regular, Rate: normal, Cardiovascular exam normal    Pulmonary   Breath sounds clear to  auscultation    Other Findings        Anesthesia Plan  ASA Score- 4     Anesthesia Type- general with ASA Monitors.         Additional Monitors:     Airway Plan: ETT.           Plan Factors-Exercise tolerance (METS): >4 METS.    Chart reviewed.  Imaging results reviewed. Existing labs reviewed. Patient summary reviewed.                  Induction- intravenous.    Postoperative Plan- Plan for postoperative opioid use. Planned trial extubation    Perioperative Resuscitation Plan - Level 1 - Full Code.       Informed Consent- Anesthetic plan and risks discussed with patient.  I personally reviewed this patient with the CRNA. Discussed and agreed on the Anesthesia Plan with the CRNA..

## 2024-12-24 NOTE — ASSESSMENT & PLAN NOTE
"Pt initially presented to outside hospital with gross hematuria and had three-way Farmer placed and CBI started. He was noted to have labile blood pressures so he was transferred to [facility] for cystoscopy and admitted to the ICU post op for hypotension and hypoxia. He did require 2 units PRBCs due to ongoing blood loss and anemia.   S/p cystoscopy with clot evacuation, fulguration of bleeding vessels and extensive transurethral resection of bladder tumor and placement of penile urethral catheter 12/15  Unable to visualize right ureter in the OR; CBI weaned off 12/16 around 11am   Patient had both urethral Farmer catheter and SPT , farmer removed on 12/20 by urology  Continue to monitor Hg and creatinine  Patient was previously on Eliquis for DVT that was stopped about 2 months on previous admissions with no plans to resume  Patient's creatinine remained stable but worsened on 12.21, urology contacted, repeat CT renal showed \"Persistent right-sided moderate hydronephrosis and hydroureter to the level of the right ureterovesical junction. No evidence of right ureteral calculus\", urology involved, IR consulted for possible nephrostomy, now planned for 12/24  aspirin resumed on 12/19, but on hold since 12/21 for procedure. Resume tomorrow.   Creatinine stable at 2.04-2.06. Recheck in am  "

## 2024-12-24 NOTE — PROGRESS NOTES
Progress Note - Urology   Name: Toro Rodriguez 74 y.o. male I MRN: [id number]  Unit/Bed#: -01 I Date of Admission: 12/15/2024   Date of Service: 12/24/2024 I Hospital Day: 9    Assessment & Plan  Clot retention of urine  Gross hematuria secondary to large bladder tumor s/p cystoscopy with clot evacuation, fulguration of bleeding vessels, extensive transurethral resection of bladder tumor and placement of penile urethral catheter 12/15  Hematuria resolved  Continue suprapubic tube to gravity drainage  Expect some blood in the urine status post PCN placement 12/24    Acute kidney injury superimposed on stage 3b chronic kidney disease (HCC)  Creat 2.04 (baseline 1.4-1.75)  Bladder mass  S/p transurethral resection of bladder tumor 12/15  Path report--high-grade muscle invasive neuroendocrine carcinoma  Has pre-scheduled follow-up appointments with both hematology oncology and Dr. Espino for planning  Will defer additional  discussion and management to  attending and medical oncology.  Hydronephrosis  Persistent right hydronephrosis to the level of urinary bladder on recent imaging without calculus.  inability to visualize and access right ureteral orifice during surgical intervention  S/p right PCN placement 12/24  Creat 2.04  Continue to trend labs per primary team  Discharge home per primary team  Follow-up with IR for future PCN exchanges  Follow-up with urology outpatient as scheduled 1/6/2025      Benign prostatic hyperplasia with urinary retention  BPH with longstanding LUTS.  Managed with suprapubic tube  Recent UDS evaluation    Urology service signing off.  Please contact the SecureChat role for the Urology service with any questions/concerns.    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  Status post transurethral resection of bladder tumor 12/15 complicated by right hydronephrosis with inability to access right ureter.  Patient ongoing  CHUY with and hydronephrosis on updated imaging.  Patient is status post PCN placement 12/24.  He was seen during this hospitalization by medical oncology and follows up with them 12/31 for further reevaluation of kidney function to determine systemic therapy.    Objective :  Temp:  [97.5 °F (36.4 °C)-98.9 °F (37.2 °C)] 98.9 °F (37.2 °C)  HR:  [73-79] 73  BP: (117-126)/(60-90) 123/60  SpO2:  [94 %-99 %] 99 %    I/O         12/22 0701  12/23 0700 12/23 0701  12/24 0700 12/24 0701  12/25 0700    P.O. 480 180     Total Intake(mL/kg) 480 (5.7) 180 (2.1)     Urine (mL/kg/hr) 970 (0.5) 1100 (0.5)     Total Output 970 1100     Net -490 -920                  Lines/Drains/Airways       Active Status       Name Placement date Placement time Site Days    Nephrostomy Right 8 Fr. 12/24/24  1012  Right  less than 1    Suprapubic Catheter 16 Fr. 12/03/24  1019  --  21                  Physical Exam  Vitals reviewed.   Constitutional:       General: He is not in acute distress.     Appearance: Normal appearance. He is not ill-appearing, toxic-appearing or diaphoretic.   HENT:      Head: Normocephalic and atraumatic.   Eyes:      General: No scleral icterus.  Cardiovascular:      Rate and Rhythm: Normal rate.   Pulmonary:      Effort: Pulmonary effort is normal. No respiratory distress.   Abdominal:      General: Abdomen is flat. There is no distension.      Palpations: Abdomen is soft.      Tenderness: There is no abdominal tenderness.   Genitourinary:     Comments: Suprapubic tube draining yellow urine.  Right PCN  Musculoskeletal:         General: No swelling.      Cervical back: Normal range of motion.   Skin:     General: Skin is warm and dry.      Coloration: Skin is not jaundiced or pale.      Findings: No rash.   Neurological:      General: No focal deficit present.      Mental Status: He is alert and oriented to person, place, and time.   Psychiatric:         Mood and Affect: Mood normal.         Behavior: Behavior normal.          Thought Content: Thought content normal.         Judgment: Judgment normal.           Lab Results: I have reviewed the following results:  Recent Labs     12/24/24  0457   WBC 7.64   HGB 9.9*   HCT 30.4*   *   SODIUM 137   K 4.4      CO2 23   BUN 36*   CREATININE 2.04*   GLUC 98   INR 1.11       Imaging Results Review: I reviewed radiology reports from this admission including: CT abdomen/pelvis.  Other Study Results Review: No additional pertinent studies reviewed.    VTE Pharmacologic Prophylaxis: VTE covered by:    None     VTE Mechanical Prophylaxis: sequential compression device

## 2024-12-24 NOTE — OCCUPATIONAL THERAPY NOTE
Occupational Therapy Progress Note     Patient Name: Toro Rodriguez  Today's Date: 12/24/2024  Problem List  Principal Problem:    Clot retention of urine  Active Problems:    Essential hypertension    Acute kidney injury superimposed on stage 3b chronic kidney disease (HCC)    Coronary artery disease involving native coronary artery of native heart    Type 2 diabetes mellitus with stage 3b chronic kidney disease, without long-term current use of insulin (HCC)    Benign prostatic hyperplasia with urinary retention    ABLA (acute blood loss anemia)    NSVT (nonsustained ventricular tachycardia) (HCC)    Hydronephrosis    Bladder mass    HFrEF (heart failure with reduced ejection fraction) (HCC)    Slow transit constipation    Acute cystitis with hematuria       12/24/24 1403   OT Last Visit   OT Visit Date 12/24/24   Note Type   Note Type Treatment   Pain Assessment   Pain Assessment Tool 0-10   Pain Score No Pain   Patient's Stated Pain Goal No pain   Hospital Pain Intervention(s) Repositioned;Ambulation/increased activity   Restrictions/Precautions   Weight Bearing Precautions Per Order No   Other Precautions Chair Alarm;Bed Alarm;Multiple lines;Fall Risk;Pain  (R nephrostomy tube)   Lifestyle   Autonomy pta, pt was mostly I w ADL (showers were supervised). facility manages meds/meals and provides transportation as needed. uses rollator for fm.   Reciprocal Relationships supportive staff; daughter that assists with IADLs   Service to Others retired    Intrinsic Gratification cooking   ADL   Where Assessed Standing at sink   Grooming Assistance 5  Supervision/Setup   Grooming Deficit Setup;Supervision/safety;Increased time to complete;Wash/dry face;Teeth care   Grooming Comments Pt completes oral care and washes face standing at sink w/ set up/supervision   Functional Standing Tolerance   Time 5 minutes   Comments Pt tolerates standing at sink to complete grooming tasks w/ supervision/set up. Pt  "occasionally supports self through UE on sink top, RW to the Pt's side. CGA for steadying/balance.   Bed Mobility   Supine to Sit 5  Supervision   Additional items Increased time required;Verbal cues;HOB elevated;Bedrails   Additional Comments Pt greeted supine and left OOB in chair w/ alarm on and all needs within reach   Transfers   Sit to Stand 4  Minimal assistance   Additional items Assist x 1;Increased time required;Verbal cues   Stand to Sit 4  Minimal assistance   Additional items Assist x 1;Increased time required;Verbal cues   Additional Comments w/ RW   Functional Mobility   Functional Mobility 4  Minimal assistance   Additional Comments Pt completes short household distance mobility within room w/ Min A using RW   Additional items Rolling walker   Subjective   Subjective \"I don't have any control anymore\"   Cognition   Overall Cognitive Status WFL   Arousal/Participation Alert;Responsive;Cooperative   Attention Within functional limits   Orientation Level Oriented X4   Memory Within functional limits   Following Commands Follows one step commands without difficulty   Comments Pt is pleasant and cooperative. He expresses frustration w/ medical status this session, emotional support and encouragement provided. Pt expresses interest in  service, RN aware and putting in orders.   Activity Tolerance   Activity Tolerance Patient limited by fatigue   Medical Staff Made Aware RN cleared   Assessment   Assessment Pt seen for skilled OT treatment session on this date w/ interventions focusing on ADL participation, standing balance, transfer skills, and fxnl mobility. Pt was agreeable and willing to participate in session. Pt engaged in the following tasks: Supervision grooming, bed mobility; Min A STS, functional mobility. In comparison to previous session, pt demonstrated improvements in ADL performance at sink. Pt required increased emotional support/encouragement this session. Pt continues to be " functioning below baseline level as occupational performance remains limited by decreased ADL status, decreased activity tolerance, decreased endurance, decreased standing tolerance, decreased standing balance, decreased transfer skills, decreased fxnl mobility, and generalized weakness . The patient's raw score on the AM-PAC Daily Activity Inpatient Short Form is 17 A raw score of less than 19 suggests the patient may benefit from discharge to home. Please refer to the recommendation of the Occupational Therapist for safe discharge planning.    From OT standpoint, recommend Level III (Minimum Resource Intensity) at time of d/c. Pt will benefit from continued OT treatment while in acute care to address deficits as defined above and maximize level of functional independence with ADLs and functional mobility. Pt left OOB in chair w/ alarm on and all needs within reach at end of session.   Plan   Treatment Interventions ADL retraining;Functional transfer training;UE strengthening/ROM;Endurance training;Equipment evaluation/education;Patient/family training;Compensatory technique education;Continued evaluation;Energy conservation;Activityengagement   Goal Expiration Date 12/30/24   OT Treatment Day 3   OT Frequency 2-3x/wk   Discharge Recommendation   Rehab Resource Intensity Level, OT III (Minimum Resource Intensity)   AM-PAC Daily Activity Inpatient   Lower Body Dressing 2   Bathing 3   Toileting 2   Upper Body Dressing 3   Grooming 3   Eating 4   Daily Activity Raw Score 17   Daily Activity Standardized Score (Calc for Raw Score >=11) 37.26   AM-PAC Applied Cognition Inpatient   Following a Speech/Presentation 4   Understanding Ordinary Conversation 4   Taking Medications 4   Remembering Where Things Are Placed or Put Away 4   Remembering List of 4-5 Errands 4   Taking Care of Complicated Tasks 3   Applied Cognition Raw Score 23   Applied Cognition Standardized Score 53.08   End of Consult   Education Provided Yes    Patient Position at End of Consult Bedside chair;Bed/Chair alarm activated;All needs within reach   Nurse Communication Nurse aware of consult     BAR Bowen, OTR/L

## 2024-12-24 NOTE — BRIEF OP NOTE (RAD/CATH)
INTERVENTIONAL RADIOLOGY PROCEDURE NOTE    Date: 12/24/2024    Procedure: IR NEPHROSTOMY TUBE PLACEMENT     Preoperative diagnosis:   1. Clot retention of urine    2. Hydroureteronephrosis    3. Hematuria    4. 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)    5. Bladder mass    6. Hydronephrosis, unspecified hydronephrosis type         Postoperative diagnosis: Same.    Surgeon: Smita Sood MD     Assistant: None. No qualified resident was available.    Blood loss: Trace    Specimens: None    Findings: Severe hydronephrosis and hydroureter.  8 Urdu nephrostomy tube placed into posterior lower pole calyx under image guidance.    Complications: None immediate.    Anesthesia: general anesthesia

## 2024-12-24 NOTE — CASE MANAGEMENT
Case Management Discharge Planning Note    Patient name Toro Rodriguez  Location /-01 MRN [id number]  : 1950 Date 2024       Current Admission Date: 12/15/2024  Current Admission Diagnosis:Clot retention of urine   Patient Active Problem List    Diagnosis Date Noted Date Diagnosed    HFrEF (heart failure with reduced ejection fraction) (Formerly [facility]) 2024     Slow transit constipation 2024     Acute cystitis with hematuria 2024     Bladder mass 2024     Hematuria 12/15/2024     Suprapubic catheter dysfunction ([facility]) 12/15/2024     Hydronephrosis 12/15/2024     Clot retention of urine 12/15/2024     Benign hypertension with chronic kidney disease, stage III (Formerly [facility]) 12/10/2024     Stage 3b chronic kidney disease (Former[facility]) 12/10/2024     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 (Former[facility]) 12/10/2024     Gross hematuria 10/31/2024     Renal lesion 10/16/2024     NSVT (nonsustained ventricular tachycardia) ([facility]) 2024     Urinary tract infection associated with cystostomy catheter  (Formerly [facility]) 2024     Encounter to discuss test results 2024     Urethral erosion by catheter, initial encounter  ([facility]) 2024     ABLA (acute blood loss anemia) 2024     Chronic venous hypertension (idiopathic) with ulcer of left lower extremity (CODE) (Formerly [facility]) 2024     Encounter for geriatric assessment 2024     Melanoma of back ([facility]ly [facility]) 2024     Dilated cardiomyopathy ([facility]) 12/15/2023     Acute kidney injury superimposed on stage 3b chronic kidney disease ([facility]) 2023     Obesity, morbid ([facility]) 2023     Type 2 diabetes mellitus with stage 3b chronic kidney disease, without long-term current use of insulin ([facility]) 11/15/2023     Essential hypertension 11/10/2023     Chronic systolic (congestive) heart failure ([facility]) 11/10/2023     Deep vein thrombosis (DVT) of left lower extremity ([facility]) 11/10/2023     Coronary artery  disease involving native coronary artery of native heart 11/10/2023     Benign prostatic hyperplasia with urinary retention 2014       LOS (days): 9  Geometric Mean LOS (GMLOS) (days): 6  Days to GMLOS:-2.8     OBJECTIVE:  Risk of Unplanned Readmission Score: 50.26         Current admission status: Inpatient   [place]erred [place]   [facility] [address]  [address] [address]  Phone: [phone] Fax: [phone]    Primary Care Provider: CARLIE Foster    Primary Insurance: MEDICARE  Secondary Insurance: BLUE CROSS    DISCHARGE DETAILS:    Per chart review, patient had IR Nephrostomy tube placed today. CM team will continue to follow for discharge planning needs.      Update: CM informed by provider that patient may be able to discharge tomorrow and will be discharging with nephrostomy tube. CM called and spoke to nursing from [facility]. CM was informed that any new scripts need sent to patient's pharmacy and that patient should discharge with his discharge summary.   CM informed of nephrostomy tube. CM was informed to call Juan with Admin to discuss nephrostomy tube. CM called Juan with Admin and was informed that was okay and inquired if patient had PT/OT services set up. CM confirmed that [facility] is set up for patient.   CM will have to call [facility] and speak to nursing to confirm discharge when medically cleared.   CM updated provider.

## 2024-12-24 NOTE — ASSESSMENT & PLAN NOTE
Persistent right hydronephrosis to the level of urinary bladder on recent imaging without calculus.  inability to visualize and access right ureteral orifice during surgical intervention  S/p right PCN placement 12/24  Creat 2.04  Continue to trend labs per primary team  Discharge home per primary team  Follow-up with IR for future PCN exchanges  Follow-up with urology outpatient as scheduled 1/6/2025

## 2024-12-24 NOTE — ASSESSMENT & PLAN NOTE
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

## 2024-12-24 NOTE — ASSESSMENT & PLAN NOTE
Resume ASA in 24h post procedure.   Continue with Lipitor 40 mg daily and metoprolol 25 mg every 12 hours  Denies chest pain  Left heart cath in 2023 showing Ost RCA to Prox RCA lesion is 100% stenosed.

## 2024-12-24 NOTE — ANESTHESIA POSTPROCEDURE EVALUATION
Post-Op Assessment Note    CV Status:  Stable  Pain Score: 0    Pain management: adequate       Mental Status:  Alert and awake   Hydration Status:  Euvolemic   PONV Controlled:  Controlled   Airway Patency:  Patent     Post Op Vitals Reviewed: Yes    No anethesia notable event occurred.    Staff: Anesthesiologist, CRNA         Last Filed PACU Vitals:  Vitals Value Taken Time   Temp 98.2 °F (36.8 °C) 12/24/24 1028   Pulse 79 12/24/24 1029   /70 12/24/24 1028   Resp 15 12/24/24 1029   SpO2 98 % 12/24/24 1029   Vitals shown include unfiled device data.    Modified Windy:  Activity: 2 (12/24/2024 10:25 AM)  Respiration: 1 (12/24/2024 10:25 AM)  Circulation: 2 (12/24/2024 10:25 AM)  Consciousness: 1 (12/24/2024 10:25 AM)  Oxygen Saturation: 1 (12/24/2024 10:25 AM)  Modified Windy Score: 7 (12/24/2024 10:25 AM)

## 2024-12-24 NOTE — PROGRESS NOTES
Outreach to pt and left voicemail introducing myself and role as Nurse Navigator to assist with coordination of cancer care, preparation for upcoming appointment, be a point of contact prior to oncology consult,  provide support and connect with available resources.  Provided contact information, reviewed upcoming appts and requested call back.    Future Appointments   Date Time Provider Department Center   12/31/2024  9:00 AM Juan Carlos Foy MD HEM ONC  Practice-Onc   1/6/2025  8:45 AM Rick Espino MD URO [facility]   1/22/2025  9:20 AM Tyson Gonzalez MD [facility]   5/7/2025  3:30 PM Joselyn Reyes Bahamonde, MD NEPH [facility]

## 2024-12-25 ENCOUNTER — PATIENT MESSAGE (OUTPATIENT)
Dept: UROLOGY | Facility: CLINIC | Age: 74
End: 2024-12-25

## 2024-12-25 VITALS
DIASTOLIC BLOOD PRESSURE: 53 MMHG | TEMPERATURE: 98.1 F | OXYGEN SATURATION: 94 % | BODY MASS INDEX: 29.24 KG/M2 | WEIGHT: 186.29 LBS | HEART RATE: 71 BPM | SYSTOLIC BLOOD PRESSURE: 132 MMHG | RESPIRATION RATE: 18 BRPM | HEIGHT: 67 IN

## 2024-12-25 PROBLEM — R45.89 DEPRESSED MOOD: Status: ACTIVE | Noted: 2024-12-25

## 2024-12-25 PROBLEM — K59.01 SLOW TRANSIT CONSTIPATION: Status: RESOLVED | Noted: 2024-12-17 | Resolved: 2024-12-25

## 2024-12-25 PROBLEM — N18.9 CKD (CHRONIC KIDNEY DISEASE): Status: ACTIVE | Noted: 2023-12-13

## 2024-12-25 PROBLEM — N30.01 ACUTE CYSTITIS WITH HEMATURIA: Status: RESOLVED | Noted: 2024-12-17 | Resolved: 2024-12-25

## 2024-12-25 LAB
ANION GAP SERPL CALCULATED.3IONS-SCNC: 8 MMOL/L (ref 4–13)
BUN SERPL-MCNC: 38 MG/DL (ref 5–25)
CALCIUM SERPL-MCNC: 8.2 MG/DL (ref 8.4–10.2)
CHLORIDE SERPL-SCNC: 106 MMOL/L (ref 96–108)
CO2 SERPL-SCNC: 23 MMOL/L (ref 21–32)
CREAT SERPL-MCNC: 2.02 MG/DL (ref 0.6–1.3)
GFR SERPL CREATININE-BSD FRML MDRD: 31 ML/MIN/1.73SQ M
GLUCOSE SERPL-MCNC: 147 MG/DL (ref 65–140)
POTASSIUM SERPL-SCNC: 4.2 MMOL/L (ref 3.5–5.3)
SODIUM SERPL-SCNC: 137 MMOL/L (ref 135–147)

## 2024-12-25 PROCEDURE — 80048 BASIC METABOLIC PNL TOTAL CA: CPT | Performed by: INTERNAL MEDICINE

## 2024-12-25 PROCEDURE — 99239 HOSP IP/OBS DSCHRG MGMT >30: CPT | Performed by: INTERNAL MEDICINE

## 2024-12-25 RX ORDER — SERTRALINE HYDROCHLORIDE 25 MG/1
25 TABLET, FILM COATED ORAL DAILY
Qty: 30 TABLET | Refills: 0 | Status: SHIPPED | OUTPATIENT
Start: 2024-12-26

## 2024-12-25 RX ORDER — SODIUM CHLORIDE 9 MG/ML
10 INJECTION, SOLUTION INTRAMUSCULAR; INTRAVENOUS; SUBCUTANEOUS DAILY
Qty: 300 ML | Refills: 0 | Status: SHIPPED | OUTPATIENT
Start: 2024-12-25 | End: 2025-04-24

## 2024-12-25 RX ORDER — SERTRALINE HYDROCHLORIDE 25 MG/1
25 TABLET, FILM COATED ORAL DAILY
Status: DISCONTINUED | OUTPATIENT
Start: 2024-12-25 | End: 2024-12-25 | Stop reason: HOSPADM

## 2024-12-25 RX ADMIN — SERTRALINE 25 MG: 25 TABLET, FILM COATED ORAL at 11:31

## 2024-12-25 RX ADMIN — SENNOSIDES 17.2 MG: 8.6 TABLET, FILM COATED ORAL at 10:00

## 2024-12-25 RX ADMIN — AMIODARONE HYDROCHLORIDE 200 MG: 200 TABLET ORAL at 10:00

## 2024-12-25 RX ADMIN — METOPROLOL SUCCINATE 25 MG: 25 TABLET, EXTENDED RELEASE ORAL at 09:57

## 2024-12-25 RX ADMIN — ASPIRIN 81 MG CHEWABLE TABLET 81 MG: 81 TABLET CHEWABLE at 10:00

## 2024-12-25 RX ADMIN — POLYETHYLENE GLYCOL 3350 17 G: 17 POWDER, FOR SOLUTION ORAL at 10:00

## 2024-12-25 RX ADMIN — AMLODIPINE BESYLATE 5 MG: 5 TABLET ORAL at 10:00

## 2024-12-25 RX ADMIN — DOCUSATE SODIUM 100 MG: 100 CAPSULE, LIQUID FILLED ORAL at 10:00

## 2024-12-25 RX ADMIN — CHLORHEXIDINE GLUCONATE 0.12% ORAL RINSE 15 ML: 1.2 LIQUID ORAL at 09:57

## 2024-12-25 RX ADMIN — FINASTERIDE 5 MG: 5 TABLET, FILM COATED ORAL at 10:00

## 2024-12-25 NOTE — ASSESSMENT & PLAN NOTE
Likely adjustment disorder secondary to his current hospitalization and newly diagnosed bladder tumor  Patient agreeable to starting antidepressant.  Will start Zoloft 25 mg daily.  Discussed side effects.

## 2024-12-25 NOTE — ASSESSMENT & PLAN NOTE
PTA norvasc 5mg daily; metoprolol 25mg q12h; lisinopril 5mg daily and lasix 20mg daily   Lisinopril and Lasix were on hold given CHUY and hypotension postoperatively  Continue with Norvasc 5 mg daily metoprolol 25 mg every 12 hours  Resume Lasix.  Continue to hold lisinopril.  Follow-up with PCP with repeat blood work to ensure stable kidney functions and can resume it as indicated

## 2024-12-25 NOTE — DISCHARGE SUMMARY
"Discharge Summary - Hospitalist   Name: Toro Rodriguez 74 y.o. male I MRN: [id number]  Unit/Bed#: -01 I Date of Admission: 12/15/2024   Date of Service: 12/25/2024 I Hospital Day: 10     Assessment & Plan  Clot retention of urine  Pt initially presented to outside hospital with gross hematuria and had three-way Farmer placed and CBI started. He was noted to have labile blood pressures so he was transferred to [facility] for cystoscopy and admitted to the ICU post op for hypotension and hypoxia. He did require 2 units PRBCs due to ongoing blood loss and anemia.   S/p cystoscopy with clot evacuation, fulguration of bleeding vessels and extensive transurethral resection of bladder tumor and placement of penile urethral catheter 12/15  Unable to visualize right ureter in the OR; CBI weaned off 12/16 around 11am   Patient had both urethral Farmer catheter and SPT , farmer removed on 12/20 by urology  Continue to monitor Hg and creatinine  Patient was previously on Eliquis for DVT that was stopped about 2 months on previous admissions with no plans to resume  Patient's creatinine remained stable but worsened on 12.21, urology contacted, repeat CT renal showed \"Persistent right-sided moderate hydronephrosis and hydroureter to the level of the right ureterovesical junction. No evidence of right ureteral calculus\", urology involved, IR consulted for nephrostomy 12/24  aspirin resumed on 12/19, but on hold since 12/21 for procedure. Resume tomorrow.   Creatinine stable at 2.04-2.06. Recheck in am  CKD (chronic kidney disease)  Lab Results   Component Value Date    CREATININE 2.02 (H) 12/25/2024    CREATININE 2.04 (H) 12/24/2024    CREATININE 2.06 (H) 12/23/2024   Creatinine reviewed, appears BL has increased since September to 1.5-1.8  Creat peaked at 2.09. Has been stable.  I do not believe patient has CHUY.   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   ABLA (acute blood loss anemia)  Lab Results   Component Value Date    HGB 9.9 (L) 12/24/2024    HGB 10.3 (L) 12/23/2024    HGB 10.2 (L) 12/22/2024   Hgb baseline 10-11 dropped to 7.2 on admission with hematuria - S/p 2 units of PRBCs with improvement in hgb  Transfuse for hgb < 7.0  Hg remains stable  Essential hypertension  PTA norvasc 5mg daily; metoprolol 25mg q12h; lisinopril 5mg daily and lasix 20mg daily   Lisinopril and Lasix were on hold given CHUY and hypotension postoperatively  Continue with Norvasc 5 mg daily metoprolol 25 mg every 12 hours  Resume Lasix.  Continue to hold lisinopril.  Follow-up with PCP with repeat blood work to ensure stable kidney functions and can resume it as indicated  Coronary artery disease involving native coronary artery of native heart  Resume ASA   Continue with Lipitor 40 mg daily and metoprolol 25 mg every 12 hours  Denies chest pain  Left heart cath in 2023 showing Ost RCA to Prox RCA lesion is 100% stenosed.     Type 2 diabetes mellitus with stage 3b chronic kidney disease, without long-term current use of insulin (HCC)  Lab Results   Component Value Date    HGBA1C 5.9 (H) 04/18/2024         Blood Sugar Average: Last 72 hrs:  (P) 129  Continue home regimen  Benign prostatic hyperplasia with urinary retention  chronic suprapubic catheter  Continue with Proscar and Flomax  NSVT (nonsustained ventricular tachycardia) (HCC)  C/w metoprolol 25mg q12h and amiodarone 200mg daily   Slow transit constipation (Resolved: 12/25/2024)  Currently on MiraLAX and Colace, Senna at bedtime, still no BM since 12/14 as per patient, feels bloated  Added Dulcolax suppository, senna changed to 2 tablets twice daily  KUB ordered on  "12/19-nonobstructive gas pattern  Continue above bowel regimen, added simethicone  added mineral oral enema if no BM after Dulcolax administration  Had large BM on 12/22  Hydronephrosis  Unable to visualize right ureter in OR;   Renal US 12/17- Moderate right hydronephrosis persists.  CT renal from 12/20: \"Persistent right-sided moderate hydronephrosis and hydroureter to the level of the right ureterovesical junction. No evidence of right ureteral calculus. \"  Urology involved, plan for nephrostomy by IR on 12/24    HFrEF (heart failure with reduced ejection fraction) ([facility])  Wt Readings from Last 3 Encounters:   12/25/24 84.5 kg (186 lb 4.6 oz)   12/15/24 86.6 kg (190 lb 14.7 oz)   12/10/24 87.5 kg (193 lb)   Echo 2/24- LV wall thickness is mildly increased EF 35-40%, systolic function is moderately reduced, moderate global hypokinesis, G1DD. LA is moderately dilated and RA is dilated. MV moderate regurgitation.  Follows with Dr. Roach outpatient   Resume Lasix  C/w GDMT:  BB:  metoprolol 25mg q12h   ACE/ARB/ARNI:  lisinopril 5mg daily on hold d/t hypotension and ?CHUY   MRA:  n/a  SGLT2i: n/a  Currently on room air   Daily weights, Strict I & Os  Cardiac diet, low sodium <2g, fluid restriction 2000ml  Acute cystitis with hematuria (Resolved: 12/25/2024)  Urine culture + Pseudomonas  Previously was on IV ceftriaxone will transition to oral ciprofloxacin given sensitivities, completed 7 days of antibiotic (12/22)  Depressed mood  Likely adjustment disorder secondary to his current hospitalization and newly diagnosed bladder tumor  Patient agreeable to starting antidepressant.  Will start Zoloft 25 mg daily.  Discussed side effects.       Medical Problems       Resolved Problems  Date Reviewed: 12/17/2024          Resolved    Acute postoperative respiratory insufficiency 12/17/2024     Resolved by  CARLIE Green    Slow transit constipation 12/25/2024     Resolved by  Russell Mcmanus MD    Acute cystitis with " hematuria 12/25/2024     Resolved by  Russell Mcmanus MD        Discharging Physician / Practitioner: Russell Mcmanus MD  PCP: CARLIE Foster  Admission Date:   Admission Orders (From admission, onward)       Ordered        12/15/24 1824  Inpatient Admission  Once                          Discharge Date: 12/25/24    Complications:  none    Reason for Admission: hematuria     Hospital Course:   Toro Rodriguez is a 74 y.o. male patient who originally presented to the hospital on 12/15/2024 due to Hematuria in the setting of large bladder tumor status post cystoscopy with clot evacuation, fulguration of bleeding vessel and extensive transurethral resection of bladder tumor.  He also underwent suprapubic catheter placement then PCN placement on December 24.  His hematuria has resolved and hemoglobin has been stable.  He did receive 2 PRBC during this hospitalization.  Pathology report of the bladder mass showing high-grade muscle invasive neuroendocrine carcinoma for which she was seen by oncology service and recommended for outpatient follow-up to discuss treatment options.  Cleared for discharge by urology.  Discussed plan with the patient and daughter.  Both agreeable with discharge plan    Please see above list of diagnoses and related plan for additional information.     Condition at Discharge: good    Discharge Day Visit / Exam:   * Please refer to separate progress note for these details *    Discussion with Family: Updated  (daughter) via phone.    Discharge instructions/Information to patient and family:   See after visit summary for information provided to patient and family.      Provisions for Follow-Up Care:  See after visit summary for information related to follow-up care and any pertinent home health orders.      Mobility at time of Discharge:   Basic Mobility Inpatient Raw Score: 17  JH-HLM Goal: 5: Stand one or more mins  JH-HLM Achieved: 6: Walk 10 steps or more  HLM Goal  achieved. Continue to encourage appropriate mobility.     Disposition:   Home with VNA Services (Reminder: Complete face to face encounter)    Planned Readmission: no    Discharge Medications:  See after visit summary for reconciled discharge medications provided to patient and/or family.      Administrative Statements   Discharge Statement:  I have spent a total time of 31 minutes in caring for this patient on the day of the visit/encounter. .    **Please Note: This note may have been constructed using a voice recognition system**

## 2024-12-25 NOTE — ASSESSMENT & PLAN NOTE
Lab Results   Component Value Date    HGBA1C 5.9 (H) 04/18/2024         Blood Sugar Average: Last 72 hrs:  (P) 129  Continue home regimen

## 2024-12-25 NOTE — PROGRESS NOTES
Pastoral Care Progress Note               12/25/24 1300   Clinical Encounter Type   Visited With Patient  (Father Tobias)   Amish Encounters   Amish Needs Prayer  (Father Tobias offered the patient a blessing.)   Sacramental Encounters   Communion Given Indicator Yes

## 2024-12-25 NOTE — ASSESSMENT & PLAN NOTE
Wt Readings from Last 3 Encounters:   12/25/24 84.5 kg (186 lb 4.6 oz)   12/15/24 86.6 kg (190 lb 14.7 oz)   12/10/24 87.5 kg (193 lb)   Echo 2/24- LV wall thickness is mildly increased EF 35-40%, systolic function is moderately reduced, moderate global hypokinesis, G1DD. LA is moderately dilated and RA is dilated. MV moderate regurgitation.  Follows with Dr. Roach outpatient   Resume Lasix  C/w GDMT:  BB:  metoprolol 25mg q12h   ACE/ARB/ARNI:  lisinopril 5mg daily on hold d/t hypotension and ?CHUY   MRA:  n/a  SGLT2i: n/a  Currently on room air   Daily weights, Strict I & Os  Cardiac diet, low sodium <2g, fluid restriction 2000ml

## 2024-12-25 NOTE — ASSESSMENT & PLAN NOTE
Resume ASA   Continue with Lipitor 40 mg daily and metoprolol 25 mg every 12 hours  Denies chest pain  Left heart cath in 2023 showing Ost RCA to Prox RCA lesion is 100% stenosed.

## 2024-12-25 NOTE — ASSESSMENT & PLAN NOTE
Lab Results   Component Value Date    CREATININE 2.02 (H) 12/25/2024    CREATININE 2.04 (H) 12/24/2024    CREATININE 2.06 (H) 12/23/2024   Creatinine reviewed, appears BL has increased since September to 1.5-1.8  Creat peaked at 2.09. Has been stable.  I do not believe patient has CHUY.   Avoid nephrotoxic drugs and hypotension- holding lasix and lisinopril    C/w SPC ; farmer removed on 12/20

## 2024-12-25 NOTE — CASE MANAGEMENT
Case Management Discharge Planning Note    Patient name Toro Rodriguez  Location /-01 MRN [id number]  : 1950 Date 2024       Current Admission Date: 12/15/2024  Current Admission Diagnosis:Clot retention of urine   Patient Active Problem List    Diagnosis Date Noted Date Diagnosed    HFrEF (heart failure with reduced ejection fraction) ([facility]) 2024     Slow transit constipation 2024     Acute cystitis with hematuria 2024     Bladder mass 2024     Hematuria 12/15/2024     Suprapubic catheter dysfunction ([facility]) 12/15/2024     Hydronephrosis 12/15/2024     Clot retention of urine 12/15/2024     Benign hypertension with chronic kidney disease, stage III ([facility]) 12/10/2024     Stage 3b chronic kidney disease ([facility]) 12/10/2024     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 ([facility]) 12/10/2024     Gross hematuria 10/31/2024     Renal lesion 10/16/2024     NSVT (nonsustained ventricular tachycardia) ([facility]) 2024     Urinary tract infection associated with cystostomy catheter  ([facility]) 2024     Encounter to discuss test results 2024     Urethral erosion by catheter, initial encounter  ([facility]) 2024     ABLA (acute blood loss anemia) 2024     Chronic venous hypertension (idiopathic) with ulcer of left lower extremity (CODE) ([facility]) 2024     Encounter for geriatric assessment 2024     Melanoma of back ([facility]) 2024     Dilated cardiomyopathy ([facility]) 12/15/2023     Acute kidney injury superimposed on stage 3b chronic kidney disease ([facility]) 2023     Obesity, morbid ([facility]) 2023     Type 2 diabetes mellitus with stage 3b chronic kidney disease, without long-term current use of insulin ([facility]) 11/15/2023     Essential hypertension 11/10/2023     Chronic systolic (congestive) heart failure ([facility]) 11/10/2023     Deep vein thrombosis (DVT) of left lower extremity ([facility]) 11/10/2023     Coronary artery  disease involving native coronary artery of native heart 11/10/2023     Benign prostatic hyperplasia with urinary retention 2014       LOS (days): 10  Geometric Mean LOS (GMLOS) (days): 6  Days to GMLOS:-3.8     OBJECTIVE:  Risk of Unplanned Readmission Score: 55.77         Current admission status: Inpatient   [place] Pharm[place]   [facility] [address]  [address] [address]  Phone: [phone] Fax: [phone]    Primary Care Provider: CARLIE Foster    Primary Insurance: MEDICARE  Secondary Insurance: BLUE CROSS    DISCHARGE DETAILS:    Discharge planning discussed with:: Stephane CASTRO at [facility]  Freedom of Choice: Yes  Comments - Freedom of Choice: [facility]  CM contacted family/caregiver?: Yes  Were Treatment Team discharge recommendations reviewed with patient/caregiver?: Yes  Did patient/caregiver verbalize understanding of patient care needs?: Yes  Were patient/caregiver advised of the risks associated with not following Treatment Team discharge recommendations?: Yes     Other Referral/Resources/Interventions Provided:  Interventions: Assisted Living, [facility]     Treatment Team Recommendation: Assisted Living  Discharge Destination Plan:: Assisted Living    Additional Comments: CM spoke with GLORIA Calderón at [facility] to discuss discharge back to them today.  Stephane aware that [facility] services are on board and she will call them once he arrives.  New scripts are to be faxed to [facility], [address] [place] [address]  [phone].  Slims aware of same.    ..IMM reviewed with patient, patient agrees with discharge determination.    Daughter, Shanique zapata patient will be discharged back to [facility] today with [facility].  She will be here around 11:00am to transport him home.

## 2024-12-25 NOTE — ASSESSMENT & PLAN NOTE
"Pt initially presented to outside hospital with gross hematuria and had three-way Farmre placed and CBI started. He was noted to have labile blood pressures so he was transferred to [facility] for cystoscopy and admitted to the ICU post op for hypotension and hypoxia. He did require 2 units PRBCs due to ongoing blood loss and anemia.   S/p cystoscopy with clot evacuation, fulguration of bleeding vessels and extensive transurethral resection of bladder tumor and placement of penile urethral catheter 12/15  Unable to visualize right ureter in the OR; CBI weaned off 12/16 around 11am   Patient had both urethral Farmer catheter and SPT , farmer removed on 12/20 by urology  Continue to monitor Hg and creatinine  Patient was previously on Eliquis for DVT that was stopped about 2 months on previous admissions with no plans to resume  Patient's creatinine remained stable but worsened on 12.21, urology contacted, repeat CT renal showed \"Persistent right-sided moderate hydronephrosis and hydroureter to the level of the right ureterovesical junction. No evidence of right ureteral calculus\", urology involved, IR consulted for nephrostomy 12/24  aspirin resumed on 12/19, but on hold since 12/21 for procedure. Resume tomorrow.   Creatinine stable at 2.04-2.06. Recheck in am  "

## 2024-12-26 ENCOUNTER — TELEPHONE (OUTPATIENT)
Dept: HEMATOLOGY ONCOLOGY | Facility: CLINIC | Age: 74
End: 2024-12-26

## 2024-12-26 NOTE — UTILIZATION REVIEW
NOTIFICATION OF INPATIENT ADMISSION   AUTHORIZATION REQUEST   SERVICING FACILITY:   Formerly [facility], [facility]  Address: [address] [place] [address]  Tax ID: [id number]  NPI: [id number] ATTENDING PROVIDER:  Attending Name and NPI#: Russell Mcmanus Md [[id number]]  Address: [address] [place] [address]  Phone: [phone]   ADMISSION INFORMATION:  Place of Service: Inpatient [facility]  Place of Service Code: 21  Inpatient Admission Date/Time: 12/15/24  2:50 PM  Discharge Date/Time: 12/25/2024  2:00 PM  Admitting Diagnosis Code/Description:  Hematuria [R31.9]     UTILIZATION REVIEW CONTACT:  Kenn Art Utilization   Network Utilization Review Department  Phone: [phone]  Fax: [phone]  Email: [email or web address]  Contact for approvals/pending authorizations, clinical reviews, and discharge.     PHYSICIAN ADVISORY SERVICES:  Medical Necessity Denial & Yzte-df-Dony Review  Phone: [phone]  Fax: [phone]  Email: [email or web address]     DISCHARGE SUPPORT TEAM:  For Patients Discharge Needs & Updates  Phone: [phone] opt. 2 Fax: [phone]  Email: [email or web address]

## 2024-12-26 NOTE — PROGRESS NOTES
Patient currently residing at [facility]. They are able to provide transportation and the family is able to assist also.     Oncology Nurse Navigator made call to patient due to referral to provider within [facility]. I spoke with patient about my role as an Oncology Nurse Navigator. I explained how to reach the office for any routine or urgent matters and the availability of patient navigation for non urgent matters. Explained oncology navigation is here to help patients through their journey and to offer assistance with any barriers to care. As a team, we strive to be patient advocates and help navigate healthcare system. Patient aware that medical oncology nursing will be primary contact once consult is complete and patient navigator will continue to offer support through entire journey. Conversation is based on evidence based care to optimize patient outcomes. Emotional support provided throughout conversation.     Evaluated for any barriers to care.   Distress thermometer completed by PN   Genetic testing not indicated   Smoking status verified nonsmoker  Prior cancer and radiation history positive for skin cancer on the back no treatment.   Provided contact information for nurse navigator and patient navigator  Verified PCP amaya Lezama/insurance on file  Discussed use of My Chart patient uses regularly     Answered questions to pt's satisfaction.  Reviewed upcoming appts. Provided support and provided direct contact information for any questions or concerns.       Future Appointments   Date Time Provider Department Center   12/31/2024  9:00 AM Juan Carlos Foy MD HEM ONC  Practice-Onc   1/6/2025  8:45 AM Rick Espino MD U[facility]   1/22/2025  9:20 AM Tyson Gonzalez MD Derm [facility] DERM   5/7/2025  3:30 PM Joselyn Reyes Bahamonde, MD NEPH [facility]

## 2024-12-26 NOTE — TELEPHONE ENCOUNTER
I phoned [facility] [employer] and was able to speak with Carlos. We reviewed that Toro has an appointment with [facility]'s Oncology on 12/31 at 0900 with Dr. Foy in the [facility] office. The office address, location, and the [employer] number were provided. Carlos indicated that they would arrange for transport, and it would likely be family providing this.

## 2024-12-30 ENCOUNTER — PATIENT OUTREACH (OUTPATIENT)
Dept: HEMATOLOGY ONCOLOGY | Facility: CLINIC | Age: 74
End: 2024-12-30

## 2024-12-30 NOTE — PROGRESS NOTES
Kristine Valdivia, this is Shanique Rodriguez. I'm calling about my father, Toro Rodriguez. His birthday is [birth year]. He has an appointment tomorrow with an oncologist to go over treatment options for bladder cancer and we spoke last week. I have a question for you. I noticed on his chart, he and we might have discussed this, but he has the, I mean, as a whole a bunch of different medical conditions, but diabetes is on there, type 2 diabetes. And I'm really concerned that when we go tomorrow, that's going to come up as one of like a reason to not pursue a type of treatment. It's like a he might not qualify for a certain kind of treatment because of the type 2 diabetes diagnosis and he has never been diagnosed with it. And I've talked to his primary care physician too. She could have this like taken off his record. So I'm not sure when this, I think it happened last year when he had one high sugar blood sugar result because of a fluid restricted diet. That's been completely normal ever since. He's been tested multiple times. So yeah, it's not like a one time, you know, it's a one time thing that he had a high value and I'm not really sure what to do about it. And I was wondering if you just give me some advice that I don't know to, is there anything to worry about or is it or how to get this taken off 'cause it shows up on this list of medical conditions and it's not a case know it's not the case. If you give me a call back at [phone], that would be great. Thanks a lot. Bye.      Call back to patients daughter, shanique. Explained that the doctor will look at the information on hand and make clinical decision based on data on hand, not the diagnosis as on the list. Shanique understands and is appreciative of the call back.

## 2024-12-31 ENCOUNTER — OFFICE VISIT (OUTPATIENT)
Age: 74
End: 2024-12-31
Payer: MEDICARE

## 2024-12-31 ENCOUNTER — TELEPHONE (OUTPATIENT)
Age: 74
End: 2024-12-31

## 2024-12-31 ENCOUNTER — RESULTS FOLLOW-UP (OUTPATIENT)
Age: 74
End: 2024-12-31

## 2024-12-31 ENCOUNTER — DOCUMENTATION (OUTPATIENT)
Age: 74
End: 2024-12-31

## 2024-12-31 ENCOUNTER — APPOINTMENT (OUTPATIENT)
Dept: LAB | Facility: HOSPITAL | Age: 74
End: 2024-12-31
Payer: MEDICARE

## 2024-12-31 VITALS
HEIGHT: 67 IN | RESPIRATION RATE: 18 BRPM | SYSTOLIC BLOOD PRESSURE: 101 MMHG | HEART RATE: 83 BPM | OXYGEN SATURATION: 96 % | WEIGHT: 187 LBS | BODY MASS INDEX: 29.35 KG/M2 | DIASTOLIC BLOOD PRESSURE: 51 MMHG | TEMPERATURE: 98.2 F

## 2024-12-31 DIAGNOSIS — C67.8 MALIGNANT NEOPLASM OF OVERLAPPING SITES OF BLADDER (HCC): Primary | ICD-10-CM

## 2024-12-31 DIAGNOSIS — N18.9 ACUTE KIDNEY INJURY SUPERIMPOSED ON CKD  (HCC): ICD-10-CM

## 2024-12-31 DIAGNOSIS — D70.1 CHEMOTHERAPY INDUCED NEUTROPENIA (HCC): ICD-10-CM

## 2024-12-31 DIAGNOSIS — T45.1X5A CHEMOTHERAPY INDUCED NEUTROPENIA (HCC): ICD-10-CM

## 2024-12-31 DIAGNOSIS — C67.8 MALIGNANT NEOPLASM OF OVERLAPPING SITES OF BLADDER (HCC): ICD-10-CM

## 2024-12-31 DIAGNOSIS — I82.462 ACUTE DEEP VEIN THROMBOSIS (DVT) OF CALF MUSCLE VEIN OF LEFT LOWER EXTREMITY (HCC): ICD-10-CM

## 2024-12-31 DIAGNOSIS — N17.9 ACUTE KIDNEY INJURY SUPERIMPOSED ON CKD  (HCC): ICD-10-CM

## 2024-12-31 LAB
ALBUMIN SERPL BCG-MCNC: 3.6 G/DL (ref 3.5–5)
ALP SERPL-CCNC: 62 U/L (ref 34–104)
ALT SERPL W P-5'-P-CCNC: 9 U/L (ref 7–52)
ANION GAP SERPL CALCULATED.3IONS-SCNC: 7 MMOL/L (ref 4–13)
AST SERPL W P-5'-P-CCNC: 16 U/L (ref 13–39)
BASOPHILS # BLD AUTO: 0.06 THOUSANDS/ΜL (ref 0–0.1)
BASOPHILS NFR BLD AUTO: 1 % (ref 0–1)
BILIRUB SERPL-MCNC: 0.36 MG/DL (ref 0.2–1)
BUN SERPL-MCNC: 33 MG/DL (ref 5–25)
CALCIUM SERPL-MCNC: 8.6 MG/DL (ref 8.4–10.2)
CHLORIDE SERPL-SCNC: 106 MMOL/L (ref 96–108)
CO2 SERPL-SCNC: 28 MMOL/L (ref 21–32)
CREAT SERPL-MCNC: 1.85 MG/DL (ref 0.6–1.3)
EOSINOPHIL # BLD AUTO: 0.22 THOUSAND/ΜL (ref 0–0.61)
EOSINOPHIL NFR BLD AUTO: 3 % (ref 0–6)
ERYTHROCYTE [DISTWIDTH] IN BLOOD BY AUTOMATED COUNT: 16 % (ref 11.6–15.1)
GFR SERPL CREATININE-BSD FRML MDRD: 34 ML/MIN/1.73SQ M
GLUCOSE SERPL-MCNC: 95 MG/DL (ref 65–140)
HCT VFR BLD AUTO: 32.9 % (ref 36.5–49.3)
HGB BLD-MCNC: 10.3 G/DL (ref 12–17)
IMM GRANULOCYTES # BLD AUTO: 0.05 THOUSAND/UL (ref 0–0.2)
IMM GRANULOCYTES NFR BLD AUTO: 1 % (ref 0–2)
LYMPHOCYTES # BLD AUTO: 1.01 THOUSANDS/ΜL (ref 0.6–4.47)
LYMPHOCYTES NFR BLD AUTO: 13 % (ref 14–44)
MCH RBC QN AUTO: 30.7 PG (ref 26.8–34.3)
MCHC RBC AUTO-ENTMCNC: 31.3 G/DL (ref 31.4–37.4)
MCV RBC AUTO: 98 FL (ref 82–98)
MONOCYTES # BLD AUTO: 0.68 THOUSAND/ΜL (ref 0.17–1.22)
MONOCYTES NFR BLD AUTO: 9 % (ref 4–12)
NEUTROPHILS # BLD AUTO: 5.82 THOUSANDS/ΜL (ref 1.85–7.62)
NEUTS SEG NFR BLD AUTO: 73 % (ref 43–75)
NRBC BLD AUTO-RTO: 0 /100 WBCS
PLATELET # BLD AUTO: 495 THOUSANDS/UL (ref 149–390)
PMV BLD AUTO: 9.9 FL (ref 8.9–12.7)
POTASSIUM SERPL-SCNC: 4.6 MMOL/L (ref 3.5–5.3)
PROT SERPL-MCNC: 6.7 G/DL (ref 6.4–8.4)
RBC # BLD AUTO: 3.35 MILLION/UL (ref 3.88–5.62)
SODIUM SERPL-SCNC: 141 MMOL/L (ref 135–147)
WBC # BLD AUTO: 7.84 THOUSAND/UL (ref 4.31–10.16)

## 2024-12-31 PROCEDURE — 80053 COMPREHEN METABOLIC PANEL: CPT

## 2024-12-31 PROCEDURE — 99205 OFFICE O/P NEW HI 60 MIN: CPT | Performed by: INTERNAL MEDICINE

## 2024-12-31 PROCEDURE — 36415 COLL VENOUS BLD VENIPUNCTURE: CPT

## 2024-12-31 PROCEDURE — 85025 COMPLETE CBC W/AUTO DIFF WBC: CPT

## 2024-12-31 RX ORDER — PROCHLORPERAZINE MALEATE 10 MG
10 TABLET ORAL EVERY 6 HOURS PRN
Qty: 30 TABLET | Refills: 0 | Status: SHIPPED | OUTPATIENT
Start: 2024-12-31

## 2024-12-31 RX ORDER — SODIUM CHLORIDE 9 MG/ML
20 INJECTION, SOLUTION INTRAVENOUS ONCE
OUTPATIENT
Start: 2025-01-14

## 2024-12-31 RX ORDER — SODIUM CHLORIDE 9 MG/ML
20 INJECTION, SOLUTION INTRAVENOUS ONCE
OUTPATIENT
Start: 2025-01-15

## 2024-12-31 RX ORDER — SODIUM CHLORIDE 9 MG/ML
20 INJECTION, SOLUTION INTRAVENOUS ONCE
OUTPATIENT
Start: 2025-01-16

## 2024-12-31 RX ORDER — LIDOCAINE/PRILOCAINE 2.5 %-2.5%
CREAM (GRAM) TOPICAL AS NEEDED
Qty: 30 G | Refills: 1 | Status: SHIPPED | OUTPATIENT
Start: 2024-12-31

## 2024-12-31 RX ORDER — ONDANSETRON 8 MG/1
8 TABLET, FILM COATED ORAL EVERY 8 HOURS PRN
Qty: 60 TABLET | Refills: 0 | Status: SHIPPED | OUTPATIENT
Start: 2024-12-31

## 2024-12-31 NOTE — TELEPHONE ENCOUNTER
[facility] lab called in to inquire if patient needs to get lab work completed right now, stating patient is currently at the lab. I confirmed with her patient just had labs on 12/24 and has labs in the system for his chemotherapy that will be starting, but that I do not see anything documented that patient needs to go for repeat labs right now, per providers note today, it states patient is to get labs repeated prior to starting chemotherapy.    She is going to make patient and daughter aware. I confirmed with her we would call patient/daughter if anything changes. She has no additional questions or concerns at this moment.

## 2024-12-31 NOTE — PROGRESS NOTES
Oncology Teach:   Review of Plan:  Treatment Plan Reviewed and Treatment Schedule Reviewed    Review of Pre-Treatment Needs:  Lab work frequency reviewed    PICC or Port Placement Needs:  Port needed, reviewed w/ pt    Expectations at First Appointment Reviewed:  Yes    Patient Medication Education Reviewed:  Yes    Supportive Medication Reviewed:  OTC reviewed, Rx meds reviewed and Confirmed Rx sent to pharmacy    Review when to call office vs. present to ED:  Yes    Provided Oncology Access Center Number:  Yes    Assessment of patient understanding:  Pt demonstrates understanding (Daughter Shanique voiced understanding also.)    Chemo consent obtained?:  Yes

## 2024-12-31 NOTE — ASSESSMENT & PLAN NOTE
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    I recommend getting a PET/CT to better evaluate the lung lesions, and establish staging.   Will also get a baseline MRI brain once kidney function improves.  They understand that regardless of stage, overall prognosis is poor. They also understand that given his age and comorbid conditions, he is at high risk of complications.  Discussed first-line treatment with dose reduced carboplatin and etoposide.    Side effects of chemotherapy were discussed, including but not limited to : rash, fatigue, renal/liver/cardiac dysfunction, neuropathy, allergic reaction, hair loss/thinning, hearing loss, tinnitus, low blood counts, risk of infections, bleeding, need for transfusions, not limited to these and even potentially life threatening side effects.    Patient is agreeable to proceed as recommended. Consent and teach were completed today. Referral placed to IR for PORT placement. Prescriptions have been sent for Compazine, Zofran and Emla cream.

## 2024-12-31 NOTE — TELEPHONE ENCOUNTER
Called patient and spoke to Shanique.  Advised Shanique that FU APPT to see Dr Foy has been scheduled for 1/21/25 at 1140 am at .  Date and time is fine, offered calendar pages however as long as on MYCHART they are not needed per Shanique.

## 2024-12-31 NOTE — TELEPHONE ENCOUNTER
Shanique is calling to let us know that they got stuck in traffic on the way to her dad's appointment and they might be a few minutes late.

## 2024-12-31 NOTE — ASSESSMENT & PLAN NOTE
Lab Results   Component Value Date    EGFR 31 12/25/2024    EGFR 30 12/24/2024    EGFR 30 12/23/2024    CREATININE 2.02 (H) 12/25/2024    CREATININE 2.04 (H) 12/24/2024    CREATININE 2.06 (H) 12/23/2024     Secondary to obstruction.   Repeat labs prior to starting treatment.  Has f/u with urology.

## 2024-12-31 NOTE — PROGRESS NOTES
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    I recommend getting a PET/CT to better evaluate the lung lesions, and establish staging.   Will also get a baseline MRI brain once kidney function improves.  They understand that regardless of stage, overall prognosis is poor. They also understand that given his age and comorbid conditions, he is at high risk of complications.  Discussed first-line treatment with dose reduced carboplatin and etoposide.    Side effects of chemotherapy were discussed, including but not limited to : rash, fatigue, renal/liver/cardiac dysfunction, neuropathy, allergic reaction, hair loss/thinning, hearing loss, tinnitus, low blood counts, risk of infections, bleeding, need for transfusions, not limited to these and even potentially life threatening side effects.    Patient is agreeable to proceed as recommended. Consent and teach were completed today. Referral placed to IR for PORT placement. Prescriptions have been sent for Compazine, Zofran and Emla cream.    Acute deep vein thrombosis (DVT) of calf muscle vein of left lower extremity (HCC)  Dx in 2023.   Off anticoagulation due to hematuria.   May stay off AC at this time.       Acute kidney injury superimposed on CKD  (HCC)  Lab Results   Component Value Date    EGFR 31 2024    EGFR 30 2024    EGFR 30 2024    CREATININE 2.02 (H) 2024    CREATININE 2.04 (H) 2024    CREATININE 2.06 (H) 2024      Secondary to obstruction.   Repeat labs prior to starting treatment.  Has f/u with urology.      History of Present Illness   Chief Complaint   Patient presents with    Follow-up     Toro Rodriguez is a 74 y.o. male with multiple comorbidities including hypertension, coronary artery disease, CHF, history of DVT, cardiomyopathy,  BPH, stage III CKD, history of melanoma, who has been referred for new diagnosis of bladder cancer.    He had a CT abdomen and pelvis in April and July 2024 due to hematuria and pelvic pain, which were unremarkable.    CT abdomen from September 5 showed nonspecific diffuse bladder wall thickening and subsequently right hydroureteronephrosis.    He had a suprapubic catheter placed on September 27, 2024.    Repeat CT from October 31 showed a possible soft tissue mass on the right side of the bladder.    He had a CT abdomen and pelvis with contrast from November 10, 2024 which revealed a 4 cm soft tissue density within the right bladder wall.  He has had multiple CT scans since.     Patient presented with gross hematuria and lower abdominal pain on December 15. He required PRBC support.  Eliquis, which he had been for prior DVT, but stopped 2 months prior to admission.  During this admission, he required a Colon placement and CBI.  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    Renal ultrasound from December 17 showed moderate right hydronephrosis.  CT renal from December 20 showed persistent right-sided moderate hydronephrosis and hydroureter up to the right ureterovesical junction. His creatinine worsened during the admission, and a nephrostomy tube was placed on December 24.  He also underwent a suprapubic catheter placement.    CT chest without contrast from December 23 showed solid 0.8 cm and a 1 cm nodule in the right lower lobe which were new from last year.   Another solid 0.3 cm right upper lobe nodule.    Last ECHO from February 2024 showed an ejection fraction of 35 to 40%.  Patient is on beta-blocker and Lasix.  Lisinopril has been on hold due to acute kidney injury.  He has had multiple UTIs so far.    Patient was seen by Dr. Roblero during this admission.  He is now here to establish care with medical oncology.    Pertinent Medical History     He is here today with his daughter.  Mother had breast cancer.  Patient is a former smoker, quit at the age of 33.  He currently lives in an assisted living facility, and he requires help with ADLs and occasionally IADLs.  He denies any recent falls.  He walks with a walker.  Urine has cleared since discharge.    Oncology History   Oncology History   Melanoma of back (HCC)   1/31/2024 Biopsy    B. Skin, left lower back, shave biopsy:     MELANOMA (thickness: at least 0.7 mm) (see note).     Note: SOX10, MART, and PRAME immunostains were reviewed; significant melanocytic architectural disorder is seen, and the lesional cells are positive for PRAME. Please see synoptic report for additional details.     Synoptic report for melanoma of the skin  Thickness: at least 0.7 mm (invasive melanoma extends to deep specimen margin)  Ulceration: not seen  Anatomic (Lukas) level: at least IV  Type: superficial spreading melanoma  Mitoses: 2/mm2  Microsatellites: cannot be determined  Lymphovascular invasion: not seen  Neurotropism: not seen  Tumor regression: present  Tumor-infiltrating lymphocytes (TIL): present, non-brisk  Margin assessment: invasive melanoma extends to deep specimen margin; melanoma in situ extends to peripheral specimen margin  Pathologic stage: at least pT1a  Associated nevus: dermal melanocytic nevus     2/20/2024 Initial Diagnosis    Melanoma of back (HCC)     3/18/2024 Surgery    A. Skin, back, excision:     -Residual focal melanoma (thickness: 0.7 mm); not seen at examined inked specimen margins.     -Prior  procedure site changes present.     4/3/2024 -  Cancer Staged    Staging form: Melanoma of the Skin, AJCC 8th Edition  - Pathologic: Stage Unknown (pT1a, pNX, cM0) - Signed by Lillie La MD on 4/3/2024          Review of Systems   Constitutional:  Negative for unexpected weight change.   Respiratory: Negative.     Cardiovascular: Negative.    Gastrointestinal:  Negative for abdominal pain, nausea and vomiting.   Genitourinary:  Positive for hematuria.   Musculoskeletal:  Positive for myalgias.   Neurological:  Positive for weakness and headaches. Negative for dizziness, syncope, light-headedness and numbness.     Current Outpatient Medications on File Prior to Visit   Medication Sig Dispense Refill    acetaminophen (TYLENOL) 325 mg tablet Take 325 mg by mouth every 6 (six) hours as needed for mild pain      amiodarone 200 mg tablet Take 1 tablet (200 mg total) by mouth daily 90 tablet 2    amLODIPine (NORVASC) 5 mg tablet Take 1 tablet (5 mg total) by mouth daily 90 tablet 1    aspirin 81 mg chewable tablet Chew 1 tablet (81 mg total) daily Do not start before September 30, 2024.      atorvastatin (LIPITOR) 40 mg tablet Take 1 tablet (40 mg total) by mouth daily with dinner  0    finasteride (PROSCAR) 5 mg tablet Take 1 tablet (5 mg total) by mouth daily 90 tablet 3    furosemide (LASIX) 20 mg tablet Take 1 tablet (20 mg total) by mouth daily 30 tablet 5    nitroglycerin (NITROSTAT) 0.4 mg SL tablet Place 1 tablet (0.4 mg total) under the tongue every 5 (five) minutes as needed for chest pain      polyethylene glycol (MIRALAX) 17 g packet Take 17 g by mouth daily 30 each 2    sertraline (ZOLOFT) 25 mg tablet Take 1 tablet (25 mg total) by mouth daily Do not start before December 26, 2024. 30 tablet 0    sodium chloride, PF, 0.9 % 10 mL by Intracatheter route daily Intracatheter flushing daily. May substitute prefilled syringe with normal saline 10 mL vials, 10 mL syringes, and 18 g blunt needles 300 mL 0     "tamsulosin (FLOMAX) 0.4 mg Take 1 capsule (0.4 mg total) by mouth daily with dinner      [Paused] lisinopril (ZESTRIL) 5 mg tablet Take 1 tablet (5 mg total) by mouth daily Do not start before 2024.      metoprolol succinate (TOPROL-XL) 25 mg 24 hr tablet Take 1 tablet (25 mg total) by mouth every 12 (twelve) hours 60 tablet 0     No current facility-administered medications on file prior to visit.      Social History     Tobacco Use    Smoking status: Former     Types: Cigarettes     Start date:      Quit date:      Years since quittin.0    Smokeless tobacco: Never    Tobacco comments:     Quit over 30 years ago (Updated 2023).    Vaping Use    Vaping status: Never Used   Substance and Sexual Activity    Alcohol use: Not Currently    Drug use: Never    Sexual activity: Not on file         Objective   Ht 5' 7\" (1.702 m)   BMI 29.18 kg/m²     ECOG   2  Physical Exam  Vitals reviewed.   Constitutional:       Appearance: He is obese.   Cardiovascular:      Rate and Rhythm: Normal rate and regular rhythm.      Pulses: Normal pulses.      Heart sounds: No murmur heard.  Pulmonary:      Effort: Pulmonary effort is normal. No respiratory distress.      Breath sounds: Normal breath sounds. No wheezing.   Abdominal:      Palpations: Abdomen is soft. There is no mass.      Comments: Suprapubic catheter in place, along with nephrostomy tube   Musculoskeletal:         General: No swelling.      Cervical back: Neck supple.   Skin:     Coloration: Skin is pale.   Neurological:      General: No focal deficit present.      Mental Status: He is alert. Mental status is at baseline.         Labs: I have reviewed the following labs:  Lab Results   Component Value Date/Time    WBC 7.64 2024 04:57 AM    RBC 3.24 (L) 2024 04:57 AM    Hemoglobin 9.9 (L) 2024 04:57 AM    Hematocrit 30.4 (L) 2024 04:57 AM    MCV 94 2024 04:57 AM    MCH 30.6 2024 04:57 AM    RDW 15.1 2024 " 04:57 AM    Platelets 450 (H) 12/24/2024 04:57 AM    Segmented % 64 12/24/2024 04:57 AM    Lymphocytes % 18 12/24/2024 04:57 AM    Monocytes % 9 12/24/2024 04:57 AM    Eosinophils Relative 7 (H) 12/24/2024 04:57 AM    Basophils Relative 1 12/24/2024 04:57 AM    Immature Grans % 1 12/24/2024 04:57 AM    Absolute Neutrophils 4.96 12/24/2024 04:57 AM     Lab Results   Component Value Date/Time    Potassium 4.2 12/25/2024 05:12 AM    Chloride 106 12/25/2024 05:12 AM    CO2 23 12/25/2024 05:12 AM    BUN 38 (H) 12/25/2024 05:12 AM    Creatinine 2.02 (H) 12/25/2024 05:12 AM    Glucose, Fasting 103 (H) 04/30/2024 04:16 AM    Calcium 8.2 (L) 12/25/2024 05:12 AM    Corrected Calcium 8.5 12/15/2024 02:51 AM    AST 14 12/15/2024 02:51 AM    ALT 12 12/15/2024 02:51 AM    Alkaline Phosphatase 70 12/15/2024 02:51 AM    Total Protein 6.4 12/15/2024 02:51 AM    Albumin 3.4 (L) 12/15/2024 02:51 AM    Total Bilirubin 0.36 12/15/2024 02:51 AM    eGFR 31 12/25/2024 05:12 AM         Radiology Results Review: I personally reviewed the following image studies in PACS and associated radiology reports: CT chest and CT abdomen/pelvis. My interpretation of the radiology images/reports is: as noted above..    Administrative Statements   I have spent a total time of 60 minutes in caring for this patient on the day of the visit/encounter including Diagnostic results, Prognosis, Patient and family education, Counseling / Coordination of care, Documenting in the medical record, Reviewing / ordering tests, medicine, procedures  , and Obtaining or reviewing history  . Topics discussed with the patient / family include symptom assessment and management, goals of care, supportive listening, and anticipatory guidance.

## 2025-01-01 ENCOUNTER — PATIENT MESSAGE (OUTPATIENT)
Age: 75
End: 2025-01-01

## 2025-01-01 DIAGNOSIS — C67.8 MALIGNANT NEOPLASM OF OVERLAPPING SITES OF BLADDER (HCC): Primary | ICD-10-CM

## 2025-01-01 NOTE — ANESTHESIA POSTPROCEDURE EVALUATION
Post-Op Assessment Note    CV Status:  Stable    Pain management: adequate       Mental Status:  Alert and awake   Hydration Status:  Euvolemic   PONV Controlled:  Controlled   Airway Patency:  Patent     Post Op Vitals Reviewed: Yes    No anethesia notable event occurred.    Staff: Anesthesiologist, CRNA           Last Filed PACU Vitals:  Vitals Value Taken Time   Temp 98 °F (36.7 °C) 12/24/24 1100   Pulse 73 12/24/24 1100   /67 12/24/24 1100   Resp 15 12/24/24 1100   SpO2 95 % 12/24/24 1100       Modified Windy:  No data recorded

## 2025-01-02 ENCOUNTER — PATIENT OUTREACH (OUTPATIENT)
Dept: HEMATOLOGY ONCOLOGY | Facility: CLINIC | Age: 75
End: 2025-01-02

## 2025-01-02 ENCOUNTER — TELEPHONE (OUTPATIENT)
Age: 75
End: 2025-01-02

## 2025-01-02 NOTE — PROGRESS NOTES
Contacted [facility] via phone [phone]    Left detailed VM with locations for potential transports to obtain cost estimate.     Awaiting return call.

## 2025-01-02 NOTE — PROGRESS NOTES
I returned call to Shanique and provided the following resources that she can utilize to assist with transport in the event that she can not transport herself or arrange for family to drive. Cost estimates are based on the trip from home to [facility].    [employer] [phone]- private pay $210 roundtrip via [employer] van for safety w/ min 72hr notice    [facility] [phone]- private pay $108+ $10 per loaded mile one way via [employer] van for safety w/ 2-3 weeks notice    [facility] non emergent transport [phone]- $40 for the first 10 miles then $2.50 per mile each additional mile via medical sedan with walker assist x1. If WC needed $85 first 10 miles then $4.80 each additional. 72hrs needed to schedule but may have cancellation slots available.     Shanique was appreciative for the information. She was happy to know that there are services who can assist when needed. I let her know that she is welcome to let us know how transport is going and if she needs additional assistance we can explore further. I let her know that Krysta will be reaching out to them in the near future to introduce herself and to assist with any other barriers to care that may be identified.

## 2025-01-02 NOTE — PROGRESS NOTES
"Received in-basket request from the  to assist with transportation options as requested from [employer] communication below.    \"Skip Arana, thanks. I'm checking out the resources on the websites you gave. I was wondering, do you know of a transportation service that my dad could use to take him to treatments and appointments, just in case there is an appt where I or my family can not take him?   I would consider uber but my dad uses a walker so would need someone to stow the walker, provide light assistance in and out of the vehicle, drive him to the nearest entrance of a building with a ramp, make sure he gets in the building ok, etc so I don't think uber or lyft would do that.    Any advice would be great. Thanks.  Regards,  Shanique Rodriguez  [phone]\"      I left VM for Shanique to gather more information regarding Toro's current living arrangement (assisted living?) and individual mobility needs so that we can evaluate which programs may be most appropriate and what he may qualify for. I provided my direct number [phone] so that I may assist.   "

## 2025-01-02 NOTE — TELEPHONE ENCOUNTER
Neulasta onpro added to plan on day 3 in place of ny[facility]a. Day 4 discontinued from plan. Patient's daughter made aware of change.

## 2025-01-02 NOTE — TELEPHONE ENCOUNTER
[facility] from [facility] states that insurance will not pay for daily nurse visits to irrigate   nephrostomy tube; Last order was faxed 12/31/24;     Nursing home does not flush nephrostomy tubes  [facility] is asking for new nephrostomy flushing orders  Irrigate nephrostomy tube 2x week and prn with 10 ml normal saline.     Fax [phone]

## 2025-01-02 NOTE — PROGRESS NOTES
Received return call from Shanique.     Toro currently lives at an assisted living facility. He uses a walker to ambulate but needs some guidance to get in and out of the vehicle without falling as he can become unsteady during the transition.     We discussed [facility] as well as [facility] but he does not qualify for these for the above listed needs. Both services can not physically assist patients and they must be able to get into the vehicle independently due to liability. Medical transport is the best suited type of transport to physically assist him in and out of the vehicle and are not restricted from the assisted living facilities.     I will reach out to [facility] to obtain cost estimates and provide these to Tej

## 2025-01-03 ENCOUNTER — PATIENT OUTREACH (OUTPATIENT)
Dept: HEMATOLOGY ONCOLOGY | Facility: CLINIC | Age: 75
End: 2025-01-03

## 2025-01-03 ENCOUNTER — TELEPHONE (OUTPATIENT)
Dept: RADIOLOGY | Facility: HOSPITAL | Age: 75
End: 2025-01-03

## 2025-01-03 RX ORDER — VANCOMYCIN HYDROCHLORIDE 1 G/200ML
1000 INJECTION, SOLUTION INTRAVENOUS ONCE
Status: CANCELLED | OUTPATIENT
Start: 2025-01-03 | End: 2025-01-03

## 2025-01-03 RX ORDER — SODIUM CHLORIDE 9 MG/ML
75 INJECTION, SOLUTION INTRAVENOUS CONTINUOUS
Status: CANCELLED | OUTPATIENT
Start: 2025-01-03

## 2025-01-03 NOTE — TELEPHONE ENCOUNTER
Updated orders faxed to [facility] at [phone]. Fax confirmation received.

## 2025-01-03 NOTE — PRE-PROCEDURE INSTRUCTIONS
Pre-procedure Instructions for Interventional Radiology  [address] [address]  INTERVENTIONAL RADIOLOGY [phone]    You are scheduled for a/an Port Placement.    On Friday 1/10/25.    Your tentative arrival time is 1 PM.  Short stay will notify you the day before your procedure with the exact arrival time and the location to arrive.    To prepare for your procedure:  Please arrange for someone to drive you home after the procedure and stay with you until the next morning if you are instructed to do so.  This is typically for patients receiving some type of sedative or anesthetic for the procedure.  DO NOT EAT OR DRINK ANYTHING after midnight on the evening before your procedure including candy & gum.  ONLY SIPS OF WATER with your medications are allowed on the morning of your procedure.  TAKE ALL OF YOUR REGULAR MEDICATIONS THE MORNING OF YOUR PROCEDURE with sips of water!  We may call you to stop some of your blood sugar, blood pressure and blood thinning medications depending on the procedure.  Please take all of these medications unless we instruct you to stop them.  If you have an allergy to x-ray dye, please contact Interventional Radiology for an x-ray dye preparation which usually consists of an oral steroid and Benadryl.    The day of your procedure:  Bring a list of the medications you take at home.  Bring medications you take for breathing problems (such as inhalers), medications for chest pain, or both.  Bring a case for your glasses or contacts.  Bring your insurance card and a form of photo ID.  Please leave all valuables such as credit cards and jewelry at home.  Report to the admitting office to the left of the registration desk in the main lobby at the [facility], Entrance B.  You will then be directed to the Short Stay Center.  While your procedure is being performed, your family may wait in the Radiology Waiting Room on the 1st floor in  Radiology.  if they need to leave, they may provide a number to be called following the procedure.   Be prepared to stay overnight just in case. Sometimes procedures will indicate the need for further observation or treatment.   If you are scheduled for a follow-up visit with the Interventional Radiologist after your procedure, you will be called with a date and time.    Special Instructions (Medications to stop taking before your procedure etc.):  Amlodipine,Lasix,lisinopril,and miralax hold AM 1/10/25.Above reviewed with his daughter Shanique.

## 2025-01-03 NOTE — PROGRESS NOTES
I reached out to Toro to complete the Distress Thermometer, review for any barriers to care and to offer any supportive services that may be needed. I left  with the reason for my call including my direct phone number [phone].

## 2025-01-06 ENCOUNTER — OFFICE VISIT (OUTPATIENT)
Dept: UROLOGY | Facility: CLINIC | Age: 75
End: 2025-01-06
Payer: MEDICARE

## 2025-01-06 ENCOUNTER — TELEPHONE (OUTPATIENT)
Age: 75
End: 2025-01-06

## 2025-01-06 ENCOUNTER — PATIENT OUTREACH (OUTPATIENT)
Dept: HEMATOLOGY ONCOLOGY | Facility: CLINIC | Age: 75
End: 2025-01-06

## 2025-01-06 VITALS
DIASTOLIC BLOOD PRESSURE: 62 MMHG | OXYGEN SATURATION: 96 % | WEIGHT: 188 LBS | BODY MASS INDEX: 29.51 KG/M2 | HEART RATE: 87 BPM | HEIGHT: 67 IN | SYSTOLIC BLOOD PRESSURE: 110 MMHG

## 2025-01-06 DIAGNOSIS — C67.8 MALIGNANT NEOPLASM OF OVERLAPPING SITES OF BLADDER (HCC): Primary | ICD-10-CM

## 2025-01-06 DIAGNOSIS — Z71.2 ENCOUNTER TO DISCUSS TEST RESULTS: ICD-10-CM

## 2025-01-06 DIAGNOSIS — N40.1 BENIGN PROSTATIC HYPERPLASIA WITH URINARY RETENTION: ICD-10-CM

## 2025-01-06 DIAGNOSIS — I12.9 BENIGN HYPERTENSION WITH CHRONIC KIDNEY DISEASE, STAGE III (HCC): ICD-10-CM

## 2025-01-06 DIAGNOSIS — Q62.11 HYDRONEPHROSIS WITH URETEROPELVIC JUNCTION (UPJ) OBSTRUCTION: ICD-10-CM

## 2025-01-06 DIAGNOSIS — R33.8 BENIGN PROSTATIC HYPERPLASIA WITH URINARY RETENTION: ICD-10-CM

## 2025-01-06 DIAGNOSIS — N18.32 TYPE 2 DIABETES MELLITUS WITH STAGE 3B CHRONIC KIDNEY DISEASE, WITHOUT LONG-TERM CURRENT USE OF INSULIN (HCC): ICD-10-CM

## 2025-01-06 DIAGNOSIS — I42.0 DILATED CARDIOMYOPATHY (HCC): ICD-10-CM

## 2025-01-06 DIAGNOSIS — Z93.6 NEPHROSTOMY STATUS (HCC): ICD-10-CM

## 2025-01-06 DIAGNOSIS — E11.22 TYPE 2 DIABETES MELLITUS WITH STAGE 3B CHRONIC KIDNEY DISEASE, WITHOUT LONG-TERM CURRENT USE OF INSULIN (HCC): ICD-10-CM

## 2025-01-06 DIAGNOSIS — I25.10 CORONARY ARTERY DISEASE INVOLVING NATIVE CORONARY ARTERY OF NATIVE HEART WITHOUT ANGINA PECTORIS: Chronic | ICD-10-CM

## 2025-01-06 DIAGNOSIS — N18.30 BENIGN HYPERTENSION WITH CHRONIC KIDNEY DISEASE, STAGE III (HCC): ICD-10-CM

## 2025-01-06 PROBLEM — N32.89 BLADDER MASS: Status: RESOLVED | Noted: 2024-12-16 | Resolved: 2025-01-06

## 2025-01-06 PROCEDURE — 99215 OFFICE O/P EST HI 40 MIN: CPT | Performed by: UROLOGY

## 2025-01-06 NOTE — ASSESSMENT & PLAN NOTE
Managed with a suprapubic catheter at this time  He can return for suprapubic catheter exchange in roughly 4 weeks with our nursing staff.  This can then be changed every 6 weeks  Discontinue tamsulosin and proscar

## 2025-01-06 NOTE — ASSESSMENT & PLAN NOTE
This competing comorbid condition does favor a trimodality approach versus trying to get the patient through a radical cystectomy

## 2025-01-06 NOTE — ASSESSMENT & PLAN NOTE
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  I did review with him and with his daughter today in the cardiology of his urologic workup.  Of note, he did not have a tumor in July of this year when cystoscopy was performed.  It appeared that he had bleeding from his suprapubic catheter and this was then shown to be due to a bladder tumor at the trigone.    Goals of care are for aggressive management at this time.  He has seen hematology/oncology  Have referred him to radiation oncology for consideration of the addition of radiation for trimodality therapy of his aggressive bladder cancer.    I will plan to perform cystoscopy by way of a suprapubic catheter to assess his disease burden at roughly 3 months from now.    Should he have worsening bleeding in the future we can offer him a palliative transurethral resection of bladder tumor if this is causing him bleeding.      Orders:    Ambulatory Referral to Radiation Oncology; Future

## 2025-01-06 NOTE — TELEPHONE ENCOUNTER
I called Toro in response to a referral that was received for patient to establish care with Radiation Oncology    Outreach was made to schedule a consultation.    A consultation was scheduled for patient during this call. Patient is scheduled on 1/20/25 at 10:00 AM with Dr Alberto at the [facility]    Schedule within: 7 days     Schedule with: First available physician in a location [facility]    Pt was offered earlier appts, pt declined due to scheduling conflicts.   Please advise if there is any way to move this appt sooner to meet scheduling guidelines per NN review.

## 2025-01-06 NOTE — ASSESSMENT & PLAN NOTE
Lab Results   Component Value Date    HGBA1C 5.9 (H) 04/18/2024   Increased risk of infectious complication with operative intervention

## 2025-01-06 NOTE — ASSESSMENT & PLAN NOTE
Currently with a nephrostomy tube in place  At his next exchange I would recommend consideration of conversion to a percutaneous nephroureteral catheter with eventual plan for internal double-J ureteral stent which can be changed every 3 to 6 months in the operating room by way of a retrograde approach if this is successful.    I did tell him to expect that his nephrostomy tube may be a permanent renal unit management strategy given his cystoscopic findings at the time of cystoscopy with clot evacuation and fulguration of bleeding vessels and TURBT

## 2025-01-06 NOTE — PROGRESS NOTES
I reached out and spoke with Toro and his daughter Shanique now that consults have been completed with the oncology teams to review for any barriers to care and offer supportive services as needed. Distress Thermometer completed at this time. Patient scored 0/10. Referral to SW placed.. I reviewed and updated the members assigned to the care team in [facility].   He knows the members of the care team as well as how and when to contact them with any needs.   He verbalizes managing the schedules well.   He is currently unable to drive but is supported by family or friends and denies transportation needs.  Daughter drives   He denies any uncontrolled symptoms. Discussed the role of Palliative Care in symptom and side effect management with this complex diagnosis. Referral placed. Patient aware they will receive a call to schedule. I provided the direct number as well if they would prefer to call.    He states that he is eating and drinking as per usual with no unintentional weight loss.     Patient does not smoke.   He states he is well supported by family and friends.  Community support information provided via [employer], including pamphlets .  He feels he has adequate insurance coverage and denies any financial concerns at this time.   Toro expressed that he has some worry and fear pertaining to his diagnosis. He stated he is trying to stay happy and positive. He has a great support system. He currently is residing in a assistance living and his daughter takes him to his appointments.    Based on individual needs I will follow up in about 4-6 weeks. I have provided my direct contact information and welcome them to contact me if needs as discussed above change. He was appreciative for the call.

## 2025-01-06 NOTE — ASSESSMENT & PLAN NOTE
His hydronephrosis is likely due to his tumor.  This is managed with a nephrostomy tube at this time

## 2025-01-06 NOTE — ASSESSMENT & PLAN NOTE
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  He has been decompressed with a nephrostomy tube.    We can consider conversion to nephroureteral catheter at his next exchange with the eventual plan for a conversion to an internal double-J ureteral stent if this is physically possible

## 2025-01-06 NOTE — ASSESSMENT & PLAN NOTE
I have spent a total time of 45 minutes in caring for this patient on the day of the visit/encounter including Diagnostic results, Prognosis, Risks and benefits of tx options, Instructions for management, Patient and family education, Importance of tx compliance, Risk factor reductions, Impressions, Counseling / Coordination of care, Documenting in the medical record, Reviewing / ordering tests, medicine, procedures  , and Obtaining or reviewing history  .

## 2025-01-07 ENCOUNTER — PATIENT OUTREACH (OUTPATIENT)
Dept: CASE MANAGEMENT | Facility: HOSPITAL | Age: 75
End: 2025-01-07

## 2025-01-07 ENCOUNTER — TELEPHONE (OUTPATIENT)
Dept: UROLOGY | Facility: MEDICAL CENTER | Age: 75
End: 2025-01-07

## 2025-01-07 NOTE — TELEPHONE ENCOUNTER
Patient seen in the office yesterday.  He needs a 3 month cystoscopy appointment in [place] with Dr. Espino.

## 2025-01-07 NOTE — TELEPHONE ENCOUNTER
Spoke with patient's daughter, Shanique, who stated that the appointment with DR.. Espino at [place] on 4/11/25 @ 1130 am for the cystoscopy would be fine.  They thanked the office for the fast scheduling call back

## 2025-01-09 ENCOUNTER — HOSPITAL ENCOUNTER (OUTPATIENT)
Dept: RADIOLOGY | Age: 75
Discharge: HOME | End: 2025-01-09
Payer: MEDICARE

## 2025-01-09 DIAGNOSIS — C67.8 MALIGNANT NEOPLASM OF OVERLAPPING SITES OF BLADDER (HCC): ICD-10-CM

## 2025-01-09 LAB — GLUCOSE SERPL-MCNC: 88 MG/DL (ref 65–140)

## 2025-01-09 PROCEDURE — 82948 REAGENT STRIP/BLOOD GLUCOSE: CPT

## 2025-01-09 PROCEDURE — 78815 PET IMAGE W/CT SKULL-THIGH: CPT

## 2025-01-09 PROCEDURE — A9552 F18 FDG: HCPCS

## 2025-01-10 ENCOUNTER — HOSPITAL ENCOUNTER (OUTPATIENT)
Dept: RADIOLOGY | Facility: HOSPITAL | Age: 75
Discharge: HOME | End: 2025-01-10
Attending: INTERNAL MEDICINE
Payer: MEDICARE

## 2025-01-10 ENCOUNTER — APPOINTMENT (OUTPATIENT)
Dept: LAB | Facility: CLINIC | Age: 75
End: 2025-01-10
Payer: MEDICARE

## 2025-01-10 VITALS
TEMPERATURE: 97.8 F | DIASTOLIC BLOOD PRESSURE: 48 MMHG | OXYGEN SATURATION: 93 % | HEART RATE: 62 BPM | RESPIRATION RATE: 16 BRPM | BODY MASS INDEX: 29.51 KG/M2 | HEIGHT: 67 IN | SYSTOLIC BLOOD PRESSURE: 105 MMHG | WEIGHT: 188 LBS

## 2025-01-10 DIAGNOSIS — C67.8 MALIGNANT NEOPLASM OF OVERLAPPING SITES OF BLADDER (HCC): ICD-10-CM

## 2025-01-10 DIAGNOSIS — D70.1 CHEMOTHERAPY INDUCED NEUTROPENIA (HCC): ICD-10-CM

## 2025-01-10 DIAGNOSIS — T45.1X5A CHEMOTHERAPY INDUCED NEUTROPENIA (HCC): ICD-10-CM

## 2025-01-10 LAB
ALBUMIN SERPL BCG-MCNC: 3.5 G/DL (ref 3.5–5)
ALP SERPL-CCNC: 69 U/L (ref 34–104)
ALT SERPL W P-5'-P-CCNC: 15 U/L (ref 7–52)
ANION GAP SERPL CALCULATED.3IONS-SCNC: 9 MMOL/L (ref 4–13)
AST SERPL W P-5'-P-CCNC: 20 U/L (ref 13–39)
BASOPHILS # BLD AUTO: 0.05 THOUSANDS/ΜL (ref 0–0.1)
BASOPHILS NFR BLD AUTO: 1 % (ref 0–1)
BILIRUB SERPL-MCNC: 0.42 MG/DL (ref 0.2–1)
BUN SERPL-MCNC: 31 MG/DL (ref 5–25)
CALCIUM SERPL-MCNC: 8.7 MG/DL (ref 8.4–10.2)
CHLORIDE SERPL-SCNC: 107 MMOL/L (ref 96–108)
CO2 SERPL-SCNC: 26 MMOL/L (ref 21–32)
CREAT SERPL-MCNC: 1.57 MG/DL (ref 0.6–1.3)
EOSINOPHIL # BLD AUTO: 0.27 THOUSAND/ΜL (ref 0–0.61)
EOSINOPHIL NFR BLD AUTO: 5 % (ref 0–6)
ERYTHROCYTE [DISTWIDTH] IN BLOOD BY AUTOMATED COUNT: 16.5 % (ref 11.6–15.1)
GFR SERPL CREATININE-BSD FRML MDRD: 42 ML/MIN/1.73SQ M
GLUCOSE P FAST SERPL-MCNC: 98 MG/DL (ref 65–99)
HCT VFR BLD AUTO: 32.9 % (ref 36.5–49.3)
HGB BLD-MCNC: 10.4 G/DL (ref 12–17)
IMM GRANULOCYTES # BLD AUTO: 0.04 THOUSAND/UL (ref 0–0.2)
IMM GRANULOCYTES NFR BLD AUTO: 1 % (ref 0–2)
LYMPHOCYTES # BLD AUTO: 1.2 THOUSANDS/ΜL (ref 0.6–4.47)
LYMPHOCYTES NFR BLD AUTO: 22 % (ref 14–44)
MCH RBC QN AUTO: 30.3 PG (ref 26.8–34.3)
MCHC RBC AUTO-ENTMCNC: 31.6 G/DL (ref 31.4–37.4)
MCV RBC AUTO: 96 FL (ref 82–98)
MONOCYTES # BLD AUTO: 0.62 THOUSAND/ΜL (ref 0.17–1.22)
MONOCYTES NFR BLD AUTO: 11 % (ref 4–12)
NEUTROPHILS # BLD AUTO: 3.33 THOUSANDS/ΜL (ref 1.85–7.62)
NEUTS SEG NFR BLD AUTO: 60 % (ref 43–75)
NRBC BLD AUTO-RTO: 0 /100 WBCS
PLATELET # BLD AUTO: 318 THOUSANDS/UL (ref 149–390)
PMV BLD AUTO: 10.3 FL (ref 8.9–12.7)
POTASSIUM SERPL-SCNC: 4.4 MMOL/L (ref 3.5–5.3)
PROT SERPL-MCNC: 6.6 G/DL (ref 6.4–8.4)
RBC # BLD AUTO: 3.43 MILLION/UL (ref 3.88–5.62)
SODIUM SERPL-SCNC: 142 MMOL/L (ref 135–147)
WBC # BLD AUTO: 5.51 THOUSAND/UL (ref 4.31–10.16)

## 2025-01-10 PROCEDURE — 99152 MOD SED SAME PHYS/QHP 5/>YRS: CPT

## 2025-01-10 PROCEDURE — 36561 INSERT TUNNELED CV CATH: CPT

## 2025-01-10 PROCEDURE — 76937 US GUIDE VASCULAR ACCESS: CPT

## 2025-01-10 PROCEDURE — 99153 MOD SED SAME PHYS/QHP EA: CPT

## 2025-01-10 PROCEDURE — 36561 INSERT TUNNELED CV CATH: CPT | Performed by: RADIOLOGY

## 2025-01-10 PROCEDURE — 77001 FLUOROGUIDE FOR VEIN DEVICE: CPT | Performed by: RADIOLOGY

## 2025-01-10 PROCEDURE — C1788 PORT, INDWELLING, IMP: HCPCS

## 2025-01-10 PROCEDURE — 85025 COMPLETE CBC W/AUTO DIFF WBC: CPT

## 2025-01-10 PROCEDURE — 36415 COLL VENOUS BLD VENIPUNCTURE: CPT

## 2025-01-10 PROCEDURE — 76937 US GUIDE VASCULAR ACCESS: CPT | Performed by: RADIOLOGY

## 2025-01-10 PROCEDURE — 99152 MOD SED SAME PHYS/QHP 5/>YRS: CPT | Performed by: RADIOLOGY

## 2025-01-10 PROCEDURE — 80053 COMPREHEN METABOLIC PANEL: CPT

## 2025-01-10 PROCEDURE — C1894 INTRO/SHEATH, NON-LASER: HCPCS

## 2025-01-10 RX ORDER — OXYCODONE HYDROCHLORIDE 5 MG/1
5 TABLET ORAL EVERY 6 HOURS PRN
Status: DISCONTINUED | OUTPATIENT
Start: 2025-01-10 | End: 2025-01-11 | Stop reason: HOSPADM

## 2025-01-10 RX ORDER — SODIUM CHLORIDE 9 MG/ML
75 INJECTION, SOLUTION INTRAVENOUS CONTINUOUS
Status: DISCONTINUED | OUTPATIENT
Start: 2025-01-10 | End: 2025-01-11 | Stop reason: HOSPADM

## 2025-01-10 RX ORDER — FENTANYL CITRATE 50 UG/ML
25 INJECTION, SOLUTION INTRAMUSCULAR; INTRAVENOUS EVERY 2 HOUR PRN
Refills: 0 | Status: CANCELLED | OUTPATIENT
Start: 2025-01-10 | End: 2025-01-12

## 2025-01-10 RX ORDER — MIDAZOLAM HYDROCHLORIDE 2 MG/2ML
INJECTION, SOLUTION INTRAMUSCULAR; INTRAVENOUS AS NEEDED
Status: COMPLETED | OUTPATIENT
Start: 2025-01-10 | End: 2025-01-10

## 2025-01-10 RX ORDER — OXYCODONE HYDROCHLORIDE 5 MG/1
10 TABLET ORAL EVERY 6 HOURS PRN
Status: DISCONTINUED | OUTPATIENT
Start: 2025-01-10 | End: 2025-01-11 | Stop reason: HOSPADM

## 2025-01-10 RX ORDER — VANCOMYCIN HYDROCHLORIDE 1 G/200ML
1000 INJECTION, SOLUTION INTRAVENOUS ONCE
Status: COMPLETED | OUTPATIENT
Start: 2025-01-10 | End: 2025-01-10

## 2025-01-10 RX ORDER — VANCOMYCIN HYDROCHLORIDE 1 G/200ML
1000 INJECTION, SOLUTION INTRAVENOUS ONCE
Status: DISCONTINUED | OUTPATIENT
Start: 2025-01-10 | End: 2025-01-10

## 2025-01-10 RX ORDER — ACETAMINOPHEN 325 MG/1
650 TABLET ORAL EVERY 4 HOURS PRN
Status: DISCONTINUED | OUTPATIENT
Start: 2025-01-10 | End: 2025-01-11 | Stop reason: HOSPADM

## 2025-01-10 RX ORDER — FENTANYL CITRATE 50 UG/ML
INJECTION, SOLUTION INTRAMUSCULAR; INTRAVENOUS AS NEEDED
Status: COMPLETED | OUTPATIENT
Start: 2025-01-10 | End: 2025-01-10

## 2025-01-10 RX ADMIN — FENTANYL CITRATE 25 MCG: 50 INJECTION INTRAMUSCULAR; INTRAVENOUS at 15:41

## 2025-01-10 RX ADMIN — MIDAZOLAM 0.5 MG: 1 INJECTION INTRAMUSCULAR; INTRAVENOUS at 15:16

## 2025-01-10 RX ADMIN — Medication 1000 MG: at 14:36

## 2025-01-10 RX ADMIN — FENTANYL CITRATE 25 MCG: 50 INJECTION INTRAMUSCULAR; INTRAVENOUS at 15:07

## 2025-01-10 RX ADMIN — MIDAZOLAM 0.5 MG: 1 INJECTION INTRAMUSCULAR; INTRAVENOUS at 14:58

## 2025-01-10 RX ADMIN — FENTANYL CITRATE 25 MCG: 50 INJECTION INTRAMUSCULAR; INTRAVENOUS at 15:27

## 2025-01-10 RX ADMIN — MIDAZOLAM 0.5 MG: 1 INJECTION INTRAMUSCULAR; INTRAVENOUS at 15:13

## 2025-01-10 RX ADMIN — Medication 20 ML: at 15:14

## 2025-01-10 RX ADMIN — FENTANYL CITRATE 25 MCG: 50 INJECTION INTRAMUSCULAR; INTRAVENOUS at 14:58

## 2025-01-10 RX ADMIN — FENTANYL CITRATE 25 MCG: 50 INJECTION INTRAMUSCULAR; INTRAVENOUS at 15:16

## 2025-01-10 RX ADMIN — FENTANYL CITRATE 25 MCG: 50 INJECTION INTRAMUSCULAR; INTRAVENOUS at 15:13

## 2025-01-10 RX ADMIN — MIDAZOLAM 0.5 MG: 1 INJECTION INTRAMUSCULAR; INTRAVENOUS at 15:40

## 2025-01-10 RX ADMIN — MIDAZOLAM 0.5 MG: 1 INJECTION INTRAMUSCULAR; INTRAVENOUS at 15:07

## 2025-01-10 RX ADMIN — MIDAZOLAM 0.5 MG: 1 INJECTION INTRAMUSCULAR; INTRAVENOUS at 15:27

## 2025-01-10 RX ADMIN — Medication 10 ML: at 15:19

## 2025-01-10 NOTE — BRIEF OP NOTE (RAD/CATH)
INTERVENTIONAL RADIOLOGY PROCEDURE NOTE    Date: 1/10/2025    Procedure: IR PORT PLACEMENT     Preoperative diagnosis:   1. Malignant neoplasm of overlapping sites of bladder (HCC)       Postoperative diagnosis: Same.    Surgeon: Reji Kaufman MD     Assistant: None. No qualified resident was available.    Blood loss: minimal    Specimens: none    Findings: Successful port placement.  May use the port immediately.    Complications: None immediate.    Anesthesia: conscious sedation

## 2025-01-10 NOTE — SEDATION DOCUMENTATION
Pt in IR for Right chest port placement procedure performed by Dr. Kaufman. Pt tolerated procedure well. Right chest port placed and closed with sutures and exofin. IR bedrest start time 1600. Report given to SSC RN.

## 2025-01-10 NOTE — DISCHARGE INSTRUCTIONS
Discharge Diagnosis  DKA, type 1, not at goal (CMS/[facility])    Issues Requiring Follow-Up  Needs close follow up as he has diabetes with an A1C above goal, and recurrent DKA episodes.     Test Results Pending At Discharge  Pending Labs       Order Current Status    BLOOD GAS ARTERIAL FULL PANEL In process            Hospital Course  HPI:  15-year-old male with past medical history of Type 1 DM, moderate persistent asthma and G6PD deficiency who is presenting to the pediatric emergency department with chief concern of DKA.  Mom brought the child to the emergency department after he was lethargic, had an increased rate of breathing, abdominal pain.     The child reports that he has not been taking his insulin over the course the past 2 to 3 days because the refrigerator failed. Per mom, she does not think he has taken any insulin in weeks.     Patient seen by Endocrinology on 3/12 and was non-adherent to his insulin regimen at that time. A1c 14.    Meds- 28u Tresiba daily, albuterol as needed  Humalou small meals, 10u large meal and add 5u if glucose > 300  Uses dexacom    ED course:  Vitals- 36.3 // 111 // 24 // 144/96 // 98% in RA  PE- lethargy, Kussmaul breathing  Labs-    Venous gas: 6.86 // 23 // 46 //4.1 // BE -29 // Na 111 (corrected 116)// K > 10    CBC: 11.2 / 17.7 / 52.5 / 238    RFP: pending    Glucose: 372  Interventions- EKG, calcium gluconate, NSB    PICU course: (3/28-)  Patient arrived to PICU. Normal saline fluids were started at 1.5 maintenance per protocol. Due to concern for high potassium, stared insulin drip. Initially NPO while on insulin drip. No urine output after several hours on the floor. Patient transitioned to 1/2 NS with added electrolytes after four hours.  Overnight had no acute issues on the morning of the  he was transitioned to phase 2 of the DKA protocol given p.o. intake and subcu insulin with scheduled mealtime insulin with hypoglycemia order set in place.  Patient  Implanted Venous Access Port     WHAT YOU NEED TO KNOW:   An implanted venous access port is a device used to give treatments and take blood. It may also be called a central venous access device (CVAD). The port is a small container that is placed under your skin, usually in your upper chest. The port is attached to a catheter that enters a large vein.   DISCHARGE INSTRUCTIONS:   Resume your normal diet. Small sips of flat soda will help with mild nausea.  Prevent an infection:   Wash your hands often.  Use soap and water. Clean your hands before and after you care for your port. Remind everyone who cares for your port to wash their hands.   Check your skin for infection every day.  Look for redness, swelling, or fluid oozing from the port site.  Care for your port:   1. You may shower beginning 48 hours after procedure.     2.  Leave glue in place.    3. It is normal for some bruising to occur.    4. Use Tylenol for pain.    5. Limit use of arm on the side that your port was placed. Lift nothing heavier than 5 pounds for 1 week, and then gradually increase activity as tolerated.    6. DO NOT apply ointment, lotion or cream to port site until incision is healed. Allow glue to fall off. DO NOT attempt to peel glue from skin even it it begins to flake.     7. After the port incision is healed you may swim, bathe.  Notify the Interventional Radiologist if you have any of the followin. Fever above 101 F    2. Increased redness or swelling after 1st day.     3. Increased pain after 1st day.    4. Any sign of infection (drainage from port site, skin separation, hot to touch).    5. Persistent nausea or vomiting.    Contact Interventional Radiology at [phone] (PAUL PATIENTS: Contact Interventional Radiology at [phone]) (YONI PATIENTS: Contact Interventional Radiology at [phone]).   successfully passed this and was dispositioned to the endocrinology service.    During floor course, patient has remained asymptomatic with appropriate PO intake and output. His acidosis has resolved, and his electrolytes has been stable. We have started patient on Omnipod 5 with a new PDM device today, we walked them thru set up, and placed the dose settings, patient has received his lunch dose thru his pump. Patient is discharged with continued insulin delivery via Omnipod 5, with CGM, he will be followed soon at clinic this week.     Pertinent Physical Exam At Time of Discharge  Physical Exam  Constitutional: Alert and awake, no acute distress.   Eyes: EOMI   ENMT: Moist oral mucosa.    Respiratory/Thorax: No increased WOB.   Cardiovascular: Well perfused.  Neurological: Alert, age appropriate behavior.   Skin: Warm, well perfused.   Home Medications     Medication List      CHANGE how you take these medications     * insulin lispro 100 unit/mL injection; Commonly known as: HumaLOG;   Inject up to 100 units daily via insulin pump; What changed: Another   medication with the same name was changed. Make sure you understand how   and when to take each.   * insulin lispro 100 unit/mL injection; Commonly known as: HumaLOG; Use   up to 80 units per day as directed; What changed: how to take this, when   to take this, additional instructions  * This list has 2 medication(s) that are the same as other medications   prescribed for you. Read the directions carefully, and ask your doctor or   other care provider to review them with you.     CONTINUE taking these medications     albuterol 90 mcg/actuation inhaler; Inhale 2 puffs every 4 hours if   needed for wheezing (cough).   Baqsimi 3 mg/actuation spray,non-aerosol; Generic drug: glucagon   BD Alcohol Swabs pads, medicated; Generic drug: alcohol swabs; USE AS   DIRECTED 4 TO 6 TIMES DAILY FOR INJECTIONS   Daily-Benja (with folic acid) tablet; Generic drug: multivitamin    "Dexcom G6  misc; Generic drug: blood-glucose meter,continuous   Dexcom G6 Sensor device; Generic drug: blood-glucose sensor; CHANGE   SENSORS EVERY 10 DAYS FOR BLOOD GLUICOSE MONITORING   Dexcom G6 Transmitter device; Generic drug: blood-glucose transmitter   device; CHANGE TRANSMITTER EVERY 3 MONTHS FOR GLUCOSE MONITORING   * glucose 4 gram chewable tablet   * dextrose 15 gram/33 gram gel in packet   * Glutose-15 40 % gel oral gel; Generic drug: glucose   ibuprofen 100 mg/5 mL suspension   Ketostix strip; Generic drug: acetone (urine) test   melatonin 5 mg tablet   montelukast 5 mg chewable tablet; Commonly known as: Singulair   multivitamin with minerals tablet; Take 1 tablet by mouth once daily.   omeprazole 20 mg tablet,delayed release (DR/EC) EC tablet; Commonly   known as: PriLOSEC   Omnipod 5 G6 Intro Kit (Gen 5) cartridge; Generic drug: insulin pump   cart,auto,BT-cntr; 1 kit by Not Applicable route continuously.  USE PDM AS   DIRECTED TO DELIVER INSULIN   Omnipod 5 G6 Pods (Gen 5) cartridge; Generic drug: insulin pump   cart,automated,BT   OneTouch Delica Plus Lancet 33 gauge misc; Generic drug: lancets   OneTouch Verio Flex meter misc; Generic drug: blood-glucose meter   OneTouch Verio test strips strip; Generic drug: blood sugar diagnostic   pen needle, diabetic 32 gauge x 5/32\" needle; USE AS DIRECTED TO INJECT   INSULIN 4 TO 6 TIMES A DAY   polyethylene glycol 17 gram/dose powder; Commonly known as: Glycolax,   Miralax   Precision Xtra B-Ketone strip; Generic drug: ketone blood test  * This list has 3 medication(s) that are the same as other medications   prescribed for you. Read the directions carefully, and ask your doctor or   other care provider to review them with you.     STOP taking these medications     Tresiba FlexTouch U-100 100 unit/mL (3 mL) injection; Generic drug:   insulin degludec       Outpatient Follow-Up  Future Appointments   Date Time Provider Department Center   4/4/2024 " 11:10 AM Ester Guevara RN [id number] [facility]       Sara Newman MD

## 2025-01-13 NOTE — TELEPHONE ENCOUNTER
TK, [facility] with [facility] calling with concerns for Stage 2 pressure wound on SPT insertion site.    She states the SPT is in his skin fold and there is increased redness, she feels he may need Nystatin. Draining well urine is yellow and clear with white sediment, few drops of blood but that has cleared    Right nephrostomy tube she flushed and changed the dressing. There is a mild redness and swelling, small amount of white yellow drainage. She was able to flush well. Clear yellow urine out put with some white sediment    She is not with the patient now. She states he drinks a lot of water. Denies fever, chills. Patient complained of pain at SPT site when she touched it.     VNA goes twice weekly.     They need orders to flush nephrostomy and to change the dressing and if antibiotic ointment is advisable  2.   Wound Care orders for wound at SPT site.    Please advise    Ph. [phone] Fax [phone]

## 2025-01-14 ENCOUNTER — HOSPITAL ENCOUNTER (OUTPATIENT)
Dept: INFUSION CENTER | Facility: HOSPITAL | Age: 75
Discharge: HOME | End: 2025-01-14
Attending: INTERNAL MEDICINE
Payer: MEDICARE

## 2025-01-14 VITALS
DIASTOLIC BLOOD PRESSURE: 57 MMHG | TEMPERATURE: 97.7 F | HEART RATE: 56 BPM | RESPIRATION RATE: 16 BRPM | WEIGHT: 186.95 LBS | HEIGHT: 67 IN | BODY MASS INDEX: 29.34 KG/M2 | OXYGEN SATURATION: 94 % | SYSTOLIC BLOOD PRESSURE: 109 MMHG

## 2025-01-14 DIAGNOSIS — D70.1 CHEMOTHERAPY INDUCED NEUTROPENIA (HCC): Primary | ICD-10-CM

## 2025-01-14 DIAGNOSIS — T45.1X5A CHEMOTHERAPY INDUCED NEUTROPENIA (HCC): Primary | ICD-10-CM

## 2025-01-14 DIAGNOSIS — C67.8 MALIGNANT NEOPLASM OF OVERLAPPING SITES OF BLADDER (HCC): ICD-10-CM

## 2025-01-14 PROBLEM — T83.510A URINARY TRACT INFECTION ASSOCIATED WITH CYSTOSTOMY CATHETER  (HCC): Status: RESOLVED | Noted: 2024-09-23 | Resolved: 2025-01-14

## 2025-01-14 PROBLEM — N39.0 URINARY TRACT INFECTION ASSOCIATED WITH CYSTOSTOMY CATHETER  (HCC): Status: RESOLVED | Noted: 2024-09-23 | Resolved: 2025-01-14

## 2025-01-14 RX ORDER — SODIUM CHLORIDE 9 MG/ML
20 INJECTION, SOLUTION INTRAVENOUS ONCE
Status: COMPLETED | OUTPATIENT
Start: 2025-01-14 | End: 2025-01-14

## 2025-01-14 RX ADMIN — FOSAPREPITANT 150 MG: 150 INJECTION, POWDER, LYOPHILIZED, FOR SOLUTION INTRAVENOUS at 10:06

## 2025-01-14 RX ADMIN — DEXAMETHASONE SODIUM PHOSPHATE: 100 INJECTION INTRAMUSCULAR; INTRAVENOUS at 09:36

## 2025-01-14 RX ADMIN — CARBOPLATIN 200.4 MG: 600 INJECTION, SOLUTION INTRAVENOUS at 10:46

## 2025-01-14 RX ADMIN — ETOPOSIDE 157.6 MG: 20 INJECTION, SOLUTION INTRAVENOUS at 11:22

## 2025-01-14 RX ADMIN — SODIUM CHLORIDE 20 ML/HR: 0.9 INJECTION, SOLUTION INTRAVENOUS at 09:36

## 2025-01-14 NOTE — TELEPHONE ENCOUNTER
TK called back looking for the orders she requested for from yesterday. Please advise.    [facility]. [phone] Fax [phone]

## 2025-01-14 NOTE — TELEPHONE ENCOUNTER
- Apply Bacitracin to site 2x daily and cover with gauze sponge, change as needed  - Flush PCN with 10 cc NSS daily

## 2025-01-14 NOTE — PROGRESS NOTES
Pt tolerated treatment with no adv reactions; AVS given; s/s to report to physician reviewed with pt and daughter who verb understanding; pt left unit ambulatory with steady gait.

## 2025-01-15 ENCOUNTER — HOSPITAL ENCOUNTER (OUTPATIENT)
Dept: INFUSION CENTER | Facility: HOSPITAL | Age: 75
Discharge: HOME | End: 2025-01-15
Attending: INTERNAL MEDICINE
Payer: MEDICARE

## 2025-01-15 ENCOUNTER — RESULTS FOLLOW-UP (OUTPATIENT)
Dept: UROLOGY | Facility: AMBULATORY SURGERY CENTER | Age: 75
End: 2025-01-15

## 2025-01-15 VITALS
HEIGHT: 67 IN | HEART RATE: 56 BPM | RESPIRATION RATE: 18 BRPM | WEIGHT: 186.95 LBS | BODY MASS INDEX: 29.34 KG/M2 | TEMPERATURE: 97.8 F | SYSTOLIC BLOOD PRESSURE: 130 MMHG | DIASTOLIC BLOOD PRESSURE: 60 MMHG | OXYGEN SATURATION: 94 %

## 2025-01-15 DIAGNOSIS — D70.1 CHEMOTHERAPY INDUCED NEUTROPENIA (HCC): Primary | ICD-10-CM

## 2025-01-15 DIAGNOSIS — T45.1X5A CHEMOTHERAPY INDUCED NEUTROPENIA (HCC): Primary | ICD-10-CM

## 2025-01-15 DIAGNOSIS — C67.8 MALIGNANT NEOPLASM OF OVERLAPPING SITES OF BLADDER (HCC): ICD-10-CM

## 2025-01-15 RX ORDER — SODIUM CHLORIDE 9 MG/ML
20 INJECTION, SOLUTION INTRAVENOUS ONCE
Status: COMPLETED | OUTPATIENT
Start: 2025-01-15 | End: 2025-01-15

## 2025-01-15 RX ADMIN — DEXAMETHASONE SODIUM PHOSPHATE: 100 INJECTION INTRAMUSCULAR; INTRAVENOUS at 12:23

## 2025-01-15 RX ADMIN — SODIUM CHLORIDE 20 ML/HR: 9 INJECTION, SOLUTION INTRAVENOUS at 12:22

## 2025-01-15 RX ADMIN — ETOPOSIDE 157.6 MG: 20 INJECTION, SOLUTION INTRAVENOUS at 13:05

## 2025-01-15 NOTE — PROGRESS NOTES
Toro Rodriguez  tolerated treatment well with no complications.      Toro Rodriguez is aware of future appt on 1/16 at 12 pm.     AVS printed and given to Toro Rodriguez:  Yes

## 2025-01-16 ENCOUNTER — HOSPITAL ENCOUNTER (OUTPATIENT)
Dept: INFUSION CENTER | Facility: HOSPITAL | Age: 75
Discharge: HOME | End: 2025-01-16
Attending: INTERNAL MEDICINE
Payer: MEDICARE

## 2025-01-16 ENCOUNTER — PATIENT OUTREACH (OUTPATIENT)
Dept: CASE MANAGEMENT | Facility: HOSPITAL | Age: 75
End: 2025-01-16

## 2025-01-16 ENCOUNTER — TELEPHONE (OUTPATIENT)
Dept: UROLOGY | Facility: AMBULATORY SURGERY CENTER | Age: 75
End: 2025-01-16

## 2025-01-16 VITALS
HEIGHT: 67 IN | SYSTOLIC BLOOD PRESSURE: 120 MMHG | DIASTOLIC BLOOD PRESSURE: 61 MMHG | WEIGHT: 186.95 LBS | OXYGEN SATURATION: 95 % | RESPIRATION RATE: 17 BRPM | TEMPERATURE: 97.3 F | HEART RATE: 54 BPM | BODY MASS INDEX: 29.34 KG/M2

## 2025-01-16 DIAGNOSIS — D70.1 CHEMOTHERAPY INDUCED NEUTROPENIA (HCC): Primary | ICD-10-CM

## 2025-01-16 DIAGNOSIS — C67.8 MALIGNANT NEOPLASM OF OVERLAPPING SITES OF BLADDER (HCC): ICD-10-CM

## 2025-01-16 DIAGNOSIS — T45.1X5A CHEMOTHERAPY INDUCED NEUTROPENIA (HCC): Primary | ICD-10-CM

## 2025-01-16 PROCEDURE — 96377 APPLICATON ON-BODY INJECTOR: CPT

## 2025-01-16 PROCEDURE — 96367 TX/PROPH/DG ADDL SEQ IV INF: CPT

## 2025-01-16 PROCEDURE — 96413 CHEMO IV INFUSION 1 HR: CPT

## 2025-01-16 RX ORDER — SODIUM CHLORIDE 9 MG/ML
20 INJECTION, SOLUTION INTRAVENOUS ONCE
Status: COMPLETED | OUTPATIENT
Start: 2025-01-16 | End: 2025-01-16

## 2025-01-16 RX ADMIN — DEXAMETHASONE SODIUM PHOSPHATE: 100 INJECTION INTRAMUSCULAR; INTRAVENOUS at 12:21

## 2025-01-16 RX ADMIN — SODIUM CHLORIDE 20 ML/HR: 0.9 INJECTION, SOLUTION INTRAVENOUS at 12:21

## 2025-01-16 RX ADMIN — ETOPOSIDE 157.6 MG: 20 INJECTION, SOLUTION INTRAVENOUS at 12:50

## 2025-01-16 RX ADMIN — PEGFILGRASTIM 6 MG: KIT SUBCUTANEOUS at 13:58

## 2025-01-16 NOTE — TELEPHONE ENCOUNTER
Called patient and spoke to daughter to inform them the  Urodynamics shows detrusor dysfunction. As well as to keep follow up April 2025 as scheduled for cystoscopy. Patients daughter is agreeable to this and will relay to patient.

## 2025-01-16 NOTE — PROGRESS NOTES
Biopsychosocial and Barriers Assessment    Type of Cancer: Malignant neoplasm of overlapping sites of bladder   Treatment plan: chemotherapy and radiation  Noted barriers to care: None  Cultural/Shinto concerns:  None  Hair Loss/ Wig resources needed: N/A    DT completed: Yes  DT score: 0/10  Issues noted: pain, loss of ability, worry, anxiety, sadness, depression, loss of interest/enjoyment, fear    Marital status/Lives with: Pt lives in an AL  Pt's support system: Daughter, staff at facility and roommate  Mental Health history: None reported  Substance Abuse: None reported    Employment/income source: SSI  Concerns with bills (treatment vs household): None reported  Noted issues with home: None    Narrative note:     LSW met with the pt in the infusion center this day.  The role of the  was described and explained and contact information was provided.  The pt lives in [facility] and this has been his living arrangement since 2021 when he began to require more care and it was unsafe for him to live alone.  Pt has 2 children, a son who is severely Autistic and lives in a group home and his daughter who lives in the [facility].  The pt's daughter brings him to all of his appointments.  He shared that he misses having the ability to have his independence but recognizes that he is not able to care for himself.  Pt states that he has a history of falls and feels safer knowing that he has care.  He has a roommate who he describes as a good support to him.  The roommate also has cancer and they are able to share how they feel with regards to their health, with one another.  The pt appears to have a close relationship with his daughter and expressed feeling well supported.  Pt does not indicate any social work needs at this time.  LSW will be available to the pt should any needs arise.  Emotional support provided.

## 2025-01-16 NOTE — PROGRESS NOTES
Pt tolerated chemotherapy infusion without any adverse reactions. Port flushed and de-accessed. Neulasta On-Pro applied to right arm, green light flashing. Pt aware of removal time. Pt ambulated out of unit with a steady gait and the use of his walker. AVS printed and given to pt.     Aware of next appt 02/04/25 @ 0900 am

## 2025-01-21 ENCOUNTER — TELEPHONE (OUTPATIENT)
Age: 75
End: 2025-01-21

## 2025-01-21 ENCOUNTER — OFFICE VISIT (OUTPATIENT)
Age: 75
End: 2025-01-21
Payer: MEDICARE

## 2025-01-21 VITALS
OXYGEN SATURATION: 95 % | WEIGHT: 188 LBS | HEIGHT: 61 IN | SYSTOLIC BLOOD PRESSURE: 142 MMHG | RESPIRATION RATE: 16 BRPM | BODY MASS INDEX: 35.5 KG/M2 | HEART RATE: 75 BPM | TEMPERATURE: 98.1 F | DIASTOLIC BLOOD PRESSURE: 56 MMHG

## 2025-01-21 DIAGNOSIS — Z93.59 SUPRAPUBIC CATHETER (HCC): ICD-10-CM

## 2025-01-21 DIAGNOSIS — C79.51 METASTASIS TO BONE (HCC): ICD-10-CM

## 2025-01-21 DIAGNOSIS — C67.8 MALIGNANT NEOPLASM OF OVERLAPPING SITES OF BLADDER (HCC): Primary | ICD-10-CM

## 2025-01-21 DIAGNOSIS — Z93.6 NEPHROSTOMY STATUS (HCC): ICD-10-CM

## 2025-01-21 PROCEDURE — 99214 OFFICE O/P EST MOD 30 MIN: CPT | Performed by: INTERNAL MEDICINE

## 2025-01-21 PROCEDURE — G2211 COMPLEX E/M VISIT ADD ON: HCPCS | Performed by: INTERNAL MEDICINE

## 2025-01-21 RX ORDER — SODIUM CHLORIDE 9 MG/ML
20 INJECTION, SOLUTION INTRAVENOUS ONCE
OUTPATIENT
Start: 2025-02-04

## 2025-01-21 RX ORDER — SODIUM CHLORIDE 9 MG/ML
20 INJECTION, SOLUTION INTRAVENOUS ONCE
OUTPATIENT
Start: 2025-02-05

## 2025-01-21 RX ORDER — SODIUM CHLORIDE 9 MG/ML
20 INJECTION, SOLUTION INTRAVENOUS ONCE
OUTPATIENT
Start: 2025-02-06

## 2025-01-21 NOTE — PROGRESS NOTES
Name: Toro Rodriguez      : 1950      MRN: [id number]  Encounter Provider: Juan Carlos Foy MD  Encounter Date: 2025   Encounter department: [facility]  :  Assessment & Plan  Malignant neoplasm of overlapping sites of bladder (HCC)  Patient with small cell carcinoma of the bladder  Reviewed PET-CT images and report with patient and family. Unfortunately he has more widespread disease with involvement of liver, bones and pelvic nodes, lung metastasis.   Currently on first-line treatment with carboplatin and etoposide.  S/p first cycle on 2025.  Tolerated procedure well without any major side effects.  Labs from January 10, 2025 showed improving creatinine and a stable hemoglobin.  Proceed with cycle 2 as scheduled with same dose reduction - etoposide at 80% and carboplatin with AUC of 3.  Obtain baseline MRI brain prior to next visit. Of note, PET-CT concerning for possible brain metastasis.   Pt had an appointment with rad onc tomorrow that he does not need at this time. Discussed with Dr. Alberto and cancelled appointment.  Overall prognosis is guarded. Will hold off on palliative care appointment for now. Will discuss at next visit     Metastasis to bone (HCC)  Discussed bone modifying agent with zometa.   Pt will work on getting a dental clearance  Side effects of drug discussed today.    Suprapubic catheter (HCC)  Continue follow up with urology    Nephrostomy status (HCC)  Has f/u with IR on 3/24 for tube check/change  Cr is improving        History of Present Illness   Chief Complaint   Patient presents with    Follow-up     Toro Rodriguez is a 74 y.o. male with multiple comorbidities including hypertension, coronary artery disease, CHF, history of DVT, cardiomyopathy,  BPH, stage III CKD, history of melanoma, who had been referred for new diagnosis of bladder cancer. Patient is a former smoker, quit at the age of 33.    He had a CT  abdomen and pelvis in April and July 2024 due to hematuria and pelvic pain, which were unremarkable.    CT abdomen from September 5 showed nonspecific diffuse bladder wall thickening and subsequently right hydroureteronephrosis. He had a suprapubic catheter placed on September 27, 2024. Repeat CT from October 31 showed a possible soft tissue mass on the right side of the bladder.    He had a CT abdomen and pelvis with contrast from November 10, 2024 which revealed a 4 cm soft tissue density within the right bladder wall.  He has had multiple CT scans since.     Patient presented with gross hematuria and lower abdominal pain on December 15. He required PRBC support.  Eliquis, which he had been for prior DVT, but stopped 2 months prior to admission.  During this admission, he required a Colon placement and CBI.  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    Renal ultrasound from December 17 showed moderate right hydronephrosis.  CT renal from December 20 showed persistent right-sided moderate hydronephrosis and hydroureter up to the right ureterovesical junction. His creatinine worsened during the admission, and a nephrostomy tube was placed on December 24. He also underwent a suprapubic catheter placement.    CT chest without contrast from December 23 showed solid 0.8 cm and a 1 cm nodule in the right lower lobe which were new from last year.  Another solid 0.3 cm right upper lobe nodule.    ECHO from February 2024 showed an ejection fraction of 35 to 40%.  Patient is on beta-blocker and Lasix.  Lisinopril has been on hold due to acute kidney injury.  He has had multiple UTIs so far.    PET-CT 1/9/25 -suggestive of more widespread ds including multiple bones, liver, lungs, pelvic nodes and possible brain metatasis    PORT placed 1/10/25.  He was started on Carboplatin and etosposide on  1/14/2025 for small cell bladder cancer.      Pertinent Medical History     He is here today with his daughter.  He has a walker. Denies any falls  Eating well. Had nausea on day of chemo, resolved with anti-emetics  He currently lives in an assisted living facility, and he requires help with ADLs and occasionally IADLs.  Urine has cleared since discharge.  Diffuse joint pains that are chronic    Oncology History   Cancer Staging   Melanoma of back (HCC)  Staging form: Melanoma of the Skin, AJCC 8th Edition  - Pathologic: Stage Unknown (pT1a, pNX, cM0) - Signed by Lillie La MD on 4/3/2024  Oncology History Overview Note   Small cell bladder cancer with metastasis to bones, liver, lungs, nodes  Carboplatin/etoposide 1/14/25- current     Melanoma of back (HCC)   1/31/2024 Biopsy    B. Skin, left lower back, shave biopsy:     MELANOMA (thickness: at least 0.7 mm) (see note).     Note: SOX10, MART, and PRAME immunostains were reviewed; significant melanocytic architectural disorder is seen, and the lesional cells are positive for PRAME. Please see synoptic report for additional details.     Synoptic report for melanoma of the skin  Thickness: at least 0.7 mm (invasive melanoma extends to deep specimen margin)  Ulceration: not seen  Anatomic (Lukas) level: at least IV  Type: superficial spreading melanoma  Mitoses: 2/mm2  Microsatellites: cannot be determined  Lymphovascular invasion: not seen  Neurotropism: not seen  Tumor regression: present  Tumor-infiltrating lymphocytes (TIL): present, non-brisk  Margin assessment: invasive melanoma extends to deep specimen margin; melanoma in situ extends to peripheral specimen margin  Pathologic stage: at least pT1a  Associated nevus: dermal melanocytic nevus     2/20/2024 Initial Diagnosis    Melanoma of back (HCC)     3/18/2024 Surgery    A. Skin, back, excision:     -Residual focal melanoma (thickness: 0.7 mm); not seen at examined inked specimen margins.     -Prior  procedure site changes present.     4/3/2024 -  Cancer Staged    Staging form: Melanoma of the Skin, AJCC 8th Edition  - Pathologic: Stage Unknown (pT1a, pNX, cM0) - Signed by Lillie La MD on 4/3/2024       Malignant neoplasm of overlapping sites of bladder (HCC)   12/15/2024 Surgery    Final Diagnosis   A. 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  - Tumor invades muscularis propria.      Comment: No evidence of conventional urothelial carcinoma is present.        12/31/2024 Initial Diagnosis    Malignant neoplasm of overlapping sites of bladder (HCC)     1/14/2025 -  Chemotherapy    alteplase (CATHFLO), 2 mg, Intracatheter, Every 1 Minute as needed, 1 of 6 cycles  pegfilgrastim (NEULASTA ONPRO), 6 mg, Subcutaneous, Once, 1 of 6 cycles  Administration: 6 mg (1/16/2025)  fosaprepitant (EMEND) IVPB, 150 mg, Intravenous, Once, 1 of 6 cycles  Administration: 150 mg (1/14/2025)  etoposide (TOPOSAR), 80 mg/m2 = 157.6 mg (80 % of original dose 100 mg/m2), Intravenous, Once, 1 of 6 cycles  Dose modification: 80 mg/m2 (80 % of original dose 100 mg/m2, Cycle 1, Reason: Dose Not Tolerated)  Administration: 157.6 mg (1/14/2025), 157.6 mg (1/15/2025), 157.6 mg (1/16/2025)  CARBOplatin (PARAPLATIN) IVPB (GOG AUC DOSING), 200.4 mg, Intravenous, Once, 1 of 6 cycles  Administration: 200.4 mg (1/14/2025)        Review of Systems   Constitutional:  Positive for fatigue. Negative for fever and unexpected weight change.   HENT:  Negative for mouth sores.    Respiratory: Negative.  Negative for cough and shortness of breath.    Cardiovascular: Negative.  Negative for chest pain and leg swelling.   Gastrointestinal:  Positive for nausea. Negative for abdominal pain and vomiting.   Genitourinary:  Negative for hematuria.   Musculoskeletal:  Positive for arthralgias.   Skin:  Negative for rash.   Neurological:  Positive for weakness. Negative for  dizziness, syncope, light-headedness, numbness and headaches.     Current Outpatient Medications on File Prior to Visit   Medication Sig Dispense Refill    acetaminophen (TYLENOL) 325 mg tablet Take 325 mg by mouth every 6 (six) hours as needed for mild pain      amiodarone 200 mg tablet Take 1 tablet (200 mg total) by mouth daily 90 tablet 2    amLODIPine (NORVASC) 5 mg tablet Take 1 tablet (5 mg total) by mouth daily 90 tablet 1    aspirin 81 mg chewable tablet Chew 1 tablet (81 mg total) daily Do not start before September 30, 2024.      atorvastatin (LIPITOR) 40 mg tablet Take 1 tablet (40 mg total) by mouth daily with dinner  0    furosemide (LASIX) 20 mg tablet Take 1 tablet (20 mg total) by mouth daily 30 tablet 5    lidocaine-prilocaine (EMLA) cream Apply topically as needed for mild pain 30 g 1    [Paused] lisinopril (ZESTRIL) 5 mg tablet Take 1 tablet (5 mg total) by mouth daily Do not start before November 4, 2024.      nitroglycerin (NITROSTAT) 0.4 mg SL tablet Place 1 tablet (0.4 mg total) under the tongue every 5 (five) minutes as needed for chest pain      ondansetron (ZOFRAN) 8 mg tablet Take 1 tablet (8 mg total) by mouth every 8 (eight) hours as needed for nausea or vomiting 60 tablet 0    polyethylene glycol (MIRALAX) 17 g packet Take 17 g by mouth daily 30 each 2    prochlorperazine (COMPAZINE) 10 mg tablet Take 1 tablet (10 mg total) by mouth every 6 (six) hours as needed for nausea or vomiting 30 tablet 0    sertraline (ZOLOFT) 25 mg tablet Take 1 tablet (25 mg total) by mouth daily Do not start before December 26, 2024. 30 tablet 0    sodium chloride, PF, 0.9 % 10 mL by Intracatheter route daily Intracatheter flushing daily. May substitute prefilled syringe with normal saline 10 mL vials, 10 mL syringes, and 18 g blunt needles 300 mL 0    metoprolol succinate (TOPROL-XL) 25 mg 24 hr tablet Take 1 tablet (25 mg total) by mouth every 12 (twelve) hours 60 tablet 0     No current  "facility-administered medications on file prior to visit.      Social History     Tobacco Use    Smoking status: Former     Types: Cigarettes     Start date:      Quit date: 2     Years since quittin.0    Smokeless tobacco: Never    Tobacco comments:     Quit over 30 years ago (Updated 2023).    Vaping Use    Vaping status: Never Used   Substance and Sexual Activity    Alcohol use: Not Currently    Drug use: Never    Sexual activity: Not on file         Objective   /56 (BP Location: Left arm, Patient Position: Sitting, Cuff Size: Adult)   Pulse 75   Temp 98.1 °F (36.7 °C) (Temporal)   Resp 16   Ht 5' 1.01\" (1.55 m)   Wt 85.3 kg (188 lb)   SpO2 95%   BMI 35.51 kg/m²     ECOG ECOG Performance Status: 1 - Restricted in physically strenuous activity but ambulatory and able to carry out work of a light or sedentary nature, e.g., light house work, office work 2  Physical Exam  Vitals reviewed.   Constitutional:       Appearance: He is obese.   HENT:      Mouth/Throat:      Mouth: Mucous membranes are moist.      Pharynx: Oropharynx is clear.   Cardiovascular:      Rate and Rhythm: Normal rate and regular rhythm.      Pulses: Normal pulses.      Heart sounds: No murmur heard.  Pulmonary:      Effort: Pulmonary effort is normal. No respiratory distress.      Breath sounds: Normal breath sounds. No wheezing.   Abdominal:      Palpations: Abdomen is soft. There is no mass.      Comments: Suprapubic catheter in place, along with nephrostomy tube   Musculoskeletal:         General: No swelling.      Cervical back: Neck supple.   Skin:     Coloration: Skin is pale.   Neurological:      General: No focal deficit present.      Mental Status: He is alert. Mental status is at baseline.         Labs: I have reviewed the following labs:  Lab Results   Component Value Date/Time    WBC 5.51 01/10/2025 12:43 PM    RBC 3.43 (L) 01/10/2025 12:43 PM    Hemoglobin 10.4 (L) 01/10/2025 12:43 PM    Hematocrit 32.9 " (L) 01/10/2025 12:43 PM    MCV 96 01/10/2025 12:43 PM    MCH 30.3 01/10/2025 12:43 PM    RDW 16.5 (H) 01/10/2025 12:43 PM    Platelets 318 01/10/2025 12:43 PM    Segmented % 60 01/10/2025 12:43 PM    Lymphocytes % 22 01/10/2025 12:43 PM    Monocytes % 11 01/10/2025 12:43 PM    Eosinophils Relative 5 01/10/2025 12:43 PM    Basophils Relative 1 01/10/2025 12:43 PM    Immature Grans % 1 01/10/2025 12:43 PM    Absolute Neutrophils 3.33 01/10/2025 12:43 PM     Lab Results   Component Value Date/Time    Potassium 4.4 01/10/2025 12:43 PM    Chloride 107 01/10/2025 12:43 PM    CO2 26 01/10/2025 12:43 PM    BUN 31 (H) 01/10/2025 12:43 PM    Creatinine 1.57 (H) 01/10/2025 12:43 PM    Glucose, Fasting 98 01/10/2025 12:43 PM    Calcium 8.7 01/10/2025 12:43 PM    Corrected Calcium 8.5 12/15/2024 02:51 AM    AST 20 01/10/2025 12:43 PM    ALT 15 01/10/2025 12:43 PM    Alkaline Phosphatase 69 01/10/2025 12:43 PM    Total Protein 6.6 01/10/2025 12:43 PM    Albumin 3.5 01/10/2025 12:43 PM    Total Bilirubin 0.42 01/10/2025 12:43 PM    eGFR 42 01/10/2025 12:43 PM         Radiology Results Review: I personally reviewed the following image studies in PACS and associated radiology reports: CT chest and CT abdomen/pelvis. My interpretation of the radiology images/reports is: as noted above..    Administrative Statements   I have spent a total time of 30 minutes in caring for this patient on the day of the visit/encounter including Diagnostic results, Prognosis, Patient and family education, Counseling / Coordination of care, Documenting in the medical record, Reviewing / ordering tests, medicine, procedures  , and Obtaining or reviewing history  . Topics discussed with the patient / family include symptom assessment and management, goals of care, supportive listening, and anticipatory guidance.

## 2025-01-21 NOTE — ASSESSMENT & PLAN NOTE
Patient with small cell carcinoma of the bladder  Reviewed PET-CT images and report with patient and family. Unfortunately he has more widespread disease with involvement of liver, bones and pelvic nodes, lung metastasis.   Currently on first-line treatment with carboplatin and etoposide.  S/p first cycle on January 14, 2025.  Tolerated procedure well without any major side effects.  Labs from January 10, 2025 showed improving creatinine and a stable hemoglobin.  Proceed with cycle 2 as scheduled with same dose reduction - etoposide at 80% and carboplatin with AUC of 3.  Obtain baseline MRI brain prior to next visit. Of note, PET-CT concerning for possible brain metastasis.   Pt had an appointment with rad onc tomorrow that he does not need at this time. Discussed with Dr. Alberto and cancelled appointment.  Overall prognosis is guarded. Will hold off on palliative care appointment for now. Will discuss at next visit

## 2025-01-21 NOTE — TELEPHONE ENCOUNTER
Called patient and spoke to daughterShanique  to advise the MRI has been scheduled for 2/17 at 12N at .  It will be port access.  Date and time is fine for patient.

## 2025-01-21 NOTE — ASSESSMENT & PLAN NOTE
Discussed bone modifying agent with zometa.   Pt will work on getting a dental clearance  Side effects of drug discussed today.

## 2025-01-23 ENCOUNTER — TELEPHONE (OUTPATIENT)
Age: 75
End: 2025-01-23

## 2025-01-23 NOTE — TELEPHONE ENCOUNTER
Call received by Shanique.     Per Shanique patient needs to have a dental clearance before starting medication.     Per Shanique [employer] are asking if there is a form that needs to be sent to have this completed or is it a verbal clearance.     [facility] fax number is [phone]. Shanique is also asking to send forms via [employer] dental office doesn't receive them.     Shanique will also like a call back with update.     Reviewed communication consent and Shanique is not listed. Shanique is patients emergency contact and daughter.         Thank you!

## 2025-01-24 NOTE — TELEPHONE ENCOUNTER
Dental clearance letter faxed and uploaded to [employer]. Called and spoke to Shanique to make her aware. He has a dentist appointment on Monday.

## 2025-01-24 NOTE — TELEPHONE ENCOUNTER
Heaven from [facility] was looking for information about the patient SPT change. I advised the patient has an appointment on 2/3/2025 with our office for a change. She is requesting for orders to be faxed to her after the appointment so she can maintain the catheter at home for the patient. Please advise.      # [phone]  Fax [phone]

## 2025-01-27 ENCOUNTER — TELEPHONE (OUTPATIENT)
Age: 75
End: 2025-01-27

## 2025-01-27 ENCOUNTER — TELEPHONE (OUTPATIENT)
Dept: PALLIATIVE MEDICINE | Facility: CLINIC | Age: 75
End: 2025-01-27

## 2025-01-27 NOTE — TELEPHONE ENCOUNTER
Spoke with pt daughter (Shanique) to reschedule pt consult with . Provider will be out of the office. Pt daughter will return call later to reschedule appt.

## 2025-01-27 NOTE — TELEPHONE ENCOUNTER
Left message for daughter Shanique informing her FMLA forms are completed and will be uploaded as an attachment in a my chart message. I will also be uploading a Release of Health Information form to be signed by dad so it is on file to send any additional notes to your employer regarding your FMLA leave. Once completed you can uploaded into [employer] chart.  Provided call back number for any questions/concerns [phone].

## 2025-01-29 ENCOUNTER — TELEPHONE (OUTPATIENT)
Age: 75
End: 2025-01-29

## 2025-01-29 NOTE — TELEPHONE ENCOUNTER
Pt's daughter said pt can't be cleared by his dentist until all his teeth are extracted and he gets dentures. She said it would be about 2month. She asked if it was ok to wait that long

## 2025-01-30 ENCOUNTER — PATIENT MESSAGE (OUTPATIENT)
Dept: UROLOGY | Facility: CLINIC | Age: 75
End: 2025-01-30

## 2025-01-30 NOTE — TELEPHONE ENCOUNTER
Discussed with Dr. Foy. We are sending over the office note with a request to expedite dental work so that Toro can proceed with zometa. Fax sent to [facility] at [phone].

## 2025-02-03 ENCOUNTER — PATIENT OUTREACH (OUTPATIENT)
Dept: HEMATOLOGY ONCOLOGY | Facility: CLINIC | Age: 75
End: 2025-02-03

## 2025-02-03 ENCOUNTER — APPOINTMENT (OUTPATIENT)
Dept: LAB | Facility: CLINIC | Age: 75
End: 2025-02-03
Payer: MEDICARE

## 2025-02-03 ENCOUNTER — PROCEDURE VISIT (OUTPATIENT)
Dept: UROLOGY | Facility: CLINIC | Age: 75
End: 2025-02-03
Payer: MEDICARE

## 2025-02-03 VITALS
WEIGHT: 186 LBS | SYSTOLIC BLOOD PRESSURE: 160 MMHG | HEART RATE: 65 BPM | OXYGEN SATURATION: 94 % | DIASTOLIC BLOOD PRESSURE: 70 MMHG | BODY MASS INDEX: 35.12 KG/M2 | HEIGHT: 61 IN

## 2025-02-03 DIAGNOSIS — T45.1X5A CHEMOTHERAPY INDUCED NEUTROPENIA (HCC): ICD-10-CM

## 2025-02-03 DIAGNOSIS — N39.0 URINARY TRACT INFECTION ASSOCIATED WITH INDWELLING URETHRAL CATHETER, SUBSEQUENT ENCOUNTER: Primary | ICD-10-CM

## 2025-02-03 DIAGNOSIS — C67.8 MALIGNANT NEOPLASM OF OVERLAPPING SITES OF BLADDER (HCC): ICD-10-CM

## 2025-02-03 DIAGNOSIS — N17.9 AKI (ACUTE KIDNEY INJURY) (HCC): ICD-10-CM

## 2025-02-03 DIAGNOSIS — D70.1 CHEMOTHERAPY INDUCED NEUTROPENIA (HCC): ICD-10-CM

## 2025-02-03 DIAGNOSIS — E83.9 CHRONIC KIDNEY DISEASE-MINERAL BONE DISORDER (CKD-MBD) WITH STAGE 3B CHRONIC KIDNEY DISEASE (HCC): ICD-10-CM

## 2025-02-03 DIAGNOSIS — T83.511D URINARY TRACT INFECTION ASSOCIATED WITH INDWELLING URETHRAL CATHETER, SUBSEQUENT ENCOUNTER: Primary | ICD-10-CM

## 2025-02-03 DIAGNOSIS — R33.9 URINARY RETENTION: ICD-10-CM

## 2025-02-03 DIAGNOSIS — N18.32 CHRONIC KIDNEY DISEASE-MINERAL BONE DISORDER (CKD-MBD) WITH STAGE 3B CHRONIC KIDNEY DISEASE (HCC): ICD-10-CM

## 2025-02-03 DIAGNOSIS — M89.9 CHRONIC KIDNEY DISEASE-MINERAL BONE DISORDER (CKD-MBD) WITH STAGE 3B CHRONIC KIDNEY DISEASE (HCC): ICD-10-CM

## 2025-02-03 LAB
ALBUMIN SERPL BCG-MCNC: 3.3 G/DL (ref 3.5–5)
ALP SERPL-CCNC: 68 U/L (ref 34–104)
ALT SERPL W P-5'-P-CCNC: 15 U/L (ref 7–52)
ANION GAP SERPL CALCULATED.3IONS-SCNC: 10 MMOL/L (ref 4–13)
ANISOCYTOSIS BLD QL SMEAR: PRESENT
AST SERPL W P-5'-P-CCNC: 15 U/L (ref 13–39)
BASOPHILS # BLD MANUAL: 0.12 THOUSAND/UL (ref 0–0.1)
BASOPHILS NFR MAR MANUAL: 1 % (ref 0–1)
BILIRUB SERPL-MCNC: 0.32 MG/DL (ref 0.2–1)
BUN SERPL-MCNC: 22 MG/DL (ref 5–25)
CALCIUM ALBUM COR SERPL-MCNC: 9 MG/DL (ref 8.3–10.1)
CALCIUM SERPL-MCNC: 8.4 MG/DL (ref 8.4–10.2)
CHLORIDE SERPL-SCNC: 103 MMOL/L (ref 96–108)
CO2 SERPL-SCNC: 27 MMOL/L (ref 21–32)
CREAT SERPL-MCNC: 1.52 MG/DL (ref 0.6–1.3)
EOSINOPHIL # BLD MANUAL: 0 THOUSAND/UL (ref 0–0.4)
EOSINOPHIL NFR BLD MANUAL: 0 % (ref 0–6)
ERYTHROCYTE [DISTWIDTH] IN BLOOD BY AUTOMATED COUNT: 16.2 % (ref 11.6–15.1)
GFR SERPL CREATININE-BSD FRML MDRD: 43 ML/MIN/1.73SQ M
GLUCOSE SERPL-MCNC: 75 MG/DL (ref 65–140)
HCT VFR BLD AUTO: 31 % (ref 36.5–49.3)
HGB BLD-MCNC: 9.8 G/DL (ref 12–17)
LYMPHOCYTES # BLD AUTO: 1.56 THOUSAND/UL (ref 0.6–4.47)
LYMPHOCYTES # BLD AUTO: 13 % (ref 14–44)
MCH RBC QN AUTO: 30.2 PG (ref 26.8–34.3)
MCHC RBC AUTO-ENTMCNC: 31.6 G/DL (ref 31.4–37.4)
MCV RBC AUTO: 96 FL (ref 82–98)
MONOCYTES # BLD AUTO: 0.48 THOUSAND/UL (ref 0–1.22)
MONOCYTES NFR BLD: 4 % (ref 4–12)
MYELOCYTE ABSOLUTE CT: 0.24 THOUSAND/UL (ref 0–0.1)
MYELOCYTES NFR BLD MANUAL: 2 % (ref 0–1)
NEUTROPHILS # BLD MANUAL: 9.46 THOUSAND/UL (ref 1.85–7.62)
NEUTS BAND NFR BLD MANUAL: 1 % (ref 0–8)
NEUTS SEG NFR BLD AUTO: 78 % (ref 43–75)
OVALOCYTES BLD QL SMEAR: PRESENT
PLATELET # BLD AUTO: 690 THOUSANDS/UL (ref 149–390)
PLATELET BLD QL SMEAR: ABNORMAL
PLATELET CLUMP BLD QL SMEAR: PRESENT
PMV BLD AUTO: 9.5 FL (ref 8.9–12.7)
POIKILOCYTOSIS BLD QL SMEAR: PRESENT
POLYCHROMASIA BLD QL SMEAR: PRESENT
POTASSIUM SERPL-SCNC: 3.8 MMOL/L (ref 3.5–5.3)
PROMYELOCYTE ABSOLUTE CT: 0.12 THOUSAND/UL (ref 0–0)
PROMYELOCYTES NFR BLD MANUAL: 1 % (ref 0–0)
PROT SERPL-MCNC: 6.3 G/DL (ref 6.4–8.4)
RBC # BLD AUTO: 3.24 MILLION/UL (ref 3.88–5.62)
RBC MORPH BLD: PRESENT
SODIUM SERPL-SCNC: 140 MMOL/L (ref 135–147)
WBC # BLD AUTO: 11.98 THOUSAND/UL (ref 4.31–10.16)

## 2025-02-03 PROCEDURE — 80053 COMPREHEN METABOLIC PANEL: CPT

## 2025-02-03 PROCEDURE — 52000 CYSTOURETHROSCOPY: CPT | Performed by: PHYSICIAN ASSISTANT

## 2025-02-03 PROCEDURE — 85027 COMPLETE CBC AUTOMATED: CPT

## 2025-02-03 PROCEDURE — 85007 BL SMEAR W/DIFF WBC COUNT: CPT

## 2025-02-03 PROCEDURE — 36415 COLL VENOUS BLD VENIPUNCTURE: CPT

## 2025-02-03 PROCEDURE — 99213 OFFICE O/P EST LOW 20 MIN: CPT | Performed by: PHYSICIAN ASSISTANT

## 2025-02-03 RX ORDER — LEVOFLOXACIN 250 MG/1
250 TABLET, FILM COATED ORAL DAILY
Qty: 5 TABLET | Refills: 0 | Status: SHIPPED | OUTPATIENT
Start: 2025-02-03 | End: 2025-02-08

## 2025-02-03 NOTE — PROGRESS NOTES
I reached out and spoke with Toro's daughter Shanique  to follow up and to review for any changes in barriers to care and offer supportive services as needed.    Barriers noted previously and outcome of interventions;  N/a    Reviewed for any new barriers as noted below.    Are you having any side effects from your treatment?Yes, describe: nausea- has medication that is helping , fatigued, tired     Are you eating and drinking normally? yes    Have you been experiencing any uncontrolled pain related to your cancer diagnosis? No    If not already established, have needs changed for a palliative care referral? established has an appt on 2/20    Do you have a good support system? Yes     Are you interested in any support groups? Support groups provided on 1/6/25    How are you doing with transportation to your appointments? Good    Do you have any questions or concerns regarding your treatment plan? No    Do you know when your upcoming appointments are? yes  Future Appointments   Date Time Provider Department Center   2/3/2025  1:00 PM UROLOGY NURSE STEVE CONNORS [facility]   2/4/2025  9:00 AM [facility] CHAIR 8 [facility]   2/5/2025  9:00 AM [facility]AIR 5 [facility]   2/6/2025  9:00 AM [facility]F CHAIR 12 [facility]   2/17/2025 12:00 PM [facility] MRI 1 [facility]   2/20/2025 10:00 AM Leeann Morgan MD [facility] CARE  P[facility]   2/20/2025 12:00 PM [facility] INF QUICK CHAIR [facility]   2/20/2025  2:30 PM Karen Posey DPM [facility] Practice[facility]   2/24/2025  3:20 PM Juan Carlos Foy MD HEM ONC [facility]   2/25/2025 12:00 PM [facility] INF CHAIR 3 [facility]   2/26/2025 12:00 PM [facility] INF CHAIR 4 [facility]   2/27/2025 12:00 PM [facility] INF CHAIR 11 [facility]   3/14/2025 12:30 PM [facility] INF CHAIR 2 [facility]   3/18/2025 11:00 AM [facility] INF CHAIR 5 [facility]   3/19/2025 12:00 PM [facility] INF CHAIR 7 [facility]   3/20/2025  12:00 PM [facility] INF CHAIR 8 [facility]   3/24/2025 11:00 AM [facility] IR 1 [facility]   4/4/2025  1:00 PM [facility] INF QUICK CHAIR [facility]   4/8/2025 10:30 AM [facility] INF CHAIR 4 [facility]   4/9/2025 12:00 PM [facility] INF CHAIR 12 [facility]   4/10/2025 12:00 PM [facility] INF CHAIR 6 [facility]   4/11/2025 11:30 AM Rick Espino MD [facility]   4/25/2025 11:00 AM [facility] INF QUICK CHAIR [facility]   4/29/2025 10:30 AM [facility] INF CHAIR 4 [facility]   4/30/2025 12:00 PM [facility] INF CHAIR 8 [facility]   5/1/2025 12:00 PM [facility] INF CHAIR 4 [facility]   5/7/2025  3:30 PM Joselyn Reyes Bahamonde, MD NEPH [facility]          Based on individual needs I will follow up with them in 4-6 weeks. I have provided my direct contact information and welcome them to contact me if their needs as discussed above change. They were appreciative for the call.

## 2025-02-04 ENCOUNTER — HOSPITAL ENCOUNTER (OUTPATIENT)
Dept: INFUSION CENTER | Facility: HOSPITAL | Age: 75
Discharge: HOME | End: 2025-02-04
Attending: INTERNAL MEDICINE
Payer: MEDICARE

## 2025-02-04 VITALS
HEART RATE: 84 BPM | BODY MASS INDEX: 29.27 KG/M2 | DIASTOLIC BLOOD PRESSURE: 70 MMHG | RESPIRATION RATE: 16 BRPM | WEIGHT: 186.51 LBS | OXYGEN SATURATION: 91 % | SYSTOLIC BLOOD PRESSURE: 140 MMHG | HEIGHT: 67 IN | TEMPERATURE: 96.9 F

## 2025-02-04 DIAGNOSIS — C67.8 MALIGNANT NEOPLASM OF OVERLAPPING SITES OF BLADDER (HCC): ICD-10-CM

## 2025-02-04 DIAGNOSIS — T45.1X5A CHEMOTHERAPY INDUCED NEUTROPENIA (HCC): Primary | ICD-10-CM

## 2025-02-04 DIAGNOSIS — D70.1 CHEMOTHERAPY INDUCED NEUTROPENIA (HCC): Primary | ICD-10-CM

## 2025-02-04 RX ORDER — LORAZEPAM 2 MG/ML
0.25 INJECTION INTRAMUSCULAR ONCE
Status: COMPLETED | OUTPATIENT
Start: 2025-02-04 | End: 2025-02-04

## 2025-02-04 RX ORDER — SODIUM CHLORIDE 9 MG/ML
20 INJECTION, SOLUTION INTRAVENOUS ONCE
Status: COMPLETED | OUTPATIENT
Start: 2025-02-04 | End: 2025-02-04

## 2025-02-04 RX ORDER — LORAZEPAM 2 MG/ML
0.25 INJECTION INTRAMUSCULAR ONCE
Status: CANCELLED
Start: 2025-02-04 | End: 2025-02-04

## 2025-02-04 RX ADMIN — CARBOPLATIN 204.6 MG: 600 INJECTION, SOLUTION INTRAVENOUS at 10:26

## 2025-02-04 RX ADMIN — FOSAPREPITANT 150 MG: 150 INJECTION, POWDER, LYOPHILIZED, FOR SOLUTION INTRAVENOUS at 09:38

## 2025-02-04 RX ADMIN — DEXAMETHASONE SODIUM PHOSPHATE: 10 INJECTION, SOLUTION INTRAMUSCULAR; INTRAVENOUS at 09:13

## 2025-02-04 RX ADMIN — SODIUM CHLORIDE 20 ML/HR: 9 INJECTION, SOLUTION INTRAVENOUS at 09:13

## 2025-02-04 RX ADMIN — ETOPOSIDE 157.6 MG: 20 INJECTION, SOLUTION INTRAVENOUS at 10:58

## 2025-02-04 RX ADMIN — LORAZEPAM 0.25 MG: 2 INJECTION INTRAMUSCULAR; INTRAVENOUS at 11:30

## 2025-02-04 NOTE — PROGRESS NOTES
Cystoscopy and bladder catheterization     Date/Time  2/3/2025 1:00 PM     Performed by  Se Funez PA-C   Authorized by  Se Funez PA-C     Universal Protocol:  Consent given by: patient  Patient understanding: patient states understanding of the procedure being performed  Patient identity confirmed: verbally with patient      Procedure Details:  Procedure type: cystoscopy    Patient tolerance: Patient tolerated the procedure well with no immediate complications    Additional Procedure Details: 74-year-old man with history of muscle invasive bladder cancer.  He has chronic suprapubic catheter which has proven difficult to manage and change.  He came in today on the nurses schedule for catheter change and was unable to be done.  I was asked to assist with the patient.  His site does not have clean edges and I was unable to place a 16 Citizen [place] catheter back into his bladder.  I was however able able to put a 5 Citizen [place] tiger tail catheter through the stoma and into his bladder however a wire placed through the tiger tail catheter came out with the exchange I reinserted the tiger tail catheter taped into his abdomen and moved him to the cystoscopy room.  After obtaining informed consent the penis was prepped and draped in the usual fashion.  2% lidocaine was used for local anesthetic.  Urethra was  at the bottom.  The flexible cystoscope easily passed into the anterior urethra.  The prostate showed lateral lobe hypertrophy and about intermediate through the prostate the tiger tail catheter was seen.  I entered the bladder the urine was cloudy.  Catheter reaction was evident at the dome.  I backed out the tiger tail catheter until it was floating freely in the bladder.  At that time the scope was removed and a 5 Citizen [place] Super Stiff sensor wire was inserted through the tiger tail catheter and into the bladder.  The tiger tail catheter was removed and a 16 [place] tip catheter was  able to be inserted with some force into the bladder with immediate clear urine drainage.  The wire was removed the catheter was inflated.  The patient tolerated the procedure well.  I did send 5 days of antibiotics to his pharmacy.  I will see him back in 4 weeks for his next catheter change which will be done over a wire.  He can easily have his bladder accessed through the penis and urethra as there was no evidence of stricture.

## 2025-02-04 NOTE — PROGRESS NOTES
Toro Rodriguez  tolerated treatment well with no complications. Dose of ativan given for nausea.     Toro Rodriguez is aware of future appt on 2/5 at 9am.     AVS printed and given to Toro Rodriguez:  No (Declined by Toro Rodriguez)

## 2025-02-05 ENCOUNTER — PATIENT OUTREACH (OUTPATIENT)
Dept: CASE MANAGEMENT | Facility: HOSPITAL | Age: 75
End: 2025-02-05

## 2025-02-05 ENCOUNTER — HOSPITAL ENCOUNTER (OUTPATIENT)
Dept: INFUSION CENTER | Facility: HOSPITAL | Age: 75
Discharge: HOME | End: 2025-02-05
Attending: INTERNAL MEDICINE
Payer: MEDICARE

## 2025-02-05 VITALS
TEMPERATURE: 98.4 F | HEIGHT: 67 IN | SYSTOLIC BLOOD PRESSURE: 155 MMHG | OXYGEN SATURATION: 95 % | DIASTOLIC BLOOD PRESSURE: 69 MMHG | RESPIRATION RATE: 17 BRPM | BODY MASS INDEX: 29.27 KG/M2 | HEART RATE: 78 BPM | WEIGHT: 186.51 LBS

## 2025-02-05 DIAGNOSIS — C67.8 MALIGNANT NEOPLASM OF OVERLAPPING SITES OF BLADDER (HCC): ICD-10-CM

## 2025-02-05 DIAGNOSIS — D70.1 CHEMOTHERAPY INDUCED NEUTROPENIA (HCC): Primary | ICD-10-CM

## 2025-02-05 DIAGNOSIS — T45.1X5A CHEMOTHERAPY INDUCED NEUTROPENIA (HCC): Primary | ICD-10-CM

## 2025-02-05 RX ORDER — SODIUM CHLORIDE 9 MG/ML
20 INJECTION, SOLUTION INTRAVENOUS ONCE
Status: COMPLETED | OUTPATIENT
Start: 2025-02-05 | End: 2025-02-05

## 2025-02-05 RX ADMIN — SODIUM CHLORIDE 20 ML/HR: 0.9 INJECTION, SOLUTION INTRAVENOUS at 09:27

## 2025-02-05 RX ADMIN — ETOPOSIDE 157.6 MG: 20 INJECTION, SOLUTION INTRAVENOUS at 09:58

## 2025-02-05 RX ADMIN — DEXAMETHASONE SODIUM PHOSPHATE: 10 INJECTION, SOLUTION INTRAMUSCULAR; INTRAVENOUS at 09:28

## 2025-02-05 NOTE — PROGRESS NOTES
JUSTICE met with the pt and his daughter in the infusion center this day.  The pt's daughter had questions about AL Facilities in the [facility] as she is considering a move to that area for the pt.  Pt's daughter lives in that area and she brings him to his treatment.  She had been living closer to the pt, however her job recently changed her to a remote employee and she and her spouse moved to their home in the [facility]. She would like her father to be closer to her.  JUSTICE provided her with a list of resources and she was appreciative.  No further needs at this time.  They were encouraged to reach out as needed.

## 2025-02-05 NOTE — PROGRESS NOTES
Toro Rodriguez  tolerated treatment well with no complications.      Toro Rodriguez is aware of future appt on 2/6/2025 at 1100.     AVS printed and given to Toro Rodriguez:    No (Declined by Toro Rodriguez)

## 2025-02-06 ENCOUNTER — HOSPITAL ENCOUNTER (OUTPATIENT)
Dept: INFUSION CENTER | Facility: HOSPITAL | Age: 75
Discharge: HOME | End: 2025-02-06
Attending: INTERNAL MEDICINE
Payer: MEDICARE

## 2025-02-06 VITALS
RESPIRATION RATE: 16 BRPM | HEIGHT: 67 IN | OXYGEN SATURATION: 94 % | HEART RATE: 75 BPM | WEIGHT: 186.51 LBS | SYSTOLIC BLOOD PRESSURE: 144 MMHG | BODY MASS INDEX: 29.27 KG/M2 | TEMPERATURE: 97.4 F | DIASTOLIC BLOOD PRESSURE: 69 MMHG

## 2025-02-06 DIAGNOSIS — C67.8 MALIGNANT NEOPLASM OF OVERLAPPING SITES OF BLADDER (HCC): ICD-10-CM

## 2025-02-06 DIAGNOSIS — T45.1X5A CHEMOTHERAPY INDUCED NEUTROPENIA (HCC): Primary | ICD-10-CM

## 2025-02-06 DIAGNOSIS — D70.1 CHEMOTHERAPY INDUCED NEUTROPENIA (HCC): Primary | ICD-10-CM

## 2025-02-06 PROCEDURE — 96367 TX/PROPH/DG ADDL SEQ IV INF: CPT

## 2025-02-06 PROCEDURE — 96377 APPLICATON ON-BODY INJECTOR: CPT

## 2025-02-06 PROCEDURE — 96413 CHEMO IV INFUSION 1 HR: CPT

## 2025-02-06 RX ORDER — SODIUM CHLORIDE 9 MG/ML
20 INJECTION, SOLUTION INTRAVENOUS ONCE
Status: COMPLETED | OUTPATIENT
Start: 2025-02-06 | End: 2025-02-06

## 2025-02-06 RX ADMIN — DEXAMETHASONE SODIUM PHOSPHATE: 10 INJECTION, SOLUTION INTRAMUSCULAR; INTRAVENOUS at 10:54

## 2025-02-06 RX ADMIN — SODIUM CHLORIDE 20 ML/HR: 9 INJECTION, SOLUTION INTRAVENOUS at 10:54

## 2025-02-06 RX ADMIN — PEGFILGRASTIM 6 MG: KIT SUBCUTANEOUS at 12:38

## 2025-02-06 RX ADMIN — ETOPOSIDE 157.6 MG: 20 INJECTION, SOLUTION INTRAVENOUS at 11:26

## 2025-02-06 NOTE — PLAN OF CARE
Problem: Knowledge Deficit  Goal: Patient/family/caregiver demonstrates understanding of disease process, treatment plan, medications, and discharge instructions  Description: Complete learning assessment and assess knowledge base.  Interventions:  - Provide teaching at level of understanding  - Provide teaching via preferred learning methods  Outcome: Progressing     Problem: Potential for Falls  Goal: Patient will remain free of falls  Description: INTERVENTIONS:  - Educate patient/family on patient safety including physical limitations  - Instruct patient to call for assistance with activity   - Consult OT/PT to assist with strengthening/mobility   - Keep Call bell within reach  - Keep bed low and locked with side rails adjusted as appropriate  - Keep care items and personal belongings within reach  - Initiate and maintain comfort rounds  - Make Fall Risk Sign visible to staff  - Offer Toileting every  Hours, in advance of need  - Initiate/Maintain alarm  - Obtain necessary fall risk management equipment:   - Apply yellow socks and bracelet for high fall risk patients  - Consider moving patient to room near nurses station  Outcome: Progressing

## 2025-02-06 NOTE — PROGRESS NOTES
Toro Rodriguez  tolerated treatment well with no complications.  Neulasta Onpro applied to Pt.'s R arm noted to be functioning properly with green blinking light flashing. (Pod pals unavailable to apply to device.)    Toro Rodriguez is aware of future appt on 02/20/2025 at 12:00 PM.     AVS printed and given to Toro Rodriguez:  Yes

## 2025-02-13 ENCOUNTER — APPOINTMENT (EMERGENCY)
Dept: RADIOLOGY | Facility: HOSPITAL | Age: 75
End: 2025-02-13
Payer: MEDICARE

## 2025-02-13 ENCOUNTER — HOSPITAL ENCOUNTER (INPATIENT)
Facility: HOSPITAL | Age: 75
LOS: 3 days | Discharge: NURSING, REHAB OR LONG-TERM CARE | End: 2025-02-17
Attending: EMERGENCY MEDICINE | Admitting: HOSPITALIST
Payer: MEDICARE

## 2025-02-13 DIAGNOSIS — N18.9 ACUTE KIDNEY INJURY SUPERIMPOSED ON CKD  (HCC): ICD-10-CM

## 2025-02-13 DIAGNOSIS — T83.511D URINARY TRACT INFECTION ASSOCIATED WITH INDWELLING URETHRAL CATHETER, SUBSEQUENT ENCOUNTER: ICD-10-CM

## 2025-02-13 DIAGNOSIS — C67.8 MALIGNANT NEOPLASM OF OVERLAPPING SITES OF BLADDER (HCC): ICD-10-CM

## 2025-02-13 DIAGNOSIS — D64.9 ANEMIA: ICD-10-CM

## 2025-02-13 DIAGNOSIS — S73.014A CLOSED POSTERIOR DISLOCATION OF RIGHT HIP, INITIAL ENCOUNTER (HCC): ICD-10-CM

## 2025-02-13 DIAGNOSIS — I50.20 HFREF (HEART FAILURE WITH REDUCED EJECTION FRACTION) (HCC): ICD-10-CM

## 2025-02-13 DIAGNOSIS — T83.010D SUPRAPUBIC CATHETER DYSFUNCTION, SUBSEQUENT ENCOUNTER: ICD-10-CM

## 2025-02-13 DIAGNOSIS — N17.9 ACUTE KIDNEY INJURY SUPERIMPOSED ON CKD  (HCC): ICD-10-CM

## 2025-02-13 DIAGNOSIS — E87.6 ACUTE HYPOKALEMIA: ICD-10-CM
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
## 2025-02-13 DIAGNOSIS — S72.001A CLOSED FRACTURE DISLOCATION OF RIGHT HIP JOINT, INITIAL ENCOUNTER (HCC): ICD-10-CM

## 2025-02-13 DIAGNOSIS — N39.0 URINARY TRACT INFECTION ASSOCIATED WITH INDWELLING URETHRAL CATHETER, SUBSEQUENT ENCOUNTER: ICD-10-CM

## 2025-02-13 LAB
ANION GAP SERPL CALCULATED.3IONS-SCNC: 15 MMOL/L (ref 4–13)
BUN SERPL-MCNC: 44 MG/DL (ref 5–25)
CALCIUM SERPL-MCNC: 7.5 MG/DL (ref 8.4–10.2)
CHLORIDE SERPL-SCNC: 104 MMOL/L (ref 96–108)
CO2 SERPL-SCNC: 17 MMOL/L (ref 21–32)
CREAT SERPL-MCNC: 2.38 MG/DL (ref 0.6–1.3)
ERYTHROCYTE [DISTWIDTH] IN BLOOD BY AUTOMATED COUNT: 15.9 % (ref 11.6–15.1)
GFR SERPL CREATININE-BSD FRML MDRD: 25 ML/MIN/1.73SQ M
GLUCOSE SERPL-MCNC: 107 MG/DL (ref 65–140)
HCT VFR BLD AUTO: 27.5 % (ref 36.5–49.3)
HGB BLD-MCNC: 8.6 G/DL (ref 12–17)
MCH RBC QN AUTO: 29.9 PG (ref 26.8–34.3)
MCHC RBC AUTO-ENTMCNC: 31.3 G/DL (ref 31.4–37.4)
MCV RBC AUTO: 96 FL (ref 82–98)
PLATELET # BLD AUTO: 152 THOUSANDS/UL (ref 149–390)
PMV BLD AUTO: 10.9 FL (ref 8.9–12.7)
POTASSIUM SERPL-SCNC: 2.9 MMOL/L (ref 3.5–5.3)
RBC # BLD AUTO: 2.88 MILLION/UL (ref 3.88–5.62)
SODIUM SERPL-SCNC: 136 MMOL/L (ref 135–147)
WBC # BLD AUTO: 1.13 THOUSAND/UL (ref 4.31–10.16)

## 2025-02-13 PROCEDURE — 85027 COMPLETE CBC AUTOMATED: CPT | Performed by: EMERGENCY MEDICINE

## 2025-02-13 PROCEDURE — 73501 X-RAY EXAM HIP UNI 1 VIEW: CPT

## 2025-02-13 PROCEDURE — 36415 COLL VENOUS BLD VENIPUNCTURE: CPT | Performed by: EMERGENCY MEDICINE

## 2025-02-13 PROCEDURE — 99285 EMERGENCY DEPT VISIT HI MDM: CPT | Performed by: EMERGENCY MEDICINE

## 2025-02-13 PROCEDURE — 27250 TREAT HIP DISLOCATION: CPT | Performed by: EMERGENCY MEDICINE

## 2025-02-13 PROCEDURE — 96360 HYDRATION IV INFUSION INIT: CPT

## 2025-02-13 PROCEDURE — 99152 MOD SED SAME PHYS/QHP 5/>YRS: CPT | Performed by: EMERGENCY MEDICINE

## 2025-02-13 PROCEDURE — 73502 X-RAY EXAM HIP UNI 2-3 VIEWS: CPT

## 2025-02-13 PROCEDURE — 80048 BASIC METABOLIC PNL TOTAL CA: CPT | Performed by: EMERGENCY MEDICINE

## 2025-02-13 PROCEDURE — 99284 EMERGENCY DEPT VISIT MOD MDM: CPT

## 2025-02-13 PROCEDURE — 71045 X-RAY EXAM CHEST 1 VIEW: CPT

## 2025-02-13 RX ORDER — POTASSIUM CHLORIDE 1500 MG/1
40 TABLET, EXTENDED RELEASE ORAL ONCE
Status: COMPLETED | OUTPATIENT
Start: 2025-02-13 | End: 2025-02-13

## 2025-02-13 RX ORDER — POTASSIUM CHLORIDE 29.8 MG/ML
40 INJECTION INTRAVENOUS ONCE
Status: COMPLETED | OUTPATIENT
Start: 2025-02-13 | End: 2025-02-14

## 2025-02-13 RX ORDER — ACETAMINOPHEN 325 MG/1
975 TABLET ORAL ONCE
Status: COMPLETED | OUTPATIENT
Start: 2025-02-13 | End: 2025-02-13

## 2025-02-13 RX ORDER — PROPOFOL 10 MG/ML
90 INJECTION, EMULSION INTRAVENOUS ONCE
Status: COMPLETED | OUTPATIENT
Start: 2025-02-13 | End: 2025-02-13

## 2025-02-13 RX ADMIN — PROPOFOL 40 MG: 10 INJECTION, EMULSION INTRAVENOUS at 23:16

## 2025-02-13 RX ADMIN — ACETAMINOPHEN 975 MG: 325 TABLET, FILM COATED ORAL at 22:29

## 2025-02-13 RX ADMIN — POTASSIUM CHLORIDE 40 MEQ: 1500 TABLET, EXTENDED RELEASE ORAL at 23:04

## 2025-02-13 RX ADMIN — SODIUM CHLORIDE 500 ML: 0.9 INJECTION, SOLUTION INTRAVENOUS at 22:44

## 2025-02-14 PROBLEM — N18.32 ANEMIA DUE TO STAGE 3B CHRONIC KIDNEY DISEASE  (HCC): Status: ACTIVE | Noted: 2025-02-14
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
## 2025-02-14 PROBLEM — E87.20 NORMAL ANION GAP METABOLIC ACIDOSIS: Status: ACTIVE | Noted: 2025-02-14

## 2025-02-14 PROBLEM — D64.9 ANEMIA: Status: ACTIVE | Noted: 2025-02-14

## 2025-02-14 PROBLEM — S73.014A CLOSED POSTERIOR DISLOCATION OF RIGHT HIP (HCC): Status: ACTIVE | Noted: 2025-02-14

## 2025-02-14 PROBLEM — R13.10 DYSPHAGIA: Status: ACTIVE | Noted: 2025-02-14

## 2025-02-14 PROBLEM — S72.001A: Status: ACTIVE | Noted: 2025-02-14
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
## 2025-02-14 PROBLEM — D63.1 ANEMIA DUE TO STAGE 3B CHRONIC KIDNEY DISEASE  (HCC): Status: ACTIVE | Noted: 2025-02-14

## 2025-02-14 PROBLEM — E87.6 ACUTE HYPOKALEMIA: Status: ACTIVE | Noted: 2025-02-14

## 2025-02-14 LAB
AMORPH URATE CRY URNS QL MICRO: ABNORMAL
ANION GAP SERPL CALCULATED.3IONS-SCNC: 10 MMOL/L (ref 4–13)
ANION GAP SERPL CALCULATED.3IONS-SCNC: 9 MMOL/L (ref 4–13)
BACTERIA UR QL AUTO: ABNORMAL /HPF
BILIRUB UR QL STRIP: NEGATIVE
BUN SERPL-MCNC: 35 MG/DL (ref 5–25)
BUN SERPL-MCNC: 44 MG/DL (ref 5–25)
CALCIUM SERPL-MCNC: 7.1 MG/DL (ref 8.4–10.2)
CALCIUM SERPL-MCNC: 7.4 MG/DL (ref 8.4–10.2)
CHLORIDE SERPL-SCNC: 107 MMOL/L (ref 96–108)
CHLORIDE SERPL-SCNC: 108 MMOL/L (ref 96–108)
CLARITY UR: ABNORMAL
CO2 SERPL-SCNC: 20 MMOL/L (ref 21–32)
CO2 SERPL-SCNC: 21 MMOL/L (ref 21–32)
COLOR UR: YELLOW
CREAT SERPL-MCNC: 1.59 MG/DL (ref 0.6–1.3)
CREAT SERPL-MCNC: 2.09 MG/DL (ref 0.6–1.3)
ERYTHROCYTE [DISTWIDTH] IN BLOOD BY AUTOMATED COUNT: 15.6 % (ref 11.6–15.1)
GFR SERPL CREATININE-BSD FRML MDRD: 29 ML/MIN/1.73SQ M
GFR SERPL CREATININE-BSD FRML MDRD: 41 ML/MIN/1.73SQ M
GLUCOSE P FAST SERPL-MCNC: 99 MG/DL (ref 65–99)
GLUCOSE SERPL-MCNC: 81 MG/DL (ref 65–140)
GLUCOSE SERPL-MCNC: 99 MG/DL (ref 65–140)
GLUCOSE UR STRIP-MCNC: NEGATIVE MG/DL
HCT VFR BLD AUTO: 24.7 % (ref 36.5–49.3)
HGB BLD-MCNC: 7.7 G/DL (ref 12–17)
HGB UR QL STRIP.AUTO: ABNORMAL
KETONES UR STRIP-MCNC: NEGATIVE MG/DL
LEUKOCYTE ESTERASE UR QL STRIP: ABNORMAL
MCH RBC QN AUTO: 29.6 PG (ref 26.8–34.3)
MCHC RBC AUTO-ENTMCNC: 31.2 G/DL (ref 31.4–37.4)
MCV RBC AUTO: 95 FL (ref 82–98)
NITRITE UR QL STRIP: NEGATIVE
NON-SQ EPI CELLS URNS QL MICRO: ABNORMAL /HPF
PH UR STRIP.AUTO: 5.5 [PH]
PLATELET # BLD AUTO: 111 THOUSANDS/UL (ref 149–390)
PMV BLD AUTO: 10.9 FL (ref 8.9–12.7)
POTASSIUM SERPL-SCNC: 3.1 MMOL/L (ref 3.5–5.3)
POTASSIUM SERPL-SCNC: 3.6 MMOL/L (ref 3.5–5.3)
PROT UR STRIP-MCNC: ABNORMAL MG/DL
RBC # BLD AUTO: 2.6 MILLION/UL (ref 3.88–5.62)
RBC #/AREA URNS AUTO: ABNORMAL /HPF
SODIUM SERPL-SCNC: 137 MMOL/L (ref 135–147)
SODIUM SERPL-SCNC: 138 MMOL/L (ref 135–147)
SP GR UR STRIP.AUTO: 1.02 (ref 1–1.03)
UROBILINOGEN UR STRIP-ACNC: <2 MG/DL
WBC # BLD AUTO: 1.53 THOUSAND/UL (ref 4.31–10.16)
WBC #/AREA URNS AUTO: ABNORMAL /HPF

## 2025-02-14 PROCEDURE — 80048 BASIC METABOLIC PNL TOTAL CA: CPT

## 2025-02-14 PROCEDURE — 97167 OT EVAL HIGH COMPLEX 60 MIN: CPT

## 2025-02-14 PROCEDURE — 80048 BASIC METABOLIC PNL TOTAL CA: CPT | Performed by: INTERNAL MEDICINE

## 2025-02-14 PROCEDURE — 99223 1ST HOSP IP/OBS HIGH 75: CPT | Performed by: INTERNAL MEDICINE

## 2025-02-14 PROCEDURE — 99222 1ST HOSP IP/OBS MODERATE 55: CPT | Performed by: ORTHOPAEDIC SURGERY

## 2025-02-14 PROCEDURE — 97163 PT EVAL HIGH COMPLEX 45 MIN: CPT

## 2025-02-14 PROCEDURE — 0SS9XZZ REPOSITION RIGHT HIP JOINT, EXTERNAL APPROACH: ICD-10-PCS | Performed by: EMERGENCY MEDICINE

## 2025-02-14 PROCEDURE — 92610 EVALUATE SWALLOWING FUNCTION: CPT

## 2025-02-14 PROCEDURE — 87081 CULTURE SCREEN ONLY: CPT | Performed by: HOSPITALIST

## 2025-02-14 PROCEDURE — 87147 CULTURE TYPE IMMUNOLOGIC: CPT | Performed by: HOSPITALIST

## 2025-02-14 PROCEDURE — 85027 COMPLETE CBC AUTOMATED: CPT

## 2025-02-14 PROCEDURE — 81001 URINALYSIS AUTO W/SCOPE: CPT

## 2025-02-14 RX ORDER — POTASSIUM CHLORIDE 1500 MG/1
40 TABLET, EXTENDED RELEASE ORAL EVERY 4 HOURS
Status: DISCONTINUED | OUTPATIENT
Start: 2025-02-14 | End: 2025-02-14

## 2025-02-14 RX ORDER — SODIUM CHLORIDE 9 MG/ML
10 INJECTION INTRAVENOUS DAILY
Status: DISCONTINUED | OUTPATIENT
Start: 2025-02-14 | End: 2025-02-17 | Stop reason: HOSPADM

## 2025-02-14 RX ORDER — METOPROLOL SUCCINATE 25 MG/1
25 TABLET, EXTENDED RELEASE ORAL EVERY 12 HOURS SCHEDULED
Status: DISCONTINUED | OUTPATIENT
Start: 2025-02-14 | End: 2025-02-17 | Stop reason: HOSPADM

## 2025-02-14 RX ORDER — AMIODARONE HYDROCHLORIDE 200 MG/1
200 TABLET ORAL DAILY
Status: DISCONTINUED | OUTPATIENT
Start: 2025-02-14 | End: 2025-02-17 | Stop reason: HOSPADM

## 2025-02-14 RX ORDER — POLYETHYLENE GLYCOL 3350 17 G/17G
17 POWDER, FOR SOLUTION ORAL DAILY
Status: DISCONTINUED | OUTPATIENT
Start: 2025-02-14 | End: 2025-02-16

## 2025-02-14 RX ORDER — POTASSIUM CHLORIDE 1500 MG/1
40 TABLET, EXTENDED RELEASE ORAL ONCE
Status: COMPLETED | OUTPATIENT
Start: 2025-02-14 | End: 2025-02-14

## 2025-02-14 RX ORDER — HEPARIN SODIUM 5000 [USP'U]/ML
5000 INJECTION, SOLUTION INTRAVENOUS; SUBCUTANEOUS EVERY 8 HOURS SCHEDULED
Status: DISCONTINUED | OUTPATIENT
Start: 2025-02-14 | End: 2025-02-17 | Stop reason: HOSPADM

## 2025-02-14 RX ORDER — POTASSIUM CHLORIDE 14.9 MG/ML
20 INJECTION INTRAVENOUS 2 TIMES DAILY
Status: DISCONTINUED | OUTPATIENT
Start: 2025-02-14 | End: 2025-02-14

## 2025-02-14 RX ORDER — LISINOPRIL 5 MG/1
5 TABLET ORAL DAILY
Status: DISCONTINUED | OUTPATIENT
Start: 2025-02-14 | End: 2025-02-17 | Stop reason: HOSPADM

## 2025-02-14 RX ORDER — ACETAMINOPHEN 325 MG/1
650 TABLET ORAL EVERY 4 HOURS PRN
Status: DISCONTINUED | OUTPATIENT
Start: 2025-02-14 | End: 2025-02-17 | Stop reason: HOSPADM

## 2025-02-14 RX ORDER — ATORVASTATIN CALCIUM 40 MG/1
40 TABLET, FILM COATED ORAL
Status: DISCONTINUED | OUTPATIENT
Start: 2025-02-14 | End: 2025-02-17 | Stop reason: HOSPADM

## 2025-02-14 RX ORDER — SERTRALINE HYDROCHLORIDE 25 MG/1
25 TABLET, FILM COATED ORAL DAILY
Status: DISCONTINUED | OUTPATIENT
Start: 2025-02-14 | End: 2025-02-17 | Stop reason: HOSPADM

## 2025-02-14 RX ORDER — AMLODIPINE BESYLATE 5 MG/1
5 TABLET ORAL DAILY
Status: DISCONTINUED | OUTPATIENT
Start: 2025-02-14 | End: 2025-02-17

## 2025-02-14 RX ORDER — ASPIRIN 81 MG/1
81 TABLET, CHEWABLE ORAL DAILY
Status: DISCONTINUED | OUTPATIENT
Start: 2025-02-14 | End: 2025-02-17 | Stop reason: HOSPADM

## 2025-02-14 RX ORDER — SODIUM CHLORIDE, SODIUM GLUCONATE, SODIUM ACETATE, POTASSIUM CHLORIDE, MAGNESIUM CHLORIDE, SODIUM PHOSPHATE, DIBASIC, AND POTASSIUM PHOSPHATE .53; .5; .37; .037; .03; .012; .00082 G/100ML; G/100ML; G/100ML; G/100ML; G/100ML; G/100ML; G/100ML
75 INJECTION, SOLUTION INTRAVENOUS CONTINUOUS
Status: DISCONTINUED | OUTPATIENT
Start: 2025-02-14 | End: 2025-02-15

## 2025-02-14 RX ORDER — FUROSEMIDE 20 MG/1
20 TABLET ORAL DAILY
Status: DISCONTINUED | OUTPATIENT
Start: 2025-02-14 | End: 2025-02-17 | Stop reason: HOSPADM

## 2025-02-14 RX ORDER — PROCHLORPERAZINE MALEATE 5 MG/1
10 TABLET ORAL EVERY 6 HOURS PRN
Status: DISCONTINUED | OUTPATIENT
Start: 2025-02-14 | End: 2025-02-17 | Stop reason: HOSPADM

## 2025-02-14 RX ORDER — LEVOFLOXACIN 250 MG/1
250 TABLET, FILM COATED ORAL DAILY
COMMUNITY
Start: 2025-02-05 | End: 2025-02-17

## 2025-02-14 RX ADMIN — ATORVASTATIN CALCIUM 40 MG: 40 TABLET, FILM COATED ORAL at 17:24

## 2025-02-14 RX ADMIN — AMIODARONE HYDROCHLORIDE 200 MG: 200 TABLET ORAL at 08:44

## 2025-02-14 RX ADMIN — ASPIRIN 81 MG CHEWABLE TABLET 81 MG: 81 TABLET CHEWABLE at 08:44

## 2025-02-14 RX ADMIN — SODIUM CHLORIDE, PRESERVATIVE FREE 10 ML: 5 INJECTION INTRAVENOUS at 08:44

## 2025-02-14 RX ADMIN — POTASSIUM CHLORIDE 40 MEQ: 1500 TABLET, EXTENDED RELEASE ORAL at 13:45

## 2025-02-14 RX ADMIN — SERTRALINE HYDROCHLORIDE 25 MG: 25 TABLET, FILM COATED ORAL at 08:44

## 2025-02-14 RX ADMIN — HEPARIN SODIUM 5000 UNITS: 5000 INJECTION INTRAVENOUS; SUBCUTANEOUS at 21:28

## 2025-02-14 RX ADMIN — SODIUM CHLORIDE, SODIUM GLUCONATE, SODIUM ACETATE, POTASSIUM CHLORIDE, MAGNESIUM CHLORIDE, SODIUM PHOSPHATE, DIBASIC, AND POTASSIUM PHOSPHATE 75 ML/HR: .53; .5; .37; .037; .03; .012; .00082 INJECTION, SOLUTION INTRAVENOUS at 18:34

## 2025-02-14 RX ADMIN — POTASSIUM CHLORIDE 40 MEQ: 29.8 INJECTION, SOLUTION INTRAVENOUS at 00:19

## 2025-02-14 RX ADMIN — METOPROLOL SUCCINATE 25 MG: 25 TABLET, FILM COATED, EXTENDED RELEASE ORAL at 08:44

## 2025-02-14 RX ADMIN — HEPARIN SODIUM 5000 UNITS: 5000 INJECTION INTRAVENOUS; SUBCUTANEOUS at 06:39

## 2025-02-14 RX ADMIN — SODIUM CHLORIDE, SODIUM GLUCONATE, SODIUM ACETATE, POTASSIUM CHLORIDE, MAGNESIUM CHLORIDE, SODIUM PHOSPHATE, DIBASIC, AND POTASSIUM PHOSPHATE 75 ML/HR: .53; .5; .37; .037; .03; .012; .00082 INJECTION, SOLUTION INTRAVENOUS at 03:53

## 2025-02-14 RX ADMIN — POTASSIUM CHLORIDE 20 MEQ: 14.9 INJECTION, SOLUTION INTRAVENOUS at 13:45

## 2025-02-14 RX ADMIN — POTASSIUM CHLORIDE 20 MEQ: 14.9 INJECTION, SOLUTION INTRAVENOUS at 20:23

## 2025-02-14 RX ADMIN — METOPROLOL SUCCINATE 25 MG: 25 TABLET, FILM COATED, EXTENDED RELEASE ORAL at 20:23

## 2025-02-14 RX ADMIN — HEPARIN SODIUM 5000 UNITS: 5000 INJECTION INTRAVENOUS; SUBCUTANEOUS at 13:45

## 2025-02-14 NOTE — ASSESSMENT & PLAN NOTE
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 Reyes

## 2025-02-14 NOTE — CASE MANAGEMENT
Case Management Assessment & Discharge Planning Note    Patient name Toro Rodriguez  Location /-01 MRN [id number]  : 1950 Date 2025       Current Admission Date: 2025  Current Admission Diagnosis:CHUY (acute kidney injury) ([facility])   Patient Active Problem List    Diagnosis Date Noted Date Diagnosed    Fall from standing 2025     Closed posterior dislocation of right hip ([facility]) 2025     Anemia due to stage 3b chronic kidney disease  ([facility]) 2025     Acute hypokalemia 2025     Dysphagia 2025     Normal anion gap metabolic acidosis 2025     Metastasis to bone ([facility]) 2025     Nephrostomy status ([facility]) 2025     Malignant neoplasm of overlapping sites of bladder ([facility]) 2024     Chemotherapy induced neutropenia ([facility]) 2024     CHUY (acute kidney injury) ([facility]) 2024     Depressed mood 2024     HFrEF (heart failure with reduced ejection fraction) ([facility]) 2024     Hematuria 12/15/2024     Suprapubic catheter dysfunction ([facility]) 12/15/2024     Hydronephrosis 12/15/2024     Clot retention of urine 12/15/2024     Benign hypertension with chronic kidney disease, stage III ([facility]) 12/10/2024     Stage 3b chronic kidney disease ([facility]) 12/10/2024     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 ([facility]) 12/10/2024     Gross hematuria 10/31/2024     Renal lesion 10/16/2024     NSVT (nonsustained ventricular tachycardia) ([facility]) 2024     Encounter to discuss test results 2024     Urethral erosion by catheter, initial encounter  ([facility]) 2024     ABLA (acute blood loss anemia) 2024     Chronic venous hypertension (idiopathic) with ulcer of left lower extremity (CODE) ([facility]) 2024     Encounter for geriatric assessment 2024     Melanoma of back ([facility]) 2024     Dilated cardiomyopathy ([facility]) 12/15/2023     CKD (chronic kidney disease) 2023     Obesity, morbid  (HCC) 12/07/2023     Type 2 diabetes mellitus with stage 3b chronic kidney disease, without long-term current use of insulin (HCC) 11/15/2023     Essential hypertension 11/10/2023     Chronic systolic (congestive) heart failure (HCC) 11/10/2023     Deep vein thrombosis (DVT) of left lower extremity (HCC) 11/10/2023     Coronary artery disease involving native coronary artery of native heart 11/10/2023     Fall from slip, trip, or stumble 10/15/2020     Benign prostatic hyperplasia with urinary retention 2014       LOS (days): 0  Geometric Mean LOS (GMLOS) (days): 5.1  Days to GMLOS:4.9     OBJECTIVE:    Risk of Unplanned Readmission Score: 68.56         Current admission status: Inpatient       [facility]:   [facility] [address] [address]  Phone: [phone] Fax: [phone]    Primary Care Provider: CARLIE Foster    Primary Insurance: MEDICARE  Secondary Insurance: BLUE CROSS    ASSESSMENT:  Active Health Care Proxies       Shanique Rodriguez Health Care Agent - Daughter   Primary Phone: [phone] (Mobile)                 Advance Directives  Does patient have a Health Care POA?: Yes  Does patient have Advance Directives?: Yes  Advance Directives: Living will, Power of  for health care  Primary Contact: Shanique Rodriguez: dtr: [phone]    Readmission Root Cause  30 Day Readmission: No    Patient Information  Admitted from:: Home  Mental Status: Alert  During Assessment patient was accompanied by: Not accompanied during assessment  Assessment information provided by:: Patient  Primary Caregiver: Self  Support Systems: Self, Daughter, Family members  County of Residence: [place]  What city do you live in?: [place]  Home entry access options. Select all that apply.: No steps to enter home, Ramp  Type of Current Residence: Facility  Upon entering residence, is there a bedroom on the main floor (no  further steps)?: Yes  Upon entering residence, is there a bathroom on the main floor (no further steps)?: Yes  Living Arrangements: Other (Comment) (Resides at [facility])  Is patient a ?: No    Activities of Daily Living Prior to Admission  Functional Status: Independent  Completes ADLs independently?: Yes  Ambulates independently?: Yes  Does patient use assisted devices?: Yes  Assisted Devices (DME) used: Rollator  Does patient currently own DME?: Yes  What DME does the patient currently own?: Rollator  Does patient have a history of Outpatient Therapy (PT/OT)?: No  Does the patient have a history of Short-Term Rehab?: Yes ([facility])  Does patient have a history of HHC?: Yes  Does patient currently have HHC?: No    Patient Information Continued  Income Source: Pension/intermediate  Does patient have prescription coverage?: Yes  Does patient receive dialysis treatments?: No  Does patient have a history of substance abuse?: No  Does patient have a history of Mental Health Diagnosis?: No    Means of Transportation  Means of Transport to Appts:: Family transport    DISCHARGE DETAILS:    Discharge planning discussed with:: patient  Freedom of Choice: Yes  Comments - Freedom of Choice: agreeable for blanket SNF referrals via [facility]    Additional Comments: Met with Pt. Pt alert and oriented x4. Discussed role of case management. Pt lives at [facility] on 1st floor, ramp to enter. Uses rollator. Gets meals and medications through [facility]. Hx of VNA through [facility]. Hx of SNF at [facility]. Pt's dtr is POA and he has living will. Pt reports he gets chemo one week on and two weeks off at [facility]. CM informed Pt of SNF recommendation and Pt agreeable. Pt reports his peference is [facility] but also agreeable for blanket SNF referrals within 10 miles. CM notified Pt that his chemo will need to be put on hold while he is at rehab. Pt expressed understanding. SNF  referrals sent via [employer].

## 2025-02-14 NOTE — ASSESSMENT & PLAN NOTE
BP acceptable  Volume status euvolemic  Home Rx: Toprol XL 25 mg BID, lisinopril 5 mg daily, lasix 20 mg daily, amlodipine 5 mg daily  Current Rx: Toprol XL 25 mg BID  Continue to hold diuretics and lisinopril  Monitor off IVF  Recommend hold parameters on antihypertensives for SBP <130 mmHg.  Avoid hypotension or fluctuations in blood pressure.  Maintain MAP >65  Low sodium (2 gm) diet  Continue to trend

## 2025-02-14 NOTE — SPEECH THERAPY NOTE
Speech Language/Pathology  Speech-Language Pathology Bedside Swallow Evaluation      Patient Name: Toro Rodriguez    Today's Date: 2/14/2025     Problem List  Principal Problem:    CHUY (acute kidney injury) (HCC)  Active Problems:    Essential hypertension    Coronary artery disease involving native coronary artery of native heart    Type 2 diabetes mellitus with stage 3b chronic kidney disease, without long-term current use of insulin (HCC)    HFrEF (heart failure with reduced ejection fraction) (HCC)    Malignant neoplasm of overlapping sites of bladder (HCC)    Closed posterior dislocation of right hip (HCC)    Anemia    Acute hypokalemia    Dysphagia      Past Medical History  Past Medical History:   Diagnosis Date    Alcohol intoxication (HCC) 10/15/2020    BPH (benign prostatic hyperplasia)     Chronic HFrEF (heart failure with reduced ejection fraction) (HCC) 11/2023    Coronary artery calcification seen on CT scan 11/10/2023    Coronary artery disease 12/14/2023    Deep vein thrombosis (DVT) of left lower extremity (HCC) 11/2023    Diabetes mellitus (HCC) 11/2023    Fall from slip, trip, or stumble 10/15/2020    History of phimosis of penis     History of urinary calculi     Hypertension 1988    Skin cancer     Tobacco abuse     quit around 2000       Past Surgical History  Past Surgical History:   Procedure Laterality Date    BLADDER STONE REMOVAL  2014    CARDIAC CATHETERIZATION N/A 12/14/2023    Procedure: Cardiac catheterization;  Surgeon: Dave Royal MD;  Location: [facility];  Service: Cardiology    IR NEPHROSTOMY TUBE PLACEMENT  12/24/2024    IR PORT PLACEMENT  1/10/2025    IR SUPRAPUBIC CATHETER CHECK/CHANGE/REINSERTION/UPSIZE  11/07/2024    IR SUPRAPUBIC CATHETER CHECK/CHANGE/REINSERTION/UPSIZE  12/3/2024    IR SUPRAPUBIC CATHETER PLACEMENT  09/27/2024    ORIF ANKLE FRACTURE Left     WI CYSTO W/IRRIG & EVAC MULTPLE OBSTRUCTING CLOTS N/A 12/15/2024    Procedure: CYSTOSCOPY EVACUATION OF  CLOTS;  Surgeon: Rick Espino MD;  Location: [facility];  Service: Urology    SKIN LESION EXCISION N/A 03/18/2024    Procedure: WIDE LOCAL EXCISION OF BACK MELANOMA;  Surgeon: Lillie aL MD;  Location: [facility];  Service: Surgical Oncology    TOTAL HIP ARTHROPLASTY Right        Summary   Pt presents w/ s/s suggestive of functional oropharyngeal swallow skills.     Complete labial seal for retrieval and containment of all materials, no anterior bolus loss present. Timely rotary mastication of regular textures w/ complete bolus breakdown and adequate bolus transfer. No oral residue noted. Suspected delayed swallow initiation though fair hyolaryngeal elevation to palpation. No overt s/s of aspiration at this time across 480 mL of thin liquids or other materials administered.     Education initiated w/ pt regarding strategies to optimize swallow safety including frequent/thorough oral care and to notify medical staff if s/s of aspiration arise. Pt verbalized understanding and agreement, denies questions or concerns at this time.     Pt w/ increased risk of aspiration 2/2 RN report of s/s of aspiration, immunocompromised, and multiple medical co-morbidities. SLP to f/u for diet tolerance x1 w/ larger PO sample as able and appropriate.       Recommended Diet: regular diet and thin liquids   Recommended Form of Meds: whole, one at a time   Aspiration precautions and swallowing strategies: upright posture, only feed when fully alert, slow rate of feeding, and small bites/sips  Other Recommendations: Continue frequent oral care        Current Medical Status  Pt is a 74 y.o. male with a PMH of HFrEF, CAD, Bladder CA currently undergoing chemo, DM, HTN  who presents with to [facility] from his nursing home after a fall.  Patient states he was walking with his walker and when he attempted to sit down he missed his chair and fell onto his buttocks.  He states his hip went out of place and it was  "very painful.  Patient denies head strike.  He denies numbness or tingling in his right lower extremity.  Patient denies chest pain, shortness of breath, fever, chills, lightheadedness, dizziness.     SLP consulted 2/2 pt w/ overt s/s of aspiration, per RN Keshav Harris \"Upon administering medications patient at 90 degrees with chin tucked coughing noted with thin liquids. NTL liquids trial performed. Delayed Coughing noted post approx 4 oz.\"   Upon entrance pt denies difficulty swallowing stating \"when that nurse gave me the pills she shoved all them in and that isn't how I take them.\"    Current Precautions:  Fall  Aspiration     Allergies:  No known food allergies    Past medical history:  Please see H&P for details    Special Studies:  All imaging reporting \"wet read\"    History of VFSS:  N/A     Social/Education/Vocational Hx:  Pt lives at [facility]     Swallow Information   Current Diet: NPO   Baseline Diet: regular diet and thin liquids per pt report       Baseline Assessment   Behavior/Cognition: alert  Speech/Language Status: able to participate in conversation and able to follow commands  Patient Positioning: upright in bed  Pain Status/Interventions/Response to Interventions: No report of or nonverbal indications of pain.       Oral Mechanism Exam  Facial: symmetrical  Labial: WFL  Lingual: WFL  Velum: symmetrical  Mandible: adequate ROM  Dentition: adequate- missing multiple teeth   Vocal quality:clear/adequate   Volitional Cough: strong/productive   Respiratory Status: on 2L NC       Consistencies Assessed and Performance   Consistencies Administered: thin liquids, puree, soft solids, and hard solids    Oral Stage:   Complete labial seal for retrieval and containment of all materials, no anterior bolus loss present. Timely rotary mastication of regular textures w/ complete bolus breakdown and adequate bolus transfer. No oral residue noted.     Pharyngeal Stage:   Suspected delayed swallow " initiation though fair hyolaryngeal elevation to palpation. No overt s/s of aspiration at this time across 480 mL of thin liquids or other materials administered.     Esophageal Concerns: none reported    Summary and Recommendations (see above)    Results Reviewed with: patient, RN, and MD     Treatment Recommended: as able and appropriate for diet tolerance x1 w/ larger PO trials     Dysphagia LTG  -Patient will demonstrate safe and effective oral intake (without overt s/s significant oral/pharyngeal dysphagia including s/s penetration or aspiration) for the highest appropriate diet level.     Short Term Goals:  -Pt will tolerate regular textures and thin liquid with no significant s/s oral or pharyngeal dysphagia across 1-3 diagnostic session/s    -Patient will comply with a Video/Modified Barium Swallow study for more complete assessment of swallowing anatomy/physiology/aspiration risk and to assess efficacy of treatment techniques so as to best guide treatment plan if medically indicated     Speech Therapy Prognosis   Prognosis: good    Prognosis Considerations: age, medical status, and prior medical history

## 2025-02-14 NOTE — PLAN OF CARE
Problem: OCCUPATIONAL THERAPY ADULT  Goal: Performs self-care activities at highest level of function for planned discharge setting.  See evaluation for individualized goals.  Description: Treatment Interventions: ADL retraining, Functional transfer training, Endurance training, Cognitive reorientation, Patient/family training, Equipment evaluation/education, Compensatory technique education, Continued evaluation          See flowsheet documentation for full assessment, interventions and recommendations.   Note: Limitation: Decreased ADL status, Decreased Safe judgement during ADL, Decreased endurance, Decreased self-care trans, Decreased high-level ADLs  Prognosis: Fair  Assessment: Pt is a 74 y.o. male seen for OT evaluation at [facility], admitted 2/13/2025 w/ CHUY (acute kidney injury) (HCC). Pt is WBAT to RLE. Per ortho with strict posterior hip precautions and knee immobilizer one RLE at all times. Extensive chart review completed by OT. Comorbidities affecting the pt's functional performance include a significant PMH of: Hypertension, CAD, T2DM, stage 3 CKD, hydronephrosis, CHUY, anemia, dysphagia, CHF, DVT, hematuria, suprapubic catheter, metastasis to bone. Personal factors affecting pt at time of IE include: difficulty performing ADLS, difficulty performing IADLS , flat affect, and health management . Pt was admitted from [facility], was  IND/A w/ ADLs (I with dressing, A with showers, A with sock/shoes) and A w/ IADLS, and IND with functional mobility with use of walker. Upon OT IE pt demonstrated  max Ax2 for bed mobility, min Ax2 for functional transfers, min Ax2 for functional mobility, [place] for UB ADLs and maxA for LB ADLS 2* the following deficits impacting occupational performance: weakness, decreased strength, decreased balance, decreased tolerance, and increased pain. Based on OT IE, pt demonstrates the need for continued skilled OT tx focused on eating, grooming, bathing/shower, toilet hygiene,  dressing, functional mobility, and clothing management during their stay in the hospital to address the stated deficits and maximize their level of functional independence w/ their ADLS and functional mobility. The patient's raw score on the AM-PAC Daily Activity inpatient short form is 15, standardized score is 34.69, less than 39.4. Patients at this level are likely to benefit from DC to post-acute rehabilitation services. However, please refer to therapist recommendation for discharge planning given other factors that may influence destination. At this time, OT recommendations at time of discharge are DC with Level I - Maximum Rehab Resource Intensity resources.     Rehab Resource Intensity Level, OT: I (Maximum Resource Intensity)

## 2025-02-14 NOTE — ASSESSMENT & PLAN NOTE
BP soft  Volume status euvolemic, mild hypovolemic  Home Rx: Toprol XL 25 mg BID, lisinopril 5 mg daily, lasix 20 mg daily, amlodipine 5 mg daily  Current Rx: Toprol XL 25 mg BID  Continue to hold diuretics and lisinopril  Continue IVF as above  Optimize hemodynamic status to avoid delay in renal recovery  Recommend hold parameters on antihypertensives for SBP <130 mmHg.  Avoid hypotension or fluctuations in blood pressure.  Maintain MAP >65  Low sodium (2 gm) diet  Continue to trend

## 2025-02-14 NOTE — ASSESSMENT & PLAN NOTE
Noted by nursing staff with taking medications of thin liquids  Currently n.p.o. with speech eval  Management per primary team

## 2025-02-14 NOTE — ASSESSMENT & PLAN NOTE
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

## 2025-02-14 NOTE — ED NOTES
Upon administering medications patient at 90 degrees with chin tucked coughing noted with thin liquids.  NTL liquids trial performed. Delayed Coughing noted post approx 4 oz.  Jonathan JORDAN and Nuvia RN made aware.  Patient transported to floor by RN and on monitor. Abductor pillow place post procedure and remains in place     Keshav Harris RN  02/14/25 0138

## 2025-02-14 NOTE — PLAN OF CARE
Problem: Potential for Falls  Goal: Patient will remain free of falls  Description: INTERVENTIONS:  - Educate patient/family on patient safety including physical limitations  - Instruct patient to call for assistance with activity   - Consult OT/PT to assist with strengthening/mobility   - Keep Call bell within reach  - Keep bed low and locked with side rails adjusted as appropriate  - Keep care items and personal belongings within reach  - Initiate and maintain comfort rounds  - Make Fall Risk Sign visible to staff  - Offer Toileting every 2 Hours, in advance of need  - Initiate/Maintain bed alarm  - Obtain necessary fall risk management equipment: yellow bracelet, yellow socks, bed alarm  - Apply yellow socks and bracelet for high fall risk patients  - Consider moving patient to room near nurses station  Outcome: Progressing     Problem: PAIN - ADULT  Goal: Verbalizes/displays adequate comfort level or baseline comfort level  Description: Interventions:  - Encourage patient to monitor pain and request assistance  - Assess pain using appropriate pain scale  - Administer analgesics based on type and severity of pain and evaluate response  - Implement non-pharmacological measures as appropriate and evaluate response  - Consider cultural and social influences on pain and pain management  - Notify physician/advanced practitioner if interventions unsuccessful or patient reports new pain  Outcome: Progressing     Problem: SAFETY ADULT  Goal: Patient will remain free of falls  Description: INTERVENTIONS:  - Educate patient/family on patient safety including physical limitations  - Instruct patient to call for assistance with activity   - Consult OT/PT to assist with strengthening/mobility   - Keep Call bell within reach  - Keep bed low and locked with side rails adjusted as appropriate  - Keep care items and personal belongings within reach  - Initiate and maintain comfort rounds  - Make Fall Risk Sign visible to  If provider orders tests at today's visit, patient would like to be contacted via [facility].  If to contact patient by phone, patient's preferred phone # is [phone] (Cell) and it is OK to leave message on voice mail or with family member.  If medications are ordered at today's visit, the pharmacy name/location patient would like them to be sent to is   [id number] - [place], [place] - [address] [facility] [address]  [address] [address]  Phone: [phone] Fax: [phone]    [facility] [address] [place] [address] [address]  [address]  [facility] [address]  Phone: [phone] Fax: [phone]         staff  - Offer Toileting every 2 Hours, in advance of need  - Initiate/Maintain bed alarm  - Obtain necessary fall risk management equipment: yellow bracelet, yellow socks, bed alarm  - Apply yellow socks and bracelet for high fall risk patients  - Consider moving patient to room near nurses station  Outcome: Progressing  Goal: Maintain or return to baseline ADL function  Description: INTERVENTIONS:  -  Assess patient's ability to carry out ADLs; assess patient's baseline for ADL function and identify physical deficits which impact ability to perform ADLs (bathing, care of mouth/teeth, toileting, grooming, dressing, etc.)  - Assess/evaluate cause of self-care deficits   - Assess range of motion  - Assess patient's mobility; develop plan if impaired  - Assess patient's need for assistive devices and provide as appropriate  - Encourage maximum independence but intervene and supervise when necessary  - Involve family in performance of ADLs  - Assess for home care needs following discharge   - Consider OT consult to assist with ADL evaluation and planning for discharge  - Provide patient education as appropriate  Outcome: Progressing  Goal: Maintains/Returns to pre admission functional level  Description: INTERVENTIONS:  - Perform AM-PAC 6 Click Basic Mobility/ Daily Activity assessment daily.  - Set and communicate daily mobility goal to care team and patient/family/caregiver.   - Collaborate with rehabilitation services on mobility goals if consulted  - Perform Range of Motion 3 times a day.  - Reposition patient every 2 hours.  - Record patient progress and toleration of activity level   Outcome: Progressing     Problem: DISCHARGE PLANNING  Goal: Discharge to home or other facility with appropriate resources  Description: INTERVENTIONS:  - Identify barriers to discharge w/patient and caregiver  - Arrange for needed discharge resources and transportation as appropriate  - Identify discharge learning needs (meds, wound  care, etc.)  - Arrange for interpretive services to assist at discharge as needed  - Refer to Case Management Department for coordinating discharge planning if the patient needs post-hospital services based on physician/advanced practitioner order or complex needs related to functional status, cognitive ability, or social support system  Outcome: Progressing     Problem: Knowledge Deficit  Goal: Patient/family/caregiver demonstrates understanding of disease process, treatment plan, medications, and discharge instructions  Description: Complete learning assessment and assess knowledge base.  Interventions:  - Provide teaching at level of understanding  - Provide teaching via preferred learning methods  Outcome: Progressing     Problem: GASTROINTESTINAL - ADULT  Goal: Minimal or absence of nausea and/or vomiting  Description: INTERVENTIONS:  - Administer IV fluids if ordered to ensure adequate hydration  - Maintain NPO status until nausea and vomiting are resolved  - Nasogastric tube if ordered  - Administer ordered antiemetic medications as needed  - Provide nonpharmacologic comfort measures as appropriate  - Advance diet as tolerated, if ordered  - Consider nutrition services referral to assist patient with adequate nutrition and appropriate food choices  Outcome: Progressing  Goal: Maintains or returns to baseline bowel function  Description: INTERVENTIONS:  - Assess bowel function  - Encourage oral fluids to ensure adequate hydration  - Administer IV fluids if ordered to ensure adequate hydration  - Administer ordered medications as needed  - Encourage mobilization and activity  - Consider nutritional services referral to assist patient with adequate nutrition and appropriate food choices  Outcome: Progressing     Problem: GENITOURINARY - ADULT  Goal: Maintains or returns to baseline urinary function  Description: INTERVENTIONS:  - Assess urinary function  - Encourage oral fluids to ensure adequate hydration if  ordered  - Administer IV fluids as ordered to ensure adequate hydration  - Administer ordered medications as needed  - Offer frequent toileting  - Follow urinary retention protocol if ordered  Outcome: Progressing  Goal: Absence of urinary retention  Description: INTERVENTIONS:  - Assess patient’s ability to void and empty bladder  - Monitor I/O  - Bladder scan as needed  - Discuss with physician/AP medications to alleviate retention as needed  - Discuss catheterization for long term situations as appropriate  Outcome: Progressing     Problem: METABOLIC, FLUID AND ELECTROLYTES - ADULT  Goal: Electrolytes maintained within normal limits  Description: INTERVENTIONS:  - Monitor labs and assess patient for signs and symptoms of electrolyte imbalances  - Administer electrolyte replacement as ordered  - Monitor response to electrolyte replacements, including repeat lab results as appropriate  - Instruct patient on fluid and nutrition as appropriate  Outcome: Progressing  Goal: Fluid balance maintained  Description: INTERVENTIONS:  - Monitor labs   - Monitor I/O and WT  - Instruct patient on fluid and nutrition as appropriate  - Assess for signs & symptoms of volume excess or deficit  Outcome: Progressing     Problem: MUSCULOSKELETAL - ADULT  Goal: Maintain or return mobility to safest level of function  Description: INTERVENTIONS:  - Assess patient's ability to carry out ADLs; assess patient's baseline for ADL function and identify physical deficits which impact ability to perform ADLs (bathing, care of mouth/teeth, toileting, grooming, dressing, etc.)  - Assess/evaluate cause of self-care deficits   - Assess range of motion  - Assess patient's mobility  - Assess patient's need for assistive devices and provide as appropriate  - Encourage maximum independence but intervene and supervise when necessary  - Involve family in performance of ADLs  - Assess for home care needs following discharge   - Consider OT consult to  assist with ADL evaluation and planning for discharge  - Provide patient education as appropriate  Outcome: Progressing  Goal: Maintain proper alignment of affected body part  Description: INTERVENTIONS:  - Support, maintain and protect limb and body alignment  - Provide patient/ family with appropriate education  Outcome: Progressing     Problem: Prexisting or High Potential for Compromised Skin Integrity  Goal: Skin integrity is maintained or improved  Description: INTERVENTIONS:  - Identify patients at risk for skin breakdown  - Assess and monitor skin integrity  - Assess and monitor nutrition and hydration status  - Monitor labs   - Assess for incontinence   - Turn and reposition patient  - Assist with mobility/ambulation  - Relieve pressure over bony prominences  - Avoid friction and shearing  - Provide appropriate hygiene as needed including keeping skin clean and dry  - Evaluate need for skin moisturizer/barrier cream  - Collaborate with interdisciplinary team   - Patient/family teaching  - Consider wound care consult   Outcome: Progressing     Problem: Nutrition/Hydration-ADULT  Goal: Nutrient/Hydration intake appropriate for improving, restoring or maintaining nutritional needs  Description: Monitor and assess patient's nutrition/hydration status for malnutrition. Collaborate with interdisciplinary team and initiate plan and interventions as ordered.  Monitor patient's weight and dietary intake as ordered or per policy. Utilize nutrition screening tool and intervene as necessary. Determine patient's food preferences and provide high-protein, high-caloric foods as appropriate.     INTERVENTIONS:  - Monitor oral intake, urinary output, labs, and treatment plans  - Assess nutrition and hydration status and recommend course of action  - Evaluate amount of meals eaten  - Assist patient with eating if necessary   - Allow adequate time for meals  - Recommend/ encourage appropriate diets, oral nutritional  supplements, and vitamin/mineral supplements  - Order, calculate, and assess calorie counts as needed  - Recommend, monitor, and adjust tube feedings and TPN/PPN based on assessed needs  - Assess need for intravenous fluids  - Provide specific nutrition/hydration education as appropriate  - Include patient/family/caregiver in decisions related to nutrition  Outcome: Progressing

## 2025-02-14 NOTE — ASSESSMENT & PLAN NOTE
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   Follows with oncology.  Currently on first-line treatment with carboplatin and etoposide.

## 2025-02-14 NOTE — ASSESSMENT & PLAN NOTE
P/w ground-level fall with right total hip arthroplasty dislocation  S/p closed reduction in ED  PT/OT  Seen by orthopedic surgery.  Plans for outpatient follow-up

## 2025-02-14 NOTE — ASSESSMENT & PLAN NOTE
Presents from [facility] living with right hip hip pain.    Patient was walking with walker and missed chair when attempting to sit, falling onto buttocks.  Closed reduction successfully performed in emergency department.  PT/OT.  Ortho consult.

## 2025-02-14 NOTE — PLAN OF CARE
Problem: Potential for Falls  Goal: Patient will remain free of falls  Description: INTERVENTIONS:  - Educate patient/family on patient safety including physical limitations  - Instruct patient to call for assistance with activity   - Consult OT/PT to assist with strengthening/mobility   - Keep Call bell within reach  - Keep bed low and locked with side rails adjusted as appropriate  - Keep care items and personal belongings within reach  - Initiate and maintain comfort rounds  - Make Fall Risk Sign visible to staff  - Offer Toileting every 2 Hours, in advance of need  - Initiate/Maintain bed alarm  - Obtain necessary fall risk management equipment: yellow bracelet, yellow socks, bed alarm  - Apply yellow socks and bracelet for high fall risk patients  - Consider moving patient to room near nurses station  Outcome: Progressing     Problem: PAIN - ADULT  Goal: Verbalizes/displays adequate comfort level or baseline comfort level  Description: Interventions:  - Encourage patient to monitor pain and request assistance  - Assess pain using appropriate pain scale  - Administer analgesics based on type and severity of pain and evaluate response  - Implement non-pharmacological measures as appropriate and evaluate response  - Consider cultural and social influences on pain and pain management  - Notify physician/advanced practitioner if interventions unsuccessful or patient reports new pain  Outcome: Progressing     Problem: SAFETY ADULT  Goal: Patient will remain free of falls  Description: INTERVENTIONS:  - Educate patient/family on patient safety including physical limitations  - Instruct patient to call for assistance with activity   - Consult OT/PT to assist with strengthening/mobility   - Keep Call bell within reach  - Keep bed low and locked with side rails adjusted as appropriate  - Keep care items and personal belongings within reach  - Initiate and maintain comfort rounds  - Make Fall Risk Sign visible to  staff  - Offer Toileting every 2 Hours, in advance of need  - Initiate/Maintain bed alarm  - Obtain necessary fall risk management equipment: yellow bracelet, yellow socks, bed alarm  - Apply yellow socks and bracelet for high fall risk patients  - Consider moving patient to room near nurses station  Outcome: Progressing     Problem: DISCHARGE PLANNING  Goal: Discharge to home or other facility with appropriate resources  Description: INTERVENTIONS:  - Identify barriers to discharge w/patient and caregiver  - Arrange for needed discharge resources and transportation as appropriate  - Identify discharge learning needs (meds, wound care, etc.)  - Arrange for interpretive services to assist at discharge as needed  - Refer to Case Management Department for coordinating discharge planning if the patient needs post-hospital services based on physician/advanced practitioner order or complex needs related to functional status, cognitive ability, or social support system  Outcome: Progressing     Problem: Knowledge Deficit  Goal: Patient/family/caregiver demonstrates understanding of disease process, treatment plan, medications, and discharge instructions  Description: Complete learning assessment and assess knowledge base.  Interventions:  - Provide teaching at level of understanding  - Provide teaching via preferred learning methods  Outcome: Progressing     Problem: GASTROINTESTINAL - ADULT  Goal: Minimal or absence of nausea and/or vomiting  Description: INTERVENTIONS:  - Administer IV fluids if ordered to ensure adequate hydration  - Maintain NPO status until nausea and vomiting are resolved  - Nasogastric tube if ordered  - Administer ordered antiemetic medications as needed  - Provide nonpharmacologic comfort measures as appropriate  - Advance diet as tolerated, if ordered  - Consider nutrition services referral to assist patient with adequate nutrition and appropriate food choices  Outcome: Progressing     Problem:  GENITOURINARY - ADULT  Goal: Maintains or returns to baseline urinary function  Description: INTERVENTIONS:  - Assess urinary function  - Encourage oral fluids to ensure adequate hydration if ordered  - Administer IV fluids as ordered to ensure adequate hydration  - Administer ordered medications as needed  - Offer frequent toileting  - Follow urinary retention protocol if ordered  Outcome: Progressing  Goal: Absence of urinary retention  Description: INTERVENTIONS:  - Assess patient’s ability to void and empty bladder  - Monitor I/O  - Bladder scan as needed  - Discuss with physician/AP medications to alleviate retention as needed  - Discuss catheterization for long term situations as appropriate  Outcome: Progressing     Problem: MUSCULOSKELETAL - ADULT  Goal: Maintain proper alignment of affected body part  Description: INTERVENTIONS:  - Support, maintain and protect limb and body alignment  - Provide patient/ family with appropriate education  Outcome: Progressing

## 2025-02-14 NOTE — ASSESSMENT & PLAN NOTE
Wt Readings from Last 3 Encounters:   02/14/25 81.1 kg (178 lb 12.7 oz)   02/06/25 84.6 kg (186 lb 8.2 oz)   02/05/25 84.6 kg (186 lb 8.2 oz)   Compensated  echo 2/26/2024:  EF 35-40% with G1DD, mod MR, IVC normal size  home diuretics: lasix 20 mg daily  Hold diuretics due to CHUY  on beta blockers and lisinopril as outpatient.  Hold ACEi in the setting of CHUY  fluid restriction, 2 gm low sodium diet, daily weights  management per primary team

## 2025-02-14 NOTE — ASSESSMENT & PLAN NOTE
Lab Results   Component Value Date    HGBA1C 5.9 (H) 04/18/2024   stable  continue to optimize glycemic control  management per primary team

## 2025-02-14 NOTE — ASSESSMENT & PLAN NOTE
POA  Etiology: suspect prerenal due to relative hypotension, volume depletion with poor oral intake, diuretics with autoregulatory dysfunction from lisinopril. Possible underlying component of active chemotherapy.  Admission creatinine 2.38 on 2/13/2025.  Today's creatinine 2.09, trending down  Peak creatinine 2.38 on 2/13/2025  baseline creatinine 1.5-1.7 since Sept '24, prior 1-1.2.   Most recent creatinine 1.52 on 2/3/2025  Hx recent CHUY Dec '24 due to obstructive uropathy with R hydroureteronephrosis, s/p R PCN 12/24/2024  workup: UA with 1+ protein, no hematuria, 10-20 WBC, innumerable bacteria  Imaging: CT 12/21/2024 with persistent right moderate hydroureteronephrosis without evidence of ureteral calculi, 2 mm nonobstructing left lower pole renal calculus. (Prior to PCN)  nonoliguric  Renal function stable and creatinine trending down with fluids  No urgent indication for renal replacement therapy (dialysis)  Will hold on renal imaging given improvement in renal function.  However, would favor renal ultrasound if no improvement or worsening renal function given hx R PCN.  Continue IVF with Isolyte 75 mL/h x 24 hrs.  Follow volume status closely given EF.  Continue to hold diuretics and lisinopril  Strict I/Os, daily weights  Avoid nephrotoxins, NSAIDs, IV contrast if possible  Avoid hypotension.  Maintain MAP >65  Trend BMP in am  Adjust meds to appropriate GFR  Optimize hemodynamic status to avoid delay in renal recovery

## 2025-02-14 NOTE — ASSESSMENT & PLAN NOTE
Demonstrated on imaging since September 2024 with worseing Dec '24 w/associated CHUY  CT 12/21/2024 with moderate right hydroureteronephrosis without R calculus with decompressed bladder with suprapubic catheter in place   +severe hydronephrosis and hydroureter noted at time of nephrostomy tube placement  S/p R PCN by IR 12/24/2024.  Plan for tube check 3/24/2025  Followed by Urology as outpatient

## 2025-02-14 NOTE — CONSULTS
Consultation - Orthopedics   Name: Toro Rodriguez 74 y.o. male I MRN: [id number]  Unit/Bed#: -01 I Date of Admission: 2/13/2025   Date of Service: 2/14/2025 I Hospital Day: 0   Inpatient consult to Orthopedic Surgery  Consult performed by: Diamante Ayers PA-C  Consult ordered by: Jonathan Doyle PA-C        Physician Requesting Evaluation: Jaydon Fleming MD   Reason for Evaluation / Principal Problem: right hip pain    Assessment & Plan  Closed posterior dislocation of right hip (HCC)  74 male status post closed reduction of right total hip prosthesis   Weightbearing as tolerated to right lower extremity with assistance  Knee immobilizer ordered to be applied to the right lower extremity.  It should be worn at all times except for hygiene  Strict posterior hip precautions  Pain control as needed  DVT prophylaxis: Currently on heparin.  Recommend follow-up with orthopedic surgeon as outpatient.  Orthopedics service signing off.    History of Present Illness   HPI: Toro Rodriguez is a 74 y.o. year old male who presents right hip pain after experiencing a mechanical fall at the assisted living facility last night.  Patient fell backwards onto his buttocks.  He felt immediate pain in the right hip and could not ambulate.  He was brought to the emergency room and x-rays revealed a dislocated right hip prosthesis.  Hip prosthesis was  reduced by ED attending under sedation.  Hip abduction pillow was applied.  Patient states he has had 1 prior hip prosthesis dislocation about 5 years ago.  Original right total hip arthroplasty was done by Dr. Parker about 20 years ago.  No other issues with the right hip.  Right hip is feeling much better since reduction.  No numbness or tingling of the right lower extremity.    Review of Systems   Constitutional: Negative.    HENT: Negative.     Eyes: Negative.    Respiratory: Negative.     Cardiovascular: Negative.    Gastrointestinal: Negative.    Endocrine: Negative.     Genitourinary: Negative.    Musculoskeletal:  Positive for arthralgias.   Skin: Negative.    Neurological: Negative.    Hematological: Negative.    Psychiatric/Behavioral: Negative.      significant for findings described in the HPI.  Historical Information   Past Medical History:   Diagnosis Date    Alcohol intoxication ([facility]) 10/15/2020    BPH (benign prostatic hyperplasia)     Chronic HFrEF (heart failure with reduced ejection fraction) ([facility]) 2023    Coronary artery calcification seen on CT scan 11/10/2023    Coronary artery disease 2023    Deep vein thrombosis (DVT) of left lower extremity ([facility]) 2023    Diabetes mellitus ([facility]) 2023    Fall from slip, trip, or stumble 10/15/2020    History of phimosis of penis     History of urinary calculi     Hypertension     Skin cancer     Tobacco abuse     quit around      Past Surgical History:   Procedure Laterality Date    BLADDER STONE REMOVAL      CARDIAC CATHETERIZATION N/A 2023    Procedure: Cardiac catheterization;  Surgeon: Dave Royal MD;  Location: [facility];  Service: Cardiology    IR NEPHROSTOMY TUBE PLACEMENT  2024    IR PORT PLACEMENT  1/10/2025    IR SUPRAPUBIC CATHETER CHECK/CHANGE/REINSERTION/UPSIZE  2024    IR SUPRAPUBIC CATHETER CHECK/CHANGE/REINSERTION/UPSIZE  12/3/2024    IR SUPRAPUBIC CATHETER PLACEMENT  2024    ORIF ANKLE FRACTURE Left     MT CYSTO W/IRRIG & EVAC MULTPLE OBSTRUCTING CLOTS N/A 12/15/2024    Procedure: CYSTOSCOPY EVACUATION OF CLOTS;  Surgeon: Rick Espino MD;  Location: [facility];  Service: Urology    SKIN LESION EXCISION N/A 2024    Procedure: WIDE LOCAL EXCISION OF BACK MELANOMA;  Surgeon: Lillie La MD;  Location: [facility];  Service: Surgical Oncology    TOTAL HIP ARTHROPLASTY Right      Social History     Tobacco Use    Smoking status: Former     Types: Cigarettes     Start date:      Quit date: 1962     Years since quittin.1    Smokeless  tobacco: Never    Tobacco comments:     Quit over 30 years ago (Updated 12/2023).    Vaping Use    Vaping status: Never Used   Substance and Sexual Activity    Alcohol use: Not Currently    Drug use: Never    Sexual activity: Not on file     E-Cigarette/Vaping    E-Cigarette Use Never User      E-Cigarette/Vaping Substances    Nicotine No     THC No     CBD No     Flavoring No     Other No     Unknown No      Family history non-contributory    Objective :  Temp:  [96.1 °F (35.6 °C)-98.2 °F (36.8 °C)] 96.1 °F (35.6 °C)  HR:  [66-91] 67  BP: ()/(46-76) 112/47  Resp:  [16-22] 18  SpO2:  [89 %-98 %] 90 %  O2 Device: Nasal cannula  Nasal Cannula O2 Flow Rate (L/min):  [2 L/min] 2 L/min  Physical Exam  Vitals and nursing note reviewed.   Constitutional:       General: He is not in acute distress.     Appearance: He is well-developed.   HENT:      Head: Normocephalic and atraumatic.   Eyes:      Conjunctiva/sclera: Conjunctivae normal.   Cardiovascular:      Rate and Rhythm: Normal rate and regular rhythm.      Heart sounds: No murmur heard.  Pulmonary:      Effort: Pulmonary effort is normal. No respiratory distress.      Breath sounds: Normal breath sounds.   Abdominal:      Palpations: Abdomen is soft.      Tenderness: There is no abdominal tenderness.   Musculoskeletal:         General: Swelling and tenderness present. No deformity.      Cervical back: Neck supple.   Skin:     General: Skin is warm and dry.      Capillary Refill: Capillary refill takes less than 2 seconds.   Neurological:      Mental Status: He is alert and oriented to person, place, and time.   Psychiatric:         Mood and Affect: Mood normal.     Right Hip Exam     Tenderness   The patient is experiencing no tenderness.     Other   Erythema: absent  Scars: present  Sensation: normal  Pulse: present    Comments:  Leg lengths equal  Thigh and calf compartments, soft and compressible  Actively moving ankle and toes  Hip abduction pillow in  "place                 Lab Results: I have reviewed the following results:   Recent Labs     02/13/25  2230 02/14/25  0448   WBC 1.13* 1.53*   HGB 8.6* 7.7*   HCT 27.5* 24.7*    111*   BUN 44* 44*   CREATININE 2.38* 2.09*     Blood Culture:   Lab Results   Component Value Date    BLOODCX No Growth After 5 Days. 11/10/2023    BLOODCX No Growth After 5 Days. 11/10/2023     Wound Culture: No results found for: \"WOUNDCULT\"    Imaging Results Review: I personally reviewed the following image studies in PACS and associated radiology reports: xray(s). My interpretation of the radiology images/reports is: X-rays right hip reveal a dislocated hip prosthesis.  No acute fractures.  Postreduction films reveal a reduced total hip prosthesis.  Other Study Results Review: No additional pertinent studies reviewed.  "

## 2025-02-14 NOTE — ASSESSMENT & PLAN NOTE
Presents from [place] assisted living with right hip hip pain.    Patient was walking with walker and missed chair when attempting to sit, falling onto buttocks.  Closed reduction successfully performed in emergency department.

## 2025-02-14 NOTE — ED PROVIDER NOTES
Time reflects when diagnosis was documented in both MDM as applicable and the Disposition within this note       Time User Action Codes Description Comment    2/13/2025 11:30 PM Tyler Kilpatrick [W19.XXXA] Fall from standing, initial encounter     2/13/2025 11:30 PM Tyler Kilpatrick [S72.001A] Closed fracture dislocation of right hip joint, initial encounter ([facility])     2/13/2025 11:31 PM Tyler Kilpatrick [N17.9,  N18.9] Acute kidney injury superimposed on CKD  ([facility])     2/13/2025 11:31 PM Tyler Kilpatrick [D64.9] Anemia     2/13/2025 11:31 PM Tyler Kilpatrick [E87.6] Acute hypokalemia     2/13/2025 11:46 PM Tyler Kilpatrick [I50.20] HFrEF (heart failure with reduced ejection fraction) ([facility])     2/13/2025 11:46 PM Tyler Kilpatrick [C67.8] Malignant neoplasm of overlapping sites of bladder ([facility])           ED Disposition       ED Disposition   Admit    Condition   Stable    Date/Time   Fri Feb 14, 2025 12:07 AM    Comment   Case was discussed with MIO and the patient's admission status was agreed to be Admission Status: observation status to the service of Dr. Wylie.               Assessment & Plan       Medical Decision Making    74 y.o. male presenting for right hip pain status post fall.  Borderline hypotension on arrival.  Will obtain CXR to evaluate for rib fracture or PTX.  Will obtain xray to evaluate for fracture or dislocation.  Compartments soft, do not suspect compartment syndrome.  Will obtain labs to evaluate for anemia, electrolyte abnormality or CHUY.    Patient agreeable to attempts at gentle manipulation without anesthesia however these were unsuccessful.  Written consent obtained for procedural sedation and reduction which was successfully performed as documented above.    Lab results were reviewed with the patient in detail including finding of anemia, CHUY and hypokalemia.  Suspect CHUY is prerenal, will give gentle IV fluid bolus in the setting of heart failure with reduced  EF.  Potassium repleted via both p.o. and IV route.    Impression: hypotension, weakness and CHUY on CKD will recommend admission for further care. Will admit for further evaluation and management.  Patient care discussed with SLIM who will assume care and admit patient to the hospital. I have discussed with the patient our recommendation of inpatient admission for further medical care. I have answered all of the patient's questions and concerns. The patient is in agreement with the plan to proceed with admission to the hospital.     Amount and/or Complexity of Data Reviewed  Labs: ordered. Decision-making details documented in ED Course.  Radiology: ordered and independent interpretation performed.    Risk  OTC drugs.  Prescription drug management.  Decision regarding hospitalization.        ED Course as of 02/14/25 0008   Thu Feb 13, 2025 2237 Hemoglobin(!): 8.6   2253 Creatinine(!): 2.38       Medications   potassium chloride 40 mEq IVPB (premix) (has no administration in time range)   acetaminophen (TYLENOL) tablet 975 mg (975 mg Oral Given 2/13/25 2229)   sodium chloride 0.9 % bolus 500 mL (500 mL Intravenous New Bag 2/13/25 2244)   propofol (DIPRIVAN) 200 MG/20ML bolus injection 90 mg (40 mg Intravenous Given 2/13/25 2316)   potassium chloride (Klor-Con M20) CR tablet 40 mEq (40 mEq Oral Given 2/13/25 2304)       ED Risk Strat Scores                                              History of Present Illness       Chief Complaint   Patient presents with    Fall     EMS from [facility]; pt was walking with walker going to sit down in chair, missed chair and fell onto buttocks, c/o R hip pain and upper leg pain, no head strike, no thinners       Past Medical History:   Diagnosis Date    Alcohol intoxication ([facility]) 10/15/2020    BPH (benign prostatic hyperplasia)     Chronic HFrEF (heart failure with reduced ejection fraction) ([facility]) 11/2023    Coronary artery calcification seen on CT scan 11/10/2023    Coronary  artery disease 2023    Deep vein thrombosis (DVT) of left lower extremity (HCC) 2023    Diabetes mellitus (HCC) 2023    Fall from slip, trip, or stumble 10/15/2020    History of phimosis of penis     History of urinary calculi     Hypertension 1988    Skin cancer     Tobacco abuse     quit around       Past Surgical History:   Procedure Laterality Date    BLADDER STONE REMOVAL      CARDIAC CATHETERIZATION N/A 2023    Procedure: Cardiac catheterization;  Surgeon: Dave Royal MD;  Location: [facility];  Service: Cardiology    IR NEPHROSTOMY TUBE PLACEMENT  2024    IR PORT PLACEMENT  1/10/2025    IR SUPRAPUBIC CATHETER CHECK/CHANGE/REINSERTION/UPSIZE  2024    IR SUPRAPUBIC CATHETER CHECK/CHANGE/REINSERTION/UPSIZE  12/3/2024    IR SUPRAPUBIC CATHETER PLACEMENT  2024    ORIF ANKLE FRACTURE Left     MT CYSTO W/IRRIG & EVAC MULTPLE OBSTRUCTING CLOTS N/A 12/15/2024    Procedure: CYSTOSCOPY EVACUATION OF CLOTS;  Surgeon: Rick Espino MD;  Location: [facility];  Service: Urology    SKIN LESION EXCISION N/A 2024    Procedure: WIDE LOCAL EXCISION OF BACK MELANOMA;  Surgeon: Lillie La MD;  Location: [facility];  Service: Surgical Oncology    TOTAL HIP ARTHROPLASTY Right       Family History   Problem Relation Age of Onset    Breast cancer Mother     Heart attack Father 61    Other Sister         s/p hysterectomy    Cerebral palsy Brother     Skin cancer Other         family history    No Known Problems Son     No Known Problems Daughter     Sudden death Neg Hx       Social History     Tobacco Use    Smoking status: Former     Types: Cigarettes     Start date:      Quit date:      Years since quittin.1    Smokeless tobacco: Never    Tobacco comments:     Quit over 30 years ago (Updated 2023).    Vaping Use    Vaping status: Never Used   Substance Use Topics    Alcohol use: Not Currently    Drug use: Never      E-Cigarette/Vaping     E-Cigarette Use Never User       E-Cigarette/Vaping Substances    Nicotine No     THC No     CBD No     Flavoring No     Other No     Unknown No       I have reviewed and agree with the history as documented.     Toro Rodriguez is a 74 y.o. year old male with PMH of HFrEF, CAD, Bladder CA currently undergoing chemo, DM, HTN, DVT presenting to the [facility] ED after a fall. Patient attempted to sit in chair when he lost his balance, causing him to fall.  He walks with a walker at baseline. Patient did not strike their head. No reported LOC. He said his right hip bended awkwardly and is reporting discomfort in that area. Patient states they have right hip pain only with movement pain. He has intact strength and sensation lower down in the right leg. Medication list includes 81 mg aspirin however per history patient does not take AC/AP medications.  Patient felt more weak than usual earlier today.  No reported fevers or cough. Patient denies headache, neck pain or back pain. No weakness or numbness in extremities. No chest pain, dyspnea or abdominal pain.      History provided by:  Medical records and patient   used: No    Fall  Associated symptoms: no abdominal pain, no back pain, no chest pain, no headaches, no nausea, no neck pain and no vomiting        Review of Systems   Constitutional:  Positive for fatigue. Negative for fever.   HENT:  Negative for congestion.    Respiratory:  Negative for cough and shortness of breath.    Cardiovascular:  Negative for chest pain and leg swelling.   Gastrointestinal:  Negative for abdominal pain, nausea and vomiting.   Genitourinary:  Negative for flank pain.   Musculoskeletal:  Positive for arthralgias. Negative for back pain and neck pain.   Skin:  Negative for wound.   Neurological:  Negative for syncope, weakness, numbness and headaches.   All other systems reviewed and are negative.          Objective       ED Triage Vitals [02/13/25 6487]   Temperature  Pulse Blood Pressure Respirations SpO2 Patient Position - Orthostatic VS   98 °F (36.7 °C) 91 92/53 16 93 % Lying      Temp Source Heart Rate Source BP Location FiO2 (%) Pain Score    Temporal Monitor Right arm -- 7      Vitals      Date and Time Temp Pulse SpO2 Resp BP Pain Score FACES Pain Rating User   02/13/25 2313 -- 76 96 % 18 105/54 -- -- SV   02/13/25 2245 -- 84 95 % 22 94/54 -- -- SV   02/13/25 2229 -- -- -- -- -- 8 --    02/13/25 2157 98 °F (36.7 °C) 91 93 % 16 92/53 7 -- MS            Physical Exam  Vitals and nursing note reviewed.   Constitutional:       General: He is not in acute distress.     Appearance: Normal appearance. He is not ill-appearing, toxic-appearing or diaphoretic.   HENT:      Head: Normocephalic and atraumatic. No Ramirez's sign, abrasion, contusion, right periorbital erythema, left periorbital erythema or laceration.      Right Ear: External ear normal.      Left Ear: External ear normal.      Nose:      Right Nostril: No epistaxis.      Left Nostril: No epistaxis.      Mouth/Throat:      Mouth: No lacerations.   Eyes:      General:         Right eye: No discharge.         Left eye: No discharge.      Pupils: Pupils are equal, round, and reactive to light.   Cardiovascular:      Rate and Rhythm: Normal rate and regular rhythm.   Pulmonary:      Effort: Pulmonary effort is normal. No respiratory distress.      Breath sounds: Normal breath sounds. No wheezing, rhonchi or rales.   Abdominal:      General: Abdomen is flat. There is no distension.      Tenderness: There is no abdominal tenderness. There is no guarding or rebound.   Musculoskeletal:      Right shoulder: No tenderness. Normal range of motion.      Left shoulder: No tenderness. Normal range of motion.      Right upper arm: No tenderness.      Left upper arm: No tenderness.      Right elbow: No swelling. No tenderness.      Left elbow: No swelling. No tenderness.      Right forearm: No swelling, tenderness or bony  tenderness.      Left forearm: No swelling, tenderness or bony tenderness.      Right wrist: No swelling, deformity, tenderness, bony tenderness or snuff box tenderness.      Left wrist: No swelling, deformity, tenderness, bony tenderness or snuff box tenderness.      Right hand: No tenderness or bony tenderness. Normal strength. Normal sensation.      Left hand: No tenderness or bony tenderness. Normal strength. Normal sensation.      Cervical back: Normal range of motion. No tenderness or bony tenderness.      Thoracic back: No bony tenderness.      Lumbar back: No bony tenderness.      Right hip: Tenderness present. No deformity or bony tenderness. Normal range of motion. Normal strength.      Left hip: No deformity, tenderness or bony tenderness. Normal range of motion. Normal strength.      Right upper leg: No swelling, edema, deformity, tenderness or bony tenderness.      Left upper leg: No tenderness.      Right knee: No swelling. No tenderness.      Left knee: No swelling. No tenderness.      Right lower leg: No bony tenderness.      Left lower leg: No bony tenderness.      Right ankle: No tenderness.      Left ankle: No tenderness.      Right foot: No tenderness.      Left foot: No tenderness.   Skin:     Coloration: Skin is pale.   Neurological:      General: No focal deficit present.      Mental Status: He is alert and oriented to person, place, and time. Mental status is at baseline.      GCS: GCS eye subscore is 4. GCS verbal subscore is 5. GCS motor subscore is 6.   Psychiatric:         Mood and Affect: Mood normal.         Behavior: Behavior normal.         Results Reviewed       Procedure Component Value Units Date/Time    Basic metabolic panel [[id number]]  (Abnormal) Collected: 02/13/25 2230    Lab Status: Final result Specimen: Blood from Arm, Right Updated: 02/13/25 2249     Sodium 136 mmol/L      Potassium 2.9 mmol/L      Chloride 104 mmol/L      CO2 17 mmol/L      ANION GAP 15 mmol/L      BUN 44  mg/dL      Creatinine 2.38 mg/dL      Glucose 107 mg/dL      Calcium 7.5 mg/dL      eGFR 25 ml/min/1.73sq m     Narrative:      National Kid[employer] Disease Foundation guidelines for Chronic Kidney Disease (CKD):     Stage 1 with normal or high GFR (GFR > 90 mL/min/1.73 square meters)    Stage 2 Mild CKD (GFR = 60-89 mL/min/1.73 square meters)    Stage 3A Moderate CKD (GFR = 45-59 mL/min/1.73 square meters)    Stage 3B Moderate CKD (GFR = 30-44 mL/min/1.73 square meters)    Stage 4 Severe CKD (GFR = 15-29 mL/min/1.73 square meters)    Stage 5 End Stage CKD (GFR <15 mL/min/1.73 square meters)  Note: GFR calculation is accurate only with a steady state creatinine    CBC and Platelet [[id number]]  (Abnormal) Collected: 02/13/25 2230    Lab Status: Final result Specimen: Blood from Arm, Right Updated: 02/13/25 2236     WBC 1.13 Thousand/uL      RBC 2.88 Million/uL      Hemoglobin 8.6 g/dL      Hematocrit 27.5 %      MCV 96 fL      MCH 29.9 pg      MCHC 31.3 g/dL      RDW 15.9 %      Platelets 152 Thousands/uL      MPV 10.9 fL             XR hip/pelv 1 vw RIGHT if performed   ED Interpretation by Tyler Kilpatrick DO (02/13 2328)   Resolution of right hip arthroplasty. No fracture.      XR chest portable   ED Interpretation by Tyler Kilpatrick DO (02/13 2327)   Mild vascular congestion. No focal infiltrate.      XR hip/pelv 1 vw right if performed   ED Interpretation by Tyler Kilpatrick DO (02/13 2328)   Persistent right arthroplasty dislocation. No fracture.      XR hip/pelv 2-3 vws right   ED Interpretation by Tyler Kilpatrick DO (02/13 2231)   Dislocation of right hip arthroplasty.          Pre-Procedural Sedation    Performed by: Tyler Kilpatrick DO  Authorized by: Tyler Kilpatrick DO    Consent:     Consent obtained:  Verbal    Consent given by:  Patient    Risks discussed:  Dysrhythmia, inadequate sedation, respiratory compromise necessitating ventilatory assistance and intubation, prolonged sedation necessitating  "reversal and prolonged hypoxia resulting in organ damage  Universal protocol:     Procedure explained and questions answered to patient or proxy's satisfaction: yes    Indications:     Sedation purpose:  Dislocation reduction    Procedure necessitating sedation performed by:  Physician performing sedation    Intended level of sedation:  Moderate (conscious sedation)  Pre-sedation assessment:     ASA classification: class 3 - patient with severe systemic disease      Neck mobility: normal      Mouth opening:  3 or more finger widths    Thyromental distance:  2 finger widths    Mallampati score:  I - soft palate, uvula, fauces, pillars visible    Pre-sedation assessments completed and reviewed: airway patency, cardiovascular function, hydration status, mental status, nausea/vomiting, pain level, respiratory function and temperature      Pre-sedation assessment completed:  2/13/2025 10:39 PM  Orthopedic injury treatment    Date/Time: 2/13/2025 11:20 PM    Performed by: Tyler Kilpatrick DO  Authorized by: Tyler Kilpatrick DO    Patient Location:  ED  [place] Protocol:  Procedure performed by: (Dr. Yoon)  Consent: Verbal consent obtained.  Risks and benefits: risks, benefits and alternatives were discussed  Consent given by: patient  Time out: Immediately prior to procedure a \"time out\" was called to verify the correct patient, procedure, equipment, support staff and site/side marked as required.  Patient understanding: patient states understanding of the procedure being performed  Patient consent: the patient's understanding of the procedure matches consent given  Procedure consent: procedure consent matches procedure scheduled  Required items: required blood products, implants, devices, and special equipment available  Patient identity confirmed: verbally with patient    Injury location:  Hip  Location details:  Right hip  Injury type:  Dislocation  Spontaneous?: No    Prosthesis?: Yes    Neurovascular status: " Neurovascularly intact    Distal perfusion: normal    Neurological function: normal    Range of motion: reduced    Local anesthesia used?: No    General anesthesia used?: No    Sedation type:  Moderate (conscious) sedation (See separate Procedural Sedation form)  Manipulation performed?: Yes    Reduction method: external rotation, traction and counter traction  Skeletal traction used?: Yes    Reduction successful?: Yes    Confirmation: Reduction confirmed by x-ray    Immobilization:  Brace  Neurovascular status: Neurovascularly intact    Distal perfusion: normal    Neurological function: normal    Range of motion: improved    Patient tolerance:  Patient tolerated the procedure well with no immediate complications  Procedural Sedation    Date/Time: 2/13/2025 11:10 PM    Performed by: Tyler Kilpatrick DO  Authorized by: Tyler Kilpatrick DO    Immediate pre-procedure details:     Reassessment: Patient reassessed immediately prior to procedure      Reviewed: vital signs and relevant labs/tests      Verified: bag valve mask available, emergency equipment available, intubation equipment available, IV patency confirmed, oxygen available and suction available    Procedure details (see MAR for exact dosages):     Sedation start time:  2/14/2025 11:10 PM    Preoxygenation:  Nasal cannula    Sedation:  Propofol    Analgesia:  None    Intra-procedure monitoring:  Blood pressure monitoring, cardiac monitor, continuous pulse oximetry, continuous capnometry, frequent LOC assessments and frequent vital sign checks    Intra-procedure events: none      Intra-procedure management:  Airway repositioning    Sedation end time:  2/13/2025 11:46 PM    Total sedation time (minutes):  36  Post-procedure details:     Post-sedation assessment completed:  2/13/2025 11:46 PM    Attendance: Constant attendance by certified staff until patient recovered      Recovery: Patient returned to pre-procedure baseline      Post-sedation assessments  completed and reviewed: airway patency, cardiovascular function, hydration status, mental status, nausea/vomiting, pain level, respiratory function and temperature      Patient is stable for discharge or admission: yes      Patient tolerance:  Tolerated well, no immediate complications      ED Medication and Procedure Management   Prior to Admission Medications   Prescriptions Last Dose Informant Patient Reported? Taking?   acetaminophen (TYLENOL) 325 mg tablet  Self Yes No   Sig: Take 325 mg by mouth every 6 (six) hours as needed for mild pain   amLODIPine (NORVASC) 5 mg tablet  Self No No   Sig: Take 1 tablet (5 mg total) by mouth daily   amiodarone 200 mg tablet  Self No No   Sig: Take 1 tablet (200 mg total) by mouth daily   aspirin 81 mg chewable tablet  Self No No   Sig: Chew 1 tablet (81 mg total) daily Do not start before September 30, 2024.   atorvastatin (LIPITOR) 40 mg tablet  Self No No   Sig: Take 1 tablet (40 mg total) by mouth daily with dinner   furosemide (LASIX) 20 mg tablet  Self No No   Sig: Take 1 tablet (20 mg total) by mouth daily   lidocaine-prilocaine (EMLA) cream  Self No No   Sig: Apply topically as needed for mild pain   lisinopril (ZESTRIL) 5 mg tablet  Self No No   Sig: Take 1 tablet (5 mg total) by mouth daily Do not start before November 4, 2024.   metoprolol succinate (TOPROL-XL) 25 mg 24 hr tablet   No No   Sig: Take 1 tablet (25 mg total) by mouth every 12 (twelve) hours   nitroglycerin (NITROSTAT) 0.4 mg SL tablet  Self No No   Sig: Place 1 tablet (0.4 mg total) under the tongue every 5 (five) minutes as needed for chest pain   ondansetron (ZOFRAN) 8 mg tablet  Self No No   Sig: Take 1 tablet (8 mg total) by mouth every 8 (eight) hours as needed for nausea or vomiting   polyethylene glycol (MIRALAX) 17 g packet  Self No No   Sig: Take 17 g by mouth daily   prochlorperazine (COMPAZINE) 10 mg tablet  Self No No   Sig: Take 1 tablet (10 mg total) by mouth every 6 (six) hours as  needed for nausea or vomiting   sertraline (ZOLOFT) 25 mg tablet  Self No No   Sig: Take 1 tablet (25 mg total) by mouth daily Do not start before December 26, 2024.   sodium chloride, PF, 0.9 %  Self No No   Sig: 10 mL by Intracatheter route daily Intracatheter flushing daily. May substitute prefilled syringe with normal saline 10 mL vials, 10 mL syringes, and 18 g blunt needles      Facility-Administered Medications: None     Patient's Medications   Discharge Prescriptions    No medications on file     No discharge procedures on file.  ED SEPSIS DOCUMENTATION   Time reflects when diagnosis was documented in both MDM as applicable and the Disposition within this note       Time User Action Codes Description Comment    2/13/2025 11:30 PM Tyler Kilpatrick [W19.XXXA] Fall from standing, initial encounter     2/13/2025 11:30 PM Tyler Kilpatrick [S72.001A] Closed fracture dislocation of right hip joint, initial encounter ([facility])     2/13/2025 11:31 PM Tyler Kilpatrick [N17.9,  N18.9] Acute kidney injury superimposed on CKD  ([facility])     2/13/2025 11:31 PM Tyler Kilpatrick [D64.9] Anemia     2/13/2025 11:31 PM Tyler Kilpatrick [E87.6] Acute hypokalemia     2/13/2025 11:46 PM Tyler Kilpatrick [I50.20] HFrEF (heart failure with reduced ejection fraction) ([facility])     2/13/2025 11:46 PM Tyler Kilpatrick [C67.8] Malignant neoplasm of overlapping sites of bladder ([facility])                  Tyler Kilpatrick,   02/14/25 0548

## 2025-02-14 NOTE — ASSESSMENT & PLAN NOTE
Wt Readings from Last 3 Encounters:   02/14/25 81 kg (178 lb 9.2 oz)   02/06/25 84.6 kg (186 lb 8.2 oz)   02/05/25 84.6 kg (186 lb 8.2 oz)     Follows with Dr. Roach outpatient   Lasix held in setting of CHUY. Monitor.  Continue metoprolol.  Hold lisinopril in setting of CHUY.  Daily weights, Strict I & Os  Cardiac diet, low sodium <2g

## 2025-02-14 NOTE — PROGRESS NOTES
Progress Note - Nephrology   Name: Toro Rodriguez 74 y.o. male I MRN: [id number]  Unit/Bed#: -01 I Date of Admission: 2/13/2025   Date of Service: 2/15/2025 I Hospital Day: 1       Brief Hx: 74 year old male with CKD 3B, recently diagnosed bladder cancer undergoing chemotherapy, R hydronephrosis, s/p R PCN 12/24/2024, suprapubic catheter since 9/27/2024, HFrEF, AF, CAD, HTN, DM2, hx melanoma, LINH p/w fall and MALIK dislocation.  Nephrology consulted for management of CHUY on CKD.   Assessment & Plan  CHUY (acute kidney injury) (HCC)  Etiology: suspect prerenal due to relative hypotension, volume depletion with poor oral intake, diuretics with autoregulatory dysfunction from lisinopril. Possible underlying component of active chemotherapy.  Admission creatinine 2.38 on 2/13/2025.  Today's creatinine 1.39, continue to trend down and below recent baseline  Peak creatinine 2.38 on 2/13/2025  baseline creatinine 1.5-1.7 since Sept '24, prior 1-1.2.   Most recent creatinine 1.52 on 2/3/2025.  Hx recent CHUY Dec '24 due to obstructive uropathy with R hydroureteronephrosis, s/p R PCN 12/24/2024  workup: UA with 1+ protein, no hematuria, 10-20 WBC, innumerable bacteria  Imaging: CT 12/21/2024 with persistent right moderate hydroureteronephrosis without evidence of ureteral calculi, 2 mm nonobstructing left lower pole renal calculus. (Prior to PCN)  nonoliguric  Renal function stable and below baseline  Will hold on renal imaging given improvement in renal function.  However, would favor renal ultrasound if no improvement or worsening renal function given hx R PCN.  Monitor off IVF  Continue to hold lisinopril  Continue to hold diuretics today given new diarrhea and stable volume status.  Likely need to resume diuretics for the next 24-48 hours.  Strict I/Os, daily weights  Avoid nephrotoxins, NSAIDs, IV contrast if possible  Avoid hypotension.  Maintain MAP >65  Trend BMP in am    Stage 3b chronic kidney disease  (HCC)  Etiology:  Suspect nephrosclerosis, diabetic glomerulopathy  Baseline creatinine 1.2-1.5 prior to CHUY in December  Outpatient Nephrologist: Dr. Reyes    Essential hypertension  BP acceptable  Volume status euvolemic  Home Rx: Toprol XL 25 mg BID, lisinopril 5 mg daily, lasix 20 mg daily, amlodipine 5 mg daily  Current Rx: Toprol XL 25 mg BID  Continue to hold diuretics and lisinopril  Monitor off IVF  Recommend hold parameters on antihypertensives for SBP <130 mmHg.  Avoid hypotension or fluctuations in blood pressure.  Maintain MAP >65  Low sodium (2 gm) diet  Continue to trend    Normal anion gap metabolic acidosis  Suspect in the setting of CHUY and now diarrhea  Stable and mild  Serum bicarb 20, AG 7 today  will give sodium bicarb to 1300 mg p.o. x 1 today  Continue to monitor  Acute hypokalemia  K+ 3.9 today  Replete with KCl 20 mEq po x 1  Replete magnesium  Continue to monitor  Hypomagnesemia  Suspect in the setting of GI losses  Magnesium 1.7  Replete with 2 g IV x 1  Continue to monitor  Anemia due to stage 3b chronic kidney disease  (HCC)  Hgb 7.5 today, below goal and trending down slightly.  Goal >10  With pancytopenia in the setting of chemotherapy.  WBC improving, platelets 97,000, trending down  Trend CBC  Transfuse for Hgb <7.0  management per primary team    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)  Calcium currently low  Continue to monitor    Closed posterior dislocation of right hip (HCC)  P/w ground-level fall with right total hip arthroplasty dislocation  S/p closed reduction in ED  PT/OT  Seen by orthopedic surgery.  Plans for outpatient follow-up  Hydronephrosis  Demonstrated on imaging since September 2024 with worseing Dec '24 w/associated CHUY  CT 12/21/2024 with moderate right hydroureteronephrosis without R calculus with decompressed bladder with suprapubic catheter in place   +severe hydronephrosis and hydroureter noted at  time of nephrostomy tube placement  S/p R PCN by IR 12/24/2024.  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, liver, bone, pelvic lymph node metastases  On first-line treatment with carboplatin and etoposide, started 1/14/2025  Chronic suprapubic catheter in place  Followed by oncology outpatient  Management per primary team  HFrEF (heart failure with reduced ejection fraction) ([facility])  Wt Readings from Last 3 Encounters:   02/15/25 82.9 kg (182 lb 12.2 oz)   02/06/25 84.6 kg (186 lb 8.2 oz)   02/05/25 84.6 kg (186 lb 8.2 oz)   Compensated  echo 2/26/2024:  EF 35-40% with G1DD, mod MR, IVC normal size  home diuretics: lasix 20 mg daily  Hold diuretics due to resolving CHUY and now diarrhea  on beta blockers and lisinopril as outpatient.  Hold ACEi in the setting of CHUY  fluid restriction, 2 gm low sodium diet, daily weights  management per primary team    Type 2 diabetes mellitus with stage 3b chronic kidney disease, without long-term current use of insulin ([facility])  Lab Results   Component Value Date    HGBA1C 5.9 (H) 04/18/2024   stable  continue to optimize glycemic control  management per primary team    Dysphagia  Noted by nursing staff with taking medications of thin liquids  Speech evaluation and cleared for regular diet  Management per primary team  UTI (urinary tract infection)  On ceftriaxone  Management per primary team  Diarrhea  C. difficile and stool studies pending  Management per primary team    Plan Summary:  -Renal function stable and below baseline  -Monitor off IVF  -Continue to hold diuretics and lisinopril today.  Consider resuming diuretics in the next 24-48 hours  -KCl 20 mEq po x 1 and Mag 2 g IV x 1  -Sodium bicarbonate 1300 mg p.o. x 1 today  -BMP in a.m.    SUBJECTIVE:  Patient awake, alert.  Complains of persistent diarrhea since last p.m.  C. difficile pending.  Renal  function back to baseline.  Adequate urine output.  Volume status stable.  VSS.    A complete review of systems was performed. Pertinent positives and negatives noted in the HPI. Otherwise the review of systems was negative.  OBJECTIVE:  Current Weight: Weight - Scale: 82.9 kg (182 lb 12.2 oz)  Vitals:    02/14/25 2022 02/15/25 0300 02/15/25 0531 02/15/25 0810   BP: 130/55 123/52  122/51   BP Location: Left arm Right arm  Left arm   Pulse:  76     Resp:  16  18   Temp:  99.3 °F (37.4 °C)  99.2 °F (37.3 °C)   TempSrc:  Axillary  Axillary   SpO2:  96%     Weight:   82.9 kg (182 lb 12.2 oz)    Height:           Intake/Output Summary (Last 24 hours) at 2/15/2025 1029  Last data filed at 2/15/2025 0838  Gross per 24 hour   Intake 2520 ml   Output 1175 ml   Net 1345 ml       General: Awake, alert in no acute distress.  Skin:  No rash, warm, good skin turgor   Eyes:  PERRL, EOMI, sclerae nonicteric.  no periorbital edema   ENT:  Moist mucous membranes  Neck:  Trachea midline, symmetric.  No JVD.  Chest:  Clear to auscultation bilaterally without wheezes, crackles or rhonchi  CVS:  Regular rate and rhythm without murmur, gallop or rub.  S1 and S2 identified and normal.  No S3, S4.   Abdomen:  Soft, nontender, nondistended without masses.  Normal bowel sounds x 4 quadrants.  No bruit.  Extremities:  Warm, pink, motor and sensory intact and well perfused.  No cyanosis, pallor.  No BLE edema.  Neuro: Awake, alert, oriented x 3.  Grossly intact  Psych: Appropriate affect, mentating appropriately.  : +suprapubic catheter in place       Medications:    Current Facility-Administered Medications:     acetaminophen (TYLENOL) tablet 650 mg, 650 mg, Oral, Q4H PRN, Jonathan Doyle PA-C    amiodarone tablet 200 mg, 200 mg, Oral, Daily, Jonathan Doyle PA-C, 200 mg at 02/15/25 0824    [Held by provider] amLODIPine (NORVASC) tablet 5 mg, 5 mg, Oral, Daily, Jonathan Doyle, PA-C    aspirin chewable tablet 81 mg, 81 mg, Oral, Daily,  Jonathan Doyle PA-C, 81 mg at 02/15/25 0824    atorvastatin (LIPITOR) tablet 40 mg, 40 mg, Oral, Daily With Dinner, Jonathan Doyle PA-C, 40 mg at 02/14/25 1724    cefTRIAXone (ROCEPHIN) IVPB (premix in dextrose) 1,000 mg 50 mL, 1,000 mg, Intravenous, Q24H, Jaydon Fleming MD    [Held by provider] furosemide (LASIX) tablet 20 mg, 20 mg, Oral, Daily, Jonathan Doyle PA-C    heparin (porcine) subcutaneous injection 5,000 Units, 5,000 Units, Subcutaneous, Q8H ARI, Jonathan Doyle PA-C, 5,000 Units at 02/15/25 0648    [Held by provider] lisinopril (ZESTRIL) tablet 5 mg, 5 mg, Oral, Daily, Jonathan Doyle PA-C    metoprolol succinate (TOPROL-XL) 24 hr tablet 25 mg, 25 mg, Oral, Q12H ARI, Jonathan Doyle PA-C, 25 mg at 02/15/25 0824    [Held by provider] polyethylene glycol (MIRALAX) packet 17 g, 17 g, Oral, Daily, Jonathan Doyle PA-C    prochlorperazine (COMPAZINE) tablet 10 mg, 10 mg, Oral, Q6H PRN, Jonathan Doyle PA-C, 10 mg at 02/15/25 0017    saccharomyces boulardii (FLORASTOR) capsule 250 mg, 250 mg, Oral, BID, Angelica Madrid PA-C, 250 mg at 02/15/25 0827    sertraline (ZOLOFT) tablet 25 mg, 25 mg, Oral, Daily, Jonathan Doyle PA-C, 25 mg at 02/15/25 0824    sodium chloride (PF) 0.9 % injection 10 mL, 10 mL, Intracatheter, Daily, Jonathan Doyle PA-C, 10 mL at 02/15/25 0827    Laboratory Results:  Results from last 7 days   Lab Units 02/15/25  0244 02/14/25 2007 02/14/25  0448 02/13/25  2230   WBC Thousand/uL 2.82*  --  1.53* 1.13*   HEMOGLOBIN g/dL 7.5*  --  7.7* 8.6*   HEMATOCRIT % 24.1*  --  24.7* 27.5*   PLATELETS Thousands/uL 97*  --  111* 152   SODIUM mmol/L 138 138 137 136   POTASSIUM mmol/L 3.9 3.6 3.1* 2.9*   CHLORIDE mmol/L 111* 108 107 104   CO2 mmol/L 20* 21 20* 17*   BUN mg/dL 29* 35* 44* 44*   CREATININE mg/dL 1.39* 1.59* 2.09* 2.38*   CALCIUM mg/dL 7.2* 7.4* 7.1* 7.5*   MAGNESIUM mg/dL 1.7*  --   --   --        I have personally reviewed the blood work as stated above and in my note.  I have  personally reviewed internal Medicine, co-consultants and previous nephrology notes.

## 2025-02-14 NOTE — PLAN OF CARE
Problem: PHYSICAL THERAPY ADULT  Goal: Performs mobility at highest level of function for planned discharge setting.  See evaluation for individualized goals.  Description: Treatment/Interventions: Functional transfer training, LE strengthening/ROM, Therapeutic exercise, Endurance training, Cognitive reorientation, Patient/family training, Equipment eval/education, Bed mobility, Gait training, Compensatory technique education, Spoke to nursing, Spoke to case management     See flowsheet documentation for full assessment, interventions and recommendations.  Note: Prognosis: Good  Problem List: Decreased strength, Decreased range of motion, Decreased endurance, Impaired balance, Decreased mobility, Impaired judgement, Decreased safety awareness, Impaired hearing, Obesity, Orthopedic restrictions, Pain  Assessment: Pt seen for PT evaluation for mobility assessment & discharge needs. Pt admitted 2/13/2025 s/p fall resulting in R hip posterior dislocation (manipulated without anesthesia), dx CHUY (acute kidney injury) (HCC). During PT IE, pt requires MAXA 2 person for bed mobility in hospital bed, transfers STS with RW and RANJIT 2 person, ambulates 6ft x1 with RW and RANJIT 2 person (pt unable to tolerate additional ambulation distance on this date due to fatigue). Pt displays above outlined functional impairments & limitations, and presents below her baseline level of functional mobility. The AM-PAC & Barthel Index outcome tools were used to assist in determining pt safety w/ mobility/self care & appropriate d/c recommendations, see above for scores. Pt is at risk of falls d/t multiple comorbidities, h/o falls, impaired balance, use of ambulatory aid, varying levels of pain , acuity of medical illness, ongoing medical treatment of primary dx, unstable vitals, WB restriction, and new posterior hip precautions. Pt's clinical presentation is currently unstable/unpredictable as seen in pt's presentation of vital sign  response, varying levels of cognitive performance, increased fall risk, new onset of impairment of functional mobility, decreased endurance, and new onset of weakness. Pt will benefit from continued PT services in order to address impairments, decrease risk of falls, maximize independence w/ fnxl mobility, & ensure safety w/ mobility for transition to next level of care. Based on pt presentation & impairments, pt would most appropriately benefit from Level II (moderate PT intensity) resources upon d/c.    Barriers to Discharge: Decreased caregiver support     Rehab Resource Intensity Level, PT: II (Moderate Resource Intensity)    See flowsheet documentation for full assessment.

## 2025-02-14 NOTE — ASSESSMENT & PLAN NOTE
Patient observed by nursing staff coughing after taking medications with water and nectar thick liquids while sitting at 90 degrees upright.   NPO and speech consult.

## 2025-02-14 NOTE — ASSESSMENT & PLAN NOTE
Noted by nursing staff with taking medications of thin liquids  Speech evaluation and cleared for regular diet  Management per primary team

## 2025-02-14 NOTE — ASSESSMENT & PLAN NOTE
Hgb 7.7, below goal and trending down.  Goal >10  With pancytopenia in the setting of chemotherapy  continue to trend  Transfuse for Hgb <7.0  management per primary team

## 2025-02-14 NOTE — ASSESSMENT & PLAN NOTE
Lab Results   Component Value Date    CREATININE 2.38 (H) 02/13/2025    CREATININE 1.52 (H) 02/03/2025     Creatine on admission 2.38, Baseline creatinine 1.5  Hold ACE/ARB and diuretic therapy  Avoid hypotension, nephrotoxins, and NSAIDS if possible    Gentle IV fluids in setting of CHF   Strict I & Os  Urinary retention protocol and bladder scan

## 2025-02-14 NOTE — H&P
"H&P - Hospitalist   Name: Toro Rodriguez 74 y.o. male I MRN: [id number]  Unit/Bed#: -01 I Date of Admission: 2/13/2025   Date of Service: 2/14/2025 I Hospital Day: 0     Assessment & Plan  CHUY (acute kidney injury) (Formerly [facility])  Lab Results   Component Value Date    CREATININE 2.38 (H) 02/13/2025    CREATININE 1.52 (H) 02/03/2025     Creatine on admission 2.38, Baseline creatinine 1.5  Hold ACE/ARB and diuretic therapy  Avoid hypotension, nephrotoxins, and NSAIDS if possible    Gentle IV fluids in setting of CHF   Strict I & Os  Urinary retention protocol and bladder scan   Closed posterior dislocation of right hip ([facility])  Presents from [facility] with right hip hip pain.    Patient was walking with walker and missed chair when attempting to sit, falling onto buttocks.  Closed reduction successfully performed in emergency department.  PT/OT.  Ortho consult.  Acute hypokalemia  Potassium 2.9 on arrival.  Repleted.  Recheck in AM.  Anemia  Hemoglobin 8.6 on arrival.  Down slightly from baseline of about 10.  Monitor.  Dysphagia  Patient observed by nursing staff coughing after taking medications with water and nectar thick liquids while sitting at 90 degrees upright.   NPO and speech consult.  Essential hypertension  Initially hypotensive on arrival.  Responded to fluids.  Hold amlodipine for now.  Monitor.  Coronary artery disease involving native coronary artery of native heart  Continue aspirin and statin therapy.  Type 2 diabetes mellitus with stage 3b chronic kidney disease, without long-term current use of insulin ([facility])  Lab Results   Component Value Date    HGBA1C 5.9 (H) 04/18/2024       No results for input(s): \"POCGLU\" in the last 72 hours.    Blood Sugar Average: Last 72 hrs:    Appears controlled by diet without any home agents. Sliding scale and hypoglycemia protocol.  HFrEF (heart failure with reduced ejection fraction) ([facility])  Wt Readings from Last 3 Encounters:   02/14/25 81 kg (178 lb 9.2 oz) " Briseyda Manriquez is a 74 year old female presenting with HTN follow up.       Denies known Latex allergy or symptoms of Latex sensitivity.  Medications reviewed and updated.  Social History     Tobacco Use   Smoking Status Former Smoker   • Packs/day: 1.00   • Years: 40.00   • Pack years: 40.00   • Types: Cigarettes   • Start date: 1969   • Quit date: 2009   • Years since quittin.9   Smokeless Tobacco Never Used   Tobacco Comment    \"patient quit smoking about a month ago\" per 2009 MD note          Health Maintenance Due   Topic Date Due   • Shingles Vaccine (1 of 2) Never done   • Influenza Vaccine (1) 2021   • COVID-19 Vaccine (3 - Booster for Moderna series) 2021       Patient is due for topics listed above, she wishes to proceed with Immunization(s) Influenza, but is not proceeding with Immunization(s) COVID-19 and Shingles at this time.     Patient would like communication of their results via:        Cell Phone:   Telephone Information:   Mobile [phone]     Okay to leave a message containing results? Yes.    Vaccine Information Statement(s) or the Emergency Use Authorization was given today. This has been reviewed, questions answered, and verbal consent given by Patient for injection(s) and administration of Influenza (Inactivated).  .    Patient tolerated without incident. See immunization grid for documentation.         02/06/25 84.6 kg (186 lb 8.2 oz)   02/05/25 84.6 kg (186 lb 8.2 oz)     Follows with Dr. Roach outpatient   Lasix held in setting of CHUY. Monitor.  Continue metoprolol.  Hold lisinopril in setting of CHUY.  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   Follows with oncology.  Currently on first-line treatment with carboplatin and etoposide.      VTE Pharmacologic Prophylaxis: VTE Score: 5 High Risk (Score >/= 5) - Pharmacological DVT Prophylaxis Ordered: heparin. Sequential Compression Devices Ordered.  Code Status: Level 1 - Full Code   Discussion with family: Patient declined call to .     Anticipated Length of Stay: Patient will be admitted on an observation basis with an anticipated length of stay of less than 2 midnights secondary to chuy.    History of Present Illness   Chief Complaint: right hip pain    Toro Rodriguez is a 74 y.o. male with a PMH of HFrEF, CAD, Bladder CA currently undergoing chemo, DM, HTN  who presents with to [facility] from his nursing home after a fall.  Patient states he was walking with his walker and when he attempted to sit down he missed his chair and fell onto his buttocks.  He states his hip went out of place and it was very painful.  Patient denies head strike.  He denies numbness or tingling in his right lower extremity.  Patient denies chest pain, shortness of breath, fever, chills, lightheadedness, dizziness.    Review of Systems   Constitutional:  Negative for chills and fever.   HENT:  Negative for ear pain and sore throat.    Eyes:  Negative for pain and visual disturbance.   Respiratory:  Negative for cough and shortness of breath.    Cardiovascular:  Negative for chest pain and palpitations.   Gastrointestinal:  Negative for abdominal pain and vomiting.    Genitourinary:  Negative for dysuria and hematuria.   Musculoskeletal:  Positive for arthralgias. Negative for back pain.        Right hip pain   Skin:  Negative for color change and rash.   Neurological:  Negative for seizures and syncope.   All other systems reviewed and are negative.      Historical Information   Past Medical History:   Diagnosis Date    Alcohol intoxication ([facility]) 10/15/2020    BPH (benign prostatic hyperplasia)     Chronic HFrEF (heart failure with reduced ejection fraction) ([facility]) 11/2023    Coronary artery calcification seen on CT scan 11/10/2023    Coronary artery disease 12/14/2023    Deep vein thrombosis (DVT) of left lower extremity ([facility]) 11/2023    Diabetes mellitus ([facility]) 11/2023    Fall from slip, trip, or stumble 10/15/2020    History of phimosis of penis     History of urinary calculi     Hypertension 1988    Skin cancer     Tobacco abuse     quit around 2000     Past Surgical History:   Procedure Laterality Date    BLADDER STONE REMOVAL  2014    CARDIAC CATHETERIZATION N/A 12/14/2023    Procedure: Cardiac catheterization;  Surgeon: Dave Royal MD;  [facility];  Service: Cardiology    IR NEPHROSTOMY TUBE PLACEMENT  12/24/2024    IR PORT PLACEMENT  1/10/2025    IR SUPRAPUBIC CATHETER CHECK/CHANGE/REINSERTION/UPSIZE  11/07/2024    IR SUPRAPUBIC CATHETER CHECK/CHANGE/REINSERTION/UPSIZE  12/3/2024    IR SUPRAPUBIC CATHETER PLACEMENT  09/27/2024    ORIF ANKLE FRACTURE Left     [place] CYSTO W/IRRIG & EVAC MULTPLE OBSTRUCTING CLOTS N/A 12/15/2024    Procedure: CYSTOSCOPY EVACUATION OF CLOTS;  Surgeon: Rick Espino MD;  [facility];  Service: Urology    SKIN LESION EXCISION N/A 03/18/2024    Procedure: WIDE LOCAL EXCISION OF BACK MELANOMA;  Surgeon: Lillie La MD;  [facility];  Service: Surgical Oncology    TOTAL HIP ARTHROPLASTY Right      Social History     Tobacco Use    Smoking status: Former     Types: Cigarettes     Start date: 1990      Quit date:      Years since quittin.1    Smokeless tobacco: Never    Tobacco comments:     Quit over 30 years ago (Updated 2023).    Vaping Use    Vaping status: Never Used   Substance and Sexual Activity    Alcohol use: Not Currently    Drug use: Never    Sexual activity: Not on file     E-Cigarette/Vaping    E-Cigarette Use Never User      E-Cigarette/Vaping Substances    Nicotine No     THC No     CBD No     Flavoring No     Other No     Unknown No      Family History   Problem Relation Age of Onset    Breast cancer Mother     Heart attack Father 61    Other Sister         s/p hysterectomy    Cerebral palsy Brother     Skin cancer Other         family history    No Known Problems Son     No Known Problems Daughter     Sudden death Neg Hx      Social History:  Marital Status:    Occupation: n/a  Patient Pre-hospital Living Situation: Long Term Care Facility  Patient Pre-hospital Level of Mobility: walks with walker  Patient Pre-hospital Diet Restrictions:     Meds/Allergies   I have reveiwed home medications using records provided by SNF.  Prior to Admission medications    Medication Sig Start Date End Date Taking? Authorizing Provider   amiodarone 200 mg tablet Take 1 tablet (200 mg total) by mouth daily 24  Yes Rob Roach MD   aspirin 81 mg chewable tablet Chew 1 tablet (81 mg total) daily Do not start before 2024. 24  Yes Jaydon Fleming MD   atorvastatin (LIPITOR) 40 mg tablet Take 1 tablet (40 mg total) by mouth daily with dinner 11/15/23  Yes Krysta Castillo PA-C   furosemide (LASIX) 20 mg tablet Take 1 tablet (20 mg total) by mouth daily 24  Yes Rob Roach MD   levofloxacin (LEVAQUIN) 250 mg tablet Take 250 mg by mouth daily 25  Yes Historical Provider, MD   lidocaine-prilocaine (EMLA) cream Apply topically as needed for mild pain 24  Yes Juan Carlos Foy MD   metoprolol succinate (TOPROL-XL) 25 mg 24 hr tablet Take 1 tablet (25 mg total)  by mouth every 12 (twelve) hours 9/17/24 2/14/25 Yes Rick Crawley PA-C   ondansetron (ZOFRAN) 8 mg tablet Take 1 tablet (8 mg total) by mouth every 8 (eight) hours as needed for nausea or vomiting 12/31/24  Yes Juan Carlos Foy MD   prochlorperazine (COMPAZINE) 10 mg tablet Take 1 tablet (10 mg total) by mouth every 6 (six) hours as needed for nausea or vomiting 12/31/24  Yes Juan Carlos Foy MD   sertraline (ZOLOFT) 25 mg tablet Take 1 tablet (25 mg total) by mouth daily Do not start before December 26, 2024. 12/26/24  Yes Russell Mcmanus MD   acetaminophen (TYLENOL) 325 mg tablet Take 325 mg by mouth every 6 (six) hours as needed for mild pain    Historical Provider, MD   amLODIPine (NORVASC) 5 mg tablet Take 1 tablet (5 mg total) by mouth daily 12/10/24   Joselyn Reyes Bahamonde, MD   lisinopril (ZESTRIL) 5 mg tablet Take 1 tablet (5 mg total) by mouth daily Do not start before November 4, 2024. 11/4/24   Elif Santa MD   nitroglycerin (NITROSTAT) 0.4 mg SL tablet Place 1 tablet (0.4 mg total) under the tongue every 5 (five) minutes as needed for chest pain 12/15/23   CARLIE Rincon   polyethylene glycol (MIRALAX) 17 g packet Take 17 g by mouth daily 10/18/24   Elif Santa MD   sodium chloride, PF, 0.9 % 10 mL by Intracatheter route daily Intracatheter flushing daily. May substitute prefilled syringe with normal saline 10 mL vials, 10 mL syringes, and 18 g blunt needles 12/25/24 4/24/25  Russell Mcmanus MD     Allergies   Allergen Reactions    Zosyn [Piperacillin Sod-Tazobactam So] Rash       Objective :  Temp:  [98 °F (36.7 °C)-98.2 °F (36.8 °C)] 98.2 °F (36.8 °C)  HR:  [72-91] 72  BP: ()/(47-76) 118/47  Resp:  [16-22] 22  SpO2:  [89 %-98 %] 95 %  O2 Device: Nasal cannula  Nasal Cannula O2 Flow Rate (L/min):  [2 L/min] 2 L/min    Physical Exam  Vitals and nursing note reviewed.   Constitutional:       General: He is not in acute distress.     Appearance: He is well-developed.   HENT:      Head:  Normocephalic and atraumatic.   Eyes:      Conjunctiva/sclera: Conjunctivae normal.   Cardiovascular:      Rate and Rhythm: Normal rate and regular rhythm.      Heart sounds: No murmur heard.  Pulmonary:      Effort: Pulmonary effort is normal. No respiratory distress.      Breath sounds: Normal breath sounds.   Abdominal:      Palpations: Abdomen is soft.      Tenderness: There is no abdominal tenderness.   Musculoskeletal:         General: No swelling.      Cervical back: Neck supple.      Comments: Right hip tenderness   Skin:     General: Skin is warm and dry.   Neurological:      General: No focal deficit present.      Mental Status: He is alert and oriented to person, place, and time.   Psychiatric:         Mood and Affect: Mood normal.          Lines/Drains:      Central Line:  Goal for removal: N/A - Chronic PICC             Lab Results: I have reviewed the following results:  Results from last 7 days   Lab Units 02/13/25  2230   WBC Thousand/uL 1.13*   HEMOGLOBIN g/dL 8.6*   HEMATOCRIT % 27.5*   PLATELETS Thousands/uL 152     Results from last 7 days   Lab Units 02/13/25  2230   SODIUM mmol/L 136   POTASSIUM mmol/L 2.9*   CHLORIDE mmol/L 104   CO2 mmol/L 17*   BUN mg/dL 44*   CREATININE mg/dL 2.38*   ANION GAP mmol/L 15*   CALCIUM mg/dL 7.5*   GLUCOSE RANDOM mg/dL 107             Lab Results   Component Value Date    HGBA1C 5.9 (H) 04/18/2024    HGBA1C 8.2 (H) 12/14/2023    HGBA1C 7.9 (H) 11/15/2023           Imaging Results Review: I reviewed radiology reports from this admission including: xray(s).  Other Study Results Review: EKG was reviewed.     Administrative Statements   I have spent a total time of 75 minutes in caring for this patient on the day of the visit/encounter including Diagnostic results, Patient and family education, Documenting in the medical record, Reviewing / ordering tests, medicine, procedures  , and Obtaining or reviewing history  .    ** Please Note: This note has been constructed  using a voice recognition system. **

## 2025-02-14 NOTE — OCCUPATIONAL THERAPY NOTE
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)    Hydronephrosis    HFrEF (heart failure with reduced ejection fraction) (HCC)    Malignant neoplasm of overlapping sites of bladder (HCC)    Closed posterior dislocation of right hip (HCC)    Anemia due to stage 3b chronic kidney disease  (HCC)    Acute hypokalemia    Dysphagia    Normal anion gap metabolic acidosis    Past Medical History  Past Medical History:   Diagnosis Date    Alcohol intoxication (HCC) 10/15/2020    BPH (benign prostatic hyperplasia)     Chronic HFrEF (heart failure with reduced ejection fraction) (HCC) 11/2023    Coronary artery calcification seen on CT scan 11/10/2023    Coronary artery disease 12/14/2023    Deep vein thrombosis (DVT) of left lower extremity (HCC) 11/2023    Diabetes mellitus (HCC) 11/2023    Fall from slip, trip, or stumble 10/15/2020    History of phimosis of penis     History of urinary calculi     Hypertension 1988    Skin cancer     Tobacco abuse     quit around 2000     Past Surgical History  Past Surgical History:   Procedure Laterality Date    BLADDER STONE REMOVAL  2014    CARDIAC CATHETERIZATION N/A 12/14/2023    Procedure: Cardiac catheterization;  Surgeon: Dave Royal MD;  [facility];  Service: Cardiology    IR NEPHROSTOMY TUBE PLACEMENT  12/24/2024    IR PORT PLACEMENT  1/10/2025    IR SUPRAPUBIC CATHETER CHECK/CHANGE/REINSERTION/UPSIZE  11/07/2024    IR SUPRAPUBIC CATHETER CHECK/CHANGE/REINSERTION/UPSIZE   12/3/2024    IR SUPRAPUBIC CATHETER PLACEMENT  09/27/2024    ORIF ANKLE FRACTURE Left     [place] CYSTO W/IRRIG & EVAC MULTPLE OBSTRUCTING CLOTS N/A 12/15/2024    Procedure: CYSTOSCOPY EVACUATION OF CLOTS;  Surgeon: Rick Espino MD;  [facility];  Service: Urology    SKIN LESION EXCISION N/A 03/18/2024    Procedure: WIDE LOCAL EXCISION OF BACK MELANOMA;  Surgeon: Lillie La MD;  [facility];  Service: Surgical Oncology    TOTAL HIP ARTHROPLASTY Right          02/14/25 1313   OT Last Visit   OT Visit Date 02/14/25   Pain Assessment   Pain Assessment Tool 0-10   Pain Score 2   Pain Location/Orientation Location: Generalized   Patient's Stated Pain Goal No pain   Hospital Pain Intervention(s) Repositioned;Ambulation/increased activity   Restrictions/Precautions   Weight Bearing Precautions Per Order Yes   RLE Weight Bearing Per Order WBAT  (strict posterior hip precautions)   Braces or Orthoses Knee immobilizer  (at all times)   Other Precautions Chair Alarm;Bed Alarm;WBS;Multiple lines;O2;Fall Risk;Pain;THR  (strict posterior total hip precautions; 2L O2 via NC (none worn at baseline))   Home Living   Type of Home Assisted living  (navi assisted)   Home Layout One level;Performs ADLs on one level;Able to live on main level with bedroom/bathroom;Ramped entrance   Bathroom Shower/Tub Walk-in shower   Bathroom Toilet Standard   Bathroom Equipment Grab bars in shower;Shower chair   Bathroom Accessibility Accessible   Home Equipment Walker  (rollator walker; semi-adjustable bed)   Prior Function   Level of [place] Independent with ADLs;Needs assistance with IADLS;Independent with functional mobility  (IND with dressing, A with showers, A with shoes/socks, IND with pants)   Lives With Facility staff   Receives Help From Personal care attendant   IADLs Family/Friend/Other provides transportation;Family/Friend/Other provides meals;Family/Friend/Other provides medication management   Falls in the  "last 6 months 1 to 4  (\"2\")   Vocational Retired   Comments Pt reports that at baseline he is independent with all facility distance mobility with rollator walker; has assistance prn for ADLs but is able to typically complete basic ADL care.   General   Family/Caregiver Present No   Subjective   Subjective \"This brace is going to be a problem when I leave here because I cannot put it on myself.\"   ADL   Eating Assistance 5  Supervision/Setup   Grooming Assistance 5  Supervision/Setup   UB Bathing Assistance 4  Minimal Assistance   LB Bathing Assistance 2  Maximal Assistance   UB Dressing Assistance 4  Minimal Assistance   LB Dressing Assistance 2  Maximal Assistance   Toileting Assistance  2  Maximal Assistance   Bed Mobility   Supine to Sit 2  Maximal assistance   Additional items Assist x 2;HOB elevated;Increased time required;LE management;Verbal cues   Additional Comments Pt able to sit EOB with supervision x3 minutes   Transfers   Sit to Stand 4  Minimal assistance   Additional items Assist x 2;Armrests;Increased time required;Verbal cues   Stand to Sit 4  Minimal assistance   Additional items Assist x 2;Armrests;Increased time required;Verbal cues   Additional Comments VC's to maintain hip precautions during transfer   Functional Mobility   Functional Mobility 4  Minimal assistance   Additional Comments x2   Additional items Rolling walker  (short distance from EOB <> recliner chair)   Balance   Static Sitting Fair +   Dynamic Sitting Fair   Static Standing Poor +   Dynamic Standing Poor   Activity Tolerance   Activity Tolerance Patient tolerated treatment well   Medical Staff Made Aware PT Gretchen   Nurse Made Aware GLORIA FLEMING Assessment   RUE Assessment WFL   LUE Assessment   LUE Assessment WFL   Hand Function   Gross Motor Coordination Functional   Fine Motor Coordination Functional   Psychosocial   Psychosocial (WDL) WDL   Cognition   Overall Cognitive Status WFL   Arousal/Participation Alert;Cooperative "   Attention Attends with cues to redirect   Orientation Level Oriented X4   Memory Within functional limits   Following Commands Follows one step commands with increased time or repetition   Comments Pt able to recall 2/3 posterior hip precautions at beginning of session; educated patient on all 3 and at end of session pt able to recall 3/3. Pt willing to participate in OT evaluation   Assessment   Limitation Decreased ADL status;Decreased Safe judgement during ADL;Decreased endurance;Decreased self-care trans;Decreased high-level ADLs   Prognosis Fair   Assessment Pt is a 74 y.o. male seen for OT evaluation at [facility], admitted 2/13/2025 w/ CHUY (acute kidney injury) (HCC). Pt is WBAT to RLE. Per ortho with strict posterior hip precautions and knee immobilizer one RLE at all times. Extensive chart review completed by OT. Comorbidities affecting the pt's functional performance include a significant PMH of: Hypertension, CAD, T2DM, stage 3 CKD, hydronephrosis, CHUY, anemia, dysphagia, CHF, DVT, hematuria, suprapubic catheter, metastasis to bone. Personal factors affecting pt at time of IE include: difficulty performing ADLS, difficulty performing IADLS , flat affect, and health management . Pt was admitted from [facility], was  IND/A w/ ADLs (I with dressing, A with showers, A with sock/shoes) and A w/ IADLS, and IND with functional mobility with use of walker. Upon OT IE pt demonstrated  max Ax2 for bed mobility, min Ax2 for functional transfers, min Ax2 for functional mobility, [place] for UB ADLs and maxA for LB ADLS 2* the following deficits impacting occupational performance: weakness, decreased strength, decreased balance, decreased tolerance, and increased pain. Based on OT IE, pt demonstrates the need for continued skilled OT tx focused on eating, grooming, bathing/shower, toilet hygiene, dressing, functional mobility, and clothing management during their stay in the hospital to address the stated deficits and  maximize their level of functional independence w/ their ADLS and functional mobility. The patient's raw score on the AM-PAC Daily Activity inpatient short form is 15, standardized score is 34.69, less than 39.4. Patients at this level are likely to benefit from DC to post-acute rehabilitation services. However, please refer to therapist recommendation for discharge planning given other factors that may influence destination. At this time, OT recommendations at time of discharge are DC with Level I - Maximum Rehab Resource Intensity resources.   Goals   Patient Goals To return to [facility] and start chemo next week   Plan   Treatment Interventions ADL retraining;Functional transfer training;Endurance training;Cognitive reorientation;Patient/family training;Equipment evaluation/education;Compensatory technique education;Continued evaluation   Goal Expiration Date 02/24/25   OT Treatment Day 0   OT Frequency 2-3x/wk   Discharge Recommendation   Rehab Resource Intensity Level, OT I (Maximum Resource Intensity)   AM-PAC Daily Activity Inpatient   Lower Body Dressing 2   Bathing 2   Toileting 2   Upper Body Dressing 3   Grooming 3   Eating 3   Daily Activity Raw Score 15   Daily Activity Standardized Score (Calc for Raw Score >=11) 34.69   AM-PAC Applied Cognition Inpatient   Following a Speech/Presentation 3   Understanding Ordinary Conversation 3   Taking Medications 3   Remembering Where Things Are Placed or Put Away 3   Remembering List of 4-5 Errands 3   Taking Care of Complicated Tasks 3   Applied Cognition Raw Score 18   Applied Cognition Standardized Score 38.07   End of Consult   Education Provided Yes   Patient Position at End of Consult Bedside chair;Bed/Chair alarm activated;All needs within reach   Nurse Communication Nurse aware of consult       Pt will complete the following goals within 10 days:     Pt will complete UB bathing and dressing with supervision.    Pt will complete LB bathing and dressing  with [place] & DME PRN.    Pt will complete toileting w/ [place] w/ G hygiene/thoroughness using DME PRN    Pt will improve functional mobility during ADL/IADL/leisure tasks to supervision using DME as needed w/ G balance/safety.      Pt will complete bed mobility with supervision, with HOB elevated & use of side rails PRN to prep for purposeful tasks    Pt will perform functional transfers with supervision in order to facilitate completion of  ADL routine.    Pt will increase standing tolerance to 10+ minutes in order to complete ADL routine.    Pt will demonstrate G carryover of pt/caregiver education and training as appropriate w/ Mod I w/o cues w/ good tolerance.    Pt will verbalize and demonstrate understanding in use of compensatory techniques and use of long handled AE for increased safety and independence during LB ADL tasks.       Pt will identify 3-5 fall risks to ensure safety upon discharge.    Noelle Alcazar ([place]), OTR/L

## 2025-02-14 NOTE — ASSESSMENT & PLAN NOTE
74 male status post closed reduction of right total hip prosthesis   Weightbearing as tolerated to right lower extremity with assistance  Knee immobilizer ordered to be applied to the right lower extremity.  It should be worn at all times except for hygiene  Strict posterior hip precautions  Pain control as needed  DVT prophylaxis: Currently on heparin.  Recommend follow-up with orthopedic surgeon as outpatient.

## 2025-02-14 NOTE — ASSESSMENT & PLAN NOTE
"Lab Results   Component Value Date    HGBA1C 5.9 (H) 04/18/2024       No results for input(s): \"POCGLU\" in the last 72 hours.    Blood Sugar Average: Last 72 hrs:    Appears controlled by diet without any home agents. Sliding scale and hypoglycemia protocol.  "

## 2025-02-14 NOTE — ASSESSMENT & PLAN NOTE
K+ 3.1  Replete with total 80 mEq KCL (IV and po) and monitor as acidosis improves  Continue to monitor

## 2025-02-14 NOTE — ASSESSMENT & PLAN NOTE
Hgb 7.5 today, below goal and trending down slightly.  Goal >10  With pancytopenia in the setting of chemotherapy.  WBC improving, platelets 97,000, trending down  Trend CBC  Transfuse for Hgb <7.0  management per primary team

## 2025-02-14 NOTE — ASSESSMENT & PLAN NOTE
Wt Readings from Last 3 Encounters:   02/15/25 82.9 kg (182 lb 12.2 oz)   02/06/25 84.6 kg (186 lb 8.2 oz)   02/05/25 84.6 kg (186 lb 8.2 oz)   Compensated  echo 2/26/2024:  EF 35-40% with G1DD, mod MR, IVC normal size  home diuretics: lasix 20 mg daily  Hold diuretics due to resolving CHUY and now diarrhea  on beta blockers and lisinopril as outpatient.  Hold ACEi in the setting of CHUY  fluid restriction, 2 gm low sodium diet, daily weights  management per primary team

## 2025-02-14 NOTE — ASSESSMENT & PLAN NOTE
Suspect in the setting of CHUY and now diarrhea  Stable and mild  Serum bicarb 20, AG 7 today  will give sodium bicarb to 1300 mg p.o. x 1 today  Continue to monitor

## 2025-02-14 NOTE — PHYSICAL THERAPY NOTE
PHYSICAL THERAPY EVALUATION  DATE: 02/14/25  TIME: 5440-6503    NAME:  Toro Rodriguez  AGE:   74 y.o.  Mrn:   [id number]  Length Of Stay: 0    ADMIT DX:  Acute hypokalemia [E87.6]  Hip injury [S79.919A]  Anemia [D64.9]  Malignant neoplasm of overlapping sites of bladder (HCC) [C67.8]  Fall from standing, initial encounter [W19.XXXA]  HFrEF (heart failure with reduced ejection fraction) ([facility]) [I50.20]  Closed fracture dislocation of right hip joint, initial encounter ([facility]) [S72.001A]  Acute kidney injury superimposed on CKD  ([facility]) [N17.9, N18.9]    Past Medical History:   Diagnosis Date    Alcohol intoxication ([facility]) 10/15/2020    BPH (benign prostatic hyperplasia)     Chronic HFrEF (heart failure with reduced ejection fraction) ([facility]) 11/2023    Coronary artery calcification seen on CT scan 11/10/2023    Coronary artery disease 12/14/2023    Deep vein thrombosis (DVT) of left lower extremity ([facility]) 11/2023    Diabetes mellitus ([facility]) 11/2023    Fall from slip, trip, or stumble 10/15/2020    History of phimosis of penis     History of urinary calculi     Hypertension 1988    Skin cancer     Tobacco abuse     quit around 2000     Past Surgical History:   Procedure Laterality Date    BLADDER STONE REMOVAL  2014    CARDIAC CATHETERIZATION N/A 12/14/2023    Procedure: Cardiac catheterization;  Surgeon: Dave Royal MD;  Location: [facility];  Service: Cardiology    IR NEPHROSTOMY TUBE PLACEMENT  12/24/2024    IR PORT PLACEMENT  1/10/2025    IR SUPRAPUBIC CATHETER CHECK/CHANGE/REINSERTION/UPSIZE  11/07/2024    IR SUPRAPUBIC CATHETER CHECK/CHANGE/REINSERTION/UPSIZE  12/3/2024    IR SUPRAPUBIC CATHETER PLACEMENT  09/27/2024    ORIF ANKLE FRACTURE Left     SD CYSTO W/IRRIG & EVAC MULTPLE OBSTRUCTING CLOTS N/A 12/15/2024    Procedure: CYSTOSCOPY EVACUATION OF CLOTS;  Surgeon: Rick Espino MD;  Location: [facility];  Service: Urology    SKIN LESION EXCISION N/A 03/18/2024    Procedure: WIDE LOCAL EXCISION OF BACK  "MELANOMA;  Surgeon: Lillie La MD;  Location: [facility];  Service: Surgical Oncology    TOTAL HIP ARTHROPLASTY Right        Performed at least 2 patient identifiers during session: Name, Birthday, ID bracelet, and Epic photo     02/14/25 [id number]   PT Last Visit   PT Visit Date 02/14/25   Note Type   Note type Evaluation   Pain Assessment   Pain Assessment Tool 0-10   Pain Score 2   Pain Location/Orientation Location: Generalized   Pain Onset/Description Onset: Gradual;Descriptor: Aching  (\"normal aches and pains\")   Effect of Pain on Daily Activities limits comfort and tolerance of functional mobility   Patient's Stated Pain Goal No pain   Hospital Pain Intervention(s) Repositioned;Ambulation/increased activity;Emotional support   Multiple Pain Sites No   Restrictions/Precautions   Weight Bearing Precautions Per Order Yes   RLE Weight Bearing Per Order WBAT  (Per ortho: R knee immobilizer at all times, posterior hip precautions, WBAT R LE.)   Braces or Orthoses Knee immobilizer  (per ortho: to be worn at all times)   Other Precautions Chair Alarm;Bed Alarm;WBS;Multiple lines;O2;Fall Risk;Pain;THR  (strict posterior total hip precautions; 2L O2 via NC (none worn at baseline))   Home Living   Type of Home Assisted living  ([facility])   Home Layout Performs ADLs on one level;Able to live on main level with bedroom/bathroom;Elevator   Bathroom Shower/Tub Walk-in shower   Bathroom Toilet Standard   Bathroom Equipment Grab bars in shower;Shower chair   Bathroom Accessibility Accessible   Home Equipment Walker;Other (Comment)  (rollator walker; semi-adjustable bed; pull cord in bathroom)   Prior Function   Level of [place] Independent with ADLs;Independent with functional mobility;Needs assistance with IADLS  (independent with dressing, assist with showering, ambulates ALBA with rollator walker)   Lives With Facility staff   Receives Help From Personal care attendant   IADLs Family/Friend/Other provides " "transportation;Family/Friend/Other provides meals;Family/Friend/Other provides medication management   Falls in the last 6 months 1 to 4  (pt reports 2 falls)   Vocational Retired   Comments Pt reports that at baseline he is independent with all facility distance mobility with rollator walker; has assistance prn for ADLs but is able to typically complete basic ADL care.   General   Additional Pertinent History Pt is a 74 yr old male admitted under observation 2/13/25 with c/o R hip pain s/p fall, attempted manipulation without anesthesia. Dx: closed posterior dislocation of R hip (R LINH 20 yrs ago, with hip dislocation 5 yrs ago). Per ortho: R knee immobilizer at all times, posterior hip precautions, WBAT R LE. PMH includes alcohol abuse, HFrEF, CAD, DVT, DM, falls, HTN, R LINH, bladder CA current with chemotherapy.   Family/Caregiver Present No   Cognition   Overall Cognitive Status WFL   Arousal/Participation Cooperative   Attention Attends with cues to redirect   Orientation Level Oriented X4   Memory Decreased recall of precautions  (pt initially able to recall 2/3 THPs, however after education at end of session able to recall 3/3 THPs)   Following Commands Follows one step commands with increased time or repetition   Subjective   Subjective \"I'm going to start up chemotherapy sometime next week.\"   RUE Assessment   RUE Assessment WFL   LUE Assessment   LUE Assessment WFL   RLE Assessment   RLE Assessment X  (grossly 3+/5 MMT throughout)   LLE Assessment   LLE Assessment X  (grossly 3+/5 to 4-/5 MMT throughout)   Coordination   Movements are Fluid and Coordinated 1   Sensation WFL   Bed Mobility   Supine to Sit 2  Maximal assistance   Additional items Assist x 2;HOB elevated;Bedrails;Increased time required;Verbal cues;LE management  (trunk management)   Sit to Supine   (NT as pt was left seated OOB in recliner chair with alarm engaged)   Additional Comments Once positioned at EOB with RANJIT, pt able to maintain " upright sitting with close S.   Transfers   Sit to Stand 4  Minimal assistance   Additional items Assist x 2;Bedrails;Increased time required;Verbal cues  (RW)   Stand to Sit 4  Minimal assistance   Additional items Assist x 2;Armrests;Increased time required;Verbal cues  (RW)   Stand pivot 4  Minimal assistance   Additional items Assist x 2;Increased time required;Verbal cues  (RW)   Additional Comments Pt needing cues for hand placement for safety and optimal mechanics, cues for surface proximity and RW positioning prior to descent.   Ambulation/Elevation   Gait pattern Improper Weight shift;Antalgic;Forward Flexion;Decreased foot clearance;Short stride;Decreased hip extension;Decreased heel strike;Decreased toe off   Gait Assistance 4  Minimal assist   Additional items Assist x 2;Verbal cues;Tactile cues   Assistive Device Rolling walker   Distance 6ft x1  (pt declines further ambulation distance or trials on this date due to generalized fatigue)   Stair Management Assistance Not tested   Balance   Static Sitting Fair +   Dynamic Sitting Fair -   Static Standing Poor +  (w/ RW)   Dynamic Standing Poor  (w/ RW)   Ambulatory Poor  (w/ RW)   Endurance Deficit   Endurance Deficit Yes   Activity Tolerance   Activity Tolerance Patient limited by fatigue;Patient limited by pain   Medical Staff Made Aware Spoke with RN, CM, OT   Assessment   Prognosis Good   Problem List Decreased strength;Decreased range of motion;Decreased endurance;Impaired balance;Decreased mobility;Impaired judgement;Decreased safety awareness;Impaired hearing;Obesity;Orthopedic restrictions;Pain   Assessment Pt seen for PT evaluation for mobility assessment & discharge needs. Pt admitted 2/13/2025 s/p fall resulting in R hip posterior dislocation (manipulated without anesthesia), dx CHUY (acute kidney injury) (HCC). During PT IE, pt requires MAXA 2 person for bed mobility in hospital bed, transfers STS with RW and RANJIT 2 person, ambulates 6ft x1  "with RW and RANJIT 2 person (pt unable to tolerate additional ambulation distance on this date due to fatigue). Pt displays above outlined functional impairments & limitations, and presents below her baseline level of functional mobility. The AM-PAC & Barthel Index outcome tools were used to assist in determining pt safety w/ mobility/self care & appropriate d/c recommendations, see above for scores. Pt is at risk of falls d/t multiple comorbidities, h/o falls, impaired balance, use of ambulatory aid, varying levels of pain , acuity of medical illness, ongoing medical treatment of primary dx, unstable vitals, WB restriction, and new posterior hip precautions. Pt's clinical presentation is currently unstable/unpredictable as seen in pt's presentation of vital sign response, varying levels of cognitive performance, increased fall risk, new onset of impairment of functional mobility, decreased endurance, and new onset of weakness. Pt will benefit from continued PT services in order to address impairments, decrease risk of falls, maximize independence w/ fnxl mobility, & ensure safety w/ mobility for transition to next level of care. Based on pt presentation & impairments, pt would most appropriately benefit from Level II (moderate PT intensity) resources upon d/c.   Barriers to Discharge Decreased caregiver support   Goals   Patient Goals \"to be able to start up chemo next week\"   STG Expiration Date 02/28/25   Short Term Goal #1 Pt will: complete all bed mobility in hospital bed with RANJIT in order to promote increased OOB functional mobility and simulate home environment; complete all transfers with RW and S in order to increase safety with functional mobility; ambulate >50ft with RW and S in order to increase safety with in facility distance functional mobility; improve B LE strength to >/= 4/5 MMT t/o in order to increase safety with functional mobility and decrease risk of falls; demonstrate understanding and " independence with LE strengthening HEP; tolerate >3hrs OOB in upright position, in order to improve muscular endurance and respiratory status; improve AM-PAC score to >/= 15/24 in order to increase independence with mobility and decrease burden of care.   PT Treatment Day 0   Plan   Treatment/Interventions Functional transfer training;LE strengthening/ROM;Therapeutic exercise;Endurance training;Cognitive reorientation;Patient/family training;Equipment eval/education;Bed mobility;Gait training;Compensatory technique education;Spoke to nursing;Spoke to case management   PT Frequency 3-5x/wk   Discharge Recommendation   Rehab Resource Intensity Level, PT II (Moderate Resource Intensity)   AM-PAC Basic Mobility Inpatient   Turning in Flat Bed Without Bedrails 2   Lying on Back to Sitting on Edge of Flat Bed Without Bedrails 1   Moving Bed to Chair 2   Standing Up From Chair Using Arms 2   Walk in Room 2   Climb 3-5 Stairs With Railing 1   Basic Mobility Inpatient Raw Score 10   Turning Head Towards Sound 3   Follow Simple Instructions 3   Low Function Basic Mobility Raw Score  16   Low Function Basic Mobility Standardized Score  25.72   [facility] Level Of Mobility   [facility] Goal 4: Move to chair/commode   -[facility] Achieved 5: Stand (1 or more minutes)   Modified Leon Scale   Modified Edwards Scale 4   Barthel Index   Feeding 10   Bathing 0   Grooming Score 0   Dressing Score 5   Bladder Score 0   Bowels Score 10   Toilet Use Score 5   Transfers (Bed/Chair) Score 5   Mobility (Level Surface) Score 0   Stairs Score 0   Barthel Index Score 35   End of Consult   Patient Position at End of Consult Bedside chair;Bed/Chair alarm activated;All needs within reach     This session, pt required and most appropriately benefited from partial or full skilled PT/OT co-eval due to extensive physical assistance of SKILLED therapists, significant regression from baseline level of mobility, continuous vitals monitoring, decreased  activity tolerance, and unpredictable medical and/or functional status. PT and OT goals were addressed separately as seen in documentation.    Based on patient's [facility] Highest Level of Mobility scores today, patient currently has a goal of -[facility] Levels: 5: STAND (1 OR MORE MINUTES), to be completed with RN staffing each shift, in order to improve overall activity tolerance and mobility, combat hospital related deconditioning, and maximize outcomes for d/c from the acute care setting.     The patient's AM-PAC Basic Mobility Inpatient Short Form Raw Score is 10. A Raw score of less than or equal to 16 suggests the patient may benefit from discharge to post-acute rehabilitation services. Please also refer to the recommendation of the Physical Therapist for safe discharge planning.      Cathi Gore PT, DPT   Available via [employer]  NPI # [id number]  [place] License - [id number]  2/14/2025

## 2025-02-14 NOTE — ASSESSMENT & PLAN NOTE
Etiology: suspect prerenal due to relative hypotension, volume depletion with poor oral intake, diuretics with autoregulatory dysfunction from lisinopril. Possible underlying component of active chemotherapy.  Admission creatinine 2.38 on 2/13/2025.  Today's creatinine 1.39, continue to trend down and below recent baseline  Peak creatinine 2.38 on 2/13/2025  baseline creatinine 1.5-1.7 since Sept '24, prior 1-1.2.   Most recent creatinine 1.52 on 2/3/2025.  Hx recent CHUY Dec '24 due to obstructive uropathy with R hydroureteronephrosis, s/p R PCN 12/24/2024  workup: UA with 1+ protein, no hematuria, 10-20 WBC, innumerable bacteria  Imaging: CT 12/21/2024 with persistent right moderate hydroureteronephrosis without evidence of ureteral calculi, 2 mm nonobstructing left lower pole renal calculus. (Prior to PCN)  nonoliguric  Renal function stable and below baseline  Will hold on renal imaging given improvement in renal function.  However, would favor renal ultrasound if no improvement or worsening renal function given hx R PCN.  Monitor off IVF  Continue to hold lisinopril  Continue to hold diuretics today given new diarrhea and stable volume status.  Likely need to resume diuretics for the next 24-48 hours.  Strict I/Os, daily weights  Avoid nephrotoxins, NSAIDs, IV contrast if possible  Avoid hypotension.  Maintain MAP >65  Trend BMP in am

## 2025-02-14 NOTE — CONSULTS
Consultation - Nephrology   Name: Toro Rodriguez 74 y.o. male I MRN: [id number]  Unit/Bed#: -01 I Date of Admission: 2/13/2025   Date of Service: 2/14/2025 I Hospital Day: 0   Inpatient consult to Nephrology  Consult performed by: Lorenza Alba PA-C  Consult ordered by: Jaydon Fleming MD        Physician Requesting Evaluation: Jaydon Fleming MD   Reason for Evaluation / Principal Problem: CHUY, POA on CKD 3B    Assessment & Plan  CHUY (acute kidney injury) (HCC)  POA  Etiology: suspect prerenal due to relative hypotension, volume depletion with poor oral intake, diuretics with autoregulatory dysfunction from lisinopril. Possible underlying component of active chemotherapy.  Admission creatinine 2.38 on 2/13/2025.  Today's creatinine 2.09, trending down  Peak creatinine 2.38 on 2/13/2025  baseline creatinine 1.5-1.7 since Sept '24, prior 1-1.2.   Most recent creatinine 1.52 on 2/3/2025  Hx recent CHUY Dec '24 due to obstructive uropathy with R hydroureteronephrosis, s/p R PCN 12/24/2024  workup: UA with 1+ protein, no hematuria, 10-20 WBC, innumerable bacteria  Imaging: CT 12/21/2024 with persistent right moderate hydroureteronephrosis without evidence of ureteral calculi, 2 mm nonobstructing left lower pole renal calculus. (Prior to PCN)  nonoliguric  Renal function stable and creatinine trending down with fluids  No urgent indication for renal replacement therapy (dialysis)  Will hold on renal imaging given improvement in renal function.  However, would favor renal ultrasound if no improvement or worsening renal function given hx R PCN.  Continue IVF with Isolyte 75 mL/h x 24 hrs.  Follow volume status closely given EF.  Continue to hold diuretics and lisinopril  Strict I/Os, daily weights  Avoid nephrotoxins, NSAIDs, IV contrast if possible  Avoid hypotension.  Maintain MAP >65  Trend BMP in am  Adjust meds to appropriate GFR  Optimize hemodynamic status to avoid delay in renal recovery    Stage 3b  chronic kidney disease (HCC)  Etiology:  Suspect nephrosclerosis, diabetic glomerulopathy  Baseline creatinine 1.2-1.5 prior to CHUY in December  Outpatient Nephrologist: Dr. Reyes    Essential hypertension  BP soft  Volume status euvolemic, mild hypovolemic  Home Rx: Toprol XL 25 mg BID, lisinopril 5 mg daily, lasix 20 mg daily, amlodipine 5 mg daily  Current Rx: Toprol XL 25 mg BID  Continue to hold diuretics and lisinopril  Continue IVF as above  Optimize hemodynamic status to avoid delay in renal recovery  Recommend hold parameters on antihypertensives for SBP <130 mmHg.  Avoid hypotension or fluctuations in blood pressure.  Maintain MAP >65  Low sodium (2 gm) diet  Continue to trend    Normal anion gap metabolic acidosis  Suspect in the setting of CHUY  Improving  Serum bicarb 20, AG 10  Continue to monitor  Acute hypokalemia  K+ 3.1  Replete with total 80 mEq KCL (IV and po) and monitor as acidosis improves  Continue to monitor  Anemia due to stage 3b chronic kidney disease  (HCC)  Hgb 7.7, below goal and trending down.  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  Plans for outpatient follow-up  Hydronephrosis  Demonstrated on imaging since September 2024 with worseing Dec '24 w/associated CHUY  CT 12/21/2024 with moderate right hydroureteronephrosis without R calculus with decompressed bladder with suprapubic catheter in place   +severe hydronephrosis and hydroureter noted at time of nephrostomy tube placement  S/p R PCN by IR 12/24/2024.  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, liver, bone, pelvic lymph node metastases  On first-line treatment with carboplatin and etoposide, started 1/14/2025  Chronic suprapubic catheter in place  Followed by oncology outpatient  Management per primary team  HFrEF (heart failure with reduced ejection fraction) (Formerly [facility])  Wt Readings from Last 3 Encounters:   02/14/25 81.1 kg (178 lb 12.7 oz)   02/06/25 84.6 kg (186 lb 8.2 oz)   02/05/25 84.6 kg (186 lb 8.2 oz)   Compensated  echo 2/26/2024:  EF 35-40% with G1DD, mod MR, IVC normal size  home diuretics: lasix 20 mg daily  Hold diuretics due to CHUY  on beta blockers and lisinopril as outpatient.  Hold ACEi in the setting of CHUY  fluid restriction, 2 gm low sodium diet, daily weights  management per primary team    Type 2 diabetes mellitus with stage 3b chronic kidney disease, without long-term current use of insulin (Formerly [facility])  Lab Results   Component Value Date    HGBA1C 5.9 (H) 04/18/2024   stable  continue to optimize glycemic control  management per primary team    Dysphagia  Noted by nursing staff with taking medications of thin liquids  Currently n.p.o. with speech eval  Management per primary team    HISTORY OF PRESENT ILLNESS:  Requesting Physician: Jaydon Fleming MD  Reason for Consult: CHUY, POA on CKD 3B    Toro Rodriguez is a 74 y.o. year old male with CKD 3B, recently diagnosed bladder cancer undergoing chemotherapy, R hydronephrosis, s/p R PCN 12/24/2024, suprapubic catheter since 9/27/2024, HFrEF, AF, CAD, HTN, DM2, hx melanoma, LINH who was admitted to [facility] after presenting from SNF with fall after attempting to sit down and missed chair.  X-ray consistent with right total hip arthroplasty posterior superior dislocation, s/p closed reduction in ER.  Plan for conservative management and outpatient f/u per Orthopedic surgery.  Admission creatinine 2.38 on 2/13/2025 with most recent creat  1.5 on 2/3/2025 at recent baseline.  Hx of CHUY in December in setting of obstructive uropathy with R hydroureteronephrosis without calculi, s/p R PCN by IR 12/24/2024.  Creatinine trending down with fluids, 2.0 today.  S/p  ml on admission followed by Isolyte 75 ml/lhr.  Diuretics and lisinopril on hold.  Pt currently lethargic but reports poor oral intake at nursing facility.  A renal consultation is requested today for assistance in the management of CHUY on CKD.    PAST MEDICAL HISTORY:  Past Medical History:   Diagnosis Date    Alcohol intoxication ([facility]) 10/15/2020    BPH (benign prostatic hyperplasia)     Chronic HFrEF (heart failure with reduced ejection fraction) ([facility]) 11/2023    Coronary artery calcification seen on CT scan 11/10/2023    Coronary artery disease 12/14/2023    Deep vein thrombosis (DVT) of left lower extremity ([facility]) 11/2023    Diabetes mellitus ([facility]) 11/2023    Fall from slip, trip, or stumble 10/15/2020    History of phimosis of penis     History of urinary calculi     Hypertension 1988    Skin cancer     Tobacco abuse     quit around 2000       PAST SURGICAL HISTORY:  Past Surgical History:   Procedure Laterality Date    BLADDER STONE REMOVAL  2014    CARDIAC CATHETERIZATION N/A 12/14/2023    Procedure: Cardiac catheterization;  Surgeon: Dave Royal MD;  Location: [facility];  Service: Cardiology    IR NEPHROSTOMY TUBE PLACEMENT  12/24/2024    IR PORT PLACEMENT  1/10/2025    IR SUPRAPUBIC CATHETER CHECK/CHANGE/REINSERTION/UPSIZE  11/07/2024    IR SUPRAPUBIC CATHETER CHECK/CHANGE/REINSERTION/UPSIZE  12/3/2024    IR SUPRAPUBIC CATHETER PLACEMENT  09/27/2024    ORIF ANKLE FRACTURE Left     OK CYSTO W/IRRIG & EVAC MULTPLE OBSTRUCTING CLOTS N/A 12/15/2024    Procedure: CYSTOSCOPY EVACUATION OF CLOTS;  Surgeon: Rick Espino MD;  Location: [facility];  Service: Urology    SKIN LESION EXCISION N/A 03/18/2024    Procedure: WIDE LOCAL EXCISION OF BACK MELANOMA;  Surgeon:  Lillie La MD;  Location: [facility];  Service: Surgical Oncology    TOTAL HIP ARTHROPLASTY Right        ALLERGIES:  Allergies   Allergen Reactions    Zosyn [Piperacillin Sod-Tazobactam So] Rash       SOCIAL HISTORY:  Social History     Substance and Sexual Activity   Alcohol Use Not Currently     Social History     Substance and Sexual Activity   Drug Use Never     Social History     Tobacco Use   Smoking Status Former    Types: Cigarettes    Start date:     Quit date:     Years since quittin.1   Smokeless Tobacco Never   Tobacco Comments    Quit over 30 years ago (Updated 2023).        FAMILY HISTORY:  Family History   Problem Relation Age of Onset    Breast cancer Mother     Heart attack Father 61    Other Sister         s/p hysterectomy    Cerebral palsy Brother     Skin cancer Other         family history    No Known Problems Son     No Known Problems Daughter     Sudden death Neg Hx        MEDICATIONS:    Current Facility-Administered Medications:     acetaminophen (TYLENOL) tablet 650 mg, 650 mg, Oral, Q4H PRN, Jonathan Doyle PA-C    amiodarone tablet 200 mg, 200 mg, Oral, Daily, Jonathan Doyle PA-C, 200 mg at 25 0844    [Held by provider] amLODIPine (NORVASC) tablet 5 mg, 5 mg, Oral, Daily, Jonathan Doyle PA-C    aspirin chewable tablet 81 mg, 81 mg, Oral, Daily, Jonathan Doyle PA-C, 81 mg at 25 0844    atorvastatin (LIPITOR) tablet 40 mg, 40 mg, Oral, Daily With Dinner, Jonathan Doyle PA-C    [Held by provider] furosemide (LASIX) tablet 20 mg, 20 mg, Oral, Daily, Jonathan Doyle PA-C    heparin (porcine) subcutaneous injection 5,000 Units, 5,000 Units, Subcutaneous, Q8H [facility], Jonathan Doyle PA-C, 5,000 Units at 25 0639    [Held by provider] lisinopril (ZESTRIL) tablet 5 mg, 5 mg, Oral, Daily, Jonathan Doyle PA-C    metoprolol succinate (TOPROL-XL) 24 hr tablet 25 mg, 25 mg, Oral, Q12H ARIJonathan PA-C, 25 mg at 25 0844    multi-electrolyte  (PLASMALYTE-A/ISOLYTE-S PH 7.4) IV solution, 75 mL/hr, Intravenous, Continuous, Jonathan Doyle PA-C, Last Rate: 75 mL/hr at 02/14/25 0353, 75 mL/hr at 02/14/25 0353    [Held by provider] polyethylene glycol (MIRALAX) packet 17 g, 17 g, Oral, Daily, Jonathan Doyle PA-C    prochlorperazine (COMPAZINE) tablet 10 mg, 10 mg, Oral, Q6H PRN, Jonathan Doyle PA-C    sertraline (ZOLOFT) tablet 25 mg, 25 mg, Oral, Daily, Jonathan Doyle PA-C, 25 mg at 02/14/25 0844    sodium chloride (PF) 0.9 % injection 10 mL, 10 mL, Intracatheter, Daily, Jonathan Doyle PA-C, 10 mL at 02/14/25 0844    REVIEW OF SYSTEMS:  A complete 10 point review of systems was performed and found to be negative unless otherwise noted in the history of present illness.  General: No fevers, chills.   Cardiovascular:  No chest pain, No leg edema.  Respiratory: No cough, sputum production,  No shortness of breath.  Gastrointestinal:  No nausea/vomiting,  No diarrhea,  No abdominal pain.  Genitourinary: No hematuria.  No foamy urine.  No dysuria    PHYSICAL EXAM:  Current Weight: Weight - Scale: 81.1 kg (178 lb 12.7 oz)  First Weight: Weight - Scale: 84.4 kg (186 lb)  Vitals:    02/14/25 0445 02/14/25 0452 02/14/25 0639 02/14/25 0753   BP:  (!) 104/46 (!) 105/47 (!) 112/47   BP Location:    Right arm   Pulse: 66   67   Resp: 20 17  18   Temp: (!) 96.4 °F (35.8 °C)   (!) 96.1 °F (35.6 °C)   TempSrc: Axillary   Axillary   SpO2:    90%   Weight: 81.1 kg (178 lb 12.7 oz)      Height:           Intake/Output Summary (Last 24 hours) at 2/14/2025 1107  Last data filed at 2/14/2025 0900  Gross per 24 hour   Intake 0 ml   Output 150 ml   Net -150 ml     General:  sleepy, lethargic.  Awakens to voice but falls back to sleep, appears comfortable and in no acute distress.  Nontoxic.  Skin:  No rash, warm, poor skin turgor   Eyes:  PERRL, EOMI, sclerae nonicteric.  no periorbital edema   ENT:  Dry mucous membranes  Neck:  Trachea midline, symmetric.  No JVD.  No carotid  bruits.  Chest:  Clear to auscultation bilaterally without wheezes, crackles or rhonchi  CVS:  Regular rate and rhythm without murmur, gallop or rub.  S1 and S2 identified and normal.  No S3, S4.   Abdomen:  Soft, nontender, nondistended without masses.  Normal bowel sounds x 4 quadrants.  No bruit.  Extremities:  Warm, pink, motor and sensory intact and well perfused.  No cyanosis, pallor.  No BLE edema.  Neuro:  lethargic. Grossly intact  Psych:  lethargic  : +suprapubic catheter in place    Invasive Devices: +suprapubic catheter       Lab Results:   Results from last 7 days   Lab Units 02/14/25  0448 02/13/25  2230   WBC Thousand/uL 1.53* 1.13*   HEMOGLOBIN g/dL 7.7* 8.6*   HEMATOCRIT % 24.7* 27.5*   PLATELETS Thousands/uL 111* 152   SODIUM mmol/L 137 136   POTASSIUM mmol/L 3.1* 2.9*   CHLORIDE mmol/L 107 104   CO2 mmol/L 20* 17*   BUN mg/dL 44* 44*   CREATININE mg/dL 2.09* 2.38*   CALCIUM mg/dL 7.1* 7.5*     EMR, including Epic and Care Everywhere, reviewed.  I have personally reviewed the blood work as stated above and in my note.  I have personally reviewed internal Medicine, co-consultants and prior nephrology notes.

## 2025-02-15 PROBLEM — R19.7 DIARRHEA: Status: ACTIVE | Noted: 2025-02-15

## 2025-02-15 PROBLEM — E83.42 HYPOMAGNESEMIA: Status: ACTIVE | Noted: 2025-02-15

## 2025-02-15 LAB
ANION GAP SERPL CALCULATED.3IONS-SCNC: 7 MMOL/L (ref 4–13)
ANISOCYTOSIS BLD QL SMEAR: PRESENT
BASOPHILS # BLD MANUAL: 0.03 THOUSAND/UL (ref 0–0.1)
BASOPHILS NFR MAR MANUAL: 1 % (ref 0–1)
BUN SERPL-MCNC: 29 MG/DL (ref 5–25)
CALCIUM SERPL-MCNC: 7.2 MG/DL (ref 8.4–10.2)
CHLORIDE SERPL-SCNC: 111 MMOL/L (ref 96–108)
CO2 SERPL-SCNC: 20 MMOL/L (ref 21–32)
CREAT SERPL-MCNC: 1.39 MG/DL (ref 0.6–1.3)
EOSINOPHIL # BLD MANUAL: 0.11 THOUSAND/UL (ref 0–0.4)
EOSINOPHIL NFR BLD MANUAL: 4 % (ref 0–6)
ERYTHROCYTE [DISTWIDTH] IN BLOOD BY AUTOMATED COUNT: 15.9 % (ref 11.6–15.1)
FERRITIN SERPL-MCNC: 647 NG/ML (ref 24–336)
GFR SERPL CREATININE-BSD FRML MDRD: 48 ML/MIN/1.73SQ M
GLUCOSE SERPL-MCNC: 82 MG/DL (ref 65–140)
HCT VFR BLD AUTO: 22.8 % (ref 36.5–49.3)
HCT VFR BLD AUTO: 24.1 % (ref 36.5–49.3)
HGB BLD-MCNC: 7.2 G/DL (ref 12–17)
HGB BLD-MCNC: 7.5 G/DL (ref 12–17)
IRON SATN MFR SERPL: 8 % (ref 15–50)
IRON SERPL-MCNC: 10 UG/DL (ref 50–212)
LYMPHOCYTES # BLD AUTO: 0.79 THOUSAND/UL (ref 0.6–4.47)
LYMPHOCYTES # BLD AUTO: 28 % (ref 14–44)
MAGNESIUM SERPL-MCNC: 1.7 MG/DL (ref 1.9–2.7)
MCH RBC QN AUTO: 30 PG (ref 26.8–34.3)
MCHC RBC AUTO-ENTMCNC: 31.1 G/DL (ref 31.4–37.4)
MCV RBC AUTO: 96 FL (ref 82–98)
METAMYELOCYTE ABSOLUTE CT: 0.06 THOUSAND/UL (ref 0–0.1)
METAMYELOCYTES NFR BLD MANUAL: 2 % (ref 0–1)
MONOCYTES # BLD AUTO: 0.25 THOUSAND/UL (ref 0–1.22)
MONOCYTES NFR BLD: 9 % (ref 4–12)
MRSA NOSE QL CULT: ABNORMAL
MRSA NOSE QL CULT: ABNORMAL
NEUTROPHILS # BLD MANUAL: 1.58 THOUSAND/UL (ref 1.85–7.62)
NEUTS BAND NFR BLD MANUAL: 6 % (ref 0–8)
NEUTS SEG NFR BLD AUTO: 50 % (ref 43–75)
NRBC BLD AUTO-RTO: 1 /100 WBC (ref 0–2)
PLATELET # BLD AUTO: 97 THOUSANDS/UL (ref 149–390)
PLATELET BLD QL SMEAR: ABNORMAL
PMV BLD AUTO: 10.9 FL (ref 8.9–12.7)
POIKILOCYTOSIS BLD QL SMEAR: PRESENT
POLYCHROMASIA BLD QL SMEAR: PRESENT
POTASSIUM SERPL-SCNC: 3.9 MMOL/L (ref 3.5–5.3)
RBC # BLD AUTO: 2.5 MILLION/UL (ref 3.88–5.62)
RBC MORPH BLD: PRESENT
SODIUM SERPL-SCNC: 138 MMOL/L (ref 135–147)
TIBC SERPL-MCNC: 133 UG/DL (ref 250–450)
TRANSFERRIN SERPL-MCNC: 95 MG/DL (ref 203–362)
UIBC SERPL-MCNC: 123 UG/DL (ref 155–355)
WBC # BLD AUTO: 2.82 THOUSAND/UL (ref 4.31–10.16)

## 2025-02-15 PROCEDURE — 83735 ASSAY OF MAGNESIUM: CPT | Performed by: INTERNAL MEDICINE

## 2025-02-15 PROCEDURE — 87505 NFCT AGENT DETECTION GI: CPT | Performed by: INTERNAL MEDICINE

## 2025-02-15 PROCEDURE — 83550 IRON BINDING TEST: CPT | Performed by: INTERNAL MEDICINE

## 2025-02-15 PROCEDURE — 85018 HEMOGLOBIN: CPT | Performed by: INTERNAL MEDICINE

## 2025-02-15 PROCEDURE — 99232 SBSQ HOSP IP/OBS MODERATE 35: CPT | Performed by: INTERNAL MEDICINE

## 2025-02-15 PROCEDURE — 85014 HEMATOCRIT: CPT | Performed by: INTERNAL MEDICINE

## 2025-02-15 PROCEDURE — 83540 ASSAY OF IRON: CPT | Performed by: INTERNAL MEDICINE

## 2025-02-15 PROCEDURE — 82728 ASSAY OF FERRITIN: CPT | Performed by: INTERNAL MEDICINE

## 2025-02-15 PROCEDURE — 80048 BASIC METABOLIC PNL TOTAL CA: CPT | Performed by: PHYSICIAN ASSISTANT

## 2025-02-15 PROCEDURE — 85007 BL SMEAR W/DIFF WBC COUNT: CPT | Performed by: INTERNAL MEDICINE

## 2025-02-15 PROCEDURE — 85027 COMPLETE CBC AUTOMATED: CPT | Performed by: INTERNAL MEDICINE

## 2025-02-15 RX ORDER — SACCHAROMYCES BOULARDII 250 MG
250 CAPSULE ORAL 2 TIMES DAILY
Status: DISCONTINUED | OUTPATIENT
Start: 2025-02-15 | End: 2025-02-17

## 2025-02-15 RX ORDER — CEFTRIAXONE 1 G/50ML
1000 INJECTION, SOLUTION INTRAVENOUS EVERY 24 HOURS
Status: DISCONTINUED | OUTPATIENT
Start: 2025-02-15 | End: 2025-02-16

## 2025-02-15 RX ORDER — POTASSIUM CHLORIDE 1500 MG/1
20 TABLET, EXTENDED RELEASE ORAL ONCE
Status: COMPLETED | OUTPATIENT
Start: 2025-02-15 | End: 2025-02-15

## 2025-02-15 RX ORDER — MAGNESIUM SULFATE HEPTAHYDRATE 40 MG/ML
2 INJECTION, SOLUTION INTRAVENOUS ONCE
Status: COMPLETED | OUTPATIENT
Start: 2025-02-15 | End: 2025-02-15

## 2025-02-15 RX ORDER — SODIUM BICARBONATE 650 MG/1
1300 TABLET ORAL ONCE
Status: COMPLETED | OUTPATIENT
Start: 2025-02-15 | End: 2025-02-15

## 2025-02-15 RX ADMIN — Medication 250 MG: at 03:43

## 2025-02-15 RX ADMIN — PROCHLORPERAZINE MALEATE 10 MG: 5 TABLET ORAL at 00:17

## 2025-02-15 RX ADMIN — SERTRALINE HYDROCHLORIDE 25 MG: 25 TABLET, FILM COATED ORAL at 08:24

## 2025-02-15 RX ADMIN — HEPARIN SODIUM 5000 UNITS: 5000 INJECTION INTRAVENOUS; SUBCUTANEOUS at 13:57

## 2025-02-15 RX ADMIN — ACETAMINOPHEN 650 MG: 325 TABLET, FILM COATED ORAL at 21:43

## 2025-02-15 RX ADMIN — AMIODARONE HYDROCHLORIDE 200 MG: 200 TABLET ORAL at 08:24

## 2025-02-15 RX ADMIN — Medication 250 MG: at 08:27

## 2025-02-15 RX ADMIN — METOPROLOL SUCCINATE 25 MG: 25 TABLET, FILM COATED, EXTENDED RELEASE ORAL at 08:24

## 2025-02-15 RX ADMIN — ATORVASTATIN CALCIUM 40 MG: 40 TABLET, FILM COATED ORAL at 17:11

## 2025-02-15 RX ADMIN — MAGNESIUM SULFATE HEPTAHYDRATE 2 G: 40 INJECTION, SOLUTION INTRAVENOUS at 08:23

## 2025-02-15 RX ADMIN — SODIUM CHLORIDE, PRESERVATIVE FREE 10 ML: 5 INJECTION INTRAVENOUS at 08:27

## 2025-02-15 RX ADMIN — CEFTRIAXONE 1000 MG: 1 INJECTION, SOLUTION INTRAVENOUS at 11:18

## 2025-02-15 RX ADMIN — POTASSIUM CHLORIDE 20 MEQ: 1500 TABLET, EXTENDED RELEASE ORAL at 08:24

## 2025-02-15 RX ADMIN — ASPIRIN 81 MG CHEWABLE TABLET 81 MG: 81 TABLET CHEWABLE at 08:24

## 2025-02-15 RX ADMIN — Medication 250 MG: at 17:11

## 2025-02-15 RX ADMIN — METOPROLOL SUCCINATE 25 MG: 25 TABLET, FILM COATED, EXTENDED RELEASE ORAL at 20:01

## 2025-02-15 RX ADMIN — SODIUM BICARBONATE 1300 MG: 650 TABLET ORAL at 12:28

## 2025-02-15 RX ADMIN — HEPARIN SODIUM 5000 UNITS: 5000 INJECTION INTRAVENOUS; SUBCUTANEOUS at 21:44

## 2025-02-15 RX ADMIN — HEPARIN SODIUM 5000 UNITS: 5000 INJECTION INTRAVENOUS; SUBCUTANEOUS at 06:48

## 2025-02-15 NOTE — ASSESSMENT & PLAN NOTE
Wt Readings from Last 3 Encounters:   02/16/25 83.7 kg (184 lb 8.4 oz)   02/06/25 84.6 kg (186 lb 8.2 oz)   02/05/25 84.6 kg (186 lb 8.2 oz)   Compensated  echo 2/26/2024:  EF 35-40% with G1DD, mod MR, IVC normal size  home diuretics: lasix 20 mg daily  Diuretics have been held  on beta blockers and lisinopril as outpatient.  Hold ACEi in the setting of CHUY  fluid restriction, 2 gm low sodium diet, daily weights  management per primary team

## 2025-02-15 NOTE — PROGRESS NOTES
Progress Note - Nephrology   Name: Toro Rodriguez 74 y.o. male I MRN: [id number]  Unit/Bed#: -01 I Date of Admission: 2/13/2025   Date of Service: 2/16/2025 I Hospital Day: 2       Brief Hx: 74 year old male with CKD 3B, recently diagnosed bladder cancer undergoing chemotherapy, R hydronephrosis, s/p R PCN 12/24/2024, suprapubic catheter since 9/27/2024, HFrEF, AF, CAD, HTN, DM2, hx melanoma, LINH p/w fall and MALIK dislocation.  Nephrology consulted for management of CHUY on CKD.   Assessment & Plan  CHUY (acute kidney injury) (HCC)  Etiology: suspect prerenal due to relative hypotension, volume depletion with poor oral intake, diuretics with autoregulatory dysfunction from lisinopril. Possible underlying component of active chemotherapy.  Admission creatinine 2.38 on 2/13/2025.  Today's creatinine 1.10, back to prior baseline  Peak creatinine 2.38 on 2/13/2025  baseline creatinine 1.5-1.7 since Sept '24, prior 1-1.2.   Most recent creatinine 1.52 on 2/3/2025.  Hx recent CHUY Dec '24 due to obstructive uropathy with R hydroureteronephrosis, s/p R PCN 12/24/2024  workup: UA with 1+ protein, no hematuria, 10-20 WBC, innumerable bacteria  Imaging: CT 12/21/2024 with persistent right moderate hydroureteronephrosis without evidence of ureteral calculi, 2 mm nonobstructing left lower pole renal calculus. (Prior to PCN)  nonoliguric  Renal function stable and back to baseline prior to September.  Will hold on renal imaging given improvement in renal function.    Continue to hold lisinopril  Continue to hold diuretics today   Strict I/Os, daily weights  Avoid nephrotoxins, NSAIDs, IV contrast if possible  Avoid hypotension.  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 Reyes    Essential hypertension  BP:  Normotensive  Volume status euvolemic  Home Rx: Toprol XL 25 mg BID, lisinopril 5 mg daily, lasix 20 mg daily, amlodipine 5 mg daily  Current Rx: Toprol XL 25 mg BID  Continue to hold lisinopril  Continue to hold diuretics  Recommend hold parameters on antihypertensives for SBP <130 mmHg.  Avoid hypotension or fluctuations in blood pressure.  Maintain MAP >65  Low sodium (2 gm) diet  Continue to trend    Normal anion gap metabolic acidosis  Resolved  Suspect in the setting of CHUY and now diarrhea  Serum bicarb 24, AG 4 today  S/p sodium bicarb to 1300 mg p.o. x 1 on 2/15  Continue to monitor  Acute hypokalemia  Resolved  K+ 4.1 today  Continue to monitor  Hypomagnesemia  Resolved  Suspect in the setting of GI losses  Magnesium 2.0 today  Continue to monitor  Anemia due to stage 3b chronic kidney disease  (HCC)  Hgb 7.6 today, below goal but stabilizing.  Goal >10  With pancytopenia in the setting of chemotherapy.   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  Plans for outpatient follow-up  Hydronephrosis  Demonstrated on imaging since September 2024 with worseing Dec '24 w/associated CHUY  CT 12/21/2024 with moderate right hydroureteronephrosis without R calculus with decompressed bladder with suprapubic catheter in place   +severe hydronephrosis and hydroureter noted at time of nephrostomy tube placement  S/p R PCN by IR 12/24/2024.  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, liver, bone, pelvic lymph node metastases  On first-line treatment with  carboplatin and etoposide, started 1/14/2025  Chronic suprapubic catheter in place  Followed by oncology outpatient  Management per primary team  HFrEF (heart failure with reduced ejection fraction) (Formerly [facility])  Wt Readings from Last 3 Encounters:   02/16/25 83.7 kg (184 lb 8.4 oz)   02/06/25 84.6 kg (186 lb 8.2 oz)   02/05/25 84.6 kg (186 lb 8.2 oz)   Compensated  echo 2/26/2024:  EF 35-40% with G1DD, mod MR, IVC normal size  home diuretics: lasix 20 mg daily  Diuretics have been held  on beta blockers and lisinopril as outpatient.  Hold ACEi in the setting of CHUY  fluid restriction, 2 gm low sodium diet, daily weights  management per primary team    Type 2 diabetes mellitus with stage 3b chronic kidney disease, without long-term current use of insulin ([facility])  Lab Results   Component Value Date    HGBA1C 5.9 (H) 04/18/2024   stable  continue to optimize glycemic control  management per primary team    Dysphagia  Noted by nursing staff with taking medications of thin liquids  Speech evaluation and cleared for regular diet  Management per primary team  UTI (urinary tract infection)  On ceftriaxone  Management per primary team  Diarrhea  C. difficile negative  stool studies pending  Management per primary team    Plan Summary:  -Renal function stable and back to prior baseline  -Continue to hold lisinopril  -Continue to have diuretics  -BMP in a.m.    SUBJECTIVE:  Patient awake, alert without complaints.  Renal function stable and back to baseline prior to September.  Volume status stable.  VSS    A complete review of systems was performed. Pertinent positives and negatives noted in the HPI. Otherwise the review of systems was negative.  OBJECTIVE:  Current Weight: Weight - Scale: 83.7 kg (184 lb 8.4 oz)  Vitals:    02/16/25 0317 02/16/25 0552 02/16/25 0558 02/16/25 0736   BP:  126/55  127/55   BP Location:  Right arm  Left arm   Pulse:  64  64   Resp:  16  18   Temp:  (!) 97.4 °F (36.3 °C)  (!) 97.3 °F (36.3 °C)   TempSrc:   Axillary  Axillary   SpO2: 96% 92%  90%   Weight:   83.7 kg (184 lb 8.4 oz)    Height:           Intake/Output Summary (Last 24 hours) at 2/16/2025 1353  Last data filed at 2/16/2025 0920  Gross per 24 hour   Intake 2022 ml   Output 1800 ml   Net 222 ml       General:  Awake, alert, appears comfortable and in no acute distress.  Nontoxic.  Skin:  No rash, warm, good skin turgor   Eyes:  PERRL, EOMI, sclerae nonicteric.  no periorbital edema   ENT:  Moist mucous membranes  Neck:  Trachea midline, symmetric.  No JVD.  Chest:  Clear to auscultation bilaterally without wheezes, crackles or rhonchi  CVS:  Regular rate and rhythm without murmur, gallop or rub.  S1 and S2 identified and normal.  No S3, S4.   Abdomen:  Soft, nontender, nondistended without masses.  Normal bowel sounds x 4 quadrants.  No bruit.  Extremities:  Warm, pink, motor and sensory intact and well perfused.  No cyanosis, pallor.  No BLE edema.  Neuro:  Awake, alert, oriented x3.  Grossly intact  Psych:  Appropriate affect.  Mentating appropriately.  Normal mental status exam  : Chronic suprapubic catheter in place      Medications:    Current Facility-Administered Medications:     acetaminophen (TYLENOL) tablet 650 mg, 650 mg, Oral, Q4H PRN, Jonathan Doyle PA-C, 650 mg at 02/15/25 2143    amiodarone tablet 200 mg, 200 mg, Oral, Daily, Jonathan Doyle PA-C, 200 mg at 02/16/25 0843    [Held by provider] amLODIPine (NORVASC) tablet 5 mg, 5 mg, Oral, Daily, Jonathan Doyle PA-C    aspirin chewable tablet 81 mg, 81 mg, Oral, Daily, Jonathan Doyle PA-C, 81 mg at 02/16/25 0843    atorvastatin (LIPITOR) tablet 40 mg, 40 mg, Oral, Daily With Dinner, Jonathan Doyle PA-C, 40 mg at 02/15/25 1711    [START ON 2/17/2025] cefTRIAXone (ROCEPHIN) IVPB (premix in dextrose) 1,000 mg 50 mL, 1,000 mg, Intravenous, Q24H, Jaydon Fleming MD    [Held by provider] furosemide (LASIX) tablet 20 mg, 20 mg, Oral, Daily, Jonathan Doyle PA-C    heparin (porcine)  subcutaneous injection 5,000 Units, 5,000 Units, Subcutaneous, Q8H ARI, Jonathan Doyle PA-C, 5,000 Units at 02/16/25 0843    [Held by provider] lisinopril (ZESTRIL) tablet 5 mg, 5 mg, Oral, Daily, Jonathan Doyle PA-C    metoprolol succinate (TOPROL-XL) 24 hr tablet 25 mg, 25 mg, Oral, Q12H ARI, Jonathan Doyle PA-C, 25 mg at 02/16/25 0843    prochlorperazine (COMPAZINE) tablet 10 mg, 10 mg, Oral, Q6H PRN, Jonathan Doyle PA-C, 10 mg at 02/15/25 0017    saccharomyces boulardii (FLORASTOR) capsule 250 mg, 250 mg, Oral, BID, Angelica Madrid PA-C, 250 mg at 02/16/25 0843    sertraline (ZOLOFT) tablet 25 mg, 25 mg, Oral, Daily, Jonathan Doyle PA-C, 25 mg at 02/16/25 0843    sodium chloride (PF) 0.9 % injection 10 mL, 10 mL, Intracatheter, Daily, Jonathan Doyle PA-C, 10 mL at 02/16/25 0843    Laboratory Results:  Results from last 7 days   Lab Units 02/16/25  0556 02/15/25  1950 02/15/25  0244 02/14/25 2007 02/14/25  0448 02/13/25  2230   WBC Thousand/uL 7.95  --  2.82*  --  1.53* 1.13*   HEMOGLOBIN g/dL 7.6* 7.2* 7.5*  --  7.7* 8.6*   HEMATOCRIT % 24.1* 22.8* 24.1*  --  24.7* 27.5*   PLATELETS Thousands/uL 92*  --  97*  --  111* 152   SODIUM mmol/L 137  --  138 138 137 136   POTASSIUM mmol/L 4.1  --  3.9 3.6 3.1* 2.9*   CHLORIDE mmol/L 109*  --  111* 108 107 104   CO2 mmol/L 24  --  20* 21 20* 17*   BUN mg/dL 18  --  29* 35* 44* 44*   CREATININE mg/dL 1.10  --  1.39* 1.59* 2.09* 2.38*   CALCIUM mg/dL 7.4*  --  7.2* 7.4* 7.1* 7.5*   MAGNESIUM mg/dL 2.0  --  1.7*  --   --   --        I have personally reviewed the blood work as stated above and in my note.  I have personally reviewed internal Medicine, co-consultants and previous nephrology notes.

## 2025-02-15 NOTE — ASSESSMENT & PLAN NOTE
BP: Normotensive  Volume status euvolemic  Home Rx: Toprol XL 25 mg BID, lisinopril 5 mg daily, lasix 20 mg daily, amlodipine 5 mg daily  Current Rx: Toprol XL 25 mg BID  Continue to hold lisinopril  Continue to hold diuretics  Recommend hold parameters on antihypertensives for SBP <130 mmHg.  Avoid hypotension or fluctuations in blood pressure.  Maintain MAP >65  Low sodium (2 gm) diet  Continue to trend

## 2025-02-15 NOTE — ASSESSMENT & PLAN NOTE
Hgb 7.6 today, below goal but stabilizing.  Goal >10  With pancytopenia in the setting of chemotherapy.   Trend CBC  Transfuse for Hgb <7.0  management per primary team

## 2025-02-15 NOTE — ASSESSMENT & PLAN NOTE
Initially hypotensive on arrival.  Responded to fluids.  Hold amlodipine for now.  Monitor.  Norvasc, Zestril and Lasix on hold

## 2025-02-15 NOTE — ASSESSMENT & PLAN NOTE
Presents from [facility] with right hip hip pain.    Patient was walking with walker and missed chair when attempting to sit, falling onto buttocks.  Closed reduction successfully performed in emergency department.  Orthopedic consult appreciated  Knee immobilizer and posterior hip precautions  Weightbearing as tolerated to right lower extremity with assistance  PT OT recommended rehab

## 2025-02-15 NOTE — ASSESSMENT & PLAN NOTE
Lab Results   Component Value Date    EGFR 48 02/15/2025    EGFR 41 02/14/2025    EGFR 29 02/14/2025    CREATININE 1.39 (H) 02/15/2025    CREATININE 1.59 (H) 02/14/2025    CREATININE 2.09 (H) 02/14/2025

## 2025-02-15 NOTE — ASSESSMENT & PLAN NOTE
Resolved  Suspect in the setting of CHUY and now diarrhea  Serum bicarb 24, AG 4 today  S/p sodium bicarb to 1300 mg p.o. x 1 on 2/15  Continue to monitor

## 2025-02-15 NOTE — ASSESSMENT & PLAN NOTE
Lab Results   Component Value Date    CREATININE 1.39 (H) 02/15/2025    CREATININE 1.59 (H) 02/14/2025     Creatine on admission 2.38, Baseline creatinine 1.5  Hold ACE/ARB and diuretic therapy  Avoid hypotension, nephrotoxins, and NSAIDS if possible    Gentle IV fluids in setting of CHF   Strict I & Os  Urinary retention protocol and bladder scan   Creatinine this morning is 1.39  Trend BMP

## 2025-02-15 NOTE — ASSESSMENT & PLAN NOTE
Wt Readings from Last 3 Encounters:   02/15/25 82.9 kg (182 lb 12.2 oz)   02/06/25 84.6 kg (186 lb 8.2 oz)   02/05/25 84.6 kg (186 lb 8.2 oz)     Follows with Dr. Roach outpatient   Lasix held in setting of CHUY. Monitor.  Continue metoprolol.  Hold lisinopril and Lasix in setting of CHUY.  Daily weights, Strict I & Os  Cardiac diet, low sodium <2g

## 2025-02-15 NOTE — PLAN OF CARE
Problem: Potential for Falls  Goal: Patient will remain free of falls  Description: INTERVENTIONS:  - Educate patient/family on patient safety including physical limitations  - Instruct patient to call for assistance with activity   - Consult OT/PT to assist with strengthening/mobility   - Keep Call bell within reach  - Keep bed low and locked with side rails adjusted as appropriate  - Keep care items and personal belongings within reach  - Initiate and maintain comfort rounds  - Make Fall Risk Sign visible to staff  - Offer Toileting every 2 Hours, in advance of need  - Initiate/Maintain bed alarm  - Obtain necessary fall risk management equipment: yellow bracelet, yellow socks, bed alarm  - Apply yellow socks and bracelet for high fall risk patients  - Consider moving patient to room near nurses station  Outcome: Progressing     Problem: PAIN - ADULT  Goal: Verbalizes/displays adequate comfort level or baseline comfort level  Description: Interventions:  - Encourage patient to monitor pain and request assistance  - Assess pain using appropriate pain scale  - Administer analgesics based on type and severity of pain and evaluate response  - Implement non-pharmacological measures as appropriate and evaluate response  - Consider cultural and social influences on pain and pain management  - Notify physician/advanced practitioner if interventions unsuccessful or patient reports new pain  Outcome: Progressing     Problem: SAFETY ADULT  Goal: Patient will remain free of falls  Description: INTERVENTIONS:  - Educate patient/family on patient safety including physical limitations  - Instruct patient to call for assistance with activity   - Consult OT/PT to assist with strengthening/mobility   - Keep Call bell within reach  - Keep bed low and locked with side rails adjusted as appropriate  - Keep care items and personal belongings within reach  - Initiate and maintain comfort rounds  - Make Fall Risk Sign visible to  staff  - Offer Toileting every 2 Hours, in advance of need  - Initiate/Maintain bed alarm  - Obtain necessary fall risk management equipment: yellow bracelet, yellow socks, bed alarm  - Apply yellow socks and bracelet for high fall risk patients  - Consider moving patient to room near nurses station  Outcome: Progressing  Goal: Maintain or return to baseline ADL function  Description: INTERVENTIONS:  -  Assess patient's ability to carry out ADLs; assess patient's baseline for ADL function and identify physical deficits which impact ability to perform ADLs (bathing, care of mouth/teeth, toileting, grooming, dressing, etc.)  - Assess/evaluate cause of self-care deficits   - Assess range of motion  - Assess patient's mobility; develop plan if impaired  - Assess patient's need for assistive devices and provide as appropriate  - Encourage maximum independence but intervene and supervise when necessary  - Involve family in performance of ADLs  - Assess for home care needs following discharge   - Consider OT consult to assist with ADL evaluation and planning for discharge  - Provide patient education as appropriate  Outcome: Progressing  Goal: Maintains/Returns to pre admission functional level  Description: INTERVENTIONS:  - Perform AM-PAC 6 Click Basic Mobility/ Daily Activity assessment daily.  - Set and communicate daily mobility goal to care team and patient/family/caregiver.   - Collaborate with rehabilitation services on mobility goals if consulted  - Perform Range of Motion 3 times a day.  - Reposition patient every 2 hours.  - Record patient progress and toleration of activity level   Outcome: Progressing     Problem: DISCHARGE PLANNING  Goal: Discharge to home or other facility with appropriate resources  Description: INTERVENTIONS:  - Identify barriers to discharge w/patient and caregiver  - Arrange for needed discharge resources and transportation as appropriate  - Identify discharge learning needs (meds, wound  care, etc.)  - Arrange for interpretive services to assist at discharge as needed  - Refer to Case Management Department for coordinating discharge planning if the patient needs post-hospital services based on physician/advanced practitioner order or complex needs related to functional status, cognitive ability, or social support system  Outcome: Progressing     Problem: Knowledge Deficit  Goal: Patient/family/caregiver demonstrates understanding of disease process, treatment plan, medications, and discharge instructions  Description: Complete learning assessment and assess knowledge base.  Interventions:  - Provide teaching at level of understanding  - Provide teaching via preferred learning methods  Outcome: Progressing     Problem: GASTROINTESTINAL - ADULT  Goal: Minimal or absence of nausea and/or vomiting  Description: INTERVENTIONS:  - Administer IV fluids if ordered to ensure adequate hydration  - Maintain NPO status until nausea and vomiting are resolved  - Nasogastric tube if ordered  - Administer ordered antiemetic medications as needed  - Provide nonpharmacologic comfort measures as appropriate  - Advance diet as tolerated, if ordered  - Consider nutrition services referral to assist patient with adequate nutrition and appropriate food choices  Outcome: Progressing  Goal: Maintains or returns to baseline bowel function  Description: INTERVENTIONS:  - Assess bowel function  - Encourage oral fluids to ensure adequate hydration  - Administer IV fluids if ordered to ensure adequate hydration  - Administer ordered medications as needed  - Encourage mobilization and activity  - Consider nutritional services referral to assist patient with adequate nutrition and appropriate food choices  Outcome: Progressing     Problem: GENITOURINARY - ADULT  Goal: Maintains or returns to baseline urinary function  Description: INTERVENTIONS:  - Assess urinary function  - Encourage oral fluids to ensure adequate hydration if  ordered  - Administer IV fluids as ordered to ensure adequate hydration  - Administer ordered medications as needed  - Offer frequent toileting  - Follow urinary retention protocol if ordered  Outcome: Progressing  Goal: Absence of urinary retention  Description: INTERVENTIONS:  - Assess patient’s ability to void and empty bladder  - Monitor I/O  - Bladder scan as needed  - Discuss with physician/AP medications to alleviate retention as needed  - Discuss catheterization for long term situations as appropriate  Outcome: Progressing     Problem: METABOLIC, FLUID AND ELECTROLYTES - ADULT  Goal: Electrolytes maintained within normal limits  Description: INTERVENTIONS:  - Monitor labs and assess patient for signs and symptoms of electrolyte imbalances  - Administer electrolyte replacement as ordered  - Monitor response to electrolyte replacements, including repeat lab results as appropriate  - Instruct patient on fluid and nutrition as appropriate  Outcome: Progressing  Goal: Fluid balance maintained  Description: INTERVENTIONS:  - Monitor labs   - Monitor I/O and WT  - Instruct patient on fluid and nutrition as appropriate  - Assess for signs & symptoms of volume excess or deficit  Outcome: Progressing     Problem: MUSCULOSKELETAL - ADULT  Goal: Maintain or return mobility to safest level of function  Description: INTERVENTIONS:  - Assess patient's ability to carry out ADLs; assess patient's baseline for ADL function and identify physical deficits which impact ability to perform ADLs (bathing, care of mouth/teeth, toileting, grooming, dressing, etc.)  - Assess/evaluate cause of self-care deficits   - Assess range of motion  - Assess patient's mobility  - Assess patient's need for assistive devices and provide as appropriate  - Encourage maximum independence but intervene and supervise when necessary  - Involve family in performance of ADLs  - Assess for home care needs following discharge   - Consider OT consult to  assist with ADL evaluation and planning for discharge  - Provide patient education as appropriate  Outcome: Progressing  Goal: Maintain proper alignment of affected body part  Description: INTERVENTIONS:  - Support, maintain and protect limb and body alignment  - Provide patient/ family with appropriate education  Outcome: Progressing     Problem: Prexisting or High Potential for Compromised Skin Integrity  Goal: Skin integrity is maintained or improved  Description: INTERVENTIONS:  - Identify patients at risk for skin breakdown  - Assess and monitor skin integrity  - Assess and monitor nutrition and hydration status  - Monitor labs   - Assess for incontinence   - Turn and reposition patient  - Assist with mobility/ambulation  - Relieve pressure over bony prominences  - Avoid friction and shearing  - Provide appropriate hygiene as needed including keeping skin clean and dry  - Evaluate need for skin moisturizer/barrier cream  - Collaborate with interdisciplinary team   - Patient/family teaching  - Consider wound care consult   Outcome: Progressing     Problem: Nutrition/Hydration-ADULT  Goal: Nutrient/Hydration intake appropriate for improving, restoring or maintaining nutritional needs  Description: Monitor and assess patient's nutrition/hydration status for malnutrition. Collaborate with interdisciplinary team and initiate plan and interventions as ordered.  Monitor patient's weight and dietary intake as ordered or per policy. Utilize nutrition screening tool and intervene as necessary. Determine patient's food preferences and provide high-protein, high-caloric foods as appropriate.     INTERVENTIONS:  - Monitor oral intake, urinary output, labs, and treatment plans  - Assess nutrition and hydration status and recommend course of action  - Evaluate amount of meals eaten  - Assist patient with eating if necessary   - Allow adequate time for meals  - Recommend/ encourage appropriate diets, oral nutritional  supplements, and vitamin/mineral supplements  - Order, calculate, and assess calorie counts as needed  - Recommend, monitor, and adjust tube feedings and TPN/PPN based on assessed needs  - Assess need for intravenous fluids  - Provide specific nutrition/hydration education as appropriate  - Include patient/family/caregiver in decisions related to nutrition  Outcome: Progressing

## 2025-02-15 NOTE — ASSESSMENT & PLAN NOTE
Etiology: suspect prerenal due to relative hypotension, volume depletion with poor oral intake, diuretics with autoregulatory dysfunction from lisinopril. Possible underlying component of active chemotherapy.  Admission creatinine 2.38 on 2/13/2025.  Today's creatinine 1.10, back to prior baseline  Peak creatinine 2.38 on 2/13/2025  baseline creatinine 1.5-1.7 since Sept '24, prior 1-1.2.   Most recent creatinine 1.52 on 2/3/2025.  Hx recent CHUY Dec '24 due to obstructive uropathy with R hydroureteronephrosis, s/p R PCN 12/24/2024  workup: UA with 1+ protein, no hematuria, 10-20 WBC, innumerable bacteria  Imaging: CT 12/21/2024 with persistent right moderate hydroureteronephrosis without evidence of ureteral calculi, 2 mm nonobstructing left lower pole renal calculus. (Prior to PCN)  nonoliguric  Renal function stable and back to baseline prior to September.  Will hold on renal imaging given improvement in renal function.    Continue to hold lisinopril  Continue to hold diuretics today   Strict I/Os, daily weights  Avoid nephrotoxins, NSAIDs, IV contrast if possible  Avoid hypotension.  Maintain MAP >65  Urinary retention protocol  Trend BMP in am

## 2025-02-15 NOTE — ASSESSMENT & PLAN NOTE
Hemoglobin 8.6 on arrival.  Down slightly from baseline of about 10.  Monitor.  Hemoglobin is 7.5  Will order iron panel  Trend H&H and transfuse to keep hemoglobin greater than 7

## 2025-02-15 NOTE — ASSESSMENT & PLAN NOTE
Patient observed by nursing staff coughing after taking medications with water and nectar thick liquids while sitting at 90 degrees upright.   Speech evaluated the patient and continue on regular diet

## 2025-02-16 LAB
ANION GAP SERPL CALCULATED.3IONS-SCNC: 4 MMOL/L (ref 4–13)
ANISOCYTOSIS BLD QL SMEAR: PRESENT
BASOPHILS # BLD MANUAL: 0 THOUSAND/UL (ref 0–0.1)
BASOPHILS NFR MAR MANUAL: 0 % (ref 0–1)
BUN SERPL-MCNC: 18 MG/DL (ref 5–25)
BURR CELLS BLD QL SMEAR: PRESENT
C COLI+JEJUNI TUF STL QL NAA+PROBE: NEGATIVE
C DIFF TOX GENS STL QL NAA+PROBE: NEGATIVE
CALCIUM SERPL-MCNC: 7.4 MG/DL (ref 8.4–10.2)
CHLORIDE SERPL-SCNC: 109 MMOL/L (ref 96–108)
CO2 SERPL-SCNC: 24 MMOL/L (ref 21–32)
CREAT SERPL-MCNC: 1.1 MG/DL (ref 0.6–1.3)
DACRYOCYTES BLD QL SMEAR: PRESENT
EC STX1+STX2 GENES STL QL NAA+PROBE: NEGATIVE
EOSINOPHIL # BLD MANUAL: 0.32 THOUSAND/UL (ref 0–0.4)
EOSINOPHIL NFR BLD MANUAL: 4 % (ref 0–6)
ERYTHROCYTE [DISTWIDTH] IN BLOOD BY AUTOMATED COUNT: 15.9 % (ref 11.6–15.1)
GFR SERPL CREATININE-BSD FRML MDRD: 64 ML/MIN/1.73SQ M
GLUCOSE SERPL-MCNC: 95 MG/DL (ref 65–140)
HCT VFR BLD AUTO: 24.1 % (ref 36.5–49.3)
HGB BLD-MCNC: 7.6 G/DL (ref 12–17)
LYMPHOCYTES # BLD AUTO: 1.51 THOUSAND/UL (ref 0.6–4.47)
LYMPHOCYTES # BLD AUTO: 19 % (ref 14–44)
MAGNESIUM SERPL-MCNC: 2 MG/DL (ref 1.9–2.7)
MCH RBC QN AUTO: 30.4 PG (ref 26.8–34.3)
MCHC RBC AUTO-ENTMCNC: 31.5 G/DL (ref 31.4–37.4)
MCV RBC AUTO: 96 FL (ref 82–98)
MONOCYTES # BLD AUTO: 0.32 THOUSAND/UL (ref 0–1.22)
MONOCYTES NFR BLD: 4 % (ref 4–12)
NEUTROPHILS # BLD MANUAL: 5.8 THOUSAND/UL (ref 1.85–7.62)
NEUTS BAND NFR BLD MANUAL: 9 % (ref 0–8)
NEUTS SEG NFR BLD AUTO: 64 % (ref 43–75)
PLATELET # BLD AUTO: 92 THOUSANDS/UL (ref 149–390)
PLATELET BLD QL SMEAR: ABNORMAL
POLYCHROMASIA BLD QL SMEAR: PRESENT
POTASSIUM SERPL-SCNC: 4.1 MMOL/L (ref 3.5–5.3)
RBC # BLD AUTO: 2.5 MILLION/UL (ref 3.88–5.62)
RBC MORPH BLD: PRESENT
SALMONELLA SP SPAO STL QL NAA+PROBE: NEGATIVE
SHIGELLA SP+EIEC IPAH STL QL NAA+PROBE: NEGATIVE
SODIUM SERPL-SCNC: 137 MMOL/L (ref 135–147)
WBC # BLD AUTO: 7.95 THOUSAND/UL (ref 4.31–10.16)

## 2025-02-16 PROCEDURE — 99232 SBSQ HOSP IP/OBS MODERATE 35: CPT | Performed by: INTERNAL MEDICINE

## 2025-02-16 PROCEDURE — 83735 ASSAY OF MAGNESIUM: CPT | Performed by: PHYSICIAN ASSISTANT

## 2025-02-16 PROCEDURE — 80048 BASIC METABOLIC PNL TOTAL CA: CPT | Performed by: PHYSICIAN ASSISTANT

## 2025-02-16 PROCEDURE — 85027 COMPLETE CBC AUTOMATED: CPT | Performed by: INTERNAL MEDICINE

## 2025-02-16 PROCEDURE — 85007 BL SMEAR W/DIFF WBC COUNT: CPT | Performed by: INTERNAL MEDICINE

## 2025-02-16 RX ORDER — CEFTRIAXONE 1 G/50ML
1000 INJECTION, SOLUTION INTRAVENOUS EVERY 24 HOURS
Status: DISCONTINUED | OUTPATIENT
Start: 2025-02-17 | End: 2025-02-17

## 2025-02-16 RX ORDER — CEFTRIAXONE 1 G/50ML
1000 INJECTION, SOLUTION INTRAVENOUS EVERY 24 HOURS
Status: DISCONTINUED | OUTPATIENT
Start: 2025-02-17 | End: 2025-02-16

## 2025-02-16 RX ADMIN — ASPIRIN 81 MG CHEWABLE TABLET 81 MG: 81 TABLET CHEWABLE at 08:43

## 2025-02-16 RX ADMIN — AMIODARONE HYDROCHLORIDE 200 MG: 200 TABLET ORAL at 08:43

## 2025-02-16 RX ADMIN — Medication 250 MG: at 08:43

## 2025-02-16 RX ADMIN — Medication 250 MG: at 17:08

## 2025-02-16 RX ADMIN — HEPARIN SODIUM 5000 UNITS: 5000 INJECTION INTRAVENOUS; SUBCUTANEOUS at 20:34

## 2025-02-16 RX ADMIN — SERTRALINE HYDROCHLORIDE 25 MG: 25 TABLET, FILM COATED ORAL at 08:43

## 2025-02-16 RX ADMIN — METOPROLOL SUCCINATE 25 MG: 25 TABLET, FILM COATED, EXTENDED RELEASE ORAL at 08:43

## 2025-02-16 RX ADMIN — HEPARIN SODIUM 5000 UNITS: 5000 INJECTION INTRAVENOUS; SUBCUTANEOUS at 08:43

## 2025-02-16 RX ADMIN — SODIUM CHLORIDE, PRESERVATIVE FREE 10 ML: 5 INJECTION INTRAVENOUS at 08:43

## 2025-02-16 RX ADMIN — CEFTRIAXONE 1000 MG: 1 INJECTION, SOLUTION INTRAVENOUS at 09:41

## 2025-02-16 RX ADMIN — ACETAMINOPHEN 650 MG: 325 TABLET, FILM COATED ORAL at 23:28

## 2025-02-16 RX ADMIN — METOPROLOL SUCCINATE 25 MG: 25 TABLET, FILM COATED, EXTENDED RELEASE ORAL at 20:34

## 2025-02-16 RX ADMIN — ATORVASTATIN CALCIUM 40 MG: 40 TABLET, FILM COATED ORAL at 17:08

## 2025-02-16 RX ADMIN — HEPARIN SODIUM 5000 UNITS: 5000 INJECTION INTRAVENOUS; SUBCUTANEOUS at 14:03

## 2025-02-16 NOTE — ASSESSMENT & PLAN NOTE
Wt Readings from Last 3 Encounters:   02/16/25 83.7 kg (184 lb 8.4 oz)   02/06/25 84.6 kg (186 lb 8.2 oz)   02/05/25 84.6 kg (186 lb 8.2 oz)     Follows with Dr. Roach outpatient   Lasix held in setting of CHUY. Monitor.  Continue metoprolol.  Hold lisinopril and Lasix in setting of CHUY.  Daily weights, Strict I & Os  Cardiac diet, low sodium <2g

## 2025-02-16 NOTE — ASSESSMENT & PLAN NOTE
Initially hypotensive on arrival.  Responded to fluids.  Hold amlodipine for now.  Monitor.  Norvasc, Zestril and Lasix on hold  Recommended to restart Norvasc if SBP greater than 145

## 2025-02-16 NOTE — ASSESSMENT & PLAN NOTE
[address] 
[phone] Patient: Robin Cox MRN: [id number] JSW:[id number] About your hospitalization You were admitted on:  May 23, 2018 You last received care in the:  [facility] 4 CV SERVICES UNIT You were discharged on:  May 24, 2018 Why you were hospitalized Your primary diagnosis was:  S/P Ablation Of Atrial Fibrillation Your diagnoses also included:  A-Fib (Hcc), Persistent Atrial Fibrillation (Hcc) Follow-up Information Follow up With Details Comments Contact Info Sabra Vasquez MD   [address]  [address] [facility] [address] [phone] Your Scheduled Appointments Thursday June 07, 2018  8:00 AM EDT  
ESTABLISHED PATIENT with Ludy Law MD  
[facility] ([address]  [address] [place] [address]  
[phone] Discharge Orders None A check merary indicates which time of day the medication should be taken. My Medications START taking these medications Instructions Each Dose to Equal  
 Morning Noon Evening Bedtime  
 flecainide 50 mg tablet Commonly known as:  TAMBOCOR Your last dose was: Your next dose is: Take 1 Tab by mouth two (2) times a day. 50 mg CONTINUE taking these medications Instructions Each Dose to Equal  
 Morning Noon Evening Bedtime  
 apixaban 5 mg tablet Commonly known as:  Ciara Lemons Your last dose was: Your next dose is: Take 1 Tab by mouth two (2) times a day. 5 mg  
    
   
   
   
  
 dilTIAZem  mg ER capsule Commonly known as:  CARDIZEM CD Your last dose was: Your next dose is: Take 1 Cap by mouth daily. 180 mg  
    
   
   
   
  
 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   Follows with oncology.  Currently on first-line treatment with carboplatin and etoposide.   Take 1 Tab by mouth daily. 20 mg  
    
   
   
   
  
 lisinopril 20 mg tablet Commonly known as:  Therisa Armando Your last dose was: Your next dose is: Take 1 Tab by mouth daily. 20 mg Where to Get Your Medications These medications were sent to [facility], [address] [place] [address] [address]  [address] [facility], [address] [place] [id number] Phone:  [phone]  
  flecainide 50 mg tablet Discharge Instructions Patient Instructions Post-EP study and ablation 1. No heavy lifting or exercises for 1 week. This includes the following: Do not push or move furniture, jog or run 2. Do not drive for 2 days. 3.  Call Dr. Teodoro Davis at [phone] if you experience any of the following symptoms: 
Dizziness, lightheadedness, fainting spells Lack of energy Shortness of breath Rapid heart rate Chest or muscle twitches Blurry vision, double vision, weakness, numbness Nausea, vomiting Fever Bleeding in the stool, black stool Leg swelling, pain 4. Follow-up with Dr. Teodoro Davis Future Appointments Date Time Provider Sancho Harmon 6/7/2018 8:00 AM Hilda Ozuna MD [address] Unit Transitions of Care [address] offers a voluntary care coordination program to provide high quality service and care to [facility] fee-for-service beneficiaries. Molly Cockayne was designed to help you enhance your health and well-being through the following services: ? Transitions of Care  support for individuals who are transitioning from one care setting to another (example: Hospital to home). ?  Chronic and Complex Care Coordination  support for individuals and caregivers of those with serious or chronic illnesses or with more than one chronic (ongoing) condition and those who take a number of different medications. If you meet specific medical criteria, a [facility] [address] may call you directly to coordinate your care with your primary care physician and your other care providers. For questions about the [facility] programs, please, contact your physicians office. For general questions or additional information about Accountable Care Organizations: 
Please visit www.medicare.gov/acos. html or call 1-800-MEDICARE (7-293.354.1590) TTY users should call 3-802.171.2022. [employer] Announcement We are excited to announce that we are making your provider's discharge notes available to you in [employer]. You will see these notes when they are completed and signed by the physician that discharged you from your recent hospital stay. If you have any questions or concerns about any information you see in [employer], please call the Health Information Department where you were seen or reach out to your Primary Care Provider for more information about your plan of care. Introducing [facility]! Dear Drea Nix: 
Thank you for requesting a [employer] account. Our records indicate that you already have an active [employer] account. You can access your account anytime at [email or web address] Did you know that you can access your hospital and ER discharge instructions at any time in [employer]? You can also review all of your test results from your hospital stay or ER visit. Additional Information If you have questions, please visit the Frequently Asked Questions section of the [employer] website at [email or web address] Remember, [employer] is NOT to be used for urgent needs. For medical emergencies, dial 911. Now available from your iPhone and Android! Introducing Matt Mccullough As a Fermin Sames patient, I wanted to make you aware of our electronic visit tool called Matt Opentopicmikefin. [employer] allows you to connect within minutes with a medical provider 24 hours a day, seven days a week via a mobile device or tablet or logging into a secure website from your computer. You can access [employer] from anywhere in the [place]. A virtual visit might be right for you when you have a simple condition and feel like you just dont want to get out of bed, or cant get away from work for an appointment, when your regular [employer] provider is not available (evenings, weekends or holidays), or when youre out of town and need minor care. Electronic visits cost only $49 and if the [employer] provider determines a prescription is needed to treat your condition, one can be electronically transmitted to a nearby pharmacy*. Please take a moment to enroll today if you have not already done so. The enrollment process is free and takes just a few minutes. To enroll, please download the [employer] jimenez to your tablet or phone, or visit [email or web address] to enroll on your computer. And, as an [address] [place] [address] patient with a [employer] account, the results of your visits will be scanned into your electronic medical record and your primary care provider will be able to view the scanned results. We urge you to continue to see your regular [facility] provider for your ongoing medical care. And while your primary care provider may not be the one available when you seek a [employer] virtual visit, the peace of mind you get from getting a real diagnosis real time can be priceless. For more information on [employer], view our Frequently Asked Questions (FAQs) at [email or web address]. Sincerely, 
 
Corazon Triplett MD 
Chief Medical Officer Nichole Locke *:  certain medications cannot be prescribed via [employer] Providers Seen During Your Hospitalization Provider Specialty Primary office phone Nat Peck MD Cardiology [phone] Your Primary Care Physician (PCP) Primary Care Physician Office Phone Office Fax [address] [facility] [place] [phone] You are allergic to the following Allergen Reactions Hydrochlorothiazide Rash Pcn (Penicillins) Other (comments) MOUTH SORE Recent Documentation Height Weight BMI Smoking Status 1.791 m 82.8 kg 25.82 kg/m2 Former Smoker Emergency Contacts Name Discharge Info Relation Home Work Mobile Linsey Quintero DISCHARGE CAREGIVER [3] Spouse [3] [phone] Patient Belongings The following personal items are in your possession at time of discharge: 
  Dental Appliances: Partials, With patient  Visual Aid: Glasses      Home Medications: None   Jewelry: Ring, With patient  Clothing: At bedside    Other Valuables: None Please provide this summary of care documentation to your next provider. Signatures-by signing, you are acknowledging that this After Visit Summary has been reviewed with you and you have received a copy. Patient Signature:  ____________________________________________________________ Date:  ____________________________________________________________  
  
Lolita Omalleyamento Provider Signature:  ____________________________________________________________ Date:  ____________________________________________________________

## 2025-02-16 NOTE — PROGRESS NOTES
Progress Note - Hospitalist   Name: Toro Rodriguez 74 y.o. male I MRN: [id number]  Unit/Bed#: -01 I Date of Admission: 2/13/2025   Date of Service: 2/16/2025 I Hospital Day: 2    Assessment & Plan  CHUY (acute kidney injury) (HCC)  Lab Results   Component Value Date    CREATININE 1.10 02/16/2025    CREATININE 1.39 (H) 02/15/2025     Creatine on admission 2.38, Baseline creatinine 1.5  Hold ACE/ARB and diuretic therapy  Avoid hypotension, nephrotoxins, and NSAIDS if possible    Gentle IV fluids in setting of CHF   Strict I & Os  Urinary retention protocol and bladder scan   Creatinine this morning is 1.10  Resolved  Trend BMP  Closed posterior dislocation of right hip (HCC)  P[facility] with right hip hip pain.    Patient was walking with walker and missed chair when attempting to sit, falling onto buttocks.  Closed reduction successfully performed in emergency department.  Orthopedic consult appreciated  Knee immobilizer and posterior hip precautions  Weightbearing as tolerated to right lower extremity with assistance  PT OT recommended rehab.  Acute hypokalemia  Potassium 2.9 on arrival.  Repleted.  Monitor and replace electrolytes as needed  Anemia due to stage 3b chronic kidney disease  (HCC)  Hemoglobin 8.6 on arrival.  Down slightly from baseline of about 10.  Monitor.  Hemoglobin is 7.6  Iron panel reviewed  Trend H&H and transfuse to keep hemoglobin greater than 7  Dysphagia  Patient observed by nursing staff coughing after taking medications with water and nectar thick liquids while sitting at 90 degrees upright.   Speech evaluated the patient and continue on regular diet  Essential hypertension  Initially hypotensive on arrival.  Responded to fluids.  Hold amlodipine for now.  Monitor.  Norvasc, Zestril and Lasix on hold  Recommended to restart Norvasc if SBP greater than 145  Coronary artery disease involving native coronary artery of native heart  Continue aspirin and statin  "therapy.  Type 2 diabetes mellitus with stage 3b chronic kidney disease, without long-term current use of insulin ([facility])  Lab Results   Component Value Date    HGBA1C 5.9 (H) 04/18/2024       No results for input(s): \"POCGLU\" in the last 72 hours.    Blood Sugar Average: Last 72 hrs:    Appears controlled by diet without any home agents. Sliding scale and hypoglycemia protocol.  HFrEF (heart failure with reduced ejection fraction) ([facility])  Wt Readings from Last 3 Encounters:   02/16/25 83.7 kg (184 lb 8.4 oz)   02/06/25 84.6 kg (186 lb 8.2 oz)   02/05/25 84.6 kg (186 lb 8.2 oz)     Follows with Dr. Roach outpatient   Lasix held in setting of CHUY. Monitor.  Continue metoprolol.  Hold lisinopril and Lasix in setting of CHUY.  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   Follows with oncology.  Currently on first-line treatment with carboplatin and etoposide.  Stage 3b chronic kidney disease ([facility])  Lab Results   Component Value Date    EGFR 64 02/16/2025    EGFR 48 02/15/2025    EGFR 41 02/14/2025    CREATININE 1.10 02/16/2025    CREATININE 1.39 (H) 02/15/2025    CREATININE 1.59 (H) 02/14/2025     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 ([facility])  Lab Results   Component Value Date    EGFR 64 02/16/2025    EGFR 48 02/15/2025    EGFR 41 02/14/2025    CREATININE 1.10 02/16/2025    CREATININE 1.39 (H) 02/15/2025    CREATININE 1.59 (H) 02/14/2025     Hydronephrosis    Normal anion gap metabolic acidosis    Hypomagnesemia  Monitor and replace  UTI (urinary tract infection)  Patient with positive UA.    Continue on Rocephin to complete the course 5 days  Can switch to oral Abx at dc  Trend WBC and fever curve  Diarrhea  Stool for C. difficile is negative.  Stool cultures are pending    Labs & " "Imaging: Results Review Statement: No pertinent imaging studies reviewed.    VTE Prophylaxis: in place.    Code Status:   Level 1 - Full Code    Patient Centered Rounds: I have performed bedside rounds with nursing staff today.    Mobility:   Basic Mobility Inpatient Raw Score: 11  JH-HLM Goal: 4: Move to chair/commode  JH-HLM Achieved: 4: Move to chair/commode  JH-HLM Goal NOT achieved. Continue with multidisciplinary rounding and encourage appropriate mobility to improve upon JH-HLM goals.    Discussions with Specialists or Other Care Team Provider: Nephrology    Education and Discussions with Family / Patient: Attempted to call daughter-no response    Total Time Spent on Date of Encounter in care of patient: 35 mins. This time was spent on one or more of the following: performing physical exam; counseling and coordination of care; obtaining or reviewing history; documenting in the medical record; reviewing/ordering tests, medications or procedures; communicating with other healthcare professionals and discussing with patient's family/caregivers.    Current Length of Stay: 2 day(s)    Current Patient Status: Inpatient   Certification Statement: The patient will continue to require additional inpatient hospital stay due to see my assessment and plan.     Subjective:   Patient is seen and examined at bedside.  Denies any new complaints.  Afebrile  All other ROS are negative.    Objective:    Vitals: Blood pressure 127/55, pulse 64, temperature (!) 97.3 °F (36.3 °C), temperature source Axillary, resp. rate 18, height 5' 7\" (1.702 m), weight 83.7 kg (184 lb 8.4 oz), SpO2 90%.,Body mass index is 28.9 kg/m².  SPO2 RA Rest      Flowsheet Row ED to Hosp-Admission (Current) from 2/13/2025 in [facility] Med [facility]urg Unit   SpO2 90 %   SpO2 Activity At Rest   O2 Device None (Room air)   O2 Flow Rate 2 L/min          I&O:   Intake/Output Summary (Last 24 hours) at 2/16/2025 0737  Last data filed at 2/16/2025 " 0553  Gross per 24 hour   Intake 2382 ml   Output 1800 ml   Net 582 ml       Physical Exam:    General- Alert, lying comfortably in bed. Not in any acute distress.  Neck- Supple, No JVD  CVS- regular, S1 and S2 normal  Chest- Bilateral Air entry, No rhochi, crackles or wheezing present.  Abdomen- soft, nontender, not distended, no guarding or rigidity, BS+  Extremities-  No pedal edema, No calf tenderness  CNS-   Alert, awake and orientedx3. No focal deficits present.    Invasive Devices       Central Venous Catheter Line  Duration             Port A Cath 01/10/25 Right Chest 36 days              Peripheral Intravenous Line  Duration             Peripheral IV 02/13/25 Distal;Left;Upper;Ventral (anterior) Arm 2 days              Drain  Duration             Suprapubic Catheter 16 Fr. 74 days    Nephrostomy Right 8 Fr. 53 days                          Social History  reviewed  Family History   Problem Relation Age of Onset    Breast cancer Mother     Heart attack Father 61    Other Sister         s/p hysterectomy    Cerebral palsy Brother     Skin cancer Other         family history    No Known Problems Son     No Known Problems Daughter     Sudden death Neg Hx     reviewed    Meds:  Current Facility-Administered Medications   Medication Dose Route Frequency Provider Last Rate Last Admin    acetaminophen (TYLENOL) tablet 650 mg  650 mg Oral Q4H PRN Jonathan Doyle PA-C   650 mg at 02/15/25 2143    amiodarone tablet 200 mg  200 mg Oral Daily Jonathan Doyle PA-C   200 mg at 02/15/25 0824    [Held by provider] amLODIPine (NORVASC) tablet 5 mg  5 mg Oral Daily Jonathan Doyle PA-C        aspirin chewable tablet 81 mg  81 mg Oral Daily Jonathan Doyle PA-C   81 mg at 02/15/25 0824    atorvastatin (LIPITOR) tablet 40 mg  40 mg Oral Daily With Dinner Jonathan Doyle PA-C   40 mg at 02/15/25 1711    cefTRIAXone (ROCEPHIN) IVPB (premix in dextrose) 1,000 mg 50 mL  1,000 mg Intravenous Q24H Jaydon Fleming  mL/hr at  02/15/25 1118 1,000 mg at 02/15/25 1118    [Held by provider] furosemide (LASIX) tablet 20 mg  20 mg Oral Daily Jonathan Doyle PA-C        heparin (porcine) subcutaneous injection 5,000 Units  5,000 Units Subcutaneous Q8H [facility] Jonathan Doyle PA-C   5,000 Units at 02/15/25 2144    [Held by provider] lisinopril (ZESTRIL) tablet 5 mg  5 mg Oral Daily Jonathan Doyle PA-C        metoprolol succinate (TOPROL-XL) 24 hr tablet 25 mg  25 mg Oral Q12H [facility] Jonathan Doyle PA-C   25 mg at 02/15/25 2001    [Held by provider] polyethylene glycol (MIRALAX) packet 17 g  17 g Oral Daily Jonathan Doyle PA-C        prochlorperazine (COMPAZINE) tablet 10 mg  10 mg Oral Q6H PRN Jonathan Doyle PA-C   10 mg at 02/15/25 0017    saccharomyces boulardii (FLORASTOR) capsule 250 mg  250 mg Oral BID Angelica Madrid PA-C   250 mg at 02/15/25 1711    sertraline (ZOLOFT) tablet 25 mg  25 mg Oral Daily Jonathan Doyle PA-C   25 mg at 02/15/25 0824    sodium chloride (PF) 0.9 % injection 10 mL  10 mL Intracatheter Daily Jonathan Doyle PA-C   10 mL at 02/15/25 0827        Medications Prior to Admission:     amiodarone 200 mg tablet    aspirin 81 mg chewable tablet    atorvastatin (LIPITOR) 40 mg tablet    furosemide (LASIX) 20 mg tablet    levofloxacin (LEVAQUIN) 250 mg tablet    lidocaine-prilocaine (EMLA) cream    metoprolol succinate (TOPROL-XL) 25 mg 24 hr tablet    ondansetron (ZOFRAN) 8 mg tablet    prochlorperazine (COMPAZINE) 10 mg tablet    sertraline (ZOLOFT) 25 mg tablet    acetaminophen (TYLENOL) 325 mg tablet    amLODIPine (NORVASC) 5 mg tablet    [Paused] lisinopril (ZESTRIL) 5 mg tablet    nitroglycerin (NITROSTAT) 0.4 mg SL tablet    polyethylene glycol (MIRALAX) 17 g packet    sodium chloride, PF, 0.9 %    Labs:  Results from last 7 days   Lab Units 02/15/25  1950 02/15/25  0244 02/14/25  0448 02/13/25  2230   WBC Thousand/uL  --  2.82* 1.53* 1.13*   HEMOGLOBIN g/dL 7.2* 7.5* 7.7* 8.6*   HEMATOCRIT % 22.8* 24.1* 24.7* 27.5*  "  PLATELETS Thousands/uL  --  97* 111* 152   LYMPHO PCT %  --  28  --   --    MONO PCT %  --  9  --   --    EOS PCT %  --  4  --   --      Results from last 7 days   Lab Units 02/16/25  0556 02/15/25  0244 02/14/25 2007   POTASSIUM mmol/L 4.1 3.9 3.6   CHLORIDE mmol/L 109* 111* 108   CO2 mmol/L 24 20* 21   BUN mg/dL 18 29* 35*   CREATININE mg/dL 1.10 1.39* 1.59*   CALCIUM mg/dL 7.4* 7.2* 7.4*     No results found for: \"TROPONINI\", \"CKMB\", \"CKTOTAL\"      Lab Results   Component Value Date    BLOODCX No Growth After 5 Days. 11/10/2023    BLOODCX No Growth After 5 Days. 11/10/2023    URINECX 30,000-39,000 cfu/ml Pseudomonas aeruginosa (A) 12/15/2024    URINECX >100,000 cfu/ml Pseudomonas aeruginosa (A) 12/13/2024    URINECX Streptococcus species (A) 12/13/2024    URINECX Lactobacillus species (A) 12/13/2024         Imaging:  Results for orders placed during the hospital encounter of 02/13/25    XR chest portable    Narrative  XR CHEST PORTABLE    INDICATION: fall.    COMPARISON: Compared with 12/15/2024    FINDINGS: Right chest port catheter tip in the cavoatrial region.    Poor inspiration with prominent markings.. No pneumothorax or pleural effusion.    Normal cardiomediastinal silhouette.    Single glenohumeral degenerative changes with deformities..    Normal upper abdomen.    Impression  No acute cardiopulmonary disease.        Workstation performed: [id number]    No results found for this or any previous visit.      Last 24 Hours Medication List:   Current Facility-Administered Medications   Medication Dose Route Frequency Provider Last Rate    acetaminophen  650 mg Oral Q4H PRN Jonathan Doyle PA-C      amiodarone  200 mg Oral Daily Jonathan Doyle PA-C      [Held by provider] amLODIPine  5 mg Oral Daily Jonathan Doyle PA-C      aspirin  81 mg Oral Daily Jonathan Doyle PA-C      atorvastatin  40 mg Oral Daily With Dinner Jonathan Doyle PA-C      cefTRIAXone  1,000 mg Intravenous Q24H Jaydon Fleming MD " 1,000 mg (02/15/25 1118)    [Held by provider] furosemide  20 mg Oral Daily Jonathan Doyle PA-C      heparin (porcine)  5,000 Units Subcutaneous Q8H [facility] Jonathan Doyle PA-C      [Held by provider] lisinopril  5 mg Oral Daily Jonathan Doyle PA-C      metoprolol succinate  25 mg Oral Q12H [facility] Jonathan Doyle PA-C      [Held by provider] polyethylene glycol  17 g Oral Daily Jonathan Doyle PA-C      prochlorperazine  10 mg Oral Q6H PRN Jonathan Doyle PA-C      saccharomyces boulardii  250 mg Oral BID Angelica Madrid PA-C      sertraline  25 mg Oral Daily Jonathan Doyle PA-C      sodium chloride (PF)  10 mL Intracatheter Daily Jonathan Doyle PA-C          Today, Patient Was Seen By: Jaydon Fleming MD    ** Please Note: Dictation voice to text software may have been used in the creation of this document. **

## 2025-02-16 NOTE — ASSESSMENT & PLAN NOTE
Presents from [place] assisted living with right hip hip pain.    Patient was walking with walker and missed chair when attempting to sit, falling onto buttocks.  Closed reduction successfully performed in emergency department.  Orthopedic consult appreciated  Knee immobilizer and posterior hip precautions  Weightbearing as tolerated to right lower extremity with assistance  PT OT recommended rehab.   Vascular Access Assessment and Midline Insertion    Patient is on 2 Cardiac and has a history of difficult/unsuccessful vascular access. He currently has a right hand 22g PIV and is in need of additional access for incompatible infusions.   The Vascular Access Team has been contacted.  Using maximum barrier precautions and sterile technique, inserted a Bard Power Injectable Midline 4F Catheter using ultrasound guidance. Catheter was trimmed at 14 cm, 00cm out, in the left upper arm, basilic vein. Neutral injection cap was attached and catheter flushes easily with blood return. Hilltop device and occlusive dressing were placed at the site. Arm circumference is 32 cm.      Patient tolerated procedure.  Reported to MALCOLM Jones.    Product Code # [id number]  Exp date 11/30/2023

## 2025-02-16 NOTE — ASSESSMENT & PLAN NOTE
Hemoglobin 8.6 on arrival.  Down slightly from baseline of about 10.  Monitor.  Hemoglobin is 7.6  Iron panel reviewed  Trend H&H and transfuse to keep hemoglobin greater than 7

## 2025-02-16 NOTE — ASSESSMENT & PLAN NOTE
Lab Results   Component Value Date    EGFR 64 02/16/2025    EGFR 48 02/15/2025    EGFR 41 02/14/2025    CREATININE 1.10 02/16/2025    CREATININE 1.39 (H) 02/15/2025    CREATININE 1.59 (H) 02/14/2025

## 2025-02-16 NOTE — ASSESSMENT & PLAN NOTE
Patient with positive UA.    Continue on Rocephin to complete the course 5 days  Can switch to oral Abx at dc  Trend WBC and fever curve

## 2025-02-16 NOTE — ASSESSMENT & PLAN NOTE
Lab Results   Component Value Date    CREATININE 1.10 02/16/2025    CREATININE 1.39 (H) 02/15/2025     Creatine on admission 2.38, Baseline creatinine 1.5  Hold ACE/ARB and diuretic therapy  Avoid hypotension, nephrotoxins, and NSAIDS if possible    Gentle IV fluids in setting of CHF   Strict I & Os  Urinary retention protocol and bladder scan   Creatinine this morning is 1.10  Resolved  Trend BMP

## 2025-02-17 ENCOUNTER — TELEPHONE (OUTPATIENT)
Age: 75
End: 2025-02-17

## 2025-02-17 ENCOUNTER — HOSPITAL ENCOUNTER (OUTPATIENT)
Dept: MRI IMAGING | Facility: HOSPITAL | Age: 75
Discharge: HOME | End: 2025-02-17
Attending: INTERNAL MEDICINE

## 2025-02-17 ENCOUNTER — RESULTS FOLLOW-UP (OUTPATIENT)
Dept: OTHER | Facility: HOSPITAL | Age: 75
End: 2025-02-17

## 2025-02-17 VITALS
RESPIRATION RATE: 18 BRPM | TEMPERATURE: 98.6 F | WEIGHT: 186.29 LBS | HEIGHT: 67 IN | BODY MASS INDEX: 29.24 KG/M2 | HEART RATE: 77 BPM | SYSTOLIC BLOOD PRESSURE: 139 MMHG | DIASTOLIC BLOOD PRESSURE: 52 MMHG | OXYGEN SATURATION: 93 %

## 2025-02-17 PROBLEM — E83.42 HYPOMAGNESEMIA: Status: RESOLVED | Noted: 2025-02-15 | Resolved: 2025-02-17

## 2025-02-17 PROBLEM — E87.6 ACUTE HYPOKALEMIA: Status: RESOLVED | Noted: 2025-02-14 | Resolved: 2025-02-17

## 2025-02-17 LAB
ANION GAP SERPL CALCULATED.3IONS-SCNC: 6 MMOL/L (ref 4–13)
BUN SERPL-MCNC: 13 MG/DL (ref 5–25)
CALCIUM SERPL-MCNC: 7.4 MG/DL (ref 8.4–10.2)
CHLORIDE SERPL-SCNC: 108 MMOL/L (ref 96–108)
CO2 SERPL-SCNC: 24 MMOL/L (ref 21–32)
CREAT SERPL-MCNC: 0.98 MG/DL (ref 0.6–1.3)
ERYTHROCYTE [DISTWIDTH] IN BLOOD BY AUTOMATED COUNT: 15.8 % (ref 11.6–15.1)
GFR SERPL CREATININE-BSD FRML MDRD: 74 ML/MIN/1.73SQ M
GLUCOSE SERPL-MCNC: 119 MG/DL (ref 65–140)
HCT VFR BLD AUTO: 24.8 % (ref 36.5–49.3)
HGB BLD-MCNC: 7.8 G/DL (ref 12–17)
MCH RBC QN AUTO: 30.4 PG (ref 26.8–34.3)
MCHC RBC AUTO-ENTMCNC: 31.5 G/DL (ref 31.4–37.4)
MCV RBC AUTO: 97 FL (ref 82–98)
NRBC BLD AUTO-RTO: 0 /100 WBCS
PLATELET # BLD AUTO: 106 THOUSANDS/UL (ref 149–390)
PMV BLD AUTO: 11.6 FL (ref 8.9–12.7)
POTASSIUM SERPL-SCNC: 4.1 MMOL/L (ref 3.5–5.3)
RBC # BLD AUTO: 2.57 MILLION/UL (ref 3.88–5.62)
SODIUM SERPL-SCNC: 138 MMOL/L (ref 135–147)
WBC # BLD AUTO: 11.97 THOUSAND/UL (ref 4.31–10.16)

## 2025-02-17 PROCEDURE — 80048 BASIC METABOLIC PNL TOTAL CA: CPT | Performed by: INTERNAL MEDICINE

## 2025-02-17 PROCEDURE — 99239 HOSP IP/OBS DSCHRG MGMT >30: CPT | Performed by: INTERNAL MEDICINE

## 2025-02-17 PROCEDURE — 85027 COMPLETE CBC AUTOMATED: CPT | Performed by: INTERNAL MEDICINE

## 2025-02-17 PROCEDURE — 99232 SBSQ HOSP IP/OBS MODERATE 35: CPT | Performed by: INTERNAL MEDICINE

## 2025-02-17 RX ORDER — AMLODIPINE BESYLATE 5 MG/1
5 TABLET ORAL DAILY
Status: DISCONTINUED | OUTPATIENT
Start: 2025-02-18 | End: 2025-02-17 | Stop reason: HOSPADM

## 2025-02-17 RX ORDER — LEVOFLOXACIN 750 MG/1
750 TABLET, FILM COATED ORAL EVERY 24 HOURS
Qty: 5 TABLET | Refills: 0 | Status: SHIPPED | OUTPATIENT
Start: 2025-02-17 | End: 2025-02-22

## 2025-02-17 RX ADMIN — HEPARIN SODIUM 5000 UNITS: 5000 INJECTION INTRAVENOUS; SUBCUTANEOUS at 04:54

## 2025-02-17 RX ADMIN — CEFTRIAXONE 1000 MG: 1 INJECTION, SOLUTION INTRAVENOUS at 08:44

## 2025-02-17 RX ADMIN — Medication 250 MG: at 08:44

## 2025-02-17 RX ADMIN — SERTRALINE HYDROCHLORIDE 25 MG: 25 TABLET, FILM COATED ORAL at 08:44

## 2025-02-17 RX ADMIN — SODIUM CHLORIDE, PRESERVATIVE FREE 10 ML: 5 INJECTION INTRAVENOUS at 08:44

## 2025-02-17 RX ADMIN — AMIODARONE HYDROCHLORIDE 200 MG: 200 TABLET ORAL at 08:44

## 2025-02-17 RX ADMIN — METOPROLOL SUCCINATE 25 MG: 25 TABLET, FILM COATED, EXTENDED RELEASE ORAL at 08:44

## 2025-02-17 RX ADMIN — ASPIRIN 81 MG CHEWABLE TABLET 81 MG: 81 TABLET CHEWABLE at 08:44

## 2025-02-17 RX ADMIN — LEVOFLOXACIN 750 MG: 500 TABLET, FILM COATED ORAL at 15:46

## 2025-02-17 RX ADMIN — ATORVASTATIN CALCIUM 40 MG: 40 TABLET, FILM COATED ORAL at 15:46

## 2025-02-17 RX ADMIN — HEPARIN SODIUM 5000 UNITS: 5000 INJECTION INTRAVENOUS; SUBCUTANEOUS at 13:05

## 2025-02-17 NOTE — ASSESSMENT & PLAN NOTE
Presents from [place] assisted living with right hip hip pain.    Patient was walking with walker and missed chair when attempting to sit, falling onto buttocks.  Closed reduction successfully performed in emergency department.  Orthopedic consult appreciated  Knee immobilizer and posterior hip precautions  Weightbearing as tolerated to right lower extremity with assistance  PT OT recommended rehab.  Discussed with Ortho at the time of discharge, will need outpatient follow-up and continuing knee immobilizer until follow-up

## 2025-02-17 NOTE — TELEPHONE ENCOUNTER
Call received from Chau from Case management at . Patient is currently admitted and will be going to a short term rehab facility after discharge. Wanted to let Dr Foy know that patient will have to put his treatments on hold while he is in the facility and would have to resume them once he leaves.

## 2025-02-17 NOTE — DISCHARGE SUMMARY
Discharge Summary - Hospitalist   Name: Toro Rodriguez 74 y.o. male I MRN: [id number]  Unit/Bed#: -01 I Date of Admission: 2/13/2025   Date of Service: 2/17/2025 I Hospital Day: 3     Assessment & Plan  CHUY (acute kidney injury) (HCC)  Lab Results   Component Value Date    CREATININE 0.98 02/17/2025    CREATININE 1.10 02/16/2025     Creatine on admission 2.38, Baseline creatinine 1.5  Hold ACE/ARB and diuretic therapy  Avoid hypotension, nephrotoxins, and NSAIDS if possible    Discussed With nephrology, continue holding ACE, diuretic, BMP in 2 weeks  Closed posterior dislocation of right hip (HCC)  Presents from [facility] with right hip hip pain.    Patient was walking with walker and missed chair when attempting to sit, falling onto buttocks.  Closed reduction successfully performed in emergency department.  Orthopedic consult appreciated  Knee immobilizer and posterior hip precautions  Weightbearing as tolerated to right lower extremity with assistance  PT OT recommended rehab.  Discussed with Ortho at the time of discharge, will need outpatient follow-up and continuing knee immobilizer until follow-up  Anemia due to stage 3b chronic kidney disease  (HCC)  Hemoglobin 8.6 on arrival.  Down slightly from baseline of about 10.  Monitor.  Hemoglobin is 7.6  Iron panel reviewed  Trend H&H and transfuse to keep hemoglobin greater than 7  Dysphagia  Patient observed by nursing staff coughing after taking medications with water and nectar thick liquids while sitting at 90 degrees upright.   Speech evaluated the patient and continue on regular diet  Essential hypertension  Initially hypotensive on arrival.  Responded to fluids.  Hold amlodipine for now.  Monitor.  Norvasc, Zestril and Lasix on hold  Recommended to restart Norvasc if SBP greater than 145  Coronary artery disease involving native coronary artery of native heart  Continue aspirin and statin therapy.  Type 2 diabetes mellitus with stage 3b  "chronic kidney disease, without long-term current use of insulin ([facility])  Lab Results   Component Value Date    HGBA1C 5.9 (H) 04/18/2024       No results for input(s): \"POCGLU\" in the last 72 hours.    Blood Sugar Average: Last 72 hrs:    Appears controlled by diet without any home agents. Sliding scale and hypoglycemia protocol.  HFrEF (heart failure with reduced ejection fraction) ([facility])  Wt Readings from Last 3 Encounters:   02/17/25 84.5 kg (186 lb 4.6 oz)   02/06/25 84.6 kg (186 lb 8.2 oz)   02/05/25 84.6 kg (186 lb 8.2 oz)     Follows with Dr. Roach outpatient   Lasix held in setting of CHUY. Monitor.  Continue metoprolol.  Hold lisinopril and Lasix in setting of CHUY.  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   Follows with oncology.  Currently on first-line treatment with carboplatin and etoposide.  Discontinue probiotic given patient has central line  Stage 3b chronic kidney disease (HCC)  Lab Results   Component Value Date    EGFR 74 02/17/2025    EGFR 64 02/16/2025    EGFR 48 02/15/2025    CREATININE 0.98 02/17/2025    CREATININE 1.10 02/16/2025    CREATININE 1.39 (H) 02/15/2025     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)  Lab Results   Component Value Date    EGFR 74 02/17/2025    EGFR 64 02/16/2025    EGFR 48 02/15/2025    CREATININE 0.98 02/17/2025    CREATININE 1.10 02/16/2025    CREATININE 1.39 (H) 02/15/2025     Hydronephrosis    Normal anion gap metabolic acidosis    UTI (urinary tract infection)  Patient with positive UA.    With leukocytosis, no fever  Patient had urine culture previously with Pseudomonas, will discharge with Levaquin, Levaquin adjusted to 750 with renal function  Monitor BMP in 1 week.  If patient is developing any side effects, repeat " BMP sooner  Diarrhea  Stool for C. difficile is negative.  Stool cultures are negative, discontinue probiotic given patient has central line     Medical Problems       Resolved Problems  Date Reviewed: 12/17/2024          Resolved    Acute hypokalemia 2/17/2025     Resolved by  CARLIE Nixon    Hypomagnesemia 2/17/2025     Resolved by  CARLIE Nixon        Discharging Physician / Practitioner: Brook Fuentes MD  PCP: CARLIE Foster  Admission Date:   Admission Orders (From admission, onward)       Ordered        02/14/25 1142  INPATIENT ADMISSION  Once            02/14/25 0007  Place in Observation  Once                          Discharge Date: 02/17/25    Disposition:    Other Skilled Nursing Facility at [facility]    Reason for Admission:   Chief Complaint   Patient presents with    Fall     EMS from [facility]; pt was walking with walker going to sit down in chair, missed chair and fell onto buttocks, c/o R hip pain and upper leg pain, no head strike, no thinners        Discharge Diagnoses:   Please see assessment and plan section above for further details regarding discharge diagnoses.     Consultations During Hospital Stay:  Nephrology  Orthopedics      Significant Findings / Test Results:   XR chest portable  Result Date: 2/14/2025  Impression: No acute cardiopulmonary disease. Workstation performed: [id number]     XR hip/pelv 1 vw RIGHT if performed  Result Date: 2/14/2025  Impression: Interval reduction of the dislocation. No acute osseous abnormality. Computerized Assisted Algorithm (CAA) may have been used to analyze all applicable images. Workstation performed: [id number]     XR hip/pelv 1 vw right if performed  Result Date: 2/14/2025  Impression: Right hip prosthesis dislocation.. Computerized Assisted Algorithm (CAA) may have been used to analyze all applicable images. Workstation performed: [id number]     XR hip/pelv 2-3 vws right  Result Date: 2/14/2025  Impression:  "Posterior dislocation of the femoral component of the right total hip prosthesis. Clinical provider is aware of the findings. Computerized Assisted Algorithm (CAA) may have been used to analyze all applicable images.     Complications:  none    Hospital Course:    Toro Rodriguez is a 74 y.o. male patient with past medical history of CKD stage III, bladder cancer on chemotherapy, right hydronephrosis with right PCN, SPC, CHF, A-fib, hypertension, diabetes, melanoma, hip arthroplasty who originally presented to the hospital on 2/13/2025 due to fall after attempting to sit down and missed chair.  Patient had CHUY and hip dislocation on admission.  Hip was reduced in ER and orthopedic consult appreciated  Nephrology on board for CHUY. Lasix, and Zestril on hold on discharge.    PT OT recommended rehab  Okay for discharge to rehab, medically cleared by nephrology, Ortho and internal medicine    Patient will need follow-up with Ortho as outpatient    Condition at Discharge: stable    Discharge Day Visit / Exam:   Subjective:  denies complaints  Vitals: Blood Pressure: 139/52 (02/17/25 1116)  Pulse: 77 (02/17/25 1116)  Temperature: 98.6 °F (37 °C) (02/17/25 0324)  Temp Source: Axillary (02/17/25 0324)  Respirations: 18 (02/17/25 0324)  Height: 5' 7\" (170.2 cm) (02/14/25 0124)  Weight - Scale: 84.5 kg (186 lb 4.6 oz) (02/17/25 0536)  SpO2: 93 % (02/17/25 1116)  Physical Exam  HENT:      Head: Normocephalic and atraumatic.   Eyes:      General: No scleral icterus.        Right eye: No discharge.         Left eye: No discharge.      Conjunctiva/sclera: Conjunctivae normal.   Pulmonary:      Effort: Pulmonary effort is normal. No respiratory distress.   Skin:     General: Skin is warm and dry.   Neurological:      Mental Status: He is alert and oriented to person, place, and time.          Discussion with Family: Discussed with daughter over phone    Medication Adjustments and Discharge Medications:  Discharge Medication List: " See after visit summary for reconciled discharge medications.   Medication Dosing Tapers - Please refer to Discharge Medication List for details on any medication dosing tapers (if applicable to patient).   Summary of Medication Adjustments made as a result of this hospitalization: As in AVS  Medications being temporarily held (include recommended restart time): Lasix, lisinopril    Wound Care Recommendations:  When applicable, please see wound care section of After Visit Summary.    Instructions for any Catheters / Lines Present at Discharge (including removal date, if applicable): Has for infusion    Diet Recommendations at Discharge:  Diet -        Diet Orders   (From admission, onward)                 Start     Ordered    02/14/25 1143  Diet Regular; Regular House  Diet effective now        References:    Adult Nutrition Support Algorithm    RD Therapeutic Diet Order Protocol   Question Answer Comment   Diet Type Regular    Regular Regular House    RD to adjust diet per protocol? Yes        02/14/25 1142                    Mobility at time of Discharge:   Basic Mobility Inpatient Raw Score: 10  -HLM Goal: 4: Move to chair/commode  JH-HLM Achieved: 2: Bed activities/Dependent transfer  HLM Goal NOT achieved. Continue to encourage mobility in post discharge setting.    Goals of Care Discussions:  Code Status at Discharge: Level 1 - Full Code  Goals of care were not discussed during this admission.    Discharge instructions/Information to patient and family:   See After Visit Summary (AVS) titled Discharge Instructions for information provided to patient and family.      Planned Readmission: None     Administrative Statements   Discharge Statement:  I have spent a total time of 33 minutes in caring for this patient on the day of the visit/encounter. >30 minutes of time was spent on: Diagnostic results, Impressions, Counseling / Coordination of care, Documenting in the medical record, Reviewing / ordering tests,  medicine, procedures  , and Communicating with other healthcare professionals .    **Please Note: This note may have been constructed using a voice recognition system.**

## 2025-02-17 NOTE — ASSESSMENT & PLAN NOTE
Patient with positive UA.    With leukocytosis, no fever  Patient had urine culture previously with Pseudomonas, will discharge with Levaquin, Levaquin adjusted to 750 with renal function  Monitor BMP in 1 week.  If patient is developing any side effects, repeat BMP sooner

## 2025-02-17 NOTE — ASSESSMENT & PLAN NOTE
Lab Results   Component Value Date    EGFR 74 02/17/2025    EGFR 64 02/16/2025    EGFR 48 02/15/2025    CREATININE 0.98 02/17/2025    CREATININE 1.10 02/16/2025    CREATININE 1.39 (H) 02/15/2025

## 2025-02-17 NOTE — ASSESSMENT & PLAN NOTE
With pancytopenia in the setting of chemotherapy.   Transfuse for Hgb <7.0  management per primary team

## 2025-02-17 NOTE — ASSESSMENT & PLAN NOTE
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 Reyes,. Next follow up 05/07/2025

## 2025-02-17 NOTE — ASSESSMENT & PLAN NOTE
Etiology: suspect prerenal due to relative hypotension, volume depletion with poor oral intake, diuretics with autoregulatory dysfunction from lisinopril. Possible underlying component of active chemotherapy.  Admission creatinine 2.38 on 2/13/2025.  Today's creatinine 0.9, back to prior baseline, CHUY resolved  Peak creatinine 2.38 on 2/13/2025  baseline creatinine 1.5-1.7 since Sept '24, prior 1-1.2.   Hx recent CHUY Dec '24 due to obstructive uropathy with R hydroureteronephrosis, s/p R PCN 12/24/2024  workup: UA with 1+ protein, no hematuria, 10-20 WBC, innumerable bacteria  Imaging: CT 12/21/2024 with persistent right moderate hydroureteronephrosis without evidence of ureteral calculi, 2 mm nonobstructing left lower pole renal calculus. (Prior to PCN)  Will hold on renal imaging given improvement in renal function.    Continue to hold lisinopril  Continue to hold diuretics today   Strict I/Os, daily weights  Avoid nephrotoxins, NSAIDs, IV contrast if possible  Urinary retention protocol  Trend BMP in am

## 2025-02-17 NOTE — ASSESSMENT & PLAN NOTE
BP overall acceptable, above goal this morning  Home Rx: Toprol XL 25 mg BID, lisinopril 5 mg daily, lasix 20 mg daily, amlodipine 5 mg daily  Current Rx: Toprol XL 25 mg BID, resume amlodipine 5 mg po daily  Continue to hold lisinopril and diuretics  Recommend hold parameters on antihypertensives for SBP <130 mmHg.  Avoid hypotension or fluctuations in blood pressure.  Maintain MAP >65  Low sodium (2 gm) diet

## 2025-02-17 NOTE — ASSESSMENT & PLAN NOTE
Stool for C. difficile is negative.  Stool cultures are negative, discontinue probiotic given patient has central line

## 2025-02-17 NOTE — ASSESSMENT & PLAN NOTE
Lab Results   Component Value Date    CREATININE 0.98 02/17/2025    CREATININE 1.10 02/16/2025     Creatine on admission 2.38, Baseline creatinine 1.5  Hold ACE/ARB and diuretic therapy  Avoid hypotension, nephrotoxins, and NSAIDS if possible    Discussed With nephrology, continue holding ACE, diuretic, BMP in 2 weeks

## 2025-02-17 NOTE — PROGRESS NOTES
Progress Note - Nephrology   Name: Toro Rodriguez 74 y.o. male I MRN: [id number]  Unit/Bed#: -01 I Date of Admission: 2/13/2025   Date of Service: 2/17/2025 I Hospital Day: 3     Assessment & Plan  CHUY (acute kidney injury) (HCC)  Etiology: suspect prerenal due to relative hypotension, volume depletion with poor oral intake, diuretics with autoregulatory dysfunction from lisinopril. Possible underlying component of active chemotherapy.  Admission creatinine 2.38 on 2/13/2025.  Today's creatinine 0.9, back to prior baseline, CHUY resolved  Peak creatinine 2.38 on 2/13/2025  baseline creatinine 1.5-1.7 since Sept '24, prior 1-1.2.   Hx recent CHUY Dec '24 due to obstructive uropathy with R hydroureteronephrosis, s/p R PCN 12/24/2024  workup: UA with 1+ protein, no hematuria, 10-20 WBC, innumerable bacteria  Imaging: CT 12/21/2024 with persistent right moderate hydroureteronephrosis without evidence of ureteral calculi, 2 mm nonobstructing left lower pole renal calculus. (Prior to PCN)  Will hold on renal imaging given improvement in renal function.    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 Reyes,. Next follow up 05/07/2025    Essential hypertension  BP overall acceptable, above goal this morning  Home Rx: Toprol XL 25 mg BID, lisinopril 5 mg daily, lasix 20 mg daily, amlodipine 5 mg daily  Current Rx: Toprol XL 25 mg BID, resume amlodipine 5 mg po daily  Continue to hold lisinopril and diuretics  Recommend hold parameters on antihypertensives for SBP <130 mmHg.  Avoid hypotension or fluctuations in blood pressure.  Maintain MAP >65  Low sodium (2 gm) diet  Acute hypokalemia  (Resolved: 2/17/2025)  K+ 4.1 today  Continue to monitor  Hypomagnesemia (Resolved: 2/17/2025)  Suspect in the setting of GI losses  Magnesium 2.0   Continue to monitor  Anemia due to stage 3b chronic kidney disease  (HCC)  With pancytopenia in the setting of chemotherapy.   Transfuse for Hgb <7.0  management per primary team    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)  Calcium currently low, previously corrected to normal to albumin  Continue to monitor    Closed posterior dislocation of right hip (HCC)  P/w ground-level fall with right total hip arthroplasty dislocation  S/p closed reduction in ED  PT/OT  Seen by orthopedic surgery.  Plans for outpatient follow-up  Hydronephrosis  Demonstrated on imaging since September 2024 with worseing Dec '24 w/associated CHUY  CT 12/21/2024 with moderate right hydroureteronephrosis without R calculus with decompressed bladder with suprapubic catheter in place   +severe hydronephrosis and hydroureter noted at time of nephrostomy tube placement  S/p R PCN by IR 12/24/2024.  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  Hold ACEi in the setting of CHUY  fluid restriction, 2 gm low sodium diet, daily weights  management per primary team    Dysphagia  Noted by nursing staff with taking medications of thin liquids  Speech evaluation and cleared for  regular diet  Management per primary team  UTI (urinary tract infection)  On ceftriaxone  Management per primary team  Diarrhea  C. difficile negative  stool studies normal  Management per primary team    Plan:  -CHUY, currently resolved.  Holding lisinopril and diuretics.    -Hypertension, blood pressure above goal this morning, amlodipine resumed.  May resume lisinopril at discharge if creatinine remains at baseline or sooner if blood pressure remains above goal.  -Dislocation of right hip, PT OT following.  Management per primary care team.  -Hydronephrosis, status post PCN placement, continue to follow with IR for tube check.  -Malignant neoplasm of bladder, with metastasis, continue follow-up with oncologist and treatment with carboplatin and etoposide.  -CHF, diuretic on hold.  May resume for weight gain, lower extremity edema or shortness of breath.  -UTI, on ceftriaxone, management per primary care team.  -Will sign off from renal at this time.  Again, continue to hold lisinopril and diuretics.  May reinitiate as needed if creatinine remains at baseline.      Subjective     Pleasant, sitting up in bed.  Denies chest pain or shortness of breath.  Denies nausea, vomiting, diarrhea.  Reports eating almost all of his breakfast this morning.    Objective :  Temp:  [98.2 °F (36.8 °C)-98.7 °F (37.1 °C)] 98.6 °F (37 °C)  HR:  [65-74] 73  BP: (130-176)/(52-74) 176/74  Resp:  [18] 18  SpO2:  [90 %-91 %] 90 %  O2 Device: None (Room air)    Current Weight: Weight - Scale: 84.5 kg (186 lb 4.6 oz)  First Weight: Weight - Scale: 84.4 kg (186 lb)  I/O         02/15 0701  02/16 0700 02/16 0701  02/17 0700 02/17 0701  02/18 0700    P.O. 2382 720 660    Total Intake(mL/kg) 2382 (28.5) 720 (8.5) 660 (7.8)    Urine (mL/kg/hr) 1800 (0.9) 2350 (1.2)     Stool 0      Total Output 1800 2350     Net +582 -1630 +660           Unmeasured Stool Occurrence 1 x            Physical Exam  Constitutional:       Appearance: Normal appearance.    HENT:      Nose: Nose normal.      Mouth/Throat:      Mouth: Mucous membranes are moist.   Cardiovascular:      Heart sounds: Normal heart sounds.   Pulmonary:      Breath sounds: Normal breath sounds.   Abdominal:      Palpations: Abdomen is soft.   Genitourinary:     Comments: PCN tube with clear yellow urine  Musculoskeletal:      Right lower leg: No edema.      Left lower leg: No edema.      Comments: Right leg immobilizer   Skin:     General: Skin is warm and dry.   Neurological:      General: No focal deficit present.      Mental Status: He is alert. Mental status is at baseline.         Medications:    Current Facility-Administered Medications:     acetaminophen (TYLENOL) tablet 650 mg, 650 mg, Oral, Q4H PRN, Jonathan Doyle PA-C, 650 mg at 02/16/25 2328    amiodarone tablet 200 mg, 200 mg, Oral, Daily, Jonathan Doyle PA-C, 200 mg at 02/17/25 0844    [START ON 2/18/2025] amLODIPine (NORVASC) tablet 5 mg, 5 mg, Oral, Daily, CARLIE Nixon    aspirin chewable tablet 81 mg, 81 mg, Oral, Daily, Jonathan Doyle PA-C, 81 mg at 02/17/25 0844    atorvastatin (LIPITOR) tablet 40 mg, 40 mg, Oral, Daily With Dinner, Jonathan Doyle PA-C, 40 mg at 02/16/25 1708    cefTRIAXone (ROCEPHIN) IVPB (premix in dextrose) 1,000 mg 50 mL, 1,000 mg, Intravenous, Q24H, Jaydon Fleming MD, Last Rate: 100 mL/hr at 02/17/25 0844, 1,000 mg at 02/17/25 0844    [Held by provider] furosemide (LASIX) tablet 20 mg, 20 mg, Oral, Daily, Jonathan Doyle PA-C    heparin (porcine) subcutaneous injection 5,000 Units, 5,000 Units, Subcutaneous, Q8H ARIJonathan PA-C, 5,000 Units at 02/17/25 0454    [Held by provider] lisinopril (ZESTRIL) tablet 5 mg, 5 mg, Oral, Daily, Jonathan Doyle PA-C    metoprolol succinate (TOPROL-XL) 24 hr tablet 25 mg, 25 mg, Oral, Q12H ARIJonathan PA-C, 25 mg at 02/17/25 0844    prochlorperazine (COMPAZINE) tablet 10 mg, 10 mg, Oral, Q6H PRN, Jonathan Doyle PA-C, 10 mg at 02/15/25 0017     "saccharomyces boulardii (FLORASTOR) capsule 250 mg, 250 mg, Oral, BID, Angelica Madrid PA-C, 250 mg at 02/17/25 0844    sertraline (ZOLOFT) tablet 25 mg, 25 mg, Oral, Daily, Jonathan Doyle PA-C, 25 mg at 02/17/25 0844    sodium chloride (PF) 0.9 % injection 10 mL, 10 mL, Intracatheter, Daily, Jonathan Doyle PA-C, 10 mL at 02/17/25 0844      Lab Results: I have reviewed the following results:  Results from last 7 days   Lab Units 02/17/25  0323 02/16/25  0556 02/15/25  1950 02/15/25  0244 02/14/25 2007 02/14/25 0448 02/13/25  2230   WBC Thousand/uL 11.97* 7.95  --  2.82*  --  1.53* 1.13*   HEMOGLOBIN g/dL 7.8* 7.6* 7.2* 7.5*  --  7.7* 8.6*   HEMATOCRIT % 24.8* 24.1* 22.8* 24.1*  --  24.7* 27.5*   PLATELETS Thousands/uL 106* 92*  --  97*  --  111* 152   POTASSIUM mmol/L 4.1 4.1  --  3.9 3.6 3.1* 2.9*   CHLORIDE mmol/L 108 109*  --  111* 108 107 104   CO2 mmol/L 24 24  --  20* 21 20* 17*   BUN mg/dL 13 18  --  29* 35* 44* 44*   CREATININE mg/dL 0.98 1.10  --  1.39* 1.59* 2.09* 2.38*   CALCIUM mg/dL 7.4* 7.4*  --  7.2* 7.4* 7.1* 7.5*   MAGNESIUM mg/dL  --  2.0  --  1.7*  --   --   --        Administrative Statements     Portions of the record may have been created with voice recognition software. Occasional wrong word or \"sound a like\" substitutions may have occurred due to the inherent limitations of voice recognition software. Read the chart carefully and recognize, using context, where substitutions have occurred.If you have any questions, please contact the dictating provider.  "

## 2025-02-17 NOTE — CASE MANAGEMENT
Case Management Discharge Planning Note    Patient name Toro Rodriguez  Location /-01 MRN [id number]  : 1950 Date 2025       Current Admission Date: 2025  Current Admission Diagnosis:CHUY (acute kidney injury) ([facility])   Patient Active Problem List    Diagnosis Date Noted Date Diagnosed    Diarrhea 02/15/2025     Fall from standing 2025     Closed posterior dislocation of right hip ([facility]) 2025     Anemia due to stage 3b chronic kidney disease  ([facility]) 2025     Dysphagia 2025     Normal anion gap metabolic acidosis 2025     Metastasis to bone ([facility]) 2025     Nephrostomy status ([facility]) 2025     Malignant neoplasm of overlapping sites of bladder ([facility]) 2024     Chemotherapy induced neutropenia ([facility]) 2024     CHUY (acute kidney injury) ([facility]) 2024     Depressed mood 2024     HFrEF (heart failure with reduced ejection fraction) ([facility]) 2024     Hematuria 12/15/2024     Suprapubic catheter dysfunction ([facility]) 12/15/2024     Hydronephrosis 12/15/2024     Clot retention of urine 12/15/2024     Benign hypertension with chronic kidney disease, stage III ([facility]) 12/10/2024     Stage 3b chronic kidney disease ([facility]) 12/10/2024     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 ([facility]) 12/10/2024     Gross hematuria 10/31/2024     Renal lesion 10/16/2024     NSVT (nonsustained ventricular tachycardia) ([facility]) 2024     UTI (urinary tract infection) 2024     Encounter to discuss test results 2024     Urethral erosion by catheter, initial encounter  ([facility]) 2024     ABLA (acute blood loss anemia) 2024     Chronic venous hypertension (idiopathic) with ulcer of left lower extremity (CODE) ([facility]) 2024     Encounter for geriatric assessment 2024     Melanoma of back ([facility]) 2024     Dilated cardiomyopathy ([facility]) 12/15/2023     CKD (chronic kidney disease) 2023      Obesity, morbid (HCC) 12/07/2023     Type 2 diabetes mellitus with stage 3b chronic kidney disease, without long-term current use of insulin (HCC) 11/15/2023     Essential hypertension 11/10/2023     Chronic systolic (congestive) heart failure (HCC) 11/10/2023     Deep vein thrombosis (DVT) of left lower extremity (HCC) 11/10/2023     Coronary artery disease involving native coronary artery of native heart 11/10/2023     Fall from slip, trip, or stumble 10/15/2020     Benign prostatic hyperplasia with urinary retention 2014       LOS (days): 3  Geometric Mean LOS (GMLOS) (days): 5.1  Days to GMLOS:2     OBJECTIVE:  Risk of Unplanned Readmission Score: 62.42         Current admission status: Inpatient   [facility] Pharmacy[facility]   [facility] [address] [address]  Phone: [phone] Fax: [phone]    Primary Care Provider: CARLIE Foster    Primary Insurance: MEDICARE  Secondary Insurance: BLUE CROSS    DISCHARGE DETAILS:    Discharge planning discussed with:: patient and dtr Shanique  Freedom of Choice: Yes  Comments - Freedom of Choice: Will return to [facility] contacted family/caregiver?: Yes    Contacts  Patient Contacts: Shanique Rodriguez: andriy  Relationship to Patient:: Family  Contact Method: Phone  Phone Number: [phone]  Reason/Outcome: Emergency Contact, Discharge Planning    Additional Comments: Met with Pt. [facility] can accept Pt today. Pt in agreement with [facility]. Pt requested CM to call his dtr(Shanique) to discuss discharge plan for today. Call placed to Pt's dtr, informed Pt's dtr that Pt has been recommended for rehaband Pt has chosen [facility]. Informed Pt's dtr that Pt's chemo tx will need to be on hold until he completes rehab. Pt's dtr in agreement. Call placed to Oncology office-Dr Foy's office([phone]), spoke with Héctor, informed Toneim that Pt will be going to [facility] upon  discharge and will need chemo tx placed on hold. Héctor informed CM that she will inform the provider. Call geri Martinez at [facility], left message to inform ftt Pt will be going to [facility] for rehab. [facility] stretcher van to transport Pt to [facility]. Pickup is 4:15pm Informed Pt's nurse(Lenora Dia), [facility], Pt and Pt's dtr(Shanique)of transport time.

## 2025-02-17 NOTE — PROGRESS NOTES
Patient:  MOO GILLIAM    MRN:  [id number]    Aidin Request ID:  [id number]    Level of care reserved:  Skilled Nursing Facility    Partner Reserved:  [facility] [place] [id number] [phone]    Clinical needs requested:    Geography searched:  10 miles around [address]    Start of Service:    Request sent:  3:59pm EST on 2/14/2025 by Amy Harris    Partner reserved:  12:25pm EST on 2/17/2025 by Amy Harris    Choice list shared:  12:17pm EST on 2/17/2025 by Amy Harris   no

## 2025-02-17 NOTE — ASSESSMENT & PLAN NOTE
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   Follows with oncology.  Currently on first-line treatment with carboplatin and etoposide.  Discontinue probiotic given patient has central line

## 2025-02-17 NOTE — CASE MANAGEMENT
Case Management Progress Note    Patient name Toro Rodriguez  Location /-01 MRN [id number]  : 1950 Date 2025       LOS (days): 3  Geometric Mean LOS (GMLOS) (days): 5.1  Days to GMLOS:2        OBJECTIVE:        Current admission status: Inpatient  Preferred Pharmacy:   [facility]- [address] [address]  Phone: [phone] Fax: [phone]    Primary Care Provider: CARLIE Foster    Primary Insurance: MEDICARE  Secondary Insurance: BLUE CROSS    PROGRESS NOTE:  Notification made to OP CM Handoff: [facility] OP CM regarding discharge planning and disposition.  Left message for Message at [place]  re: discharge to [facility] today.

## 2025-02-17 NOTE — CASE MANAGEMENT
Case Management Discharge Planning Note    Patient name Toro Rodriguez  Location /-01 MRN [id number]  : 1950 Date 2025       Current Admission Date: 2025  Current Admission Diagnosis:CHUY (acute kidney injury) ([facility])   Patient Active Problem List    Diagnosis Date Noted Date Diagnosed    Diarrhea 02/15/2025     Fall from standing 2025     Closed posterior dislocation of right hip ([facility]) 2025     Anemia due to stage 3b chronic kidney disease  ([facility]) 2025     Dysphagia 2025     Normal anion gap metabolic acidosis 2025     Metastasis to bone ([facility]) 2025     Nephrostomy status ([facility]) 2025     Malignant neoplasm of overlapping sites of bladder ([facility]) 2024     Chemotherapy induced neutropenia ([facility]) 2024     CHUY (acute kidney injury) ([facility]) 2024     Depressed mood 2024     HFrEF (heart failure with reduced ejection fraction) ([facility]) 2024     Hematuria 12/15/2024     Suprapubic catheter dysfunction ([facility]) 12/15/2024     Hydronephrosis 12/15/2024     Clot retention of urine 12/15/2024     Benign hypertension with chronic kidney disease, stage III ([facility]) 12/10/2024     Stage 3b chronic kidney disease ([facility]) 12/10/2024     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 ([facility]) 12/10/2024     Gross hematuria 10/31/2024     Renal lesion 10/16/2024     NSVT (nonsustained ventricular tachycardia) ([facility]) 2024     UTI (urinary tract infection) 2024     Encounter to discuss test results 2024     Urethral erosion by catheter, initial encounter  ([facility]) 2024     ABLA (acute blood loss anemia) 2024     Chronic venous hypertension (idiopathic) with ulcer of left lower extremity (CODE) ([facility]) 2024     Encounter for geriatric assessment 2024     Melanoma of back ([facility]) 2024     Dilated cardiomyopathy ([facility]) 12/15/2023     CKD (chronic kidney disease) 2023      Obesity, morbid (HCC) 12/07/2023     Type 2 diabetes mellitus with stage 3b chronic kidney disease, without long-term current use of insulin ([facility]) 11/15/2023     Essential hypertension 11/10/2023     Chronic systolic (congestive) heart failure ([facility]) 11/10/2023     Deep vein thrombosis (DVT) of left lower extremity (HCC) 11/10/2023     Coronary artery disease involving native coronary artery of native heart 11/10/2023     Fall from slip, trip, or stumble 10/15/2020     Benign prostatic hyperplasia with urinary retention 2014       LOS (days): 3  Geometric Mean LOS (GMLOS) (days): 5.1  Days to GMLOS:2     OBJECTIVE:  Risk of Unplanned Readmission Score: 62.42         Current admission status: Inpatient   Preferred Pharmacy:   [facility]- [address] [address]  Phone: [phone] Fax: [phone]    Primary Care Provider: CARLIE Foster    Primary Insurance: MEDICARE  Secondary Insurance: BLUE CROSS    DISCHARGE DETAILS:    IMM Given (Date):: 02/17/25  IMM Given to:: Patient  IMM reviewed with patient, patient agrees with discharge determination.

## 2025-02-17 NOTE — ASSESSMENT & PLAN NOTE
Compensated  echo 2/26/2024:  EF 35-40% with G1DD, mod MR, IVC normal size  home diuretics: lasix 20 mg daily  Diuretics have been held  on beta blockers and lisinopril as outpatient.  Hold ACEi in the setting of CHUY  fluid restriction, 2 gm low sodium diet, daily weights  management per primary team

## 2025-02-17 NOTE — ASSESSMENT & PLAN NOTE
Wt Readings from Last 3 Encounters:   02/17/25 84.5 kg (186 lb 4.6 oz)   02/06/25 84.6 kg (186 lb 8.2 oz)   02/05/25 84.6 kg (186 lb 8.2 oz)     Follows with Dr. Roach outpatient   Lasix held in setting of CHUY. Monitor.  Continue metoprolol.  Hold lisinopril and Lasix in setting of CHUY.  Daily weights, Strict I & Os  Cardiac diet, low sodium <2g

## 2025-02-18 ENCOUNTER — TELEPHONE (OUTPATIENT)
Age: 75
End: 2025-02-18

## 2025-02-18 ENCOUNTER — TELEPHONE (OUTPATIENT)
Dept: NEPHROLOGY | Facility: CLINIC | Age: 75
End: 2025-02-18

## 2025-02-18 NOTE — TELEPHONE ENCOUNTER
Spoke with Cait Paulino reminding them to have labs done. They expressed understanding and thanked us for the call:    ----- Message from Sancho Downing MD sent at 2/17/2025  1:43 PM EST -----  Patient admitted to [facility] and dislocation of right LINH  Also UTI  Also CHUY resolved creatinine less than 1 this was related to prerenal plus ACE inhibitor/diuretics      Please obtain BMP/magnesium 1 week after discharge  Patient has an appointment with Dr. Reyes in May    Of note: Lisinopril on hold and diuretics on hold potentially add back diuretics prior to discharge then once we know he is stable potentially add back lisinopril as an outpatient

## 2025-02-18 NOTE — TELEPHONE ENCOUNTER
Pt daughter would like a call back to know if Dr. Foy would want pt to change his 2/24/25 appt to a virtual while he is in rehab? Or should she cancel the appt? She can be reached at [phone]. Thank you.

## 2025-02-18 NOTE — TELEPHONE ENCOUNTER
Patient daughter calling in, stated that the patient has not recovered from his hip injury. Patient appt moved to 3/4,  appt needs to be moved as well. Please assist, no call back needed.

## 2025-02-18 NOTE — TELEPHONE ENCOUNTER
Maurice from [facility] called stating the R nephrostomy tube suture is not intact. I advised our providers did not place the tube and to contact IR or nephrology. Maurice verbalized understanding.

## 2025-02-19 ENCOUNTER — TELEPHONE (OUTPATIENT)
Age: 75
End: 2025-02-19

## 2025-02-19 NOTE — TELEPHONE ENCOUNTER
Called and spoke with rafiq to inform her that Dr. Foy is okay with putting treatment on hold and canceling his appointment till he is out of rehab. Informed rafiq that once he is out of rehab to call us and we can reschedule the appt.    Detail Level: Zone Detail Level: Detailed

## 2025-02-20 ENCOUNTER — HOSPITAL ENCOUNTER (OUTPATIENT)
Dept: RADIOLOGY | Facility: HOSPITAL | Age: 75
Discharge: HOME | End: 2025-02-20
Attending: RADIOLOGY
Payer: MEDICARE

## 2025-02-20 ENCOUNTER — HOSPITAL ENCOUNTER (OUTPATIENT)
Dept: INFUSION CENTER | Facility: HOSPITAL | Age: 75
End: 2025-02-20

## 2025-02-20 DIAGNOSIS — I10 ESSENTIAL HYPERTENSION: Primary | Chronic | ICD-10-CM

## 2025-02-20 DIAGNOSIS — R33.8 CLOT RETENTION OF URINE: ICD-10-CM

## 2025-02-20 PROCEDURE — 50435 EXCHANGE NEPHROSTOMY CATH: CPT

## 2025-02-20 PROCEDURE — 50435 EXCHANGE NEPHROSTOMY CATH: CPT | Performed by: RADIOLOGY

## 2025-02-20 PROCEDURE — C1729 CATH, DRAINAGE: HCPCS

## 2025-02-20 PROCEDURE — C1769 GUIDE WIRE: HCPCS

## 2025-02-20 RX ORDER — LIDOCAINE WITH 8.4% SOD BICARB 0.9%(10ML)
SYRINGE (ML) INJECTION AS NEEDED
Status: COMPLETED | OUTPATIENT
Start: 2025-02-20 | End: 2025-02-20

## 2025-02-20 RX ADMIN — IOHEXOL 20 ML: 350 INJECTION, SOLUTION INTRAVENOUS at 11:40

## 2025-02-20 RX ADMIN — Medication 6 ML: at 11:35

## 2025-02-20 NOTE — DISCHARGE INSTRUCTIONS
"                                                                      TUBE CARE INSTRUCTIONS    Care after your procedure:    Resume your normal diet. Small sips of flat soda will help with nausea.    1. The properly functioning catheter should be forward flushed once (1x) daily with 10ml of normal saline using clean technique.    To flush the tube, clean both connections with alcohol swab.Twist off the drainage bag/ bulb  tubing and twist the saline syringe into the drainage tube and flush. Remove the syringe and twist the drainage bag / bulb tubing tubing back on.    2. The drainage bag/bulb may be emptied as necessary. Keep a record of the amount of fluid you drain from your tube. This should be done with clean technique as well.     3. A fresh dressing should be applied daily over the tube insertion site.     4. As the tube is secured to the skin with only a suture,try not to pull on your tube. Tub baths are not permitted. Showers are permitted if the patient's skin entry site is prevented from getting wet. Similarly, washcloth \"baths\" are acceptable.     Contact Interventional Radiology at [phone] if:    1. Leakage or large amounts of liquid around the catheter.    2. Fever of 101 degrees lasting several hours without other obvious cause (such as sore throat, flu, etc).    3. Persistent nausea or vomiting.    4. Diminished drainage, which may be associated with pressure or pain. Or when the drainage from your tube is less than 10mls for 48 hours.    5. Catheter pulled back or falls out.      The following pharmacies carry the flush syringes.       [facility]                     [facility] [address]                         [phone]  [place]                       [place]  Phone [phone]            Phone [phone]                                                                          "                              Chasidy Resendiz   [facility]             [facility]                                [phone]  [address] [place]  Phone [phone]            Phone [phone]                      [facility]                                                                                                          [phone]  [facility]  [address]  [place]ie [place]                                                                               [facility]  Phone [phone]

## 2025-02-21 NOTE — BRIEF OP NOTE (RAD/CATH)
INTERVENTIONAL RADIOLOGY PROCEDURE NOTE    Date: 2/21/2025    Procedure:   Procedure Summary       Date: 02/20/25 Room / Location: [facility] Interventional Radiology    Anesthesia Start:  Anesthesia Stop:     Procedure: IR NEPHROSTOMY TUBE CHECK/CHANGE/REPOSITION/REINSERTION/UPSIZE Diagnosis:       Clot retention of urine      (routine change)    Scheduled Providers:  Responsible Provider:     Anesthesia Type: Not recorded ASA Status: Not recorded            Preoperative diagnosis:   1. Clot retention of urine         Postoperative diagnosis: Same.    Surgeon: Jennifer Robertson MD     Assistant: None. No qualified resident was available.    Blood loss: 0    Specimens: 0     Findings: R PCN exchange    Complications: None immediate.    Anesthesia: local

## 2025-02-26 ENCOUNTER — TELEPHONE (OUTPATIENT)
Age: 75
End: 2025-02-26

## 2025-02-26 NOTE — TELEPHONE ENCOUNTER
Spoke to Shanique to inform her pts  visit was scheduled for the same day pt comes in to see . Shanique appreciative of call.

## 2025-02-26 NOTE — TELEPHONE ENCOUNTER
Patients daughter, Shanique, called and RS patients appt to 3/31/25 @ 2:30pm at [facility].with Dr. Morgan.  Patient is currently in rehab and wants to wait until he is discharged.  Patient would also like appt for  RAJ BLACK.  Please call Shanique at [phone]

## 2025-03-05 ENCOUNTER — TELEPHONE (OUTPATIENT)
Age: 75
End: 2025-03-05

## 2025-03-05 NOTE — TELEPHONE ENCOUNTER
Called and scheduled with leoncio Bay for 3/18 at 8 am.  Daughter feels Toro will be discharged on 3/17 however is unsure if labs can be drawn on 3/14 if patient is still in rehab.  Daughter is not sure MEDICARE will pay if done in a facility.

## 2025-03-05 NOTE — TELEPHONE ENCOUNTER
Patients daughter, Shanique, calling to inquire when he can resume his infusion treatments.  Patient is currently at rehab for dislocated hip and will be released soon.  She was informed Dr. Foy needs to okay him resuming his infusion treatments.  She would like a call back asap

## 2025-03-09 ENCOUNTER — HOSPITAL ENCOUNTER (OUTPATIENT)
Dept: MRI IMAGING | Facility: HOSPITAL | Age: 75
Discharge: HOME | End: 2025-03-09
Attending: INTERNAL MEDICINE
Payer: MEDICARE

## 2025-03-09 DIAGNOSIS — C67.8 MALIGNANT NEOPLASM OF OVERLAPPING SITES OF BLADDER (HCC): ICD-10-CM

## 2025-03-09 PROCEDURE — 70553 MRI BRAIN STEM W/O & W/DYE: CPT

## 2025-03-09 PROCEDURE — A9585 GADOBUTROL INJECTION: HCPCS | Performed by: INTERNAL MEDICINE

## 2025-03-09 RX ORDER — GADOBUTROL 604.72 MG/ML
8 INJECTION INTRAVENOUS
Status: COMPLETED | OUTPATIENT
Start: 2025-03-09 | End: 2025-03-09

## 2025-03-09 RX ADMIN — GADOBUTROL 8 ML: 604.72 INJECTION INTRAVENOUS at 13:06

## 2025-03-10 ENCOUNTER — PATIENT OUTREACH (OUTPATIENT)
Dept: HEMATOLOGY ONCOLOGY | Facility: CLINIC | Age: 75
End: 2025-03-10

## 2025-03-10 NOTE — PROGRESS NOTES
Continued Stay Note  [facility]     Patient Name: Sigrid Casarez  MRN: [id number]  Today's Date: 8/22/2017    Admit Date: 8/16/2017          Discharge Plan       08/22/17 1019    Case Management/Social Work Plan    Plan SW received dialysis chair letter for Tuesday/Thursday/Saturday at 12. Faxed Hep panel to [facility]. Patient to get dialysis at [facility].     Patient/Family In Agreement With Plan yes    Final Note    Final Note Following for dialysis set up.               Discharge Codes     None        Expected Discharge Date and Time     Expected Discharge Date Expected Discharge Time    Aug 23, 2017             Betty Cruz     I reached out to Toro now that he is on Tx to reassess for any barriers to care and offer any supportive services that may be needed. I left  with reason for my call including my direct phone number [phone]

## 2025-03-11 ENCOUNTER — PATIENT OUTREACH (OUTPATIENT)
Dept: HEMATOLOGY ONCOLOGY | Facility: CLINIC | Age: 75
End: 2025-03-11

## 2025-03-11 ENCOUNTER — TELEPHONE (OUTPATIENT)
Dept: HEMATOLOGY ONCOLOGY | Facility: CLINIC | Age: 75
End: 2025-03-11

## 2025-03-11 ENCOUNTER — TELEPHONE (OUTPATIENT)
Age: 75
End: 2025-03-11

## 2025-03-11 NOTE — PROGRESS NOTES
Hi, this is a message for Krysta. My name is Shanique Rodriguez. I'm calling for my father Toro Rodriguez. His birthday is [id number]. I think he was given you called earlier this morning. I'm sorry I missed your call. Yeah, lots going on and I need to reschedule a bunch of appointments and stuff for my dad. Could be he isn't know he's still in rehab for a dislocated hip but he's going to be discharged likely the week of March 24th. So I'm wondering if I can schedule a an appointment with Doctor Foy the week prior to that and then reschedule like his infusions to start that week. So if yes, you can give me a call back at [phone]. That would be great. Alright, thanks a lot. Bye.                Outreach made back out to Shanique. Shanique expressed that her dad is currently in a rehab center and should be D/C around 2/24. She expressed that she would like to cancel any tx's he is currently scheduled for before 2/24 and his appt with Dr. Foy on 3/18. She expressed she is also looking to get his tx's moved to B because he will be then staying in a SNF in [place]. She would like to get his appt R/S with Dr. Foy and would like someone to contact her in regards to this. She would also like to speak to someone to see if it is okay to cancel the tx's and move them to B. I let her know I will route a message to have this addressed. She was very appreciative of the call.

## 2025-03-11 NOTE — TELEPHONE ENCOUNTER
Called and spoke with patient daughter Shanique about rescheduling the patients appt. Patient is rescheduled to 3/20/2025 @ 3:40pm. Patient leoncio Bay has questions regarding infusion and labs. I informed her that Angelique the nurse will be speaking to infusion to push back the appointments and then infusion will call her directly to make the infusion and lab appts.

## 2025-03-13 DIAGNOSIS — C67.8 MALIGNANT NEOPLASM OF OVERLAPPING SITES OF BLADDER (HCC): Primary | ICD-10-CM

## 2025-03-14 ENCOUNTER — OFFICE VISIT (OUTPATIENT)
Dept: OBGYN CLINIC | Facility: CLINIC | Age: 75
End: 2025-03-14
Payer: MEDICARE

## 2025-03-14 VITALS — WEIGHT: 182 LBS | BODY MASS INDEX: 28.56 KG/M2 | HEIGHT: 67 IN

## 2025-03-14 DIAGNOSIS — S73.014A CLOSED POSTERIOR DISLOCATION OF RIGHT HIP, INITIAL ENCOUNTER (HCC): ICD-10-CM

## 2025-03-14 PROCEDURE — 99203 OFFICE O/P NEW LOW 30 MIN: CPT | Performed by: STUDENT IN AN ORGANIZED HEALTH CARE EDUCATION/TRAINING PROGRAM

## 2025-03-14 NOTE — PROGRESS NOTES
Date: 25  Toro Rodriguez   MRN# [id number]  : 1950    Assessment & Plan  Closed posterior dislocation of right hip, initial encounter ([facility])    Orders:    Ambulatory Referral to Orthopedic Surgery  - Continue to follow posterior hip precautions including avoiding crossing legs, bending at the waist, etc.  - May discontinue use of knee immobilizer  - Continue assisted WBAT  - Continue with physical therapy for the hip and knee  - Return to the office is subsequent dislocations occur  - Follow-up as needed      Chief Complaint: Right hip posterior dislocation on 2025    Subjective:     Hip Pain  Toro is a 74 y.o. male who presents for initial evaluation as a referral from the emergency department status post posterior hip dislocation event on 2025. He recalls waking towards his bed with a walker at the assisted living home when he attempted to sit and missed the bed. He landed on his buttocks with the right leg bent underneath him. He noted immediate pain in the right hip with obvious deformity. He was seen in the ED and underwent closed reduction under sedation on 2025. At today's visit, he notes he is pain free in regards to the right hip. He is able to walk with assistance at his living facility pain free. He is compliant with the straight leg knee immobilizer, and would like to discuss removing the brace today. He complains of ankle numbness and tingling while laying down.     External Records Reviewed: Clinic notes and radiographic reports    Allergy:  Allergies   Allergen Reactions    Zosyn [Piperacillin Sod-Tazobactam So] Rash     Medications:  all current active meds have been reviewed  Past Medical History:  Past Medical History:   Diagnosis Date    Alcohol intoxication ([facility]) 10/15/2020    BPH (benign prostatic hyperplasia)     Chronic HFrEF (heart failure with reduced ejection fraction) ([facility]) 2023    Coronary artery calcification seen on CT scan 11/10/2023    Coronary  artery disease 12/14/2023    Deep vein thrombosis (DVT) of left lower extremity (HCC) 11/2023    Diabetes mellitus (HCC) 11/2023    Fall from slip, trip, or stumble 10/15/2020    History of phimosis of penis     History of urinary calculi     Hypertension 1988    Skin cancer     Tobacco abuse     quit around 2000     Past Surgical History:  Past Surgical History:   Procedure Laterality Date    BLADDER STONE REMOVAL  2014    CARDIAC CATHETERIZATION N/A 12/14/2023    Procedure: Cardiac catheterization;  Surgeon: Dave Royal MD;  Location: [facility];  Service: Cardiology    IR NEPHROSTOMY TUBE CHECK/CHANGE/REPOSITION/REINSERTION/UPSIZE  2/20/2025    IR NEPHROSTOMY TUBE PLACEMENT  12/24/2024    IR PORT PLACEMENT  1/10/2025    IR SUPRAPUBIC CATHETER CHECK/CHANGE/REINSERTION/UPSIZE  11/07/2024    IR SUPRAPUBIC CATHETER CHECK/CHANGE/REINSERTION/UPSIZE  12/3/2024    IR SUPRAPUBIC CATHETER PLACEMENT  09/27/2024    ORIF ANKLE FRACTURE Left     LA CYSTO W/IRRIG & EVAC MULTPLE OBSTRUCTING CLOTS N/A 12/15/2024    Procedure: CYSTOSCOPY EVACUATION OF CLOTS;  Surgeon: Rick Espino MD;  Location: [facility];  Service: Urology    SKIN LESION EXCISION N/A 03/18/2024    Procedure: WIDE LOCAL EXCISION OF BACK MELANOMA;  Surgeon: Lillie La MD;  Location: [facility];  Service: Surgical Oncology    TOTAL HIP ARTHROPLASTY Right      Family History:  Family History   Problem Relation Age of Onset    Breast cancer Mother     Heart attack Father 61    Other Sister         s/p hysterectomy    Cerebral palsy Brother     Skin cancer Other         family history    No Known Problems Son     No Known Problems Daughter     Sudden death Neg Hx      Social History:  Social History     Substance and Sexual Activity   Alcohol Use Not Currently     Social History     Substance and Sexual Activity   Drug Use Never     Social History     Tobacco Use   Smoking Status Former    Types: Cigarettes    Start date: 1990    Quit date: 1962  "   Years since quittin.2   Smokeless Tobacco Never   Tobacco Comments    Quit over 30 years ago (Updated 2023).            ROS:   Review of Systems   Constitutional:  Positive for activity change. Negative for chills and fever.   HENT:  Negative for ear pain and sore throat.    Eyes:  Negative for pain and visual disturbance.   Respiratory:  Negative for cough and shortness of breath.    Cardiovascular:  Negative for chest pain and palpitations.   Gastrointestinal:  Negative for abdominal pain and vomiting.   Genitourinary:  Negative for dysuria and hematuria.   Musculoskeletal:  Positive for gait problem. Negative for arthralgias and back pain.   Skin:  Negative for color change and rash.   Neurological:  Negative for seizures and syncope.   All other systems reviewed and are negative.       Objective:   BP Readings from Last 1 Encounters:   25 139/52      Wt Readings from Last 1 Encounters:   25 82.6 kg (182 lb)      Pulse Readings from Last 1 Encounters:   25 77      BMI: Estimated body mass index is 28.51 kg/m² as calculated from the following:    Height as of this encounter: 5' 7\" (1.702 m).    Weight as of this encounter: 82.6 kg (182 lb).      Physical Exam  Constitutional:       Appearance: Normal appearance.   HENT:      Head: Normocephalic.      Nose: Nose normal.   Eyes:      Conjunctiva/sclera: Conjunctivae normal.   Cardiovascular:      Rate and Rhythm: Normal rate.      Pulses: Normal pulses.   Pulmonary:      Breath sounds: Normal breath sounds.   Musculoskeletal:      Cervical back: Normal range of motion.      Comments: As noted   Skin:     General: Skin is warm and dry.   Neurological:      Mental Status: He is alert and oriented to person, place, and time.   Psychiatric:         Mood and Affect: Mood normal.         Behavior: Behavior normal.        Gait and Station:   Presented in wheelchair    Right Hip     Inspection: normal color, temperature, turgor and moisture    " Range of Motion: full without pain    Special tests deferred    Strength testing deferred     Vascular: Toes WWP with BCR    SILT DP/SP/Pamela/Saph/Tib    Images:    I personally reviewed relevant images in the PACS system and my interpretation is as follows:  X-rays of the right Hip reveal presence of total hip replacement with stable implant alignment and no signs of loosening. Successful reduction status post posterior hip dislocation. No acute fracture.       Scribe Attestation      I,:  Jacob Mcgregor am acting as a scribe while in the presence of the attending physician.:       I,:  Rickie Hughes MD personally performed the services described in this documentation    as scribed in my presence.:             Rickie Hughes MD  Adult Reconstruction Specialist   [facility]

## 2025-03-14 NOTE — ASSESSMENT & PLAN NOTE
Orders:    Ambulatory Referral to Orthopedic Surgery  - Continue to follow posterior hip precautions including avoiding crossing legs, bending at the waist, etc.  - May discontinue use of knee immobilizer  - Continue assisted WBAT  - Continue with physical therapy for the hip and knee  - Return to the office is subsequent dislocations occur  - Follow-up as needed

## 2025-03-14 NOTE — PATIENT INSTRUCTIONS
- Continue to follow posterior hip precautions including avoiding crossing legs, bending at the waist, etc.  - May discontinue use of knee immobilizer  - Continue assisted WBAT  - Continue with physical therapy for the hip and knee  - Return to the office is subsequent dislocations occur  - Follow-up as needed

## 2025-03-17 ENCOUNTER — PROCEDURE VISIT (OUTPATIENT)
Dept: UROLOGY | Facility: CLINIC | Age: 75
End: 2025-03-17
Payer: MEDICARE

## 2025-03-17 ENCOUNTER — APPOINTMENT (OUTPATIENT)
Dept: LAB | Facility: AMBULARY SURGERY CENTER | Age: 75
End: 2025-03-17
Payer: MEDICARE

## 2025-03-17 VITALS — HEART RATE: 77 BPM | DIASTOLIC BLOOD PRESSURE: 60 MMHG | SYSTOLIC BLOOD PRESSURE: 120 MMHG | OXYGEN SATURATION: 95 %

## 2025-03-17 DIAGNOSIS — R33.9 URINARY RETENTION: Primary | ICD-10-CM

## 2025-03-17 DIAGNOSIS — C67.8 MALIGNANT NEOPLASM OF OVERLAPPING SITES OF BLADDER (HCC): ICD-10-CM

## 2025-03-17 PROBLEM — N39.0 UTI (URINARY TRACT INFECTION): Status: RESOLVED | Noted: 2024-09-23 | Resolved: 2025-03-17

## 2025-03-17 LAB
ALBUMIN SERPL BCG-MCNC: 3.4 G/DL (ref 3.5–5)
ALP SERPL-CCNC: 86 U/L (ref 34–104)
ALT SERPL W P-5'-P-CCNC: 15 U/L (ref 7–52)
ANION GAP SERPL CALCULATED.3IONS-SCNC: 10 MMOL/L (ref 4–13)
AST SERPL W P-5'-P-CCNC: 18 U/L (ref 13–39)
BASOPHILS # BLD AUTO: 0.08 THOUSANDS/ÂΜL (ref 0–0.1)
BASOPHILS NFR BLD AUTO: 1 % (ref 0–1)
BILIRUB SERPL-MCNC: 0.3 MG/DL (ref 0.2–1)
BUN SERPL-MCNC: 29 MG/DL (ref 5–25)
CALCIUM ALBUM COR SERPL-MCNC: 9.3 MG/DL (ref 8.3–10.1)
CALCIUM SERPL-MCNC: 8.8 MG/DL (ref 8.4–10.2)
CHLORIDE SERPL-SCNC: 101 MMOL/L (ref 96–108)
CO2 SERPL-SCNC: 26 MMOL/L (ref 21–32)
CREAT SERPL-MCNC: 1.23 MG/DL (ref 0.6–1.3)
EOSINOPHIL # BLD AUTO: 0.57 THOUSAND/ÂΜL (ref 0–0.61)
EOSINOPHIL NFR BLD AUTO: 6 % (ref 0–6)
ERYTHROCYTE [DISTWIDTH] IN BLOOD BY AUTOMATED COUNT: 18.3 % (ref 11.6–15.1)
GFR SERPL CREATININE-BSD FRML MDRD: 56 ML/MIN/1.73SQ M
GLUCOSE SERPL-MCNC: 137 MG/DL (ref 65–140)
HCT VFR BLD AUTO: 35.5 % (ref 36.5–49.3)
HGB BLD-MCNC: 11 G/DL (ref 12–17)
IMM GRANULOCYTES # BLD AUTO: 0.06 THOUSAND/UL (ref 0–0.2)
IMM GRANULOCYTES NFR BLD AUTO: 1 % (ref 0–2)
LYMPHOCYTES # BLD AUTO: 1.23 THOUSANDS/ÂΜL (ref 0.6–4.47)
LYMPHOCYTES NFR BLD AUTO: 13 % (ref 14–44)
MCH RBC QN AUTO: 30.9 PG (ref 26.8–34.3)
MCHC RBC AUTO-ENTMCNC: 31 G/DL (ref 31.4–37.4)
MCV RBC AUTO: 100 FL (ref 82–98)
MONOCYTES # BLD AUTO: 0.66 THOUSAND/ÂΜL (ref 0.17–1.22)
MONOCYTES NFR BLD AUTO: 7 % (ref 4–12)
NEUTROPHILS # BLD AUTO: 7.25 THOUSANDS/ÂΜL (ref 1.85–7.62)
NEUTS SEG NFR BLD AUTO: 72 % (ref 43–75)
NRBC BLD AUTO-RTO: 0 /100 WBCS
PLATELET # BLD AUTO: 313 THOUSANDS/UL (ref 149–390)
PMV BLD AUTO: 10.4 FL (ref 8.9–12.7)
POTASSIUM SERPL-SCNC: 4.6 MMOL/L (ref 3.5–5.3)
PROT SERPL-MCNC: 6.7 G/DL (ref 6.4–8.4)
RBC # BLD AUTO: 3.56 MILLION/UL (ref 3.88–5.62)
SODIUM SERPL-SCNC: 137 MMOL/L (ref 135–147)
WBC # BLD AUTO: 9.85 THOUSAND/UL (ref 4.31–10.16)

## 2025-03-17 PROCEDURE — 36415 COLL VENOUS BLD VENIPUNCTURE: CPT

## 2025-03-17 PROCEDURE — 85025 COMPLETE CBC W/AUTO DIFF WBC: CPT

## 2025-03-17 PROCEDURE — 80053 COMPREHEN METABOLIC PANEL: CPT

## 2025-03-17 PROCEDURE — 51710 CHANGE OF BLADDER TUBE: CPT | Performed by: PHYSICIAN ASSISTANT

## 2025-03-17 NOTE — PROGRESS NOTES
Universal Protocol:  Consent: Verbal consent obtained.  Consent given by: patient  Patient understanding: patient states understanding of the procedure being performed  Patient identity confirmed: verbally with patient    Bladder catheterization    Date/Time: 3/17/2025 2:00 PM    Performed by: Se Funez PA-C  Authorized by: Se Funez PA-C    Consent:     Consent given by:  Patient  Universal protocol:     Procedure explained and questions answered to patient or proxy's satisfaction: yes      Patient identity confirmed:  Verbally with patient  Procedure details:     Catheter insertion:  Indwelling    Approach comment:  Suprapubic    Catheter size:  16 Fr    Number of attempts:  1    Successful placement: yes      Provider performed due to:  Complicated insertion  Post-procedure details:     Patient tolerance of procedure:  Tolerated well, no immediate complications  Comments:      74-year-old man with history of aggressive bladder cancer.  He has a right nephrostomy tube and a suprapubic tube which requires changes over a wire.  I prepped the suprapubic tube site and deflated the water out of the balloon of the catheter.  A 5 Dominican Sensor wire was inserted through the catheter and into the bladder.  The existing catheter was removed and a new 16 Dominican Pauma tip catheter was placed over the wire and into the bladder.  The balloon was inflated the wire was removed.  Patient tolerated the procedure well and the catheter was connected to a drainage bag.  He has an appointment scheduled April 11 for a cystoscopy with Dr. Espino.  I reviewed the plan with the patient and his son.  He is also seeing medical oncology this week for bladder cancer treatment since he is not a surgical candidate.

## 2025-03-18 NOTE — ASSESSMENT & PLAN NOTE
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 3/9/25 -  MRI brain - no metastasis  Currently on first-line treatment with carboplatin and etoposide.    S/p first cycle on January 14, 2025 and cycle 2 on 2/4/2025.  Recent hospitalization reviewed.  MRI brain - neg for mets  Proceed with cycle 3 as scheduled with further dose reduction - etoposide at 70% and carboplatin with AUC of 3.  Plan to repeat PET-CT prior to cycle 4.  Of note, lasix on hold per nephrology. Pt to let us know know if leg swelling worsens    Orders:    NM PET CT skull base to mid thigh; Future

## 2025-03-18 NOTE — PROGRESS NOTES
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 3/9/25 -  MRI brain - no metastasis  Currently on first-line treatment with carboplatin and etoposide.    S/p first cycle on 2025 and cycle 2 on 2025.  Recent hospitalization reviewed.  MRI brain - neg for mets  Proceed with cycle 3 as scheduled with further dose reduction - etoposide at 70% and carboplatin with AUC of 3.  Plan to repeat PET-CT prior to cycle 4.  Of note, lasix on hold per nephrology. Pt to let us know know if leg swelling worsens    Orders:    NM PET CT skull base to mid thigh; Future    Iron deficiency anemia due to chronic blood loss  Iron studies 2/15 - ferritin is 8  Hb in the 7 range previously  Most recent labs from 3/17 - hb improved to 11 ?? Error.   Does not appear he got any blood transfusions or iv iron  Recheck Hb. Discussed IV iron with treatments and pt is agreeable to this         Chemotherapy induced diarrhea  Advised to take imodium as needed  To report back if diarrhea is not controlled with this  Encouraged good PO hydration           History of Present Illness   Chief Complaint   Patient presents with    Follow-up     Toro Rodriguez is a 74 y.o. male with multiple comorbidities including hypertension, coronary artery disease, CHF, history of DVT, cardiomyopathy,  BPH, stage III CKD, history of melanoma, who had been referred for new diagnosis of bladder cancer. Patient is a former smoker, quit at the age of 33.    He had a CT abdomen and pelvis in April and 2024 due to hematuria and pelvic pain, which were unremarkable.    CT abdomen from  September 5 showed nonspecific diffuse bladder wall thickening and subsequently right hydroureteronephrosis. He had a suprapubic catheter placed on September 27, 2024. Repeat CT from October 31 showed a possible soft tissue mass on the right side of the bladder.    He had a CT abdomen and pelvis with contrast from November 10, 2024 which revealed a 4 cm soft tissue density within the right bladder wall.  He has had multiple CT scans since.     Patient presented with gross hematuria and lower abdominal pain on December 15. He required PRBC support.  Eliquis, which he had been for prior DVT, but stopped 2 months prior to admission.  During this admission, he required a Colon placement and CBI.  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    Renal ultrasound from December 17 showed moderate right hydronephrosis.  CT renal from December 20 showed persistent right-sided moderate hydronephrosis and hydroureter up to the right ureterovesical junction. His creatinine worsened during the admission, and a nephrostomy tube was placed on December 24. He also underwent a suprapubic catheter placement.    CT chest without contrast from December 23, 2024 showed solid 0.8 cm and a 1 cm nodule in the right lower lobe which were new from last year.  Another solid 0.3 cm right upper lobe nodule.    ECHO from February 2024 showed an ejection fraction of 35 to 40%.  Patient is on beta-blocker and Lasix.  Lisinopril has been on hold due to acute kidney injury.  He has had multiple UTIs so far.    PET-CT 1/9/25 -suggestive of more widespread ds including multiple bones, liver, lungs, pelvic nodes and possible brain metatasis    PORT placed 1/10/25. He was started on Carboplatin and etosposide on 1/14/2025 for small cell bladder cancer.    Received cycle 2 on 2/4/2025.   Admitted to the hospital from 2/14 - 2/17  after a fall after attempting to sit down and missed chair.  Patient had CHUY and hip dislocation on admission. Hip was reduced in ER. He was subsequently d/c to rehab.     Plan is to move to a new assisted living facility next week.   He is here today for a follow up visit with his son-in-law          Oncology History   Cancer Staging   Melanoma of back (HCC)  Staging form: Melanoma of the Skin, AJCC 8th Edition  - Pathologic: Stage Unknown (pT1a, pNX, cM0) - Signed by Lillie La MD on 4/3/2024  Oncology History Overview Note   Small cell bladder cancer with metastasis to bones, liver, lungs, nodes  Carboplatin/etoposide 1/14/25- current     Melanoma of back (HCC)   1/31/2024 Biopsy    B. Skin, left lower back, shave biopsy:     MELANOMA (thickness: at least 0.7 mm) (see note).     Note: SOX10, MART, and PRAME immunostains were reviewed; significant melanocytic architectural disorder is seen, and the lesional cells are positive for PRAME. Please see synoptic report for additional details.     Synoptic report for melanoma of the skin  Thickness: at least 0.7 mm (invasive melanoma extends to deep specimen margin)  Ulceration: not seen  Anatomic (Lukas) level: at least IV  Type: superficial spreading melanoma  Mitoses: 2/mm2  Microsatellites: cannot be determined  Lymphovascular invasion: not seen  Neurotropism: not seen  Tumor regression: present  Tumor-infiltrating lymphocytes (TIL): present, non-brisk  Margin assessment: invasive melanoma extends to deep specimen margin; melanoma in situ extends to peripheral specimen margin  Pathologic stage: at least pT1a  Associated nevus: dermal melanocytic nevus     2/20/2024 Initial Diagnosis    Melanoma of back (HCC)     3/18/2024 Surgery    A. Skin, back, excision:     -Residual focal melanoma (thickness: 0.7 mm); not seen at examined inked specimen margins.     -Prior procedure site changes present.     4/3/2024 -  Cancer Staged    Staging form: Melanoma of the Skin,  AJCC 8th Edition  - Pathologic: Stage Unknown (pT1a, pNX, cM0) - Signed by Lillie La MD on 4/3/2024       Malignant neoplasm of overlapping sites of bladder (HCC)   12/15/2024 Surgery    Final Diagnosis   A. 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  - Tumor invades muscularis propria.      Comment: No evidence of conventional urothelial carcinoma is present.        12/31/2024 Initial Diagnosis    Malignant neoplasm of overlapping sites of bladder (HCC)     1/14/2025 -  Chemotherapy    alteplase (CATHFLO), 2 mg, Intracatheter, Every 1 Minute as needed, 2 of 6 cycles  pegfilgrastim (NEULASTA ONPRO), 6 mg, Subcutaneous, Once, 2 of 6 cycles  Administration: 6 mg (1/16/2025), 6 mg (2/6/2025)  fosaprepitant (EMEND) IVPB, 150 mg, Intravenous, Once, 2 of 6 cycles  Administration: 150 mg (1/14/2025), 150 mg (2/4/2025)  etoposide (TOPOSAR), 80 mg/m2 = 157.6 mg (80 % of original dose 100 mg/m2), Intravenous, Once, 2 of 6 cycles  Dose modification: 80 mg/m2 (80 % of original dose 100 mg/m2, Cycle 1, Reason: Dose Not Tolerated), 70 mg/m2 (70 % of original dose 100 mg/m2, Cycle 3, Reason: Dose Not Tolerated)  Administration: 157.6 mg (1/14/2025), 157.6 mg (1/15/2025), 157.6 mg (2/4/2025), 157.6 mg (1/16/2025), 157.6 mg (2/5/2025), 157.6 mg (2/6/2025)  CARBOplatin (PARAPLATIN) IVPB (GOG AUC DOSING), 200.4 mg, Intravenous, Once, 2 of 6 cycles  Administration: 200.4 mg (1/14/2025), 204.6 mg (2/4/2025)        Review of Systems   Constitutional:  Positive for fatigue. Negative for fever.   Gastrointestinal:  Positive for diarrhea and nausea. Negative for vomiting.   All other systems reviewed and are negative.    Current Outpatient Medications on File Prior to Visit   Medication Sig Dispense Refill    acetaminophen (TYLENOL) 325 mg tablet Take 325 mg by mouth every 6 (six) hours as needed for mild pain      amiodarone 200 mg tablet Take 1  tablet (200 mg total) by mouth daily 90 tablet 2    amLODIPine (NORVASC) 5 mg tablet Take 1 tablet (5 mg total) by mouth daily 90 tablet 1    aspirin 81 mg chewable tablet Chew 1 tablet (81 mg total) daily Do not start before 2024.      atorvastatin (LIPITOR) 40 mg tablet Take 1 tablet (40 mg total) by mouth daily with dinner  0    lidocaine-prilocaine (EMLA) cream Apply topically as needed for mild pain 30 g 1    metoprolol succinate (TOPROL-XL) 25 mg 24 hr tablet Take 1 tablet (25 mg total) by mouth every 12 (twelve) hours 60 tablet 0    nitroglycerin (NITROSTAT) 0.4 mg SL tablet Place 1 tablet (0.4 mg total) under the tongue every 5 (five) minutes as needed for chest pain      ondansetron (ZOFRAN) 8 mg tablet Take 1 tablet (8 mg total) by mouth every 8 (eight) hours as needed for nausea or vomiting 60 tablet 0    sertraline (ZOLOFT) 25 mg tablet Take 1 tablet (25 mg total) by mouth daily Do not start before 2024. 30 tablet 0    sodium chloride, PF, 0.9 % 10 mL by Intracatheter route daily Intracatheter flushing daily. May substitute prefilled syringe with normal saline 10 mL vials, 10 mL syringes, and 18 g blunt needles 300 mL 0    [Paused] furosemide (LASIX) 20 mg tablet Take 1 tablet (20 mg total) by mouth daily (Patient not taking: Reported on 3/14/2025) 30 tablet 5    [Paused] lisinopril (ZESTRIL) 5 mg tablet Take 1 tablet (5 mg total) by mouth daily Do not start before 2024. (Patient not taking: Reported on 3/14/2025)      prochlorperazine (COMPAZINE) 10 mg tablet Take 1 tablet (10 mg total) by mouth every 6 (six) hours as needed for nausea or vomiting (Patient not taking: Reported on 3/14/2025) 30 tablet 0     No current facility-administered medications on file prior to visit.      Social History     Tobacco Use    Smoking status: Former     Types: Cigarettes     Start date:      Quit date: 1962     Years since quittin.2    Smokeless tobacco: Never    Tobacco  "comments:     Quit over 30 years ago (Updated 12/2023).    Vaping Use    Vaping status: Never Used   Substance and Sexual Activity    Alcohol use: Not Currently    Drug use: Never    Sexual activity: Not on file         Objective   /60 (BP Location: Left arm, Patient Position: Sitting, Cuff Size: Adult)   Pulse 77   Temp 98 °F (36.7 °C) (Temporal)   Resp 16   Ht 5' 7\" (1.702 m)   SpO2 97%   BMI 28.51 kg/m²     ECOG   2  Physical Exam  Vitals reviewed.   Constitutional:       Appearance: He is obese.   HENT:      Mouth/Throat:      Mouth: Mucous membranes are moist.      Pharynx: Oropharynx is clear.   Cardiovascular:      Rate and Rhythm: Normal rate and regular rhythm.      Pulses: Normal pulses.      Heart sounds: No murmur heard.  Pulmonary:      Effort: Pulmonary effort is normal. No respiratory distress.      Breath sounds: Normal breath sounds. No wheezing.   Abdominal:      Palpations: Abdomen is soft. There is no mass.      Comments: Suprapubic catheter in place, along with nephrostomy tube   Musculoskeletal:         General: No swelling.      Cervical back: Neck supple.   Neurological:      General: No focal deficit present.      Mental Status: He is alert. Mental status is at baseline.         Labs: I have reviewed the following labs:  Lab Results   Component Value Date/Time    WBC 9.85 03/17/2025 02:37 PM    RBC 3.56 (L) 03/17/2025 02:37 PM    Hemoglobin 11.0 (L) 03/17/2025 02:37 PM    Hematocrit 35.5 (L) 03/17/2025 02:37 PM     (H) 03/17/2025 02:37 PM    MCH 30.9 03/17/2025 02:37 PM    RDW 18.3 (H) 03/17/2025 02:37 PM    Platelets 313 03/17/2025 02:37 PM    Segmented % 72 03/17/2025 02:37 PM    Lymphocytes % 13 (L) 03/17/2025 02:37 PM    Monocytes % 7 03/17/2025 02:37 PM    Eosinophils Relative 6 03/17/2025 02:37 PM    Basophils Relative 1 03/17/2025 02:37 PM    Immature Grans % 1 03/17/2025 02:37 PM    Absolute Neutrophils 7.25 03/17/2025 02:37 PM     Lab Results   Component Value " Date/Time    Potassium 4.6 03/17/2025 02:37 PM    Chloride 101 03/17/2025 02:37 PM    CO2 26 03/17/2025 02:37 PM    BUN 29 (H) 03/17/2025 02:37 PM    Creatinine 1.23 03/17/2025 02:37 PM    Glucose, Fasting 99 02/14/2025 04:48 AM    Calcium 8.8 03/17/2025 02:37 PM    Corrected Calcium 9.3 03/17/2025 02:37 PM    AST 18 03/17/2025 02:37 PM    ALT 15 03/17/2025 02:37 PM    Alkaline Phosphatase 86 03/17/2025 02:37 PM    Total Protein 6.7 03/17/2025 02:37 PM    Albumin 3.4 (L) 03/17/2025 02:37 PM    Total Bilirubin 0.30 03/17/2025 02:37 PM    eGFR 56 03/17/2025 02:37 PM         Radiology Results Review: I personally reviewed the following image studies in PACS and associated radiology reports: CT chest and CT abdomen/pelvis. My interpretation of the radiology images/reports is: as noted above..    Administrative Statements   I have spent a total time of 30 minutes in caring for this patient on the day of the visit/encounter including Diagnostic results, Prognosis, Patient and family education, Counseling / Coordination of care, Documenting in the medical record, Reviewing / ordering tests, medicine, procedures  , and Obtaining or reviewing history  . Topics discussed with the patient / family include symptom assessment and management, goals of care, supportive listening, and anticipatory guidance.

## 2025-03-19 ENCOUNTER — PATIENT OUTREACH (OUTPATIENT)
Dept: HEMATOLOGY ONCOLOGY | Facility: CLINIC | Age: 75
End: 2025-03-19

## 2025-03-19 NOTE — PROGRESS NOTES
I reached out and spoke with Toro's daughter Shanique to follow up and to review for any new changes in barriers to care and offer supportive services as needed.     Barriers noted previously and outcome of interventions;  N/a    Reviewed for any new barriers as noted below.    Are you having any side effects from your treatment?No    Are you eating and drinking normally? yes    Have you been experiencing any uncontrolled pain related to your cancer diagnosis? No    If not already established, have needs changed for a palliative care referral? established    Do you have a good support system? Yes     Are you interested in any support groups? Declined at this time     How are you doing with transportation to your appointments? Good     Do you have any questions or concerns regarding your treatment plan? No    Any new financial concerns for your household or medical bills? No    Do you know when your upcoming appointments are? yes  Future Appointments   Date Time Provider Department Center   3/20/2025  3:40 PM Juan Carlos Foy MD HEM [facility]C [facility]   3/25/2025 11:30 AM [facility]   3/26/2025  1:30 PM [facility]   3/27/2025  1:00 PM [facility]   3/31/2025  2:30 PM MD RUFINA [facility]   3/31/2025  3:00 PM Vanessa ALMARAZ [facility]   4/11/2025 11:30 AM Rick Espino MD [facility]   4/15/2025 11:00 AM [facility]ED [facility]   4/16/2025 12:00 PM [facility]   4/17/2025  1:00 PM [facility] CHAIR [facility]   5/6/2025 11:30 AM [facility]   5/7/2025 11:00 AM [facility] CHAIR [facility]   5/7/2025  3:30 PM Joselyn Reyes Bahamonde, MD [facility]   5/8/2025 12:00 PM [facility] INF CHAIR 4 [facility]   5/20/2025 10:00 AM [facility] IR 1 [facility]          Based on individual needs I will follow  up with them in 4-6 weeks. I have provided my direct contact information and welcome them to contact me if their needs as discussed above change. They were appreciative for the call.

## 2025-03-20 ENCOUNTER — APPOINTMENT (OUTPATIENT)
Dept: LAB | Facility: HOSPITAL | Age: 75
End: 2025-03-20
Payer: MEDICARE

## 2025-03-20 ENCOUNTER — OFFICE VISIT (OUTPATIENT)
Age: 75
End: 2025-03-20
Payer: MEDICARE

## 2025-03-20 VITALS
OXYGEN SATURATION: 97 % | RESPIRATION RATE: 16 BRPM | TEMPERATURE: 98 F | DIASTOLIC BLOOD PRESSURE: 60 MMHG | SYSTOLIC BLOOD PRESSURE: 126 MMHG | HEART RATE: 77 BPM | HEIGHT: 67 IN | BODY MASS INDEX: 28.51 KG/M2

## 2025-03-20 DIAGNOSIS — D70.1 CHEMOTHERAPY INDUCED NEUTROPENIA (HCC): ICD-10-CM

## 2025-03-20 DIAGNOSIS — D50.0 IRON DEFICIENCY ANEMIA DUE TO CHRONIC BLOOD LOSS: ICD-10-CM

## 2025-03-20 DIAGNOSIS — C67.8 MALIGNANT NEOPLASM OF OVERLAPPING SITES OF BLADDER (HCC): Primary | ICD-10-CM

## 2025-03-20 DIAGNOSIS — K52.1 CHEMOTHERAPY INDUCED DIARRHEA: ICD-10-CM

## 2025-03-20 DIAGNOSIS — C67.8 MALIGNANT NEOPLASM OF OVERLAPPING SITES OF BLADDER (HCC): ICD-10-CM

## 2025-03-20 DIAGNOSIS — T45.1X5A CHEMOTHERAPY INDUCED DIARRHEA: ICD-10-CM

## 2025-03-20 DIAGNOSIS — T45.1X5A CHEMOTHERAPY INDUCED NEUTROPENIA (HCC): ICD-10-CM

## 2025-03-20 LAB
ALBUMIN SERPL BCG-MCNC: 3.4 G/DL (ref 3.5–5)
ALP SERPL-CCNC: 90 U/L (ref 34–104)
ALT SERPL W P-5'-P-CCNC: 12 U/L (ref 7–52)
ANION GAP SERPL CALCULATED.3IONS-SCNC: 5 MMOL/L (ref 4–13)
AST SERPL W P-5'-P-CCNC: 15 U/L (ref 13–39)
BASOPHILS # BLD AUTO: 0.08 THOUSANDS/ÂΜL (ref 0–0.1)
BASOPHILS NFR BLD AUTO: 1 % (ref 0–1)
BILIRUB SERPL-MCNC: 0.29 MG/DL (ref 0.2–1)
BUN SERPL-MCNC: 32 MG/DL (ref 5–25)
CALCIUM ALBUM COR SERPL-MCNC: 9.3 MG/DL (ref 8.3–10.1)
CALCIUM SERPL-MCNC: 8.8 MG/DL (ref 8.4–10.2)
CHLORIDE SERPL-SCNC: 103 MMOL/L (ref 96–108)
CO2 SERPL-SCNC: 29 MMOL/L (ref 21–32)
CREAT SERPL-MCNC: 1.18 MG/DL (ref 0.6–1.3)
EOSINOPHIL # BLD AUTO: 0.56 THOUSAND/ÂΜL (ref 0–0.61)
EOSINOPHIL NFR BLD AUTO: 7 % (ref 0–6)
ERYTHROCYTE [DISTWIDTH] IN BLOOD BY AUTOMATED COUNT: 17.6 % (ref 11.6–15.1)
GFR SERPL CREATININE-BSD FRML MDRD: 59 ML/MIN/1.73SQ M
GLUCOSE SERPL-MCNC: 95 MG/DL (ref 65–140)
HCT VFR BLD AUTO: 34.7 % (ref 36.5–49.3)
HGB BLD-MCNC: 10.9 G/DL (ref 12–17)
IMM GRANULOCYTES # BLD AUTO: 0.03 THOUSAND/UL (ref 0–0.2)
IMM GRANULOCYTES NFR BLD AUTO: 0 % (ref 0–2)
LYMPHOCYTES # BLD AUTO: 1.27 THOUSANDS/ÂΜL (ref 0.6–4.47)
LYMPHOCYTES NFR BLD AUTO: 15 % (ref 14–44)
MCH RBC QN AUTO: 31.1 PG (ref 26.8–34.3)
MCHC RBC AUTO-ENTMCNC: 31.4 G/DL (ref 31.4–37.4)
MCV RBC AUTO: 99 FL (ref 82–98)
MONOCYTES # BLD AUTO: 0.66 THOUSAND/ÂΜL (ref 0.17–1.22)
MONOCYTES NFR BLD AUTO: 8 % (ref 4–12)
NEUTROPHILS # BLD AUTO: 5.84 THOUSANDS/ÂΜL (ref 1.85–7.62)
NEUTS SEG NFR BLD AUTO: 69 % (ref 43–75)
NRBC BLD AUTO-RTO: 0 /100 WBCS
PLATELET # BLD AUTO: 354 THOUSANDS/UL (ref 149–390)
PMV BLD AUTO: 8.9 FL (ref 8.9–12.7)
POTASSIUM SERPL-SCNC: 4.6 MMOL/L (ref 3.5–5.3)
PROT SERPL-MCNC: 6.9 G/DL (ref 6.4–8.4)
RBC # BLD AUTO: 3.5 MILLION/UL (ref 3.88–5.62)
SODIUM SERPL-SCNC: 137 MMOL/L (ref 135–147)
WBC # BLD AUTO: 8.44 THOUSAND/UL (ref 4.31–10.16)

## 2025-03-20 PROCEDURE — G2211 COMPLEX E/M VISIT ADD ON: HCPCS | Performed by: INTERNAL MEDICINE

## 2025-03-20 PROCEDURE — 36415 COLL VENOUS BLD VENIPUNCTURE: CPT

## 2025-03-20 PROCEDURE — 85025 COMPLETE CBC W/AUTO DIFF WBC: CPT

## 2025-03-20 PROCEDURE — 80053 COMPREHEN METABOLIC PANEL: CPT

## 2025-03-20 PROCEDURE — 99203 OFFICE O/P NEW LOW 30 MIN: CPT | Performed by: INTERNAL MEDICINE

## 2025-03-20 RX ORDER — SODIUM CHLORIDE 9 MG/ML
20 INJECTION, SOLUTION INTRAVENOUS ONCE
Status: CANCELLED | OUTPATIENT
Start: 2025-03-25

## 2025-03-20 RX ORDER — SODIUM CHLORIDE 9 MG/ML
20 INJECTION, SOLUTION INTRAVENOUS ONCE
Status: CANCELLED | OUTPATIENT
Start: 2025-03-26

## 2025-03-20 RX ORDER — SODIUM CHLORIDE 9 MG/ML
20 INJECTION, SOLUTION INTRAVENOUS ONCE
Status: CANCELLED | OUTPATIENT
Start: 2025-03-27

## 2025-03-20 NOTE — ASSESSMENT & PLAN NOTE
Iron studies 2/15 - ferritin is 8  Hb in the 7 range previously  Most recent labs from 3/17 - hb improved to 11 ?? Error.   Does not appear he got any blood transfusions or iv iron  Recheck Hb. Discussed IV iron with treatments and pt is agreeable to this

## 2025-03-21 ENCOUNTER — TELEPHONE (OUTPATIENT)
Age: 75
End: 2025-03-21

## 2025-03-21 NOTE — TELEPHONE ENCOUNTER
Patient scheduled for treatment on 3/25/25.  Etoposide dose reduced from 80mg/m2 to 70mg/m2 on days 1, 2, 3 of treatment plan.   Patient made aware of change in office with Dr. Foy on 3/20/25.

## 2025-03-24 ENCOUNTER — TELEPHONE (OUTPATIENT)
Age: 75
End: 2025-03-24

## 2025-03-25 ENCOUNTER — HOSPITAL ENCOUNTER (OUTPATIENT)
Dept: INFUSION CENTER | Facility: HOSPITAL | Age: 75
Discharge: HOME | End: 2025-03-25
Attending: INTERNAL MEDICINE
Payer: MEDICARE

## 2025-03-25 ENCOUNTER — TELEPHONE (OUTPATIENT)
Age: 75
End: 2025-03-25

## 2025-03-25 VITALS
SYSTOLIC BLOOD PRESSURE: 121 MMHG | OXYGEN SATURATION: 94 % | RESPIRATION RATE: 18 BRPM | DIASTOLIC BLOOD PRESSURE: 64 MMHG | TEMPERATURE: 97.8 F | WEIGHT: 176.81 LBS | HEART RATE: 63 BPM | BODY MASS INDEX: 27.75 KG/M2 | HEIGHT: 67 IN

## 2025-03-25 DIAGNOSIS — T45.1X5A CHEMOTHERAPY INDUCED NEUTROPENIA (HCC): Primary | ICD-10-CM

## 2025-03-25 DIAGNOSIS — D70.1 CHEMOTHERAPY INDUCED NEUTROPENIA (HCC): Primary | ICD-10-CM

## 2025-03-25 DIAGNOSIS — C67.8 MALIGNANT NEOPLASM OF OVERLAPPING SITES OF BLADDER (HCC): ICD-10-CM

## 2025-03-25 DIAGNOSIS — D50.0 IRON DEFICIENCY ANEMIA DUE TO CHRONIC BLOOD LOSS: ICD-10-CM

## 2025-03-25 RX ORDER — SODIUM CHLORIDE 9 MG/ML
20 INJECTION, SOLUTION INTRAVENOUS ONCE
Status: COMPLETED | OUTPATIENT
Start: 2025-03-25 | End: 2025-03-25

## 2025-03-25 RX ORDER — SODIUM CHLORIDE 9 MG/ML
20 INJECTION, SOLUTION INTRAVENOUS ONCE
Status: CANCELLED | OUTPATIENT
Start: 2025-03-25

## 2025-03-25 RX ADMIN — DEXAMETHASONE SODIUM PHOSPHATE: 10 INJECTION, SOLUTION INTRAMUSCULAR; INTRAVENOUS at 12:30

## 2025-03-25 RX ADMIN — CARBOPLATIN 241.8 MG: 10 INJECTION, SOLUTION INTRAVENOUS at 13:44

## 2025-03-25 RX ADMIN — SODIUM CHLORIDE 20 ML/HR: 0.9 INJECTION, SOLUTION INTRAVENOUS at 11:54

## 2025-03-25 RX ADMIN — FOSAPREPITANT 150 MG: 150 INJECTION, POWDER, LYOPHILIZED, FOR SOLUTION INTRAVENOUS at 12:55

## 2025-03-25 RX ADMIN — SODIUM CHLORIDE 20 ML/HR: 0.9 INJECTION, SOLUTION INTRAVENOUS at 12:56

## 2025-03-25 RX ADMIN — ETOPOSIDE 138 MG: 20 INJECTION, SOLUTION INTRAVENOUS at 14:19

## 2025-03-25 RX ADMIN — IRON SUCROSE 200 MG: 20 INJECTION, SOLUTION INTRAVENOUS at 11:54

## 2025-03-25 NOTE — PROGRESS NOTES
Pt tolerated chemotherapy infusion without any adverse reactions. Port flushed and de-accessed. Pt ambulated out of unit with a steady gait and the use of a walker. AVS printed and given to pt.    Aware of next appt 03/26/25 @ 0130 pm

## 2025-03-26 ENCOUNTER — HOSPITAL ENCOUNTER (OUTPATIENT)
Dept: INFUSION CENTER | Facility: HOSPITAL | Age: 75
Discharge: HOME | End: 2025-03-26
Attending: INTERNAL MEDICINE
Payer: MEDICARE

## 2025-03-26 VITALS
HEIGHT: 67 IN | WEIGHT: 176.81 LBS | HEART RATE: 69 BPM | BODY MASS INDEX: 27.75 KG/M2 | SYSTOLIC BLOOD PRESSURE: 144 MMHG | TEMPERATURE: 98.2 F | OXYGEN SATURATION: 94 % | DIASTOLIC BLOOD PRESSURE: 65 MMHG | RESPIRATION RATE: 16 BRPM

## 2025-03-26 DIAGNOSIS — D50.0 IRON DEFICIENCY ANEMIA DUE TO CHRONIC BLOOD LOSS: Primary | ICD-10-CM

## 2025-03-26 DIAGNOSIS — T45.1X5A CHEMOTHERAPY INDUCED NEUTROPENIA (HCC): ICD-10-CM

## 2025-03-26 DIAGNOSIS — D70.1 CHEMOTHERAPY INDUCED NEUTROPENIA (HCC): ICD-10-CM

## 2025-03-26 DIAGNOSIS — C67.8 MALIGNANT NEOPLASM OF OVERLAPPING SITES OF BLADDER (HCC): ICD-10-CM

## 2025-03-26 PROCEDURE — 96413 CHEMO IV INFUSION 1 HR: CPT

## 2025-03-26 PROCEDURE — 96367 TX/PROPH/DG ADDL SEQ IV INF: CPT

## 2025-03-26 RX ORDER — SODIUM CHLORIDE 9 MG/ML
20 INJECTION, SOLUTION INTRAVENOUS ONCE
Status: COMPLETED | OUTPATIENT
Start: 2025-03-26 | End: 2025-03-26

## 2025-03-26 RX ORDER — SODIUM CHLORIDE 9 MG/ML
20 INJECTION, SOLUTION INTRAVENOUS ONCE
OUTPATIENT
Start: 2025-04-15

## 2025-03-26 RX ADMIN — SODIUM CHLORIDE 20 ML/HR: 0.9 INJECTION, SOLUTION INTRAVENOUS at 13:41

## 2025-03-26 RX ADMIN — DEXAMETHASONE SODIUM PHOSPHATE: 10 INJECTION, SOLUTION INTRAMUSCULAR; INTRAVENOUS at 13:41

## 2025-03-26 RX ADMIN — ETOPOSIDE 138 MG: 20 INJECTION, SOLUTION INTRAVENOUS at 14:12

## 2025-03-26 NOTE — PLAN OF CARE
Problem: Potential for Falls  Goal: Patient will remain free of falls  Description: INTERVENTIONS:- Educate patient/family on patient safety including physical limitations- Instruct patient to call for assistance with activity - Keep Call bell within reach- Keep bed low and locked with side rails adjusted as appropriate- Keep care items and personal belongings within reach- Initiate and maintain comfort rounds  Outcome: Progressing

## 2025-03-26 NOTE — PROGRESS NOTES
Toro Rodriguez  tolerated treatment well with no complications.      Toro Rodriguez is aware of future appt on 3/27 at 1300.     AVS printed and given to Toro Rodriguez:    No (Declined by Toro Rodriguez)

## 2025-03-27 ENCOUNTER — HOSPITAL ENCOUNTER (OUTPATIENT)
Dept: INFUSION CENTER | Facility: HOSPITAL | Age: 75
Discharge: HOME | End: 2025-03-27
Attending: INTERNAL MEDICINE
Payer: MEDICARE

## 2025-03-27 VITALS
HEART RATE: 65 BPM | BODY MASS INDEX: 27.75 KG/M2 | HEIGHT: 67 IN | WEIGHT: 176.81 LBS | DIASTOLIC BLOOD PRESSURE: 65 MMHG | SYSTOLIC BLOOD PRESSURE: 141 MMHG | RESPIRATION RATE: 20 BRPM | TEMPERATURE: 97.7 F

## 2025-03-27 DIAGNOSIS — C67.8 MALIGNANT NEOPLASM OF OVERLAPPING SITES OF BLADDER (HCC): ICD-10-CM

## 2025-03-27 DIAGNOSIS — D70.1 CHEMOTHERAPY INDUCED NEUTROPENIA (HCC): Primary | ICD-10-CM

## 2025-03-27 DIAGNOSIS — T45.1X5A CHEMOTHERAPY INDUCED NEUTROPENIA (HCC): Primary | ICD-10-CM

## 2025-03-27 PROCEDURE — 96377 APPLICATON ON-BODY INJECTOR: CPT

## 2025-03-27 PROCEDURE — 96413 CHEMO IV INFUSION 1 HR: CPT

## 2025-03-27 PROCEDURE — 96367 TX/PROPH/DG ADDL SEQ IV INF: CPT

## 2025-03-27 RX ORDER — PROCHLORPERAZINE MALEATE 10 MG
10 TABLET ORAL EVERY 6 HOURS PRN
Qty: 30 TABLET | Refills: 0 | Status: SHIPPED | OUTPATIENT
Start: 2025-03-27

## 2025-03-27 RX ORDER — SODIUM CHLORIDE 9 MG/ML
20 INJECTION, SOLUTION INTRAVENOUS ONCE
Status: COMPLETED | OUTPATIENT
Start: 2025-03-27 | End: 2025-03-27

## 2025-03-27 RX ADMIN — SODIUM CHLORIDE 20 ML/HR: 9 INJECTION, SOLUTION INTRAVENOUS at 13:25

## 2025-03-27 RX ADMIN — PEGFILGRASTIM 6 MG: KIT SUBCUTANEOUS at 15:11

## 2025-03-27 RX ADMIN — DEXAMETHASONE SODIUM PHOSPHATE: 10 INJECTION, SOLUTION INTRAMUSCULAR; INTRAVENOUS at 13:25

## 2025-03-27 RX ADMIN — ETOPOSIDE 138 MG: 20 INJECTION, SOLUTION INTRAVENOUS at 13:54

## 2025-04-08 ENCOUNTER — HOSPITAL ENCOUNTER (OUTPATIENT)
Dept: RADIOLOGY | Age: 75
Discharge: HOME | End: 2025-04-08

## 2025-04-11 ENCOUNTER — APPOINTMENT (OUTPATIENT)
Dept: LAB | Facility: HOSPITAL | Age: 75
End: 2025-04-11
Payer: MEDICARE

## 2025-04-11 DIAGNOSIS — T45.1X5A CHEMOTHERAPY INDUCED NEUTROPENIA (HCC): ICD-10-CM

## 2025-04-11 DIAGNOSIS — D70.1 CHEMOTHERAPY INDUCED NEUTROPENIA (HCC): ICD-10-CM

## 2025-04-11 DIAGNOSIS — C67.8 MALIGNANT NEOPLASM OF OVERLAPPING SITES OF BLADDER (HCC): ICD-10-CM

## 2025-04-11 LAB
ALBUMIN SERPL BCG-MCNC: 3.8 G/DL (ref 3.5–5)
ALP SERPL-CCNC: 106 U/L (ref 34–104)
ALT SERPL W P-5'-P-CCNC: 17 U/L (ref 7–52)
ANION GAP SERPL CALCULATED.3IONS-SCNC: 11 MMOL/L (ref 4–13)
ANISOCYTOSIS BLD QL SMEAR: PRESENT
AST SERPL W P-5'-P-CCNC: 19 U/L (ref 13–39)
BASOPHILS # BLD MANUAL: 0.09 THOUSAND/UL (ref 0–0.1)
BASOPHILS NFR MAR MANUAL: 1 % (ref 0–1)
BILIRUB SERPL-MCNC: 0.38 MG/DL (ref 0.2–1)
BUN SERPL-MCNC: 25 MG/DL (ref 5–25)
BURR CELLS BLD QL SMEAR: PRESENT
CALCIUM SERPL-MCNC: 9 MG/DL (ref 8.4–10.2)
CHLORIDE SERPL-SCNC: 103 MMOL/L (ref 96–108)
CO2 SERPL-SCNC: 23 MMOL/L (ref 21–32)
CREAT SERPL-MCNC: 1.27 MG/DL (ref 0.6–1.3)
CREAT UR-MCNC: 113 MG/DL
DIFFERENTIAL COMMENT: ABNORMAL
EOSINOPHIL # BLD MANUAL: 0 THOUSAND/UL (ref 0–0.4)
EOSINOPHIL NFR BLD MANUAL: 0 % (ref 0–6)
ERYTHROCYTE [DISTWIDTH] IN BLOOD BY AUTOMATED COUNT: 17.4 % (ref 11.6–15.1)
GFR SERPL CREATININE-BSD FRML MDRD: 54 ML/MIN/1.73SQ M
GLUCOSE P FAST SERPL-MCNC: 75 MG/DL (ref 65–99)
HCT VFR BLD AUTO: 33.7 % (ref 36.5–49.3)
HGB BLD-MCNC: 10.2 G/DL (ref 12–17)
LYMPHOCYTES # BLD AUTO: 1.69 THOUSAND/UL (ref 0.6–4.47)
LYMPHOCYTES # BLD AUTO: 19 % (ref 14–44)
MCH RBC QN AUTO: 30.9 PG (ref 26.8–34.3)
MCHC RBC AUTO-ENTMCNC: 30.3 G/DL (ref 31.4–37.4)
MCV RBC AUTO: 102 FL (ref 82–98)
MICROALBUMIN UR-MCNC: 219.4 MG/L
MICROALBUMIN/CREAT 24H UR: 194 MG/G CREATININE (ref 0–30)
MONOCYTES # BLD AUTO: 0.36 THOUSAND/UL (ref 0–1.22)
MONOCYTES NFR BLD: 4 % (ref 4–12)
MYELOCYTE ABSOLUTE CT: 0.27 THOUSAND/UL (ref 0–0.1)
MYELOCYTES NFR BLD MANUAL: 3 % (ref 0–1)
NEUTROPHILS # BLD MANUAL: 6.48 THOUSAND/UL (ref 1.85–7.62)
NEUTS SEG NFR BLD AUTO: 73 % (ref 43–75)
OVALOCYTES BLD QL SMEAR: PRESENT
PHOSPHATE SERPL-MCNC: 4.4 MG/DL (ref 2.3–4.1)
PLATELET # BLD AUTO: 402 THOUSANDS/UL (ref 149–390)
PLATELET BLD QL SMEAR: ADEQUATE
PMV BLD AUTO: 10.5 FL (ref 8.9–12.7)
POIKILOCYTOSIS BLD QL SMEAR: PRESENT
POTASSIUM SERPL-SCNC: 4.6 MMOL/L (ref 3.5–5.3)
PROT SERPL-MCNC: 6.8 G/DL (ref 6.4–8.4)
PTH-INTACT SERPL-MCNC: 46.5 PG/ML (ref 12–88)
RBC # BLD AUTO: 3.3 MILLION/UL (ref 3.88–5.62)
RBC MORPH BLD: PRESENT
SMUDGE CELLS BLD QL SMEAR: PRESENT
SODIUM SERPL-SCNC: 137 MMOL/L (ref 135–147)
WBC # BLD AUTO: 8.88 THOUSAND/UL (ref 4.31–10.16)

## 2025-04-11 PROCEDURE — 85007 BL SMEAR W/DIFF WBC COUNT: CPT

## 2025-04-11 PROCEDURE — 36415 COLL VENOUS BLD VENIPUNCTURE: CPT

## 2025-04-11 PROCEDURE — 80053 COMPREHEN METABOLIC PANEL: CPT

## 2025-04-11 PROCEDURE — 85027 COMPLETE CBC AUTOMATED: CPT

## 2025-04-14 ENCOUNTER — HOSPITAL ENCOUNTER (OUTPATIENT)
Dept: RADIOLOGY | Age: 75
Discharge: HOME | End: 2025-04-14
Payer: MEDICARE

## 2025-04-14 ENCOUNTER — TELEPHONE (OUTPATIENT)
Dept: UROLOGY | Facility: CLINIC | Age: 75
End: 2025-04-14

## 2025-04-14 DIAGNOSIS — C67.8 MALIGNANT NEOPLASM OF OVERLAPPING SITES OF BLADDER (HCC): ICD-10-CM

## 2025-04-14 PROCEDURE — 78815 PET IMAGE W/CT SKULL-THIGH: CPT

## 2025-04-14 PROCEDURE — A9552 F18 FDG: HCPCS

## 2025-04-14 NOTE — TELEPHONE ENCOUNTER
Spoke with patient's daughter in regards to rescheduled 4/11 appt.    Looks like pt was scheduled with Srinivas for a cystoscopy and rescheduled with Lul on 4/23.     Pt was due for a cath change around mid April, so mentioned to pt's daughter to keep that appt for cath change and that I would find out if a sooner appt for a cysto was available.    Tentatively scheduled cysto with Srinivas on Oct 13th (next available) but pt's daughter is requesting sooner appt if available.    Please review and advise, thanks!

## 2025-04-15 ENCOUNTER — HOSPITAL ENCOUNTER (OUTPATIENT)
Dept: INFUSION CENTER | Facility: HOSPITAL | Age: 75
Discharge: HOME | End: 2025-04-15
Attending: INTERNAL MEDICINE
Payer: MEDICARE

## 2025-04-15 VITALS
SYSTOLIC BLOOD PRESSURE: 129 MMHG | TEMPERATURE: 97.6 F | HEART RATE: 69 BPM | DIASTOLIC BLOOD PRESSURE: 74 MMHG | BODY MASS INDEX: 28.37 KG/M2 | HEIGHT: 67 IN | WEIGHT: 180.78 LBS | RESPIRATION RATE: 18 BRPM

## 2025-04-15 DIAGNOSIS — D50.0 IRON DEFICIENCY ANEMIA DUE TO CHRONIC BLOOD LOSS: Primary | ICD-10-CM

## 2025-04-15 DIAGNOSIS — T45.1X5A CHEMOTHERAPY INDUCED NEUTROPENIA (HCC): ICD-10-CM

## 2025-04-15 DIAGNOSIS — D70.1 CHEMOTHERAPY INDUCED NEUTROPENIA (HCC): ICD-10-CM

## 2025-04-15 DIAGNOSIS — C67.8 MALIGNANT NEOPLASM OF OVERLAPPING SITES OF BLADDER (HCC): ICD-10-CM

## 2025-04-15 PROCEDURE — 96367 TX/PROPH/DG ADDL SEQ IV INF: CPT

## 2025-04-15 PROCEDURE — 96417 CHEMO IV INFUS EACH ADDL SEQ: CPT

## 2025-04-15 PROCEDURE — 96413 CHEMO IV INFUSION 1 HR: CPT

## 2025-04-15 RX ORDER — SODIUM CHLORIDE 9 MG/ML
20 INJECTION, SOLUTION INTRAVENOUS ONCE
Status: COMPLETED | OUTPATIENT
Start: 2025-04-15 | End: 2025-04-15

## 2025-04-15 RX ORDER — SODIUM CHLORIDE 9 MG/ML
20 INJECTION, SOLUTION INTRAVENOUS ONCE
Status: DISCONTINUED | OUTPATIENT
Start: 2025-04-15 | End: 2025-04-18 | Stop reason: HOSPADM

## 2025-04-15 RX ORDER — SODIUM CHLORIDE 9 MG/ML
20 INJECTION, SOLUTION INTRAVENOUS ONCE
Status: CANCELLED | OUTPATIENT
Start: 2025-04-17

## 2025-04-15 RX ORDER — SODIUM CHLORIDE 9 MG/ML
20 INJECTION, SOLUTION INTRAVENOUS ONCE
Status: CANCELLED | OUTPATIENT
Start: 2025-04-15

## 2025-04-15 RX ORDER — SODIUM CHLORIDE 9 MG/ML
20 INJECTION, SOLUTION INTRAVENOUS ONCE
OUTPATIENT
Start: 2025-05-06

## 2025-04-15 RX ADMIN — DEXAMETHASONE SODIUM PHOSPHATE: 10 INJECTION, SOLUTION INTRAMUSCULAR; INTRAVENOUS at 12:10

## 2025-04-15 RX ADMIN — SODIUM CHLORIDE 20 ML/HR: 0.9 INJECTION, SOLUTION INTRAVENOUS at 11:33

## 2025-04-15 RX ADMIN — FOSAPREPITANT 150 MG: 150 INJECTION, POWDER, LYOPHILIZED, FOR SOLUTION INTRAVENOUS at 12:32

## 2025-04-15 RX ADMIN — CARBOPLATIN 230.1 MG: 10 INJECTION, SOLUTION INTRAVENOUS at 13:10

## 2025-04-15 RX ADMIN — ETOPOSIDE 138 MG: 20 INJECTION, SOLUTION INTRAVENOUS at 13:41

## 2025-04-15 RX ADMIN — IRON SUCROSE 200 MG: 20 INJECTION, SOLUTION INTRAVENOUS at 11:34

## 2025-04-15 NOTE — PROGRESS NOTES
Toro Rodriguez  tolerated treatment well with no complications.      Toro Rodriguez is aware of future appt on 4/16/25 at 1200.     AVS declined by Toro Rodriguez.

## 2025-04-15 NOTE — PLAN OF CARE
Problem: Potential for Falls  Goal: Patient will remain free of falls  Description: INTERVENTIONS:- Educate patient/family on patient safety including physical limitations- Instruct patient to call for assistance with activity - Consult OT/PT to assist with strengthening/mobility - Keep Call bell within reach- Keep bed low and locked with side rails adjusted as appropriate- Keep care items and personal belongings within reach- Initiate and maintain comfort rounds- Apply yellow socks and bracelet for high fall risk patients- Consider moving patient to room near nurses station  Outcome: Progressing

## 2025-04-16 ENCOUNTER — HOSPITAL ENCOUNTER (OUTPATIENT)
Dept: INFUSION CENTER | Facility: HOSPITAL | Age: 75
Discharge: HOME | End: 2025-04-16
Attending: INTERNAL MEDICINE
Payer: MEDICARE

## 2025-04-16 VITALS
HEIGHT: 67 IN | RESPIRATION RATE: 16 BRPM | HEART RATE: 67 BPM | DIASTOLIC BLOOD PRESSURE: 69 MMHG | SYSTOLIC BLOOD PRESSURE: 148 MMHG | TEMPERATURE: 98.2 F | BODY MASS INDEX: 28.37 KG/M2 | WEIGHT: 180.78 LBS

## 2025-04-16 DIAGNOSIS — D70.1 CHEMOTHERAPY INDUCED NEUTROPENIA (HCC): Primary | ICD-10-CM

## 2025-04-16 DIAGNOSIS — C67.8 MALIGNANT NEOPLASM OF OVERLAPPING SITES OF BLADDER (HCC): ICD-10-CM

## 2025-04-16 DIAGNOSIS — T45.1X5A CHEMOTHERAPY INDUCED NEUTROPENIA (HCC): Primary | ICD-10-CM

## 2025-04-16 PROCEDURE — 96413 CHEMO IV INFUSION 1 HR: CPT

## 2025-04-16 PROCEDURE — 96367 TX/PROPH/DG ADDL SEQ IV INF: CPT

## 2025-04-16 RX ORDER — SODIUM CHLORIDE 9 MG/ML
20 INJECTION, SOLUTION INTRAVENOUS ONCE
Status: COMPLETED | OUTPATIENT
Start: 2025-04-16 | End: 2025-04-16

## 2025-04-16 RX ADMIN — ETOPOSIDE 138 MG: 20 INJECTION, SOLUTION INTRAVENOUS at 12:57

## 2025-04-16 RX ADMIN — SODIUM CHLORIDE 20 ML/HR: 0.9 INJECTION, SOLUTION INTRAVENOUS at 12:21

## 2025-04-16 RX ADMIN — DEXAMETHASONE SODIUM PHOSPHATE: 10 INJECTION, SOLUTION INTRAMUSCULAR; INTRAVENOUS at 12:22

## 2025-04-16 NOTE — PROGRESS NOTES
Toro Rodriguez  tolerated treatment well with no complications.      Toro Rodriguez is aware of future appt on 04/17/25 at 1pm.     AVS printed and given to Toro Rodriguez:  Yes

## 2025-04-17 ENCOUNTER — RESULTS FOLLOW-UP (OUTPATIENT)
Dept: OTHER | Facility: HOSPITAL | Age: 75
End: 2025-04-17

## 2025-04-17 ENCOUNTER — OFFICE VISIT (OUTPATIENT)
Age: 75
End: 2025-04-17
Payer: MEDICARE

## 2025-04-17 ENCOUNTER — HOSPITAL ENCOUNTER (OUTPATIENT)
Dept: INFUSION CENTER | Facility: HOSPITAL | Age: 75
Discharge: HOME | End: 2025-04-17
Attending: INTERNAL MEDICINE
Payer: MEDICARE

## 2025-04-17 VITALS
WEIGHT: 183.86 LBS | RESPIRATION RATE: 18 BRPM | SYSTOLIC BLOOD PRESSURE: 143 MMHG | BODY MASS INDEX: 28.86 KG/M2 | TEMPERATURE: 97.9 F | HEIGHT: 67 IN | HEART RATE: 64 BPM | DIASTOLIC BLOOD PRESSURE: 74 MMHG

## 2025-04-17 VITALS
OXYGEN SATURATION: 96 % | DIASTOLIC BLOOD PRESSURE: 66 MMHG | WEIGHT: 184 LBS | SYSTOLIC BLOOD PRESSURE: 150 MMHG | HEART RATE: 63 BPM | BODY MASS INDEX: 28.88 KG/M2 | HEIGHT: 67 IN | TEMPERATURE: 98.4 F | RESPIRATION RATE: 16 BRPM

## 2025-04-17 DIAGNOSIS — K52.1 CHEMOTHERAPY INDUCED DIARRHEA: Primary | ICD-10-CM

## 2025-04-17 DIAGNOSIS — T45.1X5A CHEMOTHERAPY INDUCED DIARRHEA: Primary | ICD-10-CM

## 2025-04-17 DIAGNOSIS — D70.1 CHEMOTHERAPY INDUCED NEUTROPENIA (HCC): Primary | ICD-10-CM

## 2025-04-17 DIAGNOSIS — D50.0 IRON DEFICIENCY ANEMIA DUE TO CHRONIC BLOOD LOSS: ICD-10-CM

## 2025-04-17 DIAGNOSIS — C67.8 MALIGNANT NEOPLASM OF OVERLAPPING SITES OF BLADDER (HCC): ICD-10-CM

## 2025-04-17 DIAGNOSIS — C67.8 MALIGNANT NEOPLASM OF OVERLAPPING SITES OF BLADDER (HCC): Primary | ICD-10-CM

## 2025-04-17 DIAGNOSIS — T45.1X5A CHEMOTHERAPY INDUCED NEUTROPENIA (HCC): Primary | ICD-10-CM

## 2025-04-17 DIAGNOSIS — R19.7 DIARRHEA, UNSPECIFIED TYPE: ICD-10-CM

## 2025-04-17 PROCEDURE — 96375 TX/PRO/DX INJ NEW DRUG ADDON: CPT

## 2025-04-17 PROCEDURE — 96377 APPLICATON ON-BODY INJECTOR: CPT

## 2025-04-17 PROCEDURE — G2211 COMPLEX E/M VISIT ADD ON: HCPCS | Performed by: INTERNAL MEDICINE

## 2025-04-17 PROCEDURE — 99215 OFFICE O/P EST HI 40 MIN: CPT | Performed by: INTERNAL MEDICINE

## 2025-04-17 PROCEDURE — 96413 CHEMO IV INFUSION 1 HR: CPT

## 2025-04-17 RX ORDER — LIDOCAINE AND PRILOCAINE 25; 25 MG/G; MG/G
CREAM TOPICAL
Qty: 30 G | Refills: 1 | Status: SHIPPED | OUTPATIENT
Start: 2025-04-17

## 2025-04-17 RX ORDER — SODIUM CHLORIDE 9 MG/ML
20 INJECTION, SOLUTION INTRAVENOUS ONCE
OUTPATIENT
Start: 2025-05-07

## 2025-04-17 RX ORDER — SODIUM CHLORIDE 9 MG/ML
20 INJECTION, SOLUTION INTRAVENOUS ONCE
OUTPATIENT
Start: 2025-05-08

## 2025-04-17 RX ORDER — SODIUM CHLORIDE 9 MG/ML
20 INJECTION, SOLUTION INTRAVENOUS ONCE
OUTPATIENT
Start: 2025-05-06

## 2025-04-17 RX ORDER — LOPERAMIDE HYDROCHLORIDE 2 MG/1
2 TABLET ORAL 4 TIMES DAILY PRN
Qty: 30 TABLET | Refills: 0 | Status: SHIPPED | OUTPATIENT
Start: 2025-04-17

## 2025-04-17 RX ORDER — SODIUM CHLORIDE 9 MG/ML
20 INJECTION, SOLUTION INTRAVENOUS ONCE
Status: COMPLETED | OUTPATIENT
Start: 2025-04-17 | End: 2025-04-17

## 2025-04-17 RX ADMIN — ETOPOSIDE 138 MG: 20 INJECTION, SOLUTION INTRAVENOUS at 13:52

## 2025-04-17 RX ADMIN — SODIUM CHLORIDE 20 ML/HR: 0.9 INJECTION, SOLUTION INTRAVENOUS at 13:22

## 2025-04-17 RX ADMIN — PEGFILGRASTIM 6 MG: KIT SUBCUTANEOUS at 15:10

## 2025-04-17 RX ADMIN — DEXAMETHASONE SODIUM PHOSPHATE: 10 INJECTION, SOLUTION INTRAMUSCULAR; INTRAVENOUS at 13:24

## 2025-04-17 NOTE — PROGRESS NOTES
Toro Rodriguez  tolerated treatment well with no complications.      Toro Rodriguez is aware of future appt on 05/06/25 at 1130.     AVS printed and given to Toro Rodriguez:  Yes

## 2025-04-17 NOTE — PROGRESS NOTES
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   Currently on first-line treatment with carboplatin and etoposide which she started on 2025, and is currently on cycle 4.  Reviewed PET/CT, which shows overall good response, with resolution of liver and pelvic maya metastasis.  There is improvement in majority of the osseous metastatic disease except for 2 lesions as noted below.  Overall, there has been positive response.  Plan is to continue on the current regimen with close monitoring  Repeat PET-CT after completion of 6 cycles  Continue current dose reduction as follows - etoposide at 70% and carboplatin with AUC of 3.       Iron deficiency anemia due to chronic blood loss  On weekly venofer that he started 4/15.  Continue same       Diarrhea, unspecified type  Advised to use imodium PRN           History of Present Illness   Chief Complaint   Patient presents with   • Follow-up     Toro Rodriguez is a 74 y.o. male with multiple comorbidities including hypertension, coronary artery disease, CHF, history of DVT, cardiomyopathy,  BPH, stage III CKD, history of melanoma, who had been referred for new diagnosis of bladder cancer. Patient is a former smoker, quit at the age of 33.    He had a CT abdomen and pelvis in April and 2024 due to hematuria and pelvic pain, which were unremarkable.    CT abdomen from  showed nonspecific diffuse bladder wall thickening and subsequently right hydroureteronephrosis. He had a suprapubic catheter placed on 2024. Repeat CT from  showed a possible soft tissue  mass on the right side of the bladder.    He had a CT abdomen and pelvis with contrast from November 10, 2024 which revealed a 4 cm soft tissue density within the right bladder wall.  He has had multiple CT scans since.     Patient presented with gross hematuria and lower abdominal pain on December 15. He required PRBC support.  Eliquis, which he had been for prior DVT, but stopped 2 months prior to admission.  During this admission, he required a Colon placement and CBI.  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    Renal ultrasound from December 17 showed moderate right hydronephrosis.  CT renal from December 20 showed persistent right-sided moderate hydronephrosis and hydroureter up to the right ureterovesical junction. His creatinine worsened during the admission, and a nephrostomy tube was placed on December 24. He also underwent a suprapubic catheter placement.    CT chest without contrast from December 23, 2024 showed solid 0.8 cm and a 1 cm nodule in the right lower lobe which were new from last year.  Another solid 0.3 cm right upper lobe nodule.    PET-CT 1/9/25 -suggestive of more widespread ds including multiple bones, liver, lungs, pelvic nodes and possible brain metatasis    PORT placed 1/10/25. He was started on Carboplatin and etosposide on 1/14/2025 for small cell bladder cancer.    Received cycle 2 on 2/4/2025.   Admitted to the hospital from 2/14 - 2/17 after a fall after attempting to sit down and missed chair.  Patient had CHUY and hip dislocation on admission. Hip was reduced in ER. He was subsequently d/c to rehab.     Brain MRI 3/9/25 - negative for metastasis    He received cycles 3 and 4 on 3/25 and 4/15.     He underwent a restaging PET/CT on April 14, 2025 which showed a mixed response.  Resolution of FDG avid hepatic lesions and right pelvic lymph nodes.  Many of  the osseous lesions have improved, but new lesions noted in the left base of the skull, and increasing uptake in the left anterior iliac lesion.    He is here today for a follow up visit    Oncology History   Cancer Staging   Malignant neoplasm of overlapping sites of bladder (HCC)  Staging form: Urinary Bladder, AJCC 8th Edition  - Clinical stage from 4/17/2025: cT2, cN2, cM1 - Signed by Juan Carlos Foy MD on 4/17/2025  Histopathologic type: Small cell carcinoma, NOS  Stage prefix: Initial diagnosis  WHO/ISUP grade (low/high): High Grade  Histologic grading system: 2 grade system    Melanoma of back (HCC)  Staging form: Melanoma of the Skin, AJCC 8th Edition  - Pathologic: Stage Unknown (pT1a, pNX, cM0) - Signed by Lillie La MD on 4/3/2024  Oncology History Overview Note   Small cell bladder cancer with metastasis to bones, liver, lungs, nodes  Carboplatin/etoposide 1/14/25- current     Melanoma of back (HCC)   1/31/2024 Biopsy    B. Skin, left lower back, shave biopsy:     MELANOMA (thickness: at least 0.7 mm) (see note).     Note: SOX10, MART, and PRAME immunostains were reviewed; significant melanocytic architectural disorder is seen, and the lesional cells are positive for PRAME. Please see synoptic report for additional details.     Synoptic report for melanoma of the skin  Thickness: at least 0.7 mm (invasive melanoma extends to deep specimen margin)  Ulceration: not seen  Anatomic (Lukas) level: at least IV  Type: superficial spreading melanoma  Mitoses: 2/mm2  Microsatellites: cannot be determined  Lymphovascular invasion: not seen  Neurotropism: not seen  Tumor regression: present  Tumor-infiltrating lymphocytes (TIL): present, non-brisk  Margin assessment: invasive melanoma extends to deep specimen margin; melanoma in situ extends to peripheral specimen margin  Pathologic stage: at least pT1a  Associated nevus: dermal melanocytic nevus     2/20/2024 Initial Diagnosis    Melanoma of back (HCC)      3/18/2024 Surgery    A. Skin, back, excision:     -Residual focal melanoma (thickness: 0.7 mm); not seen at examined inked specimen margins.     -Prior procedure site changes present.     4/3/2024 -  Cancer Staged    Staging form: Melanoma of the Skin, AJCC 8th Edition  - Pathologic: Stage Unknown (pT1a, pNX, cM0) - Signed by Lillie La MD on 4/3/2024       Malignant neoplasm of overlapping sites of bladder (HCC)   12/15/2024 Surgery    Final Diagnosis   A. 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  - Tumor invades muscularis propria.      Comment: No evidence of conventional urothelial carcinoma is present.        12/31/2024 Initial Diagnosis    Malignant neoplasm of overlapping sites of bladder (HCC)     1/14/2025 -  Chemotherapy    etoposide (TOPOSAR), 80 mg/m2 = 157.6 mg (80 % of original dose 100 mg/m2), 4 of 6 cycles  Dose modification: 80 mg/m2 (80 % of original dose 100 mg/m2, Cycle 1, Reason: Dose Not Tolerated), 70 mg/m2 (70 % of original dose 100 mg/m2, Cycle 3, Reason: Dose Not Tolerated)  Administration: 157.6 mg (1/14/2025), 157.6 mg (1/15/2025), 157.6 mg (2/4/2025), 157.6 mg (1/16/2025), 157.6 mg (2/5/2025), 157.6 mg (2/6/2025), 138 mg (3/25/2025), 138 mg (3/26/2025), 138 mg (3/27/2025), 138 mg (4/15/2025), 138 mg (4/16/2025)  CARBOplatin (PARAPLATIN) IVPB (GOG AUC DOSING), 200.4 mg, 4 of 6 cycles  Administration: 200.4 mg (1/14/2025), 204.6 mg (2/4/2025), 241.8 mg (3/25/2025), 230.1 mg (4/15/2025)     4/17/2025 -  Cancer Staged    Staging form: Urinary Bladder, AJCC 8th Edition  - Clinical stage from 4/17/2025: cT2, cN2, cM1 - Signed by Juan Carlos Foy MD on 4/17/2025  Histopathologic type: Small cell carcinoma, NOS  Stage prefix: Initial diagnosis  WHO/ISUP grade (low/high): High Grade  Histologic grading system: 2 grade system          Review of Systems   Constitutional:  Positive for fatigue. Negative  for fever.   Gastrointestinal:  Positive for diarrhea and nausea. Negative for vomiting.   All other systems reviewed and are negative.    Current Outpatient Medications on File Prior to Visit   Medication Sig Dispense Refill   • acetaminophen (TYLENOL) 325 mg tablet Take 325 mg by mouth every 6 (six) hours as needed for mild pain     • amiodarone 200 mg tablet Take 1 tablet (200 mg total) by mouth daily 90 tablet 2   • amLODIPine (NORVASC) 5 mg tablet Take 1 tablet (5 mg total) by mouth daily 90 tablet 1   • aspirin 81 mg chewable tablet Chew 1 tablet (81 mg total) daily Do not start before September 30, 2024.     • atorvastatin (LIPITOR) 40 mg tablet Take 1 tablet (40 mg total) by mouth daily with dinner  0   • nitroglycerin (NITROSTAT) 0.4 mg SL tablet Place 1 tablet (0.4 mg total) under the tongue every 5 (five) minutes as needed for chest pain     • ondansetron (ZOFRAN) 8 mg tablet Take 1 tablet (8 mg total) by mouth every 8 (eight) hours as needed for nausea or vomiting 60 tablet 0   • prochlorperazine (COMPAZINE) 10 mg tablet Take 1 tablet (10 mg total) by mouth every 6 (six) hours as needed for nausea or vomiting 30 tablet 0   • sertraline (ZOLOFT) 25 mg tablet Take 1 tablet (25 mg total) by mouth daily Do not start before December 26, 2024. 30 tablet 0   • sodium chloride, PF, 0.9 % 10 mL by Intracatheter route daily Intracatheter flushing daily. May substitute prefilled syringe with normal saline 10 mL vials, 10 mL syringes, and 18 g blunt needles 300 mL 0   • [DISCONTINUED] lidocaine-prilocaine (EMLA) cream Apply topically as needed for mild pain 30 g 1   • [Paused] furosemide (LASIX) 20 mg tablet Take 1 tablet (20 mg total) by mouth daily (Patient not taking: Reported on 3/14/2025) 30 tablet 5   • [Paused] lisinopril (ZESTRIL) 5 mg tablet Take 1 tablet (5 mg total) by mouth daily Do not start before November 4, 2024. (Patient not taking: Reported on 3/14/2025)     • metoprolol succinate (TOPROL-XL) 25 mg  "24 hr tablet Take 1 tablet (25 mg total) by mouth every 12 (twelve) hours 60 tablet 0     Current Facility-Administered Medications on File Prior to Visit   Medication Dose Route Frequency Provider Last Rate Last Admin   • alteplase (CATHFLO) injection 2 mg  2 mg Intracatheter Q1MIN PRN Juan Carlos Foy MD       • [COMPLETED] etoposide (TOPOSAR) 138 mg in sodium chloride 0.9 % 500 mL chemo infusion  70 mg/m2 (Treatment Plan Recorded) Intravenous Once Juan Carlos Foy MD   Stopped at 25 1357   • [COMPLETED] ondansetron (ZOFRAN) 16 mg, dexamethasone (DECADRON) 10 mg in sodium chloride 0.9 % 50 mL IVPB   Intravenous Once Juan Carlos Foy MD   Stopped at 25 1242   • [COMPLETED] sodium chloride 0.9 % infusion  20 mL/hr Intravenous Once Juan Carlos Foy MD   Stopped at 25 1406   • sodium chloride 0.9 % infusion  20 mL/hr Intravenous Once Juan Carlos Foy MD          Social History     Tobacco Use   • Smoking status: Former     Types: Cigarettes     Start date:      Quit date:      Years since quittin.3   • Smokeless tobacco: Never   • Tobacco comments:     Quit over 30 years ago (Updated 2023).    Vaping Use   • Vaping status: Never Used   Substance and Sexual Activity   • Alcohol use: Not Currently   • Drug use: Never   • Sexual activity: Not on file         Objective   /66 (BP Location: Left arm, Patient Position: Sitting, Cuff Size: Standard)   Pulse 63   Temp 98.4 °F (36.9 °C) (Temporal)   Resp 16   Ht 5' 7.01\" (1.702 m)   Wt 83.5 kg (184 lb)   SpO2 96%   BMI 28.81 kg/m²     ECOG   2  Physical Exam  Vitals reviewed.   Constitutional:       Appearance: He is obese.   HENT:      Mouth/Throat:      Mouth: Mucous membranes are moist.      Pharynx: Oropharynx is clear.   Cardiovascular:      Rate and Rhythm: Normal rate and regular rhythm.      Pulses: Normal pulses.      Heart sounds: No murmur heard.  Pulmonary:      Effort: Pulmonary effort is normal. No respiratory distress.    "   Breath sounds: Normal breath sounds. No wheezing.   Abdominal:      Palpations: Abdomen is soft. There is no mass.      Comments: Suprapubic catheter in place, along with nephrostomy tube   Musculoskeletal:         General: No swelling.      Cervical back: Neck supple.   Neurological:      General: No focal deficit present.      Mental Status: He is alert. Mental status is at baseline.       Labs: I have reviewed the following labs:  Lab Results   Component Value Date/Time    WBC 8.88 04/11/2025 12:01 PM    RBC 3.30 (L) 04/11/2025 12:01 PM    Hemoglobin 10.2 (L) 04/11/2025 12:01 PM    Hematocrit 33.7 (L) 04/11/2025 12:01 PM     (H) 04/11/2025 12:01 PM    MCH 30.9 04/11/2025 12:01 PM    RDW 17.4 (H) 04/11/2025 12:01 PM    Platelets 402 (H) 04/11/2025 12:01 PM    Segmented % 69 03/20/2025 04:43 PM    Lymphocytes % 19 04/11/2025 12:01 PM    Lymphocytes % 15 03/20/2025 04:43 PM    Monocytes % 4 04/11/2025 12:01 PM    Monocytes % 8 03/20/2025 04:43 PM    Eosinophils % 0 04/11/2025 12:01 PM    Eosinophils Relative 7 (H) 03/20/2025 04:43 PM    Basophils % 1 04/11/2025 12:01 PM    Basophils Relative 1 03/20/2025 04:43 PM    Immature Grans % 0 03/20/2025 04:43 PM    Absolute Neutrophils 5.84 03/20/2025 04:43 PM     Lab Results   Component Value Date/Time    Potassium 4.6 04/11/2025 12:01 PM    Chloride 103 04/11/2025 12:01 PM    CO2 23 04/11/2025 12:01 PM    BUN 25 04/11/2025 12:01 PM    Creatinine 1.27 04/11/2025 12:01 PM    Glucose, Fasting 75 04/11/2025 12:01 PM    Calcium 9.0 04/11/2025 12:01 PM    Corrected Calcium 9.3 03/20/2025 04:43 PM    AST 19 04/11/2025 12:01 PM    ALT 17 04/11/2025 12:01 PM    Alkaline Phosphatase 106 (H) 04/11/2025 12:01 PM    Total Protein 6.8 04/11/2025 12:01 PM    Albumin 3.8 04/11/2025 12:01 PM    Total Bilirubin 0.38 04/11/2025 12:01 PM    eGFR 54 04/11/2025 12:01 PM         Radiology Results Review: I personally reviewed the following image studies in PACS and associated radiology  reports: CT chest and CT abdomen/pelvis. My interpretation of the radiology images/reports is: as noted above..    Administrative Statements   I have spent a total time of 30 minutes in caring for this patient on the day of the visit/encounter including Diagnostic results, Prognosis, Patient and family education, Counseling / Coordination of care, Documenting in the medical record, Reviewing / ordering tests, medicine, procedures  , and Obtaining or reviewing history  . Topics discussed with the patient / family include symptom assessment and management, goals of care, supportive listening, and anticipatory guidance.

## 2025-04-17 NOTE — ASSESSMENT & PLAN NOTE
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   Currently on first-line treatment with carboplatin and etoposide which she started on January 14, 2025, and is currently on cycle 4.  Reviewed PET/CT, which shows overall good response, with resolution of liver and pelvic maya metastasis.  There is improvement in majority of the osseous metastatic disease except for 2 lesions as noted below.  Overall, there has been positive response.  Plan is to continue on the current regimen with close monitoring  Repeat PET-CT after completion of 6 cycles  Continue current dose reduction as follows - etoposide at 70% and carboplatin with AUC of 3.

## 2025-04-22 NOTE — TELEPHONE ENCOUNTER
Daughter of the patient was calling back again. They are trying to figure out if the appt is needed. She is an hour away from her father. And her father is about 45 mins away from the office as well.     She was wondering for a medical standpoint if it is actually needed.

## 2025-04-22 NOTE — TELEPHONE ENCOUNTER
Patients daughter calling in stating this weekend patients SPT became clogged and [facility] changed it. She is wondering if apt with Lul is still needed.     CB: [phone] Shanique

## 2025-04-22 NOTE — TELEPHONE ENCOUNTER
Shanique, patient daughter calling asking if appointment can be cancelled tomorrow since [facility] changed SPT on Sunday. Draining well with no issues    Rescheduled next SPT change with Lul on 5/22/25. Confirmed Cysto on 10/13/25

## 2025-04-28 ENCOUNTER — TELEPHONE (OUTPATIENT)
Age: 75
End: 2025-04-28

## 2025-04-28 NOTE — TELEPHONE ENCOUNTER
Leeann from [facility] called and would like to know if Dr. Foy can write a standard order for mirlax and suppository. The order they currently have is very specific and pt is having issues with bowel movements. Please call her back at [phone] ext [phone]

## 2025-04-28 NOTE — TELEPHONE ENCOUNTER
Returned call to Leeann/Romeo K[facility] to get more information. Patient is experiencing intermittent constipation, but current orders from his PCP is to give Miralax if he has no bowel movement on Day 3 and Laxative on Day 4 of no bowel movement. Leeann wanted to see if Dr Foy will prescribe something else for daily management if appropriate,as patient is having a hard time waiting until day 3. I advised that since orders are from PCP, she should reach out to the office to discuss this concern, and call back if there are any questions.

## 2025-04-30 ENCOUNTER — PATIENT OUTREACH (OUTPATIENT)
Dept: HEMATOLOGY ONCOLOGY | Facility: CLINIC | Age: 75
End: 2025-04-30

## 2025-04-30 NOTE — PROGRESS NOTES
I reached out and spoke with Toro's daughter Shanique to follow up and to review for any new changes in barriers to care and offer supportive services as needed.     Barriers noted previously and outcome of interventions;  N/a     Reviewed for any new barriers as noted below.    Are you having any side effects from your treatment?Yes, describe: constipation - has medication to help     Are you eating and drinking normally? yes    Have you been experiencing any uncontrolled pain related to your cancer diagnosis? No    If not already established, have needs changed for a palliative care referral? established    Do you have a good support system? Yes     Are you interested in any support groups? Support group recourses provided on 1/6     How are you doing with transportation to your appointments? Daughter and [employer] [facility]     Do you have any questions or concerns regarding your treatment plan? No    Any new financial concerns for your household or medical bills? No    Do you know when your upcoming appointments are? yes  Future Appointments   Date Time Provider Department Center   5/6/2025 11:30 AM [facility]ED [facility]   5/7/2025 11:00 AM [facility] CHAIR [facility]   5/7/2025  3:30 PM Joselyn Reyes Bahamonde, MD NEPH [facility]   5/8/2025 12:00 PM BE INF CHAIR [facility]   5/15/2025  1:20 PM Juan Carlos Foy MD HEM ONC  [facility]   5/20/2025 10:00 AM [facility] IR 1 [facility]   5/22/2025 10:00 AM Se Funez PA-C [facility]   6/3/2025 11:30 AM Rick Espino MD [facility]   10/13/2025  8:30 AM Rick Espino MD [facility]          Based on individual needs I will follow up with them in 4-6 weeks. I have provided my direct contact information and welcome them to contact me if their needs as discussed above change. They were appreciative for the call.

## 2025-05-06 ENCOUNTER — HOSPITAL ENCOUNTER (OUTPATIENT)
Dept: INFUSION CENTER | Facility: HOSPITAL | Age: 75
Discharge: HOME | End: 2025-05-06
Attending: INTERNAL MEDICINE
Payer: MEDICARE

## 2025-05-06 VITALS
HEART RATE: 82 BPM | HEIGHT: 67 IN | TEMPERATURE: 98.8 F | WEIGHT: 181 LBS | SYSTOLIC BLOOD PRESSURE: 154 MMHG | DIASTOLIC BLOOD PRESSURE: 67 MMHG | BODY MASS INDEX: 28.41 KG/M2 | RESPIRATION RATE: 16 BRPM

## 2025-05-06 DIAGNOSIS — C67.8 MALIGNANT NEOPLASM OF OVERLAPPING SITES OF BLADDER (HCC): ICD-10-CM

## 2025-05-06 DIAGNOSIS — D50.0 IRON DEFICIENCY ANEMIA DUE TO CHRONIC BLOOD LOSS: Primary | ICD-10-CM

## 2025-05-06 DIAGNOSIS — T45.1X5A CHEMOTHERAPY INDUCED NEUTROPENIA (HCC): ICD-10-CM

## 2025-05-06 DIAGNOSIS — D70.1 CHEMOTHERAPY INDUCED NEUTROPENIA (HCC): ICD-10-CM

## 2025-05-06 PROCEDURE — 96417 CHEMO IV INFUS EACH ADDL SEQ: CPT

## 2025-05-06 PROCEDURE — 96367 TX/PROPH/DG ADDL SEQ IV INF: CPT

## 2025-05-06 PROCEDURE — 96413 CHEMO IV INFUSION 1 HR: CPT

## 2025-05-06 RX ORDER — SODIUM CHLORIDE 9 MG/ML
20 INJECTION, SOLUTION INTRAVENOUS ONCE
Status: COMPLETED | OUTPATIENT
Start: 2025-05-06 | End: 2025-05-06

## 2025-05-06 RX ORDER — SODIUM CHLORIDE 9 MG/ML
20 INJECTION, SOLUTION INTRAVENOUS ONCE
Status: CANCELLED | OUTPATIENT
Start: 2025-05-27

## 2025-05-06 RX ADMIN — CARBOPLATIN 216.3 MG: 10 INJECTION, SOLUTION INTRAVENOUS at 13:12

## 2025-05-06 RX ADMIN — SODIUM CHLORIDE 20 ML/HR: 0.9 INJECTION, SOLUTION INTRAVENOUS at 12:01

## 2025-05-06 RX ADMIN — ETOPOSIDE 138 MG: 20 INJECTION, SOLUTION INTRAVENOUS at 13:41

## 2025-05-06 RX ADMIN — DEXAMETHASONE SODIUM PHOSPHATE: 10 INJECTION, SOLUTION INTRAMUSCULAR; INTRAVENOUS at 12:01

## 2025-05-06 RX ADMIN — FOSAPREPITANT 150 MG: 150 INJECTION, POWDER, LYOPHILIZED, FOR SOLUTION INTRAVENOUS at 12:28

## 2025-05-06 RX ADMIN — SODIUM CHLORIDE 20 ML/HR: 0.9 INJECTION, SOLUTION INTRAVENOUS at 11:24

## 2025-05-06 RX ADMIN — IRON SUCROSE 200 MG: 20 INJECTION, SOLUTION INTRAVENOUS at 11:25

## 2025-05-06 NOTE — PROGRESS NOTES
Toro Rodriguez  tolerated treatment well with no complications.      Toro Rodriguez to return tomorrow as scheduled.    AVS printed and given to Toro Rodriguez:

## 2025-05-07 ENCOUNTER — OFFICE VISIT (OUTPATIENT)
Dept: NEPHROLOGY | Facility: CLINIC | Age: 75
End: 2025-05-07
Payer: MEDICARE

## 2025-05-07 ENCOUNTER — HOSPITAL ENCOUNTER (OUTPATIENT)
Dept: INFUSION CENTER | Facility: HOSPITAL | Age: 75
Discharge: HOME | End: 2025-05-07
Attending: INTERNAL MEDICINE
Payer: MEDICARE

## 2025-05-07 VITALS — BODY MASS INDEX: 28.34 KG/M2 | HEIGHT: 67 IN | SYSTOLIC BLOOD PRESSURE: 138 MMHG | DIASTOLIC BLOOD PRESSURE: 58 MMHG

## 2025-05-07 VITALS
HEIGHT: 67 IN | SYSTOLIC BLOOD PRESSURE: 132 MMHG | HEART RATE: 67 BPM | BODY MASS INDEX: 28.41 KG/M2 | WEIGHT: 181 LBS | DIASTOLIC BLOOD PRESSURE: 65 MMHG | TEMPERATURE: 97.6 F | RESPIRATION RATE: 18 BRPM

## 2025-05-07 DIAGNOSIS — N18.30 BENIGN HYPERTENSION WITH CHRONIC KIDNEY DISEASE, STAGE III (HCC): ICD-10-CM

## 2025-05-07 DIAGNOSIS — C67.8 MALIGNANT NEOPLASM OF OVERLAPPING SITES OF BLADDER (HCC): ICD-10-CM

## 2025-05-07 DIAGNOSIS — E11.22 TYPE 2 DIABETES MELLITUS WITH STAGE 3B CHRONIC KIDNEY DISEASE, WITHOUT LONG-TERM CURRENT USE OF INSULIN (HCC): ICD-10-CM

## 2025-05-07 DIAGNOSIS — M89.9 CHRONIC KIDNEY DISEASE-MINERAL BONE DISORDER (CKD-MBD) WITH STAGE 3B CHRONIC KIDNEY DISEASE (HCC): ICD-10-CM

## 2025-05-07 DIAGNOSIS — E66.01 OBESITY, MORBID (HCC): ICD-10-CM

## 2025-05-07 DIAGNOSIS — N18.32 CHRONIC KIDNEY DISEASE-MINERAL BONE DISORDER (CKD-MBD) WITH STAGE 3B CHRONIC KIDNEY DISEASE (HCC): ICD-10-CM

## 2025-05-07 DIAGNOSIS — I50.23 ACUTE ON CHRONIC SYSTOLIC (CONGESTIVE) HEART FAILURE (HCC): ICD-10-CM

## 2025-05-07 DIAGNOSIS — I12.9 BENIGN HYPERTENSION WITH CHRONIC KIDNEY DISEASE, STAGE III (HCC): ICD-10-CM

## 2025-05-07 DIAGNOSIS — N18.32 STAGE 3B CHRONIC KIDNEY DISEASE (HCC): ICD-10-CM

## 2025-05-07 DIAGNOSIS — T45.1X5A CHEMOTHERAPY INDUCED NEUTROPENIA (HCC): Primary | ICD-10-CM

## 2025-05-07 DIAGNOSIS — N18.32 TYPE 2 DIABETES MELLITUS WITH STAGE 3B CHRONIC KIDNEY DISEASE, WITHOUT LONG-TERM CURRENT USE OF INSULIN (HCC): ICD-10-CM

## 2025-05-07 DIAGNOSIS — N17.9 AKI (ACUTE KIDNEY INJURY) (HCC): Primary | ICD-10-CM

## 2025-05-07 DIAGNOSIS — D70.1 CHEMOTHERAPY INDUCED NEUTROPENIA (HCC): Primary | ICD-10-CM

## 2025-05-07 DIAGNOSIS — E83.9 CHRONIC KIDNEY DISEASE-MINERAL BONE DISORDER (CKD-MBD) WITH STAGE 3B CHRONIC KIDNEY DISEASE (HCC): ICD-10-CM

## 2025-05-07 DIAGNOSIS — N13.2 HYDRONEPHROSIS WITH URINARY OBSTRUCTION DUE TO RENAL CALCULUS: ICD-10-CM

## 2025-05-07 PROCEDURE — 96367 TX/PROPH/DG ADDL SEQ IV INF: CPT

## 2025-05-07 PROCEDURE — 99214 OFFICE O/P EST MOD 30 MIN: CPT | Performed by: STUDENT IN AN ORGANIZED HEALTH CARE EDUCATION/TRAINING PROGRAM

## 2025-05-07 PROCEDURE — 96413 CHEMO IV INFUSION 1 HR: CPT

## 2025-05-07 RX ORDER — SODIUM CHLORIDE 9 MG/ML
20 INJECTION, SOLUTION INTRAVENOUS ONCE
Status: COMPLETED | OUTPATIENT
Start: 2025-05-07 | End: 2025-05-07

## 2025-05-07 RX ADMIN — DEXAMETHASONE SODIUM PHOSPHATE: 10 INJECTION, SOLUTION INTRAMUSCULAR; INTRAVENOUS at 11:26

## 2025-05-07 RX ADMIN — SODIUM CHLORIDE 20 ML/HR: 0.9 INJECTION, SOLUTION INTRAVENOUS at 11:26

## 2025-05-07 RX ADMIN — ETOPOSIDE 138 MG: 20 INJECTION, SOLUTION INTRAVENOUS at 12:08

## 2025-05-07 NOTE — PATIENT INSTRUCTIONS
Thank you for coming to your visit today. As we discussed you kidney function is stable at your baseline, your creatinine is 1.2 mg/dL.  Your electrolytes are normal. Please follow the recommendations below       Recommend low sodium (salt) food    Avoid nonsteroidal anti-inflammatory drugs such as Naprosyn, ibuprofen, Aleve, Advil, Celebrex, Meloxicam (Mobic) etc.  You can use Tylenol as needed if you do not have any liver condition to be concerned about    Try to avoid medications such as pantoprazole or  Protonix/Nexium or Esomeprazole)/Prilosec or omeprazole/Prevacid or lansoprazole/AcipHex or Rabeprazole.  If you are able to, use Pepcid as this is safer for your kidneys.    Try to exercise at least 30 minutes 3 days a week to begin with with an ultimate goal of 5 days a week for at least 30 minutes      Next Visit in 12 months with results   If you need to see us earlier we can change the appointment for you        Joselyn Reyes Bahamonde, MD  Nephrology Attending

## 2025-05-07 NOTE — PROGRESS NOTES
Toro Rodriguez  tolerated treatment well with no complications.      Toro Rodriguez is aware of future appt on 5/8/25 at 12:00 pm.     AVS printed and given to Toro Rodriguez:  Yes

## 2025-05-08 ENCOUNTER — TELEPHONE (OUTPATIENT)
Dept: INFUSION CENTER | Facility: HOSPITAL | Age: 75
End: 2025-05-08

## 2025-05-08 NOTE — ASSESSMENT & PLAN NOTE
Lab Results   Component Value Date    HGBA1C 5.9 (H) 04/18/2024     HbA1c 5.9  Advised to maintain a good DM control to prevent progression of CKD   Maintain healthy diet (vegetables, fruits, whole grains, nonfat or low fat)  Weight loss  Physical activity (5 to 10 minutes to start the increase to 30 min a day)

## 2025-05-08 NOTE — ASSESSMENT & PLAN NOTE
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

## 2025-05-08 NOTE — TELEPHONE ENCOUNTER
[facility] adv there was an error on their end and did not  Toro. They are unable to do a same day tiago for him so they can not bring him to his appointment today. Rescheduled for tomorrow 5/9 at 10AM

## 2025-05-08 NOTE — PROGRESS NOTES
Name: Toro Rodriguez      : 1950      MRN: [id number]  Encounter Provider: Joselyn Reyes Bahamonde, MD  Encounter Date: 2025   Encounter department: [facility]  :  Assessment & Plan  Acute on chronic systolic (congestive) heart failure (HCC)  Wt Readings from Last 3 Encounters:   25 82.1 kg (181 lb)   25 82.1 kg (181 lb)   25 83.4 kg (183 lb 13.8 oz)     Euvolemic  Continue metoprolol twice a day  Not on diuretics at this time  Follow-up with cardiology         Benign hypertension with chronic kidney disease, stage III (HCC)  Lab Results   Component Value Date    EGFR 54 2025    EGFR 59 2025    EGFR 56 2025    CREATININE 1.27 2025    CREATININE 1.18 2025    CREATININE 1.23 2025   Volume: Euvolemic  Blood pressure: Normotensive, /58   Recommend:  Continue low-sodium diet  Continue amlodipine 5  Metoprolol 25 twice a day  Low-sodium diet  Advised to maintain a good BP control to prevent progression of CKD          Type 2 diabetes mellitus with stage 3b chronic kidney disease, without long-term current use of insulin (HCC)    Lab Results   Component Value Date    HGBA1C 5.9 (H) 2024     HbA1c 5.9  Advised to maintain a good DM control to prevent progression of CKD   Maintain healthy diet (vegetables, fruits, whole grains, nonfat or low fat)  Weight loss  Physical activity (5 to 10 minutes to start the increase to 30 min a day)         Stage 3b chronic kidney disease (HCC)  Lab Results   Component Value Date    EGFR 54 2025    EGFR 59 2025    EGFR 56 2025    CREATININE 1.27 2025    CREATININE 1.18 2025    CREATININE 1.23 2025   Baseline creatinine: 1.2 to 1.5 mg/dL  Etiology: 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  Current creatinine 1.2 mg/dL  Urinalysis with no hematuria, leukocyturia  UACR 194 milligrams per gram  Will monitor kidney function with  BMP, UACr         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)  Lab Results   Component Value Date    EGFR 54 04/11/2025    EGFR 59 03/20/2025    EGFR 56 03/17/2025    CREATININE 1.27 04/11/2025    CREATININE 1.18 03/20/2025    CREATININE 1.23 03/17/2025     Calcium 9mg/dL  PTH at goal 46.5  No indication of binders at this time       Hydronephrosis with urinary obstruction due to renal calculus  Right-sided hydroureteronephrosis with hematoma versus mass in the bladder  Currently with suprapubic catheter with persistent right hydro  Complicated with worsening CHUY  Follow up with urology            History of Present Illness   HPI  Toro Rodriguez is a 75 y.o. male  male hypertension, diabetes, right-sided hydroureteronephrosis, nephrolithiasis, CHF, suprapubic catheter who presents worsening kidney function  History obtained from: patient for initial   History obtained from: patient    Review of Systems   Constitutional:  Negative for activity change and appetite change.   HENT:  Negative for congestion and dental problem.    Eyes:  Negative for discharge.   Respiratory:  Negative for shortness of breath.    Cardiovascular:  Negative for chest pain and leg swelling.   Gastrointestinal:  Negative for abdominal distention and abdominal pain.   Endocrine: Negative for cold intolerance.   Genitourinary:  Negative for dysuria.   Musculoskeletal:  Negative for arthralgias.   Skin:  Negative for color change and pallor.   Neurological:  Negative for dizziness.   Psychiatric/Behavioral:  Negative for agitation.      Pertinent Medical History     Has been in the ED multiple times with hematuria, currently 8 worsening CHUY         Medical History Reviewed by provider this encounter:  Meds     .  Current Outpatient Medications on File Prior to Visit   Medication Sig Dispense Refill    acetaminophen (TYLENOL) 325 mg tablet Take 325 mg by mouth every 6 (six) hours as needed  for mild pain      amiodarone 200 mg tablet Take 1 tablet (200 mg total) by mouth daily 90 tablet 2    amLODIPine (NORVASC) 5 mg tablet Take 1 tablet (5 mg total) by mouth daily 90 tablet 1    aspirin 81 mg chewable tablet Chew 1 tablet (81 mg total) daily Do not start before September 30, 2024.      atorvastatin (LIPITOR) 40 mg tablet Take 1 tablet (40 mg total) by mouth daily with dinner  0    GNP Milk of Magnesia 1200 MG/15ML oral suspension       lidocaine-prilocaine (EMLA) cream Apply to port site 30-60 minutes prior to infusion appointment. 30 g 1    loperamide (IMODIUM A-D) 2 MG tablet Take 1 tablet (2 mg total) by mouth 4 (four) times a day as needed for diarrhea 30 tablet 0    metoprolol succinate (TOPROL-XL) 25 mg 24 hr tablet Take 1 tablet (25 mg total) by mouth every 12 (twelve) hours 60 tablet 0    nitroglycerin (NITROSTAT) 0.4 mg SL tablet Place 1 tablet (0.4 mg total) under the tongue every 5 (five) minutes as needed for chest pain      nystatin powder       ondansetron (ZOFRAN) 8 mg tablet Take 1 tablet (8 mg total) by mouth every 8 (eight) hours as needed for nausea or vomiting 60 tablet 0    OneLAX 10 MG suppository       polyethylene glycol (GLYCOLAX) 17 GM/SCOOP powder       prochlorperazine (COMPAZINE) 10 mg tablet Take 1 tablet (10 mg total) by mouth every 6 (six) hours as needed for nausea or vomiting 30 tablet 0    sertraline (ZOLOFT) 25 mg tablet Take 1 tablet (25 mg total) by mouth daily Do not start before December 26, 2024. 30 tablet 0    sodium chloride, PF, 0.9 % 10 mL by Intracatheter route daily Intracatheter flushing daily. May substitute prefilled syringe with normal saline 10 mL vials, 10 mL syringes, and 18 g blunt needles 300 mL 0    lisinopril (ZESTRIL) 5 mg tablet Take 1 tablet (5 mg total) by mouth daily Do not start before November 4, 2024. (Patient not taking: Reported on 3/14/2025)       Current Facility-Administered Medications on File Prior to Visit   Medication Dose  "Route Frequency Provider Last Rate Last Admin    alteplase (CATHFLO) injection 2 mg  2 mg Intracatheter Q1MIN PRN Juan Carlos Foy MD          Social History     Tobacco Use    Smoking status: Former     Types: Cigarettes     Start date:      Quit date:      Years since quittin.3    Smokeless tobacco: Never    Tobacco comments:     Quit over 30 years ago (Updated 2023).    Vaping Use    Vaping status: Never Used   Substance and Sexual Activity    Alcohol use: Not Currently    Drug use: Never    Sexual activity: Not on file        Objective   /58 (BP Location: Left arm, Patient Position: Sitting, Cuff Size: Standard)   Ht 5' 7.01\" (1.702 m)   BMI 28.34 kg/m²      Physical Exam  General:  no acute distress at this time  Skin:  No acute rash  Eyes:  No scleral icterus and noninjected  ENT:  mucous membranes moist  Neck:  no carotid bruits  Chest:  Clear to auscultation percussion, good respiratory effort, no use of accessory respiratory muscles  CVS:  Regular rate and rhythm without rub   Abdomen:  soft and nontender   Extremities: no significant lower extremity edema  Neuro:  No gross focality  Psych:  Alert , cooperative       "

## 2025-05-08 NOTE — ASSESSMENT & PLAN NOTE
Wt Readings from Last 3 Encounters:   05/07/25 82.1 kg (181 lb)   05/06/25 82.1 kg (181 lb)   04/17/25 83.4 kg (183 lb 13.8 oz)     Euvolemic  Continue metoprolol twice a day  Not on diuretics at this time  Follow-up with cardiology

## 2025-05-08 NOTE — ASSESSMENT & PLAN NOTE
Right-sided hydroureteronephrosis with hematoma versus mass in the bladder  Currently with suprapubic catheter with persistent right hydro  Complicated with worsening CHUY  Follow up with urology

## 2025-05-08 NOTE — ASSESSMENT & PLAN NOTE
Lab Results   Component Value Date    EGFR 54 04/11/2025    EGFR 59 03/20/2025    EGFR 56 03/17/2025    CREATININE 1.27 04/11/2025    CREATININE 1.18 03/20/2025    CREATININE 1.23 03/17/2025     Calcium 9mg/dL  PTH at goal 46.5  No indication of binders at this time

## 2025-05-08 NOTE — ASSESSMENT & PLAN NOTE
Lab Results   Component Value Date    EGFR 54 04/11/2025    EGFR 59 03/20/2025    EGFR 56 03/17/2025    CREATININE 1.27 04/11/2025    CREATININE 1.18 03/20/2025    CREATININE 1.23 03/17/2025   Volume: Euvolemic  Blood pressure: Normotensive, /58   Recommend:  Continue low-sodium diet  Continue amlodipine 5  Metoprolol 25 twice a day  Low-sodium diet  Advised to maintain a good BP control to prevent progression of CKD

## 2025-05-09 ENCOUNTER — HOSPITAL ENCOUNTER (OUTPATIENT)
Dept: INFUSION CENTER | Facility: HOSPITAL | Age: 75
End: 2025-05-09
Attending: INTERNAL MEDICINE
Payer: MEDICARE

## 2025-05-09 VITALS
HEIGHT: 67 IN | SYSTOLIC BLOOD PRESSURE: 109 MMHG | WEIGHT: 181 LBS | RESPIRATION RATE: 20 BRPM | HEART RATE: 73 BPM | TEMPERATURE: 98.3 F | BODY MASS INDEX: 28.41 KG/M2 | DIASTOLIC BLOOD PRESSURE: 56 MMHG

## 2025-05-09 DIAGNOSIS — C67.8 MALIGNANT NEOPLASM OF OVERLAPPING SITES OF BLADDER (HCC): ICD-10-CM

## 2025-05-09 DIAGNOSIS — T45.1X5A CHEMOTHERAPY INDUCED NEUTROPENIA (HCC): Primary | ICD-10-CM

## 2025-05-09 DIAGNOSIS — D70.1 CHEMOTHERAPY INDUCED NEUTROPENIA (HCC): Primary | ICD-10-CM

## 2025-05-09 PROCEDURE — 96413 CHEMO IV INFUSION 1 HR: CPT

## 2025-05-09 PROCEDURE — 96367 TX/PROPH/DG ADDL SEQ IV INF: CPT

## 2025-05-09 PROCEDURE — 96377 APPLICATON ON-BODY INJECTOR: CPT

## 2025-05-09 RX ORDER — SODIUM CHLORIDE 9 MG/ML
20 INJECTION, SOLUTION INTRAVENOUS ONCE
Status: COMPLETED | OUTPATIENT
Start: 2025-05-09 | End: 2025-05-09

## 2025-05-09 RX ADMIN — SODIUM CHLORIDE 20 ML/HR: 0.9 INJECTION, SOLUTION INTRAVENOUS at 10:21

## 2025-05-09 RX ADMIN — DEXAMETHASONE SODIUM PHOSPHATE: 10 INJECTION, SOLUTION INTRAMUSCULAR; INTRAVENOUS at 10:21

## 2025-05-09 RX ADMIN — ETOPOSIDE 138 MG: 20 INJECTION, SOLUTION INTRAVENOUS at 10:58

## 2025-05-09 RX ADMIN — PEGFILGRASTIM 6 MG: KIT SUBCUTANEOUS at 12:06

## 2025-05-09 NOTE — PROGRESS NOTES
Toro Rodriguez  tolerated treatment well with no complications.      Toro Rodriguez is aware of future appt on 5/28/25 at 0900.     AVS printed and given to Toro Rodriguez:

## 2025-05-12 DIAGNOSIS — C67.8 MALIGNANT NEOPLASM OF OVERLAPPING SITES OF BLADDER (HCC): Primary | ICD-10-CM

## 2025-05-13 DIAGNOSIS — N40.1 BENIGN PROSTATIC HYPERPLASIA WITH URINARY RETENTION: ICD-10-CM

## 2025-05-13 DIAGNOSIS — T83.89XA URETHRAL EROSION BY CATHETER, INITIAL ENCOUNTER  (HCC): ICD-10-CM

## 2025-05-13 DIAGNOSIS — R33.8 BENIGN PROSTATIC HYPERPLASIA WITH URINARY RETENTION: ICD-10-CM

## 2025-05-13 DIAGNOSIS — N36.8 URETHRAL EROSION BY CATHETER, INITIAL ENCOUNTER  (HCC): ICD-10-CM

## 2025-05-13 DIAGNOSIS — Q62.11 HYDRONEPHROSIS WITH URETEROPELVIC JUNCTION (UPJ) OBSTRUCTION: Primary | ICD-10-CM

## 2025-05-15 ENCOUNTER — OFFICE VISIT (OUTPATIENT)
Age: 75
End: 2025-05-15
Payer: MEDICARE

## 2025-05-15 ENCOUNTER — TELEPHONE (OUTPATIENT)
Age: 75
End: 2025-05-15

## 2025-05-15 VITALS
DIASTOLIC BLOOD PRESSURE: 63 MMHG | SYSTOLIC BLOOD PRESSURE: 109 MMHG | HEIGHT: 67 IN | BODY MASS INDEX: 27.94 KG/M2 | WEIGHT: 178 LBS | OXYGEN SATURATION: 96 % | HEART RATE: 71 BPM | TEMPERATURE: 98.6 F | RESPIRATION RATE: 18 BRPM

## 2025-05-15 DIAGNOSIS — D63.1 ANEMIA DUE TO STAGE 3B CHRONIC KIDNEY DISEASE  (HCC): ICD-10-CM

## 2025-05-15 DIAGNOSIS — N18.32 ANEMIA DUE TO STAGE 3B CHRONIC KIDNEY DISEASE  (HCC): ICD-10-CM

## 2025-05-15 DIAGNOSIS — E83.9 CHRONIC KIDNEY DISEASE-MINERAL BONE DISORDER (CKD-MBD) WITH STAGE 3B CHRONIC KIDNEY DISEASE (HCC): ICD-10-CM

## 2025-05-15 DIAGNOSIS — C79.51 METASTASIS TO BONE (HCC): ICD-10-CM

## 2025-05-15 DIAGNOSIS — N18.32 CHRONIC KIDNEY DISEASE-MINERAL BONE DISORDER (CKD-MBD) WITH STAGE 3B CHRONIC KIDNEY DISEASE (HCC): ICD-10-CM

## 2025-05-15 DIAGNOSIS — C67.8 MALIGNANT NEOPLASM OF OVERLAPPING SITES OF BLADDER (HCC): Primary | ICD-10-CM

## 2025-05-15 DIAGNOSIS — D50.0 IRON DEFICIENCY ANEMIA DUE TO CHRONIC BLOOD LOSS: ICD-10-CM

## 2025-05-15 DIAGNOSIS — M89.9 CHRONIC KIDNEY DISEASE-MINERAL BONE DISORDER (CKD-MBD) WITH STAGE 3B CHRONIC KIDNEY DISEASE (HCC): ICD-10-CM

## 2025-05-15 PROBLEM — R19.7 DIARRHEA: Status: RESOLVED | Noted: 2025-02-15 | Resolved: 2025-05-15

## 2025-05-15 PROBLEM — Z01.89 ENCOUNTER FOR GERIATRIC ASSESSMENT: Status: RESOLVED | Noted: 2024-03-08 | Resolved: 2025-05-15

## 2025-05-15 PROBLEM — Z71.2 ENCOUNTER TO DISCUSS TEST RESULTS: Status: RESOLVED | Noted: 2024-07-30 | Resolved: 2025-05-15

## 2025-05-15 PROBLEM — R31.9 HEMATURIA: Status: RESOLVED | Noted: 2024-12-15 | Resolved: 2025-05-15

## 2025-05-15 PROCEDURE — 99214 OFFICE O/P EST MOD 30 MIN: CPT | Performed by: INTERNAL MEDICINE

## 2025-05-15 PROCEDURE — G2211 COMPLEX E/M VISIT ADD ON: HCPCS | Performed by: INTERNAL MEDICINE

## 2025-05-15 RX ORDER — SODIUM CHLORIDE 9 MG/ML
20 INJECTION, SOLUTION INTRAVENOUS ONCE
OUTPATIENT
Start: 2025-05-15

## 2025-05-15 RX ORDER — SODIUM CHLORIDE 9 MG/ML
20 INJECTION, SOLUTION INTRAVENOUS ONCE
OUTPATIENT
Start: 2025-05-29

## 2025-05-15 RX ORDER — SODIUM CHLORIDE 9 MG/ML
20 INJECTION, SOLUTION INTRAVENOUS ONCE
OUTPATIENT
Start: 2025-05-30

## 2025-05-15 RX ORDER — SODIUM CHLORIDE 9 MG/ML
20 INJECTION, SOLUTION INTRAVENOUS ONCE
OUTPATIENT
Start: 2025-05-28

## 2025-05-15 NOTE — ASSESSMENT & PLAN NOTE
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     Currently on first-line treatment with carboplatin and etoposide which he started on January 14, 2025  Restaging PET/CT from 4/14/2025, after 3 cycles, showed overall good response, with resolution of liver and pelvic maya metastasis.  There is improvement in majority of the osseous metastatic disease except for 2 lesions as noted below.  Overall, there has been positive response.    He has now completed 5 cycles - 5/6/2025  Plan is to continue on the current regimen with close monitoring  Repeat PET-CT after completion of 6 cycles  Continue current dose reduction as follows - etoposide at 70% and carboplatin with AUC of 3.    Orders:    NM PET CT skull base to mid thigh; Future

## 2025-05-15 NOTE — ASSESSMENT & PLAN NOTE
Lab Results   Component Value Date    EGFR 54 04/11/2025    EGFR 59 03/20/2025    EGFR 56 03/17/2025    CREATININE 1.27 04/11/2025    CREATININE 1.18 03/20/2025    CREATININE 1.23 03/17/2025

## 2025-05-15 NOTE — PROGRESS NOTES
Name: Toro Rodriguez      : 1950      MRN: [id number]  Encounter Provider: Juan Carlos Foy MD  Encounter Date: 5/15/2025   Encounter department: 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     Currently on first-line treatment with carboplatin and etoposide which he started on 2025  Restaging PET/CT from 2025, after 3 cycles, showed overall good response, with resolution of liver and pelvic maya metastasis.  There is improvement in majority of the osseous metastatic disease except for 2 lesions as noted below.  Overall, there has been positive response.    He has now completed 5 cycles - 2025  Plan is to continue on the current regimen with close monitoring  Repeat PET-CT after completion of 6 cycles  Continue current dose reduction as follows - etoposide at 70% and carboplatin with AUC of 3.    Orders:    NM PET CT skull base to mid thigh; Future     Iron deficiency anemia due to chronic blood loss      On  venofer that he started 4/15 and received 3 doses so far with his chemotherapy.   Remains anemic. Change to weekly venofer to complete course            Metastasis to bone (HCC)  Discussed bone modifying agent with zometa.   Pt will work on getting a dental clearance  This is still pending.          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   Secondary to DM       Anemia due to stage 3b chronic kidney disease  (HCC)  Lab Results   Component Value Date    EGFR 54 2025    EGFR 59 2025    EGFR  56 03/17/2025    CREATININE 1.27 04/11/2025    CREATININE 1.18 03/20/2025    CREATININE 1.23 03/17/2025                History of Present Illness   Chief Complaint   Patient presents with    Follow-up     Toro Rodriguez is a 75 y.o. male with multiple comorbidities including hypertension, coronary artery disease, CHF, history of DVT, cardiomyopathy,  BPH, stage III CKD, history of melanoma, who had been referred for new diagnosis of bladder cancer. Patient is a former smoker, quit at the age of 33.    He had a CT abdomen and pelvis in April and July 2024 due to hematuria and pelvic pain, which were unremarkable.    CT abdomen from September 5 showed nonspecific diffuse bladder wall thickening and subsequently right hydroureteronephrosis. He had a suprapubic catheter placed on September 27, 2024. Repeat CT from October 31 showed a possible soft tissue mass on the right side of the bladder.    He had a CT abdomen and pelvis with contrast from November 10, 2024 which revealed a 4 cm soft tissue density within the right bladder wall.  He has had multiple CT scans since.     Patient presented with gross hematuria and lower abdominal pain on December 15. He required PRBC support.  Eliquis, which he had been for prior DVT, but stopped 2 months prior to admission.  During this admission, he required a Colon placement and CBI.  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    Renal ultrasound from December 17 showed moderate right hydronephrosis.  CT renal from December 20 showed persistent right-sided moderate hydronephrosis and hydroureter up to the right ureterovesical junction. His creatinine worsened during the admission, and a nephrostomy tube was placed on December 24. He also underwent a suprapubic catheter placement.    CT chest without contrast from December 23, 2024 showed solid 0.8 cm and a  1 cm nodule in the right lower lobe which were new from last year.  Another solid 0.3 cm right upper lobe nodule.    PET-CT 1/9/25 -suggestive of more widespread ds including multiple bones, liver, lungs, pelvic nodes and possible brain metatasis    PORT placed 1/10/25. He was started on Carboplatin and etosposide on 1/14/2025 for small cell bladder cancer.    Received cycle 2 on 2/4/2025.   Admitted to the hospital from 2/14 - 2/17 after a fall after attempting to sit down and missed chair.  Patient had CHUY and hip dislocation on admission. Hip was reduced in ER. He was subsequently d/c to rehab.     Brain MRI 3/9/25 - negative for metastasis    He received cycles 3 and 4 on 3/25 and 4/15.     He underwent a restaging PET/CT on April 14, 2025 which showed a mixed response.  Resolution of FDG avid hepatic lesions and right pelvic lymph nodes.  Many of the osseous lesions have improved, but new lesions noted in the left base of the skull, and increasing uptake in the left anterior iliac lesion.    He is here today for a follow up visit    Oncology History   Cancer Staging   Malignant neoplasm of overlapping sites of bladder (HCC)  Staging form: Urinary Bladder, AJCC 8th Edition  - Clinical stage from 4/17/2025: cT2, cN2, cM1 - Signed by Juan Carlos Foy MD on 4/17/2025  Histopathologic type: Small cell carcinoma, NOS  Stage prefix: Initial diagnosis  WHO/ISUP grade (low/high): High Grade  Histologic grading system: 2 grade system    Melanoma of back (HCC)  Staging form: Melanoma of the Skin, AJCC 8th Edition  - Pathologic: Stage Unknown (pT1a, pNX, cM0) - Signed by Lillie La MD on 4/3/2024  Oncology History Overview Note   Small cell bladder cancer with metastasis to bones, liver, lungs, nodes  Carboplatin/etoposide 1/14/25- current     Melanoma of back (HCC)   1/31/2024 Biopsy    B. Skin, left lower back, shave biopsy:     MELANOMA (thickness: at least 0.7 mm) (see note).     Note: SOX10, MART, and PRAME  immunostains were reviewed; significant melanocytic architectural disorder is seen, and the lesional cells are positive for PRAME. Please see synoptic report for additional details.     Synoptic report for melanoma of the skin  Thickness: at least 0.7 mm (invasive melanoma extends to deep specimen margin)  Ulceration: not seen  Anatomic (Lukas) level: at least IV  Type: superficial spreading melanoma  Mitoses: 2/mm2  Microsatellites: cannot be determined  Lymphovascular invasion: not seen  Neurotropism: not seen  Tumor regression: present  Tumor-infiltrating lymphocytes (TIL): present, non-brisk  Margin assessment: invasive melanoma extends to deep specimen margin; melanoma in situ extends to peripheral specimen margin  Pathologic stage: at least pT1a  Associated nevus: dermal melanocytic nevus     2/20/2024 Initial Diagnosis    Melanoma of back (HCC)     3/18/2024 Surgery    A. Skin, back, excision:     -Residual focal melanoma (thickness: 0.7 mm); not seen at examined inked specimen margins.     -Prior procedure site changes present.     4/3/2024 -  Cancer Staged    Staging form: Melanoma of the Skin, AJCC 8th Edition  - Pathologic: Stage Unknown (pT1a, pNX, cM0) - Signed by Lillie La MD on 4/3/2024       Malignant neoplasm of overlapping sites of bladder (HCC)   12/15/2024 Surgery    Final Diagnosis   A. 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  - Tumor invades muscularis propria.      Comment: No evidence of conventional urothelial carcinoma is present.        12/31/2024 Initial Diagnosis    Malignant neoplasm of overlapping sites of bladder (HCC)     1/14/2025 -  Chemotherapy    etoposide (TOPOSAR), 80 mg/m2 = 157.6 mg (80 % of original dose 100 mg/m2), 5 of 6 cycles  Dose modification: 80 mg/m2 (80 % of original dose 100 mg/m2, Cycle 1, Reason: Dose Not Tolerated), 70 mg/m2 (70 % of original dose 100 mg/m2, Cycle 3,  Reason: Dose Not Tolerated)  Administration: 157.6 mg (1/14/2025), 157.6 mg (1/15/2025), 157.6 mg (2/4/2025), 157.6 mg (1/16/2025), 157.6 mg (2/5/2025), 157.6 mg (2/6/2025), 138 mg (3/25/2025), 138 mg (3/26/2025), 138 mg (3/27/2025), 138 mg (4/15/2025), 138 mg (4/16/2025), 138 mg (4/17/2025), 138 mg (5/6/2025), 138 mg (5/7/2025), 138 mg (5/9/2025)  CARBOplatin (PARAPLATIN) IVPB (GO AUC DOSING), 200.4 mg, 5 of 6 cycles  Administration: 200.4 mg (1/14/2025), 204.6 mg (2/4/2025), 241.8 mg (3/25/2025), 230.1 mg (4/15/2025), 216.3 mg (5/6/2025)     4/17/2025 -  Cancer Staged    Staging form: Urinary Bladder, AJCC 8th Edition  - Clinical stage from 4/17/2025: cT2, cN2, cM1 - Signed by Juan Carlos Foy MD on 4/17/2025  Histopathologic type: Small cell carcinoma, NOS  Stage prefix: Initial diagnosis  WHO/ISUP grade (low/high): High Grade  Histologic grading system: 2 grade system          Review of Systems   Constitutional:  Positive for fatigue. Negative for fever.   Respiratory:  Negative for cough, chest tightness and shortness of breath.    Cardiovascular:  Negative for chest pain, palpitations and leg swelling.   Gastrointestinal:  Positive for constipation (taking miralax PRN), diarrhea and nausea (antiemetics have been helpful). Negative for abdominal pain and vomiting.   Genitourinary:  Negative for hematuria.   Neurological:         No neuropathy   All other systems reviewed and are negative.    Current Outpatient Medications on File Prior to Visit   Medication Sig Dispense Refill    acetaminophen (TYLENOL) 325 mg tablet Take 325 mg by mouth every 6 (six) hours as needed for mild pain      amiodarone 200 mg tablet Take 1 tablet (200 mg total) by mouth daily 90 tablet 2    amLODIPine (NORVASC) 5 mg tablet Take 1 tablet (5 mg total) by mouth daily 90 tablet 1    aspirin 81 mg chewable tablet Chew 1 tablet (81 mg total) daily Do not start before September 30, 2024.      atorvastatin (LIPITOR) 40 mg tablet Take 1  tablet (40 mg total) by mouth daily with dinner  0    GNP Milk of Magnesia 1200 MG/15ML oral suspension       lidocaine-prilocaine (EMLA) cream Apply to port site 30-60 minutes prior to infusion appointment. 30 g 1    loperamide (IMODIUM A-D) 2 MG tablet Take 1 tablet (2 mg total) by mouth 4 (four) times a day as needed for diarrhea 30 tablet 0    nitroglycerin (NITROSTAT) 0.4 mg SL tablet Place 1 tablet (0.4 mg total) under the tongue every 5 (five) minutes as needed for chest pain      nystatin powder       ondansetron (ZOFRAN) 8 mg tablet Take 1 tablet (8 mg total) by mouth every 8 (eight) hours as needed for nausea or vomiting 60 tablet 0    OneLAX 10 MG suppository       polyethylene glycol (GLYCOLAX) 17 GM/SCOOP powder       prochlorperazine (COMPAZINE) 10 mg tablet Take 1 tablet (10 mg total) by mouth every 6 (six) hours as needed for nausea or vomiting 30 tablet 0    sertraline (ZOLOFT) 25 mg tablet Take 1 tablet (25 mg total) by mouth daily Do not start before 2024. 30 tablet 0    lisinopril (ZESTRIL) 5 mg tablet Take 1 tablet (5 mg total) by mouth daily Do not start before 2024. (Patient not taking: Reported on 3/14/2025)      metoprolol succinate (TOPROL-XL) 25 mg 24 hr tablet Take 1 tablet (25 mg total) by mouth every 12 (twelve) hours 60 tablet 0    sodium chloride, PF, 0.9 % 10 mL by Intracatheter route daily Intracatheter flushing daily. May substitute prefilled syringe with normal saline 10 mL vials, 10 mL syringes, and 18 g blunt needles 300 mL 0     No current facility-administered medications on file prior to visit.      Social History     Tobacco Use    Smoking status: Former     Types: Cigarettes     Start date:      Quit date: 2     Years since quittin.4    Smokeless tobacco: Never    Tobacco comments:     Quit over 30 years ago (Updated 2023).    Vaping Use    Vaping status: Never Used   Substance and Sexual Activity    Alcohol use: Not Currently    Drug  "use: Never    Sexual activity: Not on file         Objective   Ht 5' 7.01\" (1.702 m)   BMI 28.34 kg/m²     ECOG   2  Physical Exam  Vitals reviewed.   Constitutional:       Appearance: He is obese.   HENT:      Mouth/Throat:      Mouth: Mucous membranes are moist.      Pharynx: Oropharynx is clear.     Cardiovascular:      Rate and Rhythm: Normal rate and regular rhythm.      Pulses: Normal pulses.      Heart sounds: No murmur heard.  Pulmonary:      Effort: Pulmonary effort is normal. No respiratory distress.      Breath sounds: Normal breath sounds. No wheezing.   Abdominal:      Palpations: Abdomen is soft. There is no mass.      Comments: Suprapubic catheter in place, along with nephrostomy tube     Musculoskeletal:         General: No swelling.      Cervical back: Neck supple.     Neurological:      General: No focal deficit present.      Mental Status: He is alert. Mental status is at baseline.         Labs: I have reviewed the following labs:  Lab Results   Component Value Date/Time    WBC 8.88 04/11/2025 12:01 PM    RBC 3.30 (L) 04/11/2025 12:01 PM    Hemoglobin 10.2 (L) 04/11/2025 12:01 PM    Hematocrit 33.7 (L) 04/11/2025 12:01 PM     (H) 04/11/2025 12:01 PM    MCH 30.9 04/11/2025 12:01 PM    RDW 17.4 (H) 04/11/2025 12:01 PM    Platelets 402 (H) 04/11/2025 12:01 PM    Segmented % 69 03/20/2025 04:43 PM    Lymphocytes % 19 04/11/2025 12:01 PM    Lymphocytes % 15 03/20/2025 04:43 PM    Monocytes % 4 04/11/2025 12:01 PM    Monocytes % 8 03/20/2025 04:43 PM    Eosinophils % 0 04/11/2025 12:01 PM    Eosinophils Relative 7 (H) 03/20/2025 04:43 PM    Basophils % 1 04/11/2025 12:01 PM    Basophils Relative 1 03/20/2025 04:43 PM    Immature Grans % 0 03/20/2025 04:43 PM    Absolute Neutrophils 5.84 03/20/2025 04:43 PM     Lab Results   Component Value Date/Time    Potassium 4.6 04/11/2025 12:01 PM    Chloride 103 04/11/2025 12:01 PM    CO2 23 04/11/2025 12:01 PM    BUN 25 04/11/2025 12:01 PM    Creatinine " 1.27 04/11/2025 12:01 PM    Glucose, Fasting 75 04/11/2025 12:01 PM    Calcium 9.0 04/11/2025 12:01 PM    Corrected Calcium 9.3 03/20/2025 04:43 PM    AST 19 04/11/2025 12:01 PM    ALT 17 04/11/2025 12:01 PM    Alkaline Phosphatase 106 (H) 04/11/2025 12:01 PM    Total Protein 6.8 04/11/2025 12:01 PM    Albumin 3.8 04/11/2025 12:01 PM    Total Bilirubin 0.38 04/11/2025 12:01 PM    eGFR 54 04/11/2025 12:01 PM         Radiology Results Review: I personally reviewed the following image studies in PACS and associated radiology reports: CT chest and CT abdomen/pelvis. My interpretation of the radiology images/reports is: as noted above..    Administrative Statements   I have spent a total time of 30 minutes in caring for this patient on the day of the visit/encounter including Diagnostic results, Prognosis, Patient and family education, Counseling / Coordination of care, Documenting in the medical record, Reviewing / ordering tests, medicine, procedures  , and Obtaining or reviewing history  . Topics discussed with the patient / family include symptom assessment and management, goals of care, supportive listening, and anticipatory guidance.

## 2025-05-15 NOTE — ASSESSMENT & PLAN NOTE
Discussed bone modifying agent with zometa.   Pt will work on getting a dental clearance  This is still pending.

## 2025-05-15 NOTE — ASSESSMENT & PLAN NOTE
On  venofer that he started 4/15 and received 3 doses so far with his chemotherapy.   Remains anemic. Change to weekly venofer to complete course

## 2025-05-15 NOTE — ASSESSMENT & PLAN NOTE
Lab Results   Component Value Date    EGFR 54 04/11/2025    EGFR 59 03/20/2025    EGFR 56 03/17/2025    CREATININE 1.27 04/11/2025    CREATININE 1.18 03/20/2025    CREATININE 1.23 03/17/2025     Stable.   Secondary to DM

## 2025-05-20 ENCOUNTER — HOSPITAL ENCOUNTER (OUTPATIENT)
Dept: INTERVENTIONAL RADIOLOGY/VASCULAR | Facility: HOSPITAL | Age: 75
Discharge: HOME | End: 2025-05-20
Attending: RADIOLOGY
Payer: MEDICARE

## 2025-05-20 ENCOUNTER — TELEPHONE (OUTPATIENT)
Age: 75
End: 2025-05-20

## 2025-05-20 DIAGNOSIS — Q62.11 HYDRONEPHROSIS WITH URETEROPELVIC JUNCTION (UPJ) OBSTRUCTION: Primary | ICD-10-CM

## 2025-05-20 PROCEDURE — 50431 NJX PX NFROSGRM &/URTRGRM: CPT

## 2025-05-20 PROCEDURE — 50431 NJX PX NFROSGRM &/URTRGRM: CPT | Performed by: INTERNAL MEDICINE

## 2025-05-20 RX ADMIN — IOHEXOL 15 ML: 350 INJECTION, SOLUTION INTRAVENOUS at 11:15

## 2025-05-20 NOTE — SEDATION DOCUMENTATION
Right PCN checked and capped. Will f/u in 1 week for possible removal. AVS reviewed and home with son in law. Capping trial instructions given.

## 2025-05-20 NOTE — DISCHARGE INSTRUCTIONS
Nephrostomy Tube Care     WHAT YOU NEED TO KNOW:   A nephrostomy tube is a catheter (thin plastic tube) that is inserted through your skin and into your kidney. The nephrostomy tube drains urine from your kidney into a collecting bag outside your body. You may need a nephrostomy tube when something is blocking the normal flow of urine. A nephrostomy tube may be used for a short or a long period of time. The nephrostomy tube comes out of your back, so you will need someone to help care for your nephrostomy tube.          DISCHARGE INSTRUCTIONS:      How to clean the skin around the nephrostomy tube and change the bandage:  Since the nephrostomy tube comes out of your back, you will not be able to care for it by yourself. Ask someone to follow the general directions below to check and care for your nephrostomy tube.   Gather the items you will need.          Disposable (single use) under-pad, and a clean washcloth  Plain soap, warm water, and new medical gloves  Sterile gauze bandages  Clear adhesive dressing or medical tape  Skin barrier  Protective skin film  Trash bag  Remove the old bandage, and check the tube entry site.    Have the patient lie on his side with the nephrostomy tube entry site facing up. Place the under-pad where it will catch drainage as you are working with the nephrostomy tube.   Wash your hands with soap and water. Put on new medical gloves.  Gently remove the old bandage, without pulling on the tube. Do this by holding the skin beside the tube with one hand. With the other hand, gently remove sticky tape and the skin barrier by pulling in the same direction as hair growth. Do not touch the side of the bandage that is placed over or around the tube. Throw the bandage and skin barrier away in a trash bag.  Look for signs of infection, such as skin redness and swelling. Report any skin changes to healthcare providers.  Clean the tube entry site.    Hold the tube in place to keep it from  being pulled out while you are cleaning around it.  You will need to clean the area twice. For the first cleaning, wet a new gauze bandage with soap and water.  Begin at the entry site of the tube. Wipe the skin in circles, moving away from the entry site. Remove blood and any other material with the gauze. Do this as often as needed. Use a new gauze bandage each time you clean the area, moving away from the entry site.   For the second cleaning, wet a new gauze bandage with water. Begin at the entry site of the tube. Wipe the skin in circles, moving away from the entry site. Use a new gauze bandage each time you clean the area, moving away from the entry site.   Gently pat the skin with a clean washcloth to dry it.    Apply the skin barrier and bandages.    Roll up a bandage to make it thick, and place it under  the place where the tube enters the skin. Place it to support the tube, and stop it from kinking or bending. Tape the bandage in place, and apply more bandages if directed by a healthcare provider.   Bring the tubing forward to the front and tape it to the skin. Do not stretch the tube tight, because this may pull the nephrostomy tube out.  How often to change the bandage.  Change the bandage around the tube, every other day. If your bandages  get dirty or wet, change them right away, and as often as needed. If your nephrostomy tube is to be used for a long period of time, the tube needs to be changed every 2 to 3 months. Healthcare providers will tell you when you need to make an appointment to have your tube changed.     How to care for the urine drainage bag:   Ask if you need to measure and write down how much urine is in the bag before you empty it. Drain urine out of the drainage bag when it is ½ to ? full. Open the spout at the bottom of the bag to empty the urine into the toilet.   You may need to detach the drainage bag from the nephrostomy tube to change it.. If so, attach a new drainage bag  tightly to the nephrostomy tube.     How to prevent problems with your nephrostomy tube:   Change bandages, directed.  This helps to prevent infection. Throw away or clean your drainage bag as directed by your healthcare provider.    Wipe the connecting ends of the drainage bag with alcohol before you reconnect the bag to the tube.  This helps prevent infection.     Keep the tube taped to your skin and connected to a drainage bag placed below the level of your kidneys.  This helps prevent urine from backing up into your kidneys. You may wear a small drainage bag strapped to your leg to let you move around more easily.    Check the catheter to be sure it is in place after you change your clothes or do other activities.  Do not wear tight clothing over the tube. Place the tubing over your thigh rather than under it when you are sitting down. Be sure that nothing is pulling on the nephrostomy tube when you move around.    Change positions if you see little or no urine in your drainage bag.  Check to see if the urine tube is twisted or bent. Be sure that you are not sitting or lying on the tube. If there are no kinks and there is little or no urine in the drainage bag, tell your healthcare provider.    Flush out the tube as directed. Some tubes get flushed one time a day with 10 mls of NSS You will be given a prescription for the flushes.  To flush the nephrostomy tube, clean both connections with alcohol swap. Twist off the drainage bag tube and twist the saline syringe into the nephrostomy tube and flush briskly. Remove the syringe and twist the drainage bag tube back into the nephrostomy tube.  Keep the site covered while you shower.  Tape a piece of clear adhesive plastic over the dressing to keep it dry while you shower. Do not take tub baths.    Contact Interventional Radiology at [phone] ([facility] PATIENTS: Contact Interventional Radiology at [phone]) ([facility] PATIENTS: Contact Interventional Radiology at  [phone]) if:  The skin around the nephrostomy tube is red, swollen, itches, or has a rash.   You have a fever greater than 101 or chills.  You have lower back or hip pain.  There are changes in how your urine looks or smells.  You have little or no urine draining from the nephrostomy tube.   You have nausea and are vomiting.  The black ian on your tube has moved, or the tube is longer than when it was put in.   You have questions or concerns about your condition or care.  The nephrostomy tube comes out completely.   There is blood, pus, or a bad smell coming from the place where the tube enters your skin.  Urine is leaking around the tube.        The following pharmacies carry the flush syringes.       [facility]                     [address]                     [address] [facility]                    [phone]  [place]                       [place]  Phone [phone]            Phone [phone]                 [facility]                                                                                                   [phone]  [facility]             [facility]                             [address] [place], [place] [address] [facility]   [place] CHRISTINE                                 [phone]  Phone [phone]            Phone [phone]    [facility]                                                                         [facility] [phone]  Isabel Sandoval.  [place]   Phone [phone]

## 2025-05-20 NOTE — BRIEF OP NOTE (RAD/CATH)
INTERVENTIONAL RADIOLOGY PROCEDURE NOTE    Date: 5/20/2025    Procedure:   Procedure Summary       Date: 05/20/25 Room / Location: [facility] Interventional Radiology    Anesthesia Start:  Anesthesia Stop:     Procedure: IR NEPHROSTOMY TUBE CHECK/CHANGE/REPOSITION/REINSERTION/UPSIZE Diagnosis:       Hydronephrosis with ureteropelvic junction (UPJ) obstruction      (Routine exchange and capping trial)    Scheduled Providers:  Responsible Provider:     Anesthesia Type: Not recorded ASA Status: Not recorded            Preoperative diagnosis:   1. Hydronephrosis with ureteropelvic junction (UPJ) obstruction         Postoperative diagnosis: Same.    Surgeon: Karl Moseley MD     Assistant: None. No qualified resident was available.    Blood loss: None    Specimens: None     Findings: Antegrade nephrostogram showed patency of the right ureter with flow of contrast into the urinary bladder. Given this finding, the tube was capped.    We will plan for tube check with possible removal in 1 week.    Complications: None immediate.    Anesthesia: none

## 2025-05-27 ENCOUNTER — TELEPHONE (OUTPATIENT)
Age: 75
End: 2025-05-27

## 2025-05-27 ENCOUNTER — APPOINTMENT (OUTPATIENT)
Dept: LAB | Facility: CLINIC | Age: 75
End: 2025-05-27
Payer: MEDICARE

## 2025-05-27 DIAGNOSIS — T45.1X5A CHEMOTHERAPY INDUCED NEUTROPENIA (HCC): ICD-10-CM

## 2025-05-27 DIAGNOSIS — C67.8 MALIGNANT NEOPLASM OF OVERLAPPING SITES OF BLADDER (HCC): ICD-10-CM

## 2025-05-27 DIAGNOSIS — D70.1 CHEMOTHERAPY INDUCED NEUTROPENIA (HCC): ICD-10-CM

## 2025-05-27 LAB
ALBUMIN SERPL BCG-MCNC: 3.8 G/DL (ref 3.5–5)
ALP SERPL-CCNC: 91 U/L (ref 34–104)
ALT SERPL W P-5'-P-CCNC: 10 U/L (ref 7–52)
ANION GAP SERPL CALCULATED.3IONS-SCNC: 9 MMOL/L (ref 4–13)
AST SERPL W P-5'-P-CCNC: 12 U/L (ref 13–39)
BASOPHILS # BLD AUTO: 0.06 THOUSANDS/ÂΜL (ref 0–0.1)
BASOPHILS NFR BLD AUTO: 1 % (ref 0–1)
BILIRUB SERPL-MCNC: 0.27 MG/DL (ref 0.2–1)
BUN SERPL-MCNC: 29 MG/DL (ref 5–25)
CALCIUM SERPL-MCNC: 9.1 MG/DL (ref 8.4–10.2)
CHLORIDE SERPL-SCNC: 103 MMOL/L (ref 96–108)
CO2 SERPL-SCNC: 26 MMOL/L (ref 21–32)
CREAT SERPL-MCNC: 1.4 MG/DL (ref 0.6–1.3)
EOSINOPHIL # BLD AUTO: 0.19 THOUSAND/ÂΜL (ref 0–0.61)
EOSINOPHIL NFR BLD AUTO: 2 % (ref 0–6)
ERYTHROCYTE [DISTWIDTH] IN BLOOD BY AUTOMATED COUNT: 16.1 % (ref 11.6–15.1)
GFR SERPL CREATININE-BSD FRML MDRD: 48 ML/MIN/1.73SQ M
GLUCOSE SERPL-MCNC: 129 MG/DL (ref 65–140)
HCT VFR BLD AUTO: 32.8 % (ref 36.5–49.3)
HGB BLD-MCNC: 10.5 G/DL (ref 12–17)
IMM GRANULOCYTES # BLD AUTO: 0.12 THOUSAND/UL (ref 0–0.2)
IMM GRANULOCYTES NFR BLD AUTO: 1 % (ref 0–2)
LYMPHOCYTES # BLD AUTO: 1.18 THOUSANDS/ÂΜL (ref 0.6–4.47)
LYMPHOCYTES NFR BLD AUTO: 10 % (ref 14–44)
MCH RBC QN AUTO: 32.3 PG (ref 26.8–34.3)
MCHC RBC AUTO-ENTMCNC: 32 G/DL (ref 31.4–37.4)
MCV RBC AUTO: 101 FL (ref 82–98)
MONOCYTES # BLD AUTO: 0.83 THOUSAND/ÂΜL (ref 0.17–1.22)
MONOCYTES NFR BLD AUTO: 7 % (ref 4–12)
NEUTROPHILS # BLD AUTO: 9.97 THOUSANDS/ÂΜL (ref 1.85–7.62)
NEUTS SEG NFR BLD AUTO: 79 % (ref 43–75)
NRBC BLD AUTO-RTO: 0 /100 WBCS
PLATELET # BLD AUTO: 443 THOUSANDS/UL (ref 149–390)
PMV BLD AUTO: 9.2 FL (ref 8.9–12.7)
POTASSIUM SERPL-SCNC: 4.6 MMOL/L (ref 3.5–5.3)
PROT SERPL-MCNC: 7.2 G/DL (ref 6.4–8.4)
RBC # BLD AUTO: 3.25 MILLION/UL (ref 3.88–5.62)
SODIUM SERPL-SCNC: 138 MMOL/L (ref 135–147)
WBC # BLD AUTO: 12.35 THOUSAND/UL (ref 4.31–10.16)

## 2025-05-27 PROCEDURE — 85025 COMPLETE CBC W/AUTO DIFF WBC: CPT

## 2025-05-27 PROCEDURE — 80053 COMPREHEN METABOLIC PANEL: CPT

## 2025-05-27 PROCEDURE — 36415 COLL VENOUS BLD VENIPUNCTURE: CPT

## 2025-05-27 NOTE — TELEPHONE ENCOUNTER
Leeann, nurse from [facility] called to report that patient's SPT site is more and beefy looking than it has been. There is also  brownish drainage noted around the site that was not seen in the past.  Nurse reports the patient does complain of tenderness around the stoma itself. Denies any fever, chills, or any foul order. SPT draining without any issue. No other urologic symptoms to report.    Please advise and call back- #[phone] ext. [phone]    The patient's daughter is also going to try to send pictures of stoma through my chart.

## 2025-05-27 NOTE — PROGRESS NOTES
Name: Toro Rodriguez      : 1950      MRN: [id number]  Encounter Provider: Rick Espino MD  Encounter Date: 6/3/2025   Encounter department: [facility] UROLOGY [place]  :  Assessment & Plan  Chronic systolic (congestive) heart failure (HCC)  Wt Readings from Last 3 Encounters:   25 81.6 kg (180 lb)   25 82.1 kg (181 lb)   25 82.1 kg (181 lb)                    Dilated cardiomyopathy (HCC)         Coronary artery disease involving native coronary artery of native heart without angina pectoris         Benign prostatic hyperplasia with urinary retention         Clot retention of urine         Other hydronephrosis  Continue nephrostomy tube  Do not recommend conversion to PCNU         Malignant neoplasm of overlapping sites of bladder (HCC)  Metastatic bladder cancer  Cystoscopy negative for recurrence today  Continue SPT  Cystoscopy in 6 months  Continue follow up with med onc         Nephrostomy status (HCC)           I have spent a total time of 30 minutes in caring for this patient on the day of the visit/encounter including Diagnostic results, Prognosis, Risks and benefits of tx options, Instructions for management, Patient and family education, Importance of tx compliance, Risk factor reductions, Impressions, Counseling / Coordination of care, Documenting in the medical record, Reviewing/placing orders in the medical record (including tests, medications, and/or procedures), and this is in addition to the time necessary for cystoscopy .     History of Present Illness   Toro Rodriguez is a 75 y.o. male who presents for follow up of bladder cancer, metastatic, following with med onc  ECOG 1-2  No new complaints  Nephrostomy draining well  SPT draining well    The following portions of the patient's history were reviewed and updated as appropriate: allergies, current medications, past family history, past medical history, past social history, past surgical history and problem  list.      Review of Systems   Constitutional: Negative.    HENT: Negative.     Eyes: Negative.    Respiratory: Negative.     Cardiovascular: Negative.    Gastrointestinal: Negative.    Endocrine: Negative.    Genitourinary: Negative.    Musculoskeletal: Negative.    Skin: Negative.    Allergic/Immunologic: Negative.    Neurological: Negative.    Hematological: Negative.    Psychiatric/Behavioral: Negative.            Objective   There were no vitals taken for this visit.    Physical Exam  Vitals reviewed.   Constitutional:       General: He is not in acute distress.     Appearance: Normal appearance. He is well-developed. He is not ill-appearing, toxic-appearing or diaphoretic.   HENT:      Head: Normocephalic and atraumatic.      Nose: Nose normal.      Mouth/Throat:      Mouth: Mucous membranes are moist.     Eyes:      General: No scleral icterus.        Right eye: No discharge.         Left eye: No discharge.     Neck:      Thyroid: No thyromegaly.      Trachea: No tracheal deviation.   Pulmonary:      Effort: Pulmonary effort is normal.   Chest:      Chest wall: No tenderness.   Abdominal:      General: There is no distension.      Palpations: There is no mass.      Tenderness: There is no abdominal tenderness. There is no left CVA tenderness or guarding.      Hernia: No hernia is present.     Musculoskeletal:         General: No deformity or signs of injury. Normal range of motion.      Cervical back: Normal range of motion and neck supple.   Lymphadenopathy:      Cervical: No cervical adenopathy.     Skin:     General: Skin is dry.      Coloration: Skin is not jaundiced or pale.      Findings: No erythema.     Neurological:      Mental Status: He is alert and oriented to person, place, and time.      Cranial Nerves: No cranial nerve deficit.      Motor: No abnormal muscle tone.      Coordination: Coordination normal.     Psychiatric:         Mood and Affect: Mood normal.         Behavior: Behavior normal.          Thought Content: Thought content normal.         Judgment: Judgment normal.           Results     Lab Results   Component Value Date    CALCIUM 9.0 04/11/2025    K 4.6 04/11/2025    CO2 23 04/11/2025     04/11/2025    BUN 25 04/11/2025    CREATININE 1.27 04/11/2025     Lab Results   Component Value Date    WBC 8.88 04/11/2025    HGB 10.2 (L) 04/11/2025    HCT 33.7 (L) 04/11/2025     (H) 04/11/2025     (H) 04/11/2025       Office Urine Dip  No results found for this or any previous visit (from the past hour).

## 2025-05-28 ENCOUNTER — HOSPITAL ENCOUNTER (OUTPATIENT)
Dept: INFUSION CENTER | Facility: HOSPITAL | Age: 75
Discharge: HOME | End: 2025-05-28
Attending: INTERNAL MEDICINE
Payer: MEDICARE

## 2025-05-28 VITALS
DIASTOLIC BLOOD PRESSURE: 50 MMHG | SYSTOLIC BLOOD PRESSURE: 99 MMHG | BODY MASS INDEX: 28.41 KG/M2 | HEIGHT: 67 IN | WEIGHT: 181 LBS | TEMPERATURE: 97.9 F | RESPIRATION RATE: 18 BRPM | HEART RATE: 77 BPM

## 2025-05-28 DIAGNOSIS — C67.8 MALIGNANT NEOPLASM OF OVERLAPPING SITES OF BLADDER (HCC): ICD-10-CM

## 2025-05-28 DIAGNOSIS — T45.1X5A CHEMOTHERAPY INDUCED NEUTROPENIA (HCC): ICD-10-CM

## 2025-05-28 DIAGNOSIS — D70.1 CHEMOTHERAPY INDUCED NEUTROPENIA (HCC): ICD-10-CM

## 2025-05-28 DIAGNOSIS — D50.0 IRON DEFICIENCY ANEMIA DUE TO CHRONIC BLOOD LOSS: Primary | ICD-10-CM

## 2025-05-28 PROCEDURE — 96417 CHEMO IV INFUS EACH ADDL SEQ: CPT

## 2025-05-28 PROCEDURE — 96413 CHEMO IV INFUSION 1 HR: CPT

## 2025-05-28 PROCEDURE — 96367 TX/PROPH/DG ADDL SEQ IV INF: CPT

## 2025-05-28 RX ORDER — SODIUM CHLORIDE 9 MG/ML
20 INJECTION, SOLUTION INTRAVENOUS ONCE
Status: COMPLETED | OUTPATIENT
Start: 2025-05-28 | End: 2025-05-28

## 2025-05-28 RX ORDER — SODIUM CHLORIDE 9 MG/ML
20 INJECTION, SOLUTION INTRAVENOUS ONCE
Status: CANCELLED | OUTPATIENT
Start: 2025-06-04

## 2025-05-28 RX ADMIN — SODIUM CHLORIDE 20 ML/HR: 0.9 INJECTION, SOLUTION INTRAVENOUS at 09:18

## 2025-05-28 RX ADMIN — SODIUM CHLORIDE 20 ML/HR: 0.9 INJECTION, SOLUTION INTRAVENOUS at 09:57

## 2025-05-28 RX ADMIN — ETOPOSIDE 138 MG: 20 INJECTION INTRAVENOUS at 11:46

## 2025-05-28 RX ADMIN — DEXAMETHASONE SODIUM PHOSPHATE: 10 INJECTION, SOLUTION INTRAMUSCULAR; INTRAVENOUS at 09:57

## 2025-05-28 RX ADMIN — IRON SUCROSE 200 MG: 20 INJECTION, SOLUTION INTRAVENOUS at 09:18

## 2025-05-28 RX ADMIN — CARBOPLATIN 213.3 MG: 10 INJECTION INTRAVENOUS at 11:11

## 2025-05-28 RX ADMIN — FOSAPREPITANT 150 MG: 150 INJECTION, POWDER, LYOPHILIZED, FOR SOLUTION INTRAVENOUS at 10:36

## 2025-05-28 NOTE — PROGRESS NOTES
Toro Rodriguez  tolerated treatment well with no complications.      Toro Rodriguez is aware of future appt on 05/29/25 at 200pm.     AVS printed and given to Toro Rodriguez:  Yes

## 2025-05-28 NOTE — TELEPHONE ENCOUNTER
ABBY left for nurse at [facility].    When she calls back please inform her they can either send pictures in via [employer]Act or he can be set up for a nurse visit to assess site in the office. If wound is opening a concerning amount or there is signs of infection or having severe pain he should be taken to ER. Patient does have an appt with the MD next week

## 2025-05-29 ENCOUNTER — HOSPITAL ENCOUNTER (OUTPATIENT)
Dept: INFUSION CENTER | Facility: HOSPITAL | Age: 75
Discharge: HOME | End: 2025-05-29
Attending: INTERNAL MEDICINE
Payer: MEDICARE

## 2025-05-29 VITALS
DIASTOLIC BLOOD PRESSURE: 68 MMHG | WEIGHT: 181 LBS | SYSTOLIC BLOOD PRESSURE: 142 MMHG | TEMPERATURE: 98.5 F | HEIGHT: 67 IN | HEART RATE: 80 BPM | RESPIRATION RATE: 18 BRPM | BODY MASS INDEX: 28.41 KG/M2

## 2025-05-29 DIAGNOSIS — D70.1 CHEMOTHERAPY INDUCED NEUTROPENIA (HCC): Primary | ICD-10-CM

## 2025-05-29 DIAGNOSIS — C67.8 MALIGNANT NEOPLASM OF OVERLAPPING SITES OF BLADDER (HCC): ICD-10-CM

## 2025-05-29 DIAGNOSIS — T45.1X5A CHEMOTHERAPY INDUCED NEUTROPENIA (HCC): Primary | ICD-10-CM

## 2025-05-29 PROCEDURE — 96367 TX/PROPH/DG ADDL SEQ IV INF: CPT

## 2025-05-29 PROCEDURE — 96413 CHEMO IV INFUSION 1 HR: CPT

## 2025-05-29 RX ORDER — SODIUM CHLORIDE 9 MG/ML
20 INJECTION, SOLUTION INTRAVENOUS ONCE
Status: COMPLETED | OUTPATIENT
Start: 2025-05-29 | End: 2025-05-29

## 2025-05-29 RX ADMIN — DEXAMETHASONE SODIUM PHOSPHATE: 10 INJECTION, SOLUTION INTRAMUSCULAR; INTRAVENOUS at 14:22

## 2025-05-29 RX ADMIN — ETOPOSIDE 138 MG: 20 INJECTION INTRAVENOUS at 14:48

## 2025-05-29 RX ADMIN — SODIUM CHLORIDE 20 ML/HR: 0.9 INJECTION, SOLUTION INTRAVENOUS at 14:21

## 2025-05-29 NOTE — PROGRESS NOTES
Toro Rodriguez  tolerated treatment well with no complications.      Toro Rodriguez is aware of future appt on 5/30/25 at 1200.     AVS Declined by Toro Rodriguez

## 2025-05-30 ENCOUNTER — HOSPITAL ENCOUNTER (OUTPATIENT)
Dept: INFUSION CENTER | Facility: HOSPITAL | Age: 75
Discharge: HOME | End: 2025-05-30
Attending: INTERNAL MEDICINE
Payer: MEDICARE

## 2025-05-30 VITALS
WEIGHT: 181 LBS | HEIGHT: 67 IN | RESPIRATION RATE: 18 BRPM | BODY MASS INDEX: 28.41 KG/M2 | DIASTOLIC BLOOD PRESSURE: 56 MMHG | TEMPERATURE: 97.1 F | HEART RATE: 66 BPM | SYSTOLIC BLOOD PRESSURE: 111 MMHG

## 2025-05-30 DIAGNOSIS — T45.1X5A CHEMOTHERAPY INDUCED NEUTROPENIA (HCC): Primary | ICD-10-CM

## 2025-05-30 DIAGNOSIS — D70.1 CHEMOTHERAPY INDUCED NEUTROPENIA (HCC): Primary | ICD-10-CM

## 2025-05-30 DIAGNOSIS — C67.8 MALIGNANT NEOPLASM OF OVERLAPPING SITES OF BLADDER (HCC): ICD-10-CM

## 2025-05-30 PROCEDURE — 96377 APPLICATON ON-BODY INJECTOR: CPT

## 2025-05-30 PROCEDURE — 96367 TX/PROPH/DG ADDL SEQ IV INF: CPT

## 2025-05-30 PROCEDURE — 96413 CHEMO IV INFUSION 1 HR: CPT

## 2025-05-30 RX ORDER — SODIUM CHLORIDE 9 MG/ML
20 INJECTION, SOLUTION INTRAVENOUS ONCE
Status: COMPLETED | OUTPATIENT
Start: 2025-05-30 | End: 2025-05-30

## 2025-05-30 RX ADMIN — PEGFILGRASTIM 6 MG: KIT SUBCUTANEOUS at 13:46

## 2025-05-30 RX ADMIN — ETOPOSIDE 138 MG: 20 INJECTION, SOLUTION INTRAVENOUS at 12:38

## 2025-05-30 RX ADMIN — SODIUM CHLORIDE 20 ML/HR: 0.9 INJECTION, SOLUTION INTRAVENOUS at 12:03

## 2025-05-30 RX ADMIN — DEXAMETHASONE SODIUM PHOSPHATE: 10 INJECTION, SOLUTION INTRAMUSCULAR; INTRAVENOUS at 12:04

## 2025-05-30 NOTE — PROGRESS NOTES
Toro Rodriguez  tolerated treatment well with no complications.      Toro Rodriguez is aware of future appt on 6/4/25 at 1400.     AVS printed and given to Toro Rodriguez:  Declined by Toro Rodriguez

## 2025-06-02 ENCOUNTER — HOSPITAL ENCOUNTER (OUTPATIENT)
Dept: RADIOLOGY | Facility: HOSPITAL | Age: 75
Discharge: HOME | End: 2025-06-02
Attending: INTERNAL MEDICINE
Payer: MEDICARE

## 2025-06-02 DIAGNOSIS — Q62.11 HYDRONEPHROSIS WITH URETEROPELVIC JUNCTION (UPJ) OBSTRUCTION: ICD-10-CM

## 2025-06-02 PROCEDURE — 50435 EXCHANGE NEPHROSTOMY CATH: CPT

## 2025-06-02 PROCEDURE — C1769 GUIDE WIRE: HCPCS

## 2025-06-02 PROCEDURE — C1729 CATH, DRAINAGE: HCPCS

## 2025-06-02 PROCEDURE — 50435 EXCHANGE NEPHROSTOMY CATH: CPT | Performed by: RADIOLOGY

## 2025-06-02 RX ORDER — LIDOCAINE WITH 8.4% SOD BICARB 0.9%(10ML)
SYRINGE (ML) INJECTION AS NEEDED
Status: COMPLETED | OUTPATIENT
Start: 2025-06-02 | End: 2025-06-02

## 2025-06-02 RX ADMIN — Medication 10 ML: at 13:15

## 2025-06-02 RX ADMIN — IOHEXOL 10 ML: 350 INJECTION, SOLUTION INTRAVENOUS at 13:29

## 2025-06-02 NOTE — BRIEF OP NOTE (RAD/CATH)
INTERVENTIONAL RADIOLOGY PROCEDURE NOTE    Date: 6/2/2025    Procedure:   Procedure Summary       Date: 06/02/25 Room / Location: [facility] Interventional Radiology    Anesthesia Start:  Anesthesia Stop:     Procedure: IR NEPHROSTOMY TUBE CHECK AND/OR REMOVAL Diagnosis:       Hydronephrosis with ureteropelvic junction (UPJ) obstruction      (capped PCN, possible removal)    Scheduled Providers:  Responsible Provider:     Anesthesia Type: Not recorded ASA Status: Not recorded            Preoperative diagnosis:   1. Hydronephrosis with ureteropelvic junction (UPJ) obstruction         Postoperative diagnosis: Same.    Surgeon: Arpan Land MD     Assistant: None. No qualified resident was available.    Blood loss: None    Specimens: None    Findings: Persistent obstruction of the right kidney.  Failed capping trial.  Nephrostomy tube exchanged and placed to gravity bag.  Will reach out to urology on whether we should pursue placement of nephroureteral tube.  Otherwise we will schedule next exchange in 3 months.    Complications: None immediate.    Anesthesia: local

## 2025-06-02 NOTE — SEDATION DOCUMENTATION
R PCN 8.5 fr MPD exchanged by Dr. Land. Pt tolerated procedure well, AVS given and reviewed with patient.

## 2025-06-03 ENCOUNTER — PROCEDURE VISIT (OUTPATIENT)
Dept: UROLOGY | Facility: CLINIC | Age: 75
End: 2025-06-03
Payer: MEDICARE

## 2025-06-03 VITALS
DIASTOLIC BLOOD PRESSURE: 70 MMHG | WEIGHT: 180 LBS | BODY MASS INDEX: 28.93 KG/M2 | SYSTOLIC BLOOD PRESSURE: 116 MMHG | OXYGEN SATURATION: 93 % | HEART RATE: 80 BPM | HEIGHT: 66 IN

## 2025-06-03 DIAGNOSIS — N13.39 OTHER HYDRONEPHROSIS: Primary | ICD-10-CM

## 2025-06-03 DIAGNOSIS — I42.0 DILATED CARDIOMYOPATHY (HCC): ICD-10-CM

## 2025-06-03 DIAGNOSIS — I25.10 CORONARY ARTERY DISEASE INVOLVING NATIVE CORONARY ARTERY OF NATIVE HEART WITHOUT ANGINA PECTORIS: Chronic | ICD-10-CM

## 2025-06-03 DIAGNOSIS — R33.8 BENIGN PROSTATIC HYPERPLASIA WITH URINARY RETENTION: ICD-10-CM

## 2025-06-03 DIAGNOSIS — C67.8 MALIGNANT NEOPLASM OF OVERLAPPING SITES OF BLADDER (HCC): ICD-10-CM

## 2025-06-03 DIAGNOSIS — I50.23 ACUTE ON CHRONIC SYSTOLIC (CONGESTIVE) HEART FAILURE (HCC): ICD-10-CM

## 2025-06-03 DIAGNOSIS — Z93.6 NEPHROSTOMY STATUS (HCC): ICD-10-CM

## 2025-06-03 DIAGNOSIS — R33.8 CLOT RETENTION OF URINE: ICD-10-CM

## 2025-06-03 DIAGNOSIS — N40.1 BENIGN PROSTATIC HYPERPLASIA WITH URINARY RETENTION: ICD-10-CM

## 2025-06-03 PROCEDURE — 52000 CYSTOURETHROSCOPY: CPT | Performed by: UROLOGY

## 2025-06-03 PROCEDURE — 99214 OFFICE O/P EST MOD 30 MIN: CPT | Performed by: UROLOGY

## 2025-06-03 NOTE — ASSESSMENT & PLAN NOTE
Metastatic bladder cancer  Cystoscopy negative for recurrence today  Continue SPT  Cystoscopy in 6 months  Continue follow up with med onc

## 2025-06-03 NOTE — ASSESSMENT & PLAN NOTE
Wt Readings from Last 3 Encounters:   06/03/25 81.6 kg (180 lb)   05/30/25 82.1 kg (181 lb)   05/29/25 82.1 kg (181 lb)

## 2025-06-03 NOTE — PROGRESS NOTES
Office Cystoscopy Procedure Note    Indication:    Urothelial carcinoma of the bladder    Informed consent   The risks, benefits, complications, treatment options, and expected outcomes were discussed with the patient. The patient concurred with the proposed plan and provided informed consent.    Anesthesia  Lidocaine jelly 2%    Antibiotic prophylaxis   None    Procedure  The patient was placed in the supineposition, was prepped and draped in the usual manner using sterile technique, and 2% lidocaine jelly instilled into the urethra.  A 17 F flexible cystoscope was then inserted into the suprapubic tube tract  and the bladder was carefully examined.  The following findings were noted:    Findings:    Bladder:  No recurrent bladder lesions, no tumors  Ureteral orifices:  orthotopic  Other findings:  None, retroflexed view confirms    Specimens: None                 Complications:    None; patient tolerated the procedure well           Disposition: To home            Condition: Stable    Plan: Normal cystoscopy via SPT tract, no tumors, recommend cystoscopy in 6 months       Cystoscopy     Date/Time  6/3/2025 11:30 AM     Performed by  Rick Espino MD   Authorized by  Rick Espino MD       Universal Protocol:  procedure performed by consultantConsent: Verbal consent obtained. Written consent obtained  Risks and benefits: risks, benefits and alternatives were discussed  Consent given by: patient  Patient understanding: patient states understanding of the procedure being performed  Patient consent: the patient's understanding of the procedure matches consent given  Procedure consent: procedure consent matches procedure scheduled  Relevant documents: relevant documents present and verified  Test results: test results available and properly labeled  Site marked: the operative site was not marked  Radiology Images displayed and confirmed. If images not available, report reviewed: imaging studies  available  Required items: required blood products, implants, devices, and special equipment available  Patient identity confirmed: verbally with patient and provided demographic data      Procedure Details:  Procedure type: cystoscopy    Patient tolerance: Patient tolerated the procedure well with no immediate complications    Additional Procedure Details: Office Cystoscopy Procedure Note    Indication:    Urothelial carcinoma of the bladder    Informed consent   The risks, benefits, complications, treatment options, and expected outcomes were discussed with the patient. The patient concurred with the proposed plan and provided informed consent.    Anesthesia  Lidocaine jelly 2%    Antibiotic prophylaxis   None    Procedure  The patient was placed in the supineposition, was prepped and draped in the usual manner using sterile technique, and 2% lidocaine jelly instilled into the urethra.  A 17 F flexible cystoscope was then inserted into the suprapubic tube tract  and the bladder was carefully examined.  The following findings were noted:    Findings:    Bladder:  No recurrent bladder lesions, no tumors  Ureteral orifices:  orthotopic  Other findings:  None, retroflexed view confirms    Specimens: None                 Complications:    None; patient tolerated the procedure well           Disposition: To home            Condition: Stable    Plan: Normal cystoscopy via SPT tract, no tumors, recommend cystoscopy in 6 months

## 2025-06-04 ENCOUNTER — PATIENT OUTREACH (OUTPATIENT)
Dept: HEMATOLOGY ONCOLOGY | Facility: CLINIC | Age: 75
End: 2025-06-04

## 2025-06-04 ENCOUNTER — HOSPITAL ENCOUNTER (OUTPATIENT)
Dept: INFUSION CENTER | Facility: HOSPITAL | Age: 75
Discharge: HOME | End: 2025-06-04
Attending: INTERNAL MEDICINE
Payer: MEDICARE

## 2025-06-04 VITALS
SYSTOLIC BLOOD PRESSURE: 99 MMHG | TEMPERATURE: 98.1 F | HEART RATE: 75 BPM | RESPIRATION RATE: 16 BRPM | DIASTOLIC BLOOD PRESSURE: 59 MMHG

## 2025-06-04 DIAGNOSIS — D50.0 IRON DEFICIENCY ANEMIA DUE TO CHRONIC BLOOD LOSS: Primary | ICD-10-CM

## 2025-06-04 PROCEDURE — 96365 THER/PROPH/DIAG IV INF INIT: CPT

## 2025-06-04 RX ORDER — SODIUM CHLORIDE 9 MG/ML
20 INJECTION, SOLUTION INTRAVENOUS ONCE
Status: CANCELLED | OUTPATIENT
Start: 2025-06-11

## 2025-06-04 RX ORDER — SODIUM CHLORIDE 9 MG/ML
20 INJECTION, SOLUTION INTRAVENOUS ONCE
Status: COMPLETED | OUTPATIENT
Start: 2025-06-04 | End: 2025-06-04

## 2025-06-04 RX ADMIN — SODIUM CHLORIDE 20 ML/HR: 0.9 INJECTION, SOLUTION INTRAVENOUS at 14:08

## 2025-06-04 RX ADMIN — IRON SUCROSE 200 MG: 20 INJECTION, SOLUTION INTRAVENOUS at 14:08

## 2025-06-04 NOTE — PROGRESS NOTES
I reached out and spoke with Shanique who is Toro's daughter  to follow up and to review for any new changes in barriers to care and offer supportive services as needed.     Barriers noted previously and outcome of interventions;  N/a    Reviewed for any new barriers as noted below.    Are you having any side effects from your treatment?Yes, describe: tired, fatigued, nausea- well controlled Shanique stated with medication     Are you eating and drinking normally? Yes eating with no issues     Have you been experiencing any uncontrolled pain related to your cancer diagnosis? No    If not already established, have needs changed for a palliative care referral? no    Do you have a good support system? Yes     Are you interested in any support groups? Not at this time     How are you doing with transportation to your appointments? Good no issues at this time     Do you have any questions or concerns regarding your treatment plan? No    Any new financial concerns for your household or medical bills? No    Do you know when your upcoming appointments are? yes  Future Appointments   Date Time Provider Department Center   6/4/2025  2:00 PM BE INF BED 13 BE Infusion BE [facility]   6/10/2025  9:30 AM BE NM SLN PET RM 2 BE SLN NM BE [place]   6/10/2025  1:00 PM BE INF CHAIR 2 BE Infusion BE [facility]   6/16/2025  2:00 PM Juan Carlos Foy MD HEM ONC  P[facility]   6/17/2025 12:30 PM BE INF CHAIR 7 BE Infusion BE [facility]   7/15/2025  1:00 PM UROLOGY NURSE [facility]   10/13/2025  8:30 AM Rick Espino MD URO [facility]   12/3/2025  2:00 PM Rick Espino MD [facility]          Based on individual needs I will follow up with them in 6-8 weeks. I have provided my direct contact information and welcome them to contact me if their needs as discussed above change. They were appreciative for the call.

## 2025-06-04 NOTE — PROGRESS NOTES
Toro Rodriguez  tolerated treatment well with no complications.      Toro Rodriguez is aware of future appt on 6/17/25 at 1230.     AVS printed and given to Toro Rodriguez.

## 2025-06-09 ENCOUNTER — TELEPHONE (OUTPATIENT)
Age: 75
End: 2025-06-09

## 2025-06-09 ENCOUNTER — TELEPHONE (OUTPATIENT)
Dept: INFUSION CENTER | Facility: HOSPITAL | Age: 75
End: 2025-06-09

## 2025-06-09 NOTE — TELEPHONE ENCOUNTER
Returned call to patient's daughter regarding appt schedule for the week. Provided callback number to review.   Appt 6/10 cancelled as it conflicted with PET CT appt, and too many iron infusions were scheduled.

## 2025-06-09 NOTE — TELEPHONE ENCOUNTER
Returned Shanique's call about an early appt on 6/16 to see Dr Foy.  Explained Dr Foy is rounding all this week and discussed Mon and possibility of moving to another earlier time.  Shanique explained to just leave time as scheduled.

## 2025-06-10 ENCOUNTER — HOSPITAL ENCOUNTER (OUTPATIENT)
Dept: RADIOLOGY | Age: 75
Discharge: HOME | End: 2025-06-10
Attending: INTERNAL MEDICINE
Payer: MEDICARE

## 2025-06-10 DIAGNOSIS — C67.8 MALIGNANT NEOPLASM OF OVERLAPPING SITES OF BLADDER (HCC): ICD-10-CM

## 2025-06-10 LAB — GLUCOSE SERPL-MCNC: 74 MG/DL (ref 65–140)

## 2025-06-10 PROCEDURE — A9552 F18 FDG: HCPCS

## 2025-06-10 PROCEDURE — 82948 REAGENT STRIP/BLOOD GLUCOSE: CPT

## 2025-06-10 PROCEDURE — 78815 PET IMAGE W/CT SKULL-THIGH: CPT

## 2025-06-16 ENCOUNTER — OFFICE VISIT (OUTPATIENT)
Age: 75
End: 2025-06-16
Payer: MEDICARE

## 2025-06-16 ENCOUNTER — TELEPHONE (OUTPATIENT)
Age: 75
End: 2025-06-16

## 2025-06-16 VITALS
RESPIRATION RATE: 16 BRPM | TEMPERATURE: 98.2 F | SYSTOLIC BLOOD PRESSURE: 100 MMHG | OXYGEN SATURATION: 94 % | BODY MASS INDEX: 29.05 KG/M2 | HEIGHT: 66 IN | HEART RATE: 72 BPM | DIASTOLIC BLOOD PRESSURE: 51 MMHG

## 2025-06-16 DIAGNOSIS — C67.8 MALIGNANT NEOPLASM OF OVERLAPPING SITES OF BLADDER (HCC): Primary | ICD-10-CM

## 2025-06-16 PROCEDURE — G2211 COMPLEX E/M VISIT ADD ON: HCPCS | Performed by: INTERNAL MEDICINE

## 2025-06-16 PROCEDURE — 99214 OFFICE O/P EST MOD 30 MIN: CPT | Performed by: INTERNAL MEDICINE

## 2025-06-16 RX ORDER — SODIUM CHLORIDE 9 MG/ML
20 INJECTION, SOLUTION INTRAVENOUS ONCE
Status: CANCELLED | OUTPATIENT
Start: 2025-06-19

## 2025-06-16 NOTE — PROGRESS NOTES
Name: Toro Rodriguez      : 1950      MRN: [id number]  Encounter Provider: Juan Carlos Foy MD  Encounter Date: 2025   Encounter department: [facility]ATOLOG[facility]  :    Assessment & Plan  1. Small cell carcinoma of the bladder.  Completed 6 cycles of carboplatin and etoposide.  The most recent PET/CT scan from Katie 10, 2025, shows disease progression with widespread involvement of the liver, bones, pelvic lymph nodes, and lung metastasis. Second-line treatment with immunotherapy using Keytruda was discussed and consent was obtained. Keytruda will be administered at a flat dose of 200 mg every 3 weeks. The side effects of immunotherapy were discussed in detail. Teach and consent were done today. Treatment will start as soon as possible. A repeat scan is planned after four treatments. He will need repeat labs.    2. Anemia.  His last blood work from May 27, 2025, shows he continues to be anemic with a hemoglobin level of 10.    RTC prior to cycle 2.    History of Present Illness   Chief Complaint   Patient presents with    Follow-up     History of Present Illness    Toro Rodriguez is a 75 y.o. male with multiple comorbidities including hypertension, coronary artery disease, CHF, history of DVT, cardiomyopathy,  BPH, stage III CKD, history of melanoma, who had been referred for new diagnosis of bladder cancer. Patient is a former smoker, quit at the age of 33.     He had a CT abdomen and pelvis in April and 2024 due to hematuria and pelvic pain, which were unremarkable.     CT abdomen from  showed nonspecific diffuse bladder wall thickening and subsequently right hydroureteronephrosis. He had a suprapubic catheter placed on 2024. Repeat CT from  showed a possible soft tissue mass on the right side of the bladder.     He had a CT abdomen and pelvis with contrast from November 10, 2024 which revealed a 4 cm soft tissue density  within the right bladder wall.  He has had multiple CT scans since.      Patient presented with gross hematuria and lower abdominal pain on December 15. He required PRBC support.  Eliquis, which he had been for prior DVT, but stopped 2 months prior to admission.  During this admission, he required a Colon placement and CBI.  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     Renal ultrasound from December 17 showed moderate right hydronephrosis.  CT renal from December 20 showed persistent right-sided moderate hydronephrosis and hydroureter up to the right ureterovesical junction. His creatinine worsened during the admission, and a nephrostomy tube was placed on December 24. He also underwent a suprapubic catheter placement.     CT chest without contrast from December 23, 2024 showed solid 0.8 cm and a 1 cm nodule in the right lower lobe which were new from last year.  Another solid 0.3 cm right upper lobe nodule.     PET-CT 1/9/25 -suggestive of more widespread ds including multiple bones, liver, lungs, pelvic nodes and possible brain metatasis     PORT placed 1/10/25. He was started on Carboplatin and etosposide on 1/14/2025 for small cell bladder cancer.     Received cycle 2 on 2/4/2025.   Admitted to the hospital from 2/14 - 2/17 after a fall after attempting to sit down and missed chair.  Patient had CHUY and hip dislocation on admission. Hip was reduced in ER. He was subsequently d/c to rehab.      Brain MRI 3/9/25 - negative for metastasis     He received cycles 3 and 4 on 3/25 and 4/15.      He underwent a restaging PET/CT on April 14, 2025 which showed a mixed response.  Resolution of FDG avid hepatic lesions and right pelvic lymph nodes.  Many of the osseous lesions have improved, but new lesions noted in the left base of the skull, and increasing uptake in the left anterior iliac  lesion.  Cystoscopy with Dr. Espino on Katie 3, 2025 which did not show any evidence of malignancy    He completed 6 cycles of carboplatin etoposide on May 30, 2025    He had a PET/CT on Katie 10, 2025 which unfortunately showed disease progression.  There is a new hepatic lesions in the right hepatic lobe, new retrocaval lymph node, and increasing uptake in the bony metastatic disease.  There is a fracture in the right sixth rib    Patient is here today to discuss results of the PET and next steps.          Oncology History   Cancer Staging   Malignant neoplasm of overlapping sites of bladder (HCC)  Staging form: Urinary Bladder, AJCC 8th Edition  - Clinical stage from 4/17/2025: cT2, cN2, cM1 - Signed by Juan Carlos Foy MD on 4/17/2025  Histopathologic type: Small cell carcinoma, NOS  Stage prefix: Initial diagnosis  WHO/ISUP grade (low/high): High Grade  Histologic grading system: 2 grade system    Melanoma of back (HCC)  Staging form: Melanoma of the Skin, AJCC 8th Edition  - Pathologic: Stage Unknown (pT1a, pNX, cM0) - Signed by Lillie La MD on 4/3/2024  Oncology History Overview Note   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 Skin, left lower back, shave biopsy:     MELANOMA (thickness: at least 0.7 mm) (see note).     Note: SOX10, MART, and PRAME immunostains were reviewed; significant melanocytic architectural disorder is seen, and the lesional cells are positive for PRAME. Please see synoptic report for additional details.     Synoptic report for melanoma of the skin  Thickness: at least 0.7 mm (invasive melanoma extends to deep specimen margin)  Ulceration: not seen  Anatomic (Lukas) level: at least IV  Type: superficial spreading melanoma  Mitoses: 2/mm2  Microsatellites: cannot be determined  Lymphovascular invasion: not seen  Neurotropism: not  seen  Tumor regression: present  Tumor-infiltrating lymphocytes (TIL): present, non-brisk  Margin assessment: invasive melanoma extends to deep specimen margin; melanoma in situ extends to peripheral specimen margin  Pathologic stage: at least pT1a  Associated nevus: dermal melanocytic nevus     2/20/2024 Initial Diagnosis    Melanoma of back (HCC)     3/18/2024 Surgery    A. Skin, back, excision:     -Residual focal melanoma (thickness: 0.7 mm); not seen at examined inked specimen margins.     -Prior procedure site changes present.     4/3/2024 -  Cancer Staged    Staging form: Melanoma of the Skin, AJCC 8th Edition  - Pathologic: Stage Unknown (pT1a, pNX, cM0) - Signed by Lillie La MD on 4/3/2024       Malignant neoplasm of overlapping sites of bladder (HCC)   12/15/2024 Surgery    Final Diagnosis   A. 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  - Tumor invades muscularis propria.      Comment: No evidence of conventional urothelial carcinoma is present.        12/31/2024 Initial Diagnosis    Malignant neoplasm of overlapping sites of bladder (HCC)     1/14/2025 - 5/30/2025 Chemotherapy    etoposide (TOPOSAR), 80 mg/m2 = 157.6 mg (80 % of original dose 100 mg/m2), 6 of 6 cycles  Dose modification: 80 mg/m2 (80 % of original dose 100 mg/m2, Cycle 1, Reason: Dose Not Tolerated), 70 mg/m2 (70 % of original dose 100 mg/m2, Cycle 3, Reason: Dose Not Tolerated)  Administration: 157.6 mg (1/14/2025), 157.6 mg (1/15/2025), 157.6 mg (2/4/2025), 157.6 mg (1/16/2025), 157.6 mg (2/5/2025), 157.6 mg (2/6/2025), 138 mg (3/25/2025), 138 mg (3/26/2025), 138 mg (3/27/2025), 138 mg (4/15/2025), 138 mg (4/16/2025), 138 mg (4/17/2025), 138 mg (5/6/2025), 138 mg (5/7/2025), 138 mg (5/9/2025), 138 mg (5/28/2025), 138 mg (5/29/2025), 138 mg (5/30/2025)  CARBOplatin (PARAPLATIN) IVPB ([facility] [place] AUC DOSING), 200.4 mg, 6 of 6 cycles  Administration:  "200.4 mg (1/14/2025), 204.6 mg (2/4/2025), 241.8 mg (3/25/2025), 230.1 mg (4/15/2025), 216.3 mg (5/6/2025), 213.3 mg (5/28/2025)     4/17/2025 -  Cancer Staged    Staging form: Urinary Bladder, AJCC 8th Edition  - Clinical stage from 4/17/2025: cT2, cN2, cM1 - Signed by Juan Carlos Foy MD on 4/17/2025  Histopathologic type: Small cell carcinoma, NOS  Stage prefix: Initial diagnosis  WHO/ISUP grade (low/high): High Grade  Histologic grading system: 2 grade system       6/16/2025 -  Chemotherapy    pembrolizumab (KEYTRUDA) IVPB, 200 mg, 0 of 6 cycles            06/16/25: c/o falling off the bed and had a right rib fracture     Review of Systems   All other systems reviewed and are negative.    Medical History Reviewed by provider this encounter:     .      Objective   /51 (BP Location: Right arm, Patient Position: Sitting, Cuff Size: Standard)   Pulse 72   Temp 98.2 °F (36.8 °C) (Temporal)   Resp 16   Ht 5' 6\" (1.676 m)   SpO2 94%   BMI 29.05 kg/m²     Pain Screening:  Pain Score:   7  ECOG   2-3  Physical Exam  Physical Exam      Results  Laboratory Studies  BUN and creatinine are elevated. Hemoglobin is 10.    Imaging  PET/CT scan shows disease progression.  Labs: I have reviewed the following labs:  Lab Results   Component Value Date/Time    WBC 12.35 (H) 05/27/2025 05:15 PM    RBC 3.25 (L) 05/27/2025 05:15 PM    Hemoglobin 10.5 (L) 05/27/2025 05:15 PM    Hematocrit 32.8 (L) 05/27/2025 05:15 PM     (H) 05/27/2025 05:15 PM    MCH 32.3 05/27/2025 05:15 PM    RDW 16.1 (H) 05/27/2025 05:15 PM    Platelets 443 (H) 05/27/2025 05:15 PM    Segmented % 79 (H) 05/27/2025 05:15 PM    Lymphocytes % 10 (L) 05/27/2025 05:15 PM    Monocytes % 7 05/27/2025 05:15 PM    Eosinophils Relative 2 05/27/2025 05:15 PM    Basophils Relative 1 05/27/2025 05:15 PM    Immature Grans % 1 05/27/2025 05:15 PM    Absolute Neutrophils 9.97 (H) 05/27/2025 05:15 PM     Lab Results   Component Value Date/Time    Potassium 4.6 " 05/27/2025 05:15 PM    Chloride 103 05/27/2025 05:15 PM    CO2 26 05/27/2025 05:15 PM    BUN 29 (H) 05/27/2025 05:15 PM    Creatinine 1.40 (H) 05/27/2025 05:15 PM    Glucose, Fasting 75 04/11/2025 12:01 PM    Calcium 9.1 05/27/2025 05:15 PM    Corrected Calcium 9.3 03/20/2025 04:43 PM    AST 12 (L) 05/27/2025 05:15 PM    ALT 10 05/27/2025 05:15 PM    Alkaline Phosphatase 91 05/27/2025 05:15 PM    Total Protein 7.2 05/27/2025 05:15 PM    Albumin 3.8 05/27/2025 05:15 PM    Total Bilirubin 0.27 05/27/2025 05:15 PM    eGFR 48 05/27/2025 05:15 PM     Results for orders placed or performed during the hospital encounter of 06/10/25   Fingerstick Glucose (POCT)   Result Value Ref Range    POC Glucose 74 65 - 140 mg/dl        Radiology Results Review: I personally reviewed the following image studies in PACS and associated radiology reports: PET-CT. My interpretation of the radiology images/reports is: disease progression.    Administrative Statements   I have spent a total time of 30 minutes in caring for this patient on the day of the visit/encounter including Diagnostic results, Prognosis, Risks and benefits of tx options, Patient and family education, Documenting in the medical record, Reviewing/placing orders in the medical record (including tests, medications, and/or procedures), Obtaining or reviewing history  , and Communicating with other healthcare professionals .

## 2025-06-17 ENCOUNTER — HOSPITAL ENCOUNTER (OUTPATIENT)
Dept: INFUSION CENTER | Facility: HOSPITAL | Age: 75
Discharge: HOME | End: 2025-06-17
Attending: INTERNAL MEDICINE
Payer: MEDICARE

## 2025-06-17 VITALS
TEMPERATURE: 97.8 F | RESPIRATION RATE: 16 BRPM | HEART RATE: 68 BPM | SYSTOLIC BLOOD PRESSURE: 98 MMHG | DIASTOLIC BLOOD PRESSURE: 52 MMHG

## 2025-06-17 DIAGNOSIS — C67.8 MALIGNANT NEOPLASM OF OVERLAPPING SITES OF BLADDER (HCC): ICD-10-CM

## 2025-06-17 DIAGNOSIS — D50.0 IRON DEFICIENCY ANEMIA DUE TO CHRONIC BLOOD LOSS: Primary | ICD-10-CM

## 2025-06-17 LAB
ALBUMIN SERPL BCG-MCNC: 3 G/DL (ref 3.5–5)
ALP SERPL-CCNC: 70 U/L (ref 34–104)
ALT SERPL W P-5'-P-CCNC: 32 U/L (ref 7–52)
ANION GAP SERPL CALCULATED.3IONS-SCNC: 8 MMOL/L (ref 4–13)
AST SERPL W P-5'-P-CCNC: 23 U/L (ref 13–39)
BASOPHILS # BLD AUTO: 0.07 THOUSANDS/ÂΜL (ref 0–0.1)
BASOPHILS NFR BLD AUTO: 1 % (ref 0–1)
BILIRUB SERPL-MCNC: 0.38 MG/DL (ref 0.2–1)
BUN SERPL-MCNC: 22 MG/DL (ref 5–25)
CALCIUM ALBUM COR SERPL-MCNC: 9.2 MG/DL (ref 8.3–10.1)
CALCIUM SERPL-MCNC: 8.4 MG/DL (ref 8.4–10.2)
CHLORIDE SERPL-SCNC: 102 MMOL/L (ref 96–108)
CO2 SERPL-SCNC: 27 MMOL/L (ref 21–32)
CREAT SERPL-MCNC: 1.31 MG/DL (ref 0.6–1.3)
EOSINOPHIL # BLD AUTO: 0.1 THOUSAND/ÂΜL (ref 0–0.61)
EOSINOPHIL NFR BLD AUTO: 1 % (ref 0–6)
ERYTHROCYTE [DISTWIDTH] IN BLOOD BY AUTOMATED COUNT: 15.9 % (ref 11.6–15.1)
GFR SERPL CREATININE-BSD FRML MDRD: 52 ML/MIN/1.73SQ M
GLUCOSE SERPL-MCNC: 117 MG/DL (ref 65–140)
HCT VFR BLD AUTO: 29.2 % (ref 36.5–49.3)
HGB BLD-MCNC: 9.3 G/DL (ref 12–17)
IMM GRANULOCYTES # BLD AUTO: 0.12 THOUSAND/UL (ref 0–0.2)
IMM GRANULOCYTES NFR BLD AUTO: 1 % (ref 0–2)
LYMPHOCYTES # BLD AUTO: 0.93 THOUSANDS/ÂΜL (ref 0.6–4.47)
LYMPHOCYTES NFR BLD AUTO: 10 % (ref 14–44)
MCH RBC QN AUTO: 31.6 PG (ref 26.8–34.3)
MCHC RBC AUTO-ENTMCNC: 31.8 G/DL (ref 31.4–37.4)
MCV RBC AUTO: 99 FL (ref 82–98)
MONOCYTES # BLD AUTO: 0.81 THOUSAND/ÂΜL (ref 0.17–1.22)
MONOCYTES NFR BLD AUTO: 9 % (ref 4–12)
NEUTROPHILS # BLD AUTO: 7.09 THOUSANDS/ÂΜL (ref 1.85–7.62)
NEUTS SEG NFR BLD AUTO: 78 % (ref 43–75)
NRBC BLD AUTO-RTO: 0 /100 WBCS
PLATELET # BLD AUTO: 492 THOUSANDS/UL (ref 149–390)
PMV BLD AUTO: 9.2 FL (ref 8.9–12.7)
POTASSIUM SERPL-SCNC: 3.9 MMOL/L (ref 3.5–5.3)
PROT SERPL-MCNC: 6.5 G/DL (ref 6.4–8.4)
RBC # BLD AUTO: 2.94 MILLION/UL (ref 3.88–5.62)
SODIUM SERPL-SCNC: 137 MMOL/L (ref 135–147)
TSH SERPL DL<=0.05 MIU/L-ACNC: 1.28 UIU/ML (ref 0.45–4.5)
WBC # BLD AUTO: 9.12 THOUSAND/UL (ref 4.31–10.16)

## 2025-06-17 PROCEDURE — 80053 COMPREHEN METABOLIC PANEL: CPT | Performed by: INTERNAL MEDICINE

## 2025-06-17 PROCEDURE — 85025 COMPLETE CBC W/AUTO DIFF WBC: CPT | Performed by: INTERNAL MEDICINE

## 2025-06-17 PROCEDURE — 96365 THER/PROPH/DIAG IV INF INIT: CPT

## 2025-06-17 PROCEDURE — 84443 ASSAY THYROID STIM HORMONE: CPT | Performed by: INTERNAL MEDICINE

## 2025-06-17 RX ORDER — SODIUM CHLORIDE 9 MG/ML
20 INJECTION, SOLUTION INTRAVENOUS ONCE
Status: COMPLETED | OUTPATIENT
Start: 2025-06-17 | End: 2025-06-17

## 2025-06-17 RX ORDER — SODIUM CHLORIDE 9 MG/ML
20 INJECTION, SOLUTION INTRAVENOUS ONCE
Status: CANCELLED | OUTPATIENT
Start: 2025-06-18

## 2025-06-17 RX ADMIN — SODIUM CHLORIDE 20 ML/HR: 9 INJECTION, SOLUTION INTRAVENOUS at 12:42

## 2025-06-17 RX ADMIN — IRON SUCROSE 200 MG: 20 INJECTION, SOLUTION INTRAVENOUS at 12:40

## 2025-06-17 NOTE — TELEPHONE ENCOUNTER
Patient scheduled to begin treatment at [facility] 6/19. Will review tx schedule with patient at [facility] today.

## 2025-06-19 ENCOUNTER — HOSPITAL ENCOUNTER (OUTPATIENT)
Dept: INFUSION CENTER | Facility: HOSPITAL | Age: 75
Discharge: HOME | End: 2025-06-19
Attending: INTERNAL MEDICINE
Payer: MEDICARE

## 2025-06-19 VITALS
HEART RATE: 80 BPM | HEIGHT: 66 IN | RESPIRATION RATE: 18 BRPM | WEIGHT: 181 LBS | SYSTOLIC BLOOD PRESSURE: 106 MMHG | BODY MASS INDEX: 29.09 KG/M2 | DIASTOLIC BLOOD PRESSURE: 60 MMHG | TEMPERATURE: 98.1 F

## 2025-06-19 DIAGNOSIS — C67.8 MALIGNANT NEOPLASM OF OVERLAPPING SITES OF BLADDER (HCC): Primary | ICD-10-CM

## 2025-06-19 PROCEDURE — 96413 CHEMO IV INFUSION 1 HR: CPT

## 2025-06-19 RX ORDER — SODIUM CHLORIDE 9 MG/ML
20 INJECTION, SOLUTION INTRAVENOUS ONCE
Status: COMPLETED | OUTPATIENT
Start: 2025-06-19 | End: 2025-06-19

## 2025-06-19 RX ADMIN — SODIUM CHLORIDE 20 ML/HR: 0.9 INJECTION, SOLUTION INTRAVENOUS at 08:34

## 2025-06-19 RX ADMIN — SODIUM CHLORIDE 200 MG: 9 INJECTION, SOLUTION INTRAVENOUS at 08:34

## 2025-06-19 NOTE — PROGRESS NOTES
Toro Rodriguez  tolerated treatment well with no complications.      Toro Rodriguez is aware of future appt on 07/09/25 at 1330.     AVS printed and given to Toro Rodriguez:Yes

## 2025-06-25 ENCOUNTER — TELEPHONE (OUTPATIENT)
Age: 75
End: 2025-06-25

## 2025-06-25 NOTE — TELEPHONE ENCOUNTER
Anni from visiting nurses called to update office d/t they were called out to facility d/t reports of dark red blood in nephrostomy. When nurse came out there was no visible blood. They flushed it and it flushed fine and clear urine returned. They did state the suture for nephrostomy is no longer there and they aren't sure if tube had been migrating causing blood but at this time no issue. Patient not reporting any symptoms.     KENYON Hutton -[phone]

## 2025-07-03 ENCOUNTER — TELEPHONE (OUTPATIENT)
Age: 75
End: 2025-07-03

## 2025-07-03 ENCOUNTER — OFFICE VISIT (OUTPATIENT)
Age: 75
End: 2025-07-03
Payer: MEDICARE

## 2025-07-03 VITALS
BODY MASS INDEX: 29.21 KG/M2 | TEMPERATURE: 97.9 F | RESPIRATION RATE: 18 BRPM | HEART RATE: 72 BPM | DIASTOLIC BLOOD PRESSURE: 52 MMHG | OXYGEN SATURATION: 93 % | HEIGHT: 66 IN | SYSTOLIC BLOOD PRESSURE: 90 MMHG

## 2025-07-03 DIAGNOSIS — J18.9 PNEUMONIA OF RIGHT LOWER LOBE DUE TO INFECTIOUS ORGANISM: ICD-10-CM

## 2025-07-03 DIAGNOSIS — C79.51 METASTASIS TO BONE (HCC): ICD-10-CM

## 2025-07-03 DIAGNOSIS — D50.0 IRON DEFICIENCY ANEMIA DUE TO CHRONIC BLOOD LOSS: Primary | ICD-10-CM

## 2025-07-03 DIAGNOSIS — D50.0 IRON DEFICIENCY ANEMIA DUE TO CHRONIC BLOOD LOSS: ICD-10-CM

## 2025-07-03 DIAGNOSIS — Z93.6 NEPHROSTOMY STATUS (HCC): ICD-10-CM

## 2025-07-03 DIAGNOSIS — N18.32 ANEMIA DUE TO STAGE 3B CHRONIC KIDNEY DISEASE  (HCC): ICD-10-CM

## 2025-07-03 DIAGNOSIS — I95.9 HYPOTENSION, UNSPECIFIED HYPOTENSION TYPE: ICD-10-CM

## 2025-07-03 DIAGNOSIS — J18.9 PNEUMONIA OF RIGHT LOWER LOBE DUE TO INFECTIOUS ORGANISM: Primary | ICD-10-CM

## 2025-07-03 DIAGNOSIS — C67.8 MALIGNANT NEOPLASM OF OVERLAPPING SITES OF BLADDER (HCC): Primary | ICD-10-CM

## 2025-07-03 DIAGNOSIS — D63.1 ANEMIA DUE TO STAGE 3B CHRONIC KIDNEY DISEASE  (HCC): ICD-10-CM

## 2025-07-03 PROCEDURE — G2211 COMPLEX E/M VISIT ADD ON: HCPCS | Performed by: INTERNAL MEDICINE

## 2025-07-03 PROCEDURE — 99214 OFFICE O/P EST MOD 30 MIN: CPT | Performed by: INTERNAL MEDICINE

## 2025-07-03 RX ORDER — AZITHROMYCIN 250 MG/1
TABLET, FILM COATED ORAL
Qty: 6 TABLET | Refills: 0 | Status: SHIPPED | OUTPATIENT
Start: 2025-07-03 | End: 2025-07-07

## 2025-07-03 RX ORDER — SODIUM CHLORIDE 9 MG/ML
20 INJECTION, SOLUTION INTRAVENOUS ONCE
Status: CANCELLED | OUTPATIENT
Start: 2025-07-10

## 2025-07-03 RX ORDER — SODIUM CHLORIDE 9 MG/ML
20 INJECTION, SOLUTION INTRAVENOUS ONCE
OUTPATIENT
Start: 2025-07-31

## 2025-07-03 NOTE — ASSESSMENT & PLAN NOTE
Lab Results   Component Value Date    EGFR 52 06/17/2025    EGFR 48 05/27/2025    EGFR 54 04/11/2025    CREATININE 1.31 (H) 06/17/2025    CREATININE 1.40 (H) 05/27/2025    CREATININE 1.27 04/11/2025

## 2025-07-03 NOTE — TELEPHONE ENCOUNTER
INF please see treatment plan    Thank you   SURGICAL PROGRESS NOTE


Subjective


Pt with c/o some abd pain


Vital Signs





Vital Signs








  Date Time  Temp Pulse Resp B/P (MAP) Pulse Ox O2 Delivery O2 Flow Rate FiO2


 


8/2/20 11:34     97 Room Air  


 


8/2/20 11:08   31     


 


8/2/20 11:00 99.2 119  163/69 (100)    





 99.2       


 


8/1/20 21:40       2.0 








I&O











Intake and Output 


 


 8/2/20





 07:00


 


Intake Total 5562 ml


 


Output Total 1900 ml


 


Balance 3662 ml


 


 


 


IV Total 2527 ml


 


Tube Feeding 2283 ml


 


Blood Product IV Normal Saline Flush 526 ml


 


Other 226 ml


 


Output Urine Total 1165 ml


 


Drainage Total 735 ml


 


# Bowel Movements 5








General:  Alert, Cooperative, mild distress


Abdomen:  Soft, No tenderness, Other (wound vac in place, right richardson and ROBERT 

without suction and removed without difficulty, cont pancreatic necrosis 

drainage from RLQ richardson site)


Labs





Laboratory Tests








Test


 7/31/20


17:38 7/31/20


23:38 8/1/20


06:05 8/1/20


06:22


 


Glucose (Fingerstick)


 131 mg/dL


(70-99) 143 mg/dL


(70-99) 


 139 mg/dL


(70-99)


 


White Blood Count


 


 


 16.2 x10^3/uL


(4.0-11.0) 





 


Red Blood Count


 


 


 2.83 x10^6/uL


(3.50-5.40) 





 


Hemoglobin


 


 


 8.0 g/dL


(12.0-15.5) 





 


Hematocrit


 


 


 24.3 %


(36.0-47.0) 





 


Mean Corpuscular Volume   86 fL ()  


 


Mean Corpuscular Hemoglobin   28 pg (25-35)  


 


Mean Corpuscular Hemoglobin


Concent 


 


 33 g/dL


(31-37) 





 


Red Cell Distribution Width


 


 


 17.2 %


(11.5-14.5) 





 


Platelet Count


 


 


 660 x10^3/uL


(140-400) 





 


Neutrophils (%) (Auto)   83 % (31-73)  


 


Lymphocytes (%) (Auto)   10 % (24-48)  


 


Monocytes (%) (Auto)   6 % (0-9)  


 


Eosinophils (%) (Auto)   1 % (0-3)  


 


Basophils (%) (Auto)   0 % (0-3)  


 


Neutrophils # (Auto)


 


 


 13.4 x10^3/uL


(1.8-7.7) 





 


Lymphocytes # (Auto)


 


 


 1.5 x10^3/uL


(1.0-4.8) 





 


Monocytes # (Auto)


 


 


 1.0 x10^3/uL


(0.0-1.1) 





 


Eosinophils # (Auto)


 


 


 0.2 x10^3/uL


(0.0-0.7) 





 


Basophils # (Auto)


 


 


 0.1 x10^3/uL


(0.0-0.2) 





 


Sodium Level


 


 


 130 mmol/L


(136-145) 





 


Potassium Level


 


 


 4.2 mmol/L


(3.5-5.1) 





 


Chloride Level


 


 


 101 mmol/L


() 





 


Carbon Dioxide Level


 


 


 27 mmol/L


(21-32) 





 


Anion Gap   2 (6-14)  


 


Blood Urea Nitrogen


 


 


 10 mg/dL


(7-20) 





 


Creatinine


 


 


 0.6 mg/dL


(0.6-1.0) 





 


Estimated GFR


(Cockcroft-Gault) 


 


 106.3 


 





 


Glucose Level


 


 


 143 mg/dL


(70-99) 





 


Calcium Level


 


 


 10.4 mg/dL


(8.5-10.1) 





 


Test


 8/1/20


11:48 8/1/20


18:08 8/1/20


23:58 8/2/20


05:40


 


Glucose (Fingerstick)


 147 mg/dL


(70-99) 142 mg/dL


(70-99) 135 mg/dL


(70-99) 





 


White Blood Count


 


 


 


 13.6 x10^3/uL


(4.0-11.0)


 


Red Blood Count


 


 


 


 2.50 x10^6/uL


(3.50-5.40)


 


Hemoglobin


 


 


 


 6.9 g/dL


(12.0-15.5)


 


Hematocrit


 


 


 


 21.8 %


(36.0-47.0)


 


Mean Corpuscular Volume    87 fL () 


 


Mean Corpuscular Hemoglobin    28 pg (25-35) 


 


Mean Corpuscular Hemoglobin


Concent 


 


 


 32 g/dL


(31-37)


 


Red Cell Distribution Width


 


 


 


 17.4 %


(11.5-14.5)


 


Platelet Count


 


 


 


 608 x10^3/uL


(140-400)


 


Neutrophils (%) (Auto)    80 % (31-73) 


 


Lymphocytes (%) (Auto)    11 % (24-48) 


 


Monocytes (%) (Auto)    7 % (0-9) 


 


Eosinophils (%) (Auto)    2 % (0-3) 


 


Basophils (%) (Auto)    0 % (0-3) 


 


Neutrophils # (Auto)


 


 


 


 10.9 x10^3/uL


(1.8-7.7)


 


Lymphocytes # (Auto)


 


 


 


 1.5 x10^3/uL


(1.0-4.8)


 


Monocytes # (Auto)


 


 


 


 0.9 x10^3/uL


(0.0-1.1)


 


Eosinophils # (Auto)


 


 


 


 0.2 x10^3/uL


(0.0-0.7)


 


Basophils # (Auto)


 


 


 


 0.1 x10^3/uL


(0.0-0.2)


 


Sodium Level


 


 


 


 129 mmol/L


(136-145)


 


Potassium Level


 


 


 


 4.9 mmol/L


(3.5-5.1)


 


Chloride Level


 


 


 


 100 mmol/L


()


 


Carbon Dioxide Level


 


 


 


 26 mmol/L


(21-32)


 


Anion Gap    3 (6-14) 


 


Blood Urea Nitrogen    8 mg/dL (7-20) 


 


Creatinine


 


 


 


 0.5 mg/dL


(0.6-1.0)


 


Estimated GFR


(Cockcroft-Gault) 


 


 


 131.1 





 


Glucose Level


 


 


 


 134 mg/dL


(70-99)


 


Calcium Level


 


 


 


 9.9 mg/dL


(8.5-10.1)


 


Test


 8/2/20


05:42 


 


 





 


Glucose (Fingerstick)


 147 mg/dL


(70-99) 


 


 











Laboratory Tests








Test


 8/1/20


18:08 8/1/20


23:58 8/2/20


05:40 8/2/20


05:42


 


Glucose (Fingerstick)


 142 mg/dL


(70-99) 135 mg/dL


(70-99) 


 147 mg/dL


(70-99)


 


White Blood Count


 


 


 13.6 x10^3/uL


(4.0-11.0) 





 


Red Blood Count


 


 


 2.50 x10^6/uL


(3.50-5.40) 





 


Hemoglobin


 


 


 6.9 g/dL


(12.0-15.5) 





 


Hematocrit


 


 


 21.8 %


(36.0-47.0) 





 


Mean Corpuscular Volume   87 fL ()  


 


Mean Corpuscular Hemoglobin   28 pg (25-35)  


 


Mean Corpuscular Hemoglobin


Concent 


 


 32 g/dL


(31-37) 





 


Red Cell Distribution Width


 


 


 17.4 %


(11.5-14.5) 





 


Platelet Count


 


 


 608 x10^3/uL


(140-400) 





 


Neutrophils (%) (Auto)   80 % (31-73)  


 


Lymphocytes (%) (Auto)   11 % (24-48)  


 


Monocytes (%) (Auto)   7 % (0-9)  


 


Eosinophils (%) (Auto)   2 % (0-3)  


 


Basophils (%) (Auto)   0 % (0-3)  


 


Neutrophils # (Auto)


 


 


 10.9 x10^3/uL


(1.8-7.7) 





 


Lymphocytes # (Auto)


 


 


 1.5 x10^3/uL


(1.0-4.8) 





 


Monocytes # (Auto)


 


 


 0.9 x10^3/uL


(0.0-1.1) 





 


Eosinophils # (Auto)


 


 


 0.2 x10^3/uL


(0.0-0.7) 





 


Basophils # (Auto)


 


 


 0.1 x10^3/uL


(0.0-0.2) 





 


Sodium Level


 


 


 129 mmol/L


(136-145) 





 


Potassium Level


 


 


 4.9 mmol/L


(3.5-5.1) 





 


Chloride Level


 


 


 100 mmol/L


() 





 


Carbon Dioxide Level


 


 


 26 mmol/L


(21-32) 





 


Anion Gap   3 (6-14)  


 


Blood Urea Nitrogen   8 mg/dL (7-20)  


 


Creatinine


 


 


 0.5 mg/dL


(0.6-1.0) 





 


Estimated GFR


(Cockcroft-Gault) 


 


 131.1 


 





 


Glucose Level


 


 


 134 mg/dL


(70-99) 





 


Calcium Level


 


 


 9.9 mg/dL


(8.5-10.1) 











Problem List


Problems


Medical Problems:


(1) Acute pancreatitis


Status: Acute  





(2) Cholelithiasis


Status: Acute  








Assessment/Plan


s/p necrosectomy


cont supportive care





Justicifation of Admission Dx:


Justifications for Admission:


Justification of Admission Dx:  Yes











GAMAL ZHOU MD              Aug 2, 2020 15:06

## 2025-07-03 NOTE — ASSESSMENT & PLAN NOTE
S/p right nephrostomy tube placement in Dec 2024  Last exchanged in June. Scheduled for check/change in Sept  Cr up slightly.

## 2025-07-03 NOTE — ASSESSMENT & PLAN NOTE
Noted in the PET-CT in early June. - RLL infiltrate  On exam, there is rhonchi   Clinically, cough is worsening  Recommend [facility]

## 2025-07-03 NOTE — PROGRESS NOTES
Name: Toro Rodriguez      : 1950      MRN: [id number]  Encounter Provider: Juan Carlos Foy MD  Encounter Date: 7/3/2025   Encounter department: [facility]  :  Assessment & Plan  Malignant neoplasm of overlapping sites of bladder (HCC)    Completed 6 cycles of carboplatin and etoposide  - 2025.  PET/CT scan from Katie 10, 2025 showed disease progression with widespread involvement of the liver, bones, pelvic lymph nodes, and lung metastasis.   Second-line treatment with immunotherapy using Keytruda was started on 2025.   Tolerated first cycle well.   Proceed with cycles 2 as scheduled  Return to clinic prior to cycle 3.  Plan to repeat scans prior to cycle 5.       Metastasis to bone (HCC)         Anemia due to stage 3b chronic kidney disease  (HCC)  Lab Results   Component Value Date    EGFR 52 2025    EGFR 48 2025    EGFR 54 2025    CREATININE 1.31 (H) 2025    CREATININE 1.40 (H) 2025    CREATININE 1.27 2025         Iron deficiency anemia due to chronic blood loss  Hb 9.3.  Past h/o Iron def with a iron sat of 8% in Feb.   S/p IV venofer 6 doses up till 2025.   Recheck levels        Nephrostomy status (HCC)  S/p right nephrostomy tube placement in Dec 2024  Last exchanged in . Scheduled for check/change in Sept  Cr up slightly.          Pneumonia of right lower lobe due to infectious organism  Noted in the PET-CT in early . - RLL infiltrate  On exam, there is rhonchi   Clinically, cough is worsening  Recommend Z-Crow       Hypotension, unspecified hypotension type  Stop lisinopril  Will arrange for IV hydration x 1         Return in about 3 weeks (around 2025) for Office Visit, Infusion - See Treatment Plan.    History of Present Illness   Chief Complaint   Patient presents with    Follow-up     History of Present Illness    Oncology History   Cancer Staging   Malignant neoplasm of  overlapping sites of bladder (HCC)  Staging form: Urinary Bladder, AJCC 8th Edition  - Clinical stage from 4/17/2025: cT2, cN2, cM1 - Signed by Juan Carlos Foy MD on 4/17/2025  Histopathologic type: Small cell carcinoma, NOS  Stage prefix: Initial diagnosis  WHO/ISUP grade (low/high): High Grade  Histologic grading system: 2 grade system    Melanoma of back (HCC)  Staging form: Melanoma of the Skin, AJCC 8th Edition  - Pathologic: Stage Unknown (pT1a, pNX, cM0) - Signed by Lillie La MD on 4/3/2024  Oncology History Overview Note   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 Skin, left lower back, shave biopsy:     MELANOMA (thickness: at least 0.7 mm) (see note).     Note: SOX10, MART, and PRAME immunostains were reviewed; significant melanocytic architectural disorder is seen, and the lesional cells are positive for PRAME. Please see synoptic report for additional details.     Synoptic report for melanoma of the skin  Thickness: at least 0.7 mm (invasive melanoma extends to deep specimen margin)  Ulceration: not seen  Anatomic (Lukas) level: at least IV  Type: superficial spreading melanoma  Mitoses: 2/mm2  Microsatellites: cannot be determined  Lymphovascular invasion: not seen  Neurotropism: not seen  Tumor regression: present  Tumor-infiltrating lymphocytes (TIL): present, non-brisk  Margin assessment: invasive melanoma extends to deep specimen margin; melanoma in situ extends to peripheral specimen margin  Pathologic stage: at least pT1a  Associated nevus: dermal melanocytic nevus     2/20/2024 Initial Diagnosis    Melanoma of back (HCC)     3/18/2024 Surgery    A. Skin, back, excision:     -Residual focal melanoma (thickness: 0.7 mm); not seen at examined inked specimen margins.     -Prior procedure site changes present.     4/3/2024 -  Cancer Staged     Staging form: Melanoma of the Skin, AJCC 8th Edition  - Pathologic: Stage Unknown (pT1a, pNX, cM0) - Signed by Lillie La MD on 4/3/2024       Malignant neoplasm of overlapping sites of bladder (HCC)   12/15/2024 Surgery    Final Diagnosis   A. 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  - Tumor invades muscularis propria.      Comment: No evidence of conventional urothelial carcinoma is present.        12/31/2024 Initial Diagnosis    Malignant neoplasm of overlapping sites of bladder (HCC)     1/14/2025 - 5/30/2025 Chemotherapy    etoposide (TOPOSAR), 80 mg/m2 = 157.6 mg (80 % of original dose 100 mg/m2), 6 of 6 cycles  Dose modification: 80 mg/m2 (80 % of original dose 100 mg/m2, Cycle 1, Reason: Dose Not Tolerated), 70 mg/m2 (70 % of original dose 100 mg/m2, Cycle 3, Reason: Dose Not Tolerated)  Administration: 157.6 mg (1/14/2025), 157.6 mg (1/15/2025), 157.6 mg (2/4/2025), 157.6 mg (1/16/2025), 157.6 mg (2/5/2025), 157.6 mg (2/6/2025), 138 mg (3/25/2025), 138 mg (3/26/2025), 138 mg (3/27/2025), 138 mg (4/15/2025), 138 mg (4/16/2025), 138 mg (4/17/2025), 138 mg (5/6/2025), 138 mg (5/7/2025), 138 mg (5/9/2025), 138 mg (5/28/2025), 138 mg (5/29/2025), 138 mg (5/30/2025)  CARBOplatin (PARAPLATIN) IVPB (GOG AUC DOSING), 200.4 mg, 6 of 6 cycles  Administration: 200.4 mg (1/14/2025), 204.6 mg (2/4/2025), 241.8 mg (3/25/2025), 230.1 mg (4/15/2025), 216.3 mg (5/6/2025), 213.3 mg (5/28/2025)     4/17/2025 -  Cancer Staged    Staging form: Urinary Bladder, AJCC 8th Edition  - Clinical stage from 4/17/2025: cT2, cN2, cM1 - Signed by Juan Carlos Foy MD on 4/17/2025  Histopathologic type: Small cell carcinoma, NOS  Stage prefix: Initial diagnosis  WHO/ISUP grade (low/high): High Grade  Histologic grading system: 2 grade system       6/19/2025 -  Chemotherapy    pembrolizumab (KEYTRUDA) IVPB, 200 mg, 1 of 6 cycles  Administration: 200  mg (6/19/2025)          Toro Rodriguez is a 75 y.o. male with multiple comorbidities including hypertension, coronary artery disease, CHF, history of DVT, cardiomyopathy,  BPH, stage III CKD, history of melanoma, who had been referred for new diagnosis of bladder cancer. Patient is a former smoker, quit at the age of 33.     He had a CT abdomen and pelvis in April and July 2024 due to hematuria and pelvic pain, which were unremarkable.     CT abdomen from September 5 showed nonspecific diffuse bladder wall thickening and subsequently right hydroureteronephrosis. He had a suprapubic catheter placed on September 27, 2024. Repeat CT from October 31 showed a possible soft tissue mass on the right side of the bladder.     He had a CT abdomen and pelvis with contrast from November 10, 2024 which revealed a 4 cm soft tissue density within the right bladder wall.  He has had multiple CT scans since.      Patient presented with gross hematuria and lower abdominal pain on December 15. He required PRBC support.  Eliquis, which he had been for prior DVT, but stopped 2 months prior to admission.  During this admission, he required a Colon placement and CBI.  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     Renal ultrasound from December 17 showed moderate right hydronephrosis.  CT renal from December 20 showed persistent right-sided moderate hydronephrosis and hydroureter up to the right ureterovesical junction. His creatinine worsened during the admission, and a nephrostomy tube was placed on December 24. He also underwent a suprapubic catheter placement.     CT chest without contrast from December 23, 2024 showed solid 0.8 cm and a 1 cm nodule in the right lower lobe which were new from last year.  Another solid 0.3 cm right upper lobe nodule.     PET-CT 1/9/25 -suggestive of more widespread ds including  multiple bones, liver, lungs, pelvic nodes and possible brain metatasis     PORT placed 1/10/25. He was started on Carboplatin and etosposide on 1/14/2025 for small cell bladder cancer.     Received cycle 2 on 2/4/2025.   Admitted to the hospital from 2/14 - 2/17 after a fall after attempting to sit down and missed chair.  Patient had CHUY and hip dislocation on admission. Hip was reduced in ER. He was subsequently d/c to rehab.      Brain MRI 3/9/25 - negative for metastasis     He received cycles 3 and 4 on 3/25 and 4/15.      He underwent a restaging PET/CT on April 14, 2025 which showed a mixed response.  Resolution of FDG avid hepatic lesions and right pelvic lymph nodes.  Many of the osseous lesions have improved, but new lesions noted in the left base of the skull, and increasing uptake in the left anterior iliac lesion.  Cystoscopy with Dr. Espino on Katie 3, 2025 which did not show any evidence of malignancy     He completed 6 cycles of carboplatin etoposide on May 30, 2025     He had a PET/CT on Katie 10, 2025 which unfortunately showed disease progression.  There is a new hepatic lesions in the right hepatic lobe, new retrocaval lymph node, and increasing uptake in the bony metastatic disease.  There is a fracture in the right sixth rib     Second-line treatment with immunotherapy using Keytruda was initiated on 6/19/2025.    07/03/25: He is here today with his daughter.  Patient is in a wheelchair.  He denies any nausea or vomiting.  He complains of feeling weak.  He states that he has not been able to hydrate well.  Bowel movements have been regular.  Nephrostomy tube is functioning well.  He denies any fevers or chills.  He does note worsening cough over the last month.  No shortness of breath.  No chest pains.  He tells me that they did a chest x-ray at the facility it was  negative. He was given cough syrup that wasn't helpful     Review of Systems   All other systems reviewed and are  "negative.    Medical History Reviewed by provider this encounter:     .      Objective   BP 90/52 (BP Location: Left arm, Patient Position: Sitting, Cuff Size: Standard)   Pulse 72   Temp 97.9 °F (36.6 °C) (Temporal)   Resp 18   Ht 5' 6\" (1.676 m)   SpO2 93%   BMI 29.21 kg/m²     Pain Screening:  Pain Score:   8  ECOG   3  Physical Exam  Vitals reviewed.   Constitutional:       Appearance: Normal appearance.     Cardiovascular:      Rate and Rhythm: Normal rate and regular rhythm.   Pulmonary:      Breath sounds: Rhonchi (RLL) present.     Musculoskeletal:         General: No swelling.     Neurological:      Mental Status: He is alert.     Labs: I have reviewed the following labs:  Lab Results   Component Value Date/Time    WBC 9.12 06/17/2025 12:36 PM    RBC 2.94 (L) 06/17/2025 12:36 PM    Hemoglobin 9.3 (L) 06/17/2025 12:36 PM    Hematocrit 29.2 (L) 06/17/2025 12:36 PM    MCV 99 (H) 06/17/2025 12:36 PM    MCH 31.6 06/17/2025 12:36 PM    RDW 15.9 (H) 06/17/2025 12:36 PM    Platelets 492 (H) 06/17/2025 12:36 PM    Segmented % 78 (H) 06/17/2025 12:36 PM    Lymphocytes % 10 (L) 06/17/2025 12:36 PM    Monocytes % 9 06/17/2025 12:36 PM    Eosinophils Relative 1 06/17/2025 12:36 PM    Basophils Relative 1 06/17/2025 12:36 PM    Immature Grans % 1 06/17/2025 12:36 PM    Absolute Neutrophils 7.09 06/17/2025 12:36 PM     Lab Results   Component Value Date/Time    Potassium 3.9 06/17/2025 12:36 PM    Chloride 102 06/17/2025 12:36 PM    CO2 27 06/17/2025 12:36 PM    BUN 22 06/17/2025 12:36 PM    Creatinine 1.31 (H) 06/17/2025 12:36 PM    Glucose, Fasting 75 04/11/2025 12:01 PM    Calcium 8.4 06/17/2025 12:36 PM    Corrected Calcium 9.2 06/17/2025 12:36 PM    AST 23 06/17/2025 12:36 PM    ALT 32 06/17/2025 12:36 PM    Alkaline Phosphatase 70 06/17/2025 12:36 PM    Total Protein 6.5 06/17/2025 12:36 PM    Albumin 3.0 (L) 06/17/2025 12:36 PM    Total Bilirubin 0.38 06/17/2025 12:36 PM    eGFR 52 06/17/2025 12:36 PM "     Results for orders placed or performed during the hospital encounter of 06/17/25   CBC and differential   Result Value Ref Range    WBC 9.12 4.31 - 10.16 Thousand/uL    RBC 2.94 (L) 3.88 - 5.62 Million/uL    Hemoglobin 9.3 (L) 12.0 - 17.0 g/dL    Hematocrit 29.2 (L) 36.5 - 49.3 %    MCV 99 (H) 82 - 98 fL    MCH 31.6 26.8 - 34.3 pg    MCHC 31.8 31.4 - 37.4 g/dL    RDW 15.9 (H) 11.6 - 15.1 %    MPV 9.2 8.9 - 12.7 fL    Platelets 492 (H) 149 - 390 Thousands/uL    nRBC 0 /100 WBCs    Segmented % 78 (H) 43 - 75 %    Immature Grans % 1 0 - 2 %    Lymphocytes % 10 (L) 14 - 44 %    Monocytes % 9 4 - 12 %    Eosinophils Relative 1 0 - 6 %    Basophils Relative 1 0 - 1 %    Absolute Neutrophils 7.09 1.85 - 7.62 Thousands/µL    Absolute Immature Grans 0.12 0.00 - 0.20 Thousand/uL    Absolute Lymphocytes 0.93 0.60 - 4.47 Thousands/µL    Absolute Monocytes 0.81 0.17 - 1.22 Thousand/µL    Eosinophils Absolute 0.10 0.00 - 0.61 Thousand/µL    Basophils Absolute 0.07 0.00 - 0.10 Thousands/µL   Comprehensive metabolic panel   Result Value Ref Range    Sodium 137 135 - 147 mmol/L    Potassium 3.9 3.5 - 5.3 mmol/L    Chloride 102 96 - 108 mmol/L    CO2 27 21 - 32 mmol/L    ANION GAP 8 4 - 13 mmol/L    BUN 22 5 - 25 mg/dL    Creatinine 1.31 (H) 0.60 - 1.30 mg/dL    Glucose 117 65 - 140 mg/dL    Calcium 8.4 8.4 - 10.2 mg/dL    Corrected Calcium 9.2 8.3 - 10.1 mg/dL    AST 23 13 - 39 U/L    ALT 32 7 - 52 U/L    Alkaline Phosphatase 70 34 - 104 U/L    Total Protein 6.5 6.4 - 8.4 g/dL    Albumin 3.0 (L) 3.5 - 5.0 g/dL    Total Bilirubin 0.38 0.20 - 1.00 mg/dL    eGFR 52 ml/min/1.73sq m   TSH, 3rd generation with Free T4 reflex   Result Value Ref Range    TSH 3RD GENERATION 1.279 0.450 - 4.500 uIU/mL        Radiology Results Review : No pertinent imaging studies reviewed.    Administrative Statements   I have spent a total time of 40 minutes in caring for this patient on the day of the visit/encounter including Diagnostic results,  Prognosis, Risks and benefits of tx options, Importance of tx compliance, Documenting in the medical record, Reviewing/placing orders in the medical record (including tests, medications, and/or procedures), and Obtaining or reviewing history  .

## 2025-07-03 NOTE — ASSESSMENT & PLAN NOTE
Completed 6 cycles of carboplatin and etoposide 1/14 - 5/30/2025.  PET/CT scan from Katie 10, 2025 showed disease progression with widespread involvement of the liver, bones, pelvic lymph nodes, and lung metastasis.   Second-line treatment with immunotherapy using Keytruda was started on 6/19/2025.   Tolerated first cycle well.   Proceed with cycles 2 as scheduled  Return to clinic prior to cycle 3.  Plan to repeat scans prior to cycle 5.        Does not tolerate CPAP.

## 2025-07-03 NOTE — ASSESSMENT & PLAN NOTE
Hb 9.3.  Past h/o Iron def with a iron sat of 8% in Feb.   S/p IV venofer 6 doses up till 6/17/2025.   Recheck levels

## 2025-07-08 ENCOUNTER — HOSPITAL ENCOUNTER (OUTPATIENT)
Dept: INFUSION CENTER | Facility: HOSPITAL | Age: 75
End: 2025-07-08
Attending: INTERNAL MEDICINE

## 2025-07-09 ENCOUNTER — HOSPITAL ENCOUNTER (OUTPATIENT)
Dept: INFUSION CENTER | Facility: HOSPITAL | Age: 75
Discharge: HOME | End: 2025-07-09
Attending: INTERNAL MEDICINE
Payer: MEDICARE

## 2025-07-09 ENCOUNTER — TELEPHONE (OUTPATIENT)
Age: 75
End: 2025-07-09

## 2025-07-09 VITALS
SYSTOLIC BLOOD PRESSURE: 105 MMHG | TEMPERATURE: 97 F | HEART RATE: 76 BPM | DIASTOLIC BLOOD PRESSURE: 58 MMHG | RESPIRATION RATE: 16 BRPM

## 2025-07-09 DIAGNOSIS — C67.8 MALIGNANT NEOPLASM OF OVERLAPPING SITES OF BLADDER (HCC): ICD-10-CM

## 2025-07-09 DIAGNOSIS — D50.0 IRON DEFICIENCY ANEMIA DUE TO CHRONIC BLOOD LOSS: Primary | ICD-10-CM

## 2025-07-09 LAB
ALBUMIN SERPL BCG-MCNC: 3 G/DL (ref 3.5–5)
ALP SERPL-CCNC: 81 U/L (ref 34–104)
ALT SERPL W P-5'-P-CCNC: 12 U/L (ref 7–52)
ANION GAP SERPL CALCULATED.3IONS-SCNC: 9 MMOL/L (ref 4–13)
AST SERPL W P-5'-P-CCNC: 16 U/L (ref 13–39)
BASOPHILS # BLD AUTO: 0.05 THOUSANDS/ÂΜL (ref 0–0.1)
BASOPHILS NFR BLD AUTO: 1 % (ref 0–1)
BILIRUB SERPL-MCNC: 0.52 MG/DL (ref 0.2–1)
BUN SERPL-MCNC: 26 MG/DL (ref 5–25)
CALCIUM ALBUM COR SERPL-MCNC: 11 MG/DL (ref 8.3–10.1)
CALCIUM SERPL-MCNC: 10.2 MG/DL (ref 8.4–10.2)
CHLORIDE SERPL-SCNC: 100 MMOL/L (ref 96–108)
CO2 SERPL-SCNC: 26 MMOL/L (ref 21–32)
CREAT SERPL-MCNC: 1.36 MG/DL (ref 0.6–1.3)
EOSINOPHIL # BLD AUTO: 0.54 THOUSAND/ÂΜL (ref 0–0.61)
EOSINOPHIL NFR BLD AUTO: 6 % (ref 0–6)
ERYTHROCYTE [DISTWIDTH] IN BLOOD BY AUTOMATED COUNT: 15.9 % (ref 11.6–15.1)
GFR SERPL CREATININE-BSD FRML MDRD: 49 ML/MIN/1.73SQ M
GLUCOSE SERPL-MCNC: 92 MG/DL (ref 65–140)
HCT VFR BLD AUTO: 31 % (ref 36.5–49.3)
HGB BLD-MCNC: 10 G/DL (ref 12–17)
IMM GRANULOCYTES # BLD AUTO: 0.07 THOUSAND/UL (ref 0–0.2)
IMM GRANULOCYTES NFR BLD AUTO: 1 % (ref 0–2)
LYMPHOCYTES # BLD AUTO: 0.85 THOUSANDS/ÂΜL (ref 0.6–4.47)
LYMPHOCYTES NFR BLD AUTO: 9 % (ref 14–44)
MCH RBC QN AUTO: 31.1 PG (ref 26.8–34.3)
MCHC RBC AUTO-ENTMCNC: 32.3 G/DL (ref 31.4–37.4)
MCV RBC AUTO: 96 FL (ref 82–98)
MONOCYTES # BLD AUTO: 0.58 THOUSAND/ÂΜL (ref 0.17–1.22)
MONOCYTES NFR BLD AUTO: 6 % (ref 4–12)
NEUTROPHILS # BLD AUTO: 6.98 THOUSANDS/ÂΜL (ref 1.85–7.62)
NEUTS SEG NFR BLD AUTO: 77 % (ref 43–75)
NRBC BLD AUTO-RTO: 0 /100 WBCS
PLATELET # BLD AUTO: 363 THOUSANDS/UL (ref 149–390)
PMV BLD AUTO: 8.6 FL (ref 8.9–12.7)
POTASSIUM SERPL-SCNC: 4.4 MMOL/L (ref 3.5–5.3)
PROT SERPL-MCNC: 7.2 G/DL (ref 6.4–8.4)
RBC # BLD AUTO: 3.22 MILLION/UL (ref 3.88–5.62)
SODIUM SERPL-SCNC: 135 MMOL/L (ref 135–147)
TSH SERPL DL<=0.05 MIU/L-ACNC: 2.34 UIU/ML (ref 0.45–4.5)
WBC # BLD AUTO: 9.07 THOUSAND/UL (ref 4.31–10.16)

## 2025-07-09 PROCEDURE — 96360 HYDRATION IV INFUSION INIT: CPT

## 2025-07-09 PROCEDURE — 80053 COMPREHEN METABOLIC PANEL: CPT | Performed by: INTERNAL MEDICINE

## 2025-07-09 PROCEDURE — 84443 ASSAY THYROID STIM HORMONE: CPT | Performed by: INTERNAL MEDICINE

## 2025-07-09 PROCEDURE — 85025 COMPLETE CBC W/AUTO DIFF WBC: CPT | Performed by: INTERNAL MEDICINE

## 2025-07-09 RX ADMIN — SODIUM CHLORIDE 1000 ML: 0.9 INJECTION, SOLUTION INTRAVENOUS at 13:51

## 2025-07-09 NOTE — PROGRESS NOTES
Pt presents to infusion center today for hydration and labs. He has productive cough, runny nose, SOB, and fatigue since the weekend that is not improving with z-pack and cold medicine. Per Angelique Grover RN, hold treatment for tomorrow and resume in 3 weeks. She will call facility. Paperwork with change in appts sent with pt. Left voicemail for patient's daughter informing her of the above. Next appt 7/30/25 at 1230.

## 2025-07-09 NOTE — PLAN OF CARE
Problem: Potential for Falls  Goal: Patient will remain free of falls  Description: INTERVENTIONS:  - Educate patient/family on patient safety including physical limitations  - Instruct patient to call for assistance with activity   - Consider consulting OT/PT to assist with strengthening/mobility based on AM PAC & JH-HLM score  - Consult OT/PT to assist with strengthening/mobility   - Keep Call bell within reach  - Keep bed low and locked with side rails adjusted as appropriate  - Keep care items and personal belongings within reach  - Initiate and maintain comfort rounds  - Apply yellow socks and bracelet for high fall risk patients  - Consider moving patient to room near nurses station  Outcome: Progressing

## 2025-07-09 NOTE — TELEPHONE ENCOUNTER
Received message from Annamarie at infusion.   Patient is scheduled for keytruda today and presented to infusion with a possible upper respiratory infection.   We did see him in the office last week and Dr. Foy ordered a zpak, but patient is a poor historian and we are unable to determine if he already finished this medication. He mentioned that he had an xray the other day. I did call his facility and was not able to speak to a nurse. I had to leave a voicemail asking for the nurse to call back and let us know if his symptoms are being treated. Dr. Foy mentioned about possibly doing a CT chest without contrast if he hasn't had an xray done. Will wait for her to return the call. In the meantime, his treatment today will be canceled.

## 2025-07-10 ENCOUNTER — HOSPITAL ENCOUNTER (OUTPATIENT)
Dept: INFUSION CENTER | Facility: HOSPITAL | Age: 75
End: 2025-07-10
Attending: INTERNAL MEDICINE

## 2025-07-11 ENCOUNTER — TELEPHONE (OUTPATIENT)
Age: 75
End: 2025-07-11

## 2025-07-11 NOTE — TELEPHONE ENCOUNTER
Called [facility] to speak to Toro's nurse. The  forwarded me to the nurse but I had to leave a voicemail requesting a call back. I tried to call on 7/9 as well, left a voicemail, and no one ever called back.    Patient was scheduled for keytruda on 7/9 and presented to [facility] with a possible upper respiratory infection. We did see him in the office last week and Dr. Foy ordered a zpak, but patient is a poor historian and we are unable to determine if he actually started the zpak. He mentioned that he had an xray the other day. I did call his facility and was not able to speak to a nurse. I had to leave a voicemail asking for the nurse to call back and let us know if his symptoms are being treated. Dr. Foy mentioned about possibly doing a CT chest without contrast if he hasn't had an xray done. Will wait for her to return the call. In the meantime, his keytruda for this week was canceled.

## 2025-07-16 ENCOUNTER — PATIENT OUTREACH (OUTPATIENT)
Dept: HEMATOLOGY ONCOLOGY | Facility: CLINIC | Age: 75
End: 2025-07-16

## 2025-07-16 NOTE — PROGRESS NOTES
I reached out and spoke with Shanique Engel's daughter to follow up and to review for any new changes in barriers to care and offer supportive services as needed.     Barriers noted previously and outcome of interventions;  N/a     Reviewed for any new barriers as noted below.    Are you having any side effects from your treatment?Yes, describe: fatigued and tired     Are you eating and drinking normally? Yes on board with a dietician at [facility] [place] stated     Have you been experiencing any uncontrolled pain related to your cancer diagnosis? No    If not already established, have needs changed for a palliative care referral? no- has palliative care doctor through [facility] Shanique stated     Do you have a good support system? No    Are you interested in any support groups? Declined at this time     How are you doing with transportation to your appointments? Good no issues     Do you have any questions or concerns regarding your treatment plan? No    Any new financial concerns for your household or medical bills? No    Do you know when your upcoming appointments are? yes  Future Appointments   Date Time Provider Department Center   7/24/2025 11:40 AM Juan Carlos Foy MD HEM ONC  Practice-Onc   7/30/2025 12:30 PM BE INF QUICK CHAIR [facility]   7/31/2025 12:00 PM BE INF CHAIR 9 [facility]   8/20/2025 11:30 AM BE INF QUICK CHAIR [facility]   8/21/2025  1:30 PM BE INF CHAIR 2 [facility]   9/2/2025 10:00 AM AL IR 1 [facility] IR [facility]   9/10/2025 11:30 AM BE INF QUICK CHAIR [facility]   9/11/2025 11:30 AM BE INF CHAIR 10 [facility]   10/1/2025 11:30 AM BE INF QUICK CHAIR [facility]   10/2/2025 12:30 PM BE INF CHAIR 7 BE Infusion BE hospitals   10/13/2025  8:30 [facility]   12/3/2025  2:00 PM Rick Espino MD [facility]          Based on individual needs I will follow  up with them in 4-6 weeks. I have provided my direct contact information and welcome them to contact me if their needs as discussed above change. They were appreciative for the call.

## 2025-07-22 ENCOUNTER — TELEPHONE (OUTPATIENT)
Age: 75
End: 2025-07-22

## 2025-07-22 DIAGNOSIS — C67.8 MALIGNANT NEOPLASM OF OVERLAPPING SITES OF BLADDER (HCC): Primary | ICD-10-CM

## 2025-07-22 NOTE — TELEPHONE ENCOUNTER
Called and spoke to Shanique. Recommended an ED visit. ADT order placed. Will follow up after discharge.

## 2025-07-22 NOTE — TELEPHONE ENCOUNTER
Received a call from Shanique.   She wanted to let us know that the provider at his assisted living facility came to assess Toro today. Shanique said that the provider believes he is actively dying and they have consulted hospice. Shanique said that he is very weak, not eating/drinking, and confused. She is going to call us back after the hospice visit with an update.

## 2025-07-23 NOTE — TELEPHONE ENCOUNTER
Per Shanique, Toro will be starting hospice services today. I will have his follow up canceled with Dr. Foy tomorrow and will also notify infusion to cancel treatment appointments.

## 2025-08-02 PROBLEM — J18.9 PNEUMONIA OF RIGHT LOWER LOBE DUE TO INFECTIOUS ORGANISM: Status: RESOLVED | Noted: 2025-07-03 | Resolved: 2025-08-02

## (undated) DEVICE — SUT VICRYL 3-0 REEL 54 IN J285G

## (undated) DEVICE — CATH DIAG 5FR IMPULSE 110CM 6S PIG 145 ANG

## (undated) DEVICE — CATH FOLEY 18FR 5ML 2WAY LUBRICATH

## (undated) DEVICE — DGW .035 FC J3MM 260CM TEF: Brand: EMERALD

## (undated) DEVICE — INTENDED FOR TISSUE SEPARATION, AND OTHER PROCEDURES THAT REQUIRE A SHARP SURGICAL BLADE TO PUNCTURE OR CUT.: Brand: BARD-PARKER SAFETY BLADES SIZE 15, STERILE

## (undated) DEVICE — SUT SILK 2-0 SH 30 IN K833H

## (undated) DEVICE — RADIFOCUS OPTITORQUE ANGIOGRAPHIC CATHETER: Brand: OPTITORQUE

## (undated) DEVICE — GLOVE INDICATOR PI UNDERGLOVE SZ 8 BLUE

## (undated) DEVICE — THE VASC BAND HEMOSTAT IS A COMPRESSION DEVICE TO ASSIST HEMOSTASIS OF ARTERIAL, VENOUS AND HEMODIALYSIS PERCUTANEOUS ACCESS SITES.: Brand: VASC BAND™ HEMOSTAT

## (undated) DEVICE — CHLORHEXIDINE 4PCT 4 OZ

## (undated) DEVICE — SUT MONOCRYL 4-0 PS-2 27 IN Y426H

## (undated) DEVICE — CATH FOLEY HEMATURIA 24FR 30ML 3 WAY LUBRICATH

## (undated) DEVICE — PACK TUR

## (undated) DEVICE — GLIDESHEATH BASIC HYDROPHILIC COATED INTRODUCER SHEATH: Brand: GLIDESHEATH

## (undated) DEVICE — MEDI-VAC YANK SUCT HNDL W/TPRD BULBOUS TIP: Brand: CARDINAL HEALTH

## (undated) DEVICE — GUIDEWIRE STRGHT TIP 0.035 IN  SOLO PLUS

## (undated) DEVICE — CHLORAPREP HI-LITE 26ML ORANGE

## (undated) DEVICE — GLOVE SRG BIOGEL 6.5

## (undated) DEVICE — BETHLEHEM UNIVERSAL MINOR GEN: Brand: CARDINAL HEALTH

## (undated) DEVICE — GLOVE SRG BIOGEL ECLIPSE 7.5

## (undated) DEVICE — NEEDLE 25G X 1 1/2

## (undated) DEVICE — HF-RESECTION ELECTRODE PLASMALOOP LOOP, MEDIUM, 24 FR., 12°-30°, PK TURIS: Brand: OLYMPUS

## (undated) DEVICE — TUBING SUCTION 5MM X 12 FT

## (undated) DEVICE — BASIC SINGLE BASIN 2-LF: Brand: MEDLINE INDUSTRIES, INC.

## (undated) DEVICE — ADHESIVE SKIN HIGH VISCOSITY EXOFIN 1ML

## (undated) DEVICE — SUT VICRYL 2-0 SH 27 IN UNDYED J417H

## (undated) DEVICE — HF-RESECTION ELECTRODE PLASMABUTTON BUTTON, 24 FR., 12°-30°, PK TURIS: Brand: OLYMPUS

## (undated) DEVICE — PREMIUM DRY TRAY LF: Brand: MEDLINE INDUSTRIES, INC.

## (undated) DEVICE — Device: Brand: ASAHI SILVERWAY

## (undated) DEVICE — SKIN MARKER DUAL TIP WITH RULER CAP, FLEXIBLE RULER AND LABELS: Brand: DEVON

## (undated) DEVICE — SPECIMEN CONTAINER STERILE PEEL PACK

## (undated) DEVICE — ELECTRODE BLADE MOD E-Z CLEAN 2.5IN 6.4CM -0012M

## (undated) DEVICE — BAG URINE DRAINAGE 2000ML ANTI RFLX LF

## (undated) DEVICE — 3M™ STERI-STRIP™ REINFORCED ADHESIVE SKIN CLOSURES, R1547, 1/2 IN X 4 IN (12 MM X 100 MM), 6 STRIPS/ENVELOPE: Brand: 3M™ STERI-STRIP™